# Patient Record
Sex: MALE | Race: WHITE | NOT HISPANIC OR LATINO | Employment: OTHER | URBAN - METROPOLITAN AREA
[De-identification: names, ages, dates, MRNs, and addresses within clinical notes are randomized per-mention and may not be internally consistent; named-entity substitution may affect disease eponyms.]

---

## 2017-04-06 ENCOUNTER — HOSPITAL ENCOUNTER (OUTPATIENT)
Dept: NON INVASIVE DIAGNOSTICS | Facility: CLINIC | Age: 72
Discharge: HOME | End: 2017-04-06
Payer: COMMERCIAL

## 2017-04-06 DIAGNOSIS — I72.4 ANEURYSM OF ARTERY OF LOWER EXTREMITY (HCC): ICD-10-CM

## 2017-04-06 DIAGNOSIS — I71.4 ABDOMINAL AORTIC ANEURYSM WITHOUT RUPTURE (HCC): ICD-10-CM

## 2017-04-06 DIAGNOSIS — I65.29 OCCLUSION AND STENOSIS OF UNSPECIFIED CAROTID ARTERY: ICD-10-CM

## 2017-04-06 PROCEDURE — 93925 LOWER EXTREMITY STUDY: CPT

## 2017-04-06 PROCEDURE — 93880 EXTRACRANIAL BILAT STUDY: CPT

## 2017-04-06 PROCEDURE — 93978 VASCULAR STUDY: CPT

## 2017-04-06 PROCEDURE — 93923 UPR/LXTR ART STDY 3+ LVLS: CPT

## 2017-04-17 ENCOUNTER — HOSPITAL ENCOUNTER (OUTPATIENT)
Facility: HOSPITAL | Age: 72
Setting detail: OBSERVATION
Discharge: HOME HEALTH | End: 2017-04-18
Attending: EMERGENCY MEDICINE | Admitting: SURGERY
Payer: COMMERCIAL

## 2017-04-17 ENCOUNTER — APPOINTMENT (EMERGENCY)
Dept: RADIOLOGY | Facility: HOSPITAL | Age: 72
End: 2017-04-17
Payer: COMMERCIAL

## 2017-04-17 DIAGNOSIS — S32.000A LUMBAR COMPRESSION FRACTURE, CLOSED, INITIAL ENCOUNTER (HCC): Primary | ICD-10-CM

## 2017-04-17 PROCEDURE — 70450 CT HEAD/BRAIN W/O DYE: CPT

## 2017-04-17 PROCEDURE — 90471 IMMUNIZATION ADMIN: CPT

## 2017-04-17 PROCEDURE — 90715 TDAP VACCINE 7 YRS/> IM: CPT | Performed by: EMERGENCY MEDICINE

## 2017-04-17 PROCEDURE — 99285 EMERGENCY DEPT VISIT HI MDM: CPT

## 2017-04-17 PROCEDURE — 72131 CT LUMBAR SPINE W/O DYE: CPT

## 2017-04-17 PROCEDURE — 72125 CT NECK SPINE W/O DYE: CPT

## 2017-04-17 PROCEDURE — 96374 THER/PROPH/DIAG INJ IV PUSH: CPT

## 2017-04-17 RX ORDER — ONDANSETRON 2 MG/ML
4 INJECTION INTRAMUSCULAR; INTRAVENOUS EVERY 6 HOURS PRN
Status: DISCONTINUED | OUTPATIENT
Start: 2017-04-17 | End: 2017-04-18 | Stop reason: HOSPADM

## 2017-04-17 RX ORDER — CHLORAL HYDRATE 500 MG
1000 CAPSULE ORAL DAILY
Status: DISCONTINUED | OUTPATIENT
Start: 2017-04-18 | End: 2017-04-18 | Stop reason: HOSPADM

## 2017-04-17 RX ORDER — ACETAMINOPHEN 325 MG/1
650 TABLET ORAL EVERY 6 HOURS SCHEDULED
Status: DISCONTINUED | OUTPATIENT
Start: 2017-04-17 | End: 2017-04-18 | Stop reason: HOSPADM

## 2017-04-17 RX ORDER — SODIUM CHLORIDE 9 MG/ML
100 INJECTION, SOLUTION INTRAVENOUS CONTINUOUS
Status: DISCONTINUED | OUTPATIENT
Start: 2017-04-18 | End: 2017-04-18 | Stop reason: HOSPADM

## 2017-04-17 RX ORDER — OXYCODONE HYDROCHLORIDE 5 MG/1
5 TABLET ORAL EVERY 4 HOURS PRN
Status: DISCONTINUED | OUTPATIENT
Start: 2017-04-17 | End: 2017-04-18 | Stop reason: HOSPADM

## 2017-04-17 RX ORDER — HEPARIN SODIUM 5000 [USP'U]/ML
5000 INJECTION, SOLUTION INTRAVENOUS; SUBCUTANEOUS EVERY 8 HOURS SCHEDULED
Status: DISCONTINUED | OUTPATIENT
Start: 2017-04-17 | End: 2017-04-18 | Stop reason: HOSPADM

## 2017-04-17 RX ORDER — ONDANSETRON 2 MG/ML
4 INJECTION INTRAMUSCULAR; INTRAVENOUS EVERY 4 HOURS PRN
Status: DISCONTINUED | OUTPATIENT
Start: 2017-04-17 | End: 2017-04-17

## 2017-04-17 RX ORDER — CARVEDILOL 12.5 MG/1
12.5 TABLET ORAL 2 TIMES DAILY WITH MEALS
Status: DISCONTINUED | OUTPATIENT
Start: 2017-04-18 | End: 2017-04-18 | Stop reason: HOSPADM

## 2017-04-17 RX ORDER — OXYCODONE HYDROCHLORIDE 10 MG/1
10 TABLET ORAL EVERY 4 HOURS PRN
Status: DISCONTINUED | OUTPATIENT
Start: 2017-04-17 | End: 2017-04-18 | Stop reason: HOSPADM

## 2017-04-17 RX ORDER — LISINOPRIL 20 MG/1
20 TABLET ORAL DAILY
Status: DISCONTINUED | OUTPATIENT
Start: 2017-04-18 | End: 2017-04-18 | Stop reason: HOSPADM

## 2017-04-17 RX ORDER — GINSENG 100 MG
1 CAPSULE ORAL 2 TIMES DAILY
Status: DISCONTINUED | OUTPATIENT
Start: 2017-04-17 | End: 2017-04-18 | Stop reason: HOSPADM

## 2017-04-17 RX ORDER — PRAVASTATIN SODIUM 40 MG
40 TABLET ORAL
Status: DISCONTINUED | OUTPATIENT
Start: 2017-04-18 | End: 2017-04-18 | Stop reason: HOSPADM

## 2017-04-17 RX ADMIN — BACITRACIN ZINC 1 LARGE APPLICATION: 500 OINTMENT TOPICAL at 20:27

## 2017-04-17 RX ADMIN — HYDROMORPHONE HYDROCHLORIDE 0.5 MG: 1 INJECTION, SOLUTION INTRAMUSCULAR; INTRAVENOUS; SUBCUTANEOUS at 16:55

## 2017-04-17 RX ADMIN — OXYCODONE HYDROCHLORIDE 10 MG: 10 TABLET ORAL at 19:15

## 2017-04-17 RX ADMIN — ACETAMINOPHEN 650 MG: 325 TABLET, FILM COATED ORAL at 19:15

## 2017-04-17 RX ADMIN — TETANUS TOXOID, REDUCED DIPHTHERIA TOXOID AND ACELLULAR PERTUSSIS VACCINE, ADSORBED 0.5 ML: 5; 2.5; 8; 8; 2.5 SUSPENSION INTRAMUSCULAR at 16:59

## 2017-04-17 RX ADMIN — HEPARIN SODIUM 5000 UNITS: 5000 INJECTION, SOLUTION INTRAVENOUS; SUBCUTANEOUS at 21:25

## 2017-04-18 ENCOUNTER — APPOINTMENT (OUTPATIENT)
Dept: OCCUPATIONAL THERAPY | Facility: HOSPITAL | Age: 72
End: 2017-04-18
Payer: COMMERCIAL

## 2017-04-18 ENCOUNTER — APPOINTMENT (OUTPATIENT)
Dept: RADIOLOGY | Facility: HOSPITAL | Age: 72
End: 2017-04-18
Payer: COMMERCIAL

## 2017-04-18 VITALS
HEART RATE: 64 BPM | HEIGHT: 66 IN | DIASTOLIC BLOOD PRESSURE: 84 MMHG | TEMPERATURE: 98.6 F | SYSTOLIC BLOOD PRESSURE: 149 MMHG | OXYGEN SATURATION: 97 % | WEIGHT: 204 LBS | RESPIRATION RATE: 17 BRPM | BODY MASS INDEX: 32.78 KG/M2

## 2017-04-18 PROBLEM — S32.010A CLOSED COMPRESSION FRACTURE OF FIRST LUMBAR VERTEBRA (HCC): Status: ACTIVE | Noted: 2017-04-18

## 2017-04-18 LAB
ANION GAP SERPL CALCULATED.3IONS-SCNC: 4 MMOL/L (ref 4–13)
APTT PPP: 30 SECONDS (ref 24–36)
BASOPHILS # BLD AUTO: 0.04 THOUSANDS/ΜL (ref 0–0.1)
BASOPHILS NFR BLD AUTO: 1 % (ref 0–1)
BUN SERPL-MCNC: 12 MG/DL (ref 5–25)
CALCIUM SERPL-MCNC: 8.1 MG/DL (ref 8.3–10.1)
CHLORIDE SERPL-SCNC: 106 MMOL/L (ref 100–108)
CO2 SERPL-SCNC: 29 MMOL/L (ref 21–32)
CREAT SERPL-MCNC: 0.83 MG/DL (ref 0.6–1.3)
EOSINOPHIL # BLD AUTO: 0.26 THOUSAND/ΜL (ref 0–0.61)
EOSINOPHIL NFR BLD AUTO: 5 % (ref 0–6)
ERYTHROCYTE [DISTWIDTH] IN BLOOD BY AUTOMATED COUNT: 12.4 % (ref 11.6–15.1)
GFR SERPL CREATININE-BSD FRML MDRD: >60 ML/MIN/1.73SQ M
GLUCOSE SERPL-MCNC: 98 MG/DL (ref 65–140)
HCT VFR BLD AUTO: 33.8 % (ref 36.5–49.3)
HGB BLD-MCNC: 11.5 G/DL (ref 12–17)
INR PPP: 1.12 (ref 0.86–1.16)
LYMPHOCYTES # BLD AUTO: 1.72 THOUSANDS/ΜL (ref 0.6–4.47)
LYMPHOCYTES NFR BLD AUTO: 31 % (ref 14–44)
MCH RBC QN AUTO: 32.5 PG (ref 26.8–34.3)
MCHC RBC AUTO-ENTMCNC: 34 G/DL (ref 31.4–37.4)
MCV RBC AUTO: 96 FL (ref 82–98)
MONOCYTES # BLD AUTO: 0.68 THOUSAND/ΜL (ref 0.17–1.22)
MONOCYTES NFR BLD AUTO: 12 % (ref 4–12)
NEUTROPHILS # BLD AUTO: 2.82 THOUSANDS/ΜL (ref 1.85–7.62)
NEUTS SEG NFR BLD AUTO: 51 % (ref 43–75)
NRBC BLD AUTO-RTO: 0 /100 WBCS
PLATELET # BLD AUTO: 144 THOUSANDS/UL (ref 149–390)
PMV BLD AUTO: 8.8 FL (ref 8.9–12.7)
POTASSIUM SERPL-SCNC: 4.1 MMOL/L (ref 3.5–5.3)
PROTHROMBIN TIME: 14.5 SECONDS (ref 12–14.3)
RBC # BLD AUTO: 3.54 MILLION/UL (ref 3.88–5.62)
SODIUM SERPL-SCNC: 139 MMOL/L (ref 136–145)
WBC # BLD AUTO: 5.53 THOUSAND/UL (ref 4.31–10.16)

## 2017-04-18 PROCEDURE — G8987 SELF CARE CURRENT STATUS: HCPCS

## 2017-04-18 PROCEDURE — G8979 MOBILITY GOAL STATUS: HCPCS

## 2017-04-18 PROCEDURE — 85610 PROTHROMBIN TIME: CPT | Performed by: EMERGENCY MEDICINE

## 2017-04-18 PROCEDURE — 85025 COMPLETE CBC W/AUTO DIFF WBC: CPT | Performed by: EMERGENCY MEDICINE

## 2017-04-18 PROCEDURE — 80048 BASIC METABOLIC PNL TOTAL CA: CPT | Performed by: EMERGENCY MEDICINE

## 2017-04-18 PROCEDURE — 72080 X-RAY EXAM THORACOLMB 2/> VW: CPT

## 2017-04-18 PROCEDURE — 97530 THERAPEUTIC ACTIVITIES: CPT

## 2017-04-18 PROCEDURE — 97163 PT EVAL HIGH COMPLEX 45 MIN: CPT

## 2017-04-18 PROCEDURE — 85730 THROMBOPLASTIN TIME PARTIAL: CPT | Performed by: EMERGENCY MEDICINE

## 2017-04-18 PROCEDURE — G8978 MOBILITY CURRENT STATUS: HCPCS

## 2017-04-18 PROCEDURE — 72100 X-RAY EXAM L-S SPINE 2/3 VWS: CPT

## 2017-04-18 PROCEDURE — 97116 GAIT TRAINING THERAPY: CPT

## 2017-04-18 PROCEDURE — G8988 SELF CARE GOAL STATUS: HCPCS

## 2017-04-18 PROCEDURE — 97166 OT EVAL MOD COMPLEX 45 MIN: CPT

## 2017-04-18 RX ADMIN — ACETAMINOPHEN 650 MG: 325 TABLET, FILM COATED ORAL at 12:31

## 2017-04-18 RX ADMIN — SODIUM CHLORIDE 100 ML/HR: 0.9 INJECTION, SOLUTION INTRAVENOUS at 10:15

## 2017-04-18 RX ADMIN — OXYCODONE HYDROCHLORIDE 5 MG: 5 TABLET ORAL at 00:32

## 2017-04-18 RX ADMIN — BACITRACIN ZINC 1 LARGE APPLICATION: 500 OINTMENT TOPICAL at 08:46

## 2017-04-18 RX ADMIN — BACITRACIN ZINC 1 LARGE APPLICATION: 500 OINTMENT TOPICAL at 18:02

## 2017-04-18 RX ADMIN — CARVEDILOL 12.5 MG: 12.5 TABLET, FILM COATED ORAL at 08:46

## 2017-04-18 RX ADMIN — ACETAMINOPHEN 650 MG: 325 TABLET, FILM COATED ORAL at 05:36

## 2017-04-18 RX ADMIN — PRAVASTATIN SODIUM 40 MG: 40 TABLET ORAL at 18:01

## 2017-04-18 RX ADMIN — Medication 1000 MG: at 08:46

## 2017-04-18 RX ADMIN — Medication 1 TABLET: at 08:46

## 2017-04-18 RX ADMIN — ACETAMINOPHEN 650 MG: 325 TABLET, FILM COATED ORAL at 18:02

## 2017-04-18 RX ADMIN — ACETAMINOPHEN 650 MG: 325 TABLET, FILM COATED ORAL at 00:26

## 2017-04-18 RX ADMIN — HEPARIN SODIUM 5000 UNITS: 5000 INJECTION, SOLUTION INTRAVENOUS; SUBCUTANEOUS at 05:36

## 2017-04-18 RX ADMIN — SODIUM CHLORIDE 100 ML/HR: 0.9 INJECTION, SOLUTION INTRAVENOUS at 00:30

## 2017-04-18 RX ADMIN — CARVEDILOL 12.5 MG: 12.5 TABLET, FILM COATED ORAL at 18:02

## 2017-04-18 RX ADMIN — LISINOPRIL 20 MG: 20 TABLET ORAL at 08:46

## 2017-04-20 ENCOUNTER — ALLSCRIPTS OFFICE VISIT (OUTPATIENT)
Dept: OTHER | Facility: OTHER | Age: 72
End: 2017-04-20

## 2017-04-25 ENCOUNTER — HOSPITAL ENCOUNTER (OUTPATIENT)
Dept: RADIOLOGY | Facility: CLINIC | Age: 72
Discharge: HOME | End: 2017-04-25
Payer: COMMERCIAL

## 2017-04-25 ENCOUNTER — TRANSCRIBE ORDERS (OUTPATIENT)
Dept: RADIOLOGY | Facility: CLINIC | Age: 72
End: 2017-04-25

## 2017-04-25 DIAGNOSIS — S34.101A: ICD-10-CM

## 2017-04-25 DIAGNOSIS — S34.101A: Primary | ICD-10-CM

## 2017-04-25 PROCEDURE — 72080 X-RAY EXAM THORACOLMB 2/> VW: CPT

## 2017-04-26 ENCOUNTER — ALLSCRIPTS OFFICE VISIT (OUTPATIENT)
Dept: OTHER | Facility: OTHER | Age: 72
End: 2017-04-26

## 2017-05-09 ENCOUNTER — GENERIC CONVERSION - ENCOUNTER (OUTPATIENT)
Dept: OTHER | Facility: OTHER | Age: 72
End: 2017-05-09

## 2017-05-12 DIAGNOSIS — S32.010D WEDGE COMPRESSION FRACTURE OF FIRST LUMBAR VERTEBRA WITH ROUTINE HEALING: ICD-10-CM

## 2017-05-12 DIAGNOSIS — I10 ESSENTIAL (PRIMARY) HYPERTENSION: ICD-10-CM

## 2017-05-12 DIAGNOSIS — I71.4 ABDOMINAL AORTIC ANEURYSM WITHOUT RUPTURE (HCC): ICD-10-CM

## 2017-05-30 ENCOUNTER — HOSPITAL ENCOUNTER (OUTPATIENT)
Dept: RADIOLOGY | Facility: CLINIC | Age: 72
Discharge: HOME | End: 2017-05-30
Payer: COMMERCIAL

## 2017-05-30 DIAGNOSIS — S32.010D WEDGE COMPRESSION FRACTURE OF FIRST LUMBAR VERTEBRA WITH ROUTINE HEALING: ICD-10-CM

## 2017-05-30 PROCEDURE — 72100 X-RAY EXAM L-S SPINE 2/3 VWS: CPT

## 2017-05-31 ENCOUNTER — TRANSCRIBE ORDERS (OUTPATIENT)
Dept: RADIOLOGY | Facility: HOSPITAL | Age: 72
End: 2017-05-31

## 2017-05-31 ENCOUNTER — HOSPITAL ENCOUNTER (OUTPATIENT)
Dept: RADIOLOGY | Facility: HOSPITAL | Age: 72
Discharge: HOME | End: 2017-05-31
Payer: COMMERCIAL

## 2017-05-31 ENCOUNTER — ALLSCRIPTS OFFICE VISIT (OUTPATIENT)
Dept: OTHER | Facility: OTHER | Age: 72
End: 2017-05-31

## 2017-05-31 DIAGNOSIS — S32.010D WEDGE COMPRESSION FRACTURE OF FIRST LUMBAR VERTEBRA WITH ROUTINE HEALING: ICD-10-CM

## 2017-05-31 PROCEDURE — 72020 X-RAY EXAM OF SPINE 1 VIEW: CPT

## 2017-06-06 ENCOUNTER — ALLSCRIPTS OFFICE VISIT (OUTPATIENT)
Dept: OTHER | Facility: OTHER | Age: 72
End: 2017-06-06

## 2017-06-07 ENCOUNTER — APPOINTMENT (OUTPATIENT)
Dept: LAB | Facility: CLINIC | Age: 72
End: 2017-06-07
Payer: COMMERCIAL

## 2017-06-07 DIAGNOSIS — I10 ESSENTIAL (PRIMARY) HYPERTENSION: ICD-10-CM

## 2017-06-07 DIAGNOSIS — R35.1 NOCTURIA: ICD-10-CM

## 2017-06-07 DIAGNOSIS — R31.9 HEMATURIA: ICD-10-CM

## 2017-06-07 LAB
ALBUMIN SERPL BCP-MCNC: 3.8 G/DL (ref 3.5–5)
ALP SERPL-CCNC: 49 U/L (ref 46–116)
ALT SERPL W P-5'-P-CCNC: 28 U/L (ref 12–78)
ANION GAP SERPL CALCULATED.3IONS-SCNC: 6 MMOL/L (ref 4–13)
AST SERPL W P-5'-P-CCNC: 18 U/L (ref 5–45)
BASOPHILS # BLD AUTO: 0.05 THOUSANDS/ΜL (ref 0–0.1)
BASOPHILS NFR BLD AUTO: 1 % (ref 0–1)
BILIRUB SERPL-MCNC: 0.49 MG/DL (ref 0.2–1)
BUN SERPL-MCNC: 15 MG/DL (ref 5–25)
CALCIUM SERPL-MCNC: 9.1 MG/DL (ref 8.3–10.1)
CHLORIDE SERPL-SCNC: 103 MMOL/L (ref 100–108)
CO2 SERPL-SCNC: 28 MMOL/L (ref 21–32)
CREAT SERPL-MCNC: 0.83 MG/DL (ref 0.6–1.3)
EOSINOPHIL # BLD AUTO: 0.29 THOUSAND/ΜL (ref 0–0.61)
EOSINOPHIL NFR BLD AUTO: 6 % (ref 0–6)
ERYTHROCYTE [DISTWIDTH] IN BLOOD BY AUTOMATED COUNT: 12.3 % (ref 11.6–15.1)
GFR SERPL CREATININE-BSD FRML MDRD: >60 ML/MIN/1.73SQ M
GLUCOSE P FAST SERPL-MCNC: 93 MG/DL (ref 65–99)
HCT VFR BLD AUTO: 37.3 % (ref 36.5–49.3)
HGB BLD-MCNC: 12.7 G/DL (ref 12–17)
LYMPHOCYTES # BLD AUTO: 2 THOUSANDS/ΜL (ref 0.6–4.47)
LYMPHOCYTES NFR BLD AUTO: 38 % (ref 14–44)
MCH RBC QN AUTO: 32.2 PG (ref 26.8–34.3)
MCHC RBC AUTO-ENTMCNC: 34 G/DL (ref 31.4–37.4)
MCV RBC AUTO: 95 FL (ref 82–98)
MONOCYTES # BLD AUTO: 0.64 THOUSAND/ΜL (ref 0.17–1.22)
MONOCYTES NFR BLD AUTO: 12 % (ref 4–12)
NEUTROPHILS # BLD AUTO: 2.28 THOUSANDS/ΜL (ref 1.85–7.62)
NEUTS SEG NFR BLD AUTO: 43 % (ref 43–75)
NRBC BLD AUTO-RTO: 0 /100 WBCS
PLATELET # BLD AUTO: 168 THOUSANDS/UL (ref 149–390)
PMV BLD AUTO: 8.8 FL (ref 8.9–12.7)
POTASSIUM SERPL-SCNC: 4.1 MMOL/L (ref 3.5–5.3)
PROT SERPL-MCNC: 7.4 G/DL (ref 6.4–8.2)
RBC # BLD AUTO: 3.94 MILLION/UL (ref 3.88–5.62)
SODIUM SERPL-SCNC: 137 MMOL/L (ref 136–145)
TSH SERPL DL<=0.05 MIU/L-ACNC: 0.98 UIU/ML (ref 0.36–3.74)
WBC # BLD AUTO: 5.26 THOUSAND/UL (ref 4.31–10.16)

## 2017-06-07 PROCEDURE — 85025 COMPLETE CBC W/AUTO DIFF WBC: CPT

## 2017-06-07 PROCEDURE — 84443 ASSAY THYROID STIM HORMONE: CPT

## 2017-06-07 PROCEDURE — 36415 COLL VENOUS BLD VENIPUNCTURE: CPT

## 2017-06-07 PROCEDURE — 80053 COMPREHEN METABOLIC PANEL: CPT

## 2017-06-12 ENCOUNTER — GENERIC CONVERSION - ENCOUNTER (OUTPATIENT)
Dept: OTHER | Facility: OTHER | Age: 72
End: 2017-06-12

## 2017-06-16 DIAGNOSIS — R42 DIZZINESS AND GIDDINESS: ICD-10-CM

## 2017-06-29 ENCOUNTER — APPOINTMENT (OUTPATIENT)
Dept: PHYSICAL THERAPY | Facility: REHABILITATION | Age: 72
End: 2017-06-29
Payer: COMMERCIAL

## 2017-06-29 ENCOUNTER — GENERIC CONVERSION - ENCOUNTER (OUTPATIENT)
Dept: OTHER | Facility: OTHER | Age: 72
End: 2017-06-29

## 2017-06-29 PROCEDURE — 97162 PT EVAL MOD COMPLEX 30 MIN: CPT

## 2017-06-29 PROCEDURE — G8991 OTHER PT/OT GOAL STATUS: HCPCS

## 2017-06-29 PROCEDURE — 97112 NEUROMUSCULAR REEDUCATION: CPT

## 2017-06-29 PROCEDURE — G8990 OTHER PT/OT CURRENT STATUS: HCPCS

## 2017-08-02 ENCOUNTER — GENERIC CONVERSION - ENCOUNTER (OUTPATIENT)
Dept: OTHER | Facility: OTHER | Age: 72
End: 2017-08-02

## 2017-09-07 ENCOUNTER — TRANSCRIBE ORDERS (OUTPATIENT)
Dept: ADMINISTRATIVE | Facility: HOSPITAL | Age: 72
End: 2017-09-07

## 2017-09-07 ENCOUNTER — ALLSCRIPTS OFFICE VISIT (OUTPATIENT)
Dept: OTHER | Facility: OTHER | Age: 72
End: 2017-09-07

## 2017-09-07 DIAGNOSIS — R41.3 MEMORY LOSS: Primary | ICD-10-CM

## 2017-09-07 DIAGNOSIS — R41.3 OTHER AMNESIA: ICD-10-CM

## 2017-09-14 ENCOUNTER — HOSPITAL ENCOUNTER (OUTPATIENT)
Dept: RADIOLOGY | Age: 72
Discharge: HOME | End: 2017-09-14
Payer: COMMERCIAL

## 2017-09-14 ENCOUNTER — APPOINTMENT (OUTPATIENT)
Dept: LAB | Age: 72
End: 2017-09-14
Payer: COMMERCIAL

## 2017-09-14 DIAGNOSIS — R41.3 MEMORY LOSS: ICD-10-CM

## 2017-09-14 DIAGNOSIS — R41.3 OTHER AMNESIA: ICD-10-CM

## 2017-09-14 LAB
25(OH)D3 SERPL-MCNC: 32.3 NG/ML (ref 30–100)
CRP SERPL QL: <3 MG/L
VIT B12 SERPL-MCNC: 587 PG/ML (ref 100–900)

## 2017-09-14 PROCEDURE — 82607 VITAMIN B-12: CPT

## 2017-09-14 PROCEDURE — 36415 COLL VENOUS BLD VENIPUNCTURE: CPT

## 2017-09-14 PROCEDURE — 70450 CT HEAD/BRAIN W/O DYE: CPT

## 2017-09-14 PROCEDURE — 86140 C-REACTIVE PROTEIN: CPT

## 2017-09-14 PROCEDURE — 86592 SYPHILIS TEST NON-TREP QUAL: CPT

## 2017-09-14 PROCEDURE — 82306 VITAMIN D 25 HYDROXY: CPT

## 2017-09-15 ENCOUNTER — GENERIC CONVERSION - ENCOUNTER (OUTPATIENT)
Dept: OTHER | Facility: OTHER | Age: 72
End: 2017-09-15

## 2017-09-15 LAB — RPR SER QL: NORMAL

## 2017-10-09 ENCOUNTER — HOSPITAL ENCOUNTER (OUTPATIENT)
Dept: NON INVASIVE DIAGNOSTICS | Facility: CLINIC | Age: 72
Discharge: HOME | End: 2017-10-09
Payer: COMMERCIAL

## 2017-10-09 DIAGNOSIS — I65.23 OCCLUSION AND STENOSIS OF BILATERAL CAROTID ARTERIES: ICD-10-CM

## 2017-10-09 DIAGNOSIS — I71.4 ABDOMINAL AORTIC ANEURYSM WITHOUT RUPTURE (HCC): ICD-10-CM

## 2017-10-09 PROCEDURE — 93880 EXTRACRANIAL BILAT STUDY: CPT

## 2017-10-09 PROCEDURE — 93923 UPR/LXTR ART STDY 3+ LVLS: CPT

## 2017-10-09 PROCEDURE — 93978 VASCULAR STUDY: CPT

## 2017-10-28 DIAGNOSIS — I65.23 OCCLUSION AND STENOSIS OF BILATERAL CAROTID ARTERIES: ICD-10-CM

## 2017-10-28 DIAGNOSIS — I71.4 ABDOMINAL AORTIC ANEURYSM WITHOUT RUPTURE (HCC): ICD-10-CM

## 2018-01-09 NOTE — RESULT NOTES
Discussion/Summary   all bw was normal     Verified Results  (1) CBC/PLT/DIFF [id number] 16:70ON Nichole Brownlee     Test Name Result Flag Reference   WBC COUNT 5 26 Thousand/uL  4 31-10 16   RBC COUNT 3 94 Million/uL  3 88-5 62   HEMOGLOBIN 12 7 g/dL  12 0-17 0   HEMATOCRIT 37 3 %  36 5-49 3   MCV 95 fL  82-98   MCH 32 2 pg  26 8-34 3   MCHC 34 0 g/dL  31 4-37 4   RDW 12 3 %  11 6-15 1   MPV 8 8 fL L 8 9-12 7   PLATELET COUNT 445 Thousands/uL  149-390   nRBC AUTOMATED 0 /100 WBCs     NEUTROPHILS RELATIVE PERCENT 43 %  43-75   LYMPHOCYTES RELATIVE PERCENT 38 %  14-44   MONOCYTES RELATIVE PERCENT 12 %  4-12   EOSINOPHILS RELATIVE PERCENT 6 %  0-6   BASOPHILS RELATIVE PERCENT 1 %  0-1   NEUTROPHILS ABSOLUTE COUNT 2 28 Thousands/? ??L  1 85-7 62   LYMPHOCYTES ABSOLUTE COUNT 2 00 Thousands/? ??L  0 60-4 47   MONOCYTES ABSOLUTE COUNT 0 64 Thousand/? ??L  0 17-1 22   EOSINOPHILS ABSOLUTE COUNT 0 29 Thousand/? ??L  0 00-0 61   BASOPHILS ABSOLUTE COUNT 0 05 Thousands/? ??L  0 00-0 10     (1) TSH [id number] 75:22OF Nichole Brownlee     Test Name Result Flag Reference   TSH 0 979 uIU/mL  0 358-3 740   Patients undergoing fluorescein dye angiography may retain small amounts of fluorescein in the body for 48-72 hours post procedure  Samples containing fluorescein can produce falsely depressed TSH values  If the patient had this procedure,a specimen should be resubmitted post fluorescein clearance

## 2018-01-10 NOTE — PROGRESS NOTES
Assessment    1  Compression fracture of L1 lumbar vertebra with routine healing (V54 17) (S32 010D)   2  History of compression fracture of vertebral column (V15 51) (Z87 81)    Plan    · XR SPINE LUMBAR COMPLETE W BENDING MINIMUM 6 VIEWS; Status:Active; Requested for:34Bvx5797;    Perform:[facility]; CDK:[id number];SIVABVN; Today; For:Compression fracture of L1 lumbar vertebra with routine healing; Ordered By:George Luque;    · Follow-up PRN Evaluation and Treatment  Follow-up  Status: Complete  Done:  53TYO1505   Ordered; For: Compression fracture of L1 lumbar vertebra with routine healing, History of compression fracture of vertebral column; Ordered By: Ignacio Obregon Performed:  Due: 41KMS7950    Discussion/Summary    Very pleasant 29-year-old male, returns to review lumbar spine studies 5/30/17,, these studies are carefully reviewed by Dr Selin Gamino compared with prior studies of 4/25/17 and 4/18/17 the compression fracture at L1 is identified, no significant changes are appreciated, appropriate alignment of the lumbar spine is noted  Clinically the patient is pain-free regardless of position and with palpation  With no gait or balance disturbance  Flexion-extension study is a lumbar spine was also completed today 5/31/17, was reviewed by Dr Selin Gamino and reveal stability in flexion and extension  The patient is advised to proceed to wean from his current neoprene LSO brace, over the next several days to a week, gradually increasing his activity level without the brace in place  He is cautioned to avoid impact type activities in the future such as trampoline and jumping as people with a history of a prior compression fracture are at greater risk of recurrent he expressed understanding of these instructions  Relative to his activity levels is advised to progressively increase his lifting from 10 pounds about 5 pounds weekly until he reaches his typical maximum for his age      Further follow-up with neurosurgery will be on an as basis  And no additional imaging is required at this time    These findings, impressions and recommendations are reviewed in great detail with the patient, he expressed understanding and agreement, his questions were answered completely and to his satisfaction  His wife was also notified by telephone with the results of his flexion-extension studies today, and instructions to proceed with weaning from his brace as we have discussed she expressed understanding and will pass this on to her  area  The patient, patient's family was counseled regarding diagnostic results, instructions for management, patient and family education, importance of compliance with treatment  The patient has the current Goals: She'll return to all usual activities without restriction  Patient is able to Self-Care  Chief Complaint    1  Back Pain  Hospital follow-up, history of fall, L1 compression fracture, approximately 7 weeks ago  History of Present Illness  Pleasant 66-year-old male, accompanied by his wife, returns for follow-up as noted above    He has had updated lumbar spine studies as requested 5/30/17  He reports he has discontinued his TLSO brace and has a neoprene lumbar brace with stays that has been wearing the last 2-3 weeks  He denies back pain regardless of activity or position, denies bowel or bladder disturbance, and denies gait or balance disturbance       As noted in the past he has a history of a fall from a ladder 4/17/17, he reports he was approximately 2 steps up, slipped with his slippers on and fell  He was seen at [place] [id number] emergency department 4/7/17 had a very brief inpatient stay 4/7/17 through 4/18/17, was discharged to home  He reports no interval change in his medical or surgical history  Drew Egan presents with complaints of no back pain     Associated symptoms include no spasm, no stiffness, no fever, no night sweats, no abdominal pain, no general malaise, no weight loss, no arm numbness, no leg numbness, no arm weakness, no leg weakness, no urinary incontinence, no fecal incontinence, no urinary retention and no rash localized to the area of pain  Review of Systems    Constitutional: No fever or chills, feels well, no tiredness, no recent weight gain or weight loss  Eyes: No complaints of eye pain, no red eyes, no discharge from eyes, no itchy eyes  ENT: no complaints of earache, no hearing loss, no nosebleeds, no nasal discharge, no sore throat, no hoarseness  Cardiovascular: No complaints of slow heart rate, no fast heart rate, no chest pain, no palpitations, no leg claudication, no lower extremity  Respiratory: No complaints of shortness of breath, no wheezing, no cough, no SOB on exertion, no orthopnea or PND  Gastrointestinal: No complaints of abdominal pain, no constipation, no nausea or vomiting, no diarrhea or bloody stools  Genitourinary: No complaints of dysuria, no incontinence, no hesitancy, no nocturia, no genital lesion, no testicular pain  Musculoskeletal: No complaints of arthralgia, no myalgias, no joint swelling or stiffness, no limb pain or swelling  Integumentary: No complaints of skin rash or skin lesions, no itching, no skin wound, no dry skin  Neurological: No compliants of headache, no confusion, no convulsions, no numbness or tingling, no dizziness or fainting, no limb weakness, no difficulty walking  Psychiatric: Is not suicidal, no sleep disturbances, no anxiety or depression, no change in personality, no emotional problems  Endocrine: No complaints of proptosis, no hot flashes, no muscle weakness, no erectile dysfunction, no deepening of the voice, no feelings of weakness  Hematologic/Lymphatic: a tendency for easy bruising, but no tendency for easy bleeding  ROS reviewed  Active Problems    1  Abdominal aortic aneurysm without rupture (441 4) (I71 4)   2   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)   3  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)   4  Arthritis (716 90) (M19 90)   5  Carotid artery stenosis, asymptomatic (433 10) (I65 29)   6  Compression fracture of L1 lumbar vertebra with routine healing (V54 17) (S32 010D)   7  Coronary artery disease (414 00) (I25 10)   8  Dark urine (791 9) (R82 99)   9  Elevated liver function tests (790 6) (R94 5)   10  Encounter for screening colonoscopy (V76 51) (Z12 11)   11  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) (F52 21)   12  Hematuria (599 70) (R31 9)   13  Hyperglycemia (790 29) (R73 9)   14  Hyperlipidemia (272 4) (E78 5)   15  Hypertension (401 9) (I10)   16  Incisional hernia, without obstruction or gangrene (553 21) (K43 2)   17  Localized, primary osteoarthritis of lower leg, unspecified laterality   18  Need for prophylactic vaccination and inoculation against influenza (V04 81) (Z23)   19  Nocturia (788 43) (R35 1)   20  Pain in joint, lower leg (719 46) (M25 569)   21  Postoperative examination (V67 00) (Z09)   22  Preoperative cardiovascular examination (V72 81) (Z01 810)   23  Primary osteoarthritis of left knee (715 16) (M17 12)   24  Special screening for malignant neoplasm of prostate (V76 44) (Z12 5)   25  Synovial cyst of popliteal space (727 51) (M71 20)   26  Transient cerebral ischemic attack (435 9) (G45 9)   27  Vertigo (780 4) (R42)    Past Medical History    1  History of Appendicitis (541)   2  History of Colonoscopy (Fiberoptic)   3  History of abdominal aortic aneurysm (V12 59) (Z86 79)   4  History of cholelithiasis (V12 79) (Z87 19)   5  History of dizziness (V13 89) (Z87 898)   6  History of fall (V15 88) (Z91 81)   7  History of insomnia (V13 89) (Z87 898)   8  History of transient cerebral ischemia (V12 54) (Z86 73)    The active problems and past medical history were reviewed and updated today  Surgical History    1  History of Appendectomy   2  History of Cath Stent Placement   3   History of Cholecystectomy Laparoscopic   4  History of PTCA   5  History of Surgery For Abdominal Aortic Aneurysm    The surgical history was reviewed and updated today  Family History  Mother    1  Family history of Benign Essential Hypertension   2  Family history of Heart Disease (V17 49)   3  Family history of Reported Family History Of Heart Disease   4  Family history of Silent Stroke  Father    5  Family history of Heart Disease (V17 49)   6  Family history of Reported Family History Of Heart Disease  Brother    7  Family history of Aortic Aneurysm   8  Family history of Diabetes Mellitus (V18 0)   9  Family history of Heart Disease (V17 49)   10  Family history of Heart Disease (V17 49)   11  Family history of Heart Disease (V17 49)  Family History    12  Family history of Reported Family History Of Heart Disease    The family history was reviewed and updated today  Social History    · Alcohol Use (History)   · Being A Social Drinker   · Denied: History of Drug Use   · Former smoker (H26 25) (N49 035)   · Retired  The social history was reviewed and updated today  Current Meds   1  Carvedilol 12 5 MG Oral Tablet; Take 1 tablet by mouth  twice a day with meals    Requested for: 01Aug2016; Last Rx:28Jiw0527 Ordered   2  Clopidogrel Bisulfate 75 MG Oral Tablet; TAKE 1 TABLET DAILY; Therapy: 94NTP1760 to (Evaluate:14Jan2018)  Requested for: 20Jan2017; Last   Rx:19Jan2017 Ordered   3  Lisinopril 20 MG Oral Tablet; Take 1 tablet daily; Therapy: 77SFG3328 to (Evaluate:29Azs6133)  Requested for: 73Tnl6155; Last   Rx:56Vyx3507 Ordered   4  Multi-Vitamin TABS; Therapy: (Recorded:11Jun2013) to Recorded   5  Simvastatin 40 MG Oral Tablet; TAKE 1 TABLET DAILY; Therapy: 48MMS9871 to 450 39 173)  Requested for: 83JVG5948; Last   Rx:19Jan2017 Ordered   6   Tylenol Arthritis Pain 650 MG TBCR; TAKE 2 TABLETS IN THE AM AND TWO TABLETS AT   NOON FOR THE PAIN;   Therapy: (Recorded:14Jan2014) to Recorded    The medication list was reviewed and updated today  Allergies    1  No Known Drug Allergies    Vitals  Vital Signs    Recorded: 14KYB6263 01:50PM   Temperature 98 2 F, Tympanic   Heart Rate 82   Respiration 16   Systolic 076, LUE, Sitting   Diastolic 76, LUE, Sitting   Height 5 ft 5 in   Weight 201 lb    BMI Calculated 33 45   BSA Calculated 1 98     Physical Exam     Constitutional Patient appears healthy and well developed  No signs of acute distress present  Respiratory Respiratory effort: Normal   Auscultation of lungs: Clear to auscultation bilaterally  Cardiovascular Auscultation of heart: Rate is regular  Rhythm is regular  No heart murmur appreciated  Musculo: Spine Contour is normal  No tenderness of the spine billaterally  Lumbar/Sacral Spine examination demonstrates no visible abnormailities, normal lordosis, no scoliosis, no spine tenderness on palpation, no masses, full ROM and no pain with motion in any direction  Motor Exam: all motor groups within normal limits of strength & tone bilaterally  Sensory Exam: all sensory within normal limits bilaterally  Deep Tendon Reflexes: all reflexes within normal limits bilaterally  Neurologic - Mental Status: Alert and Oriented x3  Mood and affect: Affect is normal   Grossly nonfocal    Motor System General Motor Strength: No pronator drift and no parietal drift  Reflexes: Biceps reflexes are 2+ bilaterally  Triceps reflexes are 2+ bilaterally  Achilles reflexes are 2+ bilaterally  Babinski's reflex is 2+ down going bilaterally  Ankle clonus is absent bilaterally  Sensory: Sensation grossly intact to light touch  Sensation grossly intact to light touch  Gait and Station: [place] with a normal gait         Results/Data  * XR SPINE LUMBAR 2 OR 3 VIEWS INJURY [id number] 09:06AM Nikolas Davenport Order Number: [id number]   Performing Comments: Upright study, brace in place     Test Name Result Flag Reference   XR SPINE LUMBAR 2 OR 3 VIEWS (Report)     LUMBAR SPINE     INDICATION: Follow-up L1 fracture  COMPARISON: April 25, 2017  VIEWS: AP and lateral     IMAGES: 3     FINDINGS:     A thoracolumbar scoliosis is present  Vertebral alignment is otherwise unremarkable  A wedge compression fracture of L1 is again noted, involving primarily the superior endplate, with slight further loss of height since the last study  The other lumbar vertebral bodies are normal in height  There is no evidence of new fracture  Degenerative disc disease is present at multiple levels, most advanced at L4-L5  Visualized soft tissues appear unremarkable  IMPRESSION:     Compression fracture of L1 with further loss of height since April 18, 2017  Workstation performed: [id number]     Signed by:   Jl Matthews MD   5/31/17     Health Management  Encounter for screening colonoscopy   COLONOSCOPY; every 10 weeks; Next Due: 28TUC1116; Overdue  Health Maintenance   Medicare Annual Wellness Visit; every 1 year; Next Due: 08PQJ0255;  Overdue    Signatures   Electronically signed by : Jorgito Yadav [facility]; May 31 2017  4:11PM EST                       (Author)    Electronically signed by : Dory Leventhal, M D ; May 31 2017  4:24PM EST                       (Author)    Electronically signed by : Dory Leventhal, M D ; May 31 2017  4:24PM EST                       (Author)

## 2018-01-11 NOTE — RESULT NOTES
Verified Results  * XR CHEST PA & LATERAL 44MXC7259 09:35AM [facility]     Test Name Result Flag Reference   XR CHEST PA & LATERAL (Report)     CHEST     INDICATION: Preoperative exam      COMPARISON: 12/20/2015     VIEWS: Frontal and lateral projections; 2 images     FINDINGS:         The cardiomediastinal silhouette is unremarkable  The lungs are clear  No pleural effusions  Osseous structures are age appropriate  IMPRESSION:     No active pulmonary disease  Workstation performed: [id number]     Signed by:    Sascha Kyle MD   8/22/16

## 2018-01-12 VITALS
WEIGHT: 201 LBS | DIASTOLIC BLOOD PRESSURE: 76 MMHG | BODY MASS INDEX: 33.49 KG/M2 | HEART RATE: 82 BPM | HEIGHT: 65 IN | TEMPERATURE: 98.2 F | SYSTOLIC BLOOD PRESSURE: 152 MMHG | RESPIRATION RATE: 16 BRPM

## 2018-01-12 NOTE — RESULT NOTES
Verified Results  (1) TISSUE EXAM [id number] 08:51AM Annie Gresham     Test Name Result Flag Reference   LAB AP CASE REPORT (Report)     Surgical Pathology Report             Case: [id number]                   Authorizing Provider: Edilma Garcia MD    Collected:      09/08/2016 0851        Ordering Location:   [address] [place] [address]   Received:      09/08/2016 [address] Operating Room                            Pathologist:      Del Montana MD                                 Specimen:  Gallbladder   LAB AP FINAL DIAGNOSIS      A  Gallbladder (cholecystectomy):  - Chronic cholecystitis and cholelithiasis  - Cholesterolosis  - Negative for dysplasia   Electronically signed by Del Montana MD on 9/12/2016 at 1:39 PM   LAB AP SURGICAL ADDITIONAL INFORMATION (Report)     These tests were developed and their performance characteristics   determined by [facility] or "[employer]  They may not be cleared or approved by the [place]  Food and   Drug Administration  The FDA has determined that such clearance or   approval is not necessary  These tests are used for clinical purposes  They should not be regarded as investigational or for research  This   laboratory has been approved by [facility], designated as a high-complexity   laboratory and is qualified to perform these tests  LAB AP GROSS DESCRIPTION (Report)     A  The specimen is received in formalin, labeled with the patient's name   and medical record number, and is designated gallbladder  The specimen   consists of a gallbladder measuring 5 7 x 2 8 x 1 5 cm  The serosa is   tan-pink to purple, smooth, glistening, and exhibits a transmural defect   measuring 0 6 centimeters in greatest dimension  The proximal cystic duct   margin is inked blue  The specimen is opened, and the lumen contains   liquid bile, and multiple brown-black stony fragments measuring in   aggregate 1 7 x 1 4 x 0 3 cm   The wall measures 0 2 cm in thickness  The   mucosa is green and velvety  Representative sections  One cassette  Note: The estimated total formalin fixation time based upon information   provided by the submitting clinician and the standard processing schedule   is 35 75 hours    MAS

## 2018-01-12 NOTE — RESULT NOTES
Verified Results  (1) TYPE & SCREEN [id number] 07:21AM Ning Mu     Test Name Result Flag Reference   ABO GROUPING O     RH FACTOR Positive     ANTIBODY SCREEN Negative       (1) PT WITH INR [id number] 09:43AM Ning Mu     Test Name Result Flag Reference   INR 1 11  0 86-1 16   PT 14 1 seconds  12 0-14 3     ECG 12-LEAD 42Fsm5913 09:39AM Ning Mu     Test Name Result Flag Reference   ECG 12-LEAD      Sinus bradycardia   Otherwise normal ECG   When compared with ECG of 02-DEC-2015 14:43,   No significant change was found   Confirmed by MOHSEN Bojorquez , Alex Espinoza ([id number]) on 8/19/2016 5:42:18 PM

## 2018-01-13 VITALS
TEMPERATURE: 97 F | RESPIRATION RATE: 18 BRPM | WEIGHT: 205.5 LBS | SYSTOLIC BLOOD PRESSURE: 120 MMHG | HEIGHT: 65 IN | HEART RATE: 78 BPM | BODY MASS INDEX: 34.24 KG/M2 | DIASTOLIC BLOOD PRESSURE: 80 MMHG

## 2018-01-13 NOTE — PROGRESS NOTES
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)   3  Carotid artery stenosis, asymptomatic (433 10) (I65 29)   4  Abdominal aortic aneurysm without rupture (441 4) (I71 4)   5  History of Surgery For Abdominal Aortic Aneurysm    Plan   Abdominal aortic aneurysm without rupture    · Follow Up After Tests Complete Evaluation and Treatment  Follow-up  Status: Complete   Done: [id number]   Ordered; For: Abdominal aortic aneurysm without rupture; Ordered By: Hafsa Cardenas Performed:  Due: 40VRG7664; Last Updated By: Filipe Rivers; 3/14/2016 11:10:33 AM  Aneurysm of iliac artery, Aneurysm of popliteal artery    · VAS LOWER LIMB ARTERIAL DUPLEX, COMPLETE BILATERAL/GRAFTS; SIDE:Bilateral;  Status:Active; Requested for:14Mar2017;    Perform:[facility]; Order Comments:Popliteal and ilaic ectasia; Due:14Mar2018; Last Updated Amanda Santos; 3/14/2016 11:10:33 AM;Ordered; For:Aneurysm of iliac artery, Aneurysm of popliteal artery; Ordered By:Orquidea Ospina;  Carotid artery stenosis, asymptomatic    · VAS CAROTID COMPLETE STUDY; SIDE:Bilateral; Status:Active; Requested  for:14Mar2017;    Perform:[facility]; Due:14Mar2018; Last Updated Amanda Santos; 3/14/2016 11:10:33 AM;Ordered; For:Carotid artery stenosis, asymptomatic; Ordered By:Orquidea Ospina;    (1) BUN; Status:Resulted - Requires Verification;   Done: 82MBO5209 12:00AM  [id number]; For:Abdominal aortic aneurysm without rupture, PMH: Surgery For Abdominal Aortic Aneurysm; Ordered By:Orquidea Ospina;   (1) CREATININE; Status:Resulted - Requires Verification;   Done: 21POP1452 12:00AM  IEU:[id number];LCNMKKI; For:Abdominal aortic aneurysm without rupture, PMH: Surgery For Abdominal Aortic Aneurysm; Ordered By:Orquidea Ospina;   CTA CHEST ABDOMEN PELVIS W WO CONTRAST; Status:Need Information - Financial Authorization;  Requested for:14Mar2016; Perform:[facility]; VBR:[id number]; Last Updated By:Katie Kuo; 3/18/2016 9:42:39 AM;Ordered; For:Abdominal aortic aneurysm without rupture, PMH: Surgery For Abdominal Aortic Aneurysm; Ordered By:Orquidea Ospina; Annotations              3/28/16 @ 1PM SLT     Discussion/Summary  Discussion Summary:   1  Abdominal aortic aneurysm  Mr Yousif Cardoso has history of abdominal aortic aneurysm status post tube graft repair in 2011  Recent duplex imaging with significant enlargement of aneurysmal changes proximal to the graft (4 4cm, prior 2 8cm)  Will obtain CTA chest/abdomen/pelvis for further evaluation  He is asymptomatic at this time  He will return to the office for review with Dr Shawn Galeano after imaging  2  Arterial ectasia  Discussed continued routine surveillance monitoring of popliteal and left common iliac ectasias with arterial duplex annually  He is a nonsmoker  3  Asymptomatic bilateral carotid stenosis  Discussed the importance of routine surveillance monitoring with annual CV duplex  He should continue on his antiplatelet agent (Plavix per cardiology) and statin therapy  We discussed the benefits of a low-fat diet  Chief Complaint  Chief Complaint Free Text Note Form: " I am here for my test results "       History of Present Illness  HPI: Mr Yousif Cardoso is here today to review his recent arterial testing  He had SHABBIR and CV duplex done 3/3/16 and AOIL on 2/23/16  He has history significant for tube graft repair of AAA in 2011  Today, he denies any abdominal pain or back pain  He complains of right "kneecap" pain after walking greater than 5 blocks, but denies any symptoms consistent with claudication  He says he attributes his knee pain to bilateral Baker's cysts  He is asymptomatic from a carotid standpoint  He denies any TIA/CVA symptoms; no amaurosis fugax, lateralizing weakness, or speech disturbances   He lives at home with his wife and is independent of all activities of daily living  He says he remains physically active and is a nonsmoker  He is on Plavix for his CAD  Review of Systems  Complete Male - Vasc:   Constitutional: no loss in appetite and feeling tired, but no fever, no recent weight gain, no chills and no recent weight loss  Eyes: No sudden vision loss, no blurred vision, no double vision  ENT: no loss of hearing, no nosebleeds, no hoarseness  Cardiovascular: no chest pain, regular heart rate  Respiratory: no shortness of breath, no wheezing, no shortness of breath during exertion, no orthopnea and no complaints of PND, but cough  Gastrointestinal: No nausea, No vomiting, no diarrhea, no blood in stool  Genitourinary: no dysuria, no hematuria, No urinary incontinence, no erectile dysfunction  Musculoskeletal: no limb pain, no limb swelling  Integumentary: no rash, no lesions, no wounds, no ulcer  Neurological: no dementia, no headache, no numbness, no limb weakness, no dizziness, no difficulty walking  Psychiatric: no depression, no mood disorders, no anxiety  Hematologic/Lymphatic: no bleeding disorder, no easy bruising  ROS Reviewed:   ROS reviewed  Active Problems    1  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)   2  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)   3  Carotid artery stenosis, asymptomatic (433 10) (I65 29)   4  Coronary artery disease (414 00) (I25 10)   5  Encounter for screening colonoscopy (V76 51) (Z12 11)   6  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) (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 21)   7  Hyperglycemia (790 29) (R73 9)   8  Hyperlipidemia (272 4) (E78 5)   9  Hypertension (401 9) (I10)   10  Localized, primary osteoarthritis of lower leg, unspecified laterality   11  Need for prophylactic vaccination and inoculation against influenza (V04 81) (Z23)   12  Nocturia (788 43) (R35 1)   13  Pain in joint, lower leg (719 46) (M25 569)   14  Primary osteoarthritis of left knee (715 16) (M17 12)   15   Synovial cyst of popliteal space (727 51) (M71 20)   16  Transient cerebral ischemic attack (435 9) (G45 9)   17  Vertigo (780 4) (R42)    Past Medical History    1  History of Appendicitis (541)   2  History of Colonoscopy (Fiberoptic)   3  History of abdominal aortic aneurysm (V12 59) (Z86 79)   4  History of dizziness (V13 89) (Z87 898)   5  History of insomnia (V13 89) (Z87 898)   6  History of transient cerebral ischemia (V12 54) (H01 07)  Active Problems And Past Medical History Reviewed: The active problems and past medical history were reviewed and updated today  Surgical History    1  History of Cath Stent Placement   2  History of PTCA   3  History of Surgery For Abdominal Aortic Aneurysm  Surgical History Reviewed: The surgical history was reviewed and updated today  Family History    1  Family history of Benign Essential Hypertension   2  Family history of Heart Disease (V17 49)   3  Family history of Reported Family History Of Heart Disease   4  Family history of Silent Stroke    5  Family history of Heart Disease (V17 49)   6  Family history of Reported Family History Of Heart Disease    7  Family history of Aortic Aneurysm   8  Family history of Diabetes Mellitus (V18 0)   9  Family history of Heart Disease (V17 49)   10  Family history of Heart Disease (V17 49)   11  Family history of Heart Disease (V17 49)    12  Family history of Reported Family History Of Heart Disease  Family History Reviewed: The family history was reviewed and updated today  Social History    · Alcohol Use (History)   · Being A Social Drinker   · Denied: History of Drug Use   · Former smoker (D46 28) (X84 660)  Social History Reviewed: The social history was reviewed and updated today  Current Meds   1  Carvedilol 12 5 MG Oral Tablet; Take 1 tablet by mouth  twice a day with meals    Requested for: 84QTC6514; Last Rx:25Jan2016 Ordered   2  Clopidogrel Bisulfate 75 MG Oral Tablet; TAKE 1 TABLET DAILY;    Therapy: 90CRX6687 to (Evaluate:09Jan2017)  Requested for: 93FVV5373; Last   Rx:15Jan2016 Ordered   3  Fish Oil 1000 MG Oral Capsule; Therapy: (Recorded:11Jun2013) to Recorded   4  Lisinopril 20 MG Oral Tablet; Take 1 tablet daily; Therapy: 45JSJ1413 to (Evaluate:09Jan2017)  Requested for: 96BAK8456; Last   Rx:15Jan2016 Ordered   5  Multi-Vitamin TABS; Therapy: (Recorded:11Jun2013) to Recorded   6  Nitrostat 0 4 MG Sublingual Tablet Sublingual; PLACE 1 TABLET UNDER THE TONGUE   EVERY 5 MINUTES UP TO 3 DOSES AS NEEDED FOR CHEST PAIN;   Therapy: 25GWI8265 to (Alexander Marrero)  Requested for: 43CQC0186; Last   Rx:14Nov2013 Ordered   7  Simvastatin 40 MG Oral Tablet; TAKE 1 TABLET DAILY; Therapy: 53OXN5122 to (Evaluate:09Jan2017)  Requested for: 09LNM9248; Last   Rx:15Jan2016 Ordered   8  Tylenol Arthritis Pain 650 MG Oral Tablet Extended Release; TAKE 2 TABLETS IN THE AM   AND TWO TABLETS AT NOON FOR THE PAIN;   Therapy: (Recorded:14Jan2014) to Recorded  Medication List Reviewed: The medication list was reviewed and updated today  Allergies    1  No Known Drug Allergies    Vitals  Vital Signs [Data Includes: Current Encounter]    Recorded: 09AEO6088 10:22AM   Heart Rate 74, R Radial   Pulse Quality Normal, R Radial   Respiration 16   Respiration Quality Normal   Systolic 895, RUE, Sitting   Diastolic 66, RUE, Sitting   Height 5 ft 5 5 in   Weight 202 lb    BMI Calculated 33 1   BSA Calculated 2     Physical Exam    Vascular Complete Exam   Carotid: no bruit heard on the right and no bruit on the left  Radial: right 2+ and left 2+  Femoral: right 2+ and left 2+  Popliteal: right 2+ and left 2+  Posterior tibialis: right 2+ and left 2+  Dorsalis pedis: right 2+ and left 0  Distal Pulse Exam:       Abdominal Aorta:  no palpable aneurysm  Normal Capillary Refill  Extremities: No upper or lower extremity edema  LE Varicose Veins: No Varicose Veins are Present  The heart rate was normal  The rhythm was regular  Heart sounds: normal S1 and normal S2    Murmurs: no murmurs were heard   Pulmonary   Respiratory effort: No increased work of breathing or signs of respiratory distress  Abdomen   Abdomen: Non-tender, no masses  Incisional site hernia  Psychiatric   Orientation to person, place and time: Normal    Eyes   Conjunctiva and lids: No swelling, erythema, or discharge  Ears, Nose, Mouth, and Throat   Hearing: Normal    Neck   Neck: No jugular venous distention, normal ROM and extension  Neurologic Sensory exam normal   Motor skills intact   Musculoskeletal   Gait and station: Normal    Digits and nails: Normal without clubbing or cyanosis  Muscle strength/tone: Normal    Skin   Skin and subcutaneous tissue: Normal without rashes or lesions  Venous Disease: No lipodermatosclerosis, stasis dermatitis, or hyperpigmentation noted on exam       Results/Data  Results   VAS CAROTID COMPLETE STUDY 14GQT1478 01:52PM Brain Lute Order Number: [id number]     Test Name Result Flag Reference   VAS CAROTID COMPLETE STUDY (Report)     [facility]PORT   CLINICAL:   Indications: Carotid disease w/o CVA [I65 29]  Follow-up  Routine surveillance of carotid artery disease  Patient is asymptomatic at this time  Operative History   AAA repair   ANGIOPLASTY   Coronary stenting   Risk Factors   The patient has history of HTN, hyperlipidemia, PAD and CAD  Clinical   Right Pressure: 136/80 mm Hg, Left Pressure: 138/80 mm Hg  FINDINGS:      Right    Impression PSV EDV (cm/s) Direction of Flow Ratio    Dist  ICA         54     22           0 66    Mid  ICA         80     30           0 97    Prox   ICA  1 - 49%   74     27           0 91    Dist CCA         70     27                 Mid CCA          82     25           0 97    Prox CCA         84     23                 Ext Carotid        79     12           0 96    Prox Vert         41     11 Antegrade            Subclavian        194      0 Left     Impression PSV EDV (cm/s) Direction of Flow Ratio    Dist  ICA         58     23           0 71    Mid  ICA         68     24           0 83    Prox  ICA  1 - 49%   62     23           0 76    Dist CCA         71     22                 Mid CCA          82     22           0 80    Prox CCA         103     24                 Ext Carotid        79     14           0 96    Prox Vert         42     13 Antegrade            Subclavian        112      0                          CONCLUSION:      Impression   RIGHT:   There is <50% stenosis noted in the internal carotid artery  Plaque is   heterogenous and irregular  Vertebral artery flow is antegrade  There is no significant subclavian artery   disease  LEFT:   There is <50% stenosis noted in the internal carotid artery  Plaque is   heterogenous and irregular  Vertebral artery flow is antegrade  There is no significant subclavian artery   disease  Compared to previous study on 03/17/2014, there is no significant change  Internal carotid artery stenosis determination by consensus criteria from:   Cherri Bowen et al  Carotid Artery Stenosis: Gray-Scale and Doppler US Diagnosis   - Society of Radiologists in [address] [place] [address], Radiology 2003;   [phone]  SIGNATURE:   Electronically Signed by: Allen Suazo on 2016-03-03 05:28:48 PM     VAS LOWER LIMB ARTERIAL DUPLEX, COMPLETE BILATERAL/GRAFTS 19PHH1044 01:49PM Katiuska Guadarrama Order Number: [id number]     Test Name Result Flag Reference   VAS LOWER LIMB ARTERIAL DUPLEX, COMPLETE BILATERAL/GRAFTS (Report)     [facility]PORT   CLINICAL:   Indications: Aneurysm of Artery of Lower Extremity [I72 4]  Evaluate for popliteal aneurysm, prominent pulse  H/O AAA   Risk Factors: The patient has history of Hypertension, Peripheral Arterial   Disease, Carotid Artery Disease and Hyperlipidemia     Operative History   AAA repair   ANGIOPLASTY   Coronary stenting Brachial Blood Pressure: Right Pressure: 136/80 mm Hg, Left Pressure: 138/80   mm Hg  FINDINGS:      Right         Impression PSV EDV AP (cm)    Common Femoral Artery        94  0         Prox Profunda            72  0         Prox SFA              97  0         Mid SFA               87  0         Dist SFA              48  0         Proximal Pop      Ectatic   50  0   1 0    Distal Pop             52  0         Dist Post Tibial          68  0         Dist  Ant  Tibial          78  0         Dist Peroneal            60  0            Left          Impression PSV EDV AP (cm)    Common Femoral Artery        97  0         Prox Profunda           103  0         Prox SFA              108  0         Mid SFA               78  0         Dist SFA              57  0         Proximal Pop      Ectatic   53  0   0 9    Distal Pop             56  0         Dist  Ant  Tibial          69  0         Dist Peroneal            71  0                  CONCLUSION:   Impression:   RIGHT LOWER LIMB:   Mild diffuse disease noted throughout the femoral and popliteal arteries, with   no significant stenosis  The popliteal artery is ectatic measuring 1 0 cm  There is a well defined hypoechoic non-vascularized structure noted in the   popliteal fossa consistent with a Bakers cyst       LEFT LOWER LIMB:   Mild diffuse disease noted throughout the femoral and popliteal arteries, with   no significant stenosis  The popliteal artery is ectatic measuring 0 9 cm  There is a well defined hypoechoic non-vascularized structure noted in the   popliteal fossa consistent with a Bakers cyst          TECH NOTE: GALILEO's done 2/23/2016 : RT: 1 22 LT: 1 27      In comparison to the study of 10/17/2014, the popliteal artery appears ectatic   bilaterally        SIGNATURE:   Electronically Signed by: Allen Suazo on 2016-03-03 05:28:28 PM     VAS ABDOMINAL AORTA/ILIACS; COMPLETE STUDY 98UYO5511 08:36AM Katiuska Guadarrama Order Number: [id number] Test Name Result Flag Reference   VAS ABDOMINAL AORTA/ILIACS; COMPLETE STUDY (Report)     [facility] REPORT   CLINICAL:   Indications: Aneurysm, Iliac Artery [I72 3]  Follow-up exam aortic tube graft  Operative History:   AAA repair   ANGIOPLASTY   Coronary stenting   Clinical   Right Pressure: 136/ mm Hg, Left Pressure: 140/ mm Hg  FINDINGS:      Unilateral    Impression PSV (cm/s) EDV (cm/s) AP (cm)    Sup-Luisa Ao              24      0         Jux Renal Aorta Aneurysm                4 4    Px Inf-Hank Ao            39      8         Ds Inf-Hank Ao            47      0   3 0    Celiac               148     26         Prox  SMA              113     14            Right    PSV (cm/s) EDV (cm/s) AP (cm)  RAR    Prox DIANNE      69      0   1 5       Dist DIANNE      93      0            Prox  EIA      76      0            Dist EIA      110      0            Prox Renal     86     29      3 52       Left    Impression PSV (cm/s) EDV (cm/s) AP (cm)  RAR    Prox DIANNE  Ectatic      116     13   1 5       Dist DIANNE            110      0            Prox  EIA            76      0   1 3       Dist EIA            92      0            Prox Renal           100     24      4 09             CONCLUSION:   Impression   THIS IS A FOLLOW UP STUDY   Wide patency is demonstrated through the abdominal aortic tube graft  The aorta proximal to the graft is aneurysmal, measuring 4 4 cm  ( Prior: 2 8   cm  )   The left common iliac artery is ectatic measuring 1 5 cm  The common iliac arteries are patent, bilaterally  The renal arteries are patent  Ankle/Brachial Indices were: Rt - 1 22, and Lt - 1 27  Great toe pressures were within normal limits for adequate healing potential,   bilaterally  Compared to previous study on 10/17/2014, there is a significant increase in   the caliber of the aorta proximal to the tube graft  Recommend repeat testing in 6 months as per protocol unless otherwise   indicated  SIGNATURE:   Electronically Signed by: Randi Garcia on 2016-02-23 06:13:46 PM     Future Appointments    Date/Time Provider Specialty Site   04/01/2016 01:15 PM Doctor, Robina Ray MD [facility]     Signatures   Electronically signed by : Danita Brenner; Mar 14 2016 11:36AM EST                       (Author)    Electronically signed by : Robina Roberts MD; Mar 18 2016  5:09PM EST                       (Author)

## 2018-01-13 NOTE — RESULT NOTES
Discussion/Summary   please call wife  bw was all normal and CT scan of head only showed old stroke but nothing recent  I recommend starting Aricept 5mg daily and make appt  with [facility] for f/u of memory  They probably can't get appt  until early 2018 but it would be ok for them to go on their upcoming trip  Verified Results  (1) VITAMIN B12 06IPS7478 43:17NI [facility] Order Number: [id number]     Test Name Result Flag Reference   VITAMIN B12 587 pg/mL  100-900     (1) C-REACTIVE PROTEIN 64UWT0934 16:91KM [facility] Order Number: [id number]     Test Name Result Flag Reference   C-REACT PROTEIN <3 0 mg/L  <3 0     (1) VITAMIN D 25-HYDROXY 33MKA5136 17:79ZF [facility] Order Number: [id number]     Test Name Result Flag Reference   VIT D 25-HYDROX 32 3 ng/mL  30 0-100 0   This assay is a certified procedure of the CDC Vitamin D Standardization Certification Program (VDSCP)     Deficiency <20ng/ml   Insufficiency 20-30ng/ml   Sufficient  ng/ml     *Patients undergoing fluorescein dye angiography may retain small amounts of fluorescein in the body for 48-72 hours post procedure  Samples containing fluorescein can produce falsely elevated Vitamin D values  If the patient had this procedure, a specimen should be resubmitted post fluorescein clearance  * CT HEAD WO CONTRAST [id number] 82:46BF Jaunita Mantle     Test Name Result Flag Reference   CT HEAD WO CONTRAST (Report)     CT BRAIN - WITHOUT CONTRAST     INDICATION: Amnesia  COMPARISON: 4/17/2017  TECHNIQUE: CT examination of the brain was performed  In addition to axial images, coronal reformatted images were created and submitted for interpretation  Radiation dose length product (DLP) for this visit: 983 mGy-cm    This examination, like all CT scans performed in the [facility], was performed utilizing techniques to minimize radiation dose exposure, including the use of iterative    reconstruction and automated exposure control  IMAGE QUALITY: Diagnostic  FINDINGS:      PARENCHYMA: Small old lacunar infarctions identified within the right caudate head and adjacent lentiform nucleus  No mass, mass effect or midline shift  No hemorrhage  No signs of acute territorial infarction  Normal basilar cisterns, brainstem and   cerebellum  VENTRICLES AND EXTRA-AXIAL SPACES: Normal for patient's age  VISUALIZED ORBITS AND PARANASAL SINUSES: Unremarkable  CALVARIUM AND EXTRACRANIAL SOFT TISSUES:  Normal        IMPRESSION:     Stable examination  Old caudate head and basal ganglia lacunar infarcts on the right  Workstation performed: [id number]     Signed by:   Reva Huang DO   9/14/17       Plan  Memory deficits    · Donepezil HCl - 5 MG Oral Tablet;  One po q day

## 2018-01-14 VITALS
TEMPERATURE: 97.7 F | WEIGHT: 200.25 LBS | DIASTOLIC BLOOD PRESSURE: 80 MMHG | SYSTOLIC BLOOD PRESSURE: 142 MMHG | BODY MASS INDEX: 33.36 KG/M2 | HEART RATE: 80 BPM | HEIGHT: 65 IN

## 2018-01-15 NOTE — RESULT NOTES
Verified Results  XR SPINE THORACOLUMBAR 2 VIEW 25Apr2017 09:16AM Wolf      Test Name Result Flag Reference   XR SPINE THORACOLUMBAR 2 VW (Report)     THORACOLUMBAR SPINE     INDICATION: L1 fracture x1 week  COMPARISON: April 18, 2017     VIEWS: AP and lateral     IMAGES: 2     FINDINGS: Thoracolumbar dextroscoliosis  L1 compression deformity appears stable in height  No retropulsed fragment is appreciated in these projections  Anterior marginal osteophyte formation present at multiple levels of the lumbar spine and lower thoracic spine  Lower lumbar intervertebral disc space height poorly discerned in this lateral projection due to scoliosis  The pedicles are intact  Atherosclerotic changes noted in the ectatic abdominal aorta  Numerous surgical clips are seen in the right upper quadrant and prevertebral soft tissues  IMPRESSION:     Unchanged L1 compression deformity  Workstation performed: [id number]     Signed by:   Estella Fernandes MD   4/26/17

## 2018-01-15 NOTE — PROGRESS NOTES
Assessment    1  Compression fracture of L1 lumbar vertebra with routine healing (V54 17) (S32 010D)    Plan    · * XR SPINE LUMBAR 2 OR 3 VIEWS INJURY; Status:Hold For - Exact Date; Requested  for:Approx [id number];    Perform:[facility] Radiology; Order Comments:Upright study, brace in place; Ordered; For:Compression fracture of L1 lumbar vertebra with routine healing; Ordered By:George Luque;   · Follow-up visit in 1 month Evaluation and Treatment  Follow-up, after lumbar spine study,  Ene Ribeiro  Status: Complete  Done: 26Apr2017   Ordered; For: Compression fracture of L1 lumbar vertebra with routine healing; Ordered By: Shay Martell Performed:  Due: 04RFE0554; Last Updated By: Mago Chapa; 4/26/2017 2:20:36 PM    Discussion/Summary    Very pleasant 51-year-old male, accompanied by his wife, presents to review thoracolumbar spine study 4/25/17, study was reviewed carefully in detail by Dr Cielo Roe, and compared with prior study 4/18/17, as well as CT lumbar spine 4/17/17  Appropriate alignment of the lumbar spine is noted, no significant progression of his compression fractures identified  Clinically the patient is doing very well reports significant reduction in pain, he is not wearing the appropriate brace at this time and was advised to return to the TLSO brace that was initially provided  He expresses understanding of this and will make the change she reports  He's been advised of activity restrictions, specifically no lifting greater than 10 pounds, no bending without brace in place, ambulation as tolerated  He understands to wear the brace at all times other than bed  In addition he understands that driving at this time is not advised  He also understands should there be any significant increase in his pain, difficulty with ambulation, gait or balance disturbance, or motor or sensory deficits in the lower extremities is to call return for reassessment sooner      Further follow-up as otherwise planned in approximately 4 weeks with lumbar spine study prior to visit  These findings, impressions and recommendations are reviewed in great detail with the patient, he expressed understanding and agreement, his questions were answered completely and to his satisfaction  Follow up has been scheduled, imaging has been requested      The patient, patient's family was counseled regarding diagnostic results, instructions for management, patient and family education, importance of compliance with treatment  The patient has the current Goals: Improvement/resolution of low back pain area  The patent has the current Barriers: None  Patient is able to Self-Care  Chief Complaint    1  Back Pain  Hospital follow-up, one week, history of fall, L1 compression fracture      History of Present Illness  Very pleasant 77-year-old male, accompanied by his wife, presents as noted above  He has had current lumbar spine study 4/25/17 as requested  He presents today with a LSO brace, loosely applied, not the TLSO brace he was prescribed  He reports the original brace was too tight and bothered his chest     He reports minimal pain, 2 on a 1-10 scale at most, and only with strenuous activity  He does use cane single-point, to assist with gait stability  He has a history of a fall 4/17/17, reports he fell from a ladder 2 steps landing on his back, was seen at [facility] emergency department, had a brief inpatient admission and was discharged on 4/18/17 to home  He does have ongoing home physical therapy for gait and balance strengthening and stability  Milton Carranza presents with complaints of back pain (patient expresses some pain in the lower back when he lays on his side  Patient is wearing a back brace)   Associated symptoms include no arm numbness, no leg numbness, no arm weakness, no leg weakness, no urinary incontinence, no fecal incontinence and no urinary retention  Review of Systems    Constitutional: No fever or chills, feels well, no tiredness, no recent weight gain or weight loss  Eyes: No complaints of eye pain, no red eyes, no discharge from eyes, no itchy eyes  ENT: hearing loss  Cardiovascular: No complaints of slow heart rate, no fast heart rate, no chest pain, no palpitations, no leg claudication, no lower extremity  Respiratory: No complaints of shortness of breath, no wheezing, no cough, no SOB on exertion, no orthopnea or PND  Gastrointestinal: No complaints of abdominal pain, no constipation, no nausea or vomiting, no diarrhea or bloody stools  Genitourinary: No complaints of dysuria, no incontinence, no hesitancy, no nocturia, no genital lesion, no testicular pain  Musculoskeletal: No complaints of arthralgia, no myalgias, no joint swelling or stiffness, no limb pain or swelling  Integumentary: No complaints of skin rash or skin lesions, no itching, no skin wound, no dry skin  Neurological: No compliants of headache, no confusion, no convulsions, no numbness or tingling, no dizziness or fainting, no limb weakness, no difficulty walking  Psychiatric: Is not suicidal, no sleep disturbances, no anxiety or depression, no change in personality, no emotional problems  Endocrine: No complaints of proptosis, no hot flashes, no muscle weakness, no erectile dysfunction, no deepening of the voice, no feelings of weakness  Hematologic/Lymphatic: No complaints of swollen glands, no swollen glands in the neck, does not bleed easily, no easy bruising  ROS reviewed  Active Problems    1  Abdominal aortic aneurysm without rupture (441 4) (I71 4)   2  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)   3  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)   4  Arthritis (716 90) (M19 90)   5  Carotid artery stenosis, asymptomatic (433 10) (I65 29)   6  Coronary artery disease (414 00) (I25 10)   7  Dark urine (791 9) (R82 99)   8   Elevated liver function tests (790  6) (R94 5)   9  Encounter for screening colonoscopy (V76 51) (Z12 11)   10  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) (F52 21)   11  Hematuria (599 70) (R31 9)   12  Hyperglycemia (790 29) (R73 9)   13  Hyperlipidemia (272 4) (E78 5)   14  Hypertension (401 9) (I10)   15  Incisional hernia, without obstruction or gangrene (553 21) (K43 2)   16  Localized, primary osteoarthritis of lower leg, unspecified laterality   17  Need for prophylactic vaccination and inoculation against influenza (V04 81) (Z23)   18  Nocturia (788 43) (R35 1)   19  Pain in joint, lower leg (719 46) (M25 569)   20  Postoperative examination (V67 00) (Z09)   21  Preoperative cardiovascular examination (V72 81) (Z01 810)   22  Primary osteoarthritis of left knee (715 16) (M17 12)   23  Special screening for malignant neoplasm of prostate (V76 44) (Z12 5)   24  Synovial cyst of popliteal space (727 51) (M71 20)   25  Transient cerebral ischemic attack (435 9) (G45 9)   26  Vertigo (780 4) (R42)    Past Medical History    1  History of Appendicitis (541)   2  History of Colonoscopy (Fiberoptic)   3  History of abdominal aortic aneurysm (V12 59) (Z86 79)   4  History of cholelithiasis (V12 79) (Z87 19)   5  History of dizziness (V13 89) (Z87 898)   6  History of fall (V15 88) (Z91 81)   7  History of insomnia (V13 89) (Z87 898)   8  History of transient cerebral ischemia (V12 54) (Z86 73)    The active problems and past medical history were reviewed and updated today  Surgical History    1  History of Appendectomy   2  History of Cath Stent Placement   3  History of Cholecystectomy Laparoscopic   4  History of PTCA   5  History of Surgery For Abdominal Aortic Aneurysm    The surgical history was reviewed and updated today  Family History  Mother    1  Family history of Benign Essential Hypertension   2  Family history of Heart Disease (V17 49)   3  Family history of Reported Family History Of Heart Disease   4   Family history of Silent Stroke  Father    5  Family history of Heart Disease (V17 49)   6  Family history of Reported Family History Of Heart Disease  Brother    7  Family history of Aortic Aneurysm   8  Family history of Diabetes Mellitus (V18 0)   9  Family history of Heart Disease (V17 49)   10  Family history of Heart Disease (V17 49)   11  Family history of Heart Disease (V17 49)  Family History    12  Family history of Reported Family History Of Heart Disease    The family history was reviewed and updated today  Social History    · Alcohol Use (History)   · Being A Social Drinker   · Denied: History of Drug Use   · Former smoker (I24 08) (C00 581)   · Retired  The social history was reviewed and updated today  Current Meds   1  Carvedilol 12 5 MG Oral Tablet; Take 1 tablet by mouth  twice a day with meals    Requested for: 01Aug2016; Last Rx:27Jul2016 Ordered   2  Clopidogrel Bisulfate 75 MG Oral Tablet; TAKE 1 TABLET DAILY; Therapy: 29ZTV9436 to (Evaluate:14Jan2018)  Requested for: 20Jan2017; Last   Rx:19Jan2017 Ordered   3  Fish Oil 1000 MG Oral Capsule; Therapy: (Recorded:11Jun2013) to Recorded   4  Lisinopril 20 MG Oral Tablet; Take 1 tablet daily; Therapy: 19XPZ3733 to (Evaluate:22Jul2017)  Requested for: 01Aug2016; Last   Rx:27Jul2016 Ordered   5  Multi-Vitamin TABS; Therapy: (Recorded:11Jun2013) to Recorded   6  Simvastatin 40 MG Oral Tablet; TAKE 1 TABLET DAILY; Therapy: 53JZS0810 to 450 5770)  Requested for: 20Jan2017; Last   Rx:19Jan2017 Ordered   7  Tylenol Arthritis Pain 650 MG TBCR; TAKE 2 TABLETS IN THE AM AND TWO TABLETS AT   NOON FOR THE PAIN;   Therapy: (Recorded:14Jan2014) to Recorded    The medication list was reviewed and updated today  Allergies    1   No Known Drug Allergies    Vitals  Vital Signs    Recorded: 26Apr2017 01:54PM   Temperature 97 8 F   Heart Rate 65   Respiration 14   Systolic 950   Diastolic 69   Height 5 ft 5 in   Weight 203 lb    BMI Calculated 33 78   BSA Calculated 1 99     Physical Exam     Constitutional Patient appears healthy and well developed  No signs of acute distress present  Respiratory Respiratory effort: Normal   Auscultation of lungs: Clear to auscultation bilaterally  Cardiovascular Auscultation of heart: Rate is regular  Rhythm is regular  No heart murmur appreciated  Musculo: Spine   Spine:    Lumbar/Sacral Spine examination demonstrates Lumbosacral Spine: Appearance: Normal  Tenderness: None  ROM: Deferred  Motor: Deferred  Motor Exam: all motor groups within normal limits of strength & tone bilaterally  Sensory Exam: all sensory within normal limits bilaterally  Deep Tendon Reflexes: all reflexes within normal limits bilaterally  Neurologic - Mental Status: Alert and Oriented x3  Mood and affect: Affect is normal   Grossly nonfocal    Motor System General Motor Strength: No pronator drift and no parietal drift  Motor System - Upper Extremities: Normal to inspection and palpation  Strength: Deltoids 5/5 bilaterally  Biceps 5/5 bilaterally  Triceps 5/5 bilaterally  Extensor carpi radials is 5/5 bilaterally  Extensor digitorum 5/5 bilaterally  Intrinsic 5/5 bilaterally   5/5 bilaterally  Motor System - Lower Extremities: Normal to inspection and palpation  Strength: iliopsoas 5/5 bilaterally  Quadriceps 5/5 bilaterally  Hamstrings 5/5 bilaterally  Gastrocnemius 5/5 bilaterally  Gait and Station:   Gait and station: Abnormal   Gait evaluation demonstrated a wide-based and ataxic gait and shuffling  Results/Data  XR SPINE THORACOLUMBAR 2 VIEW 25Apr2017 09:16AM [facility]     Test Name Result Flag Reference   XR SPINE THORACOLUMBAR 2 VW (Report)     THORACOLUMBAR SPINE     INDICATION: L1 fracture x1 week  COMPARISON: April 18, 2017     VIEWS: AP and lateral     IMAGES: 2     FINDINGS: Thoracolumbar dextroscoliosis  L1 compression deformity appears stable in height   No retropulsed fragment is appreciated in these projections  Anterior marginal osteophyte formation present at multiple levels of the lumbar spine and lower thoracic spine  Lower lumbar intervertebral disc space height poorly discerned in this lateral projection due to scoliosis  The pedicles are intact  Atherosclerotic changes noted in the ectatic abdominal aorta  Numerous surgical clips are seen in the right upper quadrant and prevertebral soft tissues  IMPRESSION:     Unchanged L1 compression deformity  Workstation performed: [id number]     Signed by:   Jhonny Ge MD   4/26/17     Health Management  Encounter for screening colonoscopy   COLONOSCOPY; every 10 weeks; Next Due: [id number]; Overdue  [employer]   Medicare Annual Wellness Visit; every 1 year; Next Due: 00IBG0195; Overdue    Future Appointments    Date/Time Provider Specialty Site   05/31/2017 02:00 PM Jorje Espinal, [address] [place] [address] [facility]   05/31/2017 02:15 PM MOHSEN Trammell  Neurosurgery [facility]     Signatures   Electronically signed by : Sanket Lorenzo, [facility];  Apr 26 2017  2:32PM EST                       (Author)    Electronically signed by : MOHSEN Orantes ; Apr 26 2017  2:51PM EST                       (Author)    Electronically signed by : MOHSEN Orantes ; Apr 26 2017  2:51PM EST                       (Author)

## 2018-01-15 NOTE — RESULT NOTES
Message  Patient did not require to be seen by the trauma office for a follow-up for a compression fracture  Patient supposed to see neurosurgery team and discuss clearance with the team  Patient was given neurosurgery number and told to show up for appointment in the next week  The patient was given apology for inconvenience and was not officially charged for visit as no medical work-up was performed  Signatures   Electronically signed by : SUKHDEV Holliday;  Apr 25 2017  2:55PM EST                       (Author)

## 2018-01-22 VITALS
HEIGHT: 65 IN | RESPIRATION RATE: 12 BRPM | TEMPERATURE: 98.1 F | WEIGHT: 205.38 LBS | HEART RATE: 80 BPM | BODY MASS INDEX: 34.22 KG/M2 | SYSTOLIC BLOOD PRESSURE: 158 MMHG | DIASTOLIC BLOOD PRESSURE: 80 MMHG

## 2018-01-22 VITALS
DIASTOLIC BLOOD PRESSURE: 69 MMHG | WEIGHT: 203 LBS | BODY MASS INDEX: 33.82 KG/M2 | HEART RATE: 65 BPM | SYSTOLIC BLOOD PRESSURE: 135 MMHG | RESPIRATION RATE: 14 BRPM | HEIGHT: 65 IN | TEMPERATURE: 97.8 F

## 2018-01-29 DIAGNOSIS — E78.5 HYPERLIPIDEMIA, UNSPECIFIED HYPERLIPIDEMIA TYPE: Primary | ICD-10-CM

## 2018-02-01 ENCOUNTER — TELEPHONE (OUTPATIENT)
Dept: CARDIOLOGY CLINIC | Facility: CLINIC | Age: 73
End: 2018-02-01

## 2018-02-01 RX ORDER — SIMVASTATIN 40 MG
40 TABLET ORAL
Qty: 30 TABLET | Refills: 5 | Status: SHIPPED | OUTPATIENT
Start: 2018-02-01 | End: 2018-03-01 | Stop reason: SDUPTHER

## 2018-02-02 DIAGNOSIS — I50.9 CONGESTIVE HEART FAILURE, UNSPECIFIED CONGESTIVE HEART FAILURE CHRONICITY, UNSPECIFIED CONGESTIVE HEART FAILURE TYPE: Primary | ICD-10-CM

## 2018-02-02 RX ORDER — CLOPIDOGREL BISULFATE 75 MG/1
75 TABLET ORAL DAILY
Qty: 30 TABLET | Refills: 0 | Status: SHIPPED | OUTPATIENT
Start: 2018-02-02 | End: 2018-03-01 | Stop reason: SDUPTHER

## 2018-02-05 ENCOUNTER — OFFICE VISIT (OUTPATIENT)
Dept: CARDIOLOGY CLINIC | Facility: CLINIC | Age: 73
End: 2018-02-05
Payer: COMMERCIAL

## 2018-02-05 VITALS
BODY MASS INDEX: 33.28 KG/M2 | DIASTOLIC BLOOD PRESSURE: 78 MMHG | HEART RATE: 55 BPM | HEIGHT: 66 IN | SYSTOLIC BLOOD PRESSURE: 130 MMHG | WEIGHT: 207.1 LBS

## 2018-02-05 DIAGNOSIS — I72.4 ANEURYSM OF POPLITEAL ARTERY (HCC): ICD-10-CM

## 2018-02-05 DIAGNOSIS — I72.3 ANEURYSM OF ILIAC ARTERY (HCC): ICD-10-CM

## 2018-02-05 DIAGNOSIS — E78.49 OTHER HYPERLIPIDEMIA: ICD-10-CM

## 2018-02-05 DIAGNOSIS — I25.10 CORONARY ARTERY DISEASE INVOLVING NATIVE CORONARY ARTERY OF NATIVE HEART WITHOUT ANGINA PECTORIS: ICD-10-CM

## 2018-02-05 DIAGNOSIS — I65.23 ASYMPTOMATIC BILATERAL CAROTID ARTERY STENOSIS: ICD-10-CM

## 2018-02-05 DIAGNOSIS — G45.8 OTHER SPECIFIED TRANSIENT CEREBRAL ISCHEMIAS: ICD-10-CM

## 2018-02-05 DIAGNOSIS — I10 ESSENTIAL HYPERTENSION: ICD-10-CM

## 2018-02-05 DIAGNOSIS — I71.4 ABDOMINAL AORTIC ANEURYSM WITHOUT RUPTURE (HCC): Primary | ICD-10-CM

## 2018-02-05 PROCEDURE — 99214 OFFICE O/P EST MOD 30 MIN: CPT | Performed by: INTERNAL MEDICINE

## 2018-02-05 PROCEDURE — 93000 ELECTROCARDIOGRAM COMPLETE: CPT | Performed by: INTERNAL MEDICINE

## 2018-02-05 NOTE — PROGRESS NOTES
Follow-up - Cardiology   Poornima Frazier Sr  67 y o  male MRN: [id number]        Problems     benign essential hypertension   hyperlipidemia   CAD with history of left circumflex stent   history of TIA, recurrent after stopping clopidogrel  History of AAA repair   mild bilateral carotid artery stenosis   iliac/popliteal ectasias      [place]     Jules Lorenzana is doing quite well, he has no cardiac symptoms, he is on his appropriate medical therapy and his blood pressure is well controlled  He is overdue for cholesterol assessment  I will have him undergo a lipid panel and a basic metabolic panel  His EKG in the office today was normal   All of his vascular testing is up-to-date and recently performed, no change in his AAA repair/ iliacs/popliteals  He also has mild bilateral carotid artery stenosis that is unchanged  HPI: Poornima Frazier Sr  is a 67y o  year old male with hypertension  His blood pressures continue to be very nicely controlled in the setting of carvedilol and lisinopril  He is in need of updated BMP  He denies any lightheadedness, medication intolerance  He has hyperlipidemia, he is overdue for a lipid assessment but continues to tolerate simvastatin  He has CAD with a history of stenting  This is remote  He denies chest pain  He remains relatively active although admits that he slightly overweight  He fell a year ago, had a compression fracture of the lumbar spine  Also underwent gallbladder removal and had his Plavix interrupted for a short amount of time  He has a history of TIA, recurrence after prior Plavix withdrawal   He did not have any neurological events during his gallbladder surgery  He continues to tolerate his medications well  Review of Systems   All other systems reviewed and are negative          Past Medical History:   Diagnosis Date    AAA (abdominal aortic aneurysm) (HCC)     BPH (benign prostatic hyperplasia)     CAD (coronary artery disease)     Cholelithiasis     Heart attack     Hypertension     Stroke [facility])      History   Alcohol Use    Yes     Comment: SOCIAL USE     History   Drug Use No     History   Smoking Status    Former Smoker   Smokeless Tobacco    Never Used       Allergies:  No Known Allergies    Medications:     Current Outpatient Prescriptions:     acetaminophen (TYLENOL) 500 mg tablet, Take 500 mg by mouth every 6 (six) hours as needed for mild pain , Disp: , Rfl:     carvedilol (COREG) 12 5 mg tablet, Take 12 5 mg by mouth 2 (two) times a day with meals  , Disp: , Rfl:     clopidogrel (PLAVIX) 75 mg tablet, Take 1 tablet (75 mg total) by mouth daily, Disp: 30 tablet, Rfl: 0    lisinopril (ZESTRIL) 20 mg tablet, Take 20 mg by mouth daily  , Disp: , Rfl:     Multiple Vitamin (MULTIVITAMIN) tablet, Take 1 tablet by mouth daily  , Disp: , Rfl:     Omega-3 Fatty Acids (FISH OIL) 1,000 mg, Take 1,000 mg by mouth daily  , Disp: , Rfl:     simvastatin (ZOCOR) 40 mg tablet, Take 1 tablet (40 mg total) by mouth daily at bedtime, Disp: 30 tablet, Rfl: 5      Physical Exam   Constitutional: He is oriented to person, place, and time  He appears well-developed and well-nourished  No distress  HENT:   Head: Normocephalic and atraumatic  Mouth/Throat: Oropharynx is clear and moist    Eyes: Conjunctivae and EOM are normal  Pupils are equal, round, and reactive to light  No scleral icterus  Neck: Normal range of motion  Neck supple  No thyromegaly present  Cardiovascular: Normal rate, regular rhythm, normal heart sounds and intact distal pulses  Exam reveals no gallop and no friction rub  No murmur heard  Pulmonary/Chest: Effort normal and breath sounds normal  No respiratory distress  He has no wheezes  He has no rales  Musculoskeletal: He exhibits no edema or deformity  Neurological: He is alert and oriented to person, place, and time  No cranial nerve deficit  Skin: Skin is warm and dry  No rash noted  He is not diaphoretic  No erythema  Psychiatric: He has a normal mood and affect  Laboratory Studies:  CMP:    NT-proBNP: No results found for: NTBNP   Coags:    Lipid Profile:   Lab Results   Component Value Date    CHOL 138 08/20/2015     Lab Results   Component Value Date    HDL 42 08/20/2015     Lab Results   Component Value Date    LDLCALC 67 08/20/2015     Lab Results   Component Value Date    TRIG 147 08/20/2015       Cardiac testing:   EKG reviewed personally:   Normal sinus rhythm, normal EKG  No results found for this or any previous visit  No results found for this or any previous visit  No results found for this or any previous visit  No results found for this or any previous visit  Junie Mcmillan MD    Portions of the record may have been created with voice recognition software   Occasional wrong word or "sound a like" substitutions may have occurred due to the inherent limitations of voice recognition software   Read the chart carefully and recognize, using context, where substitutions have occurred

## 2018-03-01 DIAGNOSIS — I50.9 CONGESTIVE HEART FAILURE, UNSPECIFIED CONGESTIVE HEART FAILURE CHRONICITY, UNSPECIFIED CONGESTIVE HEART FAILURE TYPE: Primary | ICD-10-CM

## 2018-03-01 DIAGNOSIS — E78.5 HYPERLIPIDEMIA, UNSPECIFIED HYPERLIPIDEMIA TYPE: ICD-10-CM

## 2018-03-01 DIAGNOSIS — I10 ESSENTIAL HYPERTENSION: ICD-10-CM

## 2018-03-01 RX ORDER — SIMVASTATIN 40 MG
40 TABLET ORAL
Qty: 90 TABLET | Refills: 3 | Status: SHIPPED | OUTPATIENT
Start: 2018-03-01 | End: 2019-01-14 | Stop reason: SDUPTHER

## 2018-03-01 RX ORDER — CARVEDILOL 12.5 MG/1
12.5 TABLET ORAL 2 TIMES DAILY WITH MEALS
Qty: 45 TABLET | Refills: 3 | Status: SHIPPED | OUTPATIENT
Start: 2018-03-01 | End: 2018-03-06 | Stop reason: SDUPTHER

## 2018-03-01 RX ORDER — CLOPIDOGREL BISULFATE 75 MG/1
75 TABLET ORAL DAILY
Qty: 90 TABLET | Refills: 3 | Status: SHIPPED | OUTPATIENT
Start: 2018-03-01 | End: 2019-01-14 | Stop reason: SDUPTHER

## 2018-03-01 RX ORDER — LISINOPRIL 20 MG/1
20 TABLET ORAL DAILY
Qty: 90 TABLET | Refills: 3 | Status: SHIPPED | OUTPATIENT
Start: 2018-03-01 | End: 2019-01-15 | Stop reason: SDUPTHER

## 2018-03-06 DIAGNOSIS — I10 ESSENTIAL HYPERTENSION: ICD-10-CM

## 2018-03-07 RX ORDER — CARVEDILOL 12.5 MG/1
12.5 TABLET ORAL 2 TIMES DAILY WITH MEALS
Qty: 180 TABLET | Refills: 0 | Status: SHIPPED | OUTPATIENT
Start: 2018-03-07 | End: 2018-07-23 | Stop reason: SDUPTHER

## 2018-04-12 ENCOUNTER — TRANSCRIBE ORDERS (OUTPATIENT)
Dept: LAB | Facility: CLINIC | Age: 73
End: 2018-04-12

## 2018-04-12 ENCOUNTER — HOSPITAL ENCOUNTER (OUTPATIENT)
Dept: NON INVASIVE DIAGNOSTICS | Facility: CLINIC | Age: 73
Discharge: HOME | End: 2018-04-12
Payer: COMMERCIAL

## 2018-04-12 DIAGNOSIS — I65.23 OCCLUSION AND STENOSIS OF BILATERAL CAROTID ARTERIES: ICD-10-CM

## 2018-04-12 PROCEDURE — 93925 LOWER EXTREMITY STUDY: CPT

## 2018-04-12 PROCEDURE — 93923 UPR/LXTR ART STDY 3+ LVLS: CPT

## 2018-04-13 PROCEDURE — 93922 UPR/L XTREMITY ART 2 LEVELS: CPT | Performed by: SURGERY

## 2018-04-13 PROCEDURE — 93925 LOWER EXTREMITY STUDY: CPT | Performed by: SURGERY

## 2018-05-28 DIAGNOSIS — I65.23 OCCLUSION AND STENOSIS OF BILATERAL CAROTID ARTERIES: ICD-10-CM

## 2018-06-28 ENCOUNTER — TELEPHONE (OUTPATIENT)
Dept: ADMINISTRATIVE | Facility: HOSPITAL | Age: 73
End: 2018-06-28

## 2018-06-28 DIAGNOSIS — I65.23 BILATERAL CAROTID ARTERY STENOSIS: Primary | ICD-10-CM

## 2018-07-23 DIAGNOSIS — I10 ESSENTIAL HYPERTENSION: ICD-10-CM

## 2018-07-23 RX ORDER — CARVEDILOL 12.5 MG/1
12.5 TABLET ORAL 2 TIMES DAILY WITH MEALS
Qty: 180 TABLET | Refills: 3 | Status: SHIPPED | OUTPATIENT
Start: 2018-07-23 | End: 2019-01-15

## 2018-08-06 ENCOUNTER — OFFICE VISIT (OUTPATIENT)
Dept: FAMILY MEDICINE CLINIC | Facility: CLINIC | Age: 73
End: 2018-08-06
Payer: COMMERCIAL

## 2018-08-06 VITALS
DIASTOLIC BLOOD PRESSURE: 70 MMHG | WEIGHT: 199.8 LBS | HEIGHT: 65 IN | RESPIRATION RATE: 16 BRPM | SYSTOLIC BLOOD PRESSURE: 110 MMHG | BODY MASS INDEX: 33.29 KG/M2 | HEART RATE: 72 BPM | TEMPERATURE: 97.9 F

## 2018-08-06 DIAGNOSIS — Z23 NEED FOR PNEUMOCOCCAL VACCINATION: ICD-10-CM

## 2018-08-06 DIAGNOSIS — Z01.818 PREOPERATIVE EVALUATION OF A MEDICAL CONDITION TO RULE OUT SURGICAL CONTRAINDICATIONS (TAR REQUIRED): Primary | ICD-10-CM

## 2018-08-06 DIAGNOSIS — I10 ESSENTIAL HYPERTENSION: ICD-10-CM

## 2018-08-06 DIAGNOSIS — R31.9 HEMATURIA, UNSPECIFIED TYPE: ICD-10-CM

## 2018-08-06 DIAGNOSIS — E78.5 HYPERLIPIDEMIA, UNSPECIFIED HYPERLIPIDEMIA TYPE: ICD-10-CM

## 2018-08-06 LAB
SL AMB  POCT GLUCOSE, UA: ABNORMAL
SL AMB LEUKOCYTE ESTERASE,UA: ABNORMAL
SL AMB POCT BILIRUBIN,UA: ABNORMAL
SL AMB POCT BLOOD,UA: ABNORMAL
SL AMB POCT CLARITY,UA: CLEAR
SL AMB POCT COLOR,UA: YELLOW
SL AMB POCT KETONES,UA: ABNORMAL
SL AMB POCT NITRITE,UA: ABNORMAL
SL AMB POCT PH,UA: 5
SL AMB POCT SPECIFIC GRAVITY,UA: 1.01
SL AMB POCT URINE PROTEIN: ABNORMAL
SL AMB POCT UROBILINOGEN: 0.2

## 2018-08-06 PROCEDURE — 3078F DIAST BP <80 MM HG: CPT | Performed by: FAMILY MEDICINE

## 2018-08-06 PROCEDURE — G0439 PPPS, SUBSEQ VISIT: HCPCS | Performed by: FAMILY MEDICINE

## 2018-08-06 PROCEDURE — 90460 IM ADMIN 1ST/ONLY COMPONENT: CPT | Performed by: FAMILY MEDICINE

## 2018-08-06 PROCEDURE — 3008F BODY MASS INDEX DOCD: CPT | Performed by: FAMILY MEDICINE

## 2018-08-06 PROCEDURE — 81002 URINALYSIS NONAUTO W/O SCOPE: CPT | Performed by: FAMILY MEDICINE

## 2018-08-06 PROCEDURE — 99214 OFFICE O/P EST MOD 30 MIN: CPT | Performed by: FAMILY MEDICINE

## 2018-08-06 PROCEDURE — 90732 PPSV23 VACC 2 YRS+ SUBQ/IM: CPT

## 2018-08-06 PROCEDURE — 3725F SCREEN DEPRESSION PERFORMED: CPT | Performed by: FAMILY MEDICINE

## 2018-08-06 PROCEDURE — 3074F SYST BP LT 130 MM HG: CPT | Performed by: FAMILY MEDICINE

## 2018-08-06 PROCEDURE — 1101F PT FALLS ASSESS-DOCD LE1/YR: CPT | Performed by: FAMILY MEDICINE

## 2018-08-06 RX ORDER — DIAZEPAM 2 MG/1
1 TABLET ORAL 3 TIMES DAILY PRN
COMMUNITY
Start: 2017-06-06 | End: 2018-09-06

## 2018-08-06 RX ORDER — SENNOSIDES 8.6 MG
650 CAPSULE ORAL AS NEEDED
COMMUNITY
End: 2018-09-06

## 2018-08-06 RX ORDER — DONEPEZIL HYDROCHLORIDE 5 MG/1
TABLET, FILM COATED ORAL DAILY
COMMUNITY
Start: 2017-09-15 | End: 2018-09-06

## 2018-08-06 RX ORDER — PREDNISOLONE ACETATE 10 MG/ML
SUSPENSION/ DROPS OPHTHALMIC
Refills: 1 | COMMUNITY
Start: 2018-07-27 | End: 2018-09-06

## 2018-08-06 RX ORDER — POLYMYXIN B SULFATE AND TRIMETHOPRIM 1; 10000 MG/ML; [USP'U]/ML
SOLUTION OPHTHALMIC
Refills: 1 | COMMUNITY
Start: 2018-07-26 | End: 2018-12-04 | Stop reason: ALTCHOICE

## 2018-08-06 RX ORDER — MECLIZINE HYDROCHLORIDE 25 MG/1
1 TABLET ORAL 3 TIMES DAILY PRN
COMMUNITY
Start: 2017-06-19 | End: 2018-08-06

## 2018-08-06 NOTE — ASSESSMENT & PLAN NOTE
Hypertension    Patient blood pressure is stable at this time and he will continue with current regimen of medications

## 2018-08-06 NOTE — ASSESSMENT & PLAN NOTE
Hematuria  Patient with asymptomatic microscopic hematuria  He will repeat urinalysis next week for further evaluation    We will make further recommendations pending results of test

## 2018-08-06 NOTE — PROGRESS NOTES
FAMILY PRACTICE OFFICE VISIT       NAME: Justina Chau Sr   AGE: 67 y o  SEX: male       : 1945        MRN: [id number]    DATE: 2018  TIME: 11:11 AM    Assessment and Plan     Problem List Items Addressed This Visit     Hyperlipidemia     Hyperlipidemia  Patient will obtain lipid panel blood work and continue with current dose of statin therapy         Relevant Orders    CBC    Lipid panel    Hypertension     Hypertension  Patient blood pressure is stable at this time and he will continue with current regimen of medications         Relevant Orders    Comprehensive metabolic panel    TSH, 3rd generation    Need for pneumococcal vaccination - Primary     Health maintenance  Patient was given Pneumovax 23 vaccine today  He will return to the office in 1 year for Prevnar 13         Relevant Orders    PNEUMOCOCCAL POLYSACCHARIDE VACCINE 23-VALENT =>1YO SQ IM    Hematuria     Hematuria  Patient with asymptomatic microscopic hematuria  He will repeat urinalysis next week for further evaluation  We will make further recommendations pending results of test          Relevant Orders    CBC    TSH, 3rd generation    Preoperative evaluation of a medical condition to rule out surgical contraindications (TAR required)     Preoperative consultation for cataract surgery  Patient appears to be in stable health  His EKG is within normal limits  He will obtain blood work for further evaluation  If blood work is within normal limits he will be cleared for upcoming surgery as described                 There are no Patient Instructions on file for this visit          Chief Complaint     Chief Complaint   Patient presents with    Medicare Wellness Visit    Pre-op Exam     Pt is here for preop clearance for cataract surgery on right eye on 2018 by Dr Vesna De La Torre at [facility] AT THE [facility]        History of Present Illness     Patient is scheduled for right cataract surgery to be performed 2018 by Dr Wiley  He denies any recent illness  I reviewed his EKG from February 2018        Review of Systems   Review of Systems   Constitutional: Negative  HENT: Negative  Eyes:        Decreased visual acuity right eye due to cataract formation   Respiratory: Negative  Cardiovascular: Negative  Gastrointestinal: Negative  Genitourinary: Negative  Musculoskeletal: Negative  Neurological: Negative          Active Problem List     Patient Active Problem List   Diagnosis    Closed compression fracture of first lumbar vertebra (HCC)    Abdominal aortic aneurysm without rupture (HCC)    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[address] )    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[address] )    Asymptomatic bilateral carotid artery stenosis    Coronary artery disease    Hyperlipidemia    Hypertension    Transient cerebral ischemic attack    Need for pneumococcal vaccination    Hematuria    Preoperative evaluation of a medical condition to rule out surgical contraindications (TAR required)       Past Medical History:  Past Medical History:   Diagnosis Date    AAA (abdominal aortic aneurysm) ([address] )     last assessed: June 11, 2013    Appendicitis     BPH (benign prostatic hyperplasia)     CAD (coronary artery disease)     Cholelithiasis     last assessed: July 26, 2016    Heart attack ([address] )     Hypertension     Insomnia     Stroke ([address] )     Transient cerebral ischemia        Past Surgical History:  Past Surgical History:   Procedure Laterality Date    ABDOMINAL AORTIC ANEURYSM REPAIR  06/07/2011    Dr Colin Headley 6/7/11    APPENDECTOMY      CHOLECYSTECTOMY LAPAROSCOPIC      COLONOSCOPY  11/2006    CORONARY ANGIOPLASTY      3 STENTS INSERTED    CORONARY ANGIOPLASTY      CORONARY ANGIOPLASTY      with stenting - 3/2012    [place] LAP,CHOLECYSTECTOMY/GRAPH N/A 9/8/2016    Procedure: CHOLECYSTECTOMY LAPAROSCOPIC with intra-op cholangiogram ;  Surgeon: José Miguel Leyva MD;  [facility];  Service: General       Family History:  Family History   Problem Relation Age of Onset    Heart disease Mother     Hypertension Mother         Benign Essential     Stroke Mother         Stroke     Heart disease Father     Diabetes Brother         mellitus     Heart disease Brother     Aortic aneurysm Brother     Heart disease Family        Social History:  Social History     Social History    Marital status: /Civil Union     Spouse name: N/A    Number of children: N/A    Years of education: N/A     Occupational History    Retired       Social History Main Topics    Smoking status: Former Smoker    Smokeless tobacco: Never Used      Comment: Quit 1980's about 25 years x1ppd    Alcohol use Yes      Comment: SOCIAL USE - occasionally     Drug use: No    Sexual activity: Not on file     Other Topics Concern    Not on file     Social History Narrative    No narrative on file       Objective     Vitals:    08/06/18 1106   BP: 110/70   Pulse:    Resp:    Temp:      Wt Readings from Last 3 Encounters:   08/06/18 90 6 kg (199 lb 12 8 oz)   02/05/18 93 9 kg (207 lb 1 6 oz)   09/07/17 93 2 kg (205 lb 8 oz)       Physical Exam   Constitutional: No distress  HENT:   Mouth/Throat: Oropharynx is clear and moist  No oropharyngeal exudate  Tympanic membranes within normal limits bilaterally   Neck:   No carotid bruits   Cardiovascular:   Regular rate and rhythm with no murmurs   Pulmonary/Chest:   Lungs are clear to auscultation without wheezes,rales, or rhonchi   Abdominal:   Abdomen is soft, nontender with positive bowel sounds  There is no rebound or guarding  No masses palpated   Musculoskeletal: He exhibits no edema  Lymphadenopathy:     He has no cervical adenopathy  Skin: No rash noted       [facility] from February 2018 at [facility] showed sinus bradycardia at 50 beats per minute, normal axis, negative acute ischemic changes  Pertinent Laboratory/Diagnostic Studies:  Lab Results   Component Value Date GLUCOSE 98 04/18/2017    BUN 15 06/07/2017    CREATININE 0 83 06/07/2017    CALCIUM 9 1 06/07/2017     06/07/2017    K 4 1 06/07/2017    CO2 28 06/07/2017     06/07/2017     Lab Results   Component Value Date    ALT 28 06/07/2017    AST 18 06/07/2017    ALKPHOS 49 06/07/2017    BILITOT 0 49 06/07/2017       Lab Results   Component Value Date    WBC 5 26 06/07/2017    HGB 12 7 06/07/2017    HCT 37 3 06/07/2017    MCV 95 06/07/2017     06/07/2017       No results found for: TSH    Lab Results   Component Value Date    CHOL 138 08/20/2015     Lab Results   Component Value Date    TRIG 147 08/20/2015     Lab Results   Component Value Date    HDL 42 08/20/2015     Lab Results   Component Value Date    LDLCALC 67 08/20/2015     No results found for: HGBA1C    Results for orders placed or performed in visit on 09/14/17   Vitamin B12   Result Value Ref Range    Vitamin B-12 587 100 - 900 pg/mL   C-reactive protein   Result Value Ref Range    CRP <3 0 <3 0 mg/L   Vitamin D 25 hydroxy   Result Value Ref Range    Vit D, 25-Hydroxy 32 3 30 0 - 100 0 ng/mL   RPR   Result Value Ref Range    RPR Non-Reactive Non-Reactive       Orders Placed This Encounter   Procedures    PNEUMOCOCCAL POLYSACCHARIDE VACCINE 23-VALENT =>3YO SQ IM    CBC    Comprehensive metabolic panel    TSH, 3rd generation    Lipid panel       ALLERGIES:  No Known Allergies    Current Medications     Current Outpatient Prescriptions   Medication Sig Dispense Refill    acetaminophen (TYLENOL ARTHRITIS PAIN) 650 mg CR tablet Take 650 mg by mouth as needed        acetaminophen (TYLENOL) 500 mg tablet Take 500 mg by mouth every 6 (six) hours as needed for mild pain        carvedilol (COREG) 12 5 mg tablet Take 1 tablet (12 5 mg total) by mouth 2 (two) times a day with meals 180 tablet 3    clopidogrel (PLAVIX) 75 mg tablet Take 1 tablet (75 mg total) by mouth daily 90 tablet 3    diazepam (VALIUM) 2 mg tablet Take 1 tablet by mouth 3 (three) times a day as needed      donepezil (ARICEPT) 5 mg tablet Take by mouth daily      lisinopril (ZESTRIL) 20 mg tablet Take 1 tablet (20 mg total) by mouth daily 90 tablet 3    Multiple Vitamin (MULTIVITAMIN) tablet Take 1 tablet by mouth daily   polymyxin b-trimethoprim (POLYTRIM) ophthalmic solution INSTILL 1 DROP INTO SURGERY EYE 4 TIMES A DAY, START 3 DAYS PRIOR TO SURGERY, SEPARATE BY 5 MINS  1    prednisoLONE acetate (PRED FORTE) 1 % ophthalmic suspension INSTILL 1 DROP IN SURGICAL EYE 4X DAILY  SEPARATE DROPS BY 5 MIN  START 3 DAYS PRIOR TO SURGERY  1    simvastatin (ZOCOR) 40 mg tablet Take 1 tablet (40 mg total) by mouth daily at bedtime 90 tablet 3     No current facility-administered medications for this visit            Health Maintenance     Health Maintenance   Topic Date Due    Depression Screening PHQ-9  1945    SLP PLAN OF CARE  1945    CRC Screening: Colonoscopy  1945    Fall Risk  09/29/2010    PNEUMOCOCCAL POLYSACCHARIDE VACCINE AGE 65 AND OVER  09/29/2010    GLAUCOMA SCREENING 65 + YR  09/29/2012    INFLUENZA VACCINE  09/01/2018    DTaP,Tdap,and Td Vaccines (2 - Td) 04/17/2027    Hepatitis C Screening  Completed     Immunization History   Administered Date(s) Administered    Influenza 10/15/2016    Influenza TIV (IM) 09/28/2011, 10/06/2014, 11/05/2017    Tdap 66/53/8745       Anibal Holloway MD

## 2018-08-06 NOTE — ASSESSMENT & PLAN NOTE
Health maintenance  Patient was given Pneumovax 23 vaccine today    He will return to the office in 1 year for Prevnar 13

## 2018-08-06 NOTE — ASSESSMENT & PLAN NOTE
Preoperative consultation for cataract surgery  Patient appears to be in stable health  His EKG is within normal limits  He will obtain blood work for further evaluation    If blood work is within normal limits he will be cleared for upcoming surgery as described

## 2018-08-06 NOTE — PROGRESS NOTES
Assessment and Plan:    Problem List Items Addressed This Visit     Hyperlipidemia     Hyperlipidemia  Patient will obtain lipid panel blood work and continue with current dose of statin therapy         Relevant Orders    CBC    Lipid panel    Hypertension     Hypertension  Patient blood pressure is stable at this time and he will continue with current regimen of medications         Relevant Orders    Comprehensive metabolic panel    TSH, 3rd generation    Need for pneumococcal vaccination     Health maintenance  Patient was given Pneumovax 23 vaccine today  He will return to the office in 1 year for Prevnar 13         Relevant Orders    PNEUMOCOCCAL POLYSACCHARIDE VACCINE 23-VALENT =>3YO SQ IM (Completed)    Hematuria     Hematuria  Patient with asymptomatic microscopic hematuria  He will repeat urinalysis next week for further evaluation  We will make further recommendations pending results of test          Relevant Orders    CBC    TSH, 3rd generation    Preoperative evaluation of a medical condition to rule out surgical contraindications (TAR required) - Primary     Preoperative consultation for cataract surgery  Patient appears to be in stable health  His EKG is within normal limits  He will obtain blood work for further evaluation  If blood work is within normal limits he will be cleared for upcoming surgery as described         Relevant Orders    POCT urine dip auto non-scope (Completed)        Health Maintenance Due   Topic Date Due    SLP PLAN OF CARE  1945    CRC Screening: Colonoscopy  1945         HPI:  Lasha Parr Sr  is a 67 y o  male here for his Subsequent Wellness Visit      Patient Active Problem List   Diagnosis    Closed compression fracture of first lumbar vertebra (HCC)    Abdominal aortic aneurysm without rupture (HCC)    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HCC)    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HCC)    Asymptomatic bilateral carotid artery stenosis    Coronary artery disease    Hyperlipidemia    Hypertension    Transient cerebral ischemic attack    Need for pneumococcal vaccination    Hematuria    Preoperative evaluation of a medical condition to rule out surgical contraindications (TAR required)     Past Medical History:   Diagnosis Date    AAA (abdominal aortic aneurysm) ([address] )     last assessed: June 11, 2013    Appendicitis     BPH (benign prostatic hyperplasia)     CAD (coronary artery disease)     Cholelithiasis     last assessed: July 26, 2016    Heart attack [facility])     Hypertension     Insomnia     Stroke [facility])     Transient cerebral ischemia      Past Surgical History:   Procedure Laterality Date    ABDOMINAL AORTIC ANEURYSM REPAIR  06/07/2011    Dr Marc Gonzales 6/7/11    APPENDECTOMY      CHOLECYSTECTOMY LAPAROSCOPIC      COLONOSCOPY  11/2006    CORONARY ANGIOPLASTY      3 STENTS INSERTED    CORONARY ANGIOPLASTY      CORONARY ANGIOPLASTY      with stenting - 3/2012    [place] LAP,CHOLECYSTECTOMY/GRAPH N/A 9/8/2016    Procedure: CHOLECYSTECTOMY LAPAROSCOPIC with intra-op cholangiogram ;  Surgeon: Ramona Harris MD;  [facility];  Service: General     Family History   Problem Relation Age of Onset    Heart disease Mother     Hypertension Mother         Benign Essential     Stroke Mother         Stroke     Heart disease Father     Diabetes Brother         mellitus     Heart disease Brother     Aortic aneurysm Brother     Heart disease Family      History   Smoking Status    Former Smoker   Smokeless Tobacco    Never Used     Comment: Quit 1980's about 25 years x1ppd     History   Alcohol Use    Yes     Comment: SOCIAL USE - occasionally       History   Drug Use No       Current Outpatient Prescriptions   Medication Sig Dispense Refill    acetaminophen (TYLENOL ARTHRITIS PAIN) 650 mg CR tablet Take 650 mg by mouth as needed        acetaminophen (TYLENOL) 500 mg tablet Take 500 mg by mouth every 6 (six) hours as needed for mild pain       carvedilol (COREG) 12 5 mg tablet Take 1 tablet (12 5 mg total) by mouth 2 (two) times a day with meals 180 tablet 3    clopidogrel (PLAVIX) 75 mg tablet Take 1 tablet (75 mg total) by mouth daily 90 tablet 3    diazepam (VALIUM) 2 mg tablet Take 1 tablet by mouth 3 (three) times a day as needed      donepezil (ARICEPT) 5 mg tablet Take by mouth daily      lisinopril (ZESTRIL) 20 mg tablet Take 1 tablet (20 mg total) by mouth daily 90 tablet 3    Multiple Vitamin (MULTIVITAMIN) tablet Take 1 tablet by mouth daily   polymyxin b-trimethoprim (POLYTRIM) ophthalmic solution INSTILL 1 DROP INTO SURGERY EYE 4 TIMES A DAY, START 3 DAYS PRIOR TO SURGERY, SEPARATE BY 5 MINS  1    prednisoLONE acetate (PRED FORTE) 1 % ophthalmic suspension INSTILL 1 DROP IN SURGICAL EYE 4X DAILY  SEPARATE DROPS BY 5 MIN  START 3 DAYS PRIOR TO SURGERY  1    simvastatin (ZOCOR) 40 mg tablet Take 1 tablet (40 mg total) by mouth daily at bedtime 90 tablet 3     No current facility-administered medications for this visit        No Known Allergies  Immunization History   Administered Date(s) Administered    Influenza 10/15/2016    Influenza TIV (IM) 09/28/2011, 10/06/2014, 11/05/2017    Pneumococcal Polysaccharide PPV23 08/06/2018    Tdap 04/17/2017       Patient Care Team:  Zak Macias MD as PCP - MD Celina To PA-C Edgar Dears Doctor, MD Timbo Lizarraga Dorinda Pickerel, PA-C Santana Harrow, MD Mickey Fell, MD      Medicare Screening Tests and Risk Assessments:  AWV Clinical     ISAR:   Previous hospitalizations?:  No       Once in a Lifetime Medicare Screening:   EKG performed?:  No    AAA screening performed? (if performed, please add date to Health Maintenance):  No       Medicare Screening Tests and Risk Assessment:   AAA Risk Assessment     Tobacco use (males only):  No   Age over 72 (males only):  Yes Family history of AAA:  No   Osteoporosis Risk Assessment     Female:  No :  Yes :  No   Age over 48:  Yes Low body weight (<127lbs):  No   Tobacco use:  No Alcohol use:  Yes   Low calcium diet:  No    FHX of fractures:  No    HIV Risk Assessment    None indicated:  Yes        Drug and Alcohol Use:   Tobacco use    Cigarettes:  former smoker    Smokeless:  never used smokeless tobacco    Tobacco use duration    Tobacco Cessation Readiness    Alcohol use    Alcohol use:  occasional use    Concern about alcohol use:  No    Alcohol Treatment Readiness   Illicit Drug Use    Drug use:  never    Drug type:  no sedative use       Diet & Exercise:   Diet   What is your diet?:  Low Saturated Fat, No Added Salt, Frequent junk food   How many servings a day of the following:   Fruits and Vegetables:  1-2 Meat:  1-2   Dairy:  1    Coffee:  2 Tea:  1   Exercise    Do you currently exercise?:  currently not exercising       Cognitive Impairment Screening:   Depression screening preformed:  Yes     PHQ-9 Depression scale score:  1   Depression screening results:  no significant symptoms   Cognitive Impairment Screening    Do you have difficulty learning or retaining new information?:  No Do you have difficulty handling new tasks?:  No   Do you have difficulty with reasoning?:  No Do you have difficulty with spatial ability and orientation?:  No   Do you have difficulty with language?:  No Do you have difficulty with behavior?:  No       Functional Ability/Level of Safety:   Hearing    Hearing difficulties:  Yes    Left:  slightly decreased Right:  normal   Hearing aid:  Yes    Hearing Impairment Assessment    Hearing status:  Hearing loss in left ear   Current Activities    Status:  unlimited ADL's, unlimited IADL's, unlimited social activities, unlimited driving   Help needed with the folllowing:    Using the phone:  No Transportation:  No   Shopping:  No Preparing Meals:  No   Doing Housework:  No Doing Laundry:  No   Managing Medications:  No Managing Money:  No   ADL    Feeding: Independant   Oral hygiene and Facial grooming:  Independant   Bathing:  Independant   Upper Body Dressing:  Independant   Lower Body Dressing:  Independant   Toileting:  Independant   Bed Mobility:  Independant   Fall Risk   Have you fallen in the last 12 months?:  No Are you unsteady on your feet?:  No    Are you taking any medications that may cause fatigue or dizziness?:  Yes    Do you rush to the bathroom potentially risking a fall?:  No   Injury History   Polypharmacy:  No Antidepressant Use:  No   Sedative Use:  No Antihypertensive Use:  Yes   Previous Fall:  No Alcohol Use:  Yes   Deconditioning:  No Visual Impairment:  No   Cogitive Impairment:  No Mmobility Impairment:  No   Postural Hypotension:  No Urinary Incontinence:  No       Home Safety:   Are there hazards in your environment?:  No   Home Safety Risk Factors   Unfamilar with surroundings:  No Uneven floors:  No   Stairs or handrail saftey risk:  No Loose rugs:  No   Household clutter:  No Poor household lighting:  No   No grab bars in bathroom:  No Further evaluation needed:  No       Advanced Directives:   Advanced Directives    Living Will:  Yes Durable POA for healthcare: Yes   Advanced directive:  Yes    Patient's End of Life Decisions        Urinary Incontinence:   Do you have urinary incontinence?:  No        Glaucoma:            Provider Screening     Preventative Screening/Counseling:   Cardiovascular Screening/Counseling:   (Labs Q5 years, EKG optional one-time)         Diabetes Screening/Counseling:   (2 tests/year if Pre-Diabetes or 1 test/year if no Diabetes)         Colorectal Cancer Screening/Counseling:   (FOBT Q1 yr; Flex Sig Q4 yrs or Q10 yrs after Screening Colonoscopy; Screening Colonoscpy Q2 yrs High Risk or Q10 yrs Low Risk; Barium Enema Q2 yrs High Risk or Q4 yrs Low Risk)         Prostate Cancer Screening/Counseling:   (Annual)          Breast Cancer Screening/Counseling:   (Baseline Age 28 - 43;  Annual Age 36+) Cervical Cancer Screening/Counseling:   (Annual for High Risk or Childbearing Age with Abnormal Pap in Last 3 yrs; Every 2 all others)         Osteoporosis Screening/Counseling:   (Every 2 Yrs if at risk or more if medically necessary)         AAA Screening/Counseling:   (Once per Lifetime with risk factors)    Family History of AAA:  No Age over 72 (males only):  Yes Tobacco use (males only):  No         Glaucoma Screening/Counseling:   (Annual)         HIV Screening/Counseling:   (Voluntary; Once annually for high risk OR 3 times for Pregnancy at diagnosis of IUP; 3rd trimester; and at Labor         Hepatitis C Screening:             Immunizations:        Other Preventative Couseling (Non-Medicare Wellness Visit Required):       Referrals (Non-Medicare Wellness Visit Required):       Medical Equipment/Suppliers:

## 2018-08-06 NOTE — ASSESSMENT & PLAN NOTE
Hyperlipidemia    Patient will obtain lipid panel blood work and continue with current dose of statin therapy

## 2018-08-13 ENCOUNTER — APPOINTMENT (OUTPATIENT)
Dept: LAB | Facility: CLINIC | Age: 73
End: 2018-08-13
Payer: COMMERCIAL

## 2018-08-13 ENCOUNTER — CLINICAL SUPPORT (OUTPATIENT)
Dept: FAMILY MEDICINE CLINIC | Facility: CLINIC | Age: 73
End: 2018-08-13
Payer: COMMERCIAL

## 2018-08-13 DIAGNOSIS — I10 ESSENTIAL HYPERTENSION: ICD-10-CM

## 2018-08-13 DIAGNOSIS — R31.9 HEMATURIA, UNSPECIFIED TYPE: Primary | ICD-10-CM

## 2018-08-13 DIAGNOSIS — R31.9 HEMATURIA, UNSPECIFIED TYPE: ICD-10-CM

## 2018-08-13 DIAGNOSIS — E78.5 HYPERLIPIDEMIA, UNSPECIFIED HYPERLIPIDEMIA TYPE: ICD-10-CM

## 2018-08-13 LAB
ALBUMIN SERPL BCP-MCNC: 3.8 G/DL (ref 3.5–5)
ALP SERPL-CCNC: 52 U/L (ref 46–116)
ALT SERPL W P-5'-P-CCNC: 28 U/L (ref 12–78)
ANION GAP SERPL CALCULATED.3IONS-SCNC: 7 MMOL/L (ref 4–13)
AST SERPL W P-5'-P-CCNC: 21 U/L (ref 5–45)
BILIRUB SERPL-MCNC: 0.46 MG/DL (ref 0.2–1)
BUN SERPL-MCNC: 13 MG/DL (ref 5–25)
CALCIUM SERPL-MCNC: 8.7 MG/DL (ref 8.3–10.1)
CHLORIDE SERPL-SCNC: 105 MMOL/L (ref 100–108)
CHOLEST SERPL-MCNC: 122 MG/DL (ref 50–200)
CO2 SERPL-SCNC: 26 MMOL/L (ref 21–32)
CREAT SERPL-MCNC: 0.86 MG/DL (ref 0.6–1.3)
ERYTHROCYTE [DISTWIDTH] IN BLOOD BY AUTOMATED COUNT: 11.9 % (ref 11.6–15.1)
GFR SERPL CREATININE-BSD FRML MDRD: 87 ML/MIN/1.73SQ M
GLUCOSE P FAST SERPL-MCNC: 93 MG/DL (ref 65–99)
HCT VFR BLD AUTO: 38.2 % (ref 36.5–49.3)
HDLC SERPL-MCNC: 37 MG/DL (ref 40–60)
HGB BLD-MCNC: 13 G/DL (ref 12–17)
LDLC SERPL CALC-MCNC: 57 MG/DL (ref 0–100)
MCH RBC QN AUTO: 32.9 PG (ref 26.8–34.3)
MCHC RBC AUTO-ENTMCNC: 34 G/DL (ref 31.4–37.4)
MCV RBC AUTO: 97 FL (ref 82–98)
NONHDLC SERPL-MCNC: 85 MG/DL
PLATELET # BLD AUTO: 200 THOUSANDS/UL (ref 149–390)
PMV BLD AUTO: 8.9 FL (ref 8.9–12.7)
POTASSIUM SERPL-SCNC: 3.8 MMOL/L (ref 3.5–5.3)
PROT SERPL-MCNC: 7.7 G/DL (ref 6.4–8.2)
RBC # BLD AUTO: 3.95 MILLION/UL (ref 3.88–5.62)
SL AMB  POCT GLUCOSE, UA: NEGATIVE
SL AMB LEUKOCYTE ESTERASE,UA: NEGATIVE
SL AMB POCT BILIRUBIN,UA: NEGATIVE
SL AMB POCT BLOOD,UA: ABNORMAL
SL AMB POCT CLARITY,UA: CLEAR
SL AMB POCT COLOR,UA: YELLOW
SL AMB POCT KETONES,UA: NEGATIVE
SL AMB POCT NITRITE,UA: NEGATIVE
SL AMB POCT PH,UA: 5
SL AMB POCT SPECIFIC GRAVITY,UA: 1.02
SL AMB POCT URINE PROTEIN: NEGATIVE
SL AMB POCT UROBILINOGEN: 0.2
SODIUM SERPL-SCNC: 138 MMOL/L (ref 136–145)
TRIGL SERPL-MCNC: 140 MG/DL
TSH SERPL DL<=0.05 MIU/L-ACNC: 0.88 UIU/ML (ref 0.36–3.74)
WBC # BLD AUTO: 5.54 THOUSAND/UL (ref 4.31–10.16)

## 2018-08-13 PROCEDURE — 85027 COMPLETE CBC AUTOMATED: CPT

## 2018-08-13 PROCEDURE — 84443 ASSAY THYROID STIM HORMONE: CPT

## 2018-08-13 PROCEDURE — 80061 LIPID PANEL: CPT

## 2018-08-13 PROCEDURE — 81002 URINALYSIS NONAUTO W/O SCOPE: CPT

## 2018-08-13 PROCEDURE — 80053 COMPREHEN METABOLIC PANEL: CPT

## 2018-08-13 PROCEDURE — 36415 COLL VENOUS BLD VENIPUNCTURE: CPT

## 2018-08-14 ENCOUNTER — TELEPHONE (OUTPATIENT)
Dept: FAMILY MEDICINE CLINIC | Facility: CLINIC | Age: 73
End: 2018-08-14

## 2018-08-14 DIAGNOSIS — R31.9 HEMATURIA, UNSPECIFIED TYPE: Primary | ICD-10-CM

## 2018-08-14 NOTE — TELEPHONE ENCOUNTER
----- Message from Radu Denson MD sent at 3/43/1013 10:47 AM EDT -----  Patient's urinalysis still showing signs of microscopic blood  I recommend patient obtaining an ultrasound of his kidney a bladder for further evaluation    I will print out prescription to proceed with this test

## 2018-08-24 DIAGNOSIS — I71.4 ABDOMINAL AORTIC ANEURYSM WITHOUT RUPTURE (HCC): Primary | ICD-10-CM

## 2018-08-27 ENCOUNTER — HOSPITAL ENCOUNTER (OUTPATIENT)
Dept: ULTRASOUND IMAGING | Facility: HOSPITAL | Age: 73
Discharge: HOME | End: 2018-08-27
Payer: COMMERCIAL

## 2018-08-27 DIAGNOSIS — R31.9 HEMATURIA, UNSPECIFIED TYPE: ICD-10-CM

## 2018-08-27 PROCEDURE — 76770 US EXAM ABDO BACK WALL COMP: CPT

## 2018-08-28 ENCOUNTER — TELEPHONE (OUTPATIENT)
Dept: UROLOGY | Facility: CLINIC | Age: 73
End: 2018-08-28

## 2018-08-28 ENCOUNTER — TELEPHONE (OUTPATIENT)
Dept: FAMILY MEDICINE CLINIC | Facility: CLINIC | Age: 73
End: 2018-08-28

## 2018-08-28 NOTE — TELEPHONE ENCOUNTER
----- Message from Tenzin Collado MD sent at 0/94/4436 11:05 AM EDT -----  Ultrasound shows mildly dilated ureter to use from his kidneys to his bladder  Reason is unknown at this time however since ultrasound is not normal I would recommend evaluation with urologist for further evaluation    Please refer to [facility] and give phone number

## 2018-08-28 NOTE — TELEPHONE ENCOUNTER
Patient left message to schedule new patient evaluation  He had ultrasound done on 8/27/18 in Epic, mildly dilated ureters, please review and advise of time frame for scheduling and if should be with MD or PA  He was referred by PCP Dr Reyna Beckwith MD     Patient states he will be at [facility] next Tuesday for BW  Please route to [facility] clinical      Thank you

## 2018-08-29 NOTE — TELEPHONE ENCOUNTER
Patient called re appointment  He accepted appointment of 10/02/18, 8:45AM at [facility] with Dr Janett Garcia  Intake paperwork mailed

## 2018-09-04 ENCOUNTER — TRANSCRIBE ORDERS (OUTPATIENT)
Dept: LAB | Facility: CLINIC | Age: 73
End: 2018-09-04

## 2018-09-04 ENCOUNTER — APPOINTMENT (OUTPATIENT)
Dept: LAB | Facility: CLINIC | Age: 73
End: 2018-09-04
Payer: COMMERCIAL

## 2018-09-04 DIAGNOSIS — I10 ESSENTIAL HYPERTENSION, MALIGNANT: Primary | ICD-10-CM

## 2018-09-04 DIAGNOSIS — I10 ESSENTIAL HYPERTENSION, MALIGNANT: ICD-10-CM

## 2018-09-04 LAB
ANION GAP SERPL CALCULATED.3IONS-SCNC: 7 MMOL/L (ref 4–13)
BUN SERPL-MCNC: 12 MG/DL (ref 5–25)
CALCIUM SERPL-MCNC: 9 MG/DL (ref 8.3–10.1)
CHLORIDE SERPL-SCNC: 105 MMOL/L (ref 100–108)
CO2 SERPL-SCNC: 28 MMOL/L (ref 21–32)
CREAT SERPL-MCNC: 0.92 MG/DL (ref 0.6–1.3)
GFR SERPL CREATININE-BSD FRML MDRD: 83 ML/MIN/1.73SQ M
GLUCOSE P FAST SERPL-MCNC: 113 MG/DL (ref 65–99)
POTASSIUM SERPL-SCNC: 4.4 MMOL/L (ref 3.5–5.3)
SODIUM SERPL-SCNC: 140 MMOL/L (ref 136–145)

## 2018-09-04 PROCEDURE — 36415 COLL VENOUS BLD VENIPUNCTURE: CPT

## 2018-09-04 PROCEDURE — 80048 BASIC METABOLIC PNL TOTAL CA: CPT

## 2018-09-06 ENCOUNTER — OFFICE VISIT (OUTPATIENT)
Dept: CARDIOLOGY CLINIC | Facility: CLINIC | Age: 73
End: 2018-09-06
Payer: COMMERCIAL

## 2018-09-06 VITALS
HEART RATE: 73 BPM | OXYGEN SATURATION: 95 % | WEIGHT: 201 LBS | HEIGHT: 65 IN | BODY MASS INDEX: 33.49 KG/M2 | DIASTOLIC BLOOD PRESSURE: 68 MMHG | SYSTOLIC BLOOD PRESSURE: 120 MMHG

## 2018-09-06 DIAGNOSIS — I65.23 ASYMPTOMATIC BILATERAL CAROTID ARTERY STENOSIS: ICD-10-CM

## 2018-09-06 DIAGNOSIS — I10 ESSENTIAL HYPERTENSION: ICD-10-CM

## 2018-09-06 DIAGNOSIS — I25.10 CORONARY ARTERY DISEASE INVOLVING NATIVE CORONARY ARTERY OF NATIVE HEART WITHOUT ANGINA PECTORIS: Primary | ICD-10-CM

## 2018-09-06 DIAGNOSIS — I71.4 ABDOMINAL AORTIC ANEURYSM WITHOUT RUPTURE (HCC): ICD-10-CM

## 2018-09-06 DIAGNOSIS — E78.49 OTHER HYPERLIPIDEMIA: ICD-10-CM

## 2018-09-06 DIAGNOSIS — I72.3 ANEURYSM OF ILIAC ARTERY (HCC): ICD-10-CM

## 2018-09-06 DIAGNOSIS — I72.4 ANEURYSM OF POPLITEAL ARTERY (HCC): ICD-10-CM

## 2018-09-06 PROCEDURE — 99214 OFFICE O/P EST MOD 30 MIN: CPT | Performed by: INTERNAL MEDICINE

## 2018-09-06 NOTE — PROGRESS NOTES
Follow-up - Cardiology   Ling Youssef Sr  67 y o  male MRN: [id number]        Problems     benign essential hypertension   hyperlipidemia   CAD with history of left circumflex stent   history of TIA, recurrent after stopping clopidogrel  History of AAA repair   mild bilateral carotid artery stenosis   iliac/popliteal ectasias      Plan      Hypertension is stable   lipids are controlled   CAD is stable without exertional symptoms   vascular imaging up-to-date   no other changes or testing plan      HPI: Ling Youssef Sr  is a 67y o  year old male with Hypertension, hyperlipidemia, CAD with remote history of stenting, AAA status post repair, with iliac and popliteal aneurysms stable as of 10/17 by imaging  He has mild bilateral carotid artery stenosis, unchanged by imaging as of 4/18  His hypertension is under good control, lipids recently assessed 8/18 and excellent, and he has no exertional cardiac symptoms, recently walked extensively around [employer]  [place] am use [place] park for 4 days over the [place] weekend, and had no exertional symptoms  He did have right-sided lower chest discomfort on Monday, took some Mylanta, passed gas and had resolution of his pain  This is been a recurrent issue for him in the past   He is also dealing with hematuria at this time, recently had a renal ultrasound and has follow-up with urology planned  Review of Systems   All other systems reviewed and are negative          Past Medical History:   Diagnosis Date    AAA [facility]abdominal [facility]) ([facility] [address] )     last assessed: June 11, 2013    Appendicitis     BPH (benign prostatic hyperplasia)     CAD (coronary artery disease)     Cholelithiasis     last assessed: July 26, 2016    [facility])     Hypertension     Insomnia     Stroke ([facility] [address] )     Transient cerebral ischemia      History   Alcohol Use    Yes     Comment: SOCIAL USE - occasionally      History   Drug Use No     History   Smoking Status    Former Smoker   Smokeless Tobacco    Never Used     Comment: Quit 1980's about 25 years x1ppd       Allergies:  No Known Allergies    Medications:     Current Outpatient Prescriptions:     carvedilol (COREG) 12 5 mg tablet, Take 1 tablet (12 5 mg total) by mouth 2 (two) times a day with meals, Disp: 180 tablet, Rfl: 3    clopidogrel (PLAVIX) 75 mg tablet, Take 1 tablet (75 mg total) by mouth daily, Disp: 90 tablet, Rfl: 3    lisinopril (ZESTRIL) 20 mg tablet, Take 1 tablet (20 mg total) by mouth daily, Disp: 90 tablet, Rfl: 3    Multiple Vitamin (MULTIVITAMIN) tablet, Take 1 tablet by mouth daily  , Disp: , Rfl:     polymyxin b-trimethoprim (POLYTRIM) ophthalmic solution, INSTILL 1 DROP INTO SURGERY EYE 4 TIMES A DAY, START 3 DAYS PRIOR TO SURGERY, SEPARATE BY 5 MINS, Disp: , Rfl: 1    simvastatin (ZOCOR) 40 mg tablet, Take 1 tablet (40 mg total) by mouth daily at bedtime, Disp: 90 tablet, Rfl: 3      Physical Exam   Constitutional: He is oriented to person, place, and time  No distress  HENT:   Head: Normocephalic and atraumatic  Eyes: Conjunctivae are normal  No scleral icterus  Neck: Normal range of motion  No JVD present  Cardiovascular: Normal rate, regular rhythm, normal heart sounds and intact distal pulses  No murmur heard  Pulmonary/Chest: Effort normal and breath sounds normal  He has no wheezes  He has no rales  Musculoskeletal: He exhibits no edema  Neurological: He is alert and oriented to person, place, and time  Skin: Skin is warm and dry  He is not diaphoretic           Laboratory Studies:  CMP:    Results from last 7 days  Lab Units 09/04/18  1037   SODIUM mmol/L 140   POTASSIUM mmol/L 4 4   CHLORIDE mmol/L 105   CO2 mmol/L 28   BUN mg/dL 12   CREATININE mg/dL 0 92   CALCIUM mg/dL 9 0   EGFR ml/min/1 73sq m 83     NT-proBNP: No results found for: NTBNP   Coags:    Lipid Profile:   Lab Results   Component Value Date    CHOL 138 08/20/2015     Lab Results   Component Value Date HDL 37 (L) 08/13/2018     Lab Results   Component Value Date    LDLCALC 57 08/13/2018     Lab Results   Component Value Date    TRIG 140 08/13/2018       Cardiac testing:       Agatha Hernandez MD    Portions of the record may have been created with voice recognition software   Occasional wrong word or "sound a like" substitutions may have occurred due to the inherent limitations of voice recognition software   Read the chart carefully and recognize, using context, where substitutions have occurred

## 2018-09-28 NOTE — PROGRESS NOTES
10/2/2018    Tabitha Chau    [id number]  [id number]    Discussion and Plan    An extensive discussion regards to a micro hematuria workup  Postvoid residual today is 67 cc  Recommended repeating PSA at this time along with in office cystoscopy  Risks including bleeding and infection discussed  All questions answered at this time  1  Nocturia  - POCT Measure PVR  - PSA Total, Diagnostic; Future    2  Hematuria, unspecified type  - Cystoscopy; Future    Assessment      Patient Active Problem List   Diagnosis    Closed compression fracture of first lumbar vertebra (HCC)    Abdominal aortic aneurysm without rupture (HCC)    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HCC)    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[facility] [address] [facility]    Asymptomatic bilateral carotid artery stenosis    Coronary artery disease    Hyperlipidemia    Hypertension    Transient cerebral ischemic attack    Need for pneumococcal vaccination    Hematuria    Preoperative evaluation of a medical condition to rule out surgical contraindications (TAR required)       History of Present Illness    Braden Hutton is a 68 y o  male seen today in regards to a history of persistent micro hematuria  This is noted on 2 urinalyses  A renal bladder ultrasound was obtained in the interim which is noteworthy only for mildly dilated ureters  No abnormal renal findings or hydronephrosis  He states he has fair to good urinary flow with nocturia times 1-2 and otherwise complete bladder emptying sensation  No history of UTI, gross hematuria, or nephrolithiasis  He is a former smoker having quit 32 years ago  PSA was 0 3 in 2016      Urinary Symptom Assessment        Past Medical History  Past Medical History:   Diagnosis Date    [facility] )     last assessed: June 11, 2013    Appendicitis     BPH (benign prostatic hyperplasia)     CAD (coronary artery disease)     Cholelithiasis     last assessed: July 26, 2016    [facility])     Hypertension  Insomnia     Stroke ([facility] [address] )     Transient cerebral ischemia        Past Social History  Past Surgical History:   Procedure Laterality Date    ABDOMINAL AORTIC ANEURYSM REPAIR  06/07/2011    Dr Jorge Chavez 6/7/11    APPENDECTOMY      CHOLECYSTECTOMY LAPAROSCOPIC      COLONOSCOPY  11/2006    CORONARY ANGIOPLASTY      3 STENTS INSERTED    CORONARY ANGIOPLASTY      CORONARY ANGIOPLASTY      with stenting - 3/2012    AR LAP,CHOLECYSTECTOMY/GRAPH N/A 9/8/2016    Procedure: CHOLECYSTECTOMY LAPAROSCOPIC with intra-op cholangiogram ;  Surgeon: Sidra Hopkins MD;  Location: [facility];  Service: General       Past Family History  Family History   Problem Relation Age of Onset    Heart disease Mother     Hypertension Mother         Benign Essential     Stroke Mother         Stroke     Heart disease Father     Diabetes Brother         mellitus     Heart disease Brother     Aortic aneurysm Brother     Heart disease Family        Past Social history  Social History     Social History    Marital status: /Civil Union     Spouse name: N/A    Number of children: N/A    Years of education: N/A     Occupational History    Retired       Social History Main Topics    Smoking status: Former Smoker    Smokeless tobacco: Never Used      Comment: Quit 1980's about 25 years x1ppd    Alcohol use Yes      Comment: SOCIAL USE - occasionally     Drug use: No    Sexual activity: Not on file     Other Topics Concern    Not on file     Social History Narrative    No narrative on file       Current Medications  Current Outpatient Prescriptions   Medication Sig Dispense Refill    carvedilol (COREG) 12 5 mg tablet Take 1 tablet (12 5 mg total) by mouth 2 (two) times a day with meals 180 tablet 3    clopidogrel (PLAVIX) 75 mg tablet Take 1 tablet (75 mg total) by mouth daily 90 tablet 3    lisinopril (ZESTRIL) 20 mg tablet Take 1 tablet (20 mg total) by mouth daily 90 tablet 3    Multiple Vitamin (MULTIVITAMIN) tablet Take 1 tablet by mouth daily   polymyxin b-trimethoprim (POLYTRIM) ophthalmic solution INSTILL 1 DROP INTO SURGERY EYE 4 TIMES A DAY, START 3 DAYS PRIOR TO SURGERY, SEPARATE BY 5 MINS  1    simvastatin (ZOCOR) 40 mg tablet Take 1 tablet (40 mg total) by mouth daily at bedtime 90 tablet 3     No current facility-administered medications for this visit  Allergies  No Known Allergies    Past Medical History, Social History, Family History, medications and allergies were reviewed  Review of Systems  Review of Systems   Constitutional: Negative  HENT: Negative  Eyes: Negative  Respiratory: Negative  Cardiovascular: Negative  Gastrointestinal: Negative  Endocrine: Negative  Genitourinary: Negative for decreased urine volume, difficulty urinating, frequency, hematuria and urgency  Musculoskeletal: Negative  Skin: Negative  Neurological: Negative  Hematological: Negative  Psychiatric/Behavioral: Negative  Vitals  Vitals:    10/02/18 0834   BP: 138/70   Pulse: 84   Weight: 92 5 kg (204 lb)   Height: 5' 5" (1 651 m)         Physical Exam    Physical Exam   Constitutional: He is oriented to person, place, and time  He appears well-developed and well-nourished  HENT:   Head: Normocephalic and atraumatic  Eyes: Pupils are equal, round, and reactive to light  Neck: Normal range of motion  Cardiovascular: Normal rate, regular rhythm and normal heart sounds  Pulmonary/Chest: Effort normal and breath sounds normal  No accessory muscle usage  No respiratory distress  Abdominal: Soft  Normal appearance and bowel sounds are normal  There is no tenderness  Genitourinary: Rectum normal, prostate normal and penis normal  No penile tenderness  Genitourinary Comments: Prostate 35 g no nodularity   Musculoskeletal: Normal range of motion  Neurological: He is alert and oriented to person, place, and time  Skin: Skin is warm, dry and intact     Psychiatric: He has a normal mood and affect  His speech is normal  Cognition and memory are normal    Nursing note and vitals reviewed  Results    Below listed labs, pathology results, and radiology images were personally reviewed:    Lab Results   Component Value Date/Time    PSA 0 3 06/28/2016 09:02 AM    PSA 0 3 09/17/2014 01:42 PM     Lab Results   Component Value Date    GLUCOSE 121 12/03/2015    CALCIUM 9 0 09/04/2018     09/04/2018    K 4 4 09/04/2018    CO2 28 09/04/2018     09/04/2018    BUN 12 09/04/2018    CREATININE 0 92 09/04/2018     Lab Results   Component Value Date    WBC 5 54 08/13/2018    HGB 13 0 08/13/2018    HCT 38 2 08/13/2018    MCV 97 08/13/2018     08/13/2018       Recent Results (from the past 1 hour(s))   POCT Measure PVR    Collection Time: 10/02/18  8:38 AM   Result Value Ref Range    POST-VOID RESIDUAL VOLUME, ML POC 67 mL   ]    RENAL ULTRASOUND     INDICATION:   R31 9: Hematuria, unspecified      COMPARISON: None     TECHNIQUE:   Ultrasound of the retroperitoneum was performed with a curvilinear transducer utilizing volumetric sweeps and still imaging techniques       FINDINGS:     KIDNEYS:  Symmetric and normal size  Right kidney:  10 5 x 5 4 cm  Left kidney:  11 0 x 5 8 cm      Right kidney  Normal echogenicity and contour  No suspicious masses detected  There is mild fullness of the collecting system  No shadowing calculi  No perinephric fluid collections      Left kidney  Normal echogenicity and contour  No suspicious masses detected  There is mild fullness of the collecting system, right greater than left  No shadowing calculi  No perinephric fluid collections      URETERS:  Nonvisualized      BLADDER:   Not overdistended  No focal thickening or mass lesions  Bilateral ureteral jets detected         IMPRESSION:     1  There is mild fullness of the renal collecting systems bilaterally, right greater than left  Etiology unclear    This is occasionally related to bladder outlet symptomatology however bladder was not over distended at the time of the exam  2  Bilateral ureteral jets are present  3  The kidneys are otherwise unremarkable in appearance        Workstation performed: [id number]      Imaging     US kidney and bladder (Order #[id number]) on 8/27/2018 - Imaging Information       Post Void Residual Measurement:  After voiding to completion the patient was brought to the exam room and positioned supine    Residual urine volume was measured with an ultrasound at:    67 ml

## 2018-10-02 ENCOUNTER — TELEPHONE (OUTPATIENT)
Dept: UROLOGY | Facility: CLINIC | Age: 73
End: 2018-10-02

## 2018-10-02 ENCOUNTER — APPOINTMENT (OUTPATIENT)
Dept: LAB | Facility: AMBULARY SURGERY CENTER | Age: 73
End: 2018-10-02
Attending: UROLOGY
Payer: COMMERCIAL

## 2018-10-02 ENCOUNTER — OFFICE VISIT (OUTPATIENT)
Dept: UROLOGY | Facility: CLINIC | Age: 73
End: 2018-10-02
Payer: COMMERCIAL

## 2018-10-02 VITALS
WEIGHT: 204 LBS | SYSTOLIC BLOOD PRESSURE: 138 MMHG | BODY MASS INDEX: 33.99 KG/M2 | HEIGHT: 65 IN | HEART RATE: 84 BPM | DIASTOLIC BLOOD PRESSURE: 70 MMHG

## 2018-10-02 DIAGNOSIS — R31.9 HEMATURIA, UNSPECIFIED TYPE: ICD-10-CM

## 2018-10-02 DIAGNOSIS — R35.1 NOCTURIA: Primary | ICD-10-CM

## 2018-10-02 DIAGNOSIS — R35.1 NOCTURIA: ICD-10-CM

## 2018-10-02 LAB
POST-VOID RESIDUAL VOLUME, ML POC: 67 ML
PSA SERPL-MCNC: 0.3 NG/ML (ref 0–4)

## 2018-10-02 PROCEDURE — 99204 OFFICE O/P NEW MOD 45 MIN: CPT | Performed by: UROLOGY

## 2018-10-02 PROCEDURE — 51798 US URINE CAPACITY MEASURE: CPT | Performed by: UROLOGY

## 2018-10-02 PROCEDURE — 84153 ASSAY OF PSA TOTAL: CPT

## 2018-10-09 ENCOUNTER — HOSPITAL ENCOUNTER (OUTPATIENT)
Dept: NON INVASIVE DIAGNOSTICS | Facility: CLINIC | Age: 73
Discharge: HOME | End: 2018-10-09
Payer: COMMERCIAL

## 2018-10-09 DIAGNOSIS — I71.4 ABDOMINAL AORTIC ANEURYSM WITHOUT RUPTURE (HCC): ICD-10-CM

## 2018-10-09 PROCEDURE — 93978 VASCULAR STUDY: CPT | Performed by: SURGERY

## 2018-10-09 PROCEDURE — 93978 VASCULAR STUDY: CPT

## 2018-11-08 ENCOUNTER — TELEPHONE (OUTPATIENT)
Dept: ADMINISTRATIVE | Facility: HOSPITAL | Age: 73
End: 2018-11-08

## 2018-11-08 DIAGNOSIS — I65.23 BILATERAL CAROTID ARTERY STENOSIS: Primary | ICD-10-CM

## 2018-11-13 PROCEDURE — 52000 CYSTOURETHROSCOPY: CPT | Performed by: UROLOGY

## 2018-11-13 NOTE — PROGRESS NOTES
Cystoscopy  Date/Time: 11/13/2018 6:04 AM  Performed by: Melly Bauer by: James Odonnell     Procedure details: cystoscopy    Patient tolerance: Patient tolerated the procedure well with no immediate complications          Patient seen for persistent micro hematuria  This is noted on 2 urinalyses  A renal bladder ultrasound was obtained in the interim which is noteworthy only for mildly dilated ureters  No abnormal renal findings or hydronephrosis  He states he has fair to good urinary flow with nocturia times 1-2 and otherwise complete bladder emptying sensation  No history of UTI, gross hematuria, or nephrolithiasis  He is a former smoker having quit 32 years ago  PSA was 0 3 in 2016  Component      Latest Ref Rng & Units 6/28/2016 10/2/2018   PSA, Total      0 0 - 4 0 ng/mL 0 3 0 3       PROCEDURE- CYSTOSCOPY    With the patient properly identified and informed consent obtained he was placed supine in the procedure suite  He was sterilely prepped and draped in the usual fashion  10 cc of viscous lidocaine was administered per urethra  Flexible cystourethroscopy was performed with the 16 [employer] Maggy scope  This revealed a normal pendulous urethra  Prostate was +2 enlarged  Kissing lobes noted  Inspection of the bladder revealed no evidence of bladder tumor  Patient tolerated the procedure well  PLAN    Cystoscopy demonstrated no lower urinary tract abnormalities  He had a repeat negative micro hematuria workup  PSA remains stable  He therefore will return in 1 year with PSA and repeat urine studies prior to visit    All questions answered at this time

## 2018-11-14 ENCOUNTER — PROCEDURE VISIT (OUTPATIENT)
Dept: UROLOGY | Facility: AMBULATORY SURGERY CENTER | Age: 73
End: 2018-11-14
Payer: COMMERCIAL

## 2018-11-14 VITALS
HEIGHT: 66 IN | DIASTOLIC BLOOD PRESSURE: 74 MMHG | SYSTOLIC BLOOD PRESSURE: 138 MMHG | WEIGHT: 204 LBS | BODY MASS INDEX: 32.78 KG/M2 | HEART RATE: 56 BPM

## 2018-11-14 DIAGNOSIS — R31.29 MICROHEMATURIA: Primary | ICD-10-CM

## 2018-11-14 LAB
SL AMB  POCT GLUCOSE, UA: NORMAL
SL AMB LEUKOCYTE ESTERASE,UA: NORMAL
SL AMB POCT BILIRUBIN,UA: NORMAL
SL AMB POCT BLOOD,UA: NORMAL
SL AMB POCT CLARITY,UA: CLEAR
SL AMB POCT COLOR,UA: YELLOW
SL AMB POCT KETONES,UA: NORMAL
SL AMB POCT NITRITE,UA: NORMAL
SL AMB POCT PH,UA: 6.5
SL AMB POCT SPECIFIC GRAVITY,UA: 1
SL AMB POCT URINE PROTEIN: NORMAL
SL AMB POCT UROBILINOGEN: 0.2

## 2018-11-14 PROCEDURE — 81002 URINALYSIS NONAUTO W/O SCOPE: CPT | Performed by: UROLOGY

## 2018-11-19 ENCOUNTER — HOSPITAL ENCOUNTER (OUTPATIENT)
Dept: NON INVASIVE DIAGNOSTICS | Facility: CLINIC | Age: 73
Discharge: HOME | End: 2018-11-19
Payer: COMMERCIAL

## 2018-11-19 DIAGNOSIS — I65.23 BILATERAL CAROTID ARTERY STENOSIS: ICD-10-CM

## 2018-11-19 PROCEDURE — 93880 EXTRACRANIAL BILAT STUDY: CPT

## 2018-11-20 PROCEDURE — 93880 EXTRACRANIAL BILAT STUDY: CPT | Performed by: SURGERY

## 2018-12-04 ENCOUNTER — OFFICE VISIT (OUTPATIENT)
Dept: CARDIOLOGY CLINIC | Facility: CLINIC | Age: 73
End: 2018-12-04
Payer: COMMERCIAL

## 2018-12-04 ENCOUNTER — APPOINTMENT (OUTPATIENT)
Dept: LAB | Facility: CLINIC | Age: 73
End: 2018-12-04
Payer: COMMERCIAL

## 2018-12-04 VITALS
WEIGHT: 203.1 LBS | DIASTOLIC BLOOD PRESSURE: 80 MMHG | HEIGHT: 65 IN | SYSTOLIC BLOOD PRESSURE: 152 MMHG | BODY MASS INDEX: 33.84 KG/M2 | OXYGEN SATURATION: 98 % | HEART RATE: 64 BPM

## 2018-12-04 DIAGNOSIS — I25.119 CORONARY ARTERY DISEASE WITH ANGINA PECTORIS, UNSPECIFIED VESSEL OR LESION TYPE, UNSPECIFIED WHETHER NATIVE OR TRANSPLANTED HEART (HCC): ICD-10-CM

## 2018-12-04 DIAGNOSIS — I10 ESSENTIAL HYPERTENSION: ICD-10-CM

## 2018-12-04 DIAGNOSIS — G45.8 OTHER SPECIFIED TRANSIENT CEREBRAL ISCHEMIAS: ICD-10-CM

## 2018-12-04 DIAGNOSIS — I25.110 CORONARY ARTERY DISEASE INVOLVING NATIVE CORONARY ARTERY OF NATIVE HEART WITH UNSTABLE ANGINA PECTORIS (HCC): ICD-10-CM

## 2018-12-04 DIAGNOSIS — I25.119 CORONARY ARTERY DISEASE WITH ANGINA PECTORIS, UNSPECIFIED VESSEL OR LESION TYPE, UNSPECIFIED WHETHER NATIVE OR TRANSPLANTED HEART (HCC): Primary | ICD-10-CM

## 2018-12-04 DIAGNOSIS — I65.23 ASYMPTOMATIC BILATERAL CAROTID ARTERY STENOSIS: ICD-10-CM

## 2018-12-04 DIAGNOSIS — E78.49 OTHER HYPERLIPIDEMIA: ICD-10-CM

## 2018-12-04 LAB
ANION GAP SERPL CALCULATED.3IONS-SCNC: 8 MMOL/L (ref 4–13)
BUN SERPL-MCNC: 16 MG/DL (ref 5–25)
CALCIUM SERPL-MCNC: 9.3 MG/DL (ref 8.3–10.1)
CHLORIDE SERPL-SCNC: 102 MMOL/L (ref 100–108)
CO2 SERPL-SCNC: 27 MMOL/L (ref 21–32)
CREAT SERPL-MCNC: 0.98 MG/DL (ref 0.6–1.3)
ERYTHROCYTE [DISTWIDTH] IN BLOOD BY AUTOMATED COUNT: 11.6 % (ref 11.6–15.1)
GFR SERPL CREATININE-BSD FRML MDRD: 76 ML/MIN/1.73SQ M
GLUCOSE SERPL-MCNC: 86 MG/DL (ref 65–140)
HCT VFR BLD AUTO: 37.8 % (ref 36.5–49.3)
HGB BLD-MCNC: 13 G/DL (ref 12–17)
INR PPP: 1.03 (ref 0.86–1.17)
MCH RBC QN AUTO: 32.4 PG (ref 26.8–34.3)
MCHC RBC AUTO-ENTMCNC: 34.4 G/DL (ref 31.4–37.4)
MCV RBC AUTO: 94 FL (ref 82–98)
PLATELET # BLD AUTO: 185 THOUSANDS/UL (ref 149–390)
PMV BLD AUTO: 8.6 FL (ref 8.9–12.7)
POTASSIUM SERPL-SCNC: 4.2 MMOL/L (ref 3.5–5.3)
PROTHROMBIN TIME: 13.2 SECONDS (ref 11.8–14.2)
RBC # BLD AUTO: 4.01 MILLION/UL (ref 3.88–5.62)
SODIUM SERPL-SCNC: 137 MMOL/L (ref 136–145)
WBC # BLD AUTO: 6.54 THOUSAND/UL (ref 4.31–10.16)

## 2018-12-04 PROCEDURE — 80048 BASIC METABOLIC PNL TOTAL CA: CPT

## 2018-12-04 PROCEDURE — 93000 ELECTROCARDIOGRAM COMPLETE: CPT | Performed by: INTERNAL MEDICINE

## 2018-12-04 PROCEDURE — 85610 PROTHROMBIN TIME: CPT | Performed by: INTERNAL MEDICINE

## 2018-12-04 PROCEDURE — 85027 COMPLETE CBC AUTOMATED: CPT

## 2018-12-04 PROCEDURE — 36415 COLL VENOUS BLD VENIPUNCTURE: CPT | Performed by: INTERNAL MEDICINE

## 2018-12-04 PROCEDURE — 4040F PNEUMOC VAC/ADMIN/RCVD: CPT | Performed by: INTERNAL MEDICINE

## 2018-12-04 PROCEDURE — 99214 OFFICE O/P EST MOD 30 MIN: CPT | Performed by: INTERNAL MEDICINE

## 2018-12-04 RX ORDER — ISOSORBIDE MONONITRATE 30 MG/1
30 TABLET, EXTENDED RELEASE ORAL DAILY
Qty: 30 TABLET | Refills: 3 | Status: SHIPPED | OUTPATIENT
Start: 2018-12-04 | End: 2018-12-10

## 2018-12-04 NOTE — PATIENT INSTRUCTIONS
Start Imdur 30 mg daily  Cardiac catheterization  Pre cardiac catheterization labs  Risks of the procedure described

## 2018-12-04 NOTE — PROGRESS NOTES
[facility]  Follow up note  Prince Dong  68 y o  male MRN: [id number]        Problems    1  Coronary artery disease with angina pectoris, unspecified vessel or lesion type, unspecified whether native or transplanted heart [facility])  POCT ECG    Basic metabolic panel    CBC    Protime-INR    Cardiac catheterization    Protime-INR   2  Other hyperlipidemia     3  Asymptomatic bilateral carotid artery stenosis     4  Essential hypertension     5  Other specified transient cerebral ischemias     6  Coronary artery disease involving native coronary artery of native heart with unstable angina pectoris [facility])         Impression:    Fede Sanford returns to see me at the [facility] office, with 2 weeks of worsening chest pain with exertion, which is a new symptom for him and likely represents new onset mild unstable angina  He has a remote history of multi vascular coronary artery disease, with an obstructive obtuse marginal status post stenting in 2006  Considering his current level of symptoms, with recurrence at even a half a block of exertion, I feel proceeding directly with cardiac catheterization is the correct approach  Plan:    Start Imdur 30 mg daily  Cardiac catheterization  Pre cardiac catheterization labs  Risks of the procedure described      HPI:   Prince Dong  is a 68y o  year old male with hypertension, hyperlipidemia, CAD defined by multivessel disease noted in 2006, but only severe obstruction of the obtuse marginal status post stenting at that time  He has a history of AAA, AAA repair, iliac aneurysms, celiac stenosis, bilateral carotid artery stenosis  From a vascular standpoint however he has been stable, recent follow-up surveillance ultrasounds unchanged      He presents today for new symptoms of chest pain, chest pressure which have been occurring over the last 2 weeks, increasing in intensity, increasing with less exertion, and now experiencing symptoms with a half a block of ambulation  Discomfort is described as moderate, relieved with rest, and while he does also have shoulder pain, this separately occurs while sleeping, it does not occur with exertion  He denies diaphoresis, shortness of breath, lightheadedness, palpitations, syncope, lower extremity edema  Review of Systems   Constitutional: Negative  HENT: Negative  Eyes: Negative  Respiratory: Positive for chest tightness  Negative for shortness of breath  Cardiovascular: Positive for chest pain  Gastrointestinal: Negative  Genitourinary: Negative  Musculoskeletal: Negative  Skin: Negative  Neurological: Negative  Hematological: Negative  Psychiatric/Behavioral: Negative  Past Medical History:   Diagnosis Date    AAA (abdominal aortic aneurysm) ([place] [id number] )     last assessed: June 11, 2013    Appendicitis     BPH (benign prostatic hyperplasia)     CAD (coronary artery disease)     Cholelithiasis     last assessed: July 26, 2016    Heart attack ([place] [address] )     Hypertension     Insomnia     Stroke ([place] [address] )     Transient cerebral ischemia      History   Alcohol Use    Yes     Comment: SOCIAL USE - occasionally      History   Drug Use No     History   Smoking Status    Former Smoker   Smokeless Tobacco    Never Used     Comment: Quit 1980's about 25 years x1ppd       Allergies:  No Known Allergies    Medications:     Current Outpatient Prescriptions:     carvedilol (COREG) 12 5 mg tablet, Take 1 tablet (12 5 mg total) by mouth 2 (two) times a day with meals, Disp: 180 tablet, Rfl: 3    clopidogrel (PLAVIX) 75 mg tablet, Take 1 tablet (75 mg total) by mouth daily, Disp: 90 tablet, Rfl: 3    lisinopril (ZESTRIL) 20 mg tablet, Take 1 tablet (20 mg total) by mouth daily, Disp: 90 tablet, Rfl: 3    Multiple Vitamin (MULTIVITAMIN) tablet, Take 1 tablet by mouth daily  , Disp: , Rfl:     simvastatin (ZOCOR) 40 mg tablet, Take 1 tablet (40 mg total) by mouth daily at bedtime, Disp: 90 tablet, Rfl: 3      Vitals:    12/04/18 1431   BP: 152/80   Pulse: 64   SpO2: 98%     Weight (last 2 days)     Date/Time   Weight    12/04/18 1431  92 1 (203 1)            Physical Exam   Constitutional: He is oriented to person, place, and time  No distress  HENT:   Head: Normocephalic and atraumatic  Eyes: Conjunctivae are normal  No scleral icterus  Neck: Normal range of motion  No JVD present  Cardiovascular: Normal rate, regular rhythm, normal heart sounds and intact distal pulses  No murmur heard  Pulmonary/Chest: Effort normal and breath sounds normal  He has no wheezes  He has no rales  Musculoskeletal: He exhibits no edema  Neurological: He is alert and oriented to person, place, and time  Skin: Skin is warm and dry  He is not diaphoretic           Laboratory Studies:  Chem:   Lab Results   Component Value Date     12/03/2015     12/02/2015     08/20/2015    K 4 4 09/04/2018    K 3 8 08/13/2018    K 4 1 06/07/2017    K 4 3 12/03/2015    K 4 0 12/02/2015    K 4 8 08/20/2015     09/04/2018     08/13/2018     06/07/2017     12/03/2015     12/02/2015     08/20/2015    CO2 28 09/04/2018    CO2 26 08/13/2018    CO2 28 06/07/2017    CO2 28 1 12/03/2015    CO2 32 6 (H) 12/02/2015    CO2 29 3 08/20/2015    GLUCOSE 121 12/03/2015    GLUCOSE 105 12/02/2015    GLUCOSE 105 08/20/2015    CREATININE 0 92 09/04/2018    CREATININE 0 86 08/13/2018    CREATININE 0 83 06/07/2017    CREATININE 0 86 12/03/2015    CREATININE 0 92 12/02/2015    CREATININE 0 89 08/20/2015    BUN 12 09/04/2018    BUN 13 08/13/2018    BUN 15 06/07/2017    BUN 15 12/03/2015    BUN 17 12/02/2015    BUN 15 08/20/2015     NT-proBNP:   Coags:  Lipid Profile:   Lab Results   Component Value Date    CHOL 138 08/20/2015    CHOL 135 09/30/2014    LDLCALC 57 08/13/2018    LDLCALC 67 08/20/2015    LDLCALC 65 09/30/2014    HDL 37 (L) 08/13/2018    HDL 42 08/20/2015    HDL 46 09/30/2014 TRIG 140 08/13/2018    TRIG 147 08/20/2015    TRIG 120 09/30/2014       Cardiac testing:   EKG reviewed personally:  Normal sinus rhythm, normal EKG    No results found for this or any previous visit  No results found for this or any previous visit  No results found for this or any previous visit  No results found for this or any previous visit  Colin Lopez MD    Portions of the record may have been created with voice recognition software   Occasional wrong word or "sound a like" substitutions may have occurred due to the inherent limitations of voice recognition software   Read the chart carefully and recognize, using context, where substitutions have occurred

## 2018-12-07 PROBLEM — I20.9 ANGINA, CLASS III (HCC): Chronic | Status: ACTIVE | Noted: 2018-12-07

## 2018-12-07 RX ORDER — ASPIRIN 81 MG/1
324 TABLET, CHEWABLE ORAL ONCE
Status: CANCELLED | OUTPATIENT
Start: 2018-12-07 | End: 2018-12-07

## 2018-12-07 RX ORDER — SODIUM CHLORIDE 9 MG/ML
125 INJECTION, SOLUTION INTRAVENOUS CONTINUOUS
Status: CANCELLED | OUTPATIENT
Start: 2018-12-07

## 2018-12-10 ENCOUNTER — HOSPITAL ENCOUNTER (OUTPATIENT)
Dept: NON INVASIVE DIAGNOSTICS | Facility: HOSPITAL | Age: 73
Discharge: HOME | End: 2018-12-10
Attending: INTERNAL MEDICINE | Admitting: INTERNAL MEDICINE
Payer: COMMERCIAL

## 2018-12-10 VITALS
SYSTOLIC BLOOD PRESSURE: 126 MMHG | HEIGHT: 66 IN | BODY MASS INDEX: 32.3 KG/M2 | TEMPERATURE: 96.9 F | WEIGHT: 201 LBS | HEART RATE: 64 BPM | RESPIRATION RATE: 18 BRPM | OXYGEN SATURATION: 96 % | DIASTOLIC BLOOD PRESSURE: 72 MMHG

## 2018-12-10 DIAGNOSIS — I25.119 CORONARY ARTERY DISEASE WITH ANGINA PECTORIS, UNSPECIFIED VESSEL OR LESION TYPE, UNSPECIFIED WHETHER NATIVE OR TRANSPLANTED HEART (HCC): ICD-10-CM

## 2018-12-10 DIAGNOSIS — I25.110 CORONARY ARTERY DISEASE INVOLVING NATIVE CORONARY ARTERY OF NATIVE HEART WITH UNSTABLE ANGINA PECTORIS (HCC): Primary | ICD-10-CM

## 2018-12-10 LAB
ANION GAP SERPL CALCULATED.3IONS-SCNC: 9 MMOL/L (ref 4–13)
ATRIAL RATE: 63 BPM
ATRIAL RATE: 64 BPM
BUN SERPL-MCNC: 13 MG/DL (ref 5–25)
CALCIUM SERPL-MCNC: 9.2 MG/DL (ref 8.3–10.1)
CHLORIDE SERPL-SCNC: 105 MMOL/L (ref 100–108)
CHOLEST SERPL-MCNC: 125 MG/DL (ref 50–200)
CO2 SERPL-SCNC: 25 MMOL/L (ref 21–32)
CREAT SERPL-MCNC: 0.86 MG/DL (ref 0.6–1.3)
ERYTHROCYTE [DISTWIDTH] IN BLOOD BY AUTOMATED COUNT: 11.9 % (ref 11.6–15.1)
GFR SERPL CREATININE-BSD FRML MDRD: 86 ML/MIN/1.73SQ M
GLUCOSE P FAST SERPL-MCNC: 117 MG/DL (ref 65–99)
GLUCOSE SERPL-MCNC: 117 MG/DL (ref 65–140)
HCT VFR BLD AUTO: 36.9 % (ref 36.5–49.3)
HDLC SERPL-MCNC: 41 MG/DL (ref 40–60)
HGB BLD-MCNC: 12.8 G/DL (ref 12–17)
INR PPP: 1.05 (ref 0.86–1.17)
LDLC SERPL CALC-MCNC: 66 MG/DL (ref 0–100)
MAGNESIUM SERPL-MCNC: 1.9 MG/DL (ref 1.6–2.6)
MCH RBC QN AUTO: 33 PG (ref 26.8–34.3)
MCHC RBC AUTO-ENTMCNC: 34.7 G/DL (ref 31.4–37.4)
MCV RBC AUTO: 95 FL (ref 82–98)
NONHDLC SERPL-MCNC: 84 MG/DL
P AXIS: 46 DEGREES
P AXIS: 78 DEGREES
PLATELET # BLD AUTO: 157 THOUSANDS/UL (ref 149–390)
PMV BLD AUTO: 8.6 FL (ref 8.9–12.7)
POTASSIUM SERPL-SCNC: 4.2 MMOL/L (ref 3.5–5.3)
PR INTERVAL: 200 MS
PR INTERVAL: 208 MS
PROTHROMBIN TIME: 13.4 SECONDS (ref 11.8–14.2)
QRS AXIS: -2 DEGREES
QRS AXIS: -2 DEGREES
QRSD INTERVAL: 102 MS
QRSD INTERVAL: 98 MS
QT INTERVAL: 404 MS
QT INTERVAL: 408 MS
QTC INTERVAL: 407 MS
QTC INTERVAL: 411 MS
RBC # BLD AUTO: 3.88 MILLION/UL (ref 3.88–5.62)
SODIUM SERPL-SCNC: 139 MMOL/L (ref 136–145)
T WAVE AXIS: 33 DEGREES
T WAVE AXIS: 59 DEGREES
TRIGL SERPL-MCNC: 90 MG/DL
VENTRICULAR RATE: 61 BPM
VENTRICULAR RATE: 61 BPM
WBC # BLD AUTO: 5.91 THOUSAND/UL (ref 4.31–10.16)

## 2018-12-10 PROCEDURE — C1894 INTRO/SHEATH, NON-LASER: HCPCS | Performed by: INTERNAL MEDICINE

## 2018-12-10 PROCEDURE — 85610 PROTHROMBIN TIME: CPT | Performed by: INTERNAL MEDICINE

## 2018-12-10 PROCEDURE — 93458 L HRT ARTERY/VENTRICLE ANGIO: CPT | Performed by: INTERNAL MEDICINE

## 2018-12-10 PROCEDURE — 99152 MOD SED SAME PHYS/QHP 5/>YRS: CPT | Performed by: INTERNAL MEDICINE

## 2018-12-10 PROCEDURE — 80048 BASIC METABOLIC PNL TOTAL CA: CPT | Performed by: INTERNAL MEDICINE

## 2018-12-10 PROCEDURE — C1887 CATHETER, GUIDING: HCPCS | Performed by: INTERNAL MEDICINE

## 2018-12-10 PROCEDURE — C1769 GUIDE WIRE: HCPCS | Performed by: INTERNAL MEDICINE

## 2018-12-10 PROCEDURE — 83735 ASSAY OF MAGNESIUM: CPT | Performed by: INTERNAL MEDICINE

## 2018-12-10 PROCEDURE — 80061 LIPID PANEL: CPT | Performed by: INTERNAL MEDICINE

## 2018-12-10 PROCEDURE — 93010 ELECTROCARDIOGRAM REPORT: CPT | Performed by: INTERNAL MEDICINE

## 2018-12-10 PROCEDURE — 93005 ELECTROCARDIOGRAM TRACING: CPT

## 2018-12-10 PROCEDURE — 85027 COMPLETE CBC AUTOMATED: CPT | Performed by: INTERNAL MEDICINE

## 2018-12-10 RX ORDER — FENTANYL CITRATE 50 UG/ML
INJECTION, SOLUTION INTRAMUSCULAR; INTRAVENOUS CODE/TRAUMA/SEDATION MEDICATION
Status: COMPLETED | OUTPATIENT
Start: 2018-12-10 | End: 2018-12-10

## 2018-12-10 RX ORDER — SODIUM CHLORIDE 9 MG/ML
125 INJECTION, SOLUTION INTRAVENOUS CONTINUOUS
Status: DISPENSED | OUTPATIENT
Start: 2018-12-10 | End: 2018-12-10

## 2018-12-10 RX ORDER — LIDOCAINE HYDROCHLORIDE 10 MG/ML
INJECTION, SOLUTION INFILTRATION; PERINEURAL CODE/TRAUMA/SEDATION MEDICATION
Status: COMPLETED | OUTPATIENT
Start: 2018-12-10 | End: 2018-12-10

## 2018-12-10 RX ORDER — HEPARIN SODIUM 1000 [USP'U]/ML
INJECTION, SOLUTION INTRAVENOUS; SUBCUTANEOUS CODE/TRAUMA/SEDATION MEDICATION
Status: COMPLETED | OUTPATIENT
Start: 2018-12-10 | End: 2018-12-10

## 2018-12-10 RX ORDER — ASPIRIN 81 MG/1
324 TABLET, CHEWABLE ORAL ONCE
Status: COMPLETED | OUTPATIENT
Start: 2018-12-10 | End: 2018-12-10

## 2018-12-10 RX ORDER — VERAPAMIL HYDROCHLORIDE 2.5 MG/ML
INJECTION, SOLUTION INTRAVENOUS CODE/TRAUMA/SEDATION MEDICATION
Status: COMPLETED | OUTPATIENT
Start: 2018-12-10 | End: 2018-12-10

## 2018-12-10 RX ORDER — SODIUM CHLORIDE 9 MG/ML
125 INJECTION, SOLUTION INTRAVENOUS CONTINUOUS
Status: DISCONTINUED | OUTPATIENT
Start: 2018-12-10 | End: 2018-12-11 | Stop reason: HOSPADM

## 2018-12-10 RX ORDER — NITROGLYCERIN 20 MG/100ML
INJECTION INTRAVENOUS CODE/TRAUMA/SEDATION MEDICATION
Status: COMPLETED | OUTPATIENT
Start: 2018-12-10 | End: 2018-12-10

## 2018-12-10 RX ORDER — MIDAZOLAM HYDROCHLORIDE 1 MG/ML
INJECTION INTRAMUSCULAR; INTRAVENOUS CODE/TRAUMA/SEDATION MEDICATION
Status: COMPLETED | OUTPATIENT
Start: 2018-12-10 | End: 2018-12-10

## 2018-12-10 RX ADMIN — NITROGLYCERIN 200 MCG: 20 INJECTION INTRAVENOUS at 10:21

## 2018-12-10 RX ADMIN — FENTANYL CITRATE 50 MCG: 50 INJECTION INTRAMUSCULAR; INTRAVENOUS at 10:09

## 2018-12-10 RX ADMIN — HEPARIN SODIUM 4000 UNITS: 1000 INJECTION INTRAVENOUS; SUBCUTANEOUS at 10:21

## 2018-12-10 RX ADMIN — VERAPAMIL HYDROCHLORIDE 2.5 MG: 2.5 INJECTION, SOLUTION INTRAVENOUS at 10:21

## 2018-12-10 RX ADMIN — MIDAZOLAM HYDROCHLORIDE 2 MG: 1 INJECTION, SOLUTION INTRAMUSCULAR; INTRAVENOUS at 10:09

## 2018-12-10 RX ADMIN — MIDAZOLAM HYDROCHLORIDE 2 MG: 1 INJECTION, SOLUTION INTRAMUSCULAR; INTRAVENOUS at 10:20

## 2018-12-10 RX ADMIN — FENTANYL CITRATE 50 MCG: 50 INJECTION INTRAMUSCULAR; INTRAVENOUS at 10:19

## 2018-12-10 RX ADMIN — IOHEXOL 75 ML: 350 INJECTION, SOLUTION INTRAVENOUS at 10:27

## 2018-12-10 RX ADMIN — ASPIRIN 81 MG 324 MG: 81 TABLET ORAL at 08:33

## 2018-12-10 RX ADMIN — LIDOCAINE HYDROCHLORIDE ANHYDROUS 1 ML: 10 INJECTION, SOLUTION INFILTRATION at 10:19

## 2018-12-10 RX ADMIN — SODIUM CHLORIDE 125 ML/HR: 0.9 INJECTION, SOLUTION INTRAVENOUS at 08:20

## 2018-12-10 NOTE — BRIEF OP NOTE (RAD/CATH)
No significant angiographic change compared to 2012, patent lcx stents  Suggest med rx cad standpoint

## 2018-12-10 NOTE — INTERVAL H&P NOTE
Update: (This section must be completed if the H&P was completed greater than 24 hrs to procedure or admission)    H&P reviewed  After examining the patient, I find no changed to the H&P since it had been written  Patient re-evaluated   Accept as history and physical     Joanthan Zhao, DO/December 10, 2018/10:31 AM

## 2018-12-10 NOTE — DISCHARGE INSTRUCTIONS
After Radial Heart Catheterization   WHAT YOU NEED TO KNOW:   What will happen after a radial heart catheterization? · You will be attached to a heart monitor until you are fully awake  A heart monitor is an EKG that stays on continuously to record your heart's electrical activity  Healthcare providers will monitor your vital signs and pulses in your arm  They will frequently check your pressure bandage for bleeding or swelling  · You may have a band wrapped tightly around your wrist  The band puts pressure on your wound and helps prevent bleeding  A healthcare provider can put air into the band or remove air from the band  A healthcare provider will gradually remove air from the band and decrease pressure on your wrist  The band may be removed in 2 hours or when your wound stops bleeding  · You will need to keep your wrist straight for 2 to 4 hours  Do not  push or pull with your arm  Arm movements can cause serious bleeding  After you are monitored for several hours, you may go home or may need to stay in the hospital overnight  What do I need to know before I go home? · Care for your wound as directed  Remove the pressure bandage in 24 hours or as directed  Mild bruising is normal and expected  A small bandage can be placed on your wound after you remove the pressure bandage  Do not put powders, lotions, or creams on your wound  They may cause your wound to get infected  Monitor your wound every day for signs of infection, such as redness, swelling, or pus  · Shower the day after your procedure or as directed  Remove your pressure bandage before you shower  Do not take baths or go in hot tubs or pools  Carefully wash the wound with soap and water  Pat the area dry  A small bandage can be placed on your wound after you shower  · Apply firm, steady pressure to your wound if it bleeds  Apply pressure with a clean gauze or towel for 5 to 10 minutes   Call 911 if bleeding becomes heavy or does not stop  · Drink liquids as directed  Liquids will help flush the contrast liquid from your body  Ask how much liquid to drink each day and which liquids are best for you  · Do not lift anything heavier than 5 pounds until directed by your healthcare provider  Heavy lifting can put stress on your wound and cause bleeding  Do not push or pull with the arm that was used for the procedure  Do not do vigorous activity for at least 48 hours  Vigorous activity may cause bleeding from your wound  Rest and do quiet activities  Take short walks around the house to prevent a blood clot  Ask your healthcare provider when you can return to your normal activities  · Do not drive or return to work until your healthcare provider says it is okay  Your healthcare provider may tell you to wait 48 hours before you drive to decrease your risk for bleeding  You may not be able to return to work for at least 2 days after your procedure if your job involves heavy lifting  What medicines may I need? You may need any of the following:  · Blood thinners    help prevent blood clots  Examples of blood thinners include heparin and warfarin  Clots can cause strokes, heart attacks, and death  The following are general safety guidelines to follow while you are taking a blood thinner:    ¨ Watch for bleeding and bruising while you take blood thinners  Watch for bleeding from your gums or nose  Watch for blood in your urine and bowel movements  Use a soft washcloth on your skin, and a soft toothbrush to brush your teeth  This can keep your skin and gums from bleeding  If you shave, use an electric shaver  Do not play contact sports  ¨ Tell your dentist and other healthcare providers that you take anticoagulants  Wear a bracelet or necklace that says you take this medicine  ¨ Do not start or stop any medicines unless your healthcare provider tells you to  Many medicines cannot be used with blood thinners       ¨ Tell your healthcare provider right away if you forget to take the medicine, or if you take too much  ¨ Warfarin  is a blood thinner that you may need to take  The following are things you should be aware of if you take warfarin  § Foods and medicines can affect the amount of warfarin in your blood  Do not make major changes to your diet while you take warfarin  Warfarin works best when you eat about the same amount of vitamin K every day  Vitamin K is found in green leafy vegetables and certain other foods  Ask for more information about what to eat when you are taking warfarin  § You will need to see your healthcare provider for follow-up visits when you are on warfarin  You will need regular blood tests  These tests are used to decide how much medicine you need  · Acetaminophen  helps decrease pain and fever  This medicine is available without a doctor's order  Ask how much medicine is safe to take, and how often to take it  Acetaminophen can cause liver damage if not taken correctly  · Take your medicine as directed  Contact your healthcare provider if you think your medicine is not helping or if you have side effects  Tell him or her if you are allergic to any medicine  Keep a list of the medicines, vitamins, and herbs you take  Include the amounts, and when and why you take them  Bring the list or the pill bottles to follow-up visits  Carry your medicine list with you in case of an emergency    Call 911 for any of the following:   · You have any of the following signs of a heart attack:      ¨ Squeezing, pressure, or pain in your chest that lasts longer than 5 minutes or returns    ¨ Discomfort or pain in your back, neck, jaw, stomach, or arm     ¨ Trouble breathing    ¨ Nausea or vomiting    ¨ Lightheadedness or a sudden cold sweat, especially with chest pain or trouble breathing    · You have any of the following signs of a stroke:      ¨ Numbness or drooping on one side of your face     ¨ Weakness in an arm or leg    ¨ Confusion or difficulty speaking    ¨ Dizziness, a severe headache, or vision loss    · You feel lightheaded, short of breath, and have chest pain  · You cough up blood  · You have trouble breathing  · You cannot stop the bleeding from your wound even after you hold firm pressure for 10 minutes  When should I seek immediate care? · Blood soaks through your bandage  · Your stitches come apart  · Your hand or arm feels numb, cool, or looks pale  · Your wound gets swollen quickly  When should I contact my healthcare provider? · You have a fever or chills  · Your wound is red, swollen, or draining pus  · Your wound looks more bruised or you have new bruising on the side of your wrist      · You have nausea or are vomiting  · Your skin is itchy, swollen, or you have a rash  · You have questions or concerns about your condition or care  CARE AGREEMENT:   You have the right to help plan your care  Learn about your health condition and how it may be treated  Discuss treatment options with your caregivers to decide what care you want to receive  You always have the right to refuse treatment  The above information is an  only  It is not intended as medical advice for individual conditions or treatments  Talk to your doctor, nurse or pharmacist before following any medical regimen to see if it is safe and effective for you  © 2017 [employer] Information is for End User's use only and may not be sold, redistributed or otherwise used for commercial purposes  All illustrations and images included in CareNotes® are the copyrighted property of A D [employer] M [employer]  or Jamison Mixon

## 2018-12-10 NOTE — H&P (VIEW-ONLY)
[facility]  Follow up note  Kiki Bran  68 y o  male MRN: [id number]        Problems    1  Coronary artery disease with angina pectoris, unspecified vessel or lesion type, unspecified whether native or transplanted heart [facility])  POCT ECG    Basic metabolic panel    CBC    Protime-INR    Cardiac catheterization    Protime-INR   2  Other hyperlipidemia     3  Asymptomatic bilateral carotid artery stenosis     4  Essential hypertension     5  Other specified transient cerebral ischemias     6  Coronary artery disease involving native coronary artery of native heart with unstable angina pectoris [facility])         Impression:    Lissy Smith returns to see me at the [place] office, with 2 weeks of worsening chest pain with exertion, which is a new symptom for him and likely represents new onset mild unstable angina  He has a remote history of multi vascular coronary artery disease, with an obstructive obtuse marginal status post stenting in 2006  Considering his current level of symptoms, with recurrence at even a half a block of exertion, I feel proceeding directly with cardiac catheterization is the correct approach  Plan:    Start Imdur 30 mg daily  Cardiac catheterization  Pre cardiac catheterization labs  Risks of the procedure described      HPI:   Kiki Bran  is a 68y o  year old male with hypertension, hyperlipidemia, CAD defined by multivessel disease noted in 2006, but only severe obstruction of the obtuse marginal status post stenting at that time  He has a history of AAA, AAA repair, iliac aneurysms, celiac stenosis, bilateral carotid artery stenosis  From a vascular standpoint however he has been stable, recent follow-up surveillance ultrasounds unchanged      He presents today for new symptoms of chest pain, chest pressure which have been occurring over the last 2 weeks, increasing in intensity, increasing with less exertion, and now experiencing symptoms with a half a block of ambulation  Discomfort is described as moderate, relieved with rest, and while he does also have shoulder pain, this separately occurs while sleeping, it does not occur with exertion  He denies diaphoresis, shortness of breath, lightheadedness, palpitations, syncope, lower extremity edema  Review of Systems   Constitutional: Negative  HENT: Negative  Eyes: Negative  Respiratory: Positive for chest tightness  Negative for shortness of breath  Cardiovascular: Positive for chest pain  Gastrointestinal: Negative  Genitourinary: Negative  Musculoskeletal: Negative  Skin: Negative  Neurological: Negative  Hematological: Negative  Psychiatric/Behavioral: Negative  Past Medical History:   Diagnosis Date    AAA (abdominal aortic aneurysm) ([place] [address] )     last assessed: June 11, 2013    Appendicitis     BPH (benign prostatic hyperplasia)     CAD (coronary artery disease)     Cholelithiasis     last assessed: July 26, 2016    Heart attack ([place] [address] )     Hypertension     Insomnia     Stroke ([place] [address] )     Transient cerebral ischemia      History   Alcohol Use    Yes     Comment: SOCIAL USE - occasionally      History   Drug Use No     History   Smoking Status    Former Smoker   Smokeless Tobacco    Never Used     Comment: Quit 1980's about 25 years x1ppd       Allergies:  No Known Allergies    Medications:     Current Outpatient Prescriptions:     carvedilol (COREG) 12 5 mg tablet, Take 1 tablet (12 5 mg total) by mouth 2 (two) times a day with meals, Disp: 180 tablet, Rfl: 3    clopidogrel (PLAVIX) 75 mg tablet, Take 1 tablet (75 mg total) by mouth daily, Disp: 90 tablet, Rfl: 3    lisinopril (ZESTRIL) 20 mg tablet, Take 1 tablet (20 mg total) by mouth daily, Disp: 90 tablet, Rfl: 3    Multiple Vitamin (MULTIVITAMIN) tablet, Take 1 tablet by mouth daily  , Disp: , Rfl:     simvastatin (ZOCOR) 40 mg tablet, Take 1 tablet (40 mg total) by mouth daily at bedtime, Disp: 90 tablet, Rfl: 3      Vitals:    12/04/18 1431   BP: 152/80   Pulse: 64   SpO2: 98%     Weight (last 2 days)     Date/Time   Weight    12/04/18 1431  92 1 (203 1)            Physical Exam   Constitutional: He is oriented to person, place, and time  No distress  HENT:   Head: Normocephalic and atraumatic  Eyes: Conjunctivae are normal  No scleral icterus  Neck: Normal range of motion  No JVD present  Cardiovascular: Normal rate, regular rhythm, normal heart sounds and intact distal pulses  No murmur heard  Pulmonary/Chest: Effort normal and breath sounds normal  He has no wheezes  He has no rales  Musculoskeletal: He exhibits no edema  Neurological: He is alert and oriented to person, place, and time  Skin: Skin is warm and dry  He is not diaphoretic           Laboratory Studies:  Chem:   Lab Results   Component Value Date     12/03/2015     12/02/2015     08/20/2015    K 4 4 09/04/2018    K 3 8 08/13/2018    K 4 1 06/07/2017    K 4 3 12/03/2015    K 4 0 12/02/2015    K 4 8 08/20/2015     09/04/2018     08/13/2018     06/07/2017     12/03/2015     12/02/2015     08/20/2015    CO2 28 09/04/2018    CO2 26 08/13/2018    CO2 28 06/07/2017    CO2 28 1 12/03/2015    CO2 32 6 (H) 12/02/2015    CO2 29 3 08/20/2015    GLUCOSE 121 12/03/2015    GLUCOSE 105 12/02/2015    GLUCOSE 105 08/20/2015    CREATININE 0 92 09/04/2018    CREATININE 0 86 08/13/2018    CREATININE 0 83 06/07/2017    CREATININE 0 86 12/03/2015    CREATININE 0 92 12/02/2015    CREATININE 0 89 08/20/2015    BUN 12 09/04/2018    BUN 13 08/13/2018    BUN 15 06/07/2017    BUN 15 12/03/2015    BUN 17 12/02/2015    BUN 15 08/20/2015     NT-proBNP:   Coags:  Lipid Profile:   Lab Results   Component Value Date    CHOL 138 08/20/2015    CHOL 135 09/30/2014    LDLCALC 57 08/13/2018    LDLCALC 67 08/20/2015    LDLCALC 65 09/30/2014    HDL 37 (L) 08/13/2018    HDL 42 08/20/2015    HDL 46 09/30/2014 TRIG 140 08/13/2018    TRIG 147 08/20/2015    TRIG 120 09/30/2014       Cardiac testing:   EKG reviewed personally:  Normal sinus rhythm, normal EKG    No results found for this or any previous visit  No results found for this or any previous visit  No results found for this or any previous visit  No results found for this or any previous visit  Larry Diallo MD    Portions of the record may have been created with voice recognition software   Occasional wrong word or "sound a like" substitutions may have occurred due to the inherent limitations of voice recognition software   Read the chart carefully and recognize, using context, where substitutions have occurred

## 2018-12-11 ENCOUNTER — TELEPHONE (OUTPATIENT)
Dept: CARDIOLOGY CLINIC | Facility: CLINIC | Age: 73
End: 2018-12-11

## 2018-12-11 DIAGNOSIS — I20.8 ANGINA EFFORT: Primary | ICD-10-CM

## 2018-12-11 RX ORDER — ISOSORBIDE MONONITRATE 30 MG/1
30 TABLET, EXTENDED RELEASE ORAL DAILY
Qty: 30 TABLET | Refills: 0 | Status: SHIPPED | OUTPATIENT
Start: 2018-12-11 | End: 2018-12-19 | Stop reason: SDUPTHER

## 2018-12-11 NOTE — TELEPHONE ENCOUNTER
Pt had cardiac cath yesterday  Wife called, concerned that he is still c/o chest pain  He did not receive the imdur from mail order yet so has not yet started taking  Has f/u with you on 12/18      #[phone]

## 2018-12-12 NOTE — TELEPHONE ENCOUNTER
I spoke with Jules Lorenzana today, he should get the isosorbide mononitrate today, indicated he should take it for 4-5 days at 30 mg daily, if it does not work for his chest discomfort, to double up to 60 mg daily, and he has an appointment to see me 12/19/2018 at which time we will discuss further medical therapy or stress testing

## 2018-12-19 ENCOUNTER — OFFICE VISIT (OUTPATIENT)
Dept: CARDIOLOGY CLINIC | Facility: CLINIC | Age: 73
End: 2018-12-19
Payer: COMMERCIAL

## 2018-12-19 VITALS
HEART RATE: 69 BPM | WEIGHT: 200.4 LBS | DIASTOLIC BLOOD PRESSURE: 64 MMHG | BODY MASS INDEX: 32.21 KG/M2 | OXYGEN SATURATION: 94 % | HEIGHT: 66 IN | SYSTOLIC BLOOD PRESSURE: 110 MMHG

## 2018-12-19 DIAGNOSIS — I25.110 CORONARY ARTERY DISEASE INVOLVING NATIVE CORONARY ARTERY OF NATIVE HEART WITH UNSTABLE ANGINA PECTORIS (HCC): Chronic | ICD-10-CM

## 2018-12-19 DIAGNOSIS — I10 ESSENTIAL HYPERTENSION: ICD-10-CM

## 2018-12-19 DIAGNOSIS — E78.49 OTHER HYPERLIPIDEMIA: ICD-10-CM

## 2018-12-19 DIAGNOSIS — I20.8 ANGINA EFFORT: ICD-10-CM

## 2018-12-19 DIAGNOSIS — I20.9 ANGINA, CLASS III (HCC): Primary | Chronic | ICD-10-CM

## 2018-12-19 PROCEDURE — 99215 OFFICE O/P EST HI 40 MIN: CPT | Performed by: INTERNAL MEDICINE

## 2018-12-19 RX ORDER — ISOSORBIDE MONONITRATE 30 MG/1
60 TABLET, EXTENDED RELEASE ORAL DAILY
Qty: 30 TABLET | Refills: 11
Start: 2018-12-19 | End: 2019-01-25 | Stop reason: SDUPTHER

## 2018-12-19 RX ORDER — ISOSORBIDE MONONITRATE 30 MG/1
30 TABLET, EXTENDED RELEASE ORAL DAILY
Qty: 90 TABLET | Refills: 3 | OUTPATIENT
Start: 2018-12-19

## 2018-12-19 NOTE — PROGRESS NOTES
[facility]  Follow up note  Celia Merida  68 y o  male MRN: [id number]        Problems    1  Angina, class III ([facility] )     2  Coronary artery disease involving native coronary artery of native heart with unstable angina pectoris ([facility] [address] )     3  Other hyperlipidemia     4  Essential hypertension         Impression:    Jennifer Gutierrez returns to see me for post cardiac catheterization evaluation of recent escalating anginal symptoms described as class 3 with less and less activity, an increased frequency  Catheterization revealed multiple nonobstructive lesions, some small vessel lesions that may explain his symptoms  He was started on Imdur 30 mg, good tolerance, improvement in pain but not resolution  He has tried to make some dietary changes  His blood pressure is well controlled, carvedilol cannot be up titrated for further anti anginal relief due to his systolic blood pressure of 110, and future discussion will be possibly to change to metoprolol succinate  His heart rate is currently 69  His lipids have been well controlled but he still takes moderate intensity simvastatin  Plan:    Increase isosorbide mononitrate to 60 mg daily  Treadmill stress test to identify area of ischemia in approximately 2-3 weeks  1 month follow-up  Most of the visit was spent educating Jennifer Gutierrez and his wife on the nature of coronary artery disease, the plan for lowering the heart rate and vascular dilatation with beta-blockers and nitrates, discussed more dietary modification, including moving to a more substantial vegetarian diet  Overall 40 min office visit with more than 20 min counseling on the above  HPI:   Carline Greer Sr  is a 68y o  year old male with hypertension, hyperlipidemia, CAD defined by multivessel disease noted in 2006, but only severe obstruction of the obtuse marginal status post stenting at that time    He has a history of AAA, AAA repair, iliac aneurysms, celiac stenosis, bilateral carotid artery stenosis  From a vascular standpoint however he has been stable, recent follow-up surveillance ultrasounds unchanged  He presented a couple weeks ago with worsening anginal symptoms, was referred directly for cardiac catheterization with discovery of multiple nonobstructive lesions, no particular identifiable arterial stenosis to explain his symptoms, and therefore possibly some smaller vessel disease, although some smaller OM branches did appear to have some ostial stenosis  He was started on Imdur 30 mg, which has improved but certainly not resolved his symptoms  He presents today with approximately 3 added 10 chest discomfort with exertion  He is tolerating the Imdur well  His heart rate is 69, he takes carvedilol 12 5 mg b i d , and his blood pressure is 426 systolic  He has made some dietary modifications including getting rid of cheese, trying to moved to slightly more vegetarian diet  Review of Systems   Constitutional: Negative  HENT: Negative  Eyes: Negative  Respiratory: Positive for chest tightness  Negative for shortness of breath  Cardiovascular: Positive for chest pain  Gastrointestinal: Negative  Genitourinary: Negative  Musculoskeletal: Negative  Skin: Negative  Neurological: Negative  Hematological: Negative  Psychiatric/Behavioral: Negative            Past Medical History:   Diagnosis Date    AAA [facility]ab[facility] aortic aneurysm) ([facility] )     last assessed: June 11, 2013    Appendicitis     BPH (benign prostatic hyperplasia)     CAD (coronary artery disease)     Cholelithiasis     last assessed: July 26, 2016    Heart attack [facility])     Hyperlipidemia     Hypertension     Insomnia     Stroke ([facility] )     Transient cerebral ischemia      History   Alcohol Use    4 2 oz/week    3 Shots of liquor, 4 Cans of beer per week     Comment: SOCIAL USE - occasionally      History   Drug Use No     History   Smoking Status    Former Smoker Smokeless Tobacco    Never Used     Comment: Quit 1980's about 25 years x1ppd       Allergies:  No Known Allergies    Medications:     Current Outpatient Prescriptions:     carvedilol (COREG) 12 5 mg tablet, Take 1 tablet (12 5 mg total) by mouth 2 (two) times a day with meals, Disp: 180 tablet, Rfl: 3    clopidogrel (PLAVIX) 75 mg tablet, Take 1 tablet (75 mg total) by mouth daily, Disp: 90 tablet, Rfl: 3    isosorbide mononitrate (IMDUR) 30 mg 24 hr tablet, Take 1 tablet (30 mg total) by mouth daily, Disp: 30 tablet, Rfl: 0    lisinopril (ZESTRIL) 20 mg tablet, Take 1 tablet (20 mg total) by mouth daily, Disp: 90 tablet, Rfl: 3    Multiple Vitamin (MULTIVITAMIN) tablet, Take 1 tablet by mouth daily  , Disp: , Rfl:     simvastatin (ZOCOR) 40 mg tablet, Take 1 tablet (40 mg total) by mouth daily at bedtime, Disp: 90 tablet, Rfl: 3      Vitals:    12/19/18 1034   BP: 110/64   Pulse: 69   SpO2: 94%     Weight (last 2 days)     Date/Time   Weight    12/19/18 1034  90 9 (200 4)            Physical Exam   Constitutional: He is oriented to person, place, and time  No distress  HENT:   Head: Normocephalic and atraumatic  Eyes: Conjunctivae are normal  No scleral icterus  Neck: Normal range of motion  No JVD present  Cardiovascular: Normal rate, regular rhythm, normal heart sounds and intact distal pulses  No murmur heard  Pulmonary/Chest: Effort normal and breath sounds normal  He has no wheezes  He has no rales  Musculoskeletal: He exhibits no edema  Neurological: He is alert and oriented to person, place, and time  Skin: Skin is warm and dry  He is not diaphoretic           Laboratory Studies:  Chem:   Lab Results   Component Value Date     12/03/2015     12/02/2015     08/20/2015    K 4 2 12/10/2018    K 4 2 12/04/2018    K 4 4 09/04/2018    K 4 3 12/03/2015    K 4 0 12/02/2015    K 4 8 08/20/2015     12/10/2018     12/04/2018     09/04/2018     12/03/2015     12/02/2015     08/20/2015    CO2 25 12/10/2018    CO2 27 12/04/2018    CO2 28 09/04/2018    CO2 28 1 12/03/2015    CO2 32 6 (H) 12/02/2015    CO2 29 3 08/20/2015    GLUCOSE 121 12/03/2015    GLUCOSE 105 12/02/2015    GLUCOSE 105 08/20/2015    CREATININE 0 86 12/10/2018    CREATININE 0 98 12/04/2018    CREATININE 0 92 09/04/2018    CREATININE 0 86 12/03/2015    CREATININE 0 92 12/02/2015    CREATININE 0 89 08/20/2015    BUN 13 12/10/2018    BUN 16 12/04/2018    BUN 12 09/04/2018    BUN 15 12/03/2015    BUN 17 12/02/2015    BUN 15 08/20/2015    MG 1 9 12/10/2018     NT-proBNP:   Coags:  Lipid Profile:   Lab Results   Component Value Date    CHOL 138 08/20/2015    CHOL 135 09/30/2014    LDLCALC 66 12/10/2018    LDLCALC 57 08/13/2018    LDLCALC 67 08/20/2015    LDLCALC 65 09/30/2014    HDL 41 12/10/2018    HDL 37 (L) 08/13/2018    HDL 42 08/20/2015    HDL 46 09/30/2014    TRIG 90 12/10/2018    TRIG 140 08/13/2018    TRIG 147 08/20/2015    TRIG 120 09/30/2014       Cardiac testing:   EKG reviewed personally:  Normal sinus rhythm, normal EKG    Cardiac catheterization was personally reviewed in the office today with the patient  Multiple nonobstructive lesions, likely some small vessel disease causing discomfort  Laureano Barrera MD    Portions of the record may have been created with voice recognition software   Occasional wrong word or "sound a like" substitutions may have occurred due to the inherent limitations of voice recognition software   Read the chart carefully and recognize, using context, where substitutions have occurred

## 2018-12-19 NOTE — PATIENT INSTRUCTIONS
U of multiple nonobstructive lesions, therefore probably some smaller vessels are causing her chest discomfort  I would likely increase the isosorbide mononitrate to 60 mg (2 tablets daily), and given a couple weeks, and then in the new year please have a treadmill stress test done which will help us try to identify the area of your heart muscle under stress  I would like to see you in about a month to continue following her symptoms, follow up on your stress test and decide on any further medication changes

## 2018-12-27 ENCOUNTER — TELEPHONE (OUTPATIENT)
Dept: FAMILY MEDICINE CLINIC | Facility: CLINIC | Age: 73
End: 2018-12-27

## 2019-01-07 ENCOUNTER — HOSPITAL ENCOUNTER (OUTPATIENT)
Dept: NON INVASIVE DIAGNOSTICS | Facility: CLINIC | Age: 74
Discharge: HOME | End: 2019-01-07
Payer: COMMERCIAL

## 2019-01-07 DIAGNOSIS — I20.9 ANGINA, CLASS III (HCC): Primary | Chronic | ICD-10-CM

## 2019-01-07 DIAGNOSIS — I25.110 CORONARY ARTERY DISEASE INVOLVING NATIVE CORONARY ARTERY OF NATIVE HEART WITH UNSTABLE ANGINA PECTORIS (HCC): Chronic | ICD-10-CM

## 2019-01-07 PROCEDURE — 93018 CV STRESS TEST I&R ONLY: CPT | Performed by: INTERNAL MEDICINE

## 2019-01-07 PROCEDURE — 78452 HT MUSCLE IMAGE SPECT MULT: CPT

## 2019-01-07 PROCEDURE — 78452 HT MUSCLE IMAGE SPECT MULT: CPT | Performed by: INTERNAL MEDICINE

## 2019-01-07 PROCEDURE — 93016 CV STRESS TEST SUPVJ ONLY: CPT | Performed by: INTERNAL MEDICINE

## 2019-01-07 PROCEDURE — A9502 TC99M TETROFOSMIN: HCPCS

## 2019-01-07 PROCEDURE — 93017 CV STRESS TEST TRACING ONLY: CPT

## 2019-01-07 RX ORDER — RANOLAZINE 500 MG/1
500 TABLET, EXTENDED RELEASE ORAL 2 TIMES DAILY
Qty: 60 TABLET | Refills: 0 | Status: SHIPPED | OUTPATIENT
Start: 2019-01-07 | End: 2019-01-15

## 2019-01-07 RX ADMIN — REGADENOSON 0.4 MG: 0.08 INJECTION, SOLUTION INTRAVENOUS at 13:20

## 2019-01-07 NOTE — PROGRESS NOTES
Small to moderate ischemia on stress  No severe stenosis correlating on prior cath  Continue medical management and will start Ranexa

## 2019-01-08 ENCOUNTER — TELEPHONE (OUTPATIENT)
Dept: CARDIOLOGY CLINIC | Facility: CLINIC | Age: 74
End: 2019-01-08

## 2019-01-08 DIAGNOSIS — E78.5 HYPERLIPIDEMIA, UNSPECIFIED HYPERLIPIDEMIA TYPE: ICD-10-CM

## 2019-01-08 LAB
CHEST PAIN STATEMENT: NORMAL
MAX DIASTOLIC BP: 80 MMHG
MAX HEART RATE: 96 BPM
MAX PREDICTED HEART RATE: 147 BPM
MAX. SYSTOLIC BP: 154 MMHG
PROTOCOL NAME: NORMAL
REASON FOR TERMINATION: NORMAL
TARGET HR FORMULA: NORMAL
TEST INDICATION: NORMAL
TIME IN EXERCISE PHASE: NORMAL

## 2019-01-08 NOTE — TELEPHONE ENCOUNTER
Patient plane did not need a PA for Ranexa his plan will not do a price cut- Needs to fail alternative drugs first-    Please advice

## 2019-01-08 NOTE — TELEPHONE ENCOUNTER
That is fine, I will see him in follow-up next week and make a further decision    Nothing further for now

## 2019-01-14 DIAGNOSIS — E78.5 HYPERLIPIDEMIA, UNSPECIFIED HYPERLIPIDEMIA TYPE: ICD-10-CM

## 2019-01-14 DIAGNOSIS — I50.9 CONGESTIVE HEART FAILURE (HCC): ICD-10-CM

## 2019-01-14 RX ORDER — SIMVASTATIN 40 MG
40 TABLET ORAL
Qty: 90 TABLET | Refills: 2 | Status: SHIPPED | OUTPATIENT
Start: 2019-01-14 | End: 2019-02-28 | Stop reason: SDUPTHER

## 2019-01-14 RX ORDER — CLOPIDOGREL BISULFATE 75 MG/1
TABLET ORAL
Qty: 90 TABLET | Refills: 3 | Status: SHIPPED | OUTPATIENT
Start: 2019-01-14 | End: 2020-03-14 | Stop reason: SDUPTHER

## 2019-01-14 RX ORDER — SIMVASTATIN 40 MG
TABLET ORAL
Qty: 90 TABLET | Refills: 3 | Status: SHIPPED | OUTPATIENT
Start: 2019-01-14 | End: 2019-01-15

## 2019-01-15 ENCOUNTER — OFFICE VISIT (OUTPATIENT)
Dept: CARDIOLOGY CLINIC | Facility: CLINIC | Age: 74
End: 2019-01-15
Payer: COMMERCIAL

## 2019-01-15 VITALS
HEIGHT: 66 IN | BODY MASS INDEX: 32.03 KG/M2 | DIASTOLIC BLOOD PRESSURE: 68 MMHG | SYSTOLIC BLOOD PRESSURE: 100 MMHG | WEIGHT: 199.3 LBS | HEART RATE: 69 BPM | OXYGEN SATURATION: 98 %

## 2019-01-15 DIAGNOSIS — I10 ESSENTIAL HYPERTENSION: ICD-10-CM

## 2019-01-15 DIAGNOSIS — I20.9 ANGINA, CLASS III (HCC): Chronic | ICD-10-CM

## 2019-01-15 DIAGNOSIS — I25.110 CORONARY ARTERY DISEASE INVOLVING NATIVE CORONARY ARTERY OF NATIVE HEART WITH UNSTABLE ANGINA PECTORIS (HCC): Chronic | ICD-10-CM

## 2019-01-15 DIAGNOSIS — E78.49 OTHER HYPERLIPIDEMIA: Primary | ICD-10-CM

## 2019-01-15 PROCEDURE — 99215 OFFICE O/P EST HI 40 MIN: CPT | Performed by: INTERNAL MEDICINE

## 2019-01-15 RX ORDER — METOPROLOL TARTRATE 50 MG/1
50 TABLET, FILM COATED ORAL EVERY 12 HOURS SCHEDULED
Qty: 180 TABLET | Refills: 3 | Status: SHIPPED | OUTPATIENT
Start: 2019-01-15 | End: 2019-03-11 | Stop reason: SDUPTHER

## 2019-01-15 RX ORDER — LISINOPRIL 20 MG/1
10 TABLET ORAL DAILY
Qty: 45 TABLET | Refills: 3
Start: 2019-01-15 | End: 2019-08-05

## 2019-01-15 RX ORDER — NITROGLYCERIN 0.4 MG/1
0.4 TABLET SUBLINGUAL
Qty: 30 TABLET | Refills: 1 | Status: SHIPPED | OUTPATIENT
Start: 2019-01-15 | End: 2019-03-11 | Stop reason: SDUPTHER

## 2019-01-15 NOTE — PROGRESS NOTES
[facility] Cardiology  Follow up note  Ansley Linares  68 y o  male MRN: [id number]        Problems    1  Other hyperlipidemia     2  Coronary artery disease involving native coronary artery of native heart with unstable angina pectoris ([address] )     3  Angina, class III ([address] )     4  Essential hypertension         Impression:    Alessandra Armando returns to see me for chronic stable angina  CAD  Recent stress test shows diffuse moderate nonobstructive disease, but he has had exertional angina, stable class 2 symptoms, only able to do 5 min on the treadmill recently, and nuclear testing revealed a moderate-sized area of distal anterior and apical ischemia, although his cardiac catheterization only determined him to have a 50% mid LAD lesion  Because of blood pressure we will need to make changes below to afford more blood pressure room for anti anginal therapy titration  Blood pressure is too low for further titration of anti anginal therapy, please see changes below  Lipids are well controlled on simvastatin      Plan:    Please cut year lisinopril in half, effectively this will then be 10 mg daily  Recurring this down because her blood pressure is too low to raise any other medications for year chest pain  Stop taking carvedilol  Start taking metoprolol tartrate 50 mg twice a day, sent in to your male in pharmacy  Do not do anything to exertional like cutting down trees  Add prn Nitro  40 minutes total visit with more than 50% educating on med changes, symptoms of angina and plan of action if symptoms don't reolve with rest     HPI:   Jake Davila Sr  is a 68y o  year old male with hypertension, hyperlipidemia, CAD defined by multivessel disease noted in 2006, but only severe obstruction of the obtuse marginal status post stenting at that time  He has a history of AAA, AAA repair, iliac aneurysms, celiac stenosis, bilateral carotid artery stenosis    From a vascular standpoint however he has been stable, recent follow-up surveillance ultrasounds unchanged  Ongoing anginal symptoms despite cardiac catheterization revealing moderate nonobstructive disease led to us proceeding with a treadmill stress test which does show a moderate-sized area of ischemia in the distal anterior and apical wall  He was only able to tolerate 5 min on the treadmill before development of chest discomfort, and he has maintained class 2 stable angina on current regimen of carvedilol, isosorbide mononitrate 60 mg  His blood pressure is only 447 systolic, no room for titration of beta-blocker therapy, and at this time he has continued to have symptoms that have been stable, easily relieved with rest   His wife is concerned that he is trying to cut down trees with his family members  Review of Systems   Constitutional: Negative  HENT: Negative  Eyes: Negative  Respiratory: Positive for chest tightness  Negative for shortness of breath  Cardiovascular: Positive for chest pain  Gastrointestinal: Negative  Genitourinary: Negative  Musculoskeletal: Negative  Skin: Negative  Neurological: Negative  Hematological: Negative  Psychiatric/Behavioral: Negative            Past Medical History:   Diagnosis Date    AAA (abdominal aortic aneurysm) ([facility] )     last assessed: June 11, 2013    Appendicitis     BPH (benign prostatic hyperplasia)     CAD (coronary artery disease)     Cholelithiasis     last assessed: July 26, 2016    Heart [facility])     Hyperlipidemia     Hypertension     Insomnia     Stroke ([facility] )     Transient cerebral ischemia      History   Alcohol Use    4 2 oz/week    3 Shots of liquor, 4 Cans of beer per week     Comment: SOCIAL USE - occasionally      History   Drug Use No     History   Smoking Status    Former Smoker   Smokeless Tobacco    Never Used     Comment: Quit 1980's about 25 years [id number]       Allergies:  No Known Allergies    Medications:     Current Outpatient Prescriptions:     carvedilol (COREG) 12 5 mg tablet, Take 1 tablet (12 5 mg total) by mouth 2 (two) times a day with meals, Disp: 180 tablet, Rfl: 3    clopidogrel (PLAVIX) 75 mg tablet, TAKE ONE TABLET BY MOUTH EVERY DAY, Disp: 90 tablet, Rfl: 3    isosorbide mononitrate (IMDUR) 30 mg 24 hr tablet, Take 2 tablets (60 mg total) by mouth daily, Disp: 30 tablet, Rfl: 11    lisinopril (ZESTRIL) 20 mg tablet, Take 1 tablet (20 mg total) by mouth daily, Disp: 90 tablet, Rfl: 3    Multiple Vitamin (MULTIVITAMIN) tablet, Take 1 tablet by mouth daily  , Disp: , Rfl:     simvastatin (ZOCOR) 40 mg tablet, Take 1 tablet (40 mg total) by mouth daily at bedtime, Disp: 90 tablet, Rfl: 2    ranolazine (RANEXA) 500 mg 12 hr tablet, Take 1 tablet (500 mg total) by mouth 2 (two) times a day (Patient not taking: Reported on 1/15/2019 ), Disp: 60 tablet, Rfl: 0    simvastatin (ZOCOR) 40 mg tablet, TAKE ONE TABLET BY MOUTH AT BEDTIME (Patient not taking: Reported on 1/15/2019), Disp: 90 tablet, Rfl: 3      Vitals:    01/15/19 0814   BP: 100/68   Pulse: 69   SpO2: 98%     Weight (last 2 days)     Date/Time   Weight    01/15/19 0814  90 4 (199 3)            Physical Exam   Constitutional: He is oriented to person, place, and time  No distress  HENT:   Head: Normocephalic and atraumatic  Eyes: Conjunctivae are normal  No scleral icterus  Neck: Normal range of motion  No JVD present  Cardiovascular: Normal rate, regular rhythm, normal heart sounds and intact distal pulses  No murmur heard  Pulmonary/Chest: Effort normal and breath sounds normal  He has no wheezes  He has no rales  Musculoskeletal: He exhibits no edema  Neurological: He is alert and oriented to person, place, and time  Skin: Skin is warm and dry  He is not diaphoretic           Laboratory Studies:  Chem:   Lab Results   Component Value Date     12/03/2015     12/02/2015     08/20/2015    K 4 2 12/10/2018    K 4 2 12/04/2018    K 4 4 09/04/2018    K 4 3 12/03/2015    K 4 0 12/02/2015    K 4 8 08/20/2015     12/10/2018     12/04/2018     09/04/2018     12/03/2015     12/02/2015     08/20/2015    CO2 25 12/10/2018    CO2 27 12/04/2018    CO2 28 09/04/2018    CO2 28 1 12/03/2015    CO2 32 6 (H) 12/02/2015    CO2 29 3 08/20/2015    GLUCOSE 121 12/03/2015    GLUCOSE 105 12/02/2015    GLUCOSE 105 08/20/2015    CREATININE 0 86 12/10/2018    CREATININE 0 98 12/04/2018    CREATININE 0 92 09/04/2018    CREATININE 0 86 12/03/2015    CREATININE 0 92 12/02/2015    CREATININE 0 89 08/20/2015    BUN 13 12/10/2018    BUN 16 12/04/2018    BUN 12 09/04/2018    BUN 15 12/03/2015    BUN 17 12/02/2015    BUN 15 08/20/2015    MG 1 9 12/10/2018     NT-proBNP:   Coags:  Lipid Profile:   Lab Results   Component Value Date    CHOL 138 08/20/2015    CHOL 135 09/30/2014    LDLCALC 66 12/10/2018    LDLCALC 57 08/13/2018    LDLCALC 67 08/20/2015    LDLCALC 65 09/30/2014    HDL 41 12/10/2018    HDL 37 (L) 08/13/2018    HDL 42 08/20/2015    HDL 46 09/30/2014    TRIG 90 12/10/2018    TRIG 140 08/13/2018    TRIG 147 08/20/2015    TRIG 120 09/30/2014       Cardiac testing:   EKG reviewed personally:      Cardiac catheterization was personally reviewed in the office today with the patient  Multiple nonobstructive lesions, likely some small vessel disease causing discomfort  Stress Myoview personally reviewed - only tolerated 5 min, heart rate did not reach 85%, switched to a Lexiscan Myoview stress test, LVEF normal but moderate area of distal anterior and apical ischemia    Kelly Melendez MD    Portions of the record may have been created with voice recognition software   Occasional wrong word or "sound a like" substitutions may have occurred due to the inherent limitations of voice recognition software   Read the chart carefully and recognize, using context, where substitutions have occurred

## 2019-01-15 NOTE — PATIENT INSTRUCTIONS
Please cut year lisinopril in half, effectively this will then be 10 mg daily  Recurring this down because her blood pressure is too low to raise any other medications for year chest pain  Stop taking carvedilol  Start taking metoprolol tartrate 50 mg twice a day, sent in to your male in pharmacy  Do not do anything to exertional like cutting down trees  Keep taking all your other medications including simvastatin  You do not need to take Ranexa at this time due to current cost, but may be going generic in the future

## 2019-01-25 ENCOUNTER — TELEPHONE (OUTPATIENT)
Dept: CARDIOLOGY CLINIC | Facility: CLINIC | Age: 74
End: 2019-01-25

## 2019-01-25 DIAGNOSIS — I20.8 ANGINA EFFORT: ICD-10-CM

## 2019-01-25 DIAGNOSIS — I20.9 ANGINA, CLASS III (HCC): Primary | Chronic | ICD-10-CM

## 2019-01-25 RX ORDER — ISOSORBIDE MONONITRATE 60 MG/1
60 TABLET, EXTENDED RELEASE ORAL DAILY
Qty: 90 TABLET | Refills: 3
Start: 2019-01-25 | End: 2019-02-05 | Stop reason: SDUPTHER

## 2019-01-25 RX ORDER — ISOSORBIDE MONONITRATE 60 MG/1
60 TABLET, EXTENDED RELEASE ORAL DAILY
Qty: 90 TABLET | Refills: 1 | Status: CANCELLED | OUTPATIENT
Start: 2019-01-25

## 2019-01-25 NOTE — TELEPHONE ENCOUNTER
Patient is on isosorbide 60 mg daily per notes of last visit with dr Meseret Soares, filling it for the amount prescribed last visit, for 90 with 1 refill

## 2019-01-25 NOTE — TELEPHONE ENCOUNTER
Pt stopped in and needs a 90 day refill sent to [facility] mail order  isosorbide mononitrate (IMDUR) 30 mg

## 2019-01-30 ENCOUNTER — TELEPHONE (OUTPATIENT)
Dept: CARDIOLOGY CLINIC | Facility: CLINIC | Age: 74
End: 2019-01-30

## 2019-01-30 NOTE — TELEPHONE ENCOUNTER
Per pt feels like the medication he's on is not working  metoprolol tartrate (LOPRESSOR) 50   lisinopril (ZESTRIL) 20 mg tablet   isosorbide mononitrate (IMDUR) 60 mg 24 hr tablet   Is not helping  He would like to know what he should do and if you could possibly give him a call    Please  Advise   Thanks

## 2019-02-01 DIAGNOSIS — I20.9 ANGINA, CLASS III (HCC): Primary | Chronic | ICD-10-CM

## 2019-02-01 RX ORDER — RANOLAZINE 500 MG/1
500 TABLET, EXTENDED RELEASE ORAL 2 TIMES DAILY
Qty: 60 TABLET | Refills: 3 | Status: SHIPPED | OUTPATIENT
Start: 2019-02-01 | End: 2019-02-06 | Stop reason: SDUPTHER

## 2019-02-02 ENCOUNTER — HOSPITAL ENCOUNTER (EMERGENCY)
Facility: HOSPITAL | Age: 74
Discharge: HOME | End: 2019-02-02
Attending: EMERGENCY MEDICINE | Admitting: EMERGENCY MEDICINE
Payer: COMMERCIAL

## 2019-02-02 ENCOUNTER — APPOINTMENT (EMERGENCY)
Dept: RADIOLOGY | Facility: HOSPITAL | Age: 74
End: 2019-02-02
Payer: COMMERCIAL

## 2019-02-02 VITALS
TEMPERATURE: 97.3 F | WEIGHT: 202.16 LBS | SYSTOLIC BLOOD PRESSURE: 144 MMHG | BODY MASS INDEX: 32.63 KG/M2 | OXYGEN SATURATION: 99 % | HEART RATE: 59 BPM | RESPIRATION RATE: 20 BRPM | DIASTOLIC BLOOD PRESSURE: 73 MMHG

## 2019-02-02 DIAGNOSIS — S80.01XA CONTUSION OF RIGHT KNEE, INITIAL ENCOUNTER: Primary | ICD-10-CM

## 2019-02-02 DIAGNOSIS — M25.561 PAIN AND SWELLING OF RIGHT KNEE: ICD-10-CM

## 2019-02-02 DIAGNOSIS — M25.461 PAIN AND SWELLING OF RIGHT KNEE: ICD-10-CM

## 2019-02-02 PROCEDURE — 73564 X-RAY EXAM KNEE 4 OR MORE: CPT

## 2019-02-02 PROCEDURE — 99283 EMERGENCY DEPT VISIT LOW MDM: CPT

## 2019-02-02 NOTE — DISCHARGE INSTRUCTIONS
Contusion in Adults   WHAT YOU NEED TO KNOW:   A contusion is a bruise that appears on your skin after an injury  A bruise happens when small blood vessels tear but skin does not  When blood vessels tear, blood leaks into nearby tissue, such as soft tissue or muscle  DISCHARGE INSTRUCTIONS:   Return to the emergency department if:   · You have new trouble moving the injured area  · You have tingling or numbness in or near the injured area  · Your hand or foot below the bruise gets cold or turns pale  Contact your healthcare provider if:   · You find a new lump in the injured area  · Your symptoms do not improve with treatment after 4 to 5 days  · You have questions or concerns about your condition or care  Medicines: You may need any of the following:  · NSAIDs  help decrease swelling and pain or fever  This medicine is available with or without a doctor's order  NSAIDs can cause stomach bleeding or kidney problems in certain people  If you take blood thinner medicine, always ask your healthcare provider if NSAIDs are safe for you  Always read the medicine label and follow directions  · Prescription pain medicine  may be given  Do not wait until the pain is severe before you take your medicine  · Take your medicine as directed  Contact your healthcare provider if you think your medicine is not helping or if you have side effects  Tell him of her if you are allergic to any medicine  Keep a list of the medicines, vitamins, and herbs you take  Include the amounts, and when and why you take them  Bring the list or the pill bottles to follow-up visits  Carry your medicine list with you in case of an emergency  Follow up with your healthcare provider as directed: You may need to return within a week to check your injury again  Write down your questions so you remember to ask them during your visits    Help a contusion heal:   · Rest the injured area  or use it less than usual  If you bruised your leg or foot, you may need crutches or a cane to help you walk  This will help you keep weight off your injured body part  · Apply ice  to decrease swelling and pain  Ice may also help prevent tissue damage  Use an ice pack, or put crushed ice in a plastic bag  Cover it with a towel and place it on your bruise for 15 to 20 minutes every hour or as directed  · Use compression  to support the area and decrease swelling  Wrap an elastic bandage around the area over the bruised muscle  Make sure the bandage is not too tight  You should be able to fit 1 finger between the bandage and your skin  · Elevate (raise) your injured body part  above the level of your heart to help decrease pain and swelling  Use pillows, blankets, or rolled towels to elevate the area as often as you can  · Do not drink alcohol  as directed  Alcohol may slow healing  · Do not stretch injured muscles  right after your injury  Ask your healthcare provider when and how you may safely stretch after your injury  Gentle stretches can help increase your flexibility  · Do not massage the area or put heating pads  on the bruise right after your injury  Heat and massage may slow healing  Your healthcare provider may tell you to apply heat after several days  At that time, heat will start to help the injury heal   Prevent another contusion:   · Stretch and warm up before you play sports or exercise  · Wear protective gear when you play sports  Examples are shin guards and padding  · If you begin a new physical activity, start slowly to give your body a chance to adjust   © 2017 [facility] Information is for End User's use only and may not be sold, redistributed or otherwise used for commercial purposes  All illustrations and images included in CareNotes® are the copyrighted property of [employer]  or [employer]  The above information is an  only   It is not intended as medical advice for individual conditions or treatments  Talk to your doctor, nurse or pharmacist before following any medical regimen to see if it is safe and effective for you  Swollen Knee Joint   WHAT YOU NEED TO KNOW:   A swollen knee joint may be caused by arthritis or by an injury or trauma, such as a knee sprain  It may also happen if you exercise too much  It may be painful to bend or straighten your knee, or walk  DISCHARGE INSTRUCTIONS:   Return to the emergency department if:   · Your knee locks or gives way  This may cause you to fall  · Your feet or toes start to look pale or feel cold  · You cannot bear weight on your leg, or you have severe pain even after treatment  Contact your healthcare provider if:   · You have a fever  · You have redness or warmth over your knee  · The swelling does not decrease with treatment  · It gets harder or more painful to straighten your leg at the knee  · Your knee weakens, or you continue to limp  · You have questions or concerns about your condition or care  Medicines:  · NSAIDs , such as ibuprofen, help decrease swelling, pain, and fever  This medicine is available with or without a doctor's order  NSAIDs can cause stomach bleeding or kidney problems in certain people  If you take blood thinner medicine, always ask your healthcare provider if NSAIDs are safe for you  Always read the medicine label and follow directions  · Take your medicine as directed  Contact your healthcare provider if you think your medicine is not helping or if you have side effects  Tell him of her if you are allergic to any medicine  Keep a list of the medicines, vitamins, and herbs you take  Include the amounts, and when and why you take them  Bring the list or the pill bottles to follow-up visits  Carry your medicine list with you in case of an emergency  Self-care:   · Rest  your knee  Avoid activities that make the swelling or pain worse   You may need to avoid putting weight on your knee while you have pain  Crutches, a cane, or a walker can be used to avoid putting weight on your knee while it heals  · Apply ice  on your knee for 15 to 20 minutes every hour or as directed  Use an ice pack, or put crushed ice in a plastic bag  Cover it with a towel  Ice helps prevent tissue damage and decreases swelling and pain  · Compress your knee with a brace or bandage to help reduce swelling  Use a brace or bandage only as directed  · Elevate  your knee above the level of your heart as often as you can  This will help decrease swelling and pain  Prop your joint on pillows or blankets to keep it elevated comfortably  · Apply heat  on your knee for 20 to 30 minutes every 2 hours for as many days as directed  Heat helps decrease pain  Physical therapy:  A physical therapist teaches you exercises to help improve movement and strength, and to decrease pain  Follow up with your healthcare provider as directed:  Write down your questions so you remember to ask them during your visits  © 2017 [facility] Information is for End User's use only and may not be sold, redistributed or otherwise used for commercial purposes  All illustrations and images included in CareNotes® are the copyrighted property of [employer]  or [employer]  The above information is an  only  It is not intended as medical advice for individual conditions or treatments  Talk to your doctor, nurse or pharmacist before following any medical regimen to see if it is safe and effective for you

## 2019-02-02 NOTE — ED PROVIDER NOTES
History  Chief Complaint   Patient presents with    Fall     slipped on ice thursday, injured right knee + swelling     67 y/o male presents today complaining of right knee pain after a fall 3 days ago  States he slipped on ice and fell directly on his right knee  States he feels like the Baker's cyst he has got bigger and is requesting a cortisone shot  He has not contacted his orthopedist for this problem  Denies other injury  Is not on blood thinners  History provided by:  Patient  Fall   Mechanism of injury: fall    Injury location:  Leg  Leg injury location:  R knee  Incident location:  Home  Time since incident:  3 days  Arrived directly from scene: no    Fall:     Fall occurred:  Tripped    Impact surface:  [employer] of impact:  Knees    Entrapped after fall: no    Suspicion of alcohol use: no    Suspicion of drug use: no    Tetanus status:  Up to date  Associated symptoms: no abdominal pain, no difficulty breathing, no headaches, no loss of consciousness and no neck pain    Risk factors: CAD    Risk factors: no anticoagulation therapy and no kidney disease        Prior to Admission Medications   Prescriptions Last Dose Informant Patient Reported? Taking? Multiple Vitamin (MULTIVITAMIN) tablet  Self Yes No   Sig: Take 1 tablet by mouth daily     clopidogrel (PLAVIX) 75 mg tablet   No No   Sig: TAKE ONE TABLET BY MOUTH EVERY DAY   isosorbide mononitrate (IMDUR) 60 mg 24 hr tablet   No No   Sig: Take 1 tablet (60 mg total) by mouth daily   lisinopril (ZESTRIL) 20 mg tablet   No No   Sig: Take 0 5 tablets (10 mg total) by mouth daily   metoprolol tartrate (LOPRESSOR) 50 mg tablet   No No   Sig: Take 1 tablet (50 mg total) by mouth every 12 (twelve) hours   nitroglycerin (NITROSTAT) 0 4 mg SL tablet   No No   Sig: Place 1 tablet (0 4 mg total) under the tongue every 5 (five) minutes as needed for chest pain   ranolazine (RANEXA) 500 mg 12 hr tablet   No No   Sig: Take 1 tablet (500 mg total) by mouth 2 (two) times a day   simvastatin (ZOCOR) 40 mg tablet   No No   Sig: Take 1 tablet (40 mg total) by mouth daily at bedtime      Facility-Administered Medications: None       Past Medical History:   Diagnosis Date    AAA (abdominal aortic aneurysm) ([facility] )     last assessed: June 11, 2013    Appendicitis     BPH (benign prostatic hyperplasia)     CAD (coronary artery disease)     Cholelithiasis     last assessed: July 26, 2016    Heart [facility])     Hyperlipidemia     Hypertension     Insomnia     Stroke ([facility] )     Transient cerebral ischemia        Past Surgical History:   Procedure Laterality Date    ABDOMINAL AORTIC ANEURYSM REPAIR  06/07/2011    Dr Noemy Moreno 6/7/11    APPENDECTOMY      CATARACT EXTRACTION Right 11/2018    CHOLECYSTECTOMY      CHOLECYSTECTOMY LAPAROSCOPIC      COLONOSCOPY  11/2006    CORONARY ANGIOPLASTY      3 STENTS INSERTED    CORONARY ANGIOPLASTY      CORONARY ANGIOPLASTY      with stenting - 3/2012    [place] LAP,CHOLECYSTECTOMY/GRAPH N/A 9/8/2016    Procedure: CHOLECYSTECTOMY LAPAROSCOPIC with intra-op cholangiogram ;  Surgeon: Todd Castro MD;  [facility];  Service: General       Family History   Problem Relation Age of Onset    Heart disease Mother     Hypertension Mother         Benign Essential     Stroke Mother         Stroke     Heart disease Father     Diabetes Brother         mellitus     Heart disease Brother     Aortic aneurysm Brother     Heart disease Family      I have reviewed and agree with the history as documented  Social History   Substance Use Topics    Smoking status: Former Smoker    Smokeless tobacco: Never Used      Comment: Quit 1980's about 25 years x1ppd    Alcohol use 4 2 oz/week     3 Shots of liquor, 4 Cans of beer per week      Comment: SOCIAL USE - occasionally         Review of Systems   Constitutional: Negative for chills, fatigue and fever     HENT: Negative for postnasal drip, sore throat and trouble swallowing  Respiratory: Negative for chest tightness and shortness of breath  Gastrointestinal: Negative for abdominal pain  Genitourinary: Negative for dysuria  Musculoskeletal: Negative for neck pain  Skin: Negative for rash  Allergic/Immunologic: Negative for immunocompromised state  Neurological: Negative for dizziness, loss of consciousness, light-headedness and headaches  Physical Exam  Physical Exam   Constitutional: He appears well-developed and well-nourished  HENT:   Head: Normocephalic and atraumatic  Eyes: Pupils are equal, round, and reactive to light  Pulmonary/Chest: Effort normal    Musculoskeletal:        Right knee: He exhibits decreased range of motion (with flexion, due to pain) and swelling (mild)  He exhibits no ecchymosis, no deformity, no laceration and no erythema  Tenderness found  Medial joint line and lateral joint line tenderness noted  Skin: Skin is warm and dry  Capillary refill takes less than 2 seconds  Psychiatric: He has a normal mood and affect  Nursing note and vitals reviewed        Vital Signs  ED Triage Vitals   Temperature Pulse Respirations Blood Pressure SpO2   02/02/19 1025 02/02/19 1022 02/02/19 1022 02/02/19 1022 02/02/19 1022   (!) 97 3 °F (36 3 °C) 59 20 144/73 99 %      Temp Source Heart Rate Source Patient Position - Orthostatic VS BP Location FiO2 (%)   02/02/19 1025 -- -- -- --   Axillary          Pain Score       02/02/19 1022       7           Vitals:    02/02/19 1022   BP: 144/73   Pulse: 59       Visual Acuity      ED Medications  Medications - No data to display    Diagnostic Studies  Results Reviewed     None                 XR knee 4+ views Right injury   ED Interpretation by Vanda Finney DO (02/02 1119)   No acute process                 Procedures  Procedures       Phone Contacts  ED Phone Contact    ED Course                               MDM  Number of Diagnoses or Management Options  Contusion of right knee, initial encounter: new and requires workup  Pain and swelling of right knee: new and requires workup  Diagnosis management comments: 10:45 AM  Patient is requesting a cortisone shot in his knee  I explained that we don't do them in the emergency department and he will need to follow up with his orthopedist for that  He then asked if I would just 'suck the fluid out' of the baker's cyst   Again, I explained that we don't do that either, due to the fact that the fluid will just reaccumulate  Will check xray to rule out bony pathology and if negative, will recommend ace wrap for compression and f/u with ortho as outpatient  11:21 AM  Xray negative by my read  Patient able to ambulate  Recommend f/u with ortho as outpatient  Amount and/or Complexity of Data Reviewed  Tests in the radiology section of CPT®: ordered and reviewed  Independent visualization of images, tracings, or specimens: yes    Risk of Complications, Morbidity, and/or Mortality  Presenting problems: low  Diagnostic procedures: low  Management options: low    Patient Progress  Patient progress: stable      Disposition  Final diagnoses:   Contusion of right knee, initial encounter   Pain and swelling of right knee     Time reflects when diagnosis was documented in both MDM as applicable and the Disposition within this note     Time User Action Codes Description Comment    2/2/2019 10:46 AM Priya Jiménez Add [S80 01XA] Contusion of right knee, initial encounter     2/2/2019 10:46 AM Priya Jiménez Add [Z78 [id number],  M25 461] Pain and swelling of right knee       ED Disposition     ED Disposition Condition Date/Time Comment    Discharge  Sat Feb 2, 2019 11:15 AM Christian Clemente Sr  discharge to home/self care      Condition at discharge: Stable        Follow-up Information     Follow up With Specialties Details Why Contact Info Additional Information    Celia Diaz MD Orthopedic Surgery Schedule an appointment as soon as possible for a visit  [address] [place] [address]  [address]  [address] [facility] Emergency Department Emergency Medicine  If symptoms worsen [address] [id number] [phone] [facility], [address], [place], [place], [address]          Patient's Medications   Discharge Prescriptions    No medications on file     No discharge procedures on file      ED Provider  Electronically Signed by           Jazmín Louie DO  02/02/19 1125

## 2019-02-05 ENCOUNTER — TELEPHONE (OUTPATIENT)
Dept: CARDIOLOGY CLINIC | Facility: CLINIC | Age: 74
End: 2019-02-05

## 2019-02-05 DIAGNOSIS — I20.8 ANGINA EFFORT: ICD-10-CM

## 2019-02-05 RX ORDER — ISOSORBIDE MONONITRATE 60 MG/1
60 TABLET, EXTENDED RELEASE ORAL DAILY
Qty: 30 TABLET | Refills: 6
Start: 2019-02-05 | End: 2019-02-06 | Stop reason: SDUPTHER

## 2019-02-05 NOTE — TELEPHONE ENCOUNTER
S/W pt he will try Isosorbide 60mg again     Pt requested to send 30 days supply to [facility]    Script sent, please sign

## 2019-02-06 DIAGNOSIS — I20.8 ANGINA EFFORT: ICD-10-CM

## 2019-02-06 DIAGNOSIS — I20.9 ANGINA, CLASS III (HCC): Chronic | ICD-10-CM

## 2019-02-08 ENCOUNTER — OFFICE VISIT (OUTPATIENT)
Dept: OBGYN CLINIC | Facility: CLINIC | Age: 74
End: 2019-02-08
Payer: COMMERCIAL

## 2019-02-08 VITALS
HEART RATE: 63 BPM | SYSTOLIC BLOOD PRESSURE: 162 MMHG | DIASTOLIC BLOOD PRESSURE: 79 MMHG | BODY MASS INDEX: 32.14 KG/M2 | WEIGHT: 200 LBS | HEIGHT: 66 IN

## 2019-02-08 DIAGNOSIS — M17.11 PRIMARY OSTEOARTHRITIS OF RIGHT KNEE: Primary | ICD-10-CM

## 2019-02-08 DIAGNOSIS — I20.8 ANGINA EFFORT: ICD-10-CM

## 2019-02-08 PROCEDURE — 99203 OFFICE O/P NEW LOW 30 MIN: CPT | Performed by: ORTHOPAEDIC SURGERY

## 2019-02-08 RX ORDER — ISOSORBIDE MONONITRATE 60 MG/1
60 TABLET, EXTENDED RELEASE ORAL DAILY
Qty: 90 TABLET | Refills: 3 | Status: SHIPPED | OUTPATIENT
Start: 2019-02-08 | End: 2019-09-23 | Stop reason: SDUPTHER

## 2019-02-08 RX ORDER — RANOLAZINE 500 MG/1
500 TABLET, EXTENDED RELEASE ORAL 2 TIMES DAILY
Qty: 180 TABLET | Refills: 2 | Status: SHIPPED | OUTPATIENT
Start: 2019-02-08 | End: 2019-03-11 | Stop reason: ALTCHOICE

## 2019-02-08 RX ORDER — ISOSORBIDE MONONITRATE 60 MG/1
60 TABLET, EXTENDED RELEASE ORAL DAILY
Qty: 30 TABLET | Refills: 6 | Status: SHIPPED | OUTPATIENT
Start: 2019-02-08 | End: 2019-02-08 | Stop reason: SDUPTHER

## 2019-02-08 NOTE — PROGRESS NOTES
Patient Name:  Nina Gaines  MRN:  [id number]    Assessment & Plan     Severe medial compartment osteoarthritis of Right knee  Flare up of osteoarthritis after mechanical fall on 1/30/19     1  Explained to the patient that since his symptoms have decreased significantly and is having no pain, there is no need for a cortisone injection today  He was instructed to make a follow-up visit if his symptoms increase for possible cortisone injection  2  He may resume activities as tolerated  3  Take Naproxen prn for pain relief  4  He will follow up on an as needed basis      Chief Complaint     Right knee pain      History of the Present Illness     Adela Ward Sr  is a 68 y o  male presents today for an initial visit of his right knee  Patient states that on 01/30/2019 suffered a mechanical fall at home when he landed directly onto the anterior aspect of his right knee  Patient states that initially he had increased pain/swelling in his knee  He was seen in the ED on 02/02/2019 with x-rays, prescribed naproxen and told him to follow up with Orthopedics if pain persists  Today, patient notes that he has minimal pain/swelling in his right knee  He is able ambulate as tolerated without pain  He is taking naproxen p r n  for pain relief with appreciable benefit  Denies numbness and tingling, fevers or chills      Physical Exam     /79   Pulse 63   Ht 5' 6" (1 676 m)   Wt 90 7 kg (200 lb)   BMI 32 28 kg/m²     Right knee:  Trace effusion  No tenderness to palpation  ROM: full without pain  5/5 quadriceps, hamstring strength  Stable to varus and valgus stress    Eyes:  Anicteric sclerae  Neck:  Supple  Lungs:  Unlabored breathing  Cardiovascular:  Capillary refill is less than 2 seconds  Skin:  Intact without erythema  Neurologic:  Sensation intact to light touch  Psychiatric:  Mood and affect are appropriate        Data Review     I have personally reviewed pertinent films in PACS, and my interpretation follows:    X-ray right knee performed on 02/02/2019:   No acute osseous abnormalities  Moderate to severe medial compartment osteoarthritis  Past Medical History:   Diagnosis Date    AAA (abdominal aortic aneurysm) ([facility] )     last assessed: June 11, 2013    Appendicitis     BPH (benign prostatic hyperplasia)     CAD (coronary artery disease)     Cholelithiasis     last assessed: July 26, 2016    Heart attack [facility])     Hyperlipidemia     Hypertension     Insomnia     Stroke ([facility] )     Transient cerebral ischemia        Past Surgical History:   Procedure Laterality Date    ABDOMINAL AORTIC ANEURYSM REPAIR  06/07/2011    Dr Monica Montgomery 6/7/11    APPENDECTOMY      CATARACT EXTRACTION Right 11/2018    CHOLECYSTECTOMY      CHOLECYSTECTOMY LAPAROSCOPIC      COLONOSCOPY  11/2006    CORONARY ANGIOPLASTY      3 STENTS INSERTED    CORONARY ANGIOPLASTY      CORONARY ANGIOPLASTY      with stenting - 3/2012    [place] LAP,CHOLECYSTECTOMY/GRAPH N/A 9/8/2016    Procedure: CHOLECYSTECTOMY LAPAROSCOPIC with intra-op cholangiogram ;  Surgeon: Bharti Lawrence MD;  [facility];  Service: General       No Known Allergies    Current Outpatient Prescriptions on File Prior to Visit   Medication Sig Dispense Refill    clopidogrel (PLAVIX) 75 mg tablet TAKE ONE TABLET BY MOUTH EVERY DAY 90 tablet 3    isosorbide mononitrate (IMDUR) 60 mg 24 hr tablet Take 1 tablet (60 mg total) by mouth daily 30 tablet 6    lisinopril (ZESTRIL) 20 mg tablet Take 0 5 tablets (10 mg total) by mouth daily 45 tablet 3    metoprolol tartrate (LOPRESSOR) 50 mg tablet Take 1 tablet (50 mg total) by mouth every 12 (twelve) hours 180 tablet 3    Multiple Vitamin (MULTIVITAMIN) tablet Take 1 tablet by mouth daily        nitroglycerin (NITROSTAT) 0 4 mg SL tablet Place 1 tablet (0 4 mg total) under the tongue every 5 (five) minutes as needed for chest pain 30 tablet 1    ranolazine (RANEXA) 500 mg 12 hr tablet Take 1 tablet (500 mg total) by mouth 2 (two) times a day 180 tablet 2    simvastatin (ZOCOR) 40 mg tablet Take 1 tablet (40 mg total) by mouth daily at bedtime 90 tablet 2     No current facility-administered medications on file prior to visit  Social History   Substance Use Topics    Smoking status: Former Smoker    Smokeless tobacco: Never Used      Comment: Quit 1980's about 25 years x1ppd    Alcohol use 4 2 oz/week     3 Shots of liquor, 4 Cans of beer per week      Comment: SOCIAL USE - occasionally        Family History   Problem Relation Age of Onset    Heart disease Mother     Hypertension Mother         Benign Essential     Stroke Mother         Stroke     Heart disease Father     Diabetes Brother         mellitus     Heart disease Brother     Aortic aneurysm Brother     Heart disease Family        Review of Systems     As stated in the HPI  All other systems were reviewed and are negative        Scribe Attestation    I,:   Lucia Queen am acting as a scribe while in the presence of the attending physician :        I,:   Shashank Matthews MD personally performed the services described in this documentation    as scribed in my presence :

## 2019-02-28 DIAGNOSIS — E78.5 HYPERLIPIDEMIA, UNSPECIFIED HYPERLIPIDEMIA TYPE: ICD-10-CM

## 2019-02-28 RX ORDER — SIMVASTATIN 40 MG
40 TABLET ORAL
Qty: 90 TABLET | Refills: 2 | Status: SHIPPED | OUTPATIENT
Start: 2019-02-28 | End: 2020-02-24 | Stop reason: SDUPTHER

## 2019-03-11 ENCOUNTER — OFFICE VISIT (OUTPATIENT)
Dept: CARDIOLOGY CLINIC | Facility: CLINIC | Age: 74
End: 2019-03-11
Payer: COMMERCIAL

## 2019-03-11 VITALS
HEIGHT: 66 IN | HEART RATE: 72 BPM | OXYGEN SATURATION: 96 % | SYSTOLIC BLOOD PRESSURE: 134 MMHG | BODY MASS INDEX: 32.56 KG/M2 | WEIGHT: 202.6 LBS | DIASTOLIC BLOOD PRESSURE: 76 MMHG

## 2019-03-11 DIAGNOSIS — I10 ESSENTIAL HYPERTENSION: ICD-10-CM

## 2019-03-11 DIAGNOSIS — I20.9 ANGINA, CLASS III (HCC): Chronic | ICD-10-CM

## 2019-03-11 DIAGNOSIS — E78.49 OTHER HYPERLIPIDEMIA: ICD-10-CM

## 2019-03-11 DIAGNOSIS — I25.110 CORONARY ARTERY DISEASE INVOLVING NATIVE CORONARY ARTERY OF NATIVE HEART WITH UNSTABLE ANGINA PECTORIS (HCC): Primary | Chronic | ICD-10-CM

## 2019-03-11 PROCEDURE — 99214 OFFICE O/P EST MOD 30 MIN: CPT | Performed by: INTERNAL MEDICINE

## 2019-03-11 RX ORDER — NITROGLYCERIN 0.4 MG/1
0.4 TABLET SUBLINGUAL
Qty: 90 TABLET | Refills: 1 | Status: SHIPPED | OUTPATIENT
Start: 2019-03-11 | End: 2019-08-05 | Stop reason: SDUPTHER

## 2019-03-11 RX ORDER — METOPROLOL TARTRATE 50 MG/1
TABLET, FILM COATED ORAL
Qty: 270 TABLET | Refills: 3 | Status: SHIPPED | OUTPATIENT
Start: 2019-03-11 | End: 2020-03-02 | Stop reason: SDUPTHER

## 2019-03-11 NOTE — PROGRESS NOTES
[facility]  Follow up note  Jay Jacobo  68 y o  male MRN: [id number]        Problems    1  Coronary artery disease involving native coronary artery of native heart with unstable angina pectoris ([address] )     2  Angina, class III (HCC)  nitroglycerin (NITROSTAT) 0 4 mg SL tablet    metoprolol tartrate (LOPRESSOR) 50 mg tablet   3  Other hyperlipidemia     4  Essential hypertension         Impression:    Nu Esteves returns to see me for chronic stable angina  · CAD  · Catheterization suggested nonobstructive moderate diffuse disease, but he does have an anterior apical ischemic defect on stress testing  · Currently still having occasional symptoms of angina, but improved, and he is able to tolerate up to 15 minutes on the treadmill  · Imdur 60 mg, metoprolol tartrate 50 mg b i d  Is his current regimen    · Hypertension  · Blood pressure afford room for further anti anginal titration, 134/76 today    · Hyperlipidemia  · Very well controlled on simvastatin      Plan:    · 3 month follow-up  · Increase metoprolol tartrate to 100 mg a m , 50 mg p m  · Continue on half dose lisinopril, cutting the 20 mg tablets in half  · Overall anginal symptoms have improved, exercise has improved  HPI:   Megan Ingram Sr  is a 68y o  year old male with hypertension, hyperlipidemia, CAD defined by multivessel disease noted in 2006, but only severe obstruction of the obtuse marginal status post stenting at that time  He has a history of AAA, AAA repair, iliac aneurysms, celiac stenosis, bilateral carotid artery stenosis  His hypertension remains well controlled, but afford room for titration of anti anginal therapy  His lipids have been under really good control  Continues on simvastatin  He could not afford Ranexa  Isosorbide mononitrate was increased to 60 mg  Below was discontinued, metoprolol was started, he is on 50 mg b i d    He has noticed an improvement in anginal symptoms, now able to exercise 15 minutes on his treadmill, previously only 5 minutes  Anxiety, emotional stress and rushing around the house can still produces anginal symptoms  It usually resolves with rest   He indicates he does not have any nitroglycerin sublingual at home  He denies syncope, lightheadedness, palpitations, shortness of breath  Review of Systems   Constitutional: Negative  HENT: Negative  Eyes: Negative  Respiratory: Positive for chest tightness  Negative for shortness of breath  Cardiovascular: Positive for chest pain  Gastrointestinal: Negative  Genitourinary: Negative  Musculoskeletal: Negative  Skin: Negative  Neurological: Negative  Hematological: Negative  Psychiatric/Behavioral: Negative            Past Medical History:   Diagnosis Date    AAA (abdominal aortic aneurysm) ([address] [place] [address] )     last assessed: June 11, 2013    Appendicitis     BPH (benign prostatic hyperplasia)     CAD (coronary artery disease)     Cholelithiasis     last assessed: July 26, 2016    Heart attack ([address] [place] [address] )     Hyperlipidemia     Hypertension     Insomnia     Stroke ([place] [address] )     Transient cerebral ischemia      Social History     Substance and Sexual Activity   Alcohol Use Yes    Alcohol/week: 4 2 oz    Types: 3 Shots of liquor, 4 Cans of beer per week    Comment: SOCIAL USE - occasionally      Social History     Substance and Sexual Activity   Drug Use No     Social History     Tobacco Use   Smoking Status Former Smoker   Smokeless Tobacco Never Used   Tobacco Comment    Quit 1980's about 25 years x1ppd       Allergies:  No Known Allergies    Medications:     Current Outpatient Medications:     clopidogrel (PLAVIX) 75 mg tablet, TAKE ONE TABLET BY MOUTH EVERY DAY, Disp: 90 tablet, Rfl: 3    isosorbide mononitrate (IMDUR) 60 mg 24 hr tablet, Take 1 tablet (60 mg total) by mouth daily, Disp: 90 tablet, Rfl: 3    lisinopril (ZESTRIL) 20 mg tablet, Take 0 5 tablets (10 mg total) by mouth daily, Disp: 45 tablet, Rfl: 3    metoprolol tartrate (LOPRESSOR) 50 mg tablet, Please take 2 tablets in the a m , 1 tablet in the p m , Disp: 270 tablet, Rfl: 3    Multiple Vitamin (MULTIVITAMIN) tablet, Take 1 tablet by mouth daily  , Disp: , Rfl:     simvastatin (ZOCOR) 40 mg tablet, Take 1 tablet (40 mg total) by mouth daily at bedtime, Disp: 90 tablet, Rfl: 2    nitroglycerin (NITROSTAT) 0 4 mg SL tablet, Place 1 tablet (0 4 mg total) under the tongue every 5 (five) minutes as needed for chest pain, Disp: 90 tablet, Rfl: 1      Vitals:    03/11/19 1046   BP: 134/76   Pulse: 72   SpO2: 96%     Weight (last 2 days)     Date/Time   Weight    03/11/19 1046   91 9 (202 6)            Physical Exam   Constitutional: He is oriented to person, place, and time  No distress  HENT:   Head: Normocephalic and atraumatic  Eyes: Conjunctivae are normal  No scleral icterus  Neck: Normal range of motion  No JVD present  Cardiovascular: Normal rate, regular rhythm, normal heart sounds and intact distal pulses  No murmur heard  Pulmonary/Chest: Effort normal and breath sounds normal  He has no wheezes  He has no rales  Musculoskeletal: He exhibits no edema  Neurological: He is alert and oriented to person, place, and time  Skin: Skin is warm and dry  He is not diaphoretic           Laboratory Studies:  Chem:   Lab Results   Component Value Date     12/03/2015     12/02/2015     08/20/2015    K 4 2 12/10/2018    K 4 2 12/04/2018    K 4 4 09/04/2018    K 4 3 12/03/2015    K 4 0 12/02/2015    K 4 8 08/20/2015     12/10/2018     12/04/2018     09/04/2018     12/03/2015     12/02/2015     08/20/2015    CO2 25 12/10/2018    CO2 27 12/04/2018    CO2 28 09/04/2018    CO2 28 1 12/03/2015    CO2 32 6 (H) 12/02/2015    CO2 29 3 08/20/2015    GLUCOSE 121 12/03/2015    GLUCOSE 105 12/02/2015    GLUCOSE 105 08/20/2015    CREATININE 0 86 12/10/2018    CREATININE 0 98 12/04/2018 CREATININE 0 92 09/04/2018    CREATININE 0 86 12/03/2015    CREATININE 0 92 12/02/2015    CREATININE 0 89 08/20/2015    BUN 13 12/10/2018    BUN 16 12/04/2018    BUN 12 09/04/2018    BUN 15 12/03/2015    BUN 17 12/02/2015    BUN 15 08/20/2015    MG 1 9 12/10/2018     NT-proBNP:   Coags:  Lipid Profile:   Lab Results   Component Value Date    CHOL 138 08/20/2015    CHOL 135 09/30/2014    LDLCALC 66 12/10/2018    LDLCALC 57 08/13/2018    LDLCALC 67 08/20/2015    LDLCALC 65 09/30/2014    HDL 41 12/10/2018    HDL 37 (L) 08/13/2018    HDL 42 08/20/2015    HDL 46 09/30/2014    TRIG 90 12/10/2018    TRIG 140 08/13/2018    TRIG 147 08/20/2015    TRIG 120 09/30/2014       Cardiac testing:   EKG reviewed personally:      Cardiac catheterization was personally reviewed in the office today with the patient  Multiple nonobstructive lesions, likely some small vessel disease causing discomfort  Stress Myoview personally reviewed - only tolerated 5 min, heart rate did not reach 85%, switched to a Lexiscan Myoview stress test, LVEF normal but moderate area of distal anterior and apical ischemia    Twyla Robb MD    Portions of the record may have been created with voice recognition software   Occasional wrong word or "sound a like" substitutions may have occurred due to the inherent limitations of voice recognition software   Read the chart carefully and recognize, using context, where substitutions have occurred

## 2019-04-03 DIAGNOSIS — I72.4 ANEURYSM OF ARTERY OF LOWER EXTREMITY (HCC): Primary | ICD-10-CM

## 2019-04-18 ENCOUNTER — HOSPITAL ENCOUNTER (OUTPATIENT)
Dept: NON INVASIVE DIAGNOSTICS | Facility: CLINIC | Age: 74
Discharge: HOME | End: 2019-04-18
Payer: COMMERCIAL

## 2019-04-18 DIAGNOSIS — I72.4 ANEURYSM OF ARTERY OF LOWER EXTREMITY (HCC): ICD-10-CM

## 2019-04-18 PROCEDURE — 93925 LOWER EXTREMITY STUDY: CPT

## 2019-04-18 PROCEDURE — 93925 LOWER EXTREMITY STUDY: CPT | Performed by: SURGERY

## 2019-04-18 PROCEDURE — 93923 UPR/LXTR ART STDY 3+ LVLS: CPT

## 2019-04-18 PROCEDURE — 93922 UPR/L XTREMITY ART 2 LEVELS: CPT | Performed by: SURGERY

## 2019-07-23 ENCOUNTER — OFFICE VISIT (OUTPATIENT)
Dept: CARDIOLOGY CLINIC | Facility: CLINIC | Age: 74
End: 2019-07-23
Payer: COMMERCIAL

## 2019-07-23 VITALS
OXYGEN SATURATION: 95 % | BODY MASS INDEX: 32.3 KG/M2 | WEIGHT: 201 LBS | HEART RATE: 72 BPM | DIASTOLIC BLOOD PRESSURE: 68 MMHG | HEIGHT: 66 IN | SYSTOLIC BLOOD PRESSURE: 128 MMHG

## 2019-07-23 DIAGNOSIS — I65.23 ASYMPTOMATIC BILATERAL CAROTID ARTERY STENOSIS: ICD-10-CM

## 2019-07-23 DIAGNOSIS — I71.4 ABDOMINAL AORTIC ANEURYSM WITHOUT RUPTURE (HCC): ICD-10-CM

## 2019-07-23 DIAGNOSIS — E78.49 OTHER HYPERLIPIDEMIA: ICD-10-CM

## 2019-07-23 DIAGNOSIS — I25.110 CORONARY ARTERY DISEASE INVOLVING NATIVE CORONARY ARTERY OF NATIVE HEART WITH UNSTABLE ANGINA PECTORIS (HCC): Chronic | ICD-10-CM

## 2019-07-23 DIAGNOSIS — I20.9 ANGINA, CLASS III (HCC): Primary | Chronic | ICD-10-CM

## 2019-07-23 DIAGNOSIS — I72.3 ANEURYSM OF ILIAC ARTERY (HCC): ICD-10-CM

## 2019-07-23 DIAGNOSIS — I72.4 ANEURYSM OF POPLITEAL ARTERY (HCC): ICD-10-CM

## 2019-07-23 DIAGNOSIS — I10 ESSENTIAL HYPERTENSION: ICD-10-CM

## 2019-07-23 PROCEDURE — 3074F SYST BP LT 130 MM HG: CPT | Performed by: INTERNAL MEDICINE

## 2019-07-23 PROCEDURE — 99214 OFFICE O/P EST MOD 30 MIN: CPT | Performed by: INTERNAL MEDICINE

## 2019-07-23 NOTE — PROGRESS NOTES
[facility] Cardiology  Follow up note  Sreekanth Patel  68 y o  male MRN: [id number]        Problems    1  Angina, class III ([facility] [address] )     2  Coronary artery disease involving native coronary artery of native heart with unstable angina pectoris ([facility] )     3  Other hyperlipidemia     4  Abdominal aortic aneurysm without rupture ([facility] )     5  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HCC)     6  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HCC)     7  Asymptomatic bilateral carotid artery stenosis     8  Essential hypertension         Impression:    Emerson Rivers returns to see me for chronic stable angina  · CAD  · Catheterization 12/18 suggested nonobstructive moderate diffuse disease, despite anterior ischemia by stress myoview 1/19 as a f/u  · Metoprolol tartrate and isosorbide mononitrate remain his anti anginal treatment  · Having good exercise tolerance, recently visited the [email or web address], walked a mi and a half without any significant symptoms, has been cutting wood without any significant symptoms, has occasional symptoms when walking up a few steps, sitting on the toilet etcetera which are very atypical, low intensity and I am not convinced anginal in nature  · Hypertension  · Well controlled    · Hyperlipidemia  · Very well controlled      Plan:    · Routine labs necessary  · Six-month follow-up  · No medication changes  · No additional testing for now    HPI:   Ria Love Sr  is a 68y o  year old male with hypertension, hyperlipidemia, CAD defined by multivessel disease noted in 2006, but only severe obstruction of the obtuse marginal status post stenting at that time  He has a history of AAA, AAA repair, iliac aneurysms, celiac stenosis, bilateral carotid artery stenosis       He has no abdominal complaints, he has occasional chest pain, 2/10, central to lower substernal, sometimes occurs when sitting on the toilet, sometimes occurs when quickly running up a set of steps, but [place], mi and a half up hills without any significant exertional symptoms  Was recently cutting wood, tells me he cut about 100 pieces of wood without any exertional symptoms  He remains on metoprolol, Imdur from anti anginal standpoint  His blood pressure is well controlled, and lisinopril previously decreased to afford tolerance of higher doses of Imdur and metoprolol  His wife is a bit concerned about forgetfulness  He does have a CT scan from 2017 at indicates old caudate head and basal ganglia lacunar infarcts  He denies palpitations  His lipids have been well controlled, last assessed 12/18  Review of Systems   All other systems reviewed and are negative          Past Medical History:   Diagnosis Date    AAA (abdominal aortic aneurysm) ([facility] )     last assessed: June 11, 2013    Appendicitis     BPH (benign prostatic hyperplasia)     CAD (coronary artery disease)     Cholelithiasis     last assessed: July 26, 2016    Heart attack ([place] [address] )     Hyperlipidemia     Hypertension     Insomnia     Stroke ([place] [address] )     Transient cerebral ischemia      Social History     Substance and Sexual Activity   Alcohol Use Yes    Alcohol/week: 7 0 standard drinks    Types: 3 Shots of liquor, 4 Cans of beer per week    Comment: SOCIAL USE - occasionally      Social History     Substance and Sexual Activity   Drug Use No     Social History     Tobacco Use   Smoking Status Former Smoker   Smokeless Tobacco Never Used   Tobacco Comment    Quit 1980's about 25 years x1ppd       Allergies:  No Known Allergies    Medications:     Current Outpatient Medications:     clopidogrel (PLAVIX) 75 mg tablet, TAKE ONE TABLET BY MOUTH EVERY DAY, Disp: 90 tablet, Rfl: 3    isosorbide mononitrate (IMDUR) 60 mg 24 hr tablet, Take 1 tablet (60 mg total) by mouth daily, Disp: 90 tablet, Rfl: 3    lisinopril (ZESTRIL) 20 mg tablet, Take 0 5 tablets (10 mg total) by mouth daily, Disp: 45 tablet, Rfl: 3    metoprolol tartrate (LOPRESSOR) 50 mg tablet, Please take 2 tablets in the a m , 1 tablet in the p m , Disp: 270 tablet, Rfl: 3    Multiple Vitamin (MULTIVITAMIN) tablet, Take 1 tablet by mouth daily  , Disp: , Rfl:     nitroglycerin (NITROSTAT) 0 4 mg SL tablet, Place 1 tablet (0 4 mg total) under the tongue every 5 (five) minutes as needed for chest pain, Disp: 90 tablet, Rfl: 1    simvastatin (ZOCOR) 40 mg tablet, Take 1 tablet (40 mg total) by mouth daily at bedtime, Disp: 90 tablet, Rfl: 2      Vitals:    07/23/19 0940   BP: 128/68   Pulse: 72   SpO2: 95%     Weight (last 2 days)     Date/Time   Weight    07/23/19 0940   91 2 (201)            Physical Exam   Constitutional: He is oriented to person, place, and time  No distress  HENT:   Head: Normocephalic and atraumatic  Eyes: Conjunctivae are normal  No scleral icterus  Neck: Normal range of motion  No JVD present  Cardiovascular: Normal rate, regular rhythm, normal heart sounds and intact distal pulses  No murmur heard  Pulmonary/Chest: Effort normal and breath sounds normal  No respiratory distress  He has no decreased breath sounds  He has no wheezes  He has no rhonchi  He has no rales  Musculoskeletal: He exhibits no edema  Right lower leg: Normal  He exhibits no edema  Left lower leg: Normal  He exhibits no edema  Neurological: He is alert and oriented to person, place, and time  Skin: Skin is warm and dry  He is not diaphoretic           Laboratory Studies:  Chem:   Lab Results   Component Value Date     12/03/2015     12/02/2015     08/20/2015    K 4 2 12/10/2018    K 4 2 12/04/2018    K 4 4 09/04/2018    K 4 3 12/03/2015    K 4 0 12/02/2015    K 4 8 08/20/2015     12/10/2018     12/04/2018     09/04/2018     12/03/2015     12/02/2015     08/20/2015    CO2 25 12/10/2018    CO2 27 12/04/2018    CO2 28 09/04/2018    CO2 28 1 12/03/2015    CO2 32 6 (H) 12/02/2015    CO2 29 3 08/20/2015    GLUCOSE 121 12/03/2015    GLUCOSE 105 12/02/2015    GLUCOSE 105 08/20/2015    CREATININE 0 86 12/10/2018    CREATININE 0 98 12/04/2018    CREATININE 0 92 09/04/2018    CREATININE 0 86 12/03/2015    CREATININE 0 92 12/02/2015    CREATININE 0 89 08/20/2015    BUN 13 12/10/2018    BUN 16 12/04/2018    BUN 12 09/04/2018    BUN 15 12/03/2015    BUN 17 12/02/2015    BUN 15 08/20/2015    MG 1 9 12/10/2018     NT-proBNP:   Coags:  Lipid Profile:   Lab Results   Component Value Date    CHOL 138 08/20/2015    CHOL 135 09/30/2014    LDLCALC 66 12/10/2018    LDLCALC 57 08/13/2018    LDLCALC 67 08/20/2015    LDLCALC 65 09/30/2014    HDL 41 12/10/2018    HDL 37 (L) 08/13/2018    HDL 42 08/20/2015    HDL 46 09/30/2014    TRIG 90 12/10/2018    TRIG 140 08/13/2018    TRIG 147 08/20/2015    TRIG 120 09/30/2014       Cardiac testing:   EKG reviewed personally:      Cardiac catheterization was personally reviewed in the office today with the patient  Multiple nonobstructive lesions, likely some small vessel disease causing discomfort  Stress Myoview personally reviewed - only tolerated 5 min, heart rate did not reach 85%, switched to a Lexiscan Myoview stress test, LVEF normal but moderate area of distal anterior and apical ischemia    Roscoe Olmstead MD    Portions of the record may have been created with voice recognition software   Occasional wrong word or "sound a like" substitutions may have occurred due to the inherent limitations of voice recognition software   Read the chart carefully and recognize, using context, where substitutions have occurred

## 2019-07-29 ENCOUNTER — TELEPHONE (OUTPATIENT)
Dept: CARDIOLOGY CLINIC | Facility: CLINIC | Age: 74
End: 2019-07-29

## 2019-07-29 DIAGNOSIS — I20.9 ANGINA, CLASS III (HCC): Chronic | ICD-10-CM

## 2019-07-29 NOTE — TELEPHONE ENCOUNTER
Patient called and would like the following medication updated from 20mg to 10 mg as prescribed-patient does not want to cut the pills any longer  He claims they turn to dust     lisinopril (ZESTRIL)   and  nitroglycerin (NITROSTAT) 0 4 mg SL tablet (no changes requested to this medication)    Please send both to [facility]    Thank you!

## 2019-07-30 RX ORDER — LISINOPRIL 10 MG/1
10 TABLET ORAL DAILY
Qty: 90 TABLET | Refills: 3 | Status: CANCELLED | OUTPATIENT
Start: 2019-07-30

## 2019-08-05 DIAGNOSIS — I20.9 ANGINA, CLASS III (HCC): Chronic | ICD-10-CM

## 2019-08-05 RX ORDER — NITROGLYCERIN 0.4 MG/1
0.4 TABLET SUBLINGUAL
Qty: 90 TABLET | Refills: 1 | Status: SHIPPED | OUTPATIENT
Start: 2019-08-05 | End: 2022-03-17

## 2019-08-05 RX ORDER — LISINOPRIL 20 MG/1
10 TABLET ORAL DAILY
Qty: 45 TABLET | Refills: 3 | Status: SHIPPED | OUTPATIENT
Start: 2019-08-05 | End: 2019-08-07 | Stop reason: SDUPTHER

## 2019-08-07 DIAGNOSIS — I20.9 ANGINA, CLASS III (HCC): Chronic | ICD-10-CM

## 2019-08-07 RX ORDER — LISINOPRIL 10 MG/1
10 TABLET ORAL DAILY
Qty: 90 TABLET | Refills: 3 | Status: SHIPPED | OUTPATIENT
Start: 2019-08-07 | End: 2020-03-02

## 2019-08-15 ENCOUNTER — TELEPHONE (OUTPATIENT)
Dept: FAMILY MEDICINE CLINIC | Facility: CLINIC | Age: 74
End: 2019-08-15

## 2019-08-15 RX ORDER — NITROGLYCERIN 0.4 MG/1
0.4 TABLET SUBLINGUAL
Qty: 90 TABLET | Refills: 1 | Status: SHIPPED | OUTPATIENT
Start: 2019-08-15

## 2019-08-15 NOTE — TELEPHONE ENCOUNTER
Spoke with pts wife, MD instructions given as pt has been having increased angina pain  She will have [facility] fill out paper

## 2019-08-15 NOTE — TELEPHONE ENCOUNTER
Why is pt requesting a handicapped license plate? Ortho stated on note from 2/19 that his knee felt much better with minimal discomfort  If it is due to heart condition then his cardiologist would have to fill out form

## 2019-08-26 ENCOUNTER — TELEPHONE (OUTPATIENT)
Dept: CARDIOLOGY CLINIC | Facility: CLINIC | Age: 74
End: 2019-08-26

## 2019-08-26 NOTE — TELEPHONE ENCOUNTER
Patient of Dr Miguel Call is having angina more frequently and wants to know if Dr Miguel Call has any suggestions  Also, wants his opinion on Cardiac Syndrome X

## 2019-08-27 NOTE — TELEPHONE ENCOUNTER
He can try increasing isosorbide mononitrate to 3 tablets a day  Give it 1 week and let me know how he is feeling

## 2019-08-27 NOTE — TELEPHONE ENCOUNTER
Patient's wife called to inquire on status of her request from yesterday  Advised of the note from Dr Della Young below and she will call in 1wk to advise if the medication increase helps with the pain

## 2019-09-23 DIAGNOSIS — I72.3 ANEURYSM OF ILIAC ARTERY (HCC): Primary | ICD-10-CM

## 2019-09-23 DIAGNOSIS — I20.8 ANGINA AT REST (HCC): ICD-10-CM

## 2019-09-23 DIAGNOSIS — I71.4 ABDOMINAL AORTIC ANEURYSM WITHOUT RUPTURE (HCC): ICD-10-CM

## 2019-09-23 RX ORDER — ISOSORBIDE MONONITRATE 60 MG/1
60 TABLET, EXTENDED RELEASE ORAL DAILY
Qty: 90 TABLET | Refills: 3 | Status: SHIPPED | OUTPATIENT
Start: 2019-09-23 | End: 2019-09-30 | Stop reason: SDUPTHER

## 2019-09-23 NOTE — TELEPHONE ENCOUNTER
Pt did not report back in one week as directed, and is almost out of Imdur 60mg  He has not felt any relief when increased to 3 tabs daily  Stated angina   described as a sharp pain lasting for a few seconds   occurs every hour   Please advise

## 2019-09-30 DIAGNOSIS — I20.8 ANGINA AT REST (HCC): ICD-10-CM

## 2019-09-30 RX ORDER — ISOSORBIDE MONONITRATE 60 MG/1
60 TABLET, EXTENDED RELEASE ORAL DAILY
Qty: 90 TABLET | Refills: 3 | Status: SHIPPED | OUTPATIENT
Start: 2019-09-30 | End: 2019-10-15 | Stop reason: SDUPTHER

## 2019-09-30 NOTE — TELEPHONE ENCOUNTER
Pt called needs Isosorbide mono ER 60mg 1 tab daily 90 day supply  Please send prescription to [facility]  He does not want it sent to [facility]  Please call pt when prescription is sent  Thank you

## 2019-10-14 ENCOUNTER — TELEPHONE (OUTPATIENT)
Dept: CARDIOLOGY CLINIC | Facility: CLINIC | Age: 74
End: 2019-10-14

## 2019-10-14 DIAGNOSIS — I20.8 ANGINA AT REST (HCC): ICD-10-CM

## 2019-10-14 RX ORDER — ISOSORBIDE MONONITRATE 60 MG/1
60 TABLET, EXTENDED RELEASE ORAL DAILY
Qty: 90 TABLET | Refills: 3 | Status: CANCELLED | OUTPATIENT
Start: 2019-10-14

## 2019-10-14 NOTE — TELEPHONE ENCOUNTER
Patient called and is in need of refill for isosorbide mononitrate (IMDUR) 60 mg 24 hr tablet     Pt is stating that he does not feel that increasing to the 3 pills per day is helping  He is now out of the medication with the increase from the originally prescribed one pill per day      He is asking if you feel he should continue this medication and IF yes, he will need a refill sent with updated instructions for the insurance to cover the script     Please advise  Thank you

## 2019-10-15 ENCOUNTER — HOSPITAL ENCOUNTER (OUTPATIENT)
Dept: NON INVASIVE DIAGNOSTICS | Facility: CLINIC | Age: 74
Discharge: HOME | End: 2019-10-15

## 2019-10-15 DIAGNOSIS — I20.8 ANGINA AT REST (HCC): ICD-10-CM

## 2019-10-15 DIAGNOSIS — I72.3 ANEURYSM OF ILIAC ARTERY (HCC): ICD-10-CM

## 2019-10-15 DIAGNOSIS — I71.4 ABDOMINAL AORTIC ANEURYSM WITHOUT RUPTURE (HCC): ICD-10-CM

## 2019-10-15 RX ORDER — ISOSORBIDE MONONITRATE 60 MG/1
60 TABLET, EXTENDED RELEASE ORAL 3 TIMES DAILY
Qty: 90 TABLET | Refills: 0 | Status: CANCELLED | OUTPATIENT
Start: 2019-10-15

## 2019-10-15 RX ORDER — ISOSORBIDE MONONITRATE 60 MG/1
120 TABLET, EXTENDED RELEASE ORAL DAILY
Qty: 180 TABLET | Refills: 3 | Status: SHIPPED | OUTPATIENT
Start: 2019-10-15 | End: 2019-10-15 | Stop reason: SDUPTHER

## 2019-10-15 RX ORDER — ISOSORBIDE MONONITRATE 60 MG/1
120 TABLET, EXTENDED RELEASE ORAL DAILY
Qty: 60 TABLET | Refills: 0 | Status: SHIPPED | OUTPATIENT
Start: 2019-10-15 | End: 2020-09-24

## 2019-10-15 RX ORDER — ISOSORBIDE MONONITRATE 60 MG/1
120 TABLET, EXTENDED RELEASE ORAL DAILY
Qty: 60 TABLET | Refills: 0 | OUTPATIENT
Start: 2019-10-15

## 2019-10-15 RX ORDER — ISOSORBIDE MONONITRATE 60 MG/1
120 TABLET, EXTENDED RELEASE ORAL DAILY
Qty: 180 TABLET | Refills: 3 | Status: SHIPPED | OUTPATIENT
Start: 2019-10-15 | End: 2020-09-24 | Stop reason: SDUPTHER

## 2019-10-15 NOTE — TELEPHONE ENCOUNTER
S/W Obdulio, advised Dr Ciera Munoz recommended decreasing back down to once daily  Pt has been paying out of pocket for a short supply because he was taking TID and insurance will not refill--too soon  Advised pt we will send a one time script over to pharmacy for three times daily with No refills  Pt also concerned he is going on a cruise and if angina would become worse he would like the option of going back up to TID  Pt will call the office when he is almost out of meds and update his status

## 2019-10-15 NOTE — TELEPHONE ENCOUNTER
So it looks like he has a 60mg tablet, max dose is 2 per day, I called and left him a message with those instructions and put through for 2 tabs of 60mg per day

## 2019-10-15 NOTE — TELEPHONE ENCOUNTER
He is a compliant patient, happy to give him whatever he needs  I'll put through a script for 3 a day but he can take 2 a day for now   Please call him and let him know

## 2019-10-17 ENCOUNTER — HOSPITAL ENCOUNTER (OUTPATIENT)
Dept: NON INVASIVE DIAGNOSTICS | Facility: CLINIC | Age: 74
Discharge: HOME | End: 2019-10-17
Payer: COMMERCIAL

## 2019-10-17 PROCEDURE — 93978 VASCULAR STUDY: CPT

## 2019-10-17 PROCEDURE — 93978 VASCULAR STUDY: CPT | Performed by: SURGERY

## 2019-11-11 ENCOUNTER — APPOINTMENT (OUTPATIENT)
Dept: LAB | Facility: CLINIC | Age: 74
End: 2019-11-11
Payer: COMMERCIAL

## 2019-11-11 DIAGNOSIS — I25.110 CORONARY ARTERY DISEASE INVOLVING NATIVE CORONARY ARTERY OF NATIVE HEART WITH UNSTABLE ANGINA PECTORIS (HCC): Chronic | ICD-10-CM

## 2019-11-11 DIAGNOSIS — R31.29 MICROHEMATURIA: ICD-10-CM

## 2019-11-11 LAB
ALBUMIN SERPL BCP-MCNC: 3.6 G/DL (ref 3.5–5)
ALP SERPL-CCNC: 52 U/L (ref 46–116)
ALT SERPL W P-5'-P-CCNC: 29 U/L (ref 12–78)
ANION GAP SERPL CALCULATED.3IONS-SCNC: 9 MMOL/L (ref 4–13)
AST SERPL W P-5'-P-CCNC: 23 U/L (ref 5–45)
BACTERIA UR QL AUTO: ABNORMAL /HPF
BILIRUB SERPL-MCNC: 0.6 MG/DL (ref 0.2–1)
BILIRUB UR QL STRIP: NEGATIVE
BUN SERPL-MCNC: 14 MG/DL (ref 5–25)
CALCIUM SERPL-MCNC: 9 MG/DL (ref 8.3–10.1)
CHLORIDE SERPL-SCNC: 101 MMOL/L (ref 100–108)
CHOLEST SERPL-MCNC: 114 MG/DL (ref 50–200)
CLARITY UR: CLEAR
CO2 SERPL-SCNC: 26 MMOL/L (ref 21–32)
COLOR UR: YELLOW
CREAT SERPL-MCNC: 0.89 MG/DL (ref 0.6–1.3)
GFR SERPL CREATININE-BSD FRML MDRD: 84 ML/MIN/1.73SQ M
GLUCOSE P FAST SERPL-MCNC: 97 MG/DL (ref 65–99)
GLUCOSE UR STRIP-MCNC: NEGATIVE MG/DL
HDLC SERPL-MCNC: 35 MG/DL
HGB UR QL STRIP.AUTO: ABNORMAL
KETONES UR STRIP-MCNC: NEGATIVE MG/DL
LDLC SERPL CALC-MCNC: 57 MG/DL (ref 0–100)
LEUKOCYTE ESTERASE UR QL STRIP: NEGATIVE
MUCOUS THREADS UR QL AUTO: ABNORMAL
NITRITE UR QL STRIP: NEGATIVE
NON-SQ EPI CELLS URNS QL MICRO: ABNORMAL /HPF
NONHDLC SERPL-MCNC: 79 MG/DL
PH UR STRIP.AUTO: 5.5 [PH]
POTASSIUM SERPL-SCNC: 3.9 MMOL/L (ref 3.5–5.3)
PROT SERPL-MCNC: 7.6 G/DL (ref 6.4–8.2)
PROT UR STRIP-MCNC: NEGATIVE MG/DL
PSA SERPL-MCNC: 0.3 NG/ML (ref 0–4)
RBC #/AREA URNS AUTO: ABNORMAL /HPF
SODIUM SERPL-SCNC: 136 MMOL/L (ref 136–145)
SP GR UR STRIP.AUTO: 1.01 (ref 1–1.03)
TRIGL SERPL-MCNC: 112 MG/DL
UROBILINOGEN UR QL STRIP.AUTO: 0.2 E.U./DL
WBC #/AREA URNS AUTO: ABNORMAL /HPF

## 2019-11-11 PROCEDURE — 81001 URINALYSIS AUTO W/SCOPE: CPT

## 2019-11-11 PROCEDURE — 80061 LIPID PANEL: CPT

## 2019-11-11 PROCEDURE — 84153 ASSAY OF PSA TOTAL: CPT

## 2019-11-11 PROCEDURE — 80053 COMPREHEN METABOLIC PANEL: CPT

## 2019-11-25 ENCOUNTER — OFFICE VISIT (OUTPATIENT)
Dept: UROLOGY | Facility: AMBULATORY SURGERY CENTER | Age: 74
End: 2019-11-25
Payer: COMMERCIAL

## 2019-11-25 VITALS
DIASTOLIC BLOOD PRESSURE: 84 MMHG | WEIGHT: 200 LBS | BODY MASS INDEX: 32.14 KG/M2 | HEIGHT: 66 IN | HEART RATE: 56 BPM | SYSTOLIC BLOOD PRESSURE: 114 MMHG

## 2019-11-25 DIAGNOSIS — Z12.5 PROSTATE CANCER SCREENING: Primary | ICD-10-CM

## 2019-11-25 PROCEDURE — 99213 OFFICE O/P EST LOW 20 MIN: CPT | Performed by: NURSE PRACTITIONER

## 2019-11-25 NOTE — PROGRESS NOTES
11/25/2019      Chief Complaint   Patient presents with    Nocturia     Assessment and Plan    76 y o  male managed by Dr Jackie Phillips    1  Nocturia  · Stop fluid intake approximately 2 hours before bedtime  · Maintain adequate hydration-upwards to 60 oz of water per day  · Avoid bladder irritants-coffee, soda, teas, spicy food    2  Hematuria  · Status post cystoscopy 11/14/2018 with negative findings  · Ultrasound kidney and bladder performed 10/2018 yields negative findings    3  Routine prostate cancer screening  · PSA performed 11/11/2019 is 0 3  · MENDOZA reveals a prostate of approximately 40 g with no nodules noted  · Discussed A guidelines regarding routine prostate cancer screening-will discontinue routine screening  Patient in agreement to doing every other year    History of Present Illness  Bernadette Shannon Sr  is a 76 y o  male here for follow up evaluation of  routine prostate cancer screening and nocturia  Patient is status post cystoscopy 11/14/2018 with negative findings and ultrasound of kidney and bladder performed 10/2018 which was also negative  Patient with complaints of nocturia getting up between 2 and 3 times a night to urinate  We discussed interventions that he can initiate including behavior and dietary modifications to assist in the reduction of urinary symptoms  Patient verbalized understanding and will attempt to reinforce these interventions  He currently denies changes to his general health since his previous office visit  Patient currently has no lower urinary tract symptoms to complain of at this visit  He denies dysuria, hematuria, urinary frequency and urgency  Reports sensation of complete bladder emptying with urination  Patient reports a significant family history of prostate cancer in his brother  Review of Systems   Constitutional: Negative for chills and fever  Respiratory: Negative for cough and shortness of breath      Cardiovascular: Negative for chest pain    Gastrointestinal: Negative for abdominal distention, abdominal pain, blood in stool, nausea and vomiting  Genitourinary: Negative for difficulty urinating, dysuria, enuresis, flank pain, frequency, hematuria and urgency  Nocturia     Musculoskeletal: Negative for back pain  Skin: Negative for rash  Neurological: Negative for dizziness  Urinary Incontinence Screening      Most Recent Value   Urinary Incontinence   Urinary Incontinence? No   Incomplete emptying? Yes   Urinary frequency? Yes   Urinary urgency? No   Urinary hesitancy? Yes   Dysuria (painful difficult urination)? No   Nocturia (waking up to use the bathroom)? Yes   Straining (having to push to go)? No   Weak stream?  Yes   Intermittent stream?  Yes   Post void dribbling?   Yes        Past Medical History  Past Medical History:   Diagnosis Date    AAA (abdominal aortic aneurysm) ([facility] )     last assessed: June 11, 2013    Appendicitis     BPH (benign prostatic hyperplasia)     CAD (coronary artery disease)     Cholelithiasis     last assessed: July 26, 2016    [facility] attack [facility])     Hyperlipidemia     Hypertension     Insomnia     Stroke ([facility] )     Transient cerebral ischemia        Past Social History  Past Surgical History:   Procedure Laterality Date    ABDOMINAL AORTIC ANEURYSM REPAIR  06/07/2011    Dr Ansley Montgomery 6/7/11    APPENDECTOMY      CATARACT EXTRACTION Right 11/2018    CHOLECYSTECTOMY      CHOLECYSTECTOMY LAPAROSCOPIC      COLONOSCOPY  11/2006    CORONARY ANGIOPLASTY      3 STENTS INSERTED    CORONARY ANGIOPLASTY      CORONARY ANGIOPLASTY      with stenting - 3/2012    AL LAP,CHOLECYSTECTOMY/GRAPH N/A 9/8/2016    Procedure: CHOLECYSTECTOMY LAPAROSCOPIC with intra-op cholangiogram ;  Surgeon: Lord Ingrid MD;  Location: [facility];  Service: General     Social History     Tobacco Use   Smoking Status Former Smoker   Smokeless Tobacco Never Used   Tobacco Comment    Quit 1980's about 25 years x1ppd       Past Family History  Family History   Problem Relation Age of Onset    Heart disease Mother     Hypertension Mother         Benign Essential     Stroke Mother         Stroke     Heart disease Father     Diabetes Brother         mellitus     Heart disease Brother     Aortic aneurysm Brother     Heart disease Family        Past Social history  Social History     Socioeconomic History    Marital status: /Civil Union     Spouse name: Not on file    Number of children: Not on file    Years of education: Not on file    Highest education level: Not on file   Occupational History    Occupation: Retired    Social Needs    Financial resource strain: Not on file    Food insecurity:     Worry: Not on file     Inability: Not on file   [employer] needs:     Medical: Not on file     Non-medical: Not on file   Tobacco Use    Smoking status: Former Smoker    Smokeless tobacco: Never Used    Tobacco comment: Quit 1980's about 25 years x1ppd   Substance and Sexual Activity    Alcohol use:  Yes     Alcohol/week: 7 0 standard drinks     Types: 3 Shots of liquor, 4 Cans of beer per week     Comment: SOCIAL USE - occasionally     Drug use: No    Sexual activity: Not on file   Lifestyle    Physical activity:     Days per week: Not on file     Minutes per session: Not on file    Stress: Not on file   Relationships    Social connections:     Talks on phone: Not on file     Gets together: Not on file     Attends [employer] service: Not on file     Active member of club or organization: Not on file     Attends meetings of clubs or organizations: Not on file     Relationship status: Not on file    Intimate partner violence:     Fear of current or ex partner: Not on file     Emotionally abused: Not on file     Physically abused: Not on file     Forced sexual activity: Not on file   Other Topics Concern    Not on file   Social History Narrative    Not on file       Current Medications  Current Outpatient Medications   Medication Sig Dispense Refill    clopidogrel (PLAVIX) 75 mg tablet TAKE ONE TABLET BY MOUTH EVERY DAY 90 tablet 3    isosorbide mononitrate (IMDUR) 60 mg 24 hr tablet Take 2 tablets (120 mg total) by mouth daily 180 tablet 3    isosorbide mononitrate (IMDUR) 60 mg 24 hr tablet Take 2 tablets (120 mg total) by mouth daily 60 tablet 0    lisinopril (ZESTRIL) 10 mg tablet Take 1 tablet (10 mg total) by mouth daily 90 tablet 3    metoprolol tartrate (LOPRESSOR) 50 mg tablet Please take 2 tablets in the a m , 1 tablet in the p m  270 tablet 3    Multiple Vitamin (MULTIVITAMIN) tablet Take 1 tablet by mouth daily   nitroglycerin (NITROSTAT) 0 4 mg SL tablet Place 1 tablet (0 4 mg total) under the tongue every 5 (five) minutes as needed for chest pain 90 tablet 1    nitroglycerin (NITROSTAT) 0 4 mg SL tablet Place 1 tablet (0 4 mg total) under the tongue every 5 (five) minutes as needed for chest pain 90 tablet 1    simvastatin (ZOCOR) 40 mg tablet Take 1 tablet (40 mg total) by mouth daily at bedtime 90 tablet 2     No current facility-administered medications for this visit  Allergies  No Known Allergies      The following portions of the patient's history were reviewed and updated as appropriate: allergies, current medications, past medical history, past social history, past surgical history and problem list       Vitals  Vitals:    11/25/19 1045   BP: 114/84   BP Location: Right arm   Patient Position: Sitting   Cuff Size: Adult   Pulse: 56   Weight: 90 7 kg (200 lb)   Height: 5' 6" (1 676 m)     Physical Exam  Physical Exam   Constitutional: He is oriented to person, place, and time  He appears well-developed and well-nourished  No distress  HENT:   Head: Atraumatic  Cardiovascular: Normal rate  Pulmonary/Chest: Effort normal and breath sounds normal  No respiratory distress  Abdominal: Soft     Genitourinary: Rectum normal, testes normal and penis normal  Prostate is enlarged  Prostate is not tender  Genitourinary Comments: Prostate noted to be approximate 40 grams smooth, no nodules noted    Musculoskeletal: Normal range of motion  Neurological: He is alert and oriented to person, place, and time  Skin: Skin is warm and dry  Vitals reviewed  Results  No results found for this or any previous visit (from the past 1 hour(s)) ]  Lab Results   Component Value Date    PSA 0 3 11/11/2019    PSA 0 3 10/02/2018    PSA 0 3 06/28/2016     Lab Results   Component Value Date    GLUCOSE 121 12/03/2015    CALCIUM 9 0 11/11/2019     12/03/2015    K 3 9 11/11/2019    CO2 26 11/11/2019     11/11/2019    BUN 14 11/11/2019    CREATININE 0 89 11/11/2019     Lab Results   Component Value Date    WBC 5 91 12/10/2018    HGB 12 8 12/10/2018    HCT 36 9 12/10/2018    MCV 95 12/10/2018     12/10/2018     Orders  Orders Placed This Encounter   Procedures    PSA, Total Screen     This is a patient instruction: This test is non-fasting  Please drink two glasses of water morning of bloodwork          Standing Status:   Future     Standing Expiration Date:   1/1/2022       SANTHOSH Torres

## 2019-11-25 NOTE — PATIENT INSTRUCTIONS
PSA to be performed in 2 years  Call the office for concerning symptoms  Stop fluid intake upwards to 2 hours before bedtime  Limit bladder irritants- coffee, soda, tea and spicy foods

## 2020-02-11 ENCOUNTER — TELEPHONE (OUTPATIENT)
Dept: CARDIOLOGY CLINIC | Facility: CLINIC | Age: 75
End: 2020-02-11

## 2020-02-11 NOTE — TELEPHONE ENCOUNTER
Patient notified that Disability form is complete and ready for pick at the [facility]

## 2020-02-24 DIAGNOSIS — E78.5 HYPERLIPIDEMIA, UNSPECIFIED HYPERLIPIDEMIA TYPE: ICD-10-CM

## 2020-02-24 RX ORDER — SIMVASTATIN 40 MG
40 TABLET ORAL
Qty: 90 TABLET | Refills: 3 | Status: CANCELLED | OUTPATIENT
Start: 2020-02-24

## 2020-02-28 RX ORDER — SIMVASTATIN 40 MG
40 TABLET ORAL
Qty: 90 TABLET | Refills: 3 | Status: SHIPPED | OUTPATIENT
Start: 2020-02-28 | End: 2021-02-22 | Stop reason: SDUPTHER

## 2020-03-02 ENCOUNTER — OFFICE VISIT (OUTPATIENT)
Dept: CARDIOLOGY CLINIC | Facility: CLINIC | Age: 75
End: 2020-03-02
Payer: COMMERCIAL

## 2020-03-02 VITALS
HEART RATE: 65 BPM | WEIGHT: 203.5 LBS | DIASTOLIC BLOOD PRESSURE: 76 MMHG | BODY MASS INDEX: 32.71 KG/M2 | SYSTOLIC BLOOD PRESSURE: 120 MMHG | HEIGHT: 66 IN

## 2020-03-02 DIAGNOSIS — E78.49 OTHER HYPERLIPIDEMIA: ICD-10-CM

## 2020-03-02 DIAGNOSIS — I20.9 ANGINA, CLASS III (HCC): Chronic | ICD-10-CM

## 2020-03-02 DIAGNOSIS — I25.110 CORONARY ARTERY DISEASE INVOLVING NATIVE CORONARY ARTERY OF NATIVE HEART WITH UNSTABLE ANGINA PECTORIS (HCC): Primary | Chronic | ICD-10-CM

## 2020-03-02 DIAGNOSIS — I10 ESSENTIAL HYPERTENSION: ICD-10-CM

## 2020-03-02 DIAGNOSIS — I65.23 ASYMPTOMATIC BILATERAL CAROTID ARTERY STENOSIS: ICD-10-CM

## 2020-03-02 DIAGNOSIS — I72.3 ANEURYSM OF ILIAC ARTERY (HCC): ICD-10-CM

## 2020-03-02 DIAGNOSIS — I71.4 ABDOMINAL AORTIC ANEURYSM WITHOUT RUPTURE (HCC): ICD-10-CM

## 2020-03-02 DIAGNOSIS — I72.4 ANEURYSM OF POPLITEAL ARTERY (HCC): ICD-10-CM

## 2020-03-02 PROCEDURE — 93000 ELECTROCARDIOGRAM COMPLETE: CPT | Performed by: INTERNAL MEDICINE

## 2020-03-02 PROCEDURE — 4040F PNEUMOC VAC/ADMIN/RCVD: CPT | Performed by: INTERNAL MEDICINE

## 2020-03-02 PROCEDURE — 3008F BODY MASS INDEX DOCD: CPT | Performed by: INTERNAL MEDICINE

## 2020-03-02 PROCEDURE — 3078F DIAST BP <80 MM HG: CPT | Performed by: INTERNAL MEDICINE

## 2020-03-02 PROCEDURE — 1036F TOBACCO NON-USER: CPT | Performed by: INTERNAL MEDICINE

## 2020-03-02 PROCEDURE — 99214 OFFICE O/P EST MOD 30 MIN: CPT | Performed by: INTERNAL MEDICINE

## 2020-03-02 PROCEDURE — 3074F SYST BP LT 130 MM HG: CPT | Performed by: INTERNAL MEDICINE

## 2020-03-02 PROCEDURE — 1160F RVW MEDS BY RX/DR IN RCRD: CPT | Performed by: INTERNAL MEDICINE

## 2020-03-02 RX ORDER — METOPROLOL TARTRATE 100 MG/1
100 TABLET ORAL EVERY 12 HOURS SCHEDULED
Qty: 180 TABLET | Refills: 3 | Status: SHIPPED | OUTPATIENT
Start: 2020-03-02 | End: 2020-03-09 | Stop reason: SDUPTHER

## 2020-03-02 NOTE — PATIENT INSTRUCTIONS
Please take a look at her isosorbide mononitrate prescription, I need to know whether your on 60 mg or 120 mg       I am going to look into the cost of Ranexa, this would be the next medication to use for chest pain relief  If this is not an option, we will repeat  Your stress test to see if there is a new area we need to consider repeat catheterization for     please stop taking lisinopril, your blood pressure does not have much room for us to increase her metoprolol at this time, and I would like to get more room to increase the metoprolol in the future  For now please increase the metoprolol to 100 mg in the morning, and 100 mg in the evening  For now this will be a 50 mg tablet that you take double of in the morning, and double of in the evening, the new medication tablet will be for 100 mg

## 2020-03-02 NOTE — PROGRESS NOTES
[facility]  Follow up note  Sudha Valdes  76 y o  male MRN: [id number]        Problems    1  Coronary artery disease involving native coronary artery of native heart with unstable angina pectoris ([facility] [address] )     2  Angina, class III ([facility] [address] )     3  Other hyperlipidemia     4  Abdominal aortic aneurysm without rupture ([facility] [address] )     5  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HCC)     6  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HCC)     7  Asymptomatic bilateral carotid artery stenosis     8  Essential hypertension         Impression:    Frank An returns to see me for chronic stable angina, and requested a 2nd opinion    · CAD  · Catheterization 12/18 suggested nonobstructive moderate diffuse disease, 50% of essentially all vessels  · Stress Myoview was done 1/19 due to ongoing symptoms despite cardiac catheterization, with discovery of anterior wall ischemia  · Metoprolol is at 50 mg p m , 100 mg a m     Imdur at 60 mg b i d  · Having limited exercise tolerance, recurrent cardiac symptoms, but often his chest pain is described as stabbing  · Hypertension  · Very well controlled    · Hyperlipidemia  · Very well controlled      Plan:    · Increase metoprolol to 100 mg b i d  · Will reassess possibility of reasonable co-pay for Ranexa which he could not previously afford  · No additional cardiac testing at this time, however I have recommended he proceed with interventional cardiology 2nd opinion    HPI:   Sudha Valdes  is a 76y o  year old male with hypertension, hyperlipidemia, CAD defined by multivessel disease noted in 2006, but only severe obstruction of the obtuse marginal status post stenting at that time  He has a history of AAA, AAA repair, iliac aneurysms, celiac stenosis, bilateral carotid artery stenosis, and has been dealing with chronic stage III angina for the last 1 year      He continues to have exertional angina, angina described as sharp and stabbing, without radiation, less improvement with nitroglycerin p r n  At this point, but is currently on 60 mg Imdur b i d , and metoprolol 100 mg a m , 50 mg p m , with previous difficulty affording Ranexa  He denies shortness of breath, syncope, lightheadedness, palpitations  His blood pressure is under excellent control  His lipids remain excellent as well  Review of Systems   Constitutional: Negative for appetite change, diaphoresis, fatigue and fever  Respiratory: Negative for chest tightness, shortness of breath and wheezing  Cardiovascular: Positive for chest pain  Negative for palpitations and leg swelling  Gastrointestinal: Negative for abdominal pain and blood in stool  Musculoskeletal: Negative for arthralgias and joint swelling  Skin: Negative for rash  Neurological: Negative for dizziness, syncope and light-headedness           Past Medical History:   Diagnosis Date    AAA (abdominal aortic aneurysm) ([place] [address] )     last assessed: June 11, 2013    Appendicitis     BPH (benign prostatic hyperplasia)     CAD (coronary artery disease)     Cholelithiasis     last assessed: July 26, 2016    Heart attack ([place] [address] )     Hyperlipidemia     Hypertension     Insomnia     Stroke ([place] [address] )     Transient cerebral ischemia      Social History     Substance and Sexual Activity   Alcohol Use Yes    Alcohol/week: 7 0 standard drinks    Types: 3 Shots of liquor, 4 Cans of beer per week    Comment: SOCIAL USE - occasionally      Social History     Substance and Sexual Activity   Drug Use No     Social History     Tobacco Use   Smoking Status Former Smoker   Smokeless Tobacco Never Used   Tobacco Comment    Quit 1980's about 25 years x1ppd       Allergies:  No Known Allergies    Medications:     Current Outpatient Medications:     clopidogrel (PLAVIX) 75 mg tablet, TAKE ONE TABLET BY MOUTH EVERY DAY, Disp: 90 tablet, Rfl: 3    isosorbide mononitrate (IMDUR) 60 mg 24 hr tablet, Take 2 tablets (120 mg total) by mouth daily, Disp: 180 tablet, Rfl: 3   lisinopril (ZESTRIL) 10 mg tablet, Take 1 tablet (10 mg total) by mouth daily, Disp: 90 tablet, Rfl: 3    metoprolol tartrate (LOPRESSOR) 50 mg tablet, Please take 2 tablets in the a m , 1 tablet in the p m , Disp: 270 tablet, Rfl: 3    nitroglycerin (NITROSTAT) 0 4 mg SL tablet, Place 1 tablet (0 4 mg total) under the tongue every 5 (five) minutes as needed for chest pain, Disp: 90 tablet, Rfl: 1    simvastatin (ZOCOR) 40 mg tablet, Take 1 tablet (40 mg total) by mouth daily at bedtime, Disp: 90 tablet, Rfl: 3    isosorbide mononitrate (IMDUR) 60 mg 24 hr tablet, Take 2 tablets (120 mg total) by mouth daily (Patient not taking: Reported on 3/2/2020), Disp: 60 tablet, Rfl: 0    Multiple Vitamin (MULTIVITAMIN) tablet, Take 1 tablet by mouth daily  , Disp: , Rfl:     nitroglycerin (NITROSTAT) 0 4 mg SL tablet, Place 1 tablet (0 4 mg total) under the tongue every 5 (five) minutes as needed for chest pain (Patient not taking: Reported on 3/2/2020), Disp: 90 tablet, Rfl: 1      Vitals:    03/02/20 1401   BP: 120/76   Pulse: 65     Weight (last 2 days)     Date/Time   Weight    03/02/20 1401   92 3 (203 5)            Physical Exam   Constitutional: He is oriented to person, place, and time  No distress  HENT:   Head: Normocephalic and atraumatic  Eyes: Conjunctivae are normal  No scleral icterus  Neck: Normal range of motion  No JVD present  Cardiovascular: Normal rate, regular rhythm, normal heart sounds and intact distal pulses  No murmur heard  Pulmonary/Chest: Effort normal and breath sounds normal  No respiratory distress  He has no decreased breath sounds  He has no wheezes  He has no rhonchi  He has no rales  Musculoskeletal: He exhibits no edema  Right lower leg: Normal  He exhibits no edema  Left lower leg: Normal  He exhibits no edema  Neurological: He is alert and oriented to person, place, and time  Skin: Skin is warm and dry  He is not diaphoretic           Laboratory Studies:  Chem:   Lab Results   Component Value Date     12/03/2015     12/02/2015     08/20/2015    K 3 9 11/11/2019    K 4 2 12/10/2018    K 4 2 12/04/2018    K 4 3 12/03/2015    K 4 0 12/02/2015    K 4 8 08/20/2015     11/11/2019     12/10/2018     12/04/2018     12/03/2015     12/02/2015     08/20/2015    CO2 26 11/11/2019    CO2 25 12/10/2018    CO2 27 12/04/2018    CO2 28 1 12/03/2015    CO2 32 6 (H) 12/02/2015    CO2 29 3 08/20/2015    GLUCOSE 121 12/03/2015    GLUCOSE 105 12/02/2015    GLUCOSE 105 08/20/2015    CREATININE 0 89 11/11/2019    CREATININE 0 86 12/10/2018    CREATININE 0 98 12/04/2018    CREATININE 0 86 12/03/2015    CREATININE 0 92 12/02/2015    CREATININE 0 89 08/20/2015    BUN 14 11/11/2019    BUN 13 12/10/2018    BUN 16 12/04/2018    BUN 15 12/03/2015    BUN 17 12/02/2015    BUN 15 08/20/2015    MG 1 9 12/10/2018     NT-proBNP:   Coags:  Lipid Profile:   Lab Results   Component Value Date    CHOL 138 08/20/2015    CHOL 135 09/30/2014    LDLCALC 57 11/11/2019    LDLCALC 66 12/10/2018    LDLCALC 57 08/13/2018    LDLCALC 67 08/20/2015    LDLCALC 65 09/30/2014    HDL 35 (L) 11/11/2019    HDL 41 12/10/2018    HDL 37 (L) 08/13/2018    HDL 42 08/20/2015    HDL 46 09/30/2014    TRIG 112 11/11/2019    TRIG 90 12/10/2018    TRIG 140 08/13/2018    TRIG 147 08/20/2015    TRIG 120 09/30/2014       Cardiac testing:   EKG reviewed personally:      Cardiac catheterization  12/18/2050% nonobstructive disease of all major vessels    Stress Myoview  1/19-anterior wall ischemia    Pina Monzon MD    Portions of the record may have been created with voice recognition software   Occasional wrong word or "sound a like" substitutions may have occurred due to the inherent limitations of voice recognition software   Read the chart carefully and recognize, using context, where substitutions have occurred

## 2020-03-09 DIAGNOSIS — I20.9 ANGINA, CLASS III (HCC): Chronic | ICD-10-CM

## 2020-03-11 RX ORDER — METOPROLOL TARTRATE 100 MG/1
100 TABLET ORAL EVERY 12 HOURS SCHEDULED
Qty: 180 TABLET | Refills: 3 | Status: SHIPPED | OUTPATIENT
Start: 2020-03-11 | End: 2021-03-26 | Stop reason: SDUPTHER

## 2020-03-14 DIAGNOSIS — I50.9 CONGESTIVE HEART FAILURE (HCC): ICD-10-CM

## 2020-03-16 DIAGNOSIS — I50.9 CONGESTIVE HEART FAILURE (HCC): ICD-10-CM

## 2020-03-16 RX ORDER — CLOPIDOGREL BISULFATE 75 MG/1
75 TABLET ORAL DAILY
Qty: 90 TABLET | Refills: 0 | Status: SHIPPED | OUTPATIENT
Start: 2020-03-16 | End: 2020-03-16

## 2020-03-17 RX ORDER — CLOPIDOGREL BISULFATE 75 MG/1
75 TABLET ORAL DAILY
Qty: 90 TABLET | Refills: 2 | Status: SHIPPED | OUTPATIENT
Start: 2020-03-17 | End: 2020-06-05 | Stop reason: SDUPTHER

## 2020-04-09 ENCOUNTER — TELEPHONE (OUTPATIENT)
Dept: CARDIOLOGY CLINIC | Facility: CLINIC | Age: 75
End: 2020-04-09

## 2020-04-15 ENCOUNTER — TELEMEDICINE (OUTPATIENT)
Dept: CARDIOLOGY CLINIC | Facility: CLINIC | Age: 75
End: 2020-04-15
Payer: COMMERCIAL

## 2020-04-15 VITALS
DIASTOLIC BLOOD PRESSURE: 90 MMHG | HEART RATE: 68 BPM | SYSTOLIC BLOOD PRESSURE: 150 MMHG | WEIGHT: 200.4 LBS | BODY MASS INDEX: 32.35 KG/M2

## 2020-04-15 DIAGNOSIS — E78.49 OTHER HYPERLIPIDEMIA: Primary | ICD-10-CM

## 2020-04-15 DIAGNOSIS — I25.110 CORONARY ARTERY DISEASE INVOLVING NATIVE CORONARY ARTERY OF NATIVE HEART WITH UNSTABLE ANGINA PECTORIS (HCC): Chronic | ICD-10-CM

## 2020-04-15 DIAGNOSIS — I20.9 ANGINA, CLASS III (HCC): Chronic | ICD-10-CM

## 2020-04-15 PROCEDURE — 99215 OFFICE O/P EST HI 40 MIN: CPT | Performed by: INTERNAL MEDICINE

## 2020-04-15 RX ORDER — AMLODIPINE BESYLATE 5 MG/1
5 TABLET ORAL DAILY
Qty: 90 TABLET | Refills: 3 | Status: SHIPPED | OUTPATIENT
Start: 2020-04-15 | End: 2020-09-24 | Stop reason: SDUPTHER

## 2020-06-04 DIAGNOSIS — I72.4 ANEURYSM OF ARTERY OF LOWER EXTREMITY (HCC): Primary | ICD-10-CM

## 2020-06-05 DIAGNOSIS — I50.9 CONGESTIVE HEART FAILURE (HCC): ICD-10-CM

## 2020-06-05 RX ORDER — CLOPIDOGREL BISULFATE 75 MG/1
75 TABLET ORAL DAILY
Qty: 90 TABLET | Refills: 3 | Status: SHIPPED | OUTPATIENT
Start: 2020-06-05 | End: 2021-06-17 | Stop reason: SDUPTHER

## 2020-06-24 ENCOUNTER — HOSPITAL ENCOUNTER (OUTPATIENT)
Dept: NON INVASIVE DIAGNOSTICS | Facility: CLINIC | Age: 75
Discharge: HOME | End: 2020-06-24
Payer: COMMERCIAL

## 2020-06-24 DIAGNOSIS — I72.4 ANEURYSM OF ARTERY OF LOWER EXTREMITY (HCC): ICD-10-CM

## 2020-06-24 PROCEDURE — 93923 UPR/LXTR ART STDY 3+ LVLS: CPT

## 2020-06-24 PROCEDURE — 93925 LOWER EXTREMITY STUDY: CPT

## 2020-06-26 PROCEDURE — 93922 UPR/L XTREMITY ART 2 LEVELS: CPT | Performed by: SURGERY

## 2020-06-26 PROCEDURE — 93925 LOWER EXTREMITY STUDY: CPT | Performed by: SURGERY

## 2020-09-24 ENCOUNTER — OFFICE VISIT (OUTPATIENT)
Dept: CARDIOLOGY CLINIC | Facility: CLINIC | Age: 75
End: 2020-09-24
Payer: COMMERCIAL

## 2020-09-24 VITALS — BODY MASS INDEX: 32.21 KG/M2 | WEIGHT: 200.4 LBS | HEART RATE: 55 BPM | OXYGEN SATURATION: 95 % | HEIGHT: 66 IN

## 2020-09-24 DIAGNOSIS — I65.23 ASYMPTOMATIC BILATERAL CAROTID ARTERY STENOSIS: ICD-10-CM

## 2020-09-24 DIAGNOSIS — E78.49 OTHER HYPERLIPIDEMIA: ICD-10-CM

## 2020-09-24 DIAGNOSIS — I20.8 ANGINA AT REST (HCC): ICD-10-CM

## 2020-09-24 DIAGNOSIS — I71.4 ABDOMINAL AORTIC ANEURYSM WITHOUT RUPTURE (HCC): ICD-10-CM

## 2020-09-24 DIAGNOSIS — I20.9 ANGINA, CLASS III (HCC): Chronic | ICD-10-CM

## 2020-09-24 DIAGNOSIS — I72.4 ANEURYSM OF POPLITEAL ARTERY (HCC): ICD-10-CM

## 2020-09-24 DIAGNOSIS — I72.3 ANEURYSM OF ILIAC ARTERY (HCC): ICD-10-CM

## 2020-09-24 DIAGNOSIS — I25.118 CORONARY ARTERY DISEASE OF NATIVE ARTERY OF NATIVE HEART WITH STABLE ANGINA PECTORIS (HCC): ICD-10-CM

## 2020-09-24 DIAGNOSIS — I10 ESSENTIAL HYPERTENSION: ICD-10-CM

## 2020-09-24 DIAGNOSIS — R07.9 CHEST PAIN, UNSPECIFIED TYPE: Primary | ICD-10-CM

## 2020-09-24 PROCEDURE — 93000 ELECTROCARDIOGRAM COMPLETE: CPT | Performed by: INTERNAL MEDICINE

## 2020-09-24 PROCEDURE — 99214 OFFICE O/P EST MOD 30 MIN: CPT | Performed by: INTERNAL MEDICINE

## 2020-09-24 RX ORDER — AMLODIPINE BESYLATE 10 MG/1
10 TABLET ORAL DAILY
Qty: 90 TABLET | Refills: 3 | Status: SHIPPED | OUTPATIENT
Start: 2020-09-24 | End: 2020-09-29 | Stop reason: SDUPTHER

## 2020-09-24 RX ORDER — ISOSORBIDE MONONITRATE 60 MG/1
120 TABLET, EXTENDED RELEASE ORAL DAILY
Qty: 180 TABLET | Refills: 3 | Status: SHIPPED | OUTPATIENT
Start: 2020-09-24 | End: 2021-10-04 | Stop reason: SDUPTHER

## 2020-09-24 NOTE — PROGRESS NOTES
[facility]  Follow up note  Fidelina Grimaldo  76 y o  male MRN: [id number]        Problems    1  Chest pain, unspecified type  POCT ECG   2  Coronary artery disease of native artery of native heart with stable angina pectoris ([facility] [address] )     3  Other hyperlipidemia     4  Abdominal aortic aneurysm without rupture ([facility] [address] )     5  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HCC)     6  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HCC)     7  Asymptomatic bilateral carotid artery stenosis     8  Essential hypertension         Impression:    Yamel Soto returns to see me for chronic stable angina, and requested a 2nd opinion    · CAD  · Stress Myoview 1/19 suggests ischemic response, but catheterization 1 month prior only showed moderate nonobstructive disease, nothing truly amenable to PCI, had him obtain a 2nd opinion from Interventional Cardiology, Dr Marcos Lawrence, who agreed with my assessment  · He is on Imdur 120 mg a m   · He is on metoprolol 100 mg b i d   · He is taking 5 mg of amlodipine at suppertime    · Hypertension  · Blood pressure is been high    · Hyperlipidemia  · Lipids remain very well controlled      Plan:    · I have asked him to increase amlodipine to 10 mg and take it 1st thing in the morning  · Continue the remainder of the medications  · Will price out co-pay for Ranexa, this is been very high in the past for him, have not utilized it yet  · Three-month follow-up recommended    HPI:   Kinga [facility]   is a 76y o  year old male with hypertension, hyperlipidemia, CAD defined by multivessel disease noted in 2006, but only severe obstruction of the obtuse marginal status post stenting at that time  He has a history of AAA, AAA repair, iliac aneurysms, celiac stenosis, bilateral carotid artery stenosis, and has been dealing with chronic stage III angina for the last 1 year  Post AAA repair and iliac aneurysm surveillance continues with vascular, no major changes recently    Blood pressures been running high  Angina continues to be an issue, but very inconsistent, telling me he can split wood without symptoms, but then made the bed this morning and had symptoms while making the bed  Had a 2nd opinion with interventional cardiology who did not see an opportunity for intervention, made some adjustments to medical therapy  EKG today is normal  Lipids have been remarkably well controlled      Review of Systems   Constitutional: Negative for appetite change, diaphoresis, fatigue and fever  Respiratory: Negative for chest tightness, shortness of breath and wheezing  Cardiovascular: Positive for chest pain  Negative for palpitations and leg swelling  Gastrointestinal: Negative for abdominal pain and blood in stool  Musculoskeletal: Negative for arthralgias and joint swelling  Skin: Negative for rash  Neurological: Negative for dizziness, syncope and light-headedness           Past Medical History:   Diagnosis Date    AAA (abdominal aortic aneurysm) ([place] [address] )     last assessed: June 11, 2013    Appendicitis     BPH (benign prostatic hyperplasia)     CAD (coronary artery disease)     Cholelithiasis     last assessed: July 26, 2016    Heart attack ([place] [address] )     Hyperlipidemia     Hypertension     Insomnia     Stroke ([place] [address] )     Transient cerebral ischemia      Social History     Substance and Sexual Activity   Alcohol Use Yes    Alcohol/week: 7 0 standard drinks    Types: 3 Shots of liquor, 4 Cans of beer per week    Comment: SOCIAL USE - occasionally      Social History     Substance and Sexual Activity   Drug Use No     Social History     Tobacco Use   Smoking Status Former Smoker   Smokeless Tobacco Never Used   Tobacco Comment    Quit 1980's about 25 years x1ppd       Allergies:  No Known Allergies    Medications:     Current Outpatient Medications:     amLODIPine (NORVASC) 5 mg tablet, Take 1 tablet (5 mg total) by mouth daily, Disp: 90 tablet, Rfl: 3    clopidogrel (PLAVIX) 75 mg tablet, Take 1 tablet (75 mg total) by mouth daily, Disp: 90 tablet, Rfl: 3    isosorbide mononitrate (IMDUR) 60 mg 24 hr tablet, Take 2 tablets (120 mg total) by mouth daily, Disp: 180 tablet, Rfl: 3    metoprolol tartrate (LOPRESSOR) 100 mg tablet, Take 1 tablet (100 mg total) by mouth every 12 (twelve) hours, Disp: 180 tablet, Rfl: 3    simvastatin (ZOCOR) 40 mg tablet, Take 1 tablet (40 mg total) by mouth daily at bedtime, Disp: 90 tablet, Rfl: 3    isosorbide mononitrate (IMDUR) 60 mg 24 hr tablet, Take 2 tablets (120 mg total) by mouth daily (Patient not taking: Reported on 3/2/2020), Disp: 60 tablet, Rfl: 0    Multiple Vitamin (MULTIVITAMIN) tablet, Take 1 tablet by mouth daily  , Disp: , Rfl:     nitroglycerin (NITROSTAT) 0 4 mg SL tablet, Place 1 tablet (0 4 mg total) under the tongue every 5 (five) minutes as needed for chest pain (Patient not taking: Reported on 9/24/2020), Disp: 90 tablet, Rfl: 1    nitroglycerin (NITROSTAT) 0 4 mg SL tablet, Place 1 tablet (0 4 mg total) under the tongue every 5 (five) minutes as needed for chest pain (Patient not taking: Reported on 3/2/2020), Disp: 90 tablet, Rfl: 1      Vitals:    09/24/20 0902   Pulse: 55   SpO2: 95%     Weight (last 2 days)     Date/Time   Weight    09/24/20 0902   90 9 (200 4)            Physical Exam  Constitutional:       General: He is not in acute distress  Appearance: He is not diaphoretic  HENT:      Head: Normocephalic and atraumatic  Eyes:      General: No scleral icterus  Conjunctiva/sclera: Conjunctivae normal    Neck:      Musculoskeletal: Normal range of motion  Vascular: No JVD  Cardiovascular:      Rate and Rhythm: Normal rate and regular rhythm  Heart sounds: Normal heart sounds  No murmur  Pulmonary:      Effort: Pulmonary effort is normal  No respiratory distress  Breath sounds: Normal breath sounds  No decreased breath sounds, wheezing, rhonchi or rales  Musculoskeletal:      Right lower leg: Normal  No edema        Left lower leg: Normal  No edema  Skin:     General: Skin is warm and dry  Neurological:      Mental Status: He is alert and oriented to person, place, and time             Laboratory Studies:  Chem:   Lab Results   Component Value Date     12/03/2015     12/02/2015     08/20/2015    K 3 9 11/11/2019    K 4 2 12/10/2018    K 4 2 12/04/2018    K 4 3 12/03/2015    K 4 0 12/02/2015    K 4 8 08/20/2015     11/11/2019     12/10/2018     12/04/2018     12/03/2015     12/02/2015     08/20/2015    CO2 26 11/11/2019    CO2 25 12/10/2018    CO2 27 12/04/2018    CO2 28 1 12/03/2015    CO2 32 6 (H) 12/02/2015    CO2 29 3 08/20/2015    GLUCOSE 121 12/03/2015    GLUCOSE 105 12/02/2015    GLUCOSE 105 08/20/2015    CREATININE 0 89 11/11/2019    CREATININE 0 86 12/10/2018    CREATININE 0 98 12/04/2018    CREATININE 0 86 12/03/2015    CREATININE 0 92 12/02/2015    CREATININE 0 89 08/20/2015    BUN 14 11/11/2019    BUN 13 12/10/2018    BUN 16 12/04/2018    BUN 15 12/03/2015    BUN 17 12/02/2015    BUN 15 08/20/2015    MG 1 9 12/10/2018     NT-proBNP:   Coags:  Lipid Profile:   Lab Results   Component Value Date    CHOL 138 08/20/2015    CHOL 135 09/30/2014    LDLCALC 57 11/11/2019    LDLCALC 66 12/10/2018    LDLCALC 57 08/13/2018    LDLCALC 67 08/20/2015    LDLCALC 65 09/30/2014    HDL 35 (L) 11/11/2019    HDL 41 12/10/2018    HDL 37 (L) 08/13/2018    HDL 42 08/20/2015    HDL 46 09/30/2014    TRIG 112 11/11/2019    TRIG 90 12/10/2018    TRIG 140 08/13/2018    TRIG 147 08/20/2015    TRIG 120 09/30/2014       Cardiac testing:   EKG reviewed personally:    9/20-sinus bradycardia, normal EKG    Cardiac catheterization  12/18 - 50% nonobstructive disease of all major vessels    Stress Myoview  1/19-anterior wall ischemia    Jelly Campbell MD    Portions of the record may have been created with voice recognition software   Occasional wrong word or "sound a like" substitutions may have occurred due to the inherent limitations of voice recognition software   Read the chart carefully and recognize, using context, where substitutions have occurred

## 2020-09-24 NOTE — PATIENT INSTRUCTIONS
Keep taking all of you pills as you currently are taking except the changes noted as below  Increase amlodipine to 10 mg, and take it 1st thing in the morning

## 2020-09-29 ENCOUNTER — TELEPHONE (OUTPATIENT)
Dept: CARDIOLOGY CLINIC | Facility: CLINIC | Age: 75
End: 2020-09-29

## 2020-09-29 DIAGNOSIS — I72.3 ANEURYSM OF ILIAC ARTERY (HCC): ICD-10-CM

## 2020-09-29 DIAGNOSIS — I20.9 ANGINA, CLASS III (HCC): Chronic | ICD-10-CM

## 2020-09-29 DIAGNOSIS — I71.4 ABDOMINAL AORTIC ANEURYSM WITHOUT RUPTURE (HCC): Primary | ICD-10-CM

## 2020-10-05 RX ORDER — AMLODIPINE BESYLATE 10 MG/1
10 TABLET ORAL DAILY
Qty: 90 TABLET | Refills: 3 | Status: SHIPPED | OUTPATIENT
Start: 2020-10-05 | End: 2021-09-13 | Stop reason: SDUPTHER

## 2020-10-06 DIAGNOSIS — I25.118 CORONARY ARTERY DISEASE OF NATIVE ARTERY OF NATIVE HEART WITH STABLE ANGINA PECTORIS (HCC): Primary | ICD-10-CM

## 2020-10-06 RX ORDER — RANOLAZINE 500 MG/1
500 TABLET, EXTENDED RELEASE ORAL 2 TIMES DAILY
Qty: 180 TABLET | Refills: 3 | Status: SHIPPED | OUTPATIENT
Start: 2020-10-06 | End: 2021-01-11 | Stop reason: SDUPTHER

## 2020-10-28 ENCOUNTER — HOSPITAL ENCOUNTER (OUTPATIENT)
Dept: NON INVASIVE DIAGNOSTICS | Facility: CLINIC | Age: 75
Discharge: HOME | End: 2020-10-28
Payer: COMMERCIAL

## 2020-10-28 DIAGNOSIS — I72.3 ANEURYSM OF ILIAC ARTERY (HCC): ICD-10-CM

## 2020-10-28 DIAGNOSIS — I71.4 ABDOMINAL AORTIC ANEURYSM WITHOUT RUPTURE (HCC): ICD-10-CM

## 2020-10-28 PROCEDURE — 93978 VASCULAR STUDY: CPT

## 2020-10-28 PROCEDURE — 93978 VASCULAR STUDY: CPT | Performed by: SURGERY

## 2020-11-25 ENCOUNTER — VBI (OUTPATIENT)
Dept: ADMINISTRATIVE | Facility: OTHER | Age: 75
End: 2020-11-25

## 2021-01-11 DIAGNOSIS — I25.118 CORONARY ARTERY DISEASE OF NATIVE ARTERY OF NATIVE HEART WITH STABLE ANGINA PECTORIS (HCC): ICD-10-CM

## 2021-01-11 NOTE — TELEPHONE ENCOUNTER
Dr Ciera Munoz patient lost track of medication only has enough for tomorrow   Can Dr Ciera Munoz send a script to [facility]

## 2021-01-12 RX ORDER — RANOLAZINE 500 MG/1
500 TABLET, EXTENDED RELEASE ORAL 2 TIMES DAILY
Qty: 180 TABLET | Refills: 3 | Status: SHIPPED | OUTPATIENT
Start: 2021-01-12 | End: 2021-10-08 | Stop reason: SDUPTHER

## 2021-01-24 DIAGNOSIS — Z23 ENCOUNTER FOR IMMUNIZATION: ICD-10-CM

## 2021-02-05 DIAGNOSIS — I65.23 CAROTID STENOSIS, BILATERAL: Primary | ICD-10-CM

## 2021-02-05 DIAGNOSIS — I73.9 PERIPHERAL VASCULAR DISEASE, UNSPECIFIED (HCC): Primary | ICD-10-CM

## 2021-02-22 DIAGNOSIS — E78.5 HYPERLIPIDEMIA, UNSPECIFIED HYPERLIPIDEMIA TYPE: ICD-10-CM

## 2021-02-22 RX ORDER — SIMVASTATIN 40 MG
40 TABLET ORAL
Qty: 90 TABLET | Refills: 3 | Status: SHIPPED | OUTPATIENT
Start: 2021-02-22 | End: 2022-02-21

## 2021-02-22 NOTE — TELEPHONE ENCOUNTER
Farrah Green called and is requesting a med refill for simvastatin, if approved please send to [facility] order for a 90 day supply, Dr Antonia Shah is the last ordering provider

## 2021-02-24 ENCOUNTER — HOSPITAL ENCOUNTER (OUTPATIENT)
Dept: NON INVASIVE DIAGNOSTICS | Facility: CLINIC | Age: 76
Discharge: HOME | End: 2021-02-24
Payer: COMMERCIAL

## 2021-02-24 DIAGNOSIS — I65.23 CAROTID STENOSIS, BILATERAL: ICD-10-CM

## 2021-02-24 PROCEDURE — 93880 EXTRACRANIAL BILAT STUDY: CPT

## 2021-02-25 PROCEDURE — 93880 EXTRACRANIAL BILAT STUDY: CPT | Performed by: SURGERY

## 2021-03-26 ENCOUNTER — TELEPHONE (OUTPATIENT)
Dept: VASCULAR SURGERY | Facility: CLINIC | Age: 76
End: 2021-03-26

## 2021-03-26 DIAGNOSIS — I20.9 ANGINA, CLASS III (HCC): Chronic | ICD-10-CM

## 2021-03-26 RX ORDER — METOPROLOL TARTRATE 100 MG/1
100 TABLET ORAL EVERY 12 HOURS SCHEDULED
Qty: 180 TABLET | Refills: 3 | Status: SHIPPED | OUTPATIENT
Start: 2021-03-26 | End: 2022-03-06 | Stop reason: SDUPTHER

## 2021-06-15 DIAGNOSIS — I50.9 CONGESTIVE HEART FAILURE (HCC): ICD-10-CM

## 2021-06-15 RX ORDER — CLOPIDOGREL BISULFATE 75 MG/1
75 TABLET ORAL DAILY
Qty: 90 TABLET | Refills: 3 | Status: CANCELLED | OUTPATIENT
Start: 2021-06-15

## 2021-06-17 DIAGNOSIS — I50.9 CONGESTIVE HEART FAILURE (HCC): ICD-10-CM

## 2021-06-17 RX ORDER — CLOPIDOGREL BISULFATE 75 MG/1
75 TABLET ORAL DAILY
Qty: 90 TABLET | Refills: 3 | Status: SHIPPED | OUTPATIENT
Start: 2021-06-17 | End: 2022-06-02

## 2021-06-28 ENCOUNTER — HOSPITAL ENCOUNTER (OUTPATIENT)
Dept: NON INVASIVE DIAGNOSTICS | Facility: CLINIC | Age: 76
Discharge: HOME | End: 2021-06-28
Payer: COMMERCIAL

## 2021-06-28 DIAGNOSIS — I73.9 PERIPHERAL VASCULAR DISEASE, UNSPECIFIED (HCC): ICD-10-CM

## 2021-06-28 PROCEDURE — 93925 LOWER EXTREMITY STUDY: CPT

## 2021-06-28 PROCEDURE — 93923 UPR/LXTR ART STDY 3+ LVLS: CPT

## 2021-06-29 PROCEDURE — 93925 LOWER EXTREMITY STUDY: CPT | Performed by: SURGERY

## 2021-06-29 PROCEDURE — 93922 UPR/L XTREMITY ART 2 LEVELS: CPT | Performed by: SURGERY

## 2021-09-13 DIAGNOSIS — I20.9 ANGINA, CLASS III (HCC): Chronic | ICD-10-CM

## 2021-09-14 RX ORDER — AMLODIPINE BESYLATE 10 MG/1
10 TABLET ORAL DAILY
Qty: 90 TABLET | Refills: 3 | Status: SHIPPED | OUTPATIENT
Start: 2021-09-14 | End: 2021-12-09 | Stop reason: SDUPTHER

## 2021-10-04 DIAGNOSIS — I20.8 ANGINA AT REST (HCC): ICD-10-CM

## 2021-10-04 RX ORDER — ISOSORBIDE MONONITRATE 60 MG/1
120 TABLET, EXTENDED RELEASE ORAL DAILY
Qty: 180 TABLET | Refills: 3 | Status: SHIPPED | OUTPATIENT
Start: 2021-10-04 | End: 2022-07-08 | Stop reason: SDUPTHER

## 2021-10-08 DIAGNOSIS — I25.118 CORONARY ARTERY DISEASE OF NATIVE ARTERY OF NATIVE HEART WITH STABLE ANGINA PECTORIS (HCC): ICD-10-CM

## 2021-10-08 RX ORDER — RANOLAZINE 500 MG/1
500 TABLET, EXTENDED RELEASE ORAL 2 TIMES DAILY
Qty: 180 TABLET | Refills: 3 | Status: SHIPPED | OUTPATIENT
Start: 2021-10-08 | End: 2022-07-08 | Stop reason: SDUPTHER

## 2021-12-07 ENCOUNTER — TELEPHONE (OUTPATIENT)
Dept: CARDIOLOGY CLINIC | Facility: CLINIC | Age: 76
End: 2021-12-07

## 2021-12-07 DIAGNOSIS — I20.9 ANGINA, CLASS III (HCC): Chronic | ICD-10-CM

## 2021-12-07 RX ORDER — AMLODIPINE BESYLATE 10 MG/1
10 TABLET ORAL DAILY
Qty: 90 TABLET | Refills: 3 | Status: CANCELLED | OUTPATIENT
Start: 2021-12-07

## 2021-12-09 DIAGNOSIS — I20.9 ANGINA, CLASS III (HCC): Chronic | ICD-10-CM

## 2021-12-14 RX ORDER — AMLODIPINE BESYLATE 10 MG/1
10 TABLET ORAL DAILY
Qty: 90 TABLET | Refills: 3 | Status: SHIPPED | OUTPATIENT
Start: 2021-12-14 | End: 2021-12-15 | Stop reason: SDUPTHER

## 2021-12-15 DIAGNOSIS — I20.9 ANGINA, CLASS III (HCC): Chronic | ICD-10-CM

## 2021-12-22 RX ORDER — AMLODIPINE BESYLATE 10 MG/1
10 TABLET ORAL DAILY
Qty: 90 TABLET | Refills: 3 | Status: SHIPPED | OUTPATIENT
Start: 2021-12-22

## 2022-02-21 ENCOUNTER — TELEPHONE (OUTPATIENT)
Dept: UROLOGY | Facility: CLINIC | Age: 77
End: 2022-02-21

## 2022-02-21 DIAGNOSIS — E78.5 HYPERLIPIDEMIA, UNSPECIFIED HYPERLIPIDEMIA TYPE: ICD-10-CM

## 2022-02-21 NOTE — TELEPHONE ENCOUNTER
Patient LVM 2/21/22 stating he hasn't been seen in the office with Radha Polanco  since 2019 and is having trouble urinating would like to get an appointment ASAP please give him a call

## 2022-02-21 NOTE — TELEPHONE ENCOUNTER
Patient previously of DR Ganga [facility] in the [place] office, known to practice for nocturia, gross hematuria s/p negative workup 11/2018, and prostate cancer screening  He was last seen 11/2019, yearly follow up was cancelled by patient

## 2022-02-22 RX ORDER — SIMVASTATIN 40 MG
40 TABLET ORAL
Qty: 90 TABLET | Refills: 3 | Status: SHIPPED | OUTPATIENT
Start: 2022-02-22

## 2022-02-22 NOTE — TELEPHONE ENCOUNTER
Spoke to patient and appointment has been scheduled for 2/25 at the [facility] office per patient's request  Location confirmed

## 2022-02-22 NOTE — TELEPHONE ENCOUNTER
Patient should be scheduled with provider to re-establish care in the next few weeks  In the interim he should go for urine testing

## 2022-02-28 ENCOUNTER — TELEPHONE (OUTPATIENT)
Dept: CARDIOLOGY CLINIC | Facility: CLINIC | Age: 77
End: 2022-02-28

## 2022-02-28 ENCOUNTER — OFFICE VISIT (OUTPATIENT)
Dept: UROLOGY | Facility: CLINIC | Age: 77
End: 2022-02-28
Payer: COMMERCIAL

## 2022-02-28 VITALS
SYSTOLIC BLOOD PRESSURE: 132 MMHG | OXYGEN SATURATION: 98 % | RESPIRATION RATE: 16 BRPM | HEART RATE: 61 BPM | BODY MASS INDEX: 31.32 KG/M2 | HEIGHT: 65 IN | WEIGHT: 188 LBS | DIASTOLIC BLOOD PRESSURE: 76 MMHG

## 2022-02-28 DIAGNOSIS — R39.9 LOWER URINARY TRACT SYMPTOMS (LUTS): ICD-10-CM

## 2022-02-28 DIAGNOSIS — R35.1 NOCTURIA: Primary | ICD-10-CM

## 2022-02-28 LAB
BACTERIA UR QL AUTO: ABNORMAL /HPF
BILIRUB UR QL STRIP: NEGATIVE
CLARITY UR: CLEAR
COLOR UR: YELLOW
GLUCOSE UR STRIP-MCNC: NEGATIVE MG/DL
HGB UR QL STRIP.AUTO: NEGATIVE
KETONES UR STRIP-MCNC: NEGATIVE MG/DL
LEUKOCYTE ESTERASE UR QL STRIP: ABNORMAL
MUCOUS THREADS UR QL AUTO: ABNORMAL
NITRITE UR QL STRIP: NEGATIVE
NON-SQ EPI CELLS URNS QL MICRO: ABNORMAL /HPF
PH UR STRIP.AUTO: 6 [PH]
POST-VOID RESIDUAL VOLUME, ML POC: 12 ML
PROT UR STRIP-MCNC: NEGATIVE MG/DL
RBC #/AREA URNS AUTO: ABNORMAL /HPF
SL AMB  POCT GLUCOSE, UA: NORMAL
SL AMB LEUKOCYTE ESTERASE,UA: NORMAL
SL AMB POCT BILIRUBIN,UA: NORMAL
SL AMB POCT BLOOD,UA: NORMAL
SL AMB POCT CLARITY,UA: CLEAR
SL AMB POCT COLOR,UA: NORMAL
SL AMB POCT KETONES,UA: NORMAL
SL AMB POCT NITRITE,UA: NORMAL
SL AMB POCT PH,UA: 5
SL AMB POCT SPECIFIC GRAVITY,UA: 1.02
SL AMB POCT URINE PROTEIN: NORMAL
SL AMB POCT UROBILINOGEN: 0.2
SP GR UR STRIP.AUTO: 1.02 (ref 1–1.03)
UROBILINOGEN UR QL STRIP.AUTO: 0.2 E.U./DL
WBC #/AREA URNS AUTO: ABNORMAL /HPF

## 2022-02-28 PROCEDURE — 81001 URINALYSIS AUTO W/SCOPE: CPT | Performed by: PHYSICIAN ASSISTANT

## 2022-02-28 PROCEDURE — 99213 OFFICE O/P EST LOW 20 MIN: CPT | Performed by: PHYSICIAN ASSISTANT

## 2022-02-28 PROCEDURE — 81002 URINALYSIS NONAUTO W/O SCOPE: CPT | Performed by: PHYSICIAN ASSISTANT

## 2022-02-28 PROCEDURE — 51798 US URINE CAPACITY MEASURE: CPT | Performed by: PHYSICIAN ASSISTANT

## 2022-02-28 RX ORDER — TAMSULOSIN HYDROCHLORIDE 0.4 MG/1
0.4 CAPSULE ORAL
Qty: 30 CAPSULE | Refills: 0 | Status: SHIPPED | OUTPATIENT
Start: 2022-02-28 | End: 2022-04-01 | Stop reason: SDUPTHER

## 2022-02-28 NOTE — PROGRESS NOTES
1  Nocturia  POCT urine dip    POCT Measure PVR       Assessment and plan:       1  LUTS  - relatively stable and well controlled urinary symptoms on tamsulosin monotherapy  - discussed conservative measures  - will f/u with urology PRN  - encouraged to contact us in the future with any additional urinary concerns      Angi Mcmahon PA-C      Chief Complaint     LUTS    History of Present Illness     Unknown Lars Schwartz  is a 68 y o  male presenting today for LUTS  Taking tamsulosin monotherapy  Overall comfortable with LUTS  Does admit to some starting and stopping at the end of his stream  Denies any incontinence, hematuria, dysuria, or infections  Hx of hematuria s/p cystoscopy and US 2018 with negative findings  Routine prostate cancer screening discontinued  Urine dip in the office today is leukocyte, nitrite, and blood negative      PVR 12mL    AUA SYMPTOM SCORE      Most Recent Value   AUA SYMPTOM SCORE    How often have you had a sensation of not emptying your bladder completely after you finished urinating? 1 (P)     How often have you had to urinate again less than two hours after you finished urinating? 0 (P)     How often have you found you stopped and started again several times when you urinate? 3 (P)     How often have you found it difficult to postpone urination? 0 (P)     How often have you had a weak urinary stream? 1 (P)     How often have you had to push or strain to begin urination? 0 (P)     How many times did you most typically get up to urinate from the time you went to bed at night until the time you got up in the morning? 1 (P)     Quality of Life: If you were to spend the rest of your life with your urinary condition just the way it is now, how would you feel about that? 2 (P)     AUA SYMPTOM SCORE 6 (P)           Laboratory     Lab Results   Component Value Date    CREATININE 0 89 11/11/2019       Lab Results   Component Value Date    PSA 0 3 11/11/2019 PSA 0 3 10/02/2018    PSA 0 3 06/28/2016       Review of Systems     Review of Systems   Constitutional: Negative for activity change, appetite change, chills, diaphoresis, fatigue, fever and unexpected weight change  Respiratory: Negative for chest tightness and shortness of breath  Cardiovascular: Negative for chest pain, palpitations and leg swelling  Gastrointestinal: Negative for abdominal distention, abdominal pain, constipation, diarrhea, nausea and vomiting  Genitourinary: Negative for decreased urine volume, difficulty urinating, dysuria, enuresis, flank pain, frequency, genital sores, hematuria and urgency  Musculoskeletal: Negative for back pain, gait problem and myalgias  Skin: Negative for color change, pallor, rash and wound  Psychiatric/Behavioral: Negative for behavioral problems  The patient is not nervous/anxious  AUA SYMPTOM SCORE      Most Recent Value   AUA SYMPTOM SCORE    How often have you had a sensation of not emptying your bladder completely after you finished urinating? 1 (P)     How often have you had to urinate again less than two hours after you finished urinating? 0 (P)     How often have you found you stopped and started again several times when you urinate? 3 (P)     How often have you found it difficult to postpone urination? 0 (P)     How often have you had a weak urinary stream? 1 (P)     How often have you had to push or strain to begin urination? 0 (P)     How many times did you most typically get up to urinate from the time you went to bed at night until the time you got up in the morning? 1 (P)     Quality of Life: If you were to spend the rest of your life with your urinary condition just the way it is now, how would you feel about that? 2 (P)     AUA SYMPTOM SCORE 6 (P)           Allergies     No Known Allergies    Physical Exam     Physical Exam  Constitutional:       General: He is not in acute distress  Appearance: Normal appearance   He is normal weight  He is not ill-appearing, toxic-appearing or diaphoretic  HENT:      Head: Normocephalic and atraumatic  Eyes:      General:         Right eye: No discharge  Left eye: No discharge  Conjunctiva/sclera: Conjunctivae normal    Pulmonary:      Effort: Pulmonary effort is normal  No respiratory distress  Musculoskeletal:         General: No swelling or tenderness  Normal range of motion  Skin:     General: Skin is warm and dry  Coloration: Skin is not jaundiced or pale  Neurological:      General: No focal deficit present  Mental Status: He is alert and oriented to person, place, and time  Psychiatric:         Mood and Affect: Mood normal          Behavior: Behavior normal          Thought Content: Thought content normal            Vital Signs     There were no vitals filed for this visit  Current Medications       Current Outpatient Medications:     amLODIPine (NORVASC) 10 mg tablet, Take 1 tablet (10 mg total) by mouth daily, Disp: 90 tablet, Rfl: 3    clopidogrel (PLAVIX) 75 mg tablet, Take 1 tablet (75 mg total) by mouth daily, Disp: 90 tablet, Rfl: 3    isosorbide mononitrate (IMDUR) 60 mg 24 hr tablet, Take 2 tablets (120 mg total) by mouth daily, Disp: 180 tablet, Rfl: 3    metoprolol tartrate (LOPRESSOR) 100 mg tablet, Take 1 tablet (100 mg total) by mouth every 12 (twelve) hours, Disp: 180 tablet, Rfl: 3    Multiple Vitamin (MULTIVITAMIN) tablet, Take 1 tablet by mouth daily  , Disp: , Rfl:     nitroglycerin (NITROSTAT) 0 4 mg SL tablet, Place 1 tablet (0 4 mg total) under the tongue every 5 (five) minutes as needed for chest pain (Patient not taking: Reported on 9/24/2020), Disp: 90 tablet, Rfl: 1    nitroglycerin (NITROSTAT) 0 4 mg SL tablet, Place 1 tablet (0 4 mg total) under the tongue every 5 (five) minutes as needed for chest pain (Patient not taking: Reported on 3/2/2020), Disp: 90 tablet, Rfl: 1    ranolazine (RANEXA) 500 mg 12 hr tablet, Take 1 tablet (500 mg total) by mouth 2 (two) times a day, Disp: 180 tablet, Rfl: 3    simvastatin (ZOCOR) 40 mg tablet, Take 1 tablet (40 mg total) by mouth daily at bedtime, Disp: 90 tablet, Rfl: 3      Active Problems     Patient Active Problem List   Diagnosis    Closed compression fracture of first lumbar vertebra (HCC)    [facility]    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[facility]    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[facility]    Asymptomatic bilateral carotid artery stenosis    Coronary artery disease of native artery of native heart with stable angina pectoris ([facility]    Hyperlipidemia    Hypertension    Transient cerebral ischemic attack    Need for pneumococcal vaccination    Hematuria    Preoperative evaluation of a medical condition to rule out surgical contraindications (TAR required)    Microhematuria    Angina, class III ([facility] )         Past Medical History     Past Medical History:   Diagnosis Date    [facility]     last assessed: June 11, 2013    Appendicitis     BPH (benign prostatic hyperplasia)     CAD (coronary artery disease)     Cholelithiasis     last assessed: July 26, 2016    Heart attack [facility])     Hyperlipidemia     Hypertension     Insomnia     [facility]     Transient cerebral ischemia          Surgical History     Past Surgical History:   Procedure Laterality Date    ABDOMINAL AORTIC ANEURYSM REPAIR  06/07/2011    Dr Rosalie Smith 6/7/11    APPENDECTOMY      CATARACT EXTRACTION Right 11/2018    CHOLECYSTECTOMY      CHOLECYSTECTOMY LAPAROSCOPIC      COLONOSCOPY  11/2006    CORONARY ANGIOPLASTY      3 STENTS INSERTED    CORONARY ANGIOPLASTY      CORONARY ANGIOPLASTY      with stenting - 3/2012    ME LAP,CHOLECYSTECTOMY/GRAPH N/A 9/8/2016    Procedure: CHOLECYSTECTOMY LAPAROSCOPIC with intra-op cholangiogram ;  Surgeon: Nasreen Staples MD;  [facility];  Service: General         Family History     Family History Problem Relation Age of Onset    Heart disease Mother     Hypertension Mother         Benign Essential     Stroke Mother         Stroke     Heart disease Father     Diabetes Brother         mellitus     Heart disease Brother     Aortic aneurysm Brother     Heart disease Family          Social History     Social History       Radiology

## 2022-03-06 DIAGNOSIS — I20.9 ANGINA, CLASS III (HCC): Chronic | ICD-10-CM

## 2022-03-07 RX ORDER — METOPROLOL TARTRATE 100 MG/1
100 TABLET ORAL EVERY 12 HOURS SCHEDULED
Qty: 180 TABLET | Refills: 11 | Status: SHIPPED | OUTPATIENT
Start: 2022-03-07 | End: 2022-04-01 | Stop reason: SDUPTHER

## 2022-03-22 ENCOUNTER — OFFICE VISIT (OUTPATIENT)
Dept: FAMILY MEDICINE CLINIC | Facility: CLINIC | Age: 77
End: 2022-03-22
Payer: COMMERCIAL

## 2022-03-22 VITALS
SYSTOLIC BLOOD PRESSURE: 120 MMHG | WEIGHT: 194.13 LBS | TEMPERATURE: 97.5 F | HEIGHT: 65 IN | RESPIRATION RATE: 18 BRPM | HEART RATE: 60 BPM | BODY MASS INDEX: 32.35 KG/M2 | OXYGEN SATURATION: 95 % | DIASTOLIC BLOOD PRESSURE: 70 MMHG

## 2022-03-22 DIAGNOSIS — I25.118 CORONARY ARTERY DISEASE OF NATIVE ARTERY OF NATIVE HEART WITH STABLE ANGINA PECTORIS (HCC): ICD-10-CM

## 2022-03-22 DIAGNOSIS — E78.49 OTHER HYPERLIPIDEMIA: ICD-10-CM

## 2022-03-22 DIAGNOSIS — I10 PRIMARY HYPERTENSION: Primary | ICD-10-CM

## 2022-03-22 DIAGNOSIS — I73.9 PERIPHERAL VASCULAR DISEASE, UNSPECIFIED (HCC): ICD-10-CM

## 2022-03-22 DIAGNOSIS — I71.4 ABDOMINAL AORTIC ANEURYSM WITHOUT RUPTURE (HCC): ICD-10-CM

## 2022-03-22 DIAGNOSIS — N52.9 ERECTILE DYSFUNCTION, UNSPECIFIED ERECTILE DYSFUNCTION TYPE: ICD-10-CM

## 2022-03-22 PROBLEM — Z01.818 PREOPERATIVE EVALUATION OF A MEDICAL CONDITION TO RULE OUT SURGICAL CONTRAINDICATIONS (TAR REQUIRED): Status: RESOLVED | Noted: 2018-08-06 | Resolved: 2022-03-22

## 2022-03-22 PROCEDURE — 1036F TOBACCO NON-USER: CPT | Performed by: FAMILY MEDICINE

## 2022-03-22 PROCEDURE — 1125F AMNT PAIN NOTED PAIN PRSNT: CPT | Performed by: FAMILY MEDICINE

## 2022-03-22 PROCEDURE — 3725F SCREEN DEPRESSION PERFORMED: CPT | Performed by: FAMILY MEDICINE

## 2022-03-22 PROCEDURE — 1170F FXNL STATUS ASSESSED: CPT | Performed by: FAMILY MEDICINE

## 2022-03-22 PROCEDURE — 3288F FALL RISK ASSESSMENT DOCD: CPT | Performed by: FAMILY MEDICINE

## 2022-03-22 PROCEDURE — 1160F RVW MEDS BY RX/DR IN RCRD: CPT | Performed by: FAMILY MEDICINE

## 2022-03-22 PROCEDURE — G0439 PPPS, SUBSEQ VISIT: HCPCS | Performed by: FAMILY MEDICINE

## 2022-03-22 PROCEDURE — 99214 OFFICE O/P EST MOD 30 MIN: CPT | Performed by: FAMILY MEDICINE

## 2022-03-22 NOTE — ASSESSMENT & PLAN NOTE
Erectile dysfunction  I had a long discussion with the patient and his wife  He will discuss with his cardiologist whether patient could safely take Cialis or Levitra given the fact that he is on nitrate medication such as Imdur

## 2022-03-22 NOTE — PROGRESS NOTES
FAMILY PRACTICE OFFICE VISIT       NAME: Edmar Chau Sr   AGE: 68 y o  SEX: male       : 1945        MRN: [id number]    DATE: 3/22/2022  TIME: 3:07 PM    Assessment and Plan     Problem List Items Addressed This Visit        Cardiovascular and Mediastinum    [facility]     Abdominal aneurysm  Most recent abdominal ultrasound showed aneurysm at 3 9 cm  Patient will follow up with cardiologist for continue monitoring of condition         Coronary artery disease of native artery of native heart with stable angina pectoris ([facility] )     Coronary artery disease  Patient denies any symptoms of chest pain or shortness of breath  At 1 point patient had been having angina type symptoms that resolved with changes in medication  Hypertension - Primary     Hypertension  Patient blood pressure is stable at this time he will continue current regimen of medications  Relevant Orders    CBC    Comprehensive metabolic panel    Lipid panel    TSH, 3rd generation       Other    Hyperlipidemia     Hyperlipidemia  Patient was given prescription to check lipid panel blood work and continue with current dose of statin therapy         Relevant Orders    CBC    Comprehensive metabolic panel    Lipid panel    TSH, 3rd generation    Erectile dysfunction     Erectile dysfunction  I had a long discussion with the patient and his wife  He will discuss with his cardiologist whether patient could safely take Cialis or Levitra given the fact that he is on nitrate medication such as Imdur  Other Visit Diagnoses     Peripheral vascular disease, unspecified [facility])                  Chief Complaint     Chief Complaint   Patient presents with    Medicare Wellness Visit       History of Present Illness     Patient the office to review chronic medical condition  He is accompanied by his wife  He denies any recent illness    He has lost 12 lb in the last 3 months by changing his eating habits and exercising on a regular basis  Patient is able to walk on a treadmill for 15 minutes in also lift weights for 10 minutes several days a week  He denies any chest pain or shortness of breath  Patient does have complaint of erectile dysfunction however patient does take in door on a regular basis  He states in the past he failed a trial of factor that was prescribed by his cardiologist     Patient also sees his urologist for history of decreased urine flow for which she takes Flomax  He denies any dysuria  Review of Systems   Review of Systems   Constitutional: Negative  HENT: Negative  Eyes: Negative  Respiratory: Negative  Cardiovascular: Negative  Gastrointestinal: Negative  Genitourinary:        As per HPI   Musculoskeletal: Negative  Skin: Negative  Neurological: Negative  Psychiatric/Behavioral: Negative          Active Problem List     Patient Active Problem List   Diagnosis    Closed compression fracture of first lumbar vertebra (HCC)    Abdominal aortic aneurysm without rupture (HCC)    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HCC)    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    Asymptomatic bilateral carotid artery stenosis    Coronary artery disease of native artery of native heart with stable angina pectoris ([facility] )    Hyperlipidemia    Hypertension    Transient cerebral ischemic attack    Need for pneumococcal vaccination    Hematuria    Microhematuria    Angina, class III ([facility]    Erectile dysfunction       Past Medical History:  Past Medical History:   Diagnosis Date    AAA (abdominal aortic aneurysm) ([facility] )     last assessed: June 11, 2013    Appendicitis     BPH (benign prostatic hyperplasia)     CAD (coronary artery disease)     Cholelithiasis     last assessed: July 26, 2016    [facility])     Hyperlipidemia     Hypertension     Insomnia     Stroke ([facility]     Transient cerebral ischemia        Past Surgical History:  Past Surgical History:   Procedure Laterality Date    ABDOMINAL AORTIC ANEURYSM REPAIR  06/07/2011    Dr Duane Barry 6/7/11    APPENDECTOMY      CATARACT EXTRACTION Right 11/2018    CHOLECYSTECTOMY      CHOLECYSTECTOMY LAPAROSCOPIC      COLONOSCOPY  11/2006    CORONARY ANGIOPLASTY      3 STENTS INSERTED    CORONARY ANGIOPLASTY      CORONARY ANGIOPLASTY      with stenting - 3/2012    IN LAP,CHOLECYSTECTOMY/GRAPH N/A 9/8/2016    Procedure: CHOLECYSTECTOMY LAPAROSCOPIC with intra-op cholangiogram ;  Surgeon: Filemon Whitley MD;  [facility];  Service: General       Family History:  Family History   Problem Relation Age of Onset    Heart disease Mother     Hypertension Mother         Benign Essential     Stroke Mother         Stroke     Heart disease Father     Diabetes Brother         mellitus     Heart disease Brother     Aortic aneurysm Brother     Heart disease Family        Social History:  Social History     Socioeconomic History    Marital status: /Civil Union     Spouse name: Not on file    Number of children: Not on file    Years of education: Not on file    Highest education level: Not on file   Occupational History    Occupation: Retired    Tobacco Use    Smoking status: Former Smoker    Smokeless tobacco: Never Used    Tobacco comment: Quit 1980's about 25 years [id number]   Vaping Use    Vaping Use: Never used   Substance and Sexual Activity    Alcohol use:  Yes     Alcohol/week: 7 0 standard drinks     Types: 4 Cans of beer, 3 Shots of liquor per week     Comment: SOCIAL USE - occasionally     Drug use: No    Sexual activity: Not Currently   Other Topics Concern    Not on file   Social History Narrative    Not on file     Social Determinants of Health     Financial Resource Strain: Not on file   Food Insecurity: Not on file   Transportation Needs: Not on file   Physical Activity: Not on file   Stress: Not on file   Social Connections: Not on file   Intimate Partner Violence: Not on file   Housing Stability: Not on file       Objective     Vitals:    03/22/22 1421   BP: 120/70   Pulse: 60   Resp: 18   Temp: 97 5 °F (36 4 °C)   SpO2: 95%     Wt Readings from Last 3 Encounters:   03/22/22 88 1 kg (194 lb 2 oz)   02/28/22 85 3 kg (188 lb)   09/24/20 90 9 kg (200 lb 6 4 oz)       Physical Exam  Constitutional:       General: He is not in acute distress  Appearance: Normal appearance  He is not ill-appearing  HENT:      Head: Normocephalic and atraumatic  Eyes:      General:         Right eye: No discharge  Left eye: No discharge  Extraocular Movements: Extraocular movements intact  Conjunctiva/sclera: Conjunctivae normal       Pupils: Pupils are equal, round, and reactive to light  Neck:      Vascular: No carotid bruit  Cardiovascular:      Rate and Rhythm: Normal rate and regular rhythm  Heart sounds: Normal heart sounds  No murmur heard  Pulmonary:      Effort: Pulmonary effort is normal       Breath sounds: Normal breath sounds  No wheezing, rhonchi or rales  Abdominal:      General: Abdomen is flat  Bowel sounds are normal  There is no distension  Palpations: Abdomen is soft  Tenderness: There is no abdominal tenderness  There is no guarding or rebound  Musculoskeletal:      Right lower leg: No edema  Left lower leg: No edema  Lymphadenopathy:      Cervical: No cervical adenopathy  Skin:     Findings: No rash  Neurological:      General: No focal deficit present  Mental Status: He is alert and oriented to person, place, and time  Cranial Nerves: No cranial nerve deficit  Psychiatric:         Mood and Affect: Mood normal          Behavior: Behavior normal          Thought Content:  Thought content normal          Judgment: Judgment normal          Pertinent Laboratory/Diagnostic Studies:  Lab Results   Component Value Date    GLUCOSE 121 12/03/2015    BUN 14 11/11/2019    CREATININE 0 89 11/11/2019 CALCIUM 9 0 11/11/2019     12/03/2015    K 3 9 11/11/2019    CO2 26 11/11/2019     11/11/2019     Lab Results   Component Value Date    ALT 29 11/11/2019    AST 23 11/11/2019    ALKPHOS 52 11/11/2019    BILITOT 0 65 12/02/2015       Lab Results   Component Value Date    WBC 5 91 12/10/2018    HGB 12 8 12/10/2018    HCT 36 9 12/10/2018    MCV 95 12/10/2018     12/10/2018       No results found for: TSH    Lab Results   Component Value Date    CHOL 138 08/20/2015     Lab Results   Component Value Date    TRIG 112 11/11/2019     Lab Results   Component Value Date    HDL 35 (L) 11/11/2019     Lab Results   Component Value Date    LDLCALC 57 11/11/2019     No results found for: HGBA1C    Results for orders placed or performed in visit on 02/28/22   Urinalysis with microscopic   Result Value Ref Range    Clarity, UA Clear     Color, UA Yellow     Specific Gravity, UA 1 025 1 003 - 1 030    pH, UA 6 0 4 5, 5 0, 5 5, 6 0, 6 5, 7 0, 7 5, 8 0    Glucose, UA Negative Negative mg/dl    Ketones, UA Negative Negative mg/dl    Blood, UA Negative     Protein, UA Negative Negative mg/dl    Nitrite, UA Negative Negative    Bilirubin, UA Negative Negative    Urobilinogen, UA 0 2 0 2, 1 0 E U /dl E U /dl    Leukocytes, UA Trace (A) Negative    WBC, UA None Seen None Seen, 2-4, 5-60 /hpf    RBC, UA None Seen None Seen, 2-4 /hpf    Bacteria, UA None Seen None Seen, Occasional /hpf    Epithelial Cells None Seen None Seen, Occasional /hpf    MUCUS THREADS Moderate (A) None Seen   POCT urine dip   Result Value Ref Range    LEUKOCYTE ESTERASE,UA -     NITRITE,UA -     SL AMB POCT UROBILINOGEN 0 2     POCT URINE PROTEIN -      PH,UA 5 0     BLOOD,UA -     SPECIFIC GRAVITY,UA 1 025     KETONES,UA -     BILIRUBIN,UA -     GLUCOSE, UA -      COLOR,UA [place]     CLARITY,UA clear    POCT Measure PVR   Result Value Ref Range    POST-VOID RESIDUAL VOLUME, ML POC 12 mL       Orders Placed This Encounter   Procedures    CBC    Comprehensive metabolic panel    Lipid panel    TSH, 3rd generation       ALLERGIES:  No Known Allergies    Current Medications     Current Outpatient Medications   Medication Sig Dispense Refill    amLODIPine (NORVASC) 10 mg tablet Take 1 tablet (10 mg total) by mouth daily 90 tablet 3    clopidogrel (PLAVIX) 75 mg tablet Take 1 tablet (75 mg total) by mouth daily 90 tablet 3    isosorbide mononitrate (IMDUR) 60 mg 24 hr tablet Take 2 tablets (120 mg total) by mouth daily 180 tablet 3    metoprolol tartrate (LOPRESSOR) 100 mg tablet Take 1 tablet (100 mg total) by mouth every 12 (twelve) hours 180 tablet 11    ranolazine (RANEXA) 500 mg 12 hr tablet Take 1 tablet (500 mg total) by mouth 2 (two) times a day 180 tablet 3    simvastatin (ZOCOR) 40 mg tablet Take 1 tablet (40 mg total) by mouth daily at bedtime 90 tablet 3    tamsulosin (FLOMAX) 0 4 mg Take 1 capsule (0 4 mg total) by mouth daily with dinner 30 capsule 0    nitroglycerin (NITROSTAT) 0 4 mg SL tablet Place 1 tablet (0 4 mg total) under the tongue every 5 (five) minutes as needed for chest pain (Patient not taking: Reported on 9/24/2020) 90 tablet 1     No current facility-administered medications for this visit           Health Maintenance     Health Maintenance   Topic Date Due    Medicare Annual Wellness Visit (AWV)  Never done    SLP PLAN OF CARE  Never done    BMI: Followup Plan  Never done    COVID-19 Vaccine (3 - Booster for Moderna series) 04/06/2022    Fall Risk  03/22/2023    Depression Screening  03/22/2023    BMI: Adult  03/22/2023    DTaP,Tdap,and Td Vaccines (2 - Td or Tdap) 04/17/2027    Hepatitis C Screening  Completed    Pneumococcal Vaccine: 65+ Years  Completed    Influenza Vaccine  Completed    HIB Vaccine  Aged Out    Hepatitis B Vaccine  Aged Out    IPV Vaccine  Aged Out    Hepatitis A Vaccine  Aged Out    Meningococcal ACWY Vaccine  Aged Out    HPV Vaccine  Aged [employer] History   Administered Date(s) Administered    COVID-19 MODERNA VACC 0 5 ML IM 02/08/2021, 11/06/2021    INFLUENZA 10/15/2016, 11/03/2018, 11/03/2018, 10/24/2020, 10/16/2021    Influenza Split High Dose Preservative Free IM 11/24/2019    Influenza, high dose seasonal 0 7 mL 08/29/2020    Influenza, seasonal, injectable 09/28/2011, 10/06/2014, 11/05/2017    Pneumococcal Polysaccharide PPV23 08/06/2018    Tdap 01/55/1608       Tabitha Toledo MD    I spent 25 minutes with this patient which greater than 50% was spent counseling reviewing chart

## 2022-03-22 NOTE — PATIENT INSTRUCTIONS

## 2022-03-22 NOTE — PROGRESS NOTES
Assessment and Plan:     Problem List Items Addressed This Visit     None           Preventive health issues were discussed with patient, and age appropriate screening tests were ordered as noted in patient's After Visit Summary  Personalized health advice and appropriate referrals for health education or preventive services given if needed, as noted in patient's After Visit Summary       History of Present Illness:     Patient presents for Medicare Annual Wellness visit    Patient Care Team:  Salina Robertson MD as PCP - General (Family Medicine)  MD Linda Gonzalez Elgie Burrs Doctor, MD Shiloh Keene MD Thomasenia Poncho, MD     Problem List:     Patient Active Problem List   Diagnosis    Closed compression fracture of first lumbar vertebra [facility])    [facility]dominal aortic aneurysm without rupture ([facility] )    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[place] [address] )    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[place] [address] )    Asymptomatic bilateral carotid artery stenosis    Coronary artery disease of native artery of native heart with stable angina pectoris ([facility] )    Hyperlipidemia    Hypertension    Transient cerebral ischemic attack    Need for pneumococcal vaccination    Hematuria    Preoperative evaluation of a medical condition to rule out surgical contraindications (TAR required)    Microhematuria    Angina, class III [facility])      Past Medical and Surgical History:     Past Medical History:   Diagnosis Date    AAA (abdominal aortic aneurysm) ([facility] )     last assessed: June 11, 2013    Appendicitis     BPH (benign prostatic hyperplasia)     CAD (coronary artery disease)     Cholelithiasis     last assessed: July 26, 2016    Heart attack [facility])     Hyperlipidemia     Hypertension     Insomnia     Stroke ([facility] )     Transient cerebral ischemia      Past Surgical History:   Procedure Laterality Date    ABDOMINAL AORTIC ANEURYSM REPAIR  06/07/2011    Dr Dedra Good 6/7/11    APPENDECTOMY  CATARACT EXTRACTION Right 11/2018    CHOLECYSTECTOMY      CHOLECYSTECTOMY LAPAROSCOPIC      COLONOSCOPY  11/2006    CORONARY ANGIOPLASTY      3 STENTS INSERTED    CORONARY ANGIOPLASTY      CORONARY ANGIOPLASTY      with stenting - 3/2012    [place] LAP,CHOLECYSTECTOMY/GRAPH N/A 9/8/2016    Procedure: CHOLECYSTECTOMY LAPAROSCOPIC with intra-op cholangiogram ;  Surgeon: Filemon Whitley MD;  Location: [facility];  Service: General      Family History:     Family History   Problem Relation Age of Onset    Heart disease Mother     Hypertension Mother         Benign Essential     Stroke Mother         Stroke     Heart disease Father     Diabetes Brother         mellitus     Heart disease Brother     Aortic aneurysm Brother     Heart disease Family       Social History:     Social History     Socioeconomic History    Marital status: /Civil Union     Spouse name: None    Number of children: None    Years of education: None    Highest education level: None   Occupational History    Occupation: Retired    Tobacco Use    Smoking status: Former Smoker    Smokeless tobacco: Never Used    Tobacco comment: Quit 1980's about 25 years x1ppd   Vaping Use    Vaping Use: Never used   Substance and Sexual Activity    Alcohol use:  Yes     Alcohol/week: 7 0 standard drinks     Types: 4 Cans of beer, 3 Shots of liquor per week     Comment: SOCIAL USE - occasionally     Drug use: No    Sexual activity: Not Currently   Other Topics Concern    None   Social History Narrative    None     Social Determinants of Health     Financial Resource Strain: Not on file   Food Insecurity: Not on file   Transportation Needs: Not on file   Physical Activity: Not on file   Stress: Not on file   Social Connections: Not on file   Intimate Partner Violence: Not on file   Housing Stability: Not on file      Medications and Allergies:     Current Outpatient Medications   Medication Sig Dispense Refill    amLODIPine ([address]) 10 mg tablet Take 1 tablet (10 mg total) by mouth daily 90 tablet 3    clopidogrel (PLAVIX) 75 mg tablet Take 1 tablet (75 mg total) by mouth daily 90 tablet 3    isosorbide mononitrate (IMDUR) 60 mg 24 hr tablet Take 2 tablets (120 mg total) by mouth daily 180 tablet 3    metoprolol tartrate (LOPRESSOR) 100 mg tablet Take 1 tablet (100 mg total) by mouth every 12 (twelve) hours 180 tablet 11    nitroglycerin (NITROSTAT) 0 4 mg SL tablet Place 1 tablet (0 4 mg total) under the tongue every 5 (five) minutes as needed for chest pain (Patient not taking: Reported on 9/24/2020) 90 tablet 1    ranolazine (RANEXA) 500 mg 12 hr tablet Take 1 tablet (500 mg total) by mouth 2 (two) times a day 180 tablet 3    simvastatin (ZOCOR) 40 mg tablet Take 1 tablet (40 mg total) by mouth daily at bedtime 90 tablet 3    tamsulosin (FLOMAX) 0 4 mg Take 1 capsule (0 4 mg total) by mouth daily with dinner 30 capsule 0     No current facility-administered medications for this visit  No Known Allergies   Immunizations:     Immunization History   Administered Date(s) Administered    COVID-19 MODERNA VACC 0 5 ML IM 02/08/2021, 11/06/2021    INFLUENZA 10/15/2016, 11/03/2018, 11/03/2018, 10/24/2020, 10/16/2021    Influenza Split High Dose Preservative Free IM 11/24/2019    Influenza, high dose seasonal 0 7 mL 08/29/2020    Influenza, seasonal, injectable 09/28/2011, 10/06/2014, 11/05/2017    Pneumococcal Polysaccharide PPV23 08/06/2018    Tdap 04/17/2017      Health Maintenance:         Topic Date Due    Hepatitis C Screening  Completed     There are no preventive care reminders to display for this patient  Medicare Health Risk Assessment:     There were no vitals taken for this visit  Health Risk Assessment:   Patient rates overall health as very good  Patient feels that their physical health rating is slightly better  Patient is very satisfied with their life  Eyesight was rated as same   Hearing was rated as slightly worse  Patient feels that their emotional and mental health rating is same  Patients states they are sometimes angry  Patient states they are sometimes unusually tired/fatigued  Pain experienced in the last 7 days has been some  Patient's pain rating has been 2/10  Patient states that he has experienced weight loss or gain in last 6 months  Depression Screening:   PHQ-2 Score: 0      Fall Risk Screening: In the past year, patient has experienced: no history of falling in past year      Home Safety:  Patient does not have trouble with stairs inside or outside of their home  Patient has working smoke alarms and has working carbon monoxide detector  Home safety hazards include: none  Nutrition:   Current diet is Low Carb  Medications:   Patient is not currently taking any over-the-counter supplements  Patient is able to manage medications  Activities of Daily Living (ADLs)/Instrumental Activities of Daily Living (IADLs):   Walk and transfer into and out of bed and chair?: Yes  Dress and groom yourself?: Yes    Bathe or shower yourself?: Yes    Feed yourself? Yes  Do your laundry/housekeeping?: Yes  Manage your money, pay your bills and track your expenses?: Yes  Make your own meals?: Yes    Do your own shopping?: Yes    Previous Hospitalizations:   Any hospitalizations or ED visits within the last 12 months?: No      Advance Care Planning:   Living will: Yes    Durable POA for healthcare:  Yes    Advanced directive: Yes      PREVENTIVE SCREENINGS      Cardiovascular Screening:    General: Screening Not Indicated, History Lipid Disorder and Risks and Benefits Discussed    Due for: Lipid Panel      Diabetes Screening:     General: Risks and Benefits Discussed    Due for: Blood Glucose      Colorectal Cancer Screening:     General: Screening Not Indicated      Prostate Cancer Screening:    General: Screening Not Indicated      Abdominal Aortic Aneurysm (AAA) Screening:    Risk factors include: tobacco use        General: Screening Not Indicated and History AAA      Lung Cancer Screening:     General: Screening Not Indicated      Hepatitis C Screening:    General: Screening Current    Screening, Brief Intervention, and Referral to Treatment (SBIRT)    Screening  Typical number of drinks in a day: 3  Typical number of drinks in a week: 3  Interpretation: Low risk drinking behavior  AUDIT-C Screenin) How often did you have a drink containing alcohol in the past year? monthly or less  2) How many drinks did you have on a typical day when you were drinking in the past year?  1 to 2  3) How often did you have 6 or more drinks on one occasion in the past year? never    AUDIT-C Score: 1  Interpretation: Score 0-3 (male): Negative screen for alcohol misuse    Single Item Drug Screening:  How often have you used an illegal drug (including marijuana) or a prescription medication for non-medical reasons in the past year? never    Single Item Drug Screen Score: 0  Interpretation: Negative screen for possible drug use disorder      Angelique Trivedi MD

## 2022-03-22 NOTE — ASSESSMENT & PLAN NOTE
Abdominal aneurysm  Most recent abdominal ultrasound showed aneurysm at 3 9 cm    Patient will follow up with cardiologist for continue monitoring of condition

## 2022-03-22 NOTE — ASSESSMENT & PLAN NOTE
Coronary artery disease  Patient denies any symptoms of chest pain or shortness of breath  At 1 point patient had been having angina type symptoms that resolved with changes in medication

## 2022-03-22 NOTE — ASSESSMENT & PLAN NOTE
Dr Oumar Espino- [phone] Hyperlipidemia    Patient was given prescription to check lipid panel blood work and continue with current dose of statin therapy

## 2022-03-26 ENCOUNTER — APPOINTMENT (OUTPATIENT)
Dept: LAB | Facility: CLINIC | Age: 77
End: 2022-03-26
Payer: COMMERCIAL

## 2022-03-26 DIAGNOSIS — E78.49 OTHER HYPERLIPIDEMIA: ICD-10-CM

## 2022-03-26 DIAGNOSIS — I10 PRIMARY HYPERTENSION: ICD-10-CM

## 2022-03-26 LAB
ALBUMIN SERPL BCP-MCNC: 3.9 G/DL (ref 3.5–5)
ALP SERPL-CCNC: 58 U/L (ref 46–116)
ALT SERPL W P-5'-P-CCNC: 24 U/L (ref 12–78)
ANION GAP SERPL CALCULATED.3IONS-SCNC: 1 MMOL/L (ref 4–13)
AST SERPL W P-5'-P-CCNC: 17 U/L (ref 5–45)
BILIRUB SERPL-MCNC: 0.75 MG/DL (ref 0.2–1)
BUN SERPL-MCNC: 13 MG/DL (ref 5–25)
CALCIUM SERPL-MCNC: 9.6 MG/DL (ref 8.3–10.1)
CHLORIDE SERPL-SCNC: 106 MMOL/L (ref 100–108)
CHOLEST SERPL-MCNC: 118 MG/DL
CO2 SERPL-SCNC: 31 MMOL/L (ref 21–32)
CREAT SERPL-MCNC: 1.03 MG/DL (ref 0.6–1.3)
ERYTHROCYTE [DISTWIDTH] IN BLOOD BY AUTOMATED COUNT: 12.2 % (ref 11.6–15.1)
GFR SERPL CREATININE-BSD FRML MDRD: 70 ML/MIN/1.73SQ M
GLUCOSE P FAST SERPL-MCNC: 99 MG/DL (ref 65–99)
HCT VFR BLD AUTO: 39.6 % (ref 36.5–49.3)
HDLC SERPL-MCNC: 43 MG/DL
HGB BLD-MCNC: 13.8 G/DL (ref 12–17)
LDLC SERPL CALC-MCNC: 54 MG/DL (ref 0–100)
MCH RBC QN AUTO: 32.4 PG (ref 26.8–34.3)
MCHC RBC AUTO-ENTMCNC: 34.8 G/DL (ref 31.4–37.4)
MCV RBC AUTO: 93 FL (ref 82–98)
NONHDLC SERPL-MCNC: 75 MG/DL
PLATELET # BLD AUTO: 192 THOUSANDS/UL (ref 149–390)
PMV BLD AUTO: 8.8 FL (ref 8.9–12.7)
POTASSIUM SERPL-SCNC: 4.7 MMOL/L (ref 3.5–5.3)
PROT SERPL-MCNC: 7.4 G/DL (ref 6.4–8.2)
RBC # BLD AUTO: 4.26 MILLION/UL (ref 3.88–5.62)
SODIUM SERPL-SCNC: 138 MMOL/L (ref 136–145)
TRIGL SERPL-MCNC: 103 MG/DL
TSH SERPL DL<=0.05 MIU/L-ACNC: 1.85 UIU/ML (ref 0.36–3.74)
WBC # BLD AUTO: 5.56 THOUSAND/UL (ref 4.31–10.16)

## 2022-03-26 PROCEDURE — 80061 LIPID PANEL: CPT

## 2022-03-26 PROCEDURE — 84443 ASSAY THYROID STIM HORMONE: CPT

## 2022-03-26 PROCEDURE — 80053 COMPREHEN METABOLIC PANEL: CPT

## 2022-03-26 PROCEDURE — 85027 COMPLETE CBC AUTOMATED: CPT

## 2022-03-26 PROCEDURE — 36415 COLL VENOUS BLD VENIPUNCTURE: CPT

## 2022-04-01 ENCOUNTER — OFFICE VISIT (OUTPATIENT)
Dept: CARDIOLOGY CLINIC | Facility: CLINIC | Age: 77
End: 2022-04-01
Payer: COMMERCIAL

## 2022-04-01 VITALS
HEART RATE: 50 BPM | DIASTOLIC BLOOD PRESSURE: 66 MMHG | WEIGHT: 192.3 LBS | BODY MASS INDEX: 32.04 KG/M2 | SYSTOLIC BLOOD PRESSURE: 102 MMHG | HEIGHT: 65 IN

## 2022-04-01 DIAGNOSIS — R39.9 LOWER URINARY TRACT SYMPTOMS (LUTS): ICD-10-CM

## 2022-04-01 DIAGNOSIS — N52.01 ERECTILE DYSFUNCTION DUE TO ARTERIAL INSUFFICIENCY: ICD-10-CM

## 2022-04-01 DIAGNOSIS — I20.9 ANGINA, CLASS III (HCC): Primary | Chronic | ICD-10-CM

## 2022-04-01 DIAGNOSIS — I71.4 ABDOMINAL AORTIC ANEURYSM WITHOUT RUPTURE (HCC): ICD-10-CM

## 2022-04-01 DIAGNOSIS — I72.3 ANEURYSM OF ILIAC ARTERY (HCC): ICD-10-CM

## 2022-04-01 DIAGNOSIS — E78.49 OTHER HYPERLIPIDEMIA: ICD-10-CM

## 2022-04-01 DIAGNOSIS — I65.23 ASYMPTOMATIC BILATERAL CAROTID ARTERY STENOSIS: ICD-10-CM

## 2022-04-01 DIAGNOSIS — I72.4 ANEURYSM OF POPLITEAL ARTERY (HCC): ICD-10-CM

## 2022-04-01 DIAGNOSIS — I10 PRIMARY HYPERTENSION: ICD-10-CM

## 2022-04-01 PROCEDURE — 99214 OFFICE O/P EST MOD 30 MIN: CPT | Performed by: INTERNAL MEDICINE

## 2022-04-01 PROCEDURE — 93000 ELECTROCARDIOGRAM COMPLETE: CPT | Performed by: INTERNAL MEDICINE

## 2022-04-01 PROCEDURE — 3078F DIAST BP <80 MM HG: CPT | Performed by: INTERNAL MEDICINE

## 2022-04-01 PROCEDURE — 3074F SYST BP LT 130 MM HG: CPT | Performed by: INTERNAL MEDICINE

## 2022-04-01 PROCEDURE — 1160F RVW MEDS BY RX/DR IN RCRD: CPT | Performed by: INTERNAL MEDICINE

## 2022-04-01 PROCEDURE — 1036F TOBACCO NON-USER: CPT | Performed by: INTERNAL MEDICINE

## 2022-04-01 RX ORDER — TAMSULOSIN HYDROCHLORIDE 0.4 MG/1
0.4 CAPSULE ORAL
Qty: 90 CAPSULE | Refills: 3 | Status: SHIPPED | OUTPATIENT
Start: 2022-04-01

## 2022-04-01 RX ORDER — METOPROLOL TARTRATE 50 MG/1
50 TABLET, FILM COATED ORAL EVERY 12 HOURS SCHEDULED
Qty: 180 TABLET | Refills: 3 | Status: SHIPPED | OUTPATIENT
Start: 2022-04-01

## 2022-04-01 RX ORDER — SILDENAFIL 100 MG/1
100 TABLET, FILM COATED ORAL DAILY PRN
Qty: 10 TABLET | Refills: 3 | Status: SHIPPED | OUTPATIENT
Start: 2022-04-01

## 2022-04-01 NOTE — PROGRESS NOTES
[facility] Cardiology  Follow up note  Marcelino Mendez  68 y o  male MRN: [id number]        Problems    1  Lower urinary tract symptoms (LUTS)  POCT ECG    tamsulosin (FLOMAX) 0 4 mg       Impression:    Toney Jung returns to see me for chronic stable angina, and requested a 2nd opinion    · CAD  · Stress Myoview 1/19 suggests ischemic response, but catheterization 1 month prior only showed moderate nonobstructive disease, nothing truly amenable to PCI, had him obtain a 2nd opinion from Interventional Cardiology, Dr Gwynne Cheadle, who agreed with my assessment  ·  Imdur, metoprolol, Ranexa, amlodipine all provide excellent anginal relief, he is exercising without any symptoms    · Hypertension  ·  blood pressure is low today    · Hyperlipidemia  · Well controlled        Plan:    · It has been years since he has been able to have an erection, he definitely asked if it was ever possible again   · 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, due to his vascular disease   · The number of antianginals he takes as well, especially the beta-blocker probably effects this is well   · His blood pressure is low normal, he is being started on Flomax by Urology, he did not get the prescription, so I sent in today   · We discussed other mechanical means for obtaining an erection, we also discussed down titration of some of his antianginals considering he has absolutely no angina for the last couple years and is exercising frequently on a treadmill  · We will start by reducing the metoprolol dose to  50 mg twice a day    HPI:   Ella Cervantes Sr  is a 68y o  year old male with hypertension, hyperlipidemia, CAD defined by multivessel disease noted in 2006, but only severe obstruction of the obtuse marginal status post stenting at that time  He has a history of AAA, AAA repair, iliac aneurysms, celiac stenosis, bilateral carotid artery stenosis, and has been dealing with chronic stage III angina for the last 1 year       he is frustrated by erectile dysfunction   Has not had an erection many years   He asks why, likely a combination of his vascular disease and multiple anti anginal medical treatment strategies  His EKG is unremarkable today   He has no new anginal symptoms   His blood pressure is low normal   He saw Urology, did not have a rectal exam, but his symptoms prompted a prescription for Flomax, a 30 day trial was given but he never received, asked for me to send in a refill today   lipids are excellent on simvastatin      Review of Systems   Constitutional: Negative for appetite change, diaphoresis, fatigue and fever  Respiratory: Negative for chest tightness, shortness of breath and wheezing  Cardiovascular: Negative for chest pain, palpitations and leg swelling  Gastrointestinal: Negative for abdominal pain and blood in stool  Musculoskeletal: Negative for arthralgias and joint swelling  Skin: Negative for rash  Neurological: Negative for dizziness, syncope and light-headedness           Past Medical History:   Diagnosis Date    AAA (abdominal aortic aneurysm) ([place] [address] )     last assessed: June 11, 2013    Appendicitis     BPH (benign prostatic hyperplasia)     CAD (coronary artery disease)     Cholelithiasis     last assessed: July 26, 2016    Heart attack ([place] [address] )     Hyperlipidemia     Hypertension     Insomnia     Stroke ([place] [address] )     Transient cerebral ischemia      Social History     Substance and Sexual Activity   Alcohol Use Yes    Alcohol/week: 7 0 standard drinks    Types: 4 Cans of beer, 3 Shots of liquor per week    Comment: SOCIAL USE - occasionally      Social History     Substance and Sexual Activity   Drug Use No     Social History     Tobacco Use   Smoking Status Former Smoker   Smokeless Tobacco Never Used   Tobacco Comment    Quit 1980's about 25 years x1ppd       Allergies:  No Known Allergies    Medications:     Current Outpatient Medications:     amLODIPine (NORVASC) 10 mg tablet, Take 1 tablet (10 mg total) by mouth daily, Disp: 90 tablet, Rfl: 3    clopidogrel (PLAVIX) 75 mg tablet, Take 1 tablet (75 mg total) by mouth daily, Disp: 90 tablet, Rfl: 3    isosorbide mononitrate (IMDUR) 60 mg 24 hr tablet, Take 2 tablets (120 mg total) by mouth daily, Disp: 180 tablet, Rfl: 3    metoprolol tartrate (LOPRESSOR) 100 mg tablet, Take 1 tablet (100 mg total) by mouth every 12 (twelve) hours, Disp: 180 tablet, Rfl: 11    ranolazine (RANEXA) 500 mg 12 hr tablet, Take 1 tablet (500 mg total) by mouth 2 (two) times a day, Disp: 180 tablet, Rfl: 3    simvastatin (ZOCOR) 40 mg tablet, Take 1 tablet (40 mg total) by mouth daily at bedtime, Disp: 90 tablet, Rfl: 3    nitroglycerin (NITROSTAT) 0 4 mg SL tablet, Place 1 tablet (0 4 mg total) under the tongue every 5 (five) minutes as needed for chest pain (Patient not taking: Reported on 9/24/2020), Disp: 90 tablet, Rfl: 1    tamsulosin (FLOMAX) 0 4 mg, Take 1 capsule (0 4 mg total) by mouth daily with dinner, Disp: 90 capsule, Rfl: 3      Vitals:    04/01/22 1111   BP: 102/66   Pulse: (!) 50     Weight (last 2 days)     Date/Time Weight    04/01/22 1111 87 2 (192 3)        Physical Exam  Constitutional:       General: He is not in acute distress  Appearance: He is not diaphoretic  HENT:      Head: Normocephalic and atraumatic  Eyes:      General: No scleral icterus  Conjunctiva/sclera: Conjunctivae normal    Neck:      Vascular: No JVD  Cardiovascular:      Rate and Rhythm: Normal rate and regular rhythm  Heart sounds: Normal heart sounds  No murmur heard  Pulmonary:      Effort: Pulmonary effort is normal  No respiratory distress  Breath sounds: Normal breath sounds  No decreased breath sounds, wheezing, rhonchi or rales  Musculoskeletal:      Cervical back: Normal range of motion  Right lower leg: Normal  No edema  Left lower leg: Normal  No edema  Skin:     General: Skin is warm and dry     Neurological: Mental Status: He is alert and oriented to person, place, and time             Laboratory Studies:  Chem:   Lab Results   Component Value Date     12/03/2015     12/02/2015     08/20/2015    K 4 7 03/26/2022    K 3 9 11/11/2019    K 4 2 12/10/2018    K 4 3 12/03/2015    K 4 0 12/02/2015    K 4 8 08/20/2015     03/26/2022     11/11/2019     12/10/2018     12/03/2015     12/02/2015     08/20/2015    CO2 31 03/26/2022    CO2 26 11/11/2019    CO2 25 12/10/2018    CO2 28 1 12/03/2015    CO2 32 6 (H) 12/02/2015    CO2 29 3 08/20/2015    GLUCOSE 121 12/03/2015    GLUCOSE 105 12/02/2015    GLUCOSE 105 08/20/2015    CREATININE 1 03 03/26/2022    CREATININE 0 89 11/11/2019    CREATININE 0 86 12/10/2018    CREATININE 0 86 12/03/2015    CREATININE 0 92 12/02/2015    CREATININE 0 89 08/20/2015    BUN 13 03/26/2022    BUN 14 11/11/2019    BUN 13 12/10/2018    BUN 15 12/03/2015    BUN 17 12/02/2015    BUN 15 08/20/2015    MG 1 9 12/10/2018     NT-proBNP:   Coags:  Lipid Profile:   Lab Results   Component Value Date    CHOL 138 08/20/2015    CHOL 135 09/30/2014    LDLCALC 54 03/26/2022    LDLCALC 57 11/11/2019    LDLCALC 66 12/10/2018    LDLCALC 67 08/20/2015    LDLCALC 65 09/30/2014    HDL 43 03/26/2022    HDL 35 (L) 11/11/2019    HDL 41 12/10/2018    HDL 42 08/20/2015    HDL 46 09/30/2014    TRIG 103 03/26/2022    TRIG 112 11/11/2019    TRIG 90 12/10/2018    TRIG 147 08/20/2015    TRIG 120 09/30/2014       Cardiac testing:   EKG reviewed personally:    Results for orders placed or performed in visit on 04/01/22   POCT ECG    Impression     Sinus bradycardia, first-degree AV block         Cardiac catheterization  12/18 - 50% nonobstructive disease of all major vessels    Stress Myoview  1/19-anterior wall ischemia    Diana Pandey MD    Portions of the record may have been created with voice recognition software   Occasional wrong word or "sound a like" substitutions may have occurred due to the inherent limitations of voice recognition software   Read the chart carefully and recognize, using context, where substitutions have occurred

## 2022-05-04 ENCOUNTER — TRANSCRIBE ORDERS (OUTPATIENT)
Dept: VASCULAR SURGERY | Facility: CLINIC | Age: 77
End: 2022-05-04

## 2022-05-04 ENCOUNTER — TELEPHONE (OUTPATIENT)
Dept: ADMINISTRATIVE | Facility: HOSPITAL | Age: 77
End: 2022-05-04

## 2022-05-04 DIAGNOSIS — I72.4 ANEURYSM OF POPLITEAL ARTERY (HCC): Primary | ICD-10-CM

## 2022-05-04 NOTE — TELEPHONE ENCOUNTER
Patient called in today to report he is having increased swelling of his right lower extremity  He stated he thought he was due for a follow up but hadn't received a call from us  I advised he had an appointment after his previous doppler on 6/28/21 and he no showed  I spoke with one of our nurses Jayda Perrin) and she advised if he is symptomatic we can move his doppler up now and have an office visit to determine what is causing his symptoms  Patient reports the swelling has gradually increased over the last month  I scheduled his doppler for 5/5/22 and OV with Dakota Dixon for 5/10/22

## 2022-05-05 ENCOUNTER — HOSPITAL ENCOUNTER (OUTPATIENT)
Dept: NON INVASIVE DIAGNOSTICS | Facility: CLINIC | Age: 77
Discharge: HOME | End: 2022-05-05
Payer: COMMERCIAL

## 2022-05-05 DIAGNOSIS — I72.4 ANEURYSM OF POPLITEAL ARTERY (HCC): ICD-10-CM

## 2022-05-05 PROCEDURE — 93923 UPR/LXTR ART STDY 3+ LVLS: CPT

## 2022-05-05 PROCEDURE — 93925 LOWER EXTREMITY STUDY: CPT | Performed by: SURGERY

## 2022-05-05 PROCEDURE — 93922 UPR/L XTREMITY ART 2 LEVELS: CPT | Performed by: SURGERY

## 2022-05-05 PROCEDURE — 93925 LOWER EXTREMITY STUDY: CPT

## 2022-05-10 ENCOUNTER — OFFICE VISIT (OUTPATIENT)
Dept: VASCULAR SURGERY | Facility: CLINIC | Age: 77
End: 2022-05-10
Payer: COMMERCIAL

## 2022-05-10 VITALS
DIASTOLIC BLOOD PRESSURE: 84 MMHG | SYSTOLIC BLOOD PRESSURE: 140 MMHG | HEIGHT: 65 IN | BODY MASS INDEX: 31.65 KG/M2 | HEART RATE: 54 BPM | WEIGHT: 190 LBS | RESPIRATION RATE: 18 BRPM

## 2022-05-10 DIAGNOSIS — I65.23 ASYMPTOMATIC BILATERAL CAROTID ARTERY STENOSIS: ICD-10-CM

## 2022-05-10 DIAGNOSIS — I71.4 ABDOMINAL AORTIC ANEURYSM WITHOUT RUPTURE (HCC): Primary | ICD-10-CM

## 2022-05-10 DIAGNOSIS — M79.89 LEG SWELLING: ICD-10-CM

## 2022-05-10 DIAGNOSIS — I72.3 ANEURYSM OF ILIAC ARTERY (HCC): ICD-10-CM

## 2022-05-10 DIAGNOSIS — I72.4 POPLITEAL ARTERY ANEURYSM, BILATERAL (HCC): ICD-10-CM

## 2022-05-10 DIAGNOSIS — I72.4 ANEURYSM OF POPLITEAL ARTERY (HCC): ICD-10-CM

## 2022-05-10 PROCEDURE — 3079F DIAST BP 80-89 MM HG: CPT

## 2022-05-10 PROCEDURE — 3077F SYST BP >= 140 MM HG: CPT

## 2022-05-10 PROCEDURE — 1160F RVW MEDS BY RX/DR IN RCRD: CPT

## 2022-05-10 PROCEDURE — 99204 OFFICE O/P NEW MOD 45 MIN: CPT

## 2022-05-10 PROCEDURE — 1036F TOBACCO NON-USER: CPT

## 2022-05-10 NOTE — PATIENT INSTRUCTIONS
AAA  -You have a 3 9 cm aneurysm above the aortic graft  You are overdue for imaging, so we will get an updated ultrasound to monitor the progression  -Continue with good control of your blood pressure  -Recommend low-sodium, low-fat diet    Popliteal Ectasia   -You have a widening of the artery behind the knee, this is common in patients with aneurysms  -We will monitor this with yearly arterial ultrasound     Carotid artery disease  -You have mild disease in both carotid arteries   -Please continue taking the Plavix and cholesterol medication   -We will get an updated carotid artery duplex in 1 year and follow up afterwards to discuss results  Leg swelling  -I gave you a prescription for compression stockings today   Please wear these every day (on in am, off in pm)

## 2022-05-10 NOTE — PROGRESS NOTES
Assessment/Plan:    Abdominal aortic aneurysm without rupture (HCC)  Hx of AAA with AAA repair on 6/7/2011 now with infrarenal AAA measuring 3 9 cm  Diagnostics:   AOIL 10/2020 reviewed: Wide patency is demonstrated through the abdominal aortic tube graft with mid-graft bend noted  The aorta proximal to the graft is aneurysmal measuring approximately 3 9 cm (Prior 4 0 cm)  The right common iliac artery is ectatic measuring 1 6 cm (Prior 1 6 cm)  The left common iliac artery is ectatic measuring 1 6 cm (Prior 1 6 cm)  Patent celiac and superior mesenteric artery  Patent bilateral renal arteries arteries  Ankle/Brachial Indices were: Rt - 1 10 (Prior 1 16) and Lt -1 09 (Prior 1 20)    Recommendations:   -Asymptomatic  -Will obtain updated AOIL to assess aneurysm size   -Continue adequate BP control, mgmt per cardiology/ PCP  -Educated on s/sx of AAA rupture and to call 911 if symptoms occur  -Instructed patient to call office with questions, concerns, or new symptoms    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[address] )  Will obtain updated AOIL to assess iliac arteries  -Asymptomatic  -Denies rest pain, claudications    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HCC)  Hx of stable popliteal artery ectasia  SHABBIR 5/5/22 was reviewed:   R popliteal artery 1 1 (prior 1 1)  L popliteal artery 1 0 (prior 1 0)    -Will continue to monitor with yearly LEAD to assess for progression   -Continue plavix  -Continue statin   -Educated on s/sx of worsening condition    Asymptomatic bilateral carotid artery stenosis  Hx of mild bilateral carotid artery stenosis <50%  -Asymptomatic  Denies TIA/CVA sx  -Continue plavix  -Continue statin   -Recommend low-fat, low-sodium diet and regular exercise  -Will continue to monitor with yearly CV duplex    -Educated on s/sx of TIA or stroke and to call 911 or go to the ED if symptoms occur       Leg swelling  67 yo male with past medical history of AAA s/p repair in 2011 now with AAA above graft, iliac artery aneurysm, popliteal ectasia, asymptomatic bilateral carotid stenosis, HTN, HLD, TIA, presents today for results review of noninvasive imaging done on 5/5/22 and evaluation of BLE swelling x several months  Patient states BLE swelling has been worsening over the last month R>L  No hx of surgeries or trauma to the leg    -Denies rest pain, claudication, pruritis, wounds to BLE  -Does not wear compression but does occassionally elevate his legs  -He notes increased swelling of the legs with increased salt intake    -No hx of DVT or clotting disorder  -BLE warm,perfused without cyanosis  -2+ edema R>L  No erythema, warmth, or pain in BLE  No concern for acute DVT at this time  Diagnostics:   LEAD 5/5/22 was reviewed:   --RLE: Mild diffuse disease noted throughout the femoral and popliteal arteries, with no significant stenosis  The popliteal artery is ectatic measuring 1 1 cm  (Prior 1 1cm )  GALILEO:  1 22 (Prior 1 26)/ / GTP 96  PVR/ PPG tracings are normal    Note: There is a well defined hypoechoic non-vascularized structure noted in the popliteal fossa consistent with a Bakers cyst, measures 4 23 x 1 01 cm      --LLE: Mild diffuse disease noted throughout the femoral and popliteal arteries, with >75% stenosis in the distal common femoral artery and in the proximal superficial femoral artery  The popliteal artery is ectatic measuring 1 0 cm  (Prior 1 0 cm )  GALILEO:  1 05 (Prior 1 23)/ / GTP 87  ankle tracings are normal, metatarsal PVR//PPG tracings are mildly dampened  Recommendations:   -No acute vascular intervention indicated at this time  -Swelling likely 2/2 venous insufficiency  Recommended conservative therapy with daily use of compression stockings, elevation of legs, regular exercise/ weight management, low-sodium diet and diligent skin care     -Continue plavix and statin therapy   -Prescription given for compression stockings 20-30 mmHg today    -Educated on s/sx of worsening condition and when to call the office for re-evaluation  -f/u 1 year or sooner if needed  -Instructed to call the office in the interim with questions, concerns, or new symptoms  Diagnoses and all orders for this visit:    Abdominal aortic aneurysm without rupture ([address] )  -     VAS abdominal aorta/iliacs; complete study; Future    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HCC)  -     VAS abdominal aorta/iliacs; complete study; Future    Popliteal artery aneurysm, bilateral (HCC)  -     VAS lower limb arterial duplex, complete bilateral; Future    Asymptomatic bilateral carotid artery stenosis  -     VAS carotid complete study; Future    Leg swelling  -     Compression Stocking    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HCC)          Subjective:      Patient ID: Boyd Mccann Sr  is a 68 y o  male  Patient presents today with his wife for results review of noninvasive imaging done on 05/05/2022  Patient complains of swelling in the bilateral lower extremities R>L over the past few months  He states the swelling has been slightly worsening over the past month  He denies leg pain at rest and with walking, numbness, or tingling  There is no evidence of bulging varicosities on exam   Pt does not wear compression stockings or elevate his legs throughout the day  He denies redness or increased temperature of the leg  He denies history of DVT or clotting disorder  I recommended daily usage of compression stockings and frequent elevation of the legs to promote blood return to the heart  Prescription for compression stockings was given today  I also recommended regular exercise and weight management, low-sodium diet, diligent skin care routine to prevent breaks in the skin  Pt is on Plavix and Simvastatin  Patient has a history AAA with repair in 2011  He now has a AAA above the aortic graft measuring 3 9 cm  Patient is overdue for serial imaging studies  Will obtain updated AOIL to assess aneurysm size    Patient has history of popliteal ectasia, which has been relatively stable  SHABBIR 5/5/22 was reviewed with patient there is evidence of diffuse disease in the right lower extremity without significant stenosis right lower extremity GALILEO 1 22/ 167/ 96  Left lower extremity with evidence of greater than 75% stenosis of distal common femoral artery and proximal SFA  GALILEO 1 05/ 137/ 87  Will obtain updated lower extremity arterial duplex in 1 year  Patient has history of bilateral carotid artery stenosis less than 50%  Patient is currently asymptomatic  He denies TIA/CVA symptoms  Will obtain updated carotid artery duplex in 1 year  Patient was educated on signs and symptoms of TIA/stroke and to call 911 if symptoms occur  He will return in 1 year sooner if needed  HPI    The following portions of the patient's history were reviewed and updated as appropriate: allergies, current medications, past family history, past medical history, past social history, past surgical history and problem list     Review of Systems   Constitutional: Negative  HENT: Negative  Eyes: Negative  Respiratory: Negative  Cardiovascular: Positive for leg swelling (RLE)  Gastrointestinal: Negative  Endocrine: Negative  Genitourinary: Negative  Musculoskeletal: Negative  Skin: Negative  Allergic/Immunologic: Negative  Neurological: Negative  Hematological: Negative  Psychiatric/Behavioral: Negative  I have personally reviewed and made appropriate changes to the ROS that was input by the medical assistant         Objective:        Vitals:    05/10/22 1515   BP: 140/84   BP Location: Left arm   Patient Position: Sitting   Pulse: (!) 54   Resp: 18   Weight: 86 2 kg (190 lb)   Height: 5' 5" (1 651 m)       Patient Active Problem List   Diagnosis    Closed compression fracture of first lumbar vertebra (HCC)    Abdominal aortic aneurysm without rupture (HCC)    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HCC)    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[facility] [address]    Asymptomatic bilateral carotid artery stenosis    Coronary artery disease involving native coronary artery of native heart without angina pectoris    Hyperlipidemia    Hypertension    Transient cerebral ischemic attack    Need for pneumococcal vaccination    Hematuria    Microhematuria    Erectile dysfunction    Leg swelling       Past Surgical History:   Procedure Laterality Date    ABDOMINAL AORTIC ANEURYSM REPAIR  06/07/2011    Dr Reji Bar 6/7/11    APPENDECTOMY      CATARACT EXTRACTION Right 11/2018    CHOLECYSTECTOMY      CHOLECYSTECTOMY LAPAROSCOPIC      COLONOSCOPY  11/2006    CORONARY ANGIOPLASTY      3 STENTS INSERTED    CORONARY ANGIOPLASTY      CORONARY ANGIOPLASTY      with stenting - 3/2012    [place] LAP,CHOLECYSTECTOMY/GRAPH N/A 9/8/2016    Procedure: CHOLECYSTECTOMY LAPAROSCOPIC with intra-op cholangiogram ;  Surgeon: Migue Pino MD;  Location: [facility];  Service: General       Family History   Problem Relation Age of Onset    Heart disease Mother     Hypertension Mother         Benign Essential     Stroke Mother         Stroke     Heart disease Father     Diabetes Brother         mellitus     Heart disease Brother     Aortic aneurysm Brother     Heart disease Family        Social History     Socioeconomic History    Marital status: /Civil Union     Spouse name: Not on file    Number of children: Not on file    Years of education: Not on file    Highest education level: Not on file   Occupational History    Occupation: Retired    Tobacco Use    Smoking status: Former Smoker    Smokeless tobacco: Never Used    Tobacco comment: Quit 1980's about 25 years [id number]   Vaping Use    Vaping Use: Never used   Substance and Sexual Activity    Alcohol use:  Yes     Alcohol/week: 7 0 standard drinks     Types: 4 Cans of beer, 3 Shots of liquor per week     Comment: SOCIAL USE - occasionally     Drug use: No    Sexual activity: Not Currently   Other Topics Concern    Not on file   Social History Narrative    Not on file     Social Determinants of Health     Financial Resource Strain: Not on file   Food Insecurity: Not on file   Transportation Needs: Not on file   Physical Activity: Not on file   Stress: Not on file   Social Connections: Not on file   Intimate Partner Violence: Not on file   Housing Stability: Not on file       No Known Allergies      Current Outpatient Medications:     amLODIPine (NORVASC) 10 mg tablet, Take 1 tablet (10 mg total) by mouth daily, Disp: 90 tablet, Rfl: 3    isosorbide mononitrate (IMDUR) 60 mg 24 hr tablet, Take 2 tablets (120 mg total) by mouth daily, Disp: 180 tablet, Rfl: 3    metoprolol tartrate (LOPRESSOR) 50 mg tablet, Take 1 tablet (50 mg total) by mouth every 12 (twelve) hours, Disp: 180 tablet, Rfl: 3    ranolazine (RANEXA) 500 mg 12 hr tablet, Take 1 tablet (500 mg total) by mouth 2 (two) times a day, Disp: 180 tablet, Rfl: 3    sildenafil (VIAGRA) 100 mg tablet, Take 1 tablet (100 mg total) by mouth daily as needed for erectile dysfunction, Disp: 10 tablet, Rfl: 3    simvastatin (ZOCOR) 40 mg tablet, Take 1 tablet (40 mg total) by mouth daily at bedtime, Disp: 90 tablet, Rfl: 3    tamsulosin (FLOMAX) 0 4 mg, Take 1 capsule (0 4 mg total) by mouth daily with dinner, Disp: 90 capsule, Rfl: 3    clopidogrel (PLAVIX) 75 mg tablet, Take 1 tablet (75 mg total) by mouth daily, Disp: 90 tablet, Rfl: 3    nitroglycerin (NITROSTAT) 0 4 mg SL tablet, Place 1 tablet (0 4 mg total) under the tongue every 5 (five) minutes as needed for chest pain (Patient not taking: Reported on 9/24/2020), Disp: 90 tablet, Rfl: 1    /84 (BP Location: Left arm, Patient Position: Sitting)   Pulse (!) 54   Resp 18   Ht 5' 5" (1 651 m)   Wt 86 2 kg (190 lb)   BMI 31 62 kg/m²          Physical Exam  Vitals and nursing note reviewed  Constitutional:       Appearance: Normal appearance  He is obese     HENT:      Head: Normocephalic and atraumatic  Eyes:      Extraocular Movements: Extraocular movements intact  Pupils: Pupils are equal, round, and reactive to light  Neck:      Vascular: No carotid bruit  Cardiovascular:      Rate and Rhythm: Normal rate and regular rhythm  Pulses:           Radial pulses are 2+ on the right side and 2+ on the left side  Femoral pulses are 2+ on the right side and 2+ on the left side  Popliteal pulses are 1+ on the right side and 1+ on the left side  Dorsalis pedis pulses are detected w/ Doppler on the right side and detected w/ Doppler on the left side  Posterior tibial pulses are detected w/ Doppler on the right side and detected w/ Doppler on the left side  Heart sounds: Normal heart sounds  Comments: No carotid bruit   Pulmonary:      Effort: Pulmonary effort is normal  No respiratory distress  Breath sounds: Normal breath sounds  Abdominal:      General: Bowel sounds are normal  There is no distension  Palpations: Abdomen is soft  Comments: No abdominal bruit or pulsatile masses  Musculoskeletal:         General: No tenderness  Normal range of motion  Cervical back: Normal range of motion and neck supple  Right lower le+ Edema present  Left lower le+ Edema present  Comments: BLE 2+ edema R>L  Skin:     General: Skin is warm  Capillary Refill: Capillary refill takes less than 2 seconds  Findings: No erythema  Comments: BLE are warm without erythema, tenderness, or pain  Neurological:      General: No focal deficit present  Mental Status: He is alert and oriented to person, place, and time     Psychiatric:         Mood and Affect: Mood normal          Behavior: Behavior normal

## 2022-05-10 NOTE — ASSESSMENT & PLAN NOTE
69 yo male with past medical history of AAA s/p repair in 2011 now with AAA above graft, iliac artery aneurysm, popliteal ectasia, asymptomatic bilateral carotid stenosis, HTN, HLD, TIA, presents today for results review of noninvasive imaging done on 5/5/22 and evaluation of BLE swelling x several months  Patient states BLE swelling has been worsening over the last month R>L  No hx of surgeries or trauma to the leg    -Denies rest pain, claudication, pruritis, wounds to BLE  -Does not wear compression but does occassionally elevate his legs  -He notes increased swelling of the legs with increased salt intake    -No hx of DVT or clotting disorder  -BLE warm,perfused without cyanosis  -2+ edema R>L  No erythema, warmth, or pain in BLE  No concern for acute DVT at this time  Diagnostics:   LEAD 5/5/22 was reviewed:   --RLE: Mild diffuse disease noted throughout the femoral and popliteal arteries, with no significant stenosis  The popliteal artery is ectatic measuring 1 1 cm  (Prior 1 1cm )  GALILEO:  1 22 (Prior 1 26)/ / GTP 96  PVR/ PPG tracings are normal    Note: There is a well defined hypoechoic non-vascularized structure noted in the popliteal fossa consistent with a Bakers cyst, measures 4 23 x 1 01 cm      --LLE: Mild diffuse disease noted throughout the femoral and popliteal arteries, with >75% stenosis in the distal common femoral artery and in the proximal superficial femoral artery  The popliteal artery is ectatic measuring 1 0 cm  (Prior 1 0 cm )  GALILEO:  1 05 (Prior 1 23)/ / GTP 87  ankle tracings are normal, metatarsal PVR//PPG tracings are mildly dampened  Recommendations:   -No acute vascular intervention indicated at this time  -Swelling likely 2/2 venous insufficiency  Recommended conservative therapy with daily use of compression stockings, elevation of legs, regular exercise/ weight management, low-sodium diet and diligent skin care     -Continue ASA 81 mg and statin therapy -Prescription given for compression stockings 20-30 mmHg today    -Educated on s/sx of worsening condition and when to call the office for re-evaluation  -f/u 1 year or sooner if needed  -Instructed to call the office in the interim with questions, concerns, or new symptoms

## 2022-05-10 NOTE — ASSESSMENT & PLAN NOTE
Will obtain updated AOIL to assess iliac arteries     -Asymptomatic  -Denies rest pain, claudications

## 2022-05-10 NOTE — ASSESSMENT & PLAN NOTE
Hx of mild bilateral carotid artery stenosis <50%  -Asymptomatic  Denies TIA/CVA sx  -Continue plavix  -Continue statin   -Recommend low-fat, low-sodium diet and regular exercise  -Will continue to monitor with yearly CV duplex    -Educated on s/sx of TIA or stroke and to call 911 or go to the ED if symptoms occur

## 2022-05-10 NOTE — ASSESSMENT & PLAN NOTE
Hx of AAA with AAA repair on 6/7/2011 now with infrarenal AAA measuring 3 9 cm  Diagnostics:   AOIL 10/2020 reviewed: Wide patency is demonstrated through the abdominal aortic tube graft with mid-graft bend noted  The aorta proximal to the graft is aneurysmal measuring approximately 3 9 cm (Prior 4 0 cm)  The right common iliac artery is ectatic measuring 1 6 cm (Prior 1 6 cm)  The left common iliac artery is ectatic measuring 1 6 cm (Prior 1 6 cm)  Patent celiac and superior mesenteric artery  Patent bilateral renal arteries arteries    Ankle/Brachial Indices were: Rt - 1 10 (Prior 1 16) and Lt -1 09 (Prior 1 20)    Recommendations:   -Asymptomatic  -Will obtain updated AOIL to assess aneurysm size   -Continue adequate BP control, mgmt per cardiology/ PCP  -Educated on s/sx of AAA rupture and to call 911 if symptoms occur  -Instructed patient to call office with questions, concerns, or new symptoms

## 2022-05-10 NOTE — ASSESSMENT & PLAN NOTE
Hx of stable popliteal artery ectasia     SHABBIR 5/5/22 was reviewed:   R popliteal artery 1 1 (prior 1 1)  L popliteal artery 1 0 (prior 1 0)    -Will continue to monitor with yearly LEAD to assess for progression   -Continue plavix  -Continue statin   -Educated on s/sx of worsening condition

## 2022-06-02 DIAGNOSIS — I50.9 CONGESTIVE HEART FAILURE (HCC): ICD-10-CM

## 2022-06-02 RX ORDER — CLOPIDOGREL BISULFATE 75 MG/1
TABLET ORAL
Qty: 90 TABLET | Refills: 3 | Status: SHIPPED | OUTPATIENT
Start: 2022-06-02

## 2022-06-08 ENCOUNTER — HOSPITAL ENCOUNTER (OUTPATIENT)
Dept: NON INVASIVE DIAGNOSTICS | Facility: CLINIC | Age: 77
Discharge: HOME | End: 2022-06-08
Payer: COMMERCIAL

## 2022-06-08 DIAGNOSIS — I71.4 ABDOMINAL AORTIC ANEURYSM WITHOUT RUPTURE (HCC): ICD-10-CM

## 2022-06-08 DIAGNOSIS — I72.3 ANEURYSM OF ILIAC ARTERY (HCC): ICD-10-CM

## 2022-06-08 PROCEDURE — 93923 UPR/LXTR ART STDY 3+ LVLS: CPT

## 2022-06-08 PROCEDURE — 93978 VASCULAR STUDY: CPT | Performed by: SURGERY

## 2022-06-08 PROCEDURE — 93978 VASCULAR STUDY: CPT

## 2022-06-09 ENCOUNTER — TRANSCRIBE ORDERS (OUTPATIENT)
Dept: VASCULAR SURGERY | Facility: CLINIC | Age: 77
End: 2022-06-09

## 2022-06-09 DIAGNOSIS — I71.4 ABDOMINAL AORTIC ANEURYSM WITHOUT RUPTURE (HCC): Primary | ICD-10-CM

## 2022-07-08 DIAGNOSIS — I20.8 ANGINA AT REST (HCC): ICD-10-CM

## 2022-07-08 DIAGNOSIS — I25.118 CORONARY ARTERY DISEASE OF NATIVE ARTERY OF NATIVE HEART WITH STABLE ANGINA PECTORIS (HCC): ICD-10-CM

## 2022-07-08 RX ORDER — ISOSORBIDE MONONITRATE 60 MG/1
120 TABLET, EXTENDED RELEASE ORAL DAILY
Qty: 180 TABLET | Refills: 3 | Status: SHIPPED | OUTPATIENT
Start: 2022-07-08 | End: 2022-09-29 | Stop reason: SDUPTHER

## 2022-07-08 RX ORDER — RANOLAZINE 500 MG/1
500 TABLET, EXTENDED RELEASE ORAL 2 TIMES DAILY
Qty: 180 TABLET | Refills: 3 | Status: SHIPPED | OUTPATIENT
Start: 2022-07-08 | End: 2022-09-13 | Stop reason: SDUPTHER

## 2022-09-13 DIAGNOSIS — I25.118 CORONARY ARTERY DISEASE OF NATIVE ARTERY OF NATIVE HEART WITH STABLE ANGINA PECTORIS (HCC): ICD-10-CM

## 2022-09-13 DIAGNOSIS — E78.5 HYPERLIPIDEMIA, UNSPECIFIED HYPERLIPIDEMIA TYPE: ICD-10-CM

## 2022-09-13 RX ORDER — RANOLAZINE 500 MG/1
500 TABLET, EXTENDED RELEASE ORAL 2 TIMES DAILY
Qty: 180 TABLET | Refills: 3 | Status: SHIPPED | OUTPATIENT
Start: 2022-09-13

## 2022-09-13 RX ORDER — SIMVASTATIN 40 MG
40 TABLET ORAL
Qty: 90 TABLET | Refills: 3 | Status: SHIPPED | OUTPATIENT
Start: 2022-09-13

## 2022-09-29 DIAGNOSIS — I20.8 ANGINA AT REST (HCC): ICD-10-CM

## 2022-09-29 RX ORDER — ISOSORBIDE MONONITRATE 60 MG/1
120 TABLET, EXTENDED RELEASE ORAL DAILY
Qty: 180 TABLET | Refills: 3 | Status: SHIPPED | OUTPATIENT
Start: 2022-09-29

## 2022-10-13 DIAGNOSIS — I20.9 ANGINA, CLASS III (HCC): Chronic | ICD-10-CM

## 2022-10-13 RX ORDER — METOPROLOL TARTRATE 50 MG/1
50 TABLET, FILM COATED ORAL EVERY 12 HOURS SCHEDULED
Qty: 14 TABLET | Refills: 0 | Status: SHIPPED | OUTPATIENT
Start: 2022-10-13 | End: 2022-10-25 | Stop reason: SDUPTHER

## 2022-10-25 DIAGNOSIS — I20.9 ANGINA, CLASS III (HCC): Chronic | ICD-10-CM

## 2022-10-25 RX ORDER — METOPROLOL TARTRATE 50 MG/1
50 TABLET, FILM COATED ORAL EVERY 12 HOURS SCHEDULED
Qty: 60 TABLET | Refills: 3 | Status: SHIPPED | OUTPATIENT
Start: 2022-10-25 | End: 2022-11-04 | Stop reason: SDUPTHER

## 2022-10-25 RX ORDER — METOPROLOL TARTRATE 50 MG/1
50 TABLET, FILM COATED ORAL EVERY 12 HOURS SCHEDULED
Qty: 60 TABLET | Refills: 3 | Status: SHIPPED | OUTPATIENT
Start: 2022-10-25 | End: 2022-10-25 | Stop reason: SDUPTHER

## 2022-11-04 DIAGNOSIS — I20.9 ANGINA, CLASS III (HCC): Chronic | ICD-10-CM

## 2022-11-04 RX ORDER — METOPROLOL TARTRATE 50 MG/1
50 TABLET, FILM COATED ORAL EVERY 12 HOURS SCHEDULED
Qty: 180 TABLET | Refills: 3 | Status: SHIPPED | OUTPATIENT
Start: 2022-11-04

## 2022-11-15 ENCOUNTER — OFFICE VISIT (OUTPATIENT)
Dept: CARDIOLOGY CLINIC | Facility: CLINIC | Age: 77
End: 2022-11-15

## 2022-11-15 VITALS
HEART RATE: 62 BPM | DIASTOLIC BLOOD PRESSURE: 68 MMHG | SYSTOLIC BLOOD PRESSURE: 122 MMHG | OXYGEN SATURATION: 97 % | WEIGHT: 186 LBS | HEIGHT: 65 IN | BODY MASS INDEX: 30.99 KG/M2

## 2022-11-15 DIAGNOSIS — I71.40 ABDOMINAL AORTIC ANEURYSM (AAA) WITHOUT RUPTURE, UNSPECIFIED PART: ICD-10-CM

## 2022-11-15 DIAGNOSIS — E78.49 OTHER HYPERLIPIDEMIA: ICD-10-CM

## 2022-11-15 DIAGNOSIS — I10 PRIMARY HYPERTENSION: ICD-10-CM

## 2022-11-15 DIAGNOSIS — I25.10 CORONARY ARTERY DISEASE INVOLVING NATIVE CORONARY ARTERY OF NATIVE HEART WITHOUT ANGINA PECTORIS: Primary | ICD-10-CM

## 2022-11-15 NOTE — PROGRESS NOTES
[facility]  Follow up note  [facility]  68 y o  male MRN: [id number]        Problems    1  Coronary artery disease involving native coronary artery of native heart without angina pectoris     2  Other hyperlipidemia     3  Abdominal aortic aneurysm (AAA) without rupture, unspecified part     4  Primary hypertension         Impression:    Vicki Loo returns to see me for a follow-up visit    CAD  Stress Myoview 1/19 suggests ischemic response, but catheterization 1 month prior only showed moderate nonobstructive disease, nothing truly amenable to PCI, had him obtain a 2nd opinion from Interventional Cardiology, Dr Delfina Francisco, who agreed with would have we have dinners will need anymore snacks of 1 irregular fruits flap Yina order it is too early for dinner but you any EM my assessment  He is exercising 15-20 minutes on the treadmill, and in doing some upper extremity strengthening exercises, all without anginal symptoms, on a regimen of metoprolol, Imdur, amlodipine, ranolazine  Hypertension  Blood pressure is well controlled    Vascular disease is followed closely by the vascular department   AAA repair with mild proximal dilation of 3 9 centimeters  Mild carotid artery stenosis  Mild iliac artery aneurysms    Hyperlipidemia  Lipids are well controlled      Plan:    No additional cardiac testing needed at this time   Lowering metoprolol previously did not improve erectile function, he does not respond to phosphodiesterase 5 inhibitor therapy, and he is not interested any urological procedures   I will have him follow-up in approximately 6 months  Her    HPI:   Christian Clemente Sr  is a 68y o  year old male with hypertension, hyperlipidemia, CAD defined by multivessel disease noted in 2006, but only severe obstruction of the obtuse marginal status post stenting at that time  He has a history of AAA, AAA repair, iliac aneurysms, celiac stenosis, bilateral carotid artery stenosis      His angina is largely absent at this time, he exercises 15-20 minutes on a treadmill per day, he does additional upper arm exercises for mild weight training  He has no exertional angina with any of this level of activity  He is on a regimen of amlodipine, Imdur, metoprolol, Ranexa  He has not had a change in symptoms in any other way shape or form, including any rest symptoms  His blood pressure is controlled  His lipids are controlled  His vascular imaging and recent follow-up with vascular earlier this year proved stability of his AAA repair with proximal dilation at 39 millimeters, mild carotid artery stenosis bilaterally, and is small iliac artery aneurysms  He denies claudication  Erectile dysfunction is his biggest complaint, cutting back on metoprolol did not improve symptoms, Viagra has not been helpful, he is not interested in speaking with urology  Review of Systems   Constitutional: Negative for appetite change, diaphoresis, fatigue and fever  Respiratory: Negative for chest tightness, shortness of breath and wheezing  Cardiovascular: Negative for chest pain, palpitations and leg swelling  Gastrointestinal: Negative for abdominal pain and blood in stool  Musculoskeletal: Negative for arthralgias and joint swelling  Skin: Negative for rash  Neurological: Negative for dizziness, syncope and light-headedness           Past Medical History:   Diagnosis Date   • AAA (abdominal aortic aneurysm)     last assessed: June 11, 2013   • Appendicitis    • BPH (benign prostatic hyperplasia)    • CAD (coronary artery disease)    • Cholelithiasis     last assessed: July 26, 2016   • Heart attack [facility])    • Hyperlipidemia    • Hypertension    • Insomnia    • Stroke [facility])    • Transient cerebral ischemia      Social History     Substance and Sexual Activity   Alcohol Use Yes   • Alcohol/week: 7 0 standard drinks   • Types: 4 Cans of beer, 3 Shots of liquor per week    Comment: SOCIAL USE - occasionally      Social History     Substance and Sexual Activity   Drug Use No     Social History     Tobacco Use   Smoking Status Former Smoker   Smokeless Tobacco Never Used   Tobacco Comment    Quit 1980's about 25 years x1ppd       Allergies:  No Known Allergies    Medications:     Current Outpatient Medications:   •  amLODIPine (NORVASC) 10 mg tablet, Take 1 tablet (10 mg total) by mouth daily, Disp: 90 tablet, Rfl: 3  •  clopidogrel (PLAVIX) 75 mg tablet, TAKE ONE TABLET BY MOUTH EVERY DAY, Disp: 90 tablet, Rfl: 3  •  isosorbide mononitrate (IMDUR) 60 mg 24 hr tablet, Take 2 tablets (120 mg total) by mouth daily, Disp: 180 tablet, Rfl: 3  •  metoprolol tartrate (LOPRESSOR) 50 mg tablet, Take 1 tablet (50 mg total) by mouth every 12 (twelve) hours, Disp: 180 tablet, Rfl: 3  •  ranolazine (RANEXA) 500 mg 12 hr tablet, Take 1 tablet (500 mg total) by mouth 2 (two) times a day, Disp: 180 tablet, Rfl: 3  •  simvastatin (ZOCOR) 40 mg tablet, Take 1 tablet (40 mg total) by mouth daily at bedtime, Disp: 90 tablet, Rfl: 3  •  tamsulosin (FLOMAX) 0 4 mg, Take 1 capsule (0 4 mg total) by mouth daily with dinner, Disp: 90 capsule, Rfl: 3  •  nitroglycerin (NITROSTAT) 0 4 mg SL tablet, Place 1 tablet (0 4 mg total) under the tongue every 5 (five) minutes as needed for chest pain (Patient not taking: Reported on 9/24/2020), Disp: 90 tablet, Rfl: 1      Vitals:    11/15/22 1331   BP: 122/68   Pulse: 62   SpO2: 97%     Weight (last 2 days)     Date/Time Weight    11/15/22 1331 84 4 (186)        Physical Exam  Constitutional:       General: He is not in acute distress  Appearance: He is not diaphoretic  HENT:      Head: Normocephalic and atraumatic  Eyes:      General: No scleral icterus  Conjunctiva/sclera: Conjunctivae normal    Neck:      Vascular: No JVD  Cardiovascular:      Rate and Rhythm: Normal rate and regular rhythm  Heart sounds: Normal heart sounds  No murmur heard    Pulmonary:      Effort: Pulmonary effort is normal  No respiratory distress  Breath sounds: Normal breath sounds  No decreased breath sounds, wheezing, rhonchi or rales  Musculoskeletal:      Cervical back: Normal range of motion  Right lower leg: Normal  No edema  Left lower leg: Normal  No edema  Skin:     General: Skin is warm and dry  Neurological:      Mental Status: He is alert and oriented to person, place, and time  Laboratory Studies:  Chem:   Lab Results   Component Value Date     12/03/2015     12/02/2015     08/20/2015    K 4 7 03/26/2022    K 3 9 11/11/2019    K 4 2 12/10/2018    K 4 3 12/03/2015    K 4 0 12/02/2015    K 4 8 08/20/2015     03/26/2022     11/11/2019     12/10/2018     12/03/2015     12/02/2015     08/20/2015    CO2 31 03/26/2022    CO2 26 11/11/2019    CO2 25 12/10/2018    CO2 28 1 12/03/2015    CO2 32 6 (H) 12/02/2015    CO2 29 3 08/20/2015    GLUCOSE 121 12/03/2015    GLUCOSE 105 12/02/2015    GLUCOSE 105 08/20/2015    CREATININE 1 03 03/26/2022    CREATININE 0 89 11/11/2019    CREATININE 0 86 12/10/2018    CREATININE 0 86 12/03/2015    CREATININE 0 92 12/02/2015    CREATININE 0 89 08/20/2015    BUN 13 03/26/2022    BUN 14 11/11/2019    BUN 13 12/10/2018    BUN 15 12/03/2015    BUN 17 12/02/2015    BUN 15 08/20/2015    MG 1 9 12/10/2018     NT-proBNP:   Coags:  Lipid Profile:   Lab Results   Component Value Date    CHOL 138 08/20/2015    CHOL 135 09/30/2014    LDLCALC 54 03/26/2022    LDLCALC 57 11/11/2019    LDLCALC 66 12/10/2018    LDLCALC 67 08/20/2015    LDLCALC 65 09/30/2014    HDL 43 03/26/2022    HDL 35 (L) 11/11/2019    HDL 41 12/10/2018    HDL 42 08/20/2015    HDL 46 09/30/2014    TRIG 103 03/26/2022    TRIG 112 11/11/2019    TRIG 90 12/10/2018    TRIG 147 08/20/2015    TRIG 120 09/30/2014       Cardiac testing:   EKG reviewed personally:    No results found for this visit on 11/15/22        Cardiac catheterization  12/18 - 50% nonobstructive disease of all major vessels    Stress Myoview  1/19-anterior wall ischemia    Leonora Barbour MD    Portions of the record may have been created with voice recognition software   Occasional wrong word or "sound a like" substitutions may have occurred due to the inherent limitations of voice recognition software   Read the chart carefully and recognize, using context, where substitutions have occurred

## 2022-12-09 ENCOUNTER — HOSPITAL ENCOUNTER (OUTPATIENT)
Dept: NON INVASIVE DIAGNOSTICS | Facility: CLINIC | Age: 77
Discharge: HOME | End: 2022-12-09

## 2022-12-09 DIAGNOSIS — I71.40 ABDOMINAL AORTIC ANEURYSM WITHOUT RUPTURE: ICD-10-CM

## 2022-12-14 ENCOUNTER — TRANSCRIBE ORDERS (OUTPATIENT)
Dept: VASCULAR SURGERY | Facility: CLINIC | Age: 77
End: 2022-12-14

## 2022-12-14 DIAGNOSIS — I71.43 INFRARENAL ABDOMINAL AORTIC ANEURYSM (AAA) WITHOUT RUPTURE: Primary | ICD-10-CM

## 2023-03-20 DIAGNOSIS — I20.9 ANGINA, CLASS III (HCC): Chronic | ICD-10-CM

## 2023-03-20 DIAGNOSIS — R39.9 LOWER URINARY TRACT SYMPTOMS (LUTS): ICD-10-CM

## 2023-03-21 RX ORDER — TAMSULOSIN HYDROCHLORIDE 0.4 MG/1
0.4 CAPSULE ORAL
Qty: 90 CAPSULE | Refills: 3 | OUTPATIENT
Start: 2023-03-21

## 2023-03-22 RX ORDER — AMLODIPINE BESYLATE 10 MG/1
10 TABLET ORAL DAILY
Qty: 90 TABLET | Refills: 3 | Status: SHIPPED | OUTPATIENT
Start: 2023-03-22

## 2023-03-28 ENCOUNTER — TELEPHONE (OUTPATIENT)
Dept: VASCULAR SURGERY | Facility: CLINIC | Age: 78
End: 2023-03-28

## 2023-03-28 NOTE — TELEPHONE ENCOUNTER
Attempted to contact patient to schedule appointment(s) listed below  Requested patient call [phone] option 3 to schedule appointment(s)  Patient's appointment(s) are due now 5/10/2023  Dopplers  [x] Abdominal Aorta Iliac (AOIL) Due 6/2023  [x] Carotid (CV) Due 5/10/2023[] Celiac and/or Mesenteric  [] Endovascular Aortic Repair (EVAR)   [] Hemodialysis Access (HD)   [] Lower Limb Arterial (SHABBIR) [] Lower Limb Venous (LEV)  [] Lower Limb Venous Duplex with Reflux (LEVDR)  [] Renal Artery  [] Upper Limb Arterial (UEA)    [] Upper Limb Venous (UEV)              [] GALILEO and Waveform analysis     Advanced Imaging   [] CTA head/neck    [] CTA abdomen    [] CTA abdomen & pelvis    [] CT abdomen with/ without contrast  [] CT abdomen with contrast  [] CT abdomen without contrast    [] CT abdomen & pelvis with/ without contrast  [] CT abdomen & pelvis with contrast  [] CT abdomen & pelvis without contrast    Office Visit   [] New patient, patient last seen over 3 years ago  [x] Risk factor modification (RFM)   [] Follow up   [] Lost to follow up (LTFU)  L/S ARUN 5/10/2022  Due 5/10/2023 RFM 1 YEAR F/U     5/10/2022

## 2023-04-01 DIAGNOSIS — I20.8 ANGINA AT REST (HCC): ICD-10-CM

## 2023-04-01 DIAGNOSIS — I20.9 ANGINA, CLASS III (HCC): Chronic | ICD-10-CM

## 2023-04-01 DIAGNOSIS — I25.118 CORONARY ARTERY DISEASE OF NATIVE ARTERY OF NATIVE HEART WITH STABLE ANGINA PECTORIS (HCC): ICD-10-CM

## 2023-04-01 DIAGNOSIS — R39.9 LOWER URINARY TRACT SYMPTOMS (LUTS): ICD-10-CM

## 2023-04-03 RX ORDER — RANOLAZINE 500 MG/1
500 TABLET, EXTENDED RELEASE ORAL 2 TIMES DAILY
Qty: 180 TABLET | Refills: 3 | Status: SHIPPED | OUTPATIENT
Start: 2023-04-03

## 2023-04-03 RX ORDER — ISOSORBIDE MONONITRATE 60 MG/1
120 TABLET, EXTENDED RELEASE ORAL DAILY
Qty: 180 TABLET | Refills: 0 | Status: SHIPPED | OUTPATIENT
Start: 2023-04-03

## 2023-04-03 RX ORDER — METOPROLOL TARTRATE 50 MG/1
50 TABLET, FILM COATED ORAL EVERY 12 HOURS SCHEDULED
Qty: 180 TABLET | Refills: 0 | Status: SHIPPED | OUTPATIENT
Start: 2023-04-03

## 2023-04-03 RX ORDER — TAMSULOSIN HYDROCHLORIDE 0.4 MG/1
0.4 CAPSULE ORAL
Qty: 90 CAPSULE | Refills: 0 | Status: SHIPPED | OUTPATIENT
Start: 2023-04-03

## 2023-05-04 ENCOUNTER — TELEPHONE (OUTPATIENT)
Dept: VASCULAR SURGERY | Facility: CLINIC | Age: 78
End: 2023-05-04

## 2023-05-18 ENCOUNTER — HOSPITAL ENCOUNTER (OUTPATIENT)
Dept: NON INVASIVE DIAGNOSTICS | Facility: CLINIC | Age: 78
Discharge: HOME | End: 2023-05-18

## 2023-05-18 DIAGNOSIS — I65.23 ASYMPTOMATIC BILATERAL CAROTID ARTERY STENOSIS: ICD-10-CM
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
## 2023-05-23 ENCOUNTER — OFFICE VISIT (OUTPATIENT)
Dept: CARDIOLOGY CLINIC | Facility: CLINIC | Age: 78
End: 2023-05-23

## 2023-05-23 VITALS
SYSTOLIC BLOOD PRESSURE: 132 MMHG | BODY MASS INDEX: 32.46 KG/M2 | OXYGEN SATURATION: 97 % | DIASTOLIC BLOOD PRESSURE: 76 MMHG | HEIGHT: 65 IN | HEART RATE: 56 BPM | WEIGHT: 194.8 LBS

## 2023-05-23 DIAGNOSIS — S29.011A MUSCLE STRAIN OF CHEST WALL, INITIAL ENCOUNTER: ICD-10-CM

## 2023-05-23 DIAGNOSIS — E78.49 OTHER HYPERLIPIDEMIA: ICD-10-CM

## 2023-05-23 DIAGNOSIS — I20.9 ANGINA, CLASS III (HCC): Chronic | ICD-10-CM

## 2023-05-23 DIAGNOSIS — I65.23 ASYMPTOMATIC BILATERAL CAROTID ARTERY STENOSIS: ICD-10-CM

## 2023-05-23 DIAGNOSIS — I71.40 ABDOMINAL AORTIC ANEURYSM (AAA) WITHOUT RUPTURE, UNSPECIFIED PART (HCC): ICD-10-CM

## 2023-05-23 DIAGNOSIS — I10 PRIMARY HYPERTENSION: ICD-10-CM

## 2023-05-23 DIAGNOSIS — I25.10 CORONARY ARTERY DISEASE INVOLVING NATIVE CORONARY ARTERY OF NATIVE HEART WITHOUT ANGINA PECTORIS: Primary | ICD-10-CM

## 2023-05-23 DIAGNOSIS — G45.8 OTHER SPECIFIED TRANSIENT CEREBRAL ISCHEMIAS: ICD-10-CM

## 2023-05-23 RX ORDER — METHOCARBAMOL 500 MG/1
500 TABLET, FILM COATED ORAL
Qty: 15 TABLET | Refills: 0 | Status: SHIPPED | OUTPATIENT
Start: 2023-05-23

## 2023-05-23 RX ORDER — NITROGLYCERIN 0.4 MG/1
0.4 TABLET SUBLINGUAL
Qty: 24 TABLET | Refills: 1 | Status: SHIPPED | OUTPATIENT
Start: 2023-05-23

## 2023-05-23 NOTE — PATIENT INSTRUCTIONS
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
## 2023-06-01 ENCOUNTER — TELEPHONE (OUTPATIENT)
Dept: CARDIOLOGY CLINIC | Facility: CLINIC | Age: 78
End: 2023-06-01

## 2023-06-01 DIAGNOSIS — I50.9 CONGESTIVE HEART FAILURE (HCC): ICD-10-CM

## 2023-06-01 NOTE — TELEPHONE ENCOUNTER
[facility] mail order requested 90 day -plavix-     They received a 30 day supply

## 2023-06-02 RX ORDER — CLOPIDOGREL BISULFATE 75 MG/1
75 TABLET ORAL DAILY
Qty: 90 TABLET | Refills: 3 | Status: SHIPPED | OUTPATIENT
Start: 2023-06-02

## 2023-06-21 ENCOUNTER — OFFICE VISIT (OUTPATIENT)
Dept: FAMILY MEDICINE CLINIC | Facility: CLINIC | Age: 78
End: 2023-06-21
Payer: COMMERCIAL

## 2023-06-21 VITALS
RESPIRATION RATE: 18 BRPM | HEIGHT: 65 IN | WEIGHT: 195.5 LBS | BODY MASS INDEX: 32.57 KG/M2 | TEMPERATURE: 97.8 F | OXYGEN SATURATION: 97 % | HEART RATE: 57 BPM | SYSTOLIC BLOOD PRESSURE: 129 MMHG | DIASTOLIC BLOOD PRESSURE: 68 MMHG

## 2023-06-21 DIAGNOSIS — Z00.00 MEDICARE ANNUAL WELLNESS VISIT, SUBSEQUENT: Primary | ICD-10-CM

## 2023-06-21 DIAGNOSIS — E78.49 OTHER HYPERLIPIDEMIA: ICD-10-CM

## 2023-06-21 DIAGNOSIS — R20.2 PARESTHESIA: ICD-10-CM

## 2023-06-21 DIAGNOSIS — I10 PRIMARY HYPERTENSION: ICD-10-CM

## 2023-06-21 DIAGNOSIS — I25.10 CORONARY ARTERY DISEASE INVOLVING NATIVE CORONARY ARTERY OF NATIVE HEART WITHOUT ANGINA PECTORIS: ICD-10-CM

## 2023-06-21 PROCEDURE — G0439 PPPS, SUBSEQ VISIT: HCPCS | Performed by: FAMILY MEDICINE

## 2023-06-21 PROCEDURE — 99213 OFFICE O/P EST LOW 20 MIN: CPT | Performed by: FAMILY MEDICINE

## 2023-06-21 NOTE — PROGRESS NOTES
Assessment and Plan:     Problem List Items Addressed This Visit        Cardiovascular and Mediastinum    Coronary artery disease involving native coronary artery of native heart without angina pectoris     Coronary artery disease  Patient appears to be in stable health and has no symptoms at this time  He will follow-up with cardiologist to monitor chronic condition  He was encouraged to restart her more regular form of aerobic exercise         Relevant Orders    CBC    Comprehensive metabolic panel    Lipid panel    TSH, 3rd generation    CBC    Comprehensive metabolic panel    Lipid panel    TSH, 3rd generation    Hypertension     Hypertension  The patient's blood pressure is stable at this time and he will continue current regimen of medications         Relevant Orders    CBC    Comprehensive metabolic panel    Lipid panel    TSH, 3rd generation    CBC    Comprehensive metabolic panel    Lipid panel    TSH, 3rd generation       Other    Hyperlipidemia     Hyperlipidemia  Patient will check lipid panel blood work and continue with current dose of statin therapy         Relevant Orders    CBC    Comprehensive metabolic panel    Lipid panel    TSH, 3rd generation    CBC    Comprehensive metabolic panel    Lipid panel    TSH, 3rd generation    Paresthesia     Paresthesia  Patient was given prescription to obtain EMG testing of her right arm to rule out carpal tunnel syndrome  We will make further recommendations pending results of test          Relevant Orders    EMG 1 Limb   Other Visit Diagnoses     Medicare annual wellness visit, subsequent    -  Primary           Preventive health issues were discussed with patient, and age appropriate screening tests were ordered as noted in patient's After Visit Summary  Personalized health advice and appropriate referrals for health education or preventive services given if needed, as noted in patient's After Visit Summary       History of Present Illness:     Patient presents for a Medicare Wellness Visit    Office to discuss chronic conditions  He recently saw his cardiologist who felt his left-sided chest discomfort was due to muscular etiology  Symptoms resolved with the use of muscle relaxer  He will follow-up with his usual cardiologist in July 2023  He denies any chest pain or shortness of breath at this time    Patient relates 1 month history of numbness and tingling of fingertips of his first 3 fingers on his right hand  Symptoms are fairly constant  Patient admits to some weight loss due to lack of exercise that he had been performing previously on a regular basis but has not been active recently with his treadmill or walking     Patient Care Team:  Alaina Murdock MD as PCP - General (Family Medicine)  MD Nneka Eli Sherald Lacrosse Doctor, MD Toi Mack PA-C Willadean Fredericks, MD Ivey Plough, MD     Review of Systems:     Review of Systems   Constitutional: Negative  HENT: Negative  Eyes: Negative  Respiratory: Negative  Cardiovascular: Negative  Gastrointestinal: Negative  Genitourinary: Negative  Musculoskeletal: Negative  Skin: Negative  Neurological: Positive for numbness  Psychiatric/Behavioral: Negative           Problem List:     Patient Active Problem List   Diagnosis   • Closed compression fracture of first lumbar vertebra (HCC)   • Abdominal aortic aneurysm without rupture (HCC)   • 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HCC)   • 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HCC)   • Asymptomatic bilateral carotid artery stenosis   • Coronary artery disease involving native coronary artery of native heart without angina pectoris   • Hyperlipidemia   • Hypertension   • Transient cerebral ischemic attack   • Need for pneumococcal vaccination   • Hematuria   • Microhematuria   • Erectile dysfunction   • Leg swelling   • Paresthesia      Past Medical and Surgical History:     Past Medical History:   Diagnosis Date   • AAA (abdominal aortic aneurysm) ([facility] [address] )     last assessed: June 11, 2013   • Appendicitis    • BPH (benign prostatic hyperplasia)    • CAD (coronary artery disease)    • Cholelithiasis     last assessed: July 26, 2016   • Heart attack [facility])    • Hyperlipidemia    • Hypertension    • Insomnia    • Stroke [facility])    • Transient cerebral ischemia      Past Surgical History:   Procedure Laterality Date   • ABDOMINAL AORTIC ANEURYSM REPAIR  06/07/2011    Dr Gomez Ayoub 6/7/11   • APPENDECTOMY     • CATARACT EXTRACTION Right 11/2018   • CHOLECYSTECTOMY     • CHOLECYSTECTOMY LAPAROSCOPIC     • COLONOSCOPY  11/2006   • CORONARY ANGIOPLASTY      3 STENTS INSERTED   • CORONARY ANGIOPLASTY     • CORONARY ANGIOPLASTY      with stenting - 3/2012   • IL LAPS SURG CHOLECYSTECTOMY W/CHOLANGIOGRAPHY N/A 9/8/2016    Procedure: CHOLECYSTECTOMY LAPAROSCOPIC with intra-op cholangiogram ;  Surgeon: Romayne Adjutant, MD;  Location: [facility];  Service: General      Family History:     Family History   Problem Relation Age of Onset   • Heart disease Mother    • Hypertension Mother         Benign Essential    • Stroke Mother         Stroke    • Heart disease Father    • Diabetes Brother         mellitus    • Heart disease Brother    • Aortic aneurysm Brother    • Heart disease Family       Social History:     Social History     Socioeconomic History   • Marital status: /Civil Union     Spouse name: None   • Number of children: None   • Years of education: None   • Highest education level: None   Occupational History   • Occupation: Retired    Tobacco Use   • Smoking status: Former   • Smokeless tobacco: Never   • Tobacco comments:     Quit 1980's about 25 years [id number]   Vaping Use   • Vaping Use: Never used   Substance and Sexual Activity   • Alcohol use:  Yes     Alcohol/week: 7 0 standard drinks of alcohol     Types: 4 Cans of beer, 3 Shots of liquor per week     Comment: SOCIAL USE - occasionally    • Drug use: No   • Sexual activity: Not Currently   Other Topics Concern   • None   Social History Narrative   • None     Social Determinants of Health     Financial Resource Strain: Low Risk  (6/21/2023)    Overall Financial Resource Strain (CARDIA)    • Difficulty of Paying Living Expenses: Not very hard   Food Insecurity: Not on file   Transportation Needs: No Transportation Needs (6/21/2023)    PRAPARE - Transportation    • Lack of Transportation (Medical): No    • Lack of Transportation (Non-Medical): No   Physical Activity: Not on file   Stress: Not on file   Social Connections: Not on file   Intimate Partner Violence: Not on file   Housing Stability: Not on file      Medications and Allergies:     Current Outpatient Medications   Medication Sig Dispense Refill   • amLODIPine (NORVASC) 10 mg tablet Take 1 tablet (10 mg total) by mouth daily 90 tablet 3   • clopidogrel (PLAVIX) 75 mg tablet Take 1 tablet (75 mg total) by mouth daily 90 tablet 3   • isosorbide mononitrate (IMDUR) 60 mg 24 hr tablet Take 2 tablets (120 mg total) by mouth daily 180 tablet 0   • methocarbamol (ROBAXIN) 500 mg tablet Take 1 tablet (500 mg total) by mouth daily at bedtime as needed for muscle spasms 15 tablet 0   • metoprolol tartrate (LOPRESSOR) 50 mg tablet Take 1 tablet (50 mg total) by mouth every 12 (twelve) hours 180 tablet 0   • nitroglycerin (NITROSTAT) 0 4 mg SL tablet Place 1 tablet (0 4 mg total) under the tongue every 5 (five) minutes as needed for chest pain 24 tablet 1   • ranolazine (RANEXA) 500 mg 12 hr tablet Take 1 tablet (500 mg total) by mouth 2 (two) times a day 180 tablet 3   • simvastatin (ZOCOR) 40 mg tablet Take 1 tablet (40 mg total) by mouth daily at bedtime 90 tablet 3   • tamsulosin (FLOMAX) 0 4 mg Take 1 capsule (0 4 mg total) by mouth daily with dinner 90 capsule 0     No current facility-administered medications for this visit       No Known Allergies   Immunizations:     Immunization History   Administered Date(s) Administered   • COVID-19 MODERNA VACC 0 5 ML IM 02/08/2021, 11/06/2021   • INFLUENZA 10/15/2016, 11/03/2018, 11/03/2018, 10/24/2020, 10/16/2021, 10/15/2022   • Influenza Split High Dose Preservative Free IM 11/24/2019   • Influenza, high dose seasonal 0 7 mL 08/29/2020   • Influenza, seasonal, injectable 09/28/2011, 10/06/2014, 11/05/2017   • Pneumococcal Polysaccharide PPV23 08/06/2018   • Tdap 04/17/2017      Health Maintenance:         Topic Date Due   • Hepatitis C Screening  Completed   • Colorectal Cancer Screening  Discontinued         Topic Date Due   • Pneumococcal Vaccine: 65+ Years (2 - PCV) 08/06/2019   • COVID-19 Vaccine (3 - Moderna series) 01/01/2022      Medicare Screening Tests and Risk Assessments:         Health Risk Assessment:   Patient rates overall health as very good  Patient feels that their physical health rating is slightly better  Patient is very satisfied with their life  Eyesight was rated as same  Hearing was rated as slightly worse  Patient feels that their emotional and mental health rating is same  Patients states they are sometimes angry  Patient states they are sometimes unusually tired/fatigued  Pain experienced in the last 7 days has been none  Patient states that he has experienced weight loss or gain in last 6 months  Fall Risk Screening: In the past year, patient has experienced: no history of falling in past year      Home Safety:  Patient does not have trouble with stairs inside or outside of their home  Patient has working smoke alarms and has working carbon monoxide detector  Home safety hazards include: none  Nutrition:   Current diet is Low Carb  Medications:   Patient is not currently taking any over-the-counter supplements  Patient is able to manage medications       Activities of Daily Living (ADLs)/Instrumental Activities of Daily Living (IADLs):   Walk and transfer into and out of bed and chair?: Yes  Dress and groom yourself?: Yes    Bathe or shower yourself?: Yes    Feed "yourself? Yes  Do your laundry/housekeeping?: Yes  Manage your money, pay your bills and track your expenses?: Yes  Make your own meals?: Yes    Do your own shopping?: Yes    Previous Hospitalizations:   Any hospitalizations or ED visits within the last 12 months?: No      Advance Care Planning:   Living will: Yes    Durable POA for healthcare: Yes    Advanced directive: Yes      PREVENTIVE SCREENINGS      Cardiovascular Screening:    General: Screening Not Indicated and History Lipid Disorder    Due for: Lipid Panel      Diabetes Screening:     General: Risks and Benefits Discussed    Due for: Blood Glucose      Colorectal Cancer Screening:     General: Screening Not Indicated      Prostate Cancer Screening:    General: Screening Not Indicated      Abdominal Aortic Aneurysm (AAA) Screening:    Risk factors include: tobacco use        General: Screening Not Indicated and History AAA      Lung Cancer Screening:     General: Screening Not Indicated      Hepatitis C Screening:    General: Screening Current    Screening, Brief Intervention, and Referral to Treatment (SBIRT)    Screening  Typical number of drinks in a day: 3  Typical number of drinks in a week: 3  Interpretation: Low risk drinking behavior  Single Item Drug Screening:  How often have you used an illegal drug (including marijuana) or a prescription medication for non-medical reasons in the past year? never    Single Item Drug Screen Score: 0  Interpretation: Negative screen for possible drug use disorder    No results found  Physical Exam:     /68 (BP Location: Right arm, Patient Position: Sitting, Cuff Size: Adult)   Pulse 57   Temp 97 8 °F (36 6 °C)   Resp 18   Ht 5' 5\" (1 651 m)   Wt 88 7 kg (195 lb 8 oz)   SpO2 97%   BMI 32 53 kg/m²     Physical Exam  Vitals and nursing note reviewed  Constitutional:       General: He is not in acute distress  Appearance: Normal appearance  He is well-developed  He is not ill-appearing   " HENT:      Head: Normocephalic and atraumatic  Eyes:      General:         Right eye: No discharge  Left eye: No discharge  Extraocular Movements: Extraocular movements intact  Conjunctiva/sclera: Conjunctivae normal       Pupils: Pupils are equal, round, and reactive to light  Neck:      Vascular: No carotid bruit  Cardiovascular:      Rate and Rhythm: Normal rate and regular rhythm  Heart sounds: Normal heart sounds  No murmur heard  Pulmonary:      Effort: Pulmonary effort is normal  No respiratory distress  Breath sounds: Normal breath sounds  No wheezing or rhonchi  Abdominal:      General: Abdomen is flat  Bowel sounds are normal       Palpations: Abdomen is soft  Tenderness: There is no abdominal tenderness  There is no guarding or rebound  Musculoskeletal:      Right lower leg: No edema  Left lower leg: No edema  Lymphadenopathy:      Cervical: No cervical adenopathy  Skin:     General: Skin is warm and dry  Capillary Refill: Capillary refill takes less than 2 seconds  Neurological:      Mental Status: He is alert  Mental status is at baseline  Psychiatric:         Mood and Affect: Mood normal          Behavior: Behavior normal          Thought Content:  Thought content normal          Judgment: Judgment normal           Zane Green MD

## 2023-06-21 NOTE — ASSESSMENT & PLAN NOTE
Coronary artery disease  Patient appears to be in stable health and has no symptoms at this time  He will follow-up with cardiologist to monitor chronic condition    He was encouraged to restart her more regular form of aerobic exercise

## 2023-06-21 NOTE — PATIENT INSTRUCTIONS
Medicare Preventive Visit Patient Instructions  Thank you for completing your Welcome to Medicare Visit or Medicare Annual Wellness Visit today  Your next wellness visit will be due in one year (6/21/2024)  The screening/preventive services that you may require over the next 5-10 years are detailed below  Some tests may not apply to you based off risk factors and/or age  Screening tests ordered at today's visit but not completed yet may show as past due  Also, please note that scanned in results may not display below  Preventive Screenings:  Service Recommendations Previous Testing/Comments   Colorectal Cancer Screening  · Colonoscopy    · Fecal Occult Blood Test (FOBT)/Fecal Immunochemical Test (FIT)  · Fecal DNA/Cologuard Test  · Flexible Sigmoidoscopy Age: 39-70 years old   Colonoscopy: every 10 years (May be performed more frequently if at higher risk)  OR  FOBT/FIT: every 1 year  OR  Cologuard: every 3 years  OR  Sigmoidoscopy: every 5 years  Screening may be recommended earlier than age 39 if at higher risk for colorectal cancer  Also, an individualized decision between you and your healthcare provider will decide whether screening between the ages of 74-80 would be appropriate   Colonoscopy: 11/20/2006  FOBT/FIT: Not on file  Cologuard: Not on file  Sigmoidoscopy: Not on file    Screening Not Indicated     Prostate Cancer Screening Individualized decision between patient and health care provider in men between ages of 53-78   Medicare will cover every 12 months beginning on the day after your 50th birthday PSA: 0 3 ng/mL     Screening Not Indicated     Hepatitis C Screening Once for adults born between 1945 and 1965  More frequently in patients at high risk for Hepatitis C Hep C Antibody: 07/05/2016    Screening Current   Diabetes Screening 1-2 times per year if you're at risk for diabetes or have pre-diabetes Fasting glucose: 99 mg/dL (3/26/2022)  A1C: No results in last 5 years (No results in last 5 years)  Risks and Benefits Discussed  Due for Blood Glucose   Cholesterol Screening Once every 5 years if you don't have a lipid disorder  May order more often based on risk factors  Lipid panel: 03/26/2022  Screening Not Indicated  History Lipid Disorder  Due for Lipid Panel      Other Preventive Screenings Covered by Medicare:  1  Abdominal Aortic Aneurysm (AAA) Screening: covered once if your at risk  You're considered to be at risk if you have a family history of AAA or a male between the age of 73-68 who smoking at least 100 cigarettes in your lifetime  2  Lung Cancer Screening: covers low dose CT scan once per year if you meet all of the following conditions: (1) Age 50-69; (2) No signs or symptoms of lung cancer; (3) Current smoker or have quit smoking within the last 15 years; (4) You have a tobacco smoking history of at least 20 pack years (packs per day x number of years you smoked); (5) You get a written order from a healthcare provider  3  Glaucoma Screening: covered annually if you're considered high risk: (1) You have diabetes OR (2) Family history of glaucoma OR (3)  aged 48 and older OR (3)  American aged 72 and older  3  Osteoporosis Screening: covered every 2 years if you meet one of the following conditions: (1) Have a vertebral abnormality; (2) On glucocorticoid therapy for more than 3 months; (3) Have primary hyperparathyroidism; (4) On osteoporosis medications and need to assess response to drug therapy  5  HIV Screening: covered annually if you're between the age of 12-76  Also covered annually if you are younger than 13 and older than 72 with risk factors for HIV infection  For pregnant patients, it is covered up to 3 times per pregnancy      Immunizations:  Immunization Recommendations   Influenza Vaccine Annual influenza vaccination during flu season is recommended for all persons aged >= 6 months who do not have contraindications   Pneumococcal Vaccine   * Pneumococcal conjugate vaccine = PCV13 (Prevnar 13), PCV15 (Vaxneuvance), PCV20 (Prevnar 20)  * Pneumococcal polysaccharide vaccine = PPSV23 (Pneumovax) Adults 2364 years old: 1-3 doses may be recommended based on certain risk factors  Adults 72 years old: 1-2 doses may be recommended based off what pneumonia vaccine you previously received   Hepatitis B Vaccine 3 dose series if at intermediate or high risk (ex: diabetes, end stage renal disease, liver disease)   Tetanus (Td) Vaccine - COST NOT COVERED BY MEDICARE PART B Following completion of primary series, a booster dose should be given every 10 years to maintain immunity against tetanus  Td may also be given as tetanus wound prophylaxis  Tdap Vaccine - COST NOT COVERED BY MEDICARE PART B Recommended at least once for all adults  For pregnant patients, recommended with each pregnancy  Shingles Vaccine (Shingrix) - COST NOT COVERED BY MEDICARE PART B  2 shot series recommended in those aged 48 and above     Health Maintenance Due:      Topic Date Due   • Hepatitis C Screening  Completed   • Colorectal Cancer Screening  Discontinued     Immunizations Due:      Topic Date Due   • Pneumococcal Vaccine: 65+ Years (2 - PCV) 08/06/2019   • COVID-19 Vaccine (3 - Moderna series) 01/01/2022     Advance Directives   What are advance directives? Advance directives are legal documents that state your wishes and plans for medical care  These plans are made ahead of time in case you lose your ability to make decisions for yourself  Advance directives can apply to any medical decision, such as the treatments you want, and if you want to donate organs  What are the types of advance directives? There are many types of advance directives, and each state has rules about how to use them  You may choose a combination of any of the following:  · Living will: This is a written record of the treatment you want   You can also choose which treatments you do not want, which to limit, and which to stop at a certain time  This includes surgery, medicine, IV fluid, and tube feedings  · Durable power of  for healthcare [facility]): This is a written record that states who you want to make healthcare choices for you when you are unable to make them for yourself  This person, called a proxy, is usually a family member or a friend  You may choose more than 1 proxy  · Do not resuscitate (DNR) order:  A DNR order is used in case your heart stops beating or you stop breathing  It is a request not to have certain forms of treatment, such as CPR  A DNR order may be included in other types of advance directives  · Medical directive: This covers the care that you want if you are in a coma, near death, or unable to make decisions for yourself  You can list the treatments you want for each condition  Treatment may include pain medicine, surgery, blood transfusions, dialysis, IV or tube feedings, and a ventilator (breathing machine)  · Values history: This document has questions about your views, beliefs, and how you feel and think about life  This information can help others choose the care that you would choose  Why are advance directives important? An advance directive helps you control your care  Although spoken wishes may be used, it is better to have your wishes written down  Spoken wishes can be misunderstood, or not followed  Treatments may be given even if you do not want them  An advance directive may make it easier for your family to make difficult choices about your care  Weight Management   Why it is important to manage your weight:  Being overweight increases your risk of health conditions such as heart disease, high blood pressure, type 2 diabetes, and certain types of cancer  It can also increase your risk for osteoarthritis, sleep apnea, and other respiratory problems  Aim for a slow, steady weight loss   Even a small amount of weight loss can lower your risk of health problems  How to lose weight safely:  A safe and healthy way to lose weight is to eat fewer calories and get regular exercise  You can lose up about 1 pound a week by decreasing the number of calories you eat by 500 calories each day  Healthy meal plan for weight management:  A healthy meal plan includes a variety of foods, contains fewer calories, and helps you stay healthy  A healthy meal plan includes the following:  · Eat whole-grain foods more often  A healthy meal plan should contain fiber  Fiber is the part of grains, fruits, and vegetables that is not broken down by your body  Whole-grain foods are healthy and provide extra fiber in your diet  Some examples of whole-grain foods are whole-wheat breads and pastas, oatmeal, brown rice, and bulgur  · Eat a variety of vegetables every day  Include dark, leafy greens such as spinach, kale, malvin greens, and mustard greens  Eat yellow and orange vegetables such as carrots, sweet potatoes, and winter squash  · Eat a variety of fruits every day  Choose fresh or canned fruit (canned in its own juice or light syrup) instead of juice  Fruit juice has very little or no fiber  · Eat low-fat dairy foods  Drink fat-free (skim) milk or 1% milk  Eat fat-free yogurt and low-fat cottage cheese  Try low-fat cheeses such as mozzarella and other reduced-fat cheeses  · Choose meat and other protein foods that are low in fat  Choose beans or other legumes such as split peas or lentils  Choose fish, skinless poultry (chicken or turkey), or lean cuts of red meat (beef or pork)  Before you cook meat or poultry, cut off any visible fat  · Use less fat and oil  Try baking foods instead of frying them  Add less fat, such as margarine, sour cream, regular salad dressing and mayonnaise to foods  Eat fewer high-fat foods  Some examples of high-fat foods include french fries, doughnuts, ice cream, and cakes  · Eat fewer sweets    Limit foods and drinks that are high in sugar  This includes candy, cookies, regular soda, and sweetened drinks  Exercise:  Exercise at least 30 minutes per day on most days of the week  Some examples of exercise include walking, biking, dancing, and swimming  You can also fit in more physical activity by taking the stairs instead of the elevator or parking farther away from stores  Ask your healthcare provider about the best exercise plan for you  © Copyright [employer] 2018 Information is for End User's use only and may not be sold, redistributed or otherwise used for commercial purposes   All illustrations and images included in CareNotes® are the copyrighted property of A D A M , Inc  or [address] [place] [address]

## 2023-06-21 NOTE — ASSESSMENT & PLAN NOTE
Paresthesia  Patient was given prescription to obtain EMG testing of her right arm to rule out carpal tunnel syndrome    We will make further recommendations pending results of test

## 2023-06-22 ENCOUNTER — APPOINTMENT (OUTPATIENT)
Dept: LAB | Facility: CLINIC | Age: 78
End: 2023-06-22
Payer: COMMERCIAL

## 2023-06-22 DIAGNOSIS — E78.49 OTHER HYPERLIPIDEMIA: ICD-10-CM

## 2023-06-22 DIAGNOSIS — I10 PRIMARY HYPERTENSION: ICD-10-CM

## 2023-06-22 DIAGNOSIS — I25.10 CORONARY ARTERY DISEASE INVOLVING NATIVE CORONARY ARTERY OF NATIVE HEART WITHOUT ANGINA PECTORIS: ICD-10-CM

## 2023-06-22 LAB
ALBUMIN SERPL BCP-MCNC: 4.4 G/DL (ref 3.5–5)
ALP SERPL-CCNC: 47 U/L (ref 34–104)
ALT SERPL W P-5'-P-CCNC: 12 U/L (ref 7–52)
ANION GAP SERPL CALCULATED.3IONS-SCNC: 5 MMOL/L
AST SERPL W P-5'-P-CCNC: 17 U/L (ref 13–39)
BILIRUB SERPL-MCNC: 0.92 MG/DL (ref 0.2–1)
BUN SERPL-MCNC: 15 MG/DL (ref 5–25)
CALCIUM SERPL-MCNC: 9.6 MG/DL (ref 8.4–10.2)
CHLORIDE SERPL-SCNC: 103 MMOL/L (ref 96–108)
CHOLEST SERPL-MCNC: 129 MG/DL
CO2 SERPL-SCNC: 29 MMOL/L (ref 21–32)
CREAT SERPL-MCNC: 1.08 MG/DL (ref 0.6–1.3)
ERYTHROCYTE [DISTWIDTH] IN BLOOD BY AUTOMATED COUNT: 12.1 % (ref 11.6–15.1)
GFR SERPL CREATININE-BSD FRML MDRD: 65 ML/MIN/1.73SQ M
GLUCOSE P FAST SERPL-MCNC: 104 MG/DL (ref 65–99)
HCT VFR BLD AUTO: 40.1 % (ref 36.5–49.3)
HDLC SERPL-MCNC: 43 MG/DL
HGB BLD-MCNC: 13.4 G/DL (ref 12–17)
LDLC SERPL CALC-MCNC: 65 MG/DL (ref 0–100)
MCH RBC QN AUTO: 32.1 PG (ref 26.8–34.3)
MCHC RBC AUTO-ENTMCNC: 33.4 G/DL (ref 31.4–37.4)
MCV RBC AUTO: 96 FL (ref 82–98)
NONHDLC SERPL-MCNC: 86 MG/DL
PLATELET # BLD AUTO: 199 THOUSANDS/UL (ref 149–390)
PMV BLD AUTO: 8.5 FL (ref 8.9–12.7)
POTASSIUM SERPL-SCNC: 4.1 MMOL/L (ref 3.5–5.3)
PROT SERPL-MCNC: 7.6 G/DL (ref 6.4–8.4)
RBC # BLD AUTO: 4.18 MILLION/UL (ref 3.88–5.62)
SODIUM SERPL-SCNC: 137 MMOL/L (ref 135–147)
TRIGL SERPL-MCNC: 104 MG/DL
TSH SERPL DL<=0.05 MIU/L-ACNC: 1.48 UIU/ML (ref 0.45–4.5)
WBC # BLD AUTO: 5.04 THOUSAND/UL (ref 4.31–10.16)

## 2023-06-22 PROCEDURE — 80061 LIPID PANEL: CPT

## 2023-06-22 PROCEDURE — 36415 COLL VENOUS BLD VENIPUNCTURE: CPT

## 2023-06-22 PROCEDURE — 85027 COMPLETE CBC AUTOMATED: CPT

## 2023-06-22 PROCEDURE — 84443 ASSAY THYROID STIM HORMONE: CPT

## 2023-06-22 PROCEDURE — 80053 COMPREHEN METABOLIC PANEL: CPT

## 2023-06-28 ENCOUNTER — RA CDI HCC (OUTPATIENT)
Dept: OTHER | Facility: HOSPITAL | Age: 78
End: 2023-06-28

## 2023-06-28 NOTE — PROGRESS NOTES
[address]  coding opportunities       Chart reviewed, no opportunity found: CHART REVIEWED, NO OPPORTUNITY FOUND        Patients Insurance     Medicare Insurance: [employer]

## 2023-07-05 DIAGNOSIS — R39.9 LOWER URINARY TRACT SYMPTOMS (LUTS): ICD-10-CM

## 2023-07-05 DIAGNOSIS — I20.8 ANGINA AT REST (HCC): ICD-10-CM

## 2023-07-05 RX ORDER — TAMSULOSIN HYDROCHLORIDE 0.4 MG/1
0.4 CAPSULE ORAL
Qty: 90 CAPSULE | Refills: 3 | Status: SHIPPED | OUTPATIENT
Start: 2023-07-05 | End: 2023-07-07 | Stop reason: SDUPTHER

## 2023-07-05 RX ORDER — ISOSORBIDE MONONITRATE 60 MG/1
120 TABLET, EXTENDED RELEASE ORAL DAILY
Qty: 180 TABLET | Refills: 3 | Status: SHIPPED | OUTPATIENT
Start: 2023-07-05 | End: 2023-07-07 | Stop reason: SDUPTHER

## 2023-07-07 DIAGNOSIS — R39.9 LOWER URINARY TRACT SYMPTOMS (LUTS): ICD-10-CM

## 2023-07-07 DIAGNOSIS — I20.8 ANGINA AT REST (HCC): ICD-10-CM

## 2023-07-07 DIAGNOSIS — I25.118 CORONARY ARTERY DISEASE OF NATIVE ARTERY OF NATIVE HEART WITH STABLE ANGINA PECTORIS (HCC): ICD-10-CM

## 2023-07-07 RX ORDER — TAMSULOSIN HYDROCHLORIDE 0.4 MG/1
0.4 CAPSULE ORAL
Qty: 90 CAPSULE | Refills: 3 | Status: SHIPPED | OUTPATIENT
Start: 2023-07-07

## 2023-07-07 RX ORDER — ISOSORBIDE MONONITRATE 60 MG/1
120 TABLET, EXTENDED RELEASE ORAL DAILY
Qty: 180 TABLET | Refills: 3 | Status: SHIPPED | OUTPATIENT
Start: 2023-07-07

## 2023-07-07 RX ORDER — RANOLAZINE 500 MG/1
500 TABLET, EXTENDED RELEASE ORAL 2 TIMES DAILY
Qty: 180 TABLET | Refills: 3 | Status: SHIPPED | OUTPATIENT
Start: 2023-07-07

## 2023-07-16 ENCOUNTER — NURSE TRIAGE (OUTPATIENT)
Dept: OTHER | Facility: OTHER | Age: 78
End: 2023-07-16

## 2023-07-16 DIAGNOSIS — I20.8 ANGINA AT REST (HCC): ICD-10-CM

## 2023-07-16 DIAGNOSIS — R39.9 LOWER URINARY TRACT SYMPTOMS (LUTS): ICD-10-CM

## 2023-07-16 DIAGNOSIS — I25.10 CORONARY ARTERY DISEASE INVOLVING NATIVE CORONARY ARTERY OF NATIVE HEART WITHOUT ANGINA PECTORIS: Primary | ICD-10-CM

## 2023-07-16 RX ORDER — RANOLAZINE 500 MG/1
500 TABLET, EXTENDED RELEASE ORAL 2 TIMES DAILY
Qty: 14 TABLET | Refills: 0 | Status: SHIPPED | OUTPATIENT
Start: 2023-07-16 | End: 2023-07-25

## 2023-07-16 RX ORDER — TAMSULOSIN HYDROCHLORIDE 0.4 MG/1
0.4 CAPSULE ORAL
Qty: 7 CAPSULE | Refills: 0 | Status: SHIPPED | OUTPATIENT
Start: 2023-07-16 | End: 2023-07-25

## 2023-07-16 RX ORDER — ISOSORBIDE MONONITRATE 120 MG/1
120 TABLET, EXTENDED RELEASE ORAL DAILY
Qty: 7 TABLET | Refills: 0 | Status: SHIPPED | OUTPATIENT
Start: 2023-07-16 | End: 2023-07-18

## 2023-07-16 NOTE — TELEPHONE ENCOUNTER
Reason for Disposition  • [1] Prescription prescribed recently is not at pharmacy AND [2] triager has access to patient's EMR AND [3] prescription is recorded in the EMR    Answer Assessment - Initial Assessment Questions  1. NAME of MEDICATION: "What medicine are you calling about?"      Flomax, ranolazine, and imdur    2. QUESTION: "What is your question?" (e.g., medication refill, side effect)  Short supply sent to local pharmacy. Mail order has not come yet    3. PRESCRIBING HCP: "Who prescribed it?" Reason: if prescribed by specialist, call should be referred to that group. Dr. Rachel Castellanos    4.  SYMPTOMS: "Do you have any symptoms?"  No    Protocols used: MEDICATION QUESTION CALL-ADULT-

## 2023-07-16 NOTE — TELEPHONE ENCOUNTER
Regarding: MEDICATION RAN OUT MAIL ORDER DID NOT ARRIVE  ----- Message from Destiney Hymen sent at 7/16/2023  9:04 AM EDT -----  Patient called that mail order pharmacy did not send his medication, he tried to call but the recording does not tell him if they received the new script from 7/7. He is completely out of his medication.      tamsulosin (FLOMAX) 0.4 mg      Sig: Take 1 capsule (0.4 mg total) by mouth daily with dinner    ranolazine (RANEXA) 500 mg 12 hr tablet     Sig: Take 1 tablet (500 mg total) by mouth 2 (two) times a day    isosorbide mononitrate (IMDUR) 60 mg 24 hr tablet      Sig: Take 2 tablets (120 mg total) by mouth daily    [place] [address]   Phone:  [phone]  Fax:  [phone]

## 2023-07-17 ENCOUNTER — NURSE TRIAGE (OUTPATIENT)
Dept: OTHER | Facility: OTHER | Age: 78
End: 2023-07-17

## 2023-07-17 ENCOUNTER — TELEPHONE (OUTPATIENT)
Dept: CARDIOLOGY CLINIC | Facility: CLINIC | Age: 78
End: 2023-07-17

## 2023-07-17 DIAGNOSIS — I20.8 ANGINA AT REST (HCC): ICD-10-CM

## 2023-07-17 DIAGNOSIS — R39.9 LOWER URINARY TRACT SYMPTOMS (LUTS): ICD-10-CM

## 2023-07-17 DIAGNOSIS — I25.118 CORONARY ARTERY DISEASE OF NATIVE ARTERY OF NATIVE HEART WITH STABLE ANGINA PECTORIS (HCC): ICD-10-CM

## 2023-07-17 RX ORDER — RANOLAZINE 500 MG/1
500 TABLET, EXTENDED RELEASE ORAL 2 TIMES DAILY
Qty: 180 TABLET | Refills: 3 | Status: CANCELLED | OUTPATIENT
Start: 2023-07-17

## 2023-07-17 RX ORDER — TAMSULOSIN HYDROCHLORIDE 0.4 MG/1
0.4 CAPSULE ORAL
Qty: 90 CAPSULE | Refills: 3 | Status: CANCELLED | OUTPATIENT
Start: 2023-07-17

## 2023-07-17 RX ORDER — ISOSORBIDE MONONITRATE 60 MG/1
120 TABLET, EXTENDED RELEASE ORAL DAILY
Qty: 14 TABLET | Refills: 0 | Status: SHIPPED | OUTPATIENT
Start: 2023-07-17 | End: 2023-07-25 | Stop reason: SDUPTHER

## 2023-07-17 NOTE — TELEPHONE ENCOUNTER
Returned call to patient who is currently in the hospital with his brother but has  medication questions. He could not remember the names of his medications. Advised patient to return call to office when he can view his medications & return call to office.

## 2023-07-17 NOTE — TELEPHONE ENCOUNTER
Regarding: medication concern  ----- Message from Graceann Mortimer sent at 7/17/2023  5:52 PM EDT -----  "I need to cancel this callback request from the nurse. The prescription sent is correct"    ----- Message from Sheridan Martinez sent at 7/17/2023  5:45 PM EDT -----  " I was prescribed the incorrect dose on my isosorbide mononitrate (IMDUR) medication.  It 120mg one tablet twice a day and they only prescribed my 60 mg twice a day."

## 2023-07-17 NOTE — TELEPHONE ENCOUNTER
Reason for Disposition  • Caller has cancelled the call before the first contact    Protocols used: NO CONTACT OR DUPLICATE CONTACT CALL-ADULT-

## 2023-07-18 RX ORDER — ISOSORBIDE MONONITRATE 120 MG/1
TABLET, EXTENDED RELEASE ORAL
Qty: 7 TABLET | Refills: 0 | Status: SHIPPED | OUTPATIENT
Start: 2023-07-18 | End: 2023-07-25

## 2023-07-21 ENCOUNTER — HOSPITAL ENCOUNTER (OUTPATIENT)
Dept: NON INVASIVE DIAGNOSTICS | Facility: CLINIC | Age: 78
Discharge: HOME | End: 2023-07-21
Payer: COMMERCIAL

## 2023-07-21 DIAGNOSIS — I71.43 INFRARENAL ABDOMINAL AORTIC ANEURYSM (AAA) WITHOUT RUPTURE (HCC): ICD-10-CM

## 2023-07-21 PROCEDURE — 93923 UPR/LXTR ART STDY 3+ LVLS: CPT | Performed by: SURGERY

## 2023-07-21 PROCEDURE — 93978 VASCULAR STUDY: CPT | Performed by: SURGERY

## 2023-07-21 PROCEDURE — 93978 VASCULAR STUDY: CPT

## 2023-07-23 ENCOUNTER — TELEPHONE (OUTPATIENT)
Dept: OTHER | Facility: OTHER | Age: 78
End: 2023-07-23

## 2023-07-23 ENCOUNTER — NURSE TRIAGE (OUTPATIENT)
Dept: OTHER | Facility: OTHER | Age: 78
End: 2023-07-23

## 2023-07-23 DIAGNOSIS — R39.9 LOWER URINARY TRACT SYMPTOMS (LUTS): ICD-10-CM

## 2023-07-23 DIAGNOSIS — I25.10 CORONARY ARTERY DISEASE INVOLVING NATIVE CORONARY ARTERY OF NATIVE HEART WITHOUT ANGINA PECTORIS: Primary | ICD-10-CM

## 2023-07-23 RX ORDER — TAMSULOSIN HYDROCHLORIDE 0.4 MG/1
0.4 CAPSULE ORAL
Qty: 7 CAPSULE | Refills: 0 | Status: SHIPPED | OUTPATIENT
Start: 2023-07-23 | End: 2023-07-25 | Stop reason: SDUPTHER

## 2023-07-23 RX ORDER — RANOLAZINE 500 MG/1
500 TABLET, EXTENDED RELEASE ORAL 2 TIMES DAILY
Qty: 14 TABLET | Refills: 0 | Status: SHIPPED | OUTPATIENT
Start: 2023-07-23 | End: 2023-07-25 | Stop reason: SDUPTHER

## 2023-07-23 NOTE — TELEPHONE ENCOUNTER
Pt. Is out of his medications again and called for emergency fill. He wants to know why the 3 medications he needs don't seem to be getting to his mail order pharmacy. Can someone look into this issue and get the medication ordered please.      He needs:      isosorbide mononitrate (IMDUR) 60 mg 24 hr tablet Take 2 tablets (120 mg total) by mouth daily     ranolazine (RANEXA) 500 mg 12 hr tablet Take 1 tablet (500 mg total) by mouth 2 (two) times a day for 7 days     tamsulosin (FLOMAX) 0.4 mg Take 1 capsule (0.4 mg total) by mouth daily with dinner

## 2023-07-23 NOTE — TELEPHONE ENCOUNTER
Regarding: emergency refills  ----- Message from Owen Bhagat sent at 7/23/2023  9:46 AM EDT -----  "I need refills on these medications because I haven't received them from [facility] mail order and I am out again. I need a 14 day supply of the following 3 medications:    1. isosorbide mononitrate (IMDUR) 60 mg 24 hr tablet twice a day   2. tamsulosin (FLOMAX) 0.4 mg [[id number]]    Sig: Take 1 capsule (0.4 mg total) by mouth daily with dinner  3. ranolazine (RANEXA) 500 mg 12 hr tablet    It needs to be sent to [address] [place] [address] in [facility] again.  Phone:  [phone]  Fax:  [phone]

## 2023-07-23 NOTE — TELEPHONE ENCOUNTER
Reason for Disposition  • [1] Caller requesting a prescription renewal (no refills left), no triage required, AND [2] triager able to renew prescription per department policy    Answer Assessment - Initial Assessment Questions  1. DRUG NAME: "What medicine do you need to have refilled?"      Medication Refill Request     Name Ranexa    Dose/Frequency 500 mg- 1 tab 2 times a day  Quantity 180  Verified pharmacy   [x ]  Verified ordering Provider   [ x]  Does patient have enough for the next 3 days? Yes [ ] No [x ]    Medication Refill Request     Name Tamsulosin   Dose/Frequency 0.4 mg daily with dinner  Quantity 90   Verified pharmacy   [x ]  Verified ordering Provider   [ x]  Does patient have enough for the next 3 days? Yes [ ] No [ x]    2. PRESCRIBING HCP: "Who prescribed it?" Reason: If prescribed by specialist, call should be referred to that group. Dr. Marilyn Erazo    Protocols used: MEDICATION REFILL AND RENEWAL CALL-ADULT-AH    Patient states he has a few more Imdur for a few more days. Patient made aware to please follow up with cardiology for remainder of refills- please follow up with patient. Temporary refills for Ranexa and Tamsulosin were sent to pharmacy.

## 2023-07-24 NOTE — PROGRESS NOTES
Assessment/Plan:    Abdominal aortic aneurysm without rupture [facility])  69 yo male with HTH, HLD, TIA, CAD s/p PCI '12, AAA s/p repair in '11 (Marcos Becky), iliac artery aneurysm, popliteal ectasia, and asymptomatic bilateral carotid stenosis presents to review imaging and [facility]. - patient presents without complaints. Denies abdominal, back, or leg pain  - Palpable distal PT pulses bilaterally    AOIL 7/21/2023-without significant change or progression of disease  Wide patency is demonstrated throughout the abdominal aortic tube graft. The aorta proximal to the graft is aneurysmal, 4.3 cm (Prior 4.2 cm)  The common iliac arteries are ectatic bilaterally, Rt - 1.6 cm, Lt 1.6 cm. 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 GALILEO Rt 1.12 and Lt 0.99 with GT pressures adequate for healing        Plan:  - AOIL in 1 year with return OV  - Continue Plavix  - Continue statin           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HCC)  -Asymptomatic  -Denies rest pain claudication    Plan as above    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HCC)  Known Hx of stable popliteal artery ectasia. Imaging previously ordered, not scheduled. - Obtain LEAD  -Continue plavix  -Continue statin       Asymptomatic bilateral carotid artery stenosis  Known Hx of mild bilateral carotid artery stenosis <50%. -Asymptomatic. Denies TIA/CVA sx    Carotid duplex 5/18/23 without significant change or progression of disease  < 50% ICA stenosis    Plan  - Carotid duplex in 1 year with return OV  -Continue plavix  -Continue statin          Diagnoses and all orders for this visit:    Asymptomatic bilateral carotid artery stenosis  -     VAS carotid complete study;  Future    Abdominal aortic aneurysm (AAA) without rupture, unspecified part ([address])  -     VAS abdominal aorta/iliacs; with GALILEO's; Future    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HCC)  -     VAS abdominal aorta/iliacs; with GALILEO's; Future          Subjective:      Patient ID: Fern Hewitt is a 68 y.o. male. Patient presents today to review carotid duplex (5/18) and AOIL (7/21). H/o AAA repair in 2011. Denies any s/s of CVA. Denies any new or worsening abdominal pain, back pain or leg pain. HPI   See assessment and plan    77-year-old male presents to review imaging and [facility] visit. Presents without complaints. Denies abdominal, back, or leg pain. Maintained on OMT with Plavix and statin  AOIL and carotid imaging reviewed without significant change or progression of disease. LEAD not obtained- due now, evaluate lower extremity/popliteal ectasia. Continue yearly surveillance and OMT. Advised to call or return to office with new or worsening symptoms, questions or concerns. The following portions of the patient's history were reviewed and updated as appropriate: allergies, current medications, past family history, past medical history, past social history, past surgical history and problem list.    Review of Systems   Constitutional: Negative. HENT: Negative. Eyes: Negative. Respiratory: Negative. Cardiovascular: Negative. Gastrointestinal: Negative. Endocrine: Negative. Genitourinary: Negative. Musculoskeletal: Negative. Skin: Negative. Allergic/Immunologic: Negative. Neurological: Negative. Hematological: Negative. Psychiatric/Behavioral: Negative. I have reviewed and made appropriate changes to the review of systems input by the medical assistant. Objective:      /70 (BP Location: Right arm, Patient Position: Sitting)   Pulse (!) 50   Ht 5' 5" (1.651 m)   Wt 86.6 kg (191 lb)   BMI 31.78 kg/m²          Physical Exam  Vitals reviewed. Constitutional:       General: He is not in acute distress. Appearance: Normal appearance. HENT:      Head: Normocephalic. Neck:      Vascular: No carotid bruit. Cardiovascular:      Rate and Rhythm: Normal rate. Pulses: Normal pulses. Carotid pulses are 2+ on the right side and 2+ on the left side. Radial pulses are 2+ on the right side and 2+ on the left side. Posterior tibial pulses are 2+ on the right side and 2+ on the left side. Heart sounds: Normal heart sounds. No murmur heard. Pulmonary:      Effort: Pulmonary effort is normal. No respiratory distress. Breath sounds: Normal breath sounds. No wheezing. Abdominal:      General: Bowel sounds are normal.      Palpations: Abdomen is soft. There is no mass. Tenderness: There is no abdominal tenderness. Musculoskeletal:         General: Normal range of motion. Cervical back: Normal range of motion and neck supple. Right lower leg: No edema. Left lower leg: No edema. Skin:     General: Skin is warm. Capillary Refill: Capillary refill takes less than 2 seconds. Neurological:      General: No focal deficit present. Mental Status: He is alert and oriented to person, place, and time. Sensory: No sensory deficit. Motor: No weakness. Psychiatric:         Behavior: Behavior normal.         I have spent a total time of 25 minutes on 07/25/23 in caring for this patient including Diagnostic results, Prognosis, Patient and family education, Risk factor reductions, Impressions, Counseling / Coordination of care, Documenting in the medical record, Reviewing / ordering tests, medicine, procedures   and Obtaining or reviewing history  .       Vitals:    07/25/23 1253   BP: 120/70   BP Location: Right arm   Patient Position: Sitting   Pulse: (!) 50   Weight: 86.6 kg (191 lb)   Height: 5' 5" (1.651 m)       Patient Active Problem List   Diagnosis   • Closed compression fracture of first lumbar vertebra [facility])   • Abdominal aortic aneurysm without rupture (HCC)   • 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HCC)   • 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HCC)   • Asymptomatic bilateral carotid artery stenosis   • Coronary artery disease of native artery of native heart with stable angina pectoris ([address])   • Hyperlipidemia   • Hypertension   • Transient cerebral ischemic attack   • Need for pneumococcal vaccination   • Hematuria   • Microhematuria   • Erectile dysfunction   • Leg swelling   • Paresthesia       Past Surgical History:   Procedure Laterality Date   • ABDOMINAL AORTIC ANEURYSM REPAIR  06/07/2011    Dr. Calista Jaimes 6/7/11   • APPENDECTOMY     • CATARACT EXTRACTION Right 11/2018   • CHOLECYSTECTOMY     • CHOLECYSTECTOMY LAPAROSCOPIC     • COLONOSCOPY  11/2006   • CORONARY ANGIOPLASTY      3 STENTS INSERTED   • CORONARY ANGIOPLASTY     • CORONARY ANGIOPLASTY      with stenting - 3/2012   • KS LAPS SURG CHOLECYSTECTOMY W/CHOLANGIOGRAPHY N/A 9/8/2016    Procedure: CHOLECYSTECTOMY LAPAROSCOPIC with intra-op cholangiogram ;  Surgeon: Almita Pal MD;  Location: [facility];  Service: General       Family History   Problem Relation Age of Onset   • Heart disease Mother    • Hypertension Mother         Benign Essential    • Stroke Mother         Stroke    • Heart disease Father    • Diabetes Brother         mellitus    • Heart disease Brother    • Aortic aneurysm Brother    • Heart disease Family        Social History     Socioeconomic History   • Marital status: /Civil Union     Spouse name: Not on file   • Number of children: Not on file   • Years of education: Not on file   • Highest education level: Not on file   Occupational History   • Occupation: Retired    Tobacco Use   • Smoking status: Former   • Smokeless tobacco: Never   • Tobacco comments:     Quit 1980's about 25 years x1ppd   Vaping Use   • Vaping Use: Never used   Substance and Sexual Activity   • Alcohol use:  Yes     Alcohol/week: 7.0 standard drinks of alcohol     Types: 4 Cans of beer, 3 Shots of liquor per week     Comment: SOCIAL USE - occasionally    • Drug use: No   • Sexual activity: Not Currently   Other Topics Concern   • Not on file   Social History Narrative   • Not on file     Social Determinants of Health     Financial Resource Strain: Low Risk  (6/21/2023)    Overall Financial Resource Strain (CARDIA)    • Difficulty of Paying Living Expenses: Not very hard   Food Insecurity: Not on file   Transportation Needs: No Transportation Needs (6/21/2023)    PRAPARE - Transportation    • Lack of Transportation (Medical): No    • Lack of Transportation (Non-Medical):  No   Physical Activity: Not on file   Stress: Not on file   Social Connections: Not on file   Intimate Partner Violence: Not on file   Housing Stability: Not on file       No Known Allergies      Current Outpatient Medications:   •  amLODIPine (NORVASC) 10 mg tablet, Take 1 tablet (10 mg total) by mouth daily, Disp: 90 tablet, Rfl: 3  •  clopidogrel (PLAVIX) 75 mg tablet, Take 1 tablet (75 mg total) by mouth daily, Disp: 90 tablet, Rfl: 3  •  isosorbide mononitrate (IMDUR) 60 mg 24 hr tablet, Take 1 tablet (60 mg total) by mouth 2 (two) times a day, Disp: 180 tablet, Rfl: 3  •  metoprolol tartrate (LOPRESSOR) 50 mg tablet, Take 1 tablet (50 mg total) by mouth every 12 (twelve) hours, Disp: 180 tablet, Rfl: 3  •  nitroglycerin (NITROSTAT) 0.4 mg SL tablet, Place 1 tablet (0.4 mg total) under the tongue every 5 (five) minutes as needed for chest pain, Disp: 24 tablet, Rfl: 1  •  ranolazine (RANEXA) 500 mg 12 hr tablet, Take 1 tablet (500 mg total) by mouth 2 (two) times a day, Disp: 180 tablet, Rfl: 3  •  simvastatin (ZOCOR) 40 mg tablet, Take 1 tablet (40 mg total) by mouth daily at bedtime, Disp: 90 tablet, Rfl: 3  •  tamsulosin (FLOMAX) 0.4 mg, Take 1 capsule (0.4 mg total) by mouth daily with dinner, Disp: 7 capsule, Rfl: 0

## 2023-07-25 ENCOUNTER — TELEPHONE (OUTPATIENT)
Dept: CARDIOLOGY CLINIC | Facility: CLINIC | Age: 78
End: 2023-07-25

## 2023-07-25 ENCOUNTER — OFFICE VISIT (OUTPATIENT)
Dept: CARDIOLOGY CLINIC | Facility: CLINIC | Age: 78
End: 2023-07-25
Payer: COMMERCIAL

## 2023-07-25 ENCOUNTER — OFFICE VISIT (OUTPATIENT)
Dept: VASCULAR SURGERY | Facility: CLINIC | Age: 78
End: 2023-07-25
Payer: COMMERCIAL

## 2023-07-25 VITALS
HEIGHT: 65 IN | DIASTOLIC BLOOD PRESSURE: 70 MMHG | SYSTOLIC BLOOD PRESSURE: 120 MMHG | WEIGHT: 191 LBS | HEART RATE: 50 BPM | BODY MASS INDEX: 31.82 KG/M2

## 2023-07-25 VITALS
BODY MASS INDEX: 31.82 KG/M2 | WEIGHT: 191 LBS | HEART RATE: 52 BPM | OXYGEN SATURATION: 95 % | SYSTOLIC BLOOD PRESSURE: 136 MMHG | HEIGHT: 65 IN | DIASTOLIC BLOOD PRESSURE: 70 MMHG

## 2023-07-25 DIAGNOSIS — I20.8 ANGINA AT REST (HCC): ICD-10-CM

## 2023-07-25 DIAGNOSIS — I20.8 ANGINA AT REST (HCC): Primary | ICD-10-CM

## 2023-07-25 DIAGNOSIS — I20.9 ANGINA, CLASS III (HCC): Chronic | ICD-10-CM

## 2023-07-25 DIAGNOSIS — I71.40 ABDOMINAL AORTIC ANEURYSM (AAA) WITHOUT RUPTURE, UNSPECIFIED PART (HCC): ICD-10-CM

## 2023-07-25 DIAGNOSIS — R39.9 LOWER URINARY TRACT SYMPTOMS (LUTS): ICD-10-CM

## 2023-07-25 DIAGNOSIS — I25.10 CORONARY ARTERY DISEASE INVOLVING NATIVE CORONARY ARTERY OF NATIVE HEART WITHOUT ANGINA PECTORIS: ICD-10-CM

## 2023-07-25 DIAGNOSIS — I72.3 ANEURYSM OF ILIAC ARTERY (HCC): ICD-10-CM

## 2023-07-25 DIAGNOSIS — I65.23 ASYMPTOMATIC BILATERAL CAROTID ARTERY STENOSIS: Primary | ICD-10-CM

## 2023-07-25 DIAGNOSIS — E78.49 OTHER HYPERLIPIDEMIA: ICD-10-CM

## 2023-07-25 DIAGNOSIS — I25.118 CORONARY ARTERY DISEASE OF NATIVE ARTERY OF NATIVE HEART WITH STABLE ANGINA PECTORIS (HCC): ICD-10-CM

## 2023-07-25 PROCEDURE — 93000 ELECTROCARDIOGRAM COMPLETE: CPT | Performed by: INTERNAL MEDICINE

## 2023-07-25 PROCEDURE — 99214 OFFICE O/P EST MOD 30 MIN: CPT | Performed by: INTERNAL MEDICINE

## 2023-07-25 PROCEDURE — 99213 OFFICE O/P EST LOW 20 MIN: CPT | Performed by: NURSE PRACTITIONER

## 2023-07-25 RX ORDER — ISOSORBIDE MONONITRATE 60 MG/1
60 TABLET, EXTENDED RELEASE ORAL 2 TIMES DAILY
Qty: 180 TABLET | Refills: 3 | Status: SHIPPED | OUTPATIENT
Start: 2023-07-25

## 2023-07-25 RX ORDER — TAMSULOSIN HYDROCHLORIDE 0.4 MG/1
0.4 CAPSULE ORAL
Qty: 7 CAPSULE | Refills: 0 | Status: SHIPPED | OUTPATIENT
Start: 2023-07-25

## 2023-07-25 RX ORDER — RANOLAZINE 500 MG/1
500 TABLET, EXTENDED RELEASE ORAL 2 TIMES DAILY
Qty: 60 TABLET | Refills: 0 | Status: SHIPPED | OUTPATIENT
Start: 2023-07-25 | End: 2023-07-25 | Stop reason: SDUPTHER

## 2023-07-25 RX ORDER — ISOSORBIDE MONONITRATE 60 MG/1
60 TABLET, EXTENDED RELEASE ORAL 2 TIMES DAILY
Qty: 60 TABLET | Refills: 0 | Status: SHIPPED | OUTPATIENT
Start: 2023-07-25 | End: 2023-07-25 | Stop reason: SDUPTHER

## 2023-07-25 RX ORDER — ISOSORBIDE MONONITRATE 60 MG/1
120 TABLET, EXTENDED RELEASE ORAL DAILY
Qty: 14 TABLET | Refills: 0 | Status: SHIPPED | OUTPATIENT
Start: 2023-07-25 | End: 2023-07-25 | Stop reason: SDUPTHER

## 2023-07-25 RX ORDER — METOPROLOL TARTRATE 50 MG/1
50 TABLET, FILM COATED ORAL EVERY 12 HOURS SCHEDULED
Qty: 180 TABLET | Refills: 3 | Status: SHIPPED | OUTPATIENT
Start: 2023-07-25

## 2023-07-25 RX ORDER — ISOSORBIDE MONONITRATE 60 MG/1
60 TABLET, EXTENDED RELEASE ORAL 2 TIMES DAILY
Qty: 180 TABLET | Refills: 3 | Status: SHIPPED | OUTPATIENT
Start: 2023-07-25 | End: 2023-07-25 | Stop reason: SDUPTHER

## 2023-07-25 RX ORDER — RANOLAZINE 500 MG/1
500 TABLET, EXTENDED RELEASE ORAL 2 TIMES DAILY
Qty: 180 TABLET | Refills: 3 | Status: SHIPPED | OUTPATIENT
Start: 2023-07-25

## 2023-07-25 NOTE — ASSESSMENT & PLAN NOTE
Known Hx of stable popliteal artery ectasia. Imaging previously ordered, not scheduled.      - Obtain LEAD  -Continue plavix  -Continue statin

## 2023-07-25 NOTE — PROGRESS NOTES
[facility]  Follow up note  Tonie Lingle. 68 y.o. male MRN: [id number]        Problems    1. Angina at rest [facility])  isosorbide mononitrate (IMDUR) 60 mg 24 hr tablet      2. Coronary artery disease of native artery of native heart with stable angina pectoris (HCC)  POCT ECG    NM myocardial perfusion spect (stress and/or rest)      3. Angina, class III (HCC)  metoprolol tartrate (LOPRESSOR) 50 mg tablet      4. Coronary artery disease involving native coronary artery of native heart without angina pectoris  ranolazine (RANEXA) 500 mg 12 hr tablet      5. Other hyperlipidemia        6.  Abdominal aortic aneurysm (AAA) without rupture, unspecified part ([address])            Impression:    Madison Gurrola returns to see me for a follow-up visit    CAD  Stress Myoview 1/19 suggests ischemia, but catheterization 1 month prior only showed moderate nonobstructive disease, nothing truly amenable to PCI, had him obtain a 2nd opinion from Interventional Cardiology, Dr. Adelina Owens, who agreed with medical management  He actually sizes 15 minutes to 2-1/2 mph on a flat treadmill  He is experiencing increased exertional fatigue  EKG without rest ischemia today in the office  Heart rate is 52, continues to be on metoprolol 50 mg twice a day, consider possible beta-blocker induced chronotropic incompetence  Possible increasing ischemia, last stress test as noted above was 4 years ago  Continues on his antianginal regimen of amlodipine, Imdur, Ranexa    Hypertension  Blood is excellent    Vascular disease is followed closely by the vascular department   AAA repair with slowly enlarging proximal segment of the aorta, now 4.3 cm, sees vascular later today  He has mild carotid stenosis  Mild iliac artery aneurysms    Hyperlipidemia  Lipid are well controlled      Plan:    Myoview stress test to assess for worsening ischemia/chronotropic incompetence  Continue beta-blocker therapy, do not withhold prior to the stress test  6-month follow-up follow-up    HPI:   Greta Rodriguez Sr. is a 68y.o. year old male with hypertension, hyperlipidemia, CAD defined by multivessel disease noted in 2006, but only severe obstruction of the obtuse marginal status post stenting at that time. He has a history of AAA, AAA repair, iliac aneurysms, celiac stenosis, bilateral carotid artery stenosis. No significant typical angina, exercising 15 minutes at 2-1/2 mph on a flat treadmill. But he tires very easily he tells me, fatigue has become a big issue with exertion. Resting EKG today normal.  He is mildly bradycardic. Blood pressures are excellent. Lipids are well controlled. AAA repair post follow-up ultrasound shows slight slow enlargement of the aorta proximal to the repair, 4.3 cm and he sees vascular today. He denies claudication. Carotid stenosis remains mild. Review of Systems   Constitutional: Positive for fatigue. Negative for appetite change, diaphoresis and fever. Respiratory: Negative for chest tightness, shortness of breath and wheezing. Cardiovascular: Negative for chest pain, palpitations and leg swelling. Gastrointestinal: Negative for abdominal pain and blood in stool. Musculoskeletal: Negative for arthralgias and joint swelling. Skin: Negative for rash. Neurological: Negative for dizziness, syncope and light-headedness.          Past Medical History:   Diagnosis Date   • AAA (abdominal aortic aneurysm) ([address])     last assessed: June 11, 2013   • Appendicitis    • BPH (benign prostatic hyperplasia)    • CAD (coronary artery disease)    • Cholelithiasis     last assessed: July 26, 2016   • Heart attack [facility])    • Hyperlipidemia    • Hypertension    • Insomnia    • Stroke [facility])    • Transient cerebral ischemia      Social History     Substance and Sexual Activity   Alcohol Use Yes   • Alcohol/week: 7.0 standard drinks of alcohol   • Types: 4 Cans of beer, 3 Shots of liquor per week    Comment: SOCIAL USE - occasionally      Social History     Substance and Sexual Activity   Drug Use No     Social History     Tobacco Use   Smoking Status Former   Smokeless Tobacco Never   Tobacco Comments    Quit 1980's about 25 years x1ppd       Allergies:  No Known Allergies    Medications:     Current Outpatient Medications:   •  amLODIPine (NORVASC) 10 mg tablet, Take 1 tablet (10 mg total) by mouth daily, Disp: 90 tablet, Rfl: 3  •  clopidogrel (PLAVIX) 75 mg tablet, Take 1 tablet (75 mg total) by mouth daily, Disp: 90 tablet, Rfl: 3  •  isosorbide mononitrate (IMDUR) 60 mg 24 hr tablet, Take 1 tablet (60 mg total) by mouth 2 (two) times a day, Disp: 180 tablet, Rfl: 3  •  metoprolol tartrate (LOPRESSOR) 50 mg tablet, Take 1 tablet (50 mg total) by mouth every 12 (twelve) hours, Disp: 180 tablet, Rfl: 3  •  nitroglycerin (NITROSTAT) 0.4 mg SL tablet, Place 1 tablet (0.4 mg total) under the tongue every 5 (five) minutes as needed for chest pain, Disp: 24 tablet, Rfl: 1  •  ranolazine (RANEXA) 500 mg 12 hr tablet, Take 1 tablet (500 mg total) by mouth 2 (two) times a day, Disp: 180 tablet, Rfl: 3  •  simvastatin (ZOCOR) 40 mg tablet, Take 1 tablet (40 mg total) by mouth daily at bedtime, Disp: 90 tablet, Rfl: 3  •  tamsulosin (FLOMAX) 0.4 mg, Take 1 capsule (0.4 mg total) by mouth daily with dinner, Disp: 7 capsule, Rfl: 0      Vitals:    07/25/23 1114   BP: 136/70   Pulse: (!) 52   SpO2: 95%     Weight (last 2 days)     Date/Time Weight    07/25/23 1114 86.6 (191)        Physical Exam  Constitutional:       General: He is not in acute distress. Appearance: He is not diaphoretic. HENT:      Head: Normocephalic and atraumatic. Eyes:      General: No scleral icterus. Conjunctiva/sclera: Conjunctivae normal.   Neck:      Vascular: No carotid bruit or JVD. Cardiovascular:      Rate and Rhythm: Normal rate and regular rhythm. Heart sounds: Normal heart sounds. No murmur heard.   Pulmonary:      Effort: Pulmonary effort is normal. No respiratory distress. Breath sounds: Normal breath sounds. No decreased breath sounds, wheezing, rhonchi or rales. Musculoskeletal:      Cervical back: Normal range of motion. Right lower leg: Normal. No edema. Left lower leg: Normal. No edema. Skin:     General: Skin is warm and dry. Neurological:      Mental Status: He is alert and oriented to person, place, and time.            Laboratory Studies:  Chem:   Lab Results   Component Value Date     12/03/2015     12/02/2015     08/20/2015    K 4.1 06/22/2023    K 4.7 03/26/2022    K 3.9 11/11/2019    K 4.3 12/03/2015    K 4.0 12/02/2015    K 4.8 08/20/2015     06/22/2023     03/26/2022     11/11/2019     12/03/2015     12/02/2015     08/20/2015    CO2 29 06/22/2023    CO2 31 03/26/2022    CO2 26 11/11/2019    CO2 28.1 12/03/2015    CO2 32.6 (H) 12/02/2015    CO2 29.3 08/20/2015    GLUCOSE 121 12/03/2015    GLUCOSE 105 12/02/2015    GLUCOSE 105 08/20/2015    CREATININE 1.08 06/22/2023    CREATININE 1.03 03/26/2022    CREATININE 0.89 11/11/2019    CREATININE 0.86 12/03/2015    CREATININE 0.92 12/02/2015    CREATININE 0.89 08/20/2015    BUN 15 06/22/2023    BUN 13 03/26/2022    BUN 14 11/11/2019    BUN 15 12/03/2015    BUN 17 12/02/2015    BUN 15 08/20/2015    MG 1.9 12/10/2018     NT-proBNP:   Coags:  Lipid Profile:   Lab Results   Component Value Date    CHOL 138 08/20/2015    CHOL 135 09/30/2014    LDLCALC 65 06/22/2023    LDLCALC 54 03/26/2022    LDLCALC 57 11/11/2019    LDLCALC 67 08/20/2015    LDLCALC 65 09/30/2014    HDL 43 06/22/2023    HDL 43 03/26/2022    HDL 35 (L) 11/11/2019    HDL 42 08/20/2015    HDL 46 09/30/2014    TRIG 104 06/22/2023    TRIG 103 03/26/2022    TRIG 112 11/11/2019    TRIG 147 08/20/2015    TRIG 120 09/30/2014       Cardiac testing:   EKG reviewed personally:    Results for orders placed or performed in visit on 07/25/23   POCT ECG    Impression Sinus bradycardia, otherwise normal         Cardiac catheterization  12/18 - 50% nonobstructive disease of all major vessels    Stress Myoview  1/19-anterior wall ischemia    Francis Castaneda MD    Portions of the record may have been created with voice recognition software.  Occasional wrong word or "sound a like" substitutions may have occurred due to the inherent limitations of voice recognition software.  Read the chart carefully and recognize, using context, where substitutions have occurred.

## 2023-07-25 NOTE — ASSESSMENT & PLAN NOTE
Known Hx of mild bilateral carotid artery stenosis <50%. -Asymptomatic.  Denies TIA/CVA sx    Carotid duplex 5/18/23 without significant change or progression of disease  < 50% ICA stenosis    Plan  - Carotid duplex in 1 year with return OV  -Continue plavix  -Continue statin

## 2023-07-25 NOTE — TELEPHONE ENCOUNTER
Pt is being seen today in the office. We will make sure his Rx's get sent to the correct place. All meds were sent to [facility] mid July. We will; send emergency refill to local pharm.

## 2023-07-25 NOTE — PATIENT INSTRUCTIONS
Lower extremity arterial duplex due now.   Order previously placed    AOIL and carotid duplex in 1 year with return office visit  Continue Plavix and statin  Call return to office sooner with new or worsening symptoms, questions or concerns

## 2023-07-25 NOTE — ASSESSMENT & PLAN NOTE
69 yo male with HTH, HLD, TIA, CAD s/p PCI '12, AAA s/p repair in '11 (Aristeo Valenzuela), iliac artery aneurysm, popliteal ectasia, and asymptomatic bilateral carotid stenosis presents to review imaging and [facility]. - patient presents without complaints. Denies abdominal, back, or leg pain  - Palpable distal PT pulses bilaterally    AOIL 7/21/2023-without significant change or progression of disease  Wide patency is demonstrated throughout the abdominal aortic tube graft. The aorta proximal to the graft is aneurysmal, 4.3 cm (Prior 4.2 cm)  The common iliac arteries are ectatic bilaterally, Rt - 1.6 cm, Lt 1.6 cm. 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   GALILEO Rt 1.12 and Lt 0.99 with GT pressures adequate for healing        Plan:  - AOIL in 1 year with return OV  - Continue Plavix  - Continue statin

## 2023-07-31 ENCOUNTER — TELEPHONE (OUTPATIENT)
Dept: FAMILY MEDICINE CLINIC | Facility: CLINIC | Age: 78
End: 2023-07-31

## 2023-07-31 DIAGNOSIS — R20.2 PARESTHESIA: Primary | ICD-10-CM

## 2023-07-31 NOTE — TELEPHONE ENCOUNTER
----- Message from Ailyn Blanton MD sent at 9/12/5289  6:02 AM EDT -----  Recent EMG showed moderate to severe carpal tunnel syndrome of right hand. Please refer patient to [facility] orthopedist Dr. Velia Estrada for consultation to discuss further treatment options. I will place referral in epic.   Please provide phone number

## 2023-08-03 ENCOUNTER — TELEPHONE (OUTPATIENT)
Age: 78
End: 2023-08-03

## 2023-08-03 NOTE — TELEPHONE ENCOUNTER
Patient is being referred to a orthopedics. Please schedule accordingly.     [facility]   [phone]

## 2023-08-14 ENCOUNTER — HOSPITAL ENCOUNTER (OUTPATIENT)
Dept: NON INVASIVE DIAGNOSTICS | Facility: CLINIC | Age: 78
Discharge: HOME | End: 2023-08-14
Payer: COMMERCIAL

## 2023-08-14 VITALS — WEIGHT: 191 LBS | HEIGHT: 65 IN | BODY MASS INDEX: 31.82 KG/M2

## 2023-08-14 DIAGNOSIS — I25.118 CORONARY ARTERY DISEASE OF NATIVE ARTERY OF NATIVE HEART WITH STABLE ANGINA PECTORIS (HCC): ICD-10-CM

## 2023-08-14 PROCEDURE — 78452 HT MUSCLE IMAGE SPECT MULT: CPT | Performed by: INTERNAL MEDICINE

## 2023-08-14 PROCEDURE — 78452 HT MUSCLE IMAGE SPECT MULT: CPT

## 2023-08-14 PROCEDURE — 93017 CV STRESS TEST TRACING ONLY: CPT

## 2023-08-14 PROCEDURE — A9502 TC99M TETROFOSMIN: HCPCS

## 2023-08-14 PROCEDURE — G1004 CDSM NDSC: HCPCS

## 2023-08-14 PROCEDURE — 93018 CV STRESS TEST I&R ONLY: CPT | Performed by: INTERNAL MEDICINE

## 2023-08-14 PROCEDURE — 93016 CV STRESS TEST SUPVJ ONLY: CPT | Performed by: INTERNAL MEDICINE

## 2023-08-14 RX ORDER — REGADENOSON 0.08 MG/ML
0.4 INJECTION, SOLUTION INTRAVENOUS ONCE
Status: COMPLETED | OUTPATIENT
Start: 2023-08-14 | End: 2023-08-14

## 2023-08-14 RX ADMIN — REGADENOSON 0.4 MG: 0.08 INJECTION, SOLUTION INTRAVENOUS at 14:07

## 2023-08-15 LAB
CHEST PAIN STATEMENT: NORMAL
MAX DIASTOLIC BP: 62 MMHG
MAX HEART RATE: 114 BPM
MAX PREDICTED HEART RATE: 143 BPM
MAX. SYSTOLIC BP: 154 MMHG
PROTOCOL NAME: NORMAL
REASON FOR TERMINATION: NORMAL
TARGET HR FORMULA: NORMAL
TEST INDICATION: NORMAL
TIME IN EXERCISE PHASE: NORMAL

## 2023-08-16 LAB
MAX HR PERCENT: 79 %
MAX HR: 114 BPM
NUC STRESS EJECTION FRACTION: 60 %
RATE PRESSURE PRODUCT: NORMAL
SL CV REST NUCLEAR ISOTOPE DOSE: 7.9 MCI
SL CV STRESS NUCLEAR ISOTOPE DOSE: 29.7 MCI
SL CV STRESS RECOVERY BP: NORMAL MMHG
SL CV STRESS RECOVERY HR: 74 BPM
SL CV STRESS RECOVERY O2 SAT: 99 %
SL CV STRESS STAGE REACHED: 2
STRESS ANGINA INDEX: 0
STRESS BASELINE BP: NORMAL MMHG
STRESS BASELINE HR: 53 BPM
STRESS O2 SAT REST: 98 %
STRESS PEAK HR: 114 BPM
STRESS POST ESTIMATED WORKLOAD: 8.1 METS
STRESS POST EXERCISE DUR MIN: 6 MIN
STRESS POST EXERCISE DUR SEC: 39 SEC
STRESS POST O2 SAT PEAK: 98 %
STRESS POST PEAK BP: 132 MMHG
STRESS/REST PERFUSION RATIO: 1.1

## 2023-08-31 ENCOUNTER — TELEPHONE (OUTPATIENT)
Dept: CARDIOLOGY CLINIC | Facility: CLINIC | Age: 78
End: 2023-08-31

## 2023-08-31 NOTE — TELEPHONE ENCOUNTER
We have been treating his chronotropic incompetence with reduced dosing beta-blocker, so at this point have him discontinue it for about 2 weeks, if he notices significant improvement, please let us know, if he notices no improvement in his fatigue especially with exertion, then just go back to the original dose of metoprolol 25 twice a day

## 2023-08-31 NOTE — TELEPHONE ENCOUNTER
P/C advised on 8/14/2023 Dr Armando Yuen changed his metoprolol to 12.5 mg bid. He continues to be fatigued has not noticed any difference. Please advised if you want him to continue at the metoprolol 12.5 mg bid or go back to original dosage.     Denies any sob,chest pains    Please advise

## 2023-09-08 ENCOUNTER — HOSPITAL ENCOUNTER (OUTPATIENT)
Dept: RADIOLOGY | Facility: HOSPITAL | Age: 78
Discharge: HOME | End: 2023-09-08
Payer: COMMERCIAL

## 2023-09-08 ENCOUNTER — OFFICE VISIT (OUTPATIENT)
Dept: FAMILY MEDICINE CLINIC | Facility: CLINIC | Age: 78
End: 2023-09-08
Payer: COMMERCIAL

## 2023-09-08 VITALS
OXYGEN SATURATION: 95 % | SYSTOLIC BLOOD PRESSURE: 140 MMHG | HEART RATE: 75 BPM | HEIGHT: 65 IN | BODY MASS INDEX: 31.99 KG/M2 | TEMPERATURE: 98 F | RESPIRATION RATE: 16 BRPM | DIASTOLIC BLOOD PRESSURE: 80 MMHG | WEIGHT: 192 LBS

## 2023-09-08 DIAGNOSIS — R07.81 RIB PAIN: Primary | ICD-10-CM

## 2023-09-08 DIAGNOSIS — R07.81 RIB PAIN: ICD-10-CM

## 2023-09-08 PROCEDURE — 71101 X-RAY EXAM UNILAT RIBS/CHEST: CPT

## 2023-09-08 PROCEDURE — 99213 OFFICE O/P EST LOW 20 MIN: CPT | Performed by: FAMILY MEDICINE
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
## 2023-09-12 ENCOUNTER — HOSPITAL ENCOUNTER (OUTPATIENT)
Dept: NON INVASIVE DIAGNOSTICS | Facility: CLINIC | Age: 78
Discharge: HOME | End: 2023-09-12
Payer: COMMERCIAL

## 2023-09-12 DIAGNOSIS — I72.4 POPLITEAL ARTERY ANEURYSM, BILATERAL (HCC): ICD-10-CM

## 2023-09-12 PROCEDURE — 93923 UPR/LXTR ART STDY 3+ LVLS: CPT

## 2023-09-12 PROCEDURE — 93925 LOWER EXTREMITY STUDY: CPT

## 2023-09-13 PROCEDURE — 93923 UPR/LXTR ART STDY 3+ LVLS: CPT | Performed by: SURGERY

## 2023-09-13 PROCEDURE — 93925 LOWER EXTREMITY STUDY: CPT | Performed by: SURGERY

## 2023-09-14 ENCOUNTER — TRANSCRIBE ORDERS (OUTPATIENT)
Dept: ADMINISTRATIVE | Facility: HOSPITAL | Age: 78
End: 2023-09-14

## 2023-09-14 DIAGNOSIS — I72.4 ANEURYSM OF POPLITEAL ARTERY (HCC): Primary | ICD-10-CM

## 2023-09-26 ENCOUNTER — TELEPHONE (OUTPATIENT)
Dept: CARDIOLOGY CLINIC | Facility: CLINIC | Age: 78
End: 2023-09-26

## 2023-09-26 NOTE — TELEPHONE ENCOUNTER
P/C back to give you an update. He has noticed no change. Continues to have the fatigue with exertion. Advised to go back to original dosage of metoprolol 25 mg bid.    He verbally understood     Please advise if would like something different

## 2023-09-27 ENCOUNTER — TELEPHONE (OUTPATIENT)
Dept: FAMILY MEDICINE CLINIC | Facility: CLINIC | Age: 78
End: 2023-09-27

## 2023-09-27 NOTE — TELEPHONE ENCOUNTER
[facility] calling, EMG is scheduled 10/2 and order is in Epic but they are stating you need to electronically sign the order.  Thank you

## 2023-10-02 ENCOUNTER — HOSPITAL ENCOUNTER (OUTPATIENT)
Dept: NEUROLOGY | Facility: CLINIC | Age: 78
Discharge: HOME | End: 2023-10-02

## 2023-10-02 DIAGNOSIS — R20.2 PARESTHESIA: Primary | ICD-10-CM

## 2023-10-02 DIAGNOSIS — R20.2 PARESTHESIA OF SKIN: ICD-10-CM

## 2023-10-02 DIAGNOSIS — R20.2 PARESTHESIA: ICD-10-CM

## 2023-10-02 NOTE — PROGRESS NOTES
Patient was scheduled for a right upper extremity EMG today, patient arrived, however mention he had the test done ready at an outside facility. Results were reviewed, scanned in media, patient had the test on July 28 at [facility], study consistent with moderate to severe carpal tunnel on the right, no evidence to support a radiculopathy in the right upper extremity. Results were reviewed with the patient, he requested information for orthopedic surgeons which was provided to him.

## 2023-10-24 DIAGNOSIS — R39.9 LOWER URINARY TRACT SYMPTOMS (LUTS): ICD-10-CM

## 2023-10-24 RX ORDER — TAMSULOSIN HYDROCHLORIDE 0.4 MG/1
0.4 CAPSULE ORAL
Qty: 90 CAPSULE | Refills: 3 | Status: SHIPPED | OUTPATIENT
Start: 2023-10-24

## 2023-11-27 ENCOUNTER — TELEPHONE (OUTPATIENT)
Dept: CARDIOLOGY CLINIC | Facility: CLINIC | Age: 78
End: 2023-11-27

## 2023-11-27 DIAGNOSIS — E78.5 HYPERLIPIDEMIA, UNSPECIFIED HYPERLIPIDEMIA TYPE: ICD-10-CM

## 2023-11-27 RX ORDER — SIMVASTATIN 40 MG
40 TABLET ORAL
Qty: 90 TABLET | Refills: 3 | Status: SHIPPED | OUTPATIENT
Start: 2023-11-27

## 2023-11-27 RX ORDER — SIMVASTATIN 40 MG
40 TABLET ORAL
Qty: 90 TABLET | Refills: 3 | Status: SHIPPED | OUTPATIENT
Start: 2023-11-27 | End: 2023-11-27 | Stop reason: SDUPTHER

## 2023-12-04 ENCOUNTER — TELEPHONE (OUTPATIENT)
Dept: CARDIOLOGY CLINIC | Facility: CLINIC | Age: 78
End: 2023-12-04

## 2023-12-04 DIAGNOSIS — I10 PRIMARY HYPERTENSION: Primary | ICD-10-CM

## 2023-12-04 NOTE — TELEPHONE ENCOUNTER
Rx was sent on 11/27/2023. Verified with Lora Luevano from [facility] and she stated  that it has been shipped out. Also relayed message to the patient and he verbalized understanding.

## 2023-12-08 DIAGNOSIS — I10 PRIMARY HYPERTENSION: ICD-10-CM

## 2023-12-26 ENCOUNTER — TELEPHONE (OUTPATIENT)
Dept: CARDIOLOGY CLINIC | Facility: CLINIC | Age: 78
End: 2023-12-26

## 2023-12-26 NOTE — TELEPHONE ENCOUNTER
Patient is requesting script for metoprolol 50 mg twice daily rather than 25 mg twice daily. Patient was on the higher dose at his last office visit in July however Epic shows several adjustments were made to the dose in August and September due to patient's fatigue on exertion. Patient reports no recollection of these changes and states he has been taking 50 mg twice daily (as two 25 mg tablets twice a day) for as long as he can remember. He denies any current symptoms of fatigue. Please advise.

## 2023-12-27 DIAGNOSIS — I10 PRIMARY HYPERTENSION: ICD-10-CM

## 2023-12-27 RX ORDER — METOPROLOL TARTRATE 50 MG/1
50 TABLET, FILM COATED ORAL 2 TIMES DAILY
Qty: 180 TABLET | Refills: 3 | Status: SHIPPED | OUTPATIENT
Start: 2023-12-27

## 2023-12-27 NOTE — TELEPHONE ENCOUNTER
Thanks for doing some of that taking, not sure how he ended up back on the 50 mg twice a day, but yes I will send a prescription in for that.

## 2024-03-07 DIAGNOSIS — I20.9 ANGINA, CLASS III (HCC): Chronic | ICD-10-CM

## 2024-03-07 RX ORDER — AMLODIPINE BESYLATE 10 MG/1
10 TABLET ORAL DAILY
Qty: 90 TABLET | Refills: 3 | Status: SHIPPED | OUTPATIENT
Start: 2024-03-07

## 2024-03-08 ENCOUNTER — OFFICE VISIT (OUTPATIENT)
Dept: FAMILY MEDICINE CLINIC | Facility: CLINIC | Age: 79
End: 2024-03-08
Payer: COMMERCIAL

## 2024-03-08 VITALS
RESPIRATION RATE: 16 BRPM | WEIGHT: 193 LBS | SYSTOLIC BLOOD PRESSURE: 120 MMHG | OXYGEN SATURATION: 94 % | HEIGHT: 65 IN | BODY MASS INDEX: 32.15 KG/M2 | DIASTOLIC BLOOD PRESSURE: 70 MMHG | HEART RATE: 61 BPM | TEMPERATURE: 98 F

## 2024-03-08 DIAGNOSIS — I71.40 ABDOMINAL AORTIC ANEURYSM (AAA) WITHOUT RUPTURE, UNSPECIFIED PART (HCC): ICD-10-CM

## 2024-03-08 DIAGNOSIS — R42 VERTIGO: Primary | ICD-10-CM

## 2024-03-08 DIAGNOSIS — I25.118 CORONARY ARTERY DISEASE OF NATIVE ARTERY OF NATIVE HEART WITH STABLE ANGINA PECTORIS (HCC): ICD-10-CM

## 2024-03-08 PROBLEM — I50.9 HEART FAILURE, UNSPECIFIED HF CHRONICITY, UNSPECIFIED HEART FAILURE TYPE (HCC): Status: ACTIVE | Noted: 2024-03-08

## 2024-03-08 PROCEDURE — G2211 COMPLEX E/M VISIT ADD ON: HCPCS | Performed by: NURSE PRACTITIONER

## 2024-03-08 PROCEDURE — 99213 OFFICE O/P EST LOW 20 MIN: CPT | Performed by: NURSE PRACTITIONER

## 2024-03-08 NOTE — PROGRESS NOTES
FAMILY PRACTICE OFFICE VISIT       NAME: Shane Chau Sr.  AGE: 78 y.o. SEX: male       : 1945        MRN: [id number]    Assessment and Plan   1. Vertigo  Symptoms consistent with BPPV.   Will have him go to vestibular therapy.   He will call me if symptoms are not improving with PT.  -     Ambulatory Referral to Physical Therapy; Future    2. Abdominal aortic aneurysm (AAA) without rupture, unspecified part (HCC)  Assessment & Plan:  Prior AAA repair. Continues with Plavix, and statin.   Follows with vascular.         3. Coronary artery disease of native artery of native heart with stable angina pectoris (HCC)  Assessment & Plan:  Asymptomatic.   Continue current medications and cardiology follow up.              Chief Complaint     Chief Complaint   Patient presents with    Dizziness     Patient is here for dizziness when bending ongoing       History of Present Illness     Shane Chau Sr. Is a 78 year old male presenting today for dizziness.     Head spinning. Bending over and coming back up, turning head.   Rolling over in bed has dizzines.   Started in early winter, was very mild.   Now getting more intense and frequent.   Never had this before.     No nausea.   Lasts seconds then resolves.     No headache, no vision changes. No lightheadedness.  No recent colds.     Accompanied by his wife today.        Review of Systems   Review of Systems   Constitutional: Negative.    HENT:  Positive for postnasal drip and sore throat. Negative for congestion, ear pain and hearing loss (hearing unchanged from baseline).    Respiratory: Negative.  Negative for cough and shortness of breath.    Cardiovascular:  Negative for chest pain, palpitations and leg swelling.   Gastrointestinal: Negative.    Neurological:  Positive for dizziness. Negative for light-headedness and headaches.       I have reviewed the patient's medical history in detail; there are no changes to the history as noted in the  "electronic medical record.    Objective     Vitals:    03/08/24 1414 03/08/24 1443   BP: 120/70 120/70   Pulse: 61    Resp: 16    Temp: 98 °F (36.7 °C)    TempSrc: Temporal    SpO2: 94%    Weight: 87.5 kg (193 lb)    Height: 5' 5\" (1.651 m)      Wt Readings from Last 3 Encounters:   03/08/24 87.5 kg (193 lb)   09/08/23 87.1 kg (192 lb)   08/14/23 86.6 kg (191 lb)     Physical Exam  Vitals and nursing note reviewed.   Constitutional:       General: He is not in acute distress.     Appearance: Normal appearance. He is not ill-appearing.   HENT:      Head: Atraumatic.      Right Ear: Tympanic membrane normal.      Left Ear: Tympanic membrane normal.      Nose: Nose normal.      Mouth/Throat:      Mouth: Mucous membranes are moist.      Pharynx: Oropharynx is clear.   Eyes:      Extraocular Movements: Extraocular movements intact.      Right eye: Nystagmus (with left lateral gaze) present.      Left eye: Nystagmus (with left lateral gaze) present.      Conjunctiva/sclera: Conjunctivae normal.      Pupils: Pupils are equal, round, and reactive to light.   Cardiovascular:      Rate and Rhythm: Normal rate and regular rhythm.      Heart sounds: No murmur heard.  Pulmonary:      Effort: Pulmonary effort is normal.      Breath sounds: Normal breath sounds.   Musculoskeletal:      Cervical back: Normal range of motion and neck supple.      Right lower leg: No edema.      Left lower leg: No edema.   Lymphadenopathy:      Cervical: No cervical adenopathy.   Neurological:      Mental Status: He is alert.   Psychiatric:         Mood and Affect: Mood normal.         Depression Screening and Follow-up Plan: Patient was screened for depression during today's encounter. They screened negative with a PHQ-2 score of 0.        ALLERGIES:  No Known Allergies    Current Medications     Current Outpatient Medications   Medication Sig Dispense Refill    amLODIPine (NORVASC) 10 mg tablet Take 1 tablet (10 mg total) by mouth daily 90 tablet 3 "    clopidogrel (PLAVIX) 75 mg tablet Take 1 tablet (75 mg total) by mouth daily 90 tablet 3    isosorbide mononitrate (IMDUR) 60 mg 24 hr tablet Take 1 tablet (60 mg total) by mouth 2 (two) times a day 180 tablet 3    metoprolol tartrate (LOPRESSOR) 50 mg tablet Take 1 tablet (50 mg total) by mouth 2 (two) times a day 180 tablet 3    nitroglycerin (NITROSTAT) 0.4 mg SL tablet Place 1 tablet (0.4 mg total) under the tongue every 5 (five) minutes as needed for chest pain 24 tablet 1    ranolazine (RANEXA) 500 mg 12 hr tablet Take 1 tablet (500 mg total) by mouth 2 (two) times a day 180 tablet 3    simvastatin (ZOCOR) 40 mg tablet Take 1 tablet (40 mg total) by mouth daily at bedtime 90 tablet 3    tamsulosin (FLOMAX) 0.4 mg Take 1 capsule by mouth daily with dinner 90 capsule 3     No current facility-administered medications for this visit.         Health Maintenance     Health Maintenance   Topic Date Due    SLP PLAN OF CARE  Never done    Zoster Vaccine (1 of 2) Never done    Pneumococcal Vaccine: 65+ Years (2 of 2 - PCV) 08/06/2019    COVID-19 Vaccine (4 - 2023-24 season) 09/01/2023    Fall Risk  06/21/2024    Medicare Annual Wellness Visit (AWV)  06/21/2024    Depression Screening  03/08/2025    Hepatitis C Screening  Completed    Influenza Vaccine  Completed    HIB Vaccine  Aged Out    IPV Vaccine  Aged Out    Hepatitis A Vaccine  Aged Out    Meningococcal ACWY Vaccine  Aged Out    HPV Vaccine  Aged Out    Colorectal Cancer Screening  Discontinued     Immunization History   Administered Date(s) Administered    COVID-19 MODERNA VACC 0.5 ML IM 02/08/2021, 03/08/2021, 11/06/2021    INFLUENZA 10/15/2016, 11/03/2018, 11/03/2018, 10/24/2020, 10/16/2021, 10/15/2022, 10/07/2023    Influenza Split High Dose Preservative Free IM 11/24/2019    Influenza, high dose seasonal 0.7 mL 08/29/2020    Influenza, seasonal, injectable 09/28/2011, 10/06/2014, 11/05/2017    Pneumococcal Polysaccharide PPV23 08/06/2018    Tdap  04/17/2017       SANTHOSH Sierra

## 2024-03-09 NOTE — ASSESSMENT & PLAN NOTE
Assessment and Plan





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


Current Visit: No   Status: Acute   


Plan to address problem: 


Management as per primary care and hematology.








(2) SIRS (systemic inflammatory response syndrome)


Current Visit: Yes   Status: Acute   


Plan to address problem: 


Patient is on Ceftriaxone and Vancomycin.








Subjective


Date of service: 08/15/19


Interval history: 


Patient is seen today for: 





Seen and examined at bedside; 24hour events reviewed; nursing and respiratory 

care staff consulted; no adverse overnight events reported to me;





Objective


                               Vital Signs - 12hr











  08/15/19 08/15/19 08/15/19





  00:29 05:14 07:35


 


Temperature 98.0 F 98.0 F 


 


Pulse Rate 75 69 


 


Respiratory 16 18 





Rate   


 


Blood Pressure 112/68 119/75 


 


O2 Sat by Pulse 94 98 100





Oximetry   














  08/15/19 08/15/19





  08:57 09:00


 


Temperature  


 


Pulse Rate  


 


Respiratory 20 20





Rate  


 


Blood Pressure  


 


O2 Sat by Pulse  





Oximetry  











Constitutional: no acute distress, alert


Eyes: non-icteric


ENT: oropharynx moist


Neck: supple


Effort: normal


Ascultation: Bilateral: clear, rales


Cardiovascular: regular rate and rhythm


Gastrointestinal: normoactive bowel sounds, soft


Integumentary: normal


Extremities: no cyanosis, no edema


Neurologic: normal mental status, non-focal exam, pupils equal and round, CN II-

XII normal


Psychiatric: mood appropriate


CBC and BMP: 


                                 08/13/19 22:25





                                 08/12/19 15:54


ABG, PT/INR, D-dimer: 


ABG











ABG pH  7.378 pH Units (7.350-7.450)   08/13/19  15:10    


 


ABG pCO2  37.9 mm Hg  08/13/19  15:10    


 


ABG pO2  120.3 mm Hg (80.0-90.0)  H  08/13/19  15:10    


 


ABG O2 Saturation  98.3 % (95.0-99.0)   08/13/19  15:10    





PT/INR, D-dimer











PT  15.3 Sec. (12.2-14.9)  H  08/12/19  16:00    


 


INR  1.24  (0.87-1.13)  H  08/12/19  16:00    








Abnormal lab findings: 


                                  Abnormal Labs











  08/12/19 08/12/19 08/12/19





  15:54 15:54 16:00


 


WBC  14.1 H  


 


RBC  1.75 L  


 


Hgb  6.2 L  


 


Hct  17.0 L*  


 


MCV  97 H  


 


MCH  35 H  


 


MCHC  37 H  


 


RDW  29.1 H  


 


Monocytes % (Manual)  9.0 H  


 


Seg Neutrophils # Man  9.9 H  


 


Monocytes # (Manual)  1.3 H  


 


Percent Retic  16.61 H  


 


PT    15.3 H


 


INR    1.24 H


 


ABG pO2   


 


ABG Base Excess   


 


ABG Hemoglobin   


 


Oxyhemoglobin   


 


Sodium   130 L 


 


Chloride   96.8 L 


 


Creatinine   0.5 L 


 


Lactic Acid   


 


Magnesium   1.60 L 


 


TIBC   


 


Ferritin   


 


Total Bilirubin   11.00 H 


 


AST   109 H 


 


Alkaline Phosphatase   184 H 


 


Lactate Dehydrogenase   1989 H 


 


Crossmatch   














  08/12/19 08/13/19 08/13/19





  16:00 06:14 06:14


 


WBC   


 


RBC   


 


Hgb    5.9 L*


 


Hct    15.5 L*


 


MCV   


 


MCH   


 


MCHC   


 


RDW   


 


Monocytes % (Manual)   


 


Seg Neutrophils # Man   


 


Monocytes # (Manual)   


 


Percent Retic   


 


PT   


 


INR   


 


ABG pO2   


 


ABG Base Excess   


 


ABG Hemoglobin   


 


Oxyhemoglobin   


 


Sodium   


 


Chloride   


 


Creatinine   


 


Lactic Acid   0.50 L 


 


Magnesium   


 


TIBC   


 


Ferritin   


 


Total Bilirubin   


 


AST   


 


Alkaline Phosphatase   


 


Lactate Dehydrogenase   


 


Crossmatch  See Detail  














  08/13/19 08/13/19 08/13/19





  06:14 06:14 15:10


 


WBC   


 


RBC   


 


Hgb   


 


Hct   


 


MCV   


 


MCH   


 


MCHC   


 


RDW   


 


Monocytes % (Manual)   


 


Seg Neutrophils # Man   


 


Monocytes # (Manual)   


 


Percent Retic   


 


PT   


 


INR   


 


ABG pO2    120.3 H


 


ABG Base Excess    -3.1 L


 


ABG Hemoglobin    6.9 L


 


Oxyhemoglobin    92.3 L


 


Sodium   


 


Chloride   


 


Creatinine   


 


Lactic Acid   


 


Magnesium   


 


TIBC  209 L  


 


Ferritin   600.7 H 


 


Total Bilirubin   


 


AST   


 


Alkaline Phosphatase   


 


Lactate Dehydrogenase   


 


Crossmatch   














  08/13/19





  22:25


 


WBC 


 


RBC 


 


Hgb  8.0 L


 


Hct  22.1 L D


 


MCV 


 


MCH 


 


MCHC 


 


RDW 


 


Monocytes % (Manual) 


 


Seg Neutrophils # Man 


 


Monocytes # (Manual) 


 


Percent Retic 


 


PT 


 


INR 


 


ABG pO2 


 


ABG Base Excess 


 


ABG Hemoglobin 


 


Oxyhemoglobin 


 


Sodium 


 


Chloride 


 


Creatinine 


 


Lactic Acid 


 


Magnesium 


 


TIBC 


 


Ferritin 


 


Total Bilirubin 


 


AST 


 


Alkaline Phosphatase 


 


Lactate Dehydrogenase 


 


Crossmatch Asymptomatic.   Continue current medications and cardiology follow up.

## 2024-05-13 ENCOUNTER — NURSE TRIAGE (OUTPATIENT)
Age: 79
End: 2024-05-13

## 2024-05-13 NOTE — TELEPHONE ENCOUNTER
"Answer Assessment - Initial Assessment Questions  1. LOCATION: \"Where does it hurt?\"        Chest pain with exertion and becomes out of breath   2. RADIATION: \"Does the pain go anywhere else?\" (e.g., into neck, jaw, arms, back)      no  3. ONSET: \"When did the chest pain begin?\" (Minutes, hours or days)       Couple weeks ago   4. PATTERN \"Does the pain come and go, or has it been constant since it started?\"  \"Does it get worse with exertion?\"       With exertion   5. DURATION: \"How long does it last\" (e.g., seconds, minutes, hours)      Minutes   6. SEVERITY: \"How bad is the pain?\"  (e.g., Scale 1-10; mild, moderate, or severe)     - MILD (1-3): doesn't interfere with normal activities      - MODERATE (4-7): interferes with normal activities or awakens from sleep     - SEVERE (8-10): excruciating pain, unable to do any normal activities        Moderate   7. CARDIAC RISK FACTORS: \"Do you have any history of heart problems or risk factors for heart disease?\" (e.g., angina, prior heart attack; diabetes, high blood pressure, high cholesterol, smoker, or strong family history of heart disease)      CAD, prior heart attack, hypertension   8. PULMONARY RISK FACTORS: \"Do you have any history of lung disease?\"  (e.g., blood clots in lung, asthma, emphysema, birth control pills)      no  9. CAUSE: \"What do you think is causing the chest pain?\"      Unsure   10. OTHER SYMPTOMS: \"Do you have any other symptoms?\" (e.g., dizziness, nausea, vomiting, sweating, fever, difficulty breathing, cough)        Dizziness when bending down    Protocols used: Chest Pain-ADULT-[place]    "
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
## 2024-05-13 NOTE — TELEPHONE ENCOUNTER
Regarding: CHEST PAIN SOB  ----- Message from Silvina Otoole sent at 5/13/2024  4:11 PM EDT -----  Pt called stating he has chest pains and is out of breath.
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
## 2024-05-13 NOTE — TELEPHONE ENCOUNTER
"Pt called stating that over the past couple weeks he has been experiencing chest discomfort with over exercise or exertion like taking a walk. He said that he also feels he gets out of breath quicker. Pt denied chest pain while on the phone. Denied chest pain at rest. Advised if chest pain would be present and/or become worse he should go to ED. Pt understood.    Scheduled pt for an appointment with Dr. Juares to further discuss and over due for follow up however he is leaving to go [facility] that day, so scheduled with Eveline TREVIZO    1. LOCATION: \"Where does it hurt?\"        Chest pain with exertion and becomes out of breath   2. RADIATION: \"Does the pain go anywhere else?\" (e.g., into neck, jaw, arms, back)      no  3. ONSET: \"When did the chest pain begin?\" (Minutes, hours or days)       Couple weeks ago   4. PATTERN \"Does the pain come and go, or has it been constant since it started?\"  \"Does it get worse with exertion?\"       With exertion   5. DURATION: \"How long does it last\" (e.g., seconds, minutes, hours)      Minutes   6. SEVERITY: \"How bad is the pain?\"  (e.g., Scale 1-10; mild, moderate, or severe)     - MILD (1-3): doesn't interfere with normal activities      - MODERATE (4-7): interferes with normal activities or awakens from sleep     - SEVERE (8-10): excruciating pain, unable to do any normal activities        Moderate   7. CARDIAC RISK FACTORS: \"Do you have any history of heart problems or risk factors for heart disease?\" (e.g., angina, prior heart attack; diabetes, high blood pressure, high cholesterol, smoker, or strong family history of heart disease)      CAD, prior heart attack, hypertension   8. PULMONARY RISK FACTORS: \"Do you have any history of lung disease?\"  (e.g., blood clots in lung, asthma, emphysema, birth control pills)      no  9. CAUSE: \"What do you think is causing the chest pain?\"      Unsure   10. OTHER SYMPTOMS: \"Do you have any other symptoms?\" (e.g., dizziness, nausea, " vomiting, sweating, fever, difficulty breathing, cough)        Dizziness when bending down

## 2024-05-14 ENCOUNTER — OFFICE VISIT (OUTPATIENT)
Dept: CARDIOLOGY CLINIC | Facility: CLINIC | Age: 79
End: 2024-05-14
Payer: COMMERCIAL

## 2024-05-14 ENCOUNTER — PREP FOR PROCEDURE (OUTPATIENT)
Dept: CARDIOLOGY CLINIC | Facility: CLINIC | Age: 79
End: 2024-05-14

## 2024-05-14 VITALS
BODY MASS INDEX: 31.82 KG/M2 | HEART RATE: 52 BPM | OXYGEN SATURATION: 96 % | HEIGHT: 65 IN | DIASTOLIC BLOOD PRESSURE: 78 MMHG | SYSTOLIC BLOOD PRESSURE: 134 MMHG | WEIGHT: 191 LBS

## 2024-05-14 DIAGNOSIS — I20.89 ANGINA OF EFFORT: Primary | ICD-10-CM

## 2024-05-14 DIAGNOSIS — I71.40 ABDOMINAL AORTIC ANEURYSM (AAA) WITHOUT RUPTURE, UNSPECIFIED PART (HCC): ICD-10-CM

## 2024-05-14 DIAGNOSIS — R06.09 DOE (DYSPNEA ON EXERTION): Primary | ICD-10-CM

## 2024-05-14 DIAGNOSIS — I20.9 ANGINA, CLASS III (HCC): Chronic | ICD-10-CM

## 2024-05-14 DIAGNOSIS — E78.00 PURE HYPERCHOLESTEROLEMIA: ICD-10-CM

## 2024-05-14 DIAGNOSIS — I25.118 CORONARY ARTERY DISEASE INVOLVING NATIVE HEART WITH OTHER FORM OF ANGINA PECTORIS, UNSPECIFIED VESSEL OR LESION TYPE (HCC): Primary | ICD-10-CM

## 2024-05-14 DIAGNOSIS — I72.3 ANEURYSM OF ILIAC ARTERY (HCC): ICD-10-CM

## 2024-05-14 DIAGNOSIS — I10 PRIMARY HYPERTENSION: ICD-10-CM

## 2024-05-14 DIAGNOSIS — I25.118 CORONARY ARTERY DISEASE OF NATIVE ARTERY OF NATIVE HEART WITH STABLE ANGINA PECTORIS (HCC): ICD-10-CM

## 2024-05-14 PROCEDURE — 93000 ELECTROCARDIOGRAM COMPLETE: CPT | Performed by: NURSE PRACTITIONER

## 2024-05-14 PROCEDURE — 99215 OFFICE O/P EST HI 40 MIN: CPT | Performed by: NURSE PRACTITIONER

## 2024-05-14 RX ORDER — ASPIRIN 325 MG
325 TABLET ORAL DAILY
Start: 2024-05-14

## 2024-05-14 RX ORDER — NITROGLYCERIN 0.4 MG/1
0.4 TABLET SUBLINGUAL
Qty: 24 TABLET | Refills: 1 | Status: SHIPPED | OUTPATIENT
Start: 2024-05-14
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
## 2024-05-15 ENCOUNTER — APPOINTMENT (OUTPATIENT)
Dept: LAB | Facility: CLINIC | Age: 79
End: 2024-05-15
Payer: COMMERCIAL

## 2024-05-15 ENCOUNTER — TELEPHONE (OUTPATIENT)
Dept: CARDIOLOGY CLINIC | Facility: CLINIC | Age: 79
End: 2024-05-15

## 2024-05-15 DIAGNOSIS — E78.49 OTHER HYPERLIPIDEMIA: ICD-10-CM

## 2024-05-15 DIAGNOSIS — I25.118 CORONARY ARTERY DISEASE OF NATIVE ARTERY OF NATIVE HEART WITH STABLE ANGINA PECTORIS (HCC): ICD-10-CM

## 2024-05-15 DIAGNOSIS — I20.9 ANGINA, CLASS III (HCC): Chronic | ICD-10-CM

## 2024-05-15 DIAGNOSIS — I25.10 CORONARY ARTERY DISEASE INVOLVING NATIVE CORONARY ARTERY OF NATIVE HEART WITHOUT ANGINA PECTORIS: ICD-10-CM

## 2024-05-15 DIAGNOSIS — I10 PRIMARY HYPERTENSION: ICD-10-CM

## 2024-05-15 LAB
ANION GAP SERPL CALCULATED.3IONS-SCNC: 4 MMOL/L (ref 4–13)
BUN SERPL-MCNC: 13 MG/DL (ref 5–25)
CALCIUM SERPL-MCNC: 9 MG/DL (ref 8.4–10.2)
CHLORIDE SERPL-SCNC: 105 MMOL/L (ref 96–108)
CO2 SERPL-SCNC: 30 MMOL/L (ref 21–32)
CREAT SERPL-MCNC: 0.93 MG/DL (ref 0.6–1.3)
ERYTHROCYTE [DISTWIDTH] IN BLOOD BY AUTOMATED COUNT: 12.1 % (ref 11.6–15.1)
GFR SERPL CREATININE-BSD FRML MDRD: 78 ML/MIN/1.73SQ M
GLUCOSE P FAST SERPL-MCNC: 98 MG/DL (ref 65–99)
HCT VFR BLD AUTO: 37.6 % (ref 36.5–49.3)
HGB BLD-MCNC: 12.9 G/DL (ref 12–17)
MCH RBC QN AUTO: 32.8 PG (ref 26.8–34.3)
MCHC RBC AUTO-ENTMCNC: 34.3 G/DL (ref 31.4–37.4)
MCV RBC AUTO: 96 FL (ref 82–98)
PLATELET # BLD AUTO: 169 THOUSANDS/UL (ref 149–390)
PMV BLD AUTO: 8.7 FL (ref 8.9–12.7)
POTASSIUM SERPL-SCNC: 4 MMOL/L (ref 3.5–5.3)
RBC # BLD AUTO: 3.93 MILLION/UL (ref 3.88–5.62)
SODIUM SERPL-SCNC: 139 MMOL/L (ref 135–147)
WBC # BLD AUTO: 5.39 THOUSAND/UL (ref 4.31–10.16)

## 2024-05-15 PROCEDURE — 36415 COLL VENOUS BLD VENIPUNCTURE: CPT

## 2024-05-15 PROCEDURE — 85027 COMPLETE CBC AUTOMATED: CPT

## 2024-05-15 PROCEDURE — 80048 BASIC METABOLIC PNL TOTAL CA: CPT

## 2024-05-15 RX ORDER — NITROGLYCERIN 0.4 MG/1
0.4 TABLET SUBLINGUAL
Qty: 24 TABLET | Refills: 1 | OUTPATIENT
Start: 2024-05-15

## 2024-05-15 NOTE — TELEPHONE ENCOUNTER
Request from [facility], medication ordered yesterday 05.14.2024 and sent to [facility], please review with patient preferred pharmacy.

## 2024-05-15 NOTE — TELEPHONE ENCOUNTER
Patient confirmed pharmacy with me at office visit yesterday. His picked up his nitro at [facility] yesterday per chart.

## 2024-05-15 NOTE — TELEPHONE ENCOUNTER
----- Message from SANTHOSH Dominguez sent at 5/14/2024  4:26 PM EDT -----  Please asked the patient to start taking aspirin 325 mg daily in addition to his other medications.

## 2024-05-15 NOTE — TELEPHONE ENCOUNTER
Patient scheduled for LEFT  Heart Cath on 06/03/2024 at [facility] with Dr. MOSELEY.      Instructions sent to patient through Take the Interview.      Patient aware of all general instructions.    Medication holds:   N/A      Labs to be done on: 05/15/2024  CMP / CBC (fasting 8 hours)

## 2024-05-21 DIAGNOSIS — I50.9 CONGESTIVE HEART FAILURE (HCC): ICD-10-CM

## 2024-05-21 NOTE — TELEPHONE ENCOUNTER
Reason for call:   [x] Refill   [] Prior Auth  [] Other:     Office:   [] PCP/Provider -   [x] Specialty/Provider - Cardiology     Medication: clopidogrel (PLAVIX) 75 mg tablet  Take 1 tablet (75 mg total) by mouth daily       Pharmacy: [facility]    Does the patient have enough for 3 days?   [x] Yes   [] No - Send as HP to POD

## 2024-05-22 RX ORDER — CLOPIDOGREL BISULFATE 75 MG/1
75 TABLET ORAL DAILY
Qty: 90 TABLET | Refills: 1 | Status: SHIPPED | OUTPATIENT
Start: 2024-05-22

## 2024-06-03 ENCOUNTER — HOSPITAL ENCOUNTER (OUTPATIENT)
Facility: HOSPITAL | Age: 79
Setting detail: OUTPATIENT SURGERY
Discharge: HOME | End: 2024-06-03
Attending: INTERNAL MEDICINE | Admitting: INTERNAL MEDICINE
Payer: COMMERCIAL

## 2024-06-03 VITALS
BODY MASS INDEX: 31.98 KG/M2 | TEMPERATURE: 97 F | HEART RATE: 62 BPM | SYSTOLIC BLOOD PRESSURE: 132 MMHG | RESPIRATION RATE: 17 BRPM | OXYGEN SATURATION: 93 % | HEIGHT: 66 IN | DIASTOLIC BLOOD PRESSURE: 75 MMHG | WEIGHT: 199 LBS

## 2024-06-03 DIAGNOSIS — I20.89 ANGINA OF EFFORT: ICD-10-CM

## 2024-06-03 DIAGNOSIS — I25.118 CORONARY ARTERY DISEASE OF NATIVE ARTERY OF NATIVE HEART WITH STABLE ANGINA PECTORIS (HCC): Primary | ICD-10-CM

## 2024-06-03 LAB
ATRIAL RATE: 60 BPM
P AXIS: 41 DEGREES
PR INTERVAL: 190 MS
QRS AXIS: 5 DEGREES
QRSD INTERVAL: 94 MS
QT INTERVAL: 444 MS
QTC INTERVAL: 444 MS
T WAVE AXIS: -3 DEGREES
VENTRICULAR RATE: 60 BPM

## 2024-06-03 PROCEDURE — 99152 MOD SED SAME PHYS/QHP 5/>YRS: CPT | Performed by: INTERNAL MEDICINE

## 2024-06-03 PROCEDURE — C1894 INTRO/SHEATH, NON-LASER: HCPCS | Performed by: INTERNAL MEDICINE

## 2024-06-03 PROCEDURE — C1769 GUIDE WIRE: HCPCS | Performed by: INTERNAL MEDICINE

## 2024-06-03 PROCEDURE — 93458 L HRT ARTERY/VENTRICLE ANGIO: CPT | Performed by: INTERNAL MEDICINE

## 2024-06-03 PROCEDURE — 93005 ELECTROCARDIOGRAM TRACING: CPT

## 2024-06-03 PROCEDURE — 93010 ELECTROCARDIOGRAM REPORT: CPT | Performed by: INTERNAL MEDICINE

## 2024-06-03 RX ORDER — SODIUM CHLORIDE 9 MG/ML
125 INJECTION, SOLUTION INTRAVENOUS CONTINUOUS
Status: DISCONTINUED | OUTPATIENT
Start: 2024-06-03 | End: 2024-06-03

## 2024-06-03 RX ORDER — HEPARIN SODIUM 1000 [USP'U]/ML
INJECTION, SOLUTION INTRAVENOUS; SUBCUTANEOUS CODE/TRAUMA/SEDATION MEDICATION
Status: DISCONTINUED | OUTPATIENT
Start: 2024-06-03 | End: 2024-06-03 | Stop reason: HOSPADM

## 2024-06-03 RX ORDER — ACETAMINOPHEN 325 MG/1
975 TABLET ORAL ONCE AS NEEDED
Status: DISCONTINUED | OUTPATIENT
Start: 2024-06-03 | End: 2024-06-03 | Stop reason: HOSPADM

## 2024-06-03 RX ORDER — SODIUM CHLORIDE 9 MG/ML
125 INJECTION, SOLUTION INTRAVENOUS CONTINUOUS
Status: DISPENSED | OUTPATIENT
Start: 2024-06-03 | End: 2024-06-03

## 2024-06-03 RX ORDER — ASPIRIN 81 MG/1
324 TABLET, CHEWABLE ORAL ONCE
Status: COMPLETED | OUTPATIENT
Start: 2024-06-03 | End: 2024-06-03

## 2024-06-03 RX ORDER — VERAPAMIL HYDROCHLORIDE 2.5 MG/ML
INJECTION, SOLUTION INTRAVENOUS CODE/TRAUMA/SEDATION MEDICATION
Status: DISCONTINUED | OUTPATIENT
Start: 2024-06-03 | End: 2024-06-03 | Stop reason: HOSPADM

## 2024-06-03 RX ORDER — NITROGLYCERIN 0.4 MG/1
0.4 TABLET SUBLINGUAL ONCE AS NEEDED
Status: DISCONTINUED | OUTPATIENT
Start: 2024-06-03 | End: 2024-06-03 | Stop reason: HOSPADM

## 2024-06-03 RX ORDER — NITROGLYCERIN 20 MG/100ML
INJECTION INTRAVENOUS CODE/TRAUMA/SEDATION MEDICATION
Status: DISCONTINUED | OUTPATIENT
Start: 2024-06-03 | End: 2024-06-03 | Stop reason: HOSPADM

## 2024-06-03 RX ORDER — ROSUVASTATIN CALCIUM 40 MG/1
40 TABLET, COATED ORAL DAILY
Qty: 90 TABLET | Refills: 3 | Status: SHIPPED | OUTPATIENT
Start: 2024-06-03

## 2024-06-03 RX ORDER — FENTANYL CITRATE 50 UG/ML
INJECTION, SOLUTION INTRAMUSCULAR; INTRAVENOUS CODE/TRAUMA/SEDATION MEDICATION
Status: DISCONTINUED | OUTPATIENT
Start: 2024-06-03 | End: 2024-06-03 | Stop reason: HOSPADM

## 2024-06-03 RX ORDER — ONDANSETRON 2 MG/ML
4 INJECTION INTRAMUSCULAR; INTRAVENOUS ONCE AS NEEDED
Status: DISCONTINUED | OUTPATIENT
Start: 2024-06-03 | End: 2024-06-03 | Stop reason: HOSPADM

## 2024-06-03 RX ORDER — MIDAZOLAM HYDROCHLORIDE 2 MG/2ML
INJECTION, SOLUTION INTRAMUSCULAR; INTRAVENOUS CODE/TRAUMA/SEDATION MEDICATION
Status: DISCONTINUED | OUTPATIENT
Start: 2024-06-03 | End: 2024-06-03 | Stop reason: HOSPADM

## 2024-06-03 RX ORDER — LIDOCAINE HYDROCHLORIDE 10 MG/ML
INJECTION, SOLUTION EPIDURAL; INFILTRATION; INTRACAUDAL; PERINEURAL CODE/TRAUMA/SEDATION MEDICATION
Status: DISCONTINUED | OUTPATIENT
Start: 2024-06-03 | End: 2024-06-03 | Stop reason: HOSPADM

## 2024-06-03 RX ADMIN — SODIUM CHLORIDE 125 ML/HR: 0.9 INJECTION, SOLUTION INTRAVENOUS at 08:07

## 2024-06-03 RX ADMIN — ASPIRIN 81 MG CHEWABLE TABLET 324 MG: 81 TABLET CHEWABLE at 08:00

## 2024-06-03 NOTE — INTERVAL H&P NOTE
"H&P reviewed. After examining the patient, I find no changed to the H&P since it had been written.    /72   Pulse 60   Temp 97.6 °F (36.4 °C) (Temporal)   Resp 18   Ht 5' 5.5\" (1.664 m)   Wt 90.3 kg (199 lb)   SpO2 94%   BMI 32.61 kg/m²      Patient re-evaluated. Accept as history and physical.    Amber Camacho, DO/Pennie 3, 2024/8:19 AM  "

## 2024-06-03 NOTE — DISCHARGE INSTR - AVS FIRST PAGE
1. Please see the post cardiac catheterization dishcarge instructions.   No heavy lifting, greater than 10 lbs. or strenuous  activity for 48 hrs.    2.Remove band aid tomorrow.  Shower and wash area- wrist gently with soap and water- beginning tomorrow. Rinse and pat dry.  Apply new water seal band aid.  Repeat this process for 5 days. No powders, creams lotions or antibiotic ointments  for 5 days.  No tub baths, hot tubs or swimming for 5 days.     3. Please call our office ([phone]) if you have any fever, redness, swelling, discharge from your wrist access site.    4.No driving for 1 day

## 2024-06-07 ENCOUNTER — CONSULT (OUTPATIENT)
Dept: CARDIAC SURGERY | Facility: CLINIC | Age: 79
End: 2024-06-07
Payer: COMMERCIAL

## 2024-06-07 VITALS
BODY MASS INDEX: 30.53 KG/M2 | HEIGHT: 66 IN | HEART RATE: 61 BPM | OXYGEN SATURATION: 97 % | SYSTOLIC BLOOD PRESSURE: 118 MMHG | WEIGHT: 190 LBS | DIASTOLIC BLOOD PRESSURE: 71 MMHG

## 2024-06-07 DIAGNOSIS — Z01.89 ENCOUNTER FOR IMAGING OF BILATERAL GREATER SAPHENOUS VEINS: ICD-10-CM

## 2024-06-07 DIAGNOSIS — Z13.1 ENCOUNTER FOR SCREENING FOR DIABETES MELLITUS: ICD-10-CM

## 2024-06-07 DIAGNOSIS — Z01.812 ENCOUNTER FOR PRE-OPERATIVE LABORATORY TESTING: ICD-10-CM

## 2024-06-07 DIAGNOSIS — I25.118 CORONARY ARTERY DISEASE OF NATIVE ARTERY OF NATIVE HEART WITH STABLE ANGINA PECTORIS (HCC): Primary | ICD-10-CM

## 2024-06-07 DIAGNOSIS — Z01.810 PRE-OPERATIVE CARDIOVASCULAR EXAMINATION: ICD-10-CM

## 2024-06-07 PROCEDURE — 99205 OFFICE O/P NEW HI 60 MIN: CPT | Performed by: THORACIC SURGERY (CARDIOTHORACIC VASCULAR SURGERY)

## 2024-06-07 RX ORDER — CEFAZOLIN SODIUM 2 G/50ML
2000 SOLUTION INTRAVENOUS ONCE
OUTPATIENT
Start: 2024-06-07 | End: 2024-06-07

## 2024-06-07 RX ORDER — CHLORHEXIDINE GLUCONATE ORAL RINSE 1.2 MG/ML
15 SOLUTION DENTAL ONCE
OUTPATIENT
Start: 2024-06-07 | End: 2024-06-07

## 2024-06-07 NOTE — PROGRESS NOTES
Consultation - Cardiothoracic Surgery   Shane Chau Sr. 78 y.o. male MRN: [id number]    Physician Requesting Consult: Dr. Blanco     Reason for Consult / Principal Problem: Coronary artery disease    History of Present Illness: Shane Chau Sr. is a 78 y.o. year old male with PMHx CAD (prior angioplasty in 1980's, stent in 1996, and JENN x2 to L Cx in 2013), BPH, HLD, HTN, insomnia, several TIA's in the 1990's, abdominal aortic aneurysm s/p repair in 2011 with aneurysm measuring 4.3 cm proximal to graft who has been following with Dr. Juares for several years. He had a pharmacologic nuclear stress test in August 2023 that demonstrated a reversible perfusion defect and mild ischemia of mid to distal anterior and apical walls. He saw cardiology again in May of this year and reported that over the last several months, his dyspnea and chest pain have progressed to the point where he has needed to take nitro at different points. He underwent cardiac catheterization on Monday 6/3 that demonstrated severe multivessel CAD, and it was at this point that he was referred for CABG evaluation.     Patient is accompanied today by his wife, Mrayjo, who underwent CABG with Dr. Amos in December 2022. Upon interview, he confirms above details. He admits to chest pain with exertion, such as going up steps, doing weed wacking, or walking quickly. He has associated dyspnea with these activities. He also reports fatigue and change in activity tolerance, needing to take more frequent breaks with activities. Up until a few weeks ago, he was going on the treadmill every day to walk. He has not been able to do this due to his symptoms. He does have a strong family history of CAD and SCD. His older brother had CABG last year at age 82. 3 of his other brothers had SCD, at ages 42, 68 and 76. He is a former smoker, 1/2 ppd, quit in 1986. He drinks alcohol socially. He is a retired  for [employer].  He is independent with his ADL's, and does not use an ambulatory assist device.     Past Medical History:  Past Medical History:   Diagnosis Date    AAA (abdominal aortic aneurysm) (HCC)     last assessed: June 11, 2013    Appendicitis     BPH (benign prostatic hyperplasia)     CAD (coronary artery disease)     Cholelithiasis     last assessed: July 26, 2016    Coronary artery disease on record    Heart attack (HCC)     Hyperlipidemia     Hypertension     Insomnia     Myocardial infarction (HCC) on record    Stroke (HCC)     Transient cerebral ischemia          Past Surgical History:   Past Surgical History:   Procedure Laterality Date    ABDOMINAL AORTIC ANEURYSM REPAIR  06/07/2011    Dr. Yarbrough 6/7/11    APPENDECTOMY      CARDIAC CATHETERIZATION Left 6/3/2024    Procedure: Cardiac Left Heart Cath;  Surgeon: Pk Blanco DO;  [facility];  Service: Cardiology    CARDIAC CATHETERIZATION  6/3/2024    Procedure: Cardiac catheterization;  Surgeon: Pk Blanco DO;  [facility];  Service: Cardiology    CARDIAC CATHETERIZATION N/A 6/3/2024    Procedure: Cardiac Coronary Angiogram;  Surgeon: Pk Blanco DO;  [facility];  Service: Cardiology    CATARACT EXTRACTION Right 11/2018    CHOLECYSTECTOMY      CHOLECYSTECTOMY LAPAROSCOPIC      COLONOSCOPY  11/2006    CORONARY ANGIOPLASTY      3 STENTS INSERTED    CORONARY ANGIOPLASTY      CORONARY ANGIOPLASTY      with stenting - 3/2012    AR LAPS SURG CHOLECYSTECTOMY W/CHOLANGIOGRAPHY N/A 9/8/2016    Procedure: CHOLECYSTECTOMY LAPAROSCOPIC with intra-op cholangiogram ;  Surgeon: Prasanth Verdin MD;  Location: [facility];  Service: General         Family History:  Family History   Problem Relation Age of Onset    Heart disease Mother     Hypertension Mother         Benign Essential     Stroke Mother         Stroke     Heart disease Father     Diabetes Brother         mellitus     Heart disease  Brother     Aortic aneurysm Brother     Heart disease Family          Social History:      Social History     Substance and Sexual Activity   Alcohol Use Not Currently    Comment: 4 oz glass of wine every other day w/ his dinner     Social History     Substance and Sexual Activity   Drug Use No     Social History     Tobacco Use   Smoking Status Former    Current packs/day: 0.00    Average packs/day: 0.5 packs/day for 30.0 years (15.0 ttl pk-yrs)    Types: Cigarettes    Start date: 3/10/1956    Quit date: 3/10/1986    Years since quittin.2   Smokeless Tobacco Never   Tobacco Comments    Quit  about 25 years x1ppd       Home Medications:   Prior to Admission medications    Medication Sig Start Date End Date Taking? Authorizing Provider   amLODIPine (NORVASC) 10 mg tablet Take 1 tablet (10 mg total) by mouth daily 3/7/24  Yes Jose Juares MD   aspirin 325 mg tablet Take 1 tablet (325 mg total) by mouth daily 24  Yes SANTHOSH Dominguez   clopidogrel (PLAVIX) 75 mg tablet Take 1 tablet (75 mg total) by mouth daily 24  Yes Jose Juares MD   isosorbide mononitrate (IMDUR) 60 mg 24 hr tablet Take 1 tablet (60 mg total) by mouth 2 (two) times a day 23  Yes Jose Juares MD   metoprolol tartrate (LOPRESSOR) 50 mg tablet Take 1 tablet (50 mg total) by mouth 2 (two) times a day 23  Yes Jose Juares MD   nitroglycerin (NITROSTAT) 0.4 mg SL tablet Place 1 tablet (0.4 mg total) under the tongue every 5 (five) minutes as needed for chest pain 24  Yes SANTHOSH Dominguez   ranolazine (RANEXA) 500 mg 12 hr tablet Take 1 tablet (500 mg total) by mouth 2 (two) times a day 23  Yes Jose Juares MD   rosuvastatin (CRESTOR) 40 MG tablet Take 1 tablet (40 mg total) by mouth daily 6/3/24  Yes SANTHOSH Duckwotrh   tamsulosin (FLOMAX) 0.4 mg Take 1 capsule by mouth daily with dinner 10/24/23  Yes Jose Juares MD       Allergies:  No Known Allergies    Review of Systems:     Review of Systems  "  Constitutional:  Positive for activity change and fatigue. Negative for chills and fever.   HENT:  Negative for ear pain and sore throat.    Eyes:  Negative for pain and visual disturbance.   Respiratory:  Positive for shortness of breath. Negative for cough.    Cardiovascular:  Positive for chest pain. Negative for palpitations and leg swelling.   Gastrointestinal:  Negative for abdominal pain and vomiting.   Genitourinary:  Negative for dysuria and hematuria.   Musculoskeletal:  Negative for arthralgias and back pain.   Skin:  Negative for color change and rash.   Neurological:  Negative for seizures and syncope.   All other systems reviewed and are negative.      Vital Signs:     Vitals:    06/07/24 0913 06/07/24 0921   BP: 116/65 118/71   BP Location: Left arm Right arm   Patient Position: Sitting Sitting   Cuff Size: Standard Standard   Pulse: 61    SpO2: 97%    Weight: 86.2 kg (190 lb)    Height: 5' 5.5\" (1.664 m)        Physical Exam:     Physical Exam  Vitals reviewed.   Constitutional:       Appearance: Normal appearance.   HENT:      Head: Normocephalic and atraumatic.   Eyes:      Pupils: Pupils are equal, round, and reactive to light.   Neck:      Vascular: No carotid bruit or JVD.   Cardiovascular:      Rate and Rhythm: Normal rate and regular rhythm.      Pulses:           Carotid pulses are 2+ on the right side and 2+ on the left side.       Radial pulses are 2+ on the right side and 2+ on the left side.        Dorsalis pedis pulses are 2+ on the right side and 2+ on the left side.      Heart sounds: Normal heart sounds. No murmur heard.     No friction rub. No gallop.   Pulmonary:      Effort: Pulmonary effort is normal.      Breath sounds: Normal breath sounds. No wheezing, rhonchi or rales.   Abdominal:      Palpations: Abdomen is soft.      Tenderness: There is no abdominal tenderness.   Musculoskeletal:      Cervical back: Normal range of motion.      Right lower leg: Edema present.      Left " "lower leg: No edema.   Skin:     General: Skin is warm and dry.      Capillary Refill: Capillary refill takes less than 2 seconds.   Neurological:      General: No focal deficit present.      Mental Status: He is alert.      Sensory: Sensation is intact.   Psychiatric:         Attention and Perception: Attention normal.         Mood and Affect: Mood normal.         Behavior: Behavior normal. Behavior is cooperative.         Lab Results:               Invalid input(s): \"LABGLOM\"      No results found for: \"HGBA1C\"  Lab Results   Component Value Date    TROPONINI <0.02 12/03/2015       Imaging Studies:     Cardiac Catheterization: 6/3/24  Left Main   Ost LM to Mid LM lesion is 30% stenosed.   Left Anterior Descending   Prox LAD lesion is 100% stenosed. The lesion is calcified. The lesion is chronically occluded.   Second Diagonal Branch   Collaterals   2nd Diag filled by collaterals from Ramus.   The collateral flow was moderate.   Left Circumflex   Prox Cx to Mid Cx lesion is 10% stenosed. The lesion was previously treated using a stent of unknown type.   First Obtuse Marginal Branch   1st Mrg lesion is 70% stenosed.   Right Coronary Artery   Prox RCA lesion is 90% stenosed. KALE flow is 3. The lesion is type C. The lesion is calcified.   Intervention    No interventions have been documented.    Echocardiogram: scheduled for 6/13/24     I have personally reviewed pertinent reports.   and reviewed prior notes from PCP and cardiology.     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  They understand and wish to proceed with further workup and ultimately surgical intervention.  We have ordered routine preoperative laboratory and vascular studies, including CT chest, vein mapping. He should maintain his appointment for his previously ordered echocardiogram that is on Thursday 6/13.  Pending the results of these tests, they will be scheduled for surgery on 6/20/24 with Dr. Ravin Amos M.D.    Shane Chau Sr. was comfortable with our recommendations, and their questions were answered to their satisfaction.  Thank you for allowing us to participate in the care of this patient.       Routine referral to gastroenterology for colonoscopy screening was not indicated, as the patient is over 75 years old    SIGNATURE: Ernestina Jj PA-C  DATE: 06/07/24  TIME: 9:58 AM

## 2024-06-07 NOTE — H&P
Preop History and Physical - Cardiothoracic Surgery   Shane Chau Sr. 78 y.o. male MRN: [id number]    Physician Requesting Consult: Dr. Blanco     Reason for Consult / Principal Problem: Coronary artery disease    History of Present Illness: Shane Chau Sr. is a 78 y.o. year old male with PMHx CAD (prior angioplasty in 1980's, stent in 1996, and JENN x2 to L Cx in 2013), BPH, HLD, HTN, insomnia, several TIA's in the 1990's, abdominal aortic aneurysm s/p repair in 2011 with aneurysm measuring 4.3 cm proximal to graft who has been following with Dr. Juares for several years. He had a pharmacologic nuclear stress test in August 2023 that demonstrated a reversible perfusion defect and mild ischemia of mid to distal anterior and apical walls. He saw cardiology again in May of this year and reported that over the last several months, his dyspnea and chest pain have progressed to the point where he has needed to take nitro at different points. He underwent cardiac catheterization on Monday 6/3 that demonstrated severe multivessel CAD, and it was at this point that he was referred for CABG evaluation.     Patient is accompanied today by his wife, Maryjo, who underwent CABG with Dr. Amos in December 2022. Upon interview, he confirms above details. He admits to chest pain with exertion, such as going up steps, doing weed wacking, or walking quickly. He has associated dyspnea with these activities. He also reports fatigue and change in activity tolerance, needing to take more frequent breaks with activities. Up until a few weeks ago, he was going on the treadmill every day to walk. He has not been able to do this due to his symptoms. He does have a strong family history of CAD and SCD. His older brother had CABG last year at age 82. 3 of his other brothers had SCD, at ages 42, 68 and 76. He is a former smoker, 1/2 ppd, quit in 1986. He drinks alcohol socially. He is a retired  for  [employer]. He is independent with his ADL's, and does not use an ambulatory assist device.     Past Medical History:  Past Medical History:   Diagnosis Date    AAA (abdominal aortic aneurysm) (HCC)     last assessed: June 11, 2013    Appendicitis     BPH (benign prostatic hyperplasia)     CAD (coronary artery disease)     Cholelithiasis     last assessed: July 26, 2016    Coronary artery disease on record    Heart attack (HCC)     Hyperlipidemia     Hypertension     Insomnia     Myocardial infarction (HCC) on record    Stroke (HCC)     Transient cerebral ischemia          Past Surgical History:   Past Surgical History:   Procedure Laterality Date    ABDOMINAL AORTIC ANEURYSM REPAIR  06/07/2011    Dr. Yarbrough 6/7/11    APPENDECTOMY      CARDIAC CATHETERIZATION Left 6/3/2024    Procedure: Cardiac Left Heart Cath;  Surgeon: Pk Blanco DO;  [facility];  Service: Cardiology    CARDIAC CATHETERIZATION  6/3/2024    Procedure: Cardiac catheterization;  Surgeon: Pk Blanco DO;  [facility];  Service: Cardiology    CARDIAC CATHETERIZATION N/A 6/3/2024    Procedure: Cardiac Coronary Angiogram;  Surgeon: Pk Blanco DO;  [facility];  Service: Cardiology    CATARACT EXTRACTION Right 11/2018    CHOLECYSTECTOMY      CHOLECYSTECTOMY LAPAROSCOPIC      COLONOSCOPY  11/2006    CORONARY ANGIOPLASTY      3 STENTS INSERTED    CORONARY ANGIOPLASTY      CORONARY ANGIOPLASTY      with stenting - 3/2012    IL LAPS SURG CHOLECYSTECTOMY W/CHOLANGIOGRAPHY N/A 9/8/2016    Procedure: CHOLECYSTECTOMY LAPAROSCOPIC with intra-op cholangiogram ;  Surgeon: Prasanth Verdin MD;  Location: [facility];  Service: General         Family History:  Family History   Problem Relation Age of Onset    Heart disease Mother     Hypertension Mother         Benign Essential     Stroke Mother         Stroke     Heart disease Father     Diabetes Brother         mellitus      Heart disease Brother     Aortic aneurysm Brother     Heart disease Family          Social History:      Social History     Substance and Sexual Activity   Alcohol Use Not Currently    Comment: 4 oz glass of wine every other day w/ his dinner     Social History     Substance and Sexual Activity   Drug Use No     Social History     Tobacco Use   Smoking Status Former    Current packs/day: 0.00    Average packs/day: 0.5 packs/day for 30.0 years (15.0 ttl pk-yrs)    Types: Cigarettes    Start date: 3/10/1956    Quit date: 3/10/1986    Years since quittin.2   Smokeless Tobacco Never   Tobacco Comments    Quit  about 25 years x1ppd       Home Medications:   Prior to Admission medications    Medication Sig Start Date End Date Taking? Authorizing Provider   amLODIPine (NORVASC) 10 mg tablet Take 1 tablet (10 mg total) by mouth daily 3/7/24  Yes Jose Juares MD   aspirin 325 mg tablet Take 1 tablet (325 mg total) by mouth daily 24  Yes SANTHOSH Dominguez   clopidogrel (PLAVIX) 75 mg tablet Take 1 tablet (75 mg total) by mouth daily 24  Yes Jose Juares MD   isosorbide mononitrate (IMDUR) 60 mg 24 hr tablet Take 1 tablet (60 mg total) by mouth 2 (two) times a day 23  Yes Jose Juares MD   metoprolol tartrate (LOPRESSOR) 50 mg tablet Take 1 tablet (50 mg total) by mouth 2 (two) times a day 23  Yes Jose Juares MD   nitroglycerin (NITROSTAT) 0.4 mg SL tablet Place 1 tablet (0.4 mg total) under the tongue every 5 (five) minutes as needed for chest pain 24  Yes SANTHOSH Dominguez   ranolazine (RANEXA) 500 mg 12 hr tablet Take 1 tablet (500 mg total) by mouth 2 (two) times a day 23  Yes Jose Juares MD   rosuvastatin (CRESTOR) 40 MG tablet Take 1 tablet (40 mg total) by mouth daily 6/3/24  Yes SANTHOSH Duckworth   tamsulosin (FLOMAX) 0.4 mg Take 1 capsule by mouth daily with dinner 10/24/23  Yes Jose Juares MD       Allergies:  No Known Allergies    Review of Systems:     Review of  "Systems   Constitutional:  Positive for activity change and fatigue. Negative for chills and fever.   HENT:  Negative for ear pain and sore throat.    Eyes:  Negative for pain and visual disturbance.   Respiratory:  Positive for shortness of breath. Negative for cough.    Cardiovascular:  Positive for chest pain. Negative for palpitations and leg swelling.   Gastrointestinal:  Negative for abdominal pain and vomiting.   Genitourinary:  Negative for dysuria and hematuria.   Musculoskeletal:  Negative for arthralgias and back pain.   Skin:  Negative for color change and rash.   Neurological:  Negative for seizures and syncope.   All other systems reviewed and are negative.      Vital Signs:     Vitals:    06/07/24 0913 06/07/24 0921   BP: 116/65 118/71   BP Location: Left arm Right arm   Patient Position: Sitting Sitting   Cuff Size: Standard Standard   Pulse: 61    SpO2: 97%    Weight: 86.2 kg (190 lb)    Height: 5' 5.5\" (1.664 m)        Physical Exam:     Physical Exam  Vitals reviewed.   Constitutional:       Appearance: Normal appearance.   HENT:      Head: Normocephalic and atraumatic.   Eyes:      Pupils: Pupils are equal, round, and reactive to light.   Neck:      Vascular: No carotid bruit or JVD.   Cardiovascular:      Rate and Rhythm: Normal rate and regular rhythm.      Pulses:           Carotid pulses are 2+ on the right side and 2+ on the left side.       Radial pulses are 2+ on the right side and 2+ on the left side.        Dorsalis pedis pulses are 2+ on the right side and 2+ on the left side.      Heart sounds: Normal heart sounds. No murmur heard.     No friction rub. No gallop.   Pulmonary:      Effort: Pulmonary effort is normal.      Breath sounds: Normal breath sounds. No wheezing, rhonchi or rales.   Abdominal:      Palpations: Abdomen is soft.      Tenderness: There is no abdominal tenderness.   Musculoskeletal:      Cervical back: Normal range of motion.      Right lower leg: Edema present.     " " Left lower leg: No edema.   Skin:     General: Skin is warm and dry.      Capillary Refill: Capillary refill takes less than 2 seconds.   Neurological:      General: No focal deficit present.      Mental Status: He is alert.      Sensory: Sensation is intact.   Psychiatric:         Attention and Perception: Attention normal.         Mood and Affect: Mood normal.         Behavior: Behavior normal. Behavior is cooperative.         Lab Results:               Invalid input(s): \"LABGLOM\"      No results found for: \"HGBA1C\"  Lab Results   Component Value Date    TROPONINI <0.02 12/03/2015       Imaging Studies:     Cardiac Catheterization: 6/3/24  Left Main   Ost LM to Mid LM lesion is 30% stenosed.   Left Anterior Descending   Prox LAD lesion is 100% stenosed. The lesion is calcified. The lesion is chronically occluded.   Second Diagonal Branch   Collaterals   2nd Diag filled by collaterals from Ramus.   The collateral flow was moderate.   Left Circumflex   Prox Cx to Mid Cx lesion is 10% stenosed. The lesion was previously treated using a stent of unknown type.   First Obtuse Marginal Branch   1st Mrg lesion is 70% stenosed.   Right Coronary Artery   Prox RCA lesion is 90% stenosed. KALE flow is 3. The lesion is type C. The lesion is calcified.   Intervention    No interventions have been documented.    Echocardiogram: scheduled for 6/13/24     I have personally reviewed pertinent reports.   and reviewed prior notes from PCP and cardiology.     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  They understand and wish to proceed with further workup and ultimately surgical intervention.  We have ordered routine preoperative laboratory and vascular studies, including CT chest, vein mapping. He should maintain his appointment for his previously ordered echocardiogram that is on Thursday 6/13.  Pending the results of these tests, they will be scheduled for surgery on 6/20/24 with Dr. Ravin Amos M.D.    Shane Chau Sr. was comfortable with our recommendations, and their questions were answered to their satisfaction.  Thank you for allowing us to participate in the care of this patient.       Routine referral to gastroenterology for colonoscopy screening was not indicated, as the patient is over 75 years old    SIGNATURE: Ernestina Jj PA-C  DATE: 06/07/24  TIME: 9:58 AM

## 2024-06-07 NOTE — H&P (VIEW-ONLY)
Consultation - Cardiothoracic Surgery   Shane Chau Sr. 78 y.o. male MRN: [id number]    Physician Requesting Consult: Dr. Blanco     Reason for Consult / Principal Problem: Coronary artery disease    History of Present Illness: Shane Chau Sr. is a 78 y.o. year old male with PMHx CAD (prior angioplasty in 1980's, stent in 1996, and JENN x2 to L Cx in 2013), BPH, HLD, HTN, insomnia, several TIA's in the 1990's, abdominal aortic aneurysm s/p repair in 2011 with aneurysm measuring 4.3 cm proximal to graft who has been following with Dr. Juares for several years. He had a pharmacologic nuclear stress test in August 2023 that demonstrated a reversible perfusion defect and mild ischemia of mid to distal anterior and apical walls. He saw cardiology again in May of this year and reported that over the last several months, his dyspnea and chest pain have progressed to the point where he has needed to take nitro at different points. He underwent cardiac catheterization on Monday 6/3 that demonstrated severe multivessel CAD, and it was at this point that he was referred for CABG evaluation.     Patient is accompanied today by his wife, Maryjo, who underwent CABG with Dr. Amos in December 2022. Upon interview, he confirms above details. He admits to chest pain with exertion, such as going up steps, doing weed wacking, or walking quickly. He has associated dyspnea with these activities. He also reports fatigue and change in activity tolerance, needing to take more frequent breaks with activities. Up until a few weeks ago, he was going on the treadmill every day to walk. He has not been able to do this due to his symptoms. He does have a strong family history of CAD and SCD. His older brother had CABG last year at age 82. 3 of his other brothers had SCD, at ages 42, 68 and 76. He is a former smoker, 1/2 ppd, quit in 1986. He drinks alcohol socially. He is a retired  for [employer].  He is independent with his ADL's, and does not use an ambulatory assist device.     Past Medical History:  Past Medical History:   Diagnosis Date    AAA (abdominal aortic aneurysm) (HCC)     last assessed: June 11, 2013    Appendicitis     BPH (benign prostatic hyperplasia)     CAD (coronary artery disease)     Cholelithiasis     last assessed: July 26, 2016    Coronary artery disease on record    Heart attack (HCC)     Hyperlipidemia     Hypertension     Insomnia     Myocardial infarction (HCC) on record    Stroke (HCC)     Transient cerebral ischemia          Past Surgical History:   Past Surgical History:   Procedure Laterality Date    ABDOMINAL AORTIC ANEURYSM REPAIR  06/07/2011    Dr. Yarbrough 6/7/11    APPENDECTOMY      CARDIAC CATHETERIZATION Left 6/3/2024    Procedure: Cardiac Left Heart Cath;  Surgeon: Pk Blanco DO;  Location: [facility];  Service: Cardiology    CARDIAC CATHETERIZATION  6/3/2024    Procedure: Cardiac catheterization;  Surgeon: Pk Blanco DO;  Location: [facility];  Service: Cardiology    CARDIAC CATHETERIZATION N/A 6/3/2024    Procedure: Cardiac Coronary Angiogram;  Surgeon: Pk Blanco DO;  Location: [facility];  Service: Cardiology    CATARACT EXTRACTION Right 11/2018    CHOLECYSTECTOMY      CHOLECYSTECTOMY LAPAROSCOPIC      COLONOSCOPY  11/2006    CORONARY ANGIOPLASTY      3 STENTS INSERTED    CORONARY ANGIOPLASTY      CORONARY ANGIOPLASTY      with stenting - 3/2012    WI LAPS SURG CHOLECYSTECTOMY W/CHOLANGIOGRAPHY N/A 9/8/2016    Procedure: CHOLECYSTECTOMY LAPAROSCOPIC with intra-op cholangiogram ;  Surgeon: Prasanth Verdin MD;  Location: [facility];  Service: General         Family History:  Family History   Problem Relation Age of Onset    Heart disease Mother     Hypertension Mother         Benign Essential     Stroke Mother         Stroke     Heart disease Father     Diabetes Brother         mellitus     Heart disease  Brother     Aortic aneurysm Brother     Heart disease Family          Social History:      Social History     Substance and Sexual Activity   Alcohol Use Not Currently    Comment: 4 oz glass of wine every other day w/ his dinner     Social History     Substance and Sexual Activity   Drug Use No     Social History     Tobacco Use   Smoking Status Former    Current packs/day: 0.00    Average packs/day: 0.5 packs/day for 30.0 years (15.0 ttl pk-yrs)    Types: Cigarettes    Start date: 3/10/1956    Quit date: 3/10/1986    Years since quittin.2   Smokeless Tobacco Never   Tobacco Comments    Quit  about 25 years x1ppd       Home Medications:   Prior to Admission medications    Medication Sig Start Date End Date Taking? Authorizing Provider   amLODIPine (NORVASC) 10 mg tablet Take 1 tablet (10 mg total) by mouth daily 3/7/24  Yes Jose Juares MD   aspirin 325 mg tablet Take 1 tablet (325 mg total) by mouth daily 24  Yes SANTHOSH Dominguez   clopidogrel (PLAVIX) 75 mg tablet Take 1 tablet (75 mg total) by mouth daily 24  Yes Jose Juares MD   isosorbide mononitrate (IMDUR) 60 mg 24 hr tablet Take 1 tablet (60 mg total) by mouth 2 (two) times a day 23  Yes Jose Juares MD   metoprolol tartrate (LOPRESSOR) 50 mg tablet Take 1 tablet (50 mg total) by mouth 2 (two) times a day 23  Yes Jose Juares MD   nitroglycerin (NITROSTAT) 0.4 mg SL tablet Place 1 tablet (0.4 mg total) under the tongue every 5 (five) minutes as needed for chest pain 24  Yes SANTHOSH Dominguez   ranolazine (RANEXA) 500 mg 12 hr tablet Take 1 tablet (500 mg total) by mouth 2 (two) times a day 23  Yes Jose Juares MD   rosuvastatin (CRESTOR) 40 MG tablet Take 1 tablet (40 mg total) by mouth daily 6/3/24  Yes SANTHOSH Duckworth   tamsulosin (FLOMAX) 0.4 mg Take 1 capsule by mouth daily with dinner 10/24/23  Yes Jose Juares MD       Allergies:  No Known Allergies    Review of Systems:     Review of Systems  "  Constitutional:  Positive for activity change and fatigue. Negative for chills and fever.   HENT:  Negative for ear pain and sore throat.    Eyes:  Negative for pain and visual disturbance.   Respiratory:  Positive for shortness of breath. Negative for cough.    Cardiovascular:  Positive for chest pain. Negative for palpitations and leg swelling.   Gastrointestinal:  Negative for abdominal pain and vomiting.   Genitourinary:  Negative for dysuria and hematuria.   Musculoskeletal:  Negative for arthralgias and back pain.   Skin:  Negative for color change and rash.   Neurological:  Negative for seizures and syncope.   All other systems reviewed and are negative.      Vital Signs:     Vitals:    06/07/24 0913 06/07/24 0921   BP: 116/65 118/71   BP Location: Left arm Right arm   Patient Position: Sitting Sitting   Cuff Size: Standard Standard   Pulse: 61    SpO2: 97%    Weight: 86.2 kg (190 lb)    Height: 5' 5.5\" (1.664 m)        Physical Exam:     Physical Exam  Vitals reviewed.   Constitutional:       Appearance: Normal appearance.   HENT:      Head: Normocephalic and atraumatic.   Eyes:      Pupils: Pupils are equal, round, and reactive to light.   Neck:      Vascular: No carotid bruit or JVD.   Cardiovascular:      Rate and Rhythm: Normal rate and regular rhythm.      Pulses:           Carotid pulses are 2+ on the right side and 2+ on the left side.       Radial pulses are 2+ on the right side and 2+ on the left side.        Dorsalis pedis pulses are 2+ on the right side and 2+ on the left side.      Heart sounds: Normal heart sounds. No murmur heard.     No friction rub. No gallop.   Pulmonary:      Effort: Pulmonary effort is normal.      Breath sounds: Normal breath sounds. No wheezing, rhonchi or rales.   Abdominal:      Palpations: Abdomen is soft.      Tenderness: There is no abdominal tenderness.   Musculoskeletal:      Cervical back: Normal range of motion.      Right lower leg: Edema present.      Left " "lower leg: No edema.   Skin:     General: Skin is warm and dry.      Capillary Refill: Capillary refill takes less than 2 seconds.   Neurological:      General: No focal deficit present.      Mental Status: He is alert.      Sensory: Sensation is intact.   Psychiatric:         Attention and Perception: Attention normal.         Mood and Affect: Mood normal.         Behavior: Behavior normal. Behavior is cooperative.         Lab Results:               Invalid input(s): \"LABGLOM\"      No results found for: \"HGBA1C\"  Lab Results   Component Value Date    TROPONINI <0.02 12/03/2015       Imaging Studies:     Cardiac Catheterization: 6/3/24  Left Main   Ost LM to Mid LM lesion is 30% stenosed.   Left Anterior Descending   Prox LAD lesion is 100% stenosed. The lesion is calcified. The lesion is chronically occluded.   Second Diagonal Branch   Collaterals   2nd Diag filled by collaterals from [place].   The collateral flow was moderate.   Left Circumflex   Prox Cx to Mid Cx lesion is 10% stenosed. The lesion was previously treated using a stent of unknown type.   First Obtuse Marginal Branch   1st Mrg lesion is 70% stenosed.   Right Coronary Artery   Prox RCA lesion is 90% stenosed. KALE flow is 3. The lesion is type C. The lesion is calcified.   Intervention    No interventions have been documented.    Echocardiogram: scheduled for 6/13/24     I have personally reviewed pertinent reports.   and reviewed prior notes from PCP and cardiology.     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  They understand and wish to proceed with further workup and ultimately surgical intervention.  We have ordered routine preoperative laboratory and vascular studies, including CT chest, vein mapping. He should maintain his appointment for his previously ordered echocardiogram that is on Thursday 6/13.  Pending the results of these tests, they will be scheduled for surgery on 6/20/24 with Dr. Ravin Amos M.D.    Shane Chau Sr. was comfortable with our recommendations, and their questions were answered to their satisfaction.  Thank you for allowing us to participate in the care of this patient.       Routine referral to gastroenterology for colonoscopy screening was not indicated, as the patient is over 75 years old    SIGNATURE: Ernestina Jj PA-C  DATE: 06/07/24  TIME: 9:58 AM

## 2024-06-08 ENCOUNTER — LAB REQUISITION (OUTPATIENT)
Dept: LAB | Facility: HOSPITAL | Age: 79
End: 2024-06-08
Payer: COMMERCIAL

## 2024-06-08 ENCOUNTER — APPOINTMENT (OUTPATIENT)
Dept: LAB | Facility: CLINIC | Age: 79
End: 2024-06-08
Payer: COMMERCIAL

## 2024-06-08 DIAGNOSIS — Z13.1 ENCOUNTER FOR SCREENING FOR DIABETES MELLITUS: ICD-10-CM

## 2024-06-08 DIAGNOSIS — I25.118 ATHEROSCLEROTIC HEART DISEASE OF NATIVE CORONARY ARTERY WITH OTHER FORMS OF ANGINA PECTORIS (HCC): ICD-10-CM

## 2024-06-08 DIAGNOSIS — I25.118 CORONARY ARTERY DISEASE OF NATIVE ARTERY OF NATIVE HEART WITH STABLE ANGINA PECTORIS (HCC): ICD-10-CM

## 2024-06-08 DIAGNOSIS — Z01.810 PRE-OPERATIVE CARDIOVASCULAR EXAMINATION: ICD-10-CM

## 2024-06-08 DIAGNOSIS — Z01.812 ENCOUNTER FOR PRE-OPERATIVE LABORATORY TESTING: ICD-10-CM

## 2024-06-08 LAB
ABO GROUP BLD: NORMAL
ALBUMIN SERPL BCP-MCNC: 4.3 G/DL (ref 3.5–5)
ALP SERPL-CCNC: 45 U/L (ref 34–104)
ALT SERPL W P-5'-P-CCNC: 15 U/L (ref 7–52)
ANION GAP SERPL CALCULATED.3IONS-SCNC: 5 MMOL/L (ref 4–13)
AST SERPL W P-5'-P-CCNC: 18 U/L (ref 13–39)
BILIRUB SERPL-MCNC: 0.85 MG/DL (ref 0.2–1)
BLD GP AB SCN SERPL QL: NEGATIVE
BUN SERPL-MCNC: 15 MG/DL (ref 5–25)
CALCIUM SERPL-MCNC: 9.5 MG/DL (ref 8.4–10.2)
CHLORIDE SERPL-SCNC: 103 MMOL/L (ref 96–108)
CHOLEST SERPL-MCNC: 121 MG/DL
CO2 SERPL-SCNC: 29 MMOL/L (ref 21–32)
CREAT SERPL-MCNC: 1.02 MG/DL (ref 0.6–1.3)
ERYTHROCYTE [DISTWIDTH] IN BLOOD BY AUTOMATED COUNT: 12.2 % (ref 11.6–15.1)
EST. AVERAGE GLUCOSE BLD GHB EST-MCNC: 108 MG/DL
GFR SERPL CREATININE-BSD FRML MDRD: 70 ML/MIN/1.73SQ M
GLUCOSE P FAST SERPL-MCNC: 101 MG/DL (ref 65–99)
HBA1C MFR BLD: 5.4 %
HCT VFR BLD AUTO: 39.6 % (ref 36.5–49.3)
HDLC SERPL-MCNC: 36 MG/DL
HGB BLD-MCNC: 13.8 G/DL (ref 12–17)
INR PPP: 1.03 (ref 0.84–1.19)
LDLC SERPL CALC-MCNC: 62 MG/DL (ref 0–100)
MCH RBC QN AUTO: 33.1 PG (ref 26.8–34.3)
MCHC RBC AUTO-ENTMCNC: 34.8 G/DL (ref 31.4–37.4)
MCV RBC AUTO: 95 FL (ref 82–98)
PLATELET # BLD AUTO: 192 THOUSANDS/UL (ref 149–390)
PMV BLD AUTO: 8.4 FL (ref 8.9–12.7)
POTASSIUM SERPL-SCNC: 4.2 MMOL/L (ref 3.5–5.3)
PROT SERPL-MCNC: 7.6 G/DL (ref 6.4–8.4)
PROTHROMBIN TIME: 14.1 SECONDS (ref 11.6–14.5)
RBC # BLD AUTO: 4.17 MILLION/UL (ref 3.88–5.62)
RH BLD: POSITIVE
SODIUM SERPL-SCNC: 137 MMOL/L (ref 135–147)
SPECIMEN EXPIRATION DATE: NORMAL
TRIGL SERPL-MCNC: 116 MG/DL
TSH SERPL DL<=0.05 MIU/L-ACNC: 3 UIU/ML (ref 0.45–4.5)
WBC # BLD AUTO: 5.94 THOUSAND/UL (ref 4.31–10.16)

## 2024-06-08 PROCEDURE — 84443 ASSAY THYROID STIM HORMONE: CPT

## 2024-06-08 PROCEDURE — 80061 LIPID PANEL: CPT

## 2024-06-08 PROCEDURE — 83036 HEMOGLOBIN GLYCOSYLATED A1C: CPT

## 2024-06-08 PROCEDURE — 86850 RBC ANTIBODY SCREEN: CPT | Performed by: THORACIC SURGERY (CARDIOTHORACIC VASCULAR SURGERY)

## 2024-06-08 PROCEDURE — 87081 CULTURE SCREEN ONLY: CPT

## 2024-06-08 PROCEDURE — 85610 PROTHROMBIN TIME: CPT

## 2024-06-08 PROCEDURE — 86901 BLOOD TYPING SEROLOGIC RH(D): CPT | Performed by: THORACIC SURGERY (CARDIOTHORACIC VASCULAR SURGERY)

## 2024-06-08 PROCEDURE — 80053 COMPREHEN METABOLIC PANEL: CPT

## 2024-06-08 PROCEDURE — 85027 COMPLETE CBC AUTOMATED: CPT

## 2024-06-08 PROCEDURE — 86900 BLOOD TYPING SEROLOGIC ABO: CPT | Performed by: THORACIC SURGERY (CARDIOTHORACIC VASCULAR SURGERY)

## 2024-06-09 LAB — MRSA NOSE QL CULT: NORMAL

## 2024-06-11 ENCOUNTER — HOSPITAL ENCOUNTER (OUTPATIENT)
Dept: NON INVASIVE DIAGNOSTICS | Facility: HOSPITAL | Age: 79
Discharge: HOME | End: 2024-06-11
Payer: COMMERCIAL

## 2024-06-11 DIAGNOSIS — Z01.812 ENCOUNTER FOR PRE-OPERATIVE LABORATORY TESTING: ICD-10-CM

## 2024-06-11 DIAGNOSIS — I25.118 CORONARY ARTERY DISEASE OF NATIVE ARTERY OF NATIVE HEART WITH STABLE ANGINA PECTORIS (HCC): ICD-10-CM

## 2024-06-11 DIAGNOSIS — Z01.810 PRE-OPERATIVE CARDIOVASCULAR EXAMINATION: ICD-10-CM

## 2024-06-11 DIAGNOSIS — Z01.89 ENCOUNTER FOR IMAGING OF BILATERAL GREATER SAPHENOUS VEINS: ICD-10-CM

## 2024-06-11 PROCEDURE — 93971 EXTREMITY STUDY: CPT | Performed by: SURGERY

## 2024-06-11 PROCEDURE — 93971 EXTREMITY STUDY: CPT

## 2024-06-13 ENCOUNTER — HOSPITAL ENCOUNTER (OUTPATIENT)
Dept: NON INVASIVE DIAGNOSTICS | Facility: CLINIC | Age: 79
Discharge: HOME | End: 2024-06-13
Payer: COMMERCIAL

## 2024-06-13 VITALS
WEIGHT: 190.04 LBS | BODY MASS INDEX: 30.54 KG/M2 | HEART RATE: 58 BPM | SYSTOLIC BLOOD PRESSURE: 118 MMHG | HEIGHT: 66 IN | DIASTOLIC BLOOD PRESSURE: 71 MMHG

## 2024-06-13 DIAGNOSIS — I25.118 CORONARY ARTERY DISEASE OF NATIVE ARTERY OF NATIVE HEART WITH STABLE ANGINA PECTORIS (HCC): ICD-10-CM

## 2024-06-13 DIAGNOSIS — R06.09 DOE (DYSPNEA ON EXERTION): ICD-10-CM

## 2024-06-13 LAB
AORTIC ROOT: 3.5 CM
APICAL FOUR CHAMBER EJECTION FRACTION: 69 %
ASCENDING AORTA: 3.2 CM
BSA FOR ECHO PROCEDURE: 1.95 M2
E WAVE DECELERATION TIME: 244 MS
E/A RATIO: 0.76
FRACTIONAL SHORTENING: 33 (ref 28–44)
INTERVENTRICULAR SEPTUM IN DIASTOLE (PARASTERNAL SHORT AXIS VIEW): 1 CM
INTERVENTRICULAR SEPTUM: 1 CM (ref 0.6–1.1)
LAAS-AP2: 19.7 CM2
LAAS-AP4: 20.7 CM2
LEFT ATRIUM AREA SYSTOLE SINGLE PLANE A4C: 19.7 CM2
LEFT ATRIUM SIZE: 4 CM
LEFT ATRIUM VOLUME (MOD BIPLANE): 64 ML
LEFT ATRIUM VOLUME INDEX (MOD BIPLANE): 32.8 ML/M2
LEFT INTERNAL DIMENSION IN SYSTOLE: 3.4 CM (ref 2.1–4)
LEFT VENTRICLE DIASTOLIC VOLUME (MOD BIPLANE): 73 ML
LEFT VENTRICLE DIASTOLIC VOLUME INDEX (MOD BIPLANE): 37.4 ML/M2
LEFT VENTRICLE SYSTOLIC VOLUME (MOD BIPLANE): 23 ML
LEFT VENTRICLE SYSTOLIC VOLUME INDEX (MOD BIPLANE): 11.8 ML/M2
LEFT VENTRICULAR INTERNAL DIMENSION IN DIASTOLE: 5.1 CM (ref 3.5–6)
LEFT VENTRICULAR POSTERIOR WALL IN END DIASTOLE: 1 CM
LEFT VENTRICULAR STROKE VOLUME: 77 ML
LV EF: 68 %
LVSV (TEICH): 77 ML
MV E'TISSUE VEL-SEP: 6 CM/S
MV PEAK A VEL: 0.82 M/S
MV PEAK E VEL: 62 CM/S
MV STENOSIS PRESSURE HALF TIME: 71 MS
MV VALVE AREA P 1/2 METHOD: 3.1
RA PRESSURE ESTIMATED: 3 MMHG
RIGHT ATRIUM AREA SYSTOLE A4C: 15.2 CM2
RIGHT VENTRICLE ID DIMENSION: 3.3 CM
RV PSP: 26 MMHG
SL CV LEFT ATRIUM LENGTH A2C: 5.1 CM
SL CV LV EF: 60
SL CV PED ECHO LEFT VENTRICLE DIASTOLIC VOLUME (MOD BIPLANE) 2D: 124 ML
SL CV PED ECHO LEFT VENTRICLE SYSTOLIC VOLUME (MOD BIPLANE) 2D: 47 ML
TR MAX PG: 23 MMHG
TR PEAK VELOCITY: 2.4 M/S
TRICUSPID ANNULAR PLANE SYSTOLIC EXCURSION: 2.8 CM
TRICUSPID VALVE PEAK REGURGITATION VELOCITY: 2.4 M/S

## 2024-06-13 PROCEDURE — 93306 TTE W/DOPPLER COMPLETE: CPT | Performed by: INTERNAL MEDICINE

## 2024-06-13 PROCEDURE — 93306 TTE W/DOPPLER COMPLETE: CPT

## 2024-06-14 ENCOUNTER — HOSPITAL ENCOUNTER (OUTPATIENT)
Dept: CT IMAGING | Facility: HOSPITAL | Age: 79
Discharge: HOME | End: 2024-06-14
Payer: COMMERCIAL

## 2024-06-14 DIAGNOSIS — I25.118 CORONARY ARTERY DISEASE OF NATIVE ARTERY OF NATIVE HEART WITH STABLE ANGINA PECTORIS (HCC): ICD-10-CM

## 2024-06-14 DIAGNOSIS — Z01.812 ENCOUNTER FOR PRE-OPERATIVE LABORATORY TESTING: ICD-10-CM

## 2024-06-14 DIAGNOSIS — Z01.810 PRE-OPERATIVE CARDIOVASCULAR EXAMINATION: ICD-10-CM

## 2024-06-14 PROCEDURE — 71250 CT THORAX DX C-: CPT

## 2024-06-19 ENCOUNTER — TELEPHONE (OUTPATIENT)
Dept: CARDIAC SURGERY | Facility: CLINIC | Age: 79
End: 2024-06-19

## 2024-06-19 ENCOUNTER — ANESTHESIA EVENT (OUTPATIENT)
Dept: PERIOP | Facility: HOSPITAL | Age: 79
End: 2024-06-19
Payer: COMMERCIAL

## 2024-06-19 RX ORDER — PHENYLEPHRINE HCL IN 0.9% NACL 1 MG/10 ML
200-2000 SYRINGE (ML) INTRAVENOUS ONCE
Status: CANCELLED | OUTPATIENT
Start: 2024-06-20

## 2024-06-19 NOTE — TELEPHONE ENCOUNTER
Called patient with results of preoperative testing.   CT chest - no acute findings  Vein mapping - adequate usable vein for bypass.   Labs - no significant findings    Called patient and spoke with his wife who verbalized understanding.

## 2024-06-20 ENCOUNTER — APPOINTMENT (INPATIENT)
Dept: RADIOLOGY | Facility: HOSPITAL | Age: 79
DRG: 235 | End: 2024-06-20
Payer: COMMERCIAL

## 2024-06-20 ENCOUNTER — ANESTHESIA (OUTPATIENT)
Dept: PERIOP | Facility: HOSPITAL | Age: 79
End: 2024-06-20
Payer: COMMERCIAL

## 2024-06-20 ENCOUNTER — HOSPITAL ENCOUNTER (INPATIENT)
Facility: HOSPITAL | Age: 79
LOS: 19 days | DRG: 235 | End: 2024-07-09
Attending: THORACIC SURGERY (CARDIOTHORACIC VASCULAR SURGERY) | Admitting: THORACIC SURGERY (CARDIOTHORACIC VASCULAR SURGERY)
Payer: COMMERCIAL

## 2024-06-20 ENCOUNTER — APPOINTMENT (OUTPATIENT)
Dept: NON INVASIVE DIAGNOSTICS | Facility: HOSPITAL | Age: 79
DRG: 235 | End: 2024-06-20
Payer: COMMERCIAL

## 2024-06-20 DIAGNOSIS — I25.10 CAD IN NATIVE ARTERY: ICD-10-CM

## 2024-06-20 DIAGNOSIS — I25.118 CORONARY ARTERY DISEASE OF NATIVE ARTERY OF NATIVE HEART WITH STABLE ANGINA PECTORIS (HCC): ICD-10-CM

## 2024-06-20 DIAGNOSIS — Z78.9 NATIONAL INSTITUTES OF HEALTH (NIH) STROKE SCALE LEVEL OF CONSCIOUSNESS SCORE 0, ALERT; KEENLY RESPONSIVE: ICD-10-CM

## 2024-06-20 DIAGNOSIS — Z95.1 S/P CABG X 3: Primary | ICD-10-CM

## 2024-06-20 DIAGNOSIS — I71.40 ABDOMINAL AORTIC ANEURYSM (AAA) WITHOUT RUPTURE, UNSPECIFIED PART (HCC): ICD-10-CM

## 2024-06-20 DIAGNOSIS — R29.90 STROKE-LIKE SYMPTOMS: ICD-10-CM

## 2024-06-20 LAB
ANION GAP SERPL CALCULATED.3IONS-SCNC: 6 MMOL/L (ref 4–13)
APTT PPP: 29 SECONDS (ref 23–37)
ATRIAL RATE: 67 BPM
BASE EXCESS BLDA CALC-SCNC: -1 MMOL/L (ref -2–3)
BASE EXCESS BLDA CALC-SCNC: -1 MMOL/L (ref -2–3)
BASE EXCESS BLDA CALC-SCNC: -2 MMOL/L (ref -2–3)
BASE EXCESS BLDA CALC-SCNC: -4 MMOL/L (ref -2–3)
BASE EXCESS BLDA CALC-SCNC: -5 MMOL/L (ref -2–3)
BASE EXCESS BLDA CALC-SCNC: 0 MMOL/L (ref -2–3)
BUN SERPL-MCNC: 16 MG/DL (ref 5–25)
CA-I BLD-SCNC: 1.04 MMOL/L (ref 1.12–1.32)
CA-I BLD-SCNC: 1.07 MMOL/L (ref 1.12–1.32)
CA-I BLD-SCNC: 1.08 MMOL/L (ref 1.12–1.32)
CA-I BLD-SCNC: 1.08 MMOL/L (ref 1.12–1.32)
CA-I BLD-SCNC: 1.14 MMOL/L (ref 1.12–1.32)
CA-I BLD-SCNC: 1.24 MMOL/L (ref 1.12–1.32)
CA-I BLD-SCNC: 1.24 MMOL/L (ref 1.12–1.32)
CA-I BLD-SCNC: 1.27 MMOL/L (ref 1.12–1.32)
CALCIUM SERPL-MCNC: 8.9 MG/DL (ref 8.4–10.2)
CHLORIDE SERPL-SCNC: 109 MMOL/L (ref 96–108)
CO2 SERPL-SCNC: 22 MMOL/L (ref 21–32)
CREAT SERPL-MCNC: 0.96 MG/DL (ref 0.6–1.3)
FIBRINOGEN PPP-MCNC: 243 MG/DL (ref 207–520)
FIO2 GAS DIL.REBREATH: 80 L
GFR SERPL CREATININE-BSD FRML MDRD: 75 ML/MIN/1.73SQ M
GLUCOSE SERPL-MCNC: 102 MG/DL (ref 65–140)
GLUCOSE SERPL-MCNC: 106 MG/DL (ref 65–140)
GLUCOSE SERPL-MCNC: 120 MG/DL (ref 65–140)
GLUCOSE SERPL-MCNC: 138 MG/DL (ref 65–140)
GLUCOSE SERPL-MCNC: 140 MG/DL (ref 65–140)
GLUCOSE SERPL-MCNC: 141 MG/DL (ref 65–140)
GLUCOSE SERPL-MCNC: 149 MG/DL (ref 65–140)
GLUCOSE SERPL-MCNC: 150 MG/DL (ref 65–140)
GLUCOSE SERPL-MCNC: 160 MG/DL (ref 65–140)
GLUCOSE SERPL-MCNC: 165 MG/DL (ref 65–140)
GLUCOSE SERPL-MCNC: 167 MG/DL (ref 65–140)
GLUCOSE SERPL-MCNC: 169 MG/DL (ref 65–140)
GLUCOSE SERPL-MCNC: 171 MG/DL (ref 65–140)
GLUCOSE SERPL-MCNC: 182 MG/DL (ref 65–140)
GLUCOSE SERPL-MCNC: 188 MG/DL (ref 65–140)
GLUCOSE SERPL-MCNC: 203 MG/DL (ref 65–140)
HCO3 BLDA-SCNC: 20.2 MMOL/L (ref 22–28)
HCO3 BLDA-SCNC: 21 MMOL/L (ref 22–28)
HCO3 BLDA-SCNC: 24.1 MMOL/L (ref 22–28)
HCO3 BLDA-SCNC: 24.8 MMOL/L (ref 22–28)
HCO3 BLDA-SCNC: 25 MMOL/L (ref 22–28)
HCO3 BLDA-SCNC: 25.1 MMOL/L (ref 22–28)
HCO3 BLDA-SCNC: 25.5 MMOL/L (ref 24–30)
HCO3 BLDA-SCNC: 27.1 MMOL/L (ref 22–28)
HCT VFR BLD AUTO: 35 % (ref 36.5–49.3)
HCT VFR BLD AUTO: 37.6 % (ref 36.5–49.3)
HCT VFR BLD CALC: 26 % (ref 36.5–49.3)
HCT VFR BLD CALC: 27 % (ref 36.5–49.3)
HCT VFR BLD CALC: 27 % (ref 36.5–49.3)
HCT VFR BLD CALC: 30 % (ref 36.5–49.3)
HCT VFR BLD CALC: 31 % (ref 36.5–49.3)
HCT VFR BLD CALC: 33 % (ref 36.5–49.3)
HGB BLD-MCNC: 12.2 G/DL (ref 12–17)
HGB BLD-MCNC: 13.1 G/DL (ref 12–17)
HGB BLDA-MCNC: 10.2 G/DL (ref 12–17)
HGB BLDA-MCNC: 10.5 G/DL (ref 12–17)
HGB BLDA-MCNC: 11.2 G/DL (ref 12–17)
HGB BLDA-MCNC: 8.8 G/DL (ref 12–17)
HGB BLDA-MCNC: 9.2 G/DL (ref 12–17)
HGB BLDA-MCNC: 9.2 G/DL (ref 12–17)
INR PPP: 1.37 (ref 0.84–1.19)
KCT BLD-ACNC: 116 SEC (ref 89–137)
KCT BLD-ACNC: 123 SEC (ref 89–137)
KCT BLD-ACNC: 495 SEC (ref 89–137)
KCT BLD-ACNC: 646 SEC (ref 89–137)
KCT BLD-ACNC: 665 SEC (ref 89–137)
KCT BLD-ACNC: 818 SEC (ref 89–137)
P AXIS: 37 DEGREES
PCO2 BLD: 21 MMOL/L (ref 21–32)
PCO2 BLD: 22 MMOL/L (ref 21–32)
PCO2 BLD: 25 MMOL/L (ref 21–32)
PCO2 BLD: 26 MMOL/L (ref 21–32)
PCO2 BLD: 27 MMOL/L (ref 21–32)
PCO2 BLD: 29 MMOL/L (ref 21–32)
PCO2 BLD: 36.3 MM HG (ref 36–44)
PCO2 BLD: 36.8 MM HG (ref 36–44)
PCO2 BLD: 38.9 MM HG (ref 36–44)
PCO2 BLD: 40.6 MM HG (ref 36–44)
PCO2 BLD: 46.1 MM HG (ref 36–44)
PCO2 BLD: 47.5 MM HG (ref 42–50)
PCO2 BLD: 48.6 MM HG (ref 36–44)
PCO2 BLD: 54.2 MM HG (ref 36–44)
PH BLD: 7.31 [PH] (ref 7.35–7.45)
PH BLD: 7.32 [PH] (ref 7.35–7.45)
PH BLD: 7.34 [PH] (ref 7.35–7.45)
PH BLD: 7.34 [PH] (ref 7.3–7.4)
PH BLD: 7.35 [PH] (ref 7.35–7.45)
PH BLD: 7.37 [PH] (ref 7.35–7.45)
PH BLD: 7.4 [PH] (ref 7.35–7.45)
PH BLD: 7.4 [PH] (ref 7.35–7.45)
PLATELET # BLD AUTO: 111 THOUSANDS/UL (ref 149–390)
PLATELET # BLD AUTO: 148 THOUSANDS/UL (ref 149–390)
PMV BLD AUTO: 8.7 FL (ref 8.9–12.7)
PMV BLD AUTO: 8.7 FL (ref 8.9–12.7)
PO2 BLD: 139 MM HG (ref 75–129)
PO2 BLD: 255 MM HG (ref 75–129)
PO2 BLD: 336 MM HG (ref 75–129)
PO2 BLD: 376 MM HG (ref 75–129)
PO2 BLD: 377 MM HG (ref 75–129)
PO2 BLD: 49 MM HG (ref 35–45)
PO2 BLD: 49 MM HG (ref 75–129)
PO2 BLD: >400 MM HG (ref 75–129)
POTASSIUM BLD-SCNC: 3.4 MMOL/L (ref 3.5–5.3)
POTASSIUM BLD-SCNC: 3.5 MMOL/L (ref 3.5–5.3)
POTASSIUM BLD-SCNC: 4.2 MMOL/L (ref 3.5–5.3)
POTASSIUM BLD-SCNC: 4.3 MMOL/L (ref 3.5–5.3)
POTASSIUM BLD-SCNC: 4.7 MMOL/L (ref 3.5–5.3)
POTASSIUM BLD-SCNC: 4.9 MMOL/L (ref 3.5–5.3)
POTASSIUM BLD-SCNC: 5 MMOL/L (ref 3.5–5.3)
POTASSIUM BLD-SCNC: 5.4 MMOL/L (ref 3.5–5.3)
POTASSIUM SERPL-SCNC: 3.7 MMOL/L (ref 3.5–5.3)
POTASSIUM SERPL-SCNC: 3.8 MMOL/L (ref 3.5–5.3)
POTASSIUM SERPL-SCNC: 4.2 MMOL/L (ref 3.5–5.3)
PR INTERVAL: 182 MS
PROTHROMBIN TIME: 16.7 SECONDS (ref 11.6–14.5)
QRS AXIS: 12 DEGREES
QRSD INTERVAL: 92 MS
QT INTERVAL: 450 MS
QTC INTERVAL: 475 MS
SAO2 % BLD FROM PO2: 100 % (ref 60–85)
SAO2 % BLD FROM PO2: 80 % (ref 60–85)
SAO2 % BLD FROM PO2: 82 % (ref 60–85)
SAO2 % BLD FROM PO2: 99 % (ref 60–85)
SODIUM BLD-SCNC: 134 MMOL/L (ref 136–145)
SODIUM BLD-SCNC: 135 MMOL/L (ref 136–145)
SODIUM BLD-SCNC: 135 MMOL/L (ref 136–145)
SODIUM BLD-SCNC: 136 MMOL/L (ref 136–145)
SODIUM BLD-SCNC: 136 MMOL/L (ref 136–145)
SODIUM BLD-SCNC: 139 MMOL/L (ref 136–145)
SODIUM BLD-SCNC: 140 MMOL/L (ref 136–145)
SODIUM BLD-SCNC: 140 MMOL/L (ref 136–145)
SODIUM SERPL-SCNC: 137 MMOL/L (ref 135–147)
SPECIMEN SOURCE: ABNORMAL
SPECIMEN SOURCE: NORMAL
SPECIMEN SOURCE: NORMAL
T WAVE AXIS: 22 DEGREES
VENTRICULAR RATE: 67 BPM

## 2024-06-20 PROCEDURE — 85347 COAGULATION TIME ACTIVATED: CPT

## 2024-06-20 PROCEDURE — 85018 HEMOGLOBIN: CPT | Performed by: PHYSICIAN ASSISTANT

## 2024-06-20 PROCEDURE — 33533 CABG ARTERIAL SINGLE: CPT | Performed by: THORACIC SURGERY (CARDIOTHORACIC VASCULAR SURGERY)

## 2024-06-20 PROCEDURE — 93355 ECHO TRANSESOPHAGEAL (TEE): CPT

## 2024-06-20 PROCEDURE — 02HV33Z INSERTION OF INFUSION DEVICE INTO SUPERIOR VENA CAVA, PERCUTANEOUS APPROACH: ICD-10-PCS | Performed by: INTERNAL MEDICINE

## 2024-06-20 PROCEDURE — 71045 X-RAY EXAM CHEST 1 VIEW: CPT

## 2024-06-20 PROCEDURE — 84132 ASSAY OF SERUM POTASSIUM: CPT | Performed by: PHYSICIAN ASSISTANT

## 2024-06-20 PROCEDURE — 94002 VENT MGMT INPAT INIT DAY: CPT

## 2024-06-20 PROCEDURE — A7041 WATER SEAL DRAIN CONTAINER: HCPCS | Performed by: THORACIC SURGERY (CARDIOTHORACIC VASCULAR SURGERY)

## 2024-06-20 PROCEDURE — 85014 HEMATOCRIT: CPT | Performed by: PHYSICIAN ASSISTANT

## 2024-06-20 PROCEDURE — 84132 ASSAY OF SERUM POTASSIUM: CPT

## 2024-06-20 PROCEDURE — 85049 AUTOMATED PLATELET COUNT: CPT | Performed by: THORACIC SURGERY (CARDIOTHORACIC VASCULAR SURGERY)

## 2024-06-20 PROCEDURE — 94760 N-INVAS EAR/PLS OXIMETRY 1: CPT

## 2024-06-20 PROCEDURE — 82330 ASSAY OF CALCIUM: CPT

## 2024-06-20 PROCEDURE — 06BQ4ZZ EXCISION OF LEFT SAPHENOUS VEIN, PERCUTANEOUS ENDOSCOPIC APPROACH: ICD-10-PCS | Performed by: THORACIC SURGERY (CARDIOTHORACIC VASCULAR SURGERY)

## 2024-06-20 PROCEDURE — 85730 THROMBOPLASTIN TIME PARTIAL: CPT | Performed by: PHYSICIAN ASSISTANT

## 2024-06-20 PROCEDURE — 33533 CABG ARTERIAL SINGLE: CPT | Performed by: PHYSICIAN ASSISTANT

## 2024-06-20 PROCEDURE — 93010 ELECTROCARDIOGRAM REPORT: CPT | Performed by: INTERNAL MEDICINE

## 2024-06-20 PROCEDURE — 86923 COMPATIBILITY TEST ELECTRIC: CPT

## 2024-06-20 PROCEDURE — 99291 CRITICAL CARE FIRST HOUR: CPT | Performed by: ANESTHESIOLOGY

## 2024-06-20 PROCEDURE — 80048 BASIC METABOLIC PNL TOTAL CA: CPT | Performed by: PHYSICIAN ASSISTANT

## 2024-06-20 PROCEDURE — 33508 ENDOSCOPIC VEIN HARVEST: CPT | Performed by: THORACIC SURGERY (CARDIOTHORACIC VASCULAR SURGERY)

## 2024-06-20 PROCEDURE — 85610 PROTHROMBIN TIME: CPT | Performed by: PHYSICIAN ASSISTANT

## 2024-06-20 PROCEDURE — 85014 HEMATOCRIT: CPT

## 2024-06-20 PROCEDURE — 82803 BLOOD GASES ANY COMBINATION: CPT

## 2024-06-20 PROCEDURE — 5A1221Z PERFORMANCE OF CARDIAC OUTPUT, CONTINUOUS: ICD-10-PCS | Performed by: THORACIC SURGERY (CARDIOTHORACIC VASCULAR SURGERY)

## 2024-06-20 PROCEDURE — 5A1223Z PERFORMANCE OF CARDIAC PACING, CONTINUOUS: ICD-10-PCS | Performed by: THORACIC SURGERY (CARDIOTHORACIC VASCULAR SURGERY)

## 2024-06-20 PROCEDURE — 33518 CABG ARTERY-VEIN TWO: CPT | Performed by: THORACIC SURGERY (CARDIOTHORACIC VASCULAR SURGERY)

## 2024-06-20 PROCEDURE — 02100Z9 BYPASS CORONARY ARTERY, ONE ARTERY FROM LEFT INTERNAL MAMMARY, OPEN APPROACH: ICD-10-PCS | Performed by: THORACIC SURGERY (CARDIOTHORACIC VASCULAR SURGERY)

## 2024-06-20 PROCEDURE — 33508 ENDOSCOPIC VEIN HARVEST: CPT | Performed by: PHYSICIAN ASSISTANT

## 2024-06-20 PROCEDURE — 30233N0 TRANSFUSION OF AUTOLOGOUS RED BLOOD CELLS INTO PERIPHERAL VEIN, PERCUTANEOUS APPROACH: ICD-10-PCS | Performed by: INTERNAL MEDICINE

## 2024-06-20 PROCEDURE — 33518 CABG ARTERY-VEIN TWO: CPT | Performed by: PHYSICIAN ASSISTANT

## 2024-06-20 PROCEDURE — 93005 ELECTROCARDIOGRAM TRACING: CPT

## 2024-06-20 PROCEDURE — 021109W BYPASS CORONARY ARTERY, TWO ARTERIES FROM AORTA WITH AUTOLOGOUS VENOUS TISSUE, OPEN APPROACH: ICD-10-PCS | Performed by: THORACIC SURGERY (CARDIOTHORACIC VASCULAR SURGERY)

## 2024-06-20 PROCEDURE — 85384 FIBRINOGEN ACTIVITY: CPT | Performed by: PHYSICIAN ASSISTANT

## 2024-06-20 PROCEDURE — 85049 AUTOMATED PLATELET COUNT: CPT | Performed by: PHYSICIAN ASSISTANT

## 2024-06-20 PROCEDURE — 84295 ASSAY OF SERUM SODIUM: CPT

## 2024-06-20 PROCEDURE — 82947 ASSAY GLUCOSE BLOOD QUANT: CPT

## 2024-06-20 PROCEDURE — 82948 REAGENT STRIP/BLOOD GLUCOSE: CPT

## 2024-06-20 DEVICE — MARKER CORONARY BYPASS VOSS GRAFT: Type: IMPLANTABLE DEVICE | Site: HEART | Status: FUNCTIONAL

## 2024-06-20 RX ORDER — SODIUM CHLORIDE 450 MG/100ML
20 INJECTION, SOLUTION INTRAVENOUS CONTINUOUS
Status: DISCONTINUED | OUTPATIENT
Start: 2024-06-20 | End: 2024-06-21

## 2024-06-20 RX ORDER — ROCURONIUM BROMIDE 10 MG/ML
INJECTION, SOLUTION INTRAVENOUS AS NEEDED
Status: DISCONTINUED | OUTPATIENT
Start: 2024-06-20 | End: 2024-06-20

## 2024-06-20 RX ORDER — CALCIUM CHLORIDE 100 MG/ML
INJECTION INTRAVENOUS; INTRAVENTRICULAR AS NEEDED
Status: DISCONTINUED | OUTPATIENT
Start: 2024-06-20 | End: 2024-06-20

## 2024-06-20 RX ORDER — PANTOPRAZOLE SODIUM 40 MG/1
40 TABLET, DELAYED RELEASE ORAL
Status: DISCONTINUED | OUTPATIENT
Start: 2024-06-21 | End: 2024-06-25

## 2024-06-20 RX ORDER — FENTANYL CITRATE 50 UG/ML
50 INJECTION, SOLUTION INTRAMUSCULAR; INTRAVENOUS
Status: DISCONTINUED | OUTPATIENT
Start: 2024-06-20 | End: 2024-06-20

## 2024-06-20 RX ORDER — FONDAPARINUX SODIUM 2.5 MG/.5ML
2.5 INJECTION SUBCUTANEOUS DAILY
Status: DISCONTINUED | OUTPATIENT
Start: 2024-06-21 | End: 2024-06-23

## 2024-06-20 RX ORDER — POTASSIUM CHLORIDE 14.9 MG/ML
20 INJECTION INTRAVENOUS ONCE AS NEEDED
Status: DISCONTINUED | OUTPATIENT
Start: 2024-06-20 | End: 2024-06-21

## 2024-06-20 RX ORDER — AMIODARONE HYDROCHLORIDE 200 MG/1
200 TABLET ORAL EVERY 8 HOURS SCHEDULED
Status: DISCONTINUED | OUTPATIENT
Start: 2024-06-20 | End: 2024-06-26

## 2024-06-20 RX ORDER — HYDROMORPHONE HCL/PF 1 MG/ML
SYRINGE (ML) INJECTION AS NEEDED
Status: DISCONTINUED | OUTPATIENT
Start: 2024-06-20 | End: 2024-06-20

## 2024-06-20 RX ORDER — MAGNESIUM SULFATE HEPTAHYDRATE 40 MG/ML
2 INJECTION, SOLUTION INTRAVENOUS ONCE
Status: COMPLETED | OUTPATIENT
Start: 2024-06-20 | End: 2024-06-20

## 2024-06-20 RX ORDER — FENTANYL CITRATE 50 UG/ML
50 INJECTION, SOLUTION INTRAMUSCULAR; INTRAVENOUS ONCE
Status: DISCONTINUED | OUTPATIENT
Start: 2024-06-20 | End: 2024-06-20

## 2024-06-20 RX ORDER — PROPOFOL 10 MG/ML
INJECTION, EMULSION INTRAVENOUS CONTINUOUS PRN
Status: DISCONTINUED | OUTPATIENT
Start: 2024-06-20 | End: 2024-06-20

## 2024-06-20 RX ORDER — NITROGLYCERIN 5 MG/ML
INJECTION, SOLUTION INTRAVENOUS AS NEEDED
Status: DISCONTINUED | OUTPATIENT
Start: 2024-06-20 | End: 2024-06-20

## 2024-06-20 RX ORDER — POTASSIUM CHLORIDE 29.8 MG/ML
40 INJECTION INTRAVENOUS ONCE
Status: COMPLETED | OUTPATIENT
Start: 2024-06-20 | End: 2024-06-21

## 2024-06-20 RX ORDER — SODIUM CHLORIDE 9 MG/ML
INJECTION, SOLUTION INTRAVENOUS CONTINUOUS PRN
Status: DISCONTINUED | OUTPATIENT
Start: 2024-06-20 | End: 2024-06-20

## 2024-06-20 RX ORDER — MAGNESIUM HYDROXIDE 1200 MG/15ML
LIQUID ORAL AS NEEDED
Status: DISCONTINUED | OUTPATIENT
Start: 2024-06-20 | End: 2024-06-20 | Stop reason: HOSPADM

## 2024-06-20 RX ORDER — SODIUM CHLORIDE, SODIUM GLUCONATE, SODIUM ACETATE, POTASSIUM CHLORIDE, MAGNESIUM CHLORIDE, SODIUM PHOSPHATE, DIBASIC, AND POTASSIUM PHOSPHATE .53; .5; .37; .037; .03; .012; .00082 G/100ML; G/100ML; G/100ML; G/100ML; G/100ML; G/100ML; G/100ML
INJECTION, SOLUTION INTRAVENOUS AS NEEDED
Status: DISCONTINUED | OUTPATIENT
Start: 2024-06-20 | End: 2024-06-20

## 2024-06-20 RX ORDER — KETAMINE HYDROCHLORIDE 100 MG/ML
INJECTION, SOLUTION INTRAMUSCULAR; INTRAVENOUS AS NEEDED
Status: DISCONTINUED | OUTPATIENT
Start: 2024-06-20 | End: 2024-06-20

## 2024-06-20 RX ORDER — HEPARIN SODIUM 1000 [USP'U]/ML
10000 INJECTION, SOLUTION INTRAVENOUS; SUBCUTANEOUS ONCE
Status: COMPLETED | OUTPATIENT
Start: 2024-06-20 | End: 2024-06-20

## 2024-06-20 RX ORDER — SODIUM CHLORIDE, SODIUM LACTATE, POTASSIUM CHLORIDE, CALCIUM CHLORIDE 600; 310; 30; 20 MG/100ML; MG/100ML; MG/100ML; MG/100ML
INJECTION, SOLUTION INTRAVENOUS CONTINUOUS PRN
Status: DISCONTINUED | OUTPATIENT
Start: 2024-06-20 | End: 2024-06-20

## 2024-06-20 RX ORDER — ONDANSETRON 2 MG/ML
4 INJECTION INTRAMUSCULAR; INTRAVENOUS EVERY 6 HOURS PRN
Status: DISCONTINUED | OUTPATIENT
Start: 2024-06-20 | End: 2024-07-05

## 2024-06-20 RX ORDER — CEFAZOLIN SODIUM 2 G/50ML
2000 SOLUTION INTRAVENOUS ONCE
Status: COMPLETED | OUTPATIENT
Start: 2024-06-20 | End: 2024-06-20

## 2024-06-20 RX ORDER — LIDOCAINE HYDROCHLORIDE 20 MG/ML
INJECTION, SOLUTION EPIDURAL; INFILTRATION; INTRACAUDAL; PERINEURAL AS NEEDED
Status: DISCONTINUED | OUTPATIENT
Start: 2024-06-20 | End: 2024-06-20

## 2024-06-20 RX ORDER — BISACODYL 10 MG
10 SUPPOSITORY, RECTAL RECTAL DAILY PRN
Status: DISCONTINUED | OUTPATIENT
Start: 2024-06-20 | End: 2024-07-02

## 2024-06-20 RX ORDER — CALCIUM GLUCONATE 20 MG/ML
2 INJECTION, SOLUTION INTRAVENOUS ONCE AS NEEDED
Status: DISCONTINUED | OUTPATIENT
Start: 2024-06-20 | End: 2024-06-21

## 2024-06-20 RX ORDER — ATORVASTATIN CALCIUM 80 MG/1
80 TABLET, FILM COATED ORAL
Status: DISCONTINUED | OUTPATIENT
Start: 2024-06-20 | End: 2024-07-09 | Stop reason: HOSPADM

## 2024-06-20 RX ORDER — MANNITOL 250 MG/ML
INJECTION, SOLUTION INTRAVENOUS AS NEEDED
Status: DISCONTINUED | OUTPATIENT
Start: 2024-06-20 | End: 2024-06-20

## 2024-06-20 RX ORDER — FENTANYL CITRATE 50 UG/ML
INJECTION, SOLUTION INTRAMUSCULAR; INTRAVENOUS AS NEEDED
Status: DISCONTINUED | OUTPATIENT
Start: 2024-06-20 | End: 2024-06-20

## 2024-06-20 RX ORDER — CHLORHEXIDINE GLUCONATE ORAL RINSE 1.2 MG/ML
15 SOLUTION DENTAL ONCE
Status: COMPLETED | OUTPATIENT
Start: 2024-06-20 | End: 2024-06-20

## 2024-06-20 RX ORDER — ACETAMINOPHEN 325 MG/1
975 TABLET ORAL
Status: DISCONTINUED | OUTPATIENT
Start: 2024-06-20 | End: 2024-07-09 | Stop reason: HOSPADM

## 2024-06-20 RX ORDER — CHLORHEXIDINE GLUCONATE ORAL RINSE 1.2 MG/ML
15 SOLUTION DENTAL 2 TIMES DAILY
Status: DISCONTINUED | OUTPATIENT
Start: 2024-06-20 | End: 2024-06-20

## 2024-06-20 RX ORDER — ASPIRIN 325 MG
325 TABLET ORAL DAILY
Status: DISCONTINUED | OUTPATIENT
Start: 2024-06-20 | End: 2024-06-20

## 2024-06-20 RX ORDER — VANCOMYCIN HYDROCHLORIDE 1 G/20ML
INJECTION, POWDER, LYOPHILIZED, FOR SOLUTION INTRAVENOUS AS NEEDED
Status: DISCONTINUED | OUTPATIENT
Start: 2024-06-20 | End: 2024-06-20 | Stop reason: HOSPADM

## 2024-06-20 RX ORDER — PROTAMINE SULFATE 10 MG/ML
INJECTION, SOLUTION INTRAVENOUS AS NEEDED
Status: DISCONTINUED | OUTPATIENT
Start: 2024-06-20 | End: 2024-06-20

## 2024-06-20 RX ORDER — CEFAZOLIN SODIUM 2 G/50ML
2000 SOLUTION INTRAVENOUS EVERY 8 HOURS
Status: COMPLETED | OUTPATIENT
Start: 2024-06-20 | End: 2024-06-21

## 2024-06-20 RX ORDER — PROPOFOL 10 MG/ML
INJECTION, EMULSION INTRAVENOUS AS NEEDED
Status: DISCONTINUED | OUTPATIENT
Start: 2024-06-20 | End: 2024-06-20

## 2024-06-20 RX ORDER — HEPARIN SODIUM 1000 [USP'U]/ML
400 INJECTION, SOLUTION INTRAVENOUS; SUBCUTANEOUS ONCE
Status: DISCONTINUED | OUTPATIENT
Start: 2024-06-20 | End: 2024-06-20 | Stop reason: HOSPADM

## 2024-06-20 RX ORDER — LIDOCAINE HCL/PF 100 MG/5ML
SYRINGE (ML) INJECTION AS NEEDED
Status: DISCONTINUED | OUTPATIENT
Start: 2024-06-20 | End: 2024-06-20

## 2024-06-20 RX ORDER — CHLORHEXIDINE GLUCONATE ORAL RINSE 1.2 MG/ML
15 SOLUTION DENTAL 2 TIMES DAILY
Status: DISCONTINUED | OUTPATIENT
Start: 2024-06-20 | End: 2024-07-09 | Stop reason: HOSPADM

## 2024-06-20 RX ORDER — HYDROMORPHONE HCL/PF 1 MG/ML
0.5 SYRINGE (ML) INJECTION EVERY 2 HOUR PRN
Status: DISCONTINUED | OUTPATIENT
Start: 2024-06-20 | End: 2024-06-21

## 2024-06-20 RX ORDER — MIDAZOLAM HYDROCHLORIDE 2 MG/2ML
INJECTION, SOLUTION INTRAMUSCULAR; INTRAVENOUS AS NEEDED
Status: DISCONTINUED | OUTPATIENT
Start: 2024-06-20 | End: 2024-06-20

## 2024-06-20 RX ORDER — OXYCODONE HYDROCHLORIDE 5 MG/1
5 TABLET ORAL EVERY 4 HOURS PRN
Status: DISCONTINUED | OUTPATIENT
Start: 2024-06-20 | End: 2024-06-29

## 2024-06-20 RX ORDER — HEPARIN SODIUM 1000 [USP'U]/ML
INJECTION, SOLUTION INTRAVENOUS; SUBCUTANEOUS AS NEEDED
Status: DISCONTINUED | OUTPATIENT
Start: 2024-06-20 | End: 2024-06-20

## 2024-06-20 RX ORDER — POLYETHYLENE GLYCOL 3350 17 G/17G
17 POWDER, FOR SOLUTION ORAL DAILY
Status: DISCONTINUED | OUTPATIENT
Start: 2024-06-21 | End: 2024-07-06

## 2024-06-20 RX ADMIN — AMINOCAPROIC ACID 5 G: 250 INJECTION, SOLUTION INTRAVENOUS at 09:48

## 2024-06-20 RX ADMIN — LIDOCAINE HYDROCHLORIDE 100 MG: 20 INJECTION INTRAVENOUS at 11:45

## 2024-06-20 RX ADMIN — NITROGLYCERIN 25 MCG: 5 INJECTION, SOLUTION INTRAVENOUS at 11:50

## 2024-06-20 RX ADMIN — SODIUM CHLORIDE, SODIUM LACTATE, POTASSIUM CHLORIDE, AND CALCIUM CHLORIDE: .6; .31; .03; .02 INJECTION, SOLUTION INTRAVENOUS at 07:30

## 2024-06-20 RX ADMIN — HYDROMORPHONE HYDROCHLORIDE 0.5 MG: 1 INJECTION, SOLUTION INTRAMUSCULAR; INTRAVENOUS; SUBCUTANEOUS at 09:01

## 2024-06-20 RX ADMIN — POTASSIUM CHLORIDE 40 MEQ: 29.8 INJECTION, SOLUTION INTRAVENOUS at 17:36

## 2024-06-20 RX ADMIN — MANNITOL 25 G: 12.5 INJECTION, SOLUTION INTRAVENOUS at 07:37

## 2024-06-20 RX ADMIN — INSULIN HUMAN 3 UNITS: 100 INJECTION, SOLUTION PARENTERAL at 11:41

## 2024-06-20 RX ADMIN — OXYCODONE HYDROCHLORIDE 5 MG: 5 TABLET ORAL at 15:15

## 2024-06-20 RX ADMIN — CALCIUM CHLORIDE 0.25 G: 100 INJECTION INTRAVENOUS; INTRAVENTRICULAR at 12:19

## 2024-06-20 RX ADMIN — NITROGLYCERIN 25 MCG: 5 INJECTION, SOLUTION INTRAVENOUS at 11:55

## 2024-06-20 RX ADMIN — MUPIROCIN 1 APPLICATION: 20 OINTMENT TOPICAL at 20:34

## 2024-06-20 RX ADMIN — HYDROMORPHONE HYDROCHLORIDE 0.5 MG: 1 INJECTION, SOLUTION INTRAMUSCULAR; INTRAVENOUS; SUBCUTANEOUS at 09:29

## 2024-06-20 RX ADMIN — AMINOCAPROIC ACID 1 G/HR: 250 INJECTION, SOLUTION INTRAVENOUS at 09:59

## 2024-06-20 RX ADMIN — CHLORHEXIDINE GLUCONATE 0.12% ORAL RINSE 15 ML: 1.2 LIQUID ORAL at 18:00

## 2024-06-20 RX ADMIN — HYDROMORPHONE HYDROCHLORIDE 0.5 MG: 1 INJECTION, SOLUTION INTRAMUSCULAR; INTRAVENOUS; SUBCUTANEOUS at 16:09

## 2024-06-20 RX ADMIN — SODIUM CHLORIDE: 0.9 INJECTION, SOLUTION INTRAVENOUS at 07:30

## 2024-06-20 RX ADMIN — NICARDIPINE HYDROCHLORIDE 10 MG/HR: 2.5 INJECTION, SOLUTION INTRAVENOUS at 19:23

## 2024-06-20 RX ADMIN — SODIUM CHLORIDE, SODIUM GLUCONATE, SODIUM ACETATE, POTASSIUM CHLORIDE, MAGNESIUM CHLORIDE, SODIUM PHOSPHATE, DIBASIC, AND POTASSIUM PHOSPHATE 500 ML: .53; .5; .37; .037; .03; .012; .00082 INJECTION, SOLUTION INTRAVENOUS at 07:37

## 2024-06-20 RX ADMIN — CEFAZOLIN SODIUM 2000 MG: 2 SOLUTION INTRAVENOUS at 19:19

## 2024-06-20 RX ADMIN — MUPIROCIN 1 APPLICATION: 20 OINTMENT TOPICAL at 06:07

## 2024-06-20 RX ADMIN — AMIODARONE HYDROCHLORIDE 200 MG: 200 TABLET ORAL at 15:16

## 2024-06-20 RX ADMIN — CEFAZOLIN SODIUM 2000 MG: 2 SOLUTION INTRAVENOUS at 11:38

## 2024-06-20 RX ADMIN — NITROGLYCERIN 25 MCG: 5 INJECTION, SOLUTION INTRAVENOUS at 11:53

## 2024-06-20 RX ADMIN — OXYCODONE HYDROCHLORIDE 5 MG: 5 TABLET ORAL at 23:47

## 2024-06-20 RX ADMIN — PROTAMINE SULFATE 250 MG: 10 INJECTION, SOLUTION INTRAVENOUS at 11:50

## 2024-06-20 RX ADMIN — HEPARIN SODIUM 10000 UNITS: 1000 INJECTION INTRAVENOUS; SUBCUTANEOUS at 11:16

## 2024-06-20 RX ADMIN — LIDOCAINE HYDROCHLORIDE 100 MG: 20 INJECTION, SOLUTION EPIDURAL; INFILTRATION; INTRACAUDAL; PERINEURAL at 07:45

## 2024-06-20 RX ADMIN — NICARDIPINE HYDROCHLORIDE 12.5 MG/HR: 2.5 INJECTION, SOLUTION INTRAVENOUS at 23:54

## 2024-06-20 RX ADMIN — FENTANYL CITRATE 250 MCG: 50 INJECTION, SOLUTION INTRAMUSCULAR; INTRAVENOUS at 07:45

## 2024-06-20 RX ADMIN — FENTANYL CITRATE 50 MCG: 50 INJECTION, SOLUTION INTRAMUSCULAR; INTRAVENOUS at 07:30

## 2024-06-20 RX ADMIN — CALCIUM CHLORIDE 1 G: 100 INJECTION INTRAVENOUS; INTRAVENTRICULAR at 11:48

## 2024-06-20 RX ADMIN — CHLORHEXIDINE GLUCONATE 0.12% ORAL RINSE 15 ML: 1.2 LIQUID ORAL at 06:07

## 2024-06-20 RX ADMIN — ACETAMINOPHEN 975 MG: 325 TABLET, FILM COATED ORAL at 19:19

## 2024-06-20 RX ADMIN — SODIUM BICARBONATE 50 MEQ: 84 INJECTION, SOLUTION INTRAVENOUS at 07:38

## 2024-06-20 RX ADMIN — POTASSIUM CHLORIDE 750 ML: 2 INJECTION, SOLUTION, CONCENTRATE INTRAVENOUS at 10:36

## 2024-06-20 RX ADMIN — NICARDIPINE HYDROCHLORIDE 2.5 MG/HR: 2.5 INJECTION, SOLUTION INTRAVENOUS at 16:30

## 2024-06-20 RX ADMIN — NICARDIPINE HYDROCHLORIDE 12.5 MG/HR: 2.5 INJECTION, SOLUTION INTRAVENOUS at 21:48

## 2024-06-20 RX ADMIN — NICARDIPINE HYDROCHLORIDE 200 MCG: 2.5 INJECTION, SOLUTION INTRAVENOUS at 10:05

## 2024-06-20 RX ADMIN — PROPOFOL 30 MCG/KG/MIN: 10 INJECTION, EMULSION INTRAVENOUS at 12:38

## 2024-06-20 RX ADMIN — ROCURONIUM BROMIDE 20 MG: 10 INJECTION, SOLUTION INTRAVENOUS at 08:40

## 2024-06-20 RX ADMIN — MIDAZOLAM 2 MG: 1 INJECTION INTRAMUSCULAR; INTRAVENOUS at 07:30

## 2024-06-20 RX ADMIN — PROPOFOL 50 MG: 10 INJECTION, EMULSION INTRAVENOUS at 07:45

## 2024-06-20 RX ADMIN — ROCURONIUM BROMIDE 80 MG: 10 INJECTION, SOLUTION INTRAVENOUS at 07:45

## 2024-06-20 RX ADMIN — SUGAMMADEX 200 MG: 100 INJECTION, SOLUTION INTRAVENOUS at 13:05

## 2024-06-20 RX ADMIN — OXYCODONE HYDROCHLORIDE 5 MG: 5 TABLET ORAL at 19:19

## 2024-06-20 RX ADMIN — KETAMINE HYDROCHLORIDE 20 MG: 100 INJECTION, SOLUTION, CONCENTRATE INTRAMUSCULAR; INTRAVENOUS at 07:45

## 2024-06-20 RX ADMIN — FENTANYL CITRATE 100 MCG: 50 INJECTION, SOLUTION INTRAMUSCULAR; INTRAVENOUS at 08:51

## 2024-06-20 RX ADMIN — HYDROMORPHONE HYDROCHLORIDE 0.5 MG: 1 INJECTION, SOLUTION INTRAMUSCULAR; INTRAVENOUS; SUBCUTANEOUS at 21:20

## 2024-06-20 RX ADMIN — ROCURONIUM BROMIDE 20 MG: 10 INJECTION, SOLUTION INTRAVENOUS at 10:04

## 2024-06-20 RX ADMIN — HEPARIN SODIUM 10000 UNITS: 1000 INJECTION INTRAVENOUS; SUBCUTANEOUS at 07:37

## 2024-06-20 RX ADMIN — AMIODARONE HYDROCHLORIDE 200 MG: 200 TABLET ORAL at 20:34

## 2024-06-20 RX ADMIN — ROCURONIUM BROMIDE 10 MG: 10 INJECTION, SOLUTION INTRAVENOUS at 12:06

## 2024-06-20 RX ADMIN — NICARDIPINE HYDROCHLORIDE 200 MCG: 2.5 INJECTION, SOLUTION INTRAVENOUS at 10:10

## 2024-06-20 RX ADMIN — ASPIRIN 81 MG: 81 TABLET, COATED ORAL at 15:17

## 2024-06-20 RX ADMIN — HYDROMORPHONE HYDROCHLORIDE 0.5 MG: 1 INJECTION, SOLUTION INTRAMUSCULAR; INTRAVENOUS; SUBCUTANEOUS at 18:00

## 2024-06-20 RX ADMIN — ACETAMINOPHEN 975 MG: 325 TABLET, FILM COATED ORAL at 15:16

## 2024-06-20 RX ADMIN — MAGNESIUM SULFATE HEPTAHYDRATE 2 G: 40 INJECTION, SOLUTION INTRAVENOUS at 13:09

## 2024-06-20 RX ADMIN — HEPARIN SODIUM 34500 UNITS: 1000 INJECTION INTRAVENOUS; SUBCUTANEOUS at 09:46

## 2024-06-20 RX ADMIN — CEFAZOLIN SODIUM 2000 MG: 2 SOLUTION INTRAVENOUS at 08:23

## 2024-06-20 RX ADMIN — AMIODARONE HYDROCHLORIDE 150 MG: 50 INJECTION, SOLUTION INTRAVENOUS at 11:16

## 2024-06-20 RX ADMIN — Medication 12.5 MG: at 06:06

## 2024-06-20 RX ADMIN — HEPARIN SODIUM 1 EACH: 5000 INJECTION INTRAVENOUS; SUBCUTANEOUS at 07:37

## 2024-06-20 RX ADMIN — KETAMINE HYDROCHLORIDE 30 MG: 100 INJECTION, SOLUTION, CONCENTRATE INTRAMUSCULAR; INTRAVENOUS at 08:47

## 2024-06-20 RX ADMIN — SODIUM CHLORIDE 1.5 UNITS/HR: 9 INJECTION, SOLUTION INTRAVENOUS at 13:57

## 2024-06-20 RX ADMIN — MAGNESIUM SULFATE HEPTAHYDRATE 2 G: 500 INJECTION, SOLUTION INTRAMUSCULAR; INTRAVENOUS at 11:29

## 2024-06-20 RX ADMIN — FENTANYL CITRATE 100 MCG: 50 INJECTION, SOLUTION INTRAMUSCULAR; INTRAVENOUS at 08:45

## 2024-06-20 RX ADMIN — ATORVASTATIN CALCIUM 80 MG: 80 TABLET, FILM COATED ORAL at 15:16

## 2024-06-20 RX ADMIN — SODIUM CHLORIDE 20 ML/HR: 0.45 INJECTION, SOLUTION INTRAVENOUS at 13:30

## 2024-06-20 RX ADMIN — CALCIUM CHLORIDE 0.25 G: 100 INJECTION INTRAVENOUS; INTRAVENTRICULAR at 12:12

## 2024-06-20 NOTE — ANESTHESIA PREPROCEDURE EVALUATION
Procedure:  CORONARY ARTERY BYPASS GRAFT (CABG) X 3- VESSELS  W/ DEDRICK (Chest)  HARVEST VEIN ENDOSCOPIC (EVH) (Leg)    Relevant Problems   CARDIO   (+) Abdominal aortic aneurysm without rupture (HCC)   (+) Coronary artery disease of native artery of native heart with stable angina pectoris (HCC)   (+) Hyperlipidemia   (+) Hypertension   (+) Rib pain      NEURO/PSYCH   (+) Paresthesia        Physical Exam    Airway    Mallampati score: I  TM Distance: >3 FB  Neck ROM: full     Dental    upper dentures and lower dentures    Cardiovascular  Cardiovascular exam normal    Pulmonary  Pulmonary exam normal     Other Findings        Anesthesia Plan  ASA Score- 4     Anesthesia Type- general with ASA Monitors.         Additional Monitors: central venous line, arterial line and pulmonary artery catheter.    Airway Plan: ETT.           Plan Factors-    Chart reviewed. EKG reviewed.  Existing labs reviewed.     Patient is not a current smoker.              Induction- intravenous.    Postoperative Plan- Plan for postoperative opioid use.         Informed Consent- Anesthetic plan and risks discussed with patient and spouse.

## 2024-06-20 NOTE — INTERVAL H&P NOTE
Vitals:    06/20/24 0548   BP: 128/75   Pulse: 57   Resp: 17   Temp: 97.6 °F (36.4 °C)   SpO2: 97%         H&P reviewed. After examining the patient I find no changes in the patients condition since the H&P was completed.    Plan for CABG.    Preoperative Beta Blocker: indicated.    Anticipated Length of Stay: Patient will be admitted on an inpatient basis with an anticipated length of stay of grater than 2 midnights.  Justification for Hospital StaY: Post surgical recovery following open heart surgery.      KALPANA Amos MD  06/20/24  7:25 AM

## 2024-06-20 NOTE — RESPIRATORY THERAPY NOTE
06/20/24 1612   Respiratory Assessment   Resp Comments Extubated pt to 4 L NC as ordered by MD. Pt passed SBT and positive cuff leak. Pt tolerated well, vent pulled.

## 2024-06-20 NOTE — OP NOTE
OPERATIVE REPORT  PATIENT NAME: Shane Chau Sr.    :  1945  MRN: [id number]  Pt Location:  OR ROOM 10    SURGERY DATE: 2024    SURGEON: KALPANA Amos MD    ASSISTANT: Mercedes Baker PA-C    ADDITIONAL ASSISTANT: None    PREOPERATIVE DIAGNOSIS:  Multivessel coronary artery disease    POSTOPERATIVE DIAGNOSIS:  None    NYHA Class: 3    CCS Class: 3    PROCEDURE: Coronary artery bypass grafting x 3 with left internal mammary artery to left anterior descending, saphenous vein graft to right posterior descending artery, saphenous vein graft to obtuse marginal 1.     ANESTHESIA: General endotracheal anesthesia with transesophageal echocardiogram guidance, Dr. Neena Crowder     CARDIOPULMONARY BYPASS TIME: 76 minutes.     CROSSCLAMP TIME: 70 minutes.    PACKS/TUBES/DRAINS: Chest tubes x 3.     MATERIALS: Pacing wires: A x1. V x1.     TRANSFUSION: None.     SPECIMENS: None.     ESTIMATED BLOOD LOSS: 200 mL    OPERATIVE TECHNIQUE:    The patient was taken to the operating room and placed supine on the operating table. Following the satisfactory induction of general anesthesia and placement of monitoring lines, the patient was prepped and draped in the usual sterile fashion. A time-out procedure was performed.    The patient underwent median sternotomy, LIMA harvest, endoscopic left greater saphenous vein harvest, systemic heparinization and conduit preparation. The patient underwent pericardiotomy and epiaortic ultrasound was used to evaluate the ascending aorta, which was found to be free of significant atheromatous disease. The patient underwent aortic and right atrial cannulation and was initiated on bypass. The ascending aorta was crossclamped. Antegrade [place] Nido cardioplegia was delivered with an excellent arrest.    The saphenous vein was anastomosed to the right posterior descending artery in end-to-side fashion using running 7-0 Prolene suture. The saphenous vein was anastomosed to the  obtuse  marginal 1 in end-to-side fashion using running 7-0 Prolene suture. The left internal mammary artery was anastomosed to the left anterior descending in end-to-side fashion using running 7-0 Prolene suture. The quality of all three grafts was excellent. A total of 2 proximal anastomoses were completed on the ascending aorta in end-to-side fashion using running 5-0 Prolene suture. The heart was de-aired and the crossclamp was removed. Atrial and ventricular pacing wires were placed. Following a period of reperfusion, the patient was weaned from cardiopulmonary bypass and decannulated. Protamine was administered with normalization of the ACT. Hemostasis was confirmed in all fields. Thoracostomy tubes were placed. The sternum was closed with stainless steel wires. The fascia, subdermis and skin were closed with multiple layers of running absorbable suture.    As the attending surgeon, I was present and scrubbed for all critical portions of this procedure. Sponge, needle and instrument counts were reported as correct by the nursing staff. There is no cardiac residency program at this facility and for complex procedures such as this, it is vital to have a physician assistant experienced in cardiac surgery.  The assistance of Mercedes Baker PA-C was necessary for endoscopic saphenous vein harvesting, retraction of the heart and coronary conduit with proper tissue handling techniques, and following of the suture with correct tension during construction of the proximal and distal coronary anastomoses.    Final transesophageal echocardiogram demonstrated normal biventricular function.        SIGNATURE: KALPANA Amos MD  DATE: June 20, 2024  TIME: 12:40 PM

## 2024-06-20 NOTE — ANESTHESIA PROCEDURE NOTES
Introducer/[place]    Performed by: Neena Crowder MD  Authorized by: Neena Crowder MD    Date/Time: 6/20/2024 8:11 AM  Patient identity confirmed: arm band  Location details: right internal jugular  Catheter size: 9 Fr  Patient position: Trendelenburg  Assessment: blood return through all ports and free fluid flow  Preparation: skin prepped with 2% chlorhexidine  Skin prep agent dried: skin prep agent completely dried prior to procedure  Sterile barriers: all five maximum sterile barriers used - cap, mask, sterile gown, sterile gloves, and large sterile sheet  Hand hygiene: hand hygiene performed prior to central venous catheter insertion  Ultrasound guidance: yes  ultrasound permanent image saved  Pre-procedure: landmarks identified  Number of attempts: 1  Successful placement: yes  Post-procedure: line sutured and dressing applied

## 2024-06-20 NOTE — ANESTHESIA PROCEDURE NOTES
Procedure Performed: DEDRICK Anesthesia  Start Time:  6/20/2024 8:17 AM            Procedure    Diagnostic Indications for DEDRICK:  hemodynamic monitoring. Type of DEDRICK: complete DEDRICK with interpretation.    Location performed: OR. Intubated.  Heart visualized. Insertion of DEDRICK Probe:  Atraumatic. Probe Type:  Multiplane and epiaortic. Modalities:  2D only, color flow mapping and continuous wave Doppler.      Echocardiographic and Doppler Measurements    PREPROCEDURE    LEFT VENTRICLE:  Systolic Function: normal. Ejection Fraction: 55%. Cavity size: normal.       RIGHT VENTRICLE:  Systolic Function: normal.  Cavity size mildly dilated. Hypertrophy present.             AORTIC VALVE:  Leaflets: normal and trileaflet. Leaflet motions normal and normal. Stenosis: none.     Regurgitation: none.      MITRAL VALVE:  Leaflets: normal. Leaflet Motions: normal. Regurgitation: mild.   Stenosis: none.       TRICUSPID VALVE:  Leaflets: normal. Leaflet Motions: normal. Stenosis: none. Regurgitation: mild.      PULMONIC VALVE:  Leaflets: normal. Regurgitation: trace.         ASCENDING AORTA:  Size:  normal.  Dissection not present.      AORTIC ARCH:  Size:  normal.   Grade 2: severe intimal thickening without protruding atheroma.    DESCENDING AORTA:  Size: normal.   Grade 4: atheroma protruding => 0.5 cm into lumen.        RIGHT ATRIUM:  Size:  normal.     LEFT ATRIUM:  Size: normal.     LEFT ATRIAL APPENDAGE:  Size: normal. No spontaneous echo contrast         ATRIAL SEPTUM:  Intra-atrial septal morphology: patent foramen ovale.          VENTRICULAR SEPTUM:  Intra-ventricular septum morphology: normal.         EPIAORTIC:  Plaque Thickness: > 5 mm.    OTHER FINDINGS:  Pericardium:  normal. Pleural Effusion:  none.        POSTPROCEDURE    LEFT VENTRICLE: Unchanged .         RIGHT VENTRICLE: Unchanged .           AORTIC VALVE: Unchanged .           MITRAL VALVE: Unchanged .         TRICUSPID VALVE: Unchanged .        PULMONIC VALVE:  Unchanged             ATRIA: Unchanged .          AORTA: Unchanged .        REMOVAL:  Probe Removal: atraumatic.

## 2024-06-20 NOTE — ANESTHESIA PROCEDURE NOTES
Arterial Line Insertion    Performed by: Neena Crowder MD  Authorized by: Neena Crowder MD  Consent: Written consent obtained.  Required items: required blood products, implants, devices, and special equipment available  Patient identity confirmed: verbally with patient and arm band  Preparation: Patient was prepped and draped in the usual sterile fashion.  Indications: multiple ABGs and hemodynamic monitoring  Orientation:  Left  Location: radial artery  Procedure Details:  Needle gauge: 20  Seldinger technique: Seldinger technique used  Number of attempts: 2    Post-procedure:  Post-procedure: dressing applied  Waveform: good waveform

## 2024-06-20 NOTE — CONSULTS
[facility]  Consult: Critical Care   Date of Service: 6/20/2024  Hospital Day: 0  Name: Shane Orozco I  MRN: [id number]  Unit/Bed#: [facility] 417-01    Consults  Assessment & Plan     Impressions:  MVCAD s/p CABG x 3 (LIMA > LAD, SVG > PDA, SVG > OM1)  Hypertension   Hyperlipidemia  TIA's on Plavix   S/p open AAA repair (2011)   PFO  Insomnia    Neuro:   Discontinue continuous sedation.   ATC tylenol, PRN oxycodone for pain  Trend neuro exam  Delirium precautions    CV:   Goals:   Cardiac Surgery Hemodynamic Monitoring goals: MAP goal >65 CI >2.2 SBP < 130 Continue pulmonary artery catheter for hemodynamic monitoring  Continue central line due to vasoactive medications Continue arterial line for blood pressure monitoring and/or frequent blood gas draws  Volume resuscitation as needed.   Epicardial pacing wire plan : Epicardial pacing wires in place. Will pace as needed for bradycardia or cardiac output   Monitor rhythm on telemetry.        Lung:   Check STAT post-op ABG and CXR  Wean vent with spontaneous breathing trial with goal to extubate today    GI:   GI prophylaxis with PPI  Bowel regimen  Zofran PRN for nausea.     FEN:   NPO Replenish K >4.0, mag >2.0 and calcium >7.0.     :   Check STAT post-op BMP  Cortés in place.   Monitor UOP with goal >0.5cc/kg/hour.  Lasix versus volume resuscitate as needed depending on hemodynamics and volume status.    ID:   Prophylactic post-op abx. Maintain normothermia. Trend temps.     Heme:   Check STAT post-op H/H and platelets.  Monitor incision site, invasive lines, and chest tube outputs for bleeding. Send coag panel if needed.    Endo:   Insulin gtt for blood sugar control.     Disposition: ICU Care        Subjective     HPI: Shane Chau Sr. is a 77 y/o male with hypertension, hyperlipidemia, multiple prior TIA's, BPH, open AAA repair (2011), insomnia, and known CAD with prior angioplasty (1980's), PCI (1996), JENN  x2 to LCx (2013) who reported to his outpatient cardiologist that he was experiencing several months of worsening chest pain and dyspnea. He typically was able to walk daily on the treadmill, but had progressed to requiring frequent breaks and SL nitro. On 6/3/24, he underwent LHC, which revealed MVCAD. He presents today, 6/20/24, s/po CABG x 3 (LIMA > LAD, SVG > PDA, SVG > OM1).     Intra-op DEDRICK LVEF 55%, RV mildly dilated with normal function, mild MR, mild TR, grade IV descending aortic atheroma, PFO    Post-op medications: None    ROS: ROS unable to be obtained due to patient being intubated and sedated.     History obtained from chart review due to patient being intubated and sedated.        Objective          Vitals: Invasive Monitoring      /75   Pulse 57   Resp 17   O2 Sat 97 %   O2 Device Nasal cannula   Temp 97.6 °F (36.4 °C)    Arterial Line  Lety BP    Arterial Line BP  Min: 93/48  Max: 130/57   MAP    Arterial Line MAP (mmHg)  Min: 62 mmHg  Max: 80 mmHg     PA Catheter   Most Recent  Min/Max in 24hrs    PAP   PAP  Min: 24/11  Max: 28/13   CVP   CVP (mean)  Min: 9 mmHg  Max: 12 mmHg   CI    CI (L/min/m2)  Min: 2.1 L/min/m2  Max: 2.2 L/min/m2   SVR   SVR (dyne*sec)/cm5  Min: 1285 (dyne*sec)/cm5  Max: 1287 (dyne*sec)/cm5          Physical Exam   Physical Exam  Vitals and nursing note reviewed.   Eyes:      Conjunctiva/sclera: Conjunctivae normal.      Pupils: Pupils are equal, round, and reactive to light.   Skin:     General: Skin is warm and dry.      Capillary Refill: Capillary refill takes less than 2 seconds.   HENT:      Head: Normocephalic and atraumatic.      Mouth/Throat:      Lips: Pink.   Neck:      Vascular: Central line present.     Cardiovascular:      Rate and Rhythm: Normal rate and regular rhythm.      Pulses:           Radial pulses are 2+ on the right side and 2+ on the left side.        Dorsalis pedis pulses are 1+ on the right side and 1+ on the left side.      Heart sounds:  Normal heart sounds.   Musculoskeletal:      Right lower leg: No edema.      Left lower leg: No edema.   Abdominal: General: There is no distension.      Palpations: Abdomen is soft.      Tenderness: There is no abdominal tenderness.       Constitutional:       General: He is not in acute distress.     Appearance: He is well-developed.      Interventions: He is sedated, intubated and restrained.   Pulmonary:      Effort: He is intubated.      Comments: Chest tubes with sanguinous output     Chest:       Psychiatric:         Behavior: Behavior is cooperative.   Neurological:      General: No focal deficit present.      Mental Status: He is lethargic.      GCS: GCS eye subscore is 3. GCS verbal subscore is 1. GCS motor subscore is 6.   Genitourinary/Anorectal:  Cortés present.        Diagnostic Studies      06/20/24 CXR: No obvious pneumothorax. Lines and tubes in appropriate positioning.    This was personally reviewed by myself in PACS.  06/20/24 EKG: NSR.   This was personally reviewed by myself.       Medications:  Scheduled PRN   acetaminophen, 975 mg, Q6H While awake  amiodarone, 200 mg, Q8H KRISTI  aspirin, 81 mg, Daily  atorvastatin, 80 mg, Daily With Dinner  cefazolin, 2,000 mg, Q8H  chlorhexidine, 15 mL, BID  fentaNYL, 50 mcg, Once  [START ON 6/21/2024] fondaparinux, 2.5 mg, Daily  magnesium sulfate, 2 g, Once  mupirocin, 1 Application, Q12H KRISTI  [START ON 6/21/2024] pantoprazole, 40 mg, Early Morning  [START ON 6/21/2024] polyethylene glycol, 17 g, Daily      acetaminophen, 650 mg, Q4H PRN  bisacodyl, 10 mg, Daily PRN  calcium gluconate, 2 g, Once PRN  fentaNYL, 50 mcg, Q1H PRN  HYDROmorphone, 0.5 mg, Q2H PRN  lactated ringers, 500 mL, Once PRN  ondansetron, 4 mg, Q6H PRN  oxyCODONE, 2.5 mg, Q4H PRN   Or  oxyCODONE, 5 mg, Q4H PRN  potassium chloride, 20 mEq, Once PRN       Continuous    insulin regular (HumuLIN R,NovoLIN R) 1 Units/mL in sodium chloride 0.9 % 100 mL infusion, 0.3-21 Units/hr  niCARdipine,  2.5-15 mg/hr  phenylephine,  mcg/min  sodium chloride, 20 mL/hr         Labs:  CBC    Recent Labs     06/20/24  1138 06/20/24  1202 06/20/24  1310   HGB 9.2* 8.8* 10.5*   HCT 27* 26* 31*   *  --   --      BMP    Recent Labs     06/20/24  1202 06/20/24  1310   CO2 22 21       Coags    No recent results     Additional Electrolytes  Recent Labs     06/20/24  1202 06/20/24  1310   CAIONIZED 1.27 1.24          Blood Gas    No recent results  No recent results LFTs  No recent results    Infectious    No recent results     No recent results Glucose  No recent results       Invasive lines and devices:  Invasive Devices       Central Venous Catheter Line  Duration             CVC Central Lines 06/20/24 <1 day    Introducer 06/20/24 <1 day              Peripheral Intravenous Line  Duration             Peripheral IV 06/20/24 Left Hand <1 day              Arterial Line  Duration             Arterial Line 06/20/24 Left Radial <1 day              Line  Duration             Pacer Wires <1 day    Pacer Wires <1 day              Drain  Duration             Chest Tube 1 Left Pleural 32 Fr. <1 day    Chest Tube 2 Mediastinal;Posterior 32 Fr. <1 day    Chest Tube 3 Mediastinal;Anterior 32 Fr. <1 day    Urethral Catheter Latex;Straight-tip 16 Fr. <1 day              Airway  Duration             ETT  Hi-Lo 8 mm <1 day                     Historical Information   Past Medical History:  No date: AAA (abdominal aortic aneurysm) (HCC)      Comment:  s/p open repair  No date: BPH (benign prostatic hyperplasia)  No date: CAD (coronary artery disease)  No date: Cholelithiasis  No date: Former tobacco use  No date: History of MI (myocardial infarction)  No date: History of TIA (transient ischemic attack)  No date: Hyperlipidemia  No date: Hypertension  No date: Insomnia  No date: Obesity Past Surgical History:  06/07/2011: ABDOMINAL AORTIC ANEURYSM REPAIR      Comment:  Dr. Yarbrough 6/7/11  No date: APPENDECTOMY  06/03/2024: CARDIAC  CATHETERIZATION; Left      Comment:  Procedure: Cardiac Left Heart Cath;  Surgeon:                Pk Blanco DO;  Location: [facility];               Service: Cardiology  2024: CARDIAC CATHETERIZATION      Comment:  Procedure: Cardiac catheterization;  Surgeon:                Pk Blanco DO;  Location: [facility];               Service: Cardiology  2024: CARDIAC CATHETERIZATION; N/A      Comment:  Procedure: Cardiac Coronary Angiogram;  Surgeon:                Pk Blanco DO;  Location: [facility];               Service: Cardiology  2018: CATARACT EXTRACTION; Right  No date: CHOLECYSTECTOMY LAPAROSCOPIC  2006: COLONOSCOPY  No date: CORONARY ANGIOPLASTY      Comment:  3 STENTS INSERTED  2016: NV LAPS SURG CHOLECYSTECTOMY W/CHOLANGIOGRAPHY; N/A      Comment:  Procedure: CHOLECYSTECTOMY LAPAROSCOPIC with intra-op                cholangiogram ;  Surgeon: Prasanth Verdin MD;  Location:               [facility];  Service: General   Current Outpatient Medications   Medication Instructions    amLODIPine (NORVASC) 10 mg, Oral, Daily    aspirin 325 mg, Oral, Daily    clopidogrel (PLAVIX) 75 mg, Oral, Daily    isosorbide mononitrate (IMDUR) 60 mg, Oral, 2 times daily    metoprolol tartrate (LOPRESSOR) 50 mg, Oral, 2 times daily    mupirocin (BACTROBAN) 2 % ointment Topical, 2 times daily    nitroglycerin (NITROSTAT) 0.4 mg, Sublingual, Every 5 minutes PRN    ranolazine (RANEXA) 500 mg, Oral, 2 times daily    rosuvastatin (CRESTOR) 40 mg, Oral, Daily    tamsulosin (FLOMAX) 0.4 mg, Oral, Daily with dinner    No Known Allergies   Social History     Tobacco Use    Smoking status: Former     Current packs/day: 0.00     Average packs/day: 0.5 packs/day for 30.0 years (15.0 ttl pk-yrs)     Types: Cigarettes     Start date: 3/10/1956     Quit date: 3/10/1986     Years since quittin.3    Smokeless tobacco: Never    Tobacco comments:     Quit   about 25 years x1ppd   Vaping Use    Vaping status: Never Used   Substance Use Topics    Alcohol use: Not Currently     Comment: 4 oz glass of wine every other day w/ his dinner    Drug use: No    Family History   Problem Relation Age of Onset    Heart disease Mother     Hypertension Mother         Benign Essential     Stroke Mother         Stroke     Heart disease Father     Coronary artery disease Brother     Diabetes Brother         mellitus     Heart disease Brother     Aortic aneurysm Brother     Coronary artery disease Brother     Sudden death Brother     Coronary artery disease Brother     Sudden death Brother     Sudden death Brother     Coronary artery disease Brother     Heart disease Family             SIGNATURE: SANTHOSH Davis

## 2024-06-20 NOTE — RESPIRATORY THERAPY NOTE
06/20/24 1304   Respiratory Protocol   Protocol Initiated? Yes   Protocol Selection Respiratory;Airway Clearance   Language Barrier? Yes   Medical & Social History Reviewed? Yes   Diagnostic Studies Reviewed? Yes   Physical Assessment Performed? Yes   Airway Clearance Plan Incentive Spirometer   Respiratory Plan Vent/NIV/HFNC   Respiratory Assessment   Assessment Type Assess only   General Appearance Sedated   Respiratory Pattern Normal   Chest Assessment Chest expansion symmetrical   Bilateral Breath Sounds Clear   R Breath Sounds Clear   L Breath Sounds Clear   Resp Comments Assessment preformed on pt who was brought from OR to unit orally intubateed with size 8.0 TT cuffed secured at 24 cm at lips with clear tape. Bilateral breath sounds clear and equal. Pt placed on settings as documented  of CM/18/420/50%/6+. All alarms on and functioning, BVM present at bedside. Will continue to monitor pt. ISTAT used for ABG and results given to MD at bedside.   ETT  Hi-Lo 8 mm   Placement Date/Time: 06/20/24 0769   Mask Ventilation: Ventilated by mask with oral airway (2)  Preoxygenated: Yes  Technique: Video laryngoscopy  Type: Hi-Lo  Tube Size: 8 mm  Laryngoscope: Ward  Blade Size: 3  Location: Oral  Grade View: Full vie...   Secured at (cm) 24   Measured from Lips   Secured Location Right   Secured by Other (Comment)  (CLEAR TAPE)   Site Condition Dry   Cuff Pressure (cm H2O)   (mlt)   Cuff Pressure (color) Green

## 2024-06-20 NOTE — ANESTHESIA PROCEDURE NOTES
Central Line Insertion    Performed by: Neena Crowder MD  Authorized by: Neena Crowder MD    Date/Time: 6/20/2024 8:05 AM  Catheter Type:  triple lumen  Consent: Written consent obtained.  Risks and benefits: risks, benefits and alternatives were discussed  Consent given by: patient  Indications: vascular access  Catheter size: 7 Fr  Patient position: Trendelenburg  Assessment: blood return through all ports and free fluid flow  Skin prep agent dried: skin prep agent completely dried prior to procedure  Sterile barriers: all five maximum sterile barriers used - cap, mask, sterile gown, sterile gloves, and large sterile sheet  Hand hygiene: hand hygiene performed prior to central venous catheter insertion  sterile gel and probe cover used in ultrasound-guided central venous catheter insertionultrasound permanent image saved  Vessel of Catheter Tip End: RA  Number of attempts: 1  Successful placement: yes  Post-procedure: chlorhexidine patch applied, dressing applied and line sutured

## 2024-06-20 NOTE — ANESTHESIA POSTPROCEDURE EVALUATION
Post-Op Assessment Note    CV Status:  Stable  Pain Score: 0    Pain management: adequate       Mental Status:  Unresponsive   Hydration Status:  Stable   PONV Controlled:  None   Airway Patency:  Patent  Airway: intubated     Post Op Vitals Reviewed: Yes    No anethesia notable event occurred.    Staff: Anesthesiologist               BP   116/52   Temp   96.0   Pulse  67   Resp   18   SpO2   98     Patient transported to ICU in stable condition. Handoff given to receiving nurse, AP, and intensivist.

## 2024-06-21 ENCOUNTER — APPOINTMENT (INPATIENT)
Dept: RADIOLOGY | Facility: HOSPITAL | Age: 79
DRG: 235 | End: 2024-06-21
Payer: COMMERCIAL

## 2024-06-21 LAB
ANION GAP SERPL CALCULATED.3IONS-SCNC: 10 MMOL/L (ref 4–13)
ANION GAP SERPL CALCULATED.3IONS-SCNC: 11 MMOL/L (ref 4–13)
BASE EXCESS BLDA CALC-SCNC: -8.2 MMOL/L
BUN SERPL-MCNC: 20 MG/DL (ref 5–25)
BUN SERPL-MCNC: 24 MG/DL (ref 5–25)
CALCIUM SERPL-MCNC: 7.7 MG/DL (ref 8.4–10.2)
CALCIUM SERPL-MCNC: 8 MG/DL (ref 8.4–10.2)
CHLORIDE SERPL-SCNC: 105 MMOL/L (ref 96–108)
CHLORIDE SERPL-SCNC: 110 MMOL/L (ref 96–108)
CO2 SERPL-SCNC: 18 MMOL/L (ref 21–32)
CO2 SERPL-SCNC: 20 MMOL/L (ref 21–32)
CREAT SERPL-MCNC: 1.29 MG/DL (ref 0.6–1.3)
CREAT SERPL-MCNC: 1.29 MG/DL (ref 0.6–1.3)
ERYTHROCYTE [DISTWIDTH] IN BLOOD BY AUTOMATED COUNT: 12 % (ref 11.6–15.1)
GFR SERPL CREATININE-BSD FRML MDRD: 52 ML/MIN/1.73SQ M
GFR SERPL CREATININE-BSD FRML MDRD: 52 ML/MIN/1.73SQ M
GLUCOSE SERPL-MCNC: 169 MG/DL (ref 65–140)
GLUCOSE SERPL-MCNC: 169 MG/DL (ref 65–140)
GLUCOSE SERPL-MCNC: 173 MG/DL (ref 65–140)
GLUCOSE SERPL-MCNC: 179 MG/DL (ref 65–140)
GLUCOSE SERPL-MCNC: 180 MG/DL (ref 65–140)
GLUCOSE SERPL-MCNC: 188 MG/DL (ref 65–140)
GLUCOSE SERPL-MCNC: 200 MG/DL (ref 65–140)
GLUCOSE SERPL-MCNC: 218 MG/DL (ref 65–140)
HCO3 BLDA-SCNC: 17.1 MMOL/L (ref 22–28)
HCT VFR BLD AUTO: 35.3 % (ref 36.5–49.3)
HGB BLD-MCNC: 12.5 G/DL (ref 12–17)
MAGNESIUM SERPL-MCNC: 2.3 MG/DL (ref 1.9–2.7)
MCH RBC QN AUTO: 33.7 PG (ref 26.8–34.3)
MCHC RBC AUTO-ENTMCNC: 35.4 G/DL (ref 31.4–37.4)
MCV RBC AUTO: 95 FL (ref 82–98)
O2 CT BLDA-SCNC: 16.8 ML/DL (ref 16–23)
OTHER FIO2: ABNORMAL %
OXYHGB MFR BLDA: 91.6 % (ref 94–97)
PCO2 BLDA: 34.8 MM HG (ref 36–44)
PH BLDA: 7.31 [PH] (ref 7.35–7.45)
PLATELET # BLD AUTO: 145 THOUSANDS/UL (ref 149–390)
PMV BLD AUTO: 8.9 FL (ref 8.9–12.7)
PO2 BLDA: 67.9 MM HG (ref 75–129)
POTASSIUM SERPL-SCNC: 4.3 MMOL/L (ref 3.5–5.3)
POTASSIUM SERPL-SCNC: 4.6 MMOL/L (ref 3.5–5.3)
RBC # BLD AUTO: 3.71 MILLION/UL (ref 3.88–5.62)
SODIUM SERPL-SCNC: 136 MMOL/L (ref 135–147)
SODIUM SERPL-SCNC: 138 MMOL/L (ref 135–147)
SPECIMEN SOURCE: ABNORMAL
WBC # BLD AUTO: 17.41 THOUSAND/UL (ref 4.31–10.16)

## 2024-06-21 PROCEDURE — 71045 X-RAY EXAM CHEST 1 VIEW: CPT

## 2024-06-21 PROCEDURE — 80048 BASIC METABOLIC PNL TOTAL CA: CPT | Performed by: NURSE PRACTITIONER

## 2024-06-21 PROCEDURE — 97163 PT EVAL HIGH COMPLEX 45 MIN: CPT

## 2024-06-21 PROCEDURE — 85027 COMPLETE CBC AUTOMATED: CPT | Performed by: PHYSICIAN ASSISTANT

## 2024-06-21 PROCEDURE — 80048 BASIC METABOLIC PNL TOTAL CA: CPT | Performed by: PHYSICIAN ASSISTANT

## 2024-06-21 PROCEDURE — 97535 SELF CARE MNGMENT TRAINING: CPT

## 2024-06-21 PROCEDURE — 97167 OT EVAL HIGH COMPLEX 60 MIN: CPT

## 2024-06-21 PROCEDURE — 93005 ELECTROCARDIOGRAM TRACING: CPT

## 2024-06-21 PROCEDURE — NC001 PR NO CHARGE: Performed by: ANESTHESIOLOGY

## 2024-06-21 PROCEDURE — 82805 BLOOD GASES W/O2 SATURATION: CPT | Performed by: PHYSICIAN ASSISTANT

## 2024-06-21 PROCEDURE — 94664 DEMO&/EVAL PT USE INHALER: CPT

## 2024-06-21 PROCEDURE — 83735 ASSAY OF MAGNESIUM: CPT | Performed by: PHYSICIAN ASSISTANT

## 2024-06-21 PROCEDURE — 82948 REAGENT STRIP/BLOOD GLUCOSE: CPT

## 2024-06-21 PROCEDURE — 99024 POSTOP FOLLOW-UP VISIT: CPT | Performed by: THORACIC SURGERY (CARDIOTHORACIC VASCULAR SURGERY)

## 2024-06-21 RX ORDER — BUMETANIDE 0.25 MG/ML
2 INJECTION INTRAMUSCULAR; INTRAVENOUS ONCE
Status: COMPLETED | OUTPATIENT
Start: 2024-06-21 | End: 2024-06-21

## 2024-06-21 RX ORDER — BUMETANIDE 0.25 MG/ML
2 INJECTION INTRAMUSCULAR; INTRAVENOUS 2 TIMES DAILY
Status: DISCONTINUED | OUTPATIENT
Start: 2024-06-21 | End: 2024-06-22

## 2024-06-21 RX ORDER — AMLODIPINE BESYLATE 10 MG/1
10 TABLET ORAL DAILY
Status: DISCONTINUED | OUTPATIENT
Start: 2024-06-22 | End: 2024-06-25

## 2024-06-21 RX ORDER — AMLODIPINE BESYLATE 5 MG/1
5 TABLET ORAL ONCE
Status: COMPLETED | OUTPATIENT
Start: 2024-06-21 | End: 2024-06-21

## 2024-06-21 RX ORDER — DOCUSATE SODIUM 100 MG/1
100 CAPSULE, LIQUID FILLED ORAL 2 TIMES DAILY
Status: DISCONTINUED | OUTPATIENT
Start: 2024-06-21 | End: 2024-07-01

## 2024-06-21 RX ORDER — DOCUSATE SODIUM 100 MG/1
100 CAPSULE, LIQUID FILLED ORAL 2 TIMES DAILY
Status: DISCONTINUED | OUTPATIENT
Start: 2024-06-21 | End: 2024-06-21

## 2024-06-21 RX ORDER — CLOPIDOGREL BISULFATE 75 MG/1
75 TABLET ORAL DAILY
Status: DISCONTINUED | OUTPATIENT
Start: 2024-06-21 | End: 2024-07-07

## 2024-06-21 RX ORDER — AMLODIPINE BESYLATE 10 MG/1
10 TABLET ORAL DAILY
Status: DISCONTINUED | OUTPATIENT
Start: 2024-06-21 | End: 2024-06-21

## 2024-06-21 RX ORDER — TEMAZEPAM 15 MG/1
15 CAPSULE ORAL
Status: DISCONTINUED | OUTPATIENT
Start: 2024-06-21 | End: 2024-07-09 | Stop reason: HOSPADM

## 2024-06-21 RX ORDER — FUROSEMIDE 10 MG/ML
40 INJECTION INTRAMUSCULAR; INTRAVENOUS ONCE
Status: COMPLETED | OUTPATIENT
Start: 2024-06-21 | End: 2024-06-21

## 2024-06-21 RX ORDER — INSULIN LISPRO 100 [IU]/ML
1-5 INJECTION, SOLUTION INTRAVENOUS; SUBCUTANEOUS
Status: DISCONTINUED | OUTPATIENT
Start: 2024-06-21 | End: 2024-07-09 | Stop reason: HOSPADM

## 2024-06-21 RX ORDER — INSULIN LISPRO 100 [IU]/ML
1-6 INJECTION, SOLUTION INTRAVENOUS; SUBCUTANEOUS
Status: DISCONTINUED | OUTPATIENT
Start: 2024-06-21 | End: 2024-07-09 | Stop reason: HOSPADM

## 2024-06-21 RX ORDER — AMLODIPINE BESYLATE 5 MG/1
5 TABLET ORAL DAILY
Status: DISCONTINUED | OUTPATIENT
Start: 2024-06-21 | End: 2024-06-21

## 2024-06-21 RX ORDER — LIDOCAINE 50 MG/G
2 PATCH TOPICAL DAILY
Status: DISCONTINUED | OUTPATIENT
Start: 2024-06-21 | End: 2024-07-09 | Stop reason: HOSPADM

## 2024-06-21 RX ORDER — CLOPIDOGREL BISULFATE 75 MG/1
75 TABLET ORAL DAILY
Status: DISCONTINUED | OUTPATIENT
Start: 2024-06-21 | End: 2024-06-21

## 2024-06-21 RX ORDER — BUMETANIDE 0.25 MG/ML
4 INJECTION INTRAMUSCULAR; INTRAVENOUS ONCE
Status: DISCONTINUED | OUTPATIENT
Start: 2024-06-21 | End: 2024-06-21

## 2024-06-21 RX ORDER — METHOCARBAMOL 500 MG/1
500 TABLET, FILM COATED ORAL EVERY 6 HOURS SCHEDULED
Status: DISCONTINUED | OUTPATIENT
Start: 2024-06-21 | End: 2024-07-02

## 2024-06-21 RX ORDER — POTASSIUM CHLORIDE 20 MEQ/1
20 TABLET, EXTENDED RELEASE ORAL 2 TIMES DAILY
Status: DISCONTINUED | OUTPATIENT
Start: 2024-06-21 | End: 2024-06-25

## 2024-06-21 RX ADMIN — AMIODARONE HYDROCHLORIDE 200 MG: 200 TABLET ORAL at 14:27

## 2024-06-21 RX ADMIN — PANTOPRAZOLE SODIUM 40 MG: 40 TABLET, DELAYED RELEASE ORAL at 05:50

## 2024-06-21 RX ADMIN — NICARDIPINE HYDROCHLORIDE 10 MG/HR: 2.5 INJECTION, SOLUTION INTRAVENOUS at 20:25

## 2024-06-21 RX ADMIN — ONDANSETRON 4 MG: 2 INJECTION INTRAMUSCULAR; INTRAVENOUS at 19:06

## 2024-06-21 RX ADMIN — INSULIN LISPRO 1 UNITS: 100 INJECTION, SOLUTION INTRAVENOUS; SUBCUTANEOUS at 16:35

## 2024-06-21 RX ADMIN — FONDAPARINUX SODIUM 2.5 MG: 2.5 INJECTION, SOLUTION SUBCUTANEOUS at 08:34

## 2024-06-21 RX ADMIN — CHLORHEXIDINE GLUCONATE 0.12% ORAL RINSE 15 ML: 1.2 LIQUID ORAL at 17:59

## 2024-06-21 RX ADMIN — NICARDIPINE HYDROCHLORIDE 10 MG/HR: 2.5 INJECTION, SOLUTION INTRAVENOUS at 01:51

## 2024-06-21 RX ADMIN — POLYETHYLENE GLYCOL 3350 17 G: 17 POWDER, FOR SOLUTION ORAL at 08:34

## 2024-06-21 RX ADMIN — LIDOCAINE 5% 2 PATCH: 700 PATCH TOPICAL at 21:27

## 2024-06-21 RX ADMIN — CHLORHEXIDINE GLUCONATE 0.12% ORAL RINSE 15 ML: 1.2 LIQUID ORAL at 08:34

## 2024-06-21 RX ADMIN — OXYCODONE HYDROCHLORIDE 5 MG: 5 TABLET ORAL at 03:54

## 2024-06-21 RX ADMIN — OXYCODONE HYDROCHLORIDE 5 MG: 5 TABLET ORAL at 20:25

## 2024-06-21 RX ADMIN — CLOPIDOGREL BISULFATE 75 MG: 75 TABLET ORAL at 08:34

## 2024-06-21 RX ADMIN — HYDROMORPHONE HYDROCHLORIDE 0.5 MG: 1 INJECTION, SOLUTION INTRAMUSCULAR; INTRAVENOUS; SUBCUTANEOUS at 04:47

## 2024-06-21 RX ADMIN — AMIODARONE HYDROCHLORIDE 200 MG: 200 TABLET ORAL at 05:50

## 2024-06-21 RX ADMIN — ACETAMINOPHEN 975 MG: 325 TABLET, FILM COATED ORAL at 14:27

## 2024-06-21 RX ADMIN — INSULIN LISPRO 1 UNITS: 100 INJECTION, SOLUTION INTRAVENOUS; SUBCUTANEOUS at 11:04

## 2024-06-21 RX ADMIN — ONDANSETRON 4 MG: 2 INJECTION INTRAMUSCULAR; INTRAVENOUS at 09:49

## 2024-06-21 RX ADMIN — ACETAMINOPHEN 975 MG: 325 TABLET, FILM COATED ORAL at 07:29

## 2024-06-21 RX ADMIN — Medication 12.5 MG: at 21:26

## 2024-06-21 RX ADMIN — HYDROMORPHONE HYDROCHLORIDE 0.5 MG: 1 INJECTION, SOLUTION INTRAMUSCULAR; INTRAVENOUS; SUBCUTANEOUS at 02:04

## 2024-06-21 RX ADMIN — MUPIROCIN 1 APPLICATION: 20 OINTMENT TOPICAL at 08:34

## 2024-06-21 RX ADMIN — ACETAMINOPHEN 975 MG: 325 TABLET, FILM COATED ORAL at 20:25

## 2024-06-21 RX ADMIN — Medication 12.5 MG: at 08:34

## 2024-06-21 RX ADMIN — AMIODARONE HYDROCHLORIDE 200 MG: 200 TABLET ORAL at 21:27

## 2024-06-21 RX ADMIN — INSULIN LISPRO 2 UNITS: 100 INJECTION, SOLUTION INTRAVENOUS; SUBCUTANEOUS at 21:29

## 2024-06-21 RX ADMIN — CEFAZOLIN SODIUM 2000 MG: 2 SOLUTION INTRAVENOUS at 11:07

## 2024-06-21 RX ADMIN — DOCUSATE SODIUM 100 MG: 100 CAPSULE, LIQUID FILLED ORAL at 17:59

## 2024-06-21 RX ADMIN — OXYCODONE HYDROCHLORIDE 5 MG: 5 TABLET ORAL at 12:33

## 2024-06-21 RX ADMIN — OXYCODONE HYDROCHLORIDE 5 MG: 5 TABLET ORAL at 16:34

## 2024-06-21 RX ADMIN — FUROSEMIDE 40 MG: 10 INJECTION, SOLUTION INTRAMUSCULAR; INTRAVENOUS at 02:06

## 2024-06-21 RX ADMIN — HYDROMORPHONE HYDROCHLORIDE 0.5 MG: 1 INJECTION, SOLUTION INTRAMUSCULAR; INTRAVENOUS; SUBCUTANEOUS at 00:22

## 2024-06-21 RX ADMIN — AMLODIPINE BESYLATE 5 MG: 5 TABLET ORAL at 14:55

## 2024-06-21 RX ADMIN — NICARDIPINE HYDROCHLORIDE 10 MG/HR: 2.5 INJECTION, SOLUTION INTRAVENOUS at 23:54

## 2024-06-21 RX ADMIN — DOCUSATE SODIUM 100 MG: 100 CAPSULE, LIQUID FILLED ORAL at 08:34

## 2024-06-21 RX ADMIN — NICARDIPINE HYDROCHLORIDE 7.5 MG/HR: 2.5 INJECTION, SOLUTION INTRAVENOUS at 04:46

## 2024-06-21 RX ADMIN — AMLODIPINE BESYLATE 5 MG: 5 TABLET ORAL at 11:39

## 2024-06-21 RX ADMIN — NICARDIPINE HYDROCHLORIDE 7.5 MG/HR: 2.5 INJECTION, SOLUTION INTRAVENOUS at 18:00

## 2024-06-21 RX ADMIN — POTASSIUM CHLORIDE 20 MEQ: 1500 TABLET, EXTENDED RELEASE ORAL at 17:59

## 2024-06-21 RX ADMIN — CEFAZOLIN SODIUM 2000 MG: 2 SOLUTION INTRAVENOUS at 02:57

## 2024-06-21 RX ADMIN — BUMETANIDE 2 MG: 0.25 INJECTION INTRAMUSCULAR; INTRAVENOUS at 05:50

## 2024-06-21 RX ADMIN — MUPIROCIN 1 APPLICATION: 20 OINTMENT TOPICAL at 21:27

## 2024-06-21 RX ADMIN — ATORVASTATIN CALCIUM 80 MG: 80 TABLET, FILM COATED ORAL at 16:34

## 2024-06-21 RX ADMIN — METHOCARBAMOL 500 MG: 500 TABLET ORAL at 21:27

## 2024-06-21 RX ADMIN — BUMETANIDE 2 MG: 0.25 INJECTION INTRAMUSCULAR; INTRAVENOUS at 16:35

## 2024-06-21 RX ADMIN — NICARDIPINE HYDROCHLORIDE 5 MG/HR: 2.5 INJECTION, SOLUTION INTRAVENOUS at 10:05

## 2024-06-21 RX ADMIN — NICARDIPINE HYDROCHLORIDE 7.5 MG/HR: 2.5 INJECTION, SOLUTION INTRAVENOUS at 14:11

## 2024-06-21 RX ADMIN — ASPIRIN 81 MG: 81 TABLET, COATED ORAL at 08:34

## 2024-06-21 NOTE — UTILIZATION REVIEW
Initial Clinical Review    Elective Inpatient surgical procedure  Age/Sex: 78 y.o. male  Surgery Date: 6/20/2024   Procedure:  Coronary artery bypass grafting x 3 with left internal mammary artery to left anterior descending, saphenous vein graft to right posterior descending artery, saphenous vein graft to obtuse marginal 1.   Anesthesia: General endotracheal anesthesia with transesophageal echocardiogram guidance     POD#1 Progress Note: Pt s/p CABG x 3 (LIMA > LAD, SVG > PDA, SVG > OM1) . Given total of 500mL LR for low CI, high SVR. Started on cardene for hypertension, which he remains on. Increasing oxygen requirements overnight, currently on 15L MFNC. Given 40mg IV lasix with minimal ouput. Given 2mg IV bumex in AM with ongoing response.    Cont cardene gtt, consider starting amlodipine 5mg (50% home dose) later today if cardene requirements remain unchanged. Continue metoprolol tartrate 12.5 mg BID. DC Martha/[place].  Cont Cortés. Continue chest tubes, pacing wires. Bumex for Diuresis. PO ASA/Statin. Close monitoring of UOP and electrolytes. Cont tele. Wean supplemental O2 as needed to maintain SpO2 > 90%. Cont PPI ppx. Bowel regimen. PT/OT. Transition to sliding scale regimen       Admission Orders: Date/Time/Statement:   Admission Orders (From admission, onward)       Ordered        06/20/24 0825  Inpatient Admission  Once                          Orders Placed This Encounter   Procedures    Inpatient Admission     Standing Status:   Standing     Number of Occurrences:   1     Order Specific Question:   Level of Care     Answer:   Critical Care [15]     Order Specific Question:   Estimated length of stay     Answer:   More than 2 Midnights     Order Specific Question:   Certification     Answer:   I certify that inpatient services are medically necessary for this patient for a duration of greater than two midnights. See H&P and MD Progress Notes for additional information about the patient's course of  treatment.       Vital Signs (last 3 days)       Date/Time Temp Pulse Resp BP MAP (mmHg) Arterial Line BP MAP SpO2 FiO2 (%) Calculated FIO2 (%) - Nasal Cannula O2 Flow Rate (L/min) Nasal Cannula O2 Flow Rate (L/min) O2 Device CO CI Patient Position - Orthostatic VS CVP (mean) [place] Coma Scale Score Pain    06/21/24 1427 -- -- -- -- -- -- -- -- -- -- -- -- -- -- -- -- -- -- Med Not Given for Pain - for MAR use only    06/21/24 1233 -- -- -- -- -- -- -- -- -- -- -- -- -- -- -- -- -- -- 6    06/21/24 1139 -- -- -- 144/67 -- -- -- -- -- -- -- -- -- -- -- -- -- -- --    06/21/24 1008 -- -- -- -- -- -- -- -- -- -- -- -- -- -- -- -- -- -- 1    06/21/24 0930 -- -- -- -- -- -- -- -- -- -- -- -- -- -- -- -- -- -- 1    06/21/24 0900 -- 67 23 142/67 96 138/53 75 mmHg 95 % -- -- -- -- -- -- -- -- -- -- --    06/21/24 0800 97.9 °F (36.6 °C) 68 26 136/62 89 128/52 73 mmHg 93 % -- -- 15 L/min -- Mid flow nasal cannula -- -- Sitting -- 15 No Pain    06/21/24 0600 -- 71 23 115/58 79 115/50 66 mmHg 94 % -- -- -- -- -- -- -- -- -- -- --    06/21/24 0447 -- -- -- -- -- -- -- -- -- -- -- -- -- -- -- -- -- -- 8    06/21/24 0400 99.5 °F (37.5 °C) 73 26 121/56 80 123/56 73 mmHg 90 % -- -- 12 L/min -- Mid flow nasal cannula -- -- Lying 17 mmHg 15 --    06/21/24 0354 -- 72 25 -- -- 122/55 72 mmHg 89 % -- -- -- -- -- 4.5 L/min 2.4 L/min/m2 -- 17 mmHg -- 8 06/21/24 0300 -- 74 24 118/57 82 118/55 71 mmHg 90 % -- -- -- -- -- 3.7 L/min 2 L/min/m2 -- 15 mmHg -- --    06/21/24 0204 -- -- -- -- -- -- -- -- -- -- -- -- -- -- -- -- -- -- 8 06/21/24 0200 99.5 °F (37.5 °C) 75 26 117/58 82 119/54 70 mmHg 92 % -- -- 12 L/min -- Mid flow nasal cannula 4.5 L/min 2.4 L/min/m2 -- 15 mmHg -- --    06/21/24 0022 -- -- -- -- -- -- -- -- -- -- -- -- -- -- -- -- -- -- 8 06/21/24 0000 99.1 °F (37.3 °C) 78 24 129/58 84 130/55 76 mmHg 90 % -- 36 -- 4 L/min Nasal cannula 6.2 L/min 3.2 L/min/m2 Lying 13 mmHg 15 --    06/20/24 2347 -- -- -- -- -- -- -- -- --  -- -- -- -- -- -- -- -- -- 7 06/20/24 2300 99.3 °F (37.4 °C) 82 22 122/58 82 123/55 76 mmHg 91 % -- -- -- -- -- 6.2 L/min 3.2 L/min/m2 -- 11 mmHg -- --    06/20/24 2200 99.1 °F (37.3 °C) 86 18 128/57 82 124/55 75 mmHg 90 % -- -- -- -- -- 7.4 L/min 3.9 L/min/m2 -- 10 mmHg -- --    06/20/24 2120 -- -- -- -- -- -- -- -- -- -- -- -- -- -- -- -- -- -- 9 06/20/24 2100 99.3 °F (37.4 °C) 91 23 133/60 86 133/58 79 mmHg 91 % -- -- -- -- -- 6.7 L/min 3.5 L/min/m2 -- 11 mmHg -- --    06/20/24 2000 99.1 °F (37.3 °C) 92 23 131/59 85 129/57 78 mmHg 92 % -- 36 -- 4 L/min Nasal cannula 6.2 L/min 3.3 L/min/m2 Lying 10 mmHg 15 --    06/20/24 1919 -- -- -- -- -- -- -- -- -- -- -- -- -- -- -- -- -- -- 9 06/20/24 1900 99.1 °F (37.3 °C) 92 16 129/59 85 126/56 77 mmHg 91 % -- -- -- -- -- 6.1 L/min 3.2 L/min/m2 -- 9 mmHg -- --    06/20/24 1800 99.1 °F (37.3 °C) 94 24 120/59 83 118/55 74 mmHg 91 % -- -- -- -- -- 6.8 L/min 3.6 L/min/m2 -- 11 mmHg -- 9 06/20/24 1700 99 °F (37.2 °C) 91 20 134/63 90 130/55 78 mmHg 95 % -- -- -- -- -- 7.1 L/min 3.7 L/min/m2 -- 8 mmHg -- --    06/20/24 1615 -- -- -- 135/65 94 -- -- -- -- -- -- -- -- -- -- Lying -- -- --    06/20/24 1610 -- -- -- -- -- -- -- -- -- 36 -- 4 L/min Nasal cannula -- -- -- -- 15 7    06/20/24 1600 98.4 °F (36.9 °C) 77 22 -- -- 135/61 83 mmHg 97 % -- -- -- -- Ventilator 5.8 L/min 3 L/min/m2 -- 9 mmHg -- --    06/20/24 1500 97.3 °F (36.3 °C) 68 23 -- -- 116/54 73 mmHg 99 % -- -- -- -- -- 4.6 L/min 2.4 L/min/m2 -- 10 mmHg -- --    06/20/24 1430 -- 65 20 -- -- 116/55 75 mmHg 100 % -- -- -- -- -- -- -- -- 10 mmHg -- --    06/20/24 1415 -- 63 18 -- -- 122/58 79 mmHg 100 % -- -- -- -- -- -- -- -- 10 mmHg -- --    06/20/24 1400 96.3 °F (35.7 °C) 63 18 -- -- 112/55 75 mmHg 99 % 50 -- -- -- -- 4.1 L/min 2.1 L/min/m2 -- 10 mmHg -- --    06/20/24 1345 -- 61 19 -- -- 93/48 62 mmHg 98 % -- -- -- -- -- -- -- -- 9 mmHg 11 --    06/20/24 1330 -- 67 19 -- -- 110/50 68 mmHg 99 % 60 -- -- -- --  -- -- -- 12 mmHg -- --    06/20/24 1315 -- 67 18 -- -- 116/52 72 mmHg 98 % 60 -- -- -- -- -- -- -- 12 mmHg -- --    06/20/24 1300 95.9 °F (35.5 °C) 67 16 -- -- 130/57 80 mmHg 99 % 60 -- -- -- Ventilator 4.2 L/min 2.2 L/min/m2 -- 10 mmHg 3 --    06/20/24 0613 -- -- -- -- -- -- -- -- -- 28 -- 2 L/min Nasal cannula -- -- -- -- -- --    06/20/24 0548 97.6 °F (36.4 °C) 57 17 128/75 -- -- -- 97 % -- -- -- -- None (Room air) -- -- -- -- -- No Pain          Weight (last 2 days)       Date/Time Weight    06/21/24 0553 88.4 (194.89)    06/20/24 0548 84 (185.19)            Pertinent Labs/Diagnostic Test Results:   Radiology:  XR chest portable   Final Interpretation by Joesph Marshall MD (06/21 1417)      No acute cardiopulmonary disease.            Workstation performed: [id number]         XR chest portable ICU   Final Interpretation by Nataliia Evans MD (06/20 7497)      Expected appearance after CABG.      Left base opacity, likely atelectasis. Small effusion not excluded.            Workstation performed: [id number]           Cardiology:  DEDRICK intraop interventional w/realtime guidance of cardiac procedures   Final Result by SYSTEMGENERATED, DOCUMENTATION (06/21 8263)   This order contains the linked images for the DEDRICK that was performed by    the Anesthesiologist.  Please see the  CARDIAC DEDRICK ANESTHESIA procedure    for results.      ECG 12 lead    by Interface, Ris Results In (06/21 9603)      ECG 12 lead    by Interface, Ris Results In (06/21 3921)      ECG 12 lead   Final Result by Johann Meredith MD (06/20 2658)   Normal sinus rhythm   Normal ECG   When compared with ECG of 03-JUN-2024 08:09,   No significant change was found   Confirmed by Johann Meredith ([id number]) on 6/20/2024 3:23:04 PM        GI:  No orders to display           Results from last 7 days   Lab Units 06/21/24  0349 06/20/24  1942 06/20/24  1310 06/20/24  1306 06/20/24  1202 06/20/24  1138   WBC Thousand/uL 17.41*  --   --   --   --   --   "  HEMOGLOBIN g/dL 12.5 13.1  --  12.2  --   --    I STAT HEMOGLOBIN g/dl  --   --  10.5*  --  8.8* 9.2*   HEMATOCRIT % 35.3* 37.6  --  35.0*  --   --    HEMATOCRIT, ISTAT %  --   --  31*  --  26* 27*   PLATELETS Thousands/uL 145*  --   --  111*  --  148*         Results from last 7 days   Lab Units 06/21/24  1429 06/21/24  0409 06/21/24  0335 06/20/24  1942 06/20/24  1647 06/20/24  1310 06/20/24  1306 06/20/24  1202 06/20/24  1138 06/20/24  1106 06/20/24  1048   SODIUM mmol/L 136  --  138  --   --   --  137  --   --   --   --    POTASSIUM mmol/L 4.3  --  4.6 4.2 3.7  --  3.8  --   --   --   --    CHLORIDE mmol/L 105  --  110*  --   --   --  109*  --   --   --   --    CO2 mmol/L 20*  --  18*  --   --   --  22  --   --   --   --    CO2, I-STAT mmol/L  --   --   --   --   --  21  --  22 25 26 27   ANION GAP mmol/L 11  --  10  --   --   --  6  --   --   --   --    BUN mg/dL 24  --  20  --   --   --  16  --   --   --   --    CREATININE mg/dL 1.29  --  1.29  --   --   --  0.96  --   --   --   --    EGFR ml/min/1.73sq m 52  --  52  --   --   --  75  --   --   --   --    CALCIUM mg/dL 8.0*  --  7.7*  --   --   --  8.9  --   --   --   --    CALCIUM, IONIZED, ISTAT mmol/L  --   --   --   --   --  1.24  --  1.27 1.08* 1.07* 1.08*   MAGNESIUM mg/dL  --  2.3  --   --   --   --   --   --   --   --   --          Results from last 7 days   Lab Units 06/21/24  1104 06/21/24  0549 06/21/24  0333 06/21/24  0150 06/20/24  2349 06/20/24  2141 06/20/24  1941 06/20/24  1807 06/20/24  1603 06/20/24  1357 06/20/24  1307   POC GLUCOSE mg/dl 180* 169* 173* 179* 188* 167* 169* 203* 106 120 165*     Results from last 7 days   Lab Units 06/21/24  1429 06/21/24  0335 06/20/24  1306   GLUCOSE RANDOM mg/dL 200* 169* 150*             No results found for: \"BETA-HYDROXYBUTYRATE\"   Results from last 7 days   Lab Units 06/21/24  0335   PH ART  7.310*   PCO2 ART mm Hg 34.8*   PO2 ART mm Hg 67.9*   HCO3 ART mmol/L 17.1*   BASE EXC ART mmol/L -8.2   O2 " CONTENT ART mL/dL 16.8   O2 HGB, ARTERIAL % 91.6*   ABG SOURCE  Line, Arterial         Results from last 7 days   Lab Units 06/20/24  1310 06/20/24  1202 06/20/24  1138 06/20/24  1106 06/20/24  1048   PH, SAÚL I-STAT   --   --   --   --  7.338   PCO2, SAÚL ISTAT mm HG  --   --   --   --  47.5   PO2, SAÚL ISTAT mm HG  --   --   --   --  49.0*   HCO3, SAÚL ISTAT mmol/L  --   --   --   --  25.5   I STAT BASE EXC mmol/L -5* -4* -1   < > 0   I STAT O2 SAT % 99* 100* 100*   < > 82   ISTAT PH ART  7.347* 7.370 7.400   < >  --    I STAT ART PCO2 mm HG 36.8 36.3 38.9   < >  --    I STAT ART PO2 mm .0* 255.0* 336.0*   < >  --    I STAT ART HCO3 mmol/L 20.2* 21.0* 24.1   < >  --     < > = values in this interval not displayed.                 Results from last 7 days   Lab Units 06/20/24  1306   PROTIME seconds 16.7*   INR  1.37*   PTT seconds 29                                     Results from last 7 days   Lab Units 06/20/24  1357   UNIT PRODUCT CODE  [id number]  [id number]  [id number]  [id number]   UNIT NUMBER  [id number]  [id number]  [id number]  [id number]   UNITABO  O  O  O  O   UNITRH  POS  POS  POS  POS   CROSSMATCH  Compatible  Compatible  Compatible  Compatible   UNIT DISPENSE STATUS  Crossmatched  Crossmatched  Crossmatched  Crossmatched   UNIT PRODUCT VOL ml 350  300  300  350               Diet: Cardiovascular; Fld restriction 1800 ml  Mobility: Ambulate  DVT Prophylaxis: SCD, fondaparinux SC    Medications/Pain Control:   Scheduled Medications:  acetaminophen, 975 mg, Oral, Q6H While awake  amiodarone, 200 mg, Oral, Q8H [facility] hospital  [START ON 6/22/2024] amLODIPine, 10 mg, Oral, Daily  aspirin, 81 mg, Oral, Daily  atorvastatin, 80 mg, Oral, Daily With Dinner  bumetanide, 2 mg, Intravenous, BID  chlorhexidine, 15 mL, Mouth/Throat, BID  clopidogrel, 75 mg, Oral, Daily  docusate sodium, 100 mg, Oral, BID  fondaparinux, 2.5 mg, Subcutaneous, Daily  insulin lispro, 1-5 Units, Subcutaneous,  HS  insulin lispro, 1-6 Units, Subcutaneous, TID AC  metoprolol tartrate, 12.5 mg, Oral, Q12H KRISTI  mupirocin, 1 Application, Nasal, Q12H KRISTI  pantoprazole, 40 mg, Oral, Early Morning  polyethylene glycol, 17 g, Oral, Daily  potassium chloride, 20 mEq, Oral, BID      Continuous IV Infusions:  niCARdipine, 2.5-15 mg/hr, Intravenous, Titrated  phenylephrine (PILAR-SYNEPHRINE) 50 mg (STANDARD CONCENTRATION) in sodium chloride 0.9% 250 mL  Rate: 7.5-54 mL/hr Dose:  mcg/min  Freq: Titrated Route: IV  Last Dose: Stopped (06/20/24 1418)  Start: 06/20/24 1300 End: 06/21/24 0714  insulin regular (HumuLIN R,NovoLIN R) 1 Units/mL in sodium chloride 0.9 % 100 mL infusion  Rate: 0.3-21 mL/hr Dose: 0.3-21 Units/hr  Freq: Titrated Route: IV  Last Dose: Stopped (06/21/24 0722)  Start: 06/20/24 1300 End: 06/21/24 0714    PRN Meds:  bisacodyl, 10 mg, Rectal, Daily PRN 06/21 x 1  ondansetron, 4 mg, Intravenous, Q6H PRN  oxyCODONE, 2.5 mg, Oral, Q4H PRN   Or  oxyCODONE, 5 mg, Oral, Q4H PRN 06/20 x 3, 06/21 x 2  temazepam, 15 mg, Oral, HS PRN        Network Utilization Review Department  ATTENTION: Please call with any questions or concerns to [phone] and carefully listen to the prompts so that you are directed to the right person. All voicemails are confidential.   For Discharge needs, contact Care Management DC Support Team at [phone] opt. [phone]  Send all requests for admission clinical reviews, approved or denied determinations and any other requests to dedicated fax number below belonging to the campus where the patient is receiving treatment. List of dedicated fax numbers for the Facilities:  FACILITY NAME UR FAX NUMBER   ADMISSION DENIALS (Administrative/Medical Necessity) [phone]   DISCHARGE SUPPORT TEAM (NETWORK) [phone]   PARENT CHILD HEALTH (Maternity/NICU/Pediatrics) [phone]   [facility] [phone]   [facility] [phone]   [facility] [phone]   [facility] [phone]   [facility] [phone]   General acute hospital [phone]   [facility] [phone]   [facility] [phone]   [facility] [phone]   [facility] [phone]   [facility] [phone]   [facility] [phone]

## 2024-06-21 NOTE — CASE MANAGEMENT
Case Management Assessment & Discharge Planning Note    Patient name Shane Chau .  Location [facility]01 MRN [id number]  : 1945 Date 2024       Current Admission Date: 2024  Current Admission Diagnosis:Coronary artery disease of native artery of native heart with stable angina pectoris (HCC)   Patient Active Problem List    Diagnosis Date Noted Date Diagnosed    S/P CABG x 3 2024     Rib pain 2023     Paresthesia 2023     Leg swelling 05/10/2022     Erectile dysfunction 2022     Microhematuria 2018     Need for pneumococcal vaccination 2018     Hematuria 2018     Asymptomatic bilateral carotid artery stenosis 2017     Closed compression fracture of first lumbar vertebra ([facility]) 2017     Abdominal aortic aneurysm without rupture ([facility]) 2016     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[facility]) 2013     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     Transient cerebral ischemic attack 2013     Coronary artery disease of native artery of native heart with stable angina pectoris ([facility]) 2012     Hyperlipidemia 2012     Hypertension 2012       LOS (days): 1  Geometric Mean LOS (GMLOS) (days): 5.8  Days to GMLOS:4.6     OBJECTIVE:    Risk of Unplanned Readmission Score: 12.89     Current admission status: Inpatient    Preferred Pharmacy:   [facility] - [place] - [address]  [address] [facility] [place] [address]  Phone: [phone] Fax: [phone]    [facility]  [place] - [facility]  [place] [address]  Phone: [phone] Fax: [phone]    [id number] [place] - [address]  [address] [place] [address]  Phone: [phone] Fax: [phone]    [facility] - [place] - [address]  [address]  [place] [address]  Phone: [phone] Fax:  [phone]    Primary Care Provider: Sherwin Mantilla MD    Primary Insurance: [facility] REP  Secondary Insurance:     ASSESSMENT:  Active Health Care Proxies    There are no active Health Care Proxies on file.       Readmission Root Cause  30 Day Readmission: No    Patient Information  Admitted from:: Home  Mental Status: Alert  During Assessment patient was accompanied by: Spouse  Assessment information provided by:: Spouse  Primary Caregiver: Spouse  Caregiver's Name:: Wife Maryjo  Caregiver's Relationship to Patient:: Significant Other  Caregiver's Telephone Number:: [phone]  Support Systems: Spouse/significant other  County [place]: [place]  [place]  Home entry access options. Select all that apply.: Stairs  Number of steps to enter home.: One Flight (13 steps up to front door OR 5 steps in from garage.)  Do the steps have railings?: Yes  Type of Current Residence: 2 story home  Upon entering residence, is there a bedroom on the main floor (no further steps)?: No  A bedroom is located on the following floor levels of residence (select all that apply):: 2nd Floor  Upon entering residence, is there a bathroom on the main floor (no further steps)?: Yes  Number of steps to 2nd floor from main floor: One Flight  Living Arrangements: Lives w/ Spouse/significant other    Activities of Daily Living Prior to Admission  Functional Status: Independent  Completes ADLs independently?: Yes  Ambulates independently?: Yes  Does patient use assisted devices?: Yes  Assisted Devices (DME) used: Walker  Does patient currently own DME?: Yes  What DME does the patient currently own?: Walker  Does patient have a history of Outpatient Therapy (PT/OT)?: No  Does the patient have a history of Short-Term Rehab?: No  Does patient have a history of HHC?: No  Does patient currently have HHC?: No    Patient Information Continued  Income Source: Pension/halfway  Does patient have prescription  coverage?: Yes  Does patient receive dialysis treatments?: No    Means of Transportation  Means of Transport to Appts:: Drives Self      Social Determinants of Health (SDOH)      Flowsheet Row Most Recent Value   Housing Stability    In the last 12 months, was there a time when you were not able to pay the mortgage or rent on time? N   In the past 12 months, how many times have you moved where you were living? 0   At any time in the past 12 months, were you homeless or living in a shelter (including now)? N   Transportation Needs    In the past 12 months, has lack of transportation kept you from medical appointments or from getting medications? no   In the past 12 months, has lack of transportation kept you from meetings, work, or from getting things needed for daily living? No   Food Insecurity    Within the past 12 months, you worried that your food would run out before you got the money to buy more. Never true   Within the past 12 months, the food you bought just didn't last and you didn't have money to get more. Never true   Utilities    In the past 12 months has the electric, gas, oil, or water company threatened to shut off services in your home? No          DISCHARGE DETAILS:    Discharge planning discussed with:: Patient and Patient's Wife Maryjo  Freedom of Choice: Yes  Comments - Freedom of Choice: CM met bedside with patient and patient's Wife Maryjo to discuss level II rehab recommendations. Patient's wife stated that patient would be open to OP PT or home rehab services but not STR placement.  CM contacted family/caregiver?: Yes  Were Treatment Team discharge recommendations reviewed with patient/caregiver?: Yes      Contacts  Patient Contacts: Maryjo, wife  Relationship to Patient:: Family  Contact Method: In Person  Reason/Outcome: Continuity of Care, Discharge Planning      CM met with patient and patient's Wife Maryjo bedside to introduce role, complete assessment, and discuss discharge planning. KYARA  discussed rehab recommendations and patient's wife confirmed what patient would be agreeable to regarding rehab. Maryjo stated that patient is independent with all IADL's/ADL's, drives self, and ambulates independently with a walker. As CM was leaving patient's room, family/friends were arriving to visit.     Patient/Wife had no other questions or concerns at this time. CM will continue to follow patient for discharge needs.

## 2024-06-21 NOTE — PLAN OF CARE
Problem: Prexisting or High Potential for Compromised Skin Integrity  Goal: Skin integrity is maintained or improved  Description: INTERVENTIONS:  - Identify patients at risk for skin breakdown  - Assess and monitor skin integrity  - Assess and monitor nutrition and hydration status  - Monitor labs   - Assess for incontinence   - Turn and reposition patient  - Assist with mobility/ambulation  - Relieve pressure over bony prominences  - Avoid friction and shearing  - Provide appropriate hygiene as needed including keeping skin clean and dry  - Evaluate need for skin moisturizer/barrier cream  - Collaborate with interdisciplinary team   - Patient/family teaching  - Consider wound care consult   Outcome: Progressing     Problem: PAIN - ADULT  Goal: Verbalizes/displays adequate comfort level or baseline comfort level  Description: Interventions:  - Encourage patient to monitor pain and request assistance  - Assess pain using appropriate pain scale  - Administer analgesics based on type and severity of pain and evaluate response  - Implement non-pharmacological measures as appropriate and evaluate response  - Consider cultural and social influences on pain and pain management  - Notify physician/advanced practitioner if interventions unsuccessful or patient reports new pain  Outcome: Progressing     Problem: INFECTION - ADULT  Goal: Absence or prevention of progression during hospitalization  Description: INTERVENTIONS:  - Assess and monitor for signs and symptoms of infection  - Monitor lab/diagnostic results  - Monitor all insertion sites, i.e. indwelling lines, tubes, and drains  - Monitor endotracheal if appropriate and nasal secretions for changes in amount and color  - Mission appropriate cooling/warming therapies per order  - Administer medications as ordered  - Instruct and encourage patient and family to use good hand hygiene technique  - Identify and instruct in appropriate isolation precautions for  identified infection/condition  Outcome: Progressing  Goal: Absence of fever/infection during neutropenic period  Description: INTERVENTIONS:  - Monitor WBC    Outcome: Progressing     Problem: SAFETY ADULT  Goal: Patient will remain free of falls  Description: INTERVENTIONS:  - Educate patient/family on patient safety including physical limitations  - Instruct patient to call for assistance with activity   - Consult OT/PT to assist with strengthening/mobility   - Keep Call bell within reach  - Keep bed low and locked with side rails adjusted as appropriate  - Keep care items and personal belongings within reach  - Initiate and maintain comfort rounds  - Make Fall Risk Sign visible to staff  - Offer Toileting every 2 Hours, in advance of need  - Initiate/Maintain alarm  - Obtain necessary fall risk management equipment:   - Apply yellow socks and bracelet for high fall risk patients  - Consider moving patient to room near nurses station  Outcome: Progressing  Goal: Maintain or return to baseline ADL function  Description: INTERVENTIONS:  -  Assess patient's ability to carry out ADLs; assess patient's baseline for ADL function and identify physical deficits which impact ability to perform ADLs (bathing, care of mouth/teeth, toileting, grooming, dressing, etc.)  - Assess/evaluate cause of self-care deficits   - Assess range of motion  - Assess patient's mobility; develop plan if impaired  - Assess patient's need for assistive devices and provide as appropriate  - Encourage maximum independence but intervene and supervise when necessary  - Involve family in performance of ADLs  - Assess for home care needs following discharge   - Consider OT consult to assist with ADL evaluation and planning for discharge  - Provide patient education as appropriate  Outcome: Progressing  Goal: Maintains/Returns to pre admission functional level  Description: INTERVENTIONS:  - Perform AM-PAC 6 Click Basic Mobility/ Daily Activity  assessment daily.  - Set and communicate daily mobility goal to care team and patient/family/caregiver.   - Collaborate with rehabilitation services on mobility goals if consulted  - Perform Range of Motion 4 times a day.  - Reposition patient every 2 hours.  - Dangle patient 4 times a day  - Stand patient 4 times a day  - Ambulate patient 4 times a day  - Out of bed to chair 3 times a day   - Out of bed for meals 3 times a day  - Out of bed for toileting  - Record patient progress and toleration of activity level   Outcome: Progressing     Problem: DISCHARGE PLANNING  Goal: Discharge to home or other facility with appropriate resources  Description: INTERVENTIONS:  - Identify barriers to discharge w/patient and caregiver  - Arrange for needed discharge resources and transportation as appropriate  - Identify discharge learning needs (meds, wound care, etc.)  - Arrange for interpretive services to assist at discharge as needed  - Refer to Case Management Department for coordinating discharge planning if the patient needs post-hospital services based on physician/advanced practitioner order or complex needs related to functional status, cognitive ability, or social support system  Outcome: Progressing     Problem: Knowledge Deficit  Goal: Patient/family/caregiver demonstrates understanding of disease process, treatment plan, medications, and discharge instructions  Description: Complete learning assessment and assess knowledge base.  Interventions:  - Provide teaching at level of understanding  - Provide teaching via preferred learning methods  Outcome: Progressing

## 2024-06-21 NOTE — PLAN OF CARE
Problem: OCCUPATIONAL THERAPY ADULT  Goal: Performs self-care activities at highest level of function for planned discharge setting.  See evaluation for individualized goals.  Description: Treatment Interventions: ADL retraining, Functional transfer training, Endurance training, Patient/family training, Equipment evaluation/education, Continued evaluation, Cardiac education, Energy conservation, Activityengagement          See flowsheet documentation for full assessment, interventions and recommendations.   Outcome: Progressing  Note: Limitation: Decreased ADL status, Decreased Safe judgement during ADL, Decreased endurance, Decreased self-care trans, Decreased high-level ADLs  Prognosis: Fair  Assessment: Pt is a 78 y.o. male who was admitted to [facility] on 6/20/2024 with Coronary artery disease of native artery of native heart with stable angina pectoris (HCC), s/p CABG x 3. Pt seen for an OT evaluation per active OT orders, CT and A-line in place.  Pt  has a past medical history of AAA (abdominal aortic aneurysm) (HCC), BPH (benign prostatic hyperplasia), CAD (coronary artery disease), Cholelithiasis, Former tobacco use, History of MI (myocardial infarction), History of TIA (transient ischemic attack), Hyperlipidemia, Hypertension, Insomnia, and Obesity. Pt lives in a [facility] with [facility], uses a tub shower with GB and standard toilet with GB. Pta, pt was independent w/ ADL/IADL and functional mobility, was (+) driving and was not using any DME at baseline. Currently, pt is Min-Mod Ax1 for UB ADL, Max Ax1 for LB ADL, and completed transfers/FM w Mod Ax1. Pt currently presents with impairments in the following categories -steps to enter environment, limited home support, difficulty performing ADLS, and limited insight into deficits activity tolerance, endurance, insight, and safety . These impairments, as well as pt's fatigue, SOB, DUNNE, pain, cardiac/sternal precautions, and risk for falls  limit pt's  ability to safely engage in all baseline areas of occupation, includinggrooming, bathing, dressing, toileting, and functional mobility/transfers.  The patient's raw score on the AM-PAC Daily Activity Inpatient Short Form is 15. A raw score of less than 19 suggests the patient may benefit from discharge to post-acute rehabilitation services. Please refer to the recommendation of the Occupational Therapist for safe discharge planning. Pt would benefit from continued acute OT services throughout hospital course and following D/C. Plan for OT interventions 2-3x per week. From OT standpoint, recommend level II services pending progress upon D/C. Pt was left seated in bedside chair with all needs within reach.     Rehab Resource Intensity Level, OT: I (Maximum Resource Intensity) (pending progress)

## 2024-06-21 NOTE — PLAN OF CARE
Problem: PHYSICAL THERAPY ADULT  Goal: Performs mobility at highest level of function for planned discharge setting.  See evaluation for individualized goals.  Description: Treatment/Interventions: Functional transfer training, LE strengthening/ROM, Elevations, Therapeutic exercise, Endurance training, Cognitive reorientation, Patient/family training, Equipment eval/education, Bed mobility, Gait training, Spoke to nursing, Spoke to case management, OT  Equipment Recommended: Walker (pt reports owning)       See flowsheet documentation for full assessment, interventions and recommendations.  Note: Prognosis: Good  Problem List: Decreased strength, Decreased range of motion, Decreased endurance, Impaired balance, Decreased mobility, Decreased safety awareness, Pain, Decreased skin integrity  Assessment: Pt is a 78 y.o. male seen for PT evaluation s/p admit to [facility] on 6/20/2024. Pt was admitted with a primary dx of: Coronary artery disease of native artery of native heart with stable angina pectoris and is s/p CABG.  PT now consulted for assessment of mobility and d/c needs. Pt with Up as tolerated orders. Pts current clinical presentation is Unstable/ Unpredictable (high complexity) due to Ongoing medical management for primary dx, Increased reliance on more restrictive AD compared to baseline, Decreased activity tolerance compared to baseline, Fall risk, Increased assistance needed from caregiver at current time, Ongoing telemetry monitoring, Increased O2 via NC from pts baseline, Continuous pulse oximetry monitoring , s/p surgical intervention. Prior to admission, pt was living in 1SH and was IND in mobility w/o AD. Upon evaluation, pt currently is requiring MOD A for transfers; MOD A for ambulation 80 ft w/ RW; Pt presents at PT eval functioning below baseline and currently w/ overall mobility deficits 2* to: BLE weakness, decreased ROM, impaired balance, decreased endurance, gait deviations,  pain, decreased activity tolerance compared to baseline, decreased functional mobility tolerance compared to baseline, decreased safety awareness, fall risk, SOB upon exertion. Pt currently at a fall risk 2* to impairments listed above.  Pt will continue to benefit from skilled acute PT interventions to address stated impairments; to maximize functional mobility; for ongoing pt/ family training; and DME needs. At conclusion of PT session pt returned back in chair, chair alarm engaged, and all needs in reach with phone and call bell within reach. Pt denies any further questions at this time. The patient's AM-PAC Basic Mobility Inpatient Short Form Raw Score is 13. A Raw score of less than or equal to 16 suggests the patient may benefit from discharge to post-acute rehabilitation services. Please also refer to the recommendation of the Physical Therapist for safe discharge planning. Recommend acute rehab upon hospital D/C.  Barriers to Discharge: (S) Inaccessible home environment (13 JUJU)     Rehab Resource Intensity Level, PT: I (Maximum Resource Intensity) (pending progress)    See flowsheet documentation for full assessment.

## 2024-06-21 NOTE — OCCUPATIONAL THERAPY NOTE
Occupational Therapy Evaluation and Treatment     Patient Name: Shane Chau Sr.  Today's Date: 6/21/2024  Problem List  Principal Problem:    Coronary artery disease of native artery of native heart with stable angina pectoris (HCC)  Active Problems:    S/P CABG x 3    Past Medical History  Past Medical History:   Diagnosis Date    AAA (abdominal aortic aneurysm) (HCC)     s/p open repair    BPH (benign prostatic hyperplasia)     CAD (coronary artery disease)     Cholelithiasis     Former tobacco use     History of MI (myocardial infarction)     History of TIA (transient ischemic attack)     Hyperlipidemia     Hypertension     Insomnia     Obesity      Past Surgical History  Past Surgical History:   Procedure Laterality Date    ABDOMINAL AORTIC ANEURYSM REPAIR  06/07/2011    Dr. Yarbrough 6/7/11    APPENDECTOMY      CARDIAC CATHETERIZATION Left 06/03/2024    Procedure: Cardiac Left Heart Cath;  Surgeon: Pk Blanco DO;  [facility]: [facility];  Service: Cardiology    CARDIAC CATHETERIZATION  06/03/2024    Procedure: Cardiac catheterization;  Surgeon: Pk Blanco DO;  [facility];  Service: Cardiology    CARDIAC CATHETERIZATION N/A 06/03/2024    Procedure: Cardiac Coronary Angiogram;  Surgeon: Pk Blanco DO;  [facility]: [facility];  Service: Cardiology    CATARACT EXTRACTION Right 11/2018    CHOLECYSTECTOMY LAPAROSCOPIC      COLONOSCOPY  11/2006    CORONARY ANGIOPLASTY      3 STENTS INSERTED    HARVEST VEIN Left 6/20/2024    Procedure: HARVEST VEIN ENDOSCOPIC (EVH);  Surgeon: KALPANA Amos MD;  Location: [facility];  Service: Cardiac Surgery    OK CORONARY ARTERY BYP W/VEIN & ARTERY GRAFT 3 VEIN N/A 6/20/2024    Procedure: CORONARY ARTERY BYPASS GRAFT (CABG) X3 VESSELS, LIMA - LAD, LEFT LEG EVH/SVG - PDA AND OM1, W/ DEDRICK;  Surgeon: KALPANA Amos MD;  Location: [facility];  Service: Cardiac Surgery    OK LAPS SURG  "CHOLECYSTECTOMY W/CHOLANGIOGRAPHY N/A 09/08/2016    Procedure: CHOLECYSTECTOMY LAPAROSCOPIC with intra-op cholangiogram ;  Surgeon: Prasanth Verdin MD;  Location: [facility];  Service: General             06/21/24 1008   OT Last Visit   OT Visit Date 06/21/24   Note Type   Note type Evaluation   Pain Assessment   Pain Assessment Tool 0-10   Pain Score 1   Pain Location/Orientation Orientation: Mid;Location: Chest;Location: Incision   Patient's Stated Pain Goal No pain   Hospital Pain Intervention(s) Repositioned;Ambulation/increased activity   Restrictions/Precautions   Weight Bearing Precautions Per Order No   Other Precautions Cardiac/sternal;Multiple lines;Telemetry;O2;Fall Risk;Pain;Hard of hearing  (CT, A-line)   Home Living   Type of Home House   Home Layout One level;Stairs to enter with rails;Performs ADLs on one level  (13 JUJU)   Bathroom Shower/Tub Tub/shower unit   Bathroom Toilet Standard   Bathroom Equipment Grab bars in shower;Grab bars around toilet   Bathroom Accessibility Accessible   Home Equipment Walker;Cane;Wheelchair-manual  (not using pta)   Additional Comments Pt lives in a 1 SH with 13 [address], uses a tub shower with GB and standard toilet with GB   Prior Function   Level of [place] Independent with ADLs;Independent with functional mobility;Independent with IADLS   Lives With Spouse   Receives Help From Family   IADLs Independent with driving;Independent with meal prep;Independent with medication management   Falls in the last 6 months 0   Vocational Retired   Lifestyle   Autonomy Pta pt I with ADL, IADL and functional mobility, (+)    Reciprocal Relationships supportive spouse   Service to Others retired   Intrinsic Gratification enjoys time with family   Subjective   Subjective \"I feel nauseous\"   ADL   Where Assessed Chair   Eating Assistance 5  Supervision/Setup   Grooming Assistance 4  Minimal Assistance   UB Bathing Assistance 3  Moderate Assistance   LB Bathing Assistance 2  " Maximal Assistance   UB Dressing Assistance 4  Minimal Assistance   LB Dressing Assistance 2  Maximal Assistance   Toileting Assistance  3  Moderate Assistance   Functional Assistance 3  Moderate Assistance   Bed Mobility   Additional Comments Pt greeted OOB in chair, left in chair with all needs within reach   Transfers   Sit to Stand 3  Moderate assistance   Additional items Assist x 1;Increased time required;Verbal cues   Stand to Sit 3  Moderate assistance   Additional items Assist x 1;Increased time required;Verbal cues   Additional Comments with RW. Following initial STS pt becomes nauseous requiring seated rest followed by RN providing nausea medication. Pt with no further symptoms following 2nd STS with BP stable throughout.    Functional Mobility   Functional Mobility 3  Moderate assistance   Additional Comments Pt performs short distances through room and hallway with MOD A x 1 with RW requiring vc for safety throughout, extra assist for chair follow and lines.   Additional items Rolling walker   Balance   Static Sitting Fair   Dynamic Sitting Fair -   Static Standing Poor +   Dynamic Standing Poor   Ambulatory Poor   Activity Tolerance   Activity Tolerance Patient limited by fatigue;Patient limited by pain;Treatment limited secondary to medical complications (Comment)  (nausea, RN aware and provided nausea medication)   Medical Staff Made Aware Co-eval with DPT due to high medical complexity, assist from [facility]   Nurse Made Aware RN cleared for therapy   RUE Assessment   RUE Assessment WFL   LUE Assessment   LUE Assessment WFL   Hand Function   Gross Motor Coordination Functional   Fine Motor Coordination Functional   Sensation   Light Touch No apparent deficits   Cognition   Overall Cognitive Status WFL   Arousal/Participation Alert;Cooperative   Attention Attends with cues to redirect   Orientation Level Oriented X4   Memory Decreased recall of precautions   Following Commands Follows one  step commands with increased time or repetition   Comments Pt cooperative to therapy although [place], with some decreased insight to deficits and safety awareness requiring vc throughout transfers and mobility.   Assessment   Limitation Decreased ADL status;Decreased Safe judgement during ADL;Decreased endurance;Decreased self-care trans;Decreased high-level ADLs   Prognosis Fair   Assessment Pt is a 78 y.o. male who was admitted to [facility] on 6/20/2024 with Coronary artery disease of native artery of native heart with stable angina pectoris (HCC), s/p CABG x 3. Pt seen for an OT evaluation per active OT orders, CT and A-line in place.  Pt  has a past medical history of AAA (abdominal aortic aneurysm) (HCC), BPH (benign prostatic hyperplasia), CAD (coronary artery disease), Cholelithiasis, Former tobacco use, History of MI (myocardial infarction), History of TIA (transient ischemic attack), Hyperlipidemia, Hypertension, Insomnia, and Obesity. Pt lives in a [facility] with [facility], uses a tub shower with GB and standard toilet with GB. Pta, pt was independent w/ ADL/IADL and functional mobility, was (+) driving and was not using any DME at baseline. Currently, pt is Min-Mod Ax1 for UB ADL, Max Ax1 for LB ADL, and completed transfers/FM w Mod Ax1. Pt currently presents with impairments in the following categories -steps to enter environment, limited home support, difficulty performing ADLS, and limited insight into deficits activity tolerance, endurance, insight, and safety . These impairments, as well as pt's fatigue, SOB, DUNNE, pain, cardiac/sternal precautions, and risk for falls  limit pt's ability to safely engage in all baseline areas of occupation, includinggrooming, bathing, dressing, toileting, and functional mobility/transfers.  The patient's raw score on the AM-PAC Daily Activity Inpatient Short Form is 15. A raw score of less than 19 suggests the patient may benefit from discharge to post-acute  rehabilitation services. Please refer to the recommendation of the Occupational Therapist for safe discharge planning. Pt would benefit from continued acute OT services throughout hospital course and following D/C. Plan for OT interventions 2-3x per week. From OT standpoint, recommend level II services pending progress upon D/C. Pt was left seated in bedside chair with all needs within reach.   Goals   Patient Goals to get better   Plan   Treatment Interventions ADL retraining;Functional transfer training;Endurance training;Patient/family training;Equipment evaluation/education;Continued evaluation;Cardiac education;Energy conservation;Activityengagement   Goal Expiration Date 07/05/24   OT Frequency 2-3x/wk   Discharge Recommendation   Rehab Resource Intensity Level, OT I (Maximum Resource Intensity)  (pending progress)   AM-PAC Daily Activity Inpatient   Lower Body Dressing 2   Bathing 2   Toileting 2   Upper Body Dressing 2   Grooming 3   Eating 4   Daily Activity Raw Score 15   Daily Activity Standardized Score (Calc for Raw Score >=11) 34.69   AM-PAC Applied Cognition Inpatient   Following a Speech/Presentation 3   Understanding Ordinary Conversation 4   Taking Medications 3   Remembering Where Things Are Placed or Put Away 3   Remembering List of 4-5 Errands 3   Taking Care of Complicated Tasks 3   Applied Cognition Raw Score 19   Applied Cognition Standardized Score 39.77   Modified Ceiba Scale   Modified Ceiba Scale 4   Additional Treatment Session   Start Time 1049   End Time 1059   Treatment Assessment Pt and spouse provided s/p cardiac sx education packet, reviewed w/ OT, discussed cardiac/sternal precautions, lifestyle modifications, post hospitalization IADL management, environmental temperature control, emotional regulation and management, lifting/driving restrictions, energy conservation techniques, mobility schedule, incisional management, VNA, and cardiac rehabilitation initiation upon clearance  per physician at follow up. Pt reviewed education packet and acknowledged reception of such.   Additional Treatment Day 1   End of Consult   Education Provided Yes   Patient Position at End of Consult Bedside chair;All needs within reach   Nurse Communication Nurse aware of consult       OT Goals    - Pt will be Min A  with LB ADL by end of hospital course.    - Pt will be Supervision with UB ADL by end of hospital course.    - Pt will be Supervision with all functional transfers required for patient safety by end of hospital course.    - Pt will be Supervision with functional mobility to/from bathroom for increased independence with toileting tasks.    - Pt activity tolerance will increase to 30 minutes in order to safely engage in ADL and transfers.     - Pt standing tolerance will increase to 5 minutes  in order to promote sink side ADLs and IADL activities.    - Pt will consistently follow one-step directions with min to no vc or prompting.     - Pt will attend to functional tasks for 10 minutes with min to no vc for attention/redirection.    - Pt will attend to and complete ongoing cognitive assessment in order to inform safe discharge planning.    - Pt will recall all cardiac precautions during OT sessions to promote safety following hospital stay.    - Pt will verbalize and demonstrate understanding to cardiac packet following education in order to promote safety following hospital stay.          Abril Pineda, YARON, OTR/L

## 2024-06-21 NOTE — PROGRESS NOTES
[facility]  Progress Note: Critical Care   Date of Service: 6/21/2024  Hospital Day: 1  Name: Shane Orozco I  MRN: [id number]  Unit/Bed#: [facility] 417-01      Assessment & Plan     Impressions:  MVCAD s/p CABG x 3 (LIMA > LAD, SVG > PDA, SVG > OM1) (6/20/24)   Hypertension   Hyperlipidemia  Acute respiratory insufficiency   BARBARA  TIA's on Plavix   S/p open AAA repair (2011)   PFO  Insomnia    Neuro:   Analgesia:     Tylenol 975mg q6h  Oxycodone 2.5 - 5mg q4h PRN  Can initiate Lidoderm patches as needed   Delirium precautions  Regulate sleep/wake cycle  Restoril 15mg qHS PRN  CAM-ICU daily  Routine neuro checks       CV:   Cardiac Surgery Hemodynamic Monitoring Goals:  MAP > 65   SBP < 130  Continue central line due to vasoactive medications  Continue arterial line for blood pressure monitoring and/or frequent ABG's  Vasopressor/Vasodilator plan:    Nicardipine 5 mg/hr  Consider starting amlodipine 5mg (50% home dose) later today if cardene requirements remain unchanged   Beta Blocker Plan:    Continue metoprolol tartrate 12.5 mg BID  Epicardial Pacing Wire Plan:    Epicardial pacing wire plan : Epicardial pacing wires in place. Will pace as needed for bradycardia or cardiac output   Post-op cardiac surgery/GDMT medications:    ASA 81 mg daily  Atorvastatin 80 mg qHS  Amiodarone 200 mg PO q8 hours   Start Plavix 75mg daily   Continue close telemetry monitoring     Lung:   Wean supplemental oxygen as needed to maintain SpO2 > 90%    Chest tube output remains persistently high; Continue chest tubes to suction today  Continue incentive spirometry and encourage coughing and deep breathing   CXR as needed       GI:   Continue PPI for stress ulcer prophylaxis  Bowel regimen:  Colace BID  Miralax daily   Dulcolax daily PRN  Monitor abdominal exam and bowel function    FEN:   Diuresis Plan: Bumex 2 mg IV BID  Nutrition plan: Cardiac diet, 1800mL fluid restriction   Replenish  electrolytes with goals: K >4.0, Mag >2.0, and Phos >3.0    :   Strict I/O monitoring   Maintain Cortés catheter given low UOP and rising creatine   Resume flomax tonight   Continue to trend regnal function tests      ID:   Monitor WBC and fever curve off antibiotics     Heme:   CBC daily to monitor hemoglobin and platelets   VTE prophylaxis: SCD's as able based on vein harvest sites, Arixtra 2.5mg daily     Endo:   Transition to sliding scale regimen   Adjust insulin regimen as needed to maintain -180    MSK/Skin:  Early mobility and skin protection:   PT/OT consult as medically appropriate   Frequent turning and pressure off-loading  Local wound care as needed    Disposition:  Stepdown Level 1    ICU Core Measures     A: Assess, Prevent, and Manage Pain Has pain been assessed? Yes  Need for changes to pain regimen? No   B: Both SAT/SAT  N/A   C: Choice of Sedation RASS Goal: 0 Alert and Calm or N/A patient not on sedation  Need for changes to sedation or analgesia regimen? No   D: Delirium CAM-ICU: Negative   E: Early Mobility  Plan for early mobility? Yes   F: Family Engagement Plan for family engagement today? Yes       Antibiotic Review: Post op requirements     Review of Invasive Devices:    Cortés Plan: Continue for accurate I/O monitoring for 48 hours  Central access plan: Medications requiring central line Hemodynamic monitoring  Lety Plan: Keep arterial line for hemodynamic monitoring    Prophylaxis:  VTE VTE covered by:  fondaparinux, Subcutaneous       Stress Ulcer  covered bypantoprazole (PROTONIX) EC tablet 40 mg [[id number]]          Significant 24hr Events      24 Hour Events/HPI: POD # 1 s/p CABG x 3 (LIMA > LAD, SVG > PDA, SVG > OM1) . Given total of 500mL LR for low CI, high SVR. Started on cardene for hypertension, which he remains on. Increasing oxygen requirements overnight. Given 40mg IV lasix with minimal ouput. Given 2mg IV bumex in AM with ongoing response.     Critical Care  Infusions : Cardene 5 mg/hour   Subjective   Review of Systems: Review of Systems   Respiratory:  Negative for shortness of breath.    Cardiovascular:  Negative for chest pain.   Gastrointestinal:  Negative for nausea.   Neurological:  Negative for dizziness.        Objective     Medications:  Scheduled Continuous   acetaminophen, 975 mg, Q6H While awake  amiodarone, 200 mg, Q8H KRISTI  aspirin, 81 mg, Daily  atorvastatin, 80 mg, Daily With Dinner  cefazolin, 2,000 mg, Q8H  chlorhexidine, 15 mL, BID  fondaparinux, 2.5 mg, Daily  mupirocin, 1 Application, Q12H KRISTI  pantoprazole, 40 mg, Early Morning  polyethylene glycol, 17 g, Daily      insulin regular (HumuLIN R,NovoLIN R) 1 Units/mL in sodium chloride 0.9 % 100 mL infusion, 0.3-21 Units/hr, Last Rate: 3 Units/hr (06/21/24 0151)  niCARdipine, 2.5-15 mg/hr, Last Rate: 5 mg/hr (06/21/24 0504)  phenylephine,  mcg/min, Last Rate: Stopped (06/20/24 1418)  sodium chloride, 20 mL/hr, Last Rate: 20 mL/hr (06/20/24 1330)         Vitals: Invasive Monitoring       Most Recent Min/Max in 24hrs   Temp 99.5 °F (37.5 °C) Temp  Min: 95.9 °F (35.5 °C)  Max: 99.5 °F (37.5 °C)   Pulse 71 Pulse  Min: 61  Max: 94   Resp (!) 23 Resp  Min: 16  Max: 26   /58 BP  Min: 115/58  Max: 135/65   O2 Sat 94 % SpO2  Min: 89 %  Max: 100 %   O2 Device: Mid flow nasal cannula  Nasal Cannula O2 Flow Rate (L/min): 4 L/min Arterial Line  [place] /50  Arterial Line BP  Min: 93/48  Max: 135/61   MAP 66 mmHg  Arterial Line MAP (mmHg)  Min: 62 mmHg  Max: 83 mmHg     PA Catheter   Most Recent  Min/Max in 24hrs    PAP 39/15 PAP  Min: 24/11  Max: 40/15   CVP 17 mmHg CVP (mean)  Min: 8 mmHg  Max: 17 mmHg   CI 2.4 L/min/m2  CI (L/min/m2)  Min: 2 L/min/m2  Max: 3.9 L/min/m2    (dyne*sec)/cm5 SVR (dyne*sec)/cm5  Min: 698 (dyne*sec)/cm5  Max: 1287 (dyne*sec)/cm5        I/O Chest tube output (if present)     Intake/Output Summary (Last 24 hours) at 6/21/2024 0644  Last data filed at 6/21/2024  0600  Gross per 24 hour   Intake 5249.82 ml   Output 2670 ml   Net 2579.82 ml     UOP: 30-50 ml/hour    BM: 0 in the last 24 hours  Weight change: 4.4 kg (9 lb 11.2 oz)     Mediastinal tubes:  110 mL/8hr  320 mL/24hr   Pleural tubes: 80 mL/8hr  365 mL/24hr        Physical Exam   Physical Exam  Vitals and nursing note reviewed.   Eyes:      Pupils: Pupils are equal, round, and reactive to light.   Skin:     General: Skin is warm and dry.      Capillary Refill: Capillary refill takes less than 2 seconds.   HENT:      Head: Normocephalic and atraumatic.      Mouth/Throat:      Lips: Pink.      Mouth: Mucous membranes are dry.   Neck:      Vascular: Central line present.     Cardiovascular:      Rate and Rhythm: Normal rate and regular rhythm.      Pulses:           Radial pulses are 2+ on the right side and 2+ on the left side.        Dorsalis pedis pulses are 2+ on the right side and 2+ on the left side.      Heart sounds: Normal heart sounds.   Musculoskeletal:      Right lower leg: No edema.      Left lower leg: No edema.   Abdominal: General: Bowel sounds are decreased. There is no distension.      Palpations: Abdomen is soft.      Tenderness: There is no abdominal tenderness.   Constitutional:       General: He is awake. He is not in acute distress.     Appearance: Normal appearance. He is well-developed and well-nourished.      Interventions: Nasal cannula in place.   Pulmonary:      Effort: Pulmonary effort is normal.      Breath sounds: Normal breath sounds.      Comments: 1500mL IS  Chest:          Comments: Chest tubes with serosang output  Psychiatric:         Behavior: Behavior is cooperative.   Neurological:      General: No focal deficit present.      Mental Status: He is alert and oriented to person, place, and time.      GCS: GCS eye subscore is 4. GCS verbal subscore is 5. GCS motor subscore is 6.   Genitourinary/Anorectal:  Cortés present.        Diagnostic Studies      06/21/24 CXR: No obvious  pneumothorax. Mild vascular congestion. Lines and tubes in appropriate placement.   This was personally reviewed by myself in PACS.  06/21/24 EKG: Accelerated junctional.   This was personally reviewed by myself.         Labs:  CBC    Recent Labs     06/20/24  1306 06/20/24  1310 06/20/24  1942 06/21/24  0349   WBC  --   --   --  17.41*   HGB 12.2   < > 13.1 12.5   HCT 35.0*   < > 37.6 35.3*   *  --   --  145*    < > = values in this interval not displayed.     BMP    Recent Labs     06/20/24  1306 06/20/24  1310 06/20/24  1647 06/20/24 1942 06/21/24  0335   SODIUM 137  --   --   --  138   K 3.8  --    < > 4.2 4.6   *  --   --   --  110*   CO2 22 21  --   --  18*   AGAP 6  --   --   --  10   BUN 16  --   --   --  20   CREATININE 0.96  --   --   --  1.29   CALCIUM 8.9  --   --   --  7.7*    < > = values in this interval not displayed.        Baseline Creat: 0.93 - 1.08   Coags    Recent Labs     06/20/24  1306   INR 1.37*   PTT 29              Additional Electrolytes  Recent Labs     06/20/24  1202 06/20/24  1310 06/21/24  0409   MG  --   --  2.3   CAIONIZED 1.27 1.24  --           Blood Gas    Recent Labs     06/21/24  0335   PHART 7.310*   HJD1DSQ 34.8*   PO2ART 67.9*   JOW2WCB 17.1*   BEART -8.2   SOURCE Line, Arterial     Recent Labs     06/21/24  0335   SOURCE Line, Arterial    LFTs  No recent results    Infectious    No recent results     No recent results Glucose  Recent Labs     06/20/24  1306 06/21/24  0335   GLUC 150* 169*        Collaborative bedside rounds performed with cardiac surgery attending, critical care attending and bedside RN.     SANTHOSH Davis

## 2024-06-21 NOTE — PROGRESS NOTES
Progress Note - Cardiothoracic Surgery   Shane Chau Sr. 78 y.o. male MRN: [id number]  Unit/Bed#: [facility] 417-01 Encounter: [id number]      POD # 1 s/p CABG    Pt seen/examined.  Interval history and data reviewed with critical care team.  Pt doing well.  No specific complaints.    No vasoactive gtts.      Medications:   Scheduled Meds:  Current Facility-Administered Medications   Medication Dose Route Frequency Provider Last Rate    acetaminophen  975 mg Oral Q6H While awake Mercedes Baker PA-C      amiodarone  200 mg Oral Q8H [facility] Mercedes Baker PA-C      aspirin  81 mg Oral Daily SANTHOSH Davis      atorvastatin  80 mg Oral Daily With Dinner Mercedes Baker PA-C      bisacodyl  10 mg Rectal Daily PRN Mercedes Baker PA-C      bumetanide  2 mg Intravenous BID SANTHOSH Davis      cefazolin  2,000 mg Intravenous Q8H Mercedes Baker PA-C Stopped (06/21/24 0400)    chlorhexidine  15 mL Mouth/Throat BID SANTHOSH Davis      clopidogrel  75 mg Oral Daily Mercedes Baker PA-C      docusate sodium  100 mg Oral BID SANTHOSH Davis      fondaparinux  2.5 mg Subcutaneous Daily Mercedes Baker PA-C      insulin lispro  1-5 Units Subcutaneous HS SANTHOSH Davis      insulin lispro  1-6 Units Subcutaneous TID AC SANTHOSH Davis      metoprolol tartrate  12.5 mg Oral Q12H [facility] SANTHOSH Davis      mupirocin  1 Application Nasal Q12H [facility] Mercedes Baker PA-C      niCARdipine  2.5-15 mg/hr Intravenous Titrated Mercedes Baker PA-C 5 mg/hr (06/21/24 0504)    ondansetron  4 mg Intravenous Q6H PRN Mercedes Baker PA-C      oxyCODONE  2.5 mg Oral Q4H PRN Mercedes Baker PA-C      Or    oxyCODONE  5 mg Oral Q4H PRN Mercedes Baker PA-C      pantoprazole  40 mg Oral Early Morning Mercedes Baker PA-C      polyethylene glycol  17 g Oral Daily Mercedes Baker PA-C      potassium chloride  20 mEq Oral BID SANTHOSH Davis      temazepam  15 mg Oral  "HS PRN SANTHOSH Davis       Continuous Infusions:niCARdipine, 2.5-15 mg/hr, Last Rate: 5 mg/hr (06/21/24 0504)      PRN Meds:.  bisacodyl    ondansetron    oxyCODONE **OR** oxyCODONE    temazepam    Vitals: Blood pressure 115/58, pulse 71, temperature 99.5 °F (37.5 °C), temperature source Core, resp. rate (!) 23, height 5' 5\" (1.651 m), weight 88.4 kg (194 lb 14.2 oz), SpO2 94%.,Body mass index is 32.43 kg/m².  I/O last 24 hours:  In: 5249.8 [P.O.:160; I.V.:3569.8; IV Piggyback:770]  Out: 2670 [Urine:1235; Blood:750; Chest Tube:685]  Invasive Devices       Central Venous Catheter Line  Duration             CVC Central Lines 06/20/24 1 day              Peripheral Intravenous Line  Duration             Peripheral IV 06/20/24 Left Hand 1 day              Arterial Line  Duration             Arterial Line 06/20/24 Left Radial 1 day              Line  Duration             Pacer Wires <1 day    Pacer Wires <1 day              Drain  Duration             Urethral Catheter Latex;Straight-tip 16 Fr. 1 day    Chest Tube 1 Left Pleural 32 Fr. <1 day    Chest Tube 2 Mediastinal;Posterior 32 Fr. <1 day    Chest Tube 3 Mediastinal;Anterior 32 Fr. <1 day                      Lab, Imaging and other studies:   Results from last 7 days   Lab Units 06/21/24  0349 06/20/24  1942 06/20/24  1310 06/20/24  1306 06/20/24  1202 06/20/24  1138   WBC Thousand/uL 17.41*  --   --   --   --   --    HEMOGLOBIN g/dL 12.5 13.1  --  12.2  --   --    I STAT HEMOGLOBIN g/dl  --   --  10.5*  --    < > 9.2*   HEMATOCRIT % 35.3* 37.6  --  35.0*  --   --    HEMATOCRIT, ISTAT %  --   --  31*  --    < > 27*   PLATELETS Thousands/uL 145*  --   --  111*  --  148*    < > = values in this interval not displayed.     Results from last 7 days   Lab Units 06/21/24  0335 06/20/24  1942 06/20/24  1647 06/20/24  1310 06/20/24  1306   POTASSIUM mmol/L 4.6 4.2 3.7  --  3.8   CHLORIDE mmol/L 110*  --   --   --  109*   CO2 mmol/L 18*  --   --   --  22   CO2, " I-STAT mmol/L  --   --   --  21  --    BUN mg/dL 20  --   --   --  16   CREATININE mg/dL 1.29  --   --   --  0.96   GLUCOSE, ISTAT mg/dl  --   --   --  140  --    CALCIUM mg/dL 7.7*  --   --   --  8.9     Results from last 7 days   Lab Units 06/20/24  1306   INR  1.37*   PTT seconds 29     Recent Labs     06/21/24  0335   PHART 7.310*   NLU2YSK 17.1*   PO2ART 67.9*   DVF6EIC 34.8*   BEART -8.2           Plan:    DC Alan/Cordis/Lety Cortés.  Continue chest tubes, pacing wires.  Ambulate.  Incentive spirometry.  Bumex for Diuresis.  PO ASA/Statin/B blocker.  Close monitoring of UOP and electrolytes.      SIGNATURE: KALPANA Amos MD  DATE: June 21, 2024  TIME: 8:51 AM

## 2024-06-21 NOTE — UTILIZATION REVIEW
NOTIFICATION OF INPATIENT ADMISSION   AUTHORIZATION REQUEST   SERVICING FACILITY:   [facility]  Address: [address] [place] [address]  Tax ID: [id number]  NPI: [id number] ATTENDING PROVIDER:  Attending Name and NPI#: KALPANA Amos Md [[id number]]  Address: [address] [place] [address]  Phone: [phone]   ADMISSION INFORMATION:  Place of Service: Inpatient Acute [facility]  Place of Service Code: 21  Inpatient Admission Date/Time: 6/20/24  8:25 AM  Discharge Date/Time: No discharge date for patient encounter.  Admitting Diagnosis Code/Description:  Coronary artery disease of native artery of native heart with stable angina pectoris (HCC) [I25.118]     UTILIZATION REVIEW CONTACT:  Sang Smith Utilization   Network Utilization Review Department  Phone: [phone]  Fax: [phone]  Email: [email or web address]  Contact for approvals/pending authorizations, clinical reviews, and discharge.     PHYSICIAN ADVISORY SERVICES:  Medical Necessity Denial & [employer]-lv-[facility]le[employer] Review  Phone: [phone]  Fax: [phone]  Email: [email or web address]     DISCHARGE SUPPORT TEAM:  For Patients Discharge Needs & Updates  Phone: [phone] opt. 2 Fax: [phone]  Email: [email or web address]

## 2024-06-21 NOTE — PHYSICAL THERAPY NOTE
Physical Therapy Evaluation    Patient Name: Shane Chau Sr.    Today's Date: 6/21/2024     Problem List  Principal Problem:    Coronary artery disease of native artery of native heart with stable angina pectoris (HCC)  Active Problems:    S/P CABG x 3       Past Medical History  Past Medical History:   Diagnosis Date    AAA (abdominal aortic aneurysm) (HCC)     s/p open repair    BPH (benign prostatic hyperplasia)     CAD (coronary artery disease)     Cholelithiasis     Former tobacco use     History of MI (myocardial infarction)     History of TIA (transient ischemic attack)     Hyperlipidemia     Hypertension     Insomnia     Obesity         Past Surgical History  Past Surgical History:   Procedure Laterality Date    ABDOMINAL AORTIC ANEURYSM REPAIR  06/07/2011    Dr. Yarbrough 6/7/11    APPENDECTOMY      CARDIAC CATHETERIZATION Left 06/03/2024    Procedure: Cardiac Left Heart Cath;  Surgeon: Pk Blanco DO;  [facility]: [facility];  Service: Cardiology    CARDIAC CATHETERIZATION  06/03/2024    Procedure: Cardiac catheterization;  Surgeon: Pk Blanco DO;  [facility];  Service: Cardiology    CARDIAC CATHETERIZATION N/A 06/03/2024    Procedure: Cardiac Coronary Angiogram;  Surgeon: Pk Blanco DO;  [facility]: [facility];  Service: Cardiology    CATARACT EXTRACTION Right 11/2018    CHOLECYSTECTOMY LAPAROSCOPIC      COLONOSCOPY  11/2006    CORONARY ANGIOPLASTY      3 STENTS INSERTED    HARVEST VEIN Left 6/20/2024    Procedure: HARVEST VEIN ENDOSCOPIC (EVH);  Surgeon: KALPANA Amos MD;  Location: [facility];  Service: Cardiac Surgery    OH CORONARY ARTERY BYP W/VEIN & ARTERY GRAFT 3 VEIN N/A 6/20/2024    Procedure: CORONARY ARTERY BYPASS GRAFT (CABG) X3 VESSELS, LIMA - LAD, LEFT LEG EVH/SVG - PDA AND OM1, W/ DEDRICK;  Surgeon: KALPANA Amos MD;  Location: [facility];  Service:  Cardiac Surgery    AZ LAPS SURG CHOLECYSTECTOMY W/CHOLANGIOGRAPHY N/A 09/08/2016    Procedure: CHOLECYSTECTOMY LAPAROSCOPIC with intra-op cholangiogram ;  Surgeon: Prasanth Verdin MD;  Location: [facility];  Service: General        06/21/24 0930   PT Last Visit   PT Visit Date 06/21/24   Note Type   Note type Evaluation   Pain Assessment   Pain Assessment Tool 0-10   Pain Score 1   Pain Location/Orientation Location: Chest;Location: Incision   Hospital Pain Intervention(s) Repositioned;Ambulation/increased activity;Splinting   Restrictions/Precautions   Weight Bearing Precautions Per Order No   Other Precautions Cardiac/sternal;Chair Alarm;Bed Alarm;Multiple lines;Telemetry;O2;Fall Risk;Pain  (15L Midflow NC; LUE A-line; Cortés; CT x3)   Home Living   Type of Home House   Home Layout One level;Stairs to enter with rails;Performs ADLs on one level;Able to live on main level with bedroom/bathroom  (13 JUJU)   Bathroom Shower/Tub Tub/shower unit   Bathroom Toilet Standard   Bathroom Equipment Grab bars in shower;Grab bars around toilet   Bathroom Accessibility Accessible   Home Equipment Cane;Walker;Wheelchair-manual  (was not using AD PTA)   Additional Comments (S)  Pt reports living in 1SH w/ 13 JUJU   Prior Function   Level of [place] Independent with ADLs;Independent with functional mobility;Independent with IADLS   Lives With Spouse   Receives Help From Family   IADLs Independent with driving;Independent with meal prep;Independent with medication management   Falls in the last 6 months 0   Vocational Retired   General   Family/Caregiver Present No   Cognition   Overall Cognitive Status WFL   Arousal/Participation Alert   Orientation Level Oriented X4   Memory Decreased recall of precautions   Following Commands Follows one step commands without difficulty   Comments Pt pleasant and cooperative. VC for safety and task completion   RLE Assessment   RLE Assessment WFL   LLE Assessment   LLE Assessment WFL   Bed  Mobility   Supine to Sit Unable to assess   Sit to Supine Unable to assess   Additional Comments Pt received sitting OOB in chair. Pt returned to chair post session   Transfers   Sit to Stand 3  Moderate assistance   Additional items Assist x 1;Increased time required;Verbal cues   Stand to Sit 3  Moderate assistance   Additional items Assist x 1;Increased time required;Verbal cues   Additional Comments Transfers w/ RW. Pt reporting initial dizziness + nausea, improved with time   Ambulation/Elevation   Gait pattern L Knee Elke;Improper Weight shift;Forward Flexion;Decreased foot clearance;Decreased L stance;Shuffling;Short stride;Excessively slow;Decreased heel strike   Gait Assistance 3  Moderate assist   Additional items Assist x 1;Verbal cues  (+ chair follow + SBA for line management)   Assistive Device Rolling walker   Distance 80'   Balance   Static Sitting Fair   Dynamic Sitting Fair -   Static Standing Poor +   Dynamic Standing Poor   Ambulatory Poor   Endurance Deficit   Endurance Deficit Yes   Endurance Deficit Description fatigue, weakness, DUNNE   Activity Tolerance   Activity Tolerance Patient limited by pain;Patient limited by fatigue   Medical Staff Made Aware LISA bella 2/2 medical complexity   Nurse Made Aware RN cleared   Assessment   Prognosis Good   Problem List Decreased strength;Decreased range of motion;Decreased endurance;Impaired balance;Decreased mobility;Decreased safety awareness;Pain;Decreased skin integrity   Assessment Pt is a 78 y.o. male seen for PT evaluation s/p admit to [facility] on 6/20/2024. Pt was admitted with a primary dx of: Coronary artery disease of native artery of native heart with stable angina pectoris and is s/p CABG.  PT now consulted for assessment of mobility and d/c needs. Pt with Up as tolerated orders. Pts current clinical presentation is Unstable/ Unpredictable (high complexity) due to Ongoing medical management for primary dx, Increased  reliance on more restrictive AD compared to baseline, Decreased activity tolerance compared to baseline, Fall risk, Increased assistance needed from caregiver at current time, Ongoing telemetry monitoring, Increased O2 via NC from pts baseline, Continuous pulse oximetry monitoring , s/p surgical intervention. Prior to admission, pt was living in 1SH and was IND in mobility w/o AD. Upon evaluation, pt currently is requiring MOD A for transfers; MOD A for ambulation 80 ft w/ RW; Pt presents at PT eval functioning below baseline and currently w/ overall mobility deficits 2* to: BLE weakness, decreased ROM, impaired balance, decreased endurance, gait deviations, pain, decreased activity tolerance compared to baseline, decreased functional mobility tolerance compared to baseline, decreased safety awareness, fall risk, SOB upon exertion. Pt currently at a fall risk 2* to impairments listed above.  Pt will continue to benefit from skilled acute PT interventions to address stated impairments; to maximize functional mobility; for ongoing pt/ family training; and DME needs. At conclusion of PT session pt returned back in chair, chair alarm engaged, and all needs in reach with phone and call bell within reach. Pt denies any further questions at this time. The patient's AM-PAC Basic Mobility Inpatient Short Form Raw Score is 13. A Raw score of less than or equal to 16 suggests the patient may benefit from discharge to post-acute rehabilitation services. Please also refer to the recommendation of the Physical Therapist for safe discharge planning. Recommend acute rehab upon hospital D/C.   Barriers to Discharge (S)  Inaccessible home environment  (13 JUJU)   Goals   Patient Goals to go home   STG Expiration Date 07/05/24   Short Term Goal #1 STG 1. Pt will be able to perform bed mobility tasks with mod I in order to improve overall functional mobility and assist in safe d/c. STG 2. Pt with sit EOB for at least 25 minutes at mod I  level in order to strengthen abdominal musculature and assist in future transfers/ ambulation. STG 3. Pt will be able to perform functional transfer with mod I in order to improve overall functional mobility and assist in safe d/c. STG 4. Pt will be able to ambulate at least 100 feet with least restrictive device with SUP A in order to improve overall functional mobility and assist in safe d/c. STG 5. Pt will improve sitting/standing static/dynamic balance 1/2 grade in order to improve functional mobility and assist in safe d/c. STG 6. Pt will improve LE strength by 1/2 grade in order to improve functional mobility and assist in safe d/c. STG 7. Pt will be able to negotiate at least 13 stairs with least restrictive device with SUP A in order to improve overall functional mobility and assist in safe d/c.   PT Treatment Day 0   Plan   Treatment/Interventions Functional transfer training;LE strengthening/ROM;Elevations;Therapeutic exercise;Endurance training;Cognitive reorientation;Patient/family training;Equipment eval/education;Bed mobility;Gait training;Spoke to nursing;Spoke to case management;OT   PT Frequency 4-6x/wk   Discharge Recommendation   Rehab Resource Intensity Level, PT I (Maximum Resource Intensity)  (pending progress)   Equipment Recommended Walker  (pt reports owning)   AM-PAC Basic Mobility Inpatient   Turning in Flat Bed Without Bedrails 3   Lying on Back to Sitting on Edge of Flat Bed Without Bedrails 2   Moving Bed to Chair 2   Standing Up From Chair Using Arms 2   Walk in Room 2   Climb 3-5 Stairs With Railing 2   Basic Mobility Inpatient Raw Score 13   Basic Mobility Standardized Score 33.99   [facility] Highest Level Of Mobility   -[facility] Goal 4: Move to chair/commode   -[facility] Achieved 7: Walk 25 feet or more   Modified Kimmie Scale   Modified Kimmie Scale 4   End of Consult   Patient Position at End of Consult Bed/Chair alarm activated;All needs within reach;Bedside chair     Jasmine Kang  PT, DPT

## 2024-06-22 LAB
ANION GAP SERPL CALCULATED.3IONS-SCNC: 7 MMOL/L (ref 4–13)
ANION GAP SERPL CALCULATED.3IONS-SCNC: 7 MMOL/L (ref 4–13)
ATRIAL RATE: 70 BPM
ATRIAL RATE: 82 BPM
BUN SERPL-MCNC: 30 MG/DL (ref 5–25)
BUN SERPL-MCNC: 33 MG/DL (ref 5–25)
CALCIUM SERPL-MCNC: 7.9 MG/DL (ref 8.4–10.2)
CALCIUM SERPL-MCNC: 8 MG/DL (ref 8.4–10.2)
CHLORIDE SERPL-SCNC: 101 MMOL/L (ref 96–108)
CHLORIDE SERPL-SCNC: 103 MMOL/L (ref 96–108)
CO2 SERPL-SCNC: 22 MMOL/L (ref 21–32)
CO2 SERPL-SCNC: 24 MMOL/L (ref 21–32)
CREAT SERPL-MCNC: 1.25 MG/DL (ref 0.6–1.3)
CREAT SERPL-MCNC: 1.29 MG/DL (ref 0.6–1.3)
ERYTHROCYTE [DISTWIDTH] IN BLOOD BY AUTOMATED COUNT: 12.1 % (ref 11.6–15.1)
GFR SERPL CREATININE-BSD FRML MDRD: 52 ML/MIN/1.73SQ M
GFR SERPL CREATININE-BSD FRML MDRD: 54 ML/MIN/1.73SQ M
GLUCOSE SERPL-MCNC: 129 MG/DL (ref 65–140)
GLUCOSE SERPL-MCNC: 148 MG/DL (ref 65–140)
GLUCOSE SERPL-MCNC: 152 MG/DL (ref 65–140)
GLUCOSE SERPL-MCNC: 155 MG/DL (ref 65–140)
GLUCOSE SERPL-MCNC: 164 MG/DL (ref 65–140)
GLUCOSE SERPL-MCNC: 177 MG/DL (ref 65–140)
GLUCOSE SERPL-MCNC: 215 MG/DL (ref 65–140)
HCT VFR BLD AUTO: 32.1 % (ref 36.5–49.3)
HGB BLD-MCNC: 11.2 G/DL (ref 12–17)
MAGNESIUM SERPL-MCNC: 2.1 MG/DL (ref 1.9–2.7)
MCH RBC QN AUTO: 33.4 PG (ref 26.8–34.3)
MCHC RBC AUTO-ENTMCNC: 34.9 G/DL (ref 31.4–37.4)
MCV RBC AUTO: 96 FL (ref 82–98)
PLATELET # BLD AUTO: 158 THOUSANDS/UL (ref 149–390)
PMV BLD AUTO: 9.1 FL (ref 8.9–12.7)
POTASSIUM SERPL-SCNC: 4.3 MMOL/L (ref 3.5–5.3)
POTASSIUM SERPL-SCNC: 4.4 MMOL/L (ref 3.5–5.3)
QRS AXIS: -12 DEGREES
QRS AXIS: -25 DEGREES
QRSD INTERVAL: 78 MS
QRSD INTERVAL: 80 MS
QT INTERVAL: 384 MS
QT INTERVAL: 390 MS
QTC INTERVAL: 427 MS
QTC INTERVAL: 432 MS
RBC # BLD AUTO: 3.35 MILLION/UL (ref 3.88–5.62)
SODIUM SERPL-SCNC: 132 MMOL/L (ref 135–147)
SODIUM SERPL-SCNC: 132 MMOL/L (ref 135–147)
T WAVE AXIS: 3 DEGREES
T WAVE AXIS: 43 DEGREES
VENTRICULAR RATE: 72 BPM
VENTRICULAR RATE: 76 BPM
WBC # BLD AUTO: 24.7 THOUSAND/UL (ref 4.31–10.16)

## 2024-06-22 PROCEDURE — NC001 PR NO CHARGE: Performed by: PHYSICIAN ASSISTANT

## 2024-06-22 PROCEDURE — 80048 BASIC METABOLIC PNL TOTAL CA: CPT | Performed by: PHYSICIAN ASSISTANT

## 2024-06-22 PROCEDURE — 80048 BASIC METABOLIC PNL TOTAL CA: CPT | Performed by: NURSE PRACTITIONER

## 2024-06-22 PROCEDURE — 94760 N-INVAS EAR/PLS OXIMETRY 1: CPT

## 2024-06-22 PROCEDURE — 99024 POSTOP FOLLOW-UP VISIT: CPT | Performed by: THORACIC SURGERY (CARDIOTHORACIC VASCULAR SURGERY)

## 2024-06-22 PROCEDURE — 82948 REAGENT STRIP/BLOOD GLUCOSE: CPT

## 2024-06-22 PROCEDURE — 83735 ASSAY OF MAGNESIUM: CPT | Performed by: NURSE PRACTITIONER

## 2024-06-22 PROCEDURE — 99233 SBSQ HOSP IP/OBS HIGH 50: CPT | Performed by: ANESTHESIOLOGY

## 2024-06-22 PROCEDURE — 85027 COMPLETE CBC AUTOMATED: CPT | Performed by: NURSE PRACTITIONER

## 2024-06-22 PROCEDURE — 93010 ELECTROCARDIOGRAM REPORT: CPT | Performed by: INTERNAL MEDICINE

## 2024-06-22 PROCEDURE — 94664 DEMO&/EVAL PT USE INHALER: CPT

## 2024-06-22 RX ORDER — BUMETANIDE 0.25 MG/ML
2 INJECTION INTRAMUSCULAR; INTRAVENOUS 3 TIMES DAILY
Status: DISCONTINUED | OUTPATIENT
Start: 2024-06-22 | End: 2024-06-23

## 2024-06-22 RX ORDER — HYDRALAZINE HYDROCHLORIDE 20 MG/ML
5 INJECTION INTRAMUSCULAR; INTRAVENOUS ONCE
Status: COMPLETED | OUTPATIENT
Start: 2024-06-22 | End: 2024-06-22

## 2024-06-22 RX ORDER — METOLAZONE 10 MG/1
10 TABLET ORAL ONCE
Status: COMPLETED | OUTPATIENT
Start: 2024-06-22 | End: 2024-06-22

## 2024-06-22 RX ORDER — METOPROLOL TARTRATE 50 MG/1
50 TABLET, FILM COATED ORAL EVERY 12 HOURS SCHEDULED
Status: DISCONTINUED | OUTPATIENT
Start: 2024-06-22 | End: 2024-06-25

## 2024-06-22 RX ORDER — HYDRALAZINE HYDROCHLORIDE 20 MG/ML
5 INJECTION INTRAMUSCULAR; INTRAVENOUS EVERY 6 HOURS PRN
Status: DISCONTINUED | OUTPATIENT
Start: 2024-06-22 | End: 2024-07-09 | Stop reason: HOSPADM

## 2024-06-22 RX ADMIN — CHLORHEXIDINE GLUCONATE 0.12% ORAL RINSE 15 ML: 1.2 LIQUID ORAL at 17:31

## 2024-06-22 RX ADMIN — AMLODIPINE BESYLATE 10 MG: 10 TABLET ORAL at 08:19

## 2024-06-22 RX ADMIN — OXYCODONE HYDROCHLORIDE 5 MG: 5 TABLET ORAL at 04:33

## 2024-06-22 RX ADMIN — METHOCARBAMOL 500 MG: 500 TABLET ORAL at 17:31

## 2024-06-22 RX ADMIN — PANTOPRAZOLE SODIUM 40 MG: 40 TABLET, DELAYED RELEASE ORAL at 05:50

## 2024-06-22 RX ADMIN — INSULIN LISPRO 1 UNITS: 100 INJECTION, SOLUTION INTRAVENOUS; SUBCUTANEOUS at 16:02

## 2024-06-22 RX ADMIN — NICARDIPINE HYDROCHLORIDE 10 MG/HR: 2.5 INJECTION, SOLUTION INTRAVENOUS at 02:36

## 2024-06-22 RX ADMIN — NICARDIPINE HYDROCHLORIDE 10 MG/HR: 2.5 INJECTION, SOLUTION INTRAVENOUS at 05:03

## 2024-06-22 RX ADMIN — INSULIN LISPRO 2 UNITS: 100 INJECTION, SOLUTION INTRAVENOUS; SUBCUTANEOUS at 10:58

## 2024-06-22 RX ADMIN — CHLORHEXIDINE GLUCONATE 0.12% ORAL RINSE 15 ML: 1.2 LIQUID ORAL at 08:18

## 2024-06-22 RX ADMIN — METHOCARBAMOL 500 MG: 500 TABLET ORAL at 11:28

## 2024-06-22 RX ADMIN — BUMETANIDE 2 MG: 0.25 INJECTION INTRAMUSCULAR; INTRAVENOUS at 15:57

## 2024-06-22 RX ADMIN — NICARDIPINE HYDROCHLORIDE 7.5 MG/HR: 2.5 INJECTION, SOLUTION INTRAVENOUS at 06:14

## 2024-06-22 RX ADMIN — Medication 12.5 MG: at 10:10

## 2024-06-22 RX ADMIN — DOCUSATE SODIUM 100 MG: 100 CAPSULE, LIQUID FILLED ORAL at 17:31

## 2024-06-22 RX ADMIN — HYDRALAZINE HYDROCHLORIDE 5 MG: 20 INJECTION INTRAMUSCULAR; INTRAVENOUS at 08:54

## 2024-06-22 RX ADMIN — OXYCODONE HYDROCHLORIDE 5 MG: 5 TABLET ORAL at 14:06

## 2024-06-22 RX ADMIN — DOCUSATE SODIUM 100 MG: 100 CAPSULE, LIQUID FILLED ORAL at 08:19

## 2024-06-22 RX ADMIN — HYDRALAZINE HYDROCHLORIDE 10 MG: 20 INJECTION INTRAMUSCULAR; INTRAVENOUS at 13:20

## 2024-06-22 RX ADMIN — INSULIN LISPRO 1 UNITS: 100 INJECTION, SOLUTION INTRAVENOUS; SUBCUTANEOUS at 06:05

## 2024-06-22 RX ADMIN — POLYETHYLENE GLYCOL 3350 17 G: 17 POWDER, FOR SOLUTION ORAL at 08:20

## 2024-06-22 RX ADMIN — METHOCARBAMOL 500 MG: 500 TABLET ORAL at 05:50

## 2024-06-22 RX ADMIN — Medication 12.5 MG: at 08:19

## 2024-06-22 RX ADMIN — AMIODARONE HYDROCHLORIDE 200 MG: 200 TABLET ORAL at 13:53

## 2024-06-22 RX ADMIN — ACETAMINOPHEN 975 MG: 325 TABLET, FILM COATED ORAL at 21:00

## 2024-06-22 RX ADMIN — AMIODARONE HYDROCHLORIDE 200 MG: 200 TABLET ORAL at 22:02

## 2024-06-22 RX ADMIN — MUPIROCIN 1 APPLICATION: 20 OINTMENT TOPICAL at 21:19

## 2024-06-22 RX ADMIN — METOPROLOL TARTRATE 50 MG: 50 TABLET, FILM COATED ORAL at 22:00

## 2024-06-22 RX ADMIN — ACETAMINOPHEN 975 MG: 325 TABLET, FILM COATED ORAL at 13:53

## 2024-06-22 RX ADMIN — ACETAMINOPHEN 975 MG: 325 TABLET, FILM COATED ORAL at 08:21

## 2024-06-22 RX ADMIN — ONDANSETRON 4 MG: 2 INJECTION INTRAMUSCULAR; INTRAVENOUS at 06:14

## 2024-06-22 RX ADMIN — POTASSIUM CHLORIDE 20 MEQ: 1500 TABLET, EXTENDED RELEASE ORAL at 17:31

## 2024-06-22 RX ADMIN — BUMETANIDE 2 MG: 0.25 INJECTION INTRAMUSCULAR; INTRAVENOUS at 08:18

## 2024-06-22 RX ADMIN — ASPIRIN 81 MG: 81 TABLET, COATED ORAL at 08:21

## 2024-06-22 RX ADMIN — CLOPIDOGREL BISULFATE 75 MG: 75 TABLET ORAL at 08:32

## 2024-06-22 RX ADMIN — AMIODARONE HYDROCHLORIDE 200 MG: 200 TABLET ORAL at 05:50

## 2024-06-22 RX ADMIN — FONDAPARINUX SODIUM 2.5 MG: 2.5 INJECTION, SOLUTION SUBCUTANEOUS at 08:21

## 2024-06-22 RX ADMIN — POTASSIUM CHLORIDE 20 MEQ: 1500 TABLET, EXTENDED RELEASE ORAL at 08:18

## 2024-06-22 RX ADMIN — ATORVASTATIN CALCIUM 80 MG: 80 TABLET, FILM COATED ORAL at 15:56

## 2024-06-22 RX ADMIN — MUPIROCIN 1 APPLICATION: 20 OINTMENT TOPICAL at 08:21

## 2024-06-22 RX ADMIN — METOLAZONE 10 MG: 10 TABLET ORAL at 11:28

## 2024-06-22 RX ADMIN — BUMETANIDE 2 MG: 0.25 INJECTION INTRAMUSCULAR; INTRAVENOUS at 21:17

## 2024-06-22 NOTE — PROGRESS NOTES
06/22/24    Procedure: Epicardial Pacing Wire removal    Shane KALPANA Chau Sr. was returned to bed and informed of mandatory one hour post-procedure bed rest.  The assigned nurse was notified.  Epicardial pacing wires removed in routine fashion, without incident.  The patient tolerated the procedure well.  Vital signs ordered  q 15 minutes for one hour, as per protocol.    Procedure: Chest tube removal    Chest tubes removed in routine fashion without incident.  Shanedevante Chau Sr. tolerated the procedure well.  Nurse notified.    SIGNATURE: Moraima Alvarez PA-C  DATE: June 22, 2024  TIME: 2:28 PM

## 2024-06-22 NOTE — RESPIRATORY THERAPY NOTE
Resp Care   06/22/24 0842   Respiratory Assessment   Assessment Type Assess only   General Appearance Alert   Respiratory Pattern Symmetrical;Spontaneous   Chest Assessment Chest expansion symmetrical   Bilateral Breath Sounds Diminished;Clear   Resp Comments pt found on 10L MFNC, spo2 is 91%, bs are diminished, will cont to monitor per resp protocol.   Oxygen Therapy/Pulse Ox   O2 Device Mid flow nasal cannula   Nasal Cannula O2 Flow Rate (L/min) 10 L/min   Calculated FIO2 (%) - Nasal Cannula 60   SpO2 Activity At Rest   $ Pulse Oximetry Spot Check Charge Completed

## 2024-06-22 NOTE — PROGRESS NOTES
Progress Note - Cardiothoracic Surgery   Shane Chau . 78 y.o. male MRN: [id number]  Unit/Bed#: [facility] 417-01 Encounter: [id number]      POD # 2 s/p CABG    Pt seen/examined.  Interval history and data reviewed with critical care team.  Pt doing well.  No specific complaints.    cardene      Medications:   Scheduled Meds:  Current Facility-Administered Medications   Medication Dose Route Frequency Provider Last Rate    acetaminophen  975 mg Oral Q6H While awake Mercedes Baker PA-C      amiodarone  200 mg Oral Q8H [facility] Mercedes Baker PA-C      amLODIPine  10 mg Oral Daily SANTHOSH Davis      aspirin  81 mg Oral Daily SANTHOSH Davis      atorvastatin  80 mg Oral Daily With Dinner Mercedes Baker PA-C      bisacodyl  10 mg Rectal Daily PRN Mercedes Baker PA-C      bumetanide  2 mg Intravenous TID Moraima Alvarez PA-C      chlorhexidine  15 mL Mouth/Throat BID SANTHOSH Davis      clopidogrel  75 mg Oral Daily Mercedes Baker PA-C      docusate sodium  100 mg Oral BID SANTHOSH Davis      fondaparinux  2.5 mg Subcutaneous Daily Mercedes Baker PA-C      insulin lispro  1-5 Units Subcutaneous HS SANTHOSH Davis      insulin lispro  1-6 Units Subcutaneous TID AC SANTHOSH Davis      lidocaine  2 patch Topical Daily Dianna Kinney PA-C      methocarbamol  500 mg Oral Q6H [facility] Dianna Kinney PA-C      metoprolol tartrate  25 mg Oral Q12H [facility] Moraima Alvarez PA-C      mupirocin  1 Application Nasal Q12H [facility] Mercedes Baker PA-C      niCARdipine  2.5-15 mg/hr Intravenous Titrated Dianna Kinney PA-C 2.5 mg/hr (06/22/24 1011)    ondansetron  4 mg Intravenous Q6H PRN Mercedes Baker PA-C      oxyCODONE  2.5 mg Oral Q4H PRN Mercedes Baker PA-C      Or    oxyCODONE  5 mg Oral Q4H PRN Mercedes Baker PA-C      pantoprazole  40 mg Oral Early Morning Mercedes Baker PA-C      polyethylene glycol  17 g Oral Daily Mercedes Baker PA-C       "potassium chloride  20 mEq Oral BID SANTHOSH Davis      temazepam  15 mg Oral HS PRN SANTHOSH Davis       Continuous Infusions:niCARdipine, 2.5-15 mg/hr, Last Rate: 2.5 mg/hr (06/22/24 1011)      PRN Meds:.  bisacodyl    ondansetron    oxyCODONE **OR** oxyCODONE    temazepam    Vitals: Blood pressure (!) 124/49, pulse 78, temperature 97.5 °F (36.4 °C), temperature source Oral, resp. rate (!) 25, height 5' 5\" (1.651 m), weight 91.3 kg (201 lb 4.5 oz), SpO2 95%.,Body mass index is 33.49 kg/m².  I/O last 24 hours:  In: 3033.3 [P.O.:960; I.V.:2023.3; IV Piggyback:50]  Out: 2980 [Urine:2600; Chest Tube:380]  Invasive Devices       Central Venous Catheter Line  Duration             CVC Central Lines 06/20/24 2 days              Peripheral Intravenous Line  Duration             Peripheral IV 06/20/24 Left Hand 2 days              Arterial Line  Duration             Arterial Line 06/20/24 Left Radial 2 days              Line  Duration             Pacer Wires 1 day    Pacer Wires 1 day              Drain  Duration             Chest Tube 1 Left Pleural 32 Fr. 2 days    Chest Tube 2 Mediastinal;Posterior 32 Fr. 2 days    Urethral Catheter Latex;Straight-tip 16 Fr. 2 days    Chest Tube 3 Mediastinal;Anterior 32 Fr. 1 day                      Lab, Imaging and other studies:   Results from last 7 days   Lab Units 06/22/24  0423 06/21/24  0349 06/20/24  1942 06/20/24  1310 06/20/24  1306   WBC Thousand/uL 24.70* 17.41*  --   --   --    HEMOGLOBIN g/dL 11.2* 12.5 13.1  --  12.2   I STAT HEMOGLOBIN   --   --   --    < >  --    HEMATOCRIT % 32.1* 35.3* 37.6  --  35.0*   HEMATOCRIT, ISTAT   --   --   --    < >  --    PLATELETS Thousands/uL 158 145*  --   --  111*    < > = values in this interval not displayed.     Results from last 7 days   Lab Units 06/22/24 0423 06/21/24  1429 06/21/24  0335 06/20/24  1647 06/20/24  1310   POTASSIUM mmol/L 4.4 4.3 4.6   < >  --    CHLORIDE mmol/L 103 105 110*  --   --    CO2 " mmol/L 22 20* 18*  --   --    CO2, I-STAT mmol/L  --   --   --   --  21   BUN mg/dL 30* 24 20  --   --    CREATININE mg/dL 1.25 1.29 1.29  --   --    GLUCOSE, ISTAT mg/dl  --   --   --   --  140   CALCIUM mg/dL 8.0* 8.0* 7.7*  --   --     < > = values in this interval not displayed.     Results from last 7 days   Lab Units 06/20/24  1306   INR  1.37*   PTT seconds 29     Recent Labs     06/21/24  0335   PHART 7.310*   [id number] 17.1*   PO2ART 67.9*   [id number] 34.8*   BEART -8.2           Plan:    Remove chest tubes  Remove wires if tolerates increased beta blocker dose  Ambulate.  Incentive spirometry.  Wean supplemental oxygen as tolerated  Bumex for Diuresis.  PO ASA/Statin/B blocker.  Close monitoring of UOP and electrolytes.      SIGNATURE: Favian Yoon DO  DATE: June 22, 2024  TIME: 10:13 AM

## 2024-06-22 NOTE — PROGRESS NOTES
[facility]  Progress Note: Critical Care   Date of Service: 6/22/2024  Hospital Day: 2  Name: Shane Chau SrDenise I  MRN: [id number]  Unit/Bed#: [facility] 417-01      Assessment & Plan     Impressions:  MVCAD s/p CABG x 3 (LIMA > LAD, SVG > PDA, SVG > OM1) (6/20/24)   Hypertension   Hyperlipidemia  Acute respiratory insufficiency   BARBARA  TIA's on Plavix   S/p open AAA repair (2011)   PFO  Insomnia    Neuro:     Cardiac Surgery Pain Control: ATC tylenol and PRN oxycodone 2.5/5mg  Delirium precautions  Regulate sleep/wake cycle  CAM-ICU daily  Trend neuro exam      CV:   Cardiac Surgery Hemodynamic Monitoring: MAP goal >65 SBP < 130 Continue central line due to vasoactive medications Continue arterial line for blood pressure monitoring and/or frequent blood gas draws  Follow rhythm on telemetry  Critical Care Infusions : Cardene 7.5 mg/hour  Beta Blocker Plan: Consider increasing lopressor to 25 mg BID  Continue norvasc 10mg daily  Epicardial pacing wire plan : No longer needed, discontinue  Post op cardiac surgery medications:   ASA 81 mg QD  Statin: Lipitor 80 mg qHS  Amiodarone 200 mg PO q8 hours       Lung:   Expected post operative oxygen requirement of 8 liters/min via MFNC  Wean supplemental oxygen as tolerated for saturation > 90%  Chest tube output remains persistently high; Continue chest tubes to suction today    GI:   Continue PPI for stress ulcer prophylaxis  Continue bowel regimen  Trend abdominal exam and bowel function    FEN:   Diuresis Plan: Bumex 2 mg IV BID  Nutrition plan: as tolerated  Replenish electrolytes with goals: K >4.0, Mag >2.0, and Phos >3.0    :   [place] Plan: Continue for accurate I/O monitoring for 48 hours  Trend UOP and BUN/creat  Strict I and O     ID:   Trend temps and WBC count  Maintain normothermia    Heme:   Trend hgb and plts    Endo:       MSK/Skin:  Mobility goal:   PT/OT consult as medically appropriate   Frequent turning and pressure  off-loading  Local wound care as needed    Disposition:  Stepdown Level 1    ICU Core Measures     A: Assess, Prevent, and Manage Pain Has pain been assessed? Yes  Need for changes to pain regimen? No   B: Both SAT/SAT  N/A   C: Choice of Sedation RASS Goal: 0 Alert and Calm  Need for changes to sedation or analgesia regimen? NA   D: Delirium CAM-ICU: Negative   E: Early Mobility  Plan for early mobility? Yes   F: Family Engagement Plan for family engagement today? Yes       Review of Invasive Devices:    [place] Plan: Continue for accurate I/O monitoring for 48 hours  Central access plan: Medications requiring central line  Lety Plan: Keep arterial line for hemodynamic monitoring    Prophylaxis:  VTE VTE covered by:  fondaparinux, Subcutaneous, 2.5 mg at 06/21/24 0834       Stress Ulcer  covered bypantoprazole (PROTONIX) EC tablet 40 mg [[id number]]          Significant 24hr Events      24 Hour Events/HPI: POD # 2 s/p CABG x 3. Yesterday Norvasc increased to 10mg and started on metoprolol due to hypertension on cardene.  Minimally responsive to lasix transitioned to bumex 2mg BID with improvement in UOP.  Remains on MFNC 8LPM  Critical Care Infusions : Cardene 7.5 mg/hour   Subjective        Objective     Medications:  Scheduled Continuous   acetaminophen, 975 mg, Q6H While awake  amiodarone, 200 mg, Q8H KRISTI  amLODIPine, 10 mg, Daily  aspirin, 81 mg, Daily  atorvastatin, 80 mg, Daily With Dinner  bumetanide, 2 mg, BID  chlorhexidine, 15 mL, BID  clopidogrel, 75 mg, Daily  docusate sodium, 100 mg, BID  fondaparinux, 2.5 mg, Daily  insulin lispro, 1-5 Units, HS  insulin lispro, 1-6 Units, TID AC  lidocaine, 2 patch, Daily  methocarbamol, 500 mg, Q6H KRISTI  metoprolol tartrate, 12.5 mg, Q12H KRISTI  mupirocin, 1 Application, Q12H KRISTI  pantoprazole, 40 mg, Early Morning  polyethylene glycol, 17 g, Daily  potassium chloride, 20 mEq, BID      niCARdipine, 2.5-15 mg/hr, Last Rate: 7.5 mg/hr (06/22/24 0614)         Vitals:  Invasive Monitoring       Most Recent Min/Max in 24hrs   Temp 97.9 °F (36.6 °C) Temp  Min: 97.9 °F (36.6 °C)  Max: 97.9 °F (36.6 °C)   Pulse 75 Pulse  Min: 66  Max: 79   Resp (!) 27 Resp  Min: 22  Max: 32   /58 BP  Min: 119/55  Max: 144/67   O2 Sat 92 % SpO2  Min: 91 %  Max: 97 %   O2 Device: Mid flow nasal cannula  Nasal Cannula O2 Flow Rate (L/min): 8 L/min Arterial Line  Vista /48  Arterial Line BP  Min: 128/46  Max: 144/53   MAP 71 mmHg  Arterial Line MAP (mmHg)  Min: 69 mmHg  Max: 78 mmHg      I/O Chest tube output (if present)     Intake/Output Summary (Last 24 hours) at 6/22/2024 0708  Last data filed at 6/22/2024 0600  Gross per 24 hour   Intake 2655.18 ml   Output 2555 ml   Net 100.18 ml     UOP: 1cc/kg/hour    BM: 0 in the last 24 hours  Weight change: 2.9 kg (6 lb 6.3 oz)     Mediastinal tubes:  60 mL/8hr  160 mL/24hr   Pleural tubes: 40 mL/8hr  160 mL/24hr        Physical Exam   Physical Exam  Skin:     General: Skin is warm and dry.   HENT:      Head: Normocephalic and atraumatic.      Mouth/Throat:      Mouth: Mucous membranes are moist.   Cardiovascular:      Rate and Rhythm: Normal rate.   Musculoskeletal:      Right lower leg: Edema present.      Left lower leg: Edema present.   Abdominal: General: There is no distension.      Palpations: Abdomen is soft.      Tenderness: There is no abdominal tenderness. There is no guarding.   Constitutional:       General: He is not in acute distress.     Appearance: He is well-developed and well-nourished. He is not ill-appearing.   Pulmonary:      Effort: Pulmonary effort is normal. No respiratory distress.      Breath sounds: No wheezing, rhonchi or rales.      Comments: Diminished b/l breath sounds   Neurological:      Mental Status: He is alert. Mental status is at baseline.   Genitourinary/Anorectal:  Cortés present.        Diagnostic Studies               Labs:  CBC    Recent Labs     06/21/24  0349 06/22/24  0423   WBC 17.41* 24.70*   HGB 12.5  11.2*   HCT 35.3* 32.1*   * 158     BMP    Recent Labs     06/21/24  1429 06/22/24  0423   SODIUM 136 132*   K 4.3 4.4    103   CO2 20* 22   AGAP 11 7   BUN 24 30*   CREATININE 1.29 1.25   CALCIUM 8.0* 8.0*        Baseline Creat: 0.9-1.1   Coags    Recent Labs     06/20/24  1306   INR 1.37*   PTT 29              Additional Electrolytes  Recent Labs     06/20/24  1202 06/20/24  1310 06/21/24  0409 06/22/24  0423   MG  --   --  2.3 2.1   CAIONIZED 1.27 1.24  --   --           Blood Gas    Recent Labs     06/21/24  0335   PHART 7.310*   [id number] 34.8*   PO2ART 67.9*   BUG5ARN 17.1*   BEART -8.2   SOURCE Line, Arterial     Recent Labs     06/21/24  0335   SOURCE Line, Arterial    LFTs  No recent results    Infectious    No recent results     No recent results Glucose  Recent Labs     06/20/24  1306 06/21/24  0335 06/21/24  1429 06/22/24  0423   GLUC 150* 169* 200* 177*        Collaborative bedside rounds performed with cardiac surgery attending, critical care attending and bedside RN.     Moraima Alvarez PA-C

## 2024-06-23 LAB
ABO GROUP BLD BPU: NORMAL
ANION GAP SERPL CALCULATED.3IONS-SCNC: 8 MMOL/L (ref 4–13)
ATRIAL RATE: 141 BPM
BPU ID: NORMAL
BUN SERPL-MCNC: 39 MG/DL (ref 5–25)
CALCIUM SERPL-MCNC: 8 MG/DL (ref 8.4–10.2)
CHLORIDE SERPL-SCNC: 95 MMOL/L (ref 96–108)
CO2 SERPL-SCNC: 31 MMOL/L (ref 21–32)
CREAT SERPL-MCNC: 1.6 MG/DL (ref 0.6–1.3)
CROSSMATCH: NORMAL
ERYTHROCYTE [DISTWIDTH] IN BLOOD BY AUTOMATED COUNT: 12.4 % (ref 11.6–15.1)
GFR SERPL CREATININE-BSD FRML MDRD: 40 ML/MIN/1.73SQ M
GLUCOSE SERPL-MCNC: 108 MG/DL (ref 65–140)
GLUCOSE SERPL-MCNC: 117 MG/DL (ref 65–140)
GLUCOSE SERPL-MCNC: 127 MG/DL (ref 65–140)
GLUCOSE SERPL-MCNC: 216 MG/DL (ref 65–140)
HCT VFR BLD AUTO: 30.1 % (ref 36.5–49.3)
HGB BLD-MCNC: 10.4 G/DL (ref 12–17)
MAGNESIUM SERPL-MCNC: 2 MG/DL (ref 1.9–2.7)
MCH RBC QN AUTO: 33.1 PG (ref 26.8–34.3)
MCHC RBC AUTO-ENTMCNC: 34.6 G/DL (ref 31.4–37.4)
MCV RBC AUTO: 96 FL (ref 82–98)
P AXIS: 48 DEGREES
PLATELET # BLD AUTO: 130 THOUSANDS/UL (ref 149–390)
PMV BLD AUTO: 9 FL (ref 8.9–12.7)
POTASSIUM SERPL-SCNC: 3.6 MMOL/L (ref 3.5–5.3)
PR INTERVAL: 170 MS
QRS AXIS: -23 DEGREES
QRSD INTERVAL: 84 MS
QT INTERVAL: 424 MS
QTC INTERVAL: 498 MS
RBC # BLD AUTO: 3.14 MILLION/UL (ref 3.88–5.62)
SODIUM SERPL-SCNC: 134 MMOL/L (ref 135–147)
T WAVE AXIS: 98 DEGREES
UNIT DISPENSE STATUS: NORMAL
UNIT PRODUCT CODE: NORMAL
UNIT PRODUCT VOLUME: 300 ML
UNIT PRODUCT VOLUME: 300 ML
UNIT PRODUCT VOLUME: 350 ML
UNIT PRODUCT VOLUME: 350 ML
UNIT RH: NORMAL
VENTRICULAR RATE: 83 BPM
WBC # BLD AUTO: 15.76 THOUSAND/UL (ref 4.31–10.16)

## 2024-06-23 PROCEDURE — 99233 SBSQ HOSP IP/OBS HIGH 50: CPT | Performed by: ANESTHESIOLOGY

## 2024-06-23 PROCEDURE — 82948 REAGENT STRIP/BLOOD GLUCOSE: CPT

## 2024-06-23 PROCEDURE — 94664 DEMO&/EVAL PT USE INHALER: CPT

## 2024-06-23 PROCEDURE — 94760 N-INVAS EAR/PLS OXIMETRY 1: CPT

## 2024-06-23 PROCEDURE — 80048 BASIC METABOLIC PNL TOTAL CA: CPT | Performed by: PHYSICIAN ASSISTANT

## 2024-06-23 PROCEDURE — 93005 ELECTROCARDIOGRAM TRACING: CPT

## 2024-06-23 PROCEDURE — 93010 ELECTROCARDIOGRAM REPORT: CPT | Performed by: INTERNAL MEDICINE

## 2024-06-23 PROCEDURE — 94762 N-INVAS EAR/PLS OXIMTRY CONT: CPT

## 2024-06-23 PROCEDURE — 85027 COMPLETE CBC AUTOMATED: CPT | Performed by: PHYSICIAN ASSISTANT

## 2024-06-23 PROCEDURE — 83735 ASSAY OF MAGNESIUM: CPT | Performed by: PHYSICIAN ASSISTANT

## 2024-06-23 RX ORDER — BUMETANIDE 0.25 MG/ML
2 INJECTION INTRAMUSCULAR; INTRAVENOUS 2 TIMES DAILY
Status: DISCONTINUED | OUTPATIENT
Start: 2024-06-23 | End: 2024-06-24

## 2024-06-23 RX ORDER — HEPARIN SODIUM 5000 [USP'U]/ML
5000 INJECTION, SOLUTION INTRAVENOUS; SUBCUTANEOUS EVERY 8 HOURS SCHEDULED
Status: DISCONTINUED | OUTPATIENT
Start: 2024-06-23 | End: 2024-07-09 | Stop reason: HOSPADM

## 2024-06-23 RX ORDER — POTASSIUM CHLORIDE 20 MEQ/1
20 TABLET, EXTENDED RELEASE ORAL ONCE
Status: COMPLETED | OUTPATIENT
Start: 2024-06-23 | End: 2024-06-23

## 2024-06-23 RX ORDER — LANOLIN ALCOHOL/MO/W.PET/CERES
3 CREAM (GRAM) TOPICAL
Status: DISCONTINUED | OUTPATIENT
Start: 2024-06-23 | End: 2024-06-23

## 2024-06-23 RX ORDER — LANOLIN ALCOHOL/MO/W.PET/CERES
6 CREAM (GRAM) TOPICAL
Status: DISCONTINUED | OUTPATIENT
Start: 2024-06-23 | End: 2024-07-09 | Stop reason: HOSPADM

## 2024-06-23 RX ADMIN — METHOCARBAMOL 500 MG: 500 TABLET ORAL at 17:35

## 2024-06-23 RX ADMIN — CHLORHEXIDINE GLUCONATE 0.12% ORAL RINSE 15 ML: 1.2 LIQUID ORAL at 17:35

## 2024-06-23 RX ADMIN — BUMETANIDE 2 MG: 0.25 INJECTION INTRAMUSCULAR; INTRAVENOUS at 17:35

## 2024-06-23 RX ADMIN — ATORVASTATIN CALCIUM 80 MG: 80 TABLET, FILM COATED ORAL at 16:31

## 2024-06-23 RX ADMIN — INSULIN LISPRO 2 UNITS: 100 INJECTION, SOLUTION INTRAVENOUS; SUBCUTANEOUS at 21:39

## 2024-06-23 RX ADMIN — HEPARIN SODIUM 5000 UNITS: 5000 INJECTION INTRAVENOUS; SUBCUTANEOUS at 21:38

## 2024-06-23 RX ADMIN — ACETAMINOPHEN 975 MG: 325 TABLET, FILM COATED ORAL at 14:05

## 2024-06-23 RX ADMIN — POLYETHYLENE GLYCOL 3350 17 G: 17 POWDER, FOR SOLUTION ORAL at 08:57

## 2024-06-23 RX ADMIN — POTASSIUM CHLORIDE 20 MEQ: 1500 TABLET, EXTENDED RELEASE ORAL at 08:57

## 2024-06-23 RX ADMIN — AMLODIPINE BESYLATE 10 MG: 10 TABLET ORAL at 08:57

## 2024-06-23 RX ADMIN — METOPROLOL TARTRATE 50 MG: 50 TABLET, FILM COATED ORAL at 21:39

## 2024-06-23 RX ADMIN — CLOPIDOGREL BISULFATE 75 MG: 75 TABLET ORAL at 08:57

## 2024-06-23 RX ADMIN — METHOCARBAMOL 500 MG: 500 TABLET ORAL at 12:31

## 2024-06-23 RX ADMIN — DOCUSATE SODIUM 100 MG: 100 CAPSULE, LIQUID FILLED ORAL at 08:57

## 2024-06-23 RX ADMIN — AMIODARONE HYDROCHLORIDE 200 MG: 200 TABLET ORAL at 06:18

## 2024-06-23 RX ADMIN — POTASSIUM CHLORIDE 20 MEQ: 1500 TABLET, EXTENDED RELEASE ORAL at 17:35

## 2024-06-23 RX ADMIN — AMIODARONE HYDROCHLORIDE 200 MG: 200 TABLET ORAL at 21:38

## 2024-06-23 RX ADMIN — BUMETANIDE 2 MG: 0.25 INJECTION INTRAMUSCULAR; INTRAVENOUS at 08:57

## 2024-06-23 RX ADMIN — CHLORHEXIDINE GLUCONATE 0.12% ORAL RINSE 15 ML: 1.2 LIQUID ORAL at 08:57

## 2024-06-23 RX ADMIN — POTASSIUM CHLORIDE 20 MEQ: 1500 TABLET, EXTENDED RELEASE ORAL at 07:42

## 2024-06-23 RX ADMIN — PANTOPRAZOLE SODIUM 40 MG: 40 TABLET, DELAYED RELEASE ORAL at 06:17

## 2024-06-23 RX ADMIN — METOPROLOL TARTRATE 50 MG: 50 TABLET, FILM COATED ORAL at 08:57

## 2024-06-23 RX ADMIN — ACETAMINOPHEN 975 MG: 325 TABLET, FILM COATED ORAL at 07:42

## 2024-06-23 RX ADMIN — HEPARIN SODIUM 5000 UNITS: 5000 INJECTION INTRAVENOUS; SUBCUTANEOUS at 07:43

## 2024-06-23 RX ADMIN — Medication 6 MG: at 22:06

## 2024-06-23 RX ADMIN — HEPARIN SODIUM 5000 UNITS: 5000 INJECTION INTRAVENOUS; SUBCUTANEOUS at 14:07

## 2024-06-23 RX ADMIN — METHOCARBAMOL 500 MG: 500 TABLET ORAL at 06:18

## 2024-06-23 RX ADMIN — AMIODARONE HYDROCHLORIDE 200 MG: 200 TABLET ORAL at 14:05

## 2024-06-23 RX ADMIN — DOCUSATE SODIUM 100 MG: 100 CAPSULE, LIQUID FILLED ORAL at 17:35

## 2024-06-23 RX ADMIN — ASPIRIN 81 MG: 81 TABLET, COATED ORAL at 08:57

## 2024-06-23 NOTE — PLAN OF CARE
Problem: Prexisting or High Potential for Compromised Skin Integrity  Goal: Skin integrity is maintained or improved  Description: INTERVENTIONS:  - Identify patients at risk for skin breakdown  - Assess and monitor skin integrity  - Assess and monitor nutrition and hydration status  - Monitor labs   - Assess for incontinence   - Turn and reposition patient  - Assist with mobility/ambulation  - Relieve pressure over bony prominences  - Avoid friction and shearing  - Provide appropriate hygiene as needed including keeping skin clean and dry  - Evaluate need for skin moisturizer/barrier cream  - Collaborate with interdisciplinary team   - Patient/family teaching  - Consider wound care consult   Outcome: Progressing     Problem: PAIN - ADULT  Goal: Verbalizes/displays adequate comfort level or baseline comfort level  Description: Interventions:  - Encourage patient to monitor pain and request assistance  - Assess pain using appropriate pain scale  - Administer analgesics based on type and severity of pain and evaluate response  - Implement non-pharmacological measures as appropriate and evaluate response  - Consider cultural and social influences on pain and pain management  - Notify physician/advanced practitioner if interventions unsuccessful or patient reports new pain  Outcome: Progressing     Problem: INFECTION - ADULT  Goal: Absence or prevention of progression during hospitalization  Description: INTERVENTIONS:  - Assess and monitor for signs and symptoms of infection  - Monitor lab/diagnostic results  - Monitor all insertion sites, i.e. indwelling lines, tubes, and drains  - Monitor endotracheal if appropriate and nasal secretions for changes in amount and color  - [place] appropriate cooling/warming therapies per order  - Administer medications as ordered  - Instruct and encourage patient and family to use good hand hygiene technique  - Identify and instruct in appropriate isolation precautions for  identified infection/condition  Outcome: Progressing  Goal: Absence of fever/infection during neutropenic period  Description: INTERVENTIONS:  - Monitor WBC    Outcome: Progressing     Goal: Maintain or return to baseline ADL function  Description: INTERVENTIONS:  -  Assess patient's ability to carry out ADLs; assess patient's baseline for ADL function and identify physical deficits which impact ability to perform ADLs (bathing, care of mouth/teeth, toileting, grooming, dressing, etc.)  - Assess/evaluate cause of self-care deficits   - Assess range of motion  - Assess patient's mobility; develop plan if impaired  - Assess patient's need for assistive devices and provide as appropriate  - Encourage maximum independence but intervene and supervise when necessary  - Involve family in performance of ADLs  - Assess for home care needs following discharge   - Consider OT consult to assist with ADL evaluation and planning for discharge  - Provide patient education as appropriate  Outcome: Progressing     Problem: DISCHARGE PLANNING  Goal: Discharge to home or other facility with appropriate resources  Description: INTERVENTIONS:  - Identify barriers to discharge w/patient and caregiver  - Arrange for needed discharge resources and transportation as appropriate  - Identify discharge learning needs (meds, wound care, etc.)  - Arrange for interpretive services to assist at discharge as needed  - Refer to Case Management Department for coordinating discharge planning if the patient needs post-hospital services based on physician/advanced practitioner order or complex needs related to functional status, cognitive ability, or social support system  Outcome: Progressing     Problem: Knowledge Deficit  Goal: Patient/family/caregiver demonstrates understanding of disease process, treatment plan, medications, and discharge instructions  Description: Complete learning assessment and assess knowledge base.  Interventions:  - Provide  teaching at level of understanding  - Provide teaching via preferred learning methods  Outcome: Progressing

## 2024-06-23 NOTE — RESPIRATORY THERAPY NOTE
Resp Care   06/23/24 0736   Respiratory Assessment   Assessment Type Assess only   General Appearance Awake;Alert   Respiratory Pattern Normal   Chest Assessment Chest expansion symmetrical   Bilateral Breath Sounds Diminished;Clear   Resp Comments pt found on 6L MFNC, spo2 is 95%, pt titrated to 5L nc, spo2 is 93%, bs are diminished, will cont to monitor   Oxygen Therapy/Pulse Ox   O2 Device Nasal cannula   Nasal Cannula O2 Flow Rate (L/min) 5 L/min   Calculated FIO2 (%) - Nasal Cannula 40   SpO2 Activity At Rest   $ Pulse Oximetry Spot Check Charge Completed   $ Pulse Oximetry Continuous Daily Charge Completed

## 2024-06-23 NOTE — PROGRESS NOTES
[facility]  Progress Note: Critical Care   Date of Service: 6/23/2024  Hospital Day: 3  Name: Shane Orozco I  MRN: [id number]  Unit/Bed#: [facility] 417-01      Assessment & Plan     Impressions:  MVCAD s/p CABG x 3 (LIMA > LAD, SVG > PDA, SVG > OM1) (6/20/24)   BARBARA  Hypertension   Hyperlipidemia  Acute respiratory insufficiency   BARBARA  TIA's on Plavix   S/p open AAA repair (2011)   PFO  Insomnia    Neuro:     Cardiac Surgery Pain Control: ATC tylenol and PRN oxycodone 2.5/5mg  Delirium precautions  Regulate sleep/wake cycle  CAM-ICU daily  Trend neuro exam      CV:   Cardiac Surgery Hemodynamic Monitoring: MAP goal >65 MAP goal >130  Follow rhythm on telemetry    Beta Blocker Plan: Continue current lopressor dosage  Epicardial pacing wire plan : Epicardial wires not in place  Post op cardiac surgery medications:   ASA 81 mg QD  Statin: Lipitor 80 mg qHS  Amiodarone 200 mg PO q8 hours  Continue norvasc 10mg daily    Lung:   Expected post operative oxygen requirement of 7 liters/min via nasal cannula  Chest tubes have been discontinued    GI:   Continue PPI for stress ulcer prophylaxis  Continue bowel regimen  Trend abdominal exam and bowel function    FEN:   Diuresis Plan: Bumex 2 mg IV decrease to BID  Nutrition plan: as tolerated  Replenish electrolytes with goals: K >4.0, Mag >2.0, and Phos >3.0    :   Cortés Plan: Cortés to be removed. Order has been placed  Trend UOP and BUN/creat  Strict I and O     ID:   Trend temps and WBC count  Maintain normothermia    Heme:   Trend hgb and plts    Endo:       MSK/Skin:  Mobility goal:   PT/OT consult as medically appropriate   Frequent turning and pressure off-loading  Local wound care as needed    Disposition:  Med Surg with Telemetry    ICU Core Measures     A: Assess, Prevent, and Manage Pain Has pain been assessed? Yes  Need for changes to pain regimen? NA   B: Both SAT/SAT  N/A   C: Choice of Sedation RASS Goal: 0 Alert and  Calm  Need for changes to sedation or analgesia regimen? NA   D: Delirium CAM-ICU: Negative   E: Early Mobility  Plan for early mobility? Yes   F: Family Engagement Plan for family engagement today? Yes       Review of Invasive Devices:    Cortés Plan: Cortés to be removed. Order has been placed  Central access plan: Hemodynamic monitoring      Prophylaxis:  VTE VTE covered by:  fondaparinux, Subcutaneous, 2.5 mg at 06/22/24 0821       Stress Ulcer  covered bypantoprazole (PROTONIX) EC tablet 40 mg [[id number]]          Significant 24hr Events      24 Hour Events/HPI: POD # 3 s/p CABG x 3. Yesterday CT/wires removed, uptitrated metoprolol to 50mg BID, weaned off of cardene.  Given a 1x dose of metolazone with improvement in UOP.        Subjective        Objective     Medications:  Scheduled Continuous   acetaminophen, 975 mg, Q6H While awake  amiodarone, 200 mg, Q8H KRISTI  amLODIPine, 10 mg, Daily  aspirin, 81 mg, Daily  atorvastatin, 80 mg, Daily With Dinner  bumetanide, 2 mg, TID  chlorhexidine, 15 mL, BID  clopidogrel, 75 mg, Daily  docusate sodium, 100 mg, BID  fondaparinux, 2.5 mg, Daily  insulin lispro, 1-5 Units, HS  insulin lispro, 1-6 Units, TID AC  lidocaine, 2 patch, Daily  methocarbamol, 500 mg, Q6H KRISTI  metoprolol tartrate, 50 mg, Q12H KRISTI  pantoprazole, 40 mg, Early Morning  polyethylene glycol, 17 g, Daily  potassium chloride, 20 mEq, BID            Vitals: Invasive Monitoring       Most Recent Min/Max in 24hrs   Temp 97.9 °F (36.6 °C) Temp  Min: 97.4 °F (36.3 °C)  Max: 98.2 °F (36.8 °C)   Pulse 74 Pulse  Min: 72  Max: 97   Resp 22 Resp  Min: 17  Max: 34   /55 BP  Min: 103/56  Max: 138/64   O2 Sat 93 % SpO2  Min: 90 %  Max: 97 %   O2 Device: Mid flow nasal cannula  Nasal Cannula O2 Flow Rate (L/min): 7 L/min    I/O Chest tube output (if present)     Intake/Output Summary (Last 24 hours) at 6/23/2024 0635  Last data filed at 6/23/2024 0600  Gross per 24 hour   Intake 1932.92 ml   Output 5220 ml   Net  -3287.08 ml     UOP: 2.5cc/kg/hour    BM: 0 in the last 24 hours  Weight change: -4.3 kg (-9 lb 7.7 oz)          Physical Exam   Physical Exam  Skin:     General: Skin is warm and dry.   HENT:      Head: Normocephalic and atraumatic.      Mouth/Throat:      Mouth: Mucous membranes are moist.   Cardiovascular:      Rate and Rhythm: Normal rate and regular rhythm.      Heart sounds: No murmur heard.     No friction rub. No gallop.   Musculoskeletal:      Right lower leg: Trace Edema present.      Left lower leg: Trace Edema present.   Abdominal: General: There is no distension.      Palpations: Abdomen is soft.      Tenderness: There is no abdominal tenderness. There is no guarding.   Constitutional:       General: He is not in acute distress.     Appearance: He is well-developed and well-nourished. He is not ill-appearing.   Pulmonary:      Effort: Pulmonary effort is normal. No respiratory distress.      Breath sounds: No wheezing, rhonchi or rales.   Neurological:      Mental Status: He is alert. Mental status is at baseline.          Diagnostic Studies      No new         Labs:  CBC    Recent Labs     06/22/24  0423 06/23/24  0418   WBC 24.70* 15.76*   HGB 11.2* 10.4*   HCT 32.1* 30.1*    130*     BMP    Recent Labs     06/22/24  1358 06/23/24  0418   SODIUM 132* 134*   K 4.3 3.6    95*   CO2 24 31   AGAP 7 8   BUN 33* 39*   CREATININE 1.29 1.60*   CALCIUM 7.9* 8.0*        Baseline Creat: 0.9-1.1   Coags    No recent results           Additional Electrolytes  Recent Labs     06/22/24  0423 06/23/24  0418   MG 2.1 2.0          Blood Gas    No recent results  No recent results LFTs  No recent results    Infectious    No recent results     No recent results Glucose  Recent Labs     06/21/24  1429 06/22/24  0423 06/22/24  1358 06/23/24  0418   GLUC 200* 177* 155* 108        Collaborative bedside rounds performed with cardiac surgery attending, critical care attending and bedside RN.     Moraima Odom  KATIA Alvarez

## 2024-06-24 PROBLEM — N17.9 AKI (ACUTE KIDNEY INJURY) (HCC): Status: ACTIVE | Noted: 2024-06-24

## 2024-06-24 LAB
ANION GAP SERPL CALCULATED.3IONS-SCNC: 11 MMOL/L (ref 4–13)
BUN SERPL-MCNC: 48 MG/DL (ref 5–25)
CALCIUM SERPL-MCNC: 8.2 MG/DL (ref 8.4–10.2)
CHLORIDE SERPL-SCNC: 90 MMOL/L (ref 96–108)
CO2 SERPL-SCNC: 35 MMOL/L (ref 21–32)
CREAT SERPL-MCNC: 1.86 MG/DL (ref 0.6–1.3)
ERYTHROCYTE [DISTWIDTH] IN BLOOD BY AUTOMATED COUNT: 12.3 % (ref 11.6–15.1)
GFR SERPL CREATININE-BSD FRML MDRD: 33 ML/MIN/1.73SQ M
GLUCOSE SERPL-MCNC: 103 MG/DL (ref 65–140)
GLUCOSE SERPL-MCNC: 131 MG/DL (ref 65–140)
GLUCOSE SERPL-MCNC: 134 MG/DL (ref 65–140)
GLUCOSE SERPL-MCNC: 139 MG/DL (ref 65–140)
GLUCOSE SERPL-MCNC: 86 MG/DL (ref 65–140)
HCT VFR BLD AUTO: 29.1 % (ref 36.5–49.3)
HGB BLD-MCNC: 10.2 G/DL (ref 12–17)
MCH RBC QN AUTO: 33.6 PG (ref 26.8–34.3)
MCHC RBC AUTO-ENTMCNC: 35.1 G/DL (ref 31.4–37.4)
MCV RBC AUTO: 96 FL (ref 82–98)
PLATELET # BLD AUTO: 135 THOUSANDS/UL (ref 149–390)
PMV BLD AUTO: 9.1 FL (ref 8.9–12.7)
POTASSIUM SERPL-SCNC: 3.4 MMOL/L (ref 3.5–5.3)
RBC # BLD AUTO: 3.04 MILLION/UL (ref 3.88–5.62)
SODIUM SERPL-SCNC: 136 MMOL/L (ref 135–147)
WBC # BLD AUTO: 16.01 THOUSAND/UL (ref 4.31–10.16)

## 2024-06-24 PROCEDURE — 99024 POSTOP FOLLOW-UP VISIT: CPT | Performed by: PHYSICIAN ASSISTANT

## 2024-06-24 PROCEDURE — 80048 BASIC METABOLIC PNL TOTAL CA: CPT | Performed by: PHYSICIAN ASSISTANT

## 2024-06-24 PROCEDURE — 97535 SELF CARE MNGMENT TRAINING: CPT

## 2024-06-24 PROCEDURE — 97530 THERAPEUTIC ACTIVITIES: CPT

## 2024-06-24 PROCEDURE — 82948 REAGENT STRIP/BLOOD GLUCOSE: CPT

## 2024-06-24 PROCEDURE — 85027 COMPLETE CBC AUTOMATED: CPT | Performed by: PHYSICIAN ASSISTANT

## 2024-06-24 PROCEDURE — 97116 GAIT TRAINING THERAPY: CPT

## 2024-06-24 PROCEDURE — 99222 1ST HOSP IP/OBS MODERATE 55: CPT | Performed by: INTERNAL MEDICINE

## 2024-06-24 RX ORDER — SENNOSIDES 8.6 MG
1 TABLET ORAL
Status: DISCONTINUED | OUTPATIENT
Start: 2024-06-24 | End: 2024-07-01

## 2024-06-24 RX ORDER — BUMETANIDE 2 MG/1
2 TABLET ORAL
Status: DISCONTINUED | OUTPATIENT
Start: 2024-06-24 | End: 2024-06-25

## 2024-06-24 RX ORDER — POTASSIUM CHLORIDE 20 MEQ/1
40 TABLET, EXTENDED RELEASE ORAL ONCE
Status: COMPLETED | OUTPATIENT
Start: 2024-06-24 | End: 2024-06-24

## 2024-06-24 RX ADMIN — METOPROLOL TARTRATE 50 MG: 50 TABLET, FILM COATED ORAL at 09:19

## 2024-06-24 RX ADMIN — DOCUSATE SODIUM 100 MG: 100 CAPSULE, LIQUID FILLED ORAL at 08:37

## 2024-06-24 RX ADMIN — METHOCARBAMOL 500 MG: 500 TABLET ORAL at 05:58

## 2024-06-24 RX ADMIN — DOCUSATE SODIUM 100 MG: 100 CAPSULE, LIQUID FILLED ORAL at 17:48

## 2024-06-24 RX ADMIN — AMIODARONE HYDROCHLORIDE 200 MG: 200 TABLET ORAL at 13:03

## 2024-06-24 RX ADMIN — ACETAMINOPHEN 975 MG: 325 TABLET, FILM COATED ORAL at 13:03

## 2024-06-24 RX ADMIN — ACETAMINOPHEN 975 MG: 325 TABLET, FILM COATED ORAL at 08:38

## 2024-06-24 RX ADMIN — METOPROLOL TARTRATE 50 MG: 50 TABLET, FILM COATED ORAL at 21:32

## 2024-06-24 RX ADMIN — CHLORHEXIDINE GLUCONATE 0.12% ORAL RINSE 15 ML: 1.2 LIQUID ORAL at 17:48

## 2024-06-24 RX ADMIN — BUMETANIDE 2 MG: 2 TABLET ORAL at 16:23

## 2024-06-24 RX ADMIN — POTASSIUM CHLORIDE 20 MEQ: 1500 TABLET, EXTENDED RELEASE ORAL at 08:36

## 2024-06-24 RX ADMIN — Medication 6 MG: at 21:32

## 2024-06-24 RX ADMIN — AMLODIPINE BESYLATE 10 MG: 10 TABLET ORAL at 09:19

## 2024-06-24 RX ADMIN — ACETAMINOPHEN 975 MG: 325 TABLET, FILM COATED ORAL at 21:32

## 2024-06-24 RX ADMIN — CLOPIDOGREL BISULFATE 75 MG: 75 TABLET ORAL at 08:37

## 2024-06-24 RX ADMIN — CHLORHEXIDINE GLUCONATE 0.12% ORAL RINSE 15 ML: 1.2 LIQUID ORAL at 08:37

## 2024-06-24 RX ADMIN — POTASSIUM CHLORIDE 40 MEQ: 1500 TABLET, EXTENDED RELEASE ORAL at 08:37

## 2024-06-24 RX ADMIN — POLYETHYLENE GLYCOL 3350 17 G: 17 POWDER, FOR SOLUTION ORAL at 08:37

## 2024-06-24 RX ADMIN — AMIODARONE HYDROCHLORIDE 200 MG: 200 TABLET ORAL at 21:32

## 2024-06-24 RX ADMIN — ATORVASTATIN CALCIUM 80 MG: 80 TABLET, FILM COATED ORAL at 16:23

## 2024-06-24 RX ADMIN — ASPIRIN 81 MG: 81 TABLET, COATED ORAL at 08:38

## 2024-06-24 RX ADMIN — HEPARIN SODIUM 5000 UNITS: 5000 INJECTION INTRAVENOUS; SUBCUTANEOUS at 21:32

## 2024-06-24 RX ADMIN — METHOCARBAMOL 500 MG: 500 TABLET ORAL at 17:48

## 2024-06-24 RX ADMIN — METHOCARBAMOL 500 MG: 500 TABLET ORAL at 13:03

## 2024-06-24 RX ADMIN — POTASSIUM CHLORIDE 20 MEQ: 1500 TABLET, EXTENDED RELEASE ORAL at 17:48

## 2024-06-24 RX ADMIN — AMIODARONE HYDROCHLORIDE 200 MG: 200 TABLET ORAL at 05:58

## 2024-06-24 RX ADMIN — PANTOPRAZOLE SODIUM 40 MG: 40 TABLET, DELAYED RELEASE ORAL at 05:58

## 2024-06-24 RX ADMIN — HEPARIN SODIUM 5000 UNITS: 5000 INJECTION INTRAVENOUS; SUBCUTANEOUS at 13:04

## 2024-06-24 RX ADMIN — SENNOSIDES 8.6 MG: 8.6 TABLET, FILM COATED ORAL at 21:32

## 2024-06-24 RX ADMIN — BUMETANIDE 2 MG: 2 TABLET ORAL at 09:19

## 2024-06-24 RX ADMIN — HEPARIN SODIUM 5000 UNITS: 5000 INJECTION INTRAVENOUS; SUBCUTANEOUS at 05:58

## 2024-06-24 NOTE — PLAN OF CARE
Problem: OCCUPATIONAL THERAPY ADULT  Goal: Performs self-care activities at highest level of function for planned discharge setting.  See evaluation for individualized goals.  Description: Treatment Interventions: ADL retraining, Functional transfer training, Endurance training, Patient/family training, Equipment evaluation/education, Continued evaluation, Cardiac education, Energy conservation, Activityengagement          See flowsheet documentation for full assessment, interventions and recommendations.   Outcome: Progressing  Note: Limitation: Decreased ADL status, Decreased Safe judgement during ADL, Decreased endurance, Decreased self-care trans, Decreased high-level ADLs  Prognosis: Fair  Assessment: Pt seen for OT treatment session day 2 on this date focused on ADL retraining, functional transfers and mobility, energy conservation, functional endurance, recall of safety precautions. Pt was greeted in chair and was cooperative throughout session. Following session, pt was left in chair with chair alarm on and all needs within reach. Pt continues to be limited by functional status related to ADLs and transfers requiring MIN A for standing grooming, MIN A x 1 with RW for all functional transfers and mobility.   The patient's raw score on the -PAC Daily Activity Inpatient Short Form is 16. A raw score of less than 19 suggests the patient may benefit from discharge to post-acute rehabilitation services. Please refer to the recommendation of the Occupational Therapist for safe discharge planning. Pt would benefit from continued acute OT intervention with plan to continue OT treatment sessions 2-3x per week. Recommend d/c to level I services.     Rehab Resource Intensity Level, OT: I (Maximum Resource Intensity)

## 2024-06-24 NOTE — OCCUPATIONAL THERAPY NOTE
Occupational Therapy Progress Note     Patient Name: Shane Chau Sr.  Today's Date: 6/24/2024  Problem List  Principal Problem:    Coronary artery disease of native artery of native heart with stable angina pectoris (HCC)  Active Problems:    Abdominal aortic aneurysm without rupture (HCC)    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HCC)    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HCC)    Asymptomatic bilateral carotid artery stenosis    Hyperlipidemia    Hypertension    S/P CABG x 3    BARBARA (acute kidney injury) (HCC)            06/24/24 0919   OT Last Visit   OT Visit Date 06/24/24   Note Type   Note Type Treatment   Pain Assessment   Pain Assessment Tool 0-10   Pain Score No Pain   Restrictions/Precautions   Weight Bearing Precautions Per Order No   Other Precautions Cardiac/sternal;Cognitive;Chair Alarm;Multiple lines;Telemetry;O2;Fall Risk;Hard of hearing   Lifestyle   Autonomy Pta pt I with ADL, IADL and functional mobility, (+)    Reciprocal Relationships supportive spouse   Service to Others retired   Intrinsic Gratification enjoys time with family   ADL   Where Assessed Other (Comment)  (Standing at raised tray table)   Eating Assistance 5  Supervision/Setup   Eating Deficit Setup;Supervision/safety   Grooming Assistance 4  Minimal Assistance   Grooming Deficit Setup;Steadying;Supervision/safety;Teeth care   Grooming Comments Pt stands for 1 minute at raised tray table to brush teeth, MIN A x 1 to steady in stance. Pt then washes face and brushes hair when seated in chair after setup.   Functional Standing Tolerance   Time 1 minute   Activity Standing grooming   Comments Pt stands at raised tray table for 1 minute to brush teeth, steadies using table surface, requiring MIN A x 1 to steady in stance   Bed Mobility   Additional Comments Pt greeted in chair, left in chair with alarm on and all needs within reach   Transfers   Sit to Stand 4  Minimal assistance   Additional items Assist x 1;Increased time  required;Verbal cues   Stand to Sit 4  Minimal assistance   Additional items Assist x 1;Increased time required;Verbal cues   Additional Comments with RW   Functional Mobility   Functional Mobility 4  Minimal assistance   Additional Comments Pt performs short distances through room and hallway with MIN A x 1 with RW and chair follow, requiring 2 seated rest breaks   Additional items Rolling walker   Cognition   Overall Cognitive Status Impaired   Arousal/Participation Cooperative   Attention Attends with cues to redirect   Orientation Level Oriented to person;Oriented to place;Oriented to situation   Memory Decreased recall of recent events;Decreased recall of precautions   Following Commands Follows one step commands with increased time or repetition   Comments Pt cooperative to therapy although limited by [place], fatigue, decreased initiation. Pt with decreased insight and safety awareness requiring vc for safety throughout ADL and transfers, will continue to assess.   Activity Tolerance   Activity Tolerance Patient limited by fatigue   Medical Staff Made Aware RN cleared for therapy, assist from restorative Mame   Assessment   Assessment Pt seen for OT treatment session day 2 on this date focused on ADL retraining, functional transfers and mobility, energy conservation, functional endurance, recall of safety precautions. Pt was greeted in chair and was cooperative throughout session. Following session, pt was left in chair with chair alarm on and all needs within reach. Pt continues to be limited by functional status related to ADLs and transfers requiring MIN A for standing grooming, MIN A x 1 with RW for all functional transfers and mobility.   The patient's raw score on the AM-PAC Daily Activity Inpatient Short Form is 16. A raw score of less than 19 suggests the patient may benefit from discharge to post-acute rehabilitation services. Please refer to the recommendation of the Occupational Therapist for safe  discharge planning. Pt would benefit from continued acute OT intervention with plan to continue OT treatment sessions 2-3x per week. Recommend d/c to level I services.   Plan   Treatment Interventions ADL retraining;Functional transfer training;Endurance training;Continued evaluation;Energy conservation;Activityengagement   Goal Expiration Date 07/05/24   OT Treatment Day 2   OT Frequency 2-3x/wk   Discharge Recommendation   Rehab Resource Intensity Level, OT I (Maximum Resource Intensity)   AM-PAC Daily Activity Inpatient   Lower Body Dressing 2   Bathing 2   Toileting 2   Upper Body Dressing 3   Grooming 3   Eating 4   Daily Activity Raw Score 16   Daily Activity Standardized Score (Calc for Raw Score >=11) 35.96   AM-PAC Applied Cognition Inpatient   Following a Speech/Presentation 3   Understanding Ordinary Conversation 4   Taking Medications 3   Remembering Where Things Are Placed or Put Away 3   Remembering List of 4-5 Errands 2   Taking Care of Complicated Tasks 2   Applied Cognition Raw Score 17   Applied Cognition Standardized Score 36.52   Modified [place] Scale   Modified [place] Scale 4   End of Consult   Education Provided Yes   Patient Position at End of Consult Bedside chair;Bed/Chair alarm activated;All needs within reach   Nurse Communication Nurse aware of consult       YARON Rae, OTR/L

## 2024-06-24 NOTE — PROGRESS NOTES
Progress Note - Cardiothoracic Surgery   Shane Chau . 78 y.o. male MRN: [id number]  Unit/Bed#: [facility] 405-01 Encounter: [id number]    Coronary artery disease. S/P coronary artery bypass grafting; POD # 4      24 Hour Events: Feeling tired and weak. Pain ok. Remains on 3L NC, using IS. + flatus and good BS, no BM yet. Working with PT/OT    Medications:   Scheduled Meds:  Current Facility-Administered Medications   Medication Dose Route Frequency Provider Last Rate    acetaminophen  975 mg Oral Q6H While awake Moraima Alvarez PA-C      amiodarone  200 mg Oral Q8H [facility] Moraima Alvarez PA-C      amLODIPine  10 mg Oral Daily Moraima Alvarez PA-C      aspirin  81 mg Oral Daily Moraima Alvarez PA-C      atorvastatin  80 mg Oral Daily With Dinner Moraima Alvarez PA-C      bisacodyl  10 mg Rectal Daily PRN Moraima Alvarez PA-C      bumetanide  2 mg Intravenous BID Moraima Alvarez PA-C      chlorhexidine  15 mL Mouth/Throat BID Moraima Alvarez PA-C      clopidogrel  75 mg Oral Daily Moraima Alvarez PA-C      docusate sodium  100 mg Oral BID Moraima Alvarez PA-C      heparin (porcine)  5,000 Units Subcutaneous Q8H [facility] Moraima Alvarez PA-C      hydrALAZINE  5 mg Intravenous Q6H PRN Moraima Alvarez PA-C      insulin lispro  1-5 Units Subcutaneous HS Moraima Alvarez PA-C      insulin lispro  1-6 Units Subcutaneous TID AC Moraima Alvarez PA-C      lidocaine  2 patch Topical Daily Moraima Alvarez PA-C      melatonin  6 mg Oral HS Rahel Baig PA-C      methocarbamol  500 mg Oral Q6H [facility] Moraima Alvarez PA-C      metoprolol tartrate  50 mg Oral Q12H [facility] Moraima Alvarez PA-C      ondansetron  4 mg Intravenous Q6H PRN Moraima Alvarez PA-C      oxyCODONE  2.5 mg Oral Q4H PRN Moraima Alvarez PA-C      Or    oxyCODONE  5 mg Oral Q4H PRN Moraima Alvarez PA-C      pantoprazole  40 mg Oral Early Morning Moraima Alvarez PA-C      polyethylene  glycol  17 g Oral Daily Moraima Alvarez PA-C      potassium chloride  20 mEq Oral BID Moraima Alvarez PA-C      temazepam  15 mg Oral HS PRN Moraima Alvarez PA-C       Continuous Infusions:   PRN Meds:.  bisacodyl    hydrALAZINE    ondansetron    oxyCODONE **OR** oxyCODONE    temazepam    Vitals:   Vitals:    06/23/24 2239 06/24/24 0249 06/24/24 0600 06/24/24 0722   BP: 129/62 100/55  105/56   BP Location:       Pulse: 92 75  81   Resp: 16 16     Temp: 98 °F (36.7 °C) 100.4 °F (38 °C)  97.9 °F (36.6 °C)   TempSrc:       SpO2: 93% (!) 88%  90%   Weight:   85.3 kg (188 lb)    Height:           Telemetry: NSR; Heart Rate: 70-80s    Respiratory:   SpO2: SpO2: 90 %; 3 LPM    Intake/Output:     Intake/Output Summary (Last 24 hours) at 6/24/2024 0722  Last data filed at 6/24/2024 0708  Gross per 24 hour   Intake 1120 ml   Output 3075 ml   Net -1955 ml        Chest tube Output:  removed    Weights:   Weight (last 2 days)       Date/Time Weight    06/24/24 0600 85.3 (188)    06/23/24 0600 87 (191.8)    06/22/24 0550 91.3 (201.28)              Results:   Results from last 7 days   Lab Units 06/24/24  0452 06/23/24  0418 06/22/24  0423   WBC Thousand/uL 16.01* 15.76* 24.70*   HEMOGLOBIN g/dL 10.2* 10.4* 11.2*   HEMATOCRIT % 29.1* 30.1* 32.1*   PLATELETS Thousands/uL 135* 130* 158     Results from last 7 days   Lab Units 06/24/24  0452 06/23/24  0418 06/22/24  1358 06/20/24  1647 06/20/24  1310   SODIUM mmol/L 136 134* 132*   < >  --    POTASSIUM mmol/L 3.4* 3.6 4.3   < >  --    CHLORIDE mmol/L 90* 95* 101   < >  --    CO2 mmol/L 35* 31 24   < >  --    CO2, I-STAT mmol/L  --   --   --   --  21   BUN mg/dL 48* 39* 33*   < >  --    CREATININE mg/dL 1.86* 1.60* 1.29   < >  --    GLUCOSE, ISTAT mg/dl  --   --   --   --  140   CALCIUM mg/dL 8.2* 8.0* 7.9*   < >  --     < > = values in this interval not displayed.     Results from last 7 days   Lab Units 06/20/24  1306   INR  1.37*   PTT seconds 29       Point of care  glucose: 86 - 215    Studies:  CXR 6/21:  Narrative & Impression   XR CHEST PORTABLE     INDICATION: Post Open Heart Surgey.     COMPARISON: 6/20/2024     FINDINGS: Sternotomy. Mediastinal/thoracostomy tubes. Right  sided central venous catheter tip terminates near cavoatrial junction. ET tube and [place]-Luis catheter removed.     Left lung base atelectasis. Lungs are otherwise clear. No pneumothorax or large pleural effusion.     Unchanged cardiomediastinal silhouette.     No acute osseous abnormality within the limitations of portable radiography.     Normal upper abdomen.     IMPRESSION:     No acute cardiopulmonary disease.       I have personally reviewed pertinent reports.   and I have personally reviewed pertinent films in PACS    Invasive Lines/Tubes:  Invasive Devices       Central Venous Catheter Line  Duration             CVC Central Lines 06/20/24 3 days              Peripheral Intravenous Line  Duration             Peripheral IV 06/20/24 Left Hand 4 days                    Physical Exam:    General: No acute distress, Alert, and Normal appearance  HEENT/NECK:  Normocephalic. Atraumatic.  no jugular venous distention.    Cardiac: Regular rate and rhythm and No murmurs/rubs/gallops  Pulmonary:  Breath sounds clear bilaterally and No rales/rhonchi/wheezes  Abdomen:  Non-tender, Non-distended, and Normal bowel sounds  Incisions: Sternum is stable.  Incision is clean, dry, and intact.  and Saphenectomy incison is clean, dry, and intact.   Extremities: Extremities warm/dry and 1+ edema B/L  Neuro: Alert and oriented X 3, Sensation is grossly intact, and No focal deficits  Skin: Warm/Dry, without rashes or lesions.    Assessment:  Principal Problem:    Coronary artery disease of native artery of native heart with stable angina pectoris (HCC)  Active Problems:    S/P CABG x 3       Coronary artery disease. S/P coronary artery bypass grafting; POD # 4    Plan:    Cardiac:     Elective surgical admission  Normal  ventricular systolic function, EF 60%    NSR; HR well-controlled  BP well-controlled    Continue Lopressor, 50mg PO BID  Cont Norvasc 10mg QD    Hold ACE inhibitor/ARB secondary to renal dysfunction    Continue prophylactic Amiodarone, 200 mg PO TID    Continue ASA and Statin therapy    Epicardial pacing wires have been removed    Central IV access no longer required; Remove central venous catheter today    Continue Subcutaneous Heparin for DVT prophylaxis    Pulmonary:     Acute post-op pulmonary insufficiency; Requiring 3 liters via nasal cannula, secondary to idiopathic pulmonary fibrosis, atelectasis, and poor inspiratory effort secondary to pain. Continue incentive spirometry/coughing/deep breathing exercises.  Wean supplemental oxygen as tolerated for saturation > 90%    Chest tubes have been discontinued    Renal:     Postoperative BARBARA, with creatinine 1.86, from 1.6 fro m1.29;  Follow up daily BMP    Intake/Output net: -1.8 L/24 hours, 2.9L/24hr    Diuretic Regimen:  Transition to  PO Bumex,  2  mg BID  Continue Potassium Chloride 20 mEq PO BID, replete K    Neuro:    Neurologically intact; No active issues   Melatonin for sleep    Incisional pain well controlled   Continue tylenol, 975 mg PO q 8, standing dose   Continue oxycodone, 2.5 to 5 mg PO q 4 hours prn pain   Continue topical Lidocaine patch to affected area   Continue methocarbamol, 500 mg PO q 6 hours prn pain/muscle spasm    GI:    Cardiac diet, with 1800 mL fluid restriction    Tolerating diet without complaint    Continue stool softeners and prn suppository, increase bowel regimen    Continue GI prophylaxis    Endo:     Pre-Op Hgb A1C: 5.4    Serum glucose well controlled on insulin sliding scale coverage    7    Hematology:     Post-operative acute blood loss anemia; Hemoglobin 10.2; trend prn  Thrombocytopenia  Leukocytosis; Afebrile with no signs of infection; Trend CBC prn    8.   Disposition:        Following daily PT/OT recommendations  regarding home vs. rehab when medically cleared for discharge - will likely need rehab pending progress      VTE Pharmacologic Prophylaxis: Heparin  VTE Mechanical Prophylaxis: sequential compression device    Collaborative rounds completed with supervising physician  Plan of care discussed with bedside nurse    SIGNATURE: Rahel Baig PA-C  DATE: June 24, 2024  TIME: 7:22 AM

## 2024-06-24 NOTE — PROGRESS NOTES
Pastoral Care Progress Note    2024  Patient: Shane Chau Sr. : 1945  Admission Date & Time: 2024  MRN: [id number] CSN: [id number]        Father met with pt for prayer and to bless. Anointing denied. Chaplains remain available.                24 1000   Clinical Encounter Type   Visited With Patient  (Father Joel)   Bahai Encounters   Bahai Needs Prayer

## 2024-06-24 NOTE — PLAN OF CARE
Problem: PHYSICAL THERAPY ADULT  Goal: Performs mobility at highest level of function for planned discharge setting.  See evaluation for individualized goals.  Description: Treatment/Interventions: Functional transfer training, LE strengthening/ROM, Elevations, Therapeutic exercise, Endurance training, Cognitive reorientation, Patient/family training, Equipment eval/education, Bed mobility, Gait training, Spoke to nursing, Spoke to case management, OT  Equipment Recommended: Walker (pt reports owning)       See flowsheet documentation for full assessment, interventions and recommendations.  Outcome: Progressing  Note: Prognosis: Good  Problem List: Decreased strength, Decreased endurance, Impaired balance, Decreased mobility, Decreased coordination, Pain  Assessment: Pt seen for session for setup, transfers, gait w/ rest periods, repositioning.  Pt cooperative w/ session, but limited by weakness, decreased endurance, pain.  continues to need A of 1 for safety w/ mobility.  Needs cues for pacing, RW use.  due to continued decrease in mobility tolerance, endurance, continue to lean towards Level I (maximal) post acute care therapy needs at d/c.  Pt/wife have been declining, and may d/c home w/ Level III per their request.  will follow  Barriers to Discharge: (S) Inaccessible home environment (13 JUJU)     Rehab Resource Intensity Level, PT: I (Maximum Resource Intensity)    See flowsheet documentation for full assessment.

## 2024-06-24 NOTE — CASE MANAGEMENT
Case Management Discharge Planning Note    Patient name Shane Chau .  Location [facility] 405/[facility] 405-01 MRN [id number]  : 1945 Date 2024       Current Admission Date: 2024  Current Admission Diagnosis:Coronary artery disease of native artery of native heart with stable angina pectoris (HCC)   Patient Active Problem List    Diagnosis Date Noted Date Diagnosed    BARBARA (acute kidney injury) ([facility]) 2024     S/P CABG x 3 2024     Rib pain 2023     Paresthesia 2023     Leg swelling 05/10/2022     Erectile dysfunction 2022     Microhematuria 2018     Need for pneumococcal vaccination 2018     Hematuria 2018     Asymptomatic bilateral carotid artery stenosis 2017     Closed compression fracture of first lumbar vertebra ([facility]) 2017     Abdominal aortic aneurysm without rupture ([facility]) 2016     Localized primary osteoarthritis of lower leg, unspecified laterality 2013     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[facility]) 2013     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  [address]  [place] [address]  Phone: [phone] Fax: [phone]    [id number] - [place] - [address]  [place] [address]  Phone: [phone] Fax: [phone]    [facility] #[id number] - [place] - [address]  [address] [place] [address]  Phone: [phone] Fax: [phone]    [facility] ([facility]  - [place] - [address]  [address]  [place] [address]  Phone: [phone] Fax: [phone]    Primary Care Provider: Sherwin Mantilla MD    Primary Insurance: [facility] REP  Secondary Insurance:     DISCHARGE DETAILS:              Requested Home Health Care         Home Health Agency Name:: [facility]         Other Referral/Resources/Interventions Provided:  Interventions: HHA  Referral Comments: Provider choice list for [facility] given to pt and his wife Maryjo, they chose [facility] and reserved on AIDIN.         Treatment Team Recommendation: Short Term Rehab  Discharge Destination Plan:: Home with Home Health Care ([facility])             IMM Given (Date):: 06/24/24  IMM Given to:: Patient     Additional Comments: Reviewed IMM with pt and wife Maryjo at bedside, answered all questions regarding DC. Copy of IMM given at bedside and original to medical records.

## 2024-06-24 NOTE — PHYSICAL THERAPY NOTE
Physical Therapy Treatment Note       06/24/24 1050   PT Last Visit   PT Visit Date 06/24/24   Note Type   Note Type Treatment   Pain Assessment   Pain Assessment Tool 0-10   Pain Score 3   Pain Location/Orientation Location: Chest   Patient's Stated Pain Goal No pain   Hospital Pain Intervention(s) Ambulation/increased activity   Restrictions/Precautions   Weight Bearing Precautions Per Order No   Other Precautions Cardiac/sternal;Multiple lines;Telemetry;Fall Risk;Pain;O2   General   Chart Reviewed Yes   Family/Caregiver Present No   Cognition   Overall Cognitive Status WFL   Arousal/Participation Responsive   Attention Attends with cues to redirect   Orientation Level Oriented X4   Memory Unable to assess   Following Commands Follows one step commands without difficulty   Subjective   Subjective states he feels hot, weak, tired.  cooperative w/ encouragement   Transfers   Sit to Stand 4  Minimal assistance   Additional items Assist x 1;Increased time required;Verbal cues   Stand to Sit 4  Minimal assistance   Additional items Assist x 1;Increased time required;Verbal cues   Ambulation/Elevation   Gait pattern   (slow, forward flexion, ataxia)   Gait Assistance 4  Minimal assist   Additional items Assist x 1  (w/ 2nd for chair follow)   Assistive Device Rolling walker   Distance 30'+40'+ 70' w/ extended seated rest periods.  time spent for setup, reposiitoning   Balance   Static Sitting Good   Dynamic Sitting Fair -   Static Standing Poor +   Dynamic Standing Poor +   Ambulatory Poor +   Endurance Deficit   Endurance Deficit Yes   Endurance Deficit Description fatigue, weakness, pain, fatigue   Activity Tolerance   Activity Tolerance Patient tolerated treatment well;Patient limited by fatigue;Patient limited by pain;Treatment limited secondary to medical complications (Comment)   Nurse Made Aware yes   Assessment   Prognosis Good   Problem List Decreased strength;Decreased endurance;Impaired balance;Decreased  mobility;Decreased coordination;Pain   Assessment Pt seen for session for setup, transfers, gait w/ rest periods, repositioning.  Pt cooperative w/ session, but limited by weakness, decreased endurance, pain.  continues to need A of 1 for safety w/ mobility.  Needs cues for pacing, RW use.  due to continued decrease in mobility tolerance, endurance, continue to lean towards Level I (maximal) post acute care therapy needs at d/c.  Pt/wife have been declining, and may d/c home w/ Level III per their request.  will follow   Goals   Patient Goals to feel better   [facility] Expiration Date 07/05/24   PT Treatment Day 1   Plan   Treatment/Interventions Functional transfer training;LE strengthening/ROM;Therapeutic exercise;Endurance training;Patient/family training;Equipment eval/education;Bed mobility;Gait training;Elevations   Progress Progressing toward goals   PT Frequency 4-6x/wk   Discharge Recommendation   Rehab Resource Intensity Level, PT I (Maximum Resource Intensity)   Equipment Recommended Walker  (pt owns RW)   AM-PAC Basic Mobility Inpatient   Turning in Flat Bed Without Bedrails 3   Lying on Back to Sitting on Edge of Flat Bed Without Bedrails 2   Moving Bed to Chair 3   Standing Up From Chair Using Arms 3   Walk in Room 3   Climb 3-5 Stairs With Railing 2   Basic Mobility Inpatient Raw Score 16   Basic Mobility Standardized Score 38.32   [facility] Highest Level Of Mobility   -HLM Goal 5: Stand one or more mins   -HLM Achieved 7: Walk 25 feet or more     Dandre Castillo PT, DPT CSRS

## 2024-06-24 NOTE — PLAN OF CARE
Problem: Prexisting or High Potential for Compromised Skin Integrity  Goal: Skin integrity is maintained or improved  Description: INTERVENTIONS:  - Identify patients at risk for skin breakdown  - Assess and monitor skin integrity  - Assess and monitor nutrition and hydration status  - Monitor labs   - Assess for incontinence   - Turn and reposition patient  - Assist with mobility/ambulation  - Relieve pressure over bony prominences  - Avoid friction and shearing  - Provide appropriate hygiene as needed including keeping skin clean and dry  - Evaluate need for skin moisturizer/barrier cream  - Collaborate with interdisciplinary team   - Patient/family teaching  - Consider wound care consult   Outcome: Progressing     Problem: PAIN - ADULT  Goal: Verbalizes/displays adequate comfort level or baseline comfort level  Description: Interventions:  - Encourage patient to monitor pain and request assistance  - Assess pain using appropriate pain scale  - Administer analgesics based on type and severity of pain and evaluate response  - Implement non-pharmacological measures as appropriate and evaluate response  - Consider cultural and social influences on pain and pain management  - Notify physician/advanced practitioner if interventions unsuccessful or patient reports new pain  Outcome: Progressing     Problem: INFECTION - ADULT  Goal: Absence or prevention of progression during hospitalization  Description: INTERVENTIONS:  - Assess and monitor for signs and symptoms of infection  - Monitor lab/diagnostic results  - Monitor all insertion sites, i.e. indwelling lines, tubes, and drains  - Monitor endotracheal if appropriate and nasal secretions for changes in amount and color  - [place] appropriate cooling/warming therapies per order  - Administer medications as ordered  - Instruct and encourage patient and family to use good hand hygiene technique  - Identify and instruct in appropriate isolation precautions for  identified infection/condition  Outcome: Progressing  Goal: Absence of fever/infection during neutropenic period  Description: INTERVENTIONS:  - Monitor WBC    Outcome: Progressing     Problem: SAFETY ADULT  Goal: Patient will remain free of falls  Description: INTERVENTIONS:  - Educate patient/family on patient safety including physical limitations  - Instruct patient to call for assistance with activity   - Consult OT/PT to assist with strengthening/mobility   - Keep Call bell within reach  - Keep bed low and locked with side rails adjusted as appropriate  - Keep care items and personal belongings within reach  - Initiate and maintain comfort rounds  - Make Fall Risk Sign visible to staff  - Offer Toileting every 2 Hours, in advance of need  - Initiate/Maintain chair alarm  - Apply yellow socks and bracelet for high fall risk patients  - Consider moving patient to room near nurses station  Outcome: Progressing  Goal: Maintain or return to baseline ADL function  Description: INTERVENTIONS:  -  Assess patient's ability to carry out ADLs; assess patient's baseline for ADL function and identify physical deficits which impact ability to perform ADLs (bathing, care of mouth/teeth, toileting, grooming, dressing, etc.)  - Assess/evaluate cause of self-care deficits   - Assess range of motion  - Assess patient's mobility; develop plan if impaired  - Assess patient's need for assistive devices and provide as appropriate  - Encourage maximum independence but intervene and supervise when necessary  - Involve family in performance of ADLs  - Assess for home care needs following discharge   - Consider OT consult to assist with ADL evaluation and planning for discharge  - Provide patient education as appropriate  Outcome: Progressing  Goal: Maintains/Returns to pre admission functional level  Description: INTERVENTIONS:  - Perform AM-PAC 6 Click Basic Mobility/ Daily Activity assessment daily.  - Set and communicate daily  mobility goal to care team and patient/family/caregiver.   - Collaborate with rehabilitation services on mobility goals if consulted  - Ambulate patient 3 times a day  - Out of bed for toileting  - Record patient progress and toleration of activity level   Outcome: Progressing     Problem: DISCHARGE PLANNING  Goal: Discharge to home or other facility with appropriate resources  Description: INTERVENTIONS:  - Identify barriers to discharge w/patient and caregiver  - Arrange for needed discharge resources and transportation as appropriate  - Identify discharge learning needs (meds, wound care, etc.)  - Arrange for interpretive services to assist at discharge as needed  - Refer to Case Management Department for coordinating discharge planning if the patient needs post-hospital services based on physician/advanced practitioner order or complex needs related to functional status, cognitive ability, or social support system  Outcome: Progressing     Problem: Knowledge Deficit  Goal: Patient/family/caregiver demonstrates understanding of disease process, treatment plan, medications, and discharge instructions  Description: Complete learning assessment and assess knowledge base.  Interventions:  - Provide teaching at level of understanding  - Provide teaching via preferred learning methods  Outcome: Progressing

## 2024-06-24 NOTE — CASE MANAGEMENT
Case Management Discharge Planning Note    Patient name Shane Chau .  Location [facility] 405/[facility] 405-01 MRN [id number]  : 1945 Date 2024       Current Admission Date: 2024  Current Admission Diagnosis:Coronary artery disease of native artery of native heart with stable angina pectoris (HCC)   Patient Active Problem List    Diagnosis Date Noted Date Diagnosed    BARBARA (acute kidney injury) ([facility]) 2024     S/P CABG x 3 2024     Rib pain 2023     Paresthesia 2023     Leg swelling 05/10/2022     Erectile dysfunction 2022     Microhematuria 2018     Need for pneumococcal vaccination 2018     Hematuria 2018     Asymptomatic bilateral carotid artery stenosis 2017     Closed compression fracture of first lumbar vertebra ([facility]) 2017     Abdominal aortic aneurysm without rupture ([facility]) 2016     Localized primary osteoarthritis of lower leg, unspecified laterality 2013     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) 2012     Hyperlipidemia 2012     Hypertension 2012       LOS (days): 4  Geometric Mean LOS (GMLOS) (days): 5.8  Days to GMLOS:1.6     OBJECTIVE:  Risk of Unplanned Readmission Score: 15.97         Current admission status: Inpatient   Preferred Pharmacy:   [facility] - [place] - [address]  [address]  [place] [address]  Phone: [phone] Fax: [phone]    [id number] - [place], [place] - [address]  [place] [address]  Phone: [phone] Fax: [phone]    [facility] #[id number] - [place] - [address]  [address] [place] [address]  Phone: [phone] Fax: [phone]    [facility] ([facility]  - [place] - [address]  [address]  [place] [address]  Phone: [phone] Fax: [phone]    Primary Care Provider: Sherwin Mantilla MD    Primary Insurance: [facility] REP  Secondary Insurance:     DISCHARGE DETAILS:    Discharge planning discussed with:: Patient and wife Maryjo at bedside  Freedom of Choice: Yes  Comments - Freedom of Choice: Both pt and wife Maryjo refuse STR. Agreeable to blanket referrals for [facility]NA for Home PT/OT.  CM contacted family/caregiver?: Yes (Evelyn Sibley at bedside)  Were Treatment Team discharge recommendations reviewed with patient/caregiver?: Yes  Did patient/caregiver verbalize understanding of patient care needs?: Yes  Were patient/caregiver advised of the risks associated with not following Treatment Team discharge recommendations?: Yes    Contacts  Patient Contacts: Evelyn Sibley  Relationship to Patient:: Family  Contact Method: In Person  Reason/Outcome: Discharge Planning    Requested Home Health Care         Is the patient interested in HHC at discharge?: Yes  Home Health Discipline requested:: Nursing, Physical Therapy, Occupational Therapy  Home Health Follow-Up Provider:: Referring Provider  Home Health Services Needed:: Post-Op Care and Assessment, Evaluate Functional Status and Safety, Strengthening/Theraputic Exercises to Improve Function  Homebound Criteria Met:: Uses an Assist Device (i.e. cane, walker, etc)         Other Referral/Resources/Interventions Provided:  Interventions: HHA  Referral Comments: Met with pt and evelyn Sibley at bedside to discuss STR rec level 1. pt and wife declined STR and requesting HHPT. Explained the limitations of HHPT vs STR. Both verbalized understanding, evelyn Sibley states she had a CABG 1.5 years ago and recovered at home and she is aware of the assistance the patient will need and feels confident she can provide it. She has also seen pt ambulate while here post surgery. Both pt and wife are agreeable with [facility] referral  for VNA.         Treatment Team Recommendation: Short Term Rehab  Discharge Destination Plan:: Home with Home Health Care

## 2024-06-24 NOTE — CONSULTS
Consultation - Cardiology Team One  Shane Chau Sr. 78 y.o. male MRN: [id number]  Unit/Bed#: [facility] 405-01 Encounter: [id number]    Inpatient consult to Cardiology  Consult performed by: Brittny Barclay PA-C  Consult ordered by: SANTHOSH Davis          Physician Requesting Consult: KALPANA Amos MD  Reason for Consult / Principal Problem: CAD s/p CABG    Assessment:    1.  CAD: s/p CABG x 3 with LIMA-LAD, SVG-RPDA and SVG-OM1.  POD #4  Final intraoperative DEDRICK: EF 55%  Rhythm management: Amiodarone 200 mg TID and Lopressor 50 mg BID  Volume management: IV Bumex transition to oral Bumex 2 mg BID today with K+ supplement.  Net output -2.0 L  Aspirin, beta-blocker, statin    2.  Preserved biventricular systolic function: Final intraoperative DEDRICK EF 55%.  Management as above.    3.  Postoperative respiratory insufficiency: Currently requiring 3 L NC.    4.  Postoperative anemia: Baseline hemoglobin 12-13.  Hemoglobin 10.2 today.    5.  Essential hypertension: Average /59 on amlodipine 10 mg daily    6.  Hyperlipidemia: , , HDL 36, LDL 62 on rosuvastatin 40 mg daily switch to atorvastatin 80 mg this admission due to nonformulary.    7.  PAD: Maintained on aspirin, Plavix and statin.      Plan/Recommendations:  Continue oral diuretics for negative fluid balance  Monitor I&O's, renal function, electrolytes and weight  Maintain potassium >4 and magnesium >2  Wean oxygen as able  Continue current medication regimen  Encourage ambulation and incentive spirometry  Continue to monitor on telemetry  ______________________________________________________________________    CC: Exertional dyspnea and chest pain      History of Present Illness   HPI: Shane Chau Sr. is a 78 y.o. year old male who has CAD s/p JENN to OM in 2006, essential hypertension, hyperlipidemia, AAA s/p repair, PAD who follows with cardiologist Dr. Juares.  Patient was seen for an acute visit on 5/14/2024 with  worsening dyspnea on exertion and exertional chest pain over the past month.  He underwent a LHC on 6/3/2024 that revealed three-vessel CAD with  collateralized LAD, calcified 90% irregular RCA and small OM1 with moderate disease within the prior AV groove stented area.  An echocardiogram was done that revealed preserved BiV function with no significant valvular abnormality.  Patient was referred to CT surgery for CABG evaluation.  After routine preoperative testing patient presented 6/20/2024 and underwent successful CABG x 3 with LIMA-LAD, SVG-RPDA and SVG-OM1.  Final intraoperative DEDRICK revealed EF of 55%.  Cardiology has been consulted for postoperative co-management.    Home medication regimen includes amlodipine 10 mg daily, aspirin 325 mg daily, Plavix 75 mg daily, Imdur 60 mg daily, Lopressor 50 mg BID, Ranexa 500 mg BID, rosuvastatin 40 mg daily.    Patient sitting in a chair during consultation and states that he feels very tired.  He has some shortness of breath.  He denies any significant chest pain palpitation, lightheadedness or dizziness.      Echocardiogram 6/13/2024: EF 60% with no WMA, G1DD, normal RV function, no significant valvular abnormality.    Cardiac catheterization 6/3/2024: Three-vessel CAD with  collateralized LAD, calcified 90% irregular RCA, smaller OM1 with moderate disease within the prior AV groove stented areas.    Left Main   Ost LM to Mid LM lesion is 30% stenosed.      Left Anterior Descending   Prox LAD lesion is 100% stenosed. The lesion is calcified. The lesion is chronically occluded.      Second Diagonal Branch   Collaterals   2nd Diag filled by collaterals from Ramus.   The collateral flow was moderate.      Left Circumflex   Prox Cx to Mid Cx lesion is 10% stenosed. The lesion was previously treated using a stent of unknown type.      First Obtuse Marginal Branch   1st Mrg lesion is 70% stenosed.      Right Coronary Artery   Prox RCA lesion is 90% stenosed. KALE flow  is 3. The lesion is type C. The lesion is calcified.      Intervention     No interventions have been documented.       EKG reviewed personally: 6/23/2024-sinus rhythm at a rate of 83 bpm with PACs.  When compared to EKG from 6/21/2024 PACs were not noted otherwise no significant change.    Telemetry reviewed personally: Normal sinus rhythm with heart rates in the 60-80s        Review of Systems   Constitutional: Positive for malaise/fatigue. Negative for chills.   Cardiovascular:  Negative for chest pain, dyspnea on exertion, leg swelling, near-syncope, orthopnea, palpitations, paroxysmal nocturnal dyspnea and syncope.   Respiratory:  Positive for shortness of breath. Negative for cough and wheezing.    Endocrine: Negative.    Hematologic/Lymphatic: Negative.    Skin: Negative.    Musculoskeletal: Negative.    Gastrointestinal: Negative.  Negative for diarrhea, nausea and vomiting.   Neurological:  Negative for dizziness, light-headedness and weakness.   Psychiatric/Behavioral: Negative.  Negative for altered mental status.    All other systems reviewed and are negative.    Historical Information   Past Medical History:   Diagnosis Date    AAA (abdominal aortic aneurysm) (HCC)     s/p open repair    BPH (benign prostatic hyperplasia)     CAD (coronary artery disease)     Cholelithiasis     Former tobacco use     History of MI (myocardial infarction)     History of TIA (transient ischemic attack)     Hyperlipidemia     Hypertension     Insomnia     Obesity      Past Surgical History:   Procedure Laterality Date    ABDOMINAL AORTIC ANEURYSM REPAIR  06/07/2011    Dr. Yarbrough 6/7/11    APPENDECTOMY      CARDIAC CATHETERIZATION Left 06/03/2024    Procedure: Cardiac Left Heart Cath;  Surgeon: Pk Blanco DO;  [facility]: [facility];  Service: Cardiology    CARDIAC CATHETERIZATION  06/03/2024    Procedure: Cardiac catheterization;  Surgeon: Pk Blanco DO;  [facility];   Service: Cardiology    CARDIAC CATHETERIZATION N/A 2024    Procedure: Cardiac Coronary Angiogram;  Surgeon: Pk Blanco DO;  [facility]: [facility];  Service: Cardiology    CATARACT EXTRACTION Right 2018    CHOLECYSTECTOMY LAPAROSCOPIC      COLONOSCOPY  2006    CORONARY ANGIOPLASTY      3 STENTS INSERTED    HARVEST VEIN Left 2024    Procedure: HARVEST VEIN ENDOSCOPIC (EVH);  Surgeon: KALPANA Amos MD;  Location: [facility];  Service: Cardiac Surgery    CA CORONARY ARTERY BYP W/VEIN & ARTERY GRAFT 3 VEIN N/A 2024    Procedure: CORONARY ARTERY BYPASS GRAFT (CABG) X3 VESSELS, LIMA - LAD, LEFT LEG EVH/SVG - PDA AND OM1, W/ DEDRICK;  Surgeon: KALPANA Amos MD;  Location: [facility];  Service: Cardiac Surgery    CA LAPS SURG CHOLECYSTECTOMY W/CHOLANGIOGRAPHY N/A 2016    Procedure: CHOLECYSTECTOMY LAPAROSCOPIC with intra-op cholangiogram ;  Surgeon: Prasanth Verdin MD;  Location: [facility];  Service: General     Social History     Substance and Sexual Activity   Alcohol Use Not Currently    Comment: 4 oz glass of wine every other day w/ his dinner     Social History     Substance and Sexual Activity   Drug Use No     Social History     Tobacco Use   Smoking Status Former    Current packs/day: 0.00    Average packs/day: 0.5 packs/day for 30.0 years (15.0 ttl pk-yrs)    Types: Cigarettes    Start date: 3/10/1956    Quit date: 3/10/1986    Years since quittin.3   Smokeless Tobacco Never   Tobacco Comments    Quit  about 25 years x1ppd     Family History:   Family History   Problem Relation Age of Onset    Heart disease Mother     Hypertension Mother         Benign Essential     Stroke Mother         Stroke     Heart disease Father     Coronary artery disease Brother     Diabetes Brother         mellitus     Heart disease Brother     Aortic aneurysm Brother     Coronary artery disease Brother     Sudden death Brother     Coronary artery disease Brother      Sudden death Brother     Sudden death Brother     Coronary artery disease Brother     Heart disease Family        Meds/Allergies   all current active meds have been reviewed, current meds:   Current Facility-Administered Medications   Medication Dose Route Frequency    acetaminophen (TYLENOL) tablet 975 mg  975 mg Oral Q6H While awake    amiodarone tablet 200 mg  200 mg Oral Q8H KRISTI    amLODIPine (NORVASC) tablet 10 mg  10 mg Oral Daily    aspirin (ECOTRIN LOW STRENGTH) EC tablet 81 mg  81 mg Oral Daily    atorvastatin (LIPITOR) tablet 80 mg  80 mg Oral Daily With Dinner    bisacodyl (DULCOLAX) rectal suppository 10 mg  10 mg Rectal Daily PRN    bumetanide (BUMEX) tablet 2 mg  2 mg Oral BID (diuretic)    chlorhexidine (PERIDEX) 0.12 % oral rinse 15 mL  15 mL Mouth/Throat BID    clopidogrel (PLAVIX) tablet 75 mg  75 mg Oral Daily    docusate sodium (COLACE) capsule 100 mg  100 mg Oral BID    heparin (porcine) subcutaneous injection 5,000 Units  5,000 Units Subcutaneous Q8H [facility]    hydrALAZINE (APRESOLINE) injection 5 mg  5 mg Intravenous Q6H PRN    insulin lispro (HumALOG/ADMELOG) 100 units/mL subcutaneous injection 1-5 Units  1-5 Units Subcutaneous HS    insulin lispro (HumALOG/ADMELOG) 100 units/mL subcutaneous injection 1-6 Units  1-6 Units Subcutaneous TID AC    lidocaine (LIDODERM) 5 % patch 2 patch  2 patch Topical Daily    magnesium hydroxide (MILK OF MAGNESIA) oral suspension 30 mL  30 mL Oral Daily PRN    melatonin tablet 6 mg  6 mg Oral HS    methocarbamol (ROBAXIN) tablet 500 mg  500 mg Oral Q6H [facility]    metoprolol tartrate (LOPRESSOR) tablet 50 mg  50 mg Oral Q12H KRISTI    ondansetron (ZOFRAN) injection 4 mg  4 mg Intravenous Q6H PRN    oxyCODONE (ROXICODONE) split tablet 2.5 mg  2.5 mg Oral Q4H PRN    Or    oxyCODONE (ROXICODONE) IR tablet 5 mg  5 mg Oral Q4H PRN    pantoprazole (PROTONIX) EC tablet 40 mg  40 mg Oral Early Morning    polyethylene glycol (MIRALAX) packet 17 g  17 g Oral Daily    potassium  "chloride (Klor-Con M20) CR tablet 20 mEq  20 mEq Oral BID    senna (SENOKOT) tablet 8.6 mg  1 tablet Oral HS    temazepam (RESTORIL) capsule 15 mg  15 mg Oral HS PRN   , and PTA meds:   Prior to Admission Medications   Prescriptions Last Dose Informant Patient Reported? Taking?   amLODIPine (NORVASC) 10 mg tablet 2024 Self No Yes   Sig: Take 1 tablet (10 mg total) by mouth daily   aspirin 325 mg tablet 2024 Self No Yes   Sig: Take 1 tablet (325 mg total) by mouth daily   clopidogrel (PLAVIX) 75 mg tablet 6/15/2024 Self No No   Sig: Take 1 tablet (75 mg total) by mouth daily   isosorbide mononitrate (IMDUR) 60 mg 24 hr tablet 2024 Self No Yes   Sig: Take 1 tablet (60 mg total) by mouth 2 (two) times a day   metoprolol tartrate (LOPRESSOR) 50 mg tablet 2024 Self No Yes   Sig: Take 1 tablet (50 mg total) by mouth 2 (two) times a day   mupirocin (BACTROBAN) 2 % ointment 2024  No Yes   Sig: Apply topically 2 (two) times a day   nitroglycerin (NITROSTAT) 0.4 mg SL tablet  Self No No   Sig: Place 1 tablet (0.4 mg total) under the tongue every 5 (five) minutes as needed for chest pain   ranolazine (RANEXA) 500 mg 12 hr tablet 2024 Self No Yes   Sig: Take 1 tablet (500 mg total) by mouth 2 (two) times a day   rosuvastatin (CRESTOR) 40 MG tablet 2024 Self No Yes   Sig: Take 1 tablet (40 mg total) by mouth daily   tamsulosin (FLOMAX) 0.4 mg 2024 Self No Yes   Sig: Take 1 capsule by mouth daily with dinner      Facility-Administered Medications: None          No Known Allergies    Objective   Vitals: Blood pressure 103/77, pulse 82, temperature 97.9 °F (36.6 °C), resp. rate 16, height 5' 5\" (1.651 m), weight 85.3 kg (188 lb), SpO2 90%.,     Body mass index is 31.28 kg/m².,     Systolic (24hrs), Av , Min:97 , Max:129     Diastolic (24hrs), Av, Min:54, Max:77    Wt Readings from Last 3 Encounters:   24 85.3 kg (188 lb)   24 86.2 kg (190 lb 0.6 oz)   24 86.2 kg " (190 lb)      Lab Results   Component Value Date    CREATININE 1.86 (H) 06/24/2024    CREATININE 1.60 (H) 06/23/2024    CREATININE 1.29 06/22/2024         Intake/Output Summary (Last 24 hours) at 6/24/2024 0914  Last data filed at 6/24/2024 0851  Gross per 24 hour   Intake 1540 ml   Output 2950 ml   Net -1410 ml     Weight (last 2 days)       Date/Time Weight    06/24/24 0600 85.3 (188)    06/23/24 0600 87 (191.8)    06/22/24 0550 91.3 (201.28)          Invasive Devices       Central Venous Catheter Line  Duration             CVC Central Lines 06/20/24 4 days              Peripheral Intravenous Line  Duration             Peripheral IV 06/20/24 Left Hand 4 days                      Physical Exam  Vitals and nursing note reviewed.   Constitutional:       General: He is not in acute distress.     Appearance: He is well-developed.   HENT:      Head: Normocephalic and atraumatic.   Neck:      Vascular: No JVD.      Comments: Invasive lines noted in right neck  Cardiovascular:      Rate and Rhythm: Normal rate and regular rhythm.      Heart sounds: Normal heart sounds. No murmur heard.     No friction rub. No gallop.   Pulmonary:      Effort: Pulmonary effort is normal. No respiratory distress.      Breath sounds: No wheezing or rales.      Comments: Diminished breath sounds at the bases bilaterally  Chest:      Chest wall: No tenderness.   Abdominal:      General: Bowel sounds are normal. There is no distension.      Palpations: Abdomen is soft.      Tenderness: There is no abdominal tenderness.   Musculoskeletal:         General: No tenderness. Normal range of motion.      Cervical back: Normal range of motion and neck supple.      Right lower leg: No edema.      Left lower leg: No edema.   Skin:     General: Skin is warm and dry.      Coloration: Skin is not pale.      Findings: No erythema.      Comments: Sternal incision clean and dry   Neurological:      Mental Status: He is alert and oriented to person, place, and  time.   Psychiatric:         Mood and Affect: Mood normal.         Behavior: Behavior normal.         Thought Content: Thought content normal.         Judgment: Judgment normal.           LABORATORY RESULTS:      CBC with diff:   Results from last 7 days   Lab Units 06/24/24  0452 06/23/24 0418 06/22/24  0423 06/21/24  0349 06/20/24  1942 06/20/24  1310 06/20/24  1306 06/20/24  1202 06/20/24  1138   WBC Thousand/uL 16.01* 15.76* 24.70* 17.41*  --   --   --   --   --    HEMOGLOBIN g/dL 10.2* 10.4* 11.2* 12.5 13.1  --  12.2  --   --    I STAT HEMOGLOBIN g/dl  --   --   --   --   --  10.5*  --    < > 9.2*   HEMATOCRIT % 29.1* 30.1* 32.1* 35.3* 37.6  --  35.0*  --   --    HEMATOCRIT, ISTAT %  --   --   --   --   --  31*  --    < > 27*   MCV fL 96 96 96 95  --   --   --   --   --    PLATELETS Thousands/uL 135* 130* 158 145*  --   --  111*  --  148*   RBC Million/uL 3.04* 3.14* 3.35* 3.71*  --   --   --   --   --    MCH pg 33.6 33.1 33.4 33.7  --   --   --   --   --    MCHC g/dL 35.1 34.6 34.9 35.4  --   --   --   --   --    RDW % 12.3 12.4 12.1 12.0  --   --   --   --   --    MPV fL 9.1 9.0 9.1 8.9  --   --  8.7*  --  8.7*    < > = values in this interval not displayed.       CMP:  Results from last 7 days   Lab Units 06/24/24 0452 06/23/24 0418 06/22/24  1358 06/22/24  0423 06/21/24  1429 06/21/24  0335 06/20/24 1942 06/20/24  1647 06/20/24  1310 06/20/24  1306 06/20/24  1202 06/20/24  1138 06/20/24  1106 06/20/24  1048 06/20/24  1041 06/20/24  0951   POTASSIUM mmol/L 3.4* 3.6 4.3 4.4 4.3 4.6 4.2   < >  --  3.8  --   --   --   --   --   --    CHLORIDE mmol/L 90* 95* 101 103 105 110*  --   --   --  109*  --   --   --   --   --   --    CO2 mmol/L 35* 31 24 22 20* 18*  --   --   --  22  --   --   --   --   --   --    CO2, I-STAT mmol/L  --   --   --   --   --   --   --   --  21  --  22 25 26 27 26 26   BUN mg/dL 48* 39* 33* 30* 24 20  --   --   --  16  --   --   --   --   --   --    CREATININE mg/dL 1.86* 1.60* 1.29  "1.25 1.29 1.29  --   --   --  0.96  --   --   --   --   --   --    GLUCOSE, ISTAT mg/dl  --   --   --   --   --   --   --   --  140  --  171* 182* 160* 149* 141* 138   CALCIUM mg/dL 8.2* 8.0* 7.9* 8.0* 8.0* 7.7*  --   --   --  8.9  --   --   --   --   --   --    EGFR ml/min/1.73sq m 33 40 52 54 52 52  --   --   --  75  --   --   --   --   --   --     < > = values in this interval not displayed.       BMP:  Results from last 7 days   Lab Units 06/24/24  0452 06/23/24 0418 06/22/24  1358 06/22/24  0423 06/21/24  1429 06/21/24  0335 06/20/24  1942 06/20/24  1647 06/20/24  1310 06/20/24  1306   POTASSIUM mmol/L 3.4* 3.6 4.3 4.4 4.3 4.6 4.2   < >  --  3.8   CHLORIDE mmol/L 90* 95* 101 103 105 110*  --   --   --  109*   CO2 mmol/L 35* 31 24 22 20* 18*  --   --   --  22   CO2, I-STAT mmol/L  --   --   --   --   --   --   --   --  21  --    BUN mg/dL 48* 39* 33* 30* 24 20  --   --   --  16   CREATININE mg/dL 1.86* 1.60* 1.29 1.25 1.29 1.29  --   --   --  0.96   GLUCOSE, ISTAT mg/dl  --   --   --   --   --   --   --   --  140  --    CALCIUM mg/dL 8.2* 8.0* 7.9* 8.0* 8.0* 7.7*  --   --   --  8.9    < > = values in this interval not displayed.          No results found for: \"NTBNP\"         Results from last 7 days   Lab Units 06/23/24 0418 06/22/24  0423 06/21/24  0409   MAGNESIUM mg/dL 2.0 2.1 2.3                     Results from last 7 days   Lab Units 06/20/24  1306   INR  1.37*     Lipid Profile:   Lab Results   Component Value Date    CHOL 138 08/20/2015    CHOL 135 09/30/2014     Lab Results   Component Value Date    HDL 36 (L) 06/08/2024    HDL 43 06/22/2023    HDL 43 03/26/2022     Lab Results   Component Value Date    LDLCALC 62 06/08/2024    LDLCALC 65 06/22/2023    LDLCALC 54 03/26/2022     Lab Results   Component Value Date    TRIG 116 06/08/2024    TRIG 104 06/22/2023    TRIG 103 03/26/2022       Imaging: I have personally reviewed pertinent reports.    XR chest portable    Result Date: 6/21/2024  Narrative: XR " CHEST PORTABLE INDICATION: Post Open Heart Surgey. COMPARISON: 6/20/2024 FINDINGS: Sternotomy. Mediastinal/thoracostomy tubes. Right  sided central venous catheter tip terminates near cavoatrial junction. ET tube and Pangburn-Luis catheter removed. Left lung base atelectasis. Lungs are otherwise clear. No pneumothorax or large pleural effusion. Unchanged cardiomediastinal silhouette. No acute osseous abnormality within the limitations of portable radiography. Normal upper abdomen.     Impression: No acute cardiopulmonary disease. Workstation performed: [id number]     DEDRICK intraop interventional w/realtime guidance of cardiac procedures    Result Date: 6/21/2024  Narrative: This order contains the linked images for the DEDRICK that was performed by the Anesthesiologist.  Please see the  CARDIAC DEDRICK ANESTHESIA procedure for results.    XR chest portable ICU    Result Date: 6/20/2024  Narrative: XR CHEST PORTABLE ICU INDICATION: S/P open heart. COMPARISON: CXR 6/14/2024, chest CT 9/8/2023. FINDINGS: ET tube 3 cm above the arminda. NG tube in stomach. Right jugular pulmonary artery catheter in main pulmonary artery. Chest tube over the left base. Moderate left base opacity. No pneumothorax. Normal heart size, CABG with sternal wires well aligned. Two mediastinal drains. Temporary epicardial pacemaker wires. Bones are unremarkable for age. Normal upper abdomen.     Impression: Expected appearance after CABG. Left base opacity, likely atelectasis. Small effusion not excluded. Workstation performed: [id number]     DEDRICK Anesthesia    Result Date: 6/20/2024  Narrative: Neena Crowder MD     6/20/2024 12:33 PM Procedure Performed: DEDRICK Anesthesia Start Time:  6/20/2024 8:17 AM Procedure Diagnostic Indications for DEDRICK:  hemodynamic monitoring. Type of DEDRICK: complete DEDRICK with interpretation.    Location performed: OR. Intubated.  Heart visualized. Insertion of DEDRICK Probe:  Atraumatic. Probe Type:  Multiplane and epiaortic. Modalities:  2D only,  color flow mapping and continuous wave Doppler. Echocardiographic and Doppler Measurements PREPROCEDURE LEFT VENTRICLE: Systolic Function: normal. Ejection Fraction: 55%. Cavity size: normal.   RIGHT VENTRICLE: Systolic Function: normal.  Cavity size mildly dilated. Hypertrophy present. AORTIC VALVE: Leaflets: normal and trileaflet. Leaflet motions normal and normal. Stenosis: none.     Regurgitation: none.  MITRAL VALVE: Leaflets: normal. Leaflet Motions: normal. Regurgitation: mild.   Stenosis: none.   TRICUSPID VALVE: Leaflets: normal. Leaflet Motions: normal. Stenosis: none. Regurgitation: mild. PULMONIC VALVE: Leaflets: normal. Regurgitation: trace. ASCENDING AORTA: Size:  normal.  Dissection not present.  AORTIC ARCH: Size:  normal.   Grade 2: severe intimal thickening without protruding atheroma. DESCENDING AORTA: Size: normal.   Grade 4: atheroma protruding => 0.5 cm into lumen. RIGHT ATRIUM: Size:  normal. LEFT ATRIUM: Size: normal. LEFT ATRIAL APPENDAGE: Size: normal. No spontaneous echo contrast ATRIAL SEPTUM: Intra-atrial septal morphology: patent foramen ovale.  VENTRICULAR SEPTUM: Intra-ventricular septum morphology: normal. EPIAORTIC: Plaque Thickness: > 5 mm. OTHER FINDINGS: Pericardium:  normal. Pleural Effusion:  none. POSTPROCEDURE LEFT VENTRICLE: Unchanged .    RIGHT VENTRICLE: Unchanged .  AORTIC VALVE: Unchanged .      MITRAL VALVE: Unchanged .    TRICUSPID VALVE: Unchanged .   PULMONIC VALVE: Unchanged   ATRIA: Unchanged .   AORTA: Unchanged . REMOVAL: Probe Removal: atraumatic.      US bedside procedure    Result Date: 6/20/2024  Narrative: [id number]    CT chest wo contrast    Result Date: 6/19/2024  Narrative: CT CHEST WITHOUT IV CONTRAST INDICATION: I25.118: Atherosclerotic heart disease of native coronary artery with other forms of angina pectoris Z01.812: Encounter for preprocedural laboratory examination Z01.810: Encounter for preprocedural  cardiovascular examination. COMPARISON: CTA chest/abdomen/pelvis 3/28/2016. TECHNIQUE: CT examination of the chest was performed without intravenous contrast. Multiplanar 2D reformatted images were created from the source data. This examination, like all CT scans performed in the [facility], was performed utilizing techniques to minimize radiation dose exposure, including the use of iterative reconstruction and automated exposure control. Radiation dose length product (DLP) for this visit: 428 mGy-cm FINDINGS: LUNGS: Mild right apical pleural-parenchymal scarring. Mild right basilar dependent changes. No consolidation, edema or suspicious nodules. There is no tracheal or endobronchial lesion. PLEURA: Single flat posterior left pleural plaque, unlikely to be related to asbestosis exposure. HEART/GREAT VESSELS: Heavy atherosclerotic coronary artery calcification. Mild atherosclerotic aortic calcifications. No thoracic aortic aneurysm. MEDIASTINUM AND OVI: Unremarkable. CHEST WALL AND LOWER NECK: Unremarkable. VISUALIZED STRUCTURES IN THE UPPER ABDOMEN: Cholecystectomy. OSSEOUS STRUCTURES: No acute fracture or destructive osseous lesion.     Impression: No consolidation, edema or suspicious nodules. Workstation performed: [id number]     Echo complete w/ contrast if indicated    Result Date: 6/13/2024  Narrative:   Left Ventricle: Left ventricular cavity size is normal. Wall thickness is normal. The left ventricular ejection fraction is 60%. Systolic function is normal. Wall motion is normal. Diastolic function is mildly abnormal, consistent with grade I (abnormal) relaxation.   Right Ventricle: Right ventricular cavity size is normal. Systolic function is normal.   Aortic Valve: There is aortic valve sclerosis.     VAS VEIN MAPPING- LOWER EXTREMITY    Result Date: 6/11/2024  Narrative:  [facility] REPORT CLINICAL: Indications: Patient presents for surgical clearance and general health  evaluation secondary to future open heart surgery.  Patient is asymptomatic at this time. Operative History: 2024-06-03 Cardiac catheterization 2012-03-01 Coronary Angioplasty & Stent x 3 2011-06-07 AAA Open Repair (Tube Graft) (Dr. Yarbrough) Risk Factors The patient has history of KIRBY, HTN, Hyperlipidemia, PAD, CAD, AAA, TIA and previous smoking (quit >10yrs ago). He is currently on a blood thinner.  FINDINGS:  Right           Impression  AP (mm)  GSV Inguinal                    4.2  GSV Prox Thigh  Branch          3.2  GSV Mid Thigh                   2.9  GSV Dist Thigh                  3.2  GSV Knee        Branch          2.2  GSV Prox Calf                   2.4  GSV Mid Calf    Branch          2.9  GSV Dist Calf                   2.3  GSV Ankle                       2.8   Left            Impression  AP (mm)  GSV Inguinal                    3.7  GSV Prox Thigh                  4.9  GSV Mid Thigh   Branch          3.4  GSV Dist Thigh  Branch          3.5  GSV Knee                        2.6  GSV Prox Calf                   2.7  GSV Mid Calf                    2.4  GSV Dist Calf                   3.0  GSV Ankle                       2.7     CONCLUSION:  Impression:  RIGHT LOWER LIMB: The great saphenous vein is patent  from the groin to the ankle. The intraluminal diameter measurements range from 2.2 mm to 4.2 mm throughout. Multiple branches noted.  LEFT LOWER LIMB: The great saphenous vein is patent  from the groin to the ankle. The intraluminal diameter measurements range from 2.4 mm to 4.9 mm throughout. Multiple branches noted.  SIGNATURE: Electronically Signed by: MEERA WANG MD on 2024-06-11 12:05:05 PM    Cardiac catheterization    Result Date: 6/3/2024  Narrative:   3V CAD with  collateralized LAD, calcified 90% irregular RCA, smaller OM1 with moderate disease within prior AV groove stented area         Counseling / Coordination of Care  Total floor / unit time spent today 45 minutes.  Greater than  50% of total time was spent with the patient and / or family counseling and / or coordination of care.  A description of the counseling / coordination of care: Review of history, current assessment, development of a plan.      Code Status: Level 1 - Full Code    ** Please Note: Dragon 360 Dictation voice to text software may have been used in the creation of this document. **

## 2024-06-25 ENCOUNTER — APPOINTMENT (INPATIENT)
Dept: RADIOLOGY | Facility: HOSPITAL | Age: 79
DRG: 235 | End: 2024-06-25
Payer: COMMERCIAL

## 2024-06-25 LAB
ANION GAP SERPL CALCULATED.3IONS-SCNC: 15 MMOL/L (ref 4–13)
BUN SERPL-MCNC: 62 MG/DL (ref 5–25)
CALCIUM SERPL-MCNC: 8.8 MG/DL (ref 8.4–10.2)
CHLORIDE SERPL-SCNC: 87 MMOL/L (ref 96–108)
CO2 SERPL-SCNC: 33 MMOL/L (ref 21–32)
CREAT SERPL-MCNC: 2.22 MG/DL (ref 0.6–1.3)
ERYTHROCYTE [DISTWIDTH] IN BLOOD BY AUTOMATED COUNT: 12.4 % (ref 11.6–15.1)
GFR SERPL CREATININE-BSD FRML MDRD: 27 ML/MIN/1.73SQ M
GLUCOSE SERPL-MCNC: 107 MG/DL (ref 65–140)
GLUCOSE SERPL-MCNC: 113 MG/DL (ref 65–140)
GLUCOSE SERPL-MCNC: 73 MG/DL (ref 65–140)
GLUCOSE SERPL-MCNC: 79 MG/DL (ref 65–140)
GLUCOSE SERPL-MCNC: 94 MG/DL (ref 65–140)
HCT VFR BLD AUTO: 32.2 % (ref 36.5–49.3)
HGB BLD-MCNC: 11.2 G/DL (ref 12–17)
LACTATE SERPL-SCNC: 1.6 MMOL/L (ref 0.5–2)
LACTATE SERPL-SCNC: 2.2 MMOL/L (ref 0.5–2)
LACTATE SERPL-SCNC: 3.8 MMOL/L (ref 0.5–2)
MCH RBC QN AUTO: 32.8 PG (ref 26.8–34.3)
MCHC RBC AUTO-ENTMCNC: 34.8 G/DL (ref 31.4–37.4)
MCV RBC AUTO: 94 FL (ref 82–98)
PLATELET # BLD AUTO: 199 THOUSANDS/UL (ref 149–390)
PMV BLD AUTO: 9.2 FL (ref 8.9–12.7)
POTASSIUM SERPL-SCNC: 3.2 MMOL/L (ref 3.5–5.3)
POTASSIUM SERPL-SCNC: 3.4 MMOL/L (ref 3.5–5.3)
POTASSIUM SERPL-SCNC: 3.9 MMOL/L (ref 3.5–5.3)
RBC # BLD AUTO: 3.41 MILLION/UL (ref 3.88–5.62)
SODIUM SERPL-SCNC: 135 MMOL/L (ref 135–147)
WBC # BLD AUTO: 10.82 THOUSAND/UL (ref 4.31–10.16)

## 2024-06-25 PROCEDURE — 99223 1ST HOSP IP/OBS HIGH 75: CPT | Performed by: SURGERY

## 2024-06-25 PROCEDURE — 74176 CT ABD & PELVIS W/O CONTRAST: CPT

## 2024-06-25 PROCEDURE — 80048 BASIC METABOLIC PNL TOTAL CA: CPT | Performed by: PHYSICIAN ASSISTANT

## 2024-06-25 PROCEDURE — 97116 GAIT TRAINING THERAPY: CPT

## 2024-06-25 PROCEDURE — 83605 ASSAY OF LACTIC ACID: CPT

## 2024-06-25 PROCEDURE — 71046 X-RAY EXAM CHEST 2 VIEWS: CPT

## 2024-06-25 PROCEDURE — 83605 ASSAY OF LACTIC ACID: CPT | Performed by: PHYSICIAN ASSISTANT

## 2024-06-25 PROCEDURE — 97530 THERAPEUTIC ACTIVITIES: CPT

## 2024-06-25 PROCEDURE — 99232 SBSQ HOSP IP/OBS MODERATE 35: CPT | Performed by: INTERNAL MEDICINE

## 2024-06-25 PROCEDURE — 82948 REAGENT STRIP/BLOOD GLUCOSE: CPT

## 2024-06-25 PROCEDURE — 85027 COMPLETE CBC AUTOMATED: CPT | Performed by: PHYSICIAN ASSISTANT

## 2024-06-25 PROCEDURE — 84132 ASSAY OF SERUM POTASSIUM: CPT | Performed by: PHYSICIAN ASSISTANT

## 2024-06-25 PROCEDURE — 99024 POSTOP FOLLOW-UP VISIT: CPT | Performed by: THORACIC SURGERY (CARDIOTHORACIC VASCULAR SURGERY)

## 2024-06-25 RX ORDER — BISACODYL 10 MG
10 SUPPOSITORY, RECTAL RECTAL ONCE
Status: COMPLETED | OUTPATIENT
Start: 2024-06-25 | End: 2024-06-25

## 2024-06-25 RX ORDER — SENNOSIDES 8.6 MG
1 TABLET ORAL ONCE
Status: DISCONTINUED | OUTPATIENT
Start: 2024-06-25 | End: 2024-07-01

## 2024-06-25 RX ORDER — POTASSIUM CHLORIDE 20 MEQ/1
40 TABLET, EXTENDED RELEASE ORAL 2 TIMES DAILY
Status: DISCONTINUED | OUTPATIENT
Start: 2024-06-25 | End: 2024-06-25

## 2024-06-25 RX ORDER — ALBUMIN, HUMAN INJ 5% 5 %
25 SOLUTION INTRAVENOUS ONCE
Status: COMPLETED | OUTPATIENT
Start: 2024-06-25 | End: 2024-06-25

## 2024-06-25 RX ORDER — METOCLOPRAMIDE HYDROCHLORIDE 5 MG/ML
10 INJECTION INTRAMUSCULAR; INTRAVENOUS ONCE
Status: COMPLETED | OUTPATIENT
Start: 2024-06-25 | End: 2024-06-25

## 2024-06-25 RX ORDER — METOPROLOL TARTRATE 1 MG/ML
7.5 INJECTION, SOLUTION INTRAVENOUS EVERY 6 HOURS SCHEDULED
Status: DISCONTINUED | OUTPATIENT
Start: 2024-06-26 | End: 2024-06-26

## 2024-06-25 RX ORDER — BUMETANIDE 1 MG/1
1 TABLET ORAL
Status: DISCONTINUED | OUTPATIENT
Start: 2024-06-25 | End: 2024-06-25

## 2024-06-25 RX ORDER — POTASSIUM CHLORIDE 29.8 MG/ML
40 INJECTION INTRAVENOUS ONCE
Status: COMPLETED | OUTPATIENT
Start: 2024-06-25 | End: 2024-06-25

## 2024-06-25 RX ORDER — METOPROLOL TARTRATE 1 MG/ML
5 INJECTION, SOLUTION INTRAVENOUS EVERY 6 HOURS SCHEDULED
Status: DISCONTINUED | OUTPATIENT
Start: 2024-06-25 | End: 2024-06-25

## 2024-06-25 RX ORDER — BUMETANIDE 1 MG/1
1 TABLET ORAL DAILY
Status: DISCONTINUED | OUTPATIENT
Start: 2024-06-26 | End: 2024-06-25

## 2024-06-25 RX ORDER — POTASSIUM CHLORIDE 14.9 MG/ML
20 INJECTION INTRAVENOUS ONCE
Status: COMPLETED | OUTPATIENT
Start: 2024-06-25 | End: 2024-06-25

## 2024-06-25 RX ORDER — SODIUM CHLORIDE, SODIUM GLUCONATE, SODIUM ACETATE, POTASSIUM CHLORIDE, MAGNESIUM CHLORIDE, SODIUM PHOSPHATE, DIBASIC, AND POTASSIUM PHOSPHATE .53; .5; .37; .037; .03; .012; .00082 G/100ML; G/100ML; G/100ML; G/100ML; G/100ML; G/100ML; G/100ML
50 INJECTION, SOLUTION INTRAVENOUS CONTINUOUS
Status: DISCONTINUED | OUTPATIENT
Start: 2024-06-25 | End: 2024-06-27

## 2024-06-25 RX ORDER — PANTOPRAZOLE SODIUM 40 MG/10ML
40 INJECTION, POWDER, LYOPHILIZED, FOR SOLUTION INTRAVENOUS
Status: DISCONTINUED | OUTPATIENT
Start: 2024-06-26 | End: 2024-07-01

## 2024-06-25 RX ADMIN — BISACODYL 10 MG: 10 SUPPOSITORY RECTAL at 17:08

## 2024-06-25 RX ADMIN — METOROPROLOL TARTRATE 5 MG: 5 INJECTION, SOLUTION INTRAVENOUS at 17:08

## 2024-06-25 RX ADMIN — HEPARIN SODIUM 5000 UNITS: 5000 INJECTION INTRAVENOUS; SUBCUTANEOUS at 14:33

## 2024-06-25 RX ADMIN — POTASSIUM CHLORIDE 40 MEQ: 29.8 INJECTION, SOLUTION INTRAVENOUS at 14:24

## 2024-06-25 RX ADMIN — PANTOPRAZOLE SODIUM 40 MG: 40 TABLET, DELAYED RELEASE ORAL at 06:04

## 2024-06-25 RX ADMIN — METOROPROLOL TARTRATE 7.5 MG: 5 INJECTION, SOLUTION INTRAVENOUS at 23:36

## 2024-06-25 RX ADMIN — METOCLOPRAMIDE 10 MG: 5 INJECTION, SOLUTION INTRAMUSCULAR; INTRAVENOUS at 03:25

## 2024-06-25 RX ADMIN — ALBUMIN (HUMAN) 25 G: 12.5 INJECTION, SOLUTION INTRAVENOUS at 11:49

## 2024-06-25 RX ADMIN — CHLORHEXIDINE GLUCONATE 0.12% ORAL RINSE 15 ML: 1.2 LIQUID ORAL at 17:08

## 2024-06-25 RX ADMIN — SODIUM CHLORIDE, SODIUM GLUCONATE, SODIUM ACETATE, POTASSIUM CHLORIDE, MAGNESIUM CHLORIDE, SODIUM PHOSPHATE, DIBASIC, AND POTASSIUM PHOSPHATE 50 ML/HR: .53; .5; .37; .037; .03; .012; .00082 INJECTION, SOLUTION INTRAVENOUS at 14:33

## 2024-06-25 RX ADMIN — AMIODARONE HYDROCHLORIDE 200 MG: 200 TABLET ORAL at 06:04

## 2024-06-25 RX ADMIN — HEPARIN SODIUM 5000 UNITS: 5000 INJECTION INTRAVENOUS; SUBCUTANEOUS at 06:04

## 2024-06-25 RX ADMIN — HEPARIN SODIUM 5000 UNITS: 5000 INJECTION INTRAVENOUS; SUBCUTANEOUS at 22:06

## 2024-06-25 RX ADMIN — METHOCARBAMOL 500 MG: 500 TABLET ORAL at 00:09

## 2024-06-25 RX ADMIN — ONDANSETRON 4 MG: 2 INJECTION INTRAMUSCULAR; INTRAVENOUS at 00:53

## 2024-06-25 RX ADMIN — POTASSIUM CHLORIDE 40 MEQ: 29.8 INJECTION, SOLUTION INTRAVENOUS at 07:21

## 2024-06-25 RX ADMIN — POTASSIUM CHLORIDE 20 MEQ: 14.9 INJECTION, SOLUTION INTRAVENOUS at 19:46

## 2024-06-25 RX ADMIN — METOROPROLOL TARTRATE 5 MG: 5 INJECTION, SOLUTION INTRAVENOUS at 12:46

## 2024-06-25 NOTE — PHYSICAL THERAPY NOTE
Physical Therapy Treatment Note       06/25/24 1050   PT Last Visit   PT Visit Date 06/25/24   Note Type   Note Type Treatment   Pain Assessment   Pain Assessment Tool 0-10   Pain Score 7   Pain Location/Orientation Location: Chest;Location: Abdomen   Patient's Stated Pain Goal No pain   Hospital Pain Intervention(s) Ambulation/increased activity   Restrictions/Precautions   Weight Bearing Precautions Per Order No   Other Precautions Cardiac/sternal;Multiple lines;Telemetry;Fall Risk;Pain;O2   General   Chart Reviewed Yes   Family/Caregiver Present No   Cognition   Overall Cognitive Status WFL   Arousal/Participation Responsive   Attention Attends with cues to redirect   Orientation Level Oriented X4   Memory Unable to assess   Following Commands Follows one step commands without difficulty   Subjective   Subjective cooperative w session.  notes abdominal pain   Transfers   Sit to Stand 4  Minimal assistance   Additional items Assist x 1;Increased time required;Verbal cues   Stand to Sit 4  Minimal assistance   Additional items Assist x 1;Increased time required;Verbal cues   Additional Comments increased time spent for setup, repositioning   Ambulation/Elevation   Gait pattern   (slow, antalgic, short step length, forward flexion)   Gait Assistance 4  Minimal assist   Additional items Assist x 1  (2nd for chair follow)   Assistive Device Rolling walker   Distance 40'x2 and 75'x1 w/ extended seated rests   Balance   Static Sitting Good   Dynamic Sitting Fair -   Static Standing Poor +   Dynamic Standing Poor +   Ambulatory Poor +   Endurance Deficit   Endurance Deficit Yes   Endurance Deficit Description fatigue, weakness, pain   Activity Tolerance   Activity Tolerance Patient limited by fatigue;Patient limited by pain;Treatment limited secondary to medical complications (Comment)   Nurse Made Aware yes   Assessment   Prognosis Good   Problem List Decreased strength;Decreased endurance;Decreased mobility;Impaired  balance;Decreased coordination   Assessment Pt seen for session for setup, transfers, gait w/ rest times, repositioning.  Pt cooperative, but limited by nausea, abd pain.  had abdominal CT earlier, but OK to ambulate per surgery team.  Needed a comparable level of assist today, but needed increased rest time, w/ SOB and pain limitations.  Cues for pacing, RW use.  continue to recommend Level I (maximal) post acute care therapy needs at d/c due to deconditioning, but pt and family have been refusing rehab   Goals   Patient Goals to feel better   [facility] Expiration Date 07/05/24   PT Treatment Day 2   Plan   Treatment/Interventions Functional transfer training;LE strengthening/ROM;Therapeutic exercise;Elevations;Endurance training;Patient/family training;Equipment eval/education;Bed mobility;Gait training   Progress Progressing toward goals   PT Frequency 4-6x/wk   Discharge Recommendation   Rehab Resource Intensity Level, PT I (Maximum Resource Intensity)   Equipment Recommended Walker  (owns RW)   AM-PAC Basic Mobility Inpatient   Turning in Flat Bed Without Bedrails 3   Lying on Back to Sitting on Edge of Flat Bed Without Bedrails 2   Moving Bed to Chair 3   Standing Up From Chair Using Arms 3   Walk in Room 3   Climb 3-5 Stairs With Railing 2   Basic Mobility Inpatient Raw Score 16   Basic Mobility Standardized Score 38.32   [facility] Highest Level Of Mobility   JH-HLM Goal 5: Stand one or more mins   JH-HLM Achieved 7: Walk 25 feet or more     Dandre Castillo PT, DPT CSRS

## 2024-06-25 NOTE — PROGRESS NOTES
General Cardiology   Progress Note -  Team One   Shane Chau Sr. 78 y.o. male MRN: [id number]    Unit/Bed#: [facility] 405-01 Encounter: [id number]    Assessment:    1.  CAD: s/p CABG x 3 with LIMA-LAD, SVG-RPDA and SVG-OM1.  POD #5  Final intraoperative DEDRICK: EF 55%  Rhythm management: Amiodarone 200 mg TID and Lopressor 50 mg BID  Volume management: Oral Bumex decreased to 1 mg BID today with K+ supplement.  Net output -2.2 L  Aspirin, beta-blocker, statin     2.  Preserved biventricular systolic function: Final intraoperative DEDRICK EF 55%.  Management as above.     3.  Postoperative respiratory insufficiency: Currently requiring 4 L NC.     4.  Postoperative renal insufficiency: Baseline creatinine 0.9-1.2.  Creatinine slowly worsened to 2.22 today from 1.86 yesterday.     5.  Essential hypertension: Average /59 on amlodipine 10 mg daily     6.  Hyperlipidemia: , , HDL 36, LDL 62 on rosuvastatin 40 mg daily switch to atorvastatin 80 mg this admission due to nonformulary.     7.  PAD: Maintained on aspirin and statin.    8.  History of TIAs: Maintained on aspirin and Plavix.    9.  Abdominal pain: CT A/P ordered by primary team.        Plan/Recommendations:  Continue oral diuretics for negative fluid balance  Monitor I&O's, renal function, electrolytes and weight  Maintain potassium >4 and magnesium >2  Wean oxygen as able  Continue current medication regimen  Encourage ambulation and incentive spirometry  Continue to monitor on telemetry  Abdominal pain workup per primary team  ______________________________________________________________________    Subjective    Patient seen and examined.  No acute events overnight.    Review of Systems   Constitutional: Negative. Negative for chills.   Cardiovascular:  Negative for chest pain, dyspnea on exertion, leg swelling, near-syncope, orthopnea, palpitations, paroxysmal nocturnal dyspnea and syncope.   Respiratory: Negative.  Negative for cough,  "shortness of breath and wheezing.    Endocrine: Negative.    Hematologic/Lymphatic: Negative.    Skin: Negative.    Musculoskeletal: Negative.    Gastrointestinal:  Positive for abdominal pain. Negative for diarrhea, nausea and vomiting.   Neurological:  Negative for dizziness, light-headedness and weakness.   Psychiatric/Behavioral: Negative.  Negative for altered mental status.    All other systems reviewed and are negative.      Objective:   Vitals: Blood pressure 122/66, pulse 71, temperature (!) 97.3 °F (36.3 °C), resp. rate 16, height 5' 5\" (1.651 m), weight 84.4 kg (186 lb 1.1 oz), SpO2 94%.,     Wt Readings from Last 3 Encounters:   24 84.4 kg (186 lb 1.1 oz)   24 86.2 kg (190 lb 0.6 oz)   24 86.2 kg (190 lb)        Lab Results   Component Value Date    CREATININE 2.22 (H) 2024    CREATININE 1.86 (H) 2024    CREATININE 1.60 (H) 2024         Body mass index is 30.96 kg/m².,     Systolic (24hrs), Av , Min:103 , Max:132     Diastolic (24hrs), Av, Min:61, Max:77          Intake/Output Summary (Last 24 hours) at 2024 0845  Last data filed at 2024 0740  Gross per 24 hour   Intake 1128 ml   Output 1175 ml   Net -47 ml     Weight (last 2 days)       Date/Time Weight    24 0600 84.4 (186.07)    24 0600 85.3 (188)    24 0600 87 (191.8)              Telemetry Review: No significant arrhythmias seen on telemetry review.       Physical Exam  Vitals and nursing note reviewed.   Constitutional:       General: He is not in acute distress.     Appearance: He is well-developed.      Comments: On 6 L NC in NAD   HENT:      Head: Normocephalic and atraumatic.   Neck:      Vascular: No JVD.      Comments: Invasive lines noted in right neck  Cardiovascular:      Rate and Rhythm: Normal rate and regular rhythm.      Heart sounds: Normal heart sounds. No murmur heard.     No friction rub. No gallop.   Pulmonary:      Effort: Pulmonary effort is normal. No " respiratory distress.      Breath sounds: Normal breath sounds. No wheezing or rales.   Chest:      Chest wall: No tenderness.   Abdominal:      General: Bowel sounds are normal. There is no distension.      Palpations: Abdomen is soft.      Tenderness: There is abdominal tenderness.   Musculoskeletal:         General: No tenderness. Normal range of motion.      Cervical back: Normal range of motion and neck supple.      Right lower leg: No edema.      Left lower leg: No edema.   Skin:     General: Skin is warm and dry.      Coloration: Skin is not pale.      Findings: No erythema.      Comments: Sternal incision clean and dry   Neurological:      Mental Status: He is alert and oriented to person, place, and time.   Psychiatric:         Mood and Affect: Mood normal.         Behavior: Behavior normal.         Thought Content: Thought content normal.         Judgment: Judgment normal.         LABORATORY RESULTS      CBC with diff:   Results from last 7 days   Lab Units 06/25/24  0459 06/24/24  0452 06/23/24  0418 06/22/24  0423 06/21/24  0349 06/20/24  1942 06/20/24  1310 06/20/24  1306 06/20/24  1202 06/20/24  1138   WBC Thousand/uL 10.82* 16.01* 15.76* 24.70* 17.41*  --   --   --   --   --    HEMOGLOBIN g/dL 11.2* 10.2* 10.4* 11.2* 12.5 13.1  --  12.2  --   --    I STAT HEMOGLOBIN g/dl  --   --   --   --   --   --  10.5*  --    < > 9.2*   HEMATOCRIT % 32.2* 29.1* 30.1* 32.1* 35.3* 37.6  --  35.0*  --   --    HEMATOCRIT, ISTAT %  --   --   --   --   --   --  31*  --    < > 27*   MCV fL 94 96 96 96 95  --   --   --   --   --    PLATELETS Thousands/uL 199 135* 130* 158 145*  --   --  111*  --  148*   RBC Million/uL 3.41* 3.04* 3.14* 3.35* 3.71*  --   --   --   --   --    MCH pg 32.8 33.6 33.1 33.4 33.7  --   --   --   --   --    MCHC g/dL 34.8 35.1 34.6 34.9 35.4  --   --   --   --   --    RDW % 12.4 12.3 12.4 12.1 12.0  --   --   --   --   --    MPV fL 9.2 9.1 9.0 9.1 8.9  --   --  8.7*  --  8.7*    < > = values in  "this interval not displayed.       CMP:  Results from last 7 days   Lab Units 06/25/24  0459 06/24/24  0452 06/23/24  0418 06/22/24  1358 06/22/24  0423 06/21/24  1429 06/21/24  0335 06/20/24  1647 06/20/24  1310 06/20/24  1306 06/20/24  1202 06/20/24  1138 06/20/24  1106 06/20/24  1048 06/20/24  1041 06/20/24  0951   POTASSIUM mmol/L 3.2* 3.4* 3.6 4.3 4.4 4.3 4.6   < >  --    < >  --   --   --   --   --   --    CHLORIDE mmol/L 87* 90* 95* 101 103 105 110*  --   --    < >  --   --   --   --   --   --    CO2 mmol/L 33* 35* 31 24 22 20* 18*  --   --    < >  --   --   --   --   --   --    CO2, I-STAT mmol/L  --   --   --   --   --   --   --   --  21  --  22 25 26 27 26 26   BUN mg/dL 62* 48* 39* 33* 30* 24 20  --   --    < >  --   --   --   --   --   --    CREATININE mg/dL 2.22* 1.86* 1.60* 1.29 1.25 1.29 1.29  --   --    < >  --   --   --   --   --   --    GLUCOSE, ISTAT mg/dl  --   --   --   --   --   --   --   --  140  --  171* 182* 160* 149* 141* 138   CALCIUM mg/dL 8.8 8.2* 8.0* 7.9* 8.0* 8.0* 7.7*  --   --    < >  --   --   --   --   --   --    EGFR ml/min/1.73sq m 27 33 40 52 54 52 52  --   --    < >  --   --   --   --   --   --     < > = values in this interval not displayed.       BMP:  Results from last 7 days   Lab Units 06/25/24 0459 06/24/24 0452 06/23/24  0418 06/22/24  1358 06/22/24  0423 06/21/24  1429 06/21/24  0335 06/20/24  1647 06/20/24  1310   POTASSIUM mmol/L 3.2* 3.4* 3.6 4.3 4.4 4.3 4.6   < >  --    CHLORIDE mmol/L 87* 90* 95* 101 103 105 110*  --   --    CO2 mmol/L 33* 35* 31 24 22 20* 18*  --   --    CO2, I-STAT mmol/L  --   --   --   --   --   --   --   --  21   BUN mg/dL 62* 48* 39* 33* 30* 24 20  --   --    CREATININE mg/dL 2.22* 1.86* 1.60* 1.29 1.25 1.29 1.29  --   --    GLUCOSE, ISTAT mg/dl  --   --   --   --   --   --   --   --  140   CALCIUM mg/dL 8.8 8.2* 8.0* 7.9* 8.0* 8.0* 7.7*  --   --     < > = values in this interval not displayed.       No results found for: \"NTBNP\"        "   Results from last 7 days   Lab Units 06/23/24  0418 06/22/24  0423 06/21/24  0409   MAGNESIUM mg/dL 2.0 2.1 2.3                     Results from last 7 days   Lab Units 06/20/24  1306   INR  1.37*       Lipid Profile:   Lab Results   Component Value Date    CHOL 138 08/20/2015    CHOL 135 09/30/2014     Lab Results   Component Value Date    HDL 36 (L) 06/08/2024    HDL 43 06/22/2023    HDL 43 03/26/2022     Lab Results   Component Value Date    LDLCALC 62 06/08/2024    LDLCALC 65 06/22/2023    LDLCALC 54 03/26/2022     Lab Results   Component Value Date    TRIG 116 06/08/2024    TRIG 104 06/22/2023    TRIG 103 03/26/2022         Meds/Allergies   all current active meds have been reviewed, current meds:   Current Facility-Administered Medications   Medication Dose Route Frequency    acetaminophen (TYLENOL) tablet 975 mg  975 mg Oral Q6H While awake    amiodarone tablet 200 mg  200 mg Oral Q8H KRISTI    amLODIPine (NORVASC) tablet 10 mg  10 mg Oral Daily    aspirin (ECOTRIN LOW STRENGTH) EC tablet 81 mg  81 mg Oral Daily    atorvastatin (LIPITOR) tablet 80 mg  80 mg Oral Daily With Dinner    bisacodyl (DULCOLAX) rectal suppository 10 mg  10 mg Rectal Daily PRN    bumetanide (BUMEX) tablet 1 mg  1 mg Oral BID (diuretic)    chlorhexidine (PERIDEX) 0.12 % oral rinse 15 mL  15 mL Mouth/Throat BID    clopidogrel (PLAVIX) tablet 75 mg  75 mg Oral Daily    docusate sodium (COLACE) capsule 100 mg  100 mg Oral BID    heparin (porcine) subcutaneous injection 5,000 Units  5,000 Units Subcutaneous Q8H KRISTI    hydrALAZINE (APRESOLINE) injection 5 mg  5 mg Intravenous Q6H PRN    insulin lispro (HumALOG/ADMELOG) 100 units/mL subcutaneous injection 1-5 Units  1-5 Units Subcutaneous HS    insulin lispro (HumALOG/ADMELOG) 100 units/mL subcutaneous injection 1-6 Units  1-6 Units Subcutaneous TID AC    lidocaine (LIDODERM) 5 % patch 2 patch  2 patch Topical Daily    magnesium hydroxide (MILK OF MAGNESIA) oral suspension 30 mL  30 mL Oral  Daily PRN    melatonin tablet 6 mg  6 mg Oral HS    methocarbamol (ROBAXIN) tablet 500 mg  500 mg Oral Q6H KRISTI    metoprolol tartrate (LOPRESSOR) tablet 50 mg  50 mg Oral Q12H KRISTI    ondansetron (ZOFRAN) injection 4 mg  4 mg Intravenous Q6H PRN    oxyCODONE (ROXICODONE) split tablet 2.5 mg  2.5 mg Oral Q4H PRN    Or    oxyCODONE (ROXICODONE) IR tablet 5 mg  5 mg Oral Q4H PRN    pantoprazole (PROTONIX) EC tablet 40 mg  40 mg Oral Early Morning    polyethylene glycol (MIRALAX) packet 17 g  17 g Oral Daily    potassium chloride (Klor-Con M20) CR tablet 40 mEq  40 mEq Oral BID    potassium chloride 40 mEq IVPB (premix)  40 mEq Intravenous Once    senna (SENOKOT) tablet 8.6 mg  1 tablet Oral HS    temazepam (RESTORIL) capsule 15 mg  15 mg Oral HS PRN    trimethobenzamide (TIGAN) IM injection 200 mg  200 mg Intramuscular Q6H PRN   , and PTA meds:   Prior to Admission Medications   Prescriptions Last Dose Informant Patient Reported? Taking?   amLODIPine (NORVASC) 10 mg tablet 6/19/2024 Self No Yes   Sig: Take 1 tablet (10 mg total) by mouth daily   aspirin 325 mg tablet 6/19/2024 Self No Yes   Sig: Take 1 tablet (325 mg total) by mouth daily   clopidogrel (PLAVIX) 75 mg tablet 6/15/2024 Self No No   Sig: Take 1 tablet (75 mg total) by mouth daily   isosorbide mononitrate (IMDUR) 60 mg 24 hr tablet 6/19/2024 Self No Yes   Sig: Take 1 tablet (60 mg total) by mouth 2 (two) times a day   metoprolol tartrate (LOPRESSOR) 50 mg tablet 6/19/2024 Self No Yes   Sig: Take 1 tablet (50 mg total) by mouth 2 (two) times a day   mupirocin (BACTROBAN) 2 % ointment 6/19/2024  No Yes   Sig: Apply topically 2 (two) times a day   nitroglycerin (NITROSTAT) 0.4 mg SL tablet  Self No No   Sig: Place 1 tablet (0.4 mg total) under the tongue every 5 (five) minutes as needed for chest pain   ranolazine (RANEXA) 500 mg 12 hr tablet 6/19/2024 Self No Yes   Sig: Take 1 tablet (500 mg total) by mouth 2 (two) times a day   rosuvastatin (CRESTOR) 40 MG  tablet 6/19/2024 Self No Yes   Sig: Take 1 tablet (40 mg total) by mouth daily   tamsulosin (FLOMAX) 0.4 mg 6/19/2024 Self No Yes   Sig: Take 1 capsule by mouth daily with dinner      Facility-Administered Medications: None       Medications Prior to Admission:     amLODIPine (NORVASC) 10 mg tablet    aspirin 325 mg tablet    isosorbide mononitrate (IMDUR) 60 mg 24 hr tablet    metoprolol tartrate (LOPRESSOR) 50 mg tablet    mupirocin (BACTROBAN) 2 % ointment    ranolazine (RANEXA) 500 mg 12 hr tablet    rosuvastatin (CRESTOR) 40 MG tablet    tamsulosin (FLOMAX) 0.4 mg    clopidogrel (PLAVIX) 75 mg tablet    nitroglycerin (NITROSTAT) 0.4 mg SL tablet         Counseling / Coordination of Care  Total floor / unit time spent today 20 minutes.  Greater than 50% of total time was spent with the patient and / or family counseling and / or coordination of care.      ** Please Note: Dragon 360 Dictation voice to text software may have been used in the creation of this document. **

## 2024-06-25 NOTE — PLAN OF CARE
Problem: Prexisting or High Potential for Compromised Skin Integrity  Goal: Skin integrity is maintained or improved  Description: INTERVENTIONS:  - Identify patients at risk for skin breakdown  - Assess and monitor skin integrity  - Assess and monitor nutrition and hydration status  - Monitor labs   - Assess for incontinence   - Turn and reposition patient  - Assist with mobility/ambulation  - Relieve pressure over bony prominences  - Avoid friction and shearing  - Provide appropriate hygiene as needed including keeping skin clean and dry  - Evaluate need for skin moisturizer/barrier cream  - Collaborate with interdisciplinary team   - Patient/family teaching  - Consider wound care consult   Outcome: Progressing     Problem: INFECTION - ADULT  Goal: Absence or prevention of progression during hospitalization  Description: INTERVENTIONS:  - Assess and monitor for signs and symptoms of infection  - Monitor lab/diagnostic results  - Monitor all insertion sites, i.e. indwelling lines, tubes, and drains  - Monitor endotracheal if appropriate and nasal secretions for changes in amount and color  - [place] appropriate cooling/warming therapies per order  - Administer medications as ordered  - Instruct and encourage patient and family to use good hand hygiene technique  - Identify and instruct in appropriate isolation precautions for identified infection/condition  Outcome: Progressing     Problem: Knowledge Deficit  Goal: Patient/family/caregiver demonstrates understanding of disease process, treatment plan, medications, and discharge instructions  Description: Complete learning assessment and assess knowledge base.  Interventions:  - Provide teaching at level of understanding  - Provide teaching via preferred learning methods  Outcome: Progressing     Problem: DISCHARGE PLANNING  Goal: Discharge to home or other facility with appropriate resources  Description: INTERVENTIONS:  - Identify barriers to discharge  w/patient and caregiver  - Arrange for needed discharge resources and transportation as appropriate  - Identify discharge learning needs (meds, wound care, etc.)  - Arrange for interpretive services to assist at discharge as needed  - Refer to Case Management Department for coordinating discharge planning if the patient needs post-hospital services based on physician/advanced practitioner order or complex needs related to functional status, cognitive ability, or social support system  Outcome: Progressing

## 2024-06-25 NOTE — PLAN OF CARE
Problem: Prexisting or High Potential for Compromised Skin Integrity  Goal: Skin integrity is maintained or improved  Description: INTERVENTIONS:  - Identify patients at risk for skin breakdown  - Assess and monitor skin integrity  - Assess and monitor nutrition and hydration status  - Monitor labs   - Assess for incontinence   - Turn and reposition patient  - Assist with mobility/ambulation  - Relieve pressure over bony prominences  - Avoid friction and shearing  - Provide appropriate hygiene as needed including keeping skin clean and dry  - Evaluate need for skin moisturizer/barrier cream  - Collaborate with interdisciplinary team   - Patient/family teaching  - Consider wound care consult   Outcome: Progressing     Problem: PAIN - ADULT  Goal: Verbalizes/displays adequate comfort level or baseline comfort level  Description: Interventions:  - Encourage patient to monitor pain and request assistance  - Assess pain using appropriate pain scale  - Administer analgesics based on type and severity of pain and evaluate response  - Implement non-pharmacological measures as appropriate and evaluate response  - Consider cultural and social influences on pain and pain management  - Notify physician/advanced practitioner if interventions unsuccessful or patient reports new pain  Outcome: Progressing     Problem: INFECTION - ADULT  Goal: Absence or prevention of progression during hospitalization  Description: INTERVENTIONS:  - Assess and monitor for signs and symptoms of infection  - Monitor lab/diagnostic results  - Monitor all insertion sites, i.e. indwelling lines, tubes, and drains  - Monitor endotracheal if appropriate and nasal secretions for changes in amount and color  - [place] appropriate cooling/warming therapies per order  - Administer medications as ordered  - Instruct and encourage patient and family to use good hand hygiene technique  - Identify and instruct in appropriate isolation precautions for  identified infection/condition  Outcome: Progressing  Goal: Absence of fever/infection during neutropenic period  Description: INTERVENTIONS:  - Monitor WBC    Outcome: Progressing     Problem: SAFETY ADULT  Goal: Patient will remain free of falls  Description: INTERVENTIONS:  - Educate patient/family on patient safety including physical limitations  - Instruct patient to call for assistance with activity   - Consult OT/PT to assist with strengthening/mobility   - Keep Call bell within reach  - Keep bed low and locked with side rails adjusted as appropriate  - Keep care items and personal belongings within reach  - Initiate and maintain comfort rounds  - Make Fall Risk Sign visible to staff  - Offer Toileting every 2 Hours, in advance of need  - Initiate/Maintain bed/chair alarm  - Apply yellow socks and bracelet for high fall risk patients  - Consider moving patient to room near nurses station  Outcome: Progressing  Goal: Maintain or return to baseline ADL function  Description: INTERVENTIONS:  -  Assess patient's ability to carry out ADLs; assess patient's baseline for ADL function and identify physical deficits which impact ability to perform ADLs (bathing, care of mouth/teeth, toileting, grooming, dressing, etc.)  - Assess/evaluate cause of self-care deficits   - Assess range of motion  - Assess patient's mobility; develop plan if impaired  - Assess patient's need for assistive devices and provide as appropriate  - Encourage maximum independence but intervene and supervise when necessary  - Involve family in performance of ADLs  - Assess for home care needs following discharge   - Consider OT consult to assist with ADL evaluation and planning for discharge  - Provide patient education as appropriate  Outcome: Progressing  Goal: Maintains/Returns to pre admission functional level  Description: INTERVENTIONS:  - Perform AM-PAC 6 Click Basic Mobility/ Daily Activity assessment daily.  - Set and communicate daily  mobility goal to care team and patient/family/caregiver.   - Collaborate with rehabilitation services on mobility goals if consulted  - Ambulate patient 3 times a day  - Out of bed for toileting  - Record patient progress and toleration of activity level   Outcome: Progressing     Problem: DISCHARGE PLANNING  Goal: Discharge to home or other facility with appropriate resources  Description: INTERVENTIONS:  - Identify barriers to discharge w/patient and caregiver  - Arrange for needed discharge resources and transportation as appropriate  - Identify discharge learning needs (meds, wound care, etc.)  - Arrange for interpretive services to assist at discharge as needed  - Refer to Case Management Department for coordinating discharge planning if the patient needs post-hospital services based on physician/advanced practitioner order or complex needs related to functional status, cognitive ability, or social support system  Outcome: Progressing     Problem: Knowledge Deficit  Goal: Patient/family/caregiver demonstrates understanding of disease process, treatment plan, medications, and discharge instructions  Description: Complete learning assessment and assess knowledge base.  Interventions:  - Provide teaching at level of understanding  - Provide teaching via preferred learning methods  Outcome: Progressing

## 2024-06-25 NOTE — PLAN OF CARE
Problem: PHYSICAL THERAPY ADULT  Goal: Performs mobility at highest level of function for planned discharge setting.  See evaluation for individualized goals.  Description: Treatment/Interventions: Functional transfer training, LE strengthening/ROM, Elevations, Therapeutic exercise, Endurance training, Cognitive reorientation, Patient/family training, Equipment eval/education, Bed mobility, Gait training, Spoke to nursing, Spoke to case management, OT  Equipment Recommended: Walker (pt reports owning)       See flowsheet documentation for full assessment, interventions and recommendations.  Outcome: Progressing  Note: Prognosis: Good  Problem List: Decreased strength, Decreased endurance, Decreased mobility, Impaired balance, Decreased coordination  Assessment: Pt seen for session for setup, transfers, gait w/ rest times, repositioning.  Pt cooperative, but limited by nausea, abd pain.  had abdominal CT earlier, but OK to ambulate per surgery team.  Needed a comparable level of assist today, but needed increased rest time, w/ SOB and pain limitations.  Cues for pacing, RW use.  continue to recommend Level I (maximal) post acute care therapy needs at d/c due to deconditioning, but pt and family have been refusing rehab  Barriers to Discharge: (S) Inaccessible home environment (13 JUJU)     Rehab Resource Intensity Level, PT: I (Maximum Resource Intensity)    See flowsheet documentation for full assessment.

## 2024-06-25 NOTE — PROGRESS NOTES
Progress Note - Cardiothoracic Surgery   Shane Chau Sr. 78 y.o. male MRN: [id number]  Unit/Bed#: [facility] 405-01 Encounter: [id number]    Coronary artery disease. S/P coronary artery bypass grafting; POD # 5      24 Hour Events: Nausea developing at midnight with 7-8 episodes of non-bloody emesis (hx of open AAA repair), given Zofran with minimal relief, refused Tigan.     Pain controlled, Denies SOB, remains on 3L NC, using IS (1250, been using 5 x a day). + flatus, no BM yet. Working with PT/OT.    Medications:   Scheduled Meds:  Current Facility-Administered Medications   Medication Dose Route Frequency Provider Last Rate    acetaminophen  975 mg Oral Q6H While awake Moraima Alvarez PA-C      amiodarone  200 mg Oral Q8H [facility] Moraima Alvarez PA-C      amLODIPine  10 mg Oral Daily Moraima Alvarez PA-C      aspirin  81 mg Oral Daily Moraima Alvarez PA-C      atorvastatin  80 mg Oral Daily With Dinner Moraima Alvarez PA-C      bisacodyl  10 mg Rectal Daily PRN Moraima Alvarez PA-C      bumetanide  2 mg Oral BID (diuretic) Rahel Baig PA-C      chlorhexidine  15 mL Mouth/Throat BID Moraima Alvarez PA-C      clopidogrel  75 mg Oral Daily Moraima Alvarez PA-C      docusate sodium  100 mg Oral BID Moraima Alvarez PA-C      heparin (porcine)  5,000 Units Subcutaneous Q8H [facility] Moraima Alvarez PA-C      hydrALAZINE  5 mg Intravenous Q6H PRN Moraima Alvarez PA-C      insulin lispro  1-5 Units Subcutaneous HS Moraima Alvarez PA-C      insulin lispro  1-6 Units Subcutaneous TID AC Moraima Alvarez PA-C      lidocaine  2 patch Topical Daily Moraima Alvarez PA-C      magnesium hydroxide  30 mL Oral Daily PRN Rahel Baig PA-C      melatonin  6 mg Oral HS Rahel Baig PA-C      methocarbamol  500 mg Oral Q6H [facility] Moraima Alvarez PA-C      metoprolol tartrate  50 mg Oral Q12H KRISTI Moraima Alvarez PA-C      ondansetron  4 mg Intravenous Q6H PRN  Moraima Alvarez PA-C      oxyCODONE  2.5 mg Oral Q4H PRN Moraima Alvarez PA-C      Or    oxyCODONE  5 mg Oral Q4H PRN Moraima Alvarez PA-C      pantoprazole  40 mg Oral Early Morning Moraiam Alvarez PA-C      polyethylene glycol  17 g Oral Daily Moraima Alvarez PA-C      potassium chloride  20 mEq Oral BID Moraima Alvarez PA-C      senna  1 tablet Oral HS Rahel Baig PA-C      temazepam  15 mg Oral HS PRN Moraima Alvarez PA-C      trimethobenzamide  200 mg Intramuscular Q6H PRN Rudolph Gunter PA-C       Continuous Infusions:   PRN Meds:.  bisacodyl    hydrALAZINE    magnesium hydroxide    ondansetron    oxyCODONE **OR** oxyCODONE    temazepam    trimethobenzamide    Vitals:   Vitals:    06/24/24 1539 06/1945 06/24/24 2300 06/25/24 0237   BP: 105/73 111/71 115/66 122/67   Pulse: 67 70 59 63   Resp:  16 16 16   Temp: (!) 97.3 °F (36.3 °C) 97.9 °F (36.6 °C) 98.1 °F (36.7 °C) 98 °F (36.7 °C)   TempSrc:       SpO2: 92% 90% 90% (!) 89%   Weight:       Height:       Afebrile (Tmax 100.4 on 6/24 @ 0300), HR 60-70s), 122/67 (SBP 100s-122)    Telemetry: NSR; Heart Rate: 71 (1 episode of 4 run Vtach @ 21:36)    Respiratory:   SpO2: SpO2: (!) 89 %; 3 LPM (quit tobacco use in 80s)    Intake/Output:     Intake/Output Summary (Last 24 hours) at 6/25/2024 0614  Last data filed at 6/25/2024 0601  Gross per 24 hour   Intake 1310 ml   Output 1300 ml   Net 10 ml    Since admit -2.4 L     Chest tube Output:  removed    Weights:   Weight (last 2 days)       Date/Time Weight    06/24/24 0600 85.3 (188)    06/23/24 0600 87 (191.8)          Admit weight: 6/20 84 kg (up 1 kg/3 lbs)    Results:   Results from last 7 days   Lab Units 06/25/24 0459 06/24/24 0452 06/23/24  0418   WBC Thousand/uL 10.82* 16.01* 15.76*   HEMOGLOBIN g/dL 11.2* 10.2* 10.4*   HEMATOCRIT % 32.2* 29.1* 30.1*   PLATELETS Thousands/uL 199 135* 130*     Results from last 7 days   Lab Units 06/25/24 0459 06/24/24  0452  06/23/24  0418 06/20/24  1647 06/20/24  1310   SODIUM mmol/L 135 136 134*   < >  --    POTASSIUM mmol/L 3.2* 3.4* 3.6   < >  --    CHLORIDE mmol/L 87* 90* 95*   < >  --    CO2 mmol/L 33* 35* 31   < >  --    CO2, I-STAT mmol/L  --   --   --   --  21   BUN mg/dL 62* 48* 39*   < >  --    CREATININE mg/dL 2.22* 1.86* 1.60*   < >  --    GLUCOSE, ISTAT mg/dl  --   --   --   --  140   CALCIUM mg/dL 8.8 8.2* 8.0*   < >  --     < > = values in this interval not displayed.     No hx of CKD    Results from last 7 days   Lab Units 06/20/24  1306   INR  1.37*   PTT seconds 29       Point of care glucose: 94 - 139 (no hx of DM)    Studies:  CXR 6/21:  Narrative & Impression   XR CHEST PORTABLE     INDICATION: Post Open Heart Surgey.     COMPARISON: 6/20/2024     FINDINGS: Sternotomy. Mediastinal/thoracostomy tubes. Right  sided central venous catheter tip terminates near cavoatrial junction. ET tube and Momence-Luis catheter removed.     Left lung base atelectasis. Lungs are otherwise clear. No pneumothorax or large pleural effusion.     Unchanged cardiomediastinal silhouette.     No acute osseous abnormality within the limitations of portable radiography.     Normal upper abdomen.     IMPRESSION:     No acute cardiopulmonary disease.       I have personally reviewed pertinent reports.   and I have personally reviewed pertinent films in PACS    Invasive Lines/Tubes:  Invasive Devices       Central Venous Catheter Line  Duration             CVC Central Lines 06/20/24 4 days                    Physical Exam:    General: No acute distress and Normal appearance  HEENT/NECK:  Normocephalic. Atraumatic.  No jugular venous distention.    Cardiac: Regular rate and rhythm and No murmurs/rubs/gallops  Pulmonary:  No rales/rhonchi/wheezes and BS diminished in L lower lobe  Abdomen:  Non-tender, Hypo-active bowel sounds, and Distended  Incisions: Sternum is stable.  Incision is clean, dry, and intact.  and Saphenectomy incison is clean, dry,  and intact.   Extremities: Extremities warm/dry and Trace edema B/L  Neuro: Alert and oriented X 3 and No focal deficits  Skin: Warm/Dry, without rashes or lesions.       Assessment:  Principal Problem:    Coronary artery disease of native artery of native heart with stable angina pectoris (HCC)  Active Problems:    Abdominal aortic aneurysm without rupture (HCC)    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HCC)    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HCC)    Asymptomatic bilateral carotid artery stenosis    Hyperlipidemia    Hypertension    S/P CABG x 3    BARBARA (acute kidney injury) (HCC)       Coronary artery disease. S/P coronary artery bypass grafting; POD # 5    Plan:    Cardiac:     Elective surgical admission  Normal ventricular systolic function, EF 60%    NSR; HR well-controlled  BP well-controlled    Continue Lopressor, 50mg PO BID  Cont Norvasc 10mg QD    Hold ACE inhibitor/ARB secondary to renal dysfunction    Continue prophylactic Amiodarone, 200 mg PO TID    Continue ASA (81 mg) and Plavix for h/o TIAs (on PTA) and Statin therapy (asymptomatic b/l carotid artery stenosis)    Epicardial pacing wires have been removed    Central IV access no longer required; Remove central venous catheter today    Continue Subcutaneous Heparin for DVT prophylaxis    Pulmonary:     Acute post-op pulmonary insufficiency; Requiring 3 liters via nasal cannula, secondary to idiopathic pulmonary fibrosis, atelectasis, and poor inspiratory effort secondary to pain. Continue incentive spirometry/coughing/deep breathing exercises.  Wean supplemental oxygen as tolerated for saturation > 90%    Chest tubes have been discontinued    Diminished BS in LLL- CXR PA/lateral    Renal:     Postoperative BARBARA, with creatinine 2.22 from 1.86, from 1.6 from 1.29;  Follow up daily BMP    Intake/Output net: even L/24 hours, (UOP- 1.3L/24- 0.6ml/kg/hr), since admit - 2.4L    Diuretic Regimen:  Decrease to PO Bumex,  1  mg BID  Increase to Potassium Chloride 40 mEq PO  BID, replete K      Neuro:    Neurologically intact; No active issues   Melatonin for sleep    Incisional pain well controlled   Continue tylenol, 975 mg PO q 8, standing dose   Continue oxycodone, 2.5 to 5 mg PO q 4 hours prn pain   Continue topical Lidocaine patch to affected area   Continue methocarbamol, 500 mg PO q 6 hours prn pain/muscle spasm    GI:    Cardiac diet, with 1800 mL fluid restriction    Complains of nausea/emesis- order CT a/p w/o, consult general surgery    Continue stool softeners and prn suppository, increase bowel regimen    Continue GI prophylaxis    Endo:     Pre-Op Hgb A1C: 5.4    Serum glucose well controlled on insulin sliding scale coverage    7    Hematology:     Post-operative blood count acceptable; Trend prn  Leukocytosis; Afebrile with no signs of infection; Trend CBC prn    8.   Disposition:        Following daily PT/OT recommendations regarding home vs. rehab when medically cleared for discharge - currently recs for rehab     VTE Pharmacologic Prophylaxis: Heparin  VTE Mechanical Prophylaxis: sequential compression device    Collaborative rounds completed with supervising physician  Plan of care discussed with bedside nurse    SIGNATURE: Sandy Mckeon PA-C  DATE: June 25, 2024  TIME: 6:14 AM

## 2024-06-25 NOTE — CONSULTS
Acute Care Surgery  Consultation    Assessment:  78 year old male with a past medical history of AAA s/p open repair, BPH, HTN, HLD, CAD with history of MI now s/p 6/20/24 CABG x3 vessels with worsening abdominal distension, emesis and no bowel movement post operatively. General surgery consulted d/t concern for ileus.    6/25 CTAP: Gastric distention and associated proximal small bowel dilatation and stool like contents within the dilated loops of small bowel without discrete transition point identified.     WBC 10.82 (16.01)  Hgb 11.2 (10.2)  Cr 2.22 (1.86)  Lactic acid 3.8, repeat pending    Plan:  Recommend NGT placement for decompression given significant gastric distension and dilated SB loops seen on CT scan.   Recommend IV fluid bolus as patient appears to be volume down with new BARBARA and lactic acidosis.  Trend lactic acid after receiving fluid bolus to ensure improvement in acidosis  Recommend switching patient to IV meds with NGT in place to ensure tubing can remain to suction  Await return of bowel function  Discussed plans with primary team who expressed understanding  General surgery will continue to follow    History of Present Illness   HPI:  Shane ACUNA Kandi Schwartz. is a 78 y.o. male with a past medical history of AAA s/p open repair, BPH, HTN, HLD, CAD with history of MI now s/p 6/20/24 CABG x3 vessels with worsening abdominal distension, emesis and no bowel movement post operatively. General surgery consulted d/t concern for ileus. Patient reports he has being doing okay post operatively but today developed worsening abdominal pain with nausea and episodes of emesis. He also has abdominal distension, bloating and frequent belching. He reports continued nausea at the time of my assessment and is passing flatus, he reports a small bowel movement this morning but no relieve in bloating after the fact. He denies fever, chills.    Review of Systems   Constitutional:  Positive for activity change and  appetite change. Negative for chills and fever.   HENT:  Negative for ear pain and sore throat.    Eyes:  Negative for pain and visual disturbance.   Respiratory:  Negative for cough, chest tightness and shortness of breath.    Cardiovascular:  Positive for chest pain. Negative for palpitations.   Gastrointestinal:  Negative for abdominal pain and vomiting.   Genitourinary:  Negative for dysuria and hematuria.   Musculoskeletal:  Negative for arthralgias and back pain.   Skin:  Negative for color change and rash.   Neurological:  Negative for seizures and syncope.   All other systems reviewed and are negative.      Historical Information   Past Medical History:   Diagnosis Date    AAA (abdominal aortic aneurysm) (HCC)     s/p open repair    BPH (benign prostatic hyperplasia)     CAD (coronary artery disease)     Cholelithiasis     Former tobacco use     History of MI (myocardial infarction)     History of TIA (transient ischemic attack)     Hyperlipidemia     Hypertension     Insomnia     Obesity      Past Surgical History:   Procedure Laterality Date    ABDOMINAL AORTIC ANEURYSM REPAIR  06/07/2011    Dr. Yarbrough 6/7/11    APPENDECTOMY      CARDIAC CATHETERIZATION Left 06/03/2024    Procedure: Cardiac Left Heart Cath;  Surgeon: Pk Blanco DO;  Location: [facility];  Service: Cardiology    CARDIAC CATHETERIZATION  06/03/2024    Procedure: Cardiac catheterization;  Surgeon: Pk Blanco DO;  Location: [facility];  Service: Cardiology    CARDIAC CATHETERIZATION N/A 06/03/2024    Procedure: Cardiac Coronary Angiogram;  Surgeon: Pk Blanco DO;  Location: [facility];  Service: Cardiology    CATARACT EXTRACTION Right 11/2018    CHOLECYSTECTOMY LAPAROSCOPIC      COLONOSCOPY  11/2006    CORONARY ANGIOPLASTY      3 STENTS INSERTED    HARVEST VEIN Left 6/20/2024    Procedure: HARVEST VEIN ENDOSCOPIC (EV[facility]);  Surgeon: KALPANA Amos MD;  Location: [facility];   Service: Cardiac Surgery    MT CORONARY ARTERY BYP W/VEIN & ARTERY GRAFT 3 VEIN N/A 2024    Procedure: CORONARY ARTERY BYPASS GRAFT (CABG) X3 VESSELS, LIMA - LAD, LEFT LEG EVH/SVG - PDA AND OM1, W/ DEDRICK;  Surgeon: KALPANA Amos MD;  Location: [facility];  Service: Cardiac Surgery    MT LAPS SURG CHOLECYSTECTOMY W/CHOLANGIOGRAPHY N/A 2016    Procedure: CHOLECYSTECTOMY LAPAROSCOPIC with intra-op cholangiogram ;  Surgeon: Prasanth Verdin MD;  Location: [facility];  Service: General     Social History   Social History     Substance and Sexual Activity   Alcohol Use Not Currently    Comment: 4 oz glass of wine every other day w/ his dinner     Social History     Substance and Sexual Activity   Drug Use No     Social History     Tobacco Use   Smoking Status Former    Current packs/day: 0.00    Average packs/day: 0.5 packs/day for 30.0 years (15.0 ttl pk-yrs)    Types: Cigarettes    Start date: 3/10/1956    Quit date: 3/10/1986    Years since quittin.3   Smokeless Tobacco Never   Tobacco Comments    Quit  about 25 years x1ppd     Family History   Problem Relation Age of Onset    Heart disease Mother     Hypertension Mother         Benign Essential     Stroke Mother         Stroke     Heart disease Father     Coronary artery disease Brother     Diabetes Brother         mellitus     Heart disease Brother     Aortic aneurysm Brother     Coronary artery disease Brother     Sudden death Brother     Coronary artery disease Brother     Sudden death Brother     Sudden death Brother     Coronary artery disease Brother     Heart disease Family        Meds/Allergies   current meds:   Current Facility-Administered Medications   Medication Dose Route Frequency    acetaminophen (TYLENOL) tablet 975 mg  975 mg Oral Q6H While awake    amiodarone tablet 200 mg  200 mg Oral Q8H [facility]    aspirin (ECOTRIN LOW STRENGTH) EC tablet 81 mg  81 mg Oral Daily    atorvastatin (LIPITOR) tablet 80 mg  80 mg Oral Daily With  Dinner    bisacodyl (DULCOLAX) rectal suppository 10 mg  10 mg Rectal Daily PRN    bisacodyl (DULCOLAX) rectal suppository 10 mg  10 mg Rectal Once    chlorhexidine (PERIDEX) 0.12 % oral rinse 15 mL  15 mL Mouth/Throat BID    clopidogrel (PLAVIX) tablet 75 mg  75 mg Oral Daily    docusate sodium (COLACE) capsule 100 mg  100 mg Oral BID    heparin (porcine) subcutaneous injection 5,000 Units  5,000 Units Subcutaneous Q8H KRISTI    hydrALAZINE (APRESOLINE) injection 5 mg  5 mg Intravenous Q6H PRN    insulin lispro (HumALOG/ADMELOG) 100 units/mL subcutaneous injection 1-5 Units  1-5 Units Subcutaneous HS    insulin lispro (HumALOG/ADMELOG) 100 units/mL subcutaneous injection 1-6 Units  1-6 Units Subcutaneous TID AC    lidocaine (LIDODERM) 5 % patch 2 patch  2 patch Topical Daily    magnesium hydroxide (MILK OF MAGNESIA) oral suspension 30 mL  30 mL Oral Daily PRN    melatonin tablet 6 mg  6 mg Oral HS    methocarbamol (ROBAXIN) tablet 500 mg  500 mg Oral Q6H KRISTI    metoprolol (LOPRESSOR) injection 5 mg  5 mg Intravenous Q6H KRISTI    multi-electrolyte (PLASMALYTE-A/ISOLYTE-S PH 7.4) IV solution  50 mL/hr Intravenous Continuous    ondansetron (ZOFRAN) injection 4 mg  4 mg Intravenous Q6H PRN    oxyCODONE (ROXICODONE) split tablet 2.5 mg  2.5 mg Oral Q4H PRN    Or    oxyCODONE (ROXICODONE) IR tablet 5 mg  5 mg Oral Q4H PRN    pantoprazole (PROTONIX) EC tablet 40 mg  40 mg Oral Early Morning    polyethylene glycol (MIRALAX) packet 17 g  17 g Oral Daily    potassium chloride (Klor-Con M20) CR tablet 40 mEq  40 mEq Oral BID    potassium chloride 40 mEq IVPB (premix)  40 mEq Intravenous Once    senna (SENOKOT) tablet 8.6 mg  1 tablet Oral HS    senna (SENOKOT) tablet 8.6 mg  1 tablet Oral Once    temazepam (RESTORIL) capsule 15 mg  15 mg Oral HS PRN    trimethobenzamide (TIGAN) IM injection 200 mg  200 mg Intramuscular Q6H PRN    and PTA meds:   Prior to Admission Medications   Prescriptions Last Dose Informant Patient Reported?  "Taking?   amLODIPine (NORVASC) 10 mg tablet 6/19/2024 Self No Yes   Sig: Take 1 tablet (10 mg total) by mouth daily   aspirin 325 mg tablet 6/19/2024 Self No Yes   Sig: Take 1 tablet (325 mg total) by mouth daily   clopidogrel (PLAVIX) 75 mg tablet 6/15/2024 Self No No   Sig: Take 1 tablet (75 mg total) by mouth daily   isosorbide mononitrate (IMDUR) 60 mg 24 hr tablet 6/19/2024 Self No Yes   Sig: Take 1 tablet (60 mg total) by mouth 2 (two) times a day   metoprolol tartrate (LOPRESSOR) 50 mg tablet 6/19/2024 Self No Yes   Sig: Take 1 tablet (50 mg total) by mouth 2 (two) times a day   mupirocin (BACTROBAN) 2 % ointment 6/19/2024  No Yes   Sig: Apply topically 2 (two) times a day   nitroglycerin (NITROSTAT) 0.4 mg SL tablet  Self No No   Sig: Place 1 tablet (0.4 mg total) under the tongue every 5 (five) minutes as needed for chest pain   ranolazine (RANEXA) 500 mg 12 hr tablet 6/19/2024 Self No Yes   Sig: Take 1 tablet (500 mg total) by mouth 2 (two) times a day   rosuvastatin (CRESTOR) 40 MG tablet 6/19/2024 Self No Yes   Sig: Take 1 tablet (40 mg total) by mouth daily   tamsulosin (FLOMAX) 0.4 mg 6/19/2024 Self No Yes   Sig: Take 1 capsule by mouth daily with dinner      Facility-Administered Medications: None     No Known Allergies    Objective   First Vitals:   Blood Pressure: 128/75 (06/20/24 0548)  Pulse: 57 (06/20/24 0548)  Temperature: 97.6 °F (36.4 °C) (06/20/24 0548)  Temp Source: Temporal (06/20/24 0548)  Respirations: 17 (06/20/24 0548)  Height: 5' 5\" (165.1 cm) (06/20/24 0548)  Weight - Scale: 84 kg (185 lb 3 oz) (06/20/24 0548)  SpO2: 97 % (06/20/24 0548)    Current Vitals:   Blood Pressure: 147/72 (06/25/24 1036)  Pulse: 81 (06/25/24 1036)  Temperature: 97.6 °F (36.4 °C) (06/25/24 1036)  Temp Source: Oral (06/25/24 1036)  Respirations: 18 (06/25/24 1036)  Height: 5' 5\" (165.1 cm) (06/20/24 0548)  Weight - Scale: 84.4 kg (186 lb 1.1 oz) (06/25/24 0600)  SpO2: 94 % (06/25/24 1036)      Intake/Output " Summary (Last 24 hours) at 6/25/2024 1051  Last data filed at 6/25/2024 1023  Gross per 24 hour   Intake 988 ml   Output 1625 ml   Net -637 ml       Invasive Devices       Central Venous Catheter Line  Duration             CVC Central Lines 06/20/24 5 days              Drain  Duration             NG/OG/Enteral Tube Nasogastric Left nare <1 day                    Physical Exam  Vitals and nursing note reviewed.   Constitutional:       General: He is not in acute distress.     Appearance: Normal appearance. He is not ill-appearing.   HENT:      Head: Normocephalic and atraumatic.      Nose: Nose normal.      Mouth/Throat:      Mouth: Mucous membranes are moist.      Pharynx: Oropharynx is clear.   Eyes:      Extraocular Movements: Extraocular movements intact.   Cardiovascular:      Rate and Rhythm: Normal rate and regular rhythm.   Pulmonary:      Effort: Pulmonary effort is normal. No respiratory distress.      Breath sounds: Normal breath sounds.   Abdominal:      General: There is distension.      Palpations: Abdomen is soft.      Tenderness: There is no abdominal tenderness. There is no guarding or rebound.   Musculoskeletal:      Cervical back: Neck supple.   Skin:     General: Skin is warm and dry.      Coloration: Skin is not jaundiced.   Neurological:      Mental Status: He is alert and oriented to person, place, and time. Mental status is at baseline.   Psychiatric:         Mood and Affect: Mood normal.         Behavior: Behavior normal.         Thought Content: Thought content normal.         Lab Results: CBC:   Lab Results   Component Value Date    WBC 10.82 (H) 06/25/2024    HGB 11.2 (L) 06/25/2024    HCT 32.2 (L) 06/25/2024    MCV 94 06/25/2024     06/25/2024    RBC 3.41 (L) 06/25/2024    MCH 32.8 06/25/2024    MCHC 34.8 06/25/2024    RDW 12.4 06/25/2024    MPV 9.2 06/25/2024   , CMP:   Lab Results   Component Value Date    SODIUM 135 06/25/2024    K 3.4 (L) 06/25/2024    CL 87 (L) 06/25/2024     CO2 33 (H) 06/25/2024    BUN 62 (H) 06/25/2024    CREATININE 2.22 (H) 06/25/2024    CALCIUM 8.8 06/25/2024    EGFR 27 06/25/2024     Imaging:   CT abdomen pelvis wo contrast   Final Result by Sp Barroso MD (06/25 1057)      Gastric distention and associated proximal small bowel dilatation and stool like contents within the dilated loops of small bowel without discrete transition point identified. Findings suggest chronic ileus though chronic partial small bowel obstruction    not entirely excluded.      Juxtarenal abdominal aortic aneurysm measuring 4.5 x 4.2 cm. Suprarenal abdominal aorta is dilated measuring 3.7 cm.      Workstation performed: [id number]         XR chest portable   Final Result by Joesph Marshall MD (06/21 1417)      No acute cardiopulmonary disease.            Workstation performed: [id number]         XR chest portable ICU   Final Result by Nataliia Evans MD (06/20 1543)      Expected appearance after CABG.      Left base opacity, likely atelectasis. Small effusion not excluded.            Workstation performed: [id number]         XR chest pa & lateral    (Results Pending)       EKG, Pathology, and Other Studies: I have personally reviewed pertinent reports.      Counseling / Coordination of Care  Total floor / unit time spent today 30 minutes.  Greater than 50% of total time was spent with the patient and / or family counseling and / or coordination of care.    Soraya Forrest MD  6/25/2024 10:51 AM

## 2024-06-26 LAB
ANION GAP SERPL CALCULATED.3IONS-SCNC: 12 MMOL/L (ref 4–13)
ANION GAP SERPL CALCULATED.3IONS-SCNC: 14 MMOL/L (ref 4–13)
BUN SERPL-MCNC: 72 MG/DL (ref 5–25)
BUN SERPL-MCNC: 82 MG/DL (ref 5–25)
CALCIUM SERPL-MCNC: 8.3 MG/DL (ref 8.4–10.2)
CALCIUM SERPL-MCNC: 8.4 MG/DL (ref 8.4–10.2)
CHLORIDE SERPL-SCNC: 93 MMOL/L (ref 96–108)
CHLORIDE SERPL-SCNC: 95 MMOL/L (ref 96–108)
CO2 SERPL-SCNC: 33 MMOL/L (ref 21–32)
CO2 SERPL-SCNC: 34 MMOL/L (ref 21–32)
CREAT SERPL-MCNC: 1.95 MG/DL (ref 0.6–1.3)
CREAT SERPL-MCNC: 2.01 MG/DL (ref 0.6–1.3)
ERYTHROCYTE [DISTWIDTH] IN BLOOD BY AUTOMATED COUNT: 12.6 % (ref 11.6–15.1)
GFR SERPL CREATININE-BSD FRML MDRD: 30 ML/MIN/1.73SQ M
GFR SERPL CREATININE-BSD FRML MDRD: 32 ML/MIN/1.73SQ M
GLUCOSE SERPL-MCNC: 105 MG/DL (ref 65–140)
GLUCOSE SERPL-MCNC: 113 MG/DL (ref 65–140)
GLUCOSE SERPL-MCNC: 123 MG/DL (ref 65–140)
GLUCOSE SERPL-MCNC: 124 MG/DL (ref 65–140)
GLUCOSE SERPL-MCNC: 125 MG/DL (ref 65–140)
GLUCOSE SERPL-MCNC: 94 MG/DL (ref 65–140)
HCT VFR BLD AUTO: 34 % (ref 36.5–49.3)
HGB BLD-MCNC: 11.6 G/DL (ref 12–17)
LACTATE SERPL-SCNC: 1.5 MMOL/L (ref 0.5–2)
MCH RBC QN AUTO: 32.6 PG (ref 26.8–34.3)
MCHC RBC AUTO-ENTMCNC: 34.1 G/DL (ref 31.4–37.4)
MCV RBC AUTO: 96 FL (ref 82–98)
PLATELET # BLD AUTO: 202 THOUSANDS/UL (ref 149–390)
PMV BLD AUTO: 9 FL (ref 8.9–12.7)
POTASSIUM SERPL-SCNC: 3.3 MMOL/L (ref 3.5–5.3)
POTASSIUM SERPL-SCNC: 3.9 MMOL/L (ref 3.5–5.3)
RBC # BLD AUTO: 3.56 MILLION/UL (ref 3.88–5.62)
SODIUM SERPL-SCNC: 139 MMOL/L (ref 135–147)
SODIUM SERPL-SCNC: 142 MMOL/L (ref 135–147)
WBC # BLD AUTO: 10.03 THOUSAND/UL (ref 4.31–10.16)

## 2024-06-26 PROCEDURE — 97116 GAIT TRAINING THERAPY: CPT

## 2024-06-26 PROCEDURE — 97535 SELF CARE MNGMENT TRAINING: CPT

## 2024-06-26 PROCEDURE — 97530 THERAPEUTIC ACTIVITIES: CPT

## 2024-06-26 PROCEDURE — 80048 BASIC METABOLIC PNL TOTAL CA: CPT | Performed by: PHYSICIAN ASSISTANT

## 2024-06-26 PROCEDURE — 85027 COMPLETE CBC AUTOMATED: CPT | Performed by: PHYSICIAN ASSISTANT

## 2024-06-26 PROCEDURE — 82948 REAGENT STRIP/BLOOD GLUCOSE: CPT

## 2024-06-26 PROCEDURE — C9113 INJ PANTOPRAZOLE SODIUM, VIA: HCPCS | Performed by: PHYSICIAN ASSISTANT

## 2024-06-26 PROCEDURE — 99233 SBSQ HOSP IP/OBS HIGH 50: CPT | Performed by: SURGERY

## 2024-06-26 PROCEDURE — 99024 POSTOP FOLLOW-UP VISIT: CPT | Performed by: THORACIC SURGERY (CARDIOTHORACIC VASCULAR SURGERY)

## 2024-06-26 PROCEDURE — 99232 SBSQ HOSP IP/OBS MODERATE 35: CPT | Performed by: INTERNAL MEDICINE

## 2024-06-26 PROCEDURE — 83605 ASSAY OF LACTIC ACID: CPT | Performed by: PHYSICIAN ASSISTANT

## 2024-06-26 RX ORDER — METOPROLOL TARTRATE 1 MG/ML
10 INJECTION, SOLUTION INTRAVENOUS EVERY 6 HOURS SCHEDULED
Status: DISCONTINUED | OUTPATIENT
Start: 2024-06-26 | End: 2024-06-28

## 2024-06-26 RX ORDER — POTASSIUM CHLORIDE 14.9 MG/ML
20 INJECTION INTRAVENOUS ONCE
Status: COMPLETED | OUTPATIENT
Start: 2024-06-26 | End: 2024-06-26

## 2024-06-26 RX ORDER — POTASSIUM CHLORIDE 29.8 MG/ML
40 INJECTION INTRAVENOUS ONCE
Status: COMPLETED | OUTPATIENT
Start: 2024-06-26 | End: 2024-06-26

## 2024-06-26 RX ADMIN — METHOCARBAMOL 500 MG: 500 TABLET ORAL at 17:29

## 2024-06-26 RX ADMIN — HEPARIN SODIUM 5000 UNITS: 5000 INJECTION INTRAVENOUS; SUBCUTANEOUS at 16:04

## 2024-06-26 RX ADMIN — POTASSIUM CHLORIDE 20 MEQ: 14.9 INJECTION, SOLUTION INTRAVENOUS at 11:58

## 2024-06-26 RX ADMIN — SODIUM CHLORIDE, SODIUM GLUCONATE, SODIUM ACETATE, POTASSIUM CHLORIDE, MAGNESIUM CHLORIDE, SODIUM PHOSPHATE, DIBASIC, AND POTASSIUM PHOSPHATE 50 ML/HR: .53; .5; .37; .037; .03; .012; .00082 INJECTION, SOLUTION INTRAVENOUS at 09:56

## 2024-06-26 RX ADMIN — HEPARIN SODIUM 5000 UNITS: 5000 INJECTION INTRAVENOUS; SUBCUTANEOUS at 22:00

## 2024-06-26 RX ADMIN — CLOPIDOGREL BISULFATE 75 MG: 75 TABLET ORAL at 09:56

## 2024-06-26 RX ADMIN — METOROPROLOL TARTRATE 10 MG: 5 INJECTION, SOLUTION INTRAVENOUS at 17:38

## 2024-06-26 RX ADMIN — ATORVASTATIN CALCIUM 80 MG: 80 TABLET, FILM COATED ORAL at 17:29

## 2024-06-26 RX ADMIN — AMIODARONE HYDROCHLORIDE 0.5 MG/MIN: 50 INJECTION, SOLUTION INTRAVENOUS at 10:07

## 2024-06-26 RX ADMIN — POTASSIUM CHLORIDE 20 MEQ: 14.9 INJECTION, SOLUTION INTRAVENOUS at 17:45

## 2024-06-26 RX ADMIN — ASPIRIN 81 MG: 81 TABLET, COATED ORAL at 09:56

## 2024-06-26 RX ADMIN — POTASSIUM CHLORIDE 40 MEQ: 29.8 INJECTION, SOLUTION INTRAVENOUS at 08:17

## 2024-06-26 RX ADMIN — METOROPROLOL TARTRATE 7.5 MG: 5 INJECTION, SOLUTION INTRAVENOUS at 11:46

## 2024-06-26 RX ADMIN — HEPARIN SODIUM 5000 UNITS: 5000 INJECTION INTRAVENOUS; SUBCUTANEOUS at 05:35

## 2024-06-26 RX ADMIN — METOROPROLOL TARTRATE 7.5 MG: 5 INJECTION, SOLUTION INTRAVENOUS at 05:35

## 2024-06-26 RX ADMIN — PANTOPRAZOLE SODIUM 40 MG: 40 INJECTION, POWDER, FOR SOLUTION INTRAVENOUS at 09:08

## 2024-06-26 NOTE — PROGRESS NOTES
Progress Note - Cardiothoracic Surgery   Shane Chau Sr. 78 y.o. male MRN: [id number]  Unit/Bed#: [facility] 405-01 Encounter: [id number]    Coronary artery disease. S/P coronary artery bypass grafting; POD # 6      24 Hour Events: CT a/p yesterday revealing ileus, NPO/NGT placed (2.5L OP), given 500 cc of albumin with LA of 3.8, down trended now WNL at 1.5 today, diuretics d/c'd, placed on isolyte 50 mL/hr. Held all oral meds and started IV lopressor, now 7.5 mg IV q6h. General surgery following.    CXR without effusion, left diaphragm elevated likely from gastric dilatation.     Pain controlled, Denies SOB, remains on 3L NC (turned off this AM), using IS (1000, used once yesterday). +diarrhea in evening. Working with PT/OT.    Medications:   Scheduled Meds:  Current Facility-Administered Medications   Medication Dose Route Frequency Provider Last Rate    acetaminophen  975 mg Oral Q6H While awake Moraima Alvarez PA-C      amiodarone  200 mg Oral Q8H [facility] Moraima Alvarez PA-C      aspirin  81 mg Oral Daily Moraima Alvarez PA-C      atorvastatin  80 mg Oral Daily With Dinner Moraima Alvarez PA-C      bisacodyl  10 mg Rectal Daily PRN Moraima Alvarez PA-C      chlorhexidine  15 mL Mouth/Throat BID Moraima Avlarez PA-C      clopidogrel  75 mg Oral Daily Moraima Alvarez PA-C      docusate sodium  100 mg Oral BID Moraima Alvarez PA-C      heparin (porcine)  5,000 Units Subcutaneous Q8H [facility] Moraima Alvarez PA-C      hydrALAZINE  5 mg Intravenous Q6H PRN Moraima Alvarez PA-C      insulin lispro  1-5 Units Subcutaneous HS Moraima Alvarez PA-C      insulin lispro  1-6 Units Subcutaneous TID AC Moraima Alvarez PA-C      lidocaine  2 patch Topical Daily Moraima Alvarez PA-C      magnesium hydroxide  30 mL Oral Daily PRN Rahel Baig PA-C      melatonin  6 mg Oral HS Rahel Baig PA-C      methocarbamol  500 mg Oral Q6H KRISTI Moraima Alvarez PA-C       metoprolol  7.5 mg Intravenous Q6H [facility] Sp Hagen PA-C      multi-electrolyte  50 mL/hr Intravenous Continuous Sp Hagen PA-C 50 mL/hr (06/25/24 1433)    ondansetron  4 mg Intravenous Q6H PRN Moraima Alvarez PA-C      oxyCODONE  2.5 mg Oral Q4H PRN Moraima Alvarez PA-C      Or    oxyCODONE  5 mg Oral Q4H PRN Moraima Alvarez PA-C      pantoprazole  40 mg Intravenous Q24H [facility] Sp Hagen PA-C      polyethylene glycol  17 g Oral Daily Moraima Alvarez PA-C      senna  1 tablet Oral HS Rahel Baig PA-C      senna  1 tablet Oral Once Sandy Mckeon PA-C      temazepam  15 mg Oral HS PRN Moraima Alvarez PA-C      trimethobenzamide  200 mg Intramuscular Q6H PRN Rudolph Gunter PA-C       Continuous Infusions:multi-electrolyte, 50 mL/hr, Last Rate: 50 mL/hr (06/25/24 1433)      PRN Meds:.  bisacodyl    hydrALAZINE    magnesium hydroxide    ondansetron    oxyCODONE **OR** oxyCODONE    temazepam    trimethobenzamide    Vitals:   Vitals:    06/26/24 0239 06/26/24 0529 06/26/24 0600 06/26/24 0701   BP: 134/70 132/72  107/67   BP Location:       Pulse: 84 83  86   Resp: 15   18   Temp: 98.9 °F (37.2 °C)   97.6 °F (36.4 °C)   TempSrc:       SpO2: 93% 91%  92%   Weight:   81.5 kg (179 lb 10.8 oz)    Height:       Afebrile, HR 80s, 122/67 (SBP 100s-140)    Telemetry: NSR; Heart Rate: 77 (1 episode of 4 run Vtach @ 0538)    Respiratory:   SpO2: SpO2: 92 %; 3 LPM (quit tobacco use in 80s)    Intake/Output:     Intake/Output Summary (Last 24 hours) at 6/26/2024 0703  Last data filed at 6/26/2024 0602  Gross per 24 hour   Intake 1192.17 ml   Output 4023 ml   Net -2830.83 ml   UOP 1.5L  NGT 2.5 L- bilious   Net neg. 2.8  Since admit -5.2L     Chest tube Output:  removed    Weights:   Weight (last 2 days)       Date/Time Weight    06/26/24 0600 81.5 (179.68)    06/25/24 0600 84.4 (186.07)    06/24/24 0600 85.3 (188)          Admit weight: 6/20 84 kg (down 3 kg)    Results:   Results from last  7 days   Lab Units 06/26/24  0455 06/25/24  0459 06/24/24  0452   WBC Thousand/uL 10.03 10.82* 16.01*   HEMOGLOBIN g/dL 11.6* 11.2* 10.2*   HEMATOCRIT % 34.0* 32.2* 29.1*   PLATELETS Thousands/uL 202 199 135*     Results from last 7 days   Lab Units 06/26/24  0455 06/25/24  1827 06/25/24  1332 06/25/24  0459 06/24/24  0452 06/20/24  1647 06/20/24  1310   SODIUM mmol/L 139  --   --  135 136   < >  --    POTASSIUM mmol/L 3.3* 3.9 3.4* 3.2* 3.4*   < >  --    CHLORIDE mmol/L 93*  --   --  87* 90*   < >  --    CO2 mmol/L 34*  --   --  33* 35*   < >  --    CO2, I-STAT mmol/L  --   --   --   --   --   --  21   BUN mg/dL 72*  --   --  62* 48*   < >  --    CREATININE mg/dL 1.95*  --   --  2.22* 1.86*   < >  --    GLUCOSE, ISTAT mg/dl  --   --   --   --   --   --  140   CALCIUM mg/dL 8.4  --   --  8.8 8.2*   < >  --     < > = values in this interval not displayed.     No hx of CKD    Results from last 7 days   Lab Units 06/20/24  1306   INR  1.37*   PTT seconds 29       Point of care glucose: 79 - 113 (no hx of DM)    Studies:  CXR: 6/25  NG tube in the stomach. There is moderate gaseous distention of the stomach.     CT a/p 6/25:   Gastric distention and associated proximal small bowel dilatation and stool like contents within the dilated loops of small bowel without discrete transition point identified. Findings suggest chronic ileus though chronic partial small bowel obstruction   not entirely excluded.     Juxtarenal abdominal aortic aneurysm measuring 4.5 x 4.2 cm. Suprarenal abdominal aorta is dilated measuring 3.7 cm.      I have personally reviewed pertinent reports.   and I have personally reviewed pertinent films in PACS    Invasive Lines/Tubes:  Invasive Devices       Central Venous Catheter Line  Duration             CVC Central Lines 06/20/24 5 days              Drain  Duration             NG/OG/Enteral Tube Nasogastric Left nare <1 day                    Physical Exam:    General: No acute distress and Normal  appearance  HEENT/NECK:  Normocephalic. Atraumatic.  No jugular venous distention.    Cardiac: Regular rate and rhythm and No murmurs/rubs/gallops  Pulmonary:  Breath sounds clear bilaterally and No rales/rhonchi/wheezes  Abdomen:   Appropriate tenderness with distention , hypoactive BS. No guarding or rigidity   Incisions: Sternum is stable.  Incision dressed with Acticoat.  No erythema or drainage and Saphenectomy incison is clean, dry, and intact.   Extremities: Extremities warm/dry and Trace edema B/L  Neuro: Alert and oriented X 3 and No focal deficits  Skin: Warm/Dry, without rashes or lesions.       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 S/P coronary artery bypass grafting; POD # 6    Plan:    Cardiac:     Elective surgical admission  Normal ventricular systolic function, EF 60%    NSR; HR well-controlled  BP well-controlled    Hold Lopressor, 50mg PO BID Norvasc 10mg QD- continue IV lopressor 7.5 mg Q6H    Hold ACE inhibitor/ARB secondary to renal dysfunction    Transition prophylactic Amiodarone, 200 mg PO TID to gtt    Restart ASA (81 mg) and Plavix for h/o TIAs (on PTA) and Statin therapy (asymptomatic b/l carotid artery stenosis)- clamp tube for meds today (~30 min)    Epicardial pacing wires have been removed    Central IV access no longer required; Continue central venous catheter today    Continue Subcutaneous Heparin for DVT prophylaxis    Pulmonary:     Acute post-op pulmonary insufficiency; Requiring 3 liters via nasal cannula, secondary to idiopathic pulmonary fibrosis, atelectasis, and poor inspiratory effort  secondary to pain. Continue incentive spirometry/coughing/deep breathing exercises.  Wean supplemental oxygen as tolerated for saturation > 90%    Chest tubes have been discontinued    CXR 6/25- elevated L diaphragm, no effusion    Renal:     Postoperative BARBARA, w/ Cr 1.95 from 2.22 from 1.86, from 1.6 from 1.29;  Follow up daily BMP    Intake/Output net: -2.8 L/24 hours, (UOP- 1.5L/24, NGT 2.5L), since admit - 5.2L    Diuretic Regimen:   No further diuretics   K 3.3- replete w/ IV K, recheck this afternoon        Neuro:    Neurologically intact; No active issues   Melatonin for sleep    Incisional pain well controlled   Continue tylenol, 975 mg PO q 8, standing dose   Continue oxycodone, 2.5 to 5 mg PO q 4 hours prn pain   Continue topical Lidocaine patch to affected area   Continue methocarbamol, 500 mg PO q 6 hours prn pain/muscle spasm    GI:    Postop ileus- NPO/NGT w/ IVF @ 50 mL/hr- general surgery following    Continue stool softeners and prn suppository, increase bowel regimen    Continue GI prophylaxis    Endo:     Pre-Op Hgb A1C: 5.4    Serum glucose well controlled on insulin sliding scale coverage    7    Hematology:     Post-operative blood count acceptable; Trend prn  Leukocytosis; Afebrile with no signs of infection; Trend CBC prn    8.   Disposition:        Following daily PT/OT recommendations regarding home vs. rehab when medically cleared for discharge - currently recs for rehab     VTE Pharmacologic Prophylaxis: Heparin  VTE Mechanical Prophylaxis: sequential compression device    Collaborative rounds completed with supervising physician  Plan of care discussed with bedside nurse    SIGNATURE: Sandy Mckeon PA-C  DATE: June 26, 2024  TIME: 7:03 AM

## 2024-06-26 NOTE — OCCUPATIONAL THERAPY NOTE
Occupational Therapy Progress Note     Patient Name: Shane Chau Sr.  Today's Date: 6/26/2024  Problem List  Principal Problem:    Coronary artery disease of native artery of native heart with stable angina pectoris (HCC)  Active Problems:    Abdominal aortic aneurysm without rupture (HCC)    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HCC)    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HCC)    Asymptomatic bilateral carotid artery stenosis    Hyperlipidemia    Hypertension    S/P CABG x 3    BARBARA (acute kidney injury) ([facility])            06/26/24 1258   OT Last Visit   OT Visit Date 06/26/24   Note Type   Note Type Treatment   Pain Assessment   Pain Assessment Tool 0-10   Pain Score No Pain   Hospital Pain Intervention(s) Repositioned;Ambulation/increased activity;Emotional support   Restrictions/Precautions   Weight Bearing Precautions Per Order No   Other Precautions Cardiac/sternal;Cognitive;Chair Alarm;Bed Alarm;Multiple lines;Telemetry;O2;Fall Risk;Pain  (NGT)   Lifestyle   Autonomy Pta pt I with ADL, IADL and functional mobility, (+)    Reciprocal Relationships supportive spouse   Service to Others retired   Intrinsic Gratification enjoys time with family   ADL   Where Assessed Chair   Grooming Assistance 5  Supervision/Setup   Grooming Deficit Setup   LB Dressing Assistance 2  Maximal Assistance   Toileting Assistance  2  Maximal Assistance   Toileting Deficit Clothing management up;Clothing management down;Perineal hygiene;Grab bar use   Functional Standing Tolerance   Time 1 minute   Activity Posterior hygiene in standing   Comments w/ RW   Bed Mobility   Supine to Sit Unable to assess   Sit to Supine Unable to assess   Additional Comments Upon arrival, pt found sitting upright in recliner; wife present in room; @ end of session, pt left sitting upright in recliner with all functional needs in reach with chair alarm activated + wife and friends in room   Transfers   Sit to Stand 4  Minimal assistance   Additional items  "Assist x 1;Increased time required;Verbal cues   Stand to Sit 4  Minimal assistance   Additional items Assist x 1;Increased time required;Verbal cues   Toilet transfer 3  Moderate assistance   Additional items Assist x 1;Increased time required;Verbal cues;Standard toilet   Additional Comments w/ RW   Functional Mobility   Functional Mobility 4  Minimal assistance   Additional Comments Pt completed short household functional mobility distances with Min A x1 w/ RW <> bathroom   Additional items Rolling walker   Toilet Transfers   Toilet Transfer From Rolling walker   Toilet Transfer Type To and from   Toilet Transfer to Standard toilet   Toilet Transfer Technique Ambulating   Toilet Transfers Moderate assistance   Toilet Transfers Comments w/ RW + grab bar use for safety and support   Subjective   Subjective \"I have to use the bathroom.\"   Cognition   Arousal/Participation Alert;Responsive;Arousable;Cooperative   Attention Within functional limits   Memory Decreased recall of precautions   Following Commands Follows one step commands without difficulty   Comments Pt pleasant and coopeative; verbal cues for proper technique during functional activity to adhere to sternal precautions   Activity Tolerance   Activity Tolerance Patient limited by fatigue;Patient limited by pain   Medical Staff Made Aware RN cleared   Assessment   Assessment Pt is a 77 yo male who actively participated in skilled OT session on 6/26/2024. Treatment focused to improve functional transfers with fall prevention strategies, static/dynamic balance, postural/trunk control, proper body mechanics, functional use of b/l UE's, higher level cognitive functions, safety awareness, and overall increased activity tolerance in ADL/IADL/leisure tasks. Upon arrival, pt found sitting upright in recliner and was agreeable to OT session. Pt performed STS w/ Min A x1 w/ RW and completed household functional mobility distances <> bathroom with Min A x1 w/ RW. Pt " completing toilet transfer w/ Mod A x1 w/ RW and grab bar and required Max A in standing for posterior hygiene w/ RW for steadying/support. At the end of the session, pt was left sitting upright in recliner with all functional needs in reach. Pt demonstrates gradual functional improvements towards OT goals however continues to be functioning below occupational baseline and is still limited by the following limitations/impairments which were addressed through skilled instruction: sternal precautions, generalized weakness, balance, endurance/activity tolerance, postural/trunk control, strength, pain, and safety awareness. At this time, recommend discharge to post-acute rehab when medically stable. The patient's raw score on the AM-PAC Daily Activity Inpatient Short Form is 17. A raw score of less than 19 suggests the patient may benefit from discharge to post-acute rehabilitation services. Please refer to the recommendation of the Occupational Therapist for safe discharge planning.  Established OT goals will be continued 2-3x/wk to address acute care needs and underlying performance skills to maximize occupational performance and safety to return to [facility].   Plan   Treatment Interventions ADL retraining;Functional transfer training;UE strengthening/ROM;Endurance training;Patient/family training;Equipment evaluation/education;Compensatory technique education;Continued evaluation;Energy conservation;Activityengagement   Goal Expiration Date 07/05/24   OT Treatment Day 3   OT Frequency 2-3x/wk   Discharge Recommendation   Rehab Resource Intensity Level, OT I (Maximum Resource Intensity)   AM-PAC Daily Activity Inpatient   Lower Body Dressing 2   Bathing 2   Toileting 2   Upper Body Dressing 3   Grooming 4   Eating 4   Daily Activity Raw Score 17   Daily Activity Standardized Score (Calc for Raw Score >=11) 37.26   AM-PAC Applied Cognition Inpatient   Following a Speech/Presentation 3   Understanding Ordinary Conversation  4   Taking Medications 3   Remembering Where Things Are Placed or Put Away 3   Remembering List of 4-5 Errands 3   Taking Care of Complicated Tasks 2   Applied Cognition Raw Score 18   Applied Cognition Standardized Score 38.07   End of Consult   Education Provided Yes;Family or social support of family present for education by provider   Patient Position at End of Consult Bedside chair;Bed/Chair alarm activated;All needs within reach   Nurse Communication Nurse aware of consult       Nidia Lopez MS, OTR/L

## 2024-06-26 NOTE — CASE MANAGEMENT
Case Management Discharge Planning Note    Patient name Shane Chau .  Location [facility] 405/[facility] 405-01 MRN [id number]  : 1945 Date 2024       Current Admission Date: 2024  Current Admission Diagnosis:Coronary artery disease of native artery of native heart with stable angina pectoris (HCC)   Patient Active Problem List    Diagnosis Date Noted Date Diagnosed    BARBARA (acute kidney injury) ([facility]) 2024     S/P CABG x 3 2024     Rib pain 2023     Paresthesia 2023     Leg swelling 05/10/2022     Erectile dysfunction 2022     Microhematuria 2018     Need for pneumococcal vaccination 2018     Hematuria 2018     Asymptomatic bilateral carotid artery stenosis 2017     Closed compression fracture of first lumbar vertebra ([facility]) 2017     Abdominal aortic aneurysm without rupture ([facility]) 2016     Localized primary osteoarthritis of lower leg, unspecified laterality 2013     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[facility]) 2013     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[facility]) 2013     Transient cerebral ischemic attack 2013     Coronary artery disease of native artery of native heart with stable angina pectoris ([facility]) 2012     Hyperlipidemia 2012     Hypertension 2012       LOS (days): 6  Geometric Mean LOS (GMLOS) (days): 8.2  Days to GMLOS:1.9     OBJECTIVE:  Risk of Unplanned Readmission Score: 15.07         Current admission status: Inpatient   Preferred Pharmacy:   [facility] - [place] - [address]  [address]  [place] [address]  Phone: [phone] Fax: [phone]    [id number] - [place], [place] - [address]  [place] [address]  Phone: [phone] Fax: [phone]    [id number] - [place] - [address]  [address] [place] [place] [address]  Phone: [phone] Fax: [phone]    [facility]  - [place] - [address]  [address]  [place] [address]  Phone: [phone] Fax: [phone]    Primary Care Provider: Sherwin Mantilla MD    Primary Insurance: [facility] REP  Secondary Insurance:     DISCHARGE DETAILS:    Discharge planning discussed with:: patient, wife Maryjo and son Shane Mena.  Freedom of Choice: Yes  Comments - Freedom of Choice: pt/family considering STR, would like [facility] or [facility].  CM contacted family/caregiver?: Yes (wife Maryjo and maggie Lynn at bedside)  Were Treatment Team discharge recommendations reviewed with patient/caregiver?: Yes (STR)  Did patient/caregiver verbalize understanding of patient care needs?: N/A- going to facility  Were patient/caregiver advised of the risks associated with not following Treatment Team discharge recommendations?: Yes                   Other Referral/Resources/Interventions Provided:  Interventions: Short Term Rehab  Referral Comments: Met with pt ,wife Maryjo and maggie Lynn regarding STR as made aware by provider that family wanted to discuss. Pt/family now agreeable with STR and would like referrals to [facility]. Referrals plced on AIDIN. PASSR completed.         Treatment Team Recommendation: Short Term Rehab  Discharge Destination Plan:: Short Term Rehab

## 2024-06-26 NOTE — PROGRESS NOTES
"Progress Note - General Surgery   Shane Chau Sr. 78 y.o. male MRN: [id number]  Unit/Bed#: [facility] 405-01 Encounter: [id number]    Assessment:  78 year old male with a past medical history of AAA s/p open repair, BPH, HTN, HLD, CAD with history of MI now s/p 6/20/24 CABG x3 vessels with worsening abdominal distension, emesis and no bowel movement post operatively. General surgery consulted d/t concern for ileus.    Vitals normal on 2.5L NC  UOP 1523cc + 2 unmeasured occurrences  NGT 1920cc + 2 unmeasured occurrences, gastric    WBC 10.03 (from 10.82)  Hb 11.6 (from 11.2)  Cr 1.95 (from 2.22)  LA 1.5 (from 1.6 from 2.2 from 3.4 from 3.8)    Plan:  Maintain NPO/NGT given ongoing abdominal distension and high NG tube output, decompression for another 24-48 hours and remove NG if ongoing bowel function  Maintenance IVF while NPO  Trend Cr  Rest of care per primary    Subjective/Objective     Subjective: No acute events overnight, remains NPO with NGT in place, passing flatus and had 3 BM's yesterday, abdominal pain improved    Objective:     Blood pressure 137/71, pulse 86, temperature 98.3 °F (36.8 °C), resp. rate 18, height 5' 5\" (1.651 m), weight 81.5 kg (179 lb 10.8 oz), SpO2 92%.,Body mass index is 29.9 kg/m².      Intake/Output Summary (Last 24 hours) at 6/26/2024 1051  Last data filed at 6/26/2024 0949  Gross per 24 hour   Intake 974.17 ml   Output 3773 ml   Net -2798.83 ml       Invasive Devices       Central Venous Catheter Line  Duration             CVC Central Lines 06/20/24 6 days              Drain  Duration             NG/OG/Enteral Tube Nasogastric Left nare 1 day                    Physical Exam:   Gen:    NAD  CV:      warm, well-perfused  Lungs: No respiratory distress  Abd:     soft, distended, minimally tender in bilateral lower quadrants  Ext:      no CCE  Neuro: A&Ox3     "

## 2024-06-26 NOTE — PHYSICAL THERAPY NOTE
PHYSICAL THERAPY NOTE          Patient Name: Shane Chau Sr.  Today's Date: 6/26/2024 06/26/24 1414   PT Last Visit   PT Visit Date 06/26/24   Note Type   Note Type Treatment   Pain Assessment   Pain Assessment Tool 0-10   Pain Score No Pain   Restrictions/Precautions   Weight Bearing Precautions Per Order No   Other Precautions Cardiac/sternal;Cognitive;Chair Alarm;Multiple lines;Telemetry;O2;Fall Risk;Pain  (NGT)   General   Chart Reviewed Yes   Additional Pertinent History Cleared for PT treatment session by mode.   Response to Previous Treatment Patient with no complaints from previous session.   Family/Caregiver Present Yes  (Wife and Son present at bedside)   Cognition   Overall Cognitive Status WFL   Arousal/Participation Alert;Responsive   Attention Within functional limits   Orientation Level Oriented to person;Oriented to place;Oriented to situation   Memory Decreased recall of precautions   Following Commands Follows one step commands without difficulty   Subjective   Subjective Pt alert; seated in chair; agreeable to mobilize with PT.   Transfers   Sit to Stand 4  Minimal assistance   Additional items Assist x 1;Increased time required;Verbal cues   Stand to Sit 4  Minimal assistance   Additional items Assist x 1;Increased time required;Verbal cues   Ambulation/Elevation   Gait pattern Wide NELLIE;Decreased foot clearance;Inconsistent keenan;Foward flexed;Short stride;Excessively slow   Gait Assistance 4  Minimal assist   Additional items Assist x 1;Verbal cues   Assistive Device Rolling walker   Distance 50ft + 30ft + 50ft + 30ft with seated rest breaks in between   Stair Management Assistance Not tested   Balance   Static Sitting Fair   Dynamic Sitting Fair -   Static Standing Fair -   Dynamic Standing Poor +   Ambulatory Poor +   Activity Tolerance   Activity Tolerance Patient limited by fatigue   Nurse Made  Aware Spoke w/ JORGE ALBERTO Feliciano.   Exercises   Knee AROM Long Arc Quad Sitting;15 reps;AROM;Bilateral   Ankle Pumps Sitting;15 reps;AROM;Bilateral   Marching Sitting;10 reps;AROM;Bilateral   Assessment   Prognosis Good   Problem List Decreased strength;Decreased endurance;Impaired balance;Decreased mobility;Decreased coordination   Assessment Pt continuing to have deficits with endurance and fatigue that limit his overall mobility. Pt requires frequent rest breaks throughout ambulation. Pt still requiring Min(A)x1. for all aspects of mobility. Pt had noted improvement in his sit to stand transfers and required less assistance each time he performed them. Will continue to work with PT to address the above mentioned deficits.   Barriers to Discharge Inaccessible home environment   Goals   Patient Goals To feel better   [facility] Expiration Date 07/05/24   PT Treatment Day 3   Plan   Treatment/Interventions Functional transfer training   Progress Slow progress, decreased activity tolerance   PT Frequency 4-6x/wk   Discharge Recommendation   Rehab Resource Intensity Level, PT I (Maximum Resource Intensity)   AM-PAC Basic Mobility Inpatient   Turning in Flat Bed Without Bedrails 2   Lying on Back to Sitting on Edge of Flat Bed Without Bedrails 2   Moving Bed to Chair 3   Standing Up From Chair Using Arms 3   Walk in Room 3   Climb 3-5 Stairs With Railing 1   Basic Mobility Inpatient Raw Score 14   Basic Mobility Standardized Score 35.55   [facility] Highest Level Of Mobility   -[facility] Goal 4: Move to chair/commode   -[facility] Achieved 7: Walk 25 feet or more   Education   Education Provided Mobility training;Home exercise program;Assistive device   Patient Demonstrates acceptance/verbal understanding   End of Consult   Patient Position at End of Consult Bedside chair;Bed/Chair alarm activated;All needs within reach     Staci Asencio

## 2024-06-26 NOTE — PLAN OF CARE
Problem: PHYSICAL THERAPY ADULT  Goal: Performs mobility at highest level of function for planned discharge setting.  See evaluation for individualized goals.  Description: Treatment/Interventions: Functional transfer training, LE strengthening/ROM, Elevations, Therapeutic exercise, Endurance training, Cognitive reorientation, Patient/family training, Equipment eval/education, Bed mobility, Gait training, Spoke to nursing, Spoke to case management, OT  Equipment Recommended: Walker (pt reports owning)       See flowsheet documentation for full assessment, interventions and recommendations.  6/26/2024 1718 by Staci Asencio  Outcome: Progressing  Note: Prognosis: Good  Problem List: Decreased strength, Decreased endurance, Impaired balance, Decreased mobility, Decreased coordination  Assessment: Pt continuing to have deficits with endurance and fatigue that limit his overall mobility. Pt requires frequent rest breaks throughout ambulation. Pt still requiring Min(A)x1. for all aspects of mobility. Pt had noted improvement in his sit to stand transfers and required less assistance each time he performed them. Will continue to work with PT to address the above mentioned deficits.  Barriers to Discharge: Inaccessible home environment     Rehab Resource Intensity Level, PT: I (Maximum Resource Intensity)    See flowsheet documentation for full assessment.

## 2024-06-26 NOTE — PROGRESS NOTES
Progress Note - Cardiology Team 1  Shane Chau Sr. 78 y.o. male MRN: [id number]  Unit/Bed#: [facility] 405-01 Encounter: [id number]        Principal Problem:    Coronary artery disease of native artery of native heart with stable angina pectoris (HCC)  Active Problems:    Abdominal aortic aneurysm without rupture (HCC)    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HCC)    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HCC)    Asymptomatic bilateral carotid artery stenosis    Hyperlipidemia    Hypertension    S/P CABG x 3    BARBARA (acute kidney injury) ([facility])      Assessment:     1.  CAD: s/p CABG x 3 with LIMA-LAD, SVG-RPDA and SVG-OM1.  POD #6  Final intraoperative DEDRICK: EF 55%  Rhythm management: Amiodarone 200 mg TID-transitioned to IV while n.p.o. Lopressor 7.5mg IV every 6.  Patient is maintaining sinus rhythm  Volume management: diuretics on hold while n.p.o. with ileus.  24-hour net -2 L  Aspirin, beta-blocker, statin- held   3-4 beat WCT     2.  Preserved biventricular systolic function: Final intraoperative DEDRICK EF 55%.  Management as above.     3.  Postoperative respiratory insufficiency: remains on 2.5 L NC.     4.  Postoperative renal insufficiency: Baseline creatinine 0.9-1.2.  Creatinine peaked  2.22 down to 1.9 this am     5.  Essential hypertension: normotensive on IV lopressor     6.  Hyperlipidemia: , , HDL 36, LDL 62 on rosuvastatin 40 mg daily switch to atorvastatin 80 mg this admission due to nonformulary. Statin not given d/t NPO status     7.  PAD: Maintained on aspirin and statin.     8.  History of TIAs: Maintained on aspirin and Plavix.     9.  Post op ileus- general surgery following- NPO/NGT        Plan/Recommendations:  Monitor I&O's, renal function, electrolytes and weight  Maintain potassium >4 and magnesium >2  Wean oxygen as able  Continue current medication regimen  Encourage ambulation and incentive spirometry  Continue to monitor on telemetry  Ileus per surgery    Subjective/Objective   Chief  "Complaint/subjective  Pt with NGT. No cp, sob        Vitals: /72   Pulse 92   Temp 98.3 °F (36.8 °C)   Resp 18   Ht 5' 5\" (1.651 m)   Wt 81.5 kg (179 lb 10.8 oz)   SpO2 92%   BMI 29.90 kg/m²     Vitals:    06/25/24 0600 06/26/24 0600   Weight: 84.4 kg (186 lb 1.1 oz) 81.5 kg (179 lb 10.8 oz)     Orthostatic Blood Pressures      Flowsheet Row Most Recent Value   Blood Pressure 136/72 filed at 06/26/2024 1146   Patient Position - Orthostatic VS Lying filed at 06/25/2024 1530              Intake/Output Summary (Last 24 hours) at 6/26/2024 1146  Last data filed at 6/26/2024 1130  Gross per 24 hour   Intake 974.17 ml   Output 3043 ml   Net -2068.83 ml       Invasive Devices       Central Venous Catheter Line  Duration             CVC Central Lines 06/20/24 6 days              Drain  Duration             NG/OG/Enteral Tube Nasogastric Left nare 1 day                    Current Facility-Administered Medications   Medication Dose Route Frequency    acetaminophen (TYLENOL) tablet 975 mg  975 mg Oral Q6H While awake    amiodarone (CORDARONE) 900 mg in dextrose 5 % 500 mL infusion  0.5 mg/min Intravenous Continuous    aspirin (ECOTRIN LOW STRENGTH) EC tablet 81 mg  81 mg Oral Daily    atorvastatin (LIPITOR) tablet 80 mg  80 mg Oral Daily With Dinner    bisacodyl (DULCOLAX) rectal suppository 10 mg  10 mg Rectal Daily PRN    chlorhexidine (PERIDEX) 0.12 % oral rinse 15 mL  15 mL Mouth/Throat BID    clopidogrel (PLAVIX) tablet 75 mg  75 mg Oral Daily    docusate sodium (COLACE) capsule 100 mg  100 mg Oral BID    heparin (porcine) subcutaneous injection 5,000 Units  5,000 Units Subcutaneous Q8H KRISTI    hydrALAZINE (APRESOLINE) injection 5 mg  5 mg Intravenous Q6H PRN    insulin lispro (HumALOG/ADMELOG) 100 units/mL subcutaneous injection 1-5 Units  1-5 Units Subcutaneous HS    insulin lispro (HumALOG/ADMELOG) 100 units/mL subcutaneous injection 1-6 Units  1-6 Units Subcutaneous TID AC    lidocaine (LIDODERM) 5 % patch " "2 patch  2 patch Topical Daily    magnesium hydroxide (MILK OF MAGNESIA) oral suspension 30 mL  30 mL Oral Daily PRN    melatonin tablet 6 mg  6 mg Oral HS    methocarbamol (ROBAXIN) tablet 500 mg  500 mg Oral Q6H Count includes the [facility]    metoprolol (LOPRESSOR) injection 7.5 mg  7.5 mg Intravenous Q6H Count includes the [facility]    multi-electrolyte (PLASMALYTE-A/ISOLYTE-S PH 7.4) IV solution  50 mL/hr Intravenous Continuous    ondansetron (ZOFRAN) injection 4 mg  4 mg Intravenous Q6H PRN    oxyCODONE (ROXICODONE) split tablet 2.5 mg  2.5 mg Oral Q4H PRN    Or    oxyCODONE (ROXICODONE) IR tablet 5 mg  5 mg Oral Q4H PRN    pantoprazole (PROTONIX) injection 40 mg  40 mg Intravenous Q24H Count includes the [facility]    polyethylene glycol (MIRALAX) packet 17 g  17 g Oral Daily    potassium chloride 20 mEq IVPB (premix)  20 mEq Intravenous Once    potassium chloride 40 mEq IVPB (premix)  40 mEq Intravenous Once    senna (SENOKOT) tablet 8.6 mg  1 tablet Oral HS    senna (SENOKOT) tablet 8.6 mg  1 tablet Oral Once    temazepam (RESTORIL) capsule 15 mg  15 mg Oral HS PRN    trimethobenzamide (TIGAN) IM injection 200 mg  200 mg Intramuscular Q6H PRN         Physical Exam: /72   Pulse 92   Temp 98.3 °F (36.8 °C)   Resp 18   Ht 5' 5\" (1.651 m)   Wt 81.5 kg (179 lb 10.8 oz)   SpO2 92%   BMI 29.90 kg/m²     General Appearance:    Alert, cooperative, no distress, appears stated age   Head:    Normocephalic, no scleral icterus   Eyes:    PERRL   Nose:   Nares normal, septum midline, no drainage    Throat:   Lips, mucosa, and tongue normal   Neck:   Supple, symmetrical, trachea midline,            Lungs:     Clear to auscultation bilaterally, respirations unlabored        Heart:    Regular rate and rhythm, S1 and S2 normal, no murmur, rub   or gallop   Abdomen:     Distended    Extremities:   Extremities normal, atraumatic, mild edema       Skin:   Skin warm   Neurologic:   Alert and oriented to person place and time, no focal deficits                 Lab Results:   Recent Results " (from the past 72 hour(s))   Fingerstick Glucose (POCT)    Collection Time: 06/23/24 12:41 PM   Result Value Ref Range    POC Glucose 127 65 - 140 mg/dl   Fingerstick Glucose (POCT)    Collection Time: 06/23/24  4:33 PM   Result Value Ref Range    POC Glucose 117 65 - 140 mg/dl   Fingerstick Glucose (POCT)    Collection Time: 06/23/24  8:52 PM   Result Value Ref Range    POC Glucose 216 (H) 65 - 140 mg/dl   Basic metabolic panel    Collection Time: 06/24/24  4:52 AM   Result Value Ref Range    Sodium 136 135 - 147 mmol/L    Potassium 3.4 (L) 3.5 - 5.3 mmol/L    Chloride 90 (L) 96 - 108 mmol/L    CO2 35 (H) 21 - 32 mmol/L    ANION GAP 11 4 - 13 mmol/L    BUN 48 (H) 5 - 25 mg/dL    Creatinine 1.86 (H) 0.60 - 1.30 mg/dL    Glucose 103 65 - 140 mg/dL    Calcium 8.2 (L) 8.4 - 10.2 mg/dL    eGFR 33 ml/min/1.73sq m   CBC (With Platelets)    Collection Time: 06/24/24  4:52 AM   Result Value Ref Range    WBC 16.01 (H) 4.31 - 10.16 Thousand/uL    RBC 3.04 (L) 3.88 - 5.62 Million/uL    Hemoglobin 10.2 (L) 12.0 - 17.0 g/dL    Hematocrit 29.1 (L) 36.5 - 49.3 %    MCV 96 82 - 98 fL    MCH 33.6 26.8 - 34.3 pg    MCHC 35.1 31.4 - 37.4 g/dL    RDW 12.3 11.6 - 15.1 %    Platelets 135 (L) 149 - 390 Thousands/uL    MPV 9.1 8.9 - 12.7 fL   Fingerstick Glucose (POCT)    Collection Time: 06/24/24  5:49 AM   Result Value Ref Range    POC Glucose 86 65 - 140 mg/dl   Fingerstick Glucose (POCT)    Collection Time: 06/24/24 10:38 AM   Result Value Ref Range    POC Glucose 131 65 - 140 mg/dl   Fingerstick Glucose (POCT)    Collection Time: 06/24/24  3:35 PM   Result Value Ref Range    POC Glucose 139 65 - 140 mg/dl   Fingerstick Glucose (POCT)    Collection Time: 06/24/24  8:48 PM   Result Value Ref Range    POC Glucose 134 65 - 140 mg/dl   Basic metabolic panel    Collection Time: 06/25/24  4:59 AM   Result Value Ref Range    Sodium 135 135 - 147 mmol/L    Potassium 3.2 (L) 3.5 - 5.3 mmol/L    Chloride 87 (L) 96 - 108 mmol/L    CO2 33 (H) 21 -  32 mmol/L    ANION GAP 15 (H) 4 - 13 mmol/L    BUN 62 (H) 5 - 25 mg/dL    Creatinine 2.22 (H) 0.60 - 1.30 mg/dL    Glucose 107 65 - 140 mg/dL    Calcium 8.8 8.4 - 10.2 mg/dL    eGFR 27 ml/min/1.73sq m   CBC    Collection Time: 06/25/24  4:59 AM   Result Value Ref Range    WBC 10.82 (H) 4.31 - 10.16 Thousand/uL    RBC 3.41 (L) 3.88 - 5.62 Million/uL    Hemoglobin 11.2 (L) 12.0 - 17.0 g/dL    Hematocrit 32.2 (L) 36.5 - 49.3 %    MCV 94 82 - 98 fL    MCH 32.8 26.8 - 34.3 pg    MCHC 34.8 31.4 - 37.4 g/dL    RDW 12.4 11.6 - 15.1 %    Platelets 199 149 - 390 Thousands/uL    MPV 9.2 8.9 - 12.7 fL   Fingerstick Glucose (POCT)    Collection Time: 06/25/24  5:57 AM   Result Value Ref Range    POC Glucose 94 65 - 140 mg/dl   Lactic acid, plasma (w/reflex if result > 2.0)    Collection Time: 06/25/24 10:17 AM   Result Value Ref Range    LACTIC ACID 3.8 (H) 0.5 - 2.0 mmol/L   Fingerstick Glucose (POCT)    Collection Time: 06/25/24 10:34 AM   Result Value Ref Range    POC Glucose 73 65 - 140 mg/dl   Lactic acid 2 Hours    Collection Time: 06/25/24  1:32 PM   Result Value Ref Range    LACTIC ACID 2.2 (H) 0.5 - 2.0 mmol/L   Potassium    Collection Time: 06/25/24  1:32 PM   Result Value Ref Range    Potassium 3.4 (L) 3.5 - 5.3 mmol/L   Fingerstick Glucose (POCT)    Collection Time: 06/25/24  3:27 PM   Result Value Ref Range    POC Glucose 79 65 - 140 mg/dl   Lactic acid, plasma (w/reflex if result > 2.0)    Collection Time: 06/25/24  6:27 PM   Result Value Ref Range    LACTIC ACID 1.6 0.5 - 2.0 mmol/L   Potassium    Collection Time: 06/25/24  6:27 PM   Result Value Ref Range    Potassium 3.9 3.5 - 5.3 mmol/L   Fingerstick Glucose (POCT)    Collection Time: 06/25/24  8:31 PM   Result Value Ref Range    POC Glucose 113 65 - 140 mg/dl   CBC    Collection Time: 06/26/24  4:55 AM   Result Value Ref Range    WBC 10.03 4.31 - 10.16 Thousand/uL    RBC 3.56 (L) 3.88 - 5.62 Million/uL    Hemoglobin 11.6 (L) 12.0 - 17.0 g/dL    Hematocrit 34.0  (L) 36.5 - 49.3 %    MCV 96 82 - 98 fL    MCH 32.6 26.8 - 34.3 pg    MCHC 34.1 31.4 - 37.4 g/dL    RDW 12.6 11.6 - 15.1 %    Platelets 202 149 - 390 Thousands/uL    MPV 9.0 8.9 - 12.7 fL   Lactic acid, plasma (w/reflex if result > 2.0)    Collection Time: 06/26/24  4:55 AM   Result Value Ref Range    LACTIC ACID 1.5 0.5 - 2.0 mmol/L   Basic metabolic panel    Collection Time: 06/26/24  4:55 AM   Result Value Ref Range    Sodium 139 135 - 147 mmol/L    Potassium 3.3 (L) 3.5 - 5.3 mmol/L    Chloride 93 (L) 96 - 108 mmol/L    CO2 34 (H) 21 - 32 mmol/L    ANION GAP 12 4 - 13 mmol/L    BUN 72 (H) 5 - 25 mg/dL    Creatinine 1.95 (H) 0.60 - 1.30 mg/dL    Glucose 113 65 - 140 mg/dL    Calcium 8.4 8.4 - 10.2 mg/dL    eGFR 32 ml/min/1.73sq m   Fingerstick Glucose (POCT)    Collection Time: 06/26/24  5:54 AM   Result Value Ref Range    POC Glucose 105 65 - 140 mg/dl   Fingerstick Glucose (POCT)    Collection Time: 06/26/24 10:36 AM   Result Value Ref Range    POC Glucose 94 65 - 140 mg/dl     Imaging: I have personally reviewed pertinent reports.    Tele- nsr     Counseling / Coordination of Care  Total time spent today 20 minutes. Greater than 50% of total time was spent with the patient and / or family counseling and / or coordination of care.

## 2024-06-26 NOTE — PLAN OF CARE
Problem: OCCUPATIONAL THERAPY ADULT  Goal: Performs self-care activities at highest level of function for planned discharge setting.  See evaluation for individualized goals.  Description: Treatment Interventions: ADL retraining, Functional transfer training, Endurance training, Patient/family training, Equipment evaluation/education, Continued evaluation, Cardiac education, Energy conservation, Activityengagement          See flowsheet documentation for full assessment, interventions and recommendations.   Note: Limitation: Decreased ADL status, Decreased Safe judgement during ADL, Decreased endurance, Decreased self-care trans, Decreased high-level ADLs  Prognosis: Fair  Assessment: Pt is a 77 yo male who actively participated in skilled OT session on 6/26/2024. Treatment focused to improve functional transfers with fall prevention strategies, static/dynamic balance, postural/trunk control, proper body mechanics, functional use of b/l UE's, higher level cognitive functions, safety awareness, and overall increased activity tolerance in ADL/IADL/leisure tasks. Upon arrival, pt found sitting upright in recliner and was agreeable to OT session. Pt performed STS w/ Min A x1 w/ RW and completed household functional mobility distances <> bathroom with Min A x1 w/ RW. Pt completing toilet transfer w/ Mod A x1 w/ RW and grab bar and required Max A in standing for posterior hygiene w/ RW for steadying/support. At the end of the session, pt was left sitting upright in recliner with all functional needs in reach. Pt demonstrates gradual functional improvements towards OT goals however continues to be functioning below occupational baseline and is still limited by the following limitations/impairments which were addressed through skilled instruction: sternal precautions, generalized weakness, balance, endurance/activity tolerance, postural/trunk control, strength, pain, and safety awareness. At this time, recommend discharge  to post-acute rehab when medically stable. The patient's raw score on the AM-PAC Daily Activity Inpatient Short Form is 17. A raw score of less than 19 suggests the patient may benefit from discharge to post-acute rehabilitation services. Please refer to the recommendation of the Occupational Therapist for safe discharge planning.  Established OT goals will be continued 2-3x/wk to address acute care needs and underlying performance skills to maximize occupational performance and safety to return to PLOF.     Rehab Resource Intensity Level, OT: I (Maximum Resource Intensity)

## 2024-06-27 ENCOUNTER — APPOINTMENT (INPATIENT)
Dept: RADIOLOGY | Facility: HOSPITAL | Age: 79
DRG: 235 | End: 2024-06-27
Payer: COMMERCIAL

## 2024-06-27 LAB
ANION GAP SERPL CALCULATED.3IONS-SCNC: 12 MMOL/L (ref 4–13)
BUN SERPL-MCNC: 85 MG/DL (ref 5–25)
CALCIUM SERPL-MCNC: 7.9 MG/DL (ref 8.4–10.2)
CHLORIDE SERPL-SCNC: 98 MMOL/L (ref 96–108)
CO2 SERPL-SCNC: 33 MMOL/L (ref 21–32)
CREAT SERPL-MCNC: 1.76 MG/DL (ref 0.6–1.3)
GFR SERPL CREATININE-BSD FRML MDRD: 36 ML/MIN/1.73SQ M
GLUCOSE SERPL-MCNC: 105 MG/DL (ref 65–140)
GLUCOSE SERPL-MCNC: 117 MG/DL (ref 65–140)
GLUCOSE SERPL-MCNC: 123 MG/DL (ref 65–140)
GLUCOSE SERPL-MCNC: 125 MG/DL (ref 65–140)
GLUCOSE SERPL-MCNC: 75 MG/DL (ref 65–140)
LACTATE SERPL-SCNC: 1.3 MMOL/L (ref 0.5–2)
MAGNESIUM SERPL-MCNC: 3.2 MG/DL (ref 1.9–2.7)
POTASSIUM SERPL-SCNC: 3.2 MMOL/L (ref 3.5–5.3)
POTASSIUM SERPL-SCNC: 3.8 MMOL/L (ref 3.5–5.3)
SODIUM SERPL-SCNC: 143 MMOL/L (ref 135–147)

## 2024-06-27 PROCEDURE — 74019 RADEX ABDOMEN 2 VIEWS: CPT

## 2024-06-27 PROCEDURE — 99024 POSTOP FOLLOW-UP VISIT: CPT | Performed by: PHYSICIAN ASSISTANT

## 2024-06-27 PROCEDURE — C9113 INJ PANTOPRAZOLE SODIUM, VIA: HCPCS | Performed by: PHYSICIAN ASSISTANT

## 2024-06-27 PROCEDURE — 83735 ASSAY OF MAGNESIUM: CPT | Performed by: PHYSICIAN ASSISTANT

## 2024-06-27 PROCEDURE — 80048 BASIC METABOLIC PNL TOTAL CA: CPT | Performed by: PHYSICIAN ASSISTANT

## 2024-06-27 PROCEDURE — 84132 ASSAY OF SERUM POTASSIUM: CPT | Performed by: PHYSICIAN ASSISTANT

## 2024-06-27 PROCEDURE — 82948 REAGENT STRIP/BLOOD GLUCOSE: CPT

## 2024-06-27 PROCEDURE — 97530 THERAPEUTIC ACTIVITIES: CPT

## 2024-06-27 PROCEDURE — 99233 SBSQ HOSP IP/OBS HIGH 50: CPT | Performed by: SURGERY

## 2024-06-27 PROCEDURE — 83605 ASSAY OF LACTIC ACID: CPT | Performed by: PHYSICIAN ASSISTANT

## 2024-06-27 PROCEDURE — 97116 GAIT TRAINING THERAPY: CPT

## 2024-06-27 RX ORDER — BISACODYL 10 MG
10 SUPPOSITORY, RECTAL RECTAL ONCE
Status: COMPLETED | OUTPATIENT
Start: 2024-06-27 | End: 2024-06-27

## 2024-06-27 RX ORDER — POTASSIUM CHLORIDE 29.8 MG/ML
40 INJECTION INTRAVENOUS ONCE
Status: COMPLETED | OUTPATIENT
Start: 2024-06-27 | End: 2024-06-27

## 2024-06-27 RX ORDER — AMOXICILLIN 250 MG
1 CAPSULE ORAL ONCE
Status: COMPLETED | OUTPATIENT
Start: 2024-06-27 | End: 2024-06-27

## 2024-06-27 RX ORDER — POTASSIUM CHLORIDE 29.8 MG/ML
40 INJECTION INTRAVENOUS ONCE
Status: COMPLETED | OUTPATIENT
Start: 2024-06-27 | End: 2024-06-28

## 2024-06-27 RX ADMIN — BISACODYL 10 MG: 10 SUPPOSITORY RECTAL at 09:48

## 2024-06-27 RX ADMIN — METOROPROLOL TARTRATE 10 MG: 5 INJECTION, SOLUTION INTRAVENOUS at 13:31

## 2024-06-27 RX ADMIN — METOROPROLOL TARTRATE 10 MG: 5 INJECTION, SOLUTION INTRAVENOUS at 06:57

## 2024-06-27 RX ADMIN — HEPARIN SODIUM 5000 UNITS: 5000 INJECTION INTRAVENOUS; SUBCUTANEOUS at 06:57

## 2024-06-27 RX ADMIN — POTASSIUM CHLORIDE 40 MEQ: 29.8 INJECTION, SOLUTION INTRAVENOUS at 20:43

## 2024-06-27 RX ADMIN — PANTOPRAZOLE SODIUM 40 MG: 40 INJECTION, POWDER, FOR SOLUTION INTRAVENOUS at 09:37

## 2024-06-27 RX ADMIN — HEPARIN SODIUM 5000 UNITS: 5000 INJECTION INTRAVENOUS; SUBCUTANEOUS at 21:07

## 2024-06-27 RX ADMIN — POTASSIUM CHLORIDE 40 MEQ: 29.8 INJECTION, SOLUTION INTRAVENOUS at 09:35

## 2024-06-27 RX ADMIN — ASPIRIN 81 MG: 81 TABLET, COATED ORAL at 09:27

## 2024-06-27 RX ADMIN — CLOPIDOGREL BISULFATE 75 MG: 75 TABLET ORAL at 09:27

## 2024-06-27 RX ADMIN — METOROPROLOL TARTRATE 10 MG: 5 INJECTION, SOLUTION INTRAVENOUS at 17:37

## 2024-06-27 RX ADMIN — Medication 6 MG: at 21:07

## 2024-06-27 RX ADMIN — AMIODARONE HYDROCHLORIDE 0.5 MG/MIN: 50 INJECTION, SOLUTION INTRAVENOUS at 15:01

## 2024-06-27 RX ADMIN — DIATRIZOATE MEGLUMINE AND DIATRIZOATE SODIUM 120 ML: 660; 100 LIQUID ORAL; RECTAL at 15:15

## 2024-06-27 RX ADMIN — POTASSIUM CHLORIDE 40 MEQ: 29.8 INJECTION, SOLUTION INTRAVENOUS at 13:18

## 2024-06-27 RX ADMIN — CHLORHEXIDINE GLUCONATE 0.12% ORAL RINSE 15 ML: 1.2 LIQUID ORAL at 20:58

## 2024-06-27 RX ADMIN — SENNOSIDES AND DOCUSATE SODIUM 1 TABLET: 50; 8.6 TABLET ORAL at 09:29

## 2024-06-27 RX ADMIN — ATORVASTATIN CALCIUM 80 MG: 80 TABLET, FILM COATED ORAL at 20:58

## 2024-06-27 RX ADMIN — CHLORHEXIDINE GLUCONATE 0.12% ORAL RINSE 15 ML: 1.2 LIQUID ORAL at 09:33

## 2024-06-27 RX ADMIN — SODIUM CHLORIDE, SODIUM GLUCONATE, SODIUM ACETATE, POTASSIUM CHLORIDE, MAGNESIUM CHLORIDE, SODIUM PHOSPHATE, DIBASIC, AND POTASSIUM PHOSPHATE 50 ML/HR: .53; .5; .37; .037; .03; .012; .00082 INJECTION, SOLUTION INTRAVENOUS at 07:21

## 2024-06-27 RX ADMIN — METOROPROLOL TARTRATE 10 MG: 5 INJECTION, SOLUTION INTRAVENOUS at 01:18

## 2024-06-27 NOTE — PROGRESS NOTES
Progress Note - General Surgery  Shane Chau . 78 y.o. male MRN: [id number]  Unit/Bed#: [facility] 405-01 Encounter: [id number]      Assessment:  78 y.o. male ith a past medical history of AAA s/p open repair, BPH, HTN, HLD, CAD with history of MI now s/p 6/20/24 CABG x3 vessels with worsening abdominal distension, emesis and no bowel movement post operatively. General surgery consulted d/t concern for ileus.     Afebrile, HDS on 3L NC.  Amio 0.5   No CBC ordered this morning  Lactate 1.3  UOP: 2050cc  NGT: 1075cc  No bowel movements documented      Plan:  - NPO  - NGT to LCWS  - Gastrografin challenge today  - Continue to monitor abdominal exam  - Remainder of recommendations pending gastrografin challenge          Subjective: No acute events overnight. Afebrile, hemodynamically stable. Passing flatus, no bowel movements. Tender in left nikko abdomen this morning L>R. No rebound or guarding, abdomen remains soft, lactate reassuring. NGT output remains elevated.      Objective:   Temp:  [97.6 °F (36.4 °C)-98.6 °F (37 °C)] 97.6 °F (36.4 °C)  HR:  [62-92] 62  Resp:  [16-22] 22  BP: (121-137)/(63-72) 121/66    Physical Exam:  General: No acute distress, alert and oriented  CV: Well perfused, regular rate   Lungs: Normal work of breathing, no increased respiratory effort  Abdomen: Soft, tender throughout, L>R, no rebound or guarding, distended, tympanic  Extremities: No  clubbing or cyanosis  Skin: Warm, dry      I/O         06/25 0701  06/26 0700 06/26 0701  06/27 0700 06/27 0701  06/28 0700    P.O. 118 0 0    I.V. (mL/kg) 774.2 (9.5) 463.2 (5.8)     NG/GT 0 0     IV Piggyback 300 101.7     Total Intake(mL/kg) 1192.2 (14.6) 564.9 (7.1) 0 (0)    Urine (mL/kg/hr) 1523 (0.8) 2050 (1.1)     Emesis/NG output 2500 1075     Stool 0      Total Output 4023 3125     Net -2830.8 -2560.2 0           Unmeasured Urine Occurrence 2 x  1 x    Unmeasured Stool Occurrence 2 x  1 x    Unmeasured Emesis Occurrence 2 x              Lab,  "Imaging and other studies: I have personally reviewed pertinent reports.  , CBC with diff: No results found for: \"WBC\", \"HGB\", \"HCT\", \"MCV\", \"PLT\", \"ADJUSTEDWBC\", \"RBC\", \"MCH\", \"MCHC\", \"RDW\", \"MPV\", \"NRBC\", BMP/CMP:   Lab Results   Component Value Date    SODIUM 143 06/27/2024    K 3.2 (L) 06/27/2024    CL 98 06/27/2024    CO2 33 (H) 06/27/2024    BUN 85 (H) 06/27/2024    CREATININE 1.76 (H) 06/27/2024    CALCIUM 7.9 (L) 06/27/2024    EGFR 36 06/27/2024           Pa Castellanos MD  General Surgery   06/27/24          "

## 2024-06-27 NOTE — PROGRESS NOTES
Progress Note - Cardiothoracic Surgery   Shane Chau Sr. 78 y.o. male MRN: [id number]  Unit/Bed#: [facility] 405-01 Encounter: [id number]    Coronary artery disease. S/P coronary artery bypass grafting; POD # 7      24 Hour Events: NGT with 1L OP, +flatus, denies BM yesterday, distention improving but TTP remains- worse in LUQ. Denies nausea. Mag 3.2, K 3.2. Lactic acid 1.3.    Pain controlled, Denies SOB, remains on 3L NC (turned off again this AM), using IS (1100, using more frequently). Ambulating with walker.     Medications:   Scheduled Meds:  Current Facility-Administered Medications   Medication Dose Route Frequency Provider Last Rate    acetaminophen  975 mg Oral Q6H While awake Moraima Alvarez PA-C      amiodarone (CORDARONE) 900 mg in dextrose 5 % 500 mL infusion  0.5 mg/min Intravenous Continuous Sandy Mckeon PA-C 0.5 mg/min (06/26/24 1007)    aspirin  81 mg Oral Daily Moraima Alvarez PA-C      atorvastatin  80 mg Oral Daily With Dinner Moraima Alvarez PA-C      bisacodyl  10 mg Rectal Daily PRN Moraima Alvarez PA-C      chlorhexidine  15 mL Mouth/Throat BID Moraima Alvarez PA-C      clopidogrel  75 mg Oral Daily Moraima Alvarez PA-C      docusate sodium  100 mg Oral BID Moraima Alvarez PA-C      heparin (porcine)  5,000 Units Subcutaneous Q8H KRISTI Moraima Alvarez PA-C      hydrALAZINE  5 mg Intravenous Q6H PRN Moraima Alvarez PA-C      insulin lispro  1-5 Units Subcutaneous HS Moraima Alvarez PA-C      insulin lispro  1-6 Units Subcutaneous TID AC Moraima Alvarez PA-C      lidocaine  2 patch Topical Daily Moraima Alvarez PA-C      magnesium hydroxide  30 mL Oral Daily PRN Rahel Baig PA-C      melatonin  6 mg Oral HS Rahel Baig PA-C      methocarbamol  500 mg Oral Q6H KRISTI Moraima Alvarez PA-C      metoprolol  10 mg Intravenous Q6H KRISTI Sandy Mckeon PA-C      multi-electrolyte  50 mL/hr Intravenous Continuous Sp Hagen PA-C 50  mL/hr (06/26/24 0956)    ondansetron  4 mg Intravenous Q6H PRN Moraima Alvarez PA-C      oxyCODONE  2.5 mg Oral Q4H PRN Moraima Alvarez PA-C      Or    oxyCODONE  5 mg Oral Q4H PRN Moraima Alvarez PA-C      pantoprazole  40 mg Intravenous Q24H [facility] Sp Hagen PA-C      polyethylene glycol  17 g Oral Daily Moraima Alvarez PA-C      senna  1 tablet Oral HS Rahel Baig PA-C      senna  1 tablet Oral Once Sandy Mckeon PA-C      temazepam  15 mg Oral HS PRN Moraima Alvarez PA-C      trimethobenzamide  200 mg Intramuscular Q6H PRN Rudolph Gunter PA-C       Continuous Infusions:amiodarone (CORDARONE) 900 mg in dextrose 5 % 500 mL infusion, 0.5 mg/min, Last Rate: 0.5 mg/min (06/26/24 1007)  multi-electrolyte, 50 mL/hr, Last Rate: 50 mL/hr (06/26/24 0956)      PRN Meds:.  bisacodyl    hydrALAZINE    magnesium hydroxide    ondansetron    oxyCODONE **OR** oxyCODONE    temazepam    trimethobenzamide    Vitals:   Vitals:    06/27/24 0157 06/27/24 0600 06/27/24 0659 06/27/24 0711   BP: 130/63  121/66 121/66   Pulse:   78 62   Resp:       Temp: 98.6 °F (37 °C)      TempSrc:       SpO2:   91% 90%   Weight:  79.6 kg (175 lb 6.4 oz)     Height:       Afebrile, HR 80s, systolics 120-130    Telemetry: NSR; Heart Rate: 82     Respiratory:   SpO2: SpO2: 90 %; 3 LPM (quit tobacco use in 80s)    Intake/Output:     Intake/Output Summary (Last 24 hours) at 6/27/2024 0712  Last data filed at 6/27/2024 0636  Gross per 24 hour   Intake 564.85 ml   Output 3025 ml   Net -2460.15 ml   UOP 2 L  NGT 1 L- bilious   Net neg. 2.6  Since admit -7.8 L     Chest tube Output:  removed    Weights:   Weight (last 2 days)       Date/Time Weight    06/27/24 0600 79.6 (175.4)    06/26/24 0600 81.5 (179.68)    06/25/24 0600 84.4 (186.07)          Admit weight: 6/20 84 kg (down 5 kg)    Results:   Results from last 7 days   Lab Units 06/26/24  0455 06/25/24  0459 06/24/24  0452   WBC Thousand/uL 10.03 10.82* 16.01*    HEMOGLOBIN g/dL 11.6* 11.2* 10.2*   HEMATOCRIT % 34.0* 32.2* 29.1*   PLATELETS Thousands/uL 202 199 135*     Results from last 7 days   Lab Units 06/27/24  0508 06/26/24  1530 06/26/24  0455 06/20/24  1647 06/20/24  1310   SODIUM mmol/L 143 142 139   < >  --    POTASSIUM mmol/L 3.2* 3.9 3.3*   < >  --    CHLORIDE mmol/L 98 95* 93*   < >  --    CO2 mmol/L 33* 33* 34*   < >  --    CO2, I-STAT mmol/L  --   --   --   --  21   BUN mg/dL 85* 82* 72*   < >  --    CREATININE mg/dL 1.76* 2.01* 1.95*   < >  --    GLUCOSE, ISTAT mg/dl  --   --   --   --  140   CALCIUM mg/dL 7.9* 8.3* 8.4   < >  --     < > = values in this interval not displayed.   Mag- 3.2    No hx of CKD    Results from last 7 days   Lab Units 06/20/24  1306   INR  1.37*   PTT seconds 29       Point of care glucose: 94 - 123 (no hx of DM)    Studies:  CXR: 6/25  NG tube in the stomach. There is moderate gaseous distention of the stomach.     CT a/p 6/25:   Gastric distention and associated proximal small bowel dilatation and stool like contents within the dilated loops of small bowel without discrete transition point identified. Findings suggest chronic ileus though chronic partial small bowel obstruction   not entirely excluded.     Juxtarenal abdominal aortic aneurysm measuring 4.5 x 4.2 cm. Suprarenal abdominal aorta is dilated measuring 3.7 cm.      I have personally reviewed pertinent reports.   and I have personally reviewed pertinent films in PACS    Invasive Lines/Tubes:  Invasive Devices       Central Venous Catheter Line  Duration             CVC Central Lines 06/20/24 6 days              Drain  Duration             NG/OG/Enteral Tube Nasogastric Left nare 1 day                  Physical Exam:    General: No acute distress and Normal appearance  HEENT/NECK:  Normocephalic. Atraumatic.  No jugular venous distention.    Cardiac: Regular rate and rhythm and No murmurs/rubs/gallops  Pulmonary:  Breath sounds clear bilaterally and No  rales/rhonchi/wheezes  Abdomen:  Hypo-active bowel sounds and Distended- improving,  No guarding or rigidity, mild diffuse tenderness worse in LUQ  Incisions: Sternum is stable.  Incision is clean, dry, and intact.  and Saphenectomy incison is clean, dry, and intact.   Extremities: Extremities warm/dry and 1+ edema B/L  Neuro: Alert and oriented X 3 and No focal deficits  Skin: Warm/Dry, without rashes or lesions.          Assessment:  Principal Problem:    Coronary artery disease of native artery of native heart with stable angina pectoris (HCC)  Active Problems:    Abdominal aortic aneurysm without rupture (HCC)    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HCC)    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HCC)    Asymptomatic bilateral carotid artery stenosis    Hyperlipidemia    Hypertension    S/P CABG x 3    BARBARA (acute kidney injury) ([facility])       Coronary artery disease. S/P coronary artery bypass grafting; POD # 7    Plan:    Cardiac:     Elective surgical admission  Normal ventricular systolic function, EF 60%    NSR; HR well-controlled  BP well-controlled    Hold Lopressor, 50mg PO BID Norvasc 10mg QD- continue IV lopressor 10 mg Q6H    Hold ACE inhibitor/ARB secondary to renal dysfunction    Continue prophylactic Amiodarone gtt    Continue ASA (81 mg) and Plavix for h/o TIAs (on PTA) and Statin therapy (asymptomatic b/l carotid artery stenosis)- clamp tube for meds (~30 min)    Epicardial pacing wires have been removed    Central IV access no longer required; Continue central venous catheter today    Continue Subcutaneous Heparin for DVT prophylaxis    Pulmonary:     Acute post-op pulmonary insufficiency; Requiring 3 liters via nasal cannula, secondary to idiopathic pulmonary fibrosis, atelectasis, and poor inspiratory effort secondary to pain. Continue incentive spirometry/coughing/deep breathing exercises.  Wean supplemental oxygen as tolerated for saturation > 90%    Chest tubes have been discontinued    CXR 6/25- elevated L  diaphragm, no effusion    Renal:     Postoperative BARBARA, w/ Cr 1.76 from 1.95 from 2.22 (peak) Follow up daily BMP    Intake/Output net: -2.5 L/24 hours, (UOP- 2L/24, NGT 1L, since admit - 7.8 L)    Diuretic Regimen:   No further diuretics   K 3.2- replete w/ IV K, recheck this afternoon        Neuro:    Neurologically intact; No active issues   Melatonin for sleep    Incisional pain well controlled   Continue tylenol, 975 mg PO q 8, standing dose   Continue oxycodone, 2.5 to 5 mg PO q 4 hours prn pain   Continue topical Lidocaine patch to affected area   Continue methocarbamol, 500 mg PO q 6 hours prn pain/muscle spasm    GI:    Postop ileus- NPO/NGT w/ IVF @ 50 mL/hr- general surgery following- discuss w/ team need for reimaging    Continue stool softeners and prn suppository, increase bowel regimen    Continue GI prophylaxis    Endo:     Pre-Op Hgb A1C: 5.4    Serum glucose well controlled on insulin sliding scale coverage    7    Hematology:     Post-operative blood count acceptable; Trend prn  Leukocytosis; Afebrile with no signs of infection; Trend CBC prn    8.   Disposition:        Following daily PT/OT recommendations regarding home vs. rehab when medically cleared for discharge - currently recs for rehab, d/c when presumed ileus resolves    VTE Pharmacologic Prophylaxis: Heparin  VTE Mechanical Prophylaxis: sequential compression device    Collaborative rounds completed with supervising physician  Plan of care discussed with bedside nurse    SIGNATURE: Sandy Mckeon PA-C  DATE: June 27, 2024  TIME: 7:12 AM

## 2024-06-27 NOTE — PHYSICAL THERAPY NOTE
Physical Therapy Treatment Note       06/27/24 0900   PT Last Visit   PT Visit Date 06/27/24   Note Type   Note Type Treatment   Pain Assessment   Pain Assessment Tool 0-10   Pain Score 4   Pain Location/Orientation Location: Chest;Location: Generalized   Patient's Stated Pain Goal No pain   Hospital Pain Intervention(s) Ambulation/increased activity;Repositioned   Restrictions/Precautions   Weight Bearing Precautions Per Order No   Other Precautions Cardiac/sternal;Multiple lines;Telemetry;Fall Risk;O2;Pain  (pt on 2 L O2)   General   Chart Reviewed Yes   Family/Caregiver Present No   Cognition   Overall Cognitive Status WFL   Arousal/Participation Responsive   Attention Within functional limits   Orientation Level Oriented X4   Memory Unable to assess   Following Commands Follows one step commands without difficulty   Subjective   Subjective states he feels OK, but has pain limitations as well as fatigue   Transfers   Sit to Stand 4  Minimal assistance   Additional items Assist x 1;Increased time required;Verbal cues   Stand to Sit 4  Minimal assistance   Additional items Assist x 1;Increased time required;Verbal cues   Toilet transfer 3  Moderate assistance   Additional items Assist x 1;Increased time required;Verbal cues   Additional Comments standing trials to clean pt post using toilet, as well as after last gait trial   Ambulation/Elevation   Gait pattern   (slow, antalgic, forward flexion)   Gait Assistance 4  Minimal assist  (occasional mod A x 1)   Additional items Assist x 1  (w/ 2nd for chair follow)   Assistive Device Rolling walker   Distance 10'x2 to and from BR w/ seated time to use toilet as well as after return from BR, followed by 30'x2 and 40'x1 w/ extended seated rests time.   Balance   Static Sitting Fair   Dynamic Sitting Poor +   Static Standing Poor +   Dynamic Standing Poor   Ambulatory Poor   Endurance Deficit   Endurance Deficit Yes   Endurance Deficit Description fatigue, weakness,  pain   Activity Tolerance   Activity Tolerance Patient limited by fatigue;Patient limited by pain;Treatment limited secondary to medical complications (Comment)   Nurse Made Aware yes   Assessment   Prognosis Good   Problem List Decreased strength;Decreased endurance;Impaired balance;Decreased mobility;Decreased coordination;Pain   Assessment Pt seen for session for setup, transfers, janell in BR, standing trials, gait w/ rest time, repositioning.  Pt cooperative w/ session w/ encouragement.  cotninued abdominal pain.  seems generally weaker today, and needed a bit more physical assist for mobility tasks.  gait distances decreased before needing to sit w/ mininmally decreased endurance.  Has been slower to progress, and continue to recommend Level I (maximal) post acute care therapy needs.   Goals   Patient Goals to rest   STG Expiration Date 07/05/24   PT Treatment Day 4   Plan   Treatment/Interventions Functional transfer training;LE strengthening/ROM;Therapeutic exercise;Endurance training;Patient/family training;Gait training;Bed mobility;Equipment eval/education;Elevations   Progress Slow progress, medical status limitations   PT Frequency 4-6x/wk   Discharge Recommendation   Rehab Resource Intensity Level, PT I (Maximum Resource Intensity)   AM-PAC Basic Mobility Inpatient   Turning in Flat Bed Without Bedrails 2   Lying on Back to Sitting on Edge of Flat Bed Without Bedrails 2   Moving Bed to Chair 2   Standing Up From Chair Using Arms 3   Walk in Room 2   Climb 3-5 Stairs With Railing 1   Basic Mobility Inpatient Raw Score 12   Basic Mobility Standardized Score 32.23   [facility] Highest Level Of Mobility   -HLM Goal 4: Move to chair/commode   -HLM Achieved 7: Walk 25 feet or more     Dandre Castillo PT, DPT CSRS

## 2024-06-27 NOTE — PROGRESS NOTES
Received Gastrografin from radiology department with instructions for administration. Per instructions, 120ml of Gastrografin administered at 1515 through NG tube and NG tube clamped after. Radiology notified and KUB ordered for 4 hours after administration.

## 2024-06-27 NOTE — PLAN OF CARE
Problem: PHYSICAL THERAPY ADULT  Goal: Performs mobility at highest level of function for planned discharge setting.  See evaluation for individualized goals.  Description: Treatment/Interventions: Functional transfer training, LE strengthening/ROM, Elevations, Therapeutic exercise, Endurance training, Cognitive reorientation, Patient/family training, Equipment eval/education, Bed mobility, Gait training, Spoke to nursing, Spoke to case management, OT  Equipment Recommended: Walker (pt reports owning)       See flowsheet documentation for full assessment, interventions and recommendations.  Outcome: Not Progressing  Note: Prognosis: Good  Problem List: Decreased strength, Decreased endurance, Impaired balance, Decreased mobility, Decreased coordination, Pain  Assessment: Pt seen for session for setup, transfers, janell in BR, standing trials, gait w/ rest time, repositioning.  Pt cooperative w/ session w/ encouragement.  cotninued abdominal pain.  seems generally weaker today, and needed a bit more physical assist for mobility tasks.  gait distances decreased before needing to sit w/ mininmally decreased endurance.  Has been slower to progress, and continue to recommend Level I (maximal) post acute care therapy needs.  Barriers to Discharge: Inaccessible home environment     Rehab Resource Intensity Level, PT: I (Maximum Resource Intensity)    See flowsheet documentation for full assessment.

## 2024-06-28 LAB
ANION GAP SERPL CALCULATED.3IONS-SCNC: 12 MMOL/L (ref 4–13)
BUN SERPL-MCNC: 83 MG/DL (ref 5–25)
CALCIUM SERPL-MCNC: 7.9 MG/DL (ref 8.4–10.2)
CHLORIDE SERPL-SCNC: 103 MMOL/L (ref 96–108)
CO2 SERPL-SCNC: 32 MMOL/L (ref 21–32)
CREAT SERPL-MCNC: 1.53 MG/DL (ref 0.6–1.3)
GFR SERPL CREATININE-BSD FRML MDRD: 42 ML/MIN/1.73SQ M
GLUCOSE SERPL-MCNC: 115 MG/DL (ref 65–140)
GLUCOSE SERPL-MCNC: 141 MG/DL (ref 65–140)
GLUCOSE SERPL-MCNC: 149 MG/DL (ref 65–140)
POTASSIUM SERPL-SCNC: 3.3 MMOL/L (ref 3.5–5.3)
POTASSIUM SERPL-SCNC: 3.3 MMOL/L (ref 3.5–5.3)
SODIUM SERPL-SCNC: 147 MMOL/L (ref 135–147)

## 2024-06-28 PROCEDURE — 99024 POSTOP FOLLOW-UP VISIT: CPT | Performed by: THORACIC SURGERY (CARDIOTHORACIC VASCULAR SURGERY)

## 2024-06-28 PROCEDURE — 99232 SBSQ HOSP IP/OBS MODERATE 35: CPT | Performed by: SURGERY

## 2024-06-28 PROCEDURE — 97530 THERAPEUTIC ACTIVITIES: CPT

## 2024-06-28 PROCEDURE — 82948 REAGENT STRIP/BLOOD GLUCOSE: CPT

## 2024-06-28 PROCEDURE — 97535 SELF CARE MNGMENT TRAINING: CPT

## 2024-06-28 PROCEDURE — 80048 BASIC METABOLIC PNL TOTAL CA: CPT | Performed by: PHYSICIAN ASSISTANT

## 2024-06-28 PROCEDURE — 84132 ASSAY OF SERUM POTASSIUM: CPT | Performed by: PHYSICIAN ASSISTANT

## 2024-06-28 PROCEDURE — C9113 INJ PANTOPRAZOLE SODIUM, VIA: HCPCS | Performed by: PHYSICIAN ASSISTANT

## 2024-06-28 RX ORDER — POTASSIUM CHLORIDE 20 MEQ/1
40 TABLET, EXTENDED RELEASE ORAL ONCE
Status: COMPLETED | OUTPATIENT
Start: 2024-06-28 | End: 2024-06-28

## 2024-06-28 RX ORDER — POLYETHYLENE GLYCOL 3350 17 G/17G
17 POWDER, FOR SOLUTION ORAL DAILY PRN
Status: DISCONTINUED | OUTPATIENT
Start: 2024-06-28 | End: 2024-07-03

## 2024-06-28 RX ORDER — AMIODARONE HYDROCHLORIDE 200 MG/1
200 TABLET ORAL
Status: DISCONTINUED | OUTPATIENT
Start: 2024-06-28 | End: 2024-07-02

## 2024-06-28 RX ADMIN — METOROPROLOL TARTRATE 10 MG: 5 INJECTION, SOLUTION INTRAVENOUS at 05:45

## 2024-06-28 RX ADMIN — METHOCARBAMOL 500 MG: 500 TABLET ORAL at 12:23

## 2024-06-28 RX ADMIN — LIDOCAINE 5% 2 PATCH: 700 PATCH TOPICAL at 22:00

## 2024-06-28 RX ADMIN — CHLORHEXIDINE GLUCONATE 0.12% ORAL RINSE 15 ML: 1.2 LIQUID ORAL at 08:23

## 2024-06-28 RX ADMIN — HEPARIN SODIUM 5000 UNITS: 5000 INJECTION INTRAVENOUS; SUBCUTANEOUS at 05:45

## 2024-06-28 RX ADMIN — POTASSIUM CHLORIDE 40 MEQ: 1500 TABLET, EXTENDED RELEASE ORAL at 16:01

## 2024-06-28 RX ADMIN — AMIODARONE HYDROCHLORIDE 200 MG: 200 TABLET ORAL at 08:23

## 2024-06-28 RX ADMIN — METOPROLOL TARTRATE 25 MG: 25 TABLET, FILM COATED ORAL at 12:23

## 2024-06-28 RX ADMIN — Medication 6 MG: at 21:58

## 2024-06-28 RX ADMIN — ACETAMINOPHEN 975 MG: 325 TABLET, FILM COATED ORAL at 13:39

## 2024-06-28 RX ADMIN — ASPIRIN 81 MG: 81 TABLET, COATED ORAL at 08:23

## 2024-06-28 RX ADMIN — POTASSIUM CHLORIDE 40 MEQ: 1500 TABLET, EXTENDED RELEASE ORAL at 08:23

## 2024-06-28 RX ADMIN — HEPARIN SODIUM 5000 UNITS: 5000 INJECTION INTRAVENOUS; SUBCUTANEOUS at 13:39

## 2024-06-28 RX ADMIN — AMIODARONE HYDROCHLORIDE 200 MG: 200 TABLET ORAL at 12:23

## 2024-06-28 RX ADMIN — CHLORHEXIDINE GLUCONATE 0.12% ORAL RINSE 15 ML: 1.2 LIQUID ORAL at 17:46

## 2024-06-28 RX ADMIN — POTASSIUM CHLORIDE 40 MEQ: 1500 TABLET, EXTENDED RELEASE ORAL at 12:23

## 2024-06-28 RX ADMIN — ATORVASTATIN CALCIUM 80 MG: 80 TABLET, FILM COATED ORAL at 16:01

## 2024-06-28 RX ADMIN — PANTOPRAZOLE SODIUM 40 MG: 40 INJECTION, POWDER, FOR SOLUTION INTRAVENOUS at 08:23

## 2024-06-28 RX ADMIN — METOPROLOL TARTRATE 25 MG: 25 TABLET, FILM COATED ORAL at 21:58

## 2024-06-28 RX ADMIN — ACETAMINOPHEN 975 MG: 325 TABLET, FILM COATED ORAL at 08:23

## 2024-06-28 RX ADMIN — METOROPROLOL TARTRATE 10 MG: 5 INJECTION, SOLUTION INTRAVENOUS at 00:25

## 2024-06-28 RX ADMIN — ACETAMINOPHEN 975 MG: 325 TABLET, FILM COATED ORAL at 21:58

## 2024-06-28 RX ADMIN — METHOCARBAMOL 500 MG: 500 TABLET ORAL at 17:46

## 2024-06-28 RX ADMIN — CLOPIDOGREL BISULFATE 75 MG: 75 TABLET ORAL at 08:23

## 2024-06-28 RX ADMIN — DOCUSATE SODIUM 100 MG: 100 CAPSULE, LIQUID FILLED ORAL at 17:46

## 2024-06-28 RX ADMIN — AMIODARONE HYDROCHLORIDE 200 MG: 200 TABLET ORAL at 16:01

## 2024-06-28 RX ADMIN — HEPARIN SODIUM 5000 UNITS: 5000 INJECTION INTRAVENOUS; SUBCUTANEOUS at 22:02

## 2024-06-28 RX ADMIN — ONDANSETRON 4 MG: 2 INJECTION INTRAMUSCULAR; INTRAVENOUS at 21:55

## 2024-06-28 NOTE — OCCUPATIONAL THERAPY NOTE
Occupational Therapy Progress Note     Patient Name: Shane Chau Sr.  Today's Date: 6/28/2024  Problem List  Principal Problem:    Coronary artery disease of native artery of native heart with stable angina pectoris (HCC)  Active Problems:    Abdominal aortic aneurysm without rupture (HCC)    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HCC)    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HCC)    Asymptomatic bilateral carotid artery stenosis    Hyperlipidemia    Hypertension    S/P CABG x 3    BARBARA (acute kidney injury) ([facility])            06/28/24 1212   OT Last Visit   OT Visit Date 06/28/24   Note Type   Note Type Treatment   Pain Assessment   Pain Assessment Tool 0-10   Pain Score No Pain   Restrictions/Precautions   Weight Bearing Precautions Per Order No   Other Precautions Cardiac/sternal;Cognitive;Bed Alarm;Telemetry;Fall Risk;Hard of hearing   Lifestyle   Autonomy Pta pt I with ADL, IADL and functional mobility, (+)    Reciprocal Relationships supportive spouse   Service to Others retired   Intrinsic Gratification enjoys time with family   ADL   Where Assessed Edge of bed   Eating Assistance 5  Supervision/Setup   Eating Deficit Setup;Supervision/safety   UB Dressing Assistance 3  Moderate Assistance   UB Dressing Deficit Setup;Supervision/safety;Verbal cueing;Thread RUE;Thread LUE;Increased time to complete;Pull over head   UB Dressing Comments Pt requiring MOD A to change gown when seated at EOB, vc for safety   LB Dressing Assistance 2  Maximal Assistance   LB Dressing Deficit Don/doff R sock;Don/doff L sock;Thread RLE into underwear;Thread LLE into underwear;Pull up over hips   LB Dressing Comments Pt requiring assist to thread BLE through adult diaper and pull over hips, assist to don B socks.   Toileting Assistance  2  Maximal Assistance   Toileting Deficit Supervison/safety;Increased time to complete;Clothing management up;Clothing management down;Perineal hygiene   Toileting Comments Pt incontinent of bowel x 2,  incontinent of urine during session requiring assist for post. hygiene and cleanup, wore adult diaper during functional mobility due to incontinence.   Bed Mobility   Rolling R 3  Moderate assistance   Additional items Assist x 1;Increased time required;Verbal cues;LE management   Rolling L 3  Moderate assistance   Additional items Assist x 1;Increased time required;Verbal cues;LE management   Supine to Sit 3  Moderate assistance   Additional items Assist x 1;Increased time required;Verbal cues;LE management   Sit to Supine 3  Moderate assistance   Additional items Assist x 1;Increased time required;Verbal cues;LE management   Additional Comments Pt greeted and left in bed with alarm on and all needs within reach. Bed linens and pads changed during session due to incontinence.   Transfers   Sit to Stand 3  Moderate assistance   Additional items Assist x 1;Increased time required;Verbal cues   Stand to Sit 4  Minimal assistance   Additional items Assist x 1;Increased time required;Verbal cues   Additional Comments with RW, STS x 5   Functional Mobility   Functional Mobility 4  Minimal assistance   Additional Comments Pt performs short distances with MIN A x 1 with RW and chair follow requiring 3 seated rest breaks.   Additional items Rolling walker   Cognition   Overall Cognitive Status Impaired   Arousal/Participation Cooperative;Lethargic   Attention Attends with cues to redirect   Orientation Level Oriented to person;Oriented to place;Oriented to situation   Memory Decreased short term memory;Decreased recall of recent events;Decreased recall of precautions   Following Commands Follows one step commands with increased time or repetition   Comments Pt cooperative to therapy although confused throughout session, very flat at times and requiring vc for communication throughout ADL and transfers, will continue to assess.   Activity Tolerance   Activity Tolerance Patient limited by fatigue   Medical Staff Made Aware JORGE ALBERTO  cleared for therapy, assist from restorative Mame   Assessment   Assessment Pt seen for OT treatment session day 4 on this date focused on ADL retraining, functional transfers and mobility, functional endurance. Pt was greeted in bed and was cooperative throughout session. Following session, pt was left in bed with alarm on and all needs within reach. Pt continues to be limited by functional status related to ADLs and transfers requiring MOD A for UB dressing, MAX A for LB dressing and toileting, also limited by cognition on this date .   The patient's raw score on the AM-PAC Daily Activity Inpatient Short Form is 15. A raw score of less than 19 suggests the patient may benefit from discharge to post-acute rehabilitation services. Please refer to the recommendation of the Occupational Therapist for safe discharge planning. Pt would benefit from continued acute OT intervention with plan to continue OT treatment sessions 2-3x per week. Recommend d/c to level II services.   Plan   Treatment Interventions ADL retraining;Functional transfer training;Endurance training;Patient/family training;Continued evaluation;Energy conservation   Goal Expiration Date 07/05/24   OT Treatment Day 4   OT Frequency 2-3x/wk   Discharge Recommendation   Rehab Resource Intensity Level, OT II (Moderate Resource Intensity)   AM-PAC Daily Activity Inpatient   Lower Body Dressing 2   Bathing 2   Toileting 2   Upper Body Dressing 2   Grooming 3   Eating 4   Daily Activity Raw Score 15   Daily Activity Standardized Score (Calc for Raw Score >=11) 34.69   AM-PAC Applied Cognition Inpatient   Following a Speech/Presentation 3   Understanding Ordinary Conversation 3   Taking Medications 3   Remembering Where Things Are Placed or Put Away 2   Remembering List of 4-5 Errands 2   Taking Care of Complicated Tasks 2   Applied Cognition Raw Score 15   Applied Cognition Standardized Score 33.54   Modified Geary Scale   Modified Geary Scale 4   End of  Consult   Education Provided Yes   Patient Position at End of Consult Supine;Bed/Chair alarm activated;All needs within reach   Nurse Communication Nurse aware of consult       YARON Rae, OTR/L

## 2024-06-28 NOTE — CASE MANAGEMENT
Case Management Discharge Planning Note    Patient name Shane Chau .  Location [facility] 405/[facility] 405-01 MRN [id number]  : 1945 Date 2024       Current Admission Date: 2024  Current Admission Diagnosis:Coronary artery disease of native artery of native heart with stable angina pectoris (HCC)   Patient Active Problem List    Diagnosis Date Noted Date Diagnosed    BARBARA (acute kidney injury) ([facility]) 2024     S/P CABG x 3 2024     Rib pain 2023     Paresthesia 2023     Leg swelling 05/10/2022     Erectile dysfunction 2022     Microhematuria 2018     Need for pneumococcal vaccination 2018     Hematuria 2018     Asymptomatic bilateral carotid artery stenosis 2017     Closed compression fracture of first lumbar vertebra ([facility]) 2017     Abdominal aortic aneurysm without rupture ([facility]) 2016     Localized primary osteoarthritis of lower leg, unspecified laterality 2013     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[facility]) 2013     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[facility]) 2013     Transient cerebral ischemic attack 2013     Coronary artery disease of native artery of native heart with stable angina pectoris ([facility]) 2012     Hyperlipidemia 2012     Hypertension 2012       LOS (days): 8  Geometric Mean LOS (GMLOS) (days): 8.2  Days to GMLOS:0     OBJECTIVE:  Risk of Unplanned Readmission Score: 17.83         Current admission status: Inpatient   Preferred Pharmacy:   [facility] - [place] - [address]  [address]  [place] [address]  Phone: [phone] Fax: [phone]    [id number] - [place] - [address]  [place] [address]  Phone: [phone] Fax: [phone]    [id number] - [place] - [address]  [address] [place] [address]  Phone: [phone] Fax: [phone]    [facility] -  [place] - [address]  [address]  [place] [address]  Phone: [phone] Fax: [phone]    Primary Care Provider: Sherwin Mantilla MD    Primary Insurance: [facility] REP  Secondary Insurance:     DISCHARGE DETAILS:                                                                                                 Additional Comments: Pt. is accepted at [facility] when medically ready. Phone number given to wife to call their admissions office to answer questions. Requested NPI etc for MV to submit for auth when ready.

## 2024-06-28 NOTE — PLAN OF CARE
Problem: SAFETY ADULT  Goal: Patient will remain free of falls  Description: INTERVENTIONS:  - Educate patient/family on patient safety including physical limitations  - Instruct patient to call for assistance with activity   - Consult OT/PT to assist with strengthening/mobility   - Keep Call bell within reach  - Keep bed low and locked with side rails adjusted as appropriate  - Keep care items and personal belongings within reach  - Initiate and maintain comfort rounds  - Make Fall Risk Sign visible to staff  - Offer Toileting every  Hours, in advance of need  - Initiate/Maintain alarm  - Obtain necessary fall risk management equipment:   - Apply yellow socks and bracelet for high fall risk patients  - Consider moving patient to room near nurses station  Outcome: Progressing  Goal: Maintain or return to baseline ADL function  Description: INTERVENTIONS:  -  Assess patient's ability to carry out ADLs; assess patient's baseline for ADL function and identify physical deficits which impact ability to perform ADLs (bathing, care of mouth/teeth, toileting, grooming, dressing, etc.)  - Assess/evaluate cause of self-care deficits   - Assess range of motion  - Assess patient's mobility; develop plan if impaired  - Assess patient's need for assistive devices and provide as appropriate  - Encourage maximum independence but intervene and supervise when necessary  - Involve family in performance of ADLs  - Assess for home care needs following discharge   - Consider OT consult to assist with ADL evaluation and planning for discharge  - Provide patient education as appropriate  Outcome: Progressing  Goal: Maintains/Returns to pre admission functional level  Description: INTERVENTIONS:  - Perform AM-PAC 6 Click Basic Mobility/ Daily Activity assessment daily.  - Set and communicate daily mobility goal to care team and patient/family/caregiver.   - Collaborate with rehabilitation services on mobility goals if consulted  - Perform  Chief complaint:   Chief Complaint   Patient presents with   • Back Problem     NP,severe lower back pain radiating down left leg, muscle spasms in bilateral calf. for 3 years pain worst for 3-4 months.        Low back pain  Left lateral thigh and calf pain bilaterally  Onset: Chronic with progressive worsening    Vitals:  There were no vitals taken for this visit.    HISTORY OF PRESENT ILLNESS     Lavonne returns after a long absence reporting worsening low back and bilateral lower extremity pain. Her lower extremity pain is worst in the left posterolateral thigh. She also gets cramping pain in the posterior lower legs bilaterally. She is most bothered by her back pain. She cannot stand or walk more than 1-1.5 hours without severe pain. She cannot lie down long without severe back pain. Because of this, she cannot sleep through the night. All these symptoms have been worsening over the past 3-4 months. No focal weakness. She denies any numbness.  Ongoing maintenance chiropractic therapy has given her moderate temporary relief. She is also active with aquatic therapy with mild relief.     Lavonne is known to have bilateral trochanteric bursitis for which she has been treated by Dr Sharp. Lavonne is being treated for psoriatic arthritis with Humira, though she feels this medication is not helping her.     Treatment:  Current medical management: Acetaminophen, Humira  Past medical management: N/A  Physical therapy: None.  Chiropractic care: Ongoing, Dr. Del Cid  Spinal injections: None  Other: None.         Other significant problems:  Patient Active Problem List    Diagnosis Date Noted   • Dependent edema 06/11/2014     Priority: High   • Coronary atherosclerosis 06/10/2013     Priority: High     May 2013 Coronary Calcium Score 16 (All LAD)     • Dyslipidemia, goal LDL below 130 06/05/2012     Priority: High     2/08/13 Lipid panel - Chol. 199, Trig. 231, HDL 52 and .8  6/05/14 Lipid panel - Chol. 175, Trig.  129, HDL 70 and LDL 79  6/13/16 Lipid Panel: Chol. 165, LDL 71, , .   6/5/17 Lipid Panel: Chol. 128, LDL 57, HDL 48, .        • Mild recurrent major depression (CMS/Formerly [facility]) 10/17/2017     Priority: Low   • Bunion of great toe of right foot 09/20/2016     Priority: Low   • Encounter for long-term (current) use of medications 08/03/2016     Priority: Low   • Psoriasis 08/03/2016     Priority: Low   • Psoriatic arthritis (CMS/Former[facility]) 08/03/2016     Priority: Low   • Metatarsalgia of right foot 07/28/2016     Priority: Low   • Right foot pain 07/28/2016     Priority: Low   • Hammer toe of right foot 07/28/2016     Priority: Low   • Capsulitis 07/28/2016     Priority: Low   • Acquired spondylolisthesis 08/24/2015     Priority: Low   • Spinal stenosis, lumbar region, with neurogenic claudication 08/24/2015     Priority: Low   • Carpal tunnel syndrome 07/12/2013     Priority: Low   • Disturbance of skin sensation 07/12/2013     Priority: Low   • Pain in limb 07/12/2013     Priority: Low   • Unspecified vitamin D deficiency 04/23/2012     Priority: Low   • Complete rupture of rotator cuff 03/26/2012     Priority: Low   • Rotator cuff (capsule) sprain 05/17/2011     Priority: Low   • Acute pain of left shoulder 05/17/2011     Priority: Low   • Unspecified hypothyroidism      Priority: Low   • Abnormal involuntary movements(781.0)      Priority: Low   • Generalized osteoarthrosis, unspecified site      Priority: Low   • Insomnia, unspecified      Priority: Low   • Disorder of bone and cartilage, unspecified      Priority: Low     osteopenia     • Essential hypertension      Priority: Low   • Premature menopause      Priority: Low     age 32 last menses     • Female stress incontinence      Priority: Low   • Paroxysmal supraventricular tachycardia (CMS/[facility])      Priority: Low     post ablation 2004         PAST MEDICAL, FAMILY AND SOCIAL HISTORY     Medications:  Current Outpatient Prescriptions   Medication   •  Range of Motion  times a day.  - Reposition patient every  hours.  - Dangle patient  times a day  - Stand patient  times a day  - Ambulate patient  times a day  - Out of bed to chair  times a day   - Out of bed for meals  times a day  - Out of bed for toileting  - Record patient progress and toleration of activity level   Outcome: Progressing      CRESTOR 20 MG tablet   • citalopram (CELEXA) 40 MG tablet   • levothyroxine (SYNTHROID, LEVOTHROID) 75 MCG tablet   • Omega-3 Fatty Acids (FISH OIL) 1200 MG capsule   • aspirin 81 MG tablet   • Calcium Carbonate Antacid (ANTACID CALCIUM PO)   • amlodipine-valsartan (EXFORGE)  MG per tablet   • metoPROLOL (LOPRESSOR) 50 MG tablet   • estrogens, conjugated (PREMARIN) cream   • Glucosamine HCl ([facility] GLUCOSAMINE PO)   • neomycin 3.5 mg-polymyxin 10,000 units-hydroCORTisone 10 mg per mL (CORTISPORIN) otic solution   • psyllium (METAMUCIL) 58.6 % packet   • Coenzyme Q10 (CO Q-10) 100 MG CAPS   • DAILY MULTIVITAMIN PO TABS   • CALCIUM 500 + D 500-400 MG-UNIT PO TABS   • IBUPROFEN 200 MG PO TABS     No current facility-administered medications for this visit.       Allergies:  ALLERGIES:   Allergen Reactions   • Methotrexate DIARRHEA       Past Medical  History/Surgeries:  Past Medical History:   Diagnosis Date   • Anxiety    • Arthritis    • Chronic depressive personality disorder    • Cyst of finger     right middle   • Depression    • Disorder of bone and cartilage, unspecified     osteopenia   • DJD (degenerative joint disease)    • Eczema    • Esophageal reflux    • Female stress incontinence     wears pantyliner   • Generalized osteoarthrosis, unspecified site    • GERD (gastroesophageal reflux disease)    • Hyperlipidemia    • Insomnia, unspecified    • Lumbar spondylosis    • Other and unspecified hyperlipidemia    • Paroxysmal supraventricular tachycardia (CMS/HCC)     post ablation 2004   • Premature menopause     age 32 last menses   • Psoriasis    • Rotator cuff tear     surgery on left shoulder 6-   • Unspecified essential hypertension    • Unspecified hypothyroidism        Past Surgical History:   Procedure Laterality Date   • Bladder surgery  10/2013    bladder stimulator implant for overactive bladder   • Colonoscopy  2006   • Colonoscopy  7/7/2016   • Ep ablation  2004   •  Esophagogastroduodenoscopy  7/7/2016    Gastritis   • Eye surgery Right 2012    right cataract extraction with IOL:   • Eye surgery Right 02-12    Vitrectomy    • Foot surgery Left 2002    FOOT 4TH TOE STRAIGHTENED   • Fusion finger joint Right 2009 and 2012    INDEX FINGER   • Ptca  2004    ablation   • Shoulder arthroscopy w/ rotator cuff repair Left 2012    left   • Skin biopsy  2011    nose       Family History:  Family History   Problem Relation Age of Onset   • Heart Father         CHF   • Asthma Father    • Alcohol Abuse Father         alcoholic   • Heart disease Father    • Heart Mother         CAD   • Arthritis Mother    • Heart disease Mother    • High blood pressure Mother    • Cancer Son         Solis's sarcoma   • * Brother         DIVERTICULITIS       Social History:  Social History   Substance Use Topics   • Smoking status: Former Smoker     Packs/day: 1.00     Years: 6.00     Types: Cigarettes     Quit date: 6/10/1968   • Smokeless tobacco: Never Used      Comment: quit smoking in 1968.   • Alcohol use 1.5 oz/week     3 Standard drinks or equivalent per week      Comment: 1-2 drinks/day       PHYSICAL EXAM     Physical Exam   Constitutional: She is oriented to person, place, and time. She appears well-developed and well-nourished.   HENT:   Head: Normocephalic and atraumatic.   Neck: Normal range of motion.   Pulmonary/Chest: Effort normal.   Musculoskeletal:        Lumbar back: She exhibits decreased range of motion, tenderness and bony tenderness. She exhibits no deformity.   Neurological: She is alert and oriented to person, place, and time.   Reflex Scores:       Patellar reflexes are 2+ on the right side and 2+ on the left side.       Achilles reflexes are 2+ on the right side and 2+ on the left side.  5/5 in all muscle groups tested, bilateral lower extremities  No clonus  Straight leg raise negative   Skin: Skin is warm and dry.   Psychiatric: She has a normal mood and affect. Her behavior is  normal. Thought content normal.     Imaging:  Lumbar radiographs today were compared to prior from 7/27/15 and reviewed with the patient. There are no significant changes compared to the 2015 radiographs. There is anterolisthesis at L4-5 secondary to severe facet joint arthropathy.  There is an L5 transitional segment with sacralization.  No other significant disc space collapse or facet arthropathy noted.     ASSESSMENT/PLAN     Assessment:  Lumbar spondylosis  Lumbar stenosis with radiculopathy  L4-5 anterolisthesis  L5 transitional segment with sacralization    Discussion:  I discussed the clinical and radiographic findings. I discussed the natural history of the above disorders. I discussed treatment options.  She has failed to improve with therapeutic exercises and ongoing chiropractic treatment.  She is interested in further evaluation with CT myelogram.  She may be a candidate for lumbar epidural injections. Ultimately, she may require L4-5 interbody fusion with instrumentation.  Hopefully this can be avoided. Lavonne would like to return following this CT myelogram to see it in person. This is reasonable.     Plan:  Consider physical therapy  Continue chiropractor  Consider acupuncture  Consider PEPPER  Lumbar CT myelogram  Return CT myelogram for review      Greater than 50% of this time was spent counseling the patient and coordinating care.

## 2024-06-28 NOTE — PLAN OF CARE
Problem: OCCUPATIONAL THERAPY ADULT  Goal: Performs self-care activities at highest level of function for planned discharge setting.  See evaluation for individualized goals.  Description: Treatment Interventions: ADL retraining, Functional transfer training, Endurance training, Patient/family training, Equipment evaluation/education, Continued evaluation, Cardiac education, Energy conservation, Activityengagement          See flowsheet documentation for full assessment, interventions and recommendations.   Outcome: Progressing  Note: Limitation: Decreased ADL status, Decreased Safe judgement during ADL, Decreased endurance, Decreased self-care trans, Decreased high-level ADLs  Prognosis: Fair  Assessment: Pt seen for OT treatment session day 4 on this date focused on ADL retraining, functional transfers and mobility, functional endurance. Pt was greeted in bed and was cooperative throughout session. Following session, pt was left in bed with alarm on and all needs within reach. Pt continues to be limited by functional status related to ADLs and transfers requiring MOD A for UB dressing, MAX A for LB dressing and toileting, also limited by cognition on this date .   The patient's raw score on the -PAC Daily Activity Inpatient Short Form is 15. A raw score of less than 19 suggests the patient may benefit from discharge to post-acute rehabilitation services. Please refer to the recommendation of the Occupational Therapist for safe discharge planning. Pt would benefit from continued acute OT intervention with plan to continue OT treatment sessions 2-3x per week. Recommend d/c to level II services.     Rehab Resource Intensity Level, OT: II (Moderate Resource Intensity)

## 2024-06-28 NOTE — PROGRESS NOTES
Progress Note - General Surgery   Shane Chau . 78 y.o. male MRN: [id number]  Unit/Bed#: [facility] [place] 405-01 Encounter: [id number]    Assessment:  78 y.o. male s/p 6/20 CABG x3, postoperative ileus now resolving.     Hx of AAA s/p open repair, BPH, HTN, HLD, CAD with MI.    Plan:  - Cardiac diet   - Monitor bowel function  - Pain and nausea control PRN  - Encourage IS, ambulation   - DVT ppx   - Remainder of care per primary   - Please reach out with additional questions or concerns    Subjective:  No acute events overnight. Denies abdominal pain. Patient denies nausea, vomiting, fever, chills. Endorses passing flatus, having bowel movements, voiding.    Objective:    Vitals:   Afebrile, Normal VS on 25. L nc  Temp:  [97.3 °F (36.3 °C)-97.7 °F (36.5 °C)] 97.7 °F (36.5 °C)  HR:  [62-78] 71  Resp:  [16-26] 16  BP: (117-129)/(60-70) 117/60  Body mass index is 30.52 kg/m².    Physical Exam:   Gen: NAD, Resting in bed  Neuro: No focal deficits  Head: Normal Cephalic, Atraumatic  Eye: EOMI, No scleral icterus  CV: Regular rate  Pulm: Normal work of breathing, No respiratory distress   Abd: Soft, Mild upper quadrant distention, Non-Tender   Skin: Warm, Dry, Intact    I/O:  U.O: 1 L +1x  BM x6    Intake/Output Summary (Last 24 hours) at 6/28/2024 0620  Last data filed at 6/27/2024 2200  Gross per 24 hour   Intake 2636.9 ml   Output 2025 ml   Net 611.9 ml       Lab Results:  06/28/24 labs pending  Recent Labs     06/26/24  0455 06/26/24  1530 06/27/24  0508 06/27/24  1742   WBC 10.03  --   --   --    HGB 11.6*  --   --   --      --   --   --    SODIUM 139 142 143  --    K 3.3* 3.9 3.2* 3.8   CL 93* 95* 98  --    CO2 34* 33* 33*  --    BUN 72* 82* 85*  --    CREATININE 1.95* 2.01* 1.76*  --    GLUC 113 125 117  --    CALCIUM 8.4 8.3* 7.9*  --    MG  --   --  3.2*  --        ---    Marilee Birmingham MD  General Surgery PGY-I

## 2024-06-28 NOTE — PROGRESS NOTES
Progress Note - Cardiothoracic Surgery   Shane Chau Sr. 78 y.o. male MRN: [id number]  Unit/Bed#: [facility] 405-01 Encounter: [id number]    Coronary artery disease. S/P coronary artery bypass grafting; POD # 8      24 Hour Events: Underwent gastrograffin challenge yesterday, contrast found in colon, NGT d/c'd, started on clears, doing well, eager to eat more, denies nausea, +distention but soft (same as yesterday), TTP now worse in LLQ, pain overall improved. +BM this AM- normal.    Pain controlled, Denies SOB, remains on 3L NC (turned off again this AM), using IS (1200, using more frequently). Ambulating with walker.     Medications:   Scheduled Meds:  Current Facility-Administered Medications   Medication Dose Route Frequency Provider Last Rate    acetaminophen  975 mg Oral Q6H While awake Moraima Alvarez PA-C      amiodarone (CORDARONE) 900 mg in dextrose 5 % 500 mL infusion  0.5 mg/min Intravenous Continuous Sandy Mckeon PA-C Stopped (06/28/24 0732)    aspirin  81 mg Oral Daily Moraima Alvarez PA-C      atorvastatin  80 mg Oral Daily With Dinner Moraima Alvarez PA-C      bisacodyl  10 mg Rectal Daily PRN Moraima Alvarez PA-C      chlorhexidine  15 mL Mouth/Throat BID Moraima Alvarez PA-C      clopidogrel  75 mg Oral Daily Moraima Alvarez PA-C      docusate sodium  100 mg Oral BID Moraima Alvarez PA-C      heparin (porcine)  5,000 Units Subcutaneous Q8H [facility] Moraima Alvarez PA-C      hydrALAZINE  5 mg Intravenous Q6H PRN Moraima Alvarez PA-C      insulin lispro  1-5 Units Subcutaneous HS Moraima Alvarez PA-C      insulin lispro  1-6 Units Subcutaneous TID AC Moraima Alvarez PA-C      lidocaine  2 patch Topical Daily Moraima Alvarez PA-C      magnesium hydroxide  30 mL Oral Daily PRN Rahel Baig PA-C      melatonin  6 mg Oral HS Rahel Baig PA-C      methocarbamol  500 mg Oral Q6H [facility] Moraima Alvarez PA-C      metoprolol  10 mg Intravenous Q6H  KRISTI Sandy Mckeon PA-C      ondansetron  4 mg Intravenous Q6H PRN Moraima Alvarez PA-C      oxyCODONE  2.5 mg Oral Q4H PRN Moraima Alvarez PA-C      Or    oxyCODONE  5 mg Oral Q4H PRN Moraima Alvarez PA-C      pantoprazole  40 mg Intravenous Q24H KRISTI Hagen PA-C      polyethylene glycol  17 g Oral Daily Moraima Alvarez PA-C      senna  1 tablet Oral HS Rahel Ilene Baig PA-C      senna  1 tablet Oral Once Sandy Mckeon PA-C      temazepam  15 mg Oral HS PRN Moraima Alvarez PA-C      trimethobenzamide  200 mg Intramuscular Q6H PRN Rudolph Gunter PA-C       Continuous Infusions:amiodarone (CORDARONE) 900 mg in dextrose 5 % 500 mL infusion, 0.5 mg/min, Last Rate: Stopped (06/28/24 0732)      PRN Meds:.  bisacodyl    hydrALAZINE    magnesium hydroxide    ondansetron    oxyCODONE **OR** oxyCODONE    temazepam    trimethobenzamide    Vitals:   Vitals:    06/28/24 0301 06/28/24 0550 06/28/24 0711 06/28/24 0712   BP: 117/60  117/61 117/61   Pulse: 71   69   Resp: 16  18    Temp: 97.7 °F (36.5 °C)  (!) 97.4 °F (36.3 °C) (!) 97.4 °F (36.3 °C)   TempSrc:       SpO2: 96%   98%   Weight:  83.2 kg (183 lb 6.8 oz)     Height:       Afebrile, HR 70s, systolics 110-120    Telemetry: NSR; Heart Rate: 69     Respiratory:   SpO2: SpO2: 98 %; 3 LPM (quit tobacco use in 80s)    Intake/Output:     Intake/Output Summary (Last 24 hours) at 6/28/2024 0734  Last data filed at 6/27/2024 2200  Gross per 24 hour   Intake 2636.9 ml   Output 1275 ml   Net 1361.9 ml   UOP 1 L   mL- bilious   Net +1.4  Since admit -6.4 L     Chest tube Output:  removed    Weights:   Weight (last 2 days)       Date/Time Weight    06/28/24 0550 83.2 (183.42)    06/27/24 0600 79.6 (175.4)    06/26/24 0600 81.5 (179.68)          Admit weight: 6/20 84 kg (down 1 kg)    Results:   Results from last 7 days   Lab Units 06/26/24  0455 06/25/24  0459 06/24/24  0452   WBC Thousand/uL 10.03 10.82* 16.01*   HEMOGLOBIN g/dL 11.6* 11.2*  10.2*   HEMATOCRIT % 34.0* 32.2* 29.1*   PLATELETS Thousands/uL 202 199 135*     Results from last 7 days   Lab Units 06/28/24  0546 06/27/24  1742 06/27/24  0508 06/26/24  1530   SODIUM mmol/L 147  --  143 142   POTASSIUM mmol/L 3.3* 3.8 3.2* 3.9   CHLORIDE mmol/L 103  --  98 95*   CO2 mmol/L 32  --  33* 33*   BUN mg/dL 83*  --  85* 82*   CREATININE mg/dL 1.53*  --  1.76* 2.01*   CALCIUM mg/dL 7.9*  --  7.9* 8.3*       No hx of CKD            Point of care glucose: 94 - 123 (no hx of DM)    Studies:  CXR: 6/25  NG tube in the stomach. There is moderate gaseous distention of the stomach.     CT a/p 6/25:   Gastric distention and associated proximal small bowel dilatation and stool like contents within the dilated loops of small bowel without discrete transition point identified. Findings suggest chronic ileus though chronic partial small bowel obstruction   not entirely excluded.     Juxtarenal abdominal aortic aneurysm measuring 4.5 x 4.2 cm. Suprarenal abdominal aorta is dilated measuring 3.7 cm.    KUB: 6/27  Contrast noted in the colon thus excluding a complete small bowel obstruction. The small bowel dilatation appears improved compared to most recent CT scan.     I have personally reviewed pertinent reports.   and I have personally reviewed pertinent films in PACS    Invasive Lines/Tubes:  Invasive Devices       Central Venous Catheter Line  Duration             CVC Central Lines 06/20/24 7 days                  Physical Exam:    General: No acute distress and Normal appearance  HEENT/NECK:  Normocephalic. Atraumatic.  No jugular venous distention.    Cardiac: Regular rate and rhythm and No murmurs/rubs/gallops  Pulmonary:  Breath sounds clear bilaterally and No rales/rhonchi/wheezes  Abdomen:  Hypo-active bowel sounds, Distended, and TTP improving, soft, no guarding/rigidity, pain worse LLQ  Incisions: Sternum is stable.  Incision is clean, dry, and intact.  and Saphenectomy incison is clean, dry, and intact.    Extremities: Extremities warm/dry and Trace edema B/L  Neuro: Alert and oriented X 3 and No focal deficits  Skin: Warm/Dry, without rashes or lesions.          Assessment:  Principal Problem:    Coronary artery disease of native artery of native heart with stable angina pectoris (HCC)  Active Problems:    Abdominal aortic aneurysm without rupture (HCC)    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HCC)    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HCC)    Asymptomatic bilateral carotid artery stenosis    Hyperlipidemia    Hypertension    S/P CABG x 3    BARBARA (acute kidney injury) (HCC)       Coronary artery disease. S/P coronary artery bypass grafting; POD # 8    Plan:    Cardiac:     Elective surgical admission  Normal ventricular systolic function, EF 60%    NSR; HR well-controlled, BP well-controlled    Restart Lopressor, 25mg PO BID- d/c IV lopressor 10 mg Q6H    Hold ACE inhibitor/ARB secondary to renal dysfunction    Transition back to prophylactic PO Amiodarone     Continue ASA (81 mg) and Plavix for h/o TIAs (on PTA) and Statin therapy (asymptomatic b/l carotid artery stenosis)    Epicardial pacing wires have been removed    D/C Central IV access no longer required    Continue Subcutaneous Heparin for DVT prophylaxis    Pulmonary:     Acute post-op pulmonary insufficiency; Requiring 3 liters via nasal cannula, secondary to idiopathic pulmonary fibrosis, atelectasis, and poor inspiratory effort secondary to pain. Continue incentive spirometry/coughing/deep breathing exercises.  Wean supplemental oxygen as tolerated for saturation > 90%    Chest tubes have been discontinued    CXR 6/25- elevated L diaphragm, no effusion    Renal:     Postoperative BARBARA improving, w/ Cr 1.53 from 1.76 from 1.95 from 2.22 (peak) Follow up daily BMP    Intake/Output net: +1.36 L/24 hours, (UOP- 1L/24hr (0.5), since admit - 6.4 L), down 1 kg    Diuretic Regimen:   No further diuretics- monitor UOP today, possible demadex 10 mg   K 3.3- replete        Neuro:    Neurologically intact; No active issues   Melatonin for sleep    Incisional pain well controlled   Continue tylenol, 975 mg PO q 8, standing dose   Continue oxycodone, 2.5 to 5 mg PO q 4 hours prn pain   Continue topical Lidocaine patch to affected area   Continue methocarbamol, 500 mg PO q 6 hours prn pain/muscle spasm    GI:    Postop ileus resolving- start cardiac diet with fluid restriction- general surgery following and in agreement    Continue stool softeners and prn suppository, increase bowel regimen    Continue GI prophylaxis    Endo:     Pre-Op Hgb A1C: 5.4    Serum glucose well controlled on insulin sliding scale coverage    7    Hematology:     Post-operative blood count acceptable; Trend prn  Leukocytosis; Afebrile with no signs of infection; Trend CBC prn    8.   Disposition:        Following daily PT/OT recommendations regarding home vs. rehab when medically cleared for discharge - currently recs for rehab, d/c possible 6/29    VTE Pharmacologic Prophylaxis: Heparin  VTE Mechanical Prophylaxis: sequential compression device    Collaborative rounds completed with supervising physician  Plan of care discussed with bedside nurse    SIGNATURE: Sandy Mckeon PA-C  DATE: June 28, 2024  TIME: 7:34 AM

## 2024-06-29 ENCOUNTER — APPOINTMENT (INPATIENT)
Dept: RADIOLOGY | Facility: HOSPITAL | Age: 79
DRG: 235 | End: 2024-06-29
Payer: COMMERCIAL

## 2024-06-29 LAB
ANION GAP SERPL CALCULATED.3IONS-SCNC: 10 MMOL/L (ref 4–13)
BUN SERPL-MCNC: 65 MG/DL (ref 5–25)
CALCIUM SERPL-MCNC: 7.5 MG/DL (ref 8.4–10.2)
CHLORIDE SERPL-SCNC: 103 MMOL/L (ref 96–108)
CO2 SERPL-SCNC: 28 MMOL/L (ref 21–32)
CREAT SERPL-MCNC: 1.42 MG/DL (ref 0.6–1.3)
GFR SERPL CREATININE-BSD FRML MDRD: 46 ML/MIN/1.73SQ M
GLUCOSE SERPL-MCNC: 113 MG/DL (ref 65–140)
GLUCOSE SERPL-MCNC: 114 MG/DL (ref 65–140)
GLUCOSE SERPL-MCNC: 114 MG/DL (ref 65–140)
GLUCOSE SERPL-MCNC: 168 MG/DL (ref 65–140)
GLUCOSE SERPL-MCNC: 94 MG/DL (ref 65–140)
MAGNESIUM SERPL-MCNC: 2.9 MG/DL (ref 1.9–2.7)
POTASSIUM SERPL-SCNC: 4 MMOL/L (ref 3.5–5.3)
SODIUM SERPL-SCNC: 141 MMOL/L (ref 135–147)

## 2024-06-29 PROCEDURE — 99024 POSTOP FOLLOW-UP VISIT: CPT | Performed by: PHYSICIAN ASSISTANT

## 2024-06-29 PROCEDURE — 82948 REAGENT STRIP/BLOOD GLUCOSE: CPT

## 2024-06-29 PROCEDURE — 99232 SBSQ HOSP IP/OBS MODERATE 35: CPT | Performed by: SURGERY

## 2024-06-29 PROCEDURE — 80048 BASIC METABOLIC PNL TOTAL CA: CPT | Performed by: PHYSICIAN ASSISTANT

## 2024-06-29 PROCEDURE — C9113 INJ PANTOPRAZOLE SODIUM, VIA: HCPCS | Performed by: PHYSICIAN ASSISTANT

## 2024-06-29 PROCEDURE — 83735 ASSAY OF MAGNESIUM: CPT | Performed by: PHYSICIAN ASSISTANT

## 2024-06-29 PROCEDURE — 74018 RADEX ABDOMEN 1 VIEW: CPT

## 2024-06-29 RX ORDER — POTASSIUM CHLORIDE 14.9 MG/ML
20 INJECTION INTRAVENOUS
Status: COMPLETED | OUTPATIENT
Start: 2024-06-29 | End: 2024-06-29

## 2024-06-29 RX ORDER — SODIUM CHLORIDE, SODIUM GLUCONATE, SODIUM ACETATE, POTASSIUM CHLORIDE, MAGNESIUM CHLORIDE, SODIUM PHOSPHATE, DIBASIC, AND POTASSIUM PHOSPHATE .53; .5; .37; .037; .03; .012; .00082 G/100ML; G/100ML; G/100ML; G/100ML; G/100ML; G/100ML; G/100ML
50 INJECTION, SOLUTION INTRAVENOUS CONTINUOUS
Status: DISCONTINUED | OUTPATIENT
Start: 2024-06-29 | End: 2024-06-29

## 2024-06-29 RX ORDER — SODIUM CHLORIDE, SODIUM GLUCONATE, SODIUM ACETATE, POTASSIUM CHLORIDE, MAGNESIUM CHLORIDE, SODIUM PHOSPHATE, DIBASIC, AND POTASSIUM PHOSPHATE .53; .5; .37; .037; .03; .012; .00082 G/100ML; G/100ML; G/100ML; G/100ML; G/100ML; G/100ML; G/100ML
50 INJECTION, SOLUTION INTRAVENOUS CONTINUOUS
Status: DISCONTINUED | OUTPATIENT
Start: 2024-06-29 | End: 2024-07-01

## 2024-06-29 RX ORDER — METOPROLOL TARTRATE 50 MG/1
50 TABLET, FILM COATED ORAL EVERY 12 HOURS SCHEDULED
Status: DISCONTINUED | OUTPATIENT
Start: 2024-06-29 | End: 2024-07-09 | Stop reason: HOSPADM

## 2024-06-29 RX ADMIN — PANTOPRAZOLE SODIUM 40 MG: 40 INJECTION, POWDER, FOR SOLUTION INTRAVENOUS at 09:11

## 2024-06-29 RX ADMIN — CLOPIDOGREL BISULFATE 75 MG: 75 TABLET ORAL at 09:11

## 2024-06-29 RX ADMIN — ATORVASTATIN CALCIUM 80 MG: 80 TABLET, FILM COATED ORAL at 17:46

## 2024-06-29 RX ADMIN — POTASSIUM CHLORIDE 20 MEQ: 14.9 INJECTION, SOLUTION INTRAVENOUS at 08:39

## 2024-06-29 RX ADMIN — POTASSIUM CHLORIDE 20 MEQ: 14.9 INJECTION, SOLUTION INTRAVENOUS at 13:34

## 2024-06-29 RX ADMIN — AMIODARONE HYDROCHLORIDE 200 MG: 200 TABLET ORAL at 17:46

## 2024-06-29 RX ADMIN — HEPARIN SODIUM 5000 UNITS: 5000 INJECTION INTRAVENOUS; SUBCUTANEOUS at 21:47

## 2024-06-29 RX ADMIN — SODIUM CHLORIDE, SODIUM GLUCONATE, SODIUM ACETATE, POTASSIUM CHLORIDE, MAGNESIUM CHLORIDE, SODIUM PHOSPHATE, DIBASIC, AND POTASSIUM PHOSPHATE 50 ML/HR: .53; .5; .37; .037; .03; .012; .00082 INJECTION, SOLUTION INTRAVENOUS at 14:15

## 2024-06-29 RX ADMIN — METOPROLOL TARTRATE 50 MG: 50 TABLET, FILM COATED ORAL at 21:46

## 2024-06-29 RX ADMIN — HEPARIN SODIUM 5000 UNITS: 5000 INJECTION INTRAVENOUS; SUBCUTANEOUS at 13:32

## 2024-06-29 RX ADMIN — ASPIRIN 81 MG: 81 TABLET, COATED ORAL at 09:11

## 2024-06-29 RX ADMIN — ACETAMINOPHEN 975 MG: 325 TABLET, FILM COATED ORAL at 13:32

## 2024-06-29 RX ADMIN — CHLORHEXIDINE GLUCONATE 0.12% ORAL RINSE 15 ML: 1.2 LIQUID ORAL at 09:11

## 2024-06-29 RX ADMIN — AMIODARONE HYDROCHLORIDE 200 MG: 200 TABLET ORAL at 12:02

## 2024-06-29 RX ADMIN — CHLORHEXIDINE GLUCONATE 0.12% ORAL RINSE 15 ML: 1.2 LIQUID ORAL at 17:46

## 2024-06-29 RX ADMIN — AMIODARONE HYDROCHLORIDE 200 MG: 200 TABLET ORAL at 09:11

## 2024-06-29 RX ADMIN — METHOCARBAMOL 500 MG: 500 TABLET ORAL at 12:02

## 2024-06-29 RX ADMIN — POTASSIUM CHLORIDE 20 MEQ: 14.9 INJECTION, SOLUTION INTRAVENOUS at 11:19

## 2024-06-29 RX ADMIN — ACETAMINOPHEN 975 MG: 325 TABLET, FILM COATED ORAL at 09:11

## 2024-06-29 RX ADMIN — POTASSIUM CHLORIDE 20 MEQ: 14.9 INJECTION, SOLUTION INTRAVENOUS at 15:42

## 2024-06-29 RX ADMIN — LIDOCAINE 5% 2 PATCH: 700 PATCH TOPICAL at 21:45

## 2024-06-29 RX ADMIN — Medication 6 MG: at 21:47

## 2024-06-29 RX ADMIN — METHOCARBAMOL 500 MG: 500 TABLET ORAL at 05:12

## 2024-06-29 RX ADMIN — METOPROLOL TARTRATE 50 MG: 50 TABLET, FILM COATED ORAL at 09:11

## 2024-06-29 RX ADMIN — HEPARIN SODIUM 5000 UNITS: 5000 INJECTION INTRAVENOUS; SUBCUTANEOUS at 05:12

## 2024-06-29 RX ADMIN — METHOCARBAMOL 500 MG: 500 TABLET ORAL at 17:46

## 2024-06-29 NOTE — PLAN OF CARE
Problem: SAFETY ADULT  Goal: Patient will remain free of falls  Description: INTERVENTIONS:  - Educate patient/family on patient safety including physical limitations  - Instruct patient to call for assistance with activity   - Consult OT/PT to assist with strengthening/mobility   - Keep Call bell within reach  - Keep bed low and locked with side rails adjusted as appropriate  - Keep care items and personal belongings within reach  - Initiate and maintain comfort rounds  - Make Fall Risk Sign visible to staff  - Offer Toileting every  Hours, in advance of need  - Initiate/Maintain alarm  - Obtain necessary fall risk management equipment:   - Apply yellow socks and bracelet for high fall risk patients  - Consider moving patient to room near nurses station  Outcome: Progressing  Goal: Maintain or return to baseline ADL function  Description: INTERVENTIONS:  -  Assess patient's ability to carry out ADLs; assess patient's baseline for ADL function and identify physical deficits which impact ability to perform ADLs (bathing, care of mouth/teeth, toileting, grooming, dressing, etc.)  - Assess/evaluate cause of self-care deficits   - Assess range of motion  - Assess patient's mobility; develop plan if impaired  - Assess patient's need for assistive devices and provide as appropriate  - Encourage maximum independence but intervene and supervise when necessary  - Involve family in performance of ADLs  - Assess for home care needs following discharge   - Consider OT consult to assist with ADL evaluation and planning for discharge  - Provide patient education as appropriate  Outcome: Progressing  Goal: Maintains/Returns to pre admission functional level  Description: INTERVENTIONS:  - Perform AM-PAC 6 Click Basic Mobility/ Daily Activity assessment daily.  - Set and communicate daily mobility goal to care team and patient/family/caregiver.   - Collaborate with rehabilitation services on mobility goals if consulted  - Perform  Range of Motion  times a day.  - Reposition patient every  hours.  - Dangle patient times a day  - Stand patient  times a day  - Ambulate patient  times a day  - Out of bed to chair  times a day   - Out of bed for meals  times a day  - Out of bed for toileting  - Record patient progress and toleration of activity level   Outcome: Progressing

## 2024-06-29 NOTE — PROGRESS NOTES
Progress Note - General Surgery   Shane Chau . 78 y.o. male MRN: [id number]  Unit/Bed#: [facility] 405-01 Encounter: [id number]    Assessment:  78 y.o. male s/p 6/20 CABG x3, postoperative ileus. Having bowel function.     Hx of AAA s/p open repair, BPH, HTN, HLD, CAD with MI.    Plan:  - Follow up pending KUB  - If nausea resolved and continuing to have bowel function, trial clear liquid diet  - Advance diet as tolerated  - No immediate indication for NGT replacement  - Continue abdominal exams  - Pain and nausea control PRN  - Encourage IS, ambulation   - DVT ppx   - Remainder of care per primary     Subjective:  Episode of emesis overnight, patient unable to provide additional details regarding provocation. Denies abdominal pain or nausea at present. Reports eating yesterday. States he is hungry and thirsty this morning. Passing flatus, having frequent bowel movements, nine in past 24 hours.     Objective:    Vitals:   Afebrile, Normal VS on room air.  Temp:  [97.4 °F (36.3 °C)-98.6 °F (37 °C)] 97.7 °F (36.5 °C)  HR:  [62-80] 80  Resp:  [16-22] 18  BP: (127-141)/(69-72) 127/70  Body mass index is 30.8 kg/m².    Physical Exam:   Gen: NAD, Resting in bed  Head: Normal Cephalic, Atraumatic  CV: Regular rate  Pulm: Normal work of breathing, No respiratory distress on RA  Abd: Soft, Distention in upper abdomen, Mildly tender inferior to prior chest tube sites   Ext: No edema bilateral lower extremities, Non-tender  Skin: Warm, Dry, Intact    I/O:  U.O: 976 cc +5x unmeasured  Emesis: x1  Stool x9    Intake/Output Summary (Last 24 hours) at 6/29/2024 0939  Last data filed at 6/29/2024 0910  Gross per 24 hour   Intake 300 ml   Output 976 ml   Net -676 ml       Lab Results:  Recent Labs     06/27/24  0508 06/27/24  1742 06/28/24  0546 06/28/24  1349 06/29/24  0512   SODIUM 143  --  147  --  141   K 3.2*   < > 3.3* 3.3* 4.0   CL 98  --  103  --  103   CO2 33*  --  32  --  28   BUN 85*  --  83*  --  65*   CREATININE  1.76*  --  1.53*  --  1.42*   GLUC 117  --  115  --  113   CALCIUM 7.9*  --  7.9*  --  7.5*   MG 3.2*  --   --   --  2.9*    < > = values in this interval not displayed.       ---    Marilee Birmingham MD  General Surgery PGY-I

## 2024-06-29 NOTE — PROGRESS NOTES
Progress Note - Cardiothoracic Surgery   Shane Chau Sr. 78 y.o. male MRN: [id number]  Unit/Bed#: [facility] 405-01 Encounter: [id number]    Coronary artery disease. S/P coronary artery bypass grafting; POD # 9      24 Hour Events: Advanced to regular cardiac diet yesterday after passing gastrograffin challenge. Developed emesis at MN. Episodes of diarrhea as well. Denies abdominal pain or nausea currently. Abdomen remains distended. Diffuse tenderness to palpation.     Pain controlled, Denies SOB, using IS (1200, using more frequently). Ambulating with walker.     Medications:   Scheduled Meds:  Current Facility-Administered Medications   Medication Dose Route Frequency Provider Last Rate    acetaminophen  975 mg Oral Q6H While awake Moraima Alvarez PA-C      amiodarone  200 mg Oral TID With Meals Sandy Mckeon PA-C      aspirin  81 mg Oral Daily Moraima Alvarez PA-C      atorvastatin  80 mg Oral Daily With Dinner Moraima Alvarez PA-C      bisacodyl  10 mg Rectal Daily PRN Moraima Alvarez PA-C      chlorhexidine  15 mL Mouth/Throat BID Moraima Alvarez PA-C      clopidogrel  75 mg Oral Daily Moraima Alvarez PA-C      docusate sodium  100 mg Oral BID Moraima Alvarez PA-C      heparin (porcine)  5,000 Units Subcutaneous Q8H KRISTI Moraima Alvarez PA-C      hydrALAZINE  5 mg Intravenous Q6H PRN Moraima Alvarez PA-C      insulin lispro  1-5 Units Subcutaneous HS Moraima Alvarez PA-C      insulin lispro  1-6 Units Subcutaneous TID AC Moraima Alvarez PA-C      lidocaine  2 patch Topical Daily Moraima Alvarez PA-C      magnesium hydroxide  30 mL Oral Daily PRN Rahel Baig PA-C      melatonin  6 mg Oral HS Rahel Baig PA-C      methocarbamol  500 mg Oral Q6H KRISTI Moriama Alvarez PA-C      metoprolol tartrate  25 mg Oral Q12H KRISTI Sandy Mckeon PA-C      ondansetron  4 mg Intravenous Q6H PRN Moraima Alvarez PA-C      oxyCODONE  2.5 mg Oral Q4H PRN Moraima Odom  KATIA Alvarez      Or    oxyCODONE  5 mg Oral Q4H PRN Moraima Alvarez PA-C      pantoprazole  40 mg Intravenous Q24H [facility] Hagen PA-C      polyethylene glycol  17 g Oral Daily Moraima Alvarez PA-C      polyethylene glycol  17 g Oral Daily PRN Loreta Santamaria PA-C      senna  1 tablet Oral HS Rahel Baig PA-C      senna  1 tablet Oral Once Sandy Mckeon PA-C      temazepam  15 mg Oral HS PRN Moraima Alvarez PA-C      trimethobenzamide  200 mg Intramuscular Q6H PRN Rudolph Gunter PA-C       Continuous Infusions:     PRN Meds:.  bisacodyl    hydrALAZINE    magnesium hydroxide    ondansetron    oxyCODONE **OR** oxyCODONE    polyethylene glycol    temazepam    trimethobenzamide    Vitals:   Vitals:    06/28/24 2200 06/28/24 2302 06/29/24 0250 06/29/24 0600   BP: 134/69 132/69 130/69    Pulse: 70 76 76    Resp:  16 16    Temp:  98.5 °F (36.9 °C) 98.2 °F (36.8 °C)    TempSrc:       SpO2: 94% 94% 93%    Weight:    84 kg (185 lb 1.6 oz)   Height:       Afebrile, HR 70s, systolics 130s     Telemetry: NSR; Heart Rate: 71 w/ PVCs    Respiratory:   SpO2: SpO2: 93 %; RA (quit tobacco use in 80s)    Intake/Output:     Intake/Output Summary (Last 24 hours) at 6/29/2024 0657  Last data filed at 6/29/2024 0200  Gross per 24 hour   Intake 578 ml   Output 976 ml   Net -398 ml   UOP 1 L  Diarrhea x 7-9  Net -400  Since admit -6.8 L     Chest tube Output:  removed    Weights:   Weight (last 2 days)       Date/Time Weight    06/29/24 0600 84 (185.1)    06/28/24 0550 83.2 (183.42)    06/27/24 0600 79.6 (175.4)          Admit weight: 6/20 84 kg (at admit weight)    Results:   Results from last 7 days   Lab Units 06/26/24  0455 06/25/24  0459 06/24/24  0452   WBC Thousand/uL 10.03 10.82* 16.01*   HEMOGLOBIN g/dL 11.6* 11.2* 10.2*   HEMATOCRIT % 34.0* 32.2* 29.1*   PLATELETS Thousands/uL 202 199 135*     Results from last 7 days   Lab Units 06/29/24  0512 06/28/24  1349 06/28/24  0546 06/27/24  1742  06/27/24  0508   SODIUM mmol/L 141  --  147  --  143   POTASSIUM mmol/L 4.0 3.3* 3.3*   < > 3.2*   CHLORIDE mmol/L 103  --  103  --  98   CO2 mmol/L 28  --  32  --  33*   BUN mg/dL 65*  --  83*  --  85*   CREATININE mg/dL 1.42*  --  1.53*  --  1.76*   CALCIUM mg/dL 7.5*  --  7.9*  --  7.9*    < > = values in this interval not displayed.   Mag- 6/27 3.2    No hx of CKD        Point of care glucose: 114 - 149 (no hx of DM)    Studies:  CXR: 6/25  NG tube in the stomach. There is moderate gaseous distention of the stomach.     CT a/p 6/25:   Gastric distention and associated proximal small bowel dilatation and stool like contents within the dilated loops of small bowel without discrete transition point identified. Findings suggest chronic ileus though chronic partial small bowel obstruction   not entirely excluded.     Juxtarenal abdominal aortic aneurysm measuring 4.5 x 4.2 cm. Suprarenal abdominal aorta is dilated measuring 3.7 cm.    KUB: 6/27  Contrast noted in the colon thus excluding a complete small bowel obstruction. The small bowel dilatation appears improved compared to most recent CT scan.     I have personally reviewed pertinent reports.   and I have personally reviewed pertinent films in PACS    Invasive Lines/Tubes:  Invasive Devices       Peripheral Intravenous Line  Duration             Peripheral IV 06/28/24 Right Antecubital <1 day              Drain  Duration             External Urinary Catheter Small <1 day                  Physical Exam:    General: No acute distress and Normal appearance  HEENT/NECK:  Normocephalic. Atraumatic.  No jugular venous distention.    Cardiac: Regular rate and rhythm and No murmurs/rubs/gallops  Pulmonary:  Breath sounds clear bilaterally and No rales/rhonchi/wheezes  Abdomen:  Hypo-active bowel sounds, Distended, and diffuse TTP , soft, no guarding or rigidity  Incisions: Sternum is stable.  Incision is clean, dry, and intact.  and Saphenectomy incison is clean, dry,  and intact.   Extremities: Extremities warm/dry and No edema B/L  Neuro: Alert and oriented X 3 and No focal deficits  Skin: Warm/Dry, without rashes or lesions.       Assessment:  Principal Problem:    Coronary artery disease of native artery of native heart with stable angina pectoris (HCC)  Active Problems:    Abdominal aortic aneurysm without rupture (HCC)    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HCC)    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HCC)    Asymptomatic bilateral carotid artery stenosis    Hyperlipidemia    Hypertension    S/P CABG x 3    BARBARA (acute kidney injury) (HCC)       Coronary artery disease. S/P coronary artery bypass grafting; POD # 9    Plan:    Cardiac:     Elective surgical admission  Normal ventricular systolic function, EF 60%    NSR; HR well-controlled, BP well-controlled    Increase Lopressor, 50mg PO BID w/ PVCs    Hold ACE inhibitor/ARB secondary to renal dysfunction    Continue prophylactic PO Amiodarone     Continue ASA (81 mg) and Plavix for h/o TIAs (on PTA) and Statin therapy (asymptomatic b/l carotid artery stenosis)    Epicardial pacing wires have been removed    D/Cd Central IV access no longer required    Continue Subcutaneous Heparin for DVT prophylaxis    Pulmonary:     Acute post-op pulmonary insufficiency; Requiring 3 liters via nasal cannula, secondary to idiopathic pulmonary fibrosis, atelectasis, and poor inspiratory effort secondary to pain. Continue incentive spirometry/coughing/deep breathing exercises.  Wean supplemental oxygen as tolerated for saturation > 90%    Chest tubes have been discontinued    CXR 6/25- elevated L diaphragm, no effusion    Renal:     Postoperative BARBARA improving, w/ Cr 1.42 1.53 from 1.76 from 1.95 from 2.22 (peak) Follow up daily BMP    Intake/Output net: ->400 mL/24 hours, (UOP- ~1L/24hr (0.5), since admit - 6.8 L), at admit weight    Diuretic Regimen:   No further diuretics   K 4- continue to monitor w/ frequent hypokalemia      Neuro:    Neurologically  intact; No active issues   Melatonin for sleep    Incisional pain well controlled   Continue tylenol, 975 mg PO q 8, standing dose   Discontinue oxycodone, 2.5 to 5 mg PO q 4 hours prn pain   Continue topical Lidocaine patch to affected area   Continue methocarbamol, 500 mg PO q 6 hours for pain/muscle spasm    GI:    Postop ileus vs. Chronic partial SBO- NPO, order KUB, discuss with general surgery need for NGT vs further imaging, currently not nauseous    -f/u Mag    Continue stool softeners and prn suppository, increase bowel regimen    Continue GI prophylaxis    Endo:     Pre-Op Hgb A1C: 5.4    Serum glucose well controlled on insulin sliding scale coverage    7    Hematology:     Post-operative blood count acceptable; Trend prn  Leukocytosis; Afebrile with no signs of infection; Trend CBC prn      8.   Disposition:        Following daily PT/OT recommendations regarding home vs. rehab when medically cleared for discharge - currently recs for rehab, when GI issue resolves    VTE Pharmacologic Prophylaxis: Heparin  VTE Mechanical Prophylaxis: sequential compression device    Collaborative rounds completed with supervising physician  Plan of care discussed with bedside nurse    SIGNATURE: Sandy Mckeon PA-C  DATE: June 29, 2024  TIME: 6:57 AM

## 2024-06-29 NOTE — PLAN OF CARE
Problem: SAFETY ADULT  Goal: Patient will remain free of falls  Description: INTERVENTIONS:  - Educate patient/family on patient safety including physical limitations  - Instruct patient to call for assistance with activity   - Consult OT/PT to assist with strengthening/mobility   - Keep Call bell within reach  - Keep bed low and locked with side rails adjusted as appropriate  - Keep care items and personal belongings within reach  - Initiate and maintain comfort rounds  - Make Fall Risk Sign visible to staff  - Offer Toileting every  Hours, in advance of need  - Initiate/Maintain alarm  - Obtain necessary fall risk management equipment:   - Apply yellow socks and bracelet for high fall risk patients  - Consider moving patient to room near nurses station  6/29/2024 1004 by Soledad Corona  Outcome: Progressing  6/29/2024 1003 by Soledad Corona  Outcome: Progressing  Goal: Maintain or return to baseline ADL function  Description: INTERVENTIONS:  -  Assess patient's ability to carry out ADLs; assess patient's baseline for ADL function and identify physical deficits which impact ability to perform ADLs (bathing, care of mouth/teeth, toileting, grooming, dressing, etc.)  - Assess/evaluate cause of self-care deficits   - Assess range of motion  - Assess patient's mobility; develop plan if impaired  - Assess patient's need for assistive devices and provide as appropriate  - Encourage maximum independence but intervene and supervise when necessary  - Involve family in performance of ADLs  - Assess for home care needs following discharge   - Consider OT consult to assist with ADL evaluation and planning for discharge  - Provide patient education as appropriate  6/29/2024 1004 by Soledad Corona  Outcome: Progressing  6/29/2024 1003 by Soledad Corona  Outcome: Progressing  Goal: Maintains/Returns to pre admission functional level  Description: INTERVENTIONS:  - Perform AM-PAC 6 Click Basic Mobility/ Daily Activity assessment  daily.  - Set and communicate daily mobility goal to care team and patient/family/caregiver.   - Collaborate with rehabilitation services on mobility goals if consulted  - Perform Range of Motion  times a day.  - Reposition patient every  hours.  - Dangle patient  times a day  - Stand patient  times a day  - Ambulate patient  times a day  - Out of bed to chair  times a day   - Out of bed for meals  times a day  - Out of bed for toileting  - Record patient progress and toleration of activity level   6/29/2024 1004 by Soledad Corona  Outcome: Progressing  6/29/2024 1003 by Soledad Corona  Outcome: Progressing

## 2024-06-30 LAB
ANION GAP SERPL CALCULATED.3IONS-SCNC: 8 MMOL/L (ref 4–13)
BASOPHILS # BLD AUTO: 0.03 THOUSANDS/ÂΜL (ref 0–0.1)
BASOPHILS NFR BLD AUTO: 0 % (ref 0–1)
BUN SERPL-MCNC: 50 MG/DL (ref 5–25)
CALCIUM SERPL-MCNC: 7.1 MG/DL (ref 8.4–10.2)
CHLORIDE SERPL-SCNC: 106 MMOL/L (ref 96–108)
CO2 SERPL-SCNC: 24 MMOL/L (ref 21–32)
CREAT SERPL-MCNC: 1.16 MG/DL (ref 0.6–1.3)
EOSINOPHIL # BLD AUTO: 0.48 THOUSAND/ÂΜL (ref 0–0.61)
EOSINOPHIL NFR BLD AUTO: 3 % (ref 0–6)
ERYTHROCYTE [DISTWIDTH] IN BLOOD BY AUTOMATED COUNT: 13.2 % (ref 11.6–15.1)
GFR SERPL CREATININE-BSD FRML MDRD: 59 ML/MIN/1.73SQ M
GLUCOSE SERPL-MCNC: 126 MG/DL (ref 65–140)
GLUCOSE SERPL-MCNC: 143 MG/DL (ref 65–140)
GLUCOSE SERPL-MCNC: 162 MG/DL (ref 65–140)
GLUCOSE SERPL-MCNC: 93 MG/DL (ref 65–140)
GLUCOSE SERPL-MCNC: 95 MG/DL (ref 65–140)
HCT VFR BLD AUTO: 31.2 % (ref 36.5–49.3)
HGB BLD-MCNC: 10.3 G/DL (ref 12–17)
IMM GRANULOCYTES # BLD AUTO: 0.26 THOUSAND/UL (ref 0–0.2)
IMM GRANULOCYTES NFR BLD AUTO: 2 % (ref 0–2)
LYMPHOCYTES # BLD AUTO: 1.34 THOUSANDS/ÂΜL (ref 0.6–4.47)
LYMPHOCYTES NFR BLD AUTO: 8 % (ref 14–44)
MAGNESIUM SERPL-MCNC: 2.7 MG/DL (ref 1.9–2.7)
MCH RBC QN AUTO: 32.4 PG (ref 26.8–34.3)
MCHC RBC AUTO-ENTMCNC: 33 G/DL (ref 31.4–37.4)
MCV RBC AUTO: 98 FL (ref 82–98)
MONOCYTES # BLD AUTO: 1.11 THOUSAND/ÂΜL (ref 0.17–1.22)
MONOCYTES NFR BLD AUTO: 6 % (ref 4–12)
NEUTROPHILS # BLD AUTO: 14.12 THOUSANDS/ÂΜL (ref 1.85–7.62)
NEUTS SEG NFR BLD AUTO: 81 % (ref 43–75)
NRBC BLD AUTO-RTO: 0 /100 WBCS
PLATELET # BLD AUTO: 197 THOUSANDS/UL (ref 149–390)
PMV BLD AUTO: 9.3 FL (ref 8.9–12.7)
POTASSIUM SERPL-SCNC: 3.9 MMOL/L (ref 3.5–5.3)
RBC # BLD AUTO: 3.18 MILLION/UL (ref 3.88–5.62)
SODIUM SERPL-SCNC: 138 MMOL/L (ref 135–147)
WBC # BLD AUTO: 17.34 THOUSAND/UL (ref 4.31–10.16)

## 2024-06-30 PROCEDURE — 97116 GAIT TRAINING THERAPY: CPT

## 2024-06-30 PROCEDURE — 97535 SELF CARE MNGMENT TRAINING: CPT

## 2024-06-30 PROCEDURE — 99024 POSTOP FOLLOW-UP VISIT: CPT | Performed by: PHYSICIAN ASSISTANT

## 2024-06-30 PROCEDURE — 97530 THERAPEUTIC ACTIVITIES: CPT

## 2024-06-30 PROCEDURE — 85025 COMPLETE CBC W/AUTO DIFF WBC: CPT | Performed by: PHYSICIAN ASSISTANT

## 2024-06-30 PROCEDURE — C9113 INJ PANTOPRAZOLE SODIUM, VIA: HCPCS | Performed by: PHYSICIAN ASSISTANT

## 2024-06-30 PROCEDURE — 99232 SBSQ HOSP IP/OBS MODERATE 35: CPT | Performed by: SURGERY

## 2024-06-30 PROCEDURE — 83735 ASSAY OF MAGNESIUM: CPT | Performed by: PHYSICIAN ASSISTANT

## 2024-06-30 PROCEDURE — 82948 REAGENT STRIP/BLOOD GLUCOSE: CPT

## 2024-06-30 PROCEDURE — 80048 BASIC METABOLIC PNL TOTAL CA: CPT | Performed by: PHYSICIAN ASSISTANT

## 2024-06-30 RX ORDER — POTASSIUM CHLORIDE 20 MEQ/1
40 TABLET, EXTENDED RELEASE ORAL ONCE
Status: COMPLETED | OUTPATIENT
Start: 2024-06-30 | End: 2024-06-30

## 2024-06-30 RX ADMIN — HEPARIN SODIUM 5000 UNITS: 5000 INJECTION INTRAVENOUS; SUBCUTANEOUS at 06:14

## 2024-06-30 RX ADMIN — CHLORHEXIDINE GLUCONATE 0.12% ORAL RINSE 15 ML: 1.2 LIQUID ORAL at 09:51

## 2024-06-30 RX ADMIN — PANTOPRAZOLE SODIUM 40 MG: 40 INJECTION, POWDER, FOR SOLUTION INTRAVENOUS at 09:50

## 2024-06-30 RX ADMIN — METOPROLOL TARTRATE 50 MG: 50 TABLET, FILM COATED ORAL at 09:50

## 2024-06-30 RX ADMIN — ACETAMINOPHEN 975 MG: 325 TABLET, FILM COATED ORAL at 13:46

## 2024-06-30 RX ADMIN — HEPARIN SODIUM 5000 UNITS: 5000 INJECTION INTRAVENOUS; SUBCUTANEOUS at 13:46

## 2024-06-30 RX ADMIN — METHOCARBAMOL 500 MG: 500 TABLET ORAL at 06:14

## 2024-06-30 RX ADMIN — AMIODARONE HYDROCHLORIDE 200 MG: 200 TABLET ORAL at 09:50

## 2024-06-30 RX ADMIN — ATORVASTATIN CALCIUM 80 MG: 80 TABLET, FILM COATED ORAL at 17:15

## 2024-06-30 RX ADMIN — METHOCARBAMOL 500 MG: 500 TABLET ORAL at 17:15

## 2024-06-30 RX ADMIN — POTASSIUM CHLORIDE 40 MEQ: 1500 TABLET, EXTENDED RELEASE ORAL at 09:50

## 2024-06-30 RX ADMIN — ASPIRIN 81 MG: 81 TABLET, COATED ORAL at 09:50

## 2024-06-30 RX ADMIN — INSULIN LISPRO 1 UNITS: 100 INJECTION, SOLUTION INTRAVENOUS; SUBCUTANEOUS at 17:15

## 2024-06-30 RX ADMIN — METOPROLOL TARTRATE 50 MG: 50 TABLET, FILM COATED ORAL at 22:03

## 2024-06-30 RX ADMIN — CLOPIDOGREL BISULFATE 75 MG: 75 TABLET ORAL at 09:50

## 2024-06-30 RX ADMIN — AMIODARONE HYDROCHLORIDE 200 MG: 200 TABLET ORAL at 17:15

## 2024-06-30 RX ADMIN — HEPARIN SODIUM 5000 UNITS: 5000 INJECTION INTRAVENOUS; SUBCUTANEOUS at 22:03

## 2024-06-30 RX ADMIN — ACETAMINOPHEN 975 MG: 325 TABLET, FILM COATED ORAL at 09:50

## 2024-06-30 RX ADMIN — AMIODARONE HYDROCHLORIDE 200 MG: 200 TABLET ORAL at 13:46

## 2024-06-30 RX ADMIN — CHLORHEXIDINE GLUCONATE 0.12% ORAL RINSE 15 ML: 1.2 LIQUID ORAL at 17:15

## 2024-06-30 NOTE — PROGRESS NOTES
Progress Note - Cardiothoracic Surgery   Shane Chau Sr. 78 y.o. male MRN: [id number]  Unit/Bed#: [facility] 405-01 Encounter: [id number]    Coronary artery disease. S/P coronary artery bypass grafting; POD # 10      24 Hour Events: KUB yesterday with signs of ileus, continues to be distended and tympanic, denies nausea, continues with diarrhea. NPO/NGT. K 3.9, Mag 2.7.    No further PVCs with increase in BB.     Pain controlled, Denies SOB, using IS (1100), using more frequently), weaned to RA. Ambulating with walker.     Medications:   Scheduled Meds:  Current Facility-Administered Medications   Medication Dose Route Frequency Provider Last Rate    acetaminophen  975 mg Oral Q6H While awake Moraima Alvarez PA-C      amiodarone  200 mg Oral TID With Meals Sandy Mckeon PA-C      aspirin  81 mg Oral Daily Moraima Alvarez PA-C      atorvastatin  80 mg Oral Daily With Dinner Moraima Alvarez PA-C      bisacodyl  10 mg Rectal Daily PRN Moraima Alvarez PA-C      chlorhexidine  15 mL Mouth/Throat BID Moraima Alvarez PA-C      clopidogrel  75 mg Oral Daily Moraima Alvarez PA-C      docusate sodium  100 mg Oral BID Moraima Alvarez PA-C      heparin (porcine)  5,000 Units Subcutaneous Q8H KRISTI Moraima Alvarez PA-C      hydrALAZINE  5 mg Intravenous Q6H PRN Moraima Alvarez PA-C      insulin lispro  1-5 Units Subcutaneous HS Moraima Alvarez PA-C      insulin lispro  1-6 Units Subcutaneous TID AC Moraima Alvarez PA-C      lidocaine  2 patch Topical Daily Moraima Alvarez PA-C      magnesium hydroxide  30 mL Oral Daily PRN Rahel Baig PA-C      melatonin  6 mg Oral HS Rahel Baig PA-C      methocarbamol  500 mg Oral Q6H KRISTI Moraima Alvarez PA-C      metoprolol tartrate  50 mg Oral Q12H KRISTI Sandy Mckeon PA-C      multi-electrolyte  50 mL/hr Intravenous Continuous Marilee Birmingham MD 50 mL/hr (06/29/24 1415)    ondansetron  4 mg Intravenous Q6H PRN Moraima Odom  KATIA Alvarez      pantoprazole  40 mg Intravenous Q24H [facility] hightower      polyethylene glycol  17 g Oral Daily Moraima Alvarez PA-C      polyethylene glycol  17 g Oral Daily PRN Loreta Santamaria PA-C      senna  1 tablet Oral HS Rahel Baig PA-C      senna  1 tablet Oral Once Sandy Mckeon PA-C      temazepam  15 mg Oral HS PRN Moraima Alvarez PA-C      trimethobenzamide  200 mg Intramuscular Q6H PRN Rudolph Gunter PA-C       Continuous Infusions:multi-electrolyte, 50 mL/hr, Last Rate: 50 mL/hr (06/29/24 1415)        PRN Meds:.  bisacodyl    hydrALAZINE    magnesium hydroxide    ondansetron    polyethylene glycol    temazepam    trimethobenzamide    Vitals:   Vitals:    06/29/24 2147 06/29/24 2208 06/30/24 0248 06/30/24 0600   BP: 108/62 106/53 116/63    Pulse:  69 70    Resp:  (!) 24     Temp:  97.8 °F (36.6 °C) 97.8 °F (36.6 °C)    TempSrc:       SpO2:  94% 93%    Weight:    81.1 kg (178 lb 12.7 oz)   Height:       Afebrile, HR 70s-80, systolics 100-130    Telemetry: NSR; Heart Rate: 68    Respiratory:   SpO2: SpO2: 93 %; RA (quit tobacco use in 80s)    Intake/Output:     Intake/Output Summary (Last 24 hours) at 6/30/2024 0642  Last data filed at 6/30/2024 0622  Gross per 24 hour   Intake 805.83 ml   Output 2050 ml   Net -1244.17 ml   UOP 2 L  Diarrhea x 2  Net -1.2  Since admit -8 L     Chest tube Output:  removed    Weights:   Weight (last 2 days)       Date/Time Weight    06/30/24 0600 81.1 (178.79)    06/29/24 0600 84 (185.1)    06/28/24 0550 83.2 (183.42)          Admit weight: 6/20 84 kg (down 3 kg)    Results:   Results from last 7 days   Lab Units 06/30/24  0500 06/26/24  0455 06/25/24  0459   WBC Thousand/uL 17.34* 10.03 10.82*   HEMOGLOBIN g/dL 10.3* 11.6* 11.2*   HEMATOCRIT % 31.2* 34.0* 32.2*   PLATELETS Thousands/uL 197 202 199     Results from last 7 days   Lab Units 06/30/24  0500 06/29/24  0512 06/28/24  1349 06/28/24  0546   SODIUM mmol/L 138 141  --  147    POTASSIUM mmol/L 3.9 4.0 3.3* 3.3*   CHLORIDE mmol/L 106 103  --  103   CO2 mmol/L 24 28  --  32   BUN mg/dL 50* 65*  --  83*   CREATININE mg/dL 1.16 1.42*  --  1.53*   CALCIUM mg/dL 7.1* 7.5*  --  7.9*   Mag- 2.7    No hx of CKD        Point of care glucose: 95 - 168 (no hx of DM)    Studies:  CXR: 6/25  NG tube in the stomach. There is moderate gaseous distention of the stomach.     CT a/p 6/25:   Gastric distention and associated proximal small bowel dilatation and stool like contents within the dilated loops of small bowel without discrete transition point identified. Findings suggest chronic ileus though chronic partial small bowel obstruction   not entirely excluded.     Juxtarenal abdominal aortic aneurysm measuring 4.5 x 4.2 cm. Suprarenal abdominal aorta is dilated measuring 3.7 cm.    KUB: 6/27  Contrast noted in the colon thus excluding a complete small bowel obstruction. The small bowel dilatation appears improved compared to most recent CT scan.     KUB 6/30 read pending- signs of ileus      I have personally reviewed pertinent reports.   and I have personally reviewed pertinent films in PACS    Invasive Lines/Tubes:  Invasive Devices       Peripheral Intravenous Line  Duration             Peripheral IV 06/28/24 Right Antecubital 1 day              Drain  Duration             External Urinary Catheter Small 1 day                  Physical Exam:    General: No acute distress and Normal appearance  HEENT/NECK:  Normocephalic. Atraumatic.  No jugular venous distention.    Cardiac: Regular rate and rhythm and No murmurs/rubs/gallops  Pulmonary:  Breath sounds clear bilaterally and No rales/rhonchi/wheezes  Abdomen:  Hypo-active bowel sounds, Distended, and appropriate tenderness to palpation, soft, no guarding or rigidity  Incisions: Sternum is stable.  Incision is clean, dry, and intact.  and Saphenectomy incison is clean, dry, and intact.   Extremities: Extremities warm/dry and No edema B/L  Neuro:  Alert and oriented X 3 and No focal deficits  Skin: Warm/Dry, without rashes or lesions.         Assessment:  Principal Problem:    Coronary artery disease of native artery of native heart with stable angina pectoris (HCC)  Active Problems:    Abdominal aortic aneurysm without rupture (HCC)    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HCC)    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HCC)    Asymptomatic bilateral carotid artery stenosis    Hyperlipidemia    Hypertension    S/P CABG x 3    BARBARA (acute kidney injury) (HCC)       Coronary artery disease. S/P coronary artery bypass grafting; POD # 10    Plan:    Cardiac:     Elective surgical admission  Normal ventricular systolic function, EF 60%    NSR; HR well-controlled, BP well-controlled    Continue Lopressor, 50mg PO BID     Hold ACE inhibitor/ARB secondary to renal dysfunction    Continue prophylactic PO Amiodarone     Continue ASA (81 mg) and Plavix for h/o TIAs (on PTA) and Statin therapy (asymptomatic b/l carotid artery stenosis)    Epicardial pacing wires have been removed    D/Cd Central IV access no longer required    Continue Subcutaneous Heparin for DVT prophylaxis    Pulmonary:     Good Room air oxygen saturation; Continue incentive spirometry/Coughing/Deep breathing exercises    Chest tubes have been discontinued    CXR 6/25- elevated L diaphragm, no effusion    Renal:     Postoperative BARBARA resolved, w/ Cr 1.16 from 1.42 1.53 from 1.76 from 1.95 from 2.22 (peak) Follow up daily BMP    Intake/Output net: -1.2 L/24 hours, (UOP- 2L/24hr, since admit -8 L), down from admit weight    Diuretic Regimen:   No further diuretics   K 3.9- continue to monitor w/ prior hypokalemia, replete > 4      Neuro:    Neurologically intact; No active issues   Melatonin for sleep    Incisional pain well controlled   Continue tylenol, 975 mg PO q 8, standing dose   Discontinue oxycodone, 2.5 to 5 mg PO q 4 hours prn pain   Continue topical Lidocaine patch to affected area   Continue methocarbamol, 500 mg  PO q 6 hours for pain/muscle spasm    GI:    Postop ileus vs. Chronic partial SBO- start clears w/ IVF @ 50, general surgery following, no additional imaging recomended    -monitor Mag    Continue stool softeners and prn suppository, increase bowel regimen    Continue GI prophylaxis    Endo:     Pre-Op Hgb A1C: 5.4    Serum glucose well controlled on insulin sliding scale coverage    7    Hematology:     Post-operative blood count acceptable; Trend prn  Leukocytosis; Afebrile with no signs of infection; Trend CBC prn      8.   Disposition:        Following daily PT/OT recommendations regarding home vs. rehab when medically cleared for discharge - recs for rehab, when GI issue resolves - accepted to [facility], needs auth    VTE Pharmacologic Prophylaxis: Heparin  VTE Mechanical Prophylaxis: sequential compression device    Collaborative rounds completed with supervising physician  Plan of care discussed with bedside nurse    SIGNATURE: Sandy Mckeon PA-C  DATE: June 30, 2024  TIME: 6:42 AM

## 2024-06-30 NOTE — PROGRESS NOTES
"Progress Note  Shane Chau Sr. 78 y.o. male MRN: [id number]  Unit/Bed#: [facility] 405-01 Encounter: [id number]    Assessment  78M s/p 6/20 CABG x3, postop ileus. Having bowel function     Plan  - adv diet to CLD, IVF  - IS, OOB/ambulate  - DVT ppx    Subjective/Objective   NAOE. Pain controlled. No n/v. Had x2 small BM, passing flatus. Voiding. OOB.    VSS on RA.  /63   Pulse 70   Temp 97.8 °F (36.6 °C)   Resp (!) 24   Ht 5' 5\" (1.651 m)   Wt 81.1 kg (178 lb 12.7 oz)   SpO2 93%   BMI 29.75 kg/m²     Physical Exam:  General: NAD  CV: nl rate  Lungs: nl wob. No resp distress.  ABD: Soft, mild-moderate distention, minimal upper tenderness  Extrem: No CCE    UOP: 2050cc    Recent Labs     06/29/24  0512 06/30/24  0500   WBC  --  17.34*   HGB  --  10.3*   PLT  --  197   SODIUM 141 138   K 4.0 3.9    106   CO2 28 24   BUN 65* 50*   CREATININE 1.42* 1.16   GLUC 113 93   CALCIUM 7.5* 7.1*   MG 2.9* 2.7   EGFR 46 59       "

## 2024-06-30 NOTE — PLAN OF CARE
Problem: OCCUPATIONAL THERAPY ADULT  Goal: Performs self-care activities at highest level of function for planned discharge setting.  See evaluation for individualized goals.  Description: Treatment Interventions: ADL retraining, Functional transfer training, Endurance training, Patient/family training, Equipment evaluation/education, Continued evaluation, Cardiac education, Energy conservation, Activityengagement          See flowsheet documentation for full assessment, interventions and recommendations.   Outcome: Progressing  Note: Limitation: Decreased ADL status, Decreased Safe judgement during ADL, Decreased endurance, Decreased self-care trans, Decreased high-level ADLs  Prognosis: Fair  Assessment: Pt seen for Occupational Therapy session with focus on activity tolerance functional transfers/mob, standing tolerance and standing tolerance and balance for pt engagement in UB/LB self-care tasks.  Pt cleared by JORGE ALBERTO/ Maura for pt participated in OT session. Pt presented sitting out of bed to bedside chair  and agreeable to participate in therapy following pt identifiers confirmed. Pt did not report a therapy goal this session however pt was pleasant and cooperative with all therapy requests. Pt required assist  LB dressing, bathing and toileting 2* decreased activity tolerance and decreased general strength. Pt required frequent rest breaks throughout session. Pt  tolerate session Pt remains appropriate for  I ([employer]) Pt set up to bedside chair post session, chair alarm activated and all needs within pt reach     Rehab Resource Intensity Level, OT: II (Moderate Resource Intensity)

## 2024-06-30 NOTE — OCCUPATIONAL THERAPY NOTE
"  Occupational Therapy Progress Note     Patient Name: Shane Chau Sr.  Today's Date: 6/30/2024  Problem List  Principal Problem:    Coronary artery disease of native artery of native heart with stable angina pectoris (HCC)  Active Problems:    Abdominal aortic aneurysm without rupture (HCC)    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HCC)    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HCC)    Asymptomatic bilateral carotid artery stenosis    Hyperlipidemia    Hypertension    S/P CABG x 3    BARBARA (acute kidney injury) (HCC)       Occupational Therapy Treatment Note     06/30/24 0849   OT Last Visit   OT Visit Date 06/30/24   Note Type   Note Type Treatment for insurance authorization   Pain Assessment   Pain Assessment Tool 0-10   Pain Score No Pain   Restrictions/Precautions   Weight Bearing Precautions Per Order No   Other Precautions Cardiac/sternal;Chair Alarm   Lifestyle   Autonomy Pta pt I with ADL, IADL and functional mobility, (+)    Reciprocal Relationships supportive spouse   Service to Others retired   Intrinsic Gratification enjoys time with family   ADL   Where Assessed Commode   Grooming Assistance 5  Supervision/Setup   UB Bathing Assistance 3  Moderate Assistance   LB Bathing Assistance 2  Maximal Assistance   UB Dressing Assistance 4  Minimal Assistance   LB Dressing Assistance 2  Maximal Assistance   Toileting Assistance  2  Maximal Assistance   Toileting Deficit Perineal hygiene   Toilet Transfers   Toilet Transfer From Rolling walker   Toilet Transfer Type To and from   Toilet Transfer to Standard toilet   Toilet Transfer Technique Ambulating   Toilet Transfers Moderate assistance   Subjective   Subjective \"This air feels so good. Can we stay here for 5 minutes?\"   Cognition   Overall Cognitive Status Impaired   Arousal/Participation Cooperative;Lethargic   Attention Attends with cues to redirect   Orientation Level Oriented X4   Memory Decreased short term memory;Decreased recall of recent events;Decreased " recall of precautions   Following Commands Follows one step commands with increased time or repetition   Activity Tolerance   Activity Tolerance Patient limited by fatigue   Assessment   Assessment Pt seen for Occupational Therapy session with focus on activity tolerance functional transfers/mob, standing tolerance and standing tolerance and balance for pt engagement in UB/LB self-care tasks.  Pt cleared by JORGE ALBERTO/ Maura for pt participated in OT session. Pt presented sitting out of bed to bedside chair  and agreeable to participate in therapy following pt identifiers confirmed. Pt did not report a therapy goal this session however pt was pleasant and cooperative with all therapy requests. Pt required assist  LB dressing, bathing and toileting 2* decreased activity tolerance and decreased general strength. Pt required frequent rest breaks throughout session. Pt  tolerate session Pt remains appropriate for  I ([employer]) Pt set up to bedside chair post session, chair alarm activated and all needs within pt reach   Plan   Treatment Interventions ADL retraining   Goal Expiration Date 07/05/24   OT Treatment Day 5   OT Frequency 2-3x/wk   Discharge Recommendation   Rehab Resource Intensity Level, OT II (Moderate Resource Intensity)   AM-PAC Daily Activity Inpatient   Lower Body Dressing 2   Bathing 2   Toileting 2   Upper Body Dressing 3   Grooming 3   Eating 4   Daily Activity Raw Score 16   Daily Activity Standardized Score (Calc for Raw Score >=11) 35.96   AM-PAC Applied Cognition Inpatient   Following a Speech/Presentation 3   Understanding Ordinary Conversation 3   Taking Medications 3   Remembering Where Things Are Placed or Put Away 2   Remembering List of 4-5 Errands 2   Taking Care of Complicated Tasks 2   Applied Cognition Raw Score 15   Applied Cognition Standardized Score 33.54   Barthel Index   Grooming Score 5   Dressing Score 5   Toilet Use Score 5   Transfers (Bed/Chair) Score 10   Mobility (Level  Surface) Score 10         Aisha BINGHAM/L

## 2024-06-30 NOTE — PROGRESS NOTES
"Progress Note  Shane Chau Sr. 78 y.o. male MRN: [id number]  Unit/Bed#: [facility] 405-01 Encounter: [id number]    Assessment  78M s/p 6/20 CABG x3, postop ileus. Having bowel function     Plan  - adv diet to lo-res, d/c IVF  - IS, OOB/ambulate  - DVT ppx    Subjective/Objective   NAOE. Pain controlled. No n/v. Had BM, passing flatus. Voiding. Walking.    VSS on RA.  /59   Pulse 64   Temp 98 °F (36.7 °C) (Oral)   Resp 18   Ht 5' 5\" (1.651 m)   Wt 83.6 kg (184 lb 3.2 oz)   SpO2 95%   BMI 30.65 kg/m²     Physical Exam:  General: NAD  CV: nl rate  Lungs: nl wob. No resp distress.  ABD: Soft, moderate distention, NT  Extrem: No CCE    UOP: 1330cc  Stool: x3    Recent Labs     06/30/24  0500 07/01/24  0500   WBC 17.34* 14.31*   HGB 10.3* 9.7*    213   SODIUM 138 133*   K 3.9 3.8    103   CO2 24 24   BUN 50* 32*   CREATININE 1.16 1.10   GLUC 93 93   CALCIUM 7.1* 6.8*   MG 2.7 2.3   EGFR 59 63       "

## 2024-06-30 NOTE — PLAN OF CARE
Problem: PHYSICAL THERAPY ADULT  Goal: Performs mobility at highest level of function for planned discharge setting.  See evaluation for individualized goals.  Description: Treatment/Interventions: Functional transfer training, LE strengthening/ROM, Elevations, Therapeutic exercise, Endurance training, Cognitive reorientation, Patient/family training, Equipment eval/education, Bed mobility, Gait training, Spoke to nursing, Spoke to case management, OT  Equipment Recommended: Walker (pt reports owning)       See flowsheet documentation for full assessment, interventions and recommendations.  Outcome: Progressing  Note: Prognosis: Good  Problem List: Decreased strength, Decreased endurance, Impaired balance, Decreased mobility, Decreased coordination, Pain  Assessment: Pt seen for PT treatment session this date. Upon initially standing pt became incontinent of bowel. Performed several STS transfers throughout today's session. Patient had poor pattie over throughout session requiring continued verbal cuing for proper hand placement. Patient required seated rest breaks during ambulation. Patient eager to reach distance he had set for himself however needed to rest several times. Increased time required for all transfers and mobility. Min A x1 with RW during ambulation due to poor reactive balance and poor awareness of surroundings. At end of session pt in bedside recliner all needs within reach and chair alarm activated. Patient would benefit from continued PT intervention. Recommend level I resource intensity.  Barriers to Discharge: Inaccessible home environment     Rehab Resource Intensity Level, PT: I (Maximum Resource Intensity)    See flowsheet documentation for full assessment.

## 2024-06-30 NOTE — PHYSICAL THERAPY NOTE
PHYSICAL THERAPY NOTE          Patient Name: Shane Chau Sr.  Today's Date: 6/30/2024 06/30/24 0850   PT Last Visit   PT Visit Date 06/30/24   Note Type   Note Type Treatment for insurance authorization   Pain Assessment   Pain Assessment Tool 0-10   Pain Score No Pain   Restrictions/Precautions   Weight Bearing Precautions Per Order No   Other Precautions Cardiac/sternal;Cognitive;Chair Alarm;Multiple lines;Hard of hearing   General   Chart Reviewed Yes   Response to Previous Treatment Patient with no complaints from previous session.   Family/Caregiver Present No   Cognition   Overall Cognitive Status Impaired   Orientation Level Oriented X4   Memory Decreased short term memory;Decreased recall of recent events;Decreased recall of precautions   Following Commands Follows one step commands with increased time or repetition   Bed Mobility   Additional Comments Pt found in bedside recliner, returned at end of session all needs within reach   Transfers   Sit to Stand 4  Minimal assistance   Additional items Assist x 1;Increased time required;Verbal cues   Stand to Sit 4  Minimal assistance   Additional items Assist x 1;Increased time required;Verbal cues   Toilet transfer 3  Moderate assistance   Additional items Assist x 1;Increased time required;Verbal cues   Additional Comments w/ RW, STS x 6   Ambulation/Elevation   Gait pattern Short stride;Antalgic;Step through pattern   Gait Assistance 4  Minimal assist   Additional items Assist x 1   Assistive Device Rolling walker   Distance 10'x 2 to and from bathroom due to bowel incontience, then 20', 40', 30', and 20'. Seated rest breaks in between + chair follow   Stair Management Assistance Not tested   Balance   Static Sitting Fair   Dynamic Sitting Poor +   Static Standing Poor +   Dynamic Standing Poor   Ambulatory Poor   Endurance Deficit   Endurance Deficit Yes    Endurance Deficit Description fatigue, weakness   Activity Tolerance   Activity Tolerance Patient limited by fatigue   Medical Staff Made Aware OT   Nurse Made Aware RN cleared   Assessment   Prognosis Good   Problem List Decreased strength;Decreased endurance;Impaired balance;Decreased mobility;Decreased coordination;Pain   Assessment Pt seen for PT treatment session this date. Upon initially standing pt became incontinent of bowel. Performed several STS transfers throughout today's session. Patient had poor pattie over throughout session requiring continued verbal cuing for proper hand placement. Patient required seated rest breaks during ambulation. Patient eager to reach distance he had set for himself however needed to rest several times. Increased time required for all transfers and mobility. Min A x1 with RW during ambulation due to poor reactive balance and poor awareness of surroundings. At end of session pt in bedside recliner all needs within reach and chair alarm activated. Patient would benefit from continued PT intervention. Recommend level I resource intensity.   Barriers to Discharge Inaccessible home environment   Goals   Patient Goals to walk   STG Expiration Date 07/05/24   PT Treatment Day 5   Plan   Treatment/Interventions Functional transfer training;LE strengthening/ROM;Therapeutic exercise;Endurance training;Patient/family training;Gait training;Bed mobility;Equipment eval/education;Elevations   Progress Slow progress, decreased activity tolerance   PT Frequency 4-6x/wk   Discharge Recommendation   Rehab Resource Intensity Level, PT I (Maximum Resource Intensity)   Equipment Recommended Walker  (pt owns)   AM-PAC Basic Mobility Inpatient   Turning in Flat Bed Without Bedrails 3   Lying on Back to Sitting on Edge of Flat Bed Without Bedrails 3   Moving Bed to Chair 3   Standing Up From Chair Using Arms 3   Walk in Room 3   Climb 3-5 Stairs With Railing 1   Basic Mobility Inpatient Raw Score 16    Basic Mobility Standardized Score 38.32   [facility] Highest Level Of Mobility   -HL Goal 5: Stand one or more mins   -HLM Achieved 7: Walk 25 feet or more   Education   Education Provided Home exercise program;Assistive device   Patient Demonstrates acceptance/verbal understanding         Carley Negron, PT

## 2024-07-01 LAB
ANION GAP SERPL CALCULATED.3IONS-SCNC: 6 MMOL/L (ref 4–13)
BASOPHILS # BLD AUTO: 0.02 THOUSANDS/ÂΜL (ref 0–0.1)
BASOPHILS NFR BLD AUTO: 0 % (ref 0–1)
BUN SERPL-MCNC: 32 MG/DL (ref 5–25)
CALCIUM SERPL-MCNC: 6.8 MG/DL (ref 8.4–10.2)
CHLORIDE SERPL-SCNC: 103 MMOL/L (ref 96–108)
CO2 SERPL-SCNC: 24 MMOL/L (ref 21–32)
CREAT SERPL-MCNC: 1.1 MG/DL (ref 0.6–1.3)
EOSINOPHIL # BLD AUTO: 0.43 THOUSAND/ÂΜL (ref 0–0.61)
EOSINOPHIL NFR BLD AUTO: 3 % (ref 0–6)
ERYTHROCYTE [DISTWIDTH] IN BLOOD BY AUTOMATED COUNT: 13.1 % (ref 11.6–15.1)
GFR SERPL CREATININE-BSD FRML MDRD: 63 ML/MIN/1.73SQ M
GLUCOSE SERPL-MCNC: 111 MG/DL (ref 65–140)
GLUCOSE SERPL-MCNC: 133 MG/DL (ref 65–140)
GLUCOSE SERPL-MCNC: 170 MG/DL (ref 65–140)
GLUCOSE SERPL-MCNC: 289 MG/DL (ref 65–140)
GLUCOSE SERPL-MCNC: 93 MG/DL (ref 65–140)
HCT VFR BLD AUTO: 29.5 % (ref 36.5–49.3)
HGB BLD-MCNC: 9.7 G/DL (ref 12–17)
IMM GRANULOCYTES # BLD AUTO: 0.2 THOUSAND/UL (ref 0–0.2)
IMM GRANULOCYTES NFR BLD AUTO: 1 % (ref 0–2)
LYMPHOCYTES # BLD AUTO: 1.36 THOUSANDS/ÂΜL (ref 0.6–4.47)
LYMPHOCYTES NFR BLD AUTO: 10 % (ref 14–44)
MAGNESIUM SERPL-MCNC: 2.3 MG/DL (ref 1.9–2.7)
MCH RBC QN AUTO: 32.7 PG (ref 26.8–34.3)
MCHC RBC AUTO-ENTMCNC: 32.9 G/DL (ref 31.4–37.4)
MCV RBC AUTO: 99 FL (ref 82–98)
MONOCYTES # BLD AUTO: 0.95 THOUSAND/ÂΜL (ref 0.17–1.22)
MONOCYTES NFR BLD AUTO: 7 % (ref 4–12)
NEUTROPHILS # BLD AUTO: 11.35 THOUSANDS/ÂΜL (ref 1.85–7.62)
NEUTS SEG NFR BLD AUTO: 79 % (ref 43–75)
NRBC BLD AUTO-RTO: 0 /100 WBCS
PLATELET # BLD AUTO: 213 THOUSANDS/UL (ref 149–390)
PMV BLD AUTO: 9.6 FL (ref 8.9–12.7)
POTASSIUM SERPL-SCNC: 3.8 MMOL/L (ref 3.5–5.3)
RBC # BLD AUTO: 2.97 MILLION/UL (ref 3.88–5.62)
SODIUM SERPL-SCNC: 133 MMOL/L (ref 135–147)
WBC # BLD AUTO: 14.31 THOUSAND/UL (ref 4.31–10.16)

## 2024-07-01 PROCEDURE — 99024 POSTOP FOLLOW-UP VISIT: CPT | Performed by: PHYSICIAN ASSISTANT

## 2024-07-01 PROCEDURE — 97116 GAIT TRAINING THERAPY: CPT

## 2024-07-01 PROCEDURE — 80048 BASIC METABOLIC PNL TOTAL CA: CPT | Performed by: PHYSICIAN ASSISTANT

## 2024-07-01 PROCEDURE — 83735 ASSAY OF MAGNESIUM: CPT | Performed by: PHYSICIAN ASSISTANT

## 2024-07-01 PROCEDURE — 97110 THERAPEUTIC EXERCISES: CPT

## 2024-07-01 PROCEDURE — 82948 REAGENT STRIP/BLOOD GLUCOSE: CPT

## 2024-07-01 PROCEDURE — 99231 SBSQ HOSP IP/OBS SF/LOW 25: CPT | Performed by: SURGERY

## 2024-07-01 PROCEDURE — 97530 THERAPEUTIC ACTIVITIES: CPT

## 2024-07-01 PROCEDURE — 85025 COMPLETE CBC W/AUTO DIFF WBC: CPT | Performed by: PHYSICIAN ASSISTANT

## 2024-07-01 RX ORDER — AMOXICILLIN 250 MG
1 CAPSULE ORAL 2 TIMES DAILY
Status: DISCONTINUED | OUTPATIENT
Start: 2024-07-01 | End: 2024-07-03

## 2024-07-01 RX ORDER — PANTOPRAZOLE SODIUM 40 MG/10ML
40 INJECTION, POWDER, LYOPHILIZED, FOR SOLUTION INTRAVENOUS
Status: DISCONTINUED | OUTPATIENT
Start: 2024-07-01 | End: 2024-07-01

## 2024-07-01 RX ORDER — PANTOPRAZOLE SODIUM 40 MG/1
40 TABLET, DELAYED RELEASE ORAL
Status: DISCONTINUED | OUTPATIENT
Start: 2024-07-02 | End: 2024-07-09 | Stop reason: HOSPADM

## 2024-07-01 RX ORDER — POTASSIUM CHLORIDE 20 MEQ/1
20 TABLET, EXTENDED RELEASE ORAL ONCE
Status: COMPLETED | OUTPATIENT
Start: 2024-07-01 | End: 2024-07-01

## 2024-07-01 RX ADMIN — METOPROLOL TARTRATE 50 MG: 50 TABLET, FILM COATED ORAL at 22:26

## 2024-07-01 RX ADMIN — ASPIRIN 81 MG: 81 TABLET, COATED ORAL at 08:47

## 2024-07-01 RX ADMIN — HEPARIN SODIUM 5000 UNITS: 5000 INJECTION INTRAVENOUS; SUBCUTANEOUS at 15:29

## 2024-07-01 RX ADMIN — SENNOSIDES AND DOCUSATE SODIUM 1 TABLET: 50; 8.6 TABLET ORAL at 08:47

## 2024-07-01 RX ADMIN — CHLORHEXIDINE GLUCONATE 0.12% ORAL RINSE 15 ML: 1.2 LIQUID ORAL at 08:48

## 2024-07-01 RX ADMIN — METHOCARBAMOL 500 MG: 500 TABLET ORAL at 18:15

## 2024-07-01 RX ADMIN — CLOPIDOGREL BISULFATE 75 MG: 75 TABLET ORAL at 08:47

## 2024-07-01 RX ADMIN — METHOCARBAMOL 500 MG: 500 TABLET ORAL at 06:19

## 2024-07-01 RX ADMIN — ACETAMINOPHEN 975 MG: 325 TABLET, FILM COATED ORAL at 08:47

## 2024-07-01 RX ADMIN — SODIUM CHLORIDE, SODIUM GLUCONATE, SODIUM ACETATE, POTASSIUM CHLORIDE, MAGNESIUM CHLORIDE, SODIUM PHOSPHATE, DIBASIC, AND POTASSIUM PHOSPHATE 50 ML/HR: .53; .5; .37; .037; .03; .012; .00082 INJECTION, SOLUTION INTRAVENOUS at 06:19

## 2024-07-01 RX ADMIN — HEPARIN SODIUM 5000 UNITS: 5000 INJECTION INTRAVENOUS; SUBCUTANEOUS at 06:19

## 2024-07-01 RX ADMIN — POLYETHYLENE GLYCOL 3350 17 G: 17 POWDER, FOR SOLUTION ORAL at 08:48

## 2024-07-01 RX ADMIN — AMIODARONE HYDROCHLORIDE 200 MG: 200 TABLET ORAL at 18:15

## 2024-07-01 RX ADMIN — HEPARIN SODIUM 5000 UNITS: 5000 INJECTION INTRAVENOUS; SUBCUTANEOUS at 22:26

## 2024-07-01 RX ADMIN — METOPROLOL TARTRATE 50 MG: 50 TABLET, FILM COATED ORAL at 08:47

## 2024-07-01 RX ADMIN — AMIODARONE HYDROCHLORIDE 200 MG: 200 TABLET ORAL at 08:47

## 2024-07-01 RX ADMIN — ATORVASTATIN CALCIUM 80 MG: 80 TABLET, FILM COATED ORAL at 18:18

## 2024-07-01 RX ADMIN — AMIODARONE HYDROCHLORIDE 200 MG: 200 TABLET ORAL at 12:00

## 2024-07-01 RX ADMIN — INSULIN LISPRO 4 UNITS: 100 INJECTION, SOLUTION INTRAVENOUS; SUBCUTANEOUS at 17:00

## 2024-07-01 RX ADMIN — METHOCARBAMOL 500 MG: 500 TABLET ORAL at 12:00

## 2024-07-01 RX ADMIN — INSULIN LISPRO 1 UNITS: 100 INJECTION, SOLUTION INTRAVENOUS; SUBCUTANEOUS at 06:19

## 2024-07-01 RX ADMIN — ACETAMINOPHEN 975 MG: 325 TABLET, FILM COATED ORAL at 15:29

## 2024-07-01 RX ADMIN — POTASSIUM CHLORIDE 20 MEQ: 1500 TABLET, EXTENDED RELEASE ORAL at 08:47

## 2024-07-01 RX ADMIN — Medication 6 MG: at 22:26

## 2024-07-01 RX ADMIN — SENNOSIDES AND DOCUSATE SODIUM 1 TABLET: 50; 8.6 TABLET ORAL at 18:15

## 2024-07-01 NOTE — PLAN OF CARE
Problem: PHYSICAL THERAPY ADULT  Goal: Performs mobility at highest level of function for planned discharge setting.  See evaluation for individualized goals.  Description: Treatment/Interventions: Functional transfer training, LE strengthening/ROM, Elevations, Therapeutic exercise, Endurance training, Cognitive reorientation, Patient/family training, Equipment eval/education, Bed mobility, Gait training, Spoke to nursing, Spoke to case management, OT  Equipment Recommended: Walker (pt reports owning)       See flowsheet documentation for full assessment, interventions and recommendations.  Outcome: Progressing  Note: Prognosis: Good  Problem List: Decreased strength, Decreased endurance, Impaired balance, Decreased mobility, Decreased safety awareness  Assessment: Pt continuing to make progressions in overall mobility status. Pt still continues to be limited by endurance and required frequent rest breaks throughout ambulation, However, pt was able to ambulate further today. Pt continuing to make improvements in balance as well with decreased support needed for transfers and ambulation. Overall, Pt will continue to benefit from PT services until medically cleared for D/C; Will continue to follow.  Barriers to Discharge: Inaccessible home environment     Rehab Resource Intensity Level, PT: I (Maximum Resource Intensity)    See flowsheet documentation for full assessment.

## 2024-07-01 NOTE — RESTORATIVE TECHNICIAN NOTE
Restorative Technician Note      Patient Name: Shane ACUNA Kandi Schwartz.     Restorative Tech Visit Date: 07/01/24  Note Type: Mobility  Patient Position Upon Consult: Bedside chair  Activity Performed: Stood; Other (Comment) (to be cleaned)  Assistive Device: Roller walker  Patient Position at End of Consult: Bed/Chair alarm activated; All needs within reach; Bedside chair

## 2024-07-01 NOTE — PHYSICAL THERAPY NOTE
Physical Therapy Treatment     Patient's Name: Shane Chau Sr.    Admitting Diagnosis  Coronary artery disease of native artery of native heart with stable angina pectoris (formerly [facility]) [I25.118]    Problem List  Patient Active Problem List   Diagnosis    Closed compression fracture of first lumbar vertebra (HCC)    Abdominal aortic aneurysm without rupture (HCC)    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HCC)    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HCC)    Asymptomatic bilateral carotid artery stenosis    Coronary artery disease of native artery of native heart with stable angina pectoris (HCC)    Hyperlipidemia    Hypertension    Transient cerebral ischemic attack    Need for pneumococcal vaccination    Hematuria    Microhematuria    Erectile dysfunction    Leg swelling    Paresthesia    Rib pain    S/P CABG x 3    Localized primary osteoarthritis of lower leg, unspecified laterality    BARBARA (acute kidney injury) (formerly [facility])       Past Medical History  Past Medical History:   Diagnosis Date    AAA (abdominal aortic aneurysm) (formerly [facility])     s/p open repair    BPH (benign prostatic hyperplasia)     CAD (coronary artery disease)     Cholelithiasis     Former tobacco use     History of MI (myocardial infarction)     History of TIA (transient ischemic attack)     Hyperlipidemia     Hypertension     Insomnia     Obesity        Past Surgical History  Past Surgical History:   Procedure Laterality Date    ABDOMINAL AORTIC ANEURYSM REPAIR  06/07/2011    Dr. Yarbrough 6/7/11    APPENDECTOMY      CARDIAC CATHETERIZATION Left 06/03/2024    Procedure: Cardiac Left Heart Cath;  Surgeon: Pk Blanco DO;  [facility];  Service: Cardiology    CARDIAC CATHETERIZATION  06/03/2024    Procedure: Cardiac catheterization;  Surgeon: Pk Blanco DO;  [facility];  Service: Cardiology    CARDIAC CATHETERIZATION N/A 06/03/2024    Procedure: Cardiac Coronary Angiogram;  Surgeon: Pk Blanco DO;  Location:  [facility];  Service: Cardiology    CATARACT EXTRACTION Right 11/2018    CHOLECYSTECTOMY LAPAROSCOPIC      COLONOSCOPY  11/2006    CORONARY ANGIOPLASTY      3 STENTS INSERTED    HARVEST VEIN Left 6/20/2024    Procedure: HARVEST VEIN ENDOSCOPIC ([facility]);  Surgeon: KALPANA Amos MD;  Location: [facility];  Service: Cardiac Surgery    MI CORONARY ARTERY BYP W/VEIN & ARTERY GRAFT 3 VEIN N/A 6/20/2024    Procedure: CORONARY ARTERY BYPASS GRAFT (CABG) X3 VESSELS, LIMA - LAD, LEFT LEG EVH/SVG - PDA AND OM1, W/ DEDRICK;  Surgeon: KALPANA Amos MD;  Location: [facility];  Service: Cardiac Surgery    MI LAPS SURG CHOLECYSTECTOMY W/CHOLANGIOGRAPHY N/A 09/08/2016    Procedure: CHOLECYSTECTOMY LAPAROSCOPIC with intra-op cholangiogram ;  Surgeon: Prasanth Verdin MD;  Location: [facility];  Service: General          07/01/24 1720   PT Last Visit   PT Visit Date 07/01/24   Note Type   Note Type Treatment   Pain Assessment   Pain Assessment Tool 0-10   Pain Score No Pain   Restrictions/Precautions   Weight Bearing Precautions Per Order No   Other Precautions Cardiac/sternal;Chair Alarm;Multiple lines;Hard of hearing  (Simultaneous filing. User may not have seen previous data.)   General   Chart Reviewed Yes   Additional Pertinent History Cleared for PT treatment session by nsg.   Response to Previous Treatment Patient with no complaints from previous session.   Family/Caregiver Present No   Cognition   Overall Cognitive Status WFL   Arousal/Participation Alert   Attention Attends with cues to redirect   Orientation Level Oriented to person;Oriented to place;Oriented to situation   Memory Decreased recall of precautions   Following Commands Follows one step commands without difficulty   Subjective   Subjective Pt alert; seated in chair; agreeable to mobilize with PT.   Transfers   Sit to Stand 5  Supervision   Additional items Increased time required;Verbal cues   Stand to Sit 5  Supervision   Additional items  Increased time required;Verbal cues   Ambulation/Elevation   Gait pattern Wide NELLIE;Decreased foot clearance;Inconsistent keenan;Short stride;Excessively slow   Gait Assistance 4  Minimal assist   Additional items Assist x 1;Verbal cues   Assistive Device Rolling walker   Distance 30ft + 40ft + 30ft + 70ft w/ seated rest breaks in between.   Stair Management Assistance Not tested   Balance   Static Sitting Fair +   Dynamic Sitting Fair   Static Standing Fair -   Dynamic Standing Poor +   Ambulatory Poor +   Activity Tolerance   Activity Tolerance Patient limited by fatigue   Nurse Made Aware Spoke w/ JORGE ALBERTO Meyer  (Simultaneous filing. User may not have seen previous data.)   Exercises   Knee AROM Long Arc Quad Sitting;20 reps;AROM;Bilateral   Ankle Pumps Sitting;20 reps;AROM;Bilateral   Marching    Balance Training Sitting;20 reps;AROM;Bilateral    Standing balance/tolerance; x1 minute during pericare post-residual stool noted    Assessment   Prognosis Good   Problem List Decreased strength;Decreased endurance;Impaired balance;Decreased mobility;Decreased safety awareness   Assessment Pt continuing to make progressions in overall mobility status. Pt still continues to be limited by endurance and required frequent rest breaks throughout ambulation, However, pt was able to ambulate further today. Pt continuing to make improvements in balance as well with decreased support needed for transfers and ambulation. Overall, Pt will continue to benefit from PT services until medically cleared for D/C; Will continue to follow.   Barriers to Discharge Inaccessible home environment   Goals   Patient Goals To get better.   STG Expiration Date 07/05/24   PT Treatment Day 6   Plan   Treatment/Interventions Functional transfer training;LE strengthening/ROM;Therapeutic exercise;Endurance training;Equipment eval/education;Bed mobility;Gait training;Spoke to case management;Spoke to nursing   Progress Progressing toward goals   PT  Frequency 4-6x/wk   Discharge Recommendation   Rehab Resource Intensity Level, PT I (Maximum Resource Intensity)   AM-PAC Basic Mobility Inpatient   Turning in Flat Bed Without Bedrails 3   Lying on Back to Sitting on Edge of Flat Bed Without Bedrails 3   Moving Bed to Chair 3   Standing Up From Chair Using Arms 3   Walk in Room 3   Climb 3-5 Stairs With Railing 1  (Simultaneous filing. User may not have seen previous data.)   Basic Mobility Inpatient Raw Score 16   Basic Mobility Standardized Score 38.32   [facility] Highest Level Of Mobility   -HLM Goal 5: Stand one or more mins   -HLM Achieved 7: Walk 25 feet or more   Education   Education Provided Mobility training;Home exercise program;Assistive device   Patient Demonstrates acceptance/verbal understanding   End of Consult   Patient Position at End of Consult Bedside chair;Bed/Chair alarm activated;All needs within reach           Staci Asencio

## 2024-07-01 NOTE — RESTORATIVE TECHNICIAN NOTE
Restorative Technician Note      Patient Name: Shane ACUNA Kandi Schwartz.     Restorative Tech Visit Date: 07/01/24  Note Type: Mobility  Patient Position Upon Consult: Bedside chair  Activity Performed: Ambulated  Assistive Device: Roller walker  Patient Position at End of Consult: Bed/Chair alarm activated; All needs within reach; Bedside chair

## 2024-07-01 NOTE — PROGRESS NOTES
Progress Note - Cardiothoracic Surgery   Shane Chau Sr. 78 y.o. male MRN: [id number]  Unit/Bed#: [facility] 405-01 Encounter: [id number]    Coronary artery disease. S/P coronary artery bypass grafting; POD # 11    24 Hour Events: had clear liquid for breakfast, did well, no nausea thereafter  No pain this morning  Ambulated with PT  BM x 3 since yesterday    Medications:   Scheduled Meds:  Current Facility-Administered Medications   Medication Dose Route Frequency Provider Last Rate    acetaminophen  975 mg Oral Q6H While awake Moraima Alvarez PA-C      amiodarone  200 mg Oral TID With Meals Sandy Mckeon PA-C      aspirin  81 mg Oral Daily Moraima Alvarez PA-C      atorvastatin  80 mg Oral Daily With Dinner Moraima Alvarez PA-C      bisacodyl  10 mg Rectal Daily PRN Moraima Alvarez PA-C      chlorhexidine  15 mL Mouth/Throat BID Moraima Alvarez PA-C      clopidogrel  75 mg Oral Daily Moraima Alvarez PA-C      docusate sodium  100 mg Oral BID Moraima Alvarez PA-C      heparin (porcine)  5,000 Units Subcutaneous Q8H KRISTI Moraima Alvarez PA-C      hydrALAZINE  5 mg Intravenous Q6H PRN Moraima Alvarez PA-C      insulin lispro  1-5 Units Subcutaneous HS Moraima Alvarez PA-C      insulin lispro  1-6 Units Subcutaneous TID AC Moraima Alvarez PA-C      lidocaine  2 patch Topical Daily Moraima Alvarez PA-C      magnesium hydroxide  30 mL Oral Daily PRN Rahel Baig PA-C      melatonin  6 mg Oral HS Rahel Baig PA-C      methocarbamol  500 mg Oral Q6H KRISTI Moraima Alvarez PA-C      metoprolol tartrate  50 mg Oral Q12H KRISTI Sandy Mckeon PA-C      multi-electrolyte  50 mL/hr Intravenous Continuous Marilee Birmingham MD 50 mL/hr (07/01/24 0619)    ondansetron  4 mg Intravenous Q6H PRN Moraima Alvarez PA-C      [START ON 7/2/2024] pantoprazole  40 mg Oral Early Morning KALPANA Amos MD      pantoprazole  40 mg Intravenous Q24H KRISTI Amos,  MD      polyethylene glycol  17 g Oral Daily Moraima Alvarez PA-C      polyethylene glycol  17 g Oral Daily PRN Loreta Santamaria PA-C      senna  1 tablet Oral HS Rahel Baig PA-C      senna  1 tablet Oral Once Sandy Mckeon PA-C      temazepam  15 mg Oral HS PRN Moraima Alvarez PA-C      trimethobenzamide  200 mg Intramuscular Q6H PRN Rudolph Gunter PA-C       Continuous Infusions:multi-electrolyte, 50 mL/hr, Last Rate: 50 mL/hr (07/01/24 0619)      PRN Meds:.  bisacodyl    hydrALAZINE    magnesium hydroxide    ondansetron    polyethylene glycol    temazepam    trimethobenzamide    Vitals:   Vitals:    07/01/24 0234 07/01/24 0540 07/01/24 0654 07/01/24 0700   BP: 115/59  111/66    BP Location:       Pulse: 64  72    Resp:   18 19   Temp:    97.7 °F (36.5 °C)   TempSrc:    Oral   SpO2: 95%  96%    Weight:  83.6 kg (184 lb 3.2 oz)     Height:           Telemetry: NSR; Heart Rate: 71    Respiratory:   SpO2: SpO2: 96 %; Room Air    Intake/Output:     Intake/Output Summary (Last 24 hours) at 7/1/2024 0742  Last data filed at 7/1/2024 0619  Gross per 24 hour   Intake 1792 ml   Output 2130 ml   Net -338 ml        Weights:   Weight (last 2 days)       Date/Time Weight    07/01/24 0540 83.6 (184.2)    06/30/24 0600 81.1 (178.79)    06/29/24 0600 84 (185.1)          Results:   Results from last 7 days   Lab Units 07/01/24  0500 06/30/24  0500 06/26/24  0455   WBC Thousand/uL 14.31* 17.34* 10.03   HEMOGLOBIN g/dL 9.7* 10.3* 11.6*   HEMATOCRIT % 29.5* 31.2* 34.0*   PLATELETS Thousands/uL 213 197 202     Results from last 7 days   Lab Units 07/01/24  0500 06/30/24  0500 06/29/24  0512   SODIUM mmol/L 133* 138 141   POTASSIUM mmol/L 3.8 3.9 4.0   CHLORIDE mmol/L 103 106 103   CO2 mmol/L 24 24 28   BUN mg/dL 32* 50* 65*   CREATININE mg/dL 1.10 1.16 1.42*   CALCIUM mg/dL 6.8* 7.1* 7.5*       Point of care glucose:     Invasive Lines/Tubes:  Invasive Devices       Peripheral Intravenous Line  Duration              Peripheral IV 06/28/24 Right Antecubital 2 days              Drain  Duration             External Urinary Catheter Small 2 days                    Physical Exam:    General: No acute distress, Alert, and Normal appearance  HEENT/NECK:  Normocephalic. Atraumatic.  No jugular venous distention.    Cardiac: Regular rate and rhythm  Pulmonary:  Breath sounds clear bilaterally  Abdomen:   soft, nontender, distended with good bowel sounds  Incisions: Sternum is stable.  Incision is clean, dry, and intact.  and Saphenectomy incison is clean, dry, and intact.   Extremities: Extremities warm/dry and Trace edema B/L  Neuro: Alert and oriented X 3  Skin: Warm/Dry, without rashes or lesions.    Assessment:  Principal Problem:    S/P CABG x 3  Active Problems:    Abdominal aortic aneurysm without rupture ([facility])    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[facility])    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[facility])    Asymptomatic bilateral carotid artery stenosis    Coronary artery disease of native artery of native heart with stable angina pectoris ([facility])    Hyperlipidemia    Hypertension    BARBARA (acute kidney injury) ([facility])       Coronary artery disease. S/P coronary artery bypass grafting; POD # 11    Plan:    Cardiac:   LVEF 60%  NSR; HR well-controlled  BP well-controlled  On metoprolol 50 mg bid   Continue prophylactic Amiodarone, 200 mg PO TID  Plavix for hx of TIA (on preoperatively)  Continue ASA and Statin therapy    Epicardial pacing wires have been removed    Patient no longer has central IV access     Continue Subcutaneous Heparin for DVT prophylaxis    Pulmonary:     Good Room air oxygen saturation; Continue incentive spirometry/Coughing/Deep breathing exercises    Chest tubes have been discontinued    Renal:     Post op Creatinine stable; Follow up labs prn     Intake/Output net: -338 mL/24 hours  No diuretics with recent ileus, ivf recently d/c by general surgery      Neuro:    Neurologically intact; No active issues       GI: postop  ileus resolved, having BM   Diet advanced by general surgery  Ivf d/c'd     Continue stool softeners and prn suppository    Continue GI prophylaxis    Endo: on SSI     7    Hematology:     Post-operative acute blood loss anemia; Hemoglobin 9.7; trend prn  WBC downtrending    8.   Disposition:        Anticipated discharge date: once tolerating diet        VTE Pharmacologic Prophylaxis: Heparin  VTE Mechanical Prophylaxis: sequential compression device    Collaborative rounds completed with supervising physician  Plan of care discussed with bedside nurse    SIGNATURE: Miranda Mijares PA-C  DATE: July 1, 2024  TIME: 7:42 AM

## 2024-07-02 LAB
GLUCOSE SERPL-MCNC: 124 MG/DL (ref 65–140)
GLUCOSE SERPL-MCNC: 131 MG/DL (ref 65–140)
GLUCOSE SERPL-MCNC: 141 MG/DL (ref 65–140)
GLUCOSE SERPL-MCNC: 90 MG/DL (ref 65–140)
MAGNESIUM SERPL-MCNC: 2.2 MG/DL (ref 1.9–2.7)
SARS-COV-2 RNA RESP QL NAA+PROBE: NEGATIVE

## 2024-07-02 PROCEDURE — 97530 THERAPEUTIC ACTIVITIES: CPT

## 2024-07-02 PROCEDURE — 83735 ASSAY OF MAGNESIUM: CPT | Performed by: PHYSICIAN ASSISTANT

## 2024-07-02 PROCEDURE — 97535 SELF CARE MNGMENT TRAINING: CPT

## 2024-07-02 PROCEDURE — 99231 SBSQ HOSP IP/OBS SF/LOW 25: CPT | Performed by: SURGERY

## 2024-07-02 PROCEDURE — 87635 SARS-COV-2 COVID-19 AMP PRB: CPT | Performed by: PHYSICIAN ASSISTANT

## 2024-07-02 PROCEDURE — 97116 GAIT TRAINING THERAPY: CPT

## 2024-07-02 PROCEDURE — 82948 REAGENT STRIP/BLOOD GLUCOSE: CPT

## 2024-07-02 PROCEDURE — 97110 THERAPEUTIC EXERCISES: CPT

## 2024-07-02 PROCEDURE — 99024 POSTOP FOLLOW-UP VISIT: CPT | Performed by: PHYSICIAN ASSISTANT

## 2024-07-02 RX ORDER — METHOCARBAMOL 500 MG/1
500 TABLET, FILM COATED ORAL EVERY 6 HOURS PRN
Status: DISCONTINUED | OUTPATIENT
Start: 2024-07-02 | End: 2024-07-09 | Stop reason: HOSPADM

## 2024-07-02 RX ORDER — BISACODYL 10 MG
10 SUPPOSITORY, RECTAL RECTAL DAILY PRN
Status: DISCONTINUED | OUTPATIENT
Start: 2024-07-02 | End: 2024-07-09 | Stop reason: HOSPADM

## 2024-07-02 RX ADMIN — SENNOSIDES AND DOCUSATE SODIUM 1 TABLET: 50; 8.6 TABLET ORAL at 17:12

## 2024-07-02 RX ADMIN — CHLORHEXIDINE GLUCONATE 0.12% ORAL RINSE 15 ML: 1.2 LIQUID ORAL at 17:14

## 2024-07-02 RX ADMIN — HEPARIN SODIUM 5000 UNITS: 5000 INJECTION INTRAVENOUS; SUBCUTANEOUS at 21:26

## 2024-07-02 RX ADMIN — PANTOPRAZOLE SODIUM 40 MG: 40 TABLET, DELAYED RELEASE ORAL at 06:47

## 2024-07-02 RX ADMIN — POLYETHYLENE GLYCOL 3350 17 G: 17 POWDER, FOR SOLUTION ORAL at 09:39

## 2024-07-02 RX ADMIN — CLOPIDOGREL BISULFATE 75 MG: 75 TABLET ORAL at 09:39

## 2024-07-02 RX ADMIN — CHLORHEXIDINE GLUCONATE 0.12% ORAL RINSE 15 ML: 1.2 LIQUID ORAL at 09:39

## 2024-07-02 RX ADMIN — METOPROLOL TARTRATE 50 MG: 50 TABLET, FILM COATED ORAL at 09:39

## 2024-07-02 RX ADMIN — METHOCARBAMOL 500 MG: 500 TABLET ORAL at 02:56

## 2024-07-02 RX ADMIN — HEPARIN SODIUM 5000 UNITS: 5000 INJECTION INTRAVENOUS; SUBCUTANEOUS at 06:47

## 2024-07-02 RX ADMIN — ACETAMINOPHEN 975 MG: 325 TABLET, FILM COATED ORAL at 14:03

## 2024-07-02 RX ADMIN — ASPIRIN 81 MG: 81 TABLET, COATED ORAL at 09:39

## 2024-07-02 RX ADMIN — SENNOSIDES AND DOCUSATE SODIUM 1 TABLET: 50; 8.6 TABLET ORAL at 09:39

## 2024-07-02 RX ADMIN — Medication 6 MG: at 21:26

## 2024-07-02 RX ADMIN — ACETAMINOPHEN 975 MG: 325 TABLET, FILM COATED ORAL at 07:57

## 2024-07-02 RX ADMIN — ATORVASTATIN CALCIUM 80 MG: 80 TABLET, FILM COATED ORAL at 17:12

## 2024-07-02 RX ADMIN — HEPARIN SODIUM 5000 UNITS: 5000 INJECTION INTRAVENOUS; SUBCUTANEOUS at 14:03

## 2024-07-02 RX ADMIN — ACETAMINOPHEN 975 MG: 325 TABLET, FILM COATED ORAL at 20:05

## 2024-07-02 RX ADMIN — METOPROLOL TARTRATE 50 MG: 50 TABLET, FILM COATED ORAL at 20:06

## 2024-07-02 NOTE — CASE MANAGEMENT
Case Management Discharge Planning Note    Patient name Shane Chau .  Location [facility] 405/[facility] 405-01 MRN [id number]  : 1945 Date 2024       Current Admission Date: 2024  Current Admission Diagnosis:S/P CABG x 3   Patient Active Problem List    Diagnosis Date Noted Date Diagnosed    BARBARA (acute kidney injury) (HCC) 2024     S/P CABG x 3 2024     Rib pain 2023     Paresthesia 2023     Leg swelling 05/10/2022     Erectile dysfunction 2022     Microhematuria 2018     Need for pneumococcal vaccination 2018     Hematuria 2018     Asymptomatic bilateral carotid artery stenosis 2017     Closed compression fracture of first lumbar vertebra ([facility]) 2017     Abdominal aortic aneurysm without rupture ([facility]) 2016     Localized primary osteoarthritis of lower leg, unspecified laterality 2013     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[facility]) 2013     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[facility]) 2013     Transient cerebral ischemic attack 2013     Coronary artery disease of native artery of native heart with stable angina pectoris ([facility]) 2012     Hyperlipidemia 2012     Hypertension 2012       LOS (days): 12  Geometric Mean LOS (GMLOS) (days): 8.2  Days to GMLOS:-4     OBJECTIVE:  Risk of Unplanned Readmission Score: 15.8         Current admission status: Inpatient   Preferred Pharmacy:   [facility] - [place] - [address]  [address] [facility] [address]  Phone: [phone] Fax: [phone]    [facility]  [place], [place] - [address]  [place] [address]  Phone: [phone] Fax: [phone]    [facility]/pharmacy #[id number] - [place], [place] - [address]  [address] [address]  Phone: [phone] Fax: [phone]    [facility] - [place] - [address]  [address]  [facility]  [address]  Phone: [phone] Fax: [phone]    Primary Care Provider: Sherwin Mantilla MD    Primary Insurance: [facility] REP  Secondary Insurance:     DISCHARGE DETAILS:                                                                                                               Facility Insurance Auth Number: [id number]

## 2024-07-02 NOTE — CASE MANAGEMENT
[employer] has received APPROVED authorization.  Insurance:   miriam   Called in by Rep:ege HOLLAND#  [phone]  Authorization received for: SNF  Facility: [facility]    Authorization #:[id number]    Start of Care:7/2  Next Review Date:7/5   Submit next review to: alyse or fax [phone]     Care Manager notified:catie hunt     Please reach out to CM for updates on any clinical information.

## 2024-07-02 NOTE — CASE MANAGEMENT
Case Management Discharge Planning Note    Patient name Shane Chau .  Location [facility] 405/[facility] 405-01 MRN [id number]  : 1945 Date 2024       Current Admission Date: 2024  Current Admission Diagnosis:S/P CABG x 3   Patient Active Problem List    Diagnosis Date Noted Date Diagnosed    BARBARA (acute kidney injury) (HCC) 2024     S/P CABG x 3 2024     Rib pain 2023     Paresthesia 2023     Leg swelling 05/10/2022     Erectile dysfunction 2022     Microhematuria 2018     Need for pneumococcal vaccination 2018     Hematuria 2018     Asymptomatic bilateral carotid artery stenosis 2017     Closed compression fracture of first lumbar vertebra ([facility]) 2017     Abdominal aortic aneurysm without rupture ([facility]) 2016     Localized primary osteoarthritis of lower leg, unspecified laterality 2013     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[facility]) 2013     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[facility]) 2013     Transient cerebral ischemic attack 2013     Coronary artery disease of native artery of native heart with stable angina pectoris ([facility]) 2012     Hyperlipidemia 2012     Hypertension 2012       LOS (days): 12  Geometric Mean LOS (GMLOS) (days): 8.2  Days to GMLOS:-4.1     OBJECTIVE:  Risk of Unplanned Readmission Score: 15.8         Current admission status: Inpatient   Preferred Pharmacy:   [facility] - [place] - [address]  [address] [facility] [place] [address]  Phone: [phone] Fax: [phone]    [facility] - [place] - [address]  [place] [address]  Phone: [phone] Fax: [phone]    [facility]/pharmacy #[id number] - [place], [place] - [address]  [address] [address]  Phone: [phone] Fax: [phone]    [facility] - [place] - [address]  [address]  [facility]  [address]  Phone: [phone] Fax: [phone]    Primary Care Provider: Sherwin Mantilla MD    Primary Insurance: [email or web address]  Secondary Insurance:     DISCHARGE DETAILS:                                                                                                 Additional Comments: Covid neg. Auth received for [facility], facility requested for 7/3/24 am. Requested BLS for transport at 1000. No auth needed.  Awaiting time.    Accepting Facility Name, City & State : [facility] rehab     Facility/Agency Fax Number: [phone]

## 2024-07-02 NOTE — PHYSICAL THERAPY NOTE
PHYSICAL THERAPY NOTE          Patient Name: Shane Chau Sr.  Today's Date: 7/2/2024 07/02/24 1142   PT Last Visit   PT Visit Date 07/02/24   Note Type   Note Type Treatment   Pain Assessment   Pain Assessment Tool 0-10   Pain Score No Pain   Restrictions/Precautions   Other Precautions Cardiac/sternal;Telemetry;Hard of hearing;Chair Alarm   General   Chart Reviewed Yes   Additional Pertinent History cleared for Tx session by nsg   Response to Previous Treatment Patient with no complaints from previous session.   Cognition   Overall Cognitive Status WFL   Arousal/Participation Alert;Cooperative   Attention Within functional limits   Orientation Level Oriented to person;Oriented to place;Oriented to situation   Memory Decreased recall of precautions   Following Commands Follows one step commands with increased time or repetition   Subjective   Subjective Alert; in the chair; agreeable to mobilize   Transfers   Sit to Stand 5  Supervision   Additional items Verbal cues   Stand to Sit 4  Minimal assistance   Additional items Assist x 1;Verbal cues   Ambulation/Elevation   Gait pattern Short stride;Excessively slow   Gait Assistance 4  Minimal assist   Additional items Assist x 1;Verbal cues;Tactile cues   Assistive Device Rolling walker   Distance 60 ft + 70 ft + 60 ft w/ extended seated rest periods in between   Stair Management Assistance Not tested   Balance   Static Sitting Fair +   Dynamic Sitting Fair   Static Standing Fair -   Dynamic Standing Poor +   Ambulatory Poor +   Activity Tolerance   Activity Tolerance Patient limited by fatigue   Nurse Made Aware spoke to JORGE ALBERTO Meyer   Exercises   Hip Abduction Sitting;15 reps;AAROM;Bilateral   Knee AROM Long Arc Quad Sitting;15 reps;AROM;Bilateral   Ankle Pumps Sitting;15 reps;AROM;Bilateral   Marching Sitting;10 reps;AROM;Bilateral   Assessment   Prognosis Good   Problem  List Decreased strength;Decreased endurance;Impaired balance;Decreased mobility   Assessment Pt cont to gradually improve in functional mobility skills ambulating further distances overall w/ close guarding/min (A) provided; pt still remains to be generally weak and deconditioned w/ decreased functional endurance likely affecting his mobility skills --> will cont to follow to address; D/C recommendations are listed below; will follow   Barriers to Discharge Inaccessible home environment   Goals   Patient Goals to cont getting better   STG Expiration Date 07/05/24   PT Treatment Day 7   Plan   Treatment/Interventions Functional transfer training;LE strengthening/ROM;Elevations;Endurance training;Therapeutic exercise;Bed mobility;Gait training;Spoke to nursing;Spoke to case management   Progress Progressing toward goals   PT Frequency 4-6x/wk   Discharge Recommendation   Rehab Resource Intensity Level, PT I (Maximum Resource Intensity)   Equipment Recommended Walker   AM-PAC Basic Mobility Inpatient   Turning in Flat Bed Without Bedrails 3   Lying on Back to Sitting on Edge of Flat Bed Without Bedrails 3   Moving Bed to Chair 3   Standing Up From Chair Using Arms 3   Walk in Room 3   Climb 3-5 Stairs With Railing 2   Basic Mobility Inpatient Raw Score 17   Basic Mobility Standardized Score 39.67   [facility] Highest Level Of Mobility   -HL Goal 5: Stand one or more mins   -HLM Achieved 7: Walk 25 feet or more   Education   Education Provided Mobility training;Home exercise program;Assistive device   Patient Demonstrates verbal understanding;Reinforcement needed   End of Consult   Patient Position at End of Consult Bedside chair;Bed/Chair alarm activated;All needs within reach     Bradly Howell

## 2024-07-02 NOTE — PROGRESS NOTES
"Progress Note  Shane Chau Sr. 78 y.o. male MRN: [id number]  Unit/Bed#: [facility] 405-01 Encounter: [id number]    Assessment  78M s/p 6/20 CABG x3, postop ileus.  Having bowel function         Plan  Monitor strict I/O  Diet as tolerated  Continue OOB ambulate  Rest per primary        Subjective/Objective   Patient seen and examined bedside.  No overnight events.  Large bowel movement while getting on scale for daily weight today. Multiple additional BM yesterday.  Tolerated regular diet yesterday.  Denies feeling bloated.  No nausea or emesis.        Vitals:    07/02/24 0714   BP: 112/59   Pulse: 66   Resp: 15   Temp: 97.7 °F (36.5 °C)   SpO2: 97%     /59 (BP Location: Right arm)   Pulse 66   Temp 97.7 °F (36.5 °C) (Oral)   Resp 15   Ht 5' 5\" (1.651 m)   Wt 83.2 kg (183 lb 6.8 oz)   SpO2 97%   BMI 30.52 kg/m²     Physical Exam:  General - no acute distress, responsive and cooperative  CV - warm, regular rate  Pulm - normal work of breathing, no respiratory distress  Abd - soft, protuberant, nontender  Neuro - m/s grossly intact, cn grossly intact  Ext - moving all extremities      Recent Labs     06/30/24  0500 07/01/24  0500 07/02/24  0633   WBC 17.34* 14.31*  --    HGB 10.3* 9.7*  --     213  --    SODIUM 138 133*  --    K 3.9 3.8  --     103  --    CO2 24 24  --    BUN 50* 32*  --    CREATININE 1.16 1.10  --    GLUC 93 93  --    CALCIUM 7.1* 6.8*  --    MG 2.7 2.3 2.2   EGFR 59 63  --        "

## 2024-07-02 NOTE — PLAN OF CARE
Problem: PHYSICAL THERAPY ADULT  Goal: Performs mobility at highest level of function for planned discharge setting.  See evaluation for individualized goals.  Description: Treatment/Interventions: Functional transfer training, LE strengthening/ROM, Elevations, Therapeutic exercise, Endurance training, Cognitive reorientation, Patient/family training, Equipment eval/education, Bed mobility, Gait training, Spoke to nursing, Spoke to case management, OT  Equipment Recommended: Walker (pt reports owning)       See flowsheet documentation for full assessment, interventions and recommendations.  Outcome: Progressing  Note: Prognosis: Good  Problem List: Decreased strength, Decreased endurance, Impaired balance, Decreased mobility  Assessment: Pt cont to gradually improve in functional mobility skills ambulating further distances overall w/ close guarding/min (A) provided; pt still remains to be generally weak and deconditioned w/ decreased functional endurance likely affecting his mobility skills --> will cont to follow to address; D/C recommendations are listed below; will follow  Barriers to Discharge: Inaccessible home environment     Rehab Resource Intensity Level, PT: I (Maximum Resource Intensity)    See flowsheet documentation for full assessment.

## 2024-07-02 NOTE — PROGRESS NOTES
Progress Note - Cardiothoracic Surgery   Shane Chau . 78 y.o. male MRN: [id number]  Unit/Bed#: [place] 405-01 Encounter: [id number]    Coronary artery disease. S/P coronary artery bypass grafting; POD # 12    24 Hour Events: tolerating diet, no nausea or vomiting.  Daily BM  No abdominal pain.      Medications:   Scheduled Meds:  Current Facility-Administered Medications   Medication Dose Route Frequency Provider Last Rate    acetaminophen  975 mg Oral Q6H While awake Moraima Alvarez PA-C      amiodarone  200 mg Oral TID With Meals Sandy Mckeon PA-C      aspirin  81 mg Oral Daily Moraima Alvarez PA-C      atorvastatin  80 mg Oral Daily With Dinner Moraima Alvarez PA-C      bisacodyl  10 mg Rectal Daily PRN Moraima Alvarez PA-C      chlorhexidine  15 mL Mouth/Throat BID Moraima Alvarez PA-C      clopidogrel  75 mg Oral Daily Moraima Alvarez PA-C      heparin (porcine)  5,000 Units Subcutaneous Q8H KRISTI Moraima Alvarez PA-C      hydrALAZINE  5 mg Intravenous Q6H PRN Moraima Alvarez PA-C      insulin lispro  1-5 Units Subcutaneous HS Moraima Alvarez PA-C      insulin lispro  1-6 Units Subcutaneous TID AC Moraima Alvarez PA-C      lidocaine  2 patch Topical Daily Moraima Alvarez PA-C      magnesium hydroxide  30 mL Oral Daily PRN Rahel Baig PA-C      melatonin  6 mg Oral HS Rahel Baig PA-C      methocarbamol  500 mg Oral Q6H KRISTI Moraima Alvarez PA-C      metoprolol tartrate  50 mg Oral Q12H KRISTI Sandy Mckeon PA-C      ondansetron  4 mg Intravenous Q6H PRN Moraima Alvarez PA-C      pantoprazole  40 mg Oral Early Morning KALPANA Amos MD      polyethylene glycol  17 g Oral Daily Moraima Alvarez PA-C      polyethylene glycol  17 g Oral Daily PRN Loreta Santamaria PA-C      senna-docusate sodium  1 tablet Oral BID Miranda Mijares PA-C      temazepam  15 mg Oral HS PRN Moraima Ilene Antonio, PA-C      trimethobenzamide  200 mg  Intramuscular Q6H PRN Rudolph Gunter PA-C       Continuous Infusions:   PRN Meds:.  bisacodyl    hydrALAZINE    magnesium hydroxide    ondansetron    polyethylene glycol    temazepam    trimethobenzamide    Vitals: 112/59 at 0714 and then 109/56 at 3 am  Vitals:    07/01/24 2228 07/02/24 0252 07/02/24 0600 07/02/24 0714   BP: 146/71 109/56  112/59   BP Location:       Pulse: 76 72  66   Resp:    15   Temp: 97.7 °F (36.5 °C)   97.7 °F (36.5 °C)   TempSrc:       SpO2: 96% 94%  97%   Weight:   83.2 kg (183 lb 6.8 oz)    Height:           Telemetry: NSR; Heart Rate: 73    Respiratory:   SpO2: SpO2: 97 %; Room Air    Intake/Output:     Intake/Output Summary (Last 24 hours) at 7/2/2024 0727  Last data filed at 7/2/2024 0600  Gross per 24 hour   Intake 2000.83 ml   Output 937 ml   Net 1063.83 ml        Weights:   Weight (last 2 days)       Date/Time Weight    07/02/24 0600 83.2 (183.42)    07/01/24 0540 83.6 (184.2)    06/30/24 0600 81.1 (178.79)          Results:   Results from last 7 days   Lab Units 07/01/24  0500 06/30/24  0500 06/26/24  0455   WBC Thousand/uL 14.31* 17.34* 10.03   HEMOGLOBIN g/dL 9.7* 10.3* 11.6*   HEMATOCRIT % 29.5* 31.2* 34.0*   PLATELETS Thousands/uL 213 197 202     Results from last 7 days   Lab Units 07/01/24  0500 06/30/24  0500 06/29/24  0512   SODIUM mmol/L 133* 138 141   POTASSIUM mmol/L 3.8 3.9 4.0   CHLORIDE mmol/L 103 106 103   CO2 mmol/L 24 24 28   BUN mg/dL 32* 50* 65*   CREATININE mg/dL 1.10 1.16 1.42*   CALCIUM mg/dL 6.8* 7.1* 7.5*     Point of care glucose:     Invasive Lines/Tubes:  Invasive Devices       Peripheral Intravenous Line  Duration             Peripheral IV 07/02/24 Right;Ventral (anterior) Forearm <1 day              Drain  Duration             External Urinary Catheter Small 3 days                    Physical Exam:    General: No acute distress, Alert, and Normal appearance  HEENT/NECK:  Normocephalic. Atraumatic.  No jugular venous distention.    Cardiac:  Regular rate and rhythm  Pulmonary:  Breath sounds clear bilaterally  Abdomen:  Non-tender, Normal bowel sounds, and Distended  Incisions: Sternum is stable.  Incision is clean, dry, and intact.  and Saphenectomy incison is clean, dry, and intact.   Extremities: Extremities warm/dry and Trace edema B/L  Neuro: Alert and oriented X 3  Skin: Warm/Dry, without rashes or lesions.    Assessment:  Principal Problem:    S/P CABG x 3  Active Problems:    Abdominal aortic aneurysm without rupture (HCC)    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HCC)    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HCC)    Asymptomatic bilateral carotid artery stenosis    Coronary artery disease of native artery of native heart with stable angina pectoris (HCC)    Hyperlipidemia    Hypertension    BARBARA (acute kidney injury) (HCC)       Coronary artery disease. S/P coronary artery bypass grafting; POD # 12    Plan:    Cardiac:     LVEF 60%  NSR; HR well-controlled  BP well-controlled  On metoprolol 50 mg bid   D/C prophylactic amiodarone  Plavix for hx of TIA (on preoperatively)  Continue ASA and Statin therapy     Epicardial pacing wires have been removed     Patient no longer has central IV access      Continue Subcutaneous Heparin for DVT prophylaxis    Pulmonary:     Good Room air oxygen saturation; Continue incentive spirometry/Coughing/Deep breathing exercises    Chest tubes have been discontinued    Renal:     Post op Creatinine stable; Follow up labs prn     Intake/Output net: +1063 mL/24 hours    Neuro:    Neurologically intact; No active issues       GI:  Postoperative ileus resolving   Diet advanced by general surgery, tolerating, no nausea no vomiting + daily BM     Continue stool softeners and prn suppository    Continue GI prophylaxis    Endo: no issues     7    Hematology:     Post-operative acute blood loss anemia; Hemoglobin 9.7; trend prn    8.   Disposition:        Anticipated discharge date: today/tomorrow to [facility]      VTE Pharmacologic  Prophylaxis: Heparin  VTE Mechanical Prophylaxis: sequential compression device    Collaborative rounds completed with supervising physician  Plan of care discussed with bedside nurse    SIGNATURE: Miranda Mijares PA-C  DATE: July 2, 2024  TIME: 7:27 AM

## 2024-07-02 NOTE — CASE MANAGEMENT
Case Management Discharge Planning Note    Patient name Shane Chau .  Location [facility] 405/[facility] 405-01 MRN [id number]  : 1945 Date 2024       Current Admission Date: 2024  Current Admission Diagnosis:S/P CABG x 3   Patient Active Problem List    Diagnosis Date Noted Date Diagnosed    BARBARA (acute kidney injury) (HCC) 2024     S/P CABG x 3 2024     Rib pain 2023     Paresthesia 2023     Leg swelling 05/10/2022     Erectile dysfunction 2022     Microhematuria 2018     Need for pneumococcal vaccination 2018     Hematuria 2018     Asymptomatic bilateral carotid artery stenosis 2017     Closed compression fracture of first lumbar vertebra ([facility]) 2017     Abdominal aortic aneurysm without rupture ([facility]) 2016     Localized primary osteoarthritis of lower leg, unspecified laterality 2013     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[facility]) 2013     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[facility]) 2013     Transient cerebral ischemic attack 2013     Coronary artery disease of native artery of native heart with stable angina pectoris ([facility]) 2012     Hyperlipidemia 2012     Hypertension 2012       LOS (days): 12  Geometric Mean LOS (GMLOS) (days): 8.2  Days to GMLOS:-4     OBJECTIVE:  Risk of Unplanned Readmission Score: 15.8         Current admission status: Inpatient   Preferred Pharmacy:   [facility] - [place] - [address]  [address] [facility] [address]  Phone: [phone] Fax: [phone]    [facility]  [place] - [address]  [place] [address]  Phone: [phone] Fax: [phone]    [facility]/pharmacy #[id number] - [place], [place] - [address]  [address] [address]  Phone: [phone] Fax: [phone]    [facility] - [place] - [address]  [address]  [facility]  [address]  Phone: [phone] Fax: [phone]    Primary Care Provider: Sherwin Mantilla MD    Primary Insurance: [facility] [employer]  Secondary Insurance:     DISCHARGE DETAILS:                                                                                        IMM Given (Date):: 07/02/24  IMM Given to:: Patient

## 2024-07-02 NOTE — PLAN OF CARE
Problem: OCCUPATIONAL THERAPY ADULT  Goal: Performs self-care activities at highest level of function for planned discharge setting.  See evaluation for individualized goals.  Description: Treatment Interventions: ADL retraining, Functional transfer training, Endurance training, Patient/family training, Equipment evaluation/education, Continued evaluation, Cardiac education, Energy conservation, Activityengagement          See flowsheet documentation for full assessment, interventions and recommendations.   Outcome: Progressing  Note: Limitation: Decreased ADL status, Decreased Safe judgement during ADL, Decreased endurance, Decreased self-care trans, Decreased high-level ADLs  Prognosis: Fair  Assessment: Pt seen for OT treatment session day 6 on this date focused on ADL retraining, functional transfers and mobility, energy conservation, functional endurance, recall of safety precautions. Pt was greeted in chair and was cooperative throughout session. Following session, pt was left in chair with chair alarm on and all needs within reach. Pt continues to be limited by functional status related to ADLs and transfers requiring MIN A for standing grooming, MOD A for toileting after incontinent of bowel, although demonstrates improvements in cognition. The patient's raw score on the -PAC Daily Activity Inpatient Short Form is 16. A raw score of less than 19 suggests the patient may benefit from discharge to post-acute rehabilitation services. Please refer to the recommendation of the Occupational Therapist for safe discharge planning. Pt would benefit from continued acute OT intervention with plan to continue OT treatment sessions 2-3 per week. Recommend d/c to  level II services.     Rehab Resource Intensity Level, OT: II (Moderate Resource Intensity)

## 2024-07-02 NOTE — OCCUPATIONAL THERAPY NOTE
Occupational Therapy Progress Note     Patient Name: Shane Chau Sr.  Today's Date: 7/2/2024  Problem List  Principal Problem:    S/P CABG x 3  Active Problems:    Abdominal aortic aneurysm without rupture (HCC)    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HCC)    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HCC)    Asymptomatic bilateral carotid artery stenosis    Coronary artery disease of native artery of native heart with stable angina pectoris (HCC)    Hyperlipidemia    Hypertension    ABRBARA (acute kidney injury) ([facility])          07/02/24 0950   OT Last Visit   OT Visit Date 07/02/24   Note Type   Note Type Treatment   Pain Assessment   Pain Assessment Tool 0-10   Pain Score No Pain   Restrictions/Precautions   Weight Bearing Precautions Per Order No   Other Precautions Cardiac/sternal;Cognitive;Chair Alarm;Bed Alarm;Telemetry;Fall Risk;Hard of hearing   Lifestyle   Autonomy Pta pt I with ADL, IADL and functional mobility, (+)    Reciprocal Relationships supportive spouse   Service to Others retired   Intrinsic Gratification enjoys time with family   ADL   Where Assessed Chair   Grooming Assistance 4  Minimal Assistance   Grooming Deficit Steadying;Supervision/safety   Grooming Comments Pt began grooming standing at raised tray table standing for 1 minute although became incontinent of bowel requiring seated rest, finishes brushing teeth when seated in chair.   Toileting Assistance  3  Moderate Assistance   Toileting Deficit Steadying;Supervison/safety;Perineal hygiene   Toileting Comments Pt incontinent of bowel requiring assist for steadying, post. hygiene and pad changes.   Functional Standing Tolerance   Time 1 minute   Activity Standing grooming   Comments Pt stands at raised tray table with CGA for 1 minute to perform standing grooming   Bed Mobility   Additional Comments Pt greeted OOB in chair, left in chair with alarm on and all needs within reach.   Transfers   Sit to Stand 5  Supervision   Stand to Sit 5  Supervision    Additional Comments with RW, STS x 6   Functional Mobility   Functional Mobility 4  Minimal assistance   Additional Comments Pt performs short household distance mobility with MIN A x 1 with RW and chair follow requiring 3 seated rest breaks. Scans hallway environment for 4 blue sticky notes, locates and reaches during functional mobility.   Additional items Rolling walker   Cognition   Overall Cognitive Status WFL   Arousal/Participation Cooperative   Attention Attends with cues to redirect   Orientation Level Oriented X4   Memory Decreased recall of precautions   Following Commands Follows one step commands without difficulty   Comments Pt cooperative to therapy with improved cognition at this time.   Activity Tolerance   Activity Tolerance Patient limited by fatigue   Medical Staff Made Aware RN alonzo, LISA Parham   Assessment   Assessment Pt seen for OT treatment session day 6 on this date focused on ADL retraining, functional transfers and mobility, energy conservation, functional endurance, recall of safety precautions. Pt was greeted in chair and was cooperative throughout session. Following session, pt was left in chair with chair alarm on and all needs within reach. Pt continues to be limited by functional status related to ADLs and transfers requiring MIN A for standing grooming, MOD A for toileting after incontinent of bowel, although demonstrates improvements in cognition. The patient's raw score on the AM-PAC Daily Activity Inpatient Short Form is 16. A raw score of less than 19 suggests the patient may benefit from discharge to post-acute rehabilitation services. Please refer to the recommendation of the Occupational Therapist for safe discharge planning. Pt would benefit from continued acute OT intervention with plan to continue OT treatment sessions 2-3 per week. Recommend d/c to  level II services.   Plan   Treatment Interventions ADL retraining;Functional transfer training;Endurance  training;Continued evaluation;Energy conservation   Goal Expiration Date 07/05/24   OT Treatment Day 6   OT Frequency 2-3x/wk   Discharge Recommendation   Rehab Resource Intensity Level, OT II (Moderate Resource Intensity)   AM-PAC Daily Activity Inpatient   Lower Body Dressing 2   Bathing 2   Toileting 2   Upper Body Dressing 3   Grooming 3   Eating 4   Daily Activity Raw Score 16   Daily Activity Standardized Score (Calc for Raw Score >=11) 35.96   AM-PAC Applied Cognition Inpatient   Following a Speech/Presentation 3   Understanding Ordinary Conversation 4   Taking Medications 4   Remembering Where Things Are Placed or Put Away 3   Remembering List of 4-5 Errands 3   Taking Care of Complicated Tasks 2   Applied Cognition Raw Score 19   Applied Cognition Standardized Score 39.77   Modified [place] Scale   Modified Kimmie Scale 4   End of Consult   Education Provided Yes   Patient Position at End of Consult Bedside chair;Bed/Chair alarm activated;All needs within reach   Nurse Communication Nurse aware of consult       YARON Rae, OTR/L

## 2024-07-02 NOTE — CASE MANAGEMENT
[facility] received request for authorization from Care Manager.  Authorization request submitted for: SNF  Facility Name:[facility]    NPI:[id number]   Facility MD: Dr. Vivar     NPI: [id number]  Authorization initiated by contacting insurance:  "[employer]  Via: resmio   Clinicals submitted via Portal attachment   Pending Reference #:[id number]     Care Manager notified: Mary Marquez     Updates to authorization status will be noted in chart. Please reach out to CM for updates on any clinical information.

## 2024-07-03 ENCOUNTER — APPOINTMENT (INPATIENT)
Dept: RADIOLOGY | Facility: HOSPITAL | Age: 79
DRG: 235 | End: 2024-07-03
Payer: COMMERCIAL

## 2024-07-03 ENCOUNTER — APPOINTMENT (INPATIENT)
Dept: NON INVASIVE DIAGNOSTICS | Facility: HOSPITAL | Age: 79
DRG: 235 | End: 2024-07-03
Payer: COMMERCIAL

## 2024-07-03 PROBLEM — R29.90 STROKE-LIKE SYMPTOMS: Status: ACTIVE | Noted: 2024-07-03

## 2024-07-03 LAB
ANION GAP SERPL CALCULATED.3IONS-SCNC: 7 MMOL/L (ref 4–13)
AORTIC ROOT: 3.6 CM
APICAL FOUR CHAMBER EJECTION FRACTION: 68 %
ASCENDING AORTA: 3.5 CM
ATRIAL RATE: 72 BPM
BSA FOR ECHO PROCEDURE: 1.9 M2
BUN SERPL-MCNC: 23 MG/DL (ref 5–25)
CALCIUM SERPL-MCNC: 7.3 MG/DL (ref 8.4–10.2)
CHLORIDE SERPL-SCNC: 100 MMOL/L (ref 96–108)
CO2 SERPL-SCNC: 25 MMOL/L (ref 21–32)
CREAT SERPL-MCNC: 1.12 MG/DL (ref 0.6–1.3)
E WAVE DECELERATION TIME: 327 MS
E/A RATIO: 0.8
ERYTHROCYTE [DISTWIDTH] IN BLOOD BY AUTOMATED COUNT: 13.1 % (ref 11.6–15.1)
FRACTIONAL SHORTENING: 33 (ref 28–44)
GFR SERPL CREATININE-BSD FRML MDRD: 62 ML/MIN/1.73SQ M
GLUCOSE SERPL-MCNC: 102 MG/DL (ref 65–140)
GLUCOSE SERPL-MCNC: 119 MG/DL (ref 65–140)
GLUCOSE SERPL-MCNC: 122 MG/DL (ref 65–140)
GLUCOSE SERPL-MCNC: 96 MG/DL (ref 65–140)
GLUCOSE SERPL-MCNC: 98 MG/DL (ref 65–140)
GLUCOSE SERPL-MCNC: 99 MG/DL (ref 65–140)
HCT VFR BLD AUTO: 30.2 % (ref 36.5–49.3)
HGB BLD-MCNC: 9.9 G/DL (ref 12–17)
INTERVENTRICULAR SEPTUM IN DIASTOLE (PARASTERNAL SHORT AXIS VIEW): 0.9 CM
INTERVENTRICULAR SEPTUM: 0.9 CM (ref 0.6–1.1)
LAAS-AP2: 15.9 CM2
LAAS-AP4: 13.6 CM2
LEFT ATRIUM SIZE: 3.4 CM
LEFT ATRIUM VOLUME (MOD BIPLANE): 35 ML
LEFT ATRIUM VOLUME INDEX (MOD BIPLANE): 18.4 ML/M2
LEFT INTERNAL DIMENSION IN SYSTOLE: 3.4 CM (ref 2.1–4)
LEFT VENTRICULAR INTERNAL DIMENSION IN DIASTOLE: 5.1 CM (ref 3.5–6)
LEFT VENTRICULAR POSTERIOR WALL IN END DIASTOLE: 1 CM
LEFT VENTRICULAR STROKE VOLUME: 75 ML
LVSV (TEICH): 75 ML
MAGNESIUM SERPL-MCNC: 2.1 MG/DL (ref 1.9–2.7)
MCH RBC QN AUTO: 31.9 PG (ref 26.8–34.3)
MCHC RBC AUTO-ENTMCNC: 32.8 G/DL (ref 31.4–37.4)
MCV RBC AUTO: 97 FL (ref 82–98)
MV E'TISSUE VEL-SEP: 6 CM/S
MV PEAK A VEL: 0.74 M/S
MV PEAK E VEL: 59 CM/S
MV STENOSIS PRESSURE HALF TIME: 95 MS
MV VALVE AREA P 1/2 METHOD: 2.32
P AXIS: 22 DEGREES
PA ADP BLD-ACNC: 180 PRU (ref 194–418)
PLATELET # BLD AUTO: 232 THOUSANDS/UL (ref 149–390)
PMV BLD AUTO: 9 FL (ref 8.9–12.7)
POTASSIUM SERPL-SCNC: 3.9 MMOL/L (ref 3.5–5.3)
PR INTERVAL: 188 MS
QRS AXIS: 12 DEGREES
QRSD INTERVAL: 102 MS
QT INTERVAL: 448 MS
QTC INTERVAL: 490 MS
RA PRESSURE ESTIMATED: 3 MMHG
RBC # BLD AUTO: 3.1 MILLION/UL (ref 3.88–5.62)
RIGHT ATRIUM AREA SYSTOLE A4C: 15.9 CM2
RIGHT VENTRICLE ID DIMENSION: 2.9 CM
SL CV LEFT ATRIUM LENGTH A2C: 5.5 CM
SL CV LV EF: 70
SL CV PED ECHO LEFT VENTRICLE DIASTOLIC VOLUME (MOD BIPLANE) 2D: 123 ML
SL CV PED ECHO LEFT VENTRICLE SYSTOLIC VOLUME (MOD BIPLANE) 2D: 48 ML
SODIUM SERPL-SCNC: 132 MMOL/L (ref 135–147)
T WAVE AXIS: 175 DEGREES
TRICUSPID ANNULAR PLANE SYSTOLIC EXCURSION: 0.8 CM
VENTRICULAR RATE: 72 BPM
WBC # BLD AUTO: 12.67 THOUSAND/UL (ref 4.31–10.16)

## 2024-07-03 PROCEDURE — 99024 POSTOP FOLLOW-UP VISIT: CPT | Performed by: THORACIC SURGERY (CARDIOTHORACIC VASCULAR SURGERY)

## 2024-07-03 PROCEDURE — 93306 TTE W/DOPPLER COMPLETE: CPT

## 2024-07-03 PROCEDURE — 93306 TTE W/DOPPLER COMPLETE: CPT | Performed by: INTERNAL MEDICINE

## 2024-07-03 PROCEDURE — 83735 ASSAY OF MAGNESIUM: CPT | Performed by: PHYSICIAN ASSISTANT

## 2024-07-03 PROCEDURE — 85027 COMPLETE CBC AUTOMATED: CPT | Performed by: PHYSICIAN ASSISTANT

## 2024-07-03 PROCEDURE — 80048 BASIC METABOLIC PNL TOTAL CA: CPT | Performed by: PHYSICIAN ASSISTANT

## 2024-07-03 PROCEDURE — NC001 PR NO CHARGE

## 2024-07-03 PROCEDURE — 85576 BLOOD PLATELET AGGREGATION: CPT

## 2024-07-03 PROCEDURE — 82948 REAGENT STRIP/BLOOD GLUCOSE: CPT

## 2024-07-03 PROCEDURE — 70498 CT ANGIOGRAPHY NECK: CPT

## 2024-07-03 PROCEDURE — 92610 EVALUATE SWALLOWING FUNCTION: CPT

## 2024-07-03 PROCEDURE — 97530 THERAPEUTIC ACTIVITIES: CPT

## 2024-07-03 PROCEDURE — 97535 SELF CARE MNGMENT TRAINING: CPT

## 2024-07-03 PROCEDURE — NC001 PR NO CHARGE: Performed by: PSYCHIATRY & NEUROLOGY

## 2024-07-03 PROCEDURE — 70496 CT ANGIOGRAPHY HEAD: CPT

## 2024-07-03 PROCEDURE — 99223 1ST HOSP IP/OBS HIGH 75: CPT

## 2024-07-03 PROCEDURE — 93005 ELECTROCARDIOGRAM TRACING: CPT

## 2024-07-03 PROCEDURE — 93010 ELECTROCARDIOGRAM REPORT: CPT | Performed by: INTERNAL MEDICINE

## 2024-07-03 RX ORDER — AMOXICILLIN 250 MG
1 CAPSULE ORAL 2 TIMES DAILY PRN
Status: DISCONTINUED | OUTPATIENT
Start: 2024-07-03 | End: 2024-07-09 | Stop reason: HOSPADM

## 2024-07-03 RX ADMIN — HEPARIN SODIUM 5000 UNITS: 5000 INJECTION INTRAVENOUS; SUBCUTANEOUS at 05:29

## 2024-07-03 RX ADMIN — CHLORHEXIDINE GLUCONATE 0.12% ORAL RINSE 15 ML: 1.2 LIQUID ORAL at 17:36

## 2024-07-03 RX ADMIN — ASPIRIN 81 MG: 81 TABLET, COATED ORAL at 09:06

## 2024-07-03 RX ADMIN — CHLORHEXIDINE GLUCONATE 0.12% ORAL RINSE 15 ML: 1.2 LIQUID ORAL at 10:00

## 2024-07-03 RX ADMIN — PANTOPRAZOLE SODIUM 40 MG: 40 TABLET, DELAYED RELEASE ORAL at 05:29

## 2024-07-03 RX ADMIN — HEPARIN SODIUM 5000 UNITS: 5000 INJECTION INTRAVENOUS; SUBCUTANEOUS at 14:46

## 2024-07-03 RX ADMIN — METOPROLOL TARTRATE 50 MG: 50 TABLET, FILM COATED ORAL at 20:50

## 2024-07-03 RX ADMIN — POLYETHYLENE GLYCOL 3350 17 G: 17 POWDER, FOR SOLUTION ORAL at 09:06

## 2024-07-03 RX ADMIN — Medication 6 MG: at 21:24

## 2024-07-03 RX ADMIN — METOPROLOL TARTRATE 50 MG: 50 TABLET, FILM COATED ORAL at 09:06

## 2024-07-03 RX ADMIN — ACETAMINOPHEN 975 MG: 325 TABLET, FILM COATED ORAL at 20:50

## 2024-07-03 RX ADMIN — CLOPIDOGREL BISULFATE 75 MG: 75 TABLET ORAL at 09:06

## 2024-07-03 RX ADMIN — ATORVASTATIN CALCIUM 80 MG: 80 TABLET, FILM COATED ORAL at 17:36

## 2024-07-03 RX ADMIN — ACETAMINOPHEN 975 MG: 325 TABLET, FILM COATED ORAL at 14:46

## 2024-07-03 RX ADMIN — IOHEXOL 85 ML: 350 INJECTION, SOLUTION INTRAVENOUS at 07:01

## 2024-07-03 RX ADMIN — ACETAMINOPHEN 975 MG: 325 TABLET, FILM COATED ORAL at 09:06

## 2024-07-03 RX ADMIN — HEPARIN SODIUM 5000 UNITS: 5000 INJECTION INTRAVENOUS; SUBCUTANEOUS at 21:24

## 2024-07-03 NOTE — PHYSICAL THERAPY NOTE
PHYSICAL THERAPY NOTE          Patient Name: Shane Chau Sr.  Today's Date: 7/3/2024         07/03/24 1103   PT Last Visit   PT Visit Date 07/03/24   Note Type   Note Type Treatment   Pain Assessment   Pain Assessment Tool 0-10   Pain Score No Pain   Restrictions/Precautions   Other Precautions Cardiac/sternal;Telemetry;Fall Risk;Bed Alarm;Hard of hearing   General   Chart Reviewed Yes   Additional Pertinent History Pt had a rapid response earlier in AM due to noted (L) side weakness and slurred speech; undergoing w/u w/ MRI pending; cleared for Tx session by nsg   Response to Previous Treatment Patient with no complaints from previous session.   Cognition   Overall Cognitive Status WFL   Arousal/Participation Alert;Cooperative   Attention Attends with cues to redirect   Orientation Level Oriented to person;Oriented to place;Oriented to situation   Memory Decreased recall of precautions   Following Commands Follows one step commands without difficulty   Subjective   Subjective Alert; in bed; min dysarthria noted; agreeable to mobilize; noted to be incontinent of stool and extensive pericare initiated w/ staff   Bed Mobility   Rolling R 4  Minimal assistance   Additional items Assist x 1;Increased time required;Verbal cues   Rolling L 4  Minimal assistance   Additional items Assist x 1;Increased time required;Verbal cues   Supine to Sit 4  Minimal assistance   Additional items Assist x 1;HOB elevated;Increased time required;Verbal cues   Sit to Supine 4  Minimal assistance   Additional items Assist x 1;Increased time required;Verbal cues;LE management   Transfers   Sit to Stand 4  Minimal assistance   Additional items Assist x 1;Verbal cues   Stand to Sit 4  Minimal assistance   Additional items Assist x 1;Verbal cues   Additional Comments Upon standing, pt reports he needs to have a BM and transition to the BR was initiated    Ambulation/Elevation   Gait pattern Excessively slow;Short stride;Inconsistent keenan   Gait Assistance 4  Minimal assist   Additional items Assist x 1;Verbal cues;Tactile cues   Assistive Device Rolling walker   Distance 2 x 8 ft w/ seated period in between   Balance   Static Sitting Fair   Dynamic Sitting Fair -   Static Standing Fair -   Dynamic Standing Poor +   Ambulatory Poor +   Activity Tolerance   Activity Tolerance Patient limited by fatigue   Medical Staff Made Aware Co-Tx performed w/ OTR due to complexity of medical status   Nurse Made Aware spoke to JORGE ALBERTO Meyer   Exercises   Balance training  Standing balance/tolerance training x 30 sec during pericare   Assessment   Prognosis Good   Problem List Decreased strength;Decreased endurance;Impaired balance;Decreased mobility;Impaired hearing   Assessment Pt presents s/p rapid response earlier in AM due to acute neurologic change and additional workup is pending; no overt focal weakness noted in the LE w/ overall observed mobility status appearing to be at the level noted during last session (min (A) at this time); pt does exhibit some slurred speech but able to respond appropriately and follow directions consistently; overall, will cont to follow pt per POC to achieve Tx goals; D/C recommendations are listed below   Barriers to Discharge Inaccessible home environment   Goals   Patient Goals to go to the    STG Expiration Date 07/05/24   PT Treatment Day 8   Plan   Treatment/Interventions Functional transfer training;LE strengthening/ROM;Elevations;Therapeutic exercise;Endurance training;Bed mobility;Gait training;Spoke to nursing;OT   Progress Progressing toward goals   PT Frequency 4-6x/wk   Discharge Recommendation   Rehab Resource Intensity Level, PT I (Maximum Resource Intensity)   Equipment Recommended Walker   Walker Package Recommended Wheeled walker   Change/add to Walker Package? No   AM-PAC Basic Mobility Inpatient   Turning in Flat Bed Without  Bedrails 3   Lying on Back to Sitting on Edge of Flat Bed Without Bedrails 3   Moving Bed to Chair 3   Standing Up From Chair Using Arms 3   Walk in Room 3   Climb 3-5 Stairs With Railing 2   Basic Mobility Inpatient Raw Score 17   Basic Mobility Standardized Score 39.67   [facility] Highest Level Of Mobility   -[facility] Goal 5: Stand one or more mins   -HL Achieved 6: Walk 10 steps or more   Education   Education Provided Mobility training;Assistive device   Patient Demonstrates verbal understanding   End of Consult   Patient Position at End of Consult Supine;Bed/Chair alarm activated;All needs within reach     Bradly Howell

## 2024-07-03 NOTE — QUICK NOTE
NEUROLOGY RESIDENCY   STROKE ALERT  OVERNIGHT QUICK NOTE    Name: Shane Chau Sr. Age: 78 y.o. Sex:male  MRN: [id number]   Unit/Bed#:[unfilled]/@BED@   Encounter: [id number] Length of Stay: 13    Patient seen and examined by the overnight resident. Case was discussed with the on-call Neurologist, Dr Bunn. Preliminary recommendations are as outlined below. Formal recommendations to follow in AM.      Recommendations for outpatient neurological follow up have yet to be determined.   Pending for discharge: Stroke Work Up    Assessment & Plan    ASSESSMENT & PLAN   #Stroke Like Symptoms   Shane Chau Sr. is a 78 y.o. male with pertinent PMHx HTN, HLD, multiple TIAs, abdominal aortic aneurysm s/p repair in 2011, who is currently admitted where he had a CABG x 3 performed on 6/20/2024. Stroke alert called this morning at 6:20 AM. Patient was last seen normal at 4:30 AM. Nursing staff noticed at 6 AM that patient was dysarthric, and had left-sided weakness was appreciated when patient stood to use the scale. On neurology arrival patient had BP of 136/75, FSBG 111. Left sided weakness resolved. NIH SS 2 for left facial droop and dysarthria. NC CTH without acute intracranial abnormalities, chronic lacunar infarcts in the right BG corona radiata, chronic bilateral cerebellar lacunar infarcts, and mild microangiopathic changes. CTA H/N showed moderate to severe atherosclerotic stenosis of the origin of the left vertebral artery, patent major vessels of the [place] without high-grade stenosis, no aneurysm, and no other hemodynamically significant stenosis in the cervical carotid or right vertebral artery.s a result of recent CABG patient was not determined to be a candidate for thrombolysis (TNK).      Impression: Impression: Clinical presentation concerning for acute vascular insult . Further work up as outlined below to identify presence of a vascular insult.    Plan: Discussed plan with neurology  attending, Dr. Bunn  Admit along the stroke pathway with telemetry monitoring  Strict NPO prior to dysphagia screen   Frequent neuro checks per protocol. If there is any acute changes in exam, please obtain stat CT head and notify neurology team  BP Goals: Permissive hypertension - SBP<220/110 for first 24 hrs.   Antiplatelet agents: Continue DAPT with ASA 81 mg and Plavix 75 mg   Statin: Continue stati  Labs: Hgb A1c, LDL   Brain Imaging: Obtain MRI brain without contrast  TTE pending  Euthermic/Euglycemic  DVT PPx: Heparin, SCDs  PT/OT/ST/PMR evaluations when able  Stroke education and counseling  Rest of other care per primary team appreciated    Disposition: PT Recommendations - Rehab Resource Intensity Level, PT: I (Maximum Resource Intensity)    Subjective   CHIEF COMPLAINT   No chief complaint on file.    HISTORY OF PRESENT ILLNESS   Reason for Consult: Stroke Alert  Time Stroke alert called: 7/3/2024 at 6:20 AM  Neurology Time of Arrival: Immediate  Patient Last Known Well: 7/3/2024 at 4:30 AM    HPI: Shane Kimalis Orozco is a 78 y.o. male with pertinent PMHx HTN, HLD, multiple TIAs, abdominal aortic aneurysm s/p repair in 2011, who is currently admitted where he had a CABG x 3 performed on 6/20/2024. Stroke alert called this morning at 6:20 AM. Patient was last seen normal at 4:30 AM. Nursing staff noticed at 6 AM that patient was dysarthric, and had left-sided weakness was appreciated when patient stood to use the scale. On neurology arrival patient had BP of 136/75, FSBG 111. Left sided weakness resolved. NIH SS 2 for left facial droop and dysarthria. NC CTH without acute intracranial abnormalities, chronic lacunar infarcts in the right BG corona radiata, chronic bilateral cerebellar lacunar infarcts, and mild microangiopathic changes. CTA H/N showed moderate to severe atherosclerotic stenosis of the origin of the left vertebral artery, patent major vessels of the [place] without  high-grade stenosis, no aneurysm, and no other hemodynamically significant stenosis in the cervical carotid or right vertebral artery.      TNK Decision: Not administered as patient not a candidate for thrombolytic medications.  Contraindication(s) to TNK administration: Recent CABG    NIH Stroke Scale:  Interval: Baseline (time of stroke alert)  Time NIHSS Completed: Upon neurology team arrival  1a.Level of Consciousness: 0 = Alert   1b. LOC Questions: 0 = Answers both correctly   1c. LOC Commands: 0 = Obeys both correctly   2. Best Gaze: 0 = Normal   3. Visual: 0 = No visual field loss   4. Facial Palsy: 1=Minor paralysis (flattened nasolabial fold, asymmetric on smiling)   5a. Motor Right Arm: 0 = No drift, limb holds 90 (or 45) degrees for full 10 seconds   5b. Motor Left Arm: 0 = No drift, limb holds 90 (or 45) degrees for full 10 seconds   6a. Motor Right Le = No drift, limb holds 30 degrees for full 5 seconds   6b. Motor Left Le = No drift, limb holds 30 degrees for full 5 seconds   7. Limb Ataxia:  0 = Absent   8. Sensory: 0 = Normal; no sensory loss   9. Best Language:  0 = No aphasia, normal   10. Dysarthria: 1 = Mild to moderate,slurs at least some words and at worst, can be understood with some difficulty   11. Extinction: 0=No abnormality   Total Score: 2     Modified Nicholson Score: 0 (No baseline symptoms/disability)    REVIEW OF SYSTEMS   ROS: See HPI for details  HISTORICAL INFORMATION   Review of previous medical records was completed.   Past Medical History:  has a past medical history of AAA (abdominal aortic aneurysm) (HCC), BPH (benign prostatic hyperplasia), CAD (coronary artery disease), Cholelithiasis, Former tobacco use, History of MI (myocardial infarction), History of TIA (transient ischemic attack), Hyperlipidemia, Hypertension, Insomnia, and Obesity.   Allergies: Patient has no known allergies.  Past Surgical History:  has a past surgical history that includes Appendectomy;  "Abdominal aortic aneurysm repair (06/07/2011); Coronary angioplasty; pr laps surg cholecystectomy w/cholangiography (N/A, 09/08/2016); Colonoscopy (11/2006); CHOLECYSTECTOMY LAPAROSCOPIC; Cataract extraction (Right, 11/2018); Cardiac catheterization (Left, 06/03/2024); Cardiac catheterization (06/03/2024); Cardiac catheterization (N/A, 06/03/2024); pr coronary artery byp w/vein & artery graft 3 vein (N/A, 6/20/2024); and HARVEST VEIN (Left, 6/20/2024).  Family History: family history includes Aortic aneurysm in his brother; Coronary artery disease in his brother, brother, brother, and brother; Diabetes in his brother; Heart disease in his brother, family, father, and mother; Hypertension in his mother; Stroke in his mother; Sudden death in his brother, brother, and brother.  Social History:  reports that he quit smoking about 38 years ago. His smoking use included cigarettes. He started smoking about 68 years ago. He has a 15 pack-year smoking history. He has never used smokeless tobacco. He reports that he does not currently use alcohol. He reports that he does not use drugs.     Objective   OBJECTIVE   Patient ID: Shane Chau Sr. is a 78 y.o. male.  Blood pressure 121/66, pulse 71, temperature 97.5 °F (36.4 °C), resp. rate 16, height 5' 5\" (1.651 m), weight 83 kg (182 lb 15.7 oz), SpO2 96%.   GENERAL EXAM:  Constitutional: Not in acute distress. Not ill-appearing, toxic-appearing or diaphoretic.   HENT: Normocephalic and atraumatic. Nose and Ears normal.   Eyes: No scleral icterus. No discharge.   Cardiovascular:  Distal extremities warm without palpable edema or tenderness, no observed significant swelling.   Pulmonary: Pulmonary effort is normal. Not in respiratory distress  Musculoskeletal: No swelling or deformity.  Psychiatric: Normal behavior and appropriate affect     NEUROLOGIC  EXAM:  Mental Status: Alertness: alert, Orientation: time, date, person, Speech/Language fluent, garbled, dysarthric, " Commands: Can follow multistep commands  Cranial Nerves:  I Not tested   II Visual Fields normal   II, Ill,  IV, VI Pupils equal, round, reactive to light and accommodation  EOM full and intact   V facial sensation was normal and symmetrical   VII facial asymmetrical (LFD)   VIII Normal hearing to speech   IX, X Normal Palatal Elevation, No Uvular Deviation   XI Shoulder shrug and head turn is normal   XII Midline tongue protrusion   Motor: No pronator drift, No atrophy or muscle wasting, Normal tone, No Involuntary Movements, and No tremors appreciated  Arm Right  Left Leg Right  Left   Deltoid 5/5 5/5 lliopsoas 5/5 5/5   Biceps 5/5 5/5 Quads 5/5 5/5   Triceps 5/5 5/5 Hamstrings 5/5 5/5   Wrist Extension 5/5 5/5 Ankle Dorsi Flexion 5/5 5/5   Wrist Flexion 5/5 5/5 Ankle Plantar Flexion 5/5 5/5   Reflexes: No clonus, no Yates's, no cross abductors, toes down     BICEP   TRICEPS   BRACHIO   PATELLAR   ACHILLES   RIGHT 2+ 2+ 2+ 2+ 2+   LEFT 2+ 2+ 2+ 2+ 2+   Sensory: Normal sensation to light touch, pinprick, vibration, temperature, and proprioception in all limbs, romberg negative  Coordination: Cerebellar arm drift present. Finger To Nose: Right Intact, Left Intact. Heel To Shin: Right Intact, Left Intact  Station/Gait: Deferred d/t medical severity    LABORATORY DATA      Labs: I have personally reviewed pertinent labs.    Results from last 7 days   Lab Units 07/03/24  0436 07/01/24  0500   WBC Thousand/uL 12.67* 14.31*   HEMOGLOBIN g/dL 9.9* 9.7*   HEMATOCRIT % 30.2* 29.5*   PLATELETS Thousands/uL 232 213   SEGS PCT %  --  79*   LYMPHO PCT %  --  10*   MONO PCT %  --  7   EOS PCT %  --  3     Results from last 7 days   Lab Units 07/03/24  0436   SODIUM mmol/L 132*   POTASSIUM mmol/L 3.9   CHLORIDE mmol/L 100   CO2 mmol/L 25   BUN mg/dL 23   CREATININE mg/dL 1.12   ANION GAP mmol/L 7   CALCIUM mg/dL 7.3*   GLUCOSE RANDOM mg/dL 98     Results from last 7 days   Lab Units 07/03/24  0634 07/03/24  0554 07/02/24  2101  07/02/24  1617 07/02/24  1046 07/02/24  0546 07/01/24  2107 07/01/24  1608 07/01/24  1040 07/01/24  0529 06/30/24  2110 06/30/24  1537   POC GLUCOSE mg/dl 102 96 124 131 141* 90 111 289* 133 170* 143* 162*         Results from last 7 days   Lab Units 06/27/24  0508   LACTIC ACID mmol/L 1.3         Microbiology: I have reviewed pertinent results.      Lab Results   Component Value Date    URINECX No Growth <1000 cfu/mL 06/29/2016       IMAGING STUDIES    Radiology Results: I have personally reviewed pertinent reports and reviewed films in PACS  XR chest pa & lateral    Result Date: 6/26/2024  Impression: NG tube in the stomach. There is moderate gaseous distention of the stomach. Workstation performed: [id number]     CT abdomen pelvis wo contrast    Result Date: 6/25/2024  Impression: Gastric distention and associated proximal small bowel dilatation and stool like contents within the dilated loops of small bowel without discrete transition point identified. Findings suggest chronic ileus though chronic partial small bowel obstruction not entirely excluded. Juxtarenal abdominal aortic aneurysm measuring 4.5 x 4.2 cm. Suprarenal abdominal aorta is dilated measuring 3.7 cm. Workstation performed: [id number]     XR chest portable    Result Date: 6/21/2024  Impression: No acute cardiopulmonary disease. Workstation performed: [id number]     XR chest portable ICU    Result Date: 6/20/2024  Impression: Expected appearance after CABG. Left base opacity, likely atelectasis. Small effusion not excluded. Workstation performed: [id number]         MEDICATIONS     Medications Prior to Admission:     amLODIPine (NORVASC) 10 mg tablet    aspirin 325 mg tablet    isosorbide mononitrate (IMDUR) 60 mg 24 hr tablet    metoprolol tartrate (LOPRESSOR) 50 mg tablet    mupirocin (BACTROBAN) 2 % ointment    ranolazine (RANEXA) 500 mg 12 hr tablet    rosuvastatin (CRESTOR) 40 MG tablet    tamsulosin (FLOMAX) 0.4 mg    clopidogrel (PLAVIX) 75  "mg tablet    nitroglycerin (NITROSTAT) 0.4 mg SL tablet     Scheduled PRN   acetaminophen, 975 mg, Q6H While awake  aspirin, 81 mg, Daily  atorvastatin, 80 mg, Daily With Dinner  chlorhexidine, 15 mL, BID  clopidogrel, 75 mg, Daily  heparin (porcine), 5,000 Units, Q8H KRISTI  insulin lispro, 1-5 Units, HS  insulin lispro, 1-6 Units, TID AC  lidocaine, 2 patch, Daily  melatonin, 6 mg, HS  metoprolol tartrate, 50 mg, Q12H KRISTI  pantoprazole, 40 mg, Early Morning  polyethylene glycol, 17 g, Daily  senna-docusate sodium, 1 tablet, BID      bisacodyl, 10 mg, Daily PRN  hydrALAZINE, 5 mg, Q6H PRN  magnesium hydroxide, 30 mL, Daily PRN  methocarbamol, 500 mg, Q6H PRN  ondansetron, 4 mg, Q6H PRN  polyethylene glycol, 17 g, Daily PRN  temazepam, 15 mg, HS PRN  trimethobenzamide, 200 mg, Q6H PRN       Continuous          VTE Mechanical Prophylaxis: Sequential Compression Device  VTE Pharmacologic Prophylaxis: VTE covered by:  heparin (porcine), Subcutaneous, 5,000 Units at 07/03/24 0529      ====================================================================================  Code Status: Level 1 - Full Code  Advance Directive and Living Will:      Power of :    POLST:    =======================================================================    Dano Bullock,    [facility]'s Neurology Residency, PGY-3    Portions of the record may have been created with voice recognition software.  Occasional wrong word or \"sound a like\" substitutions may have occurred due to the inherent limitations of voice recognition software.  Read the chart carefully and recognize, using context, where substitutions have occurred.    "

## 2024-07-03 NOTE — CONSULTS
NEUROLOGY RESIDENCY CONSULT NOTE     Name: hSane Chau Sr.   Age & Sex: 78 y.o. male   MRN: [id number]  Unit/Bed#: [facility] 405-01   Encounter: [id number]  Length of Stay: 13    Recommendations for outpatient neurological follow up have yet to be determined.      ASSESSMENT & PLAN     Stroke-like symptoms  Assessment & Plan  Shane Chau Sr. is a 78 y.o. male with pertinent PMHx HTN, HLD, multiple TIAs, abdominal aortic aneurysm s/p repair in 2011, who is currently admitted where he had a CABG x 3 performed on 6/20/2024. Stroke alert called this morning at 6:20 AM. Patient was last seen normal at 4:30 AM. Nursing staff noticed at 6 AM that patient was dysarthric, and had left-sided weakness was appreciated when patient stood to use the scale. On neurology arrival patient had BP of 136/75, FSBG 111. Left sided weakness resolved. NIH SS 2 for left facial droop and dysarthria. NC CTH without acute intracranial abnormalities, chronic lacunar infarcts in the right BG corona radiata, chronic bilateral cerebellar lacunar infarcts, and mild microangiopathic changes. CTA H/N showed moderate to severe atherosclerotic stenosis of the origin of the left vertebral artery, patent major vessels of the [place] without high-grade stenosis, no aneurysm, and no other hemodynamically significant stenosis in the cervical carotid or right vertebral artery.s a result of recent CABG patient was not determined to be a candidate for thrombolysis (TNK).        Impression: Impression: Clinical presentation concerning for acute vascular insult . Further work up as outlined below to identify presence of a vascular insult.     Plan: Discussed plan with neurology attending, Dr. Bunn  Admit along the stroke pathway with telemetry monitoring  Strict NPO prior to dysphagia screen   Frequent neuro checks per protocol. If there is any acute changes in exam, please obtain stat CT head and notify neurology team  BP Goals:  Permissive hypertension - SBP<220/110 for first 24 hrs.   Antiplatelet agents: Continue DAPT with ASA 81 mg and Plavix 75 mg   Statin: Continue stati  Labs: Hgb A1c, LDL   Brain Imaging: Obtain MRI brain without contrast  TTE pending  Euthermic/Euglycemic  DVT PPx: Heparin, SCDs  PT/OT/ST/PMR evaluations when able  Stroke education and counseling  Rest of other care per primary team appreciated    Disposition: PT Recommendations - Rehab Resource Intensity Level, PT: I (Maximum Resource Intensit        SUBJECTIVE   Reason for Consult: Stroke Alert  Time Stroke alert called: 7/3/2024 at 6:20 AM  Neurology Time of Arrival: Immediate  Patient Last Known Well: 7/3/2024 at 4:30 AM     HPI: Shane Chau Sr. is a 78 y.o. male with pertinent PMHx HTN, HLD, multiple TIAs, abdominal aortic aneurysm s/p repair in 2011, who is currently admitted where he had a CABG x 3 performed on 6/20/2024. Stroke alert called this morning at 6:20 AM. Patient was last seen normal at 4:30 AM. Nursing staff noticed at 6 AM that patient was dysarthric, and had left-sided weakness was appreciated when patient stood to use the scale. On neurology arrival patient had BP of 136/75, FSBG 111. Left sided weakness resolved. NIH SS 2 for left facial droop and dysarthria. NC CTH without acute intracranial abnormalities, chronic lacunar infarcts in the right BG corona radiata, chronic bilateral cerebellar lacunar infarcts, and mild microangiopathic changes. CTA H/N showed moderate to severe atherosclerotic stenosis of the origin of the left vertebral artery, patent major vessels of the [place] without high-grade stenosis, no aneurysm, and no other hemodynamically significant stenosis in the cervical carotid or right vertebral artery.        TNK Decision: Not administered as patient not a candidate for thrombolytic medications.  Contraindication(s) to TNK administration: Recent CABG     NIH Stroke Scale:  Interval: Baseline (time of stroke  alert)  Time NIHSS Completed: Upon neurology team arrival  1a.Level of Consciousness: 0 = Alert   1b. LOC Questions: 0 = Answers both correctly   1c. LOC Commands: 0 = Obeys both correctly   2. Best Gaze: 0 = Normal   3. Visual: 0 = No visual field loss   4. Facial Palsy: 1=Minor paralysis (flattened nasolabial fold, asymmetric on smiling)   5a. Motor Right Arm: 0 = No drift, limb holds 90 (or 45) degrees for full 10 seconds   5b. Motor Left Arm: 0 = No drift, limb holds 90 (or 45) degrees for full 10 seconds   6a. Motor Right Le = No drift, limb holds 30 degrees for full 5 seconds   6b. Motor Left Le = No drift, limb holds 30 degrees for full 5 seconds   7. Limb Ataxia:  0 = Absent   8. Sensory: 0 = Normal; no sensory loss   9. Best Language:  0 = No aphasia, normal   10. Dysarthria: 1 = Mild to moderate,slurs at least some words and at worst, can be understood with some difficulty   11. Extinction: 0=No abnormality   Total Score: 2      Modified Kimmie Score: 0 (No baseline symptoms/disability)    Inpatient consult to Neurology  Consult performed by: Eugenia Velez  Consult ordered by: Rudolph Gunter PA-C        Historical Information   Past Medical History:   Diagnosis Date    AAA (abdominal aortic aneurysm) (HCC)     s/p open repair    BPH (benign prostatic hyperplasia)     CAD (coronary artery disease)     Cholelithiasis     Former tobacco use     History of MI (myocardial infarction)     History of TIA (transient ischemic attack)     Hyperlipidemia     Hypertension     Insomnia     Obesity      Past Surgical History:   Procedure Laterality Date    ABDOMINAL AORTIC ANEURYSM REPAIR  2011    Dr. Yarbrough 11    APPENDECTOMY      CARDIAC CATHETERIZATION Left 2024    Procedure: Cardiac Left Heart Cath;  Surgeon: Pk Blanco DO;  Location: [facility];  Service: Cardiology    CARDIAC CATHETERIZATION  2024    Procedure: Cardiac catheterization;  Surgeon: Pk  DO Francis;  [facility]: [facility];  Service: Cardiology    CARDIAC CATHETERIZATION N/A 2024    Procedure: Cardiac Coronary Angiogram;  Surgeon: Pk Blanco DO;  [facility];  Service: Cardiology    CATARACT EXTRACTION Right 2018    CHOLECYSTECTOMY LAPAROSCOPIC      COLONOSCOPY  2006    CORONARY ANGIOPLASTY      3 STENTS INSERTED    HARVEST VEIN Left 2024    Procedure: HARVEST VEIN ENDOSCOPIC (EVH);  Surgeon: KALPANA Amos MD;  Location: [facility];  Service: Cardiac Surgery    MA CORONARY ARTERY BYP W/VEIN & ARTERY GRAFT 3 VEIN N/A 2024    Procedure: CORONARY ARTERY BYPASS GRAFT (CABG) X3 VESSELS, LIMA - LAD, LEFT LEG EVH/SVG - PDA AND OM1, W/ DEDRICK;  Surgeon: KALPANA Amos MD;  Location: [facility];  Service: Cardiac Surgery    MA LAPS SURG CHOLECYSTECTOMY W/CHOLANGIOGRAPHY N/A 2016    Procedure: CHOLECYSTECTOMY LAPAROSCOPIC with intra-op cholangiogram ;  Surgeon: Prasanth Verdin MD;  Location: [facility];  Service: General     Social History   Social History     Substance and Sexual Activity   Alcohol Use Not Currently    Comment: 4 oz glass of wine every other day w/ his dinner     Social History     Substance and Sexual Activity   Drug Use No     E-Cigarette/Vaping    E-Cigarette Use Never User      E-Cigarette/Vaping Substances    Nicotine No     THC No     CBD No     Flavoring No      Social History     Tobacco Use   Smoking Status Former    Current packs/day: 0.00    Average packs/day: 0.5 packs/day for 30.0 years (15.0 ttl pk-yrs)    Types: Cigarettes    Start date: 3/10/1956    Quit date: 3/10/1986    Years since quittin.3   Smokeless Tobacco Never   Tobacco Comments    Quit  about 25 years x1ppd     Family History: non-contributory  Meds/Allergies   all current active meds have been reviewed  No Known Allergies    Review of previous medical records was completed.         OBJECTIVE     Patient ID: Shane ACUNA  "Kandi Orozco is a 78 y.o. male.    Vitals:   Vitals:    24 1042 24 1100 24 1115 24 1456   BP: 125/65  117/62 133/70   BP Location:       Pulse: 69  69 70   Resp: 20   18   Temp: 97.8 °F (36.6 °C)   (!) 97.4 °F (36.3 °C)   TempSrc:       SpO2: 95% 98%  96%   Weight: 82.6 kg (182 lb)      Height: 5' 5\" (1.651 m)         Body mass index is 30.29 kg/m².     Intake/Output Summary (Last 24 hours) at 7/3/2024 1649  Last data filed at 7/3/2024 1325  Gross per 24 hour   Intake 238 ml   Output 1268 ml   Net -1030 ml       Temperature:   Temp (24hrs), Av.5 °F (36.4 °C), Min:97.4 °F (36.3 °C), Max:97.8 °F (36.6 °C)    Temperature: (!) 97.4 °F (36.3 °C)    Invasive Devices:   Invasive Devices       Peripheral Intravenous Line  Duration             Peripheral IV 24 Right;Ventral (anterior) Forearm 1 day    Peripheral IV 24 Right Antecubital <1 day              Drain  Duration             External Urinary Catheter Small 4 days                    Physical Exam  Neurological:      Mental Status: He is oriented to person, place, and time.      Cranial Nerves: Cranial nerves 2-12 are intact.      Motor: Motor strength is normal.     Coordination: Finger-Nose-Finger Test normal.      Deep Tendon Reflexes:      Reflex Scores:       Bicep reflexes are 2+ on the right side and 2+ on the left side.       Brachioradialis reflexes are 2+ on the right side and 2+ on the left side.       Patellar reflexes are 2+ on the right side and 2+ on the left side.         Neurologic Exam     Mental Status   Oriented to person, place, and time.     Cranial Nerves   Cranial nerves II through XII intact.     Motor Exam   Muscle bulk: normal  Overall muscle tone: normal    Strength   Strength 5/5 throughout.     Sensory Exam   Light touch normal.   Pinprick normal.     Gait, Coordination, and Reflexes     Coordination   Finger to nose coordination: normal    Reflexes   Right brachioradialis: 2+  Left " brachioradialis: 2+  Right biceps: 2+  Left biceps: 2+  Right patellar: 2+  Left patellar: 2+         LABORATORY DATA     Labs: I have personally reviewed pertinent reports.    Results from last 7 days   Lab Units 07/03/24  0436 07/01/24  0500 06/30/24  0500   WBC Thousand/uL 12.67* 14.31* 17.34*   HEMOGLOBIN g/dL 9.9* 9.7* 10.3*   HEMATOCRIT % 30.2* 29.5* 31.2*   PLATELETS Thousands/uL 232 213 197   SEGS PCT %  --  79* 81*   MONO PCT %  --  7 6   EOS PCT %  --  3 3      Results from last 7 days   Lab Units 07/03/24  0436 07/01/24  0500 06/30/24  0500   POTASSIUM mmol/L 3.9 3.8 3.9   CHLORIDE mmol/L 100 103 106   CO2 mmol/L 25 24 24   BUN mg/dL 23 32* 50*   CREATININE mg/dL 1.12 1.10 1.16   CALCIUM mg/dL 7.3* 6.8* 7.1*     Results from last 7 days   Lab Units 07/03/24  0436 07/02/24  0633 07/01/24  0500   MAGNESIUM mg/dL 2.1 2.2 2.3              Results from last 7 days   Lab Units 06/27/24  0508   LACTIC ACID mmol/L 1.3           IMAGING & DIAGNOSTIC TESTING     Radiology Results: I have personally reviewed pertinent reports.    CTA stroke alert (head/neck)   Final Result by Liliana Irwin MD (07/03 0721)      1.  Patent major vessels of the [place] without high-grade stenosis.  No aneurysm.   2.  Moderate to severe atherosclerotic stenosis at the origin of the left vertebral artery.   3.  No hemodynamically significant stenosis in the cervical carotid and the right vertebral arteries.               Findings were directly discussed with Stone Lucas at 7:11 a.m.                           Workstation performed: [id number]         CT stroke alert brain   Final Result by Liliana Irwin MD (07/03 0721)      1.  No acute intracranial CT abnormality.   2.  Chronic lacunar infarcts in the right basal ganglia and corona radiata. Chronic bilateral cerebellar lacunar infarcts.   3.  Mild microangiopathic change.                        Findings were directly discussed with Stone Lucas at 7:11 a.m.                         Workstation performed: [id number]         XR abdomen 1 view kub   Final Result by Satya Gage MD (06/30 1430)      1. Enteric contrast material seen further in the colon. Persistent small bowel dilatation centrally may be related to partial or intermittent small bowel obstruction.               Workstation performed: [id number]         XR abdomen complete inc upright and/or decubitus   Final Result by Sp Tan MD (06/27 1937)      Contrast noted in the colon thus excluding a complete small bowel obstruction. The small bowel dilatation appears improved compared to most recent CT scan.               Workstation performed: [id number]         XR chest pa & lateral   Final Result by Joce Matthews MD (06/26 0605)      NG tube in the stomach. There is moderate gaseous distention of the stomach.            Workstation performed: [id number]         CT abdomen pelvis wo contrast   Final Result by Sp Barroso MD (06/25 1057)      Gastric distention and associated proximal small bowel dilatation and stool like contents within the dilated loops of small bowel without discrete transition point identified. Findings suggest chronic ileus though chronic partial small bowel obstruction    not entirely excluded.      Juxtarenal abdominal aortic aneurysm measuring 4.5 x 4.2 cm. Suprarenal abdominal aorta is dilated measuring 3.7 cm.      Workstation performed: [id number]         XR chest portable   Final Result by Joesph Marshall MD (06/21 1417)      No acute cardiopulmonary disease.            Workstation performed: [id number]         XR chest portable ICU   Final Result by Nataliia Evans MD (06/20 1543)      Expected appearance after CABG.      Left base opacity, likely atelectasis. Small effusion not excluded.            Workstation performed: [id number]         MRI Inpatient Order    (Results Pending)       Other Diagnostic Testing: I have personally reviewed pertinent reports.      ACTIVE  MEDICATIONS     Current Facility-Administered Medications   Medication Dose Route Frequency    acetaminophen (TYLENOL) tablet 975 mg  975 mg Oral Q6H While awake    aspirin (ECOTRIN LOW STRENGTH) EC tablet 81 mg  81 mg Oral Daily    atorvastatin (LIPITOR) tablet 80 mg  80 mg Oral Daily With Dinner    bisacodyl (DULCOLAX) rectal suppository 10 mg  10 mg Rectal Daily PRN    chlorhexidine (PERIDEX) 0.12 % oral rinse 15 mL  15 mL Mouth/Throat BID    clopidogrel (PLAVIX) tablet 75 mg  75 mg Oral Daily    heparin (porcine) subcutaneous injection 5,000 Units  5,000 Units Subcutaneous Q8H KRISTI    hydrALAZINE (APRESOLINE) injection 5 mg  5 mg Intravenous Q6H PRN    insulin lispro (HumALOG/ADMELOG) 100 units/mL subcutaneous injection 1-5 Units  1-5 Units Subcutaneous HS    insulin lispro (HumALOG/ADMELOG) 100 units/mL subcutaneous injection 1-6 Units  1-6 Units Subcutaneous TID AC    lidocaine (LIDODERM) 5 % patch 2 patch  2 patch Topical Daily    magnesium hydroxide (MILK OF MAGNESIA) oral suspension 30 mL  30 mL Oral Daily PRN    melatonin tablet 6 mg  6 mg Oral HS    methocarbamol (ROBAXIN) tablet 500 mg  500 mg Oral Q6H PRN    metoprolol tartrate (LOPRESSOR) tablet 50 mg  50 mg Oral Q12H KRISTI    ondansetron (ZOFRAN) injection 4 mg  4 mg Intravenous Q6H PRN    pantoprazole (PROTONIX) EC tablet 40 mg  40 mg Oral Early Morning    polyethylene glycol (MIRALAX) packet 17 g  17 g Oral Daily    senna-docusate sodium (SENOKOT S) 8.6-50 mg per tablet 1 tablet  1 tablet Oral BID PRN    temazepam (RESTORIL) capsule 15 mg  15 mg Oral HS PRN    trimethobenzamide (TIGAN) IM injection 200 mg  200 mg Intramuscular Q6H PRN       Prior to Admission medications    Medication Sig Start Date End Date Taking? Authorizing Provider   amLODIPine (NORVASC) 10 mg tablet Take 1 tablet (10 mg total) by mouth daily 3/7/24  Yes Jose Juares MD   aspirin 325 mg tablet Take 1 tablet (325 mg total) by mouth daily 5/14/24  Yes SANTHOSH Dominguez    isosorbide mononitrate (IMDUR) 60 mg 24 hr tablet Take 1 tablet (60 mg total) by mouth 2 (two) times a day 7/25/23  Yes Jose Juares MD   metoprolol tartrate (LOPRESSOR) 50 mg tablet Take 1 tablet (50 mg total) by mouth 2 (two) times a day 12/27/23  Yes Jose Juares MD   mupirocin (BACTROBAN) 2 % ointment Apply topically 2 (two) times a day 6/7/24  Yes Ernestina Jj PA-C   ranolazine (RANEXA) 500 mg 12 hr tablet Take 1 tablet (500 mg total) by mouth 2 (two) times a day 7/25/23  Yes Jose Juares MD   rosuvastatin (CRESTOR) 40 MG tablet Take 1 tablet (40 mg total) by mouth daily 6/3/24  Yes SANTHOSH Duckworth   tamsulosin (FLOMAX) 0.4 mg Take 1 capsule by mouth daily with dinner 10/24/23  Yes Jose Juares MD   clopidogrel (PLAVIX) 75 mg tablet Take 1 tablet (75 mg total) by mouth daily 5/22/24   Jose Juares MD   nitroglycerin (NITROSTAT) 0.4 mg SL tablet Place 1 tablet (0.4 mg total) under the tongue every 5 (five) minutes as needed for chest pain 5/14/24   SANTHOSH Dominguez       CODE STATUS & ADVANCED DIRECTIVES     Code Status: Level 1 - Full Code  Advance Directive and Living Will:      Power of :    POLST:        VTE Pharmacologic Prophylaxis: per primary team  VTE Mechanical Prophylaxis: per primary team    ======    I have discussed the patient's history, physical exam findings, assessment, and plan in detail with attending, Dr. León    Thank you for allowing me to participate in the care of your patient, Shane Chau Sr..    Eugenia Agustin  [facility] Neurology Residency, PGY-2

## 2024-07-03 NOTE — ASSESSMENT & PLAN NOTE
Shane Chau . is a 78 y.o. male with pertinent PMHx HTN, HLD, multiple TIAs, abdominal aortic aneurysm s/p repair in 2011, who is currently admitted where he had a CABG x 3 performed on 6/20/2024. Stroke alert called this morning at 6:20 AM. Patient was last seen normal at 4:30 AM. Nursing staff noticed at 6 AM that patient was dysarthric, and had left-sided weakness was appreciated when patient stood to use the scale. On neurology arrival patient had BP of 136/75, FSBG 111. Left sided weakness resolved. NIH SS 2 for left facial droop and dysarthria. NC CTH without acute intracranial abnormalities, chronic lacunar infarcts in the right BG corona radiata, chronic bilateral cerebellar lacunar infarcts, and mild microangiopathic changes. CTA H/N showed moderate to severe atherosclerotic stenosis of the origin of the left vertebral artery, patent major vessels of the [place] without high-grade stenosis, no aneurysm, and no other hemodynamically significant stenosis in the cervical carotid or right vertebral artery.s a result of recent CABG patient was not determined to be a candidate for thrombolysis (TNK).        Impression: Impression: Clinical presentation concerning for acute vascular insult . Further work up as outlined below to identify presence of a vascular insult.     Plan:  Frequent neuro checks per protocol. If there is any acute changes in exam, please obtain stat CT head and notify neurology team  BP Goals: Normotension  Antiplatelet agents: Continue DAPT with ASA 81 mg and Plavix 75 mg   Statin: Continue statin  Labs: P2Y12 - 180  Brain Imaging: Obtain MRI brain without contrast  Will consider medication changes after reviewing MRI results  TTE pending  Euthermic/Euglycemic  DVT PPx: Heparin, SCDs  PT/OT/ST/PMR evaluations when able  Stroke education and counseling  Rest of other care per primary team appreciated    Disposition: PT Recommendations - Rehab Resource Intensity Level, PT:  I (Maximum Resource Intensit

## 2024-07-03 NOTE — PLAN OF CARE
Problem: PHYSICAL THERAPY ADULT  Goal: Performs mobility at highest level of function for planned discharge setting.  See evaluation for individualized goals.  Description: Treatment/Interventions: Functional transfer training, LE strengthening/ROM, Elevations, Therapeutic exercise, Endurance training, Cognitive reorientation, Patient/family training, Equipment eval/education, Bed mobility, Gait training, Spoke to nursing, Spoke to case management, OT  Equipment Recommended: Walker (pt reports owning)       See flowsheet documentation for full assessment, interventions and recommendations.  Outcome: Progressing  Note: Prognosis: Good  Problem List: Decreased strength, Decreased endurance, Impaired balance, Decreased mobility, Impaired hearing  Assessment: Pt presents s/p rapid response earlier in AM due to acute neurologic change and additional workup is pending; no overt focal weakness noted in the LE w/ overall observed mobility status appearing to be at the level noted during last session (min (A) at this time); pt does exhibit some slurred speech but able to respond appropriately and follow directions consistently; overall, will cont to follow pt per POC to achieve Tx goals; D/C recommendations are listed below  Barriers to Discharge: Inaccessible home environment     Rehab Resource Intensity Level, PT: I (Maximum Resource Intensity)    See flowsheet documentation for full assessment.

## 2024-07-03 NOTE — CONSULTS
PHYSICAL MEDICINE AND REHABILITATION CONSULT NOTE  Shane Chau Sr. 78 y.o. male MRN: [id number]  Unit/Bed#: [facility] 405-01 Encounter: [id number]    Requested by (Physician/Service): KALPANA Amos MD  Reason for Consultation:  Assessment of rehabilitation needs    Assessment:  Rehabilitation Diagnosis:   Stroke like symptoms including dysarthria and left sided weakness   CAD s/p CABG  Impaired mobility and self care  Impaired cognition     Recommendations:  Rehabilitation Plan:  Continue PT/OT (SLP) while on acute care.  MRI is pending however if found to have acute infarct he may be a candidate for acute inpatient rehabilitation. Currently arrangement have been made for inpatient rehabilitation at [facility].     Medical Co-morbidities Plan:  CAD s/p CABG x 3 6/20/2024  Hypertension   Acute kidney injury  Bilateral carotid stenosis   Hyperlipidemia   Abdominal aortic aneurysm   Chronic right basal ganglia infarcts  Bowel plan: LBM 7/2/2024, incontinent   Bladder plan: Condom catheter  DVT ppx: heparin SQ and SCD    Thank you for this consultation.  Do not hesitate to contact service with further questions.      SANTHOSH Shaikh  PM&R    I have spent a total time of 30 minutes on 07/03/24 in caring for this patient including Patient and family education, Counseling / Coordination of care, Documenting in the medical record, Reviewing / ordering tests, medicine, procedures  , Obtaining or reviewing history  , and Communicating with other healthcare professionals .      History of Present Illness:  Shane Chau Sr. is a 78 y.o. male with a PMH of CAD, JENN x 2, BPH, HLD, HTN, TIAs, abdominal aortic aneurysm s/p repair 2011 who presented to the [facility] on 6/20/2024 for CABG. He had a nuclear stress test in 8/2023 that demonstrated a reversible perfusion defect and mild ischemia of mid to distal anterior and apical walls. He followed up with cardiology 5/2024 and  "at that time reported dyspnea and chest pain with exertion to  point where he needed to take nitro. He underwent cardiac catheterization on 6/3 which showed severe multivessel CAD and was referred for CABG evaluation at that time. He was admitted on 6/20/2024 and underwent CABG x 3. Intraoperative DEDRICK showed EF of 55%. On 7/3/2024 rapid response was called for dysarthria and left sided weakness. CT head showed no acute intracranial abnormality. Chronic lacunar infarcts in the right basal ganglia and corona radiata. Chronic bilateral cerebellar lacunar infarcts. MRI is pending. PM&R are consulted for rehabilitation recommendations.     The patient was seen in his room with his spouse at bedside. Currently the patient denies any weakness, numbness, tingling, blurred vision, double vision, slurred speech or expressive aphasia. He denies any exertional chest pain with ambulation and transfers. Spouse at bedside reports that he was to transfer to [facility] today for rehabilitation.      Review of Systems: 10 point ROS negative except for what is noted in HPI    Function:  Prior level of function and living situation: The patient lives with his spouse in a two story home with 13 steps up to the front door or 5 steps in from the garage. He was independent.       Current level of function:  Physical Therapy:Minimal assist for bed mobility, minimal assist for transfers and  ambulation   Occupational Therapy: minimal assist for grooming, moderate assist for toileting   Speech Therapy: dysphagia level 3 with thins      Physical Exam:  /64   Pulse 68   Temp (!) 97.4 °F (36.3 °C)   Resp 18   Ht 5' 5\" (1.651 m)   Wt 83 kg (182 lb 15.7 oz)   SpO2 98%   BMI 30.45 kg/m²        Intake/Output Summary (Last 24 hours) at 7/3/2024 1000  Last data filed at 7/3/2024 0918  Gross per 24 hour   Intake 118 ml   Output 1268 ml   Net -1150 ml       Body mass index is 30.45 kg/m².      Physical Exam  Constitutional:       " General: He is not in acute distress.     Appearance: He is not toxic-appearing.   HENT:      Head: Normocephalic and atraumatic.      Right Ear: External ear normal.      Left Ear: External ear normal.      Nose: Nose normal.      Mouth/Throat:      Mouth: Mucous membranes are moist.      Pharynx: Oropharynx is clear.   Pulmonary:      Effort: Pulmonary effort is normal. No respiratory distress.   Abdominal:      General: There is no distension.   Musculoskeletal:         General: Normal range of motion.   Skin:     General: Skin is warm and dry.   Neurological:      Mental Status: He is alert.      Comments: Oriented to person/place, mild dysmetria LUE on FTN testing, mild dysarthria    Psychiatric:         Mood and Affect: Mood normal.              Social History:    Social History     Socioeconomic History    Marital status: /Civil Union     Spouse name: None    Number of children: None    Years of education: None    Highest education level: None   Occupational History    Occupation: Retired    Tobacco Use    Smoking status: Former     Current packs/day: 0.00     Average packs/day: 0.5 packs/day for 30.0 years (15.0 ttl pk-yrs)     Types: Cigarettes     Start date: 3/10/1956     Quit date: 3/10/1986     Years since quittin.3    Smokeless tobacco: Never    Tobacco comments:     Quit  about 25 years x1ppd   Vaping Use    Vaping status: Never Used   Substance and Sexual Activity    Alcohol use: Not Currently     Comment: 4 oz glass of wine every other day w/ his dinner    Drug use: No    Sexual activity: Not Currently     Partners: Female   Other Topics Concern    None   Social History Narrative    None     Social Determinants of Health     Financial Resource Strain: Low Risk  (2023)    Overall Financial Resource Strain (CARDIA)     Difficulty of Paying Living Expenses: Not very hard   Food Insecurity: No Food Insecurity (2024)    Hunger Vital Sign     Worried About Running Out of Food  in the Last Year: Never true     Ran Out of Food in the Last Year: Never true   Transportation Needs: No Transportation Needs (6/21/2024)    PRAPARE - Transportation     Lack of Transportation (Medical): No     Lack of Transportation (Non-Medical): No   Physical Activity: Not on file   Stress: Not on file   Social Connections: Unknown (6/18/2024)    Received from [email or web address]    Social Connections     How often do you feel lonely or isolated from those around you? (Adult - for ages 18 years and over): Not on file   Intimate Partner Violence: Not on file   Housing Stability: Low Risk  (6/21/2024)    Housing Stability Vital Sign     Unable to Pay for Housing in the Last Year: No     Number of Times Moved in the Last Year: 0     Homeless in the Last Year: No        Family History:    Family History   Problem Relation Age of Onset    Heart disease Mother     Hypertension Mother         Benign Essential     Stroke Mother         Stroke     Heart disease Father     Coronary artery disease Brother     Diabetes Brother         mellitus     Heart disease Brother     Aortic aneurysm Brother     Coronary artery disease Brother     Sudden death Brother     Coronary artery disease Brother     Sudden death Brother     Sudden death Brother     Coronary artery disease Brother     Heart disease Family          Medications:     Current Facility-Administered Medications:     acetaminophen (TYLENOL) tablet 975 mg, 975 mg, Oral, Q6H While awake, Moraima Alvarez PA-C, 975 mg at 07/03/24 0906    aspirin (ECOTRIN LOW STRENGTH) EC tablet 81 mg, 81 mg, Oral, Daily, Moraima Alvarez PA-C, 81 mg at 07/03/24 0906    atorvastatin (LIPITOR) tablet 80 mg, 80 mg, Oral, Daily With Dinner, Moraima Alvarez PA-C, 80 mg at 07/02/24 1712    bisacodyl (DULCOLAX) rectal suppository 10 mg, 10 mg, Rectal, Daily PRN, KALPANA Amos MD    chlorhexidine (PERIDEX) 0.12 % oral rinse 15 mL, 15 mL, Mouth/Throat, BID, Moraima Alvarez PA-C, 15 mL  at 07/02/24 1714    clopidogrel (PLAVIX) tablet 75 mg, 75 mg, Oral, Daily, Moraima Alvarez PA-C, 75 mg at 07/03/24 0906    heparin (porcine) subcutaneous injection 5,000 Units, 5,000 Units, Subcutaneous, Q8H KRISTI, Moraima Alvarez PA-C, 5,000 Units at 07/03/24 0529    hydrALAZINE (APRESOLINE) injection 5 mg, 5 mg, Intravenous, Q6H PRN, Moraima Alvarez PA-C    insulin lispro (HumALOG/ADMELOG) 100 units/mL subcutaneous injection 1-5 Units, 1-5 Units, Subcutaneous, HS, Moraima Alvarez PA-C, 2 Units at 06/23/24 2139    insulin lispro (HumALOG/ADMELOG) 100 units/mL subcutaneous injection 1-6 Units, 1-6 Units, Subcutaneous, TID AC, 4 Units at 07/01/24 1700 **AND** Fingerstick Glucose (POCT), , , TID AC, Moraima Alvarez PA-C    lidocaine (LIDODERM) 5 % patch 2 patch, 2 patch, Topical, Daily, Moraima Alvarez PA-C, 2 patch at 06/29/24 2145    magnesium hydroxide (MILK OF MAGNESIA) oral suspension 30 mL, 30 mL, Oral, Daily PRN, Rahel Baig PA-C    melatonin tablet 6 mg, 6 mg, Oral, HS, Rahel Baig PA-C, 6 mg at 07/02/24 2126    methocarbamol (ROBAXIN) tablet 500 mg, 500 mg, Oral, Q6H PRN, Miranda Mijares PA-C    metoprolol tartrate (LOPRESSOR) tablet 50 mg, 50 mg, Oral, Q12H KRISTI, Sandy Mckeon PA-C, 50 mg at 07/03/24 0906    ondansetron (ZOFRAN) injection 4 mg, 4 mg, Intravenous, Q6H PRN, Moraima Alvarez PA-C, 4 mg at 06/28/24 2155    pantoprazole (PROTONIX) EC tablet 40 mg, 40 mg, Oral, Early Morning, KALPANA Amos MD, 40 mg at 07/03/24 0529    polyethylene glycol (MIRALAX) packet 17 g, 17 g, Oral, Daily, Moraima Alvarez PA-C, 17 g at 07/03/24 0906    senna-docusate sodium (SENOKOT S) 8.6-50 mg per tablet 1 tablet, 1 tablet, Oral, BID PRN, Miranda Mijares PA-C    temazepam (RESTORIL) capsule 15 mg, 15 mg, Oral, HS PRN, Moraima Alvarez PA-C    trimethobenzamide (TIGAN) IM injection 200 mg, 200 mg, Intramuscular, Q6H PRN, Rudolph Gunter PA-C    Past  Medical History:     Past Medical History:   Diagnosis Date    AAA (abdominal aortic aneurysm) (HCC)     s/p open repair    BPH (benign prostatic hyperplasia)     CAD (coronary artery disease)     Cholelithiasis     Former tobacco use     History of MI (myocardial infarction)     History of TIA (transient ischemic attack)     Hyperlipidemia     Hypertension     Insomnia     Obesity         Past Surgical History:     Past Surgical History:   Procedure Laterality Date    ABDOMINAL AORTIC ANEURYSM REPAIR  06/07/2011    Dr. Yarbrough 6/7/11    APPENDECTOMY      CARDIAC CATHETERIZATION Left 06/03/2024    Procedure: Cardiac Left Heart Cath;  Surgeon: Pk Blanco DO;  Location: [facility];  Service: Cardiology    CARDIAC CATHETERIZATION  06/03/2024    Procedure: Cardiac catheterization;  Surgeon: Pk Blanco DO;  Location: [facility];  Service: Cardiology    CARDIAC CATHETERIZATION N/A 06/03/2024    Procedure: Cardiac Coronary Angiogram;  Surgeon: Pk Blanco DO;  Location: [facility];  Service: Cardiology    CATARACT EXTRACTION Right 11/2018    CHOLECYSTECTOMY LAPAROSCOPIC      COLONOSCOPY  11/2006    CORONARY ANGIOPLASTY      3 STENTS INSERTED    HARVEST VEIN Left 6/20/2024    Procedure: HARVEST VEIN ENDOSCOPIC (EVH);  Surgeon: KALPANA Amos MD;  Location: [facility] MAIN [facility];  Service: Cardiac Surgery    MN CORONARY ARTERY BYP W/VEIN & ARTERY GRAFT 3 VEIN N/A 6/20/2024    Procedure: CORONARY ARTERY BYPASS GRAFT (CABG) X3 VESSELS, LIMA - LAD, LEFT LEG EVH/SVG - PDA AND OM1, W/ DEDRICK;  Surgeon: KALPANA Amos MD;  Location: [facility];  Service: Cardiac Surgery    MN LAPS SURG CHOLECYSTECTOMY W/CHOLANGIOGRAPHY N/A 09/08/2016    Procedure: CHOLECYSTECTOMY LAPAROSCOPIC with intra-op cholangiogram ;  Surgeon: Prasanth Verdin MD;  Location: [facility];  Service: General         Allergies:     No Known Allergies        LABORATORY RESULTS:      Lab Results    Component Value Date    HGB 9.9 (L) 07/03/2024    HGB 13.0 12/02/2015    HCT 30.2 (L) 07/03/2024    HCT 37.6 12/02/2015    WBC 12.67 (H) 07/03/2024    WBC 6.55 12/02/2015     Lab Results   Component Value Date    BUN 23 07/03/2024    BUN 15 12/03/2015     12/03/2015    K 3.9 07/03/2024    K 4.3 12/03/2015     07/03/2024     12/03/2015    GLUCOSE 140 06/20/2024    GLUCOSE 121 12/03/2015    CREATININE 1.12 07/03/2024    CREATININE 0.86 12/03/2015     Lab Results   Component Value Date    PROTIME 16.7 (H) 06/20/2024    PROTIME 14.0 12/02/2015    INR 1.37 (H) 06/20/2024    INR 1.15 12/02/2015        DIAGNOSTIC STUDIES: Reviewed  XR chest pa & lateral    Result Date: 6/26/2024  Impression: NG tube in the stomach. There is moderate gaseous distention of the stomach. Workstation performed: [id number]     CT abdomen pelvis wo contrast    Result Date: 6/25/2024  Impression: Gastric distention and associated proximal small bowel dilatation and stool like contents within the dilated loops of small bowel without discrete transition point identified. Findings suggest chronic ileus though chronic partial small bowel obstruction not entirely excluded. Juxtarenal abdominal aortic aneurysm measuring 4.5 x 4.2 cm. Suprarenal abdominal aorta is dilated measuring 3.7 cm. Workstation performed: [id number]     XR chest portable    Result Date: 6/21/2024  Impression: No acute cardiopulmonary disease. Workstation performed: [id number]     XR chest portable ICU    Result Date: 6/20/2024  Impression: Expected appearance after CABG. Left base opacity, likely atelectasis. Small effusion not excluded. Workstation performed: [id number]

## 2024-07-03 NOTE — PROGRESS NOTES
"Progress Note - Cardiothoracic Surgery   Shane Chau Sr. 78 y.o. male MRN: [id number]  Unit/Bed#: [facility] 405-01 Encounter: [id number]    Coronary artery disease. S/P coronary artery bypass grafting; POD # 13    24 Hour Events: RRT called/stroke alert for change in neuro status with slurring and possible left sided weakness     No pain.  No abdominal complaints.  He keeps telling me \"I'm hungry.\"    Medications:   Scheduled Meds:  Current Facility-Administered Medications   Medication Dose Route Frequency Provider Last Rate    acetaminophen  975 mg Oral Q6H While awake Moraima Alvarez PA-C      aspirin  81 mg Oral Daily Moraima Alvarez PA-C      atorvastatin  80 mg Oral Daily With Dinner Moraima Alvarez PA-C      bisacodyl  10 mg Rectal Daily PRN KALPANA Amos MD      chlorhexidine  15 mL Mouth/Throat BID Moraima Alvarez PA-C      clopidogrel  75 mg Oral Daily Moraima Alvarez PA-C      heparin (porcine)  5,000 Units Subcutaneous Q8H KRISTI Moraima Alvarez PA-C      hydrALAZINE  5 mg Intravenous Q6H PRN Moraima Alvarez PA-C      insulin lispro  1-5 Units Subcutaneous HS Moraima Alvarez PA-C      insulin lispro  1-6 Units Subcutaneous TID AC Moraima Alvarez PA-C      lidocaine  2 patch Topical Daily Moraima Alvarez PA-C      magnesium hydroxide  30 mL Oral Daily PRN Rahel Baig PA-C      melatonin  6 mg Oral HS Rahel Baig PA-C      methocarbamol  500 mg Oral Q6H PRN Miranda Mijares PA-C      metoprolol tartrate  50 mg Oral Q12H KRISTI Sandy Mckeon PA-C      ondansetron  4 mg Intravenous Q6H PRN Moraima Alvarez PA-C      pantoprazole  40 mg Oral Early Morning KALPANA Amos MD      polyethylene glycol  17 g Oral Daily Moraima Alvarez PA-C      polyethylene glycol  17 g Oral Daily PRN Loreta Santamaria PA-C      senna-docusate sodium  1 tablet Oral BID Miranda Mijares PA-C      temazepam  15 mg Oral HS PRN Moraima Alvarez PA-C "      trimethobenzamide  200 mg Intramuscular Q6H PRN Rudolph Gunter PA-C       Continuous Infusions:   PRN Meds:.  bisacodyl    hydrALAZINE    magnesium hydroxide    methocarbamol    ondansetron    polyethylene glycol    temazepam    trimethobenzamide    Vitals:   Vitals:    07/03/24 0241 07/03/24 0600 07/03/24 0614 07/03/24 0715   BP: 125/66  134/76 121/66   BP Location:    Left arm   Pulse: 69  80 71   Resp: 16      Temp: 97.5 °F (36.4 °C)      TempSrc:       SpO2: 96%  96% 96%   Weight:  83 kg (182 lb 15.7 oz)     Height:           Telemetry: NSR; Heart Rate: 69    Respiratory:   SpO2: SpO2: 96 %; Room Air    Intake/Output:     Intake/Output Summary (Last 24 hours) at 7/3/2024 0733  Last data filed at 7/3/2024 0534  Gross per 24 hour   Intake --   Output 968 ml   Net -968 ml        Weights:   Weight (last 2 days)       Date/Time Weight    07/03/24 0600 83 (182.98)    07/02/24 0600 83.2 (183.42)    07/01/24 0540 83.6 (184.2)          Results:   Results from last 7 days   Lab Units 07/03/24  0436 07/01/24  0500 06/30/24  0500   WBC Thousand/uL 12.67* 14.31* 17.34*   HEMOGLOBIN g/dL 9.9* 9.7* 10.3*   HEMATOCRIT % 30.2* 29.5* 31.2*   PLATELETS Thousands/uL 232 213 197     Results from last 7 days   Lab Units 07/03/24  0436 07/01/24  0500 06/30/24  0500   SODIUM mmol/L 132* 133* 138   POTASSIUM mmol/L 3.9 3.8 3.9   CHLORIDE mmol/L 100 103 106   CO2 mmol/L 25 24 24   BUN mg/dL 23 32* 50*   CREATININE mg/dL 1.12 1.10 1.16   CALCIUM mg/dL 7.3* 6.8* 7.1*     Point of care glucose:     Studies:  CTA stroke alert:  IMPRESSION:     1.  Patent major vessels of the [place] of [place] without high-grade stenosis.  No aneurysm.  2.  Moderate to severe atherosclerotic stenosis at the origin of the left vertebral artery.  3.  No hemodynamically significant stenosis in the cervical carotid and the right vertebral arteries.    CT head:   IMPRESSION:     1.  No acute intracranial CT abnormality.  2.  Chronic lacunar infarcts  in the right basal ganglia and corona radiata. Chronic bilateral cerebellar lacunar infarcts.  3.  Mild microangiopathic change.    Invasive Lines/Tubes:  Invasive Devices       Peripheral Intravenous Line  Duration             Peripheral IV 07/02/24 Right;Ventral (anterior) Forearm 1 day    Peripheral IV 07/03/24 Right Antecubital <1 day              Drain  Duration             External Urinary Catheter Small 4 days                    Physical Exam:    General: No acute distress and Alert  HEENT/NECK:  Normocephalic. Atraumatic.  No jugular venous distention.    Cardiac: Regular rate and rhythm  Pulmonary:  Breath sounds clear bilaterally  Abdomen:  Non-tender, Non-distended, and Distended + BMs  Incisions: Sternum is stable.  Incision is clean, dry, and intact.  and Saphenectomy incison is clean, dry, and intact.   Extremities: Extremities warm/dry and Trace edema B/L  Neuro: Alert and Oriented to time, place, person, follows commands in upper and lower extremities, he is 5/5 strength equal in UE and LE with grossly intact sensation  Speech is slow and mumbled but can be understood   Skin: Warm/Dry, without rashes or lesions.    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 S/P coronary artery bypass grafting; POD # 13    Plan:    Cardiac:   LVEF 60%, NSR with stable BP MAP 70-80  Continue metoprolol 50 mg bid   On ASA, statin therapy    Pulmonary:     Good Room air oxygen saturation; Continue incentive spirometry/Coughing/Deep breathing exercises      Renal:     Post op Creatinine stable; Follow up labs prn   Diuretic Regimen: hold  for now    Neuro: stroke pathway, neurology consult incomplete but seen at bedside by resident.  CT head/CTA complete without an acute finding. NPO for now until seen by speech for evaluation.   He had hx of previous TIAs in the past has been on ASA and Plavix therapy postoperatively     GI: postoperative ileus resolved, no abdominal pain or complaints.  Start to decrease his bowel regimen due to ongoing BMs     Continue stool softeners and prn suppository    Continue GI prophylaxis    Endo: no issues on SSI coverage     7    Hematology:     Post-operative acute blood loss anemia; Hemoglobin 9.9; trend prn    8.   Disposition:    pending    Called his wife, Maryjo.  Explained the current situation.  He has history of TIAs in the past, she tells me those were also speech related TIAs. She is coming to visit him here and aware he will stay in the hospital pending progress.    VTE Pharmacologic Prophylaxis: Heparin  VTE Mechanical Prophylaxis: sequential compression device    Collaborative rounds completed with supervising physician  Plan of care discussed with bedside nurse    SIGNATURE: Miranda Mijares PA-C  DATE: July 3, 2024  TIME: 7:33 AM

## 2024-07-03 NOTE — PROGRESS NOTES
RR was called this morning around 0618 when we noticed a L side weakness during standing to be weighed on scale. Pt wasn't able to grab the bar and staff felt he was a bit more unstable standing. Sat him back down on the bed and noticed speech was slurring which prompt us to call RR.

## 2024-07-03 NOTE — RAPID RESPONSE
Rapid Response Note  Shane Chau Sr. 78 y.o. male MRN: [id number]  Unit/Bed#: [facility] 405-01 Encounter: [id number]    Rapid Response Notification(s):   Response called date/time:  7/3/2024 6:10 AM  Response team arrival date/time:  7/3/2024 6:11 AM  Level of care:  [facility]  Rapid response location:  [facility] unit  Primary reason for rapid response call:  Acute change in neuro status    Rapid Response Intervention(s):   Airway:  None  Breathing:  None  Circulation:  None  Fluids administered:  None  Medications administered:  None       Assessment:   Stroke like symptoms    Plan:   Initiate stroke alert and stroke pathway  CTA/head and neck  Neurology consult     Rapid Response Outcome:   Transfer:  Remain on floor  Primary service notified of transfer: Yes    Code Status: Level 1 (Full Code)      Family notified: Primary team to notify Family Member       Background/Situation:   Shane Chau Sr. is a 78 y.o. male who is admitted for CABG. RRT called for garbled speech and reported left sided weakness. JULIETTEW 0430.    Review of Systems   All other systems reviewed and are negative.      Objective:   Vitals:    07/02/24 1925 07/02/24 2250 07/03/24 0241 07/03/24 0614   BP: 121/66 123/66 125/66 134/76   BP Location: Right arm Right arm     Pulse: 71 72 69 80   Resp: 17 17 16    Temp: 97.8 °F (36.6 °C) (!) 97.4 °F (36.3 °C) 97.5 °F (36.4 °C)    TempSrc: Oral Oral     SpO2: 96% 96% 96% 96%   Weight:       Height:         Physical Exam  Vitals reviewed.   Constitutional:       General: He is not in acute distress.     Appearance: He is not ill-appearing, toxic-appearing or diaphoretic.   Cardiovascular:      Rate and Rhythm: Normal rate.   Pulmonary:      Effort: No tachypnea, accessory muscle usage or respiratory distress.   Abdominal:      Palpations: Abdomen is soft.   Skin:     General: Skin is warm.   Neurological:      Mental Status: He is alert.      Comments: Neurologic Physical Exam:    Mental  Status  Orientation to: time, date, person, place  GCS: Eye Spontaneous = 4, Verbal Oriented = 5, Motor Obeys commands = 6, n/a  Follows commands x2: Follows commands in upper extremities, Follows commands in lower extremities, and Can follow 2 step commands  Speech: slowed, poor articulation, and mumbled    Cranial Nerves  Pupils: 4 mm bilaterally  EOM: full and intact  Facial Symmetry: facial symmetry equal  Visual fields: 0 = No visual field loss    Motor  RUE strength: 5/5: Full ROM against gravity and full resistance  RLE strength: 5/5: Full ROM against gravity and full resistance    LUE strength: 5/5: Full ROM against gravity and full resistance  LLE strength: 5/5: Full ROM against gravity and full resistance    Sensation  RUE sensation: normal  RLE sensation: normal    LUE sensation: normal  LLE sensation: normal    Coordination  Finger-to-nose: bilaterally intact

## 2024-07-03 NOTE — SPEECH THERAPY NOTE
Speech-Language Pathology Bedside Swallow Evaluation      Patient Name: Shane Chau Sr.    Today's Date: 7/3/2024     Problem List  Principal Problem:    S/P CABG x 3  Active Problems:    Abdominal aortic aneurysm without rupture (HCC)    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HCC)    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HCC)    Asymptomatic bilateral carotid artery stenosis    Coronary artery disease of native artery of native heart with stable angina pectoris (HCC)    Hyperlipidemia    Hypertension    BARBARA (acute kidney injury) (HCC)    Stroke-like symptoms      Past Medical History  Past Medical History:   Diagnosis Date    AAA (abdominal aortic aneurysm) (HCC)     s/p open repair    BPH (benign prostatic hyperplasia)     CAD (coronary artery disease)     Cholelithiasis     Former tobacco use     History of MI (myocardial infarction)     History of TIA (transient ischemic attack)     Hyperlipidemia     Hypertension     Insomnia     Obesity        Past Surgical History  Past Surgical History:   Procedure Laterality Date    ABDOMINAL AORTIC ANEURYSM REPAIR  06/07/2011    Dr. Yarbrough 6/7/11    APPENDECTOMY      CARDIAC CATHETERIZATION Left 06/03/2024    Procedure: Cardiac Left Heart Cath;  Surgeon: Pk Blanco DO;  Location: [facility];  Service: Cardiology    CARDIAC CATHETERIZATION  06/03/2024    Procedure: Cardiac catheterization;  Surgeon: Pk Blanco DO;  Location: [facility];  Service: Cardiology    CARDIAC CATHETERIZATION N/A 06/03/2024    Procedure: Cardiac Coronary Angiogram;  Surgeon: Pk Blanco DO;  Location: [facility];  Service: Cardiology    CATARACT EXTRACTION Right 11/2018    CHOLECYSTECTOMY LAPAROSCOPIC      COLONOSCOPY  11/2006    CORONARY ANGIOPLASTY      3 STENTS INSERTED    HARVEST VEIN Left 6/20/2024    Procedure: HARVEST VEIN ENDOSCOPIC (EVH);  Surgeon: KALPANA Amos MD;  Location: [facility];  Service: Cardiac Surgery    SD CORONARY  ARTERY BYP W/VEIN & ARTERY GRAFT 3 VEIN N/A 6/20/2024    Procedure: CORONARY ARTERY BYPASS GRAFT (CABG) X3 VESSELS, LIMA - LAD, LEFT LEG EVH/SVG - PDA AND OM1, W/ DEDRICK;  Surgeon: KALPANA Amos MD;  Location: [facility];  Service: Cardiac Surgery    SC LAPS SURG CHOLECYSTECTOMY W/CHOLANGIOGRAPHY N/A 09/08/2016    Procedure: CHOLECYSTECTOMY LAPAROSCOPIC with intra-op cholangiogram ;  Surgeon: Prasanth Verdin MD;  Location: [facility];  Service: General       Summary   Pt presented with s/s suggestive of moderate oral dysphagia characterized by decreased rotary chew pattern, prolonged mastication time and significant left side buccal pocketing with regular eggs. Patient has intermittent left anterior leakage with yogurt and thin liquids. The patient did not have pocketing observed with regular toast. Cough x1 observed with thin liquids. Otherwise pharyngeal stage of swallowing appears adequate.     Patient rapid response this morning for garbled speech and left side weakness. Awaiting MRI. Speech is occasionally slurred, but 100% intelligible.     Risk/s for Aspiration: low/mod     Recommended Diet: soft/level 3 diet and thin liquids   Recommended Form of Meds: whole with liquid   Aspiration precautions and swallowing strategies: upright posture, small bites/sips, and check left cheek for pocketing  Other Recommendations: Continue frequent oral care    Current Medical Status  HPI: Shane Kimalis Orozco is a 79 y/o male with hypertension, hyperlipidemia, multiple prior TIA's, BPH, open AAA repair (2011), insomnia, and known CAD with prior angioplasty (1980's), PCI (1996), JENN x2 to LCx (2013) who reported to his outpatient cardiologist that he was experiencing several months of worsening chest pain and dyspnea. He typically was able to walk daily on the treadmill, but had progressed to requiring frequent breaks and SL nitro. On 6/3/24, he underwent LHC, which revealed MVCAD. He presents today, 6/20/24, s/po  CABG x 3 (LIMA > LAD, SVG > PDA, SVG > OM1).    Intra-op DEDRICK LVEF 55%, RV mildly dilated with normal function, mild MR, mild TR, grade IV descending aortic atheroma, PFO    Current Precautions:  Fall  Aspiration    Allergies:  No known food allergies  Past medical history:  Please see H&P for details    Special Studies:  CT head 7/3/24:   1.  No acute intracranial CT abnormality.  2.  Chronic lacunar infarcts in the right basal ganglia and corona radiata. Chronic bilateral cerebellar lacunar infarcts.  3.  Mild microangiopathic change.    Social/Education/Vocational Hx:  Pt lives with family    Swallow Information   Current Risks for Dysphagia & Aspiration:  r/o CVA  Current Symptoms/Concerns: pocketing food  Current Diet: NPO   Baseline Diet: regular diet and thin liquids    Baseline Assessment   Behavior/Cognition: alert  Speech/Language Status: able to participate in conversation and able to follow commands  Patient Positioning: upright in bed  Pain Status/Interventions/Response to Interventions: No report of or nonverbal indications of pain.     Swallow Mechanism Exam  Facial: left facial droop  Labial: decreased ROM left side  Lingual: right sided tongue deviation  Velum: unable to visualize  Mandible: adequate ROM  Dentition: full dentures  Vocal quality:clear/adequate   Respiratory Status: on RA       Consistencies Assessed and Performance   Consistencies Administered: thin liquids, puree, and hard solids  Materials administered included yogurt, scrambled eggs and toast with thin liquids    Oral Stage: moderate  Bite strength is adequate. Rotary chew pattern is decreased, with prolonged mastication. Significant left side buccal pocketing observed with eggs. Patient tolerated toast and medications with water well. Intermittent anterior leakage observed with yogurt and thin liquids. Patient aware and able to clear.     Pharyngeal Stage: WFL  Swallow Mechanics:  Swallowing initiation appeared prompt.  Laryngeal  rise was observed and judged to be within functional limits.  No coughing, throat clearing, change in vocal quality or respiratory status noted today.     Esophageal Concerns: none reported    Summary and Recommendations (see above)    Results Reviewed with: patient, RN, and MD     Treatment Recommended: dysphagia therapy      Frequency of treatment: 2-3x week, as able     Patient Stated Goal: none stated     Dysphagia LTG  -Patient will demonstrate safe and effective oral intake (without overt s/s significant oral/pharyngeal dysphagia including s/s penetration or aspiration) for the highest appropriate diet level.     Short Term Goals:  -Pt will tolerate Dysphagia 3/advanced (dental soft) diet and thin liquid with no significant s/s oral or pharyngeal dysphagia across 1-3 diagnostic session/s.    -Patient will tolerate trials of upgraded food and/or liquid texture with no significant s/s of oral or pharyngeal dysphagia including aspiration across 1-3 diagnostic sessions     Speech Therapy Prognosis   Prognosis: fair    Prognosis Considerations: medical status

## 2024-07-03 NOTE — PLAN OF CARE
Problem: OCCUPATIONAL THERAPY ADULT  Goal: Performs self-care activities at highest level of function for planned discharge setting.  See evaluation for individualized goals.  Description: Treatment Interventions: ADL retraining, Functional transfer training, Endurance training, Patient/family training, Equipment evaluation/education, Continued evaluation, Cardiac education, Energy conservation, Activityengagement          See flowsheet documentation for full assessment, interventions and recommendations.   Outcome: Progressing  Note: Limitation: Decreased ADL status, Decreased Safe judgement during ADL, Decreased endurance, Decreased self-care trans, Decreased high-level ADLs  Prognosis: Fair  Assessment: Pt seen for OT treatment session day 7 on this date focused on ADL retraining, functional transfers and mobility, energy conservation, functional endurance. Pt seen for OT treatment session following stroke alert called this AM for facial droop, dysarthria, and L-sided weakness. Pt was greeted in bed and was cooperative throughout session. Following session, pt was left in bed with alarm on and all needs within reach. Pt continues to be limited by functional status related to ADLs and transfers requiring MAX A for LB bathing and toileting.   The patient's raw score on the -PAC Daily Activity Inpatient Short Form is 16. A raw score of less than 19 suggests the patient may benefit from discharge to post-acute rehabilitation services. Please refer to the recommendation of the Occupational Therapist for safe discharge planning. Pt would benefit from continued acute OT intervention with plan to continue OT treatment sessions 2-3x per week. Recommend d/c to level II services.     Rehab Resource Intensity Level, OT: II (Moderate Resource Intensity)

## 2024-07-04 LAB
ANION GAP SERPL CALCULATED.3IONS-SCNC: 7 MMOL/L (ref 4–13)
BUN SERPL-MCNC: 21 MG/DL (ref 5–25)
CALCIUM SERPL-MCNC: 7.3 MG/DL (ref 8.4–10.2)
CHLORIDE SERPL-SCNC: 102 MMOL/L (ref 96–108)
CHOLEST SERPL-MCNC: 43 MG/DL
CO2 SERPL-SCNC: 22 MMOL/L (ref 21–32)
CREAT SERPL-MCNC: 1.02 MG/DL (ref 0.6–1.3)
EST. AVERAGE GLUCOSE BLD GHB EST-MCNC: 117 MG/DL
GFR SERPL CREATININE-BSD FRML MDRD: 70 ML/MIN/1.73SQ M
GLUCOSE SERPL-MCNC: 103 MG/DL (ref 65–140)
GLUCOSE SERPL-MCNC: 109 MG/DL (ref 65–140)
GLUCOSE SERPL-MCNC: 131 MG/DL (ref 65–140)
GLUCOSE SERPL-MCNC: 139 MG/DL (ref 65–140)
GLUCOSE SERPL-MCNC: 154 MG/DL (ref 65–140)
HBA1C MFR BLD: 5.7 %
HDLC SERPL-MCNC: 12 MG/DL
LDLC SERPL CALC-MCNC: 10 MG/DL (ref 0–100)
POTASSIUM SERPL-SCNC: 3.9 MMOL/L (ref 3.5–5.3)
SODIUM SERPL-SCNC: 131 MMOL/L (ref 135–147)
TRIGL SERPL-MCNC: 103 MG/DL

## 2024-07-04 PROCEDURE — 82948 REAGENT STRIP/BLOOD GLUCOSE: CPT

## 2024-07-04 PROCEDURE — 80048 BASIC METABOLIC PNL TOTAL CA: CPT | Performed by: PHYSICIAN ASSISTANT

## 2024-07-04 PROCEDURE — 80061 LIPID PANEL: CPT

## 2024-07-04 PROCEDURE — 99024 POSTOP FOLLOW-UP VISIT: CPT | Performed by: PHYSICIAN ASSISTANT

## 2024-07-04 PROCEDURE — 83036 HEMOGLOBIN GLYCOSYLATED A1C: CPT

## 2024-07-04 RX ORDER — TORSEMIDE 20 MG/1
20 TABLET ORAL DAILY
Status: DISCONTINUED | OUTPATIENT
Start: 2024-07-04 | End: 2024-07-07

## 2024-07-04 RX ORDER — POTASSIUM CHLORIDE 20 MEQ/1
20 TABLET, EXTENDED RELEASE ORAL DAILY
Status: DISCONTINUED | OUTPATIENT
Start: 2024-07-04 | End: 2024-07-07

## 2024-07-04 RX ADMIN — ACETAMINOPHEN 975 MG: 325 TABLET, FILM COATED ORAL at 09:05

## 2024-07-04 RX ADMIN — HEPARIN SODIUM 5000 UNITS: 5000 INJECTION INTRAVENOUS; SUBCUTANEOUS at 22:00

## 2024-07-04 RX ADMIN — HEPARIN SODIUM 5000 UNITS: 5000 INJECTION INTRAVENOUS; SUBCUTANEOUS at 05:25

## 2024-07-04 RX ADMIN — PANTOPRAZOLE SODIUM 40 MG: 40 TABLET, DELAYED RELEASE ORAL at 05:25

## 2024-07-04 RX ADMIN — METOPROLOL TARTRATE 50 MG: 50 TABLET, FILM COATED ORAL at 22:00

## 2024-07-04 RX ADMIN — CLOPIDOGREL BISULFATE 75 MG: 75 TABLET ORAL at 09:05

## 2024-07-04 RX ADMIN — Medication 6 MG: at 22:00

## 2024-07-04 RX ADMIN — ATORVASTATIN CALCIUM 80 MG: 80 TABLET, FILM COATED ORAL at 17:14

## 2024-07-04 RX ADMIN — POLYETHYLENE GLYCOL 3350 17 G: 17 POWDER, FOR SOLUTION ORAL at 09:05

## 2024-07-04 RX ADMIN — INSULIN LISPRO 1 UNITS: 100 INJECTION, SOLUTION INTRAVENOUS; SUBCUTANEOUS at 11:02

## 2024-07-04 RX ADMIN — ACETAMINOPHEN 975 MG: 325 TABLET, FILM COATED ORAL at 22:00

## 2024-07-04 RX ADMIN — HEPARIN SODIUM 5000 UNITS: 5000 INJECTION INTRAVENOUS; SUBCUTANEOUS at 13:53

## 2024-07-04 RX ADMIN — POTASSIUM CHLORIDE 20 MEQ: 1500 TABLET, EXTENDED RELEASE ORAL at 11:02

## 2024-07-04 RX ADMIN — ASPIRIN 81 MG: 81 TABLET, COATED ORAL at 09:05

## 2024-07-04 RX ADMIN — TORSEMIDE 20 MG: 20 TABLET ORAL at 11:02

## 2024-07-04 RX ADMIN — METOPROLOL TARTRATE 50 MG: 50 TABLET, FILM COATED ORAL at 09:05

## 2024-07-04 RX ADMIN — CHLORHEXIDINE GLUCONATE 0.12% ORAL RINSE 15 ML: 1.2 LIQUID ORAL at 22:00

## 2024-07-04 RX ADMIN — CHLORHEXIDINE GLUCONATE 0.12% ORAL RINSE 15 ML: 1.2 LIQUID ORAL at 09:05

## 2024-07-04 RX ADMIN — ACETAMINOPHEN 975 MG: 325 TABLET, FILM COATED ORAL at 13:52

## 2024-07-04 NOTE — PROGRESS NOTES
Progress Note - Cardiothoracic Surgery   Shane Chau Sr. 78 y.o. male MRN: [id number]  Unit/Bed#: [facility] 405-01 Encounter: [id number]    Coronary artery disease. S/P coronary artery bypass grafting; POD # 14    24 Hour Events: imaging without any acute findings on CTA and CT yesterday, neurology following, speech clear and resolved.   No abdominal pain.  No n/v and he is tolerating diet.  No pain.     Medications:   Scheduled Meds:  Current Facility-Administered Medications   Medication Dose Route Frequency Provider Last Rate    acetaminophen  975 mg Oral Q6H While awake Moraima Alvarez PA-C      aspirin  81 mg Oral Daily oMraima Alvarez PA-C      atorvastatin  80 mg Oral Daily With Dinner Moraima Alvarez PA-C      bisacodyl  10 mg Rectal Daily PRN KALPANA Amos MD      chlorhexidine  15 mL Mouth/Throat BID Moraima Alvarez PA-C      clopidogrel  75 mg Oral Daily Moraima Alvarez PA-C      heparin (porcine)  5,000 Units Subcutaneous Q8H KRISTI Moraima Alvarez PA-C      hydrALAZINE  5 mg Intravenous Q6H PRN Moraima Alvarez PA-C      insulin lispro  1-5 Units Subcutaneous HS Moraima Alvarez PA-C      insulin lispro  1-6 Units Subcutaneous TID AC Moraima Alvarez PA-C      lidocaine  2 patch Topical Daily Moraima Alvarez PA-C      magnesium hydroxide  30 mL Oral Daily PRN Rahel Baig PA-C      melatonin  6 mg Oral HS Rahel Baig PA-C      methocarbamol  500 mg Oral Q6H PRN Miranda Mijares PA-C      metoprolol tartrate  50 mg Oral Q12H KRISTI Sandy Mckeon PA-C      ondansetron  4 mg Intravenous Q6H PRN Moraima Alvarez PA-C      pantoprazole  40 mg Oral Early Morning KALPANA Amos MD      polyethylene glycol  17 g Oral Daily Moraima Alvarez PA-C      senna-docusate sodium  1 tablet Oral BID PRN Miranda Mijares PA-C      temazepam  15 mg Oral HS PRN Moraima Alvarez PA-C      trimethobenzamide  200 mg Intramuscular Q6H PRN Rudolph RENTERIA  KATIA Gunter       Continuous Infusions:   PRN Meds:.  bisacodyl    hydrALAZINE    magnesium hydroxide    methocarbamol    ondansetron    senna-docusate sodium    temazepam    trimethobenzamide    Vitals:   Vitals:    07/04/24 0545 07/04/24 0651 07/04/24 0659 07/04/24 0700   BP:  124/77 124/77    BP Location:       Pulse:  69 62    Resp:  14 18    Temp:    98.5 °F (36.9 °C)   TempSrc:       SpO2:  96% 97%    Weight: 83.4 kg (183 lb 13.8 oz)      Height:           Telemetry: NSR; Heart Rate: 76    Respiratory:   SpO2: SpO2: 97 %; Room Air    Intake/Output:     Intake/Output Summary (Last 24 hours) at 7/4/2024 0755  Last data filed at 7/4/2024 0748  Gross per 24 hour   Intake 508 ml   Output 1266 ml   Net -758 ml        Weights:   Weight (last 2 days)       Date/Time Weight    07/04/24 0545 83.4 (183.86)    07/03/24 10:42:13 82.6 (182)    07/03/24 0600 83 (182.98)    07/02/24 0600 83.2 (183.42)            Results:   Results from last 7 days   Lab Units 07/03/24  0436 07/01/24  0500 06/30/24  0500   WBC Thousand/uL 12.67* 14.31* 17.34*   HEMOGLOBIN g/dL 9.9* 9.7* 10.3*   HEMATOCRIT % 30.2* 29.5* 31.2*   PLATELETS Thousands/uL 232 213 197     Results from last 7 days   Lab Units 07/04/24  0438 07/03/24  0436 07/01/24  0500   SODIUM mmol/L 131* 132* 133*   POTASSIUM mmol/L 3.9 3.9 3.8   CHLORIDE mmol/L 102 100 103   CO2 mmol/L 22 25 24   BUN mg/dL 21 23 32*   CREATININE mg/dL 1.02 1.12 1.10   CALCIUM mg/dL 7.3* 7.3* 6.8*     Point of care glucose:     Invasive Lines/Tubes:  Invasive Devices       Peripheral Intravenous Line  Duration             Peripheral IV 07/02/24 Right;Ventral (anterior) Forearm 2 days    Peripheral IV 07/03/24 Right Antecubital 1 day              Drain  Duration             External Urinary Catheter Small 5 days                    Physical Exam:    General: No acute distress, Alert, and Normal appearance  HEENT/NECK:  Normocephalic. Atraumatic.  No jugular venous distention.    Cardiac:  Regular rate and rhythm  Pulmonary:  Breath sounds clear bilaterally  Abdomen:  Non-tender, Normal bowel sounds, and Distended  Incisions: Sternum is stable.  Incision is clean, dry, and intact.  and Saphenectomy incision dressed with Acticoat.  No erythema or drainage  Extremities: Extremities warm/dry and 1+ edema B/L  Neuro: Alert and oriented X 3speech is clear this morning, strength equal bilaterally, no facial droop   Skin: Warm/Dry, without rashes or lesions.    Assessment:  Principal Problem:    S/P CABG x 3  Active Problems:    Abdominal aortic aneurysm without rupture ([facility])    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[facility])    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[facility])    Asymptomatic bilateral carotid artery stenosis    Coronary artery disease of native artery of native heart with stable angina pectoris ([facility])    Hyperlipidemia    Hypertension    BARBARA (acute kidney injury) ([facility])    Stroke-like symptoms       Coronary artery disease. S/P coronary artery bypass grafting; POD # 14    Plan:    Cardiac: LVEF 60%, on ASA and statin therapy  Continue metoprolol 50 mg bid   On ASA and statin therapy  Has been on his preoperative plavix since 6/21, P2Y12 below normal, per neurology will continue this plan    Pulmonary:     Good Room air oxygen saturation; Continue incentive spirometry/Coughing/Deep breathing exercises    Chest tubes have been discontinued    Renal:     Post op Creatinine stable; Follow up labs prn     Intake/Output net: -758 mL/24 hours    Diuretic Regimen: low dose diuretics     Neuro: stroke pathway per neurology     GI: postoperative ileus resolved  Speech cleared for level 3 dysphagia diet with thin liquids   Continue stool softeners and prn suppository    Continue GI prophylaxis    Endo:     Pre-Op Hgb A1C: 5.7    Serum glucose well controlled on insulin sliding scale coverage    7    Hematology:     Post-operative acute blood loss anemia; Hemoglobin 9.9; trend prn    8.   Disposition:    discharge to [facility] when cleared      VTE Pharmacologic Prophylaxis: Heparin  VTE Mechanical Prophylaxis: sequential compression device    Collaborative rounds completed with supervising physician  Plan of care discussed with bedside nurse    SIGNATURE: Miranda Mijares PA-C  DATE: July 4, 2024  TIME: 7:55 AM

## 2024-07-04 NOTE — CONSULTS
Patient reported dislikes Dental Soft diet restriction as well as low residue diet. Encouraged continued po intake along with heart healthy diet at home. Patient declined additional written cardiac diet education. Continue to follow up when wife available if diet questions arise; Consider omitting Low Residue diet restriction - eventually add back Cardiac diet restriction upon d/c.

## 2024-07-05 LAB
ANION GAP SERPL CALCULATED.3IONS-SCNC: 7 MMOL/L (ref 4–13)
BUN SERPL-MCNC: 25 MG/DL (ref 5–25)
CALCIUM SERPL-MCNC: 7.7 MG/DL (ref 8.4–10.2)
CHLORIDE SERPL-SCNC: 101 MMOL/L (ref 96–108)
CO2 SERPL-SCNC: 22 MMOL/L (ref 21–32)
CREAT SERPL-MCNC: 1.18 MG/DL (ref 0.6–1.3)
ERYTHROCYTE [DISTWIDTH] IN BLOOD BY AUTOMATED COUNT: 13.5 % (ref 11.6–15.1)
GFR SERPL CREATININE-BSD FRML MDRD: 58 ML/MIN/1.73SQ M
GLUCOSE SERPL-MCNC: 120 MG/DL (ref 65–140)
GLUCOSE SERPL-MCNC: 123 MG/DL (ref 65–140)
GLUCOSE SERPL-MCNC: 124 MG/DL (ref 65–140)
GLUCOSE SERPL-MCNC: 128 MG/DL (ref 65–140)
GLUCOSE SERPL-MCNC: 133 MG/DL (ref 65–140)
HCT VFR BLD AUTO: 34.3 % (ref 36.5–49.3)
HGB BLD-MCNC: 11.3 G/DL (ref 12–17)
MCH RBC QN AUTO: 33 PG (ref 26.8–34.3)
MCHC RBC AUTO-ENTMCNC: 32.9 G/DL (ref 31.4–37.4)
MCV RBC AUTO: 100 FL (ref 82–98)
PLATELET # BLD AUTO: 416 THOUSANDS/UL (ref 149–390)
PMV BLD AUTO: 8.8 FL (ref 8.9–12.7)
POTASSIUM SERPL-SCNC: 4.7 MMOL/L (ref 3.5–5.3)
RBC # BLD AUTO: 3.42 MILLION/UL (ref 3.88–5.62)
SODIUM SERPL-SCNC: 130 MMOL/L (ref 135–147)
WBC # BLD AUTO: 12.88 THOUSAND/UL (ref 4.31–10.16)

## 2024-07-05 PROCEDURE — 92526 ORAL FUNCTION THERAPY: CPT

## 2024-07-05 PROCEDURE — 99233 SBSQ HOSP IP/OBS HIGH 50: CPT | Performed by: PSYCHIATRY & NEUROLOGY

## 2024-07-05 PROCEDURE — 82948 REAGENT STRIP/BLOOD GLUCOSE: CPT

## 2024-07-05 PROCEDURE — 85027 COMPLETE CBC AUTOMATED: CPT | Performed by: PHYSICIAN ASSISTANT

## 2024-07-05 PROCEDURE — 99024 POSTOP FOLLOW-UP VISIT: CPT | Performed by: PHYSICIAN ASSISTANT

## 2024-07-05 PROCEDURE — 80048 BASIC METABOLIC PNL TOTAL CA: CPT | Performed by: PHYSICIAN ASSISTANT

## 2024-07-05 RX ORDER — ONDANSETRON 2 MG/ML
4 INJECTION INTRAMUSCULAR; INTRAVENOUS EVERY 6 HOURS PRN
Status: DISCONTINUED | OUTPATIENT
Start: 2024-07-05 | End: 2024-07-09 | Stop reason: HOSPADM

## 2024-07-05 RX ADMIN — HEPARIN SODIUM 5000 UNITS: 5000 INJECTION INTRAVENOUS; SUBCUTANEOUS at 06:56

## 2024-07-05 RX ADMIN — TORSEMIDE 20 MG: 20 TABLET ORAL at 08:45

## 2024-07-05 RX ADMIN — ONDANSETRON 4 MG: 2 INJECTION INTRAMUSCULAR; INTRAVENOUS at 00:18

## 2024-07-05 RX ADMIN — ASPIRIN 81 MG: 81 TABLET, COATED ORAL at 08:45

## 2024-07-05 RX ADMIN — HEPARIN SODIUM 5000 UNITS: 5000 INJECTION INTRAVENOUS; SUBCUTANEOUS at 21:24

## 2024-07-05 RX ADMIN — ACETAMINOPHEN 975 MG: 325 TABLET, FILM COATED ORAL at 14:15

## 2024-07-05 RX ADMIN — ATORVASTATIN CALCIUM 80 MG: 80 TABLET, FILM COATED ORAL at 17:01

## 2024-07-05 RX ADMIN — METOPROLOL TARTRATE 50 MG: 50 TABLET, FILM COATED ORAL at 21:25

## 2024-07-05 RX ADMIN — CHLORHEXIDINE GLUCONATE 0.12% ORAL RINSE 15 ML: 1.2 LIQUID ORAL at 08:44

## 2024-07-05 RX ADMIN — ACETAMINOPHEN 975 MG: 325 TABLET, FILM COATED ORAL at 21:24

## 2024-07-05 RX ADMIN — CLOPIDOGREL BISULFATE 75 MG: 75 TABLET ORAL at 08:45

## 2024-07-05 RX ADMIN — Medication 6 MG: at 21:25

## 2024-07-05 RX ADMIN — POTASSIUM CHLORIDE 20 MEQ: 1500 TABLET, EXTENDED RELEASE ORAL at 08:45

## 2024-07-05 RX ADMIN — ONDANSETRON 4 MG: 2 INJECTION INTRAMUSCULAR; INTRAVENOUS at 12:35

## 2024-07-05 RX ADMIN — HEPARIN SODIUM 5000 UNITS: 5000 INJECTION INTRAVENOUS; SUBCUTANEOUS at 14:15

## 2024-07-05 RX ADMIN — METOPROLOL TARTRATE 50 MG: 50 TABLET, FILM COATED ORAL at 08:45

## 2024-07-05 RX ADMIN — PANTOPRAZOLE SODIUM 40 MG: 40 TABLET, DELAYED RELEASE ORAL at 06:59

## 2024-07-05 NOTE — PROGRESS NOTES
NEUROLOGY RESIDENCY PROGRESS NOTE     Name: Shane Chau Sr.   Age & Sex: 78 y.o. male   MRN: [id number]  Unit/Bed#: [facility] 405-01   Encounter: [id number]    Shane Chau Sr. will need follow up in in 6 weeks with neurovascular Attending/YANIQUE/Resident .  He will not require outpatient neurological testing.      ASSESSMENT & PLAN     Stroke-like symptoms  Assessment & Plan  Shane Chau Sr. is a 78 y.o. male with pertinent PMHx HTN, HLD, multiple TIAs, abdominal aortic aneurysm s/p repair in 2011, who is currently admitted where he had a CABG x 3 performed on 6/20/2024. Stroke alert called this morning at 6:20 AM. Patient was last seen normal at 4:30 AM. Nursing staff noticed at 6 AM that patient was dysarthric, and had left-sided weakness was appreciated when patient stood to use the scale. On neurology arrival patient had BP of 136/75, FSBG 111. Left sided weakness resolved. NIH SS 2 for left facial droop and dysarthria. NC CTH without acute intracranial abnormalities, chronic lacunar infarcts in the right BG corona radiata, chronic bilateral cerebellar lacunar infarcts, and mild microangiopathic changes. CTA H/N showed moderate to severe atherosclerotic stenosis of the origin of the left vertebral artery, patent major vessels of the [place] without high-grade stenosis, no aneurysm, and no other hemodynamically significant stenosis in the cervical carotid or right vertebral artery.s a result of recent CABG patient was not determined to be a candidate for thrombolysis (TNK).        Impression: Impression: Clinical presentation concerning for acute vascular insult . Further work up as outlined below to identify presence of a vascular insult.     Plan:  Frequent neuro checks per protocol. If there is any acute changes in exam, please obtain stat CT head and notify neurology team  BP Goals: Normotension  Antiplatelet agents: Continue DAPT with ASA 81 mg and Plavix 75 mg   Statin:  Continue statin  Labs: P2Y12 - 180  Brain Imaging: Obtain MRI brain without contrast  Will consider medication changes after reviewing MRI results  TTE pending  Euthermic/Euglycemic  DVT PPx: Heparin, SCDs  PT/OT/ST/PMR evaluations when able  Stroke education and counseling  Rest of other care per primary team appreciated    Disposition: PT Recommendations - Rehab Resource Intensity Level, PT: I (Maximum Resource Intensit          SUBJECTIVE     Patient was seen and examined. No acute events overnight. Patient reports being gassy, otherwise no acute complaints. Discussed importance of MRI. Patient understands. All questions and concerns addressed.      OBJECTIVE     Patient ID: Shane Chau Sr. is a 78 y.o. male.    Vitals:    24 0314 24 0600 24 0739 24 1130   BP: 132/73  130/73 130/79   BP Location: Left arm      Pulse: 77  77 68   Resp: 16   20   Temp: 97.9 °F (36.6 °C)  (!) 97.4 °F (36.3 °C) 97.6 °F (36.4 °C)   TempSrc:       SpO2: 93%  93% 95%   Weight:  82.4 kg (181 lb 10.5 oz)     Height:          Temperature:   Temp (24hrs), Av.6 °F (36.4 °C), Min:97.3 °F (36.3 °C), Max:97.9 °F (36.6 °C)    Temperature: 97.6 °F (36.4 °C)      Physical Exam  Vitals reviewed.   Neurological:      Mental Status: He is oriented to person, place, and time.      Cranial Nerves: Cranial nerves 2-12 are intact.      Deep Tendon Reflexes:      Reflex Scores:       Bicep reflexes are 2+ on the right side and 2+ on the left side.       Brachioradialis reflexes are 2+ on the right side and 2+ on the left side.       Patellar reflexes are 2+ on the right side and 2+ on the left side.         Neurologic Exam     Mental Status   Oriented to person, place, and time.     Cranial Nerves   Cranial nerves II through XII intact.     Motor Exam   Muscle bulk: normal  Overall muscle tone: normal    Strength   Strength 5/5 except as noted.   Right iliopsoas: 4/5  Left iliopsoas: 4/5  Right quadriceps: 4/5  Left  quadriceps: 4/5  Right anterior tibial: 4/5  Left anterior tibial: 4/5  Right posterior tibial: 4/5  Left posterior tibial: 4/5    Sensory Exam   Light touch normal.     Gait, Coordination, and Reflexes     Reflexes   Right brachioradialis: 2+  Left brachioradialis: 2+  Right biceps: 2+  Left biceps: 2+  Right patellar: 2+  Left patellar: 2+         LABORATORY DATA     Labs: I have personally reviewed pertinent reports.    Results from last 7 days   Lab Units 07/05/24  0847 07/03/24  0436 07/01/24  0500 06/30/24  0500   WBC Thousand/uL 12.88* 12.67* 14.31* 17.34*   HEMOGLOBIN g/dL 11.3* 9.9* 9.7* 10.3*   HEMATOCRIT % 34.3* 30.2* 29.5* 31.2*   PLATELETS Thousands/uL 416* 232 213 197   SEGS PCT %  --   --  79* 81*   MONO PCT %  --   --  7 6   EOS PCT %  --   --  3 3      Results from last 7 days   Lab Units 07/05/24  0847 07/04/24  0438 07/03/24  0436   SODIUM mmol/L 130* 131* 132*   POTASSIUM mmol/L 4.7 3.9 3.9   CHLORIDE mmol/L 101 102 100   CO2 mmol/L 22 22 25   BUN mg/dL 25 21 23   CREATININE mg/dL 1.18 1.02 1.12   CALCIUM mg/dL 7.7* 7.3* 7.3*     Results from last 7 days   Lab Units 07/03/24  0436 07/02/24  0633 07/01/24  0500   MAGNESIUM mg/dL 2.1 2.2 2.3                        IMAGING & DIAGNOSTIC TESTING     Radiology Results: I have personally reviewed pertinent reports.      CTA stroke alert (head/neck)   Final Result by Liliana Irwin MD (07/03 0721)      1.  Patent major vessels of the [place] without high-grade stenosis.  No aneurysm.   2.  Moderate to severe atherosclerotic stenosis at the origin of the left vertebral artery.   3.  No hemodynamically significant stenosis in the cervical carotid and the right vertebral arteries.               Findings were directly discussed with Stone Lucas at 7:11 a.m.                           Workstation performed: [id number]         CT stroke alert brain   Final Result by Liliana Irwin MD (07/03 0721)      1.  No acute intracranial CT abnormality.   2.  Chronic  lacunar infarcts in the right basal ganglia and corona radiata. Chronic bilateral cerebellar lacunar infarcts.   3.  Mild microangiopathic change.                        Findings were directly discussed with Stone Lucas at 7:11 a.m.                        Workstation performed: [id number]         XR abdomen 1 view kub   Final Result by Satya Gage MD (06/30 1430)      1. Enteric contrast material seen further in the colon. Persistent small bowel dilatation centrally may be related to partial or intermittent small bowel obstruction.               Workstation performed: [id number]         XR abdomen complete inc upright and/or decubitus   Final Result by Sp Tan MD (06/27 1937)      Contrast noted in the colon thus excluding a complete small bowel obstruction. The small bowel dilatation appears improved compared to most recent CT scan.               Workstation performed: [id number]         XR chest pa & lateral   Final Result by Joce Matthews MD (06/26 0605)      NG tube in the stomach. There is moderate gaseous distention of the stomach.            Workstation performed: [id number]         CT abdomen pelvis wo contrast   Final Result by Sp Barroso MD (06/25 1057)      Gastric distention and associated proximal small bowel dilatation and stool like contents within the dilated loops of small bowel without discrete transition point identified. Findings suggest chronic ileus though chronic partial small bowel obstruction    not entirely excluded.      Juxtarenal abdominal aortic aneurysm measuring 4.5 x 4.2 cm. Suprarenal abdominal aorta is dilated measuring 3.7 cm.      Workstation performed: [id number]         XR chest portable   Final Result by Joesph Marshall MD (06/21 1417)      No acute cardiopulmonary disease.            Workstation performed: [id number]         XR chest portable ICU   Final Result by Nataliia Evans MD (06/20 8183)      Expected appearance after CABG.      Left  base opacity, likely atelectasis. Small effusion not excluded.            Workstation performed: [id number]         MRI Inpatient Order    (Results Pending)       Other Diagnostic Testing: I have personally reviewed pertinent reports.      ACTIVE MEDICATIONS     Current Facility-Administered Medications   Medication Dose Route Frequency    acetaminophen (TYLENOL) tablet 975 mg  975 mg Oral Q6H While awake    aspirin (ECOTRIN LOW STRENGTH) EC tablet 81 mg  81 mg Oral Daily    atorvastatin (LIPITOR) tablet 80 mg  80 mg Oral Daily With Dinner    bisacodyl (DULCOLAX) rectal suppository 10 mg  10 mg Rectal Daily PRN    chlorhexidine (PERIDEX) 0.12 % oral rinse 15 mL  15 mL Mouth/Throat BID    clopidogrel (PLAVIX) tablet 75 mg  75 mg Oral Daily    heparin (porcine) subcutaneous injection 5,000 Units  5,000 Units Subcutaneous Q8H KRISTI    hydrALAZINE (APRESOLINE) injection 5 mg  5 mg Intravenous Q6H PRN    insulin lispro (HumALOG/ADMELOG) 100 units/mL subcutaneous injection 1-5 Units  1-5 Units Subcutaneous HS    insulin lispro (HumALOG/ADMELOG) 100 units/mL subcutaneous injection 1-6 Units  1-6 Units Subcutaneous TID AC    lidocaine (LIDODERM) 5 % patch 2 patch  2 patch Topical Daily    magnesium hydroxide (MILK OF MAGNESIA) oral suspension 30 mL  30 mL Oral Daily PRN    melatonin tablet 6 mg  6 mg Oral HS    methocarbamol (ROBAXIN) tablet 500 mg  500 mg Oral Q6H PRN    metoprolol tartrate (LOPRESSOR) tablet 50 mg  50 mg Oral Q12H KRISTI    ondansetron (ZOFRAN) injection 4 mg  4 mg Intravenous Q6H PRN    pantoprazole (PROTONIX) EC tablet 40 mg  40 mg Oral Early Morning    polyethylene glycol (MIRALAX) packet 17 g  17 g Oral Daily    potassium chloride (Klor-Con M20) CR tablet 20 mEq  20 mEq Oral Daily    senna-docusate sodium (SENOKOT S) 8.6-50 mg per tablet 1 tablet  1 tablet Oral BID PRN    temazepam (RESTORIL) capsule 15 mg  15 mg Oral HS PRN    torsemide (DEMADEX) tablet 20 mg  20 mg Oral Daily    trimethobenzamide (TIGAN)  IM injection 200 mg  200 mg Intramuscular Q6H PRN       Prior to Admission medications    Medication Sig Start Date End Date Taking? Authorizing Provider   amLODIPine (NORVASC) 10 mg tablet Take 1 tablet (10 mg total) by mouth daily 3/7/24  Yes Jose Juares MD   aspirin 325 mg tablet Take 1 tablet (325 mg total) by mouth daily 5/14/24  Yes SANTHOSH Dominguez   isosorbide mononitrate (IMDUR) 60 mg 24 hr tablet Take 1 tablet (60 mg total) by mouth 2 (two) times a day 7/25/23  Yes Jose Juares MD   metoprolol tartrate (LOPRESSOR) 50 mg tablet Take 1 tablet (50 mg total) by mouth 2 (two) times a day 12/27/23  Yes Jose Juares MD   mupirocin (BACTROBAN) 2 % ointment Apply topically 2 (two) times a day 6/7/24  Yes Ernestina Jj PA-C   ranolazine (RANEXA) 500 mg 12 hr tablet Take 1 tablet (500 mg total) by mouth 2 (two) times a day 7/25/23  Yes Jose Juares MD   rosuvastatin (CRESTOR) 40 MG tablet Take 1 tablet (40 mg total) by mouth daily 6/3/24  Yes SANTHOSH Duckworth   tamsulosin (FLOMAX) 0.4 mg Take 1 capsule by mouth daily with dinner 10/24/23  Yes Jose Juares MD   clopidogrel (PLAVIX) 75 mg tablet Take 1 tablet (75 mg total) by mouth daily 5/22/24   Jose Juares MD   nitroglycerin (NITROSTAT) 0.4 mg SL tablet Place 1 tablet (0.4 mg total) under the tongue every 5 (five) minutes as needed for chest pain 5/14/24   SANTHOSH Dominguez         VTE Pharmacologic Prophylaxis: VTE covered by:  heparin (porcine), Subcutaneous, 5,000 Units at 07/05/24 1415      VTE Mechanical Prophylaxis: sequential compression device    ======    I have discussed the patient's history, physical exam findings, assessment, and plan in detail with attending, Dr. León    Thank you for allowing me to participate in the care of your patient, Shane Chau Sr..    Eugenia Velez  [facility]'s Neurology Residency, PGY-2

## 2024-07-05 NOTE — SPEECH THERAPY NOTE
Speech Language/Pathology    Speech/Language Pathology Progress Note    Patient Name: Shane Chau Sr.  Today's Date: 7/5/2024     Problem List  Principal Problem:    S/P CABG x 3  Active Problems:    Abdominal aortic aneurysm without rupture (HCC)    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HCC)    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HCC)    Asymptomatic bilateral carotid artery stenosis    Coronary artery disease of native artery of native heart with stable angina pectoris (HCC)    Hyperlipidemia    Hypertension    BARBARA (acute kidney injury) (HCC)    Stroke-like symptoms       Past Medical History  Past Medical History:   Diagnosis Date    AAA (abdominal aortic aneurysm) (HCC)     s/p open repair    BPH (benign prostatic hyperplasia)     CAD (coronary artery disease)     Cholelithiasis     Former tobacco use     History of MI (myocardial infarction)     History of TIA (transient ischemic attack)     Hyperlipidemia     Hypertension     Insomnia     Obesity         Past Surgical History  Past Surgical History:   Procedure Laterality Date    ABDOMINAL AORTIC ANEURYSM REPAIR  06/07/2011    Dr. Yarbrough 6/7/11    APPENDECTOMY      CARDIAC CATHETERIZATION Left 06/03/2024    Procedure: Cardiac Left Heart Cath;  Surgeon: Pk Blanco DO;  Location: [facility];  Service: Cardiology    CARDIAC CATHETERIZATION  06/03/2024    Procedure: Cardiac catheterization;  Surgeon: Pk Blanco DO;  [facility]: [facility];  Service: Cardiology    CARDIAC CATHETERIZATION N/A 06/03/2024    Procedure: Cardiac Coronary Angiogram;  Surgeon: Pk Blanco DO;  [facility]: [facility];  Service: Cardiology    CATARACT EXTRACTION Right 11/2018    CHOLECYSTECTOMY LAPAROSCOPIC      COLONOSCOPY  11/2006    CORONARY ANGIOPLASTY      3 STENTS INSERTED    HARVEST VEIN Left 6/20/2024    Procedure: HARVEST VEIN ENDOSCOPIC (EVH);  Surgeon: KALPANA Amos MD;  Location: [facility];  Service: Cardiac Surgery    OK  "CORONARY ARTERY BYP W/VEIN & ARTERY GRAFT 3 VEIN N/A 6/20/2024    Procedure: CORONARY ARTERY BYPASS GRAFT (CABG) X3 VESSELS, LIMA - LAD, LEFT LEG EVH/SVG - PDA AND OM1, W/ DEDRICK;  Surgeon: KALPANA Amos MD;  Location: [facility];  Service: Cardiac Surgery    NM LAPS SURG CHOLECYSTECTOMY W/CHOLANGIOGRAPHY N/A 09/08/2016    Procedure: CHOLECYSTECTOMY LAPAROSCOPIC with intra-op cholangiogram ;  Surgeon: Prasanth Verdin MD;  Location: [facility];  Service: General         Subjective:  \"I'd rather drink. I can have a pint of ice cream\" Patient is awake and alert.     Objective:  The patient is seen for dysphagia therapy. He reports poor appetite, but denies dislike of level 3 diet. The patient is agreeable to am snack and is assessed with regular cookie and ice cream with thin liquids. Facial weakness appears improved since prior session, with improved ROM. The patient takes cookies x2, all at once. Mastication time is min prolonged. A minimal amount of oral residue is observed in between bites, but is eventually cleared. The patient uses liquid wash. He feeds himself ice cream with good rate and bite size. No overt s/s aspiration observed across any trial. Discussed level 3 vs regular diet with patient. At this time, the patient wishes to continue on a level 3 diet.     Assessment:  The patient tolerated trials well.     Plan/Recommendations:  Recommend to continue level 3 diet and thin liquids. Continue ST and trial upgrades as able and agreeable.      "

## 2024-07-05 NOTE — CASE MANAGEMENT
Case Management Discharge Planning Note    Patient name Shane Chau .  Location [facility] 405/[facility] 405-01 MRN [id number]  : 1945 Date 2024       Current Admission Date: 2024  Current Admission Diagnosis:S/P CABG x 3   Patient Active Problem List    Diagnosis Date Noted Date Diagnosed    Stroke-like symptoms 2024     BARBARA (acute kidney injury) (HCC) 2024     S/P CABG x 3 2024     Rib pain 2023     Paresthesia 2023     Leg swelling 05/10/2022     Erectile dysfunction 2022     Microhematuria 2018     Need for pneumococcal vaccination 2018     Hematuria 2018     Asymptomatic bilateral carotid artery stenosis 2017     Closed compression fracture of first lumbar vertebra ([facility]) 2017     Abdominal aortic aneurysm without rupture ([facility]) 2016     Localized primary osteoarthritis of lower leg, unspecified laterality 2013     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[facility]) 2013     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[facility]) 2013     Transient cerebral ischemic attack 2013     Coronary artery disease of native artery of native heart with stable angina pectoris ([facility]) 2012     Hyperlipidemia 2012     Hypertension 2012       LOS (days): 15  Geometric Mean LOS (GMLOS) (days): 8.2  Days to GMLOS:-7.1     OBJECTIVE:  Risk of Unplanned Readmission Score: 14.78         Current admission status: Inpatient   Preferred Pharmacy:   [facility] [place] - [address]  [address]  [place] [address]  Phone: [phone] Fax: [phone]    [facility] - [place] - [address]  [place] [address]  Phone: [phone] Fax: [phone]    [facility] #[id number] - [place], [place] - [address]  [address] [place] [address]  Phone: [phone] Fax: [phone]    [facility]) - [place] - [address]  [facility]  [address] [place] [address]  Phone: [phone] Fax: [phone]    Primary Care Provider: Sherwin Mantilla MD    Primary Insurance: [employer] [facility]  Secondary Insurance:     DISCHARGE DETAILS:                                                                                                 Additional Comments: Pt. was seen again by PM&R who feel he may be appropriate for ARC when MRI is done. Possible MRI tonight. Will need to submit for new auth for ARC or MV.

## 2024-07-05 NOTE — PROGRESS NOTES
Progress Note - Cardiothoracic Surgery   Shane Chau . 78 y.o. male MRN: [id number]  Unit/Bed#: [facility] 405-01 Encounter: [id number]    Coronary artery disease. S/P coronary artery bypass grafting; POD # 15    24 Hour Events: No events overnight.  He does not like the low fiber/residue diet. No complaints.     Medications:   Scheduled Meds:  Current Facility-Administered Medications   Medication Dose Route Frequency Provider Last Rate    acetaminophen  975 mg Oral Q6H While awake Moraima Alvarez PA-C      aspirin  81 mg Oral Daily Moraima Alvarez PA-C      atorvastatin  80 mg Oral Daily With Dinner Moraima Alvarez PA-C      bisacodyl  10 mg Rectal Daily PRN KALPANA Amos MD      chlorhexidine  15 mL Mouth/Throat BID Moraima Alvarez PA-C      clopidogrel  75 mg Oral Daily Moraima Alvarez PA-C      heparin (porcine)  5,000 Units Subcutaneous Q8H KRISTI Moraima Alvarez PA-C      hydrALAZINE  5 mg Intravenous Q6H PRN Moraima Alvarez PA-C      insulin lispro  1-5 Units Subcutaneous HS Moraima Alvarez PA-C      insulin lispro  1-6 Units Subcutaneous TID AC Moraima Alvarez PA-C      lidocaine  2 patch Topical Daily Moraima Alvarez PA-C      magnesium hydroxide  30 mL Oral Daily PRN Rahel Baig PA-C      melatonin  6 mg Oral HS Rahel Baig PA-C      methocarbamol  500 mg Oral Q6H PRN Miranda Mijares PA-C      metoprolol tartrate  50 mg Oral Q12H KRISTI Sandy Mckeon PA-C      ondansetron  4 mg Intravenous Q6H PRN Moraima Alvarez PA-C      pantoprazole  40 mg Oral Early Morning KALPANA Amos MD      polyethylene glycol  17 g Oral Daily Moraima Alvarez PA-C      potassium chloride  20 mEq Oral Daily Miranda Mijares PA-C      senna-docusate sodium  1 tablet Oral BID PRN Miranda Mijares PA-C      temazepam  15 mg Oral HS PRN Moraima Alvarez PA-C      torsemide  20 mg Oral Daily Miranda Mijares PA-C      trimethobenzamide  200  mg Intramuscular Q6H PRN Rudolph Gunter PA-C       Continuous Infusions:   PRN Meds:.  bisacodyl    hydrALAZINE    magnesium hydroxide    methocarbamol    ondansetron    senna-docusate sodium    temazepam    trimethobenzamide    Vitals:   Vitals:    07/04/24 2320 07/05/24 0314 07/05/24 0600 07/05/24 0739   BP: 144/78 132/73  130/73   BP Location:  Left arm     Pulse: 77 77  77   Resp: 14 16     Temp: (!) 97.3 °F (36.3 °C) 97.9 °F (36.6 °C)  (!) 97.4 °F (36.3 °C)   TempSrc:       SpO2: 95% 93%  93%   Weight:   82.4 kg (181 lb 10.5 oz)    Height:           Telemetry: NSR; Heart Rate: 73    Respiratory:   SpO2: SpO2: 93 %; Room Air    Intake/Output:     Intake/Output Summary (Last 24 hours) at 7/5/2024 0754  Last data filed at 7/5/2024 0739  Gross per 24 hour   Intake 716 ml   Output 587 ml   Net 129 ml        Weights:   Weight (last 2 days)       Date/Time Weight    07/05/24 0600 82.4 (181.66)    07/04/24 0545 83.4 (183.86)    07/03/24 10:42:13 82.6 (182)    07/03/24 0600 83 (182.98)            Results:   Results from last 7 days   Lab Units 07/03/24  0436 07/01/24  0500 06/30/24  0500   WBC Thousand/uL 12.67* 14.31* 17.34*   HEMOGLOBIN g/dL 9.9* 9.7* 10.3*   HEMATOCRIT % 30.2* 29.5* 31.2*   PLATELETS Thousands/uL 232 213 197     Results from last 7 days   Lab Units 07/04/24  0438 07/03/24  0436 07/01/24  0500   SODIUM mmol/L 131* 132* 133*   POTASSIUM mmol/L 3.9 3.9 3.8   CHLORIDE mmol/L 102 100 103   CO2 mmol/L 22 25 24   BUN mg/dL 21 23 32*   CREATININE mg/dL 1.02 1.12 1.10   CALCIUM mg/dL 7.3* 7.3* 6.8*     Point of care glucose: 128-154    Invasive Lines/Tubes:  Invasive Devices       Peripheral Intravenous Line  Duration             Peripheral IV 07/02/24 Right;Ventral (anterior) Forearm 3 days    Peripheral IV 07/03/24 Right Antecubital 2 days              Drain  Duration             External Urinary Catheter Small 6 days                    Physical Exam:    General: No acute distress, Alert, and Normal  appearance  HEENT/NECK:  Normocephalic. Atraumatic.  No jugular venous distention.    Cardiac: Regular rate and rhythm  Pulmonary:  Breath sounds clear bilaterally  Abdomen:  Non-tender, Non-distended, and Normal bowel sounds  Incisions: Sternum is stable.  Incision is clean, dry, and intact.  and Saphenectomy incison is clean, dry, and intact.   Extremities: Extremities warm/dry and 1+ edema B/L  Neuro: Alert and oriented X 3  Skin: Warm/Dry, without rashes or lesions.    Assessment:  Principal Problem:    S/P CABG x 3  Active Problems:    Abdominal aortic aneurysm without rupture (HCC)    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[facility])    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HCC)    Asymptomatic bilateral carotid artery stenosis    Coronary artery disease of native artery of native heart with stable angina pectoris ([facility])    Hyperlipidemia    Hypertension    BARBARA (acute kidney injury) ([facility])    Stroke-like symptoms       Coronary artery disease. S/P coronary artery bypass grafting; POD # 15    Plan:    Cardiac:   LVEF 60%, on ASA and statin therapy, metoprolol 50 mg bid  Remains on Plavix with asa since postop for hx of TIA    Pulmonary: no issues     Renal: labs pending, even I/O balance    Neuro: awaiting MRI, on stroke pathway    GI: does not enjoy the food with low fiber/low residue but was recommended by gen surgery for now resolved ileus    Continue stool softeners and prn suppository    Continue GI prophylaxis    Endo: no issues     7    Hematology:     Post-operative blood count acceptable; Trend prn    8.   Disposition:        Anticipated discharge date: pending MRI then d/c to ARC       VTE Pharmacologic Prophylaxis: Heparin  VTE Mechanical Prophylaxis: sequential compression device    Collaborative rounds completed with supervising physician  Plan of care discussed with bedside nurse    SIGNATURE: Miranda Mijares PA-C  DATE: July 5, 2024  TIME: 7:54 AM

## 2024-07-05 NOTE — UTILIZATION REVIEW
Continued Stay Review    Date: 07/05                          Current Patient Class: IP  Current Level of Care: MS    HPI:78 y.o. male initially admitted on 06/20     Assessment/Plan: No events overnight. Pt does not like the low fiber/residue diet. Tele: NSR. Cont plavix and ASA. Continue stool softeners and prn suppository. Continue GI prophylaxis. Pending MRI brain. PT/OT recommending Maximum Resource Intensity. CM ff for placement- Acute rehab vx SNF for STR.    Vital Signs (last 3 days)       Date/Time Temp Pulse Resp BP MAP (mmHg) SpO2 O2 Device Patient Position - Orthostatic VS Stef Coma Scale Score Pain    07/05/24 15:45:26 97.1 °F (36.2 °C) 84 18 102/75 84 100 % -- -- -- --    07/05/24 1415 -- -- -- -- -- -- -- -- -- 1    07/05/24 1200 -- -- -- -- -- -- -- -- -- No Pain    07/05/24 11:30:20 97.6 °F (36.4 °C) 68 20 130/79 96 95 % -- -- -- --    07/05/24 0900 -- -- -- -- -- -- -- -- 15 No Pain    07/05/24 07:39:41 97.4 °F (36.3 °C) 77 -- 130/73 92 93 % -- -- -- --    07/05/24 03:14:27 97.9 °F (36.6 °C) 77 16 132/73 93 93 % None (Room air) Lying -- --    07/05/24 0100 -- -- -- -- -- -- -- -- -- No Pain    07/04/24 23:20:38 97.3 °F (36.3 °C) 77 14 144/78 100 95 % -- -- -- --    07/04/24 2200 -- 84 -- 150/80 -- -- -- -- -- No Pain    07/04/24 2000 -- -- -- -- -- -- -- -- 15 --    07/04/24 19:21:37 97.7 °F (36.5 °C) 81 16 151/81 104 95 % -- -- -- --    07/04/24 14:52:11 98 °F (36.7 °C) 76 -- 125/72 90 93 % -- -- -- --    07/04/24 10:08:17 97.8 °F (36.6 °C) 73 16 123/71 88 95 % -- -- -- --    07/04/24 0900 -- -- -- -- -- 95 % None (Room air) -- 15 No Pain    07/04/24 0700 98.5 °F (36.9 °C) -- -- -- -- -- -- -- -- --    07/04/24 06:59:35 -- 62 18 124/77 93 97 % -- -- -- --    07/04/24 06:51:52 -- 69 14 124/77 93 96 % -- -- -- --    07/04/24 0000 -- -- -- -- -- -- -- -- 15 --    07/03/24 2215 -- 72 -- 117/64 82 95 % -- -- -- --    07/03/24 2200 -- 72 -- -- -- 96 % -- -- 15 --    07/03/24 2100 -- 77 -- 121/65 84  96 % -- -- -- --    07/03/24 2050 -- 82 -- 121/65 -- -- -- -- -- No Pain    07/03/24 2000 -- 75 -- -- -- 95 % None (Room air) -- 15 No Pain    07/03/24 18:32:58 98.7 °F (37.1 °C) 78 18 124/65 85 96 % -- -- -- --    07/03/24 1800 -- -- -- -- -- -- -- -- 15 --    07/03/24 1702 -- 72 -- 129/69 89 97 % -- -- -- --    07/03/24 1700 -- -- -- -- -- -- -- -- 15 --    07/03/24 1602 -- 68 -- 129/70 90 97 % -- -- -- --    07/03/24 1600 -- -- -- -- -- -- -- -- 15 --    07/03/24 1500 -- -- -- -- -- -- -- -- 15 No Pain    07/03/24 14:56:01 97.4 °F (36.3 °C) 70 18 133/70 91 96 % -- -- -- --    07/03/24 1400 -- -- -- -- -- -- -- -- 15 --    07/03/24 1300 -- -- -- -- -- -- -- -- 15 --    07/03/24 1200 -- -- -- -- -- -- -- -- 15 --    07/03/24 1115 -- 69 -- 117/62 80 -- -- -- -- --    07/03/24 1103 -- -- -- -- -- -- -- -- -- No Pain    07/03/24 1101 -- -- -- -- -- -- -- -- -- No Pain    07/03/24 1100 -- -- -- -- -- 98 % -- -- 15 --    07/03/24 10:42:13 97.8 °F (36.6 °C) 69 20 125/65 85 95 % -- -- -- --    07/03/24 1015 -- 72 -- 124/66 85 -- -- -- -- --    07/03/24 1000 -- -- -- -- -- 97 % None (Room air) -- 15 --    07/03/24 0915 -- 70 -- 124/67 86 -- -- -- -- --    07/03/24 0900 -- -- -- -- -- 97 % None (Room air) -- 15 --    07/03/24 0800 97.4 °F (36.3 °C) 68 -- 123/64 84 97 % None (Room air) -- 15 No Pain    07/03/24 07:58:44 97.4 °F (36.3 °C) 68 18 123/64 84 98 % -- -- -- --    07/03/24 0745 -- 71 -- 121/65 84 -- -- -- 15 --    07/03/24 0730 -- 73 -- 119/68 85 96 % None (Room air) -- 15 No Pain    07/03/24 0715 -- 71 -- 121/66 84 96 % -- Lying -- --    07/03/24 06:14:37 -- 80 -- 134/76 -- 96 % -- -- -- --    07/03/24 02:41:49 97.5 °F (36.4 °C) 69 16 125/66 86 96 % -- -- -- --    07/02/24 22:50:36 97.4 °F (36.3 °C) 72 17 123/66 85 96 % None (Room air) Lying -- --    07/02/24 2015 -- -- -- -- -- -- -- -- 15 No Pain    07/02/24 19:25:52 97.8 °F (36.6 °C) 71 17 121/66 84 96 % None (Room air) Lying -- --    07/02/24 15:47:03 97.5 °F  (36.4 °C) 72 18 119/65 83 97 % None (Room air) Sitting -- --    07/02/24 1142 -- -- -- -- -- -- -- -- -- No Pain    07/02/24 0950 -- -- -- -- -- -- -- -- -- No Pain    07/02/24 0900 -- -- -- -- -- 97 % None (Room air) -- 15 No Pain    07/02/24 07:14:02 97.7 °F (36.5 °C) 66 15 112/59 77 97 % None (Room air) Sitting -- --    07/02/24 02:52:43 -- 72 -- 109/56 74 94 % -- -- -- --          Weight (last 2 days)       Date/Time Weight    07/05/24 0600 82.4 (181.66)    07/04/24 0545 83.4 (183.86)    07/03/24 10:42:13 82.6 (182)    07/03/24 0600 83 (182.98)              Pertinent Labs/Diagnostic Results:   Radiology:  CTA stroke alert (head/neck)   Final Interpretation by Liliana Irwin MD (07/03 0721)      1.  Patent major vessels of the [place] of coe without high-grade stenosis.  No aneurysm.   2.  Moderate to severe atherosclerotic stenosis at the origin of the left vertebral artery.   3.  No hemodynamically significant stenosis in the cervical carotid and the right vertebral arteries.               Findings were directly discussed with Stone Lucas at 7:11 a.m.                           Workstation performed: [id number]         CT stroke alert brain   Final Interpretation by Liliana Irwin MD (07/03 0721)      1.  No acute intracranial CT abnormality.   2.  Chronic lacunar infarcts in the right basal ganglia and corona radiata. Chronic bilateral cerebellar lacunar infarcts.   3.  Mild microangiopathic change.                        Findings were directly discussed with Stone Lucas at 7:11 a.m.                        Workstation performed: [id number]         XR abdomen 1 view kub   Final Interpretation by Satya Gage MD (06/30 4810)      1. Enteric contrast material seen further in the colon. Persistent small bowel dilatation centrally may be related to partial or intermittent small bowel obstruction.               Workstation performed: [id number]         XR abdomen complete inc upright and/or decubitus   Final  Interpretation by Sp Tan MD (06/27 1937)      Contrast noted in the colon thus excluding a complete small bowel obstruction. The small bowel dilatation appears improved compared to most recent CT scan.               Workstation performed: [id number]         XR chest pa & lateral   Final Interpretation by Joce Matthews MD (06/26 0605)      NG tube in the stomach. There is moderate gaseous distention of the stomach.            Workstation performed: [id number]         CT abdomen pelvis wo contrast   Final Interpretation by Sp Barroso MD (06/25 1057)      Gastric distention and associated proximal small bowel dilatation and stool like contents within the dilated loops of small bowel without discrete transition point identified. Findings suggest chronic ileus though chronic partial small bowel obstruction    not entirely excluded.      Juxtarenal abdominal aortic aneurysm measuring 4.5 x 4.2 cm. Suprarenal abdominal aorta is dilated measuring 3.7 cm.      Workstation performed: [id number]         XR chest portable   Final Interpretation by Joesph Marshall MD (06/21 1417)      No acute cardiopulmonary disease.            Workstation performed: [id number]         XR chest portable ICU   Final Interpretation by Nataliia Evans MD (06/20 1543)      Expected appearance after CABG.      Left base opacity, likely atelectasis. Small effusion not excluded.            Workstation performed: [id number]         MRI Inpatient Order    (Results Pending)     Cardiology:  Echo complete w/ contrast if indicated   Final Result by Pk Bee DO (07/03 1124)        Left Ventricle: Left ventricular cavity size is normal. Wall thickness    is mildly increased. There is mild concentric hypertrophy. The left    ventricular ejection fraction is 70%. Systolic function is hyperdynamic.    Wall motion is normal. Diastolic function is mildly abnormal, consistent    with grade I (abnormal) relaxation.      IVS: There is abnormal septal motion consistent with post-operative    status.     Right Ventricle: Right ventricle is not well visualized. Systolic    function is at least mildly reduced.     Aortic Valve: There is aortic valve sclerosis.     Mitral Valve: There is mild regurgitation.         ECG 12 lead   Final Result by Bennie Izquierdo MD (07/03 0749)   Normal sinus rhythm   T wave abnormality, consider anterolateral ischemia   Prolonged QT   Abnormal ECG   When compared with ECG of 23-JUN-2024 06:25,   Premature atrial complexes are no longer Present   Nonspecific T wave abnormality now evident in Inferior leads   T wave inversion now evident in Anterior leads   Confirmed by Bennie Izquierdo ([id number]) on 7/3/2024 7:49:30 AM      ECG 12 lead   Final Result by Pk Bee DO (06/23 1228)   Sinus tachycardia Premature atrial complexes      Nonspecific T wave abnormality   Prolonged QT   Abnormal ECG   When compared with ECG of 21-JUN-2024 05:56,   Confirmed by Pk Bee ([id number]) on 6/23/2024 12:28:37 PM      DEDRICK intraop interventional w/realtime guidance of cardiac procedures   Final Result by CARTER OMER (06/21 0987)   This order contains the linked images for the DEDRICK that was performed by    the Anesthesiologist.  Please see the  CARDIAC DEDRICK ANESTHESIA procedure    for results.      ECG 12 lead   Final Result by Johann Meredith MD (06/22 3132)   Accelerated Junctional rhythm   Nonspecific T wave abnormality   Abnormal ECG   When compared with ECG of 21-JUN-2024 04:29, (unconfirmed)   Criteria for Inferior infarct are no longer Present   Confirmed by Johann Meredith ([id number]) on 6/22/2024 3:58:36 PM      ECG 12 lead   Final Result by Johann Meredith MD (06/22 0188)   Accelerated Junctional rhythm with retrograde conduction   Inferior infarct , age undetermined   Abnormal ECG   Confirmed by Johann Meredith ([id number]) on 6/22/2024 3:58:23 PM      ECG 12 lead   Final Result by Johann Meredith MD  (06/20 1523)   Normal sinus rhythm   Normal ECG   When compared with ECG of 03-JUN-2024 08:09,   No significant change was found   Confirmed by Johann Meredith ([id number]) on 6/20/2024 3:23:04 PM        GI:  No orders to display       Results from last 7 days   Lab Units 07/02/24  0933   SARS-COV-2  Negative     Results from last 7 days   Lab Units 07/05/24  0847 07/03/24  0436 07/01/24  0500 06/30/24  0500   WBC Thousand/uL 12.88* 12.67* 14.31* 17.34*   HEMOGLOBIN g/dL 11.3* 9.9* 9.7* 10.3*   HEMATOCRIT % 34.3* 30.2* 29.5* 31.2*   PLATELETS Thousands/uL 416* 232 213 197   TOTAL NEUT ABS Thousands/µL  --   --  11.35* 14.12*         Results from last 7 days   Lab Units 07/05/24  0847 07/04/24  0438 07/03/24  0436 07/02/24  0633 07/01/24  0500 06/30/24  0500 06/29/24  0512   SODIUM mmol/L 130* 131* 132*  --  133* 138 141   POTASSIUM mmol/L 4.7 3.9 3.9  --  3.8 3.9 4.0   CHLORIDE mmol/L 101 102 100  --  103 106 103   CO2 mmol/L 22 22 25  --  24 24 28   ANION GAP mmol/L 7 7 7  --  6 8 10   BUN mg/dL 25 21 23  --  32* 50* 65*   CREATININE mg/dL 1.18 1.02 1.12  --  1.10 1.16 1.42*   EGFR ml/min/1.73sq m 58 70 62  --  63 59 46   CALCIUM mg/dL 7.7* 7.3* 7.3*  --  6.8* 7.1* 7.5*   MAGNESIUM mg/dL  --   --  2.1 2.2 2.3 2.7 2.9*         Results from last 7 days   Lab Units 07/05/24  1546 07/05/24  1055 07/05/24  0540 07/04/24  2045 07/04/24  1536 07/04/24  1029 07/04/24  0552 07/03/24  2052 07/03/24  1545 07/03/24  1050 07/03/24  0634 07/03/24  0554   POC GLUCOSE mg/dl 120 133 128 131 139 154* 109 119 99 122 102 96     Results from last 7 days   Lab Units 07/05/24  0847 07/04/24  0438 07/03/24  0436 07/01/24  0500 06/30/24  0500 06/29/24  0512   GLUCOSE RANDOM mg/dL 124 103 98 93 93 113         Results from last 7 days   Lab Units 07/04/24  0438   HEMOGLOBIN A1C % 5.7*   EAG mg/dl 117     Medications:   Scheduled Medications:  acetaminophen, 975 mg, Oral, Q6H While awake  aspirin, 81 mg, Oral, Daily  atorvastatin, 80 mg, Oral,  Daily With Dinner  chlorhexidine, 15 mL, Mouth/Throat, BID  clopidogrel, 75 mg, Oral, Daily  heparin (porcine), 5,000 Units, Subcutaneous, Q8H KRISTI  insulin lispro, 1-5 Units, Subcutaneous, HS  insulin lispro, 1-6 Units, Subcutaneous, TID AC  lidocaine, 2 patch, Topical, Daily  melatonin, 6 mg, Oral, HS  metoprolol tartrate, 50 mg, Oral, Q12H KRISTI  pantoprazole, 40 mg, Oral, Early Morning  polyethylene glycol, 17 g, Oral, Daily  potassium chloride, 20 mEq, Oral, Daily  torsemide, 20 mg, Oral, Daily      Continuous IV Infusions: none     PRN Meds:  bisacodyl, 10 mg, Rectal, Daily PRN  hydrALAZINE, 5 mg, Intravenous, Q6H PRN  magnesium hydroxide, 30 mL, Oral, Daily PRN  methocarbamol, 500 mg, Oral, Q6H PRN  ondansetron, 4 mg, Intravenous, Q6H PRN 07/05 x 2  senna-docusate sodium, 1 tablet, Oral, BID PRN  temazepam, 15 mg, Oral, HS PRN  trimethobenzamide, 200 mg, Intramuscular, Q6H PRN        Discharge Plan: D    Network Utilization Review Department  ATTENTION: Please call with any questions or concerns to [phone] and carefully listen to the prompts so that you are directed to the right person. All voicemails are confidential.   For Discharge needs, contact Care Management DC Support Team at [phone] opt. [phone]  Send all requests for admission clinical reviews, approved or denied determinations and any other requests to dedicated fax number below belonging to the [place] where the patient is receiving treatment. List of dedicated fax numbers for the Facilities:  FACILITY NAME UR FAX NUMBER   ADMISSION DENIALS (Administrative/Medical Necessity) [phone]   DISCHARGE SUPPORT TEAM (NETWORK) [phone]   PARENT CHILD HEALTH (Maternity/NICU/Pediatrics) [phone]   [facility] [phone]   [facility] [phone]   [facility] [phone]   [facility] [phone]   [facility]  [phone]   [facility] [phone]   [facility] [phone]   [facility] [phone]   [facility] [phone]   [facility] [phone]   [facility] [phone]   [facility] [phone]

## 2024-07-06 ENCOUNTER — APPOINTMENT (OUTPATIENT)
Dept: RADIOLOGY | Facility: HOSPITAL | Age: 79
DRG: 235 | End: 2024-07-06
Payer: COMMERCIAL

## 2024-07-06 LAB
ANION GAP SERPL CALCULATED.3IONS-SCNC: 8 MMOL/L (ref 4–13)
BUN SERPL-MCNC: 28 MG/DL (ref 5–25)
CALCIUM SERPL-MCNC: 7.4 MG/DL (ref 8.4–10.2)
CHLORIDE SERPL-SCNC: 99 MMOL/L (ref 96–108)
CO2 SERPL-SCNC: 22 MMOL/L (ref 21–32)
CREAT SERPL-MCNC: 1.14 MG/DL (ref 0.6–1.3)
GFR SERPL CREATININE-BSD FRML MDRD: 61 ML/MIN/1.73SQ M
GLUCOSE SERPL-MCNC: 103 MG/DL (ref 65–140)
GLUCOSE SERPL-MCNC: 105 MG/DL (ref 65–140)
GLUCOSE SERPL-MCNC: 108 MG/DL (ref 65–140)
GLUCOSE SERPL-MCNC: 114 MG/DL (ref 65–140)
GLUCOSE SERPL-MCNC: 116 MG/DL (ref 65–140)
POTASSIUM SERPL-SCNC: 4.5 MMOL/L (ref 3.5–5.3)
SODIUM SERPL-SCNC: 129 MMOL/L (ref 135–147)

## 2024-07-06 PROCEDURE — 80048 BASIC METABOLIC PNL TOTAL CA: CPT | Performed by: PHYSICIAN ASSISTANT

## 2024-07-06 PROCEDURE — 99024 POSTOP FOLLOW-UP VISIT: CPT | Performed by: PHYSICIAN ASSISTANT

## 2024-07-06 PROCEDURE — 82948 REAGENT STRIP/BLOOD GLUCOSE: CPT

## 2024-07-06 PROCEDURE — 70551 MRI BRAIN STEM W/O DYE: CPT

## 2024-07-06 RX ADMIN — POTASSIUM CHLORIDE 20 MEQ: 1500 TABLET, EXTENDED RELEASE ORAL at 08:29

## 2024-07-06 RX ADMIN — ACETAMINOPHEN 975 MG: 325 TABLET, FILM COATED ORAL at 08:29

## 2024-07-06 RX ADMIN — HEPARIN SODIUM 5000 UNITS: 5000 INJECTION INTRAVENOUS; SUBCUTANEOUS at 21:26

## 2024-07-06 RX ADMIN — ACETAMINOPHEN 975 MG: 325 TABLET, FILM COATED ORAL at 14:09

## 2024-07-06 RX ADMIN — TORSEMIDE 20 MG: 20 TABLET ORAL at 08:29

## 2024-07-06 RX ADMIN — CHLORHEXIDINE GLUCONATE 0.12% ORAL RINSE 15 ML: 1.2 LIQUID ORAL at 08:29

## 2024-07-06 RX ADMIN — CLOPIDOGREL BISULFATE 75 MG: 75 TABLET ORAL at 08:29

## 2024-07-06 RX ADMIN — Medication 6 MG: at 21:26

## 2024-07-06 RX ADMIN — HEPARIN SODIUM 5000 UNITS: 5000 INJECTION INTRAVENOUS; SUBCUTANEOUS at 14:09

## 2024-07-06 RX ADMIN — ACETAMINOPHEN 975 MG: 325 TABLET, FILM COATED ORAL at 21:26

## 2024-07-06 RX ADMIN — PANTOPRAZOLE SODIUM 40 MG: 40 TABLET, DELAYED RELEASE ORAL at 06:37

## 2024-07-06 RX ADMIN — METOPROLOL TARTRATE 50 MG: 50 TABLET, FILM COATED ORAL at 08:29

## 2024-07-06 RX ADMIN — CHLORHEXIDINE GLUCONATE 0.12% ORAL RINSE 15 ML: 1.2 LIQUID ORAL at 16:40

## 2024-07-06 RX ADMIN — METOPROLOL TARTRATE 50 MG: 50 TABLET, FILM COATED ORAL at 21:26

## 2024-07-06 RX ADMIN — ASPIRIN 81 MG: 81 TABLET, COATED ORAL at 08:29

## 2024-07-06 RX ADMIN — HEPARIN SODIUM 5000 UNITS: 5000 INJECTION INTRAVENOUS; SUBCUTANEOUS at 06:37

## 2024-07-06 RX ADMIN — ATORVASTATIN CALCIUM 80 MG: 80 TABLET, FILM COATED ORAL at 16:40

## 2024-07-06 NOTE — PROGRESS NOTES
Progress Note - Cardiothoracic Surgery   Shane Chau . 78 y.o. male MRN: [id number]  Unit/Bed#: [facility] 405-01 Encounter: [id number]    Coronary artery disease. S/P coronary artery bypass grafting; POD # 16    24 Hour Events: No events overnight.  No complaints.  NO pain.  MOving his bowel every day, no abdominal pain, hungry often.     Medications:   Scheduled Meds:  Current Facility-Administered Medications   Medication Dose Route Frequency Provider Last Rate    acetaminophen  975 mg Oral Q6H While awake Moraima Alvarez PA-C      aspirin  81 mg Oral Daily Moraima Alvarez PA-C      atorvastatin  80 mg Oral Daily With Dinner Moraima Alvarez PA-C      bisacodyl  10 mg Rectal Daily PRN KALPANA Amos MD      chlorhexidine  15 mL Mouth/Throat BID Moraima Alvarez PA-C      clopidogrel  75 mg Oral Daily Moraima Alvarez PA-C      heparin (porcine)  5,000 Units Subcutaneous Q8H KRISTI Moraima Alvarez PA-C      hydrALAZINE  5 mg Intravenous Q6H PRN Moraima Alvarez PA-C      insulin lispro  1-5 Units Subcutaneous HS Moraima Alvarez PA-C      insulin lispro  1-6 Units Subcutaneous TID AC Moraima Alvarez PA-C      lidocaine  2 patch Topical Daily Moraima Alvarez PA-C      magnesium hydroxide  30 mL Oral Daily PRN Rahel Baig PA-C      melatonin  6 mg Oral HS Rahel Baig PA-C      methocarbamol  500 mg Oral Q6H PRN Miranda Mijares PA-C      metoprolol tartrate  50 mg Oral Q12H [facility] Sandy Mckeon PA-C      ondansetron  4 mg Intravenous Q6H PRN Stone Lucas MD      pantoprazole  40 mg Oral Early Morning KALPANA Amos MD      polyethylene glycol  17 g Oral Daily Moraima Alvarez PA-C      potassium chloride  20 mEq Oral Daily Miranda Mijares PA-C      senna-docusate sodium  1 tablet Oral BID PRN Miranda Mijares PA-C      temazepam  15 mg Oral HS PRN Moraima Alvarez PA-C      torsemide  20 mg Oral Daily Miranda Mijares PA-C       trimethobenzamide  200 mg Intramuscular Q6H PRN Stone Lucas MD       Continuous Infusions:   PRN Meds:.  bisacodyl    hydrALAZINE    magnesium hydroxide    methocarbamol    ondansetron    senna-docusate sodium    temazepam    trimethobenzamide    Vitals: 115/66 at 7 am  Vitals:    07/05/24 2304 07/06/24 0228 07/06/24 0631 07/06/24 0709   BP: 146/80 142/77  115/66   BP Location:       Pulse: 87 81  81   Resp: 15 16  16   Temp: 98.7 °F (37.1 °C) 98.2 °F (36.8 °C)  (!) 97.2 °F (36.2 °C)   TempSrc:       SpO2: 93% 93%  94%   Weight:   81.9 kg (180 lb 8.9 oz)    Height:           Telemetry: NSR; Heart Rate: 69    Respiratory:   SpO2: SpO2: 94 %; Room Air    Intake/Output:     Intake/Output Summary (Last 24 hours) at 7/6/2024 0759  Last data filed at 7/6/2024 0235  Gross per 24 hour   Intake 720 ml   Output 250 ml   Net 470 ml        Weights:   Weight (last 2 days)       Date/Time Weight    07/06/24 0631 81.9 (180.56)    07/05/24 0600 82.4 (181.66)    07/04/24 0545 83.4 (183.86)            Results:   Results from last 7 days   Lab Units 07/05/24  0847 07/03/24  0436 07/01/24  0500   WBC Thousand/uL 12.88* 12.67* 14.31*   HEMOGLOBIN g/dL 11.3* 9.9* 9.7*   HEMATOCRIT % 34.3* 30.2* 29.5*   PLATELETS Thousands/uL 416* 232 213     Results from last 7 days   Lab Units 07/05/24  0847 07/04/24  0438 07/03/24  0436   SODIUM mmol/L 130* 131* 132*   POTASSIUM mmol/L 4.7 3.9 3.9   CHLORIDE mmol/L 101 102 100   CO2 mmol/L 22 22 25   BUN mg/dL 25 21 23   CREATININE mg/dL 1.18 1.02 1.12   CALCIUM mg/dL 7.7* 7.3* 7.3*     Invasive Lines/Tubes:  Invasive Devices       Peripheral Intravenous Line  Duration             Peripheral IV 07/02/24 Right;Ventral (anterior) Forearm 4 days    Peripheral IV 07/03/24 Right Antecubital 3 days                    Physical Exam:    General: No acute distress, Alert, and Normal appearance  HEENT/NECK:  Normocephalic. Atraumatic.  No jugular venous distention.    Cardiac: Regular rate and  rhythm  Pulmonary:  Breath sounds clear bilaterally  Abdomen:  Non-tender, Non-distended, and Distended  Incisions: Sternum is stable.  Incision is clean, dry, and intact.  and Saphenectomy incison is clean, dry, and intact.   Extremities: Extremities warm/dry and 1+ edema B/L  Neuro: Alert and oriented X 3  Skin: Warm/Dry, without rashes or lesions.    Assessment:  Principal Problem:    S/P CABG x 3  Active Problems:    Abdominal aortic aneurysm without rupture (HCC)    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HCC)    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HCC)    Asymptomatic bilateral carotid artery stenosis    Coronary artery disease of native artery of native heart with stable angina pectoris (HCC)    Hyperlipidemia    Hypertension    BARBARA (acute kidney injury) (HCC)    Stroke-like symptoms       Coronary artery disease. S/P coronary artery bypass grafting; POD # 16    Plan:    Cardiac:     LVEF 60%, on ASA and statin therapy, metoprolol 50 mg bid  Remains on Plavix with asa since postop for hx of TIA    Pulmonary:  no issues     Renal:     Post op Creatinine stable; Follow up labs prn   Diuresing well on torsemide 20 mg daily    Neuro:awaiting MRI for further planning with medications, called MRI yesterday was told it would be done last night. Not yet completed.  Will call again today for timing.     GI: ileus resolved, speech recommending to continue level 3 diet with thin liquids     Continue stool softeners and prn suppository    Continue GI prophylaxis    Endo:  no issues     7    Hematology:     Post-operative blood count acceptable; Trend prn    8.   Disposition:        Anticipated discharge date: when MRI completed then [facility] will accept patient      VTE Pharmacologic Prophylaxis: Heparin  VTE Mechanical Prophylaxis: sequential compression device    Collaborative rounds completed with supervising physician  Plan of care discussed with bedside nurse    SIGNATURE: Miranda Mijares PA-C  DATE: July 6, 2024  TIME:  7:59 AM

## 2024-07-06 NOTE — QUICK NOTE
78 year old male with a Shane Chau . is a 78 y.o. male with pertinent PMHx HTN, HLD, multiple TIAs, abdominal aortic aneurysm s/p repair in 2011, who is currently admitted for a CABG x 3 performed on 6/20/2024. Stroke alert called on 7/3/24 at 0620. The patient was found to have dysarthria, left sided weakness, left tongue deviation, left facial droop, and left pronator drift. Symptoms have since improved.    Patient's MRI completed and reviewed on 7/6/2024. Punctate acute to subacute infarcts involving the right caudate head, anterior centrum semiovale, and lateral right frontal cortex. Chronic right corona radiata/basal ganglia and bilateral cerebellar lacunar infarcts. Mild chronic microangiopathic ischemic changes.  Patient has been on Aspirin 81mg and Plavix 75mg. P2Y12 level checked during this hospitalization was 180.     The distribution of the strokes found on MRI is suspicious for an embolic source. Consider further cardiac work up including a Zio patch / Loop recorder to assess for possible arrhythmias unless high suspicion for procedure related embolic shower as the etiology.    If arrhythmia is found on work up recommend switching from DAPT to AC+ASA.    If procedure related etiology suspected can still continue current DAPT. May consider switching Plavix to Brilinta for further lowering P2Y12 level.

## 2024-07-07 LAB
ANION GAP SERPL CALCULATED.3IONS-SCNC: 7 MMOL/L (ref 4–13)
BUN SERPL-MCNC: 24 MG/DL (ref 5–25)
CALCIUM SERPL-MCNC: 7.3 MG/DL (ref 8.4–10.2)
CHLORIDE SERPL-SCNC: 100 MMOL/L (ref 96–108)
CO2 SERPL-SCNC: 23 MMOL/L (ref 21–32)
CREAT SERPL-MCNC: 1.1 MG/DL (ref 0.6–1.3)
GFR SERPL CREATININE-BSD FRML MDRD: 63 ML/MIN/1.73SQ M
GLUCOSE SERPL-MCNC: 105 MG/DL (ref 65–140)
GLUCOSE SERPL-MCNC: 114 MG/DL (ref 65–140)
GLUCOSE SERPL-MCNC: 117 MG/DL (ref 65–140)
GLUCOSE SERPL-MCNC: 84 MG/DL (ref 65–140)
GLUCOSE SERPL-MCNC: 91 MG/DL (ref 65–140)
POTASSIUM SERPL-SCNC: 3.8 MMOL/L (ref 3.5–5.3)
SODIUM SERPL-SCNC: 130 MMOL/L (ref 135–147)

## 2024-07-07 PROCEDURE — 99024 POSTOP FOLLOW-UP VISIT: CPT | Performed by: PHYSICIAN ASSISTANT

## 2024-07-07 PROCEDURE — 82948 REAGENT STRIP/BLOOD GLUCOSE: CPT

## 2024-07-07 PROCEDURE — 97530 THERAPEUTIC ACTIVITIES: CPT

## 2024-07-07 PROCEDURE — 97535 SELF CARE MNGMENT TRAINING: CPT

## 2024-07-07 PROCEDURE — 80048 BASIC METABOLIC PNL TOTAL CA: CPT | Performed by: PHYSICIAN ASSISTANT

## 2024-07-07 RX ORDER — TORSEMIDE 20 MG/1
20 TABLET ORAL 2 TIMES DAILY
Status: DISCONTINUED | OUTPATIENT
Start: 2024-07-07 | End: 2024-07-08

## 2024-07-07 RX ORDER — POTASSIUM CHLORIDE 20 MEQ/1
20 TABLET, EXTENDED RELEASE ORAL 2 TIMES DAILY
Status: DISCONTINUED | OUTPATIENT
Start: 2024-07-07 | End: 2024-07-09 | Stop reason: HOSPADM

## 2024-07-07 RX ADMIN — POTASSIUM CHLORIDE 20 MEQ: 1500 TABLET, EXTENDED RELEASE ORAL at 18:05

## 2024-07-07 RX ADMIN — TORSEMIDE 20 MG: 20 TABLET ORAL at 18:05

## 2024-07-07 RX ADMIN — HEPARIN SODIUM 5000 UNITS: 5000 INJECTION INTRAVENOUS; SUBCUTANEOUS at 21:49

## 2024-07-07 RX ADMIN — Medication 6 MG: at 21:49

## 2024-07-07 RX ADMIN — CHLORHEXIDINE GLUCONATE 0.12% ORAL RINSE 15 ML: 1.2 LIQUID ORAL at 18:05

## 2024-07-07 RX ADMIN — LIDOCAINE 5% 2 PATCH: 700 PATCH TOPICAL at 21:49

## 2024-07-07 RX ADMIN — TICAGRELOR 90 MG: 90 TABLET ORAL at 21:49

## 2024-07-07 RX ADMIN — POTASSIUM CHLORIDE 20 MEQ: 1500 TABLET, EXTENDED RELEASE ORAL at 09:50

## 2024-07-07 RX ADMIN — METOPROLOL TARTRATE 50 MG: 50 TABLET, FILM COATED ORAL at 21:49

## 2024-07-07 RX ADMIN — ACETAMINOPHEN 975 MG: 325 TABLET, FILM COATED ORAL at 13:28

## 2024-07-07 RX ADMIN — HEPARIN SODIUM 5000 UNITS: 5000 INJECTION INTRAVENOUS; SUBCUTANEOUS at 13:28

## 2024-07-07 RX ADMIN — METOPROLOL TARTRATE 50 MG: 50 TABLET, FILM COATED ORAL at 09:50

## 2024-07-07 RX ADMIN — ATORVASTATIN CALCIUM 80 MG: 80 TABLET, FILM COATED ORAL at 18:05

## 2024-07-07 RX ADMIN — TORSEMIDE 20 MG: 20 TABLET ORAL at 09:50

## 2024-07-07 RX ADMIN — ACETAMINOPHEN 975 MG: 325 TABLET, FILM COATED ORAL at 21:49

## 2024-07-07 RX ADMIN — PANTOPRAZOLE SODIUM 40 MG: 40 TABLET, DELAYED RELEASE ORAL at 05:49

## 2024-07-07 RX ADMIN — HEPARIN SODIUM 5000 UNITS: 5000 INJECTION INTRAVENOUS; SUBCUTANEOUS at 05:49

## 2024-07-07 RX ADMIN — CHLORHEXIDINE GLUCONATE 0.12% ORAL RINSE 15 ML: 1.2 LIQUID ORAL at 09:50

## 2024-07-07 RX ADMIN — ACETAMINOPHEN 975 MG: 325 TABLET, FILM COATED ORAL at 09:50

## 2024-07-07 RX ADMIN — ASPIRIN 81 MG: 81 TABLET, COATED ORAL at 09:50

## 2024-07-07 RX ADMIN — CLOPIDOGREL BISULFATE 75 MG: 75 TABLET ORAL at 09:50

## 2024-07-07 NOTE — PLAN OF CARE
Problem: PHYSICAL THERAPY ADULT  Goal: Performs mobility at highest level of function for planned discharge setting.  See evaluation for individualized goals.  Description: Treatment/Interventions: Functional transfer training, LE strengthening/ROM, Elevations, Therapeutic exercise, Endurance training, Cognitive reorientation, Patient/family training, Equipment eval/education, Bed mobility, Gait training, Spoke to nursing, Spoke to case management, OT  Equipment Recommended: Walker (pt reports owning)       See flowsheet documentation for full assessment, interventions and recommendations.  Outcome: Progressing  Note: Prognosis: Good  Problem List: Decreased strength, Decreased endurance, Impaired balance, Decreased mobility  Assessment: Pt cont to demonstrate gradual improvement in mobility skills achieving min (A) w/ transfers and amb w/ rw; pt still exhibits decreased functional endurance and overall weakness and deconditioning requiring frequent rest periods b/in bouts of mobility; D/C recommendations are listed below; will follow per updated Tx goals below.  Barriers to Discharge: Inaccessible home environment     Rehab Resource Intensity Level, PT: I (Maximum Resource Intensity)    See flowsheet documentation for full assessment.

## 2024-07-07 NOTE — OCCUPATIONAL THERAPY NOTE
Occupational Therapy Progress Note     Patient Name: Shane Chau Sr.  Today's Date: 7/7/2024  Problem List  Principal Problem:    S/P CABG x 3  Active Problems:    Abdominal aortic aneurysm without rupture (HCC)    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HCC)    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HCC)    Asymptomatic bilateral carotid artery stenosis    Coronary artery disease of native artery of native heart with stable angina pectoris (HCC)    Hyperlipidemia    Hypertension    BARBARA (acute kidney injury) (HCC)    Stroke-like symptoms              07/07/24 0945   OT Last Visit   OT Visit Date 07/07/24   Note Type   Note Type Treatment   Pain Assessment   Pain Assessment Tool 0-10   Pain Score No Pain   Restrictions/Precautions   Weight Bearing Precautions Per Order No   Other Precautions Cardiac/sternal;Cognitive;Chair Alarm;Telemetry;Fall Risk   Lifestyle   Autonomy Pta pt I with ADL, IADL and functional mobility, (+)    Reciprocal Relationships supportive spouse   Service to Others retired   Intrinsic Gratification enjoys time with family   ADL   Where Assessed Chair  (+bathroom)   UB Bathing Comments Simulated, pt maintains MOD A level of assist.   LB Bathing Comments Simulated, pt maintains MAX A level of assist.   UB Dressing Comments Simulated, pt maintains MIN A level of assist.   LB Dressing Assistance 2  Maximal Assistance   LB Dressing Deficit Don/doff R sock;Don/doff L sock   Toileting Assistance  3  Moderate Assistance   Toileting Deficit Steadying;Supervison/safety;Increased time to complete;Perineal hygiene   Toileting Comments Pt requiring assist for post hygiene following BM, assist for steadying.   Bed Mobility   Additional Comments Pt greeted and left in chair with alarm on and all needs within reach.   Transfers   Sit to Stand 4  Minimal assistance   Additional items Assist x 1;Verbal cues   Stand to Sit 4  Minimal assistance   Additional items Assist x 1;Verbal cues   Toilet transfer 3  Moderate  assistance   Additional items Assist x 1;Increased time required;Verbal cues;Standard toilet   Additional Comments with RW   Functional Mobility   Functional Mobility 4  Minimal assistance   Additional Comments Pt performs short distances with MIN A x 1 with RW and chair follow, requiring 2 seated rest breaks.   Additional items Rolling walker   Cognition   Overall Cognitive Status WFL  (with some higher level deficits)   Arousal/Participation Cooperative   Attention Attends with cues to redirect   Orientation Level Oriented to person;Oriented to place;Oriented to situation   Memory Decreased short term memory;Decreased recall of recent events;Decreased recall of precautions   Following Commands Follows one step commands without difficulty   Comments Pt cooperative to therapy although continues to require vc for safety, reports the month being June requiring vc for orientation to date.   Activity Tolerance   Activity Tolerance Patient limited by fatigue   Medical Staff Made Aware RN cleared for therapy, co-tx with DPT during functional mobility due to medical complexity and chair follow.   Assessment   Assessment Pt seen for OT treatment session day 8 on this date focused on ADL retraining, functional transfers and mobility, energy conservation, functional endurance, recall of safety precautions. Pt was greeted in chair and was cooperative throughout session. Following session, pt was left in chair with chair alarm on and all needs within reach. Pt continues to be limited by functional status related to ADLs and transfers requiring MAX A for LB dressing, MOD A for toileting, maintains MIN A for UB dressing, MOD A for UB bathing and MAX A for LB bathing, demonstrates improvements in motivation to improve and endurance. The patient's raw score on the AM-PAC Daily Activity Inpatient Short Form is 16. A raw score of less than 19 suggests the patient may benefit from discharge to post-acute rehabilitation services.  Please refer to the recommendation of the Occupational Therapist for safe discharge planning. Pt would benefit from continued acute OT intervention with plan to continue OT treatment sessions 2-3x per week. Recommend d/c to level I services.   Plan   Treatment Interventions ADL retraining;Functional transfer training;Endurance training;Continued evaluation;Energy conservation;Activityengagement   Goal Expiration Date 07/17/24   OT Treatment Day 8   OT Frequency 2-3x/wk   Discharge Recommendation   Rehab Resource Intensity Level, OT I (Maximum Resource Intensity)   AM-PAC Daily Activity Inpatient   Lower Body Dressing 2   Bathing 2   Toileting 2   Upper Body Dressing 3   Grooming 3   Eating 4   Daily Activity Raw Score 16   Daily Activity Standardized Score (Calc for Raw Score >=11) 35.96   AM-PAC Applied Cognition Inpatient   Following a Speech/Presentation 3   Understanding Ordinary Conversation 4   Taking Medications 3   Remembering Where Things Are Placed or Put Away 3   Remembering List of 4-5 Errands 3   Taking Care of Complicated Tasks 3   Applied Cognition Raw Score 19   Applied Cognition Standardized Score 39.77   Modified Kimmie Scale   Modified Rice Scale 4   End of Consult   Education Provided Yes   Patient Position at End of Consult Bedside chair;Bed/Chair alarm activated;All needs within reach   Nurse Communication Nurse aware of consult       YARON Rae, OTR/L

## 2024-07-07 NOTE — PHYSICAL THERAPY NOTE
PHYSICAL THERAPY NOTE          Patient Name: Shane Chau Sr.  Today's Date: 7/7/2024 07/07/24 0946   PT Last Visit   PT Visit Date 07/07/24   Note Type   Note Type Treatment   Pain Assessment   Pain Assessment Tool 0-10   Pain Score No Pain   Restrictions/Precautions   Other Precautions Cardiac/sternal;Telemetry;Fall Risk;Chair Alarm   General   Chart Reviewed Yes   Additional Pertinent History cleared for Tx session by nsg   Response to Previous Treatment Patient with no complaints from previous session.   Cognition   Overall Cognitive Status WFL   Arousal/Participation Alert;Cooperative   Attention Attends with cues to redirect   Orientation Level Oriented to person;Oriented to place;Oriented to situation   Memory Decreased short term memory;Decreased recall of recent events;Decreased recall of precautions   Following Commands Follows one step commands without difficulty   Subjective   Subjective Alert; in the chair; somewhat flat affect; agreeable to mobilize (going to the BR first)   Transfers   Sit to Stand 4  Minimal assistance   Additional items Assist x 1;Verbal cues   Stand to Sit 4  Minimal assistance   Additional items Assist x 1;Verbal cues   Ambulation/Elevation   Gait pattern Excessively slow;Short stride   Gait Assistance 4  Minimal assist   Additional items Assist x 1;Verbal cues;Tactile cues   Assistive Device Rolling walker   Distance 2 x 8 ft + 40 ft + 40 ft + 80 ft w/ seated rest periods in between   Balance   Static Sitting Fair   Dynamic Sitting Fair -   Static Standing Fair -   Dynamic Standing Poor +   Ambulatory Poor +   Activity Tolerance   Activity Tolerance Patient limited by fatigue   Medical Staff Made Aware Co-Tx performed w/ OTR due to complexity of medical status   Nurse Made Aware spoke to JORGE ALBERTO De Jesus   Assessment   Prognosis Good   Problem List Decreased strength;Decreased  endurance;Impaired balance;Decreased mobility   Assessment Pt cont to demonstrate gradual improvement in mobility skills achieving min (A) w/ transfers and amb w/ rw; pt still exhibits decreased functional endurance and overall weakness and deconditioning requiring frequent rest periods b/in bouts of mobility; D/C recommendations are listed below; will follow per updated Tx goals below.   Barriers to Discharge Inaccessible home environment   Goals   Patient Goals to go to rehab   STG Expiration Date 07/17/24   Short Term Goal #1 7-10 days (updated Tx goals). Pt will amb 150 ft w/ rw <--> SPC, mod (I) in order to facilitate safe return to premorbid environment and to initiate return to community amb status. Pt will negotiate 13 steps w/ hand rail and SPC PRN, (S)x1 in order to assure safe navigation in and out of the premorbid living environment. Pt will achieve mod (I) level w/ bed mob in order to facilitate safety with OOB and back to bed transitions in own living environment. Pt will perform transfers w/ mod (I) to assure (I) and safety w/ functional mobility/transitions w/ all aspects of mobility/locomotion. Pt will participate in LE therex and balance activities to max progression w/ mobility skills.   PT Treatment Day 9   Plan   Treatment/Interventions Functional transfer training;LE strengthening/ROM;Elevations;Therapeutic exercise;Endurance training;Equipment eval/education;Bed mobility;Gait training;Spoke to nursing;OT   Progress Progressing toward goals   PT Frequency 4-6x/wk   Discharge Recommendation   Rehab Resource Intensity Level, PT I (Maximum Resource Intensity)   Equipment Recommended Walker   Walker Package Recommended Wheeled walker   Change/add to Walker Package? No   AM-PAC Basic Mobility Inpatient   Turning in Flat Bed Without Bedrails 3   Lying on Back to Sitting on Edge of Flat Bed Without Bedrails 3   Moving Bed to Chair 3   Standing Up From Chair Using Arms 3   Walk in Room 3   Climb 3-5  Stairs With Railing 2   Basic Mobility Inpatient Raw Score 17   Basic Mobility Standardized Score 39.67   [facility] Highest Level Of Mobility   -[facility] Goal 5: Stand one or more mins   -[facility] Achieved 7: Walk 25 feet or more   Education   Education Provided Mobility training;Assistive device   Patient Demonstrates verbal understanding;Reinforcement needed   End of Consult   Patient Position at End of Consult Bedside chair;Bed/Chair alarm activated;All needs within reach     Bradly Howell

## 2024-07-07 NOTE — PLAN OF CARE
Problem: OCCUPATIONAL THERAPY ADULT  Goal: Performs self-care activities at highest level of function for planned discharge setting.  See evaluation for individualized goals.  Description: Treatment Interventions: ADL retraining, Functional transfer training, Endurance training, Patient/family training, Equipment evaluation/education, Continued evaluation, Cardiac education, Energy conservation, Activityengagement          See flowsheet documentation for full assessment, interventions and recommendations.   Outcome: Progressing  Note: Limitation: Decreased ADL status, Decreased Safe judgement during ADL, Decreased endurance, Decreased self-care trans, Decreased high-level ADLs  Prognosis: Fair  Assessment: Pt seen for OT treatment session day 8 on this date focused on ADL retraining, functional transfers and mobility, energy conservation, functional endurance, recall of safety precautions. Pt was greeted in chair and was cooperative throughout session. Following session, pt was left in chair with chair alarm on and all needs within reach. Pt continues to be limited by functional status related to ADLs and transfers requiring MAX A for LB dressing, MOD A for toileting, maintains MIN A for UB dressing, MOD A for UB bathing and MAX A for LB bathing, demonstrates improvements in motivation to improve and endurance. The patient's raw score on the -PAC Daily Activity Inpatient Short Form is 16. A raw score of less than 19 suggests the patient may benefit from discharge to post-acute rehabilitation services. Please refer to the recommendation of the Occupational Therapist for safe discharge planning. Pt would benefit from continued acute OT intervention with plan to continue OT treatment sessions 2-3x per week. Recommend d/c to level I services.     Rehab Resource Intensity Level, OT: I (Maximum Resource Intensity)

## 2024-07-07 NOTE — PROGRESS NOTES
Progress Note - Cardiothoracic Surgery   Shane Chau . 78 y.o. male MRN: [id number]  Unit/Bed#: [facility] 405-01 Encounter: [id number]       Coronary artery disease. S/P coronary artery bypass grafting; POD # 17    24 Hour Events: No events overnight.  No complaints. Tolerating diet.     Medications:   Scheduled Meds:  Current Facility-Administered Medications   Medication Dose Route Frequency Provider Last Rate    acetaminophen  975 mg Oral Q6H While awake Moraima Alvarez PA-C      aspirin  81 mg Oral Daily Moraima Alvarez PA-C      atorvastatin  80 mg Oral Daily With Dinner Moraima Alvarez PA-C      bisacodyl  10 mg Rectal Daily PRN KALPANA Amos MD      chlorhexidine  15 mL Mouth/Throat BID Moraima Alvarez PA-C      clopidogrel  75 mg Oral Daily Moraima Alvarez PA-C      heparin (porcine)  5,000 Units Subcutaneous Q8H KRISTI Moraima Alvarez PA-C      hydrALAZINE  5 mg Intravenous Q6H PRN Moraima Alvarez PA-C      insulin lispro  1-5 Units Subcutaneous HS Moraima Alvarez PA-C      insulin lispro  1-6 Units Subcutaneous TID AC Moraima Alvarez PA-C      lidocaine  2 patch Topical Daily Moraima Alvarez PA-C      magnesium hydroxide  30 mL Oral Daily PRN Rahel Baig PA-C      melatonin  6 mg Oral HS Rahel Baig PA-C      methocarbamol  500 mg Oral Q6H PRN Miranda Mijares PA-C      metoprolol tartrate  50 mg Oral Q12H [facility] Sandy Mckeon PA-C      ondansetron  4 mg Intravenous Q6H PRN Stone Lucas MD      pantoprazole  40 mg Oral Early Morning KALPANA Amos MD      potassium chloride  20 mEq Oral Daily Miranda Mijares PA-C      senna-docusate sodium  1 tablet Oral BID PRN Miranda Mijares PA-C      temazepam  15 mg Oral HS PRN Moraima Alvarez PA-C      torsemide  20 mg Oral Daily Miranda Mijares PA-C      trimethobenzamide  200 mg Intramuscular Q6H PRN Stone Lucas MD       Continuous Infusions:   PRN Meds:.   bisacodyl    hydrALAZINE    magnesium hydroxide    methocarbamol    ondansetron    senna-docusate sodium    temazepam    trimethobenzamide    Vitals:   Vitals:    07/06/24 2227 07/07/24 0233 07/07/24 0600 07/07/24 0707   BP: 119/65 120/65  112/62   BP Location:       Pulse: 81 79  68   Resp: 14   17   Temp: 98.3 °F (36.8 °C) 98.1 °F (36.7 °C)  (!) 97.3 °F (36.3 °C)   TempSrc:       SpO2: 94% 94%  95%   Weight:   80.8 kg (178 lb 2.1 oz)    Height:           Telemetry: NSR; Heart Rate: 70    Respiratory:   SpO2: SpO2: 95 %; Room Air    Intake/Output:     Intake/Output Summary (Last 24 hours) at 7/7/2024 0842  Last data filed at 7/7/2024 0822  Gross per 24 hour   Intake 1154 ml   Output 650 ml   Net 504 ml        Weights:   Weight (last 2 days)       Date/Time Weight    07/07/24 0600 80.8 (178.13)    07/06/24 0631 81.9 (180.56)    07/06/24 0600 81.9 (180.56)    07/05/24 0600 82.4 (181.66)          Results:   Results from last 7 days   Lab Units 07/05/24  0847 07/03/24  0436 07/01/24  0500   WBC Thousand/uL 12.88* 12.67* 14.31*   HEMOGLOBIN g/dL 11.3* 9.9* 9.7*   HEMATOCRIT % 34.3* 30.2* 29.5*   PLATELETS Thousands/uL 416* 232 213     Results from last 7 days   Lab Units 07/07/24  0547 07/06/24  0822 07/05/24  0847   SODIUM mmol/L 130* 129* 130*   POTASSIUM mmol/L 3.8 4.5 4.7   CHLORIDE mmol/L 100 99 101   CO2 mmol/L 23 22 22   BUN mg/dL 24 28* 25   CREATININE mg/dL 1.10 1.14 1.18   CALCIUM mg/dL 7.3* 7.4* 7.7*     Studies:  MRI IMPRESSION:     Punctate acute to subacute infarcts involving the right caudate head, anterior centrum semiovale, and lateral right frontal cortex.     Chronic right corona radiata/basal ganglia and bilateral cerebellar lacunar infarcts.     Mild chronic microangiopathic ischemic changes.    Invasive Lines/Tubes:  Invasive Devices       Peripheral Intravenous Line  Duration             Peripheral IV 07/07/24 Right;Ventral (anterior) Forearm <1 day                    Physical Exam:    General: No  acute distress, Alert, and Normal appearance  HEENT/NECK:  Normocephalic. Atraumatic.  No jugular venous distention.    Cardiac: Regular rate and rhythm  Pulmonary:  Breath sounds clear bilaterally  Abdomen:  Non-tender, Normal bowel sounds, and Distended  Incisions: Sternum is stable.  Incision is clean, dry, and intact.  and Saphenectomy incison is clean, dry, and intact.   Extremities: Extremities warm/dry and 1+ edema B/L  Neuro: Alert and oriented X 3  Skin: Warm/Dry, without rashes or lesions.    Assessment:  Principal Problem:    S/P CABG x 3  Active Problems:    Abdominal aortic aneurysm without rupture ([facility])    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[facility])    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[facility])    Asymptomatic bilateral carotid artery stenosis    Coronary artery disease of native artery of native heart with stable angina pectoris ([facility])    Hyperlipidemia    Hypertension    BARBARA (acute kidney injury) (HCC)    Stroke-like symptoms          Coronary artery disease. S/P coronary artery bypass grafting; POD # 17    Plan:    Cardiac: LVEF 60% on ASA and statin therapy   Metoprolol 50 mg bid   Amiodarone (prophylactic) d/c after 2 weeks with no telemetry events    Pulmonary: no issues, on RA    Renal:     Post op Creatinine stable; Follow up labs prn     Intake/Output net: inaccurate due to missed voids with BMs  Diuretic Regimen:  Continue PO Torsemide, 20 mg QD  Continue Potassium Chloride 20 mEq PO QD    Neuro: Stroke alert 7/3 for stroke like symptoms, now all resolved (dysarthria, left sided weakness and left facial droop) has been on plavix since 6/21 for hx of multiple TIAs in the past (wife states similar as stroke alert findings with quick resolution) on will discuss neurology note with my attending for further AC or Brilinta change from Plavix, will change if cleared -- cleared with Dr Yoon to transition from plavix to brilinta, has not had an documented arrhythmias while postoperative    GI:    Tolerating diet, ileus  resolved, distention continues but stable, having bowel movement, no abodminal pain, n/v    Continue stool softeners and prn suppository    Continue GI prophylaxis    Endo: no issues     7    Hematology:     Post-operative blood count acceptable; Trend prn    8.   Disposition:        Anticipated discharge date: [facility] d/c tomorrow now that MRI completed and Neurology seen      VTE Pharmacologic Prophylaxis: Heparin  VTE Mechanical Prophylaxis: sequential compression device    Collaborative rounds completed with supervising physician  Plan of care discussed with bedside nurse    SIGNATURE: Miranda Mijares PA-C  DATE: July 7, 2024  TIME: 8:42 AM

## 2024-07-08 DIAGNOSIS — R29.90 STROKE-LIKE SYMPTOMS: Primary | ICD-10-CM

## 2024-07-08 LAB
ANION GAP SERPL CALCULATED.3IONS-SCNC: 9 MMOL/L (ref 4–13)
BUN SERPL-MCNC: 24 MG/DL (ref 5–25)
CALCIUM SERPL-MCNC: 7.6 MG/DL (ref 8.4–10.2)
CHLORIDE SERPL-SCNC: 99 MMOL/L (ref 96–108)
CO2 SERPL-SCNC: 23 MMOL/L (ref 21–32)
CREAT SERPL-MCNC: 1.08 MG/DL (ref 0.6–1.3)
GFR SERPL CREATININE-BSD FRML MDRD: 65 ML/MIN/1.73SQ M
GLUCOSE SERPL-MCNC: 107 MG/DL (ref 65–140)
GLUCOSE SERPL-MCNC: 113 MG/DL (ref 65–140)
GLUCOSE SERPL-MCNC: 118 MG/DL (ref 65–140)
GLUCOSE SERPL-MCNC: 85 MG/DL (ref 65–140)
GLUCOSE SERPL-MCNC: 90 MG/DL (ref 65–140)
POTASSIUM SERPL-SCNC: 3.6 MMOL/L (ref 3.5–5.3)
SODIUM SERPL-SCNC: 131 MMOL/L (ref 135–147)

## 2024-07-08 PROCEDURE — 82948 REAGENT STRIP/BLOOD GLUCOSE: CPT

## 2024-07-08 PROCEDURE — 99233 SBSQ HOSP IP/OBS HIGH 50: CPT | Performed by: INTERNAL MEDICINE

## 2024-07-08 PROCEDURE — 80048 BASIC METABOLIC PNL TOTAL CA: CPT | Performed by: FAMILY MEDICINE

## 2024-07-08 PROCEDURE — 99024 POSTOP FOLLOW-UP VISIT: CPT | Performed by: PHYSICIAN ASSISTANT

## 2024-07-08 RX ORDER — TORSEMIDE 20 MG/1
20 TABLET ORAL DAILY
Status: DISCONTINUED | OUTPATIENT
Start: 2024-07-08 | End: 2024-07-09 | Stop reason: HOSPADM

## 2024-07-08 RX ADMIN — TICAGRELOR 90 MG: 90 TABLET ORAL at 21:40

## 2024-07-08 RX ADMIN — Medication 6 MG: at 21:40

## 2024-07-08 RX ADMIN — ACETAMINOPHEN 975 MG: 325 TABLET, FILM COATED ORAL at 08:58

## 2024-07-08 RX ADMIN — HEPARIN SODIUM 5000 UNITS: 5000 INJECTION INTRAVENOUS; SUBCUTANEOUS at 13:00

## 2024-07-08 RX ADMIN — CHLORHEXIDINE GLUCONATE 0.12% ORAL RINSE 15 ML: 1.2 LIQUID ORAL at 08:58

## 2024-07-08 RX ADMIN — HEPARIN SODIUM 5000 UNITS: 5000 INJECTION INTRAVENOUS; SUBCUTANEOUS at 06:08

## 2024-07-08 RX ADMIN — TORSEMIDE 20 MG: 20 TABLET ORAL at 08:58

## 2024-07-08 RX ADMIN — POTASSIUM CHLORIDE 20 MEQ: 1500 TABLET, EXTENDED RELEASE ORAL at 17:05

## 2024-07-08 RX ADMIN — CHLORHEXIDINE GLUCONATE 0.12% ORAL RINSE 15 ML: 1.2 LIQUID ORAL at 17:05

## 2024-07-08 RX ADMIN — METOPROLOL TARTRATE 50 MG: 50 TABLET, FILM COATED ORAL at 21:40

## 2024-07-08 RX ADMIN — ATORVASTATIN CALCIUM 80 MG: 80 TABLET, FILM COATED ORAL at 17:05

## 2024-07-08 RX ADMIN — METOPROLOL TARTRATE 50 MG: 50 TABLET, FILM COATED ORAL at 08:57

## 2024-07-08 RX ADMIN — POTASSIUM CHLORIDE 20 MEQ: 1500 TABLET, EXTENDED RELEASE ORAL at 08:58

## 2024-07-08 RX ADMIN — ASPIRIN 81 MG: 81 TABLET, COATED ORAL at 08:58

## 2024-07-08 RX ADMIN — PANTOPRAZOLE SODIUM 40 MG: 40 TABLET, DELAYED RELEASE ORAL at 06:08

## 2024-07-08 RX ADMIN — ACETAMINOPHEN 975 MG: 325 TABLET, FILM COATED ORAL at 20:32

## 2024-07-08 RX ADMIN — HEPARIN SODIUM 5000 UNITS: 5000 INJECTION INTRAVENOUS; SUBCUTANEOUS at 21:40

## 2024-07-08 RX ADMIN — TICAGRELOR 90 MG: 90 TABLET ORAL at 08:58

## 2024-07-08 NOTE — PROGRESS NOTES
Progress Note - Cardiology   Shane Chau Sr. 78 y.o. male MRN: [id number]  Unit/Bed#: [facility] 405-01 Encounter: [id number]    Assessment:  Stroke-like symptoms in the setting of post--CABG  - S/p CABG on 6/20 with development of stroke-like symptoms of left facial droop with dysarthria on 7/3 morning; dysarthria and L facial droop much improved, wife agrees that he is almost back to his baseline.  - CT head 7/3 showed chronic lacunar infarcts in R basal ganglia and corona radiata and chronic b/l cerebellar lacunar infarcts w/o acute intracranial abnormalities. 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   - Echo on 7/3 showed EF 70% with no wall motion abnormalities; G1DD, mild concentric hypertrophy, normal RV ; aortic valve sclerosis, and mild MR.  - MRI 7/6: Punctate acute to subacute infarcts involving the right caudate head, anterior centrum semiovale, and lateral right frontal cortex. Chronic right corona radiata/basal ganglia and bilateral cerebellar lacunar infarcts. Mild chronic microangiopathic ischemic changes.  - Given ~2 weeks s/p CABG, it is less likely due to embolic source from periprocedural/procedurally induced; consider arrhythmia-induced   - No significant events on tele in the past 24 hours  - Recommend outpatient Zio patch to assess possible arrhythmias; consider loop recorder if negative Zio patch before starting DOAC.   - Agree with neurology, consider ASA 81 mg daily and Brilinta 90 mg BID      Plan:  - Zio patch to assess arrhythmia ; consider loop recorder if negative Zio patch  - Continue current regimen of ASA + Brilinta    Subjective/Objective   Chief Complaint: CAD s/p CABG X3 on 6/20 with development of stroke-like symptoms on 7/3/24    Subjective: Patient is examined and seen this afternoon. Patient is sitting and breathing comfortably on RA. His wife is present.  "Patient states that he is feeling much improved regarding his stroke-like symptoms. His wife also agrees that his slurred speech and left facial droop have resolved. Patient is able to articulate w/o problem. Denies dizziness, headaches, visual changes, SOB, chest pain/pressure, cough, nausea, vomiting, diarrhea, and weakness in both upper and lower extremities. No other concerns voiced at this time.     Objective:    Vitals: /69   Pulse 83   Temp 97.9 °F (36.6 °C)   Resp 16   Ht 5' 5\" (1.651 m)   Wt 78.2 kg (172 lb 6.4 oz)   SpO2 96%   BMI 28.69 kg/m²   Vitals:    07/07/24 0600 07/08/24 0600   Weight: 80.8 kg (178 lb 2.1 oz) 78.2 kg (172 lb 6.4 oz)     Orthostatic Blood Pressures      Flowsheet Row Most Recent Value   Blood Pressure 122/69 filed at 07/08/2024 1029   Patient Position - Orthostatic VS Lying filed at 07/07/2024 2300              Intake/Output Summary (Last 24 hours) at 7/8/2024 1409  Last data filed at 7/8/2024 1123  Gross per 24 hour   Intake 1048 ml   Output 1400 ml   Net -352 ml       Invasive Devices       Peripheral Intravenous Line  Duration             Peripheral IV 07/07/24 Right;Ventral (anterior) Forearm 1 day                    Review of Systems   Constitutional:  Negative for chills and fever.   HENT:  Negative for ear pain and sore throat.    Eyes:  Negative for pain and visual disturbance.   Respiratory:  Negative for cough, chest tightness and shortness of breath.    Cardiovascular:  Positive for leg swelling. Negative for chest pain and palpitations.   Gastrointestinal:  Negative for abdominal pain and vomiting.   Genitourinary:  Negative for dysuria and hematuria.   Musculoskeletal:  Negative for arthralgias and back pain.   Skin:  Negative for color change and rash.   Neurological:  Negative for dizziness, seizures, syncope, facial asymmetry, light-headedness and headaches.   All other systems reviewed and are negative.      Physical Exam  Vitals and nursing note " reviewed.   Constitutional:       General: He is not in acute distress.     Appearance: He is well-developed.   HENT:      Head: Normocephalic and atraumatic.   Eyes:      Conjunctiva/sclera: Conjunctivae normal.   Cardiovascular:      Rate and Rhythm: Normal rate and regular rhythm.      Pulses: Normal pulses.      Heart sounds: Normal heart sounds. No murmur heard.  Pulmonary:      Effort: Pulmonary effort is normal. No respiratory distress.      Breath sounds: Normal breath sounds. No wheezing or rales.   Abdominal:      Palpations: Abdomen is soft.      Tenderness: There is no abdominal tenderness.   Musculoskeletal:         General: No swelling.      Cervical back: Neck supple.      Right lower leg: Edema (1+) present.      Left lower leg: Edema (1+) present.   Skin:     General: Skin is warm.      Capillary Refill: Capillary refill takes less than 2 seconds.      Coloration: Skin is not jaundiced.      Findings: No bruising.   Neurological:      Mental Status: He is alert and oriented to person, place, and time.   Psychiatric:         Mood and Affect: Mood normal.          Lab Results: I have personally reviewed pertinent lab results.    Imaging: I have personally reviewed pertinent reports.    EKG:   Results for orders placed or performed during the hospital encounter of 06/20/24 (from the past 1000 hour(s))   ECG 12 lead    Collection Time: 07/03/24  7:03 AM   Result Value    Ventricular Rate 72    Atrial Rate 72    PA Interval 188    QRSD Interval 102    QT Interval 448    QTC Interval 490    P Axis 22    QRS Axis 12    T Wave Axis 175    Narrative    Normal sinus rhythm  T wave abnormality, consider anterolateral ischemia  Prolonged QT  Abnormal ECG  When compared with ECG of 23-JUN-2024 06:25,  Premature atrial complexes are no longer Present  Nonspecific T wave abnormality now evident in Inferior leads  T wave inversion now evident in Anterior leads  Confirmed by Bennie Izquierdo ([id number]) on 7/3/2024 7:49:30  AM     *Note: Due to a large number of results and/or encounters for the requested time period, some results have not been displayed. A complete set of results can be found in Results Review.      VTE Pharmacologic Prophylaxis: Heparin  VTE Mechanical Prophylaxis: sequential compression device    Counseling / Coordination of Care  Total time spent today 30 minutes. Greater than 50% of total time was spent with the patient and / or family counseling and / or coordination of care. A description of the counseling / coordination of care:

## 2024-07-08 NOTE — RESTORATIVE TECHNICIAN NOTE
Restorative Technician Note      Patient Name: Shane KALPANA Chau Sr.     Restorative Tech Visit Date: 07/08/24  Note Type: Mobility  Patient Position Upon Consult: Bedside chair  Activity Performed: Ambulated  Assistive Device: Roller walker  Patient Position at End of Consult: All needs within reach; Bedside chair; Bed/Chair alarm activated

## 2024-07-08 NOTE — PROGRESS NOTES
Pastoral Care Progress Note    2024  Patient: Shane Chau Sr. : 1945  Admission Date & Time: 2024 0521  MRN: [id number] CSN: [id number]         [id number] 1500   Clinical Encounter Type   Visited With   (Fr Maldonado made pastoral visit, pt declined blessing)   Voodoo Encounters   Voodoo Needs Prayer

## 2024-07-08 NOTE — PROGRESS NOTES
"PHYSICAL MEDICINE AND REHABILITATION   PREADMISSION ASSESSMENT     Projected IGC and Rehabilitation Diagnoses:  Impairment of mobility, safety and Activities of Daily Living (ADLs) due to Cardiac:  09 Cardiac    Etiologic:  Coronary Artery Disease   Date of Onset: 6/20/24   Date of surgery: CABG x 3 with left internal mammary artery to left anterior descending, saphenous vein graft to right posterior descending artery, saphenous vein graft to obtuse marginal 1.     PATIENT INFORMATION  Name: Shane Chau . Phone #: [phone] (home)   Address: [address] [place] [address]  YOB: 1945 Age: 78 y.o. #   Marital Status: /Civil Union  Ethnicity:   Employment Status: retired  Extended Emergency Contact Information  Primary Emergency Contact: Maryjo Chau  Address: [address] [place] [address] [place]  Mobile Phone: [phone]  Relation: Spouse  Advance Directive: Level 1 - Full Code (No advanced directives on file)    INSURANCE/COVERAGE:     Primary Payor: [employer]  REP / Plan: [employer]  REP / Product Type: Medicare PPO /   Secondary Payer: Self-pay   Payer Contact:  Payer Contact:   Contact Phone:  Contact Phone:     [facility] has received APPROVED authorization.  Insurance: [employer]    Called in by Rep: Briana HOLLAND#: [phone]  Authorization received for: Acute Rehab  Facility: [facility]   Authorization #: [id number]   Start of Care: 07/09  Next Review Date: 2 Business Days   Submit next review to #: [phone] or [employer] [place]    Medical Record #: [id number]    REFERRAL SOURCE:   Referring provider: KALPANA Amos MD  Referring facility: [facility]  Room: [facility] 405/[facility]  PCP: Sherwin Mantilla MD PCP phone number: [phone]    MEDICAL INFORMATION  HPI: As per Discharge Summary completed by Sp Hagen PA-C on 7/9/24 - \"The " patient was seen in consultation prior to this admission for evaluation of multivessel coronary artery disease.  Risks and benefits of coronary artery bypass grafting were discussed in detail, and patient was agreeable.  Routine preoperative evaluation was completed and informed consent was obtained prior to admission.     6/20: Elective admission for CABGx3 (LIMA to LAD, SVG to RPDA, SVG to OM1, LLE EVH).   No significant postoperative bleeding, Plavix (pre-op med) to start tomorrow).  Transferred to ICU without inotropic or pressor support.  Wean towards extubation. EKG w/ NSR & stable T wave inversions in III/aVR.      6/21: Given total of 500mL LR for low CI, high SVR. Started on cardene for hypertension, which he remains on. Increasing oxygen requirements overnight. Given 40mg IV lasix with minimal output. Given 2mg IV bumex in AM with ongoing response. Remains supported with Cardene 5. Start Lopressor 12.5 PO BID. Bumex, 2 mg IV BID; T/C infusion, pending response to intermittent dosing. Resume Plavix for H/O TIA. Transfer to step down.       6/22: Midflow, may need high flow. NSR, no more junctional. Remains on cardene. Increase metoprolol to 25, cont norvasc 10, may need ACE. D/C CTs and PWs. Increase bumex to TID, Cr 1.25, net even from cardene gtt. Wean cardene as able.       6/23: VSS. Cont lopressor bid. Cont norvasc 10 qd. Down to 7l NC. Cont bumex 2mg iv bid, metolazone x1 yesterday, good UO, Cr 1.6 from 1.2, monitor. Other labs stable. SSI. Tx to tele      6/24: VSS. Remains on 3L NC. Cont Lopressor 50 bid and Norvasc 10. Change to Po bumex, Cr 1.86 from 1.6, -1.8L. SSI. Will need rehab. PM: Good UOP so far today. Still on 3LNC.      6/25: Nausea developing at midnight with 7-8 episodes of non-bloody emesis (hx of open AAA repair), given Zofran with minimal relief, refused Tigan, order CT a/p wo consult general surgery. Net even, up 3 lbs, remains on 3L NC, UOP mod. With Cr continue to uptrend to  2.22, consult nephrology, decrease bumex to 1 mg PO QD, replete K. LLL with diminished BS, order CXR. Lactic acid of 3.8, CT findings of likely chronic ileus though chronic partial small bowel obstruction not entirely excluded general surgery recs for NPO/NGT and IVF bolus.  PM: NG tube placed and on continuous suction, stop further diuretics, needs IVFs start isolyte 50cc/hr, trend lactic acid. 2VCXR without effusion, lungs clear, but left diaphragm elevated likely from gastric dilatation. Hold all oral meds and start IV lopressor 5mg IV q6h while NPO/NG tube in place and until ileus improves. Repeat lactic acid improved to 2.2. Repeat K only 3.4, replete. Significant output of 2L out of NG tube since placement so far. IV Lopressor increased to 7.5mg overnight.      6/26: Remains on 3L NC (turned off this AM), using IS (1000, used once yesterday). +diarrhea in evening, NGT w/ 2.5L bilious OP, LA now WNL, Cr down 1.95, K 3.3, replete with IV and recheck this afternoon. Net neg 2.8L. F/U general surgery recs.       6/27: NGT with 1L OP, +flatus, denies BM yesterday, distention improving but TTP remains- worse in LUQ. Denies nausea. Mag 3.2, K 3.2. Lactic acid 1.3. Discuss with general surgery need for repeat imaging. HR/BP well controlled. Continue meds with tube clamp. Cr down trending 1.95, replete K. Increase bowel regimen.      6/28: Underwent gastrograffin challenge yesterday, contrast found in colon, NGT d/c'd, started on clears, doing well, eager to eat more, denies nausea, +distention but soft (same as yesterday), TTP now worse in LLQ, pain overall improved. +BM this AM- normal. Start cardiac diet with fluid restriction. Remains on 3L NC, Cr down 1.53, net + 1.36, UOP 1L, monitor for need for diuretics. D/C IV Lopressor, restart Lopressor 25 mg BID. Transition back to PO amiodarone, d/c central line. Replete K. PM: repeat Potassium 3.3; Additional 40 mEq oral potassium ordered.       6/29: Developed emesis at  [place]. Episodes of diarrhea as well. Denies abdominal pain or nausea currently. Abdomen remains distended. Diffuse tenderness to palpation, order KUB, discussed with general surgery, start on clears. Remains NSR with PVCs, increase lopressor to 50 mg BID. Continues on 3 L NC. BARBARA improving 1.42, continue to hold diuretics, net neg >400.        6/30: KUB yesterday with signs of ileus, continues to be distended and tympanic, denies nausea, continues with diarrhea, advance to clears. K 3.9, Mag 2.7. No further PVCs with increase in BB. WBC elevated 17, continue to monitor.      7/1: Tolerating clear liquids.  Distended abdomen, no pain with palpation, passing flatus, Ongoing Bms.  D/C IVF.       7/2: Tolerating diet, +BM daily, no n/v general surgery signed off.  Cleared for discharge to rehab. D/C amiodarone. Labs stable.      7/3: Stoke alert at 6 am due to slurred speech and possible left facial droop.  CTA/CT head negative for acute findings, most likely TIA. Stroke pathway. Speech evaluation cleared for level 3 thin lo fiber/lo residue no carbonation for his previous ileus/gen surgery recommendation diet. Labs stable.       7/4: Resolved deficits from yesterday, speech clear.  P2Y12 reviewed by neurology continue ASA and Plavix. Start torsemide 20 mg daily.  Tolerating diet.  ARC referral.       7/5: Speech remains clear with equal strength. Tolerating diet. Cleared for d/c after MRI per neurology. Called MRI and they scheduled for tonight      7/6: No MRI done. Asked MRI to get today today. Na 130, diuresing well.  Otherwise doing well.       7/7: MRI complete with mult small acute/subacute infarctions, changed plavix to brilinta at neurology request. Na 130, bid diuretics.      7/8: No events overnight.  No complaints. Tolerating diet. Breathing well. Pain controlled. + Flatus/BM.       Neuro intact. Na improving to 131. Cardiology to assess if Ziopatch warranted on discharge as suggested by neuro.  Awaiting rehab  "placement pending auth. PM: Cardiology recommending Zio patch after discharge.       7/9: No issues, remains SR, RA, neuro intact, Na improved 133, cardiology arranged Zio patch for after discharge. Awaiting rehab placement. Accepted to [facility] for discharge today.\"    He has been deemed hemodynamically stable at this time. PT/OT therapies were consulted and continue to follow patient at this time. PM&R was consulted and are recommending inpatient acute rehab when medically stable. All involved medical disciplines feel/agree patient is medically ready for discharge at this time. Inpatient acute rehabilitation physician was consulted. Upon review of patient's case and correspondence with PT/OT therapies and PM&R services, [facility] physician feels he will benefit and is a good candidate and appropriate for inpatient acute rehab at this time. He has demonstrated the willingness, desire and tolerance to participate in the required 3 hours or more of therapies per day.     Past Medical History:   Past Surgical History:   Allergies:     Past Medical History:   Diagnosis Date    AAA (abdominal aortic aneurysm) (HCC)     s/p open repair    BPH (benign prostatic hyperplasia)     CAD (coronary artery disease)     Cholelithiasis     Former tobacco use     History of MI (myocardial infarction)     History of TIA (transient ischemic attack)     Hyperlipidemia     Hypertension     Insomnia     Obesity     Past Surgical History:   Procedure Laterality Date    ABDOMINAL AORTIC ANEURYSM REPAIR  06/07/2011    Dr. Yarbrough 6/7/11    APPENDECTOMY      CARDIAC CATHETERIZATION Left 06/03/2024    Procedure: Cardiac Left Heart Cath;  Surgeon: Pk Blanco DO;  [facility]: [facility];  Service: Cardiology    CARDIAC CATHETERIZATION  06/03/2024    Procedure: Cardiac catheterization;  Surgeon: Pk Blanco DO;  [facility]: [facility];  Service: Cardiology    CARDIAC CATHETERIZATION N/A 06/03/2024    Procedure: Cardiac " Coronary Angiogram;  Surgeon: Pk Blanco DO;  [facility]: [facility];  Service: Cardiology    CATARACT EXTRACTION Right 11/2018    CHOLECYSTECTOMY LAPAROSCOPIC      COLONOSCOPY  11/2006    CORONARY ANGIOPLASTY      3 STENTS INSERTED    HARVEST VEIN Left 6/20/2024    Procedure: HARVEST VEIN ENDOSCOPIC (EVH);  Surgeon: KALPANA Amos MD;  Location: [facility] MAIN [facility];  Service: Cardiac Surgery    NE CORONARY ARTERY BYP W/VEIN & ARTERY GRAFT 3 VEIN N/A 6/20/2024    Procedure: CORONARY ARTERY BYPASS GRAFT (CABG) X3 VESSELS, LIMA - LAD, LEFT LEG EVH/SVG - PDA AND OM1, W/ DEDRICK;  Surgeon: KALPANA Amos MD;  Location: [facility];  Service: Cardiac Surgery    NE LAPS SURG CHOLECYSTECTOMY W/CHOLANGIOGRAPHY N/A 09/08/2016    Procedure: CHOLECYSTECTOMY LAPAROSCOPIC with intra-op cholangiogram ;  Surgeon: Prasanth Verdin MD;  Location: [facility];  Service: General     No Known Allergies      Medical/functional conditions requiring inpatient rehabilitation: Impaired balance, ambulation and mobility and ADL self-care    Risk for medical/clinical complications:  Risk for Falls, uncontrolled pain, DVT/PE, skin breakdown, infection, anemia, hypo/hypertension, hypo/hyperglycemia.    Comorbidities:  CAD s/p CABG x 3 6/20/2024  Stroke like symptoms in setting of post CABG  Hypertension   Acute kidney injury  Bilateral carotid stenosis   Hyperlipidemia   Abdominal aortic aneurysm   Chronic right basal ganglia infarcts  Bowel plan: LBM 7/6/2024, incontinent   Bladder plan: Condom catheter  DVT ppx: heparin SQ and SCD    CURRENT VITAL SIGNS:   Temp:  [97.3 °F (36.3 °C)-98.7 °F (37.1 °C)] 97.4 °F (36.3 °C)  HR:  [68-87] 81  Resp:  [16] 16  BP: (120-127)/(69-72) 125/70   Intake/Output Summary (Last 24 hours) at 7/9/2024 1119  Last data filed at 7/9/2024 1013  Gross per 24 hour   Intake 690 ml   Output 550 ml   Net 140 ml        LABORATORY RESULTS:      Lab Results   Component Value Date    HGB 11.3 (L) 07/05/2024     HGB 13.0 2015    HCT 34.3 (L) 2024    HCT 37.6 2015    WBC 12.88 (H) 2024    WBC 6.55 2015     Lab Results   Component Value Date    BUN 23 2024    BUN 15 2015     2015    K 3.8 2024    K 4.3 2015     2024     2015    GLUCOSE 140 2024    GLUCOSE 121 2015    CREATININE 1.05 2024    CREATININE 0.86 2015     Lab Results   Component Value Date    PROTIME 16.7 (H) 2024    PROTIME 14.0 2015    INR 1.37 (H) 2024    INR 1.15 2015        DIAGNOSTIC STUDIES:  XR chest pa & lateral    Result Date: 2024  Impression: NG tube in the stomach. There is moderate gaseous distention of the stomach. Workstation performed: [id number]     CT abdomen pelvis wo contrast    Result Date: 2024  Impression: Gastric distention and associated proximal small bowel dilatation and stool like contents within the dilated loops of small bowel without discrete transition point identified. Findings suggest chronic ileus though chronic partial small bowel obstruction not entirely excluded. Juxtarenal abdominal aortic aneurysm measuring 4.5 x 4.2 cm. Suprarenal abdominal aorta is dilated measuring 3.7 cm. Workstation performed: [id number]     XR chest portable    Result Date: 2024  Impression: No acute cardiopulmonary disease. Workstation performed: [id number]     XR chest portable ICU    Result Date: 2024  Impression: Expected appearance after CABG. Left base opacity, likely atelectasis. Small effusion not excluded. Workstation performed: [id number]       PRECAUTIONS/SPECIAL NEEDS:  Tobacco:   Social History     Tobacco Use   Smoking Status Former    Current packs/day: 0.00    Average packs/day: 0.5 packs/day for 30.0 years (15.0 ttl pk-yrs)    Types: Cigarettes    Start date: 3/10/1956    Quit date: 3/10/1986    Years since quittin.3   Smokeless Tobacco Never   Tobacco Comments    Quit  1980's about 25 years x1ppd   , Alcohol:    Social History     Substance and Sexual Activity   Alcohol Use Not Currently    Comment: 4 oz glass of wine every other day w/ his dinner   , Cardiac Precautions/Sternal Precautions, Anticoagulation:  aspirin 81 mg orally every day and heparin, Blood Sugar Management: as per MD recommendations, Edema Management, Safety Concerns, Pain Management, Bowel Incontinence: # of accidents unknown, Aspiration Risk/Precautions, Dietary Restrictions: Dysphagia 3 - Dental Soft; Thin liquid; Fluid Restriction; No carbonation, and Language Preference: English    MEDICATIONS:     Current Facility-Administered Medications:     acetaminophen (TYLENOL) tablet 975 mg, 975 mg, Oral, Q6H While awake, Moraima Alvarez PA-C, 975 mg at 07/09/24 0910    aspirin (ECOTRIN LOW STRENGTH) EC tablet 81 mg, 81 mg, Oral, Daily, Moraima Alvarez PA-C, 81 mg at 07/09/24 0910    atorvastatin (LIPITOR) tablet 80 mg, 80 mg, Oral, Daily With Dinner, Moraima Alvarez PA-C, 80 mg at 07/08/24 1705    bisacodyl (DULCOLAX) rectal suppository 10 mg, 10 mg, Rectal, Daily PRN, KALPANA Amos MD    chlorhexidine (PERIDEX) 0.12 % oral rinse 15 mL, 15 mL, Mouth/Throat, BID, Moraima Alvarez PA-C, 15 mL at 07/09/24 0912    heparin (porcine) subcutaneous injection 5,000 Units, 5,000 Units, Subcutaneous, Q8H KRISTI, Moraima Alvarez PA-C, 5,000 Units at 07/09/24 0547    hydrALAZINE (APRESOLINE) injection 5 mg, 5 mg, Intravenous, Q6H PRN, Moraima Alvarez PA-C    insulin lispro (HumALOG/ADMELOG) 100 units/mL subcutaneous injection 1-5 Units, 1-5 Units, Subcutaneous, HS, Moraima Alvarez PA-C, 2 Units at 06/23/24 2139    insulin lispro (HumALOG/ADMELOG) 100 units/mL subcutaneous injection 1-6 Units, 1-6 Units, Subcutaneous, TID AC, 1 Units at 07/04/24 1102 **AND** Fingerstick Glucose (POCT), , , TID AC, Moraima Alvarez, KATIA    lidocaine (LIDODERM) 5 % patch 2 patch, 2 patch, Topical, Daily, Moraima  Ilene Alvarez PA-C, 2 patch at 07/07/24 2149    magnesium hydroxide (MILK OF MAGNESIA) oral suspension 30 mL, 30 mL, Oral, Daily PRN, Rahel Baig PA-C    melatonin tablet 6 mg, 6 mg, Oral, HS, Rahel Baig PA-C, 6 mg at 07/08/24 2140    methocarbamol (ROBAXIN) tablet 500 mg, 500 mg, Oral, Q6H PRN, Miranda Mijares PA-C    metoprolol tartrate (LOPRESSOR) tablet 50 mg, 50 mg, Oral, Q12H KRISTI, Sandy Mckeon PA-C, 50 mg at 07/09/24 0910    ondansetron (ZOFRAN) injection 4 mg, 4 mg, Intravenous, Q6H PRN, Stone Lucas MD    pantoprazole (PROTONIX) EC tablet 40 mg, 40 mg, Oral, Early Morning, KALPANA Amos MD, 40 mg at 07/09/24 0547    potassium chloride (Klor-Con M20) CR tablet 20 mEq, 20 mEq, Oral, BID, Miranda Mijares PA-C, 20 mEq at 07/09/24 0910    senna-docusate sodium (SENOKOT S) 8.6-50 mg per tablet 1 tablet, 1 tablet, Oral, BID PRN, Miranda Mijares PA-C    temazepam (RESTORIL) capsule 15 mg, 15 mg, Oral, HS PRN, Moraima Alvarez PA-C    ticagrelor (BRILINTA) tablet 90 mg, 90 mg, Oral, Q12H KRISTI, Miranda Mijares PA-C, 90 mg at 07/09/24 0910    torsemide (DEMADEX) tablet 20 mg, 20 mg, Oral, Daily, Sp Hagen PA-C, 20 mg at 07/09/24 0911    trimethobenzamide (TIGAN) IM injection 200 mg, 200 mg, Intramuscular, Q6H PRN, Stone Lucas MD    SKIN INTEGRITY:   no rashes, no erythema, no peripheral edema, Incision: healing well, no significant drainage, no dehiscence, no significant erythema, Wound - sacrum- first assessed 7/8/24.    PRIOR LEVEL OF FUNCTION:  He lives in a(n) single family home  Shane ACUNA Kandi Orozco is  and lives with their spouse.  Self Care: Independent, Indoor Mobility: Independent, Stairs (in/outdoor): Independent, and Cognition: Independent    FALLS IN THE LAST 6 MONTHS: 0    HOME ENVIRONMENT:  The living area: can live on one level  There are Greater than 10 steps to enter the home.    The patient will have 24 hour  supervision/physical assistance available upon discharge.    PREVIOUS DME:  Equipment in home (previous DME): Grab Bars, Rolling Walker, Manual Wheelchair, Single Point Cane, and Grab bars surrounding toilet    FUNCTIONAL STATUS:   Physical Therapy Occupational Therapy Speech Therapy    As per PT 7/7/24:    General   Chart Reviewed Yes   Additional Pertinent History cleared for Tx session by mode   Response to Previous Treatment Patient with no complaints from previous session.   Cognition   Overall Cognitive Status WFL   Arousal/Participation Alert;Cooperative   Attention Attends with cues to redirect   Orientation Level Oriented to person;Oriented to place;Oriented to situation   Memory Decreased short term memory;Decreased recall of recent events;Decreased recall of precautions   Following Commands Follows one step commands without difficulty   Subjective   Subjective Alert; in the chair; somewhat flat affect; agreeable to mobilize (going to the BR first)   Transfers   Sit to Stand 4  Minimal assistance   Additional items Assist x 1;Verbal cues   Stand to Sit 4  Minimal assistance   Additional items Assist x 1;Verbal cues   Ambulation/Elevation   Gait pattern Excessively slow;Short stride   Gait Assistance 4  Minimal assist   Additional items Assist x 1;Verbal cues;Tactile cues   Assistive Device Rolling walker   Distance 2 x 8 ft + 40 ft + 40 ft + 80 ft w/ seated rest periods in between   Balance   Static Sitting Fair   Dynamic Sitting Fair -   Static Standing Fair -   Dynamic Standing Poor +   Ambulatory Poor +   Activity Tolerance   Activity Tolerance Patient limited by fatigue   Medical Staff Made Aware Co-Tx performed w/ OTR due to complexity of medical status   Nurse Made Aware spoke to JORGE ALBERTO De Jesus   Assessment   Prognosis Good   Problem List Decreased strength;Decreased endurance;Impaired balance;Decreased mobility   Assessment Pt cont to demonstrate gradual improvement in mobility skills achieving min (A) w/  transfers and amb w/ rw; pt still exhibits decreased functional endurance and overall weakness and deconditioning requiring frequent rest periods b/in bouts of mobility; D/C recommendations are listed below; will follow per updated Tx goals below.     As per OT 7/7/24:    ADL   Where Assessed Chair  (+bathroom)   UB Bathing Comments Simulated, pt maintains MOD A level of assist.   LB Bathing Comments Simulated, pt maintains MAX A level of assist.   UB Dressing Comments Simulated, pt maintains MIN A level of assist.   LB Dressing Assistance 2  Maximal Assistance   LB Dressing Deficit Don/doff R sock;Don/doff L sock   Toileting Assistance  3  Moderate Assistance   Toileting Deficit Steadying;Supervison/safety;Increased time to complete;Perineal hygiene   Toileting Comments Pt requiring assist for post hygiene following BM, assist for steadying.   Bed Mobility   Additional Comments Pt greeted and left in chair with alarm on and all needs within reach.   Transfers   Sit to Stand 4  Minimal assistance   Additional items Assist x 1;Verbal cues   Stand to Sit 4  Minimal assistance   Additional items Assist x 1;Verbal cues   Toilet transfer 3  Moderate assistance   Additional items Assist x 1;Increased time required;Verbal cues;Standard toilet   Additional Comments with RW   Functional Mobility   Functional Mobility 4  Minimal assistance   Additional Comments Pt performs short distances with MIN A x 1 with RW and chair follow, requiring 2 seated rest breaks.   Additional items Rolling walker   Cognition   Overall Cognitive Status WFL  (with some higher level deficits)   Arousal/Participation Cooperative   Attention Attends with cues to redirect   Orientation Level Oriented to person;Oriented to place;Oriented to situation   Memory Decreased short term memory;Decreased recall of recent events;Decreased recall of precautions   Following Commands Follows one step commands without difficulty   Comments Pt cooperative to therapy  "although continues to require vc for safety, reports the month being June requiring vc for orientation to date.   Activity Tolerance   Activity Tolerance Patient limited by fatigue   Medical Staff Made Aware RN cleared for therapy, co-tx with DPT during functional mobility due to medical complexity and chair follow.   Assessment   Assessment Pt seen for OT treatment session day 8 on this date focused on ADL retraining, functional transfers and mobility, energy conservation, functional endurance, recall of safety precautions. Pt was greeted in chair and was cooperative throughout session. Following session, pt was left in chair with chair alarm on and all needs within reach. Pt continues to be limited by functional status related to ADLs and transfers requiring MAX A for LB dressing, MOD A for toileting, maintains MIN A for UB dressing, MOD A for UB bathing and MAX A for LB bathing, demonstrates improvements in motivation to improve and endurance. The patient's raw score on the AM-PAC Daily Activity Inpatient Short Form is 16. A raw score of less than 19 suggests the patient may benefit from discharge to post-acute rehabilitation services. Please refer to the recommendation of the Occupational Therapist for safe discharge planning. Pt would benefit from continued acute OT intervention with plan to continue OT treatment sessions 2-3x per week. Recommend d/c to level I services.     As per SLP 7/5/24:    Subjective:  \"I'd rather drink. I can have a pint of ice cream\" Patient is awake and alert.      Objective:  The patient is seen for dysphagia therapy. He reports poor appetite, but denies dislike of level 3 diet. The patient is agreeable to am snack and is assessed with regular cookie and ice cream with thin liquids. Facial weakness appears improved since prior session, with improved ROM. The patient takes cookies x2, all at once. Mastication time is min prolonged. A minimal amount of oral residue is observed in " between bites, but is eventually cleared. The patient uses liquid wash. He feeds himself ice cream with good rate and bite size. No overt s/s aspiration observed across any trial. Discussed level 3 vs regular diet with patient. At this time, the patient wishes to continue on a level 3 diet.      Assessment:  The patient tolerated trials well.      Plan/Recommendations:  Recommend to continue level 3 diet and thin liquids. Continue ST and trial upgrades as able and agreeable.      CARE SCORES:  Self Care:  Eatin: Not applicable  Oral hygiene: 09: Not applicable  Toilet hygiene: 03: Partial/moderate assistance  Shower/bathing self: 02: Substantial/maximal assistance  Upper body dressin: Supervision or touching  assistance  Lower body dressin: Substantial/maximal assistance  Putting on/taking off footwear: 02: Substantial/maximal assistance  Transfers:  Roll left and right: 09: Not applicable  Sit to lyin: Not applicable  Lying to sitting on side of bed: 09: Not applicable  Sit to stand: 04: Supervision or touching  assistance  Chair/bed to chair transfer: 04: Supervision or touching  assistance  Toilet transfer: 04: Supervision or touching  assistance  Mobility:  Walk 10 ft: 04: Supervision or touching  assistance  Walk 50 ft with two turns: 09: Not applicable  Walk 150ft: 09: Not applicable    CURRENT GAP IN FUNCTION  Prior to Admission: Functional Status: Patient was independent with mobility/ambulation, transfers, ADL's, IADL's.    Expected functional outcomes: It is expected that with skilled acute rehabilitation services the patient will progress to Supervision for self care and Supervision for mobility     Estimated length of stay: 10 to 14 days    Anticipated Post-Discharge Disposition/Treatment  Disposition: Return to previous home/apartment.  Outpatient Services: Physical Therapy (PT), Occupational Therapy (OT), and Speech Therapy    BARRIERS TO DISCHARGE  Weakness, Fatigue, Financial  "Resources, Equipment Needs, and Resource Availability    INTERVENTIONS FOR DISCHARGE  Adaptive equipment, Patient/Family/Caregiver Education, Support Group, Financial Assistance, Arrange DME needs, Medication Changes as per MD recommendations, Therapy exercises, Center of balance support , and Energy conservation education     REQUIRED THERAPY:  Patient will require PT, OT and ST 60 minutes each per day, five days per week to achieve rehab goals.     REQUIRED FUNCTIONAL AND MEDICAL MANAGEMENT FOR INPATIENT REHABILITATION:  Pain Management: Overall pain is well controlled, Deep Vein Thrombosis (DVT) Prophylaxis:  heparin and SCD's while in bed, Diabetes Management: continue sliding scale insulin, patient to do finger sticks as ordered, SLIM to continue to manage diabetes, and  ,    Nursing education and bowel/bladder management, internal medicine to manage/monitor medical conditions, PM&R to maximize function and provide medical oversight, PT/OT intervention, patient/family education/training, and any consults as needed.     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  Risks and benefits of coronary artery bypass grafting were discussed in detail, and patient was agreeable.  Routine preoperative evaluation was completed and informed consent was obtained prior to admission.     6/20: Elective admission for CABGx3 (LIMA to LAD, SVG to RPDA, SVG to OM1, LLE EVH).   No significant postoperative bleeding, Plavix (pre-op med) to start tomorrow).  Transferred to ICU without inotropic or pressor support.  Wean towards extubation. EKG w/ NSR & stable T wave inversions in III/aVR.      6/21: Given total of 500mL LR for low CI, high SVR. Started on cardene for hypertension, which he remains on. Increasing oxygen requirements overnight. Given 40mg IV lasix " with minimal output. Given 2mg IV bumex in AM with ongoing response. Remains supported with Cardene 5. Start Lopressor 12.5 PO BID. Bumex, 2 mg IV BID; T/C infusion, pending response to intermittent dosing. Resume Plavix for H/O TIA. Transfer to step down.       6/22: Midflow, may need high flow. NSR, no more junctional. Remains on cardene. Increase metoprolol to 25, cont norvasc 10, may need ACE. D/C CTs and PWs. Increase bumex to TID, Cr 1.25, net even from cardene gtt. Wean cardene as able.       6/23: VSS. Cont lopressor bid. Cont norvasc 10 qd. Down to 7l NC. Cont bumex 2mg iv bid, metolazone x1 yesterday, good UO, Cr 1.6 from 1.2, monitor. Other labs stable. SSI. Tx to tele      6/24: VSS. Remains on 3L NC. Cont Lopressor 50 bid and Norvasc 10. Change to Po bumex, Cr 1.86 from 1.6, -1.8L. SSI. Will need rehab. PM: Good UOP so far today. Still on 3LNC.      6/25: Nausea developing at midnight with 7-8 episodes of non-bloody emesis (hx of open AAA repair), given Zofran with minimal relief, refused Tigan, order CT a/p wo consult general surgery. Net even, up 3 lbs, remains on 3L NC, UOP mod. With Cr continue to uptrend to 2.22, consult nephrology, decrease bumex to 1 mg PO QD, replete K. LLL with diminished BS, order CXR. Lactic acid of 3.8, CT findings of likely chronic ileus though chronic partial small bowel obstruction not entirely excluded general surgery recs for NPO/NGT and IVF bolus.  PM: NG tube placed and on continuous suction, stop further diuretics, needs IVFs start isolyte 50cc/hr, trend lactic acid. 2VCXR without effusion, lungs clear, but left diaphragm elevated likely from gastric dilatation. Hold all oral meds and start IV lopressor 5mg IV q6h while NPO/NG tube in place and until ileus improves. Repeat lactic acid improved to 2.2. Repeat K only 3.4, replete. Significant output of 2L out of NG tube since placement so far. IV Lopressor increased to 7.5mg overnight.      6/26: Remains on 3L NC  (turned off this AM), using IS (1000, used once yesterday). +diarrhea in evening, NGT w/ 2.5L bilious OP, LA now WNL, Cr down 1.95, K 3.3, replete with IV and recheck this afternoon. Net neg 2.8L. F/U general surgery recs.       6/27: NGT with 1L OP, +flatus, denies BM yesterday, distention improving but TTP remains- worse in LUQ. Denies nausea. Mag 3.2, K 3.2. Lactic acid 1.3. Discuss with general surgery need for repeat imaging. HR/BP well controlled. Continue meds with tube clamp. Cr down trending 1.95, replete K. Increase bowel regimen.      6/28: Underwent gastrograffin challenge yesterday, contrast found in colon, NGT d/c'd, started on clears, doing well, eager to eat more, denies nausea, +distention but soft (same as yesterday), TTP now worse in LLQ, pain overall improved. +BM this AM- normal. Start cardiac diet with fluid restriction. Remains on 3L NC, Cr down 1.53, net + 1.36, UOP 1L, monitor for need for diuretics. D/C IV Lopressor, restart Lopressor 25 mg BID. Transition back to PO amiodarone, d/c central line. Replete K. PM: repeat Potassium 3.3; Additional 40 mEq oral potassium ordered.       6/29: Developed emesis at [place]. Episodes of diarrhea as well. Denies abdominal pain or nausea currently. Abdomen remains distended. Diffuse tenderness to palpation, order KUB, discussed with general surgery, start on clears. Remains NSR with PVCs, increase lopressor to 50 mg BID. Continues on 3 L NC. BARBARA improving 1.42, continue to hold diuretics, net neg >400.        6/30: KUB yesterday with signs of ileus, continues to be distended and tympanic, denies nausea, continues with diarrhea, advance to clears. K 3.9, Mag 2.7. No further PVCs with increase in BB. WBC elevated 17, continue to monitor.      7/1: Tolerating clear liquids.  Distended abdomen, no pain with palpation, passing flatus, Ongoing Bms.  D/C IVF.       7/2: Tolerating diet, +BM daily, no n/v general surgery signed off.  Cleared for discharge to rehab.  "D/C amiodarone. Labs stable.      7/3: Stoke alert at 6 am due to slurred speech and possible left facial droop.  CTA/CT head negative for acute findings, most likely TIA. Stroke pathway. Speech evaluation cleared for level 3 thin lo fiber/lo residue no carbonation for his previous ileus/gen surgery recommendation diet. Labs stable.       7/4: Resolved deficits from yesterday, speech clear.  P2Y12 reviewed by neurology continue ASA and Plavix. Start torsemide 20 mg daily.  Tolerating diet.  [facility] referral.       7/5: Speech remains clear with equal strength. Tolerating diet. Cleared for d/c after MRI per neurology. Called MRI and they scheduled for tonight      7/6: No MRI done. Asked MRI to get today today. Na 130, diuresing well.  Otherwise doing well.       7/7: MRI complete with mult small acute/subacute infarctions, changed plavix to brilinta at neurology request. Na 130, bid diuretics.      7/8: No events overnight.  No complaints. Tolerating diet. Breathing well. Pain controlled. + Flatus/BM.       Neuro intact. Na improving to 131. Cardiology to assess if Ziopatch warranted on discharge as suggested by neuro.  Awaiting rehab placement pending auth. PM: Cardiology recommending Zio patch after discharge.       7/9: No issues, remains SR, RA, neuro intact, Na improved 133, cardiology arranged Zio patch for after discharge. Awaiting rehab placement. Accepted to [facility] for discharge today.\"     Prior to admission / hospital stay patient was independent with ADLs, functional mobility and IADLs. Currently with PT therapies: Min A with transfers and ambulation with RW. Currently with OT therapies: Simulated with OT - Mod A UB bathing, Max A LB bathing, Min A UB dressing and Max A LB dressing, Mod A toileting. Nursing is being recommended for medication distribution and management, bowel and bladder management and educational purposes, internal medicine to continue to monitor and manage medical conditions, PM&R to " maximize function and provide medical oversight, and inpatient rehab to maximize self-care, mobility, strength and endurance upon discharge to home.

## 2024-07-08 NOTE — QUICK NOTE
78 year old male with a Shane Chau Sr. is a 78 y.o. male with pertinent PMHx HTN, HLD, multiple TIAs, abdominal aortic aneurysm s/p repair in 2011, who is currently admitted for a CABG x 3 performed on 6/20/2024. Stroke alert called on 7/3/24 at 0620. The patient was found to have dysarthria, left sided weakness, left tongue deviation, left facial droop, and left pronator drift. Symptoms have since improved.     Patient's MRI completed and reviewed on 7/6/2024 by on call team. Punctate acute to subacute infarcts involving the right caudate head, anterior centrum semiovale, and lateral right frontal cortex. Chronic right corona radiata/basal ganglia and bilateral cerebellar lacunar infarcts. Mild chronic microangiopathic ischemic changes.  Patient has been on Aspirin 81mg and Plavix 75mg. P2Y12 level checked during this hospitalization was 180.      The distribution of the strokes found on MRI is suspicious for an embolic source. Reccomend further cardiac work up including a Zio patch / Loop recorder to assess for possible arrhythmias unless high suspicion for procedure related embolic shower as the etiology.     If arrhythmia is found on work up recommend switching from DAPT to AC+ASA.     If procedure related etiology suspected can still continue current DAPT regimen (ASA, Plavix).       Follow up with vascular neurology AP/attending outpatient in 4-6 after discharge.

## 2024-07-08 NOTE — DISCHARGE INSTRUCTIONS
Instructions Following Open Heart Surgery      Protect your sternum (breast bone). Wires are placed during surgery to hold the sternum together. Do not lift anything heavier than 10 pounds for the first four weeks after surgery. (For example, a gallon of milk weighs 8 pounds) When you are seen for a routine postop check, you will be cleared to lift up to 25 lbs. Following three months from the time of surgery, you may lift without any restrictions.     Hug a pillow to your chest or cross your arms over your chest when you laugh, sneeze, or cough. You may resume sexual activity when you feel ready. Do not put any excessive pressure on your chest.    Be careful when you get into or out of a chair or bed.  Hug a pillow or cross your arms when you stand or sit. Do not twist as you move. Use only your legs to sit and stand. You may need a raised toilet seat if you have trouble standing up without using your arms.     No sleeping on side for the first 4 weeks. Limit daytime naps, to prevent difficulty sleeping at night.    Women: Wear a clean surgical bra every day.     Do not play sports that use your shoulder.  Examples include tennis and golf.    Do not drive until cleared by surgeon. Typically cleared to drive after one month, determination will be made at routine postop appointment. When a passenger in the car, wear a seat belt.    Continue you breathing exercises throughout the day with incentive spirometry    Activity: Your goal is to go on frequent walks, slowly increasing your activity level. Walking will help prevent leg swelling and prevent blood clots. When you start outpatient cardiac rehab in one month, you will be given additional instructions and a formal exercise plan. It is OK to go up steps, with help.     You may resume sexual activity when you are comfortable. Do not put excessive pressure on your chest.     Weigh yourself daily in the morning and keep of log of your weight. If your weight  increases more than 2 lbs per day or more than 5 lbs in a week, call your surgeon's office.    Keep your legs elevated when sitting. Pressure stockings may be worn to prevent significant leg swelling.     You may get routine vaccines any time after open heart surgery    Do not smoke:  Nicotine can damage blood vessels and make it more difficult to heal. Do not use e-cigarettes or smokeless tobacco in place of cigarettes or to help you quit. They still contain nicotine. Ask your primary care provider for information if you currently smoke and need help quitting.     Incision/Wound Care:    Shower daily. Gently wash your incision with warm water and mild soap. Do not scrub the area. Gently pat the area dry with a clean towel. If you have a bandage, dry the area and put on a new, clean bandage. Change your bandage at least daily, or if it gets wet or dirty. Always wash your hands before you care for your wound. Do not apply ointments, creams, lotions, powders, etc. If you develop signs of an infection (fever > 101, redness, warm skin, or drainage) please call your surgeon's office.     Do not pick at or touch puncture sites or incisions. Allow and scabs to come off on their own.     It is normal to have some drainage from the chest tube sites. Apply clean/dry dressings, until this resolves.    You may have discomfort or numbness along the incision for 3-4 weeks. It is normal to occasionally experience brief sharp shooing pains, tightness, or pulling sensations.    Do not take a bath or swim until cleared by surgeon.    As your incision heals, sometimes small tender lumps or pimple-like bumps develop which is due to your body attempting to “dissolve” the suture (stiches).     Call your local emergency number (151 in the [place]) or have someone call if:   You have squeezing, pressure, or pain in your chest or back, lightheadedness or sudden cold sweat  Short of breath that does not go away with rest  You cough up blood.  You  have a fast heartbeat that flutters. Or palpitations  You faint.  Signs of a stroke:  Numbness or drooping on one side of your face. Weakness in an arm or leg. Confusion or difficulty speaking. Dizziness, a severe headache, or vision loss    Call your surgeons office if:   You have bleeding that does not stop even after you apply pressure for 5 minutes.  You have redness, tenderness, or signs of infection at incision (pain, swelling, odor, or green/yellow discharge from incision site)  You have a severe headache.  You have a fever higher than 101°F (38.4°C).  You urinate less, or not at all.  You have persistent nausea, vomiting, or diarrhea  You hear persistent or worsening crunching or grinding in your sternum.  You have questions or concerns about your condition or care.      Diet:    Eat heart-healthy foods, low in unhealthy fats and sodium (salt). A heart healthy diet helps improve your cholesterol levels and lowers your risk for heart disease and stroke. You will receive formal education on healthy eating and label reading during your cardiac rehab in one month.   Choose foods that contain healthy fats, such as soybean, canola, olive, corn, and sunflower oils.  Limit unhealthy fats. Examples include:  Cholesterol  is found in animal foods, such as eggs and lobster, and in dairy products made from whole milk.    Saturated fat  is found in meats, such as coker and hamburger. It is also found in chicken or turkey skin, whole milk, and butter.     Trans fat  is found in packaged foods, such as potato chips and cookies. It is also in some fried foods, and shortening. Do not eat foods that contain trans fats.  Get 20 to 30 grams of fiber each day.  Fruits, vegetables, whole-grain foods, and legumes (cooked beans) are good sources of fiber.  Low Sodium: Limit to 2,000 mg or less each day, unless otherwise directed. Choose low-sodium or no-salt-added foods. Add little or no salt to food you prepare. Use herbs and  spices in place of salt.  Foods high in sodium include frozen dinners, macaroni and cheese, instant potatoes, canned vegetables, cured or smoked meats, hot dogs, coker and sausage, ketchup, barbecue sauce, salad dressing, pickles, olives, soy sauce, and miso.     Fluid Restriction: Fluid restriction after hospital discharge is advised. Limit your fluid intake to no more than 8 cups of liquid (8oz = 1cup) or 2000 mL per day.    Limit/Do not drink alcohol. Alcohol can damage heart and raise your blood pressure. The general recommended limit is 1 drink a day for women 21 or older and for men 65 or older. Do not have more than 3 drinks within 24 hours or 7 within a week. A drink of alcohol is 12 oz of beer, 5 oz of wine, or 1½ oz of liquor.        Narcotic Safety     What do I need to know about narcotic safety?    A narcotic is a type of medicine used to treat pain. When you are discharged from the hospital, you will be prescribed a narcotic called oxycodone. Pain control and management may help you rest, heal, and return to your daily activities. Do not take more than the recommended amount. Too much can cause a life-threatening overdose. Do not continue to take it after your pain stops.     How do I use narcotics safely?   Take prescribed narcotics exactly as directed.  You may develop tolerance. This means you keep needing higher doses to get the same effect. You may also develop narcotic use disorder. This means you are not able to control your narcotic use.  Do not give narcotics to others or take narcotics that belong to someone else.  Do not mix narcotics with other medicines or alcohol.  The combination can cause an overdose, or cause you to stop breathing.   Do not drive or operate heavy machinery after you use a narcotic.    Talk to your healthcare provider if you have any side effects.  Side effects include nausea, sleepiness, itching, and trouble thinking clearly.     What can I do to manage constipation?   Constipation is the most common side effect of narcotic medicine. Constipation is when you have hard, dry bowel movements, or you go longer than usual between bowel movements. The following are ways you can prevent or relieve constipation:  Drink liquids as allowed.  Drinking extra liquids will help soften and move your bowels. You should drink up to your maximum fluid allotment of 2 liters.    Eat high-fiber foods.  This may help decrease constipation by adding bulk to your bowel movements. High-fiber foods include fruits, vegetables, whole-grain breads and cereals, and beans  Supplements. You have been prescribed a fiber supplement called polyethylene glycol (Kaleigh lax) and a stool softener called docusate sodium (Colace). You should take these for as long as you continue narcotic medications.  Exercise regularly.  Regular physical activity can help stimulate your intestines. Walking is a good exercise to prevent or relieve constipation. Ask which exercises are best for you.    How do I store narcotics safely?   Store narcotics where others cannot easily get them.  Keep them in a locked cabinet or secure area. Do not  keep them in a purse or other bag you carry with you. A person may be looking for something else and find the narcotics.    Make sure narcotics are stored out of the reach of children.  A child can easily overdose on narcotics. Narcotics may look like candy to a small child.

## 2024-07-08 NOTE — PLAN OF CARE
Problem: PAIN - ADULT  Goal: Verbalizes/displays adequate comfort level or baseline comfort level  Description: Interventions:  - Encourage patient to monitor pain and request assistance  - Assess pain using appropriate pain scale  - Administer analgesics based on type and severity of pain and evaluate response  - Implement non-pharmacological measures as appropriate and evaluate response  - Consider cultural and social influences on pain and pain management  - Notify physician/advanced practitioner if interventions unsuccessful or patient reports new pain  Outcome: Progressing     Problem: SAFETY ADULT  Goal: Patient will remain free of falls  Description: INTERVENTIONS:  - Educate patient/family on patient safety including physical limitations  - Instruct patient to call for assistance with activity   - Consult OT/PT to assist with strengthening/mobility   - Keep Call bell within reach  - Keep bed low and locked with side rails adjusted as appropriate  - Keep care items and personal belongings within reach  - Initiate and maintain comfort rounds  - Make Fall Risk Sign visible to staff  - Offer Toileting every 2 Hours, in advance of need  - Initiate/Maintain bed alarm  - Obtain necessary fall risk management equipment: non-skid  - Apply yellow socks and bracelet for high fall risk patients  - Consider moving patient to room near nurses station  Outcome: Progressing     Problem: Knowledge Deficit  Goal: Patient/family/caregiver demonstrates understanding of disease process, treatment plan, medications, and discharge instructions  Description: Complete learning assessment and assess knowledge base.  Interventions:  - Provide teaching at level of understanding  - Provide teaching via preferred learning methods  Outcome: Progressing

## 2024-07-08 NOTE — CASE MANAGEMENT
Case Management Discharge Planning Note    Patient name Shane Chau .  [facility] MRN [id number]  : 1945 Date 2024       Current Admission Date: 2024  Current Admission Diagnosis:S/P CABG x 3   Patient Active Problem List    Diagnosis Date Noted Date Diagnosed    Stroke-like symptoms 2024     BARBARA (acute kidney injury) (HCC) 2024     S/P CABG x 3 2024     Rib pain 2023     Paresthesia 2023     Leg swelling 05/10/2022     Erectile dysfunction 2022     Microhematuria 2018     Need for pneumococcal vaccination 2018     Hematuria 2018     Asymptomatic bilateral carotid artery stenosis 2017     Closed compression fracture of first lumbar vertebra ([facility]) 2017     Abdominal aortic aneurysm without rupture ([facility]) 2016     Localized primary osteoarthritis of lower leg, unspecified laterality 2013     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     Transient cerebral ischemic attack 2013     Coronary artery disease of native artery of native heart with stable angina pectoris ([facility]) 2012     Hyperlipidemia 2012     Hypertension 2012       LOS (days): 18  Geometric Mean LOS (GMLOS) (days): 8.2  Days to GMLOS:-10     OBJECTIVE:  Risk of Unplanned Readmission Score: 13.94         Current admission status: Inpatient   Preferred Pharmacy:   [facility] - [place] - [address]  [address]  [place] [address]  Phone: [phone] Fax: [phone]    [id number] - [place] - [address]  [place] [address]  Phone: [phone] Fax: [phone]    [facility] #[id number] - [place], [place] - [address]  [address] [place] [address]  Phone: [phone] Fax: [phone]    [facility] - [place] - [address]  [address] [place] [address]  Phone: [phone] Fax: [phone]    Primary Care Provider: Sherwin Mantilla MD    Primary Insurance: [facility]  Secondary Insurance:     DISCHARGE DETAILS:                                                                                                 Additional Comments: Pt is still recommended by PT/OT for acute rehab, pt would prefer [facility], referral sent, He is ready for discharge. Awaiting confirmation from [facility] with NPI so I can send for auth.

## 2024-07-08 NOTE — PROGRESS NOTES
NEUROLOGY RESIDENCY PROGRESS NOTE     Name: Shane Chau Sr.   Age & Sex: 78 y.o. male   MRN: [id number]  Unit/Bed#: [facility] 405-01   Encounter: [id number]    Shane Chau Sr. will need follow up in in 6 weeks with neurovascular Attending/YANIQUE/Resident .  He will not require outpatient neurological testing.   ASSESSMENT & PLAN     Stroke-like symptoms  Assessment & Plan  Shane Chau Sr. is a 78 y.o. male with pertinent PMHx HTN, HLD, multiple TIAs, abdominal aortic aneurysm s/p repair in 2011, who is currently admitted where he had a CABG x 3 performed on 6/20/2024. Stroke alert called this morning at 6:20 AM. Patient was last seen normal at 4:30 AM. Nursing staff noticed at 6 AM that patient was dysarthric, and had left-sided weakness was appreciated when patient stood to use the scale. On neurology arrival patient had BP of 136/75, FSBG 111. Left sided weakness resolved. NIH SS 2 for left facial droop and dysarthria. NC CTH without acute intracranial abnormalities, chronic lacunar infarcts in the right BG corona radiata, chronic bilateral cerebellar lacunar infarcts, and mild microangiopathic changes. CTA H/N showed moderate to severe atherosclerotic stenosis of the origin of the left vertebral artery, patent major vessels of the [place] without high-grade stenosis, no aneurysm, and no other hemodynamically significant stenosis in the cervical carotid or right vertebral artery.s a result of recent CABG patient was not determined to be a candidate for thrombolysis (TNK).        Impression: Impression: Clinical presentation concerning for acute vascular insult . Further work up as outlined below to identify presence of a vascular insult.     Plan:  Frequent neuro checks per protocol. If there is any acute changes in exam, please obtain stat CT head and notify neurology team  BP Goals: Normotension  Antiplatelet agents: Continue DAPT with ASA 81 mg and Plavix 75 mg   Statin: Continue  statin  Labs: P2Y12 - 180  Brain Imaging: Obtain MRI brain without contrast  Will consider medication changes after reviewing MRI results  TTE pending  Euthermic/Euglycemic  DVT PPx: Heparin, SCDs  PT/OT/ST/PMR evaluations when able  Stroke education and counseling  Rest of other care per primary team appreciated    Disposition: PT Recommendations - Rehab Resource Intensity Level, PT: I (Maximum Resource Intensit          SUBJECTIVE     Patient was seen and examined. No acute events overnight. Nursing report no concerns. Patient reports no concerns.     Pertinent Negatives include: numbness, weakness, speech or visual changes, headaches, migraine headaches, syncope, seizures, amaurosis, diplopia, other visual changes, paralysis/weakness, numbness or tingling, involuntary movements, tremor, speech impairment     Review of Systems   Constitutional:  Positive for fatigue. Negative for appetite change, chills and fever.       OBJECTIVE     Patient ID: Shane Chau Sr. is a 78 y.o. male.    Vitals:    24 2300 24 0258 24 0600 24 0659   BP: 125/70 126/69  120/69   BP Location:       Pulse: 73 68  75   Resp: 18   16   Temp: 98.3 °F (36.8 °C)   (!) 97.4 °F (36.3 °C)   TempSrc:       SpO2: 97% 96%  97%   Weight:   78.2 kg (172 lb 6.4 oz)    Height:          Temperature:   Temp (24hrs), Av.8 °F (36.6 °C), Min:97.4 °F (36.3 °C), Max:98.3 °F (36.8 °C)    Temperature: (!) 97.4 °F (36.3 °C)      Physical Exam  Constitutional:       Appearance: Normal appearance.   HENT:      Head: Normocephalic and atraumatic.      Nose: Nose normal.      Mouth/Throat:      Mouth: Mucous membranes are moist.   Eyes:      Extraocular Movements: Extraocular movements intact and EOM normal.      Conjunctiva/sclera: Conjunctivae normal.      Pupils: Pupils are equal, round, and reactive to light.   Cardiovascular:      Rate and Rhythm: Normal rate.      Pulses: Normal pulses.      Heart sounds: Normal heart sounds.    Pulmonary:      Effort: Pulmonary effort is normal.      Breath sounds: Normal breath sounds.   Abdominal:      General: Abdomen is flat.   Musculoskeletal:         General: Normal range of motion.   Skin:     General: Skin is warm.   Neurological:      General: No focal deficit present.      Mental Status: He is alert and oriented to person, place, and time.      Coordination: Finger-Nose-Finger Test and Heel to Shin Test normal.      Deep Tendon Reflexes:      Reflex Scores:       Tricep reflexes are 2+ on the right side and 2+ on the left side.       Bicep reflexes are 2+ on the right side and 2+ on the left side.       Brachioradialis reflexes are 2+ on the right side and 2+ on the left side.       Patellar reflexes are 2+ on the right side and 2+ on the left side.       Achilles reflexes are 2+ on the right side and 2+ on the left side.  Psychiatric:         Mood and Affect: Mood normal.         Speech: Speech normal.         Behavior: Behavior normal.         Thought Content: Thought content normal.         Judgment: Judgment normal.          Neurologic Exam     Mental Status   Oriented to person, place, and time.   Attention: normal. Concentration: normal.   Speech: speech is normal   Level of consciousness: alert  Knowledge: good.     Cranial Nerves     CN II   Visual fields full to confrontation.   Visual acuity: normal  Right visual field deficit: none  Left visual field deficit: none     CN III, IV, VI   Pupils are equal, round, and reactive to light.  Extraocular motions are normal.   Right pupil: Accommodation: intact.   Left pupil: Accommodation: intact.   CN III: no CN III palsy  CN VI: no CN VI palsy  Nystagmus: none   Diplopia: none  Ophthalmoparesis: none  Upgaze: normal  Downgaze: normal    CN V   Facial sensation intact.     CN VII   Facial expression full, symmetric.     CN VIII   CN VIII normal.     CN IX, X   CN IX normal.   CN X normal.     CN XI   CN XI normal.     CN XII   CN XII normal.      Motor Exam   Muscle bulk: normal  Overall muscle tone: normal  Right arm tone: normal  Left arm tone: normal  Right arm pronator drift: absent  Left arm pronator drift: absent  Right leg tone: normal  Left leg tone: normal    Strength   Right neck flexion: 5/5  Left neck flexion: 5/5  Right neck extension: 5/5  Left neck extension: 5/5  Right deltoid: 5/5  Left deltoid: 5/5  Right biceps: 5/5  Left biceps: 5/5  Right triceps: 5/5  Left triceps: 5/5  Right wrist flexion: 5/5  Left wrist flexion: 5/5  Right wrist extension: 5/5  Left wrist extension: 5/5  Right interossei: 5/5  Left interossei: 5/5  Right abdominals: 5/5  Left abdominals: 5/5  Right iliopsoas: 5/5  Left iliopsoas: 5/5  Right quadriceps: 5/5  Left quadriceps: 5/5  Right hamstrin/5  Left hamstrin/5  Right glutei: 5/5  Left glutei: 5/5  Right anterior tibial: 5/5  Left anterior tibial: 5/5  Right posterior tibial: 5/5  Left posterior tibial: 5/5  Right peroneal: 5/5  Left peroneal: 5/5  Right gastroc: 5/5  Left gastroc: 5/5    Sensory Exam   Light touch normal.   Vibration normal.   Temperature sensation normal in all extremeties     Gait, Coordination, and Reflexes     Coordination   Finger to nose coordination: normal  Heel to shin coordination: normal    Tremor   Intention tremor: absent    Reflexes   Right brachioradialis: 2+  Left brachioradialis: 2+  Right biceps: 2+  Left biceps: 2+  Right triceps: 2+  Left triceps: 2+  Right patellar: 2+  Left patellar: 2+  Right achilles: 2+  Left achilles: 2+  Right : 2+  Left : 2+  Right plantar: normal  Left plantar: normal  Right Yates: absent  Left Yates: absent             LABORATORY DATA     Labs: I have personally reviewed pertinent reports.   and I have personally reviewed pertinent films in PACS  Results from last 7 days   Lab Units 24  0847 24  0436   WBC Thousand/uL 12.88* 12.67*   HEMOGLOBIN g/dL 11.3* 9.9*   HEMATOCRIT % 34.3* 30.2*   PLATELETS Thousands/uL 416*  232      Results from last 7 days   Lab Units 07/08/24  0315 07/07/24  0547 07/06/24  0822   SODIUM mmol/L 131* 130* 129*   POTASSIUM mmol/L 3.6 3.8 4.5   CHLORIDE mmol/L 99 100 99   CO2 mmol/L 23 23 22   BUN mg/dL 24 24 28*   CREATININE mg/dL 1.08 1.10 1.14   CALCIUM mg/dL 7.6* 7.3* 7.4*     Results from last 7 days   Lab Units 07/03/24  0436 07/02/24  0633   MAGNESIUM mg/dL 2.1 2.2                        IMAGING & DIAGNOSTIC TESTING     Radiology Results: I have personally reviewed pertinent reports.   and I have personally reviewed pertinent films in PACS    MRI brain wo contrast   Final Result by Олег Knapp MD (07/06 1441)      Punctate acute to subacute infarcts involving the right caudate head, anterior centrum semiovale, and lateral right frontal cortex.      Chronic right corona radiata/basal ganglia and bilateral cerebellar lacunar infarcts.      Mild chronic microangiopathic ischemic changes.      The study was marked in EPIC for immediate notification.      Workstation performed: [id number]         CTA stroke alert (head/neck)   Final Result by Liliana Irwin MD (07/03 0721)      1.  Patent major vessels of the [place] without high-grade stenosis.  No aneurysm.   2.  Moderate to severe atherosclerotic stenosis at the origin of the left vertebral artery.   3.  No hemodynamically significant stenosis in the cervical carotid and the right vertebral arteries.               Findings were directly discussed with Stone Lucas at 7:11 a.m.                           Workstation performed: [id number]         CT stroke alert brain   Final Result by Liliana Irwin MD (07/03 0721)      1.  No acute intracranial CT abnormality.   2.  Chronic lacunar infarcts in the right basal ganglia and corona radiata. Chronic bilateral cerebellar lacunar infarcts.   3.  Mild microangiopathic change.                        Findings were directly discussed with Stone Lucas at 7:11 a.m.                        Workstation  performed: [id number]         XR abdomen 1 view kub   Final Result by Satya Gage MD (06/30 1430)      1. Enteric contrast material seen further in the colon. Persistent small bowel dilatation centrally may be related to partial or intermittent small bowel obstruction.               Workstation performed: [id number]         XR abdomen complete inc upright and/or decubitus   Final Result by Sp Tan MD (06/27 1937)      Contrast noted in the colon thus excluding a complete small bowel obstruction. The small bowel dilatation appears improved compared to most recent CT scan.               Workstation performed: [id number]         XR chest pa & lateral   Final Result by Joce Matthews MD (06/26 0605)      NG tube in the stomach. There is moderate gaseous distention of the stomach.            Workstation performed: [id number]         CT abdomen pelvis wo contrast   Final Result by Sp Barroso MD (06/25 1057)      Gastric distention and associated proximal small bowel dilatation and stool like contents within the dilated loops of small bowel without discrete transition point identified. Findings suggest chronic ileus though chronic partial small bowel obstruction    not entirely excluded.      Juxtarenal abdominal aortic aneurysm measuring 4.5 x 4.2 cm. Suprarenal abdominal aorta is dilated measuring 3.7 cm.      Workstation performed: [id number]         XR chest portable   Final Result by Joesph Marshall MD (06/21 1417)      No acute cardiopulmonary disease.            Workstation performed: [id number]         XR chest portable ICU   Final Result by Nataliia Evans MD (06/20 1543)      Expected appearance after CABG.      Left base opacity, likely atelectasis. Small effusion not excluded.            Workstation performed: [id number]             Other Diagnostic Testing: I have personally reviewed pertinent reports.   and I have personally reviewed pertinent films in PACS    ACTIVE MEDICATIONS      Current Facility-Administered Medications   Medication Dose Route Frequency    acetaminophen (TYLENOL) tablet 975 mg  975 mg Oral Q6H While awake    aspirin (ECOTRIN LOW STRENGTH) EC tablet 81 mg  81 mg Oral Daily    atorvastatin (LIPITOR) tablet 80 mg  80 mg Oral Daily With Dinner    bisacodyl (DULCOLAX) rectal suppository 10 mg  10 mg Rectal Daily PRN    chlorhexidine (PERIDEX) 0.12 % oral rinse 15 mL  15 mL Mouth/Throat BID    heparin (porcine) subcutaneous injection 5,000 Units  5,000 Units Subcutaneous Q8H KRISTI    hydrALAZINE (APRESOLINE) injection 5 mg  5 mg Intravenous Q6H PRN    insulin lispro (HumALOG/ADMELOG) 100 units/mL subcutaneous injection 1-5 Units  1-5 Units Subcutaneous HS    insulin lispro (HumALOG/ADMELOG) 100 units/mL subcutaneous injection 1-6 Units  1-6 Units Subcutaneous TID AC    lidocaine (LIDODERM) 5 % patch 2 patch  2 patch Topical Daily    magnesium hydroxide (MILK OF MAGNESIA) oral suspension 30 mL  30 mL Oral Daily PRN    melatonin tablet 6 mg  6 mg Oral HS    methocarbamol (ROBAXIN) tablet 500 mg  500 mg Oral Q6H PRN    metoprolol tartrate (LOPRESSOR) tablet 50 mg  50 mg Oral Q12H KRISTI    ondansetron (ZOFRAN) injection 4 mg  4 mg Intravenous Q6H PRN    pantoprazole (PROTONIX) EC tablet 40 mg  40 mg Oral Early Morning    potassium chloride (Klor-Con M20) CR tablet 20 mEq  20 mEq Oral BID    senna-docusate sodium (SENOKOT S) 8.6-50 mg per tablet 1 tablet  1 tablet Oral BID PRN    temazepam (RESTORIL) capsule 15 mg  15 mg Oral HS PRN    ticagrelor (BRILINTA) tablet 90 mg  90 mg Oral Q12H KRISTI    torsemide (DEMADEX) tablet 20 mg  20 mg Oral Daily    trimethobenzamide (TIGAN) IM injection 200 mg  200 mg Intramuscular Q6H PRN       Prior to Admission medications    Medication Sig Start Date End Date Taking? Authorizing Provider   amLODIPine (NORVASC) 10 mg tablet Take 1 tablet (10 mg total) by mouth daily 3/7/24  Yes Jose Juares MD   aspirin 325 mg tablet Take 1 tablet (325 mg total) by  mouth daily 5/14/24  Yes SANTHOSH Dominguez   isosorbide mononitrate (IMDUR) 60 mg 24 hr tablet Take 1 tablet (60 mg total) by mouth 2 (two) times a day 7/25/23  Yes Jose Juares MD   metoprolol tartrate (LOPRESSOR) 50 mg tablet Take 1 tablet (50 mg total) by mouth 2 (two) times a day 12/27/23  Yes Jose Juares MD   mupirocin (BACTROBAN) 2 % ointment Apply topically 2 (two) times a day 6/7/24  Yes Ernestina Jj PA-C   ranolazine (RANEXA) 500 mg 12 hr tablet Take 1 tablet (500 mg total) by mouth 2 (two) times a day 7/25/23  Yes Jose Juares MD   rosuvastatin (CRESTOR) 40 MG tablet Take 1 tablet (40 mg total) by mouth daily 6/3/24  Yes SANTHOSH Duckworth   tamsulosin (FLOMAX) 0.4 mg Take 1 capsule by mouth daily with dinner 10/24/23  Yes Jose Juares MD   clopidogrel (PLAVIX) 75 mg tablet Take 1 tablet (75 mg total) by mouth daily 5/22/24   Jose Juares MD   nitroglycerin (NITROSTAT) 0.4 mg SL tablet Place 1 tablet (0.4 mg total) under the tongue every 5 (five) minutes as needed for chest pain 5/14/24   SANTHOSH Dominguez         VTE Pharmacologic Prophylaxis: VTE covered by:  heparin (porcine), Subcutaneous, 5,000 Units at 07/08/24 0608      VTE Mechanical Prophylaxis: sequential compression device    ======    I have discussed the patient's history, physical exam findings, assessment, and plan in detail with attending, Dr. Alcantara    Thank you for allowing me to participate in the care of your patient, Shane Chau Sr..    [facility] Neurology Residency, PGY-***

## 2024-07-08 NOTE — CASE MANAGEMENT
[facility] received request for authorization from Care Manager.  Authorization request submitted for: Acute Rehab  Facility Name:  [facility] [place] NPI: [id number]  Facility MD:  Ashley DePadua NPI: [id number]  Authorization initiated by contacting insurance: [employer]   Via: [employer]   Clinicals submitted via Portal attachment   Pending Reference #: [id number]    Care Manager notified: Mary Marquez      Updates to authorization status will be noted in chart. Please reach out to CM for updates on any clinical information.

## 2024-07-08 NOTE — AR[facility] ADMISSION
[facility]  met with patient/family at bedside : introduced self, explained role, reviewed [facility] program,services offered,acute rehab criteria,review of referral by [facility] Medical Director,insurance authorization process,the three [facility] locations and anticipated rehab length of stay. [facility] rehab folder left patient ; all questions answered. Patient / family first choice is [facility]  and second is [facility] . Patient/ family made aware [facility] reviewer will communicate with the  who will keep patient updated on referral status.

## 2024-07-08 NOTE — ARC ADMISSION
Referral received for patient consideration of [facility] placement for rehab.  Will review and will update CM with determination when known.    Reviewed patient referral with [facility] physician and patient has been preapproved pending medical stability, insurance authorization and bed availability at campus of choice.  As stated in Neurology note, they are recommending further cardiac work up including Zio patch/Loop recorder to assess for possible arrhythmias.  [facility] will continue to follow.  CM has been updated.

## 2024-07-08 NOTE — PROGRESS NOTES
Progress Note - Cardiothoracic Surgery   Shane Chau Sr. 78 y.o. male MRN: [id number]  Unit/Bed#: [facility] 405-01 Encounter: [id number]       Coronary artery disease. S/P coronary artery bypass grafting; POD # 18    24 Hour Events: No events overnight.  No complaints. Tolerating diet. Breathing well. Pain controlled. + Flatus/BM. Neuro intact. Awaiting rehab placement.     Medications:   Scheduled Meds:  Current Facility-Administered Medications   Medication Dose Route Frequency Provider Last Rate    acetaminophen  975 mg Oral Q6H While awake Moraima Alvarez PA-C      aspirin  81 mg Oral Daily Moraima Alvarez PA-C      atorvastatin  80 mg Oral Daily With Dinner Moraima Alvarez PA-C      bisacodyl  10 mg Rectal Daily PRN KALPANA Amos MD      chlorhexidine  15 mL Mouth/Throat BID Moraima Alvarez PA-C      heparin (porcine)  5,000 Units Subcutaneous Q8H KRISTI Moraima Alvarez PA-C      hydrALAZINE  5 mg Intravenous Q6H PRN Moraima Alvarez PA-C      insulin lispro  1-5 Units Subcutaneous HS Moraima Alvarez PA-C      insulin lispro  1-6 Units Subcutaneous TID AC Moraima Alvarez PA-C      lidocaine  2 patch Topical Daily Moraima Alvarez PA-C      magnesium hydroxide  30 mL Oral Daily PRN Rahel Baig PA-C      melatonin  6 mg Oral HS Rahel Baig PA-C      methocarbamol  500 mg Oral Q6H PRN Miranda Mijares PA-C      metoprolol tartrate  50 mg Oral Q12H KRISTI Sandy Mckeon PA-C      ondansetron  4 mg Intravenous Q6H PRN Stone Lucas MD      pantoprazole  40 mg Oral Early Morning KALPANA Amos MD      potassium chloride  20 mEq Oral BID Miranda Mijares PA-C      senna-docusate sodium  1 tablet Oral BID PRN Miranda Mijares PA-C      temazepam  15 mg Oral HS PRN Moraima Alvarez PA-C      ticagrelor  90 mg Oral Q12H KRISTI Miranda Mijares PA-C      torsemide  20 mg Oral ROGER Mijares PA-C      trimethobenzamide  200 mg  Intramuscular Q6H PRN Stone Lucas MD       Continuous Infusions:   PRN Meds:.  bisacodyl    hydrALAZINE    magnesium hydroxide    methocarbamol    ondansetron    senna-docusate sodium    temazepam    trimethobenzamide    Vitals:   Vitals:    07/07/24 2300 07/08/24 0258 07/08/24 0600 07/08/24 0659   BP: 125/70 126/69  120/69   BP Location:       Pulse: 73 68  75   Resp: 18   16   Temp: 98.3 °F (36.8 °C)   (!) 97.4 °F (36.3 °C)   TempSrc:       SpO2: 97% 96%  97%   Weight:   78.2 kg (172 lb 6.4 oz)    Height:           Telemetry: NSR; Heart Rate: 70    Respiratory:   SpO2: SpO2: 97 %; Room Air    Intake/Output:     Intake/Output Summary (Last 24 hours) at 7/8/2024 0716  Last data filed at 7/8/2024 0601  Gross per 24 hour   Intake 834 ml   Output 1500 ml   Net -666 ml        Weights:   Weight (last 2 days)       Date/Time Weight    07/08/24 0600 78.2 (172.4)    07/07/24 0600 80.8 (178.13)    07/06/24 0631 81.9 (180.56)    07/06/24 0600 81.9 (180.56)          Results:   Results from last 7 days   Lab Units 07/05/24  0847 07/03/24  0436   WBC Thousand/uL 12.88* 12.67*   HEMOGLOBIN g/dL 11.3* 9.9*   HEMATOCRIT % 34.3* 30.2*   PLATELETS Thousands/uL 416* 232     Results from last 7 days   Lab Units 07/08/24  0315 07/07/24  0547 07/06/24  0822   SODIUM mmol/L 131* 130* 129*   POTASSIUM mmol/L 3.6 3.8 4.5   CHLORIDE mmol/L 99 100 99   CO2 mmol/L 23 23 22   BUN mg/dL 24 24 28*   CREATININE mg/dL 1.08 1.10 1.14   CALCIUM mg/dL 7.6* 7.3* 7.4*     Studies:  MRI IMPRESSION:     Punctate acute to subacute infarcts involving the right caudate head, anterior centrum semiovale, and lateral right frontal cortex.     Chronic right corona radiata/basal ganglia and bilateral cerebellar lacunar infarcts.     Mild chronic microangiopathic ischemic changes.    Invasive Lines/Tubes:  Invasive Devices       Peripheral Intravenous Line  Duration             Peripheral IV 07/07/24 Right;Ventral (anterior) Forearm 1 day                     Physical Exam:    General: No acute distress, Alert, and Normal appearance  HEENT/NECK:  Normocephalic. Atraumatic.  No jugular venous distention.    Cardiac: Regular rate and rhythm  Pulmonary:  Breath sounds clear bilaterally  Abdomen:  Non-tender, Normal bowel sounds, and Distended  Incisions: Sternum is stable.  Incision is clean, dry, and intact.  and Saphenectomy incison is clean, dry, and intact.   Extremities: Extremities warm/dry and 1+ edema B/L  Neuro: Alert and oriented X 3  Skin: Warm/Dry, without rashes or lesions.    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 S/P coronary artery bypass grafting; POD # 18    Plan:    Cardiac:   LVEF 60% on ASA and statin therapy   Metoprolol 50 mg bid   Amiodarone (prophylactic) d/c after 2 weeks with no telemetry events    Pulmonary: no issues, on RA    Renal:     Post op Creatinine stable; Follow up labs prn     Intake/Output net: inaccurate due to missed voids with BMs  Diuretic Regimen:  Continue PO Torsemide, 20 mg QD  Continue Potassium Chloride 20 mEq PO QD    Hyponatremia, mild, improving to 131   - cont diuresis    Neuro:   Stroke alert 7/3 for stroke like symptoms, now all resolved (dysarthria, left sided weakness and left facial droop) has been on plavix since 6/21 for hx of multiple TIAs in the past (wife states similar as stroke alert findings with quick resolution) . Changed Plavix to Brilinta per neurology, cleared by Dr. Yoon.   GI:    Tolerating diet, ileus resolved, having bowel movement, no abodminal pain, n/v    Continue stool  softeners and prn suppository    Continue GI prophylaxis    Endo: no issues     7    Hematology:     Post-operative blood count acceptable; Trend prn    8.   Disposition:        Anticipated discharge date: [facility] when bed available and insurance auth completed.     VTE Pharmacologic Prophylaxis: Heparin  VTE Mechanical Prophylaxis: sequential compression device    Collaborative rounds completed with supervising physician  Plan of care discussed with bedside nurse    SIGNATURE: Sp Hagen PA-C  DATE: July 8, 2024  TIME: 7:16 AM

## 2024-07-08 NOTE — RESTORATIVE TECHNICIAN NOTE
Restorative Technician Note      Patient Name: Shane ACUNA Kandi Schwartz.     Restorative Tech Visit Date: 07/08/24  Note Type: Mobility  Patient Position Upon Consult: Bedside chair  Activity Performed: Ambulated  Assistive Device: Roller walker  Patient Position at End of Consult: Bed/Chair alarm activated; All needs within reach; Bedside chair

## 2024-07-09 ENCOUNTER — HOSPITAL ENCOUNTER (INPATIENT)
Facility: HOSPITAL | Age: 79
LOS: 8 days | Discharge: HOME HEALTH | End: 2024-07-17
Attending: INTERNAL MEDICINE | Admitting: INTERNAL MEDICINE
Payer: COMMERCIAL

## 2024-07-09 VITALS
DIASTOLIC BLOOD PRESSURE: 70 MMHG | HEIGHT: 65 IN | OXYGEN SATURATION: 97 % | SYSTOLIC BLOOD PRESSURE: 125 MMHG | BODY MASS INDEX: 28.54 KG/M2 | WEIGHT: 171.3 LBS | HEART RATE: 81 BPM | TEMPERATURE: 97.4 F | RESPIRATION RATE: 16 BRPM

## 2024-07-09 DIAGNOSIS — I25.118 CORONARY ARTERY DISEASE OF NATIVE ARTERY OF NATIVE HEART WITH STABLE ANGINA PECTORIS (HCC): ICD-10-CM

## 2024-07-09 DIAGNOSIS — R29.90 STROKE-LIKE SYMPTOMS: ICD-10-CM

## 2024-07-09 DIAGNOSIS — T81.9XXD: ICD-10-CM

## 2024-07-09 DIAGNOSIS — R26.2 AMBULATORY DYSFUNCTION: Primary | ICD-10-CM

## 2024-07-09 DIAGNOSIS — Z95.1 S/P CABG X 3: ICD-10-CM

## 2024-07-09 DIAGNOSIS — E87.79 OTHER HYPERVOLEMIA: ICD-10-CM

## 2024-07-09 DIAGNOSIS — R13.11 ORAL PHASE DYSPHAGIA: ICD-10-CM

## 2024-07-09 PROBLEM — N18.9 ACUTE ON CHRONIC KIDNEY FAILURE  (HCC): Status: ACTIVE | Noted: 2024-06-24

## 2024-07-09 PROBLEM — I73.9 PAD (PERIPHERAL ARTERY DISEASE) (HCC): Status: ACTIVE | Noted: 2024-07-09

## 2024-07-09 PROBLEM — I63.9 CVA (CEREBRAL VASCULAR ACCIDENT) (HCC): Status: ACTIVE | Noted: 2024-07-09

## 2024-07-09 PROBLEM — E87.70 VOLUME OVERLOAD: Status: ACTIVE | Noted: 2024-07-09

## 2024-07-09 PROBLEM — K56.7 ILEUS (HCC): Status: ACTIVE | Noted: 2024-07-09

## 2024-07-09 PROBLEM — E87.1 HYPONATREMIA: Status: ACTIVE | Noted: 2024-07-09

## 2024-07-09 LAB
ANION GAP SERPL CALCULATED.3IONS-SCNC: 9 MMOL/L (ref 4–13)
BUN SERPL-MCNC: 23 MG/DL (ref 5–25)
CALCIUM SERPL-MCNC: 7.8 MG/DL (ref 8.4–10.2)
CHLORIDE SERPL-SCNC: 100 MMOL/L (ref 96–108)
CO2 SERPL-SCNC: 24 MMOL/L (ref 21–32)
CREAT SERPL-MCNC: 1.05 MG/DL (ref 0.6–1.3)
GFR SERPL CREATININE-BSD FRML MDRD: 67 ML/MIN/1.73SQ M
GLUCOSE SERPL-MCNC: 106 MG/DL (ref 65–140)
GLUCOSE SERPL-MCNC: 107 MG/DL (ref 65–140)
GLUCOSE SERPL-MCNC: 107 MG/DL (ref 65–140)
GLUCOSE SERPL-MCNC: 88 MG/DL (ref 65–140)
GLUCOSE SERPL-MCNC: 91 MG/DL (ref 65–140)
POTASSIUM SERPL-SCNC: 3.8 MMOL/L (ref 3.5–5.3)
SODIUM SERPL-SCNC: 133 MMOL/L (ref 135–147)

## 2024-07-09 PROCEDURE — 99223 1ST HOSP IP/OBS HIGH 75: CPT | Performed by: INTERNAL MEDICINE

## 2024-07-09 PROCEDURE — 97530 THERAPEUTIC ACTIVITIES: CPT

## 2024-07-09 PROCEDURE — 97535 SELF CARE MNGMENT TRAINING: CPT

## 2024-07-09 PROCEDURE — 82948 REAGENT STRIP/BLOOD GLUCOSE: CPT

## 2024-07-09 PROCEDURE — NC001 PR NO CHARGE

## 2024-07-09 PROCEDURE — 80048 BASIC METABOLIC PNL TOTAL CA: CPT | Performed by: PHYSICIAN ASSISTANT

## 2024-07-09 PROCEDURE — 99024 POSTOP FOLLOW-UP VISIT: CPT | Performed by: PHYSICIAN ASSISTANT

## 2024-07-09 PROCEDURE — 99223 1ST HOSP IP/OBS HIGH 75: CPT

## 2024-07-09 RX ORDER — INSULIN LISPRO 100 [IU]/ML
1-5 INJECTION, SOLUTION INTRAVENOUS; SUBCUTANEOUS
Status: DISCONTINUED | OUTPATIENT
Start: 2024-07-09 | End: 2024-07-10

## 2024-07-09 RX ORDER — ACETAMINOPHEN 325 MG/1
975 TABLET ORAL
Status: DISCONTINUED | OUTPATIENT
Start: 2024-07-09 | End: 2024-07-17 | Stop reason: HOSPADM

## 2024-07-09 RX ORDER — METOPROLOL TARTRATE 50 MG/1
50 TABLET, FILM COATED ORAL EVERY 12 HOURS SCHEDULED
Status: DISCONTINUED | OUTPATIENT
Start: 2024-07-09 | End: 2024-07-17 | Stop reason: HOSPADM

## 2024-07-09 RX ORDER — LANOLIN ALCOHOL/MO/W.PET/CERES
6 CREAM (GRAM) TOPICAL
Status: DISCONTINUED | OUTPATIENT
Start: 2024-07-09 | End: 2024-07-17 | Stop reason: HOSPADM

## 2024-07-09 RX ORDER — ONDANSETRON 4 MG/1
4 TABLET, ORALLY DISINTEGRATING ORAL EVERY 6 HOURS PRN
Status: DISCONTINUED | OUTPATIENT
Start: 2024-07-09 | End: 2024-07-17 | Stop reason: HOSPADM

## 2024-07-09 RX ORDER — LANOLIN ALCOHOL/MO/W.PET/CERES
6 CREAM (GRAM) TOPICAL
Start: 2024-07-09

## 2024-07-09 RX ORDER — POTASSIUM CHLORIDE 20 MEQ/1
20 TABLET, EXTENDED RELEASE ORAL 2 TIMES DAILY
Status: DISCONTINUED | OUTPATIENT
Start: 2024-07-09 | End: 2024-07-17 | Stop reason: HOSPADM

## 2024-07-09 RX ORDER — AMOXICILLIN 250 MG
1 CAPSULE ORAL 2 TIMES DAILY PRN
Start: 2024-07-09 | End: 2024-07-17

## 2024-07-09 RX ORDER — ACETAMINOPHEN 325 MG/1
650 TABLET ORAL EVERY 6 HOURS PRN
Start: 2024-07-09

## 2024-07-09 RX ORDER — TORSEMIDE 20 MG/1
20 TABLET ORAL DAILY
Start: 2024-07-09 | End: 2024-07-17

## 2024-07-09 RX ORDER — POTASSIUM CHLORIDE 20 MEQ/1
20 TABLET, EXTENDED RELEASE ORAL DAILY
Start: 2024-07-09 | End: 2024-07-17

## 2024-07-09 RX ORDER — TORSEMIDE 20 MG/1
20 TABLET ORAL DAILY
Status: DISCONTINUED | OUTPATIENT
Start: 2024-07-10 | End: 2024-07-17 | Stop reason: HOSPADM

## 2024-07-09 RX ORDER — HEPARIN SODIUM 5000 [USP'U]/ML
5000 INJECTION, SOLUTION INTRAVENOUS; SUBCUTANEOUS EVERY 8 HOURS SCHEDULED
Status: DISCONTINUED | OUTPATIENT
Start: 2024-07-09 | End: 2024-07-17 | Stop reason: HOSPADM

## 2024-07-09 RX ORDER — BISACODYL 10 MG
10 SUPPOSITORY, RECTAL RECTAL DAILY PRN
Status: DISCONTINUED | OUTPATIENT
Start: 2024-07-09 | End: 2024-07-17 | Stop reason: HOSPADM

## 2024-07-09 RX ORDER — LIDOCAINE 50 MG/G
2 PATCH TOPICAL DAILY
Status: DISCONTINUED | OUTPATIENT
Start: 2024-07-09 | End: 2024-07-17 | Stop reason: HOSPADM

## 2024-07-09 RX ORDER — PANTOPRAZOLE SODIUM 40 MG/1
40 TABLET, DELAYED RELEASE ORAL
Start: 2024-07-09 | End: 2024-07-17

## 2024-07-09 RX ORDER — ATORVASTATIN CALCIUM 80 MG/1
80 TABLET, FILM COATED ORAL
Status: DISCONTINUED | OUTPATIENT
Start: 2024-07-09 | End: 2024-07-17 | Stop reason: HOSPADM

## 2024-07-09 RX ORDER — PANTOPRAZOLE SODIUM 40 MG/1
40 TABLET, DELAYED RELEASE ORAL
Status: DISCONTINUED | OUTPATIENT
Start: 2024-07-10 | End: 2024-07-17 | Stop reason: HOSPADM

## 2024-07-09 RX ORDER — POLYETHYLENE GLYCOL 3350 17 G/17G
17 POWDER, FOR SOLUTION ORAL DAILY PRN
Status: DISCONTINUED | OUTPATIENT
Start: 2024-07-09 | End: 2024-07-17 | Stop reason: HOSPADM

## 2024-07-09 RX ORDER — CHLORHEXIDINE GLUCONATE ORAL RINSE 1.2 MG/ML
15 SOLUTION DENTAL 2 TIMES DAILY
Status: DISCONTINUED | OUTPATIENT
Start: 2024-07-09 | End: 2024-07-16

## 2024-07-09 RX ADMIN — CHLORHEXIDINE GLUCONATE 15 ML: 1.2 SOLUTION ORAL at 17:13

## 2024-07-09 RX ADMIN — LIDOCAINE 2 PATCH: 700 PATCH TOPICAL at 21:35

## 2024-07-09 RX ADMIN — PANTOPRAZOLE SODIUM 40 MG: 40 TABLET, DELAYED RELEASE ORAL at 05:47

## 2024-07-09 RX ADMIN — POTASSIUM CHLORIDE 20 MEQ: 1500 TABLET, EXTENDED RELEASE ORAL at 09:10

## 2024-07-09 RX ADMIN — METOPROLOL TARTRATE 50 MG: 50 TABLET, FILM COATED ORAL at 21:35

## 2024-07-09 RX ADMIN — TORSEMIDE 20 MG: 20 TABLET ORAL at 09:11

## 2024-07-09 RX ADMIN — Medication 6 MG: at 21:35

## 2024-07-09 RX ADMIN — ATORVASTATIN CALCIUM 80 MG: 80 TABLET, FILM COATED ORAL at 17:13

## 2024-07-09 RX ADMIN — ACETAMINOPHEN 975 MG: 325 TABLET, FILM COATED ORAL at 21:35

## 2024-07-09 RX ADMIN — HEPARIN SODIUM 5000 UNITS: 5000 INJECTION INTRAVENOUS; SUBCUTANEOUS at 14:17

## 2024-07-09 RX ADMIN — METOPROLOL TARTRATE 50 MG: 50 TABLET, FILM COATED ORAL at 09:10

## 2024-07-09 RX ADMIN — ACETAMINOPHEN 975 MG: 325 TABLET, FILM COATED ORAL at 09:10

## 2024-07-09 RX ADMIN — POTASSIUM CHLORIDE 20 MEQ: 1500 TABLET, EXTENDED RELEASE ORAL at 17:13

## 2024-07-09 RX ADMIN — ACETAMINOPHEN 975 MG: 325 TABLET, FILM COATED ORAL at 14:17

## 2024-07-09 RX ADMIN — TICAGRELOR 90 MG: 90 TABLET ORAL at 09:10

## 2024-07-09 RX ADMIN — HEPARIN SODIUM 5000 UNITS: 5000 INJECTION INTRAVENOUS; SUBCUTANEOUS at 21:35

## 2024-07-09 RX ADMIN — ASPIRIN 81 MG: 81 TABLET, COATED ORAL at 09:10

## 2024-07-09 RX ADMIN — HEPARIN SODIUM 5000 UNITS: 5000 INJECTION INTRAVENOUS; SUBCUTANEOUS at 05:47

## 2024-07-09 RX ADMIN — CHLORHEXIDINE GLUCONATE 0.12% ORAL RINSE 15 ML: 1.2 LIQUID ORAL at 09:12

## 2024-07-09 RX ADMIN — TICAGRELOR 90 MG: 90 TABLET ORAL at 21:42

## 2024-07-09 NOTE — PROGRESS NOTES
Progress Note - Cardiothoracic Surgery   Shane Chau Sr. 78 y.o. male MRN: [id number]  Unit/Bed#: [facility] 405-01 Encounter: [id number]       Coronary artery disease. S/P coronary artery bypass grafting; POD # 19    24 Hour Events: No sig events overnight.  Tolerating diet. Pain controlled. + Flatus/BM. Neuro intact. Cardiology recommending Zio patch after discharge. Awaiting rehab placement.     Medications:   Scheduled Meds:  Current Facility-Administered Medications   Medication Dose Route Frequency Provider Last Rate    acetaminophen  975 mg Oral Q6H While awake Moraima Alvarez PA-C      aspirin  81 mg Oral Daily Moraima Alvarez PA-C      atorvastatin  80 mg Oral Daily With Dinner Moraima Alvarez PA-C      bisacodyl  10 mg Rectal Daily PRN KALPANA Amos MD      chlorhexidine  15 mL Mouth/Throat BID Moraima Alvarez PA-C      heparin (porcine)  5,000 Units Subcutaneous Q8H KRISTI Moraima Alvarez PA-C      hydrALAZINE  5 mg Intravenous Q6H PRN Moraima Alvarez PA-C      insulin lispro  1-5 Units Subcutaneous HS Moraima Alvarez PA-C      insulin lispro  1-6 Units Subcutaneous TID AC Moraima Alvarez PA-C      lidocaine  2 patch Topical Daily Moraima Alvarez PA-C      magnesium hydroxide  30 mL Oral Daily PRN Rahel Baig PA-C      melatonin  6 mg Oral HS Rahel Baig PA-C      methocarbamol  500 mg Oral Q6H PRN Miranda Mijares PA-C      metoprolol tartrate  50 mg Oral Q12H KRISTI Sandy Mckeon PA-C      ondansetron  4 mg Intravenous Q6H PRN Stone Lucas MD      pantoprazole  40 mg Oral Early Morning KALPANA Amos MD      potassium chloride  20 mEq Oral BID Miranda Mijares PA-C      senna-docusate sodium  1 tablet Oral BID PRN Miranda Mijares PA-C      temazepam  15 mg Oral HS PRN Moraima Alvarez PA-C      ticagrelor  90 mg Oral Q12H KRISTI Miranda Mijares PA-C      torsemide  20 mg Oral Daily Sp Hagen PA-C       trimethobenzamide  200 mg Intramuscular Q6H PRN Stone Lucas MD       Continuous Infusions:   PRN Meds:.  bisacodyl    hydrALAZINE    magnesium hydroxide    methocarbamol    ondansetron    senna-docusate sodium    temazepam    trimethobenzamide    Vitals:   Vitals:    07/08/24 2142 07/09/24 0230 07/09/24 0648 07/09/24 0701   BP: 122/70 125/69  123/72   Pulse: 84 68  84   Resp:  16  16   Temp: 97.9 °F (36.6 °C) (!) 97.3 °F (36.3 °C)  97.6 °F (36.4 °C)   TempSrc:       SpO2: 96% 99%  99%   Weight:   77.7 kg (171 lb 4.8 oz)    Height:           Telemetry: NSR; Heart Rate: 70    Respiratory:   SpO2: SpO2: 99 %; Room Air    Intake/Output:     Intake/Output Summary (Last 24 hours) at 7/9/2024 0714  Last data filed at 7/9/2024 0451  Gross per 24 hour   Intake 450 ml   Output 400 ml   Net 50 ml        Weights:   Weight (last 2 days)       Date/Time Weight    07/09/24 0648 77.7 (171.3)    07/08/24 0600 78.2 (172.4)    07/07/24 0600 80.8 (178.13)          Results:   Results from last 7 days   Lab Units 07/05/24  0847 07/03/24  0436   WBC Thousand/uL 12.88* 12.67*   HEMOGLOBIN g/dL 11.3* 9.9*   HEMATOCRIT % 34.3* 30.2*   PLATELETS Thousands/uL 416* 232     Results from last 7 days   Lab Units 07/09/24  0458 07/08/24  0315 07/07/24  0547   SODIUM mmol/L 133* 131* 130*   POTASSIUM mmol/L 3.8 3.6 3.8   CHLORIDE mmol/L 100 99 100   CO2 mmol/L 24 23 23   BUN mg/dL 23 24 24   CREATININE mg/dL 1.05 1.08 1.10   CALCIUM mg/dL 7.8* 7.6* 7.3*     Studies:  MRI IMPRESSION:     Punctate acute to subacute infarcts involving the right caudate head, anterior centrum semiovale, and lateral right frontal cortex.     Chronic right corona radiata/basal ganglia and bilateral cerebellar lacunar infarcts.     Mild chronic microangiopathic ischemic changes.    Invasive Lines/Tubes:  Invasive Devices       Peripheral Intravenous Line  Duration             Peripheral IV 07/07/24 Right;Ventral (anterior) Forearm 2 days                    Physical  Exam:    General: No acute distress and Alert  HEENT/NECK:  Normocephalic. Atraumatic.  No jugular venous distention.    Cardiac: Regular rate and rhythm  Pulmonary:  Breath sounds clear bilaterally  Abdomen:  Non-tender, Non-distended, and Normal bowel sounds  Incisions: Sternum is stable.  Incision is clean, dry, and intact.   Extremities: Extremities warm/dry and Trace edema B/L  Neuro: Alert and oriented X 3 and No focal deficits  Skin: Warm/Dry, without rashes or lesions.       Assessment:  Principal Problem:    S/P CABG x 3  Active Problems:    Abdominal aortic aneurysm without rupture ([facility])    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[facility])    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[facility])    Asymptomatic bilateral carotid artery stenosis    Coronary artery disease of native artery of native heart with stable angina pectoris (HCC)    Hyperlipidemia    Hypertension    BARBARA (acute kidney injury) (HCC)    Stroke-like symptoms          Coronary artery disease. S/P coronary artery bypass grafting; POD # 19    Plan:    Cardiac:   LVEF 60% on ASA and statin therapy   Metoprolol 50 mg bid   Amiodarone (prophylactic) d/c after 2 weeks with no telemetry events    Pulmonary:   no issues, on RA    Renal:     Post op Creatinine stable; Follow up labs prn     Intake/Output net: inaccurate due to missed voids with BMs  Diuretic Regimen:  Continue PO Torsemide, 20 mg QD  Continue Potassium Chloride 20 mEq PO QD    Hyponatremia, mild, improving to 133   - cont diuresis    Neuro:   Stroke alert 7/3 for stroke like symptoms, now all resolved (dysarthria, left sided weakness and left facial droop) has been on plavix since 6/21 for hx of multiple TIAs in the past (wife states similar as stroke alert findings with quick resolution) . Changed Plavix to Brilinta per neurology, cleared by Dr. Yoon.   Doug patch recommended by cardiology on discharge, arranged by them  GI:    Tolerating diet, ileus resolved,   having bowel movement, no abodminal pain, no  n/v    Continue stool softeners and prn suppository    Continue GI prophylaxis    Endo: no issues     7    Hematology:     Post-operative blood count acceptable; Trend prn    8.   Disposition:        Anticipated discharge date:   [facility] when bed available and insurance auth completed.     VTE Pharmacologic Prophylaxis: Heparin  VTE Mechanical Prophylaxis: sequential compression device    Collaborative rounds completed with supervising physician  Plan of care discussed with bedside nurse    SIGNATURE: Sp Hagen PA-C  DATE: July 9, 2024  TIME: 7:14 AM

## 2024-07-09 NOTE — CASE MANAGEMENT
Received call from Briana @ [facility] (P#: [phone]). Stated CT scan did not show any acute findings for a stroke and would need to review MRI. Stated if MRI does not show findings then the case would have to be sent to the medical director for review prior to a determination. Requesting callback. Auth still pended. CM Notified: Mary Marquez.

## 2024-07-09 NOTE — H&P
[facility]  H&P  Name: Shane Chau Sr. 78 y.o. male I MRN: [id number]  Unit/Bed#: -01 I Date of Admission: 7/9/2024   Date of Service: 7/9/2024 I Hospital Day: 0      Assessment & Plan   * CVA (cerebral vascular accident) (formerly [facility])  Assessment & Plan  Developed left facial weakness/left sided weakness and dysarthria  MRI showed punctate acute to subacute infarcts involving the right caudate head, anterior centrum semiovale, and lateral right frontal cortex and chronic right corona radiata/basal ganglia and bilateral cerebellar lacunar infarcts.  He had been, at the time of the stroke, on Plavix and ASA.    P2Y12 showed that he was a Plavix responder but Neuro felt likely not adequate protection and switched the Plavix to Brilinta  Will need price check for Brilinta  Continue statin  For OP Zio patch to rule out that the CVA was cardioembolic from afib = Cards sent message to office to order    Coronary artery disease of native artery of native heart with stable angina pectoris (formerly [facility])  Assessment & Plan  Hx CAD with PCI/stents  Now CABG x 3 on 6/20/24  Will watch incisions  Sternal precautions    Volume overload  Assessment & Plan  Continue Torsemide  LVEF is normal    Hypertension  Assessment & Plan  Continue Lopressor 50 mg q12hrs  stable    Hyponatremia  Assessment & Plan  Mild at 133  Will watch  Continue FR 2000 ml    PAD (peripheral artery disease) (formerly [facility])  Assessment & Plan  Has B/L carotid stenosis  Continue ASA/Brilinta and statin    Ileus (formerly [facility])  Assessment & Plan  Occurred post op and was felt to be an ileus vs SBO  Did resolve  Will watch    Abdominal aortic aneurysm without rupture (formerly [facility])  Assessment & Plan  Had repair 2011  Follows with Dr Juares for surveillance     Acute on chronic kidney failure  (HCC)  Assessment & Plan  Creatinine baseline is 0.9-1.2  Creatinine post-op was up to 2.0 but is baseline now  Will watch            Subjective/HPI:    Shane ACUNADenise  "Kandi Schwartz is a 78 year old patient with a history of AAA repair 2011, CAD with previous PCI/stents, BPH, HLD, HTN, TIAs (on ASA/Plavix at home), PAD/bilateral carotid artery stenosis, 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  He underwent a cardiac catheterization on 6/3/24 as an outpatient that revealed severe multivessel CAD and he was referred for CABG.      On 6/2024, he underwent a CABG x 3 with LIMA/LAD, SVG/right PDA, SVG OM1.  He had post-operative volume overload and hypoxia and was treated with Bumex.  He had transient BARBARA/CKD.  He also developed an ileus vs SBP which did resolve.      On 7/3/24, he was a rapid response for dysarthria, left facial weakness.  CT head was negative for acute changes.  CTA showed \"Patent major vessels of the [place] of [place] without high-grade stenosis; no aneurysm; moderate to severe atherosclerotic stenosis at the origin of the left vertebral artery\".  There was no hemodynamically significant stenosis in the cervical carotid and the right vertebral arteries.  MRI revealed punctate acute to subacute infarcts involving the right caudate head, anterior centrum semiovale, and lateral right frontal cortex along with chronic right corona radiata/basal ganglia and bilateral cerebellar lacunar infarcts.  Neurology felt likely that did not occur as a result of the CABG/procedure since the CVA happened 10 days post-op.  Even thought he was a Plavix responder, it was felt that it may have not provided enough protection and he was switched from ASA/Plavix to ASA/Brilinta.  He is to have a Zio patch as an outpatient to eliminate the possibility that he had PAF as a cause although no atrial fibrillation was seen on telemetry during his stay.      Currently, patient denies CP, SOB, dizziness, N/V/D.        Review of Systems: A 10 point ROS was performed; " negative except as noted above.       Social History:    Substance Use History:   Social History     Substance and Sexual Activity   Alcohol Use Not Currently    Comment: 4 oz glass of wine every other day w/ his dinner     Social History     Tobacco Use   Smoking Status Former    Current packs/day: 0.00    Average packs/day: 0.5 packs/day for 30.0 years (15.0 ttl pk-yrs)    Types: Cigarettes    Start date: 3/10/1956    Quit date: 3/10/1986    Years since quittin.3   Smokeless Tobacco Never   Tobacco Comments    Quit  about 25 years x1ppd     Social History     Substance and Sexual Activity   Drug Use No       Past Medical History:    Past Medical History:   Diagnosis Date    AAA (abdominal aortic aneurysm) (HCC)     s/p open repair    BPH (benign prostatic hyperplasia)     CAD (coronary artery disease)     Cholelithiasis     Former tobacco use     History of MI (myocardial infarction)     History of TIA (transient ischemic attack)     Hyperlipidemia     Hypertension     Insomnia     Obesity          Review of Scheduled Meds:  Current Facility-Administered Medications   Medication Dose Route Frequency Provider Last Rate    acetaminophen  975 mg Oral Q6H While awake SANTHOSH Cunningham      [START ON 7/10/2024] aspirin  81 mg Oral Daily SANTHOSH Cunningham      atorvastatin  80 mg Oral After Dinner SANTHOSH Cunningham      bisacodyl  10 mg Rectal Daily PRN Dandre Stoner MD      chlorhexidine  15 mL Mouth/Throat BID SANTHOSH Cunningham      heparin (porcine)  5,000 Units Subcutaneous Q8H [facility] SANTHOSH Cunningham      insulin lispro  1-5 Units Subcutaneous TID AC SANTHOSH Cunningham      insulin lispro  1-5 Units Subcutaneous HS SANTHOSH Cunningham      lidocaine  2 patch Topical Daily SANTHOSH Cunningham      melatonin  6 mg Oral HS SANTHOSH Cunningham      metoprolol tartrate  50 mg Oral Q12H KRISTI SANTHOSH Cunningham       ondansetron  4 mg Oral Q6H PRN SANTHOSH Cunningham      [START ON 7/10/2024] pantoprazole  40 mg Oral Early Morning SANTHOSH Cunningham      polyethylene glycol  17 g Oral Daily PRN Dandre Stoner MD      potassium chloride  20 mEq Oral BID SANTHOSH Cunningham      ticagrelor  90 mg Oral Q12H [facility] SANTHOSH Cunningham      [START ON 7/10/2024] torsemide  20 mg Oral Daily SANTHOSH Cunningham         Physical Exam:  Temp:  [97.2 °F (36.2 °C)-98.7 °F (37.1 °C)] 97.6 °F (36.4 °C)  HR:  [68-87] 73  Resp:  [16-19] 19  BP: (120-131)/(65-72) 131/65  SpO2:  [95 %-99 %] 95 %    General Appearance: no distress, nontoxic appearing  HEENT:  External ear normal.  Nose normal w/o drainage. Mucous membranes are moist. Oropharynx is clear. Conjunctiva clear w/o icterus or redness.  Neck:  Supple, normal ROM  Lungs: BBS with bibasilar crackles R>L but no wheeze/rhonchi; respirations unlabored with normal inspiratory/expiratory effort.  No retractions noted.  On RA  CV: regular rate and rhythm; no rubs/murmurs/gallops, PMI normal.  Sternum stable with incision healing well, no erythema/drainage.  Chest tube sites are w/o erythema/drainage and healing well   ABD: Abdomen is soft.  Bowel sounds all quadrants.  Nontender with no distention.    EXT: very mild lower leg edema  Skin: normal turgor, normal texture, no rashes  Psych: affect normal, mood normal  Neuro: AAO; face symmetric.  Speech clear.  No weakness UE, no pronator drift left arm       Laboratory:    Results from last 7 days   Lab Units 07/05/24  0847 07/03/24  0436   HEMOGLOBIN g/dL 11.3* 9.9*   HEMATOCRIT % 34.3* 30.2*   WBC Thousand/uL 12.88* 12.67*     Results from last 7 days   Lab Units 07/09/24  0458 07/08/24  0315 07/07/24  0547   BUN mg/dL 23 24 24   SODIUM mmol/L 133* 131* 130*   POTASSIUM mmol/L 3.8 3.6 3.8   CHLORIDE mmol/L 100 99 100   CREATININE mg/dL 1.05 1.08 1.10            Wt Readings from Last 1 Encounters:  "  07/09/24 76.2 kg (168 lb)     Estimated body mass index is 27.96 kg/m² as calculated from the following:    Height as of this encounter: 5' 5\" (1.651 m).    Weight as of this encounter: 76.2 kg (168 lb).    Imaging:  No orders to display       Level 1 - Full Code    Counseling / Coordination of Care:   Total floor / unit time spent today 75 minutes. and Greater than 50% of total time was spent with the patient and / or family counseling and / or coordination of care.  A description of the counseling / coordination of care: review of records, examination of the patient, explanation of the plan of care.      ** Please Note: Fluency Direct voice to text software may have been used in the creation of this document. **      "

## 2024-07-09 NOTE — PHYSICAL THERAPY NOTE
PHYSICAL THERAPY NOTE          Patient Name: Shane Chau Sr.  Today's Date: 7/9/2024 07/09/24 1016   PT Last Visit   PT Visit Date 07/09/24   Note Type   Note Type Treatment   Pain Assessment   Pain Assessment Tool 0-10   Pain Score No Pain   Restrictions/Precautions   Other Precautions Cardiac/sternal;Chair Alarm;Telemetry;Hard of hearing   General   Chart Reviewed Yes   Additional Pertinent History cleared for Tx session by nsg   Response to Previous Treatment Patient with no complaints from previous session.   Cognition   Overall Cognitive Status WFL   Arousal/Participation Cooperative;Alert   Attention Within functional limits   Orientation Level Oriented to person;Oriented to place;Oriented to situation   Memory Decreased recall of precautions   Following Commands Follows one step commands without difficulty   Subjective   Subjective Alert; in the chair; agreeable to perform therex and mobilize   Transfers   Sit to Stand 5  Supervision   Stand to Sit 5  Supervision   Ambulation/Elevation   Gait pattern Short stride;Excessively slow   Gait Assistance 5  Supervision   Additional items Assist x 1;Verbal cues;Tactile cues   Assistive Device Rolling walker   Distance 2 x 100 ft w/ seated rest period in between   Stair Management Assistance Not tested   Balance   Static Sitting Fair +   Dynamic Sitting Fair   Static Standing Fair -   Dynamic Standing Fair -   Ambulatory Fair -   Activity Tolerance   Activity Tolerance Patient limited by fatigue   Nurse Made Aware spoke to JORGE ALBERTO Meade   Exercises   Hip Abduction Sitting;20 reps;AAROM;Bilateral   Knee AROM Long Arc Quad Sitting;20 reps;AROM;Bilateral   Ankle Pumps Sitting;20 reps;AROM;Bilateral   Marching Sitting;10 reps;AROM;Bilateral   Assessment   Prognosis Good   Problem List Decreased strength;Decreased endurance;Impaired balance;Decreased mobility   Assessment Pt was  able to progress to (S) level w/ transfers and amb w/ rw and ambulated further distances as well; pt still remains to be generally weak and deconditioned w/ rest periods required post bouts of activity to avoid excessive fatigue; D/C recommendations are listed below; will follow   Barriers to Discharge Inaccessible home environment   Goals   Patient Goals to cont getting better   STG Expiration Date 07/17/24   PT Treatment Day 10   Plan   Treatment/Interventions Functional transfer training;LE strengthening/ROM;Elevations;Therapeutic exercise;Endurance training;Bed mobility;Gait training;Equipment eval/education;Spoke to nursing;Spoke to case management   Progress Improving as expected   PT Frequency 4-6x/wk   Discharge Recommendation   Rehab Resource Intensity Level, PT I (Maximum Resource Intensity)   Equipment Recommended Walker   Walker Package Recommended Wheeled walker   Change/add to Walker Package? No   AM-PAC Basic Mobility Inpatient   Turning in Flat Bed Without Bedrails 3   Lying on Back to Sitting on Edge of Flat Bed Without Bedrails 3   Moving Bed to Chair 3   Standing Up From Chair Using Arms 3   Walk in Room 3   Climb 3-5 Stairs With Railing 2   Basic Mobility Inpatient Raw Score 17   Basic Mobility Standardized Score 39.67   [facility] Highest Level Of Mobility   -HLM Goal 5: Stand one or more mins   -HLM Achieved 7: Walk 25 feet or more   Education   Education Provided Mobility training;Assistive device   Patient Demonstrates verbal understanding;Reinforcement needed   End of Consult   Patient Position at End of Consult Bedside chair;Bed/Chair alarm activated;All needs within reach       Bradly Howell

## 2024-07-09 NOTE — RESTORATIVE TECHNICIAN NOTE
Restorative Technician Note      Patient Name: Shane ACUNA Kandi Schwartz.     Restorative Tech Visit Date: 07/09/24  Note Type: Mobility  Patient Position Upon Consult: Supine  Activity Performed: Transferred  Assistive Device: Other (Comment) (none)  Patient Position at End of Consult: Bed/Chair alarm activated; All needs within reach; Bedside chair

## 2024-07-09 NOTE — ASSESSMENT & PLAN NOTE
Developed left facial weakness/left sided weakness and dysarthria  MRI showed punctate acute to subacute infarcts involving the right caudate head, anterior centrum semiovale, and lateral right frontal cortex and chronic right corona radiata/basal ganglia and bilateral cerebellar lacunar infarcts.  He had been, at the time of the stroke, on Plavix and ASA.    P2Y12 showed that he was a Plavix responder but Neuro felt likely not adequate protection and switched the Plavix to Brilinta  Will need price check for Brilinta  Continue statin  For OP Zio patch to rule out that the CVA was cardioembolic from afib = Cards sent message to office to order

## 2024-07-09 NOTE — DISCHARGE SUMMARY
Discharge Summary - Cardiothoracic Surgery   Shane Chau Sr. 78 y.o. 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      - post op ileus, resolved  - post op CVAs on MRI, deficits improved  - post op acute blood loss anemia, stable  - post op mild hyponatremia, resolving    Attending: Ravin Amos M.D.    Consulting Physician(s):   Cardiology  Medical critical care  Neurology  General Surgery  PMR    Procedures Performed:   Procedure(s):  CORONARY ARTERY BYPASS GRAFT (CABG) X3 VESSELS, LIMA - LAD, LEFT LEG EVH/SVG - PDA AND OM1, W/ DEDRICK  HARVEST VEIN ENDOSCOPIC (EVH)     Hospital Course:   The patient was seen in consultation prior to this admission for evaluation of multivessel coronary artery disease.  Risks and benefits of coronary artery bypass grafting were discussed in detail, and patient was agreeable.  Routine preoperative evaluation was completed and informed consent was obtained prior to admission.    6/20: Elective admission for CABGx3 (LIMA to LAD, SVG to RPDA, SVG to OM1, LLE EVH).   No significant postoperative bleeding, Plavix (pre-op med) to start tomorrow).  Transferred to ICU without inotropic or pressor support.  Wean towards extubation. EKG w/ NSR & stable T wave inversions in III/aVR.     6/21: Given total of 500mL LR for low CI, high SVR. Started on cardene for hypertension, which he remains on. Increasing oxygen requirements overnight. Given 40mg IV lasix with minimal output. Given 2mg IV bumex in AM  with ongoing response. Remains supported with Cardene 5. Start Lopressor 12.5 PO BID. Bumex, 2 mg IV BID; T/C infusion, pending response to intermittent dosing. Resume Plavix for H/O TIA. Transfer to step down.      6/22: Midflow, may need high flow. NSR, no more junctional. Remains on cardene. Increase metoprolol to 25, cont norvasc 10, may need ACE. D/C CTs and PWs. Increase bumex to TID, Cr 1.25, net even from cardene gtt. Wean cardene as able.      6/23: VSS. Cont lopressor bid. Cont norvasc 10 qd. Down to 7l NC. Cont bumex 2mg iv bid, metolazone x1 yesterday, good UO, Cr 1.6 from 1.2, monitor. Other labs stable. SSI. Tx to tele     6/24: VSS. Remains on 3L NC. Cont Lopressor 50 bid and Norvasc 10. Change to Po bumex, Cr 1.86 from 1.6, -1.8L. SSI. Will need rehab. PM: Good UOP so far today. Still on 3LNC.     6/25: Nausea developing at midnight with 7-8 episodes of non-bloody emesis (hx of open AAA repair), given Zofran with minimal relief, refused Tigan, order CT a/p wo consult general surgery. Net even, up 3 lbs, remains on 3L NC, UOP mod. With Cr continue to uptrend to 2.22, consult nephrology, decrease bumex to 1 mg PO QD, replete K. LLL with diminished BS, order CXR. Lactic acid of 3.8, CT findings of likely chronic ileus though chronic partial small bowel obstruction not entirely excluded general surgery recs for NPO/NGT and IVF bolus.  PM: NG tube placed and on continuous suction, stop further diuretics, needs IVFs start isolyte 50cc/hr, trend lactic acid. 2VCXR without effusion, lungs clear, but left diaphragm elevated likely from gastric dilatation. Hold all oral meds and start IV lopressor 5mg IV q6h while NPO/NG tube in place and until ileus improves. Repeat lactic acid improved to 2.2. Repeat K only 3.4, replete. Significant output of 2L out of NG tube since placement so far. IV Lopressor increased to 7.5mg overnight.     6/26: Remains on 3L NC (turned off this AM), using IS (1000, used once  yesterday). +diarrhea in evening, NGT w/ 2.5L bilious OP, LA now WNL, Cr down 1.95, K 3.3, replete with IV and recheck this afternoon. Net neg 2.8L. F/U general surgery recs.      6/27: NGT with 1L OP, +flatus, denies BM yesterday, distention improving but TTP remains- worse in LUQ. Denies nausea. Mag 3.2, K 3.2. Lactic acid 1.3. Discuss with general surgery need for repeat imaging. HR/BP well controlled. Continue meds with tube clamp. Cr down trending 1.95, replete K. Increase bowel regimen.     6/28: Underwent gastrograffin challenge yesterday, contrast found in colon, NGT d/c'd, started on clears, doing well, eager to eat more, denies nausea, +distention but soft (same as yesterday), TTP now worse in LLQ, pain overall improved. +BM this AM- normal. Start cardiac diet with fluid restriction. Remains on 3L NC, Cr down 1.53, net + 1.36, UOP 1L, monitor for need for diuretics. D/C IV Lopressor, restart Lopressor 25 mg BID. Transition back to PO amiodarone, d/c central line. Replete K. PM: repeat Potassium 3.3; Additional 40 mEq oral potassium ordered.      6/29: Developed emesis at MN. Episodes of diarrhea as well. Denies abdominal pain or nausea currently. Abdomen remains distended. Diffuse tenderness to palpation, order KUB, discussed with general surgery, start on clears. Remains NSR with PVCs, increase lopressor to 50 mg BID. Continues on 3 L NC. BARBARA improving 1.42, continue to hold diuretics, net neg >400.       6/30: KUB yesterday with signs of ileus, continues to be distended and tympanic, denies nausea, continues with diarrhea, advance to clears. K 3.9, Mag 2.7. No further PVCs with increase in BB. WBC elevated 17, continue to monitor.     7/1: Tolerating clear liquids.  Distended abdomen, no pain with palpation, passing flatus, Ongoing Bms.  D/C IVF.      7/2: Tolerating diet, +BM daily, no n/v general surgery signed off.  Cleared for discharge to rehab. D/C amiodarone. Labs stable.     7/3: Stoke alert at 6  am due to slurred speech and possible left facial droop.  CTA/CT head negative for acute findings, most likely TIA. Stroke pathway. Speech evaluation cleared for level 3 thin lo fiber/lo residue no carbonation for his previous ileus/gen surgery recommendation diet. Labs stable.      7/4: Resolved deficits from yesterday, speech clear.  P2Y12 reviewed by neurology continue ASA and Plavix. Start torsemide 20 mg daily.  Tolerating diet.  ARC referral.      7/5: Speech remains clear with equal strength. Tolerating diet. Cleared for d/c after MRI per neurology. Called MRI and they scheduled for tonight     7/6: No MRI done. Asked MRI to get today today. Na 130, diuresing well.  Otherwise doing well.      7/7: MRI complete with mult small acute/subacute infarctions, changed plavix to brilinta at neurology request. Na 130, bid diuretics.     7/8: No events overnight.  No complaints. Tolerating diet. Breathing well. Pain controlled. + Flatus/BM.      Neuro intact. Na improving to 131. Cardiology to assess if Ziopatch warranted on discharge as suggested by neuro.  Awaiting rehab placement pending auth. PM: Cardiology recommending Zio patch after  discharge.      7/9: No issues, remains SR, RA, neuro intact, Na improved 133, cardiology arranged Zio patch for after  discharge. Awaiting rehab placement. Accepted to [facility] for discharge today.      Condition at Discharge:   good     Discharge Physical Exam:    Please see the documented physical exam from this morning's progress note for details.    Discharge Data:  Results from last 7 days   Lab Units 07/05/24  0847 07/03/24  0436   WBC Thousand/uL 12.88* 12.67*   HEMOGLOBIN g/dL 11.3* 9.9*   HEMATOCRIT % 34.3* 30.2*   PLATELETS Thousands/uL 416* 232     Results from last 7 days   Lab Units 07/09/24  0458 07/08/24  0315 07/07/24  0547   POTASSIUM mmol/L 3.8 3.6 3.8   CHLORIDE mmol/L 100 99 100   CO2 mmol/L 24 23 23   BUN mg/dL 23 24 24   CREATININE mg/dL 1.05 1.08 1.10   CALCIUM  mg/dL 7.8* 7.6* 7.3*           Discharge instructions/Information to patient and family:   See after visit summary for information provided to patient and family.      Shane Chau Sr. was educated on restrictions regarding driving and lifting, and techniques of proper incisional care.  They were specifically counselled on signs and symptoms of an incisional infection, and advised to contact our service immediately should they develop fevers, sweats, chill, redness or drainage at the site of any incisions.    Provisions for Follow-Up Care:  See after visit summary for information related to follow-up care and any pertinent home health orders.      Disposition:  SL [facility]    Planned Readmission:   No    Discharge Medications:  See after visit summary for reconciled discharge medications provided to patient and family.      Shane Chau Sr. was provided contact information and scheduled a follow up appointment with Ravin Amos M.D.  Additionally, follow up appointments have been scheduled for their primary care physician and primary cardiologist.  Contact information was provided.    Shane Chau Sr. was counseled on the importance of avoiding tobacco products.  As with all patients whom have undergone open heart surgery, tobacco cessation medication was contraindicated at the time of discharge.     ACE/ARB was Contraindicated secondary to renal dysfunction, EF >40% and no hx of CHF    Beta Blocker was Prescribed at discharge    Aspirin was Prescribed at discharge    Statin was Prescribed at discharge    Preop plavix was changed to Brilinta per neurology for CVAs (occurred while on plavix)      The patient was discharged on ongoing diuretic therapy with Torsemide, 20 mg, PO QD and Potassium Chloride 20 mEq, PO QD.  They were advised to continue these medications for 10 days, unless otherwise directed.     The patient was informed that following their postoperative surgical evaluation,  they will be referred to outpatient cardiac rehabilitation.  They were counseled that this program is run by specialists who will help them safely strengthen their heart and prevent more heart disease.  Cardiac rehabilitation will include exercise, relaxation, stress management, and heart-healthy nutrition.  Caregivers will also check to make sure their medication regimen is working.    During this admission, the patient was questioned on their use of tobacco, alcohol, and illicit/non-prescription drug use in the  previous 24 months. Shane Chau Sr. states that they have not used any of these substances in this time frame.    I spent 30 minutes discharging the patient. This time was spent on the day of discharge. I had direct contact with the patient on the day of discharge. Additional documentation is required if more than 30 minutes were spent on discharge.     SIGNATURE: Sp Hagen PA-C  DATE: July 9, 2024  TIME: 10:00 AM

## 2024-07-09 NOTE — CASE MANAGEMENT
[facility] has received APPROVED authorization.  Insurance: Timothy    Called in by Rep: Briana SKYLER#: [phone]  Authorization received for: Acute Rehab  Facility: [facility]   Authorization #: [id number]   Start of Care: 07/09  Next Review Date: 2 Business Days   Submit next review to F#: [phone] or [employer]     Care Manager notified: Mary Marquez     Please reach out to  for updates on any clinical information.

## 2024-07-09 NOTE — CASE MANAGEMENT
Case Management Discharge Planning Note    Patient name Shane Chau .  Location [facility] 405/[facility] 405-01 MRN [id number]  : 1945 Date 2024       Current Admission Date: 2024  Current Admission Diagnosis:S/P CABG x 3   Patient Active Problem List    Diagnosis Date Noted Date Diagnosed    Stroke-like symptoms 2024     BARBARA (acute kidney injury) (HCC) 2024     S/P CABG x 3 2024     Rib pain 2023     Paresthesia 2023     Leg swelling 05/10/2022     Erectile dysfunction 2022     Microhematuria 2018     Need for pneumococcal vaccination 2018     Hematuria 2018     Asymptomatic bilateral carotid artery stenosis 2017     Closed compression fracture of first lumbar vertebra ([facility]) 2017     Abdominal aortic aneurysm without rupture ([facility]) 2016     Localized primary osteoarthritis of lower leg, unspecified laterality 2013     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[facility]) 2013     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     Transient cerebral ischemic attack 2013     Coronary artery disease of native artery of native heart with stable angina pectoris ([facility]) 2012     Hyperlipidemia 2012     Hypertension 2012       LOS (days): 19  Geometric Mean LOS (GMLOS) (days): 8.2  Days to GMLOS:-10.9     OBJECTIVE:  Risk of Unplanned Readmission Score: 14.11         Current admission status: Inpatient   Preferred Pharmacy:   [facility] - [place] - [address]  [address]  [place] [address]  Phone: [phone] Fax: [phone]    [id number] - [place], [place] - [address]  [place] [address]  Phone: [phone] Fax: [phone]    [facility] #[id number] - [place], [place] - [address]  [address] [place] [address]  Phone: [phone] Fax: [phone]    [facility]) - [place] - [address]  [facility]  [address] [place] [address]  Phone: [phone] Fax: [phone]    Primary Care Provider: Sherwin Mantilla MD    Primary Insurance: [facility] REP  Secondary Insurance:     DISCHARGE DETAILS:                                                                                                               Facility Insurance Auth Number: [id number]

## 2024-07-09 NOTE — PCC SPEECH THERAPY
ST orders received for completion of cognitive linguistic and dysphagia evaluations. Results and recommendations to follow with plan to complete evaluations on 7/10.     As of 7/11/2024: In regards to cognition, Pt completed SLUMs on evaluation and scored 15 which as compared to those with high school education correlates with severe neurocognitive disorder and is indicative of dementia. Compared to the formalized testing and informal analysis, pt noted deficits with working memory, immediate memory, STM, LTM, remote memory, reasoning, sequencing, problem solving, executive function skills, attention and word recall. Additionally, skilled ST can assist w/ ADL such as medication management using cognitive skills necessary to complete. The above deficits present as barriers in pt's ability in independent and safely perform basic ADL and to complete, as well as to maintain safety. Interventions to address these areas include: verbal problem solving tasks, verbal working memory tasks, visual memory recall tasks, drawing conclusions activities, written sequencing tasks, sequencing tasks, categorization tasks, picture problem solving activities, verbal reasoning tasks, higher level deductive reasoning activities, verbal review of current medications, written review of medications, tangible medication management task, written calendar tasks, visual retraining activities., visual attention tasks, and family education/training.     Pt is current functioning at:    Comprehension: Supervision  Expression: Supervision  Social Interaction: Supervision  Problem Solving: Mod A  Memory: Max A    In regards to swallowing, pt presented with s/s suggestive of mild oral and suspected minimal and mild pharyngeal dysphagia.  Symptoms or concerns included mild decreased mastication, suspected mild decreased control of soft solids , some mild residue which is effectively cleared with liquids, and anterior loss/spillage of soft solids.  Additionally, pt is also suspected of mild pharyngeal swallow delay, suspected mild decreased hyolaryngeal elevation upon palpation, and some multiple swallows with soft solids and thins. Mastication was adequate with the materials administered however, at times pt needed increase time for regular items more than level 3 items. Pt noted to benefit most from liquids and sauces to assist in breakdown for bolus.  Bolus formation and transfer were suspected to be mildly delayed with level 3 and regular foods as evidenced by some increase in rate of intake and residue on tongue in between bites. Pt needed some verbal cues for liquid wash and lingual sweeps to clear residue. Pt currently is distant supervision and is taking medication whole with liquids. Precautions include all meals OOB, upright posture, only feed when fully alert, slow rate of feeding, small bites/sips, quiet environment (tv off, limit talking, door closed, etc.), alternating bites and sips. Family training to be completed regarding swallowing strategies.     Update from week: 7/16/2024 . Pt is being followed for cognitive linguistic tx and dysphagia tx  sessions to where pt is making steady progress this week.     Continued cognitive barriers which present include: decreased attention, ST memory recall , reasoning, sequencing, organization of thoughts, judgement, and slower processing, which still impacts pt's overall safety, functional cognitive communication skills as well as functional mobility. The following interventions are used to target these barriers, including verbal working memory tasks, written sequencing tasks, verbal reasoning tasks, verbal review of current medications, written review of medications,  written health management tasks, verbal health management tasks, and family education/training.     Continued dysphagia barriers which present include the following risks for aspiration such as: poor positioning, ineffective mastication,  delay in oral transit, and delay in swallow initiation. In order to address the noted barriers, skilled SLP services will address this by targeting the following interventions: swallow exercise, proper positioning, safe swallow strategies, and family education/training.    Current diet is regular diet w/ thin liquid.      Current level of functioning:   Eating: Supervision-distant   Comprehension: supervision  Expression: supervision  Social Interaction: supervision  Executive functions: mod A  Memory: mod A     Family training/education has been completed with pt's spouse Maryjo on 7/16/2024. Reviewed diet recommendations as well as swallow strategies which have been provided on pt's discharge instructions page which will be provided at time of discharge. Additional recommendations were d/w pt and pt's spouse about providing increased supervision w/ medication management upon d/c. Spouse in agreement. Recommendations at time of discharge are for continued home ST services which will likely be brief given swallow function but could be more given cognitive skills. At this time, pt will continue to benefit from skilled cognitive linguistic tx and dysphagia tx  session to maximize overall functional independence given overall cognitive linguistic skills and swallow abilities  in attempts to decreased caregiver burden over time.

## 2024-07-09 NOTE — RESTORATIVE TECHNICIAN NOTE
Restorative Technician Note      Patient Name: Shane ACUNA Kandi Schwartz.     Restorative Tech Visit Date: 07/09/24  Note Type: Mobility  Patient Position Upon Consult: Bedside chair  Activity Performed: Ambulated  Assistive Device: Roller walker  Patient Position at End of Consult: All needs within reach; Other (comment) (on the stretcher)

## 2024-07-09 NOTE — CASE MANAGEMENT
Case Management Discharge Planning Note    Patient name Shane Chau .  Location [facility] 405/[facility] 405-01 MRN [id number]  : 1945 Date 2024       Current Admission Date: 2024  Current Admission Diagnosis:S/P CABG x 3   Patient Active Problem List    Diagnosis Date Noted Date Diagnosed    Stroke-like symptoms 2024     BARBARA (acute kidney injury) (HCC) 2024     S/P CABG x 3 2024     Rib pain 2023     Paresthesia 2023     Leg swelling 05/10/2022     Erectile dysfunction 2022     Microhematuria 2018     Need for pneumococcal vaccination 2018     Hematuria 2018     Asymptomatic bilateral carotid artery stenosis 2017     Closed compression fracture of first lumbar vertebra ([facility]) 2017     Abdominal aortic aneurysm without rupture ([facility]) 2016     Localized primary osteoarthritis of lower leg, unspecified laterality 2013     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[facility]) 2013     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     Transient cerebral ischemic attack 2013     Coronary artery disease of native artery of native heart with stable angina pectoris ([facility]) 2012     Hyperlipidemia 2012     Hypertension 2012       LOS (days): 19  Geometric Mean LOS (GMLOS) (days): 8.2  Days to GMLOS:-11     OBJECTIVE:  Risk of Unplanned Readmission Score: 15.25         Current admission status: Inpatient   Preferred Pharmacy:   [facility] - [place] - [address]  [address]  [place] [address]  Phone: [phone] Fax: [phone]    [id number] - [place] - [address]  [place] [address]  Phone: [phone] Fax: [phone]    [facility] #[id number] - [place] - [address]  [address] [place] [address]  Phone: [phone] Fax: [phone]    [facility]) - [place] - [address]  [facility]  [address] [place] [address]  Phone: [phone] Fax: [phone]    Primary Care Provider: Sherwin Mantilla MD    Primary Insurance: [employer]  Secondary Insurance:     DISCHARGE DETAILS:                                                                                        IMM Given (Date):: 07/09/24 (given at 0900)  IMM Given to:: Patient  Family notified:: wife Maryjo informed of transfer to [facility] today at 1300.  Additional Comments: Pt. authorized for [facility] and transported today at 1300 to room 973. Wife aware, IMM done this am.

## 2024-07-09 NOTE — PLAN OF CARE
Problem: PHYSICAL THERAPY ADULT  Goal: Performs mobility at highest level of function for planned discharge setting.  See evaluation for individualized goals.  Description: Treatment/Interventions: Functional transfer training, LE strengthening/ROM, Elevations, Therapeutic exercise, Endurance training, Cognitive reorientation, Patient/family training, Equipment eval/education, Bed mobility, Gait training, Spoke to nursing, Spoke to case management, OT  Equipment Recommended: Walker (pt reports owning)       See flowsheet documentation for full assessment, interventions and recommendations.  Outcome: Progressing  Note: Prognosis: Good  Problem List: Decreased strength, Decreased endurance, Impaired balance, Decreased mobility  Assessment: Pt was able to progress to (S) level w/ transfers and amb w/ rw and ambulated further distances as well; pt still remains to be generally weak and deconditioned w/ rest periods required post bouts of activity to avoid excessive fatigue; D/C recommendations are listed below; will follow  Barriers to Discharge: Inaccessible home environment     Rehab Resource Intensity Level, PT: I (Maximum Resource Intensity)    See flowsheet documentation for full assessment.

## 2024-07-10 PROBLEM — R73.03 PREDIABETES: Status: ACTIVE | Noted: 2024-07-10

## 2024-07-10 PROBLEM — T81.9XXA: Status: ACTIVE | Noted: 2024-07-10

## 2024-07-10 PROBLEM — Z91.89 POTENTIAL FOR COGNITIVE IMPAIRMENT: Status: ACTIVE | Noted: 2024-07-10

## 2024-07-10 PROBLEM — E87.70 VOLUME OVERLOAD: Status: ACTIVE | Noted: 2024-07-10

## 2024-07-10 PROBLEM — Z87.19 HISTORY OF ILEUS: Status: ACTIVE | Noted: 2024-07-09

## 2024-07-10 PROBLEM — R13.10 DYSPHAGIA: Status: ACTIVE | Noted: 2024-07-10

## 2024-07-10 PROBLEM — Z91.89 AT RISK FOR VENOUS THROMBOEMBOLISM (VTE): Status: ACTIVE | Noted: 2024-07-10

## 2024-07-10 PROBLEM — N18.2 CKD (CHRONIC KIDNEY DISEASE) STAGE 2, GFR 60-89 ML/MIN: Status: ACTIVE | Noted: 2024-06-24

## 2024-07-10 PROBLEM — D62 POSTOPERATIVE ANEMIA DUE TO ACUTE BLOOD LOSS: Status: ACTIVE | Noted: 2024-07-10

## 2024-07-10 PROBLEM — R13.11 ORAL PHASE DYSPHAGIA: Status: ACTIVE | Noted: 2024-07-10

## 2024-07-10 PROBLEM — I65.02 STENOSIS OF LEFT VERTEBRAL ARTERY: Status: ACTIVE | Noted: 2024-07-10

## 2024-07-10 PROBLEM — R19.4 FREQUENT BOWEL MOVEMENTS: Status: ACTIVE | Noted: 2024-07-10

## 2024-07-10 LAB
ANION GAP SERPL CALCULATED.3IONS-SCNC: 9 MMOL/L (ref 4–13)
BUN SERPL-MCNC: 21 MG/DL (ref 5–25)
CALCIUM SERPL-MCNC: 7.8 MG/DL (ref 8.4–10.2)
CHLORIDE SERPL-SCNC: 100 MMOL/L (ref 96–108)
CO2 SERPL-SCNC: 23 MMOL/L (ref 21–32)
CREAT SERPL-MCNC: 1.18 MG/DL (ref 0.6–1.3)
ERYTHROCYTE [DISTWIDTH] IN BLOOD BY AUTOMATED COUNT: 13.2 % (ref 11.6–15.1)
GFR SERPL CREATININE-BSD FRML MDRD: 58 ML/MIN/1.73SQ M
GLUCOSE SERPL-MCNC: 121 MG/DL (ref 65–140)
GLUCOSE SERPL-MCNC: 122 MG/DL (ref 65–140)
GLUCOSE SERPL-MCNC: 126 MG/DL (ref 65–140)
GLUCOSE SERPL-MCNC: 78 MG/DL (ref 65–140)
HCT VFR BLD AUTO: 31.9 % (ref 36.5–49.3)
HGB BLD-MCNC: 10.6 G/DL (ref 12–17)
MCH RBC QN AUTO: 32.5 PG (ref 26.8–34.3)
MCHC RBC AUTO-ENTMCNC: 33.2 G/DL (ref 31.4–37.4)
MCV RBC AUTO: 98 FL (ref 82–98)
PLATELET # BLD AUTO: 386 THOUSANDS/UL (ref 149–390)
PMV BLD AUTO: 8.3 FL (ref 8.9–12.7)
POTASSIUM SERPL-SCNC: 4.2 MMOL/L (ref 3.5–5.3)
RBC # BLD AUTO: 3.26 MILLION/UL (ref 3.88–5.62)
SODIUM SERPL-SCNC: 132 MMOL/L (ref 135–147)
WBC # BLD AUTO: 9.13 THOUSAND/UL (ref 4.31–10.16)

## 2024-07-10 PROCEDURE — 80048 BASIC METABOLIC PNL TOTAL CA: CPT

## 2024-07-10 PROCEDURE — 99231 SBSQ HOSP IP/OBS SF/LOW 25: CPT | Performed by: PHYSICIAN ASSISTANT

## 2024-07-10 PROCEDURE — 97535 SELF CARE MNGMENT TRAINING: CPT

## 2024-07-10 PROCEDURE — 97530 THERAPEUTIC ACTIVITIES: CPT

## 2024-07-10 PROCEDURE — 99233 SBSQ HOSP IP/OBS HIGH 50: CPT

## 2024-07-10 PROCEDURE — 92523 SPEECH SOUND LANG COMPREHEN: CPT

## 2024-07-10 PROCEDURE — 97110 THERAPEUTIC EXERCISES: CPT

## 2024-07-10 PROCEDURE — 97163 PT EVAL HIGH COMPLEX 45 MIN: CPT

## 2024-07-10 PROCEDURE — 82948 REAGENT STRIP/BLOOD GLUCOSE: CPT

## 2024-07-10 PROCEDURE — 99024 POSTOP FOLLOW-UP VISIT: CPT | Performed by: PHYSICIAN ASSISTANT

## 2024-07-10 PROCEDURE — 97167 OT EVAL HIGH COMPLEX 60 MIN: CPT

## 2024-07-10 PROCEDURE — 92610 EVALUATE SWALLOWING FUNCTION: CPT

## 2024-07-10 PROCEDURE — 85027 COMPLETE CBC AUTOMATED: CPT

## 2024-07-10 RX ORDER — CEFADROXIL 500 MG/1
1000 CAPSULE ORAL EVERY 12 HOURS SCHEDULED
Status: COMPLETED | OUTPATIENT
Start: 2024-07-10 | End: 2024-07-15

## 2024-07-10 RX ADMIN — ACETAMINOPHEN 975 MG: 325 TABLET, FILM COATED ORAL at 13:17

## 2024-07-10 RX ADMIN — PANTOPRAZOLE SODIUM 40 MG: 40 TABLET, DELAYED RELEASE ORAL at 05:51

## 2024-07-10 RX ADMIN — CHLORHEXIDINE GLUCONATE 15 ML: 1.2 SOLUTION ORAL at 08:31

## 2024-07-10 RX ADMIN — ASPIRIN 81 MG: 81 TABLET, COATED ORAL at 08:30

## 2024-07-10 RX ADMIN — Medication 6 MG: at 21:17

## 2024-07-10 RX ADMIN — METOPROLOL TARTRATE 50 MG: 50 TABLET, FILM COATED ORAL at 21:17

## 2024-07-10 RX ADMIN — LIDOCAINE 2 PATCH: 700 PATCH TOPICAL at 21:17

## 2024-07-10 RX ADMIN — METOPROLOL TARTRATE 50 MG: 50 TABLET, FILM COATED ORAL at 08:30

## 2024-07-10 RX ADMIN — ATORVASTATIN CALCIUM 80 MG: 80 TABLET, FILM COATED ORAL at 17:00

## 2024-07-10 RX ADMIN — POTASSIUM CHLORIDE 20 MEQ: 1500 TABLET, EXTENDED RELEASE ORAL at 08:31

## 2024-07-10 RX ADMIN — HEPARIN SODIUM 5000 UNITS: 5000 INJECTION INTRAVENOUS; SUBCUTANEOUS at 13:17

## 2024-07-10 RX ADMIN — HEPARIN SODIUM 5000 UNITS: 5000 INJECTION INTRAVENOUS; SUBCUTANEOUS at 21:17

## 2024-07-10 RX ADMIN — ACETAMINOPHEN 975 MG: 325 TABLET, FILM COATED ORAL at 08:30

## 2024-07-10 RX ADMIN — TICAGRELOR 90 MG: 90 TABLET ORAL at 08:31

## 2024-07-10 RX ADMIN — TICAGRELOR 90 MG: 90 TABLET ORAL at 21:21

## 2024-07-10 RX ADMIN — ACETAMINOPHEN 975 MG: 325 TABLET, FILM COATED ORAL at 21:17

## 2024-07-10 RX ADMIN — TORSEMIDE 20 MG: 20 TABLET ORAL at 08:30

## 2024-07-10 RX ADMIN — HEPARIN SODIUM 5000 UNITS: 5000 INJECTION INTRAVENOUS; SUBCUTANEOUS at 05:51

## 2024-07-10 RX ADMIN — CEFADROXIL 1000 MG: 500 CAPSULE ORAL at 21:21

## 2024-07-10 RX ADMIN — POTASSIUM CHLORIDE 20 MEQ: 1500 TABLET, EXTENDED RELEASE ORAL at 17:00

## 2024-07-10 RX ADMIN — CHLORHEXIDINE GLUCONATE 15 ML: 1.2 SOLUTION ORAL at 17:00

## 2024-07-10 NOTE — APP STUDENT NOTE
YANIQUE STUDENT  Inpatient Progress Note for TRAINING ONLY  Not Part of Legal Medical Record     Progress Note - Shane Chau Sr. 78 y.o. male MRN: [id number]  Unit/Bed#: -01 Encounter: [id number]      CVA  Assessment and Plan  - While on the floor on 7/3/24, patient developed acute dysarthria, L facial droop, L-sided weakness. Hx of TIA  - CT did not show acute changes. Chronic lacunar infarcts.   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  - Hx CAD with PCI/stents  - Multivessel CAD - s/p CABG x 3 on 6/20/24  - Monitor incisions for wound dehiscence, infection, etc  - Continue postoperative amiodarone per surgical protocol with plans to discontinue outpatient  - Continue ASA and Lipitor    Fluid overload  - Diuresis started s/p CABG on 6/21/24  - Monitor I/O, BP, BMP and kidney function intermittently  - Pt noted to have b/l LE edema 7/10/24, continues to have good output  - Continue oral demadex  - Monitor for s/s of fluid overload and discontinue diuretic when no longer deemed necessary    Hypertension  - Continue lopressor  - Maintain hold on ACE inhibitor/ARB secondary to renal function?  - Monitor BP     Diabetes Mellitus?  - Continue SA and LA insulin  - Monitor blood sugar TID  - Continue diabetic diet    History of ileus  Assessment & Plan  Postoperative ileus, managed by general surgery   Required NGT placement for decompression, removed on 6/28  Resolved, tolerating regular diet and having regular BMs    CKD (chronic  kidney disease) stage 2, GFR 60-89 ml/min  Assessment & Plan  No documented diagnosis of CKD however per chart review, baseline creatinine approximately 0.9-1.2 with GFR 60-90  Noted with postoperative BARBARA with creatinine peak of 2.2 in the setting of iatrogenic volume overload  Initiated on Demadex 20 mg daily, and continued on this for now   Monitor BMP intermittently     Hyponatremia  - Mild and in lower 130s for past few days  - Monitor with intermittent BMPs    VTE Pharmacologic Prophylaxis:   Pharmacologic: Enoxaparin (Lovenox)  Mechanical VTE Prophylaxis in Place: Yes?    Patient Centered Rounds: I have performed bedside rounds with nursing staff today.    Discussions with Specialists or Other Care Team Provider:     Education and Discussions with Family / Patient:     Time Spent for Care: 30 minutes.  More than 50% of total time spent on counseling and coordination of care as described above.    Current Length of Stay: 1 day(s)    Current Patient Status: Inpatient Rehab   Certification Statement: The patient will continue to require additional inpatient hospital stay due to rehab    Discharge Plan: Community setting with out patient and home health PT and OT    Code Status: Level 1 - Full Code    Subjective:   Pt is a 77 y/o M with a PMH of TIA, CAD with previous stent/PCI, HTN, CKD II, presents for acute rehab. Pt was admitted on 24 for CABG x 3. Pt developed ileus, NG tube was placed and removed with resolution of symptoms. On 7/3/24, patient developed stroke like symptoms: L sided weakness, facial droop, and dysarthria. CT showed no acute changes. MRI showed acute infarcts in different areas of brain. Pt was already on Plavix and ASA. Neuro switched pt to Brilinta and ASA. Cardiology ordered Zio patch after discharge to look for a.fib. Patient was admitted to acute rehab on 24. Expected LOS 14 days.    Objective:     Vitals:   Temp (24hrs), Av.7 °F (36.5 °C), Min:97.2 °F (36.2 °C), Max:98.1  °F (36.7 °C)    Temp:  [97.2 °F (36.2 °C)-98.1 °F (36.7 °C)] 98.1 °F (36.7 °C)  HR:  [73-84] 83  Resp:  [16-19] 17  BP: (120-147)/(65-77) 120/73  SpO2:  [95 %] 95 %  Body mass index is 27.18 kg/m².     Input and Output Summary (last 24 hours):       Intake/Output Summary (Last 24 hours) at 7/10/2024 1208  Last data filed at 7/10/2024 0841  Gross per 24 hour   Intake 440 ml   Output 700 ml   Net -260 ml       Physical Exam:     Physical Exam  Vitals and nursing note reviewed.   Constitutional:       General: He is not in acute distress.     Appearance: He is well-developed.   HENT:      Head: Normocephalic and atraumatic.   Eyes:      Extraocular Movements: Extraocular movements intact.      Conjunctiva/sclera: Conjunctivae normal.   Cardiovascular:      Rate and Rhythm: Normal rate and regular rhythm.      Heart sounds: No murmur heard.  Pulmonary:      Effort: Pulmonary effort is normal. No respiratory distress.      Breath sounds: Normal breath sounds. No wheezing, rhonchi or rales.   Abdominal:      Palpations: Abdomen is soft.      Tenderness: There is no abdominal tenderness.      Comments: Abdominal binder in place   Musculoskeletal:         General: No swelling.      Cervical back: Neck supple.      Right lower leg: Edema present.      Left lower leg: Edema present.   Skin:     General: Skin is warm and dry.      Capillary Refill: Capillary refill takes less than 2 seconds.   Neurological:      Mental Status: He is alert.   Psychiatric:         Mood and Affect: Mood normal.       ( *** Be Sure to Include Physical Exam: Delete this entire line when you have entered your exam)    Historical Information   Past Medical History:   Diagnosis Date    AAA (abdominal aortic aneurysm) (HCC)     s/p open repair    BPH (benign prostatic hyperplasia)     CAD (coronary artery disease)     Cholelithiasis     Former tobacco use     History of MI (myocardial infarction)     History of TIA (transient ischemic attack)      Hyperlipidemia     Hypertension     Insomnia     Obesity      Past Surgical History:   Procedure Laterality Date    ABDOMINAL AORTIC ANEURYSM REPAIR  06/07/2011    Dr. Yarbrough 6/7/11    APPENDECTOMY      CARDIAC CATHETERIZATION Left 06/03/2024    Procedure: Cardiac Left Heart Cath;  Surgeon: Pk Blanco DO;  [facility];  Service: Cardiology    CARDIAC CATHETERIZATION  06/03/2024    Procedure: Cardiac catheterization;  Surgeon: Pk Blanco DO;  [facility];  Service: Cardiology    CARDIAC CATHETERIZATION N/A 06/03/2024    Procedure: Cardiac Coronary Angiogram;  Surgeon: Pk Blanco DO;  [facility];  Service: Cardiology    CATARACT EXTRACTION Right 11/2018    CHOLECYSTECTOMY LAPAROSCOPIC      COLONOSCOPY  11/2006    CORONARY ANGIOPLASTY      3 STENTS INSERTED    HARVEST VEIN Left 6/20/2024    Procedure: HARVEST VEIN ENDOSCOPIC (EVH);  Surgeon: KALPANA Amos MD;  Location: [facility];  Service: Cardiac Surgery    HI CORONARY ARTERY BYP W/VEIN & ARTERY GRAFT 3 VEIN N/A 6/20/2024    Procedure: CORONARY ARTERY BYPASS GRAFT (CABG) X3 VESSELS, LIMA - LAD, LEFT LEG EVH/SVG - PDA AND OM1, W/ DEDRICK;  Surgeon: KALPANA Amos MD;  Location: [facility];  Service: Cardiac Surgery    HI LAPS SURG CHOLECYSTECTOMY W/CHOLANGIOGRAPHY N/A 09/08/2016    Procedure: CHOLECYSTECTOMY LAPAROSCOPIC with intra-op cholangiogram ;  Surgeon: Prasanth Verdin MD;  Location: [facility];  Service: General     Social History   Social History     Substance and Sexual Activity   Alcohol Use Not Currently    Comment: 4 oz glass of wine every other day w/ his dinner     Social History     Substance and Sexual Activity   Drug Use No     Social History     Tobacco Use   Smoking Status Former    Current packs/day: 0.00    Average packs/day: 0.5 packs/day for 30.0 years (15.0 ttl pk-yrs)    Types: Cigarettes    Start date: 3/10/1956    Quit date: 3/10/1986     Years since quittin.3   Smokeless Tobacco Never   Tobacco Comments    Quit  about 25 years x1ppd     Family History:   Family History   Problem Relation Age of Onset    Heart disease Mother     Hypertension Mother         Benign Essential     Stroke Mother         Stroke     Heart disease Father     Coronary artery disease Brother     Diabetes Brother         mellitus     Heart disease Brother     Aortic aneurysm Brother     Coronary artery disease Brother     Sudden death Brother     Coronary artery disease Brother     Sudden death Brother     Sudden death Brother     Coronary artery disease Brother     Heart disease Family        Meds/Allergies   all medications and allergies reviewed  No Known Allergies    Additional Data:     Labs:    Results from last 7 days   Lab Units 24  0847   WBC Thousand/uL 12.88*   HEMOGLOBIN g/dL 11.3*   HEMATOCRIT % 34.3*   PLATELETS Thousands/uL 416*     Results from last 7 days   Lab Units 24  0458   SODIUM mmol/L 133*   POTASSIUM mmol/L 3.8   CHLORIDE mmol/L 100   CO2 mmol/L 24   BUN mg/dL 23   CREATININE mg/dL 1.05   ANION GAP mmol/L 9   CALCIUM mg/dL 7.8*   GLUCOSE RANDOM mg/dL 91         Results from last 7 days   Lab Units 07/10/24  1052 24  2103 24  1553 24  1026 24  0602 24  2056 24  1534 24  1040 24  0525 24  2107 24  1522 24  1033   POC GLUCOSE mg/dl 121 107 107 106 88 113 118 107 90 105 117 114     Results from last 7 days   Lab Units 24  0438   HEMOGLOBIN A1C % 5.7*             * I Have Reviewed All Lab Data Listed Above.  * Additional Pertinent Lab Tests Reviewed: All Labs For Current Hospital Admission Reviewed    Imaging:    Imaging Reports Reviewed Today Include:   Imaging Personally Reviewed by Myself Includes:      Recent Cultures (last 7 days):           Last 24 Hours Medication List:   Current Facility-Administered Medications   Medication Dose Route Frequency Provider  Last Rate    acetaminophen  975 mg Oral Q6H While awake SANTHOSH Cunningham      aspirin  81 mg Oral Daily SANTHOSH Cunningham      atorvastatin  80 mg Oral After Dinner SANTHOSH Cunningham      bisacodyl  10 mg Rectal Daily PRN Dandre Stoner MD      chlorhexidine  15 mL Mouth/Throat BID SANTHOSH Cunningham      heparin (porcine)  5,000 Units Subcutaneous Q8H KRISTI Mckenzie Oliver, SANTHOSH      insulin lispro  1-5 Units Subcutaneous TID AC Mckenzie Oliver, SANTHOSH      insulin lispro  1-5 Units Subcutaneous HS SANTHOSH Cunningham      lidocaine  2 patch Topical Daily SANTHOSH Cunningham      melatonin  6 mg Oral HS Mckenzie Oliver, SANTHOSH      metoprolol tartrate  50 mg Oral Q12H Count includes the [facility] SANTHOSH Cunningham      ondansetron  4 mg Oral Q6H PRN SANTHOSH Cunningham      pantoprazole  40 mg Oral Early Morning SANTHOSH Cunningham      polyethylene glycol  17 g Oral Daily PRN Dandre Stoner MD      potassium chloride  20 mEq Oral BID SANTHOSH Cunningham      ticagrelor  90 mg Oral Q12H Count includes the [facility] Mckenzie Oliver, SANTHOSH      torsemide  20 mg Oral Daily SANTHOSH Cunningham          Today, Patient Was Seen By: Mita Clarke    ** Please Note: Dictation voice to text software may have been used in the creation of this document. **

## 2024-07-10 NOTE — UTILIZATION REVIEW
NOTIFICATION OF ADMISSION DISCHARGE   This is a Notification of Discharge from [facility]. Please be advised that this patient has been discharge from our facility. Below you will find the admission and discharge date and time including the patient’s disposition.   UTILIZATION REVIEW CONTACT:  Sang Smith  Utilization   Network Utilization Review Department  Phone: [phone] x carefully listen to the prompts. All voicemails are confidential.  Email: [email or web address]     ADMISSION INFORMATION  PRESENTATION DATE: 6/20/2024  5:21 AM  OBERVATION ADMISSION DATE: N/A  INPATIENT ADMISSION DATE: 6/20/24  8:25 AM   DISCHARGE DATE: 7/9/2024  1:30 PM   DISPOSITION:[facility]    Network Utilization Review Department  ATTENTION: Please call with any questions or concerns to [phone] and carefully listen to the prompts so that you are directed to the right person. All voicemails are confidential.   For Discharge needs, contact Care Management DC Support Team at [phone] opt. [phone]  Send all requests for admission clinical reviews, approved or denied determinations and any other requests to dedicated fax number below belonging to the campus where the patient is receiving treatment. List of dedicated fax numbers for the Facilities:  FACILITY NAME UR FAX NUMBER   ADMISSION DENIALS (Administrative/Medical Necessity) [phone]   DISCHARGE SUPPORT TEAM ([facility]) [phone]   PARENT CHILD HEALTH (Maternity/NICU/Pediatrics) [phone]   [facility] [phone]   [facility] [phone]   [facility] [phone]   [facility] [phone]   On license of [facility] [phone]   [facility] [phone]   General acute hospital [phone]   [facility] [phone]   [facility]  [facility] [phone]   Critical access hospital [phone]   [facility] [phone]   [facility] [phone]

## 2024-07-10 NOTE — ASSESSMENT & PLAN NOTE
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

## 2024-07-10 NOTE — ASSESSMENT & PLAN NOTE
Hx CAD with PCI/stents; now status post CABG x 3 on 6/20/24  Intraoperative DEDRICK demonstrates preserved systolic function, with LVEF 55%   Currently on p.o. diuretic due to postop volume overload  Also on postop course of phylactic amiodarone  Continue aspirin, statin, beta-blocker  Monitor incisions, sternal precautions

## 2024-07-10 NOTE — ASSESSMENT & PLAN NOTE
Consult(ed) IM and comanagement with their service during ARC course   Monitor H/H, vitals, signs/symptoms of acute bleeding  Hb stable at 10.6 on 7/10

## 2024-07-10 NOTE — ASSESSMENT & PLAN NOTE
No history of anemia, baseline hemoglobin 13. Noted with postop anemia following CABG.  Remains asymptomatic and hemodynamically stable at this time   Monitor CBC intermittently and transfuse as needed for hemoglobin < 8

## 2024-07-10 NOTE — CONSULTS
Consultation - Cardiothoracic Surgery   Shane Chau Sr. 78 y.o. male MRN: [id number]  Unit/Bed#: [facility], [place] 973-01 Encounter: [id number]      History of Present Illness   Physician Requesting Consult: Tomeka Pino MD  Reason for Consult / Principal Problem: lower sternal drainage  HPI: Shane Chau Sr. is a 78 y.o. year old male who presents with lower sternal drainage. Patient underwent elective outpatient CABGx3 (LIMA to LAD, L SVG to PDA & OM1) on 6/20/2024 w/ Dr. Amos. Patient's post-operative course was complicated by prolonged oxygen wean but on RA PTD, BARBARA w/ Cr normalized by discharge, ileus requiring NGT, post-op CVA on POD #13 (prior history of multiple TIAs, came in on antiplatlet therapy & discharged on this as well) & hyponatremia likely from dilution & stable by discharge. The patient was discharged to [facility] 7/9/2024.    Contacted by primary team at [facility], [place] today due to increase in sternal drainage. No fevers recorded, no CBC today & last on 7/5/2024. Per RN the patient had his wounds open to air w/o issues until this PM when she did a wound check, noted to have murky drainage from inferior sternum (photo shown from her EPIC chat thread & shared w/ surgeon) & was bathed this afternoon but does not have standing orders for showering or wound care etc since his arrival. Per the patient he is feeling well, no cardiac/pulmonary complaints. States he has had a good appetite as far as he can tell, tells me he is eating >50% meals.    Inpatient consult to Cardiac Surgery  Consult performed by: Mercedes Baker PA-C  Consult ordered by: Dandre Stoner MD          Review of Systems   Constitutional: Negative.  Negative for activity change, diaphoresis, fatigue and unexpected weight change.   HENT: Negative.     Respiratory: Negative.  Negative for chest tightness, shortness of breath and wheezing.    Cardiovascular: Negative.  Negative for chest pain, palpitations and leg swelling.    Gastrointestinal: Negative.  Negative for blood in stool, constipation, diarrhea, nausea and vomiting.   Genitourinary: Negative.    Musculoskeletal: Negative.  Negative for arthralgias, back pain, gait problem and myalgias.   Skin:  Positive for wound.   Neurological: Negative.  Negative for dizziness, syncope, weakness, light-headedness, numbness and headaches.   Hematological: Negative.  Does not bruise/bleed easily.   All other systems reviewed and are negative.      Historical Information   Past Medical History:   Diagnosis Date    AAA (abdominal aortic aneurysm) (HCC)     s/p open repair    BPH (benign prostatic hyperplasia)     CAD (coronary artery disease)     Cholelithiasis     Former tobacco use     History of MI (myocardial infarction)     History of TIA (transient ischemic attack)     Hyperlipidemia     Hypertension     Insomnia     Obesity      Past Surgical History:   Procedure Laterality Date    ABDOMINAL AORTIC ANEURYSM REPAIR  06/07/2011    Dr. Yarbrough 6/7/11    APPENDECTOMY      CARDIAC CATHETERIZATION Left 06/03/2024    Procedure: Cardiac Left Heart Cath;  Surgeon: Pk Blanco DO;  Location: [facility];  Service: Cardiology    CARDIAC CATHETERIZATION  06/03/2024    Procedure: Cardiac catheterization;  Surgeon: Pk Blanco DO;  Location: [facility];  Service: Cardiology    CARDIAC CATHETERIZATION N/A 06/03/2024    Procedure: Cardiac Coronary Angiogram;  Surgeon: Pk Blanco DO;  Location: [facility];  Service: Cardiology    CATARACT EXTRACTION Right 11/2018    CHOLECYSTECTOMY LAPAROSCOPIC      COLONOSCOPY  11/2006    CORONARY ANGIOPLASTY      3 STENTS INSERTED    HARVEST VEIN Left 6/20/2024    Procedure: HARVEST VEIN ENDOSCOPIC (EVH);  Surgeon: KALPANA Amos MD;  Location: [facility];  Service: Cardiac Surgery    MD CORONARY ARTERY BYP W/VEIN & ARTERY GRAFT 3 VEIN N/A 6/20/2024    Procedure: CORONARY ARTERY BYPASS GRAFT (CABG) X3  VESSELS, LIMA - LAD, LEFT LEG EVH/SVG - PDA AND OM1, W/ DEDRICK;  Surgeon: KALPANA Amos MD;  Location: [facility];  Service: Cardiac Surgery    MT LAPS SURG CHOLECYSTECTOMY W/CHOLANGIOGRAPHY N/A 2016    Procedure: CHOLECYSTECTOMY LAPAROSCOPIC with intra-op cholangiogram ;  Surgeon: Prasanth Verdin MD;  Location: [facility];  Service: General     Social History     Substance and Sexual Activity   Alcohol Use Not Currently    Comment: 4 oz glass of wine every other day w/ his dinner     Social History     Substance and Sexual Activity   Drug Use No     Social History     Tobacco Use   Smoking Status Former    Current packs/day: 0.00    Average packs/day: 0.5 packs/day for 30.0 years (15.0 ttl pk-yrs)    Types: Cigarettes    Start date: 3/10/1956    Quit date: 3/10/1986    Years since quittin.3   Smokeless Tobacco Never   Tobacco Comments    Quit  about 25 years x1ppd     Family History: non-contributory    Meds/Allergies   all current active meds have been reviewed, current meds:   Current Facility-Administered Medications   Medication Dose Route Frequency    acetaminophen (TYLENOL) tablet 975 mg  975 mg Oral Q6H While awake    aspirin (ECOTRIN LOW STRENGTH) EC tablet 81 mg  81 mg Oral Daily    atorvastatin (LIPITOR) tablet 80 mg  80 mg Oral After Dinner    bisacodyl (DULCOLAX) rectal suppository 10 mg  10 mg Rectal Daily PRN    cefadroxil (DURICEF) capsule 1,000 mg  1,000 mg Oral Q12H KRISTI    chlorhexidine (PERIDEX) 0.12 % oral rinse 15 mL  15 mL Mouth/Throat BID    heparin (porcine) subcutaneous injection 5,000 Units  5,000 Units Subcutaneous Q8H KRISTI    lidocaine (LIDODERM) 5 % patch 2 patch  2 patch Topical Daily    melatonin tablet 6 mg  6 mg Oral HS    metoprolol tartrate (LOPRESSOR) tablet 50 mg  50 mg Oral Q12H KRISTI    ondansetron (ZOFRAN-ODT) dispersible tablet 4 mg  4 mg Oral Q6H PRN    pantoprazole (PROTONIX) EC tablet 40 mg  40 mg Oral Early Morning    polyethylene glycol (MIRALAX)  "packet 17 g  17 g Oral Daily PRN    potassium chloride (Klor-Con M20) CR tablet 20 mEq  20 mEq Oral BID    ticagrelor (BRILINTA) tablet 90 mg  90 mg Oral Q12H KRISTI    torsemide (DEMADEX) tablet 20 mg  20 mg Oral Daily   , and PTA meds:   Prior to Admission Medications   Prescriptions Last Dose Informant Patient Reported? Taking?   acetaminophen (TYLENOL) 325 mg tablet   No No   Sig: Take 2 tablets (650 mg total) by mouth every 6 (six) hours as needed for mild pain, headaches or fever   aspirin (ECOTRIN LOW STRENGTH) 81 mg EC tablet   No No   Sig: Take 1 tablet (81 mg total) by mouth daily   melatonin 3 mg   No No   Sig: Take 2 tablets (6 mg total) by mouth daily at bedtime   metoprolol tartrate (LOPRESSOR) 50 mg tablet  Self No No   Sig: Take 1 tablet (50 mg total) by mouth 2 (two) times a day   pantoprazole (PROTONIX) 40 mg tablet   No No   Sig: Take 1 tablet (40 mg total) by mouth daily in the early morning Take while on both aspirin and brilinta   potassium chloride (Klor-Con M20) 20 mEq tablet   No No   Sig: Take 1 tablet (20 mEq total) by mouth daily   rosuvastatin (CRESTOR) 40 MG tablet  Self No No   Sig: Take 1 tablet (40 mg total) by mouth daily   senna-docusate sodium (SENOKOT S) 8.6-50 mg per tablet   No No   Sig: Take 1 tablet by mouth 2 (two) times a day as needed for constipation (1st line agent)   tamsulosin (FLOMAX) 0.4 mg  Self No No   Sig: Take 1 capsule by mouth daily with dinner   ticagrelor (BRILINTA) 90 MG   No No   Sig: Take 1 tablet (90 mg total) by mouth every 12 (twelve) hours   torsemide (DEMADEX) 20 mg tablet   No No   Sig: Take 1 tablet (20 mg total) by mouth daily      Facility-Administered Medications: None     No Known Allergies    Objective   Vitals: Blood pressure 120/76, pulse 72, temperature 98 °F (36.7 °C), temperature source Oral, resp. rate 16, height 5' 5\" (1.651 m), weight 74.1 kg (163 lb 5.8 oz), SpO2 96%.    Invasive Devices       Peripheral Intravenous Line  Duration         "     Peripheral IV 07/07/24 Right;Ventral (anterior) Forearm 3 days                    Physical Exam  Constitutional:       General: He is not in acute distress.     Appearance: Normal appearance. He is well-developed. He is not ill-appearing or toxic-appearing.      Comments: Sitting in recliner in NAD   HENT:      Head: Normocephalic and atraumatic.      Mouth/Throat:      Mouth: Oropharynx is clear and moist and mucous membranes are normal.      Dentition: Normal dentition. Does not have dentures.      Pharynx: Uvula midline.   Neck:      Vascular: No carotid bruit or JVD.      Trachea: Trachea normal.   Cardiovascular:      Rate and Rhythm: Normal rate and regular rhythm.   Pulmonary:      Effort: Pulmonary effort is normal.      Breath sounds: Normal breath sounds.      Comments: On RA  Abdominal:      General: There is no distension.      Palpations: Abdomen is not rigid. There is no mass.      Tenderness: There is no abdominal tenderness. There is no guarding.   Musculoskeletal:      Cervical back: Normal range of motion and neck supple.   Skin:     General: Skin is warm, dry and intact.      Findings: No bruising, petechiae or rash.      Nails: There is no clubbing.      Comments: Trace edema b/l LE, no varicosities appreciated to b/l LE, EVH incision healing w/ eschar noted & no reactive or infective erythema    Inferior sternal incision dressing remove --> fibrinous maternal noted, eschar noted to more superior portions, no gross purulent drainage expressed w/ manipulation, no reactive or infective erythema, small pinpoint opening to inferior sternal incision noted which was unable to be probed suggesting no wound tracking   Neurological:      Mental Status: He is alert and oriented to person, place, and time.      Cranial Nerves: No cranial nerve deficit.      Sensory: No sensory deficit.      Comments: No focal deficit appreciated   Psychiatric:         Mood and Affect: Mood and affect normal.         Lab  Results:   Results from last 7 days   Lab Units 07/05/24  0847   WBC Thousand/uL 12.88*   HEMOGLOBIN g/dL 11.3*   HEMATOCRIT % 34.3*   PLATELETS Thousands/uL 416*     Results from last 7 days   Lab Units 07/09/24  0458 07/08/24  0315 07/07/24  0547   POTASSIUM mmol/L 3.8 3.6 3.8   CHLORIDE mmol/L 100 99 100   CO2 mmol/L 24 23 23   BUN mg/dL 23 24 24   CREATININE mg/dL 1.05 1.08 1.10   CALCIUM mg/dL 7.8* 7.6* 7.3*         Lab Results   Component Value Date    HGBA1C 5.7 (H) 07/04/2024     Lab Results   Component Value Date    TROPONINI <0.02 12/03/2015       Imaging Studies: I have personally reviewed pertinent reports.   and I have personally reviewed pertinent films in PACS  EKG, Pathology, and Other Studies: I have personally reviewed pertinent reports.   and I have personally reviewed pertinent films in PACS      Assessment:  Patient Active Problem List    Diagnosis Date Noted    Volume overload 07/10/2024    Postoperative anemia due to acute blood loss 07/10/2024    At risk for venous thromboembolism (VTE) 07/10/2024    Stenosis of left vertebral artery 07/10/2024    Prediabetes 07/10/2024    Oral phase dysphagia 07/10/2024    Hyponatremia 07/09/2024    CVA (cerebral vascular accident) ([facility]) 07/09/2024    PAD (peripheral artery disease) ([facility]) 07/09/2024    History of ileus 07/09/2024    Stroke-like symptoms 07/03/2024    CKD (chronic kidney disease) stage 2, GFR 60-89 ml/min 06/24/2024    S/P CABG x 3 06/20/2024    Rib pain 09/08/2023    Paresthesia 06/21/2023    Leg swelling 05/10/2022    Erectile dysfunction 03/22/2022    Microhematuria 11/14/2018    Need for pneumococcal vaccination 08/06/2018    Hematuria 08/06/2018    Asymptomatic bilateral carotid artery stenosis 08/28/2017    Closed compression fracture of first lumbar vertebra ([facility]) 04/18/2017    Abdominal aortic aneurysm without rupture ([facility]) 03/14/2016    Localized primary osteoarthritis of lower leg, unspecified laterality 08/07/2013    Aneurysm of  iliac artery (HCC) 06/11/2013    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 Incision cleansed, steri-strips placed, beta-dined to clean & clean 4x4 placed. Wound care consult pending. CBC pending, no fever. Per surgeon given patient's complicated post-operative course which included minimal nutrition while recovering from post-operative ileus we have ordered Ensure supplementation TID  w/ meals (no h/o DM2) as well as prophylactic Duricef x5 days. Cardiac surgery will continue w/ routine wound checks/dressing changes to monitor wound progression.    Case discussed with Dr. Ravin Amos M.D..    The patient was comfortable with our recommendations, and their questions were answered to their satisfaction.  We will continue to evaluate the patient daily.Thank you for allowing us to participate in the care of this patient.       Mercedes Baker PA-C  3:53 PM  07/10/24

## 2024-07-10 NOTE — ASSESSMENT & PLAN NOTE
Improving   Pt afebrile without leukocytosis or abdominal pain  No recent bowel meds; did have hx of ileus; is eating ok  Monitor for now  If concerns for infectious diarrhea in future obtain C diff or other studies  Ensure adequate hydration

## 2024-07-10 NOTE — ASSESSMENT & PLAN NOTE
Stroke alert called on 7/3/24 due to dysarthria, left sided weakness, left tongue deviation, left facial droop, and left pronator drift. Symptoms have since improved.  MRI brain 7/6/2024 showed: Punctate acute to subacute infarcts involving the right caudate head, anterior centrum semiovale, and lateral right frontal cortex. Chronic right corona radiata/basal ganglia and bilateral cerebellar lacunar infarcts. Mild chronic microangiopathic ischemic changes..   Neurology evaluated, recommendations appreciated  Distribution of the strokes found on MRI is suspicious for an embolic source -> plan for further cardiac work up with Zio patch/ILR as outpatient   If arrhythmia is found on work up recommend switching from DAPT to NOAC and ASA  P2Y12 level checked during this hospitalization was 180 -> switched from Plavix to Brilinta   Will need price check for Brilinta   Continue with ASA and statin

## 2024-07-10 NOTE — PROGRESS NOTES
SLP COGNITION AND DYSPHAGIA EVALUATION     07/10/24 1200   Pain Assessment   Pain Assessment Tool 0-10   Pain Score No Pain   Restrictions/Precautions   Precautions Bed/chair alarms;Cardiac/sternal;Cognitive;Fall Risk;Supervision on toilet/commode   Cognitive Linguisitic Assessments   The [employer] Mental Status ([facility]) Pt completed the [facility] cognitive assessment. Pt total score was 15 which as compared to those with high school education correlates with severe neurocognitive disorder and is indicative of dementia. Breakdown of scores as follows:    Orientation: 2/3  Functional problem solving with math: 3/3  Divergent naming (timed 1 minute- animals): 1/3 (7 animals total)  Word recall: 2/5  Mental flexibility with number order: 1/2  Clock Drawin/4   Following directions given shapes: 2/2  Story recall comprehension questions: 8    Total score: 15/30    Based on score, patient will benefit from further skilled SLP services to maximize overall cognitive lingusitic communication abilities for increased independence and decreased burden of care.      Comprehension   Assist Devices Glasses  (reading glasses as needed)   Auditory Basic;Complex   Visual Basic;Complex   Findings Please see SLP tx note for further information.   QI: Comprehension 4. Undestands: Clear comprehension without cues or repetitions   Comprehension (FIM) 5 - Needs help/cues, repetition only RARELY ( < 10% of the time)   Expression   Verbal Basic;Complex   Non-Verbal Basic;Complex   Intelligibility Sentence   Findings Please see SLP tx note for further information.   QI: Expression 4. Express complex messages without difficulty and with speech that is clear and easy to understan   Expression (FIM) 5 - Needs help/cues only RARELY (< 10% of the time)   Social Interaction   Cooperation with staff;with family   Participation Individual   Behaviors observed Appropriate   Findings Please see SLP tx note for further information.    Social Interaction (FIM) 5 - Interacts appropriately with others 90% of time   Problem Solving   Complex Manages medications   Routine Manages call bell;Manges precautions;Manages ADL   Findings Please see SLP tx note for further information.   Problem solving (FIM) 3 - Solves basic problmes 50-74% of time   Memory   Recognize People Yes   Remember Routine Yes   Initiates Tasks Yes   Short-Term Impaired   Long Term Impaired   Recalls Precaution Yes   Findings Please see SLP tx note for further information.   Memory (FIM) 2 - Recognizes, recalls/performs 25-49%   Speech/Language/Cognition Assessment   Treatment Assessment Pt was awake and alert upon arrival of SLP for skilled ST evaluation assessing current cognitive linguistic skills. Pt demonstrated orientation x4 to month, year, current location and reason for hospitalization. During evaluation, pt was observed to appropriately ask for help such as asking to use the bathroom and following verbal and gestural cues by SLP when directing to and from bathroom. Pt reports he was I prior to admission w/ finances by his wife has begun assisting with the cooking, cleaning, laundry, driving and making appointments.  Previously, he would do online baking and use automatic payments. He notes his wife has begun to take over this and organize all his bills. Pt reports he previously took care of all 7 of his medications and used a pill box. Pt reports he is  to Maryjo for 34 years and noted that he has 2 children-Jaret who lives in [place] and Obdulio Mena who lives in [place]. Pt reports he does not feel he has any cognitive deficits since entering the hospital and reports he uses glasses to read and his right handed. The SLUMs was completed  and the pt had a total score of 15 which compared to those with high school education correlates with severe neurocognitive disorder and is indicative of dementia. Compared to the formalized testing and informal analysis, pt noted  deficits with working memory, immediate memory, STM, LTM, remote memory, reasoning, sequencing, problem solving, executive function skills, attention and word recall. Additionally, skilled ST can assist w/ ADL such as medication management using cognitive skills necessary to complete. At this time, pt continues to benefit from ongoing skilled SLP services to maximize overall cognitive linguistic skills in attempts to decrease caregiver burden over time.   Eating   Type of Assistance Needed Set-up / clean-up;Verbal cues   Physical Assistance Level No physical assistance   Eating CARE Score 4   Swallow Assessment   Swallow Treatment Assessment   Bedside Dysphagia Evaluation      Patient Name: Shane Chau Sr.    Today's Date: 7/10/2024     Problem List  Principal Problem:    CVA (cerebral vascular accident) ([facility])  Active Problems:    Abdominal aortic aneurysm without rupture ([facility])    Coronary artery disease of native artery of native heart with stable angina pectoris ([facility])    Primary hypertension    CKD (chronic kidney disease) stage 2, GFR 60-89 ml/min    Hyponatremia    PAD (peripheral artery disease) ([facility])    History of ileus    Volume overload    Postoperative anemia due to acute blood loss    At risk for venous thromboembolism (VTE)    Stenosis of left vertebral artery    Prediabetes    Oral phase dysphagia      Past Medical History  Past Medical History:   Diagnosis Date    AAA (abdominal aortic aneurysm) ([facility])     s/p open repair    BPH (benign prostatic hyperplasia)     CAD (coronary artery disease)     Cholelithiasis     Former tobacco use     History of MI (myocardial infarction)     History of TIA (transient ischemic attack)     Hyperlipidemia     Hypertension     Insomnia     Obesity        Past Surgical History  Past Surgical History:   Procedure Laterality Date    ABDOMINAL AORTIC ANEURYSM REPAIR  06/07/2011    Dr. Yarbrough 6/7/11    APPENDECTOMY      CARDIAC CATHETERIZATION Left 06/03/2024     Procedure: Cardiac Left Heart Cath;  Surgeon: Pk Blanco DO;  Location: [facility];  Service: Cardiology    CARDIAC CATHETERIZATION  06/03/2024    Procedure: Cardiac catheterization;  Surgeon: Pk Blanco DO;  [facility]: [facility];  Service: Cardiology    CARDIAC CATHETERIZATION N/A 06/03/2024    Procedure: Cardiac Coronary Angiogram;  Surgeon: Pk Blanco DO;  [facility]: [facility];  Service: Cardiology    CATARACT EXTRACTION Right 11/2018    CHOLECYSTECTOMY LAPAROSCOPIC      COLONOSCOPY  11/2006    CORONARY ANGIOPLASTY      3 STENTS INSERTED    HARVEST VEIN Left 6/20/2024    Procedure: HARVEST VEIN ENDOSCOPIC (EVH);  Surgeon: KALPANA Amos MD;  Location: [facility];  Service: Cardiac Surgery    ID CORONARY ARTERY BYP W/VEIN & ARTERY GRAFT 3 VEIN N/A 6/20/2024    Procedure: CORONARY ARTERY BYPASS GRAFT (CABG) X3 VESSELS, LIMA - LAD, LEFT LEG EVH/SVG - PDA AND OM1, W/ DEDRICK;  Surgeon: KALPANA Amos MD;  Location: [facility];  Service: Cardiac Surgery    ID LAPS SURG CHOLECYSTECTOMY W/CHOLANGIOGRAPHY N/A 09/08/2016    Procedure: CHOLECYSTECTOMY LAPAROSCOPIC with intra-op cholangiogram ;  Surgeon: Prasanth Verdin MD;  Location: [facility];  Service: General         Summary   Pt presented with s/s suggestive of mild oral and suspected minimal and mild pharyngeal dysphagia.  Symptoms or concerns included mild decreased mastication, suspected mild decreased control of soft solids , some mild residue which is effectively cleared with liquids, and anterior loss/spillage of soft solids. Additionally, pt is also suspected of mild pharyngeal swallow delay, suspected mild decreased hyolaryngeal elevation upon palpation, and some multiple swallows with soft solids and thins.      Mastication was adequate with the materials administered however, at times pt needed increase time for regular items more than level 3 items. Pt noted to benefit most from  liquids and sauces to assist in breakdown for bolus.  Bolus formation and transfer were suspected to be mildly delayed with level 3 and regular foods as evidenced by some increase in rate of intake and residue on tongue in between bites. Pt needed some verbal cues for liquid wash and lingual sweeps to clear residue. Pt noted to have good labial seal around cup and utensils with no anterior spill but some noted drooling at times. Bolus transfers were suspected to be decreased by piecemeal deglutition with level 3 and regular items. Swallowing initiation appeared prompt with thins but mildly decreased with regular foods. Slight delays in initiation at times with regular foods were noted with pt needing frequent swallows. Laryngeal rise was palpated and judged to be within functional limits.  No coughing, throat clearing, change in vocal quality or respiratory status noted today.     Traditional oral care completed at end of meal with trace amounts of residue cleared.    Later when SLP returned to take order for trial tray for next ST session, pt's son Obdulio Mena was present and SLP introduced self to pt's son and reviewed what was done w/ pt today during evaluation and pt's current diet referencing pink sheet. SLP noted that pt currently is on a level 3 diet but will be trialing a regular diet next session as pt was progressing well during evaluation today. Pt's son asked if this will be done every day and SLP educated son that based on staffing, it will be attempted during the week most likely but on the weekend, it may be at least once but maybe not twice. Son was understanding and thanked SLP for explanation.        Risk/s for Aspiration: present with thins and soft solids occasionally       Recommendations:  Recommended Diet: soft/level 3 diet, thin liquids, and Continue frequent oral care   Recommended Form of Meds: whole with liquid as tolerated  Aspiration precautions and swallowing strategies: upright posture,  "only feed when fully alert, slow rate of feeding, small bites/sips, quiet environment (tv off, limit talking, door closed, etc.), alternating bites and sips, and OOB FOR ALL MEALS  Other Recommendations: Continue frequent oral care    DISTANT supervision w/ meals.  Consider consult with:  nutrition  Results reviewed with:  patient, RN, family, PT, and OT   Aspiration precautions posted.    F/u ST tx: Pt will continue to benefit from ongoing skilled dysphagia tx sessions to establish safest least restrictive diet w/o increased oropharyngeal or aspiration sxs as well as monitor ability to carryover swallow strategies independently.      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  He underwent a cardiac catheterization on 6/3/24 as an outpatient that revealed severe multivessel CAD and he was referred for CABG.       On 6/2024, he underwent a CABG x 3 with LIMA/LAD, SVG/right PDA, SVG OM1.  He had post-operative volume overload and hypoxia and was treated with Bumex.  He had transient BARBARA/CKD.  He also developed an ileus vs SBP which did resolve.       On 7/3/24, he was a rapid response for dysarthria, left facial weakness.  CT head was negative for acute changes.  CTA showed \"Patent major vessels of the [place] of [place] without high-grade stenosis; no aneurysm; moderate to severe atherosclerotic stenosis at the origin of the left vertebral artery\".  There was no hemodynamically significant stenosis in the cervical carotid and the right " vertebral arteries.  MRI revealed punctate acute to subacute infarcts involving the right caudate head, anterior centrum semiovale, and lateral right frontal cortex along with chronic right corona radiata/basal ganglia and bilateral cerebellar lacunar infarcts.  Neurology felt likely that did not occur as a result of the CABG/procedure since the CVA happened 10 days post-op.  Even thought he was a Plavix responder, it was felt that it may have not provided enough protection and he was switched from ASA/Plavix to ASA/Brilinta.  He is to have a Zio patch as an outpatient to eliminate the possibility that he had PAF as a cause although no atrial fibrillation was seen on telemetry during his stay.    Allergies:  No known food allergies    Past medical history:  Please see H&P for details    Special Studies:  On 6/2024, he underwent a CABG x 3 with LIMA/LAD, SVG/right PDA, SVG OM1.  He had post-operative volume overload and hypoxia and was treated with Bumex.  He had transient BARBARA/CKD.  He also developed an ileus vs SBP which did resolve.      Social/Education/Vocational Hx:  Pt lives with family    Swallow Information   Current Risks for Dysphagia & Aspiration: new neuro event, cognitive deficit, positioning issues, head support (sternal precautions)  Current Symptoms/Concerns: mild difficulty with chewing, decreased oral intake, weight loss, dehydration  Current Diet: soft/level 3 diet and thin liquids   Baseline Diet: regular diet and thin liquids      Baseline Assessment   Behavior/Cognition: alert  Speech/Language Status: able to participate in conversation and able to follow commands  Patient Positioning: upright in recliner  Pain Status/Interventions/Response to Interventions: No report of or nonverbal indications of pain.       Swallow Mechanism Exam  Facial: symmetrical  Labial: WFL  Lingual: WFL  Velum: unable to visualize  Mandible: adequate ROM  Dentition: adequate  Vocal quality:clear/adequate   Volitional  Cough: strong/productive   Respiratory Status: on RA       Consistencies Assessed and Total Amount Consumed: 75% of meal and 120 cc of thins by cup    Consistencies Administered: thin liquids and mixed consistency  Materials administered included level 3 and regular foods: chopped pork with gravy, mash potatoes, cheez-its, vanilla and chocolate pudding.       Oral Stage: mild  Mastication was adequate with the materials administered however, at times pt needed increase time for regular items more than level 3 items. Pt noted to benefit most from liquids and sauces to assist in breakdown for bolus.  Bolus formation and transfer were suspected to be mildly delayed with level 3 and regular foods as evidenced by some increase in rate of intake and residue on tongue in between bites. Pt needed some verbal cues for liquid wash and lingual sweeps to clear residue. Pt noted to have good labial seal around cup and utensils with no anterior spill but some noted drooling at times. Bolus transfers were suspected to be decreased by piecemeal deglutition with level 3 and regular items. Bolus breakdown and bolus formation with level 3 solids and regular solids were decreased as pt was observed to need increase time to form, transfer and initiate.      Pharyngeal Stage: minimal and mild  Swallowing initiation appeared prompt with thins but mildly decreased with regular foods. Slight delays in initiation at times with regular foods were noted with pt needing frequent swallows. Laryngeal rise was palpated and judged to be within functional limits.  No coughing, throat clearing, change in vocal quality or respiratory status noted today.     Esophageal Concerns: none reported    Summary and Recommendations (see above)   Swallow Assessment Prognosis   Prognosis Good   Prognosis Considerations Age;Co-morbidities;Medical diagnosis;Medical prognosis;Previous level of function;Severity of impairments   SLP Therapy Minutes   SLP Time In 1200    SLP Time Out 1300   SLP Total Time (minutes) 60   SLP Mode of treatment - Individual (minutes) 60   SLP Mode of treatment - Concurrent (minutes) 0   SLP Mode of treatment - Group (minutes) 0   SLP Mode of treatment - Co-treat (minutes) 0   SLP Mode of Treatment - Total time(minutes) 60 minutes   SLP Cumulative Minutes 60   Therapy Time missed   Time missed? No

## 2024-07-10 NOTE — PROGRESS NOTES
PT CYNDI BERKOWITZ        07/10/24 1401   Patient Data   Rehab Impairment Impairment of mobility, safety and Activities of Daily Living (ADLs) due to Cardiac   Etiologic Diagnosis coronary artery disease  (Punctate acute to subacute infarcts involving the right caudate head, anterior centrum semiovale, and lateral right frontal cortex.)   Date of Onset 06/20/24   Support System   Name Maryjo   Relationship Spouse   Home Setup   Type of Home Single Level   Method of Entry Stairs   Number of Stairs 6  (with bilateral HR)   First Floor Bathroom Tub;Grab Bars   First Floor Bathroom Accessibility Grab bars by toilet;Grab bars in tub/shower   First Floor Setup Available Yes   Baseline Information   Vocation   (retired)   Transportation    Prior Level of Function   Self-Care 3. Independent - Patient completed the activities by him/herself, with or without an assistive device, with no assistance from a helper.   Indoor-Mobility (Ambulation) 3. Independent - Patient completed the activities by him/herself, with or without an assistive device, with no assistance from a helper.   Stairs 3. Independent - Patient completed the activities by him/herself, with or without an assistive device, with no assistance from a helper.   Functional Cognition 3. Independent - Patient completed the activities by him/herself, with or without an assistive device, with no assistance from a helper.   Prior Device Used Z. None of the above   Falls in the Last Year   Number of falls in the past 12 months 0   Patient Preference   Nickname (Patient Preference) Obdulio   Restrictions/Precautions   Precautions Bed/chair alarms;Cardiac/sternal;Fall Risk;Supervision on toilet/commode   Pain Assessment   Pain Assessment Tool 0-10   Pain Score No Pain   Toileting Hygiene   Type of Assistance Needed Physical assistance   Physical Assistance Level 25% or less   Comment for bowel hygeine   Toileting Hygiene CARE Score 3   Toilet Transfer   Type of  Assistance Needed Physical assistance   Physical Assistance Level 25% or less   Comment Ramy with use of grab bar   Toilet Transfer CARE Score 3   Transfer Bed/Chair/Wheelchair   Type of Assistance Needed Physical assistance   Physical Assistance Level 26%-50%   Comment Ramy with no AD   Chair/Bed-to-Chair Transfer CARE Score 3   Roll Left and Right   Comment (S)  please assess next session   Sit to Lying   Comment (S)  please assess next session   Lying to Sitting on Side of Bed   Comment (S)  please assess next session   Sit to Stand   Type of Assistance Needed Physical assistance   Physical Assistance Level 25% or less   Comment Ramy with no AD   Sit to Stand CARE Score 3   Car Transfer   Type of Assistance Needed Physical assistance   Physical Assistance Level 26%-50%   Comment Ramy, VCs for safety and  sternal precautions   Car Transfer CARE Score 3   Ambulation   Primary Mode of Locomotion Prior to Admission Walk   Distance Walked (feet) 115 ft  (70 ft, 60 ft, 53 ft)   Assist Device   (none)   Gait Pattern Slow Chantel;Decreased foot clearance;Step to;Step through;Improper weight shift;Narrow NELLIE   Limitations Noted In Balance;Endurance;Heel Strike;Speed;Strength;Swing   Provided Assistance with: Balance   Walk Assist Level Minimum Assist   Walk 10 Feet   Type of Assistance Needed Physical assistance   Physical Assistance Level 26%-50%   Comment Ramy with no AD   Walk 10 Feet CARE Score 3   Walk 50 Feet with Two Turns   Type of Assistance Needed Physical assistance   Physical Assistance Level 26%-50%   Comment Ramy with no AD   Walk 50 Feet with Two Turns CARE Score 3   Walk 150 Feet   Comment unable to complete due to fatigue level   Reason if not Attempted Safety concerns   Walk 150 Feet CARE Score 88   Walking 10 Feet on Uneven Surfaces   Type of Assistance Needed Physical assistance   Physical Assistance Level 26%-50%   Comment Min/ModA with no AD over mat surface   Walking 10 Feet on Uneven Surfaces CARE  Score 3   Wheel 50 Feet with Two Turns   Reason if not Attempted Activity not applicable   Wheel 50 Feet with Two Turns CARE Score 9   Wheel 150 Feet   Reason if not Attempted Activity not applicable   Wheel 150 Feet CARE Score 9   Curb or Single Stair   Style negotiated Single stair   Type of Assistance Needed Physical assistance   Physical Assistance Level 26%-50%   Comment ModA with HHA to ascend/descend 6 inch curb step   1 Step (Curb) CARE Score 3   4 Steps   Type of Assistance Needed Physical assistance   Physical Assistance Level 26%-50%   Comment Ramy with single HR, reciprocal pattern   4 Steps CARE Score 3   12 Steps   Comment unable to complete secondary to fatigue   Reason if not Attempted Activity not applicable   12 Steps CARE Score 9   Stairs   Type  Steps   # of Steps 6  (x2)   Weight Bearing Precautions Sternal   Assist Devices Single Rail   Comprehension   QI: Comprehension 4. Undestands: Clear comprehension without cues or repetitions   Expression   QI: Expression 4. Express complex messages without difficulty and with speech that is clear and easy to understan   RLE Assessment   RLE Assessment WFL   LLE Assessment   LLE Assessment WFL   Cognition   Overall Cognitive Status WFL   Arousal/Participation Alert;Cooperative   Attention Attends with cues to redirect   Orientation Level Oriented X4   Memory Decreased recall of precautions   Following Commands Follows one step commands with increased time or repetition   Objective Measure   PT Measure(s) 5x STS with no UE upport: 23.19 seconds. 10 Meter Walk Test Self Selected Pace: 0.39 m/s   Therapeutic Exercise   Therapeutic Exercise/Activity NuStep for conditioning  LEs only: Level 2 x 10 minutes. Educated patient on expectations during his stay at the [facility], daily schedule, etc.   Discharge Information   Vocational Plan Retired/not working   Patient's Discharge Plan return home with wife.   Patient's Rehab Expectations To improve stamina with  walking. Increase strength   Barriers to Discharge Home Limited Family Support;Decreased Strength;Decreased Endurance;Safety Considerations   Impressions Patient is a 78 year old male who presents to [facility], [place] following hospitalization forCABGx3 on 6/20/2024 and CVA on 7/3. PMH significant for oronary artery disease status post three-vessel CABG 6/20/2024, hypertension, bilateral carotid artery stenosis, TIA with evidence of prior right basal ganglia and bilateral cerebellar lacunar infarcts, aortic aneurysm . Prior to admission, patient was independent for ADLs, , independent walking without assistive device. Patient lives with with spouse, in a 1SH. On initial evaluation, patient presents decreased strength, imbalance, decreased endurance, fatigue, decreased insight to deficits, and spinal precautions resulting in increased assistance for all functional mobility. Patient requires  Min Assistance for transfers, Min Assistance for ambulation, Min Assistance for stair navigation.  Patient is high risk for falls secondary to deficits found on initial evaluation and via outcome measures. Patient will benefit from physical therapy services to address the deficits identified on evaluation and to maximize safety and independence with all functional mobility and to decrease caregiver burden. Barriers to discharge home at this time include: limited caregiver support/ inaccessible home environment/high risk for falls/impaired cognition. Patient is a good rehabilitation. Patient's estimated length of stay is 10 to 14 days with goals set for [place]. PT plan of care to focus on bed mobility/transfers/gait training/stair navigation/strength training/balance training/improving activity tolerance.   PT Therapy Minutes   PT Time In 1400   PT Time Out 1530   PT Total Time (minutes) 90   PT Mode of treatment - Individual (minutes) 90   PT Mode of treatment - Concurrent (minutes) 0   PT Mode of treatment - Group  (minutes) 0   PT Mode of treatment - Co-treat (minutes) 0   PT Mode of Treatment - Total time(minutes) 90 minutes   PT Cumulative Minutes 90   Cumulative Minutes   Cumulative therapy minutes 180   Loreta Heck, PT, DPT

## 2024-07-10 NOTE — ASSESSMENT & PLAN NOTE
Lab Results   Component Value Date    EGFR 58 07/10/2024    EGFR 67 07/09/2024    EGFR 65 07/08/2024    CREATININE 1.18 07/10/2024    CREATININE 1.05 07/09/2024    CREATININE 1.08 07/08/2024   Monitor BUN/Cr intermittently as well as electrolytes   IM consulted to manage  Limit nephrotoxic agents when possible  Optimal BP mgmt

## 2024-07-10 NOTE — PCC PHYSICAL THERAPY
Patient has made great progress in physical therapy. Patient was progressed to IRP on 7/16 without RW. Patient can independently ambulate shorter distances with no AD, but benefits from RW for longer distances to help conserve energy. All barriers to home have been resolved. Patient discharge home tomorrow afternoon, 7/17 with home health RN services. Patient will benefit from cardiac rehabilitation when cleared by physician.

## 2024-07-10 NOTE — CONSULTS
"PHYSICAL MEDICINE AND REHABILITATION CONSULTATION NOTE  Shane Chau Sr. 78 y.o. male MRN: [id number]  Unit/Bed#: -01 Encounter: [id number]     Referred by:  SANTHOSH Cunningham   Rehab Diagnosis: *Stroke:  01.1  Left Body Involvement (Right Brain)  Etiologic Diagnosis:  *Punctate acute to subacute infarcts involving the right caudate head, anterior centrum semiovale, and lateral right frontal cortex.   *This is addendum from pre-admit screen     History of Present Illness:   Per my recent c/s \"78-year-old male with a past medical history of with a history of coronary artery disease status post three-vessel CABG 6/20/2024, hypertension, bilateral carotid artery stenosis, TIA with evidence of prior right basal ganglia and bilateral cerebellar lacunar infarcts, aortic aneurysm who is postop course complicated by ileus which has been improving, acute kidney injury improving, acute hypoxic respiratory failure improving, who developed dysarthria and left-sided weakness on 7/3 with stroke alert/rapid response called.  Documented vitals at that time were unremarkable.  CT head without acute intracranial findings but did show evidence of the chronic lacunar infarcts in the right basal ganglia and bilateral cerebellar hemispheres with mild microangiopathic changes.  CTA head and neck showed moderate to severe atherosclerotic stenosis of the left vertebral artery.\"  MRI brain showed punctate acute to subacute infarcts involving the right caudate head, anterior centrum semiovale, and lateral right frontal cortex.    Chronic right corona radiata/basal ganglia and bilateral cerebellar lacunar infarcts. Mild chronic microangiopathic ischemic changes.  Neurology was not certain that stroke was related to procedure as it occurred 10 days postop.  Patient was switched to aspirin/Brilinta from aspirin/Plavix per neurology.  They also recommend outpatient Zio patch.  Patient was evaluated by skilled therapies and " was found to have significant decline in ADLs and ambulation and appears appropriate for admission to [facility].     Chief complaint:  Decline in mobility      Subjective: On evaluation, patient notes some generalized fatigue and slight imbalance without acute worsening of strength or sensation.  He notes chest wall soreness improving.  He denies fever, chills, sweats, uncontrolled constipation or diarrhea, abdominal pain, urinary issues, lightheadedness, or other new complaints.     Review of Systems: A 10 point ROS was performed; negative except as noted above.     Coronary artery disease of native artery of native heart with stable angina pectoris (HCC)  Assessment & Plan  S/p 3v CABG 6/20/24 (prior stenting)   Sternal precautions  IM consulted and with overall management at their discretion during ARC course  Antithrombotic: Aspirin, Brilinta  Statin  Optimal blood pressure and blood sugar control      * CVA (cerebral vascular accident) (HCC)  Assessment & Plan  - Occurred about 10 days after CABG; unclear relation to procedure   - punctate acute to subacute infarcts involving the right caudate head, anterior centrum semiovale, and lateral right frontal cortex and chronic right corona radiata/basal ganglia and bilateral cerebellar lacunar infarcts.  - Residual impairments on admission to ARC: L sided weakness, imbalance, incoordination, possibly cog impairments-     Secondary stroke prevention  - OP Ziopatch recommended to rule out atrial fibrillation after discharge  - Antithrombotic: Aspirin and Brilinta  - Statin  - Optimal management of blood pressure and diabetes  -  patient and if applicable caregiver on optimal stroke management  - Fall precautions   - Follow-up with neurology and PCP after d/c   - Recommend acute comprehensive interdisciplinary inpatient rehabilitation to include intensive skilled therapies (PT, OT, ST) as outlined with oversight and management by  "rehabilitation physician as well as inpatient rehab level nursing, case management and weekly interdisciplinary team meetings.       Stenosis of left vertebral artery  Assessment & Plan  Moderate to severe left vertebral artery atherosclerotic stenosis   - Antithrombotic: aspirin, brilinta  - Statin  - Optimal blood pressure and blood sugar control  - OP neuro     At risk for venous thromboembolism (VTE)  Assessment & Plan  SCDs, ambulation, and heparin       Postoperative anemia due to acute blood loss  Assessment & Plan  Consult(ed) IM and comanagement with their service during ARC course   Monitor H/H, vitals, signs/symptoms of acute bleeding      Volume overload  Assessment & Plan  IM consulted and with overall management at their discretion during ARC course  Torsemide per IM     History of ileus  Assessment & Plan  Improved (temp required NGT and Gen Sx c/s on acute)  Monitor GI function, intake     PAD (peripheral artery disease) ([facility])  Assessment & Plan  \"PAD/bilateral carotid artery stenosis, 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\"  IM consulted and with overall management at their discretion during ARC course  - Antithrombotic: asp/brilinta  - Statin  - Optimal blood pressure and blood sugar control  - OP Vasc sx       Hyponatremia  Assessment & Plan  IM consulted and with overall management at their discretion during ARC course  2000 FR    CKD (chronic kidney disease) stage 2, GFR 60-89 ml/min  Assessment & Plan  Lab Results   Component Value Date    EGFR 67 07/09/2024    EGFR 65 07/08/2024    EGFR 63 07/07/2024    CREATININE 1.05 07/09/2024    CREATININE 1.08 07/08/2024    CREATININE 1.10 07/07/2024   Monitor BUN/Cr intermittently as well as electrolytes   IM consulted to manage  Limit nephrotoxic agents when possible  Optimal BP mgmt       Primary hypertension  Assessment & Plan  Internal medicine consulted and co-management with their service  Monitor vitals with and without activity; monitor for " orthostasis  Monitor hemoglobin, electrolytes, kidney function, hydration status   Current meds: MTP and torsemide per IM       Abdominal aortic aneurysm without rupture (HCC)  Assessment & Plan  Mgmt per IM         Disposition:   Home with [facility] 2 weeks from admission    Follow-up:   PCP  Neuro  Cards  Cardiac Sx  Vasc Sx  Other chronic providers    Restrictions include:  Fall precautions and see above     ---------------------------------------------------------------------------------------------------------------------      OBJECTIVE:    Physical Exam:  Temp:  [97.2 °F (36.2 °C)-97.9 °F (36.6 °C)] 97.9 °F (36.6 °C)  HR:  [68-84] 84  Resp:  [16-19] 16  BP: (123-137)/(65-77) 137/77  Vitals above reviewed on date of encounter    General: Awake, alert in NAD  HENT: NCAT, MMM  Neck: Supple, no lymphadenopathy  Respiratory: Unlabored breathing, breath sounds equal, Lungs CTA, no wheezes, rales, or rhonchi  Cardiovascular: Regular rate and rhythm, no murmurs, rubs, or gallops  Gastrointestinal: Soft, non-tender, non-distended, normoactive bowel sounds  Genitourinary: No blackman  SkiN/MSK/Extremities: Sternal incision healing adequately, puncture sites without clear signs of infection.  Distal extremities appropriately warm, normal cap refill distally, mild bilateral lower extremity edema without calf tenderness to palpation   neurologic/Psych:   MENTAL STATUS: awake, oriented to person, place, time, and situation; slight sluggish mentation speed  Affect: Flattened  CN II-XII: grossly intact   Strength/MMT: Left side 4+ to 5 out of 5, right side 5 out of 5  Finger-nose impaired slightly on the left    Laboratory:    Results from last 7 days   Lab Units 07/05/24  0847 07/03/24  0436   HEMOGLOBIN g/dL 11.3* 9.9*   HEMATOCRIT % 34.3* 30.2*   WBC Thousand/uL 12.88* 12.67*     Results from last 7 days   Lab Units 07/09/24  0458 07/08/24  0315 07/07/24  0547   BUN mg/dL 23 24 24   POTASSIUM mmol/L 3.8 3.6 3.8   CHLORIDE mmol/L  "100 99 100   CREATININE mg/dL 1.05 1.08 1.10            Wt Readings from Last 1 Encounters:   07/10/24 74.1 kg (163 lb 5.8 oz)     Estimated body mass index is 27.18 kg/m² as calculated from the following:    Height as of this encounter: 5' 5\" (1.651 m).    Weight as of this encounter: 74.1 kg (163 lb 5.8 oz).    Imaging: reviewed    Per EMR:  Past Medical History:   Past Surgical History:   Family History:   Social history:   Past Medical History:   Diagnosis Date    AAA (abdominal aortic aneurysm) (HCC)     s/p open repair    BPH (benign prostatic hyperplasia)     CAD (coronary artery disease)     Cholelithiasis     Former tobacco use     History of MI (myocardial infarction)     History of TIA (transient ischemic attack)     Hyperlipidemia     Hypertension     Insomnia     Obesity     Past Surgical History:   Procedure Laterality Date    ABDOMINAL AORTIC ANEURYSM REPAIR  06/07/2011    Dr. Yarbrough 6/7/11    APPENDECTOMY      CARDIAC CATHETERIZATION Left 06/03/2024    Procedure: Cardiac Left Heart Cath;  Surgeon: Pk Blanco DO;  Location: [facility];  Service: Cardiology    CARDIAC CATHETERIZATION  06/03/2024    Procedure: Cardiac catheterization;  Surgeon: Pk Blanco DO;  [facility]: [facility];  Service: Cardiology    CARDIAC CATHETERIZATION N/A 06/03/2024    Procedure: Cardiac Coronary Angiogram;  Surgeon: Pk Blanco DO;  Location: [facility];  Service: Cardiology    CATARACT EXTRACTION Right 11/2018    CHOLECYSTECTOMY LAPAROSCOPIC      COLONOSCOPY  11/2006    CORONARY ANGIOPLASTY      3 STENTS INSERTED    HARVEST VEIN Left 6/20/2024    Procedure: HARVEST VEIN ENDOSCOPIC (EVH);  Surgeon: KALPANA Amos MD;  Location: [facility];  Service: Cardiac Surgery    KS CORONARY ARTERY BYP W/VEIN & ARTERY GRAFT 3 VEIN N/A 6/20/2024    Procedure: CORONARY ARTERY BYPASS GRAFT (CABG) X3 VESSELS, LIMA - LAD, LEFT LEG EVH/SVG - PDA AND OM1, W/ DEDRICK;  Surgeon: KALPANA" Ravin Amos MD;  Location: [facility];  Service: Cardiac Surgery    ME LAPS SURG CHOLECYSTECTOMY W/CHOLANGIOGRAPHY N/A 2016    Procedure: CHOLECYSTECTOMY LAPAROSCOPIC with intra-op cholangiogram ;  Surgeon: Prasanth Verdin MD;  Location: [facility];  Service: General     Family History   Problem Relation Age of Onset    Heart disease Mother     Hypertension Mother         Benign Essential     Stroke Mother         Stroke     Heart disease Father     Coronary artery disease Brother     Diabetes Brother         mellitus     Heart disease Brother     Aortic aneurysm Brother     Coronary artery disease Brother     Sudden death Brother     Coronary artery disease Brother     Sudden death Brother     Sudden death Brother     Coronary artery disease Brother     Heart disease Family       Social History     Socioeconomic History    Marital status: /Civil Union     Spouse name: Not on file    Number of children: Not on file    Years of education: Not on file    Highest education level: Not on file   Occupational History    Occupation: Retired    Tobacco Use    Smoking status: Former     Current packs/day: 0.00     Average packs/day: 0.5 packs/day for 30.0 years (15.0 ttl pk-yrs)     Types: Cigarettes     Start date: 3/10/1956     Quit date: 3/10/1986     Years since quittin.3    Smokeless tobacco: Never    Tobacco comments:     Quit  about 25 years x1ppd   Vaping Use    Vaping status: Never Used   Substance and Sexual Activity    Alcohol use: Not Currently     Comment: 4 oz glass of wine every other day w/ his dinner    Drug use: No    Sexual activity: Not Currently     Partners: Female   Other Topics Concern    Not on file   Social History Narrative    Not on file     Social Determinants of Health     Financial Resource Strain: Low Risk  (2023)    Overall Financial Resource Strain (CARDIA)     Difficulty of Paying Living Expenses: Not very hard   Food Insecurity: No Food Insecurity  (6/21/2024)    Hunger Vital Sign     Worried About Running Out of Food in the Last Year: Never true     Ran Out of Food in the Last Year: Never true   Transportation Needs: No Transportation Needs (6/21/2024)    PRAPARE - Transportation     Lack of Transportation (Medical): No     Lack of Transportation (Non-Medical): No   Physical Activity: Not on file   Stress: Not on file   Social Connections: Unknown (6/18/2024)    Received from [employer]    Social Connections     How often do you feel lonely or isolated from those around you? (Adult - for ages 18 years and over): Not on file   Intimate Partner Violence: Not on file   Housing Stability: Low Risk  (6/21/2024)    Housing Stability Vital Sign     Unable to Pay for Housing in the Last Year: No     Number of Times Moved in the Last Year: 0     Homeless in the Last Year: No          Current Medical Diagnosis Medications Allergies   Patient Active Problem List   Diagnosis    Closed compression fracture of first lumbar vertebra ([facility])    Abdominal aortic aneurysm without rupture (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)    Asymptomatic bilateral carotid artery stenosis    Coronary artery disease of native artery of native heart with stable angina pectoris ([facility])    Hyperlipidemia    Primary hypertension    Transient cerebral ischemic attack    Need for pneumococcal vaccination    Hematuria    Microhematuria    Erectile dysfunction    Leg swelling    Paresthesia    Rib pain    S/P CABG x 3    Localized primary osteoarthritis of lower leg, unspecified laterality    CKD (chronic kidney disease) stage 2, GFR 60-89 ml/min    Stroke-like symptoms    Hyponatremia    CVA (cerebral vascular accident) (HCC)    PAD (peripheral artery disease) (HCC)    History of ileus    Volume overload    Postoperative anemia due to acute blood loss    At risk for venous thromboembolism (VTE)    Stenosis of left vertebral artery      Current Facility-Administered  Medications:     acetaminophen (TYLENOL) tablet 975 mg, 975 mg, Oral, Q6H While awake, SANTHOSH Cunningham, 975 mg at 07/10/24 0830    aspirin (ECOTRIN LOW STRENGTH) EC tablet 81 mg, 81 mg, Oral, Daily, SANTHOSH Cunningham, 81 mg at 07/10/24 0830    atorvastatin (LIPITOR) tablet 80 mg, 80 mg, Oral, After Dinner, SANTHOSH Cunningham, 80 mg at 07/09/24 1713    bisacodyl (DULCOLAX) rectal suppository 10 mg, 10 mg, Rectal, Daily PRN, Dandre Stoner MD    chlorhexidine (PERIDEX) 0.12 % oral rinse 15 mL, 15 mL, Mouth/Throat, BID, SANTHOSH Cunningham, 15 mL at 07/10/24 0831    heparin (porcine) subcutaneous injection 5,000 Units, 5,000 Units, Subcutaneous, Q8H KRISTI, SANTHOSH Cunningham, 5,000 Units at 07/10/24 0551    insulin lispro (HumALOG/ADMELOG) 100 units/mL subcutaneous injection 1-5 Units, 1-5 Units, Subcutaneous, TID AC **AND** [CANCELED] Fingerstick Glucose (POCT), , , TID AC, SANTHOSH Cunningham    insulin lispro (HumALOG/ADMELOG) 100 units/mL subcutaneous injection 1-5 Units, 1-5 Units, Subcutaneous, HS, SANTHOSH Cunningham    lidocaine (LIDODERM) 5 % patch 2 patch, 2 patch, Topical, Daily, SANTHOSH Cunningham, 2 patch at 07/09/24 2135    melatonin tablet 6 mg, 6 mg, Oral, HS, SANTHOSH Cunningham, 6 mg at 07/09/24 2135    metoprolol tartrate (LOPRESSOR) tablet 50 mg, 50 mg, Oral, Q12H KRISTI, SANTHOSH Cunningham, 50 mg at 07/10/24 0830    ondansetron (ZOFRAN-ODT) dispersible tablet 4 mg, 4 mg, Oral, Q6H PRN, SANTHOSH Cunningham    pantoprazole (PROTONIX) EC tablet 40 mg, 40 mg, Oral, Early Morning, SANTHOSH Cunningham, 40 mg at 07/10/24 0551    polyethylene glycol (MIRALAX) packet 17 g, 17 g, Oral, Daily PRN, Dandre Stoner MD    potassium chloride (Klor-Con M20) CR tablet 20 mEq, 20 mEq, Oral, BID, SANTHOSH Cunningham, 20 mEq at 07/10/24 0831    ticagrelor (BRILINTA) tablet 90 mg, 90 mg, Oral, Q12H  KRISTI, Mckenzie Gil SANTHOSH Oliver, 90 mg at 07/10/24 0831    torsemide (DEMADEX) tablet 20 mg, 20 mg, Oral, Daily, Mckenzie Oliver SANTHOSH, 20 mg at 07/10/24 0830 No Known Allergies         Prior Level of Function and social history:    He lives in a(n) single family home  Shane Chau Sr. is  and lives with their spouse.  Self Care: Independent, Indoor Mobility: Independent, Stairs (in/outdoor): Independent, and Cognition: Independent     FALLS IN THE LAST 6 MONTHS: 0     HOME ENVIRONMENT:  The living area: can live on one level  There are Greater than 10 steps to enter the home.    The patient will have 24 hour supervision/physical assistance available upon discharge.       Current Level of Function:    Mobility:  Transfers Min assist  Ambulation/Mobility 40-80 ft min assist     ADLs:  Mod assist toileting, UBB  Max assist LBD, LBB    Potential Barriers to Discharge:  Co-morbidities (see above), new functional deficits, impaired balance, impaired coordination, possible impaired cognition, deconditioning    Tolerance for three hours of therapy per therapy day: adequate     Rehabilitation Prognosis: good       Rehabilitation Plan/Therapy Interventions: This patient will have close medical and rehabilitation oversight from a physical medicine and rehabilitation physician and if needed an internal medicine physician to manage the complexity of medical issues to optimize health, ability to participate in therapy, quality of life, and functional outcomes. This patient requires 24 hour rehabilitation nursing to address bowel and bladder management, (pain management if listed below), medication administration, positioning/skin monitoring, fall/injury prevention, and VTE prophylaxis.      Physical, occupational and speech therapy: Patient requires PT and OT to improve functional mobility, transfers, upper and lower body strengthening, conditioning, balance, and gait training with appropriate assistive  "device. Patient also requires ST to evaluate and if appropriate treat deficits in speech, swallowing, and cognition. PT, OT, ST will be provided approximately 5 times per week for 60 minutes per day. Skilled therapists and nursing will also provide patient/family safety education and training.  / will work to ensure proper communication between patient (+/- family) and staff regarding the overall rehabilitation and medical process while patient is in the acute rehabilitation center.  / will work with patient (and if applicable family and community resources) to optimize safe discharge.    Discharge Planning:    Estimated length of stay:   14 days.    Family/Patient Goals:  Patient/family's goals: Return to previous home/apartment.    Mobility Goals:   Modified independent to supervision    Activities of Daily Living (ADLs) Goals:  Largely modified independent to supervision with possible slight assist for lower body dressing and bathing    Cognition / Communication:  Closer to prior level of function    Rehabilitation and discharge goals discussed with the patient and/or family.    Case Managment and Social Work to review patient/family resources and to coordinate Discharge Planning.    Patient and Family Education and Training:  Rehabilitation and discharge goals discussed with the patient and/or family.  Patient/family education/training needs to be discussed in weekly team meeting.    Other equipment: To be determined    Medical Necessity Criteria for ARC Admission:  The preadmission screen, post-admission physical evaluation, overall plan of care and admissions order demonstrate a reasonable expectation that the following criteria were met at the time of admission to the ARC.  (See \"Specific areas of management and oversight in ARC setting\" for additional details on medical necessity as outlined below).  The patient requires active and ongoing therapeutic " intervention of multiple therapy disciplines (physical therapy, occupational therapy, speech-language pathology, or prosthetics/orthotics), one of which is physical or occupational therapy.    Patient requires an intensive rehabilitation therapy program, as defined in Chapter 1, section 110.2.2 of the CMS Medicare Policy Manual. This intensive rehabilitation therapy program will consist of at least 3 hours of therapy per day at least 5 days per week or at least 15 hours of intensive rehabilitation therapy within a 7 consecutive day period, beginning with the date of admission to the [facility].    The patient is reasonably expected to actively participate in, and benefit significantly from, the intensive rehabilitation therapy program as defined in Chapter 1, section 110.2.2 of the CMS Medicare Policy Manual at this time of admission to the [facility]. He can reasonably be expected to make measurable improvement (that will be of practical value to improve the patient’s functional capacity or adaptation to impairments) as a result of the rehabilitation treatment, as defined in section 110.3, and such improvement can be expected to be made within the prescribed period of time. As noted in the CMS Medicare Policy Manual, the patient need not be expected to achieve complete independence in the domain of self-care nor be expected to return to his or her prior level of functioning in order to meet this standard.  The patient must require physician supervision by a rehabilitation physician. As such, a rehabilitation physician will conduct face-to-face visits with the patient at least 3 days per week throughout the patient’s stay in the [facility] to assess the patient both medically and functionally, as well as to modify the course of treatment as needed to maximize the patient’s capacity to benefit from the rehabilitation process.  The patient requires an intensive and coordinated interdisciplinary approach to providing rehabilitation, as  defined in Chapter 1, section 110.2.5 of the CMS Medicare Policy Manual. This will be achieved through periodic team conferences, conducted at least once in a 7-day period, and comprising of an interdisciplinary team of medical professionals consisting of: a rehabilitation physician, registered nurse,  and/or , and a licensed/certified therapist from each therapy discipline involved in treating the patient.     Changes Since Pre-admission Assessment: None -This patient's participation in rehab continues to be reasonable, necessary and appropriate.    CMS Required Post-Admission Physician Evaluation Elements  History and Physical, including medical history, functional history and active comorbidities as in above text.     Post-Admission Physician Evaluation:  The patient has the potential to make improvement and is in need of physical, occupational, and/or therapy services. The patient may also need nutritional services. Given the patient's complex medical condition and risk of further medical complications, rehabilitative services cannot be safely provided at a lower level of care, such as a skilled nursing facility. I have reviewed the patient's functional and medical status at the time of the preadmission screening and they are the same as on the day of this admission. I acknowledge that I have personally performed a full physical examination on this patient within 24 hours of admission. The patient demonstrated understanding the rehabilitation program and the discharge process after we discussed them.     Agree in entirety: yes  Minor adaptions: none    Major changes: none    Specific areas of management and oversight in ARC setting:  Cardiopulmonary function/Anemia: Ensure cardiopulmonary stability and optimize cardiopulmonary function not only at rest but with activity as patient's activity level significantly increases in acute rehab compared with prior to transfer in preparation for  safe discharge from ARC.  Must closely and frequently monitor blood pressure, HR, anemia to ensure adequate cardiac output during ADLs and ambulation as patient is at increased risk for orthostatic hypotension/syncope and potential injury if not monitored for and managed adequately.    Blood pressure management:    Frequent monitoring of blood pressure with appropriate adjustments in blood pressure medication management to optimize blood pressure control and prevent/limit renal complications.   Monitoring impact of blood pressure and side-effects of blood pressure medications at rest and with activity.  Hypoxia prevention: Ensure appropriate level of oxygenation at rest and with activity to avoid symptomatic hypoxia, maximize functional performance, and decrease risk of atelectasis/pneumonia through close and frequent monitoring, providing appropriate respiratory treatments (such as incentive spirometry), and when necessary provide/adjust respiratory medications.    Pain management:  Pain will improve with frequent evaluation of pain, careful adjustments in medications, frequent re-evaluation of patient's pain and medical/neurologic status to ensure optimal pain control, avoidance of potential serious and even life-threatening side-effects and drug interactions, as well as weaning pain medications as soon as possible to decrease risk of short and long-term use.     Neurologic Disorder: CVA causing impaired mobility, ADLs, and gait:  intensive skilled therapies with physical therapy and occupational therapy with close oversight and management by rehab specialized physician in acute rehabilitation setting to most expeditiously and effectively improve functional mobility, transfers, upper and lower body strengthening, conditioning, balance, and gait training with appropriate assistive device.  Patient will have optimal supervision and management of patient's underlying neurologic disorder with specialized  rehabilitation physician during this period of recovery to ensure most expeditious and optimal recovery with decreased risks of fall/injury and other complications including acute worsening of neuro disorder, decrease risk of VTE, PNA, and skin ulceration.  Inpatient rehabilitation education/teaching:  To be provided to patient and typically family/caregiver (if able to be identified) by all skilled therapists, rehab nursing, case management, and rehab specialized physician to ensure optimal recovery and decrease risks of complications in both acute rehabilitation setting as well as after discharge.   Chronic kidney failure management and blood pressure management: Frequent measurements and evaluation of urinary intake, urinary output, and labs which include BUN/Cr and electrolytes with appropriate adjustments in medication and when necessary order additional testing.  Frequent monitoring of blood pressure with appropriate adjustments in blood pressure medication management to optimize blood pressure control and prevent/limit renal complications.    Skin management: Increased risk of skin wounds/breakdown/rashes due to recent immobility and co-morbidities.   Appropriate skin checks from nursing and as needed physician.  Frequent turning, application of appropriate barrier creams/dressings, cushions.  Patient/caregiver education.  Optimal bowel/bladder hygiene and mobilization.      ** Please Note: Fluency Direct voice to text software may have been used in the creation of this document. **    75 minutes or greater spent for this encounter which included a combination of face-to-face time with patient and non-face-to-face time which in part specifically includes management of CVA, CABG.  Face-to-face time included extended discussion with patient regarding current condition, medical history, mood, medical/rehabilitation management, and disposition.  Non face-to-face time included coordination of care with patient's  co-managing AP and/or physician(s) thru communication and review of their recent documentation as well as reviewing vitals, bowel/bladder function, recent labs, diagnostic imaging, and notes from therapy, CM, and nursing.      I personally performed the required components and examined the patient myself in person on 7/9/24.      Dandre Stoner MD, MS  [facility]  Physical Medicine and Rehabilitation  Brain Injury Medicine

## 2024-07-10 NOTE — ASSESSMENT & PLAN NOTE
Postoperative ileus, managed by general surgery   Required NGT placement for decompression, removed on 6/28  Resolved, tolerating regular diet and having regular BMs

## 2024-07-10 NOTE — PCC NURSING
Pt w H/O CAD.  Had CABG x 3 6/20 w mult post op complications.  Remains on sternal precautions and incisions are ТАТЬЯНА. CVA -Developed left facial weakness/left sided weakness and dysarthria MRI showed punctate acute to subacute infarcts involving the right caudate head, anterior centrum semiovale, and lateral right frontal cortex and chronic right corona radiata/basal ganglia and bilateral cerebellar lacunar infarcts. He had been, at the time of the stroke, on Plavix and ASA.   P2Y12 showed that he was a Plavix responder but Neuro felt likely not adequate protection and switched the Plavix to Brilinta Will need price check for Brilinta Continue statin  For OP Zio patch to rule out that the CVA was cardioembolic from afib = Cards sent message to office to order. Volume overload- Continue Torsemide LVEF is normal Hypertension-Continue Lopressor 50 mg q12hrs stable Hyponatremia-Mild at 133 Continue FR 2000 ml  PAD- Has B/L carotid stenosis Continue ASA/Brilinta and statin Ileus - Occurred post op and was felt to be an ileus vs SBO  Did resolve H/O Abdominal aortic aneurysm - Had repair 2011 Acute on chronic kidney failure Creatinine baseline is 0.9-1.2 Creatinine post-op was up to 2.0 but is baseline now. Pt can be inc bowel and bladder at times.      7/17- Drainage and redness noted at base of sternal wound.  Pt started on duricef x 5 days and daily dressing changes initiated.  Family will learn dressing changes for D/C. Remains on demadex po daily for vol overload and 2000ml FR.  No c/o pain.    This week we will encourage independence with ADLs.  We will monitor labs and vital signs.  We will educate pt/family about repositioning to prevent skin breakdown.  We will assist w repositioning and perform routine skin checks.  We will monitor for adequate pain control.  We will monitor for constipation and medicate pt as ordered. We will monitor incision/wound for healing and s/s of infection. We will increase safety  awareness and keep pt free from falls.

## 2024-07-10 NOTE — ASSESSMENT & PLAN NOTE
Postoperative ileus, managed by general surgery conservatively   Resolved, tolerating regular diet and having regular BMs

## 2024-07-10 NOTE — ASSESSMENT & PLAN NOTE
- Occurred about 10 days after CABG; unclear relation to procedure   - punctate acute to subacute infarcts involving the right caudate head, anterior centrum semiovale, and lateral right frontal cortex and chronic right corona radiata/basal ganglia and bilateral cerebellar lacunar infarcts.  - Residual impairments on admission to [facility]: L sided weakness, imbalance, incoordination, possibly cog impairments-     Secondary stroke prevention  - OP Ziopatch recommended to rule out atrial fibrillation after discharge  - Antithrombotic: Aspirin and Brilinta  - Statin  - Optimal management of blood pressure and diabetes  -  patient and if applicable caregiver on optimal stroke management  - Fall precautions   - Follow-up with neurology and PCP after d/c   - Completing acute comprehensive interdisciplinary inpatient rehabilitation to include intensive skilled therapies (PT, OT, ST) as outlined with oversight and management by rehabilitation physician as well as inpatient rehab level nursing, case management and weekly interdisciplinary team meetings.   - Discharge home with Home RN  - Will be able to advance to cardiac rehab once cleared by cardiac surgery

## 2024-07-10 NOTE — TREATMENT PLAN
Individualized Plan of Care - [facility]  Shane Chau Sr. 78 y.o. male MRN: [id number]  Unit/Bed#: [facility] 973-01 Encounter: [id number]     PATIENT INFORMATION  ADMISSION DATE: 7/9/2024  1:31 PM JUAN C CATEGORY:Stroke:  01.1  Left Body Involvement (Right Brain)   ADMISSION DIAGNOSIS: Punctate acute to subacute infarcts involving the right caudate head, anterior centrum semiovale, and lateral right frontal cortex  EXPECTED LOS: 14 days     MEDICAL/FUNCTIONAL PROGNOSIS  Pre-admit screens and post-admit evaluations reviewed and are consistent.     Based on my assessment of the patient's medical conditions and current functional status, the prognosis for attaining medical and functional goals or the IRF stay is:  Good.    Patient is appropriate for acute rehabilitation.    Medical Goals:   Patient will be medically stable for discharge back to community setting upon completion or acute rehab program and patient/family will be able to manage medical conditions and comorbid conditions with medications and appropriate follow up upon completion of rehab program.    Cardiopulmonary function/Anemia: Ensure cardiopulmonary stability and optimize cardiopulmonary function not only at rest but with activity as patient's activity level significantly increases in acute rehab compared with prior to transfer in preparation for safe discharge from [facility].  Must closely and frequently monitor blood pressure, HR, anemia to ensure adequate cardiac output during ADLs and ambulation as patient is at increased risk for orthostatic hypotension/syncope and potential injury if not monitored for and managed adequately.    Blood pressure management:    Frequent monitoring of blood pressure with appropriate adjustments in blood pressure medication management to optimize blood pressure control and prevent/limit renal complications.   Monitoring impact of blood pressure and side-effects of blood pressure medications at rest and with  activity.  Hypoxia prevention: Ensure appropriate level of oxygenation at rest and with activity to avoid symptomatic hypoxia, maximize functional performance, and decrease risk of atelectasis/pneumonia through close and frequent monitoring, providing appropriate respiratory treatments (such as incentive spirometry), and when necessary provide/adjust respiratory medications.    Pain management:  Pain will improve with frequent evaluation of pain, careful adjustments in medications, frequent re-evaluation of patient's pain and medical/neurologic status to ensure optimal pain control, avoidance of potential serious and even life-threatening side-effects and drug interactions, as well as weaning pain medications as soon as possible to decrease risk of short and long-term use.     Neurologic Disorder: CVA causing impaired mobility, ADLs, and gait:  intensive skilled therapies with physical therapy and occupational therapy with close oversight and management by rehab specialized physician in acute rehabilitation setting to most expeditiously and effectively improve functional mobility, transfers, upper and lower body strengthening, conditioning, balance, and gait training with appropriate assistive device.  Patient will have optimal supervision and management of patient's underlying neurologic disorder with specialized rehabilitation physician during this period of recovery to ensure most expeditious and optimal recovery with decreased risks of fall/injury and other complications including acute worsening of neuro disorder, decrease risk of VTE, PNA, and skin ulceration.  Inpatient rehabilitation education/teaching:  To be provided to patient and typically family/caregiver (if able to be identified) by all skilled therapists, rehab nursing, case management, and rehab specialized physician to ensure optimal recovery and decrease risks of complications in both acute rehabilitation setting as well as after discharge.    Chronic kidney failure management and blood pressure management: Frequent measurements and evaluation of urinary intake, urinary output, and labs which include BUN/Cr and electrolytes with appropriate adjustments in medication and when necessary order additional testing.  Frequent monitoring of blood pressure with appropriate adjustments in blood pressure medication management to optimize blood pressure control and prevent/limit renal complications.    Skin management: Increased risk of skin wounds/breakdown/rashes due to recent immobility and co-morbidities.   Appropriate skin checks from nursing and as needed physician.  Frequent turning, application of appropriate barrier creams/dressings, cushions.  Patient/caregiver education.  Optimal bowel/bladder hygiene and mobilization.      ANTICIPATED DISCHARGE DISPOSITION AND SERVICES  COMMUNITY SETTING:    Previous community setting.    ANTICIPATED FOLLOW-UP SERVICE:   Outpatient Therapy PT, OT v  Home Health Services: PT, OT     DISCIPLINE SPECIFIC PLANS:  Required Disciplines & Services: PT, OT, ST, Rehabilitation Physician, Rehabillitation Nursing, Case Management, Dietary    REQUIRED THERAPY (expected):  Therapy Hours per Day Days per Week Tx Days (Days in [facility])   Physical Therapy 1 5 14   Occupational Therapy 1 5 14   Speech/Language Therapy 1 5 7   NOTE: Additional therapy time(s) may be added as appropriate to meet patient needs and to achieve functional goals.    ANTICIPATED FUNCTIONAL OUTCOMES:  ADL: Patient will be largely modified independent with ADLs except possible bathing and LB dressing which may need some assist   Bladder/Bowel: Patient will be modified independent with bladder/bowel management upon completion of rehab program   Transfers: Patient will be supervision-modified independent with transfers upon completion of rehab program   Locomotion: Patient will be at or near supervision-modified independent with locomotion (wheelchair or ambulation)  upon completion of rehab program   Cognitive: Patient will be near prior level of cognitive function upon completion of rehab program     DISCHARGE PLANNING NEEDS  Equipment needs: To be determined at team conference.      REHAB ANTICIPATED PARTICIPATION RESTRICTIONS:  Uncertain at this time.  To be determined closer to discharge.

## 2024-07-10 NOTE — ASSESSMENT & PLAN NOTE
Improved (temp required NGT and Gen Sx c/s on acute)  Last BM 7/15. Currently with only miralax and suppository ordered PRN  Monitor GI function, intake

## 2024-07-10 NOTE — ASSESSMENT & PLAN NOTE
S/p 3v CABG 6/20/24 (prior stenting  Antithrombotic: Aspirin, Brilinta  Statin  Optimal blood pressure and blood sugar control  Outpatient f/u with Cards

## 2024-07-10 NOTE — ASSESSMENT & PLAN NOTE
Hx CAD with PCI/stents; now status post CABG x 3 on 6/20/24  Intraoperative DEDRICK demonstrates preserved systolic function, with LVEF 55%   Currently on p.o. diuretic due to postop volume overload  Also on postop course of prophylactic amiodarone  Continue aspirin, statin, beta-blocker  Monitor incisions, sternal precautions

## 2024-07-10 NOTE — ASSESSMENT & PLAN NOTE
IM consulted and with overall management at their discretion during [facility] course  2000 FR and Torsemide per IM   Outpatient BMP with PCP

## 2024-07-10 NOTE — PROGRESS NOTES
SLP TAA     07/10/24 1200   Patient Data   Rehab Impairment Impairment of mobility, safety and Activities of Daily Living (ADLs) due to Cardiac   Etiologic Diagnosis Coronary Artery Disease  (Punctate acute to subacute infarcts involving the right caudate head, anterior centrum semiovale, and lateral right frontal cortex.)   Date of Onset 06/20/24   Restrictions/Precautions   Precautions Bed/chair alarms;Cardiac/sternal;Cognitive;Fall Risk;Supervision on toilet/commode   Pain Assessment   Pain Assessment Tool 0-10   Pain Score No Pain   Eating Assessment   Type of Assistance Needed Set-up / clean-up;Verbal cues   Physical Assistance Level No physical assistance   Eating CARE Score 4   Comprehension   Assist Devices Glasses  (reading glasses as needed)   Auditory Basic;Complex   Visual Basic;Complex   Findings Please see SLP tx note for further information.   QI: Comprehension 4. Undestands: Clear comprehension without cues or repetitions   Comprehension (FIM) 5 - Needs help/cues, repetition only RARELY ( < 10% of the time)   Expression   Verbal Basic;Complex   Non-Verbal Basic;Complex   Intelligibility Sentence   Findings Please see SLP tx note for further information.   QI: Expression 4. Express complex messages without difficulty and with speech that is clear and easy to understan   Expression (FIM) 5 - Needs help/cues only RARELY (< 10% of the time)   Social Interaction   Cooperation with staff;with family   Participation Individual   Behaviors observed Appropriate   Findings Please see SLP tx note for further information.   Social Interaction (FIM) 5 - Interacts appropriately with others 90% of time   Problem Solving   Complex Manages medications   Routine Manages call bell;Manges precautions;Manages ADL   Findings Please see SLP tx note for further information.   Problem solving (FIM) 3 - Solves basic problmes 50-74% of time   Memory   Recognize People Yes   Remember Routine Yes   Initiates Tasks Yes  "  Short-Term Impaired   Long Term Impaired   Recalls Precaution Yes   Findings Please see SLP tx note for further information.   Memory (FIM) 2 - Recognizes, recalls/performs 25-49%   Discharge Information   Vocational Plan Retired/not working   Barriers to Return to Vocation Environmental Access;Strength;Endurance;Skill Set Limitation   Patient's Discharge Plan Home with family support   Patient's Rehab Expectations 'To get out of here'   Barriers to Discharge Home Limited Family Support;Decreased Cognitive Function;Decreased Strength;Decreased Endurance;Safety Considerations   Impressions Pt  is a 78 year old patient with a history of AAA repair 2011, CAD with previous PCI/stents, BPH, HLD, HTN, TIAs (on ASA/Plavix at home), PAD/bilateral carotid artery stenosis, 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  He underwent a cardiac catheterization on 6/3/24 as an outpatient that revealed severe multivessel CAD and he was referred for CABG.       On 6/2024, he underwent a CABG x 3 with LIMA/LAD, SVG/right PDA, SVG OM1.  He had post-operative volume overload and hypoxia and was treated with Bumex.  He had transient BARBARA/CKD.  He also developed an ileus vs SBP which did resolve.       On 7/3/24, he was a rapid response for dysarthria, left facial weakness.  CT head was negative for acute changes.  CTA showed \"Patent major vessels of the [place] of coe without high-grade stenosis; no aneurysm; moderate to severe atherosclerotic stenosis at the origin of the left vertebral artery\".  There was no hemodynamically significant stenosis in the cervical carotid and the right vertebral arteries.  MRI revealed punctate acute to subacute infarcts involving the right caudate head, anterior centrum semiovale, and lateral right frontal cortex along with chronic right corona radiata/basal ganglia and bilateral " cerebellar lacunar infarcts.  Neurology felt likely that did not occur as a result of the CABG/procedure since the CVA happened 10 days post-op.  Even thought he was a Plavix responder, it was felt that it may have not provided enough protection and he was switched from ASA/Plavix to ASA/Brilinta.  He is to have a Zio patch as an outpatient to eliminate the possibility that he had PAF as a cause although no atrial fibrillation was seen on telemetry during his stay.      Pt is a good rehab candidate to achieve min-mod A assist upon anticipated discharge home w/ family support/supervision. Current barriers include: working memory, immediate memory, STM, LTM, remote memory, reasoning, sequencing, problem solving, executive function skills, attention and word recall and decrease independence overall ADLs which will impact pt's safety awareness and functional mobility. Pt estimated length of stay 2 weeks in [facility]. Pt will benefit from skilled SLP services to maximize overall cognitive and swallow abilities at this time to decrease burden of care for family at time of discharge.    SLP Therapy Minutes   SLP Time In 1200   SLP Time Out 1300   SLP Total Time (minutes) 60   SLP Mode of treatment - Individual (minutes) 60   SLP Mode of treatment - Concurrent (minutes) 0   SLP Mode of treatment - Group (minutes) 0   SLP Mode of treatment - Co-treat (minutes) 0   SLP Mode of Treatment - Total time(minutes) 60 minutes   SLP Cumulative Minutes 60   Cumulative Minutes   Cumulative therapy minutes 150

## 2024-07-10 NOTE — PROGRESS NOTES
[facility]  Progress Note  Name: Shane Chau Sr. I  MRN: [id number]  Unit/Bed#: -01 I Date of Admission: 7/9/2024   Date of Service: 7/10/2024 I Hospital Day: 1    Assessment & Plan   * CVA (cerebral vascular accident) (HCC)  Assessment & Plan  Stroke alert called on 7/3/24 due to dysarthria, left sided weakness, left tongue deviation, left facial droop, and left pronator drift. Symptoms have since improved.  MRI brain 7/6/2024 showed: Punctate acute to subacute infarcts involving the right caudate head, anterior centrum semiovale, and lateral right frontal cortex. Chronic right corona radiata/basal ganglia and bilateral cerebellar lacunar infarcts. Mild chronic microangiopathic ischemic changes..   Neurology evaluated, recommendations appreciated  Distribution of the strokes found on MRI is suspicious for an embolic source -> plan for further cardiac work up with Zio patch/ILR as outpatient   If arrhythmia is found on work up recommend switching from DAPT to NOAC and ASA  P2Y12 level checked during this hospitalization was 180 -> switched from Plavix to Brilinta   Will need price check for Brilinta   Continue with ASA and statin     Coronary artery disease of native artery of native heart with stable angina pectoris (HCC)  Assessment & Plan  Hx CAD with PCI/stents; now status post CABG x 3 on 6/20/24  Intraoperative DEDRICK demonstrates preserved systolic function, with LVEF 55%   Currently on p.o. diuretic due to postop volume overload  Also on postop course of phylactic amiodarone  Continue aspirin, statin, beta-blocker  Monitor incisions, sternal precautions    Volume overload  Assessment & Plan  Iatrogenic volume overload status post CABG. Preserved LVEF on intra-op DEDRICK  Continues with bilateral LE edema on exam, encourage elevation/compression   Continue with Demadex 20 mg daily   Monitor volumes status closely   Daily weights, intake and output   Fluid and salt  restriction   Intermittent BMP    CKD (chronic kidney disease) stage 2, GFR 60-89 ml/min  Assessment & Plan  Diagnosis of CKD however per chart review baseline creatinine approximately 0.9-1.2  Noted with postoperative BARBARA with creatinine peak of 2.2 in the setting of iatrogenic volume overload  Initiated on Demadex 20 mg daily, and continued on this for now   Monitor BMP intermittently    Postoperative anemia due to acute blood loss  Assessment & Plan  No history of anemia, baseline hemoglobin 13. Noted with postop anemia following CABG.  Remains asymptomatic and hemodynamically stable at this time   Monitor CBC intermittently and transfuse as needed for hemoglobin < 8     History of ileus  Assessment & Plan  Postoperative ileus, managed by general surgery conservatively   Resolved, tolerating regular diet and having regular BMs    Hyponatremia  Assessment & Plan  Mild, with serum sodium 133 on 7/9  Continue FR 2000 ml and Demadex 20 mg daily  Monitor volume status, intake and output  Monitor BMP intermittently    Primary hypertension  Assessment & Plan  Blood pressure stable on review  Continue Lopressor 50 mg q12h  Started on Demadex 20 mg daily postoperatively due to volume overload, continue    Abdominal aortic aneurysm without rupture (HCC)  Assessment & Plan  Status post repair 2011  Follows with Dr Juares for surveillance     PAD (peripheral artery disease) (HCC)  Assessment & Plan  Has B/L carotid stenosis  Continue ASA/Brilinta and statin               The above assessment and plan was reviewed and updated as determined by my evaluation of the patient on 7/10/2024.    Labs:   Results from last 7 days   Lab Units 07/05/24  0847   WBC Thousand/uL 12.88*   HEMOGLOBIN g/dL 11.3*   HEMATOCRIT % 34.3*   PLATELETS Thousands/uL 416*     Results from last 7 days   Lab Units 07/09/24  0458 07/08/24  0315   SODIUM mmol/L 133* 131*   POTASSIUM mmol/L 3.8 3.6   CHLORIDE mmol/L 100 99   CO2 mmol/L 24 23   BUN mg/dL 23  24   CREATININE mg/dL 1.05 1.08   CALCIUM mg/dL 7.8* 7.6*     Results from last 7 days   Lab Units 07/04/24  0438   HEMOGLOBIN A1C % 5.7*         Results from last 7 days   Lab Units 07/10/24  1052 07/09/24  2103 07/09/24  1553   POC GLUCOSE mg/dl 121 107 107       Imaging  No orders to display       Review of Scheduled Meds:  Current Facility-Administered Medications   Medication Dose Route Frequency Provider Last Rate    acetaminophen  975 mg Oral Q6H While awake SANTHOSH Cunningham      aspirin  81 mg Oral Daily SANTHOSH Cunningham      atorvastatin  80 mg Oral After Dinner SANTHOSH Cunningham      bisacodyl  10 mg Rectal Daily PRN Dandre Stoner MD      chlorhexidine  15 mL Mouth/Throat BID SANTHOSH Cunningham      heparin (porcine)  5,000 Units Subcutaneous Q8H On license of [facility] SANTHOSH Cunningham      insulin lispro  1-5 Units Subcutaneous TID AC SANTHOSH Cunningham      insulin lispro  1-5 Units Subcutaneous HS SANTHOSH Cunnignham      lidocaine  2 patch Topical Daily SANTHOSH Cunningham      melatonin  6 mg Oral HS SANTHOSH Cunningham      metoprolol tartrate  50 mg Oral Q12H On license of [facility] SANTHOSH Cunningham      ondansetron  4 mg Oral Q6H PRN SANTHOSH Cunningham      pantoprazole  40 mg Oral Early Morning SANTHOSH Cunningham      polyethylene glycol  17 g Oral Daily PRN Dandre Stoner MD      potassium chloride  20 mEq Oral BID SANTHOSH Cunningham      ticagrelor  90 mg Oral Q12H On license of [facility] SANTHOSH Cunningham      torsemide  20 mg Oral Daily SANTHOSH Cunningham         Subjective/ HPI: Patient seen and examined. Patients overnight issues or events were reviewed with nursing staff. New or overnight issues include the following:     Patient offers no complaints today. Specifically denies abdominal pain, chest pain, n/v/d, urinary symptoms.     ROS:   A 10 point ROS was performed; negative except as noted  above.        *Labs /Radiology studies Reviewed, no new imaging  *Medications  reviewed and reconciled as needed  *Please refer to order section for additional ordered labs studies      Physical Examination:  Vitals:   Vitals:    07/10/24 0554 07/10/24 0830 07/10/24 0900 07/10/24 0903   BP: 124/71 134/66 147/77 120/73   BP Location: Left arm  Left arm Left arm   Pulse: 75 83     Resp: 17      Temp: 98.1 °F (36.7 °C)      TempSrc: Oral      SpO2: 95%      Weight: 74.1 kg (163 lb 5.8 oz)      Height:           General Appearance: NAD; pleasant  HEENT: conjunctiva normal; mucous membranes moist; face symmetrical  Neck:  Supple  Lungs: clear bilaterally, normal respiratory effort, no retractions, expiratory effort normal, on room air  CV: regular rate and rhythm, no murmurs rubs or gallops noted   ABD: soft non tender, abdominal binder in place  EXT: +bilateral LE edema, compression stockings in place  Skin: sternotomy incision CDI   Psych: affect normal, mood normal  Neuro: AAOx3       The above physical exam was reviewed and updated as determined by my evaluation of the patient on 7/10/2024.    Invasive Devices       Peripheral Intravenous Line  Duration             Peripheral IV 07/07/24 Right;Ventral (anterior) Forearm 3 days                       VTE Pharmacologic Prophylaxis: Heparin  Code Status: Level 1 - Full Code  Current Length of Stay: 1 day(s)    Total floor / unit time spent today 30 minutes  Coordination of patient's care was performed in conjunction with primary service. Time invested included review of patient's labs, vitals, and management of their comorbidities with continued monitoring, examination of patient as well as answering patient questions, documenting her findings and creating progress note in electronic medical record,  ordering appropriate diagnostic testing.       ** Please Note:  voice to text software may have been used in the creation of this document. Although proof errors in  transcription or interpretation are a potential of such software**

## 2024-07-10 NOTE — ASSESSMENT & PLAN NOTE
Iatrogenic volume overload status post CABG. Preserved LVEF on intra-op DEDRICK  Continues with bilateral LE edema on exam, encourage elevation/compression   Continue with Demadex 20 mg daily   Monitor volumes status closely   Daily weights, intake and output   Fluid and salt restriction   Intermittent BMP

## 2024-07-10 NOTE — ASSESSMENT & PLAN NOTE
Mild, with serum sodium recently 132  Continue FR 2000 ml and Demadex 20 mg daily  Monitor volume status, intake and output  Monitor BMP intermittently

## 2024-07-10 NOTE — ASSESSMENT & PLAN NOTE
Internal medicine consulted and co-management with their service  Monitor vitals with and without activity; monitor for orthostasis  Monitor hemoglobin, electrolytes, kidney function, hydration status   Current meds: Lopressor, Torsemide daily

## 2024-07-10 NOTE — ASSESSMENT & PLAN NOTE
No documented diagnosis of CKD however per chart review, baseline creatinine approximately 0.9-1.2 with GFR 60-90  Noted with postoperative BARBARA with creatinine peak of 2.2 in the setting of iatrogenic volume overload  Initiated on Demadex 20 mg daily, and continued on this for now   Monitor BMP intermittently

## 2024-07-10 NOTE — PROGRESS NOTES
PT ARC GOAL        07/10/24 1600   Rehab Team Goals   Transfer Team Goal Patient will be independent with transfers with least restrictive device upon completion of rehab program   Locomotion Team Goal Patient will be independent with locomotion with least restrictive device upon completion of rehab program   Rehab Team Interventions   PT Interventions Gait Training;Therapeutic Exercise;Neuromuscualr Reeducation;Transfer Training;Bed Mobility;Modalities;Patient/Family Education   PT Transfer Goal   Roll left and right Goal 06. Independent - Patient completes the activity by him/herself with no assistance from a helper.   Sit to lying Goal 06. Independent - Patient completes the activity by him/herself with no assistance from a helper.   Lying to sitting on side of bed Goal 06. Independent - Patient completes the activity by him/herself with no assistance from a helper.   Sit to stand Goal 06. Independent - Patient completes the activity by him/herself with no assistance from a helper.   Chair/bed-to-chair transfer Goal 06. Independent - Patient completes the activity by him/herself with no assistance from a helper.   Car Transfer Goal 06. Independent - Patient completes the activity by him/herself with no assistance from a helper.   Assistive Device   (no AD)   Status Target goal - two weeks  (10-14 days)   Locomotion Goal   Primary discharge mode of locomotion Walking   Assist Device   (none vs RW for energy conservation)   Gait Pattern Improvement Inconsistant Chantel;Improper weight shift;Excess Slow   Environment Level Surface;Uneven Surface;Well Lit   Walk 10 feet Goal 06. Independent - Patient completes the activity by him/herself with no assistance from a helper.   Walk 50 feet with 2 turns Goal 06. Independent - Patient completes the activity by him/herself with no assistance from a helper.   Walk 150 feet Goal 06. Independent - Patient completes the activity by him/herself with no assistance from a helper.    Walking 10 feet on uneven surface 06. Independent - Patient completes the activity by him/herself with no assistance from a helper.   Walking Goal Status Target goal - two weeks  (10-14 days)   Wheel 50 feet with 2 turns Goal 09. Not applicable   Wheel 150 feet Goal 09. Not applicable   Stairs Goal   1 step or curb goal 06. Independent - Patient completes the activity by him/herself with no assistance from a helper.   4 steps Goal 06. Independent - Patient completes the activity by him/herself with no assistance from a helper.   12 steps Goal 06. Independent - Patient completes the activity by him/herself with no assistance from a helper.   Assist Level Moderate [place]   Number of Stairs 12   Technique Reciprocal;Non-reciprocal   Hand Rail Right   Status Target goal - two weeks   Object Retrieval Goal   Picking up object Goal 06. Independent - Patient completes the activity by him/herself with no assistance from a helper.   Assistive Device  Reacher   Small Object Picked Up marker

## 2024-07-10 NOTE — ASSESSMENT & PLAN NOTE
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

## 2024-07-10 NOTE — PROGRESS NOTES
OT Long Term Goals     07/10/24 0800   Rehab Team Goals   ADL Team Goal Patient will be independent with ADLs with least restrictive device upon completion of rehab program   Rehab Team Interventions   OT Interventions Self Care;Therapeutic Exercise;Energy Conservation;Patient/Family Education;Home Management   Eating Goal   Eating Goal 06. Independent - Patient completes the activity by him/herself with no assistance from a helper.   Status Target goal - two weeks   Interventions Optimal Position   Grooming Goal   Oral Hygiene Goal 06. Independent - Patient completes the activity by him/herself with no assistance from a helper.   Status Target goal - two weeks   Intervention Balance Work;Therapeutic Exercise;Tolerance Work   Tub/Shower Transfer Goal   Method Tub Shower   Assist Device Grab Bar   Status Target goal - two weeks   Interventions Assistive Device;ADL Training   Bathing Goal   Shower/bathe self Goal 06. Independent - Patient completes the activity by him/herself with no assistance from a helper.   Environment Standing;Tub;Shower   Safety Precautions Sternal Precaution   Status Target goal - two weeks   Intervention Assistive Device;ADL Training;Therapeutic Exercise   Upper Body Dressing Goal   Upper body dressing Goal 06. Independent - Patient completes the activity by him/herself with no assistance from a helper.   Environment Seated   Safety Precautions Sternal Precaution   Status Target goal - two weeks   Intervention Balance Work;Therapeutic Exercise;Tolerance Work   Lower Body Dressing Goal   Lower body dressing Goal 06. Independent - Patient completes the activity by him/herself with no assistance from a helper.   Safety Precautions Sternal Precaution   Status Target goal - two weeks   Intervention Balance Work;Therapeutic Exercise;Tolerance Work   Toileting Transfer Goal   Toilet transfer Goal 06. Independent - Patient completes the activity by him/herself with no assistance from a helper.    Assistive Device Grab Bar   Safety Sternal Precaution   Status Target goal - two weeks   Intervention ADL Training;Balance Work;Assistive Device   Toileting Goal   Toileting hygiene Goal 06. Independent - Patient completes the activity by him/herself with no assistance from a helper.   Status Target goal - two weeks   Intervention ADL Training;Balance Work;Assistive Device   Meal Prep and Kitchen Mobility   Assist Level Independent   Status Target goal - two weeks   Medication Management   Assist Level Independent   Status Target goal - two weeks   Laundry   Assist Level Independent   Status Target goal - two weeks

## 2024-07-10 NOTE — ASSESSMENT & PLAN NOTE
Diagnosis of CKD however per chart review baseline creatinine approximately 0.9-1.2  Noted with postoperative BRABARA with creatinine peak of 2.2 in the setting of iatrogenic volume overload  Initiated on Demadex 20 mg daily, and continued on this for now   Monitor BMP intermittently

## 2024-07-10 NOTE — PCC OCCUPATIONAL THERAPY
7/16/24  Pt progressed to largely Mod I w/ basic Adls, requires up to [facility] for footwear for TEDs don/doff only, otherwise Mod I. Mod I for basic kitchen mob and meal prep. Rec Sup/assists for higher level meal prep, finances, med management at this time 2' pt performance w/ tasks in sessions and 18/30 MOCA score this admission. FT completed successfully w/ pts wife, Maryjo on 7/16. Pt provided w IRP using RW on 7/16. From OT standpoint pt OK for DC home tomorrow w/ wife and ongoing Home OT services.        OT Eval to be completed 07/10/24  07/11/24: Pt is limited by decreased endurance, impaired balance, weakness, restricted UE ROM, and decreased functional mobility. These impairments cause decline w/ ADL/IADLs function and addressed in pt goals. Pt demo G rehab potential and motivated to completed OT tx to DC home in 10-14 days. Pt plans to be Independent in all goals w/ use of RW and other AE. OT interventions include ADL/IADL retraining, balance training, endurance training, functional transfers, AE education, and energy conservation tech.

## 2024-07-10 NOTE — PROGRESS NOTES
Physical Medicine and Rehabilitation Progress Note  Shane Chau Sr. 78 y.o. male MRN: [id number]  Unit/Bed#: [facility] 973-01 Encounter: [id number]      Assessment & Plan:     Decline in ADLs and mobility: Functional assessment        FIM  Care Score  Admit Score Recent Score    Total assist  1-100% or 2p    Tot     Max assist 2-51-75%    Sub UBD, LBD, bathing    Mod assist 3- 26-50%  Par     Min assist 3- 25% or < Par To hygiene    CG assist 4  TA     Sup/Setup 4-5 Sup     Mod-I/Indep 6 MI      Transfers  Mod-significant assist     Ambulation   50 ft mod assist      Stairs    4 steps mod assist      Goal: Supervision for most ADLs and for mobility with possible slight help for LBD and bathing  Major barriers:  Impaired balance, coordination, possibly cog, deconditioning  Dispo: Home with [facility] 14 days from admission      Coronary artery disease of native artery of native heart with stable angina pectoris ([facility])  Assessment & Plan  S/p 3v CABG 6/20/24 (prior stenting)   Sternal precautions  7/10 noted to have some drainage from sternal incision somewhat increased from earlier; patient states this has been happening from time to time since sx   - Cardiac sx c/s - recommended Duricef and LWC with soap/water, DSD   - Will c/s wound care to see if they have other product/recs for optimal LWC as well   - Obtained CBC - no leukocytosis 7/10, pt afebrile, monitor site closely   IM consulted and with overall management at their discretion during ARC course  Antithrombotic: Aspirin, Brilinta  Statin  Optimal blood pressure and blood sugar control      * CVA (cerebral vascular accident) ([facility])  Assessment & Plan  - Occurred about 10 days after CABG; unclear relation to procedure   - punctate acute to subacute infarcts involving the right caudate head, anterior centrum semiovale, and lateral right frontal cortex and chronic right corona radiata/basal ganglia and bilateral cerebellar lacunar infarcts.  - Residual impairments on  admission to [facility]C: L sided weakness, imbalance, incoordination, possibly cog impairments-     Secondary stroke prevention  - OP Ziopatch recommended to rule out atrial fibrillation after discharge  - Antithrombotic: Aspirin and Brilinta  - Statin  - Optimal management of blood pressure and diabetes  -  patient and if applicable caregiver on optimal stroke management  - Fall precautions   - Follow-up with neurology and PCP after d/c   - Continue acute comprehensive interdisciplinary inpatient rehabilitation to include intensive skilled therapies (PT, OT, ST) as outlined with oversight and management by rehabilitation physician as well as inpatient rehab level nursing, case management and weekly interdisciplinary team meetings.       Frequent bowel movements  Assessment & Plan  Patient had several Bms last 2 days and some were initially more diarrhea but most recent one was formed  Pt afebrile without leukocytosis or abdominal pain  No recent bowel meds; did have hx of ileus; is eating ok  Monitor for now  If concerns for infectious diarrhea in future obtain C diff or other studies  Ensure adequate hydration       Potential for cognitive impairment  Assessment & Plan  -Acute comprehensive interdisciplinary inpatient rehabilitation to include intensive skilled therapies as outlined with oversight and management by rehabilitation physician.  Continue inpatient rehab level nursing, case management and weekly interdisciplinary team meetings.   -Recommend MOCA during ARC course   -Recommend eval of med mgmt and IADLs   -Monitor neuro-exam, wakefulness, mood, cognition, insight into deficits and safety awareness  - If cog impairment noted recommend - patient (+/- family/caregiver education) and training and compensatory strategies to optimize safety, function, and decrease risk of complications  -Optimize sleep-wake cycle  -Limit sedating medications when possible  -Monitor and ensure optimal management electrolytes,  "nutrition, and hydration  -Monitor for signs or symptoms of infection, medication intolerances, other systemic etiologies  -Fall precautions with frequent rounding; proactive toileting program, patient should not be unattended in bathroom    -OP follow-up PCP        Oral phase dysphagia  Assessment & Plan  D3, thins  SLP eval and tx  Aspiration precautions     Stenosis of left vertebral artery  Assessment & Plan  Moderate to severe left vertebral artery atherosclerotic stenosis   - Antithrombotic: aspirin, brilinta  - Statin  - Optimal blood pressure and blood sugar control  - OP neuro     At risk for venous thromboembolism (VTE)  Assessment & Plan  SCDs, ambulation, and heparin       Postoperative anemia due to acute blood loss  Assessment & Plan  Consult(ed) IM and comanagement with their service during ARC course   Monitor H/H, vitals, signs/symptoms of acute bleeding      Volume overload  Assessment & Plan  IM consulted and with overall management at their discretion during ARC course  Torsemide per IM     History of ileus  Assessment & Plan  Improved (temp required NGT and Gen Sx c/s on acute)  Monitor GI function, intake     PAD (peripheral artery disease) ([facility])  Assessment & Plan  \"PAD/bilateral carotid artery stenosis, 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\"  IM consulted and with overall management at their discretion during ARC course  - Antithrombotic: asp/brilinta  - Statin  - Optimal blood pressure and blood sugar control  - OP Vasc sx       Hyponatremia  Assessment & Plan  IM consulted and with overall management at their discretion during ARC course  2000 FR    CKD (chronic kidney disease) stage 2, GFR 60-89 ml/min  Assessment & Plan  Lab Results   Component Value Date    EGFR 67 07/09/2024    EGFR 65 07/08/2024    EGFR 63 07/07/2024    CREATININE 1.05 07/09/2024    CREATININE 1.08 07/08/2024    CREATININE 1.10 07/07/2024   Monitor BUN/Cr intermittently as well as electrolytes   IM consulted to " manage  Limit nephrotoxic agents when possible  Optimal BP mgmt       Primary hypertension  Assessment & Plan  Internal medicine consulted and co-management with their service  Monitor vitals with and without activity; monitor for orthostasis  Monitor hemoglobin, electrolytes, kidney function, hydration status   Current meds: MTP and torsemide per IM       Abdominal aortic aneurysm without rupture (HCC)  Assessment & Plan  Mgmt per IM         Other Medical Issues:  Monitor for     Follow-up providers and other issues to be followed up after discharge  PCP  Neuro  Cards  Cardiac Sx  Vasc Sx  Other chronic providers    Objective:    Allergies per EMR  Diagnostic Studies: Reviewed, no new imaging  No orders to display     See above as well    Laboratory: Labs reviewed  Results from last 7 days   Lab Units 07/10/24  1601 07/05/24  0847   HEMOGLOBIN g/dL 10.6* 11.3*   HEMATOCRIT % 31.9* 34.3*   WBC Thousand/uL 9.13 12.88*     Results from last 7 days   Lab Units 07/10/24  1601 07/09/24  0458 07/08/24  0315   BUN mg/dL 21 23 24   SODIUM mmol/L 132* 133* 131*   POTASSIUM mmol/L 4.2 3.8 3.6   CHLORIDE mmol/L 100 100 99   CREATININE mg/dL 1.18 1.05 1.08            Drug regimen reviewed, all potential adverse effects identified and addressed:    Scheduled Meds:  Current Facility-Administered Medications   Medication Dose Route Frequency Provider Last Rate    acetaminophen  975 mg Oral Q6H While awake SANTHOSH Cunningham      aspirin  81 mg Oral Daily SANTHOSH Cunningham      atorvastatin  80 mg Oral After Dinner SANTHOSH Cunningham      bisacodyl  10 mg Rectal Daily PRN Dandre Stoner MD      cefadroxil  1,000 mg Oral Q12H KRISTI Baker PA-C      chlorhexidine  15 mL Mouth/Throat BID SANTHOSH Cunningham      heparin (porcine)  5,000 Units Subcutaneous Q8H KRISTI SANTHOSH Cunningham      lidocaine  2 patch Topical Daily SANTHOSH Cunningham      melatonin  6 mg Oral HS  SANTHOSH Cunningham      metoprolol tartrate  50 mg Oral Q12H [facility] SANTHOSH Cunningham      ondansetron  4 mg Oral Q6H PRN SANTHOSH Cunningham      pantoprazole  40 mg Oral Early Morning SANTHOSH Cunningham      polyethylene glycol  17 g Oral Daily PRN Dandre Stoner MD      potassium chloride  20 mEq Oral BID SANTHOSH Cunningham      ticagrelor  90 mg Oral Q12H [facility] SANTHOSH Cunningham      torsemide  20 mg Oral Daily SANTHOSH Cunningham         Chief Complaints:  Rehab follow-up     Subjective:     On eval, patient did note some loose bowel movements earlier but did have a form 1 more recently.  He denies abdominal pain, nausea, vomiting.  He reports eating okay.  Patient denies worsening strength, sensation, balance, lightheadedness, chest discomfort, fever, or other new complaints.    ROS: A 10 point ROS was performed; negative except as noted above.       Physical Exam:  Temp:  [97.9 °F (36.6 °C)-98.1 °F (36.7 °C)] 98 °F (36.7 °C)  HR:  [72-84] 72  Resp:  [16-17] 16  BP: (120-147)/(66-78) 140/78  SpO2:  [95 %-96 %] 96 %    GEN:  Sitting in NAD   HEENT/NECK: MMM  CARDIAC: Regular rate rhythm, no murmers, no rubs, no gallops  LUNGS:  clear to auscultation, no wheezes, rales, or rhonchi  ABDOMEN: Soft, non-tender, non-distended, normal active bowel sounds  EXTREMITIES/SKIN:  lower sternal incision with some serosang drainage and fibrinous material inferiorly; subtle BLE edema without calf TTP   NEURO:   MENTAL STATUS: Able to follow simple commands;  and Strength/MMT:  Unchanged  PSYCH:  Affect:  Flattened some      ** Please Note: Fluency Direct voice to text software may have been used in the creation of this document. **

## 2024-07-10 NOTE — ASSESSMENT & PLAN NOTE
-Acute comprehensive interdisciplinary inpatient rehabilitation to include intensive skilled therapies as outlined with oversight and management by rehabilitation physician.  Continue inpatient rehab level nursing, case management and weekly interdisciplinary team meetings.   MOCA 7/12 18 out of 30 indicated mild cog impairment but close to mod with greatest deficits in VSP/executive, language, and delayed recall  -Recommend eval of med mgmt and IADLs   -Monitor neuro-exam, wakefulness, mood, cognition, insight into deficits and safety awareness  - patient (+/- family/caregiver education) and training and compensatory strategies to optimize safety, function, and decrease risk of complications  -Optimize sleep-wake cycle  -Limit sedating medications when possible  -Monitor and ensure optimal management electrolytes, nutrition, and hydration  -Monitor for signs or symptoms of infection, medication intolerances, other systemic etiologies  -Fall precautions with frequent rounding; proactive toileting program, patient should not be unattended in bathroom    -OP follow-up PCP, neuro, and possibly Salma

## 2024-07-10 NOTE — ASSESSMENT & PLAN NOTE
Blood pressure stable on review  Continue Lopressor 50 mg q12h  Started on Demadex 20 mg daily postoperatively due to volume overload, continue

## 2024-07-10 NOTE — ASSESSMENT & PLAN NOTE
Mild, with serum sodium 133 on 7/9  Continue FR 2000 ml and Demadex 20 mg daily  Monitor volume status, intake and output  Monitor BMP intermittently

## 2024-07-10 NOTE — PROGRESS NOTES
OT Initial Evaluation Note       07/10/24 0800   Patient Data   Rehab Impairment Impairment of mobility, safety and Activities of Daily Living (ADLs) due to Cardiac   Etiologic Diagnosis Coronary Artery Disease  (punctate acute to subacute infarcts involving the right caudate head, anterior centrum semiovale, and lateral right frontal cortex and chronic right corona radiata/basal ganglia and bilateral cerebellar lacunar infarcts.)   Date of Onset 06/20/24   Support System   Name mandy Chakraborty wife   Managing Financials Yes   Able to provide 24 hour supervision Yes   Able to provide physical help? Yes   Home Setup   Type of Home Single Level  ([place])   Method of Entry Stairs   Number of Stairs 6   Number of Stairs in Home 13   First Floor Bathroom Tub;Grab Bars   First Floor Bathroom Accessibility Grab bars by toilet;Grab bars in tub/shower   First Floor Setup Available Yes   Available Equipment Roller Walker;Wheelchair;Long Handled Adaptive Equipment   Baseline Information   Vocation Other  (retired)   Transportation ;Family/friends drive  (wife drives)   Prior IADL Participation   Money Management Identify Money;Estimate Costs;Estimate Change;Combine Bills;Manage Checkbook   Meal Preparation Partial Participation   Laundry Partial Participation   Home Cleaning Other  (none)   Prior Level of Function   Self-Care 3. Independent - Patient completed the activities by him/herself, with or without an assistive device, with no assistance from a helper.   Indoor-Mobility (Ambulation) 3. Independent - Patient completed the activities by him/herself, with or without an assistive device, with no assistance from a helper.   Stairs 3. Independent - Patient completed the activities by him/herself, with or without an assistive device, with no assistance from a helper.   Functional Cognition 3. Independent - Patient completed the activities by him/herself, with or without an assistive device, with no  assistance from a helper.   Prior Device Used Z. None of the above   Falls in the Last Year   Number of falls in the past 12 months 0   Type of Injury Associated with Fall No injury   Patient Preference   Nickname (Patient Preference) Obdulio   Psychosocial   Psychosocial (WDL) WDL   Patient Behaviors/Mood Calm;Cooperative;Pleasant   Ability to Express Feelings Able to express   Ability to Express Needs Able to express   Ability to Express Thoughts Able to express   Ability to Understand Others Understands   Restrictions/Precautions   Precautions Bed/chair alarms;Cardiac/sternal;Fall Risk;Supervision on toilet/commode   Braces or Orthoses Other (Comment)  (ARTIE Fong and ABD binder)   Pain Assessment   Pain Assessment Tool 0-10   Pain Score No Pain   Eating Assessment   Type of Assistance Needed Independent   Physical Assistance Level No physical assistance   Eating CARE Score 6   Oral Hygiene   Type of Assistance Needed Incidental touching   Physical Assistance Level No physical assistance   Comment in stance at sink using RW with appropriate sequencing   Oral Hygiene CARE Score 4   Tub/Shower Transfer   Findings has tub/shower at home, does NOT have active shower orders at this time   Shower/Bathe Self   Type of Assistance Needed Physical assistance   Physical Assistance Level 76% or more   Comment pt seated EOB to complete SB. Pt req A with chest area due to drainage from incision. Nursing was consulted and chest dressing was changed. Pt required A with backside bathing while in stance using RW. Pt req VC for thoroughness. Pt demo SOB while in stance and educated on deep breathing techniques and utilizing rest breaks as needed. Pt reports having tub shower at home w/o tub bench/chair. Pt would req Max A if completing standing shower due to decreased endurance, balance, and decreased standing tolerance.   Shower/Bathe Self CARE Score 2   Dressing/Undressing Clothing   Type of Assistance Needed Physical assistance    Physical Assistance Level 51%-75%   Comment pt completed seated EOB and able to thread, orient, and don shirt appropriately. Pt educated on sternal precautions and provided with alernative solution to donning shirt w/o allowing both arms raised overhead. Pt demo G carryover of task. Pt req TA for donning ABD binder and will educate on donning/doffing in upcoming sessions.   Upper Body Dressing CARE Score 2   Type of Assistance Needed Physical assistance   Physical Assistance Level 51%-75%   Comment pt able to thread underwear/pants while seated EOB w/o A and in stance with RW with CGA. Pt does not report dizziness when in stance and able to manage clothing w/ fair+ standing balance. Pt able to tie pants w/o A.   Lower Body Dressing CARE Score 2   Putting On/Taking Off Footwear   Type of Assistance Needed Physical assistance   Physical Assistance Level 76% or more   Comment pt able to don/doff  socks seated EOB w/o A and using crossed leg tech. Pt req TA for donning TEDs and reports never donning ind PTA.   Putting On/Taking Off Footwear CARE Score 2   Toileting Hygiene   Type of Assistance Needed Physical assistance   Physical Assistance Level 25% or less   Comment pt req A with backside hygiene while in stance using GB for support. Pt complete CM w/ CGA   Toileting Hygiene CARE Score 3   Toilet Transfer   Type of Assistance Needed Physical assistance   Physical Assistance Level 26%-50%   Comment uses GB and RW with Min A.   Toilet Transfer CARE Score 3   Transfer Bed/Chair/Wheelchair   Type of Assistance Needed Physical assistance   Physical Assistance Level 51%-75%   Comment SPT using RW <> bed/recliner with Min A.   Chair/Bed-to-Chair Transfer CARE Score 2   Lying to Sitting on Side of Bed   Type of Assistance Needed Physical assistance   Physical Assistance Level 26%-50%   Comment pt able to use bed rails for A and demo some decreased trunk control when seated EOB   Lying to Sitting on Side of Bed CARE  Score 3   Sit to Stand   Type of Assistance Needed Physical assistance   Physical Assistance Level 25% or less   Comment pt completed STS with RW and min A demo Fair+ balance while in stance. Pt did not report any dizziness and some SOB during prolonged standing. Pt sitting EOB /77 and standing /74.   Sit to Stand CARE Score 3   Comprehension   Assist Devices Other  (reading glasses if needed)   Auditory Basic   Visual Basic   QI: Comprehension 4. Undestands: Clear comprehension without cues or repetitions   Comprehension (FIM) 6 - Understands complex/abstract but requires more time   Expression   Verbal Basic   Non-Verbal Basic   Intelligibility Sentence   QI: Expression 4. Express complex messages without difficulty and with speech that is clear and easy to understan   Expression (FIM) 7 - Expresses complex/abstract ideas in a reasonable time w/o devices or helper.   RUE Assessment   RUE Assessment WFL   LUE Assessment   LUE Assessment WFL   Coordination   Movements are Fluid and Coordinated 1   Sensation   Light Touch Not tested   Sharp/Dull Not tested   Stereognosis Not tested   Propioception Not tested   Cognition   Overall Cognitive Status WFL   Arousal/Participation Alert;Cooperative   Attention Attends with cues to redirect   Orientation Level Oriented X4   Memory Decreased recall of precautions   Following Commands Follows one step commands with increased time or repetition   Comments VC for sternal precautions and safety awareness. need to evaluate further to determine if there are any cog deficits from CVA   Discharge Information   Vocational Plan Retired/not working   Barriers to Return to Vocation Environmental Access;Strength;Endurance;Skill Set Limitation   Patient's Discharge Plan plan to return home with wife and return to completing ADLs and functiona mobility IND   Patient's Rehab Expectations Pt goal is to return home with independence   Barriers to Discharge Home Limited Family  Support;Decreased Strength;Decreased Endurance;Safety Considerations   Impressions Pt is 78 y.o admitted on 7/9 due to increased dyspnea and chest pain. Pt was dx with CAD and underwent a CABG x 3 with left internal mammary artery to left anterior descending, saphenous vein graft to right posterior descending artery, saphenous vein graft to obtuse marginal 1. Pt then dx with CVA and referred to [facility]C. PMH includes AAA (abdominal aortic aneurysm) (HCC), BPH (benign prostatic hyperplasia), CAD (coronary artery disease), Cholelithiasis, Former tobacco use, History of MI (myocardial infarction), History of TIA (transient ischemic attack), Hyperlipidemia, Hypertension, Insomnia, and Obesity. Precautions include sternal precautions, fall risk, and bed/chair alarms. Refer to flowsheet for PLOF, home setup, current functional status, and support system. Pt is limited by decreased endurance, impaired balance, weakness, restricted UE ROM, and decreased functional mobility. These impairments cause decline w/ ADL/IADLs function and addressed in pt goals. Pt demo G rehab potential and motivated to completed OT tx to DC home in 10-14 days. Pt plans to be Independent in all goals w/ use of RW and other AE. OT interventions include ADL/IADL retraining, balance training, endurance training, functional transfers, AE education, and energy conservation tech.   OT Therapy Minutes   OT Time In 0800   OT Time Out 0930   OT Total Time (minutes) 90   OT Mode of treatment - Individual (minutes) 90   OT Mode of treatment - Concurrent (minutes) 0   OT Mode of treatment - Group (minutes) 0   OT Mode of treatment - Co-treat (minutes) 0   OT Mode of Treatment - Total time(minutes) 90 minutes   OT Cumulative Minutes 90   Cumulative Minutes   Cumulative therapy minutes 90

## 2024-07-11 PROCEDURE — 99233 SBSQ HOSP IP/OBS HIGH 50: CPT

## 2024-07-11 PROCEDURE — 97530 THERAPEUTIC ACTIVITIES: CPT

## 2024-07-11 PROCEDURE — 99222 1ST HOSP IP/OBS MODERATE 55: CPT

## 2024-07-11 PROCEDURE — 92526 ORAL FUNCTION THERAPY: CPT

## 2024-07-11 PROCEDURE — 97535 SELF CARE MNGMENT TRAINING: CPT

## 2024-07-11 PROCEDURE — 97110 THERAPEUTIC EXERCISES: CPT

## 2024-07-11 RX ADMIN — POTASSIUM CHLORIDE 20 MEQ: 1500 TABLET, EXTENDED RELEASE ORAL at 10:07

## 2024-07-11 RX ADMIN — TICAGRELOR 90 MG: 90 TABLET ORAL at 21:03

## 2024-07-11 RX ADMIN — ACETAMINOPHEN 975 MG: 325 TABLET, FILM COATED ORAL at 10:06

## 2024-07-11 RX ADMIN — TORSEMIDE 20 MG: 20 TABLET ORAL at 10:07

## 2024-07-11 RX ADMIN — ACETAMINOPHEN 975 MG: 325 TABLET, FILM COATED ORAL at 15:08

## 2024-07-11 RX ADMIN — ATORVASTATIN CALCIUM 80 MG: 80 TABLET, FILM COATED ORAL at 17:07

## 2024-07-11 RX ADMIN — TICAGRELOR 90 MG: 90 TABLET ORAL at 10:07

## 2024-07-11 RX ADMIN — LIDOCAINE 2 PATCH: 700 PATCH TOPICAL at 21:01

## 2024-07-11 RX ADMIN — Medication 6 MG: at 21:01

## 2024-07-11 RX ADMIN — CEFADROXIL 1000 MG: 500 CAPSULE ORAL at 10:07

## 2024-07-11 RX ADMIN — ACETAMINOPHEN 975 MG: 325 TABLET, FILM COATED ORAL at 21:01

## 2024-07-11 RX ADMIN — HEPARIN SODIUM 5000 UNITS: 5000 INJECTION INTRAVENOUS; SUBCUTANEOUS at 21:01

## 2024-07-11 RX ADMIN — PANTOPRAZOLE SODIUM 40 MG: 40 TABLET, DELAYED RELEASE ORAL at 06:03

## 2024-07-11 RX ADMIN — HEPARIN SODIUM 5000 UNITS: 5000 INJECTION INTRAVENOUS; SUBCUTANEOUS at 15:08

## 2024-07-11 RX ADMIN — METOPROLOL TARTRATE 50 MG: 50 TABLET, FILM COATED ORAL at 10:07

## 2024-07-11 RX ADMIN — ASPIRIN 81 MG: 81 TABLET, COATED ORAL at 10:07

## 2024-07-11 RX ADMIN — CEFADROXIL 1000 MG: 500 CAPSULE ORAL at 21:03

## 2024-07-11 RX ADMIN — HEPARIN SODIUM 5000 UNITS: 5000 INJECTION INTRAVENOUS; SUBCUTANEOUS at 06:03

## 2024-07-11 RX ADMIN — METOPROLOL TARTRATE 50 MG: 50 TABLET, FILM COATED ORAL at 21:02

## 2024-07-11 RX ADMIN — POTASSIUM CHLORIDE 20 MEQ: 1500 TABLET, EXTENDED RELEASE ORAL at 17:07

## 2024-07-11 NOTE — CASE MANAGEMENT
Met w/pt and reviewed rehab routine and cm role. Pt resides with spouse in a [place] home with either 6 percy from the front or 13 from the garage. Pt has been made aware of the potential los and the restriction on driving temporarily. Pt states his wife is independent and does most of the home making chores. Pt helps with laundry. Pt owns a roller walker and found a w/c at a [employer] store. Pt has had outpt therapy at [facility]. Pt uses [facility] for rx services. Cm reviewed team mtg process and update from todays mtg. Pt was surprised about LOS stating he has been in the hospital for a long time. Cm reviewed his dc from the hospital and admission to acute rehab. Informed of insurance update conducted today.

## 2024-07-11 NOTE — PROGRESS NOTES
[facility]  Progress Note  Name: Shane Chau Sr. I  MRN: [id number]  Unit/Bed#: -01 I Date of Admission: 7/9/2024   Date of Service: 7/11/2024 I Hospital Day: 2    Assessment & Plan   * CVA (cerebral vascular accident) (HCC)  Assessment & Plan  Stroke alert called on 7/3/24 due to dysarthria, left sided weakness, left tongue deviation, left facial droop, and left pronator drift. Symptoms have since improved.  MRI brain 7/6/2024 showed: Punctate acute to subacute infarcts involving the right caudate head, anterior centrum semiovale, and lateral right frontal cortex. Chronic right corona radiata/basal ganglia and bilateral cerebellar lacunar infarcts. Mild chronic microangiopathic ischemic changes..   Neurology evaluated, recommendations appreciated  Distribution of the strokes found on MRI is suspicious for an embolic source -> plan for further cardiac work up with Zio patch/ILR as outpatient   If arrhythmia is found on work up recommend switching from DAPT to NOAC and ASA  P2Y12 level checked during this hospitalization was 180 -> switched from Plavix to Brilinta   Brilinta $47/month  Continue with ASA and statin     Prediabetes  Assessment & Plan  Recent A1c 5.7%   Monitor fasting glucose intermittently     Postoperative anemia due to acute blood loss  Assessment & Plan  No history of anemia, baseline hemoglobin 13. Noted with postop anemia following CABG.  Remains asymptomatic and hemodynamically stable at this time   Monitor CBC intermittently and transfuse as needed for hemoglobin < 8     Volume overload  Assessment & Plan  Iatrogenic volume overload status post CABG. Preserved LVEF on intra-op DEDRICK  Continues with bilateral LE edema on exam, encourage elevation/compression   Continue with Demadex 20 mg daily   Monitor volumes status closely   Daily weights, intake and output   Fluid and salt restriction   Intermittent BMP    PAD (peripheral artery disease)  ([facility])  Assessment & Plan  Has B/L carotid stenosis  Continue ASA/Brilinta and statin    Hyponatremia  Assessment & Plan  Mild, with serum sodium recently 132  Continue FR 2000 ml and Demadex 20 mg daily  Monitor volume status, intake and output  Monitor BMP intermittently    CKD (chronic kidney disease) stage 2, GFR 60-89 ml/min  Assessment & Plan  No documented diagnosis of CKD however per chart review, baseline creatinine approximately 0.9-1.2 with GFR 60-90  Noted with postoperative BARBARA with creatinine peak of 2.2 in the setting of iatrogenic volume overload  Initiated on Demadex 20 mg daily, and continued on this for now   Monitor BMP intermittently    Primary hypertension  Assessment & Plan  Blood pressure stable on review  Continue Lopressor 50 mg q12h  Started on Demadex 20 mg daily postoperatively due to volume overload, continue    Coronary artery disease of native artery of native heart with stable angina pectoris ([facility])  Assessment & Plan  Hx CAD with PCI/stents; now status post CABG x 3 on 6/20/24  Intraoperative DEDRICK demonstrates preserved systolic function, with LVEF 55%   Currently on p.o. diuretic due to postop volume overload  Also on postop course of prophylactic amiodarone  Continue aspirin, statin, beta-blocker  Monitor incisions, sternal precautions    Oral phase dysphagia  Assessment & Plan  Speech therapy is following  Currently on level 3 dysphagia diet with thin liquids    History of ileus  Assessment & Plan  Postoperative ileus, managed by general surgery   Required NGT placement for decompression, removed on 6/28  Resolved, tolerating regular diet and having regular BMs             The above assessment and plan was reviewed and updated as determined by my evaluation of the patient on 7/11/2024.    Labs:   Results from last 7 days   Lab Units 07/10/24  1601 07/05/24  0847   WBC Thousand/uL 9.13 12.88*   HEMOGLOBIN g/dL 10.6* 11.3*   HEMATOCRIT % 31.9* 34.3*   PLATELETS Thousands/uL 386 416*      Results from last 7 days   Lab Units 07/10/24  1601 07/09/24  0458   SODIUM mmol/L 132* 133*   POTASSIUM mmol/L 4.2 3.8   CHLORIDE mmol/L 100 100   CO2 mmol/L 23 24   BUN mg/dL 21 23   CREATININE mg/dL 1.18 1.05   CALCIUM mg/dL 7.8* 7.8*             Results from last 7 days   Lab Units 07/10/24  2104 07/10/24  1558 07/10/24  1052   POC GLUCOSE mg/dl 78 122 121       Imaging  No orders to display       Review of Scheduled Meds:  Current Facility-Administered Medications   Medication Dose Route Frequency Provider Last Rate    acetaminophen  975 mg Oral Q6H While awake SANTHOSH Cunningham      aspirin  81 mg Oral Daily SANTHOSH Cunningham      atorvastatin  80 mg Oral After Dinner SANTHOSH Cunningham      bisacodyl  10 mg Rectal Daily PRN Dandre Stoner MD      cefadroxil  1,000 mg Oral Q12H KRISTI Baker PA-C      chlorhexidine  15 mL Mouth/Throat BID SANTHOSH Cunningham      heparin (porcine)  5,000 Units Subcutaneous Q8H KRISTI SANTHOSH Cunningham      lidocaine  2 patch Topical Daily SANTHOSH Cunningham      melatonin  6 mg Oral HS SANTHOSH Cunningham      metoprolol tartrate  50 mg Oral Q12H [facility] SANTHOSH Cunningham      ondansetron  4 mg Oral Q6H PRN SANTHOSH Cunningham      pantoprazole  40 mg Oral Early Morning SANTHOSH Cunningham      polyethylene glycol  17 g Oral Daily PRN Dandre Stoner MD      potassium chloride  20 mEq Oral BID SANTHOSH Cunningham      ticagrelor  90 mg Oral Q12H [facility] SANTHOSH Cunningham      torsemide  20 mg Oral Daily SANTHOSH Cunningham         Subjective/ HPI: Patient seen and examined. Patients overnight issues or events were reviewed with nursing staff. New or overnight issues include the following:     Pt seen in his room with his wife. He states that he is fatigued. Discussed the price of Brilinta per month and he is agreeable to the cost. He denies any other  complaints.    ROS:   A 10 point ROS was performed; negative except as noted above.        *Labs /Radiology studies Reviewed  *Medications  reviewed and reconciled as needed  *Please refer to order section for additional ordered labs studies      Physical Examination:  Vitals:   Vitals:    07/11/24 0608 07/11/24 0705 07/11/24 0730 07/11/24 1006   BP: 135/76 113/73 120/74 128/76   BP Location: Left arm Right arm Right arm    Pulse: 73      Resp: 18      Temp: 98 °F (36.7 °C)      TempSrc: Oral      SpO2: 96%      Weight: 72 kg (158 lb 11.7 oz)      Height:           General Appearance: NAD; pleasant  HEENT: PERRLA, conjuctiva normal; mucous membranes moist; face symmetrical  Neck:  Supple  Lungs: clear bilaterally, normal respiratory effort, no retractions, expiratory effort normal, on room air  CV: regular rate and rhythm, no murmurs rubs or gallops noted   ABD: soft non tender, +BS x4  EXT: DP pulses intact, no lymphadenopathy, + edema bilat LE  Skin: normal turgor, normal texture, no rash  Psych: affect normal, mood normal  Neuro: AAOx3       The above physical exam was reviewed and updated as determined by my evaluation of the patient on 7/11/2024.    Invasive Devices       Peripheral Intravenous Line  Duration             Peripheral IV 07/07/24 Right;Ventral (anterior) Forearm 4 days                       VTE Pharmacologic Prophylaxis: Heparin  Code Status: Level 1 - Full Code  Current Length of Stay: 2 day(s)    Total floor / unit time spent today  35 minutes   Coordination of patient's care was performed in conjunction with primary service. Time invested included review of patient's labs, vitals, and management of their comorbidities with continued monitoring, examination of patient as well as answering patient questions, documenting her findings and creating progress note in electronic medical record,  ordering appropriate diagnostic testing.       ** Please Note:  voice to text software may have been used in  the creation of this document. Although proof errors in transcription or interpretation are a potential of such software**

## 2024-07-11 NOTE — PROGRESS NOTES
"Physical Medicine and Rehabilitation Progress Note  Shane Chau Sr. 78 y.o. male MRN: [id number]  Unit/Bed#: -01 Encounter: [id number]      Assessment & Plan:     Decline in ADLs and mobility: Functional assessment-improving         FIM  Care Score  Admit Score Recent Score    Total assist  1-100% or 2p    Tot     Max assist 2-51-75%    Sub UBD, LBD, bathing    Mod assist 3- 26-50%  Par  LBD   Min assist 3- 25% or < Par To hygiene To hygiene, Bathing, UBD   CG assist 4  TA     Sup/Setup 4-5 Sup     Mod-I/Indep 6 MI      Transfers  Mod-significant assist Min assist     Ambulation   50 ft mod assist  Min-mod assist short distance     Stairs    4 steps mod assist    Recent SLP FIM - Significant assist memory, mod assist PS, other domains supervision  Goal: Supervision for most ADLs and for mobility with possible slight help for LBD and bathing  Major barriers:  Impaired balance, coordination, possibly cog, deconditioning  Dispo: Home with [facility] 14 days from admission      Coronary artery disease of native artery of native heart with stable angina pectoris (HCC)  Assessment & Plan  S/p 3v CABG 6/20/24 (prior stenting)   Sternal precautions  7/10 noted to have some drainage from sternal incision somewhat increased from earlier; patient states this has been happening from time to time since sx   - Cardiac sx c/s - recommended Duricef and LWC  7/11 Wound care c/s  \"Mid sternal wound with full-thickness dehiscence.  Discussed with cardiac surgery advanced practitioner prior to examining patient, who reported they were able to express moderate amount of yellow drainage on exam from the distal aspect of the incision. No additional drainage able to be palpated or expressed on wound care exam.   Will recommend wound management of silver alginate and dry dressing daily and PRN, for antimicrobial benefit and to absorb any further drainage.  Oral antibiotics as per cardiac surgery is also following.   No apparent " "clinical s/s of infection present on wound care exam.\"   - No leukocytosis 7/10, pt afebrile, monitor site closely   IM consulted and with overall management at their discretion during ARC course  Antithrombotic: Aspirin, Brilinta  Statin  Optimal blood pressure and blood sugar control  Continue PT, OT  Monitor vitals      * CVA (cerebral vascular accident) (HCC)  Assessment & Plan  - Neuro exam stablle  - Occurred about 10 days after CABG; unclear relation to procedure   - punctate acute to subacute infarcts involving the right caudate head, anterior centrum semiovale, and lateral right frontal cortex and chronic right corona radiata/basal ganglia and bilateral cerebellar lacunar infarcts.  - Residual impairments on admission to ARC: L sided weakness, imbalance, incoordination, possibly cog impairments-     Secondary stroke prevention  - OP Ziopatch recommended to rule out atrial fibrillation after discharge  - Antithrombotic: Aspirin and Brilinta  - Statin  - Optimal management of blood pressure and diabetes  -  patient and if applicable caregiver on optimal stroke management  - Fall precautions   - Follow-up with neurology and PCP after d/c   - Continue acute comprehensive interdisciplinary inpatient rehabilitation to include intensive skilled therapies (PT, OT, ST) as outlined with oversight and management by rehabilitation physician as well as inpatient rehab level nursing, case management and weekly interdisciplinary team meetings.       Frequent bowel movements  Assessment & Plan  Improving   Pt afebrile without leukocytosis or abdominal pain  No recent bowel meds; did have hx of ileus; is eating ok  Monitor for now  If concerns for infectious diarrhea in future obtain C diff or other studies  Ensure adequate hydration       Potential for cognitive impairment  Assessment & Plan  -Acute comprehensive interdisciplinary inpatient rehabilitation to include intensive skilled therapies as outlined with " "oversight and management by rehabilitation physician.  Continue inpatient rehab level nursing, case management and weekly interdisciplinary team meetings.   -Recommend MOCA during ARC course   -Recommend eval of med mgmt and IADLs   -Monitor neuro-exam, wakefulness, mood, cognition, insight into deficits and safety awareness  - If cog impairment noted recommend - patient (+/- family/caregiver education) and training and compensatory strategies to optimize safety, function, and decrease risk of complications  -Optimize sleep-wake cycle  -Limit sedating medications when possible  -Monitor and ensure optimal management electrolytes, nutrition, and hydration  -Monitor for signs or symptoms of infection, medication intolerances, other systemic etiologies  -Fall precautions with frequent rounding; proactive toileting program, patient should not be unattended in bathroom    -OP follow-up PCP        Oral phase dysphagia  Assessment & Plan  D3, thins  SLP eval and tx  Aspiration precautions     Stenosis of left vertebral artery  Assessment & Plan  Moderate to severe left vertebral artery atherosclerotic stenosis   - Antithrombotic: aspirin, brilinta  - Statin  - Optimal blood pressure and blood sugar control  - OP neuro     At risk for venous thromboembolism (VTE)  Assessment & Plan  SCDs, ambulation, and heparin       Postoperative anemia due to acute blood loss  Assessment & Plan  Consult(ed) IM and comanagement with their service during ARC course   Monitor H/H, vitals, signs/symptoms of acute bleeding  Hb stable at 10.6 on 7/10     Volume overload  Assessment & Plan  IM consulted and with overall management at their discretion during ARC course  Torsemide per IM     History of ileus  Assessment & Plan  Improved (temp required NGT and Gen Sx c/s on acute)  Monitor GI function, intake     PAD (peripheral artery disease) ([facility])  Assessment & Plan  \"PAD/bilateral carotid artery stenosis, aneurysm of the iliac and popliteal " "artery\"  IM consulted and with overall management at their discretion during ARC course  - Antithrombotic: asp/brilinta  - Statin  - Optimal blood pressure and blood sugar control  - OP Vasc sx       Hyponatremia  Assessment & Plan  IM consulted and with overall management at their discretion during ARC course  2000 FR and Torsemide per IM       CKD (chronic kidney disease) stage 2, GFR 60-89 ml/min  Assessment & Plan  Lab Results   Component Value Date    EGFR 58 07/10/2024    EGFR 67 07/09/2024    EGFR 65 07/08/2024    CREATININE 1.18 07/10/2024    CREATININE 1.05 07/09/2024    CREATININE 1.08 07/08/2024   Monitor BUN/Cr intermittently as well as electrolytes   IM consulted to manage  Limit nephrotoxic agents when possible  Optimal BP mgmt       Primary hypertension  Assessment & Plan  Internal medicine consulted and co-management with their service  Monitor vitals with and without activity; monitor for orthostasis  Monitor hemoglobin, electrolytes, kidney function, hydration status   Current meds: MTP and torsemide per IM       Abdominal aortic aneurysm without rupture (HCC)  Assessment & Plan  Mgmt per IM         Other Medical Issues:  Monitor for     Follow-up providers and other issues to be followed up after discharge  PCP  Neuro  Cards  Cardiac Sx  Vasc Sx  Other chronic providers    Objective:    Allergies per EMR  Diagnostic Studies: Reviewed, no new imaging  No orders to display     See above as well    Laboratory: Labs reviewed  Results from last 7 days   Lab Units 07/10/24  1601 07/05/24  0847   HEMOGLOBIN g/dL 10.6* 11.3*   HEMATOCRIT % 31.9* 34.3*   WBC Thousand/uL 9.13 12.88*     Results from last 7 days   Lab Units 07/10/24  1601 07/09/24  0458 07/08/24  0315   BUN mg/dL 21 23 24   SODIUM mmol/L 132* 133* 131*   POTASSIUM mmol/L 4.2 3.8 3.6   CHLORIDE mmol/L 100 100 99   CREATININE mg/dL 1.18 1.05 1.08            Drug regimen reviewed, all potential adverse effects identified and " addressed:    Scheduled Meds:  Current Facility-Administered Medications   Medication Dose Route Frequency Provider Last Rate    acetaminophen  975 mg Oral Q6H While awake SANTHOSH Cunningham      aspirin  81 mg Oral Daily SANTHOSH Cunningham      atorvastatin  80 mg Oral After Dinner SANTHOSH Cunningham      bisacodyl  10 mg Rectal Daily PRN Dandre Stoner MD      cefadroxil  1,000 mg Oral Q12H KRISTI Mercedes Baker PA-C      chlorhexidine  15 mL Mouth/Throat BID SANTHOSH Cunningham      heparin (porcine)  5,000 Units Subcutaneous Q8H [facility] SANTHOSH Cunningham      lidocaine  2 patch Topical Daily SANTHOSH Cunningham      melatonin  6 mg Oral HS SANTHOSH Cunningham      metoprolol tartrate  50 mg Oral Q12H [facility] SANTHOSH Cunningham      ondansetron  4 mg Oral Q6H PRN SANTHOSH Cunningham      pantoprazole  40 mg Oral Early Morning SANTHOSH Cunningham      polyethylene glycol  17 g Oral Daily PRN Dandre Stoner MD      potassium chloride  20 mEq Oral BID SANTHOSH Cunningham      ticagrelor  90 mg Oral Q12H [facility] SANTHOSH Cunningham      torsemide  20 mg Oral Daily SANTHOSH Cunningham         Chief Complaints:  Rehab follow-up     Subjective:     On eval, patient denies increased shortness of breath, chest pain, fever, chills, worsening strength or sensation, no new complaints.    ROS: A 10 point ROS was performed; negative except as noted above.       Physical Exam:  Temp:  [98 °F (36.7 °C)-98.2 °F (36.8 °C)] 98.1 °F (36.7 °C)  HR:  [59-79] 79  Resp:  [16-18] 16  BP: (113-138)/(73-80) 138/80  SpO2:  [96 %-97 %] 97 %    GEN:  Sitting in NAD   HEENT/NECK: MMM  CARDIAC: Reg rate  LUNGS:  Nonlabored  ABDOMEN: Soft, non-tender, non-distended, normal active bowel sounds  EXTREMITIES/SKIN:  Subtle calf edema without calf TTP  NEURO:   MENTAL STATUS: awake, oriented to person, place, time, and situation and  Strength/MMT:  Left side 4+ to 5 out of 5, right side 5 out of 5  PSYCH:  Affect:  Flattened      ** Please Note: Fluency Direct voice to text software may have been used in the creation of this document. **    Time spent counseling/coordinating care. Counseling includes discussion with patient re: progress in therapies, functional issues observed by therapy staff, and discussion with patient his/her current medical state/wellbeing. Coordination of patient's care was performed in conjunction with Internal Medicine service to monitor patient's labs, vitals, and management of their comorbidities. In addition, this patient was discussed by the interdisciplinary team in weekly case conference today. The care of the patient was extensively discussed with all care providers and an appropriate rehabilitation plan was formulated unique for this patient. Barriers were identified preventing progression of therapy and appropriate interventions were discussed with each discipline. Please see the team note for input from all disciplines regarding barriers, intervention, and discharge planning.

## 2024-07-11 NOTE — CONSULTS
Consultation - Wound Care   Shane Chau . 78 y.o. male MRN: [id number]  Unit/Bed#: [facility] 973-01 Encounter: [id number]    Assessment:  Surgical wound dehiscence  Status post CABG    Plan:  Mid sternal wound with full-thickness dehiscence.  Discussed with cardiac surgery advanced practitioner prior to examining patient, who reported they were able to express moderate amount of yellow drainage on exam from the distal aspect of the incision. No additional drainage able to be palpated or expressed on wound care exam.   Will recommend wound management of silver alginate and dry dressing daily and PRN, for antimicrobial benefit and to absorb any further drainage.  Oral antibiotics as per cardiac surgery is also following.   No apparent clinical s/s of infection present on wound care exam.   A1C results reviewed with the patient today.     Result of 5.7 noted from 7/4/24  Managed by primary team   Recommend to continue with preventative nursing skin care measures as per PMR.  Pressure relief- offloading of pressure with turning/repositioning as patient medically tolerates, heel elevation, foam wedges for offloading/repositioning, and waffle cushion to chair.  Nutrition is following  Patient verbalized understanding of plan of care.  Epic secure chat wound care team with questions or concerns.   Routine wound care follow-up while admitted. AVS updated.     History of Present Illness:  Patient is a 78-year old male who is admitted to [facility] status post CVA.  Patient has a history of multivessel CAD status post CABG x 3 on 6/20/2024, hyponatremia, CKD, hypertension, PAD, AAA, BPH and TIA.  Wound care consulted for sternal wound.  Patient cooperative and agreeable for the assessment.  Wound was examined just prior to wound care eval by cardiac surgery AP.  Per chart review wound began to drain from distal aspect yesterday.  At time of the assessment there is a dry dressing in place to the wound. Patient denies fever,  chills, or increased pain related to the wound.    Subjective:    Review of Systems   Constitutional:  Negative for chills and fever.   HENT:  Negative for congestion and sneezing.    Respiratory:  Negative for cough and shortness of breath.    Musculoskeletal:  Positive for gait problem.   Skin:  Positive for wound.   Psychiatric/Behavioral:  Negative for agitation.        Historical Information   Past Medical History:   Diagnosis Date    AAA (abdominal aortic aneurysm) (HCC)     s/p open repair    BPH (benign prostatic hyperplasia)     CAD (coronary artery disease)     Cholelithiasis     Former tobacco use     History of MI (myocardial infarction)     History of TIA (transient ischemic attack)     Plavix in past, now on Brilinta    Hyperlipidemia     Hypertension     Insomnia     Obesity      Past Surgical History:   Procedure Laterality Date    ABDOMINAL AORTIC ANEURYSM REPAIR  06/07/2011    Dr. Yarbrough 6/7/11    APPENDECTOMY      CARDIAC CATHETERIZATION Left 06/03/2024    Procedure: Cardiac Left Heart Cath;  Surgeon: Pk Blanco DO;  Location: [facility];  Service: Cardiology    CARDIAC CATHETERIZATION  06/03/2024    Procedure: Cardiac catheterization;  Surgeon: Pk Blanco DO;  Location: [facility];  Service: Cardiology    CARDIAC CATHETERIZATION N/A 06/03/2024    Procedure: Cardiac Coronary Angiogram;  Surgeon: Pk Blanco DO;  Location: [facility];  Service: Cardiology    CATARACT EXTRACTION Right 11/2018    CHOLECYSTECTOMY LAPAROSCOPIC      COLONOSCOPY  11/2006    CORONARY ANGIOPLASTY      3 STENTS INSERTED    HARVEST VEIN Left 6/20/2024    Procedure: HARVEST VEIN ENDOSCOPIC (EVH);  Surgeon: KALPANA Amos MD;  Location: [facility];  Service: Cardiac Surgery    GA CORONARY ARTERY BYP W/VEIN & ARTERY GRAFT 3 VEIN N/A 6/20/2024    Procedure: CORONARY ARTERY BYPASS GRAFT (CABG) X3 VESSELS, LIMA - LAD, LEFT LEG EVH/SVG - PDA AND OM1, W/ DEDRICK;   Surgeon: KALPANA Amos MD;  Location: [facility];  Service: Cardiac Surgery    FL LAPS SURG CHOLECYSTECTOMY W/CHOLANGIOGRAPHY N/A 2016    Procedure: CHOLECYSTECTOMY LAPAROSCOPIC with intra-op cholangiogram ;  Surgeon: Prasanth Verdin MD;  Location: [facility];  Service: General     Social History   Social History     Substance and Sexual Activity   Alcohol Use Not Currently    Comment: 4 oz glass of wine every other day w/ his dinner     Social History     Substance and Sexual Activity   Drug Use No     E-Cigarette/Vaping    E-Cigarette Use Never User      E-Cigarette/Vaping Substances    Nicotine No     THC No     CBD No     Flavoring No      Social History     Tobacco Use   Smoking Status Former    Current packs/day: 0.00    Average packs/day: 0.5 packs/day for 30.0 years (15.0 ttl pk-yrs)    Types: Cigarettes    Start date: 3/10/1956    Quit date: 3/10/1986    Years since quittin.3   Smokeless Tobacco Never   Tobacco Comments    Quit  about 25 years x1ppd     Family History:   Family History   Problem Relation Age of Onset    Heart disease Mother     Hypertension Mother         Benign Essential     Stroke Mother         Stroke     Heart disease Father     Coronary artery disease Brother     Diabetes Brother         mellitus     Heart disease Brother     Aortic aneurysm Brother     Coronary artery disease Brother     Sudden death Brother     Coronary artery disease Brother     Sudden death Brother     Sudden death Brother     Coronary artery disease Brother     Heart disease Family        Meds/Allergies   current meds:   Current Facility-Administered Medications   Medication Dose Route Frequency    acetaminophen (TYLENOL) tablet 975 mg  975 mg Oral Q6H While awake    aspirin (ECOTRIN LOW STRENGTH) EC tablet 81 mg  81 mg Oral Daily    atorvastatin (LIPITOR) tablet 80 mg  80 mg Oral After Dinner    bisacodyl (DULCOLAX) rectal suppository 10 mg  10 mg Rectal Daily PRN    cefadroxil  "(DURICEF) capsule 1,000 mg  1,000 mg Oral Q12H [facility], 1928[facility]1983    chlorhexidine (PERIDEX) 0.12 % oral rinse 15 mL  15 mL Mouth/Throat BID    heparin (porcine) subcutaneous injection 5,000 Units  5,000 Units Subcutaneous Q8H [facility], 1928–1983    lidocaine (LIDODERM) 5 % patch 2 patch  2 patch Topical Daily    melatonin tablet 6 mg  6 mg Oral HS    metoprolol tartrate (LOPRESSOR) tablet 50 mg  50 mg Oral Q12H [facility], 1928–1983    ondansetron (ZOFRAN-ODT) dispersible tablet 4 mg  4 mg Oral Q6H PRN    pantoprazole (PROTONIX) EC tablet 40 mg  40 mg Oral Early Morning    polyethylene glycol (MIRALAX) packet 17 g  17 g Oral Daily PRN    potassium chloride (Klor-Con M20) CR tablet 20 mEq  20 mEq Oral BID    ticagrelor (BRILINTA) tablet 90 mg  90 mg Oral Q12H KRISTI    torsemide (DEMADEX) tablet 20 mg  20 mg Oral Daily     No Known Allergies    Objective   Vitals: Blood pressure 128/76, pulse 73, temperature 98 °F (36.7 °C), temperature source Oral, resp. rate 18, height 5' 5\" (1.651 m), weight 72 kg (158 lb 11.7 oz), SpO2 96%.     Wounds:   Midsternal incision with scattered areas of full-thickness dehiscence.  Areas measured together.  Wound beds with approximately 60% moist red tissue and 40% yellow slough noted at the distal aspect.  Adherent scabbing present along remainder of incision.  Wound is producing scant amount of serosanguineous drainage at time of the assessment, no purulence.  No further drainage able to be expressed.  Wound was not explored with cotton tip applicator at recommendation of cardiac surgery AP.  Edges fragile attached without maceration.  Periwound is intact with scar tissue.  There is no erythema, warmth, induration or fluctuance present. No tenderness with palpation.      No induration, fluctuance, odor, warmth/temperature differences, redness, or purulence noted to the above mentioned wounds and skin areas assessed. New dressings applied as noted above. Patient tolerated assessment well- denies pain and no s/s of non-verbal pain or " "discomfort observed during the encounter.        Wound 06/20/24 Chest N/A (Active)   Wound Image   07/11/24 0904   Wound Length (cm) 7.5 cm 07/11/24 0904   Wound Width (cm) 0.3 cm 07/11/24 0904   Wound Depth (cm) 0.2 cm 07/11/24 0904     Physical Exam  Constitutional:       General: He is awake. He is not in acute distress.     Appearance: He is not diaphoretic.   HENT:      Head: Normocephalic and atraumatic.      Right Ear: External ear normal.      Left Ear: External ear normal.   Eyes:      Conjunctiva/sclera: Conjunctivae normal.   Pulmonary:      Effort: Pulmonary effort is normal. No respiratory distress.   Genitourinary:     Comments: No incontinence noted per chart.  Musculoskeletal:      Comments: Patient seen out of bed in recliner chair on offloading seating cushion.   Skin:     General: Skin is warm and dry.      Findings: Wound present. No erythema.      Comments: Refer to wound section for assessment details.    Neurological:      Mental Status: He is alert and oriented to person, place, and time.      Gait: Gait abnormal.   Psychiatric:         Behavior: Behavior is cooperative.           Lab, Imaging and other studies: I have personally reviewed pertinent reports.      Code Status: Level 1 - Full Code      Counseling / Coordination of Care  Total time spent today:    Total time (face-to-face and non-face-to-face) spent on today's visit was 24 minutes. This includes preparation for the visits (H&P on 7/9/24 and cardiac surgery note on 7/10/24) performance of a medically appropriate history and examination, and orders for medications/treatments or testing.  Discussed assessment findings, and plan of care/recommendations with patients RN.      SANTHOSH Manrique, FNP-C, [facility]      Portions of the record may have been created with voice recognition software.  Occasional wrong word or \"sound a like\" substitutions may have occurred due to the inherent limitations of voice recognition software.  Read " the chart carefully and recognize, using context, where substitutions have occurred

## 2024-07-11 NOTE — DISCHARGE INSTR - OTHER ORDERS
Cleanse mid sternum wound with NSS, pat dry, 3m betty wound, betadine and apply silver alginate to the wound. Cover with dry dressing. Change daily or twice daily as needed for saturation        ST Recommendations for Diet:  Continue on REGULAR diet w/ thin liquids. Educated pt and spouse in regard to monitoring tolerance of more dry and crunchy food items. The following swallow strategies were also verbally reviewed w/ pt and spouse:    1- Fully upright for ALL meals  2- Use extra moistening agents w/ solids  (ie: gravies, condiments, etc)  3- Smaller bites of solids  4- Slower rate of intake  5-Alternate foods and liquids    Also d/w pt and spouse about overall appetite and if it is lower at home, pt does like the Ensures, which was provided on tray today, which can be an additional resource for extra nutrition. Briefly reviewed w/ both pt and spouse about cognitive deficits which presented but due to shorter length of stay, not much has been followed up from ST services. SLP currently recommending for spouse to provide supervision given I ADL's (primarily medication management). Spouse in agreement w/ recommendations at this time. Will plan for at best Home ST services for assessment and brief f/u given swallow function and cognitive skills.

## 2024-07-11 NOTE — PROGRESS NOTES
07/11/24 0845   Pain Assessment   Pain Assessment Tool 0-10   Pain Score No Pain   Restrictions/Precautions   Precautions Bed/chair alarms;Cardiac/sternal;Cognitive;Fall Risk;Fluid restriction;Supervision on toilet/commode   Eating   Type of Assistance Needed Set-up / clean-up;Supervision;Verbal cues   Physical Assistance Level No physical assistance   Eating CARE Score 4   Swallow Assessment   Swallow Treatment Assessment   Daily Dysphagia Tx Note     Patient Name: Shane Chau .    Today's Date: 7/11/2024      Current Risks for Dysphagia & Aspiration: new neuro event, cognitive deficit, positioning issues, head support (sternal precautions)     Current Symptoms/Concerns: mild difficulty with chewing, decreased oral intake, weight loss, dehydration    Current diet:soft/level 3 diet and thin liquids     Premorbid diet::regular diet and thin liquids     Cognitive Status: impaired; see cognition evaluation for further details     Positioning: upright in recliner    Items administered:Consistencies Administered: thin liquids and mixed consistency  Materials administered included English toast with breakfast potatoes, banana, yogurt, chocolate pudding, strawberry pudding, apple juice    Total amount of meal consumed: 50% of meal and 120 cc of thins by cup      Oral stage:mild-moderate  Lip closure: functional around cup and utensils   Anterior spillage: pooling of saliva on R side   Mastication: significant prolonged and decreased mastication with most solids; noted to take large rapid intake   Bolus formation: suspect decrease  Bolus control: suspect decrease with solids  Transfer: suspect decrease with solids  Oral residue: trace amounts with soft solids > hard solids  Pocketing: none     Pharyngeal stage:minimal  Swallow promptness: prompts with thins; min-mild with solids  Hyolaryngeal elevation:  present upon palpation but mildly decreased and delayed with solids especially with larger and rapid intake  of solids; prompt and efficient with thins  Wet voice: none  Throat clear: none  Cough: none  Secondary swallows: at times  Audible swallows: at times     Esophageal stage: No over sxs of aspiration      Summary:     Pt presenting with mild-moderate oral and minimal pharyngeal dysphagia today. Symptoms or concerns included decreased mastication, decreased bolus formation, delayed oral intiation, suspected decreased control of soft and hard solids , oral residue with soft solids , residue effectively cleared with lingual sweeps and liquid wash, piecemeal deglutition, and anterior loss/spillage of soft solids. Additionally, pt presents with  suspected pharyngeal swallow delay, suspected decreased hyolaryngeal elevation upon palpation, and multiple swallows.      Pt seen during breakfast meal for dysphagia tx session with pt being independent in self feeding and setting up tray. Pt was seated in recliner and SLP assisted pt in sitting up chair for meal. Pt noted to be excited in trial of regular meal and noted to need verbal cues for reminders for uses for ketchup and syrups on tray to use for meal.     In regards to swallowing, lip closure was overall functional around cup and utensils with mild anterior spillage noted on R side w/ potato 1x but pt was able to catch. Pt was noted to often have pooling of saliva on R side and noted to not be aware at times needing verbal cues by SLP to clear. Mastication was noted to be significantly prolonged and decreased, mainly with harder solids such as U[place]bek toast, coker and potatoes, as pt would often need 2-3 minutes at least to breakdown items. Pt was aware of need for time stating 'It's taking me longer than I thought but hey if it takes me awhile, it takes me awhile.' Pt also was noted to take pieces off from coker that felt hard so to not bite down and eat them. Pt's large bolus size and fast intake affected to decrease bolus formation and control. Additionally, suspected  decrease of bolus transfer w/ harder solids were suspected but improved once use of liquid was was done. Liquids on own and soft solids such as pudding were noted to be prompts with no difficulty with bolus formation, control or transfer. Oral residue were suspected to have trace amounts more with soft amounts on tongue. Swallow promptness was noted to be prompts with thins and min-mild with solids as evidenced by piecemeal deglutition. Hyolaryngeal elevation was present upon palpation but judged to be mildly decreased and delayed with solids especially with larger and rapid intake of solids; prompt and efficient with thins    Traditional oral care completed at end of meal with trace amounts of residue cleared.     Recommendations:  Diet: soft/level 3 diet and thin liquids  Meds: whole with liquid  Strategies:  upright posture, only feed when fully alert, slow rate of feeding, small bites/sips, quiet environment (tv off, limit talking, door closed, etc.), alternating bites and sips, and OOB FOR ALL MEALS -TRADITIONAL CARE AFTER MEALS    DISTANT supervision w/ meals.  Results reviewed with:  patient, RN-Halina, MD-Dr. Stoner, PT-Loreta, and OT-Silvina  Aspiration precautions posted.    F/u ST tx: Pt will continue to benefit from ongoing skilled dysphagia tx sessions to establish safest least restrictive diet w/o increased oropharyngeal or aspiration sxs as well as monitor ability to carryover swallow strategies independently.    Plan  -trials regular diet with thins with SLP ONLY  -family education and training ongoing with son and wife  -provided education on diet and modifications    Swallow Assessment Prognosis   Prognosis Good   Prognosis Considerations Age;Medical diagnosis;Medical prognosis;Previous level of function;Severity of impairments;New learning ability;Ability to carry over   SLP Therapy Minutes   SLP Time In 0845   SLP Time Out 0930   SLP Total Time (minutes) 45   SLP Mode of treatment - Individual  (minutes) 45   SLP Mode of treatment - Concurrent (minutes) 0   SLP Mode of treatment - Group (minutes) 0   SLP Mode of treatment - Co-treat (minutes) 0   SLP Mode of Treatment - Total time(minutes) 45 minutes   SLP Cumulative Minutes 105   Therapy Time missed   Time missed? No

## 2024-07-11 NOTE — PROGRESS NOTES
07/11/24 1000   Pain Assessment   Pain Assessment Tool 0-10   Pain Score No Pain   Restrictions/Precautions   Precautions Bed/chair alarms;Cardiac/sternal;Fall Risk;Fluid restriction;Supervision on toilet/commode   Weight Bearing Restrictions No   ROM Restrictions Yes  (spinal)   Braces or Orthoses Other (Comment)  (TEDs and abdominal binder. Did not use binder this session and BP normal)   Cognition   Overall Cognitive Status WFL   Arousal/Participation Alert;Cooperative   Attention Attends with cues to redirect   Orientation Level Oriented X4   Memory Decreased recall of precautions   Following Commands Follows one step commands with increased time or repetition   Roll Left and Right   Type of Assistance Needed Supervision   Roll Left and Right CARE Score 4   Sit to Lying   Type of Assistance Needed Supervision   Sit to Lying CARE Score 4   Lying to Sitting on Side of Bed   Type of Assistance Needed Supervision   Lying to Sitting on Side of Bed CARE Score 4   Sit to Stand   Type of Assistance Needed Physical assistance   Physical Assistance Level 25% or less   Comment Ramy /CGAwith no AD   Sit to Stand CARE Score 3   Bed-Chair Transfer   Type of Assistance Needed Physical assistance   Physical Assistance Level 25% or less   Comment Ramy with no AD   Chair/Bed-to-Chair Transfer CARE Score 3   Walk 10 Feet   Type of Assistance Needed Physical assistance   Physical Assistance Level 26%-50%   Comment Ramy with no AD   Walk 10 Feet CARE Score 3   Walk 50 Feet with Two Turns   Type of Assistance Needed Physical assistance   Physical Assistance Level 25% or less   Comment Ramy with no AD   Walk 50 Feet with Two Turns CARE Score 3   Walk 150 Feet   Type of Assistance Needed Physical assistance   Physical Assistance Level Total assistance   Comment Ramy with no AD, WC follow for safety, inconsistently able to complete so WC follow this session for safety. Fatigues with more repetition   Walk 150 Feet CARE Score 1    Ambulation   Primary Mode of Locomotion Prior to Admission Walk   Distance Walked (feet) 150 ft  (x2, 98 ft, 55 ft, small bouts of ambulation in the room to access bathroom, etc)   Assist Device   (none)   Gait Pattern Inconsistant Chantel;Slow Chantel;Step to;Step through   Limitations Noted In Balance;Endurance;Speed;Strength   Provided Assistance with: Balance   Walk Assist Level Minimum Assist   Wheel 50 Feet with Two Turns   Reason if not Attempted Activity not applicable   Wheel 50 Feet with Two Turns CARE Score 9   Wheel 150 Feet   Reason if not Attempted Activity not applicable   Wheel 150 Feet CARE Score 9   Curb or Single Stair   Style negotiated Single stair   Type of Assistance Needed Physical assistance   Physical Assistance Level 26%-50%   Comment Min/ModA with single HR, reciprocal pattern, can likely progress to Ramy next session   1 Step (Curb) CARE Score 3   4 Steps   Type of Assistance Needed Physical assistance   Physical Assistance Level 26%-50%   Comment Min/ModA with single HR, reciprocal pattern, can likely progress to Ramy next session   4 Steps CARE Score 3   12 Steps   Comment unable to complete 2/2 to fatigue   Reason if not Attempted Safety concerns   12 Steps CARE Score 88   Stairs   Type Stairs   # of Steps 6  (x2)   Weight Bearing Precautions Sternal   Findings Fatigued after completing 6 steps   Picking Up Object   Type of Assistance Needed Incidental touching   Comment CGA to pick marker up off the floor   Picking Up Object CARE Score 4   Toilet Transfer   Type of Assistance Needed Physical assistance   Physical Assistance Level 25% or less   Comment Ramy with no AD   Toilet Transfer CARE Score 3   Therapeutic Interventions   Strengthening Nu Step Level 2 x10 minutes LEs only; STS 2x8 with no UE support, Statice Marching with no UE for cardiovascular endurance and balance 2x30 CGA for safety   Other Ambulation over various height obstacles 2x 60 ft with Ramy for balance/stability,  + 1 small LOB which pt was able to recover with CGA from therapist   Equipment Use   CPM Educated patient on deep breathing techniques. Educated patient on the difference two different types of strokes (hemorrhagic vs ischemic). Educated on the signs and symptoms of stroke (BE FAST).   Assessment   Treatment Assessment Patient participated in 90 minute skilled physical therapy session focusing on improving overall activity tolerance with repetitive walking, emphasis on distance and stair management. Patient was able to ambulate 150 ft this session which is improvement from evaluation. Pt id require a WC follow 2/2 to varying levels of fatigue. can likely progress to PT pulling WC behind patient. Pt continues to require VCs to recall sternal precautions. Pt will benefit from continued skilled physicla therapy services to maximize safety and independence to return home. furutre PT session to continue focus on progressing ambulation distance as well as increasing challenging tasks, stair management, and TE/TA to improve overall activity tolerance.   Problem List Decreased strength;Decreased range of motion;Decreased endurance;Impaired balance;Decreased mobility;Decreased skin integrity;Orthopedic restrictions   Barriers to Discharge Inaccessible home environment   Plan   Treatment/Interventions Functional transfer training;LE strengthening/ROM;Therapeutic exercise;Endurance training;Patient/family training;Gait training;Bed mobility;Equipment eval/education   PT Therapy Minutes   PT Time In 1000   PT Time Out 1130   PT Total Time (minutes) 90   PT Mode of treatment - Individual (minutes) 90   PT Mode of treatment - Concurrent (minutes) 0   PT Mode of treatment - Group (minutes) 0   PT Mode of treatment - Co-treat (minutes) 0   PT Mode of Treatment - Total time(minutes) 90 minutes   PT Cumulative Minutes 180     Loreta Heck, PT, DPT

## 2024-07-11 NOTE — PROGRESS NOTES
"   07/11/24 0700   Pain Assessment   Pain Assessment Tool 0-10   Pain Score No Pain   Restrictions/Precautions   Precautions Bed/chair alarms;Cardiac/sternal;Fall Risk;Supervision on toilet/commode   Weight Bearing Restrictions No   ROM Restrictions Yes  (sternal precautions)   Braces or Orthoses Other (Comment)  (BLE TEDs and ABD binder--used TEDS for edema management, did not utilize binder)   Lifestyle   Autonomy \"I'm waiting for breakfast\"   Eating   Comment to be completed with SLP after OT session   Oral Hygiene   Type of Assistance Needed Incidental touching   Physical Assistance Level No physical assistance   Comment in stance at sink   Oral Hygiene CARE Score 4   Shower/Bathe Self   Type of Assistance Needed Physical assistance   Physical Assistance Level 25% or less   Comment seated to complete sponge bathing routine. pt able to bathe all parts with assist for thoroughness of feet; stands with CG for bathing of betty/rear.   Shower/Bathe Self CARE Score 3   Upper Body Dressing   Type of Assistance Needed Physical assistance   Physical Assistance Level 25% or less   Comment seated to don shirt; good carryover of sternal precautions with assist to adjust over trunk/down in back   Upper Body Dressing CARE Score 3   Lower Body Dressing   Type of Assistance Needed Physical assistance   Physical Assistance Level 26%-50%   Comment seated to thread underwear/shorts over feet; slight assist as R foot was threaded into wrong opening of underwear requiring assist to correct. stands with CG for CM   Lower Body Dressing CARE Score 3   Putting On/Taking Off Footwear   Type of Assistance Needed Physical assistance   Physical Assistance Level 26%-50%   Comment TA for TEDS; supervision for donning/doffing socks; did not utilize shoes due to edema   Putting On/Taking Off Footwear CARE Score 3   Sit to Stand   Type of Assistance Needed Physical assistance   Physical Assistance Level 25% or less   Comment Min A no AD; CG with " RW   Sit to Stand CARE Score 3   Bed-Chair Transfer   Type of Assistance Needed Physical assistance   Physical Assistance Level 25% or less   Comment Min A no AD; CG with RW   Chair/Bed-to-Chair Transfer CARE Score 3   Toileting Hygiene   Type of Assistance Needed Physical assistance   Physical Assistance Level 25% or less   Comment for thoroughness of rear; CG for CM in stance   Toileting Hygiene CARE Score 3   Toilet Transfer   Type of Assistance Needed Physical assistance   Physical Assistance Level 25% or less   Comment Min A no AD; CG with RW   Toilet Transfer CARE Score 3   Cognition   Overall Cognitive Status WFL   Arousal/Participation Alert;Cooperative   Attention Attends with cues to redirect   Orientation Level Oriented X4   Memory Decreased recall of precautions   Following Commands Follows one step commands with increased time or repetition   Additional Activities   Additional Activities Comments stroke outcome measures completed and are as follows: R : 45, 40, 43; L : 40, 43, 45; R lateral pinch: 10.6, 9.6, 9.7; L lateral pinch: 11.4, 9.1, 9.3; R 9HPT: 36 sec; L 9HPT: 27 sec; R box and blocks: 40 blocks and L box and blocks: 38 blocks. MoCA to be completed in PM session by OTR/L per physician request.   Activity Tolerance   Activity Tolerance Patient tolerated treatment well   Medical Staff Made Aware RN notified of pt's BP w/o TEDS/binder donned; pt with no c/o dizziness/lightheadedness. edu provided on use of TEDS more for edema management.   Assessment   Treatment Assessment pt engages in 90 minute skilled OT Session focusing on ADL retraining and assessing for stroke outcome measures. see above for full func details. pt awake and agreeable to ADL. completes sponge bathing routine seated in recliner as pt does not have active shower orders at this time. pt completes bathing at min A, dressing at min A, func transfers at min A w/o AD and CG with RW. no c/o dizziness/lightheadedness with  standing/transfers. ortho BP's assessed without TEDs and binder donned and charted in vitals session. TEDs donned for edema management vs low BP with edu provided to pt on above. stroke outcome measures completed with no overt deficits with L and R UE remaining fairly consistent with MMT,  strength, pinch strength, FMC and GMC. recommend continued skilled care to focus on ADL retraining, strength/endurance/activity tolerance, standing олег/bal, stroke education, in order to decrease burden of care at d/c.   Prognosis Good   Problem List Decreased strength;Decreased endurance;Impaired balance;Decreased mobility   Plan   Treatment/Interventions ADL retraining;Functional transfer training;LE strengthening/ROM;Therapeutic exercise;Endurance training;Patient/family training;Equipment eval/education;Compensatory technique education   OT Therapy Minutes   OT Time In 0700   OT Time Out 0830   OT Total Time (minutes) 90   OT Mode of treatment - Individual (minutes) 90   OT Mode of treatment - Concurrent (minutes) 0   OT Mode of treatment - Group (minutes) 0   OT Mode of treatment - Co-treat (minutes) 0   OT Mode of Treatment - Total time(minutes) 90 minutes   OT Cumulative Minutes 180   Therapy Time missed   Time missed? No

## 2024-07-11 NOTE — TEAM CONFERENCE
Acute RehabilitationTeam Conference Note  Date: 7/11/2024   Time: 9:47 A.M      Patient Name:  Shane Chau Denise       Medical Record Number: [id number]   YOB: 1945  Sex: Male          Room/Bed:  [facility]-01  Payor Info:  Payor: [employer]  REP / Plan: [employer] [employer]  REP / Product Type: Medicare PPO /      Admitting Diagnosis: No admission diagnoses are documented for this encounter.   Admit Date/Time:  7/9/2024  1:31 PM  Admission Comments: No comment available     Primary Diagnosis:  CVA (cerebral vascular accident) ([facility])  Principal Problem: CVA (cerebral vascular accident) ([facility])    Patient Active Problem List    Diagnosis Date Noted    Volume overload 07/10/2024    Postoperative anemia due to acute blood loss 07/10/2024    At risk for venous thromboembolism (VTE) 07/10/2024    Stenosis of left vertebral artery 07/10/2024    Prediabetes 07/10/2024    Oral phase dysphagia 07/10/2024    Reaction at surgical site 07/10/2024    Potential for cognitive impairment 07/10/2024    Frequent bowel movements 07/10/2024    Hyponatremia 07/09/2024    CVA (cerebral vascular accident) ([facility]) 07/09/2024    PAD (peripheral artery disease) ([facility]) 07/09/2024    History of ileus 07/09/2024    Stroke-like symptoms 07/03/2024    CKD (chronic kidney disease) stage 2, GFR 60-89 ml/min 06/24/2024    S/P CABG x 3 06/20/2024    Rib pain 09/08/2023    Paresthesia 06/21/2023    Leg swelling 05/10/2022    Erectile dysfunction 03/22/2022    Microhematuria 11/14/2018    Need for pneumococcal vaccination 08/06/2018    Hematuria 08/06/2018    Asymptomatic bilateral carotid artery stenosis 08/28/2017    Closed compression fracture of first lumbar vertebra ([facility]) 04/18/2017    Abdominal aortic aneurysm without rupture ([facility]) 03/14/2016    Localized primary osteoarthritis of lower leg, unspecified laterality 08/07/2013    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[facility]) 06/11/2013    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[facility]) 06/11/2013     Transient cerebral ischemic attack 06/11/2013    Coronary artery disease of native artery of native heart with stable angina pectoris (HCC) 07/25/2012    Hyperlipidemia 07/25/2012    Primary hypertension 07/25/2012       Physical Therapy:    Weight Bearing Status: Weight Bearing as Tolerated  Transfers: Minimal Assistance  Bed Mobility: Supervision  Amulation Distance (ft): 60 feet (progressed to 150 ft on 7/11, but required WC follow 2/2 to safety/fatigue)  Ambulation: Minimal Assistance, Moderate Assistance  Assistive Device for Ambulation:  (none)  Wheelchair Mobility Distance:  (not applicable)  Number of Stairs: 6  Assistive Device for Stairs: Right Hand Rail  Stair Assistance: Minimal Assistance (Min/ModAx1)  Discharge Recommendations: Home Independently    Date: 7/11/2024  Date of Evaluation: 7/10/2024  Estimated Length of Stay: 10-14 days    Barriers to Discharge Home: inaccessible home environment (6 JUJU), decreased activity tolerance/endurance, decreased recall of precautions   PT Interventions to address Barriers: TE/TA to improve activity tolerance, stair training, gait training with no AD (evaluate need for AD from energy conservation perspective closer to DC)  Equipments Needs: Will continue to evaluate during POC.   PT Plan of Care for the Week: focus on increasing gait distance and increase challenge during ambulation (over objects, changing direction, increasing gait speed), stair navigation, NuStep  Family Training Needed:  not at this time. Goal is for patient to be independent at discharge   Discharge Planning: plan to discharge home. Need for additional therapy TBD pending progress         Occupational Therapy:  Eating: Independent  Grooming: Incidental Touching  Bathing: Maximum Assistance  Bathing: Maximum Assistance  Upper Body Dressing: Moderate Assistance  Lower Body Dressing: Moderate Assistance  Toileting: Minimal Assistance  Toilet Transfer: Minimal Assistance  Cognition: Exceptions to  WNL  Cognition: Decreased Memory  Orientation: Person, Place, Situation  Discharge Recommendations: Home with:  DC Home with:: Family Support       OT Eval to be completed 07/10/24  07/11/24: Pt is limited by decreased endurance, impaired balance, weakness, restricted UE ROM, and decreased functional mobility. These impairments cause decline w/ ADL/IADLs function and addressed in pt goals. Pt demo G rehab potential and motivated to completed OT tx to DC home in 10-14 days. Pt plans to be Independent in all goals w/ use of RW and other AE. OT interventions include ADL/IADL retraining, balance training, endurance training, functional transfers, AE education, and energy conservation tech.     Speech Therapy:  Mode of Communication: Verbal  Cognition: Exceptions to WNL  Cognition: Decreased Memory, Decreased Executive Functions, Decreased Attention, Decreased Comprehension  Orientation: Person, Place, Time, Situation  Swallowing: Exceptions to WNL  Swallowing: Oral Dysphagia, Pharyngeal Dysphagia, Aspiration Risk  Diet Recommendations: Level 3/Denture Soft, Thin  Discharge Recommendations:  (pending patient progress)  ST orders received for completion of cognitive linguistic and dysphagia evaluations. Results and recommendations to follow with plan to complete evaluations on 7/10.     As of 7/11/2024: In regards to cognition, Pt completed SLUMs on evaluation and scored 15 which as compared to those with high school education correlates with severe neurocognitive disorder and is indicative of dementia. Compared to the formalized testing and informal analysis, pt noted deficits with working memory, immediate memory, STM, LTM, remote memory, reasoning, sequencing, problem solving, executive function skills, attention and word recall. Additionally, skilled ST can assist w/ ADL such as medication management using cognitive skills necessary to complete. The above deficits present as barriers in pt's ability in independent and  safely perform basic ADL and to complete, as well as to maintain safety. Interventions to address these areas include: verbal problem solving tasks, verbal working memory tasks, visual memory recall tasks, drawing conclusions activities, written sequencing tasks, sequencing tasks, categorization tasks, picture problem solving activities, verbal reasoning tasks, higher level deductive reasoning activities, verbal review of current medications, written review of medications, tangible medication management task, written calendar tasks, visual retraining activities., visual attention tasks, and family education/training.     Pt is current functioning at:    Comprehension: Supervision  Expression: Supervision  Social Interaction: Supervision  Problem Solving: Mod A  Memory: Max A    In regards to swallowing, pt presented with s/s suggestive of mild oral and suspected minimal and mild pharyngeal dysphagia.  Symptoms or concerns included mild decreased mastication, suspected mild decreased control of soft solids , some mild residue which is effectively cleared with liquids, and anterior loss/spillage of soft solids. Additionally, pt is also suspected of mild pharyngeal swallow delay, suspected mild decreased hyolaryngeal elevation upon palpation, and some multiple swallows with soft solids and thins. Mastication was adequate with the materials administered however, at times pt needed increase time for regular items more than level 3 items. Pt noted to benefit most from liquids and sauces to assist in breakdown for bolus.  Bolus formation and transfer were suspected to be mildly delayed with level 3 and regular foods as evidenced by some increase in rate of intake and residue on tongue in between bites. Pt needed some verbal cues for liquid wash and lingual sweeps to clear residue. Pt currently is distant supervision and is taking medication whole with liquids. Precautions include all meals OOB, upright posture, only feed  when fully alert, slow rate of feeding, small bites/sips, quiet environment (tv off, limit talking, door closed, etc.), alternating bites and sips. Family training to be completed regarding swallowing strategies.         Nursing Notes:     Diet Type: Dysphagia III, Thin Liquids                      Diet Patient/Family Education Complete: No    Type of Wound (LDA): Wound                    Type of Wound Patient/Family Education: No  Bladder: Continent     Bladder Patient/Family Education: No  Bowel: Incontinent     Bowel Patient/Family Education: No     Pain Score: 0                          Pain Patient/Family Education: No  Medication Management/Safety  Injectable: Insulin  Safe Administration: No  Reason for non-safe administration: needs eval  Medication Patient/Family Education Complete: No     Pt w H/O CAD.  Had CABG x 3 6/20 w mult post op complications.  Remains on sternal precautions and incisions are ТАТЬЯНА. CVA -Developed left facial weakness/left sided weakness and dysarthria MRI showed punctate acute to subacute infarcts involving the right caudate head, anterior centrum semiovale, and lateral right frontal cortex and chronic right corona radiata/basal ganglia and bilateral cerebellar lacunar infarcts. He had been, at the time of the stroke, on Plavix and ASA.   P2Y12 showed that he was a Plavix responder but Neuro felt likely not adequate protection and switched the Plavix to Brilinta Will need price check for Brilinta Continue statin  For OP Zio patch to rule out that the CVA was cardioembolic from afib = Cards sent message to office to order. Volume overload- Continue Torsemide LVEF is normal Hypertension-Continue Lopressor 50 mg q12hrs stable Hyponatremia-Mild at 133 Continue FR 2000 ml  PAD- Has B/L carotid stenosis Continue ASA/Brilinta and statin Ileus - Occurred post op and was felt to be an ileus vs SBO  Did resolve H/O Abdominal aortic aneurysm - Had repair 2011 Acute on chronic kidney failure  Creatinine baseline is 0.9-1.2 Creatinine post-op was up to 2.0 but is baseline now. Pt can be inc bowel and bladder at times.      This week we will encourage independence with ADLs.  We will monitor labs and vital signs.  We will educate pt/family about repositioning to prevent skin breakdown.  We will assist w repositioning and perform routine skin checks.  We will monitor for adequate pain control.  We will monitor for constipation and medicate pt as ordered. We will monitor incision/wound for healing and s/s of infection. We will increase safety awareness and keep pt free from falls.                Case Management:     Discharge Planning  Living Arrangements: Lives w/ Spouse/significant other  Support Systems: Spouse/significant other  Assistance Needed: TBD  Type of Current Residence: Private residence  Current Home Care Services: No  Pt admitted s/p cardiac event followed by a stroke. Pt has supportive family and is expected to return home. Cm assmt to be completed and is following for contd stay review and dc planning recommendations.     Is the patient actively participating in therapies? yes  List any modifications to the treatment plan:     Barriers Interventions   Incisional drainage at base of sternal Abx started , daily dressing changes, wound care consult   Cognition,problem solving memory, aspiration risk Bonner to be conducted, speech therapy exercises, level 3 with thin liquid diet   Steps to enter Therapy stair training   Sternal precautions Compensatory strategies for adls   Activity tolerance, endurance Energy conservation education     Is the patient making expected progress toward goals? yes  List any update or changes to goals:     Medical Goals: Patient will be medically stable for discharge to [facility] upon completion of rehab program and Patient will be able to manage medical conditions and comorbid conditions with medications and follow up upon completion of rehab  program    Weekly Team Goals:   Rehab Team Goals  ADL Team Goal: Patient will be independent with ADLs with least restrictive device upon completion of rehab program  Transfer Team Goal: Patient will be independent with transfers with least restrictive device upon completion of rehab program  Locomotion Team Goal: Patient will be independent with locomotion with least restrictive device upon completion of rehab program  Cognitive Team Goal: Patient will be independent for basic  tasks and require supervision for complex tasks upon completion of rehab program    Discussion: pt presents with the above barriers and the team is working on the above interventions to progress functional ability to independent. Team is estimating a 10-14 day los. Pt has a supportive wife who will be brought in for family training when need is identified. Speech is trialing regular diet as well as swallow strategies. Physical therapy is working on endurance and activity tolerance with strengthening exercises and use of adaptive equipment as needed.     Anticipated Discharge Date:  reteam  [facility] Team Members Present:The following team members are supervising care for this patient and were present during this Weekly Team Conference.    Physician: Dr. Herbie MD  : MARISELA Martinez  Registered Nurse: Zulema Santamaria RN  Physical Therapist: Loreta Heck DPT  Occupational Therapist: Silvina Quiros MS, OTR/L  Speech Therapist: So Pelayo MS, CCC-SLP

## 2024-07-11 NOTE — ASSESSMENT & PLAN NOTE
Stroke alert called on 7/3/24 due to dysarthria, left sided weakness, left tongue deviation, left facial droop, and left pronator drift. Symptoms have since improved.  MRI brain 7/6/2024 showed: Punctate acute to subacute infarcts involving the right caudate head, anterior centrum semiovale, and lateral right frontal cortex. Chronic right corona radiata/basal ganglia and bilateral cerebellar lacunar infarcts. Mild chronic microangiopathic ischemic changes..   Neurology evaluated, recommendations appreciated  Distribution of the strokes found on MRI is suspicious for an embolic source -> plan for further cardiac work up with Zio patch/ILR as outpatient   If arrhythmia is found on work up recommend switching from DAPT to NOAC and ASA  P2Y12 level checked during this hospitalization was 180 -> switched from Plavix to Brilinta   Brilinta $47/month  Continue with ASA and statin

## 2024-07-11 NOTE — PROGRESS NOTES
OT Daily Treatment Note       07/11/24 1230   Pain Assessment   Pain Assessment Tool 0-10   Pain Score No Pain   Cognition   Overall Cognitive Status WFL   Arousal/Participation Alert;Cooperative   Attention Attends with cues to redirect   Orientation Level Oriented X4   Memory Decreased recall of precautions   Following Commands Follows one step commands with increased time or repetition   Comments Pt completed [place] Cognitive Assessment (MoCA) version 8.1. Pt scored overall 18 / 30  indicating a mild cognitive deficit. This score indicates pt may difficulty with completing higher level cognitive tasks including medication mgmt which pt was doing IND at baseline. Plan to formally assess IADLs at next session as pt is going to be a short LOS so we need to determine pts abilities with IADLs and determine recommendations as needed. Please see below for details of MoCA.   Activity Tolerance   Activity Tolerance Patient tolerated treatment well   Assessment   Treatment Assessment Pt engaged in brief skilled OT session focusing on formalized cognitive assessment. See above for details. Pt would benefit from IADL retraining in upcoming sessions along with endurance training, energy conservation techniques, stroke edu and sternal precautions edu in upcoming sessions to inc IND and safety at home.   Prognosis Good   Problem List Decreased strength;Decreased range of motion;Decreased endurance;Impaired balance;Decreased mobility;Decreased skin integrity;Orthopedic restrictions   Barriers to Discharge Inaccessible home environment   Plan   Treatment/Interventions ADL retraining;Functional transfer training;Therapeutic exercise;Endurance training;Patient/family training;Compensatory technique education   Progress Improving as expected   OT Therapy Minutes   OT Time In 1230   OT Time Out 1300   OT Total Time (minutes) 30   OT Mode of treatment - Individual (minutes) 30   OT Mode of treatment - Concurrent (minutes) 0   OT  Mode of treatment - Group (minutes) 0   OT Mode of treatment - Co-treat (minutes) 0   OT Mode of Treatment - Total time(minutes) 30 minutes   OT Cumulative Minutes 210   Therapy Time missed   Time missed? No       Visuospatial/executive: 1 /5   Naming: 3/3   Memory:  (worth no points) 4/5 (1st trial), 4/5 (2nd trial)  Attention:  5/ 6   Language: 1 / 3   Abstraction: 2 / 2   Delayed recall: 0 / 5    Memory Index Score (MIS): 4/15  Orientation: 5 / 6     +1 point given for less than or equal to 12 years of education? Yes       Total score 18/30, indicating a mild cognitive deficit.     MoCA certified rater ID: [id number]    Silvina Quiros, MS OTR/L, CSRS

## 2024-07-11 NOTE — CASE MANAGEMENT
Clinical update faxed to jacqueline at [facility] via [employer] to [phone]. Awaiting determination.     1223 cm asked to phone [facility] for price check on brilinta. Cm informed $47.

## 2024-07-11 NOTE — PCC CARE MANAGEMENT
Patient : Sania Duran Age: 32 year old Sex: male   MRN: [id number] Encounter Date: 8/20/2022    G24/G24    History     Chief Complaint   Patient presents with   • Abdominal Pain     HPI  8/20/2022  5:37 AM Sania Duran is a 32 year old male transitioning to female with a hx/o GERD who presents to the ED for evaluation of upper abd pain that began a couple of months ago. The pt has not taken medication for GERD recently. Tonight's episode started two hours ago.  No nausea, vomiting, change in bowels or other concerns. There are no further complaints or modifying factors at this time.    PCP: No Pcp      Allergies   Allergen Reactions   • Ibuprofen GI UPSET       Past Medical History:   Diagnosis Date   • Anxiety    • Essential (primary) hypertension      History reviewed. No pertinent past surgical history.    History reviewed. No pertinent family history.    Social History     Tobacco Use   • Smoking status: Current Every Day Smoker     Packs/day: 0.50   • Smokeless tobacco: Never Used   Substance Use Topics   • Alcohol use: Yes     Comment: 3X a week   • Drug use: No       E-cigarette/Vaping     E-Cigarette/Vaping Substances & Devices       Review of Systems   Constitutional: Negative for chills, diaphoresis and fever.   HENT: Negative for congestion, rhinorrhea and sore throat.    Eyes: Negative for visual disturbance.   Respiratory: Negative for cough and shortness of breath.    Cardiovascular: Negative for chest pain and leg swelling.   Gastrointestinal: Positive for abdominal pain. Negative for diarrhea, nausea and vomiting.   Genitourinary: Negative for dysuria, flank pain, frequency and hematuria.   Musculoskeletal: Negative for myalgias.   Skin: Negative for rash.   Neurological: Negative for dizziness, weakness, light-headedness and headaches.       Physical Exam     ED Triage Vitals [08/20/22 0526]   ED Triage Vitals Group      Temp 98.6 °F (37 °C)      Heart Rate 94      Resp 18      BP (!) 146/83  Pt admitted s/p cardiac event followed by a stroke. Pt has supportive family and is expected to return home. Cm assmt to be completed and is following for contd stay review and dc planning recommendations.         SpO2 92 %      EtCO2 mmHg       Height       Weight       Weight Scale Used       BMI (Calculated)       IBW/kg (Calculated)        Physical Exam  Vitals and nursing note reviewed.   Constitutional:       Appearance: He is well-developed.   Eyes:      Conjunctiva/sclera: Conjunctivae normal.      Pupils: Pupils are equal, round, and reactive to light.   Cardiovascular:      Rate and Rhythm: Normal rate and regular rhythm.      Pulses: Normal pulses.      Heart sounds: Normal heart sounds.   Pulmonary:      Effort: Pulmonary effort is normal.      Breath sounds: Normal breath sounds.   Abdominal:      General: Bowel sounds are normal.      Palpations: Abdomen is soft.      Tenderness: There is abdominal tenderness in the epigastric area.   Musculoskeletal:         General: No tenderness. Normal range of motion.      Cervical back: Normal range of motion and neck supple.      Right lower leg: No edema.      Left lower leg: No edema.   Skin:     General: Skin is warm and dry.      Findings: No rash.   Neurological:      Mental Status: He is alert and oriented to person, place, and time.      GCS: GCS eye subscore is 4. GCS verbal subscore is 5. GCS motor subscore is 6.      Cranial Nerves: No cranial nerve deficit.      Motor: Motor function is intact.         ED Course     Procedures    Lab Results     Results for orders placed or performed during the hospital encounter of 08/20/22   Comprehensive Metabolic Panel   Result Value Ref Range    Fasting Status      Sodium 137 135 - 145 mmol/L    Potassium 4.4 3.4 - 5.1 mmol/L    Chloride 107 97 - 110 mmol/L    Carbon Dioxide 27 21 - 32 mmol/L    Anion Gap 7 7 - 19 mmol/L    Glucose 122 (H) 70 - 99 mg/dL    BUN 12 6 - 20 mg/dL    Creatinine 0.82 0.67 - 1.17 mg/dL    Glomerular Filtration Rate >90 >=60    BUN/ Creatinine Ratio 15 7 - 25    Calcium 9.6 8.4 - 10.2 mg/dL    Bilirubin, Total 0.4 0.2 - 1.0 mg/dL    GOT/AST 32 <=37 Units/L    GPT/ALT 35 <64 Units/L     Alkaline Phosphatase 83 45 - 117 Units/L    Albumin 3.5 (L) 3.6 - 5.1 g/dL    Protein, Total 7.5 6.4 - 8.2 g/dL    Globulin 4.0 2.0 - 4.0 g/dL    A/G Ratio 0.9 (L) 1.0 - 2.4   Lipase   Result Value Ref Range    Lipase 89 73 - 393 Units/L   CBC with Automated Differential (performable only)   Result Value Ref Range    WBC 14.4 (H) 4.2 - 11.0 K/mcL    RBC 4.90 4.50 - 5.90 mil/mcL    HGB 13.3 13.0 - 17.0 g/dL    HCT 42.3 39.0 - 51.0 %    MCV 86.3 78.0 - 100.0 fl    MCH 27.1 26.0 - 34.0 pg    MCHC 31.4 (L) 32.0 - 36.5 g/dL    RDW-CV 14.1 11.0 - 15.0 %    RDW-SD 44.1 39.0 - 50.0 fL     140 - 450 K/mcL    NRBC 0 <=0 /100 WBC    Neutrophil, Percent 60 %    Lymphocytes, Percent 27 %    Mono, Percent 9 %    Eosinophils, Percent 2 %    Basophils, Percent 1 %    Immature Granulocytes 1 %    Absolute Neutrophils 8.7 (H) 1.8 - 7.7 K/mcL    Absolute Lymphocytes 3.9 1.0 - 4.8 K/mcL    Absolute Monocytes 1.3 (H) 0.3 - 0.9 K/mcL    Absolute Eosinophils  0.3 0.0 - 0.5 K/mcL    Absolute Basophils 0.1 0.0 - 0.3 K/mcL    Absolute Immmature Granulocytes 0.1 0.0 - 0.2 K/mcL       Radiology Results     Imaging Results          US Liver / Gallbladder / Pancreas (Final result)  Result time 08/20/22 06:50:02    Final result                 Impression:    IMPRESSION:  1. Diffuse hepatic steatosis.  2. No evidence of gallstones or biliary dilatation.               Narrative:    EXAM: US LIVER / GALLBLADDER / PANCREAS    INDICATION: Epigastric abdominal pain.     COMPARISON: CT abdomen pelvis from 12/10/2019    TECHNIQUE: Ultrasound right upper quadrant was performed.    FINDINGS: The liver is enlarged and diffusely of increased echogenicity  with poor sound transmission suggesting fatty replacement. This can also be  seen on the prior CT. No focal liver lesion is identified. However the  diffuse hepatic steatosis and poor sound attenuation limits visualization.  There is no intra or extra hepatic biliary dilatation. Common bile  duct  measures 5 mm. Gallbladder is normal without gallstones, wall thickening,  or pericholecystic fluid. There was a negative sonographic Bynum sign. The  pancreas was poorly visualized due to overlying bowel gas. Limited images  of the right kidney show no hydronephrosis. The intrahepatic inferior vena  cava is patent. The portal vein is patent with hepatopedal flow. There is  no ascites.                                ED Medication Orders (From admission, onward)    Ordered Start     Status Ordering Provider    08/20/22 0537 08/20/22 0538  alum-mag hydroxide+simethicone/lidocaine viscous (2:1) (MAGIC MOUTHWASH/GI COCKTAIL) (compounded) oral suspension 15 mL  ONCE         Last MAR action: Given JANIS RODRIGES    08/20/22 0537 08/20/22 0538  famotidine (PEPCID) tablet 20 mg  ONCE         Last MAR action: Given JANIS RODRIGES               [facility]  Vitals  Vitals:    08/20/22 0526 08/20/22 0622   BP: (!) 146/83 135/87   BP Location: LUE - Left upper extremity    Patient Position: Sitting    Pulse: 94 86   Resp: 18 14   Temp: 98.6 °F (37 °C)    TempSrc: Temporal    SpO2: 92% 99%       ED Course  Discussed plan for GI medication, labs and US gallbladder.     Patient recheck:  Feeling improved.  Patient updated on stable labs and negative US for gallstones.  Patient does have hepatic steatosis.      GI referral placed.  Patient prescribed prilosec and instructed to avoid triggers.     [facility]  Imaging Records for the Patient were reviewed.   Patient's Records indicate that the patient did not have a CT Scan of the Abdomen in the last 6 months.     Critical Care    No Critical Care    Diagnosis  The encounter diagnosis was Upper abdominal pain.    Follow Up:  Isabella Dumont MD  [address]  [address]  [facility] [address]  [phone]             New Prescriptions    OMEPRAZOLE (PRILOSEC) 40 MG CAPSULE    Take 1 capsule by mouth daily.       Pt is discharged in stable condition.      I have reviewed the  information recorded by the scribe for accuracy and agree with its contents.    ____________________________________________________________________    Yasmani Hernandez acting as a scribe for Dr. Lisset Mireles  Dictation # [id number]  Scribe: Yasmani Mireles MD  08/20/22 0711

## 2024-07-12 PROCEDURE — 97530 THERAPEUTIC ACTIVITIES: CPT

## 2024-07-12 PROCEDURE — 97116 GAIT TRAINING THERAPY: CPT

## 2024-07-12 PROCEDURE — 99232 SBSQ HOSP IP/OBS MODERATE 35: CPT

## 2024-07-12 PROCEDURE — 99024 POSTOP FOLLOW-UP VISIT: CPT | Performed by: PHYSICIAN ASSISTANT

## 2024-07-12 PROCEDURE — 97110 THERAPEUTIC EXERCISES: CPT

## 2024-07-12 PROCEDURE — 97112 NEUROMUSCULAR REEDUCATION: CPT

## 2024-07-12 PROCEDURE — 97535 SELF CARE MNGMENT TRAINING: CPT

## 2024-07-12 PROCEDURE — 92526 ORAL FUNCTION THERAPY: CPT

## 2024-07-12 RX ADMIN — Medication 6 MG: at 21:38

## 2024-07-12 RX ADMIN — HEPARIN SODIUM 5000 UNITS: 5000 INJECTION INTRAVENOUS; SUBCUTANEOUS at 14:31

## 2024-07-12 RX ADMIN — METOPROLOL TARTRATE 50 MG: 50 TABLET, FILM COATED ORAL at 21:38

## 2024-07-12 RX ADMIN — ACETAMINOPHEN 975 MG: 325 TABLET, FILM COATED ORAL at 21:38

## 2024-07-12 RX ADMIN — ASPIRIN 81 MG: 81 TABLET, COATED ORAL at 09:09

## 2024-07-12 RX ADMIN — TICAGRELOR 90 MG: 90 TABLET ORAL at 09:09

## 2024-07-12 RX ADMIN — POTASSIUM CHLORIDE 20 MEQ: 1500 TABLET, EXTENDED RELEASE ORAL at 17:13

## 2024-07-12 RX ADMIN — LIDOCAINE 2 PATCH: 700 PATCH TOPICAL at 21:38

## 2024-07-12 RX ADMIN — METOPROLOL TARTRATE 50 MG: 50 TABLET, FILM COATED ORAL at 09:10

## 2024-07-12 RX ADMIN — ACETAMINOPHEN 975 MG: 325 TABLET, FILM COATED ORAL at 09:05

## 2024-07-12 RX ADMIN — HEPARIN SODIUM 5000 UNITS: 5000 INJECTION INTRAVENOUS; SUBCUTANEOUS at 21:39

## 2024-07-12 RX ADMIN — CEFADROXIL 1000 MG: 500 CAPSULE ORAL at 09:10

## 2024-07-12 RX ADMIN — HEPARIN SODIUM 5000 UNITS: 5000 INJECTION INTRAVENOUS; SUBCUTANEOUS at 06:41

## 2024-07-12 RX ADMIN — POTASSIUM CHLORIDE 20 MEQ: 1500 TABLET, EXTENDED RELEASE ORAL at 09:08

## 2024-07-12 RX ADMIN — ACETAMINOPHEN 975 MG: 325 TABLET, FILM COATED ORAL at 14:31

## 2024-07-12 RX ADMIN — CEFADROXIL 1000 MG: 500 CAPSULE ORAL at 21:39

## 2024-07-12 RX ADMIN — ATORVASTATIN CALCIUM 80 MG: 80 TABLET, FILM COATED ORAL at 17:13

## 2024-07-12 RX ADMIN — TICAGRELOR 90 MG: 90 TABLET ORAL at 21:39

## 2024-07-12 RX ADMIN — PANTOPRAZOLE SODIUM 40 MG: 40 TABLET, DELAYED RELEASE ORAL at 06:39

## 2024-07-12 RX ADMIN — TORSEMIDE 20 MG: 20 TABLET ORAL at 09:08

## 2024-07-12 NOTE — PROGRESS NOTES
[facility]  Progress Note  Name: Shane Chau Sr. I  MRN: [id number]  Unit/Bed#: -01 I Date of Admission: 7/9/2024   Date of Service: 7/12/2024 I Hospital Day: 3    Assessment & Plan   * CVA (cerebral vascular accident) (HCC)  Assessment & Plan  Stroke alert called on 7/3/24 due to dysarthria, left sided weakness, left tongue deviation, left facial droop, and left pronator drift. Symptoms have since improved.  MRI brain 7/6/2024 showed: Punctate acute to subacute infarcts involving the right caudate head, anterior centrum semiovale, and lateral right frontal cortex. Chronic right corona radiata/basal ganglia and bilateral cerebellar lacunar infarcts. Mild chronic microangiopathic ischemic changes..   Neurology evaluated, recommendations appreciated  Distribution of the strokes found on MRI is suspicious for an embolic source -> plan for further cardiac work up with Zio patch/ILR as outpatient   If arrhythmia is found on work up recommend switching from DAPT to NOAC and ASA  P2Y12 level checked during this hospitalization was 180 -> switched from Plavix to Brilinta   Brilinta $47/month - pt and wife are agreeable to the cost.  Continue with ASA and statin     Prediabetes  Assessment & Plan  Recent A1c 5.7%   Monitor fasting glucose intermittently     Postoperative anemia due to acute blood loss  Assessment & Plan  No history of anemia, baseline hemoglobin 13. Noted with postop anemia following CABG.  Remains asymptomatic and hemodynamically stable at this time   Monitor CBC intermittently and transfuse as needed for hemoglobin < 8     Volume overload  Assessment & Plan  Iatrogenic volume overload status post CABG. Preserved LVEF on intra-op DEDRICK  Continues with bilateral LE edema on exam, encourage elevation/compression   Continue with Demadex 20 mg daily   Monitor volumes status closely   Daily weights, intake and output   Fluid and salt restriction   Intermittent  BMP    PAD (peripheral artery disease) ([facility])  Assessment & Plan  Has B/L carotid stenosis  Continue ASA/Brilinta and statin    Hyponatremia  Assessment & Plan  Mild, with serum sodium recently 132  Continue FR 2000 ml and Demadex 20 mg daily  Monitor volume status, intake and output  Monitor BMP intermittently    CKD (chronic kidney disease) stage 2, GFR 60-89 ml/min  Assessment & Plan  No documented diagnosis of CKD however per chart review, baseline creatinine approximately 0.9-1.2 with GFR 60-90  Noted with postoperative BARBARA with creatinine peak of 2.2 in the setting of iatrogenic volume overload  Initiated on Demadex 20 mg daily, and continued on this for now   Monitor BMP intermittently    Primary hypertension  Assessment & Plan  Blood pressure stable on review  Continue Lopressor 50 mg q12h  Started on Demadex 20 mg daily postoperatively due to volume overload, continue    Coronary artery disease of native artery of native heart with stable angina pectoris ([facility])  Assessment & Plan  Hx CAD with PCI/stents; now status post CABG x 3 on 6/20/24  Intraoperative DEDRICK demonstrates preserved systolic function, with LVEF 55%   Currently on p.o. diuretic due to postop volume overload  Also on postop course of prophylactic amiodarone  Continue aspirin, statin, beta-blocker  Monitor incisions, sternal precautions             The above assessment and plan was reviewed and updated as determined by my evaluation of the patient on 7/12/2024.    Labs:   Results from last 7 days   Lab Units 07/10/24  1601 07/05/24  0847   WBC Thousand/uL 9.13 12.88*   HEMOGLOBIN g/dL 10.6* 11.3*   HEMATOCRIT % 31.9* 34.3*   PLATELETS Thousands/uL 386 416*     Results from last 7 days   Lab Units 07/10/24  1601 07/09/24  0458   SODIUM mmol/L 132* 133*   POTASSIUM mmol/L 4.2 3.8   CHLORIDE mmol/L 100 100   CO2 mmol/L 23 24   BUN mg/dL 21 23   CREATININE mg/dL 1.18 1.05   CALCIUM mg/dL 7.8* 7.8*             Results from last 7 days   Lab Units  07/10/24  2104 07/10/24  1558 07/10/24  1052   POC GLUCOSE mg/dl 78 122 121       Imaging  No orders to display       Review of Scheduled Meds:  Current Facility-Administered Medications   Medication Dose Route Frequency Provider Last Rate    acetaminophen  975 mg Oral Q6H While awake SANTHOSH Cunningham      aspirin  81 mg Oral Daily SANTHOSH Cunningham      atorvastatin  80 mg Oral After Dinner SANTHOSH Cunningham      bisacodyl  10 mg Rectal Daily PRN Dandre Stoner MD      cefadroxil  1,000 mg Oral Q12H [facility] Mercedes Baker PA-C      chlorhexidine  15 mL Mouth/Throat BID SANTHOSH Cunningham      heparin (porcine)  5,000 Units Subcutaneous Q8H [facility] SANTHOSH Cunningham      lidocaine  2 patch Topical Daily SANTHOSH Cunningham      melatonin  6 mg Oral HS SANTHOSH Cunningham      metoprolol tartrate  50 mg Oral Q12H [facility] SANTHOSH Cunningham      ondansetron  4 mg Oral Q6H PRN SANTHOSH Cunningham      pantoprazole  40 mg Oral Early Morning SANTHOSH Cunningham      polyethylene glycol  17 g Oral Daily PRN Dandre Stoner MD      potassium chloride  20 mEq Oral BID SANTHOSH Cunningham      ticagrelor  90 mg Oral Q12H [facility] SANTHOSH Cunningham      torsemide  20 mg Oral Daily SANTHOSH Cunningham         Subjective/ HPI: Patient seen and examined. Patients overnight issues or events were reviewed with nursing staff. New or overnight issues include the following:     Pt seen in his room. He was working on medication management with therapy. He denies any current complaints.    ROS:   A 10 point ROS was performed; negative except as noted above.        *Labs /Radiology studies Reviewed  *Medications  reviewed and reconciled as needed  *Please refer to order section for additional ordered labs studies      Physical Examination:  Vitals:   Vitals:    07/11/24 1006 07/11/24 1352 07/11/24 2043 07/12/24 0608   BP:  128/76 128/75 138/80 139/74   BP Location:  Right arm Left arm Left arm   Pulse:  59 79 77   Resp:  18 16 18   Temp:  98.2 °F (36.8 °C) 98.1 °F (36.7 °C) 98.5 °F (36.9 °C)   TempSrc:  Oral Oral Oral   SpO2:  97% 97% 96%   Weight:    72.1 kg (159 lb)   Height:           General Appearance: NAD; pleasant  HEENT: PERRLA, conjuctiva normal; mucous membranes moist; face symmetrical  Neck:  Supple  Lungs: clear bilaterally, normal respiratory effort, no retractions, expiratory effort normal, on room air  CV: regular rate and rhythm, no murmurs rubs or gallops noted   ABD: soft non tender, +BS x4  EXT: DP pulses intact, no lymphadenopathy, + bilat LE edema  Skin: normal turgor, normal texture, no rash  Psych: affect normal, mood normal  Neuro: AAOx3       The above physical exam was reviewed and updated as determined by my evaluation of the patient on 7/12/2024.    Invasive Devices       None                      VTE Pharmacologic Prophylaxis: Heparin  Code Status: Level 1 - Full Code  Current Length of Stay: 3 day(s)    Total floor / unit time spent today  35 minutes   Coordination of patient's care was performed in conjunction with primary service. Time invested included review of patient's labs, vitals, and management of their comorbidities with continued monitoring, examination of patient as well as answering patient questions, documenting her findings and creating progress note in electronic medical record,  ordering appropriate diagnostic testing.       ** Please Note:  voice to text software may have been used in the creation of this document. Although proof errors in transcription or interpretation are a potential of such software**

## 2024-07-12 NOTE — PROGRESS NOTES
OT Treatment Note       07/12/24 0900   Pain Assessment   Pain Assessment Tool 0-10   Pain Score No Pain   Restrictions/Precautions   Precautions Bed/chair alarms;Cardiac/sternal;Fall Risk;Fluid restriction;Supervision on toilet/commode;Cognitive   Weight Bearing Restrictions No   ROM Restrictions Yes  (sternal precautions)   Braces or Orthoses   (BLE TEDs, no abd binder needed this session)   Grooming   Findings washing hands seated at sink with setup   Sit to Stand   Type of Assistance Needed Incidental touching   Physical Assistance Level No physical assistance   Comment CGA without AD   Sit to Stand CARE Score 4   Bed-Chair Transfer   Type of Assistance Needed Incidental touching   Physical Assistance Level No physical assistance   Comment CGA without AD   Chair/Bed-to-Chair Transfer CARE Score 4   Toileting Hygiene   Type of Assistance Needed Incidental touching   Physical Assistance Level No physical assistance   Comment hygiene seated, CM with CGA in stance   Toileting Hygiene CARE Score 4   Toilet Transfer   Type of Assistance Needed Incidental touching   Physical Assistance Level No physical assistance   Comment SPT CGA without AD   Toilet Transfer CARE Score 4   Meal Prep   Meal Preparation reports wife does majority of cooking but pt will sometimes make breakfast at home. can complete hot meal prep in future session   Kitchen Mobility   Kitchen-Mobility Level   (no AD)   Kitchen Activity Retrieve items   Kitchen Mobility Comments Pt retrieving items throughout kitchen cabinets, fridge, microwave (reports counter height at home) with CGA and vc's for safety. Pt initially requires vc's to recall sternal precautions, requires min vc's throughout to maintain sternal precautions and recall number of items he was instructed to locate during task.   Health Management   Health Management Level of Assistance Close supervision;Moderate verbal cues   Health Management Pt reports PTA IND managing meds using multiple  1x/day pillboxes. OT reviewing meds pt is currently taking. Pt only able to recall 1 med he is currently taking (melatonin) but unable to state purpose of med. When reviewing med list with pt, pt unable to recall purpose of any med besides lopressor. Pt then completing tangible med management task of filling pillboxes. Pt requiring mod vc's throughout task to ensure correct med administration, as pt attempts to put 2 pills in one slot for metoprolol instead of 1 in AM and 1 in PM slot, attempts to put lipitor in AM slot rather than PM, and attempts to sort torsemide twice. Pt requires vc's to recognize errors and also problem solve how to correct errors during task. Reviewed techniques to use to keep track of meds already sorted. May benefit from further review of med management tasks but anticipate he may require SUP with task from wife upon d/c to ensure safety.   Cognition   Overall Cognitive Status WFL   Arousal/Participation Alert;Cooperative   Attention Attends with cues to redirect   Orientation Level Oriented X4   Memory Decreased recall of precautions   Following Commands Follows one step commands with increased time or repetition   Additional Activities   Additional Activities Comments For increased act олег/endurance required for ADLs/mobility, pt completing STS from recliner without UE support x10 reps, then while holding 3# db between BUE 10x2 reps; brief rest breaks between sets as needed to manage fatigue; HR=94.   Activity Tolerance   Activity Tolerance Patient tolerated treatment well   Assessment   Treatment Assessment Pt seen for 90 min OT session focusing on functional transfers/mobility, kitchen mobility, toileting tasks, and medication management tasks. Pt tolerating session well. Following toileting task, OT noting drainage through shirt from chest incision; MD and RN made aware and to change dressing following OT session. Pt demo's decreased recall/adherance to sternal precautions. Pt  requiring mod vc's during med management task for proper sorting of meds into pillbox, see above for further details. OT to continue POC to focus on standing balance, activity tolerance, energy conservation techniques, NMR, functional cog, B/IADLs, and functional transfers/mobility to maximize functional IND and safety prior to d/c.   Prognosis Good   Problem List Decreased strength;Decreased endurance;Impaired balance;Decreased range of motion;Decreased mobility;Decreased cognition;Decreased skin integrity;Orthopedic restrictions   Barriers to Discharge Inaccessible home environment   Plan   Treatment/Interventions Functional transfer training;ADL retraining;Therapeutic exercise;Endurance training;Cognitive reorientation;Patient/family training;Equipment eval/education;Bed mobility;Gait training;Compensatory technique education;Continued evaluation;Spoke to nursing   Progress Progressing toward goals   OT Therapy Minutes   OT Time In 0900   OT Time Out 1030   OT Total Time (minutes) 90   OT Mode of treatment - Individual (minutes) 90   OT Mode of treatment - Concurrent (minutes) 0   OT Mode of treatment - Group (minutes) 0   OT Mode of treatment - Co-treat (minutes) 0   OT Mode of Treatment - Total time(minutes) 90 minutes   OT Cumulative Minutes 300   Therapy Time missed   Time missed? No

## 2024-07-12 NOTE — PROGRESS NOTES
Progress Note - Cardiothoracic Surgery   Shane Chau Sr. 78 y.o. male MRN: [id number]  Unit/Bed#: -01 Encounter: [id number]      24 Hour Events: Seen by wound care. PEr cardiac sx sign out, PA expressed serous pocket from inferior wound then dressed. No other events. No issues per RN, only needing to change dressing after PT sessions. No complaints per patient, eating % & all of his Ensures w/ meals.    Medications:   Scheduled Meds:  Current Facility-Administered Medications   Medication Dose Route Frequency Provider Last Rate    acetaminophen  975 mg Oral Q6H While awake SANTHOSH Cunningham      aspirin  81 mg Oral Daily SANTHOSH Cunningham      atorvastatin  80 mg Oral After Dinner SANTHOSH Cunningham      bisacodyl  10 mg Rectal Daily PRN Dadnre Stoner MD      cefadroxil  1,000 mg Oral Q12H KRISTI Baker PA-C      chlorhexidine  15 mL Mouth/Throat BID SANTHOSH Cunningham      heparin (porcine)  5,000 Units Subcutaneous Q8H [facility] SANTHOSH Cunningham      lidocaine  2 patch Topical Daily SANTHOSH Cunningham      melatonin  6 mg Oral HS SANTHOSH Cunningham      metoprolol tartrate  50 mg Oral Q12H [facility] SANTHOSH Cunningham      ondansetron  4 mg Oral Q6H PRN SANTHOSH Cunningham      pantoprazole  40 mg Oral Early Morning SANTHOSH Cunningham      polyethylene glycol  17 g Oral Daily PRN Dandre Stoner MD      potassium chloride  20 mEq Oral BID SANTHOSH Cunningham      ticagrelor  90 mg Oral Q12H [facility] SANTHOSH Cunningham      torsemide  20 mg Oral Daily SANTHOSH Cunningham       Continuous Infusions:     Results:   NO NEW CBC OR BMP  Results from last 7 days   Lab Units 07/10/24  1601   WBC Thousand/uL 9.13   HEMOGLOBIN g/dL 10.6*   HEMATOCRIT % 31.9*   PLATELETS Thousands/uL 386     Results from last 7 days   Lab Units 07/10/24  1601 07/09/24  0458 07/08/24  0315   POTASSIUM  mmol/L 4.2 3.8 3.6   CHLORIDE mmol/L 100 100 99   CO2 mmol/L 23 24 23   BUN mg/dL 21 23 24   CREATININE mg/dL 1.18 1.05 1.08   CALCIUM mg/dL 7.8* 7.8* 7.6*           Vitals:   Vitals:    07/12/24 0810 07/12/24 0830 07/12/24 0840 07/12/24 0910   BP: 132/72   135/70   BP Location: Right arm      Pulse:  92 (!) 108 88   Resp:       Temp:       TempSrc:       SpO2:       Weight:       Height:           Physical Exam:    General: No acute distress  HEENT/NECK:  Normocephalic. Atraumatic.  Cardiac: Regular rate and rhythm  Pulmonary:   on RA, no respiratory distress  Abdomen:  Non-distended  Extremities: Extremities warm/dry  Neuro: Alert and oriented X 3  Skin: Warm/Dry, without rashes or lesions.    Wound care: dressing taken down, inferior wound stable from exam 7/10, no open portions, unable to probe to suggest tracking, a few CCs of murky yellow fluid expressed, phots taken & shared w/ surgeon via EPIC chat, wound redressed as found, no surrounding erythema suggesting infection    Assessment:    CAD s/p CABGx3  Surgical site reaction/drainage/breakdown    Plan:  Continue 5 day course of antibiotics  No fever  CBC 7/10 w/o leukocytosis  Wound care following w/ orders place  Wound taken down & dressing changed by cardiac sx at bedside, plan for repeat wound change ongoing    Case discussed w/ Dr. Bette BERMUDEZ    SIGNATURE: Mercedes Baker PA-C  DATE: July 12, 2024  TIME: 11:38 AM    * This note was completed in part utilizing m-Pager fluency direct voice recognition software.   Grammatical errors, random word insertion, spelling mistakes, and incomplete sentences may be an occasional consequence of the system secondary to software limitations, ambient noise and hardware issues. At the time of dictation, efforts were made to edit, clarify and /or correct errors. Please read the chart carefully and recognize, using context, where substitutions have occurred.  If you have any questions or concerns about the context,  text or information contained within the body of this dictation, please contact myself, the provider, for further clarification.

## 2024-07-12 NOTE — PROGRESS NOTES
"Physical Medicine and Rehabilitation Progress Note  Shane Chau Sr. 78 y.o. male MRN: [id number]  Unit/Bed#: -01 Encounter: [id number]      Assessment & Plan:     Decline in ADLs and mobility: Functional assessment-improving         FIM  Care Score  Admit Score Recent Score    Total assist  1-100% or 2p    Tot     Max assist 2-51-75%    Sub UBD, LBD, bathing    Mod assist 3- 26-50%  Par  LBD   Min assist 3- 25% or < Par To hygiene To hygiene, Bathing, UBD   CG assist 4  TA     Sup/Setup 4-5 Sup     Mod-I/Indep 6 MI      Transfers  Mod-significant assist CG assist     Ambulation   50 ft mod assist  150 ft CGA    Stairs    4 steps mod assist 4 steps CGA   Recent SLP FIM - Significant assist memory, mod assist PS, other domains supervision  Goal: Supervision for most ADLs and for mobility with possible slight help for LBD and bathing  Major barriers:  Impaired balance, coordination, possibly cog, deconditioning  Dispo: Home with [facility] 14 days from admission      Coronary artery disease of native artery of native heart with stable angina pectoris (HCC)  Assessment & Plan  S/p 3v CABG 6/20/24 (prior stenting)   Sternal precautions  7/10 noted to have some drainage from sternal incision somewhat increased from earlier; patient states this has been happening from time to time since sx   - Cardiac sx c/s - recommended Duricef and LWC  7/11 Wound care c/s  \"Mid sternal wound with full-thickness dehiscence.  Discussed with cardiac surgery advanced practitioner prior to examining patient, who reported they were able to express moderate amount of yellow drainage on exam from the distal aspect of the incision. No additional drainage able to be palpated or expressed on wound care exam.   Will recommend wound management of silver alginate and dry dressing daily and PRN, for antimicrobial benefit and to absorb any further drainage.  Oral antibiotics as per cardiac surgery is also following.   No apparent clinical s/s " "of infection present on wound care exam.\"   - No leukocytosis 7/10, pt afebrile, monitor site closely   7/12 Card sx follow-up  \"dressing taken down, inferior wound stable from exam 7/10, no open portions, unable to probe to suggest tracking, a few CCs of murky yellow fluid expressed, phots taken & shared w/ surgeon via EPIC chat, wound redressed as found, no surrounding erythema suggesting infection\"    IM consulted and with overall management at their discretion during ARC course  Antithrombotic: Aspirin, Brilinta  Statin  Optimal blood pressure and blood sugar control  Continue PT, OT  Monitor vitals      * CVA (cerebral vascular accident) (HCC)  Assessment & Plan  - Neuro exam stable 7/12; function improving  - Occurred about 10 days after CABG; unclear relation to procedure   - punctate acute to subacute infarcts involving the right caudate head, anterior centrum semiovale, and lateral right frontal cortex and chronic right corona radiata/basal ganglia and bilateral cerebellar lacunar infarcts.  - Residual impairments on admission to ARC: L sided weakness, imbalance, incoordination, possibly cog impairments-     Secondary stroke prevention  - OP Ziopatch recommended to rule out atrial fibrillation after discharge  - Antithrombotic: Aspirin and Brilinta  - Statin  - Optimal management of blood pressure and diabetes  -  patient and if applicable caregiver on optimal stroke management  - Fall precautions   - Follow-up with neurology and PCP after d/c   - Continue acute comprehensive interdisciplinary inpatient rehabilitation to include intensive skilled therapies (PT, OT, ST) as outlined with oversight and management by rehabilitation physician as well as inpatient rehab level nursing, case management and weekly interdisciplinary team meetings.       Frequent bowel movements  Assessment & Plan  Improving   Pt afebrile without leukocytosis or abdominal pain  No recent bowel meds; did have hx of ileus; is " eating ok  Monitor for now  If concerns for infectious diarrhea in future obtain C diff or other studies  Ensure adequate hydration       Cognitive impairment  Assessment & Plan  -Acute comprehensive interdisciplinary inpatient rehabilitation to include intensive skilled therapies as outlined with oversight and management by rehabilitation physician.  Continue inpatient rehab level nursing, case management and weekly interdisciplinary team meetings.   MOCA 7/12 18 out of 30 indicated mild cog impairment but close to mod with greatest deficits in VSP/executive, language, and delayed recall  -Recommend eval of med mgmt and IADLs   -Monitor neuro-exam, wakefulness, mood, cognition, insight into deficits and safety awareness  - patient (+/- family/caregiver education) and training and compensatory strategies to optimize safety, function, and decrease risk of complications  -Optimize sleep-wake cycle  -Limit sedating medications when possible  -Monitor and ensure optimal management electrolytes, nutrition, and hydration  -Monitor for signs or symptoms of infection, medication intolerances, other systemic etiologies  -Fall precautions with frequent rounding; proactive toileting program, patient should not be unattended in bathroom    -OP follow-up PCP, neuro, and possibly Salma       Oral phase dysphagia  Assessment & Plan  Improving Slp recommended upgrade to [facility]  SLP eval and tx  Aspiration precautions     Stenosis of left vertebral artery  Assessment & Plan  Moderate to severe left vertebral artery atherosclerotic stenosis   - Antithrombotic: aspirin, brilinta  - Statin  - Optimal blood pressure and blood sugar control  - OP neuro     At risk for venous thromboembolism (VTE)  Assessment & Plan  SCDs, ambulation, and heparin       Postoperative anemia due to acute blood loss  Assessment & Plan  Consult(ed) IM and comanagement with their service during ARC course   Monitor H/H, vitals, signs/symptoms of acute  "bleeding  Hb stable at 10.6 on 7/10     Volume overload  Assessment & Plan  IM consulted and with overall management at their discretion during ARC course  Torsemide per IM     History of ileus  Assessment & Plan  Improved (temp required NGT and Gen Sx c/s on acute)  Monitor GI function, intake     PAD (peripheral artery disease) (HCC)  Assessment & Plan  \"PAD/bilateral carotid artery stenosis, 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\"  IM consulted and with overall management at their discretion during ARC course  - Antithrombotic: asp/brilinta  - Statin  - Optimal blood pressure and blood sugar control  - OP Vasc sx       Hyponatremia  Assessment & Plan  IM consulted and with overall management at their discretion during ARC course  2000 FR and Torsemide per IM       CKD (chronic kidney disease) stage 2, GFR 60-89 ml/min  Assessment & Plan  Lab Results   Component Value Date    EGFR 58 07/10/2024    EGFR 67 07/09/2024    EGFR 65 07/08/2024    CREATININE 1.18 07/10/2024    CREATININE 1.05 07/09/2024    CREATININE 1.08 07/08/2024   Monitor BUN/Cr intermittently as well as electrolytes   IM consulted to manage  Limit nephrotoxic agents when possible  Optimal BP mgmt       Primary hypertension  Assessment & Plan  Internal medicine consulted and co-management with their service  Monitor vitals with and without activity; monitor for orthostasis  Monitor hemoglobin, electrolytes, kidney function, hydration status   Current meds: MTP and torsemide per IM       Abdominal aortic aneurysm without rupture (HCC)  Assessment & Plan  Mgmt per IM         Other Medical Issues:  Monitor for     Follow-up providers and other issues to be followed up after discharge  PCP  Neuro  Cards  Cardiac Sx  Vasc Sx  Other chronic providers    Objective:    Allergies per EMR  Diagnostic Studies: Reviewed, no new imaging  No orders to display     See above as well    Laboratory: Labs reviewed  Results from last 7 days   Lab Units " 07/10/24  1601   HEMOGLOBIN g/dL 10.6*   HEMATOCRIT % 31.9*   WBC Thousand/uL 9.13     Results from last 7 days   Lab Units 07/10/24  1601 07/09/24  0458 07/08/24  0315   BUN mg/dL 21 23 24   SODIUM mmol/L 132* 133* 131*   POTASSIUM mmol/L 4.2 3.8 3.6   CHLORIDE mmol/L 100 100 99   CREATININE mg/dL 1.18 1.05 1.08            Drug regimen reviewed, all potential adverse effects identified and addressed:    Scheduled Meds:  Current Facility-Administered Medications   Medication Dose Route Frequency Provider Last Rate    acetaminophen  975 mg Oral Q6H While awake SANTHOSH Cunningham      aspirin  81 mg Oral Daily SANTHOSH Cunningham      atorvastatin  80 mg Oral After Dinner SANTHOSH Cunningham      bisacodyl  10 mg Rectal Daily PRN Dandre Stoner MD      cefadroxil  1,000 mg Oral Q12H KRISTI Baker PA-C      chlorhexidine  15 mL Mouth/Throat BID SANTHOSH Cunningham      heparin (porcine)  5,000 Units Subcutaneous Q8H [facility] SANTHOSH Cunningham      lidocaine  2 patch Topical Daily SANTHOSH Cunningham      melatonin  6 mg Oral HS SANTHOSH Cunningham      metoprolol tartrate  50 mg Oral Q12H [facility] SANTHOSH Cunningham      ondansetron  4 mg Oral Q6H PRN SANTHOSH Cunningham      pantoprazole  40 mg Oral Early Morning SANTHOSH Cunningham      polyethylene glycol  17 g Oral Daily PRN Dandre Stoner MD      potassium chloride  20 mEq Oral BID SANTHOSH Cunningham      ticagrelor  90 mg Oral Q12H [facility] SANTHOSH Cunningham      torsemide  20 mg Oral Daily SANTHOSH Cunningham         Chief Complaints:  Rehab follow-up     Subjective:     On eval, patient denies increased chest pain, shortness of breath, fever, chills, worsening strength or sensation, or other new complaints.    ROS: A 10 point ROS was performed; negative except as noted above.       Physical Exam:  Temp:  [97.6 °F (36.4 °C)-98.5 °F (36.9 °C)] 98.3 °F  (36.8 °C)  HR:  [] 85  Resp:  [18-19] 18  BP: (120-139)/(65-74) 130/72  SpO2:  [96 %-100 %] 96 %    GEN:  Sitting in NAD   HEENT/NECK: MMM  CARDIAC: Regular rate rhythm, no murmers, no rubs, no gallops  LUNGS:  clear to auscultation, no wheezes, rales, or rhonchi  ABDOMEN: Soft, non-tender, non-distended, normal active bowel sounds  EXTREMITIES/SKIN:  Subtle bilateral lower extremity edema without calf tenderness to palpation, inferior sternal incision with some serosanguineous drainage  NEURO:   MENTAL STATUS: Adequate wakefulness, able to follow simple commands and Strength/MMT:  Unchanged  PSYCH:  Affect:  flattened some      ** Please Note: Fluency Direct voice to text software may have been used in the creation of this document. **

## 2024-07-12 NOTE — ASSESSMENT & PLAN NOTE
Stroke alert called on 7/3/24 due to dysarthria, left sided weakness, left tongue deviation, left facial droop, and left pronator drift. Symptoms have since improved.  MRI brain 7/6/2024 showed: Punctate acute to subacute infarcts involving the right caudate head, anterior centrum semiovale, and lateral right frontal cortex. Chronic right corona radiata/basal ganglia and bilateral cerebellar lacunar infarcts. Mild chronic microangiopathic ischemic changes..   Neurology evaluated, recommendations appreciated  Distribution of the strokes found on MRI is suspicious for an embolic source -> plan for further cardiac work up with Zio patch/ILR as outpatient   If arrhythmia is found on work up recommend switching from DAPT to NOAC and ASA  P2Y12 level checked during this hospitalization was 180 -> switched from Plavix to Brilinta   Brilinta $47/month - pt and wife are agreeable to the cost.  Continue with ASA and statin

## 2024-07-12 NOTE — PROGRESS NOTES
ST Updates:   Recommendations: still assessing  Continued Services: yes   Discharge Date: -  Family Education/Training: spoke with wife and daughter on 7/12 during meal about regular upgrade and swallow strategies but have not done true family training yet. Spoke to them about future family training and they are on board about having and learning more.   Continued Treatment Plan: dysphagia (swallow strategies, carryover of regular diet) cognition (attention, STM recall, safety, problem solving)       07/12/24 1230   Pain Assessment   Pain Assessment Tool 0-10   Pain Score No Pain   Restrictions/Precautions   Precautions Bed/chair alarms;Cognitive;Fall Risk;Fluid restriction;Supervision on toilet/commode   Eating   Type of Assistance Needed Set-up / clean-up;Supervision;Verbal cues   Physical Assistance Level No physical assistance   Eating CARE Score 4   Swallow Assessment   Swallow Treatment Assessment   Daily Dysphagia Tx Note     Patient Name: Shane Chau .    Today's Date: 7/12/2024      Current Risks for Dysphagia & Aspiration: new neuro event, cognitive deficit, positioning issues, head support (sternal precautions)      Current Symptoms/Concerns: mild difficulty with chewing, decreased oral intake, weight loss, dehydration     Current diet:soft/level 3 diet and thin liquids      Premorbid diet::regular diet and thin liquids      Cognitive Status: impaired; see cognition evaluation for further details      Positioning: upright in recliner    Items administered:Consistencies Administered: thin liquids and mixed consistency  Materials administered included cheeseburger with a salad, banana and pudding    Total amount of meal consumed: 100% of meal and 120 cc of thins by cup    Oral stage:mild  Lip closure: functional around cup and utensils  Anterior spillage: none  Mastication: prolonged and decreased but beginning to improve; noted to be longer with burger than salad today  Bolus formation: suspect  decrease   Bolus control: mild decrease but controled for most solids overall  Transfer: suspect timely with thins and and mildly decreased with solids due to weak musculature   Oral residue:trace amounts on tongue but improvement in clearance with no need for verbal cues   Pocketing: none     Pharyngeal stage:minimal  Swallow promptness: prompt with thins and min delays with solids  Hyolaryngeal elevation:  present and prompt  Wet voice: none  Throat clear: none  Cough: none  Secondary swallows: occasionally  Audible swallows: none     Esophageal stage: No overt sxs of aspiration    Summary:     Pt presenting with mild oral and minimal pharyngeal dysphagia today. Symptoms or concerns included prolonged and mild decrease of mastication with hard solids, decreased bolus formation with hard solids, and oral residue with hard solids, additionally, pt presents at times with  multiple swallows to assist with getting hard solids and at times soft solids down.     Pt was seen during lunch meal for dysphagia tx session with pt being independent in self feeding and setting up tray. Pt was seated in recliner and SLP assisted pt in sitting up in chair for meal. Pt noted to continue to be excited for continuing of trialing for regular diet.     In regards to swallowing during meal, lip closure was functional with no anterior spillage was noted. Mastication continued to be prolonged and decreased but is beginning to improve with regular foods. Pt continued to need increase time for breakdown with average time for bites to be 2-3 minutes and is effective. No evidence that swallow is affected by increase breakdown time. Bolus formation with burger was noted to be significantly more decreased than with salad due to size and due to effort needed for breakdown. However, pt did note to indep cut up items, use sauces to assist prior to formation in oral cavity due to decrease of liquid wash. Bolus control is suspected to be mildly  decreased with hard solids evidenced by some piecemeal deglutition at times but overall has significantly improved. Pt has been observed to show improvement in control of solids with regular diet with use of independence in lingual sweeps and liquid wash. Bolus transfers were suspected to be timely with no evidence of coughing or TC with liquids or solids. Trace amounts of oral residue were noted on tongue but pt was independent in clearing with lingual sweeps and liquid wash. Upon palpation, swallow promptness was prompt with thins and minimally delayed with solids, suspect due to increase mastication, anticipate improvement with increase use of swallowing exercises with throat exercises.     Traditional oral care was completed at end of meal with minimal amounts of oral residual cleared.     Pt's wife Maryjo arrived a few minutes into the meal and SLP provided education regarding trial tray and pt's progress. SLP spoke to pt's wife regarding how pt has improved and noted that he has continued to have difficulty with mastication and this has been the main concern and this session, will focus more on this with implementation of exercises to increase swallowing strength and swallowing muscles. SLP spoke to pt and pt's wife regarding swallowing mechanism as pt's wife was curious regarding strokes and dysphagia and SLP provided education on dysphagia and provided information as well as provided examples on how to provide cues for pt to assist and the progress as pt previously was having difficulty with fast intake and large bites but has improved. By end of session, pt's daughter also arrived and SLP presented self and showed pt, daughter and wife throat exercises including effortful swallow, Mendelsohn maneuver, and towel tuck. SLP provided written handout with each exercise written with frequency written out. SLP noted to family that formal family training will be completed in coming sessions prior to d/c but SLP is  here to answer any questions. Family noted no questions but thanked SLP and noted that they will be practicing with pt frequently. Pt was noted to be practicing on own at end of session when SLP was speaking with family.     Recommendations:  Diet: regular diet and thin liquidsUPGRADED  Meds: whole with liquid  Strategies:  upright posture, only feed when fully alert, slow rate of feeding, small bites/sips, quiet environment (tv off, limit talking, door closed, etc.), alternating bites and sips, and OOB FOR ALL MEALS -TRADITIONAL CARE AFTER MEALS     DISTANT supervision w/ meals.  Results reviewed with:  patient, RN-MD Halina-SANTHOSH Rodriguez-Rubi and Family-wife and daughter  Aspiration precautions posted.     F/u ST tx: Pt will continue to benefit from ongoing skilled dysphagia tx sessions to establish safest least restrictive diet w/o increased oropharyngeal or aspiration sxs as well as monitor ability to carryover swallow strategies independently.     Plan  -monitor upgrade with regular diet to ensure safety with regular diet  -family education and training ongoing with son, daughter and wife  -provided education on diet and modifications    Swallow Assessment Prognosis   Prognosis Good   Prognosis Considerations Age;Medical diagnosis;Medical prognosis;Previous level of function;Severity of impairments   SLP Therapy Minutes   SLP Time In 1230   SLP Time Out 1330   SLP Total Time (minutes) 60   SLP Mode of treatment - Individual (minutes) 60   SLP Mode of treatment - Concurrent (minutes) 0   SLP Mode of treatment - Group (minutes) 0   SLP Mode of treatment - Co-treat (minutes) 0   SLP Mode of Treatment - Total time(minutes) 60 minutes   SLP Cumulative Minutes 165   Therapy Time missed   Time missed? No

## 2024-07-12 NOTE — PROGRESS NOTES
07/12/24 0730   Pain Assessment   Pain Assessment Tool 0-10   Pain Score No Pain   Restrictions/Precautions   Precautions Bed/chair alarms;Cardiac/sternal;Fall Risk;Fluid restriction;Supervision on toilet/commode   Weight Bearing Restrictions No   ROM Restrictions Yes  (sternal precautions)   Braces or Orthoses   (b/l knee high TEDs, abd binder not needed this session)   Cognition   Overall Cognitive Status WFL   Arousal/Participation Alert;Cooperative   Subjective   Subjective Pt agreeable to PT session, no new complaints to report   Sit to Stand   Type of Assistance Needed Incidental touching   Comment CG no AD   Sit to Stand CARE Score 4   Bed-Chair Transfer   Type of Assistance Needed Incidental touching   Comment CG no AD   Chair/Bed-to-Chair Transfer CARE Score 4   Walk 10 Feet   Type of Assistance Needed Incidental touching   Comment CG no AD   Walk 10 Feet CARE Score 4   Walk 50 Feet with Two Turns   Type of Assistance Needed Incidental touching   Comment CG no AD   Walk 50 Feet with Two Turns CARE Score 4   Walk 150 Feet   Type of Assistance Needed Incidental touching   Comment CG no AD   Walk 150 Feet CARE Score 4   Ambulation   Primary Mode of Locomotion Prior to Admission Walk   Distance Walked (feet) 150 ft  (x1, 100'x1, 50'x1,)   Assist Device   (none)   Gait Pattern Inconsistant Chantel;Slow Chantel;Step to;Step through   Limitations Noted In Balance;Endurance;Speed;Strength   Walk Assist Level Contact Guard   Does the patient walk? 2. Yes   Wheel 50 Feet with Two Turns   Reason if not Attempted Activity not applicable   Wheel 50 Feet with Two Turns CARE Score 9   Wheel 150 Feet   Reason if not Attempted Activity not applicable   Wheel 150 Feet CARE Score 9   Curb or Single Stair   Style negotiated Curb   Type of Assistance Needed Physical assistance   Physical Assistance Level 26%-50%   Comment curb step x4   1 Step (Curb) CARE Score 3   4 Steps   Type of Assistance Needed Incidental touching    Comment on  steps using single vs b/l railing, step thru pattern; pt fatigued at 8 steps this session   4 Steps CARE Score 4   Stairs   Type Stairs;Curb   # of Steps 8  (x2 trials for conditioning and so pt can access home upon d/c)   Weight Bearing Precautions Sternal;Cardiac   Toilet Transfer   Type of Assistance Needed Incidental touching   Comment CG no AD   Toilet Transfer CARE Score 4   Therapeutic Interventions   Strengthening circuit trng x3 STSs, walk 10', x3 STSs, walk 10', 3 sets total of this circuit with therapeutic rest breaks between trials for pt tolerance   Neuromuscular Re-Education obstacle course step thru L HHA 10'x4 with min-modAx1 provided for balance, 2nd trial w/o HHA, min-modAx1 only needed on 1 occ due to LOB otherwise CGA 10'x2. Sidestepping thru obstacle course 10'x4 and 10'x2 CG-modAx1 for balance   Other BP monitored from sit to stand, no drop in BP, see vitals for details. Activity rated as 11-13/20 on Werner scale. HR monitored t/o session, 108 at most with activity   Equipment Use   NuStep lvl 2, 12 mins, LEs only for conditioning; pt rates activity as 11/20 on Werner scale   Assessment   Treatment Assessment Session focusing on improving pt's activity tolerance and balance to increase pt's independence with functional mobilities. No c/o ortho BP sx's during session, BP monitored and WNL therefore only b/l LE TEDs donned. Monitored activity intensity with use of werner scale, pt rated all interventions as 11-13/20, does fatigue requiring seated therapeutic rest breaks but then able to cont with session. At this time pt cont to progress towards LTGs, cont with POC focusing on stair trng so pt can safely access his home upon d/c, improving pt's activity tolerance and balance in order to increase pt's independence with mobilities and reduce fall risk as well as continue with stroke education.   Problem List Decreased strength;Decreased range of motion;Decreased endurance;Impaired  balance;Decreased mobility;Decreased skin integrity;Orthopedic restrictions   Barriers to Discharge Inaccessible home environment   Plan   Treatment/Interventions Functional transfer training;LE strengthening/ROM;Elevations;Therapeutic exercise;Endurance training;Bed mobility;Gait training   Progress Progressing toward goals   PT Therapy Minutes   PT Time In 0730   PT Time Out 0900   PT Total Time (minutes) 90   PT Mode of treatment - Individual (minutes) 90   PT Mode of treatment - Concurrent (minutes) 0   PT Mode of treatment - Group (minutes) 0   PT Mode of treatment - Co-treat (minutes) 0   PT Mode of Treatment - Total time(minutes) 90 minutes   PT Cumulative Minutes 270   Therapy Time missed   Time missed? No

## 2024-07-12 NOTE — PLAN OF CARE
Problem: PAIN - ADULT  Goal: Verbalizes/displays adequate comfort level or baseline comfort level  Description: Interventions:  - Encourage patient to monitor pain and request assistance  - Assess pain using appropriate pain scale  - Administer analgesics based on type and severity of pain and evaluate response  - Implement non-pharmacological measures as appropriate and evaluate response  - Consider cultural and social influences on pain and pain management  - Notify physician/advanced practitioner if interventions unsuccessful or patient reports new pain  Outcome: Progressing     Problem: SAFETY ADULT  Goal: Patient will remain free of falls  Description: INTERVENTIONS:  - Educate patient/family on patient safety including physical limitations  - Instruct patient to call for assistance with activity   - Consult OT/PT to assist with strengthening/mobility   - Keep Call bell within reach  - Keep bed low and locked with side rails adjusted as appropriate  - Keep care items and personal belongings within reach  - Initiate and maintain comfort rounds  - Make Fall Risk Sign visible to staff  - Offer Toileting every 2 Hours, in advance of need  - Initiate/Maintain bed/chair alarm  - Obtain necessary fall risk management equipment: non skid footwear  - Apply yellow socks and bracelet for high fall risk patients  - Consider moving patient to room near nurses station  Outcome: Progressing  Goal: Maintain or return to baseline ADL function  Description: INTERVENTIONS:  -  Assess patient's ability to carry out ADLs; assess patient's baseline for ADL function and identify physical deficits which impact ability to perform ADLs (bathing, care of mouth/teeth, toileting, grooming, dressing, etc.)  - Assess/evaluate cause of self-care deficits   - Assess range of motion  - Assess patient's mobility; develop plan if impaired  - Assess patient's need for assistive devices and provide as appropriate  - Encourage maximum independence  but intervene and supervise when necessary  - Involve family in performance of ADLs  - Assess for home care needs following discharge   - Consider OT consult to assist with ADL evaluation and planning for discharge  - Provide patient education as appropriate  Outcome: Progressing  Goal: Maintains/Returns to pre admission functional level  Description: INTERVENTIONS:  - Perform AM-PAC 6 Click Basic Mobility/ Daily Activity assessment daily.  - Set and communicate daily mobility goal to care team and patient/family/caregiver.   - Collaborate with rehabilitation services on mobility goals if consulted  - Perform Range of Motion 3 times a day.  - Reposition patient every 2 hours.  - Dangle patient 3 times a day  - Stand patient 3 times a day  - Ambulate patient 3 times a day  - Out of bed to chair 3 times a day   - Out of bed for meals 3 times a day  - Out of bed for toileting  - Record patient progress and toleration of activity level   Outcome: Progressing

## 2024-07-13 PROBLEM — R41.89 COGNITIVE IMPAIRMENT: Status: ACTIVE | Noted: 2024-07-10

## 2024-07-13 PROCEDURE — 97530 THERAPEUTIC ACTIVITIES: CPT

## 2024-07-13 PROCEDURE — 97530 THERAPEUTIC ACTIVITIES: CPT | Performed by: PHYSICAL THERAPIST

## 2024-07-13 PROCEDURE — 97112 NEUROMUSCULAR REEDUCATION: CPT

## 2024-07-13 PROCEDURE — 97110 THERAPEUTIC EXERCISES: CPT | Performed by: PHYSICAL THERAPIST

## 2024-07-13 RX ADMIN — POTASSIUM CHLORIDE 20 MEQ: 1500 TABLET, EXTENDED RELEASE ORAL at 09:59

## 2024-07-13 RX ADMIN — TORSEMIDE 20 MG: 20 TABLET ORAL at 09:59

## 2024-07-13 RX ADMIN — TICAGRELOR 90 MG: 90 TABLET ORAL at 22:17

## 2024-07-13 RX ADMIN — CEFADROXIL 1000 MG: 500 CAPSULE ORAL at 22:17

## 2024-07-13 RX ADMIN — ACETAMINOPHEN 975 MG: 325 TABLET, FILM COATED ORAL at 22:19

## 2024-07-13 RX ADMIN — CEFADROXIL 1000 MG: 500 CAPSULE ORAL at 09:59

## 2024-07-13 RX ADMIN — HEPARIN SODIUM 5000 UNITS: 5000 INJECTION INTRAVENOUS; SUBCUTANEOUS at 14:30

## 2024-07-13 RX ADMIN — Medication 6 MG: at 22:19

## 2024-07-13 RX ADMIN — TICAGRELOR 90 MG: 90 TABLET ORAL at 09:59

## 2024-07-13 RX ADMIN — CHLORHEXIDINE GLUCONATE 15 ML: 1.2 SOLUTION ORAL at 09:59

## 2024-07-13 RX ADMIN — CHLORHEXIDINE GLUCONATE 15 ML: 1.2 SOLUTION ORAL at 17:45

## 2024-07-13 RX ADMIN — HEPARIN SODIUM 5000 UNITS: 5000 INJECTION INTRAVENOUS; SUBCUTANEOUS at 22:08

## 2024-07-13 RX ADMIN — POTASSIUM CHLORIDE 20 MEQ: 1500 TABLET, EXTENDED RELEASE ORAL at 17:45

## 2024-07-13 RX ADMIN — METOPROLOL TARTRATE 50 MG: 50 TABLET, FILM COATED ORAL at 09:59

## 2024-07-13 RX ADMIN — ACETAMINOPHEN 975 MG: 325 TABLET, FILM COATED ORAL at 09:59

## 2024-07-13 RX ADMIN — LIDOCAINE 2 PATCH: 700 PATCH TOPICAL at 22:08

## 2024-07-13 RX ADMIN — METOPROLOL TARTRATE 50 MG: 50 TABLET, FILM COATED ORAL at 22:07

## 2024-07-13 RX ADMIN — PANTOPRAZOLE SODIUM 40 MG: 40 TABLET, DELAYED RELEASE ORAL at 06:01

## 2024-07-13 RX ADMIN — HEPARIN SODIUM 5000 UNITS: 5000 INJECTION INTRAVENOUS; SUBCUTANEOUS at 06:02

## 2024-07-13 RX ADMIN — ATORVASTATIN CALCIUM 80 MG: 80 TABLET, FILM COATED ORAL at 17:45

## 2024-07-13 RX ADMIN — ACETAMINOPHEN 975 MG: 325 TABLET, FILM COATED ORAL at 14:30

## 2024-07-13 RX ADMIN — ASPIRIN 81 MG: 81 TABLET, COATED ORAL at 09:59

## 2024-07-13 NOTE — PROGRESS NOTES
07/13/24 1030   Pain Assessment   Pain Assessment Tool 0-10   Pain Score No Pain   Restrictions/Precautions   Precautions Bed/chair alarms;Cognitive;Cardiac/sternal;Fall Risk;Fluid restriction;Supervision on toilet/commode   Weight Bearing Restrictions No   ROM Restrictions Yes  (sternal precatuions)   Braces or Orthoses Other (Comment)  (BL TEDs)   Cognition   Overall Cognitive Status WFL   Arousal/Participation Alert;Cooperative   Attention Attends with cues to redirect   Orientation Level Oriented X4   Memory Decreased recall of precautions   Following Commands Follows one step commands with increased time or repetition   Subjective   Subjective pt sitting in bed side chair. pt agreeable to AM PT session.   Sit to Stand   Type of Assistance Needed Incidental touching   Physical Assistance Level No physical assistance   Comment CGA no device   Sit to Stand CARE Score 4   Bed-Chair Transfer   Type of Assistance Needed Incidental touching   Physical Assistance Level No physical assistance   Comment CGA no device   Chair/Bed-to-Chair Transfer CARE Score 4   Walk 10 Feet   Type of Assistance Needed Incidental touching   Comment CGA no device   Walk 10 Feet CARE Score 4   Walk 50 Feet with Two Turns   Type of Assistance Needed Incidental touching   Physical Assistance Level No physical assistance   Comment CGA no device   Walk 50 Feet with Two Turns CARE Score 4   Walk 150 Feet   Comment not a focus of todays session   Ambulation   Primary Mode of Locomotion Prior to Admission Walk   Distance Walked (feet) 75 ft  (x2, multiple shorter bouts during session)   Assist Device   (none)   Gait Pattern Inconsistant Chantel;Slow Chantel;Step through   Limitations Noted In Balance;Endurance;Speed;Strength;Swing   Walk Assist Level Contact Guard   Does the patient walk? 2. Yes   Wheel 50 Feet with Two Turns   Reason if not Attempted Activity not applicable   Wheel 50 Feet with Two Turns CARE Score 9   Wheel 150 Feet   Reason  "if not Attempted Activity not applicable   Wheel 150 Feet CARE Score 9   Curb or Single Stair   Style negotiated Single stair   Type of Assistance Needed Incidental touching   Physical Assistance Level No physical assistance   Comment 6\"  steps 1 HR   1 Step (Curb) CARE Score 4   4 Steps   Type of Assistance Needed Incidental touching   Physical Assistance Level No physical assistance   Comment 6\"  steps 1 HR, continues to fatigue with 4 laps.  able to completed 2 bouts   4 Steps CARE Score 4   12 Steps   Reason if not Attempted Safety concerns   12 Steps CARE Score 88   Stairs   Type Stairs   # of Steps 8   Weight Bearing Precautions Sternal;Cardiac   Assist Devices Single Rail   Findings fatigued with 8 steps   Toilet Transfer   Type of Assistance Needed Incidental touching   Physical Assistance Level No physical assistance   Comment CGA no device, grab bars   Toilet Transfer CARE Score 4   Therapeutic Interventions   Strengthening STS x5 3 sets   Equipment Use   NuStep Lv1 10 minutes BLE only   Other Comments   Comments HR monitored throuhgout session wtih max being 100bpm   Assessment   Treatment Assessment pt participated in 60 minute session of PT with focus on LE strength and enurance and stair training.  orthostatics taken at beginning of session with pt reporting no symptoms of dizziness. pt tolerated Nu step well without rest break noting LE fatigue.  pt completed stairs in similar fashion as previous session, limited to about 8 steps.  pt was educated about sternal precautions and how to avoid compromising them with functional tasks such as STS and stairs. post session, pt was returned to bedside chair with chair alarm activated and call bell within reach with lunch and tray placed in front.   Problem List Decreased strength;Decreased endurance;Impaired balance;Decreased range of motion;Decreased mobility;Decreased cognition;Decreased skin integrity;Orthopedic restrictions   Barriers to " Discharge Inaccessible home environment   Plan   Treatment/Interventions Functional transfer training;LE strengthening/ROM;ADL retraining;Therapeutic exercise;Gait training;Endurance training   Progress Progressing toward goals   PT Therapy Minutes   PT Time In 1030   PT Time Out 1130   PT Total Time (minutes) 60   PT Mode of treatment - Individual (minutes) 60   PT Mode of treatment - Concurrent (minutes) 0   PT Mode of treatment - Group (minutes) 0   PT Mode of treatment - Co-treat (minutes) 0   PT Mode of Treatment - Total time(minutes) 60 minutes   PT Cumulative Minutes 330

## 2024-07-13 NOTE — PROGRESS NOTES
"   07/13/24 0852   Pain Assessment   Pain Assessment Tool 0-10   Pain Score No Pain   Restrictions/Precautions   Precautions Bed/chair alarms;Cognitive;Fall Risk;Fluid restriction;Supervision on toilet/commode;Cardiac/sternal   Weight Bearing Restrictions No   ROM Restrictions Yes  (sternal precautions)   Braces or Orthoses Other (Comment)  (BLE FRANCOISE)   Sit to Stand   Type of Assistance Needed Incidental touching   Physical Assistance Level No physical assistance   Comment CG w/o AD; CS with RW   Sit to Stand CARE Score 4   Bed-Chair Transfer   Type of Assistance Needed Incidental touching   Physical Assistance Level No physical assistance   Comment CG w/o AD; CS with RW   Chair/Bed-to-Chair Transfer CARE Score 4   Meal Prep   Meal Preparation wife not present; pt reports he mostly has cold cereal and coffee for breakfast. pt able to retrieve all items necessary for making bowl of cereal while maintaining sternal precautions w/o difficulty and with no safety concerns. due to MoCA scoring, would anticipate/recommend that pt have at least supervision with more complex meal prep such as stove top meal prep and would discourage oven use at this time.   Health Management   Health Management (S)  pt engages in [facility] cog task using medication error activity in which pt was provided mock pill organizers with noted errors. pt was instructed to read each med label and identify the noted errors. pt able to complete with 50% accuracy, requiring VCs/attention to entire mock pill organizer and not just individual rows (AM, PM). pt did better with less medications, however when added in more than 4 medications to activity is when pt required increased assistance. pt reports completing his own medication management PTA and that he uses a \"single row\" per his PCP recommendation. will need to follow up with spouse regarding this and at this time, recommend supervision/assistance with medication management.   Cognition   Overall " Cognitive Status WFL   Arousal/Participation Alert;Cooperative   Attention Attends with cues to redirect   Orientation Level Oriented X4   Memory Decreased recall of precautions   Following Commands Follows one step commands with increased time or repetition   Additional Activities   Additional Activities Comments pt engages in repetitive short distance func transfers w/o use of RW as pt reports his wish is to not have to use at time of d/c; pt completes at overall CG, at times can be CS.   Activity Tolerance   Activity Tolerance Patient tolerated treatment well   Assessment   Treatment Assessment pt engages in 60 minute skilled OT Session focusing on func transfers with and w/o AD, cold meal prep and func cog tasks. see above for full func details. pt demo's slow and steady progress toward OT goals. completes basic transfers WITH RW at CS, WITHOUT at CG but pt wishes to d/c home w/o use of AD (will use a SPC if needed); completed cold meal prep as pt reports mostly having cold cereal and coffee for breakfast and that spouse cooks lunch/dinner or he will make himself a cold sandwhich for lunch. mock medication management activity completed in which he was to identify noted errors, see above. at this time, will recommend supervision/assistance with med management and more complex meal prep; will need to confirm with spouse regarding pt use of single pill organizer. recommend continued skilled care to focus on ADL retraining, func transfers, standing олег/bal, func cog, stroke education, in order to decrease burden of care at d/c.   Prognosis Good   Problem List Decreased strength;Decreased endurance;Impaired balance;Decreased range of motion;Decreased mobility;Decreased cognition;Decreased skin integrity;Orthopedic restrictions   Plan   Treatment/Interventions ADL retraining;Functional transfer training;Therapeutic exercise;Endurance training;Cognitive reorientation;Patient/family training;Equipment  eval/education;Compensatory technique education   OT Therapy Minutes   OT Time In 0830   OT Time Out 0930   OT Total Time (minutes) 60   OT Mode of treatment - Individual (minutes) 60   OT Mode of treatment - Concurrent (minutes) 0   OT Mode of treatment - Group (minutes) 0   OT Mode of treatment - Co-treat (minutes) 0   OT Mode of Treatment - Total time(minutes) 60 minutes   OT Cumulative Minutes 360   Therapy Time missed   Time missed? No

## 2024-07-13 NOTE — PROGRESS NOTES
07/13/24 1330   Pain Assessment   Pain Assessment Tool 0-10   Pain Score No Pain   Restrictions/Precautions   Precautions Bed/chair alarms;Cognitive;Cardiac/sternal;Fall Risk;Fluid restriction;Supervision on toilet/commode   Weight Bearing Restrictions No   ROM Restrictions Yes  (sternal precautions)   Cognition   Overall Cognitive Status WFL   Arousal/Participation Alert;Cooperative   Attention Attends with cues to redirect   Orientation Level Oriented X4   Memory Decreased recall of precautions   Following Commands Follows one step commands with increased time or repetition   Sit to Stand   Type of Assistance Needed Incidental touching   Comment CGA with no AD   Sit to Stand CARE Score 4   Bed-Chair Transfer   Type of Assistance Needed Incidental touching   Comment CGA with no AD   Chair/Bed-to-Chair Transfer CARE Score 4   Walk 10 Feet   Type of Assistance Needed Incidental touching   Comment CGA with no AD   Walk 10 Feet CARE Score 4   Walk 50 Feet with Two Turns   Type of Assistance Needed Incidental touching   Comment CGA with no AD   Walk 50 Feet with Two Turns CARE Score 4   Walk 150 Feet   Type of Assistance Needed Incidental touching   Comment CGA with no AD   Walk 150 Feet CARE Score 4   Ambulation   Primary Mode of Locomotion Prior to Admission Walk   Distance Walked (feet) 165 ft  (150 ft, 144 ft, 168 ft)   Assist Device   (no AD)   Gait Pattern Inconsistant Chantel;Step through   Limitations Noted In Endurance;Speed;Swing   Does the patient walk? 2. Yes   Curb or Single Stair   Style negotiated Single stair   Type of Assistance Needed Incidental touching   Comment CGA to ascend/descend 1 step with single HR   1 Step (Curb) CARE Score 4   4 Steps   Type of Assistance Needed Incidental touching   Comment CGA to ascend/descend 6  steps with single HR. completed 2x6   4 Steps CARE Score 4   12 Steps   Comment unable to complete 2/2 to fatigue   Reason if not Attempted Safety concerns   12 Steps  CARE Score 88   Toilet Transfer   Type of Assistance Needed Incidental touching   Comment CGA with no AD   Toilet Transfer CARE Score 4   Therapeutic Interventions   Neuromuscular Re-Education Backward Ambulation, High Knee Ambulation with CGA for safety, Ambulation over various height bolsters ModA due to 1 LOB laterally requiring assistance from therapist to correct   Assessment   Treatment Assessment Patient participated in 60 minute skilled physical therapy session focusing on increasing ambulation distance as tolerated and higher level tasks during ambulation to improve overall actiivity tolernace. wife present during session .Wife inquired requiring patient's LOS. Wife expressed concern over pt becoming depressed from being in the hospital too long. She also inquired about potential DC date. Informed her patient's care team can discuss early next week. Pt is making good progress in PT, but main limiting factor is activity tolerance/stamina. Patinet will benefit from continued skilled PT to maximize safety and independence.   Problem List Decreased strength;Decreased endurance;Impaired balance;Decreased range of motion;Decreased mobility;Decreased cognition;Decreased skin integrity;Orthopedic restrictions   Barriers to Discharge Inaccessible home environment   PT Barriers   Physical Impairment Decreased strength;Decreased endurance;Impaired balance;Decreased mobility;Orthopedic restrictions   Plan   Treatment/Interventions Functional transfer training;LE strengthening/ROM;Elevations;Therapeutic exercise;Endurance training;Patient/family training;Equipment eval/education;Gait training;Bed mobility   Progress Progressing toward goals   Discharge Recommendation   Rehab Resource Intensity Level, PT   (home with cardiac rehab)   PT Therapy Minutes   PT Time In 1330   PT Time Out 1430   PT Total Time (minutes) 60   PT Mode of treatment - Individual (minutes) 60   PT Mode of treatment - Concurrent (minutes) 0   PT  Mode of treatment - Group (minutes) 0   PT Mode of treatment - Co-treat (minutes) 0   PT Mode of Treatment - Total time(minutes) 60 minutes   PT Cumulative Minutes 390     Loreta Heck, PT, DPT

## 2024-07-13 NOTE — PLAN OF CARE
Problem: PAIN - ADULT  Goal: Verbalizes/displays adequate comfort level or baseline comfort level  Description: Interventions:  - Encourage patient to monitor pain and request assistance  - Assess pain using appropriate pain scale  - Administer analgesics based on type and severity of pain and evaluate response  - Implement non-pharmacological measures as appropriate and evaluate response  - Consider cultural and social influences on pain and pain management  - Notify physician/advanced practitioner if interventions unsuccessful or patient reports new pain  Outcome: Progressing     Problem: INFECTION - ADULT  Goal: Absence or prevention of progression during hospitalization  Description: INTERVENTIONS:  - Assess and monitor for signs and symptoms of infection  - Monitor lab/diagnostic results  - Monitor all insertion sites, i.e. indwelling lines, tubes, and drains  - Monitor endotracheal if appropriate and nasal secretions for changes in amount and color  - [place] appropriate cooling/warming therapies per order  - Administer medications as ordered  - Instruct and encourage patient and family to use good hand hygiene technique  - Identify and instruct in appropriate isolation precautions for identified infection/condition  Outcome: Progressing

## 2024-07-14 PROCEDURE — 97110 THERAPEUTIC EXERCISES: CPT

## 2024-07-14 PROCEDURE — 97530 THERAPEUTIC ACTIVITIES: CPT

## 2024-07-14 PROCEDURE — 99232 SBSQ HOSP IP/OBS MODERATE 35: CPT | Performed by: PHYSICIAN ASSISTANT

## 2024-07-14 RX ADMIN — METOPROLOL TARTRATE 50 MG: 50 TABLET, FILM COATED ORAL at 21:36

## 2024-07-14 RX ADMIN — POTASSIUM CHLORIDE 20 MEQ: 1500 TABLET, EXTENDED RELEASE ORAL at 17:31

## 2024-07-14 RX ADMIN — LIDOCAINE 2 PATCH: 700 PATCH TOPICAL at 21:36

## 2024-07-14 RX ADMIN — Medication 6 MG: at 21:40

## 2024-07-14 RX ADMIN — CHLORHEXIDINE GLUCONATE 15 ML: 1.2 SOLUTION ORAL at 08:19

## 2024-07-14 RX ADMIN — TICAGRELOR 90 MG: 90 TABLET ORAL at 21:37

## 2024-07-14 RX ADMIN — HEPARIN SODIUM 5000 UNITS: 5000 INJECTION INTRAVENOUS; SUBCUTANEOUS at 21:36

## 2024-07-14 RX ADMIN — ATORVASTATIN CALCIUM 80 MG: 80 TABLET, FILM COATED ORAL at 17:29

## 2024-07-14 RX ADMIN — ACETAMINOPHEN 975 MG: 325 TABLET, FILM COATED ORAL at 21:40

## 2024-07-14 RX ADMIN — TORSEMIDE 20 MG: 20 TABLET ORAL at 08:19

## 2024-07-14 RX ADMIN — CEFADROXIL 1000 MG: 500 CAPSULE ORAL at 21:37

## 2024-07-14 RX ADMIN — ACETAMINOPHEN 975 MG: 325 TABLET, FILM COATED ORAL at 08:18

## 2024-07-14 RX ADMIN — ACETAMINOPHEN 975 MG: 325 TABLET, FILM COATED ORAL at 13:24

## 2024-07-14 RX ADMIN — POTASSIUM CHLORIDE 20 MEQ: 1500 TABLET, EXTENDED RELEASE ORAL at 08:19

## 2024-07-14 RX ADMIN — HEPARIN SODIUM 5000 UNITS: 5000 INJECTION INTRAVENOUS; SUBCUTANEOUS at 13:25

## 2024-07-14 RX ADMIN — HEPARIN SODIUM 5000 UNITS: 5000 INJECTION INTRAVENOUS; SUBCUTANEOUS at 06:20

## 2024-07-14 RX ADMIN — METOPROLOL TARTRATE 50 MG: 50 TABLET, FILM COATED ORAL at 08:19

## 2024-07-14 RX ADMIN — CEFADROXIL 1000 MG: 500 CAPSULE ORAL at 08:20

## 2024-07-14 RX ADMIN — PANTOPRAZOLE SODIUM 40 MG: 40 TABLET, DELAYED RELEASE ORAL at 06:20

## 2024-07-14 RX ADMIN — CHLORHEXIDINE GLUCONATE 15 ML: 1.2 SOLUTION ORAL at 17:29

## 2024-07-14 RX ADMIN — ASPIRIN 81 MG: 81 TABLET, COATED ORAL at 08:18

## 2024-07-14 RX ADMIN — TICAGRELOR 90 MG: 90 TABLET ORAL at 08:20

## 2024-07-14 NOTE — PROGRESS NOTES
07/14/24 0903   Pain Assessment   Pain Assessment Tool 0-10   Pain Score No Pain   Restrictions/Precautions   Precautions Bed/chair alarms;Cardiac/sternal;Fluid restriction;Supervision on toilet/commode;Fall Risk   Weight Bearing Restrictions No   ROM Restrictions Yes  (sternal precautions)   Braces or Orthoses   (TEDs)   Cognition   Overall Cognitive Status WFL   Arousal/Participation Alert;Cooperative   Attention Attends with cues to redirect   Orientation Level Oriented X4   Memory Decreased recall of precautions   Following Commands Follows one step commands with increased time or repetition   Roll Left and Right   Type of Assistance Needed Supervision   Roll Left and Right CARE Score 4   Sit to Lying   Type of Assistance Needed Supervision   Sit to Lying CARE Score 4   Lying to Sitting on Side of Bed   Type of Assistance Needed Supervision   Lying to Sitting on Side of Bed CARE Score 4   Sit to Stand   Type of Assistance Needed Incidental touching   Comment CGA/CS with no AD   Sit to Stand CARE Score 4   Bed-Chair Transfer   Type of Assistance Needed Incidental touching   Comment CGA with no AD   Chair/Bed-to-Chair Transfer CARE Score 4   Walk 10 Feet   Type of Assistance Needed Incidental touching;Supervision   Comment CGA with no AD, CS with RW   Walk 10 Feet CARE Score 4   Walk 50 Feet with Two Turns   Type of Assistance Needed Incidental touching   Comment CGA with no AD, CGA/CS with RW   Walk 50 Feet with Two Turns CARE Score 4   Walk 150 Feet   Type of Assistance Needed Incidental touching   Comment CGA with no AD, CGA/CS with RW   Walk 150 Feet CARE Score 4   Ambulation   Primary Mode of Locomotion Prior to Admission Walk   Distance Walked (feet) 215 ft  (124 ft, 92 ft)   Findings Patient able to ambulate longer distances with RW since the AD helped conserve his energy. Pt has RW at home   Does the patient walk? 2. Yes   Wheel 50 Feet with Two Turns   Reason if not Attempted Activity not applicable    Wheel 50 Feet with Two Turns CARE Score 9   Wheel 150 Feet   Reason if not Attempted Activity not applicable   Wheel 150 Feet CARE Score 9   Toilet Transfer   Type of Assistance Needed Incidental touching   Comment CGA with no AD   Toilet Transfer CARE Score 4   Therapeutic Interventions   Strengthening STS 2x8 with no UE support   Equipment Use   NuStep Level 2 x 10 minutes, LEs only   Assessment   Treatment Assessment Patient participated in a 57 minute skilled physical therapy session primarily focusing on gait training to improve overall stamina and conditioning as well as LE strengthening. Pt is making great progress in physical therapy. Using the RW allows the patient to ambulate longer distances prior to needing a seated rest break. confirmed with pt that he has RW at home he can use. Pt will benefit from continued skilled PT services to maximize safety and independence with functional mobility prior to discharge.   Problem List Decreased strength;Decreased endurance;Impaired balance;Decreased range of motion;Decreased mobility;Decreased cognition;Decreased skin integrity;Orthopedic restrictions   Barriers to Discharge Inaccessible home environment   PT Barriers   Physical Impairment Decreased strength;Decreased endurance;Impaired balance;Decreased mobility;Orthopedic restrictions   Plan   Treatment/Interventions Functional transfer training;LE strengthening/ROM;Therapeutic exercise;Endurance training;Patient/family training;Bed mobility;Equipment eval/education   Progress Progressing toward goals   PT Therapy Minutes   PT Time In 0904   PT Time Out 1000   PT Total Time (minutes) 56   PT Mode of treatment - Individual (minutes) 56   PT Mode of treatment - Concurrent (minutes) 0   PT Mode of treatment - Group (minutes) 0   PT Mode of treatment - Co-treat (minutes) 0   PT Mode of Treatment - Total time(minutes) 56 minutes   PT Cumulative Minutes 446     Loreta Heck, PT, DPT

## 2024-07-14 NOTE — PLAN OF CARE
Problem: PAIN - ADULT  Goal: Verbalizes/displays adequate comfort level or baseline comfort level  Description: Interventions:  - Encourage patient to monitor pain and request assistance  - Assess pain using appropriate pain scale  - Administer analgesics based on type and severity of pain and evaluate response  - Implement non-pharmacological measures as appropriate and evaluate response  - Consider cultural and social influences on pain and pain management  - Notify physician/advanced practitioner if interventions unsuccessful or patient reports new pain  Outcome: Progressing     Problem: INFECTION - ADULT  Goal: Absence or prevention of progression during hospitalization  Description: INTERVENTIONS:  - Assess and monitor for signs and symptoms of infection  - Monitor lab/diagnostic results  - Monitor all insertion sites, i.e. indwelling lines, tubes, and drains  - Monitor endotracheal if appropriate and nasal secretions for changes in amount and color  - [place] appropriate cooling/warming therapies per order  - Administer medications as ordered  - Instruct and encourage patient and family to use good hand hygiene technique  - Identify and instruct in appropriate isolation precautions for identified infection/condition  Outcome: Progressing

## 2024-07-14 NOTE — PROGRESS NOTES
[facility]  Progress Note  Name: Shane Chau Sr. I  MRN: [id number]  Unit/Bed#: -01 I Date of Admission: 7/9/2024   Date of Service: 7/14/2024 I Hospital Day: 5    Assessment & Plan   Oral phase dysphagia  Assessment & Plan  Speech therapy is following  Currently on level 3 dysphagia diet with thin liquids    Prediabetes  Assessment & Plan  Recent A1c 5.7%   Monitor fasting glucose intermittently     Postoperative anemia due to acute blood loss  Assessment & Plan  No history of anemia, baseline hemoglobin 13. Noted with postop anemia following CABG.  Remains asymptomatic and hemodynamically stable at this time   Monitor CBC intermittently and transfuse as needed for hemoglobin < 8     Volume overload  Assessment & Plan  Iatrogenic volume overload status post CABG. Preserved LVEF on intra-op DEDRICK  Continues with bilateral LE edema on exam, encourage elevation/compression   Continue with Demadex 20 mg daily   Monitor volumes status closely   Daily weights, intake and output   Fluid and salt restriction   Intermittent BMP    History of ileus  Assessment & Plan  Postoperative ileus, managed by general surgery   Required NGT placement for decompression, removed on 6/28  Resolved, tolerating regular diet and having regular BMs    PAD (peripheral artery disease) (HCC)  Assessment & Plan  Has B/L carotid stenosis  Continue ASA/Brilinta and statin    Hyponatremia  Assessment & Plan  Mild, with serum sodium recently 132  Continue FR 2000 ml and Demadex 20 mg daily  Monitor volume status, intake and output  Monitor BMP intermittently    CKD (chronic kidney disease) stage 2, GFR 60-89 ml/min  Assessment & Plan  No documented diagnosis of CKD however per chart review, baseline creatinine approximately 0.9-1.2 with GFR 60-90  Noted with postoperative BARBARA with creatinine peak of 2.2 in the setting of iatrogenic volume overload  Initiated on Demadex 20 mg daily, and continued on  this for now   Monitor BMP intermittently    Primary hypertension  Assessment & Plan  Blood pressure stable on review  Continue Lopressor 50 mg q12h  Started on Demadex 20 mg daily postoperatively due to volume overload, continue    Coronary artery disease of native artery of native heart with stable angina pectoris (HCC)  Assessment & Plan  Hx CAD with PCI/stents; now status post CABG x 3 on 6/20/24  Intraoperative DEDRICK demonstrates preserved systolic function, with LVEF 55%   Currently on p.o. diuretic due to postop volume overload  Also on postop course of prophylactic amiodarone  Continue aspirin, statin, beta-blocker  Monitor incisions, sternal precautions    Abdominal aortic aneurysm without rupture (HCC)  Assessment & Plan  Status post repair 2011  Follows with Dr Juares for surveillance     * CVA (cerebral vascular accident) ([facility])  Assessment & Plan  Stroke alert called on 7/3/24 due to dysarthria, left sided weakness, left tongue deviation, left facial droop, and left pronator drift. Symptoms have since improved.  MRI brain 7/6/2024 showed: Punctate acute to subacute infarcts involving the right caudate head, anterior centrum semiovale, and lateral right frontal cortex. Chronic right corona radiata/basal ganglia and bilateral cerebellar lacunar infarcts. Mild chronic microangiopathic ischemic changes..   Neurology evaluated, recommendations appreciated  Distribution of the strokes found on MRI is suspicious for an embolic source -> plan for further cardiac work up with Zio patch/ILR as outpatient   If arrhythmia is found on work up recommend switching from DAPT to NOAC and ASA  P2Y12 level checked during this hospitalization was 180 -> switched from Plavix to Brilinta   Brilinta $47/month - pt and wife are agreeable to the cost.  Continue with ASA and statin                  The above assessment and plan was reviewed and updated as determined by my evaluation of the patient on 7/14/2024.    Labs:   Results  from last 7 days   Lab Units 07/10/24  1601   WBC Thousand/uL 9.13   HEMOGLOBIN g/dL 10.6*   HEMATOCRIT % 31.9*   PLATELETS Thousands/uL 386     Results from last 7 days   Lab Units 07/10/24  1601 07/09/24  0458   SODIUM mmol/L 132* 133*   POTASSIUM mmol/L 4.2 3.8   CHLORIDE mmol/L 100 100   CO2 mmol/L 23 24   BUN mg/dL 21 23   CREATININE mg/dL 1.18 1.05   CALCIUM mg/dL 7.8* 7.8*             Results from last 7 days   Lab Units 07/10/24  2104 07/10/24  1558 07/10/24  1052   POC GLUCOSE mg/dl 78 122 121       Imaging  No orders to display       Review of Scheduled Meds:  Current Facility-Administered Medications   Medication Dose Route Frequency Provider Last Rate    acetaminophen  975 mg Oral Q6H While awake SANTHOSH Cunningham      aspirin  81 mg Oral Daily SANTHOSH Cunningham      atorvastatin  80 mg Oral After Dinner SANTHOSH Cunningham      bisacodyl  10 mg Rectal Daily PRN Dandre Stoner MD      cefadroxil  1,000 mg Oral Q12H KRISTI Baker PA-C      chlorhexidine  15 mL Mouth/Throat BID SANTHOSH Cunningham      heparin (porcine)  5,000 Units Subcutaneous Q8H [facility] SANTHOSH Cunningham      lidocaine  2 patch Topical Daily SANTHOSH Cunningham      melatonin  6 mg Oral HS SANTHOSH Cunningham      metoprolol tartrate  50 mg Oral Q12H [facility] SANTHOSH Cunningham      ondansetron  4 mg Oral Q6H PRN SANTHOSH Cunningham      pantoprazole  40 mg Oral Early Morning SANTHOSH Cunningham      polyethylene glycol  17 g Oral Daily PRN Dandre Stoner MD      potassium chloride  20 mEq Oral BID SANTHOSH Cunningham      ticagrelor  90 mg Oral Q12H [facility] SANTHOSH Cunningham      torsemide  20 mg Oral Daily SANTHOSH Cunningham         Subjective/ HPI: Patient seen and examined. Patients overnight issues or events were reviewed with nursing staff. New or overnight issues include the following:     Patient seen sitting  comfortably in chair. He has no complaints. Denies CP/SOB. No abd pain/N/V    ROS:   A 10 point ROS was performed; negative except as noted above.        *Labs /Radiology studies Reviewed  *Medications  reviewed and reconciled as needed  *Please refer to order section for additional ordered labs studies      Physical Examination:  Vitals:   Vitals:    07/13/24 1323 07/13/24 2135 07/14/24 0548 07/14/24 1324   BP: 135/66 129/70 140/71 123/67   BP Location: Right arm Right arm Left arm Left arm   Pulse: 81 86 83 82   Resp: 20 16 16 16   Temp: 98.1 °F (36.7 °C) 98.7 °F (37.1 °C) 97.9 °F (36.6 °C) 98 °F (36.7 °C)   TempSrc: Oral Oral Oral Oral   SpO2: 96% 95% 94% 94%   Weight:   73.3 kg (161 lb 9.6 oz)    Height:           General Appearance: NAD; pleasant  HEENT: PERRLA, conjuctiva normal; mucous membranes moist; face symmetrical  Neck:  Supple  Lungs: clear bilaterally, normal respiratory effort, no retractions, expiratory effort normal, on room air  CV: regular rate and rhythm, no murmurs rubs or gallops noted. Anterior chest wall incision C/D/I  ABD: soft non tender, +BS x4  EXT: DP pulses intact, no lymphadenopathy, no edema  Skin: normal turgor, normal texture, no rash  Psych: affect normal, mood normal  Neuro: AAOx3       The above physical exam was reviewed and updated as determined by my evaluation of the patient on 7/14/2024.    Invasive Devices       Drain  Duration             External Urinary Catheter <1 day                       VTE Pharmacologic Prophylaxis: Heparin  Code Status: Level 1 - Full Code  Current Length of Stay: 5 day(s)    Total floor / unit time spent today 15 minutes  Coordination of patient's care was performed in conjunction with primary service. Time invested included review of patient's labs, vitals, and management of their comorbidities with continued monitoring, examination of patient as well as answering patient questions, documenting her findings and creating progress note in  electronic medical record,  ordering appropriate diagnostic testing.       ** Please Note:  voice to text software may have been used in the creation of this document. Although proof errors in transcription or interpretation are a potential of such software**

## 2024-07-15 PROCEDURE — 97535 SELF CARE MNGMENT TRAINING: CPT

## 2024-07-15 PROCEDURE — 97530 THERAPEUTIC ACTIVITIES: CPT

## 2024-07-15 PROCEDURE — 99024 POSTOP FOLLOW-UP VISIT: CPT | Performed by: PHYSICIAN ASSISTANT

## 2024-07-15 PROCEDURE — 97116 GAIT TRAINING THERAPY: CPT

## 2024-07-15 PROCEDURE — 99232 SBSQ HOSP IP/OBS MODERATE 35: CPT | Performed by: PHYSICAL MEDICINE & REHABILITATION

## 2024-07-15 PROCEDURE — 97110 THERAPEUTIC EXERCISES: CPT

## 2024-07-15 RX ORDER — CEFADROXIL 500 MG/1
1000 CAPSULE ORAL EVERY 12 HOURS SCHEDULED
Status: DISCONTINUED | OUTPATIENT
Start: 2024-07-15 | End: 2024-07-17 | Stop reason: HOSPADM

## 2024-07-15 RX ADMIN — HEPARIN SODIUM 5000 UNITS: 5000 INJECTION INTRAVENOUS; SUBCUTANEOUS at 13:12

## 2024-07-15 RX ADMIN — POTASSIUM CHLORIDE 20 MEQ: 1500 TABLET, EXTENDED RELEASE ORAL at 17:06

## 2024-07-15 RX ADMIN — TORSEMIDE 20 MG: 20 TABLET ORAL at 08:14

## 2024-07-15 RX ADMIN — METOPROLOL TARTRATE 50 MG: 50 TABLET, FILM COATED ORAL at 08:14

## 2024-07-15 RX ADMIN — ATORVASTATIN CALCIUM 80 MG: 80 TABLET, FILM COATED ORAL at 17:06

## 2024-07-15 RX ADMIN — POTASSIUM CHLORIDE 20 MEQ: 1500 TABLET, EXTENDED RELEASE ORAL at 08:14

## 2024-07-15 RX ADMIN — HEPARIN SODIUM 5000 UNITS: 5000 INJECTION INTRAVENOUS; SUBCUTANEOUS at 22:12

## 2024-07-15 RX ADMIN — TICAGRELOR 90 MG: 90 TABLET ORAL at 08:16

## 2024-07-15 RX ADMIN — ACETAMINOPHEN 975 MG: 325 TABLET, FILM COATED ORAL at 13:11

## 2024-07-15 RX ADMIN — METOPROLOL TARTRATE 50 MG: 50 TABLET, FILM COATED ORAL at 22:12

## 2024-07-15 RX ADMIN — HEPARIN SODIUM 5000 UNITS: 5000 INJECTION INTRAVENOUS; SUBCUTANEOUS at 06:12

## 2024-07-15 RX ADMIN — ACETAMINOPHEN 975 MG: 325 TABLET, FILM COATED ORAL at 08:14

## 2024-07-15 RX ADMIN — Medication 6 MG: at 22:12

## 2024-07-15 RX ADMIN — CEFADROXIL 1000 MG: 500 CAPSULE ORAL at 22:14

## 2024-07-15 RX ADMIN — CEFADROXIL 1000 MG: 500 CAPSULE ORAL at 08:15

## 2024-07-15 RX ADMIN — TICAGRELOR 90 MG: 90 TABLET ORAL at 22:15

## 2024-07-15 RX ADMIN — ACETAMINOPHEN 975 MG: 325 TABLET, FILM COATED ORAL at 22:12

## 2024-07-15 RX ADMIN — PANTOPRAZOLE SODIUM 40 MG: 40 TABLET, DELAYED RELEASE ORAL at 06:12

## 2024-07-15 RX ADMIN — ASPIRIN 81 MG: 81 TABLET, COATED ORAL at 08:14

## 2024-07-15 RX ADMIN — LIDOCAINE 2 PATCH: 700 PATCH TOPICAL at 22:11

## 2024-07-15 NOTE — PROGRESS NOTES
"Physical Medicine and Rehabilitation Progress Note  Shane Chau . 78 y.o. male MRN: [id number]  Unit/Bed#: -01 Encounter: [id number]    To Review: 78-year-old male with a past medical history of with a history of coronary artery disease status post three-vessel CABG 6/20/2024, hypertension, bilateral carotid artery stenosis, TIA with evidence of prior right basal ganglia and bilateral cerebellar lacunar infarcts, aortic aneurysm who is postop course complicated by ileus which has been improving, acute kidney injury improving, acute hypoxic respiratory failure improving, who developed dysarthria and left-sided weakness on 7/3 with stroke alert/rapid response called.  Documented vitals at that time were unremarkable.  CT head without acute intracranial findings but did show evidence of the chronic lacunar infarcts in the right basal ganglia and bilateral cerebellar hemispheres with mild microangiopathic changes.  CTA head and neck showed moderate to severe atherosclerotic stenosis of the left vertebral artery.\"  MRI brain showed punctate acute to subacute infarcts involving the right caudate head, anterior centrum semiovale, and lateral right frontal cortex.    Chronic right corona radiata/basal ganglia and bilateral cerebellar lacunar infarcts. Mild chronic microangiopathic ischemic changes.  Neurology was not certain that stroke was related to procedure as it occurred 10 days postop.  Patient was switched to aspirin/Brilinta from aspirin/Plavix per neurology.  They also recommend outpatient Zio patch.  Patient was evaluated by skilled therapies and was found to have significant decline in ADLs and ambulation and appears appropriate for admission to [facility] [place].    Chief Complaint:Overall feeling well.    Interval History/Subjective:  No acute events overnight. Drainage from chest wall inferior incision site. No fevers, cihlls. Has been feeling a bit down per team. Denies " any new pain, Cp, SOB, N/V, abdominal pain. Largely continent of bowel/bladder.     ROS:  A 10 point review of systems was negative except for what is noted in the HPI.    Today's Changes:  Had just a gauze dressing on his incision.  Adding 3m betty-incision to protect skin and betadine  Continue silver alginate to wound and cover with secondary dressing of gauze/paper tape. To change daily Prn soilage/ dislodgement. Significatn amount of serous drainage, and some fibrinous component today. Appears stable overall.   Asked nursing to get patient IS as L base sounded diminished. He had no incentive spirometer at bedside.   Discussed with team, potential for possible discharge end of this week.   4.  Nutrition consult given stroke.     Total visit time: 35 minutes, with more than 50% spent counseling/coordinating care. Counseling includes discussion with patient re: progress in therapies, functional issues observed by therapy staff, and discussion with patient regarding their current medical state and wellbeing. Coordination of patient's care was performed in conjunction with Internal Medicine service to monitor patient's labs, vitals, and management of their comorbidities.    Assessment/Plan:    * CVA (cerebral vascular accident) (HCC)  Assessment & Plan  - Occurred about 10 days after CABG; unclear relation to procedure   - punctate acute to subacute infarcts involving the right caudate head, anterior centrum semiovale, and lateral right frontal cortex and chronic right corona radiata/basal ganglia and bilateral cerebellar lacunar infarcts.  - Residual impairments on admission to [facility]: L sided weakness, imbalance, incoordination, possibly cog impairments-     Secondary stroke prevention  - OP Ziopatch recommended to rule out atrial fibrillation after discharge  - Antithrombotic: Aspirin and Brilinta  - Statin  - Optimal management of blood pressure and diabetes  -  patient and if applicable caregiver on optimal  stroke management  - Fall precautions   - Follow-up with neurology and PCP after d/c   - Continue acute comprehensive interdisciplinary inpatient rehabilitation to include intensive skilled therapies (PT, OT, ST) as outlined with oversight and management by rehabilitation physician as well as inpatient rehab level nursing, case management and weekly interdisciplinary team meetings.       Frequent bowel movements  Assessment & Plan  Improving   Pt afebrile without leukocytosis or abdominal pain  No recent bowel meds; did have hx of ileus; is eating ok  Monitor for now  If concerns for infectious diarrhea in future obtain C diff or other studies  Ensure adequate hydration       Cognitive impairment  Assessment & Plan  -Acute comprehensive interdisciplinary inpatient rehabilitation to include intensive skilled therapies as outlined with oversight and management by rehabilitation physician.  Continue inpatient rehab level nursing, case management and weekly interdisciplinary team meetings.   MOCA 7/12 18 out of 30 indicated mild cog impairment but close to mod with greatest deficits in VSP/executive, language, and delayed recall  -Recommend eval of med mgmt and IADLs   -Monitor neuro-exam, wakefulness, mood, cognition, insight into deficits and safety awareness  - patient (+/- family/caregiver education) and training and compensatory strategies to optimize safety, function, and decrease risk of complications  -Optimize sleep-wake cycle  -Limit sedating medications when possible  -Monitor and ensure optimal management electrolytes, nutrition, and hydration  -Monitor for signs or symptoms of infection, medication intolerances, other systemic etiologies  -Fall precautions with frequent rounding; proactive toileting program, patient should not be unattended in bathroom    -OP follow-up PCP, neuro, and possibly Salma       Oral phase dysphagia  Assessment & Plan  Improving Slp recommended upgrade to [facility]  SLP eval and  "tx  Aspiration precautions     At risk for venous thromboembolism (VTE)  Assessment & Plan  SCDs, ambulation, and heparin       History of ileus  Assessment & Plan  Improved (temp required NGT and Gen Sx c/s on acute)  Last BM 7/14. Currently with only miralax and suppository ordered PRN  Monitor GI function, intake     S/P CABG x 3  Assessment & Plan  6/20/2024  Sternal incision with inferior pole with full thickness dehiscence  Abx as per cardiac surgery  Daily wound care as per wound care.  Monitor and follow closely with Cardiac surgery.    Prediabetes  Assessment & Plan  Nutrition counseling  Outpatient f/u with PCP    Stenosis of left vertebral artery  Assessment & Plan  Moderate to severe left vertebral artery atherosclerotic stenosis   - Antithrombotic: aspirin, brilinta  - Statin  - Optimal blood pressure and blood sugar control  - OP neuro     Postoperative anemia due to acute blood loss  Assessment & Plan  Consult(ed) IM and comanagement with their service during [facility] course   Monitor H/H, vitals, signs/symptoms of acute bleeding  Hb stable at 10.6 on 7/10     Volume overload  Assessment & Plan  Iatrogenic volume overload status post CABG. 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\"  IM consulted and with overall management at their discretion during [facility] course  - Antithrombotic: asp/brilinta  - Statin  - Optimal blood pressure and blood sugar control  - OP Vasc sx       Hyponatremia  Assessment & Plan  IM consulted and with overall management at their discretion during [facility] course  2000 FR and Torsemide per IM   BMP monitor as per IM      CKD (chronic " "kidney disease) stage 2, GFR 60-89 ml/min  Assessment & Plan  Lab Results   Component Value Date    EGFR 58 07/10/2024    EGFR 67 07/09/2024    EGFR 65 07/08/2024    CREATININE 1.18 07/10/2024    CREATININE 1.05 07/09/2024    CREATININE 1.08 07/08/2024   Monitor BUN/Cr intermittently as well as electrolytes   IM consulted to manage  Limit nephrotoxic agents when possible  Optimal BP mgmt       Primary hypertension  Assessment & Plan  Internal medicine consulted and co-management with their service  Monitor vitals with and without activity; monitor for orthostasis  Monitor hemoglobin, electrolytes, kidney function, hydration status   Current meds: Lopressor, Torsemide daily      Coronary artery disease of native artery of native heart with stable angina pectoris (HCC)  Assessment & Plan  S/p 3v CABG 6/20/24 (prior stenting)   Sternal precautions  7/10 noted to have some drainage from sternal incision somewhat increased from earlier; patient states this has been happening from time to time since sx   - Cardiac sx c/s - recommended Duricef and LWC  7/11 Wound care c/s  \"Mid sternal wound with full-thickness dehiscence.  Discussed with cardiac surgery advanced practitioner prior to examining patient, who reported they were able to express moderate amount of yellow drainage on exam from the distal aspect of the incision. No additional drainage able to be palpated or expressed on wound care exam.   Will recommend wound management of silver alginate and dry dressing daily and PRN, for antimicrobial benefit and to absorb any further drainage.  Oral antibiotics as per cardiac surgery is also following.   No apparent clinical s/s of infection present on wound care exam.\"   - No leukocytosis 7/10, pt afebrile, monitor site closely   7/12 Card sx follow-up  \"dressing taken down, inferior wound stable from exam 7/10, no open portions, unable to probe to suggest tracking, a few CCs of murky yellow fluid expressed, phots taken & " "shared w/ surgeon via EPIC chat, wound redressed as found, no surrounding erythema suggesting infection\"    IM consulted and with overall management at their discretion during [facility] course  Antithrombotic: Aspirin, Brilinta  Statin  Optimal blood pressure and blood sugar control  Continue PT, OT  Monitor vitals      Abdominal aortic aneurysm without rupture (HCC)  Assessment & Plan  Mgmt per IM       Health Maintenance  #Delirium/Sleep: At risk, Optimize sleep/wake, bowel, bladder, and pain management. Behavioral/environmental interventions with avoidance of chemical/physical restraint.   #Pain: Scheduled Tylenol, Lidoderm patches for his chest wall.    #Bowel: Last BM 7/14 and continent. Suppository and miralax Prn.   #Bladder: Voiding and continent   #Skin/Pressure Injury Prevention: Turn Q2hr in bed, with weight shifts [id number] in wheelchair.  #GI Prophylaxis: Already on PPI  #Code Status: Full Code  #FEN: Regular, Cardiac diet, 2000mL FR.   #Dispo: ELOS 2 weeks. Lives with spouse in home with 10 JUJU. He will have supervision/assistance available. He was previously independent.       Objective:    Functional Update:  PT: Sup bed mobility, CGA transfers, CGA ambulation 215'   OT: CGA toileting hygiene and transfers. MOCA 18/30.   SLP: mild-minimal oropharyngeal dysphagia. Cleared for reg/thins with distant supervision with meals     Allergies per EMR    Physical Exam:  Temp:  [97.6 °F (36.4 °C)-98.1 °F (36.7 °C)] 98.1 °F (36.7 °C)  HR:  [82-86] 85  Resp:  [16-18] 16  BP: (123-136)/(67-78) 135/76  Oxygen Therapy  SpO2: 93 %    Gen: No acute distress, Well-nourished, well-appearing.  HEENT: Moist mucus membranes  Cardiovascular: Regular rate, rhythm, S1/S2. Distal pulses palpable  Heme/Extr: No edema  Pulmonary: Non-labored breathing, left base diminished. No adventitious sounds  : No blackman  GI: Soft, non-tender, non-distended.   MSK: ROM is full in all extremities. No effusions or deformities. Bulk is symmetric. " See below for MMT scores.   Integumentary: Skin is warm, dry. Sternal incision with full thickness dehiscence and some fibrinous tissue and drainage from dressing changed this morning. Surrounding blanching erythema without tenderness.   Neuro: AAOx3,  Speech is intact. Appropriate to questioning.   Psych: Normal mood and blunted affect.     Diagnostic Studies: reviewed, no new imaging  No results found.    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[facility])    Asymptomatic bilateral carotid artery stenosis    Coronary artery disease of native artery of native heart with stable angina pectoris ([facility])    Hyperlipidemia    Primary hypertension    Transient cerebral ischemic attack    Need for pneumococcal vaccination    Hematuria    Microhematuria    Erectile dysfunction    Leg swelling    Paresthesia    Rib pain    S/P CABG x 3    Localized primary osteoarthritis of lower leg, unspecified laterality    CKD (chronic kidney disease) stage 2, GFR 60-89 ml/min    Stroke-like symptoms    Hyponatremia    CVA (cerebral vascular accident) ([facility])    PAD (peripheral artery disease) ([facility])    History of ileus    Volume overload    Postoperative anemia due to acute blood loss    At risk for venous thromboembolism (VTE)    Stenosis of left vertebral artery    Prediabetes    Oral phase dysphagia    Reaction at surgical site    Cognitive impairment    Frequent bowel movements         Medications  Current Facility-Administered Medications    Medication Dose Route Frequency Provider Last Rate    acetaminophen  975 mg Oral Q6H While awake SANTHOSH Cunningham      aspirin  81 mg Oral Daily SANTHOSH Cunningham      atorvastatin  80 mg Oral After Dinner SANTHOSH Cunningham      bisacodyl  10 mg Rectal Daily PRN Dandre Stoner MD      cefadroxil  1,000 mg Oral Q12H KRISTI Mercedes Baker PA-C      chlorhexidine  15 mL Mouth/Throat BID SANTHOSH Cunningham      heparin (porcine)  5,000 Units Subcutaneous Q8H [facility] SANTHOSH Cunningham      lidocaine  2 patch Topical Daily SANTHOSH Cunningham      melatonin  6 mg Oral HS SANTHOSH Cunningham      metoprolol tartrate  50 mg Oral Q12H [facility] SANTHOSH Cunningham      ondansetron  4 mg Oral Q6H PRN SANTHOSH Cunningham      pantoprazole  40 mg Oral Early Morning SANTHOSH Cunningham      polyethylene glycol  17 g Oral Daily PRN Dandre Stoner MD      potassium chloride  20 mEq Oral BID SANTHOSH Cunningham      ticagrelor  90 mg Oral Q12H [facility] SANTHOSH Cunningham      torsemide  20 mg Oral Daily SANTHOSH Cunningham            ** Please Note: Fluency Direct voice to text software may have been used in the creation of this document. **

## 2024-07-15 NOTE — PHYSICAL THERAPY NOTE
Stroke Education Series    Pt and spouse Maryjo participated in skilled Stroke Education Series in an individual setting to address the topic of Stroke 101: Understanding the Basics of Stroke in both verbal and written formats. Education within this session reviewed the basic structural and functional components of the brain and included information on the causes of stroke, related signs/symptoms, risk factors, and the process of stroke rehabilitation. The goal of this education was to provide the patient with general understanding of how the brain functions and how a stroke can impact his/her functional mobility and independence. In addition, the intention of this education is to provide the patient with the information to reduce the risk of a second stroke. Following education, pt's response to education is: verbalizes understanding.    To individualize the education, the following topics were included based upon the patients' past and current medical history: family history.  Additional topics that were covered include weakness, decreased coordination, depression, and prevention of new conditions and/or worsening condition.       Start Time: 1340    End Time: 1345     Stroke Education Series    Pt and spouse participated in skilled Stroke Education Series in an individual setting to address the topic of Purpose of Rehab post stroke in both verbal and written formats. Education within this session included a review of the individual roles of the rehab team, functions of the acute rehab center, and neuro rehabilitation treatment strategies. The goal of this education session was to provide the patient with an understanding of the overall importance of the therapy process. This section reviews neuroplasticity principles as well that includes how patiens can incorporate specificity, intensity, repetition and salience into their home exercise program. This allows the patient to connect neuro rehabilitation treatment  strategies to his or her individual therapy process. The patients are engaged in conversation to incorporate activities they like to do into therapy sessions. Following education, the patient's response to education is: verbalizes understanding.      Additional topics to individualize this section of stroke education include: cardiovascular components of rehab, involvement of therapy reducing risk of another stroke, and how family can help in the rehab process .    Start Time: 1345    End Time: 1350     Stroke Education Series    Pt and spouse participated in skilled Stroke Education Series in an individualized setting to address the topic of Preparing To Go Home. This education was provided in both verbal and written formats.Education within this session focused on providing safety strategies including environmental and lifestyle changes that if made will support a safe discharge to his/her home setting. One goal of this session is to focus on ways to improve the patient's independence while maintaining safety and decreasing fall risk. Following education, pt's response to education is: verbalizes understanding.    To individualize this session, the following topics were reviewed: caregiver considerations, assistive devices, and family training.      Start Time: 1350    End Time: 1355     Stroke Education Series    Pt and spouse participated in skilled Stroke Education Series in an individualized setting to address the topic of What Comes Next post stroke in both verbal and written formats. Education within this session included information on recommendations and resources after leaving the [facility]. This education session links together what has been covered in previous stroke education classes to improve the patient's understanding of how managing risk factors for stroke, participating in therapy, working with family and friends in the rehab process, and reviewing their role in the rehab process will maximize outcomes  and their functional gains. This education also incorporates what the patient wants to achieve and helps them set realistic goals. To individualize this education the following topics were covered: continued therapy (home versus outpatient), driving programs, and caregiver support. Following education, pt's response to education is: verbalizes understanding.      Start Time: 1355    End Time: 1400

## 2024-07-15 NOTE — PLAN OF CARE
Problem: PAIN - ADULT  Goal: Verbalizes/displays adequate comfort level or baseline comfort level  Description: Interventions:  - Encourage patient to monitor pain and request assistance  - Assess pain using appropriate pain scale  - Administer analgesics based on type and severity of pain and evaluate response  - Implement non-pharmacological measures as appropriate and evaluate response  - Consider cultural and social influences on pain and pain management  - Notify physician/advanced practitioner if interventions unsuccessful or patient reports new pain  Outcome: Progressing     Problem: INFECTION - ADULT  Goal: Absence or prevention of progression during hospitalization  Description: INTERVENTIONS:  - Assess and monitor for signs and symptoms of infection  - Monitor lab/diagnostic results  - Monitor all insertion sites, i.e. indwelling lines, tubes, and drains  - Monitor endotracheal if appropriate and nasal secretions for changes in amount and color  - [place] appropriate cooling/warming therapies per order  - Administer medications as ordered  - Instruct and encourage patient and family to use good hand hygiene technique  - Identify and instruct in appropriate isolation precautions for identified infection/condition  Outcome: Progressing     Problem: SAFETY ADULT  Goal: Patient will remain free of falls  Description: INTERVENTIONS:  - Educate patient/family on patient safety including physical limitations  - Instruct patient to call for assistance with activity   - Consult OT/PT to assist with strengthening/mobility   - Keep Call bell within reach  - Keep bed low and locked with side rails adjusted as appropriate  - Keep care items and personal belongings within reach  - Initiate and maintain comfort rounds  - Make Fall Risk Sign visible to staff  - Offer Toileting every 2 Hours, in advance of need  - Initiate/Maintain bed/chair alarm  - Obtain necessary fall risk management equipment: alarms  - Apply  yellow socks and bracelet for high fall risk patients  - Consider moving patient to room near nurses station  Outcome: Progressing  Goal: Maintain or return to baseline ADL function  Description: INTERVENTIONS:  -  Assess patient's ability to carry out ADLs; assess patient's baseline for ADL function and identify physical deficits which impact ability to perform ADLs (bathing, care of mouth/teeth, toileting, grooming, dressing, etc.)  - Assess/evaluate cause of self-care deficits   - Assess range of motion  - Assess patient's mobility; develop plan if impaired  - Assess patient's need for assistive devices and provide as appropriate  - Encourage maximum independence but intervene and supervise when necessary  - Involve family in performance of ADLs  - Assess for home care needs following discharge   - Consider OT consult to assist with ADL evaluation and planning for discharge  - Provide patient education as appropriate  Outcome: Progressing  Goal: Maintains/Returns to pre admission functional level  Description: INTERVENTIONS:  - Perform AM-PAC 6 Click Basic Mobility/ Daily Activity assessment daily.  - Set and communicate daily mobility goal to care team and patient/family/caregiver.   - Collaborate with rehabilitation services on mobility goals if consulted  - Perform Range of Motion 3 times a day.  - Reposition patient every 2 hours.  - Dangle patient 3 times a day  - Stand patient 3 times a day  - Ambulate patient 3 times a day  - Out of bed to chair 3 times a day   - Out of bed for meals 3 times a day  - Out of bed for toileting  - Record patient progress and toleration of activity level   Outcome: Progressing     Problem: DISCHARGE PLANNING  Goal: Discharge to home or other facility with appropriate resources  Description: INTERVENTIONS:  - Identify barriers to discharge w/patient and caregiver  - Arrange for needed discharge resources and transportation as appropriate  - Identify discharge learning needs  (meds, wound care, etc.)  - Arrange for interpretive services to assist at discharge as needed  - Refer to Case Management Department for coordinating discharge planning if the patient needs post-hospital services based on physician/advanced practitioner order or complex needs related to functional status, cognitive ability, or social support system  Outcome: Progressing     Problem: Nutrition/Hydration-ADULT  Goal: Nutrient/Hydration intake appropriate for improving, restoring or maintaining nutritional needs  Description: Monitor and assess patient's nutrition/hydration status for malnutrition. Collaborate with interdisciplinary team and initiate plan and interventions as ordered.  Monitor patient's weight and dietary intake as ordered or per policy. Utilize nutrition screening tool and intervene as necessary. Determine patient's food preferences and provide high-protein, high-caloric foods as appropriate.     INTERVENTIONS:  - Monitor oral intake, urinary output, labs, and treatment plans  - Assess nutrition and hydration status and recommend course of action  - Evaluate amount of meals eaten  - Assist patient with eating if necessary   - Allow adequate time for meals  - Recommend/ encourage appropriate diets, oral nutritional supplements, and vitamin/mineral supplements  - Order, calculate, and assess calorie counts as needed  - Recommend, monitor, and adjust tube feedings and TPN/PPN based on assessed needs  - Assess need for intravenous fluids  - Provide specific nutrition/hydration education as appropriate  - Include patient/family/caregiver in decisions related to nutrition  Outcome: Progressing     Problem: Prexisting or High Potential for Compromised Skin Integrity  Goal: Skin integrity is maintained or improved  Description: INTERVENTIONS:  - Identify patients at risk for skin breakdown  - Assess and monitor skin integrity  - Assess and monitor nutrition and hydration status  - Monitor labs   - Assess for  incontinence   - Turn and reposition patient  - Assist with mobility/ambulation  - Relieve pressure over bony prominences  - Avoid friction and shearing  - Provide appropriate hygiene as needed including keeping skin clean and dry  - Evaluate need for skin moisturizer/barrier cream  - Collaborate with interdisciplinary team   - Patient/family teaching  - Consider wound care consult   Outcome: Progressing

## 2024-07-15 NOTE — PROGRESS NOTES
[facility]  Progress Note  Name: Shane Chau Sr. I  MRN: [id number]  Unit/Bed#: -01 I Date of Admission: 7/9/2024   Date of Service: 7/15/2024 I Hospital Day: 6    Assessment & Plan   * CVA (cerebral vascular accident) (HCC)  Assessment & Plan  Stroke alert called on 7/3/24 due to dysarthria, left sided weakness, left tongue deviation, left facial droop, and left pronator drift. Symptoms have since improved.  MRI brain 7/6/2024 showed: Punctate acute to subacute infarcts involving the right caudate head, anterior centrum semiovale, and lateral right frontal cortex. Chronic right corona radiata/basal ganglia and bilateral cerebellar lacunar infarcts. Mild chronic microangiopathic ischemic changes..   Neurology evaluated, recommendations appreciated  Distribution of the strokes found on MRI is suspicious for an embolic source -> plan for further cardiac work up with Zio patch/ILR as outpatient   If arrhythmia is found on work up recommend switching from DAPT to NOAC and ASA  P2Y12 level checked during this hospitalization was 180 -> switched from Plavix to Brilinta   Brilinta $47/month - pt and wife are agreeable to the cost.  Continue with ASA and statin     Prediabetes  Assessment & Plan  Recent A1c 5.7%   Monitor fasting glucose intermittently     Postoperative anemia due to acute blood loss  Assessment & Plan  No history of anemia, baseline hemoglobin 13. Noted with postop anemia following CABG.  Remains asymptomatic and hemodynamically stable at this time   Monitor CBC intermittently and transfuse as needed for hemoglobin < 8     Volume overload  Assessment & Plan  Iatrogenic volume overload status post CABG. Preserved LVEF on intra-op DEDRICK  Continues with bilateral LE edema on exam, encourage elevation/compression   Continue with Demadex 20 mg daily   Monitor volumes status closely   Daily weights, intake and output   Fluid and salt restriction   Intermittent  BMP    PAD (peripheral artery disease) ([facility])  Assessment & Plan  Has B/L carotid stenosis  Continue ASA/Brilinta and statin    Hyponatremia  Assessment & Plan  Mild, with serum sodium recently 132  Continue FR 2000 ml and Demadex 20 mg daily  Monitor volume status, intake and output  Monitor BMP intermittently    CKD (chronic kidney disease) stage 2, GFR 60-89 ml/min  Assessment & Plan  No documented diagnosis of CKD however per chart review, baseline creatinine approximately 0.9-1.2 with GFR 60-90  Noted with postoperative BARBARA with creatinine peak of 2.2 in the setting of iatrogenic volume overload  Initiated on Demadex 20 mg daily, and continued on this for now   Monitor BMP intermittently    Primary hypertension  Assessment & Plan  Blood pressure stable on review  Continue Lopressor 50 mg q12h  Started on Demadex 20 mg daily postoperatively due to volume overload, continue    Coronary artery disease of native artery of native heart with stable angina pectoris ([facility])  Assessment & Plan  Hx CAD with PCI/stents; now status post CABG x 3 on 6/20/24  Intraoperative DEDRICK demonstrates preserved systolic function, with LVEF 55%   Currently on p.o. diuretic due to postop volume overload  Also on postop course of prophylactic amiodarone  Continue aspirin, statin, beta-blocker  Monitor incisions, sternal precautions             The above assessment and plan was reviewed and updated as determined by my evaluation of the patient on 7/15/2024.    Labs:   Results from last 7 days   Lab Units 07/10/24  1601   WBC Thousand/uL 9.13   HEMOGLOBIN g/dL 10.6*   HEMATOCRIT % 31.9*   PLATELETS Thousands/uL 386     Results from last 7 days   Lab Units 07/10/24  1601 07/09/24  0458   SODIUM mmol/L 132* 133*   POTASSIUM mmol/L 4.2 3.8   CHLORIDE mmol/L 100 100   CO2 mmol/L 23 24   BUN mg/dL 21 23   CREATININE mg/dL 1.18 1.05   CALCIUM mg/dL 7.8* 7.8*             Results from last 7 days   Lab Units 07/10/24  2104 07/10/24  8398  07/10/24  1052   POC GLUCOSE mg/dl 78 122 121       Imaging  No orders to display       Review of Scheduled Meds:  Current Facility-Administered Medications   Medication Dose Route Frequency Provider Last Rate    acetaminophen  975 mg Oral Q6H While awake SANTHOSH Cunningham      aspirin  81 mg Oral Daily SANTHOSH Cunningham      atorvastatin  80 mg Oral After Dinner SANTHOSH Cunningham      bisacodyl  10 mg Rectal Daily PRN Dandre Stoner MD      cefadroxil  1,000 mg Oral Q12H [facility] Mercedes Baker PA-C      chlorhexidine  15 mL Mouth/Throat BID SANTHOSH Cunningham      heparin (porcine)  5,000 Units Subcutaneous Q8H [facility] SANTHOSH Cunningham      lidocaine  2 patch Topical Daily SANTHOSH Cunningham      melatonin  6 mg Oral HS SANTHOSH Cunningham      metoprolol tartrate  50 mg Oral Q12H [facility] SANTHOSH Cunningham      ondansetron  4 mg Oral Q6H PRN SANTHOSH Cunningham      pantoprazole  40 mg Oral Early Morning SANTHOSH Cunningham      polyethylene glycol  17 g Oral Daily PRN Dandre Stoner MD      potassium chloride  20 mEq Oral BID SANTHOSH Cunningham      ticagrelor  90 mg Oral Q12H [facility] SANTHOSH Cunningham      torsemide  20 mg Oral Daily SANTHOSH Cunningham         Subjective/ HPI: Patient seen and examined. Patients overnight issues or events were reviewed with nursing staff. New or overnight issues include the following:     Pt seen in PT. His wife was present as well. He states that he is doing well and denies any current complaints.    ROS:   A 10 point ROS was performed; negative except as noted above.        *Labs /Radiology studies Reviewed  *Medications  reviewed and reconciled as needed  *Please refer to order section for additional ordered labs studies      Physical Examination:  Vitals:   Vitals:    07/14/24 1324 07/14/24 2045 07/15/24 0556 07/15/24 0814   BP: 123/67 136/78 135/76 139/71    BP Location: Left arm Left arm Left arm Right arm   Pulse: 82 86 85 86   Resp: 16 18 16    Temp: 98 °F (36.7 °C) 97.6 °F (36.4 °C) 98.1 °F (36.7 °C)    TempSrc: Oral Oral Oral    SpO2: 94% 95% 93%    Weight:   73.3 kg (161 lb 8 oz)    Height:           General Appearance: NAD; pleasant  HEENT: PERRLA, conjuctiva normal; mucous membranes moist; face symmetrical  Neck:  Supple  Lungs: clear bilaterally, normal respiratory effort, no retractions, expiratory effort normal, on room air  CV: regular rate and rhythm, no murmurs rubs or gallops noted   ABD: soft non tender, +BS x4  EXT: DP pulses intact, no lymphadenopathy, no edema  Skin: normal turgor, normal texture, no rash, sternal incision stable  Psych: affect normal, mood normal  Neuro: AAOx3       The above physical exam was reviewed and updated as determined by my evaluation of the patient on 7/15/2024.    Invasive Devices       Drain  Duration             External Urinary Catheter 1 day                       VTE Pharmacologic Prophylaxis: Heparin  Code Status: Level 1 - Full Code  Current Length of Stay: 6 day(s)    Total floor / unit time spent today  35 minutes   Coordination of patient's care was performed in conjunction with primary service. Time invested included review of patient's labs, vitals, and management of their comorbidities with continued monitoring, examination of patient as well as answering patient questions, documenting her findings and creating progress note in electronic medical record,  ordering appropriate diagnostic testing.       ** Please Note:  voice to text software may have been used in the creation of this document. Although proof errors in transcription or interpretation are a potential of such software**

## 2024-07-15 NOTE — PROGRESS NOTES
"   07/15/24 5560   Pain Assessment   Pain Assessment Tool 0-10   Pain Score No Pain   Restrictions/Precautions   Precautions Fall Risk;Cardiac/sternal;Fluid restriction   Weight Bearing Restrictions No   ROM Restrictions Yes  (sternal precautions)   General   Change In Medical/Functional Status removed from bed/chair alarms and GH   Cognition   Overall Cognitive Status WFL   Arousal/Participation Alert;Cooperative   Subjective   Subjective Pt's spouse Maryjo present for PT session for FT   Sit to Stand   Type of Assistance Needed Supervision   Comment w/ and w/o RW   Sit to Stand CARE Score 4   Bed-Chair Transfer   Type of Assistance Needed Supervision   Comment w/ and w/o RW   Chair/Bed-to-Chair Transfer CARE Score 4   Car Transfer   Type of Assistance Needed Supervision   Comment no AD   Car Transfer CARE Score 4   Walk 10 Feet   Type of Assistance Needed Supervision   Comment RW vs no AD   Walk 10 Feet CARE Score 4   Walk 50 Feet with Two Turns   Type of Assistance Needed Supervision   Comment RW vs no AD   Walk 50 Feet with Two Turns CARE Score 4   Walk 150 Feet   Type of Assistance Needed Supervision   Comment RW; educated pt and spouse that PT recommends RW for longer distance ambulation due to decreased activity tolerance   Walk 150 Feet CARE Score 4   Walking 10 Feet on Uneven Surfaces   Type of Assistance Needed Supervision   Comment CS no AD on ramp   Walking 10 Feet on Uneven Surfaces CARE Score 4   Ambulation   Primary Mode of Locomotion Prior to Admission Walk   Distance Walked (feet) 150 ft  (x1, 60'x2 RW; 70'x1, multiple short distances w/o AD)   Assist Device Roller Walker  (vs no AD)   Findings spouse observing pt perform all ambulation   Does the patient walk? 2. Yes   Curb or Single Stair   Style negotiated Curb   Type of Assistance Needed Supervision   Comment CS 8\" curb step no AD   1 Step (Curb) CARE Score 4   4 Steps   Type of Assistance Needed Supervision   Comment CS single HR   4 Steps CARE " Score 4   Stairs   Type Stairs;Curb   # of Steps 8   Weight Bearing Precautions Cardiac;Sternal   Toilet Transfer   Type of Assistance Needed Supervision   Comment RW   Toilet Transfer CARE Score 4   Equipment Use   NuStep lvl 3, 10 mins, LEs only for conditioning, rated as 11/20 on [employer]   Assessment   Treatment Assessment Pt and spouse engaged in PT session focusing on FT with spouse Maryjo to demo pt's current level of function. At this time pt S for all mobilities w/ and w/o RW this session with no LOB. Plan to progress to IRPs next session. Spouse reports she is pleased with pt's progress and if possible she would like pt to d/c home Weds 7/17 pending therapy and MD approval, will discuss with team. Pt is making great progress towards Branden goals, would need HEP prior to d/c which was discussed with pt and spouse, pt has RW at home. Stroke education completed with spouse and pt and reviewed energy conservation techniques at length with spouse and pt as well. Reviewed that PT recommends RW outside of home or inside of home if pt more fatigued which both pt and spouse in agreement with. At this time cont POC with focus on providing pt with HEP in prep for dc home sometime this week as well as assess pt for IRPs and cont to focus on functional mobility trng to reduce fall risk.   Family/Caregiver Present spouse Maryjo present t/o entire session   PT Therapy Minutes   PT Time In 1330   PT Time Out 1420   PT Total Time (minutes) 50   PT Mode of treatment - Individual (minutes) 50   PT Mode of treatment - Concurrent (minutes) 0   PT Mode of treatment - Group (minutes) 0   PT Mode of treatment - Co-treat (minutes) 0   PT Mode of Treatment - Total time(minutes) 50 minutes   PT Cumulative Minutes 556   Therapy Time missed   Time missed? No

## 2024-07-15 NOTE — ASSESSMENT & PLAN NOTE
6/20/2024  Sternal incision with inferior pole with full thickness dehiscence   - Wound care following   - Local care   - Wife education on wound care   - Plan to discharge with home nursing  Abx as per cardiac surgery  Daily wound care as per wound care.  Monitor and follow closely with Cardiac surgery.

## 2024-07-15 NOTE — PROGRESS NOTES
Progress Note - Cardiothoracic Surgery   Shane Chau Sr. 78 y.o. male MRN: [id number]  Unit/Bed#: -01 Encounter: [id number]      24 Hour Events: No events. No complaints. Taking a rest at PT session during encounter. Admits to not feeling there is much drainage, wound has been covered. Still eating most of his meals per patient/RN. No report of additional needed dressing changes per RN.    Medications:   Scheduled Meds:  Current Facility-Administered Medications   Medication Dose Route Frequency Provider Last Rate    acetaminophen  975 mg Oral Q6H While awake SANTHOSH Cunningham      aspirin  81 mg Oral Daily SANTHOSH Cunningham      atorvastatin  80 mg Oral After Dinner SANTHOSH Cunningham      bisacodyl  10 mg Rectal Daily PRN Dandre Stoner MD      cefadroxil  1,000 mg Oral Q12H KRISTI Baker PA-C      chlorhexidine  15 mL Mouth/Throat BID SANTHOSH Cunningham      heparin (porcine)  5,000 Units Subcutaneous Q8H KRISTI SANTHOSH Cunningham      lidocaine  2 patch Topical Daily SANTHOSH Cunningham      melatonin  6 mg Oral HS SANTHOSH Cunningham      metoprolol tartrate  50 mg Oral Q12H [facility] SANTHOSH Cunningham      ondansetron  4 mg Oral Q6H PRN SANTHOSH Cunningham      pantoprazole  40 mg Oral Early Morning SANTHOSH Cunningham      polyethylene glycol  17 g Oral Daily PRN Dandre Stoner MD      potassium chloride  20 mEq Oral BID SANTHOSH Cunningham      ticagrelor  90 mg Oral Q12H [facility] SANTHOSH Cunningham      torsemide  20 mg Oral Daily SANTHOSH Cunningham       Continuous Infusions:     Results:   NO NEW CBC OR BMP  Results from last 7 days   Lab Units 07/10/24  1601   WBC Thousand/uL 9.13   HEMOGLOBIN g/dL 10.6*   HEMATOCRIT % 31.9*   PLATELETS Thousands/uL 386     Results from last 7 days   Lab Units 07/10/24  1601 07/09/24  0458   POTASSIUM mmol/L 4.2 3.8   CHLORIDE mmol/L 100  100   CO2 mmol/L 23 24   BUN mg/dL 21 23   CREATININE mg/dL 1.18 1.05   CALCIUM mg/dL 7.8* 7.8*           Vitals:   Vitals:    07/14/24 1324 07/14/24 2045 07/15/24 0556 07/15/24 0814   BP: 123/67 136/78 135/76 139/71   BP Location: Left arm Left arm Left arm Right arm   Pulse: 82 86 85 86   Resp: 16 18 16    Temp: 98 °F (36.7 °C) 97.6 °F (36.4 °C) 98.1 °F (36.7 °C)    TempSrc: Oral Oral Oral    SpO2: 94% 95% 93%    Weight:   73.3 kg (161 lb 8 oz)    Height:           Physical Exam:    General: No acute distress  HEENT/NECK:  Normocephalic. Atraumatic.  Cardiac: Regular rate and rhythm  Pulmonary:   on RA, no respiratory distress  Abdomen:  Non-distended  Extremities: Extremities warm/dry  Neuro: Alert and oriented X 3  Skin: Warm/Dry, without rashes or lesions.    Wound care: dressing taken down,  surgeon updated, wound unable to be debrided, wound cleansed w/ beta-dine then redressed, no surrounding erythema suggesting infection, bedside RN present & to apply wound care suggested dressing once patient PT session complete    Assessment:    CAD s/p CABGx3  Surgical site reaction/drainage/breakdown    Plan:  Renew 5 day course of antibiotics per surgeon w/ ongoing drainage  No fever  CBC 7/10 w/o leukocytosis  Wound care following w/ orders place  Wound taken down & dressing changed by cardiac sx at bedside, plan for repeat wound change ongoing    Case discussed w/ Dr. Bette BERMUDEZ    SIGNATURE: Mercedes Baker PA-C  DATE: July 15, 2024  TIME: 8:45 AM    * This note was completed in part utilizing [employer] direct voice recognition software.   Grammatical errors, random word insertion, spelling mistakes, and incomplete sentences may be an occasional consequence of the system secondary to software limitations, ambient noise and hardware issues. At the time of dictation, efforts were made to edit, clarify and /or correct errors. Please read the chart carefully and recognize, using context, where substitutions  have occurred.  If you have any questions or concerns about the context, text or information contained within the body of this dictation, please contact myself, the provider, for further clarification.

## 2024-07-15 NOTE — PROGRESS NOTES
07/15/24 1030   Pain Assessment   Pain Assessment Tool 0-10   Pain Score No Pain   Restrictions/Precautions   Precautions Bed/chair alarms;Cardiac/sternal;Fluid restriction;Supervision on toilet/commode;Fall Risk   Weight Bearing Restrictions No   ROM Restrictions Yes  (sternal precautions)   Braces or Orthoses   (TEDs)   Cognition   Overall Cognitive Status WFL   Arousal/Participation Alert;Cooperative   Subjective   Subjective Pt ringing call bell to utilize restroom upon PTs arrival   Sit to Stand   Type of Assistance Needed Supervision   Comment no AD   Sit to Stand CARE Score 4   Bed-Chair Transfer   Type of Assistance Needed Supervision   Comment no AD   Chair/Bed-to-Chair Transfer CARE Score 4   Car Transfer   Type of Assistance Needed Supervision   Comment no AD   Car Transfer CARE Score 4   Walk 10 Feet   Type of Assistance Needed Supervision   Comment no AD   Walk 10 Feet CARE Score 4   Walk 50 Feet with Two Turns   Type of Assistance Needed Supervision   Comment no AD   Walk 50 Feet with Two Turns CARE Score 4   Walking 10 Feet on Uneven Surfaces   Type of Assistance Needed Supervision   Comment CS no AD over floor mat with airex foam underneath x4 laps   Walking 10 Feet on Uneven Surfaces CARE Score 4   Ambulation   Primary Mode of Locomotion Prior to Admission Walk   Distance Walked (feet) 70 ft  (x1, 60'x3, 10'x3)   Assist Device   (none)   Gait Pattern Inconsistant Chantel;Step through   Limitations Noted In Endurance;Speed   Walk Assist Level Close Supervision;Supervision   Findings CS/S level no AD this session; pt reports he will use RW if he feels he needs in home upon dc; discussed use of RW in community for prolonged distances which pt agreeable to; will perform longer distance walking with RW in pm session   Does the patient walk? 2. Yes   Wheel 50 Feet with Two Turns   Reason if not Attempted Activity not applicable   Wheel 50 Feet with Two Turns CARE Score 9   Wheel 150 Feet   Reason if  "not Attempted Activity not applicable   Wheel 150 Feet CARE Score 9   Curb or Single Stair   Style negotiated Curb   Type of Assistance Needed Supervision   Comment CS 6\" curb step x2   1 Step (Curb) CARE Score 4   4 Steps   Type of Assistance Needed Supervision   Comment CS single HR   4 Steps CARE Score 4   12 Steps   Comment fatigues at 8 steps   Reason if not Attempted Safety concerns   12 Steps CARE Score 88   Stairs   Type Stairs;Curb   # of Steps 8   Weight Bearing Precautions Sternal;Cardiac   Toilet Transfer   Type of Assistance Needed Supervision   Comment S/CS start and end of session to standard toilet; pt able to perform toileting hygiene with S provided   Toilet Transfer CARE Score 4   Assessment   Treatment Assessment Session focusing on assessing pt perform functional mobilities w/o use of AD this session. Decreased activity tolerance cont to limit pt from performing activities for prolonged period of time but with therapeutic rest breaks pt able to recover. Despite decreased activity tolerance, pt performed all transfers and household distance ambulation at CS/S level this session. Discussed use of RW for prolonged distances which pt confirms he has at home. Discussed potential d/c home on late this week, potentially 7/18 which pt agreeable to stating he has no concerns to d/c home, will f/u with team regarding potential d/c. If d/c to be 7/18 would recommend assessing for IRPs next day and providing pt with with HEP to improve endurance and activity tolerance.   Family/Caregiver Present PT spoke with spouse Maryjo via phone to s/u FT for 7/16/24. Discussed pt's progress and tentative d/c for later this week pending therapy and medical clearance. Maryjo to be present on 7/16 at 10:00 a.m. for FT sessions.   Problem List Decreased strength;Decreased endurance;Impaired balance;Decreased range of motion;Decreased mobility;Decreased cognition;Decreased skin integrity;Orthopedic restrictions   Barriers to " Discharge Inaccessible home environment   PT Barriers   Functional Limitation Stair negotiation;Walking   Plan   Treatment/Interventions Functional transfer training;LE strengthening/ROM;Elevations;Therapeutic exercise;Endurance training;Bed mobility;Gait training   Progress Progressing toward goals   Discharge Recommendation   Rehab Resource Intensity Level, PT   (cardiac rehab)   Equipment Recommended   (RW for community distances)   PT Therapy Minutes   PT Time In 1030   PT Time Out 1130   PT Total Time (minutes) 60   PT Mode of treatment - Individual (minutes) 60   PT Mode of treatment - Concurrent (minutes) 0   PT Mode of treatment - Group (minutes) 0   PT Mode of treatment - Co-treat (minutes) 0   PT Mode of Treatment - Total time(minutes) 60 minutes   PT Cumulative Minutes 506   Therapy Time missed   Time missed? No

## 2024-07-16 ENCOUNTER — HOME HEALTH ADMISSION (OUTPATIENT)
Dept: HOME HEALTH SERVICES | Facility: HOME HEALTHCARE | Age: 79
End: 2024-07-16
Payer: COMMERCIAL

## 2024-07-16 PROBLEM — Z91.89 AT RISK FOR VENOUS THROMBOEMBOLISM (VTE): Status: RESOLVED | Noted: 2024-07-10 | Resolved: 2024-07-16

## 2024-07-16 PROBLEM — E87.70 VOLUME OVERLOAD: Status: RESOLVED | Noted: 2024-07-10 | Resolved: 2024-07-16

## 2024-07-16 PROBLEM — Z87.19 HISTORY OF ILEUS: Status: RESOLVED | Noted: 2024-07-09 | Resolved: 2024-07-16

## 2024-07-16 PROBLEM — R19.4 FREQUENT BOWEL MOVEMENTS: Status: RESOLVED | Noted: 2024-07-10 | Resolved: 2024-07-16

## 2024-07-16 PROBLEM — R13.11 ORAL PHASE DYSPHAGIA: Status: RESOLVED | Noted: 2024-07-10 | Resolved: 2024-07-16

## 2024-07-16 PROCEDURE — 99232 SBSQ HOSP IP/OBS MODERATE 35: CPT | Performed by: PHYSICAL MEDICINE & REHABILITATION

## 2024-07-16 PROCEDURE — 97129 THER IVNTJ 1ST 15 MIN: CPT

## 2024-07-16 PROCEDURE — 97535 SELF CARE MNGMENT TRAINING: CPT

## 2024-07-16 PROCEDURE — 97130 THER IVNTJ EA ADDL 15 MIN: CPT

## 2024-07-16 PROCEDURE — 97530 THERAPEUTIC ACTIVITIES: CPT

## 2024-07-16 PROCEDURE — 99024 POSTOP FOLLOW-UP VISIT: CPT | Performed by: PHYSICIAN ASSISTANT

## 2024-07-16 PROCEDURE — 97110 THERAPEUTIC EXERCISES: CPT

## 2024-07-16 PROCEDURE — 99231 SBSQ HOSP IP/OBS SF/LOW 25: CPT | Performed by: NURSE PRACTITIONER

## 2024-07-16 PROCEDURE — 92526 ORAL FUNCTION THERAPY: CPT

## 2024-07-16 RX ADMIN — HEPARIN SODIUM 5000 UNITS: 5000 INJECTION INTRAVENOUS; SUBCUTANEOUS at 21:49

## 2024-07-16 RX ADMIN — POTASSIUM CHLORIDE 20 MEQ: 1500 TABLET, EXTENDED RELEASE ORAL at 17:00

## 2024-07-16 RX ADMIN — ACETAMINOPHEN 975 MG: 325 TABLET, FILM COATED ORAL at 08:15

## 2024-07-16 RX ADMIN — ASPIRIN 81 MG: 81 TABLET, COATED ORAL at 08:15

## 2024-07-16 RX ADMIN — METOPROLOL TARTRATE 50 MG: 50 TABLET, FILM COATED ORAL at 08:15

## 2024-07-16 RX ADMIN — POTASSIUM CHLORIDE 20 MEQ: 1500 TABLET, EXTENDED RELEASE ORAL at 08:15

## 2024-07-16 RX ADMIN — ACETAMINOPHEN 975 MG: 325 TABLET, FILM COATED ORAL at 21:49

## 2024-07-16 RX ADMIN — METOPROLOL TARTRATE 50 MG: 50 TABLET, FILM COATED ORAL at 21:51

## 2024-07-16 RX ADMIN — TICAGRELOR 90 MG: 90 TABLET ORAL at 21:52

## 2024-07-16 RX ADMIN — LIDOCAINE 2 PATCH: 700 PATCH TOPICAL at 21:48

## 2024-07-16 RX ADMIN — ACETAMINOPHEN 975 MG: 325 TABLET, FILM COATED ORAL at 14:03

## 2024-07-16 RX ADMIN — ATORVASTATIN CALCIUM 80 MG: 80 TABLET, FILM COATED ORAL at 17:00

## 2024-07-16 RX ADMIN — TORSEMIDE 20 MG: 20 TABLET ORAL at 08:15

## 2024-07-16 RX ADMIN — HEPARIN SODIUM 5000 UNITS: 5000 INJECTION INTRAVENOUS; SUBCUTANEOUS at 05:27

## 2024-07-16 RX ADMIN — PANTOPRAZOLE SODIUM 40 MG: 40 TABLET, DELAYED RELEASE ORAL at 05:27

## 2024-07-16 RX ADMIN — CEFADROXIL 1000 MG: 500 CAPSULE ORAL at 21:52

## 2024-07-16 RX ADMIN — Medication 6 MG: at 21:49

## 2024-07-16 RX ADMIN — HEPARIN SODIUM 5000 UNITS: 5000 INJECTION INTRAVENOUS; SUBCUTANEOUS at 14:03

## 2024-07-16 RX ADMIN — TICAGRELOR 90 MG: 90 TABLET ORAL at 08:17

## 2024-07-16 RX ADMIN — CEFADROXIL 1000 MG: 500 CAPSULE ORAL at 08:17

## 2024-07-16 NOTE — ASSESSMENT & PLAN NOTE
Blood pressure stable on review  Continue Lopressor 50 mg q12h  Started on Demadex 20 mg daily postoperatively due to volume overload, continue as his BP is at goal (148/74 this am 7/16/24)

## 2024-07-16 NOTE — Clinical Note
"Patient seen for skilled ST session to address cognitive linguistic skills.  Patient awake and alert upright in recliner upon SLP arrival and agreeable to session.  Patient engaged in short rapport building as SLP new to this patient care.  Patient denies overall cognitive communication deficits.  Results of SLUMS reviewed noting overall score of 15 that does indicate moderate-severe deficits (dementia rating).  Patient was not able to recall items consumed for lunchtime meal or items ordered for dinnertime meal.  Patient was not able to independently recall and state swallowing strategies.  SLP reviewed strategies including slow rate, small sips to complete liquid wash, upright position for PO intake, and oral care after meals.  SLP provided hand-out to aid recall for discharge to home setting.  SLP reviewed medications and created \"cheat sheet\" with patient.  SLP noted medication, purpose, frequency, and time of administration.  SLP educated on recall strategies including verbal rehearsal, association, and visualization to aid recall and modeled utilizing medication list.  Following review patient was able to recall medication information correctly in 6 out of 12 opportunities; mod-max verbal cues increased accuracy to 7 out of 12.  Sternal precautions recalled with 33% accuracy.  SLP created hand-out and reviewed to increase recall and adherence to sternal precautions.  Final task targeted STM for recall of shapes and pictures for picture retention.  Patient was able to code stimuli independently and complete recall with 50% accuracy.  Mod-max verbal cues increase accuracy to 60%.   Initiated education for discharge including recommendations for daily cognitive communication activities.  Reviewed recommendations for assistance with IADL's at this time d/t overall cognitive communication deficits and to continue skilled ST services in home setting.                "

## 2024-07-16 NOTE — PROGRESS NOTES
"   07/16/24 2911   Pain Assessment   Pain Assessment Tool 0-10   Pain Score No Pain   Restrictions/Precautions   Precautions Fall Risk;Cardiac/sternal;Fluid restriction   ROM Restrictions Yes  (sternal prec)   Braces or Orthoses   (TEDs)   Lifestyle   Autonomy \"I'm feeling like I have to pee\" pt responds when asked how he feels about his current cognition vs his baseline   Oral Hygiene   Type of Assistance Needed Independent   Comment standing at sink   Oral Hygiene CARE Score 6   Shower/Bathe Self   Type of Assistance Needed Independent   Comment seated to complete SB routine at sink. extra time to manage fatigue. Seated pt can wash feet w/ cross leg tech. In stance at IND to wash groin and buttocks.   Shower/Bathe Self CARE Score 6   Bathing   Assessed Bath Style Sponge Bath   Anticipated D/C Bath Style Sponge Bath  (2' no  current shower oder)   Upper Body Dressing   Type of Assistance Needed Independent   Comment Able to gather items. IND seated   Upper Body Dressing CARE Score 6   Lower Body Dressing   Type of Assistance Needed Independent   Comment Able to gather items. IND seated to thread BLE to thigh height and CM in stance at sink.   Lower Body Dressing CARE Score 6   Putting On/Taking Off Footwear   Type of Assistance Needed Physical assistance   Physical Assistance Level 25% or less   Comment partial assist for TEDs. Pt able to don/doff hospital socks and sneakers using cross leg tech. REviewed w/ wife need for assist and she verbalized ability to assist w/ TEDs if it is to doc rec for pt to wear following DC   Putting On/Taking Off Footwear CARE Score 3   Lying to Sitting on Side of Bed   Type of Assistance Needed Independent   Comment HOB was elevate   Lying to Sitting on Side of Bed CARE Score 6   Sit to Stand   Type of Assistance Needed Independent   Comment RW   Sit to Stand CARE Score 6   Bed-Chair Transfer   Type of Assistance Needed Independent   Comment w RW   Chair/Bed-to-Chair Transfer CARE " "Score 6   Toileting Hygiene   Type of Assistance Needed Independent   Comment IND w/ RW in stance following voiding and BM   Toileting Hygiene CARE Score 6   Toilet Transfer   Type of Assistance Needed Independent   Comment IND w/ RW on/off toilet w/ GB, pt has GB at home   Toilet Transfer CARE Score 6   Exercise Tools   Exercise Tools Yes   Other Exercise Tool 1 Table margot w/ 4# inserted in all available planes 30x, tolerated well. Followed by Red power flex bar 30x \"wringing\" exercises, tolerated well. Completed to inc activity tolerance and UE strength w/in sternal prec for inc IND w/ ADL/IADLs.   Cognition   Overall Cognitive Status Impaired   Arousal/Participation Alert;Cooperative   Attention Attends with cues to redirect   Orientation Level Oriented X4   Memory Decreased recall of precautions   Following Commands Follows one step commands with increased time or repetition   Comments Recent 18/30 MOCA score   Activity Tolerance   Activity Tolerance Patient tolerated treatment well   Assessment   Treatment Assessment OT session focusing on DC ADL followed by FT w/ pts wife Maryjo in prep for DC home tomorrow. Pt has met OT goals and wife in agreement to assist w/ recommeded tasks. See above for fxnl details and below for details on FT. Plan for ongoing OT services in form of HOME OT. From OT stand point, OK to DC home tomorrow w/ family support. BP assessed t/o and remained WFL, no drop w/ position changes w/o TEDs nor Binder.   OT Family training done with: wifeMaryjo   Assessment of family training FT completed w/ pt's wifeMaryjo and the pt. Reviewed OT role at inpatient rehab and current fxnl status of largely mod I w/ RW for basic ADLs but requiring assist w/ TEDs, but unsure if TEDs to be rec for home and encouraged pt and wife to follow up w/ MD. Reviewed recommendation of sup for meds and higher level IADLs such as finances, stove top/oven use following pts 18/30 MOCA score. Reviewed MOCA results and " overview of implications. Wife reports understanding and endorses she can provide pt support. Reviewed the healing process s/p extensive cardiac surgery and CVA and effects not only physically but also cognitively, and possibilities for healing w/ ongoing therapy. pt and wife in agreement for HOME OT services. Reviewed pt not currently cleared for shower and rec to follow up w/ home OT once cleared. Pt and wife in agreement w/ all provided edu, reporting all questions answered at this time.   Prognosis Good   Problem List Decreased range of motion;Decreased strength;Decreased endurance;Impaired balance;Decreased mobility;Decreased coordination;Decreased cognition;Decreased skin integrity;Orthopedic restrictions   Plan   Treatment/Interventions   (DC home tomorrow)   Discharge Recommendation   Recommendation   (HOME OT)   OT Therapy Minutes   OT Time In 0830   OT Time Out 1010   OT Total Time (minutes) 100   OT Mode of treatment - Individual (minutes) 100   OT Mode of treatment - Concurrent (minutes) 0   OT Mode of treatment - Group (minutes) 0   OT Mode of treatment - Co-treat (minutes) 0   OT Mode of Treatment - Total time(minutes) 100 minutes   OT Cumulative Minutes 550   Therapy Time missed   Time missed? No

## 2024-07-16 NOTE — ASSESSMENT & PLAN NOTE
No documented diagnosis of CKD however per chart review, baseline creatinine approximately 0.9-1.2 with GFR 60-90  Noted with postoperative BARBARA with creatinine peak of 2.2 in the setting of iatrogenic volume overload  Initiated on Demadex 20 mg daily, and continued on this for now   Monitor BMP intermittently, creatinine was stable at last check

## 2024-07-16 NOTE — ASSESSMENT & PLAN NOTE
Iatrogenic volume overload status post CABG. Preserved LVEF on intra-op DEDRICK  Continues with bilateral LE edema on exam, encourage elevation/compression   Continue with Demadex 20 mg daily   Monitor volumes status closely   Daily weights, intake and output   Fluid and salt restriction   Intermittent BMP  Euvolemic on exam today (7/16/24)   O negative

## 2024-07-16 NOTE — CASE MANAGEMENT
Pts dc was scheduled for tomorrow during morning rounds. Recommendation for [facility] for nursing services made. Cm met w/pt and he is in agreement with the plan and with [facility] for services. Referral made via aidin.  Cm phoned pts wife and made aware, she is in agreement.

## 2024-07-16 NOTE — PROGRESS NOTES
"Physical Medicine and Rehabilitation Progress Note  Shane Chau . 78 y.o. male MRN: [id number]  Unit/Bed#: -01 Encounter: [id number]    To Review: 78-year-old male with a past medical history of with a history of coronary artery disease status post three-vessel CABG 6/20/2024, hypertension, bilateral carotid artery stenosis, TIA with evidence of prior right basal ganglia and bilateral cerebellar lacunar infarcts, aortic aneurysm who is postop course complicated by ileus which has been improving, acute kidney injury improving, acute hypoxic respiratory failure improving, who developed dysarthria and left-sided weakness on 7/3 with stroke alert/rapid response called.  Documented vitals at that time were unremarkable.  CT head without acute intracranial findings but did show evidence of the chronic lacunar infarcts in the right basal ganglia and bilateral cerebellar hemispheres with mild microangiopathic changes.  CTA head and neck showed moderate to severe atherosclerotic stenosis of the left vertebral artery.\"  MRI brain showed punctate acute to subacute infarcts involving the right caudate head, anterior centrum semiovale, and lateral right frontal cortex.    Chronic right corona radiata/basal ganglia and bilateral cerebellar lacunar infarcts. Mild chronic microangiopathic ischemic changes.  Neurology was not certain that stroke was related to procedure as it occurred 10 days postop.  Patient was switched to aspirin/Brilinta from aspirin/Plavix per neurology.  They also recommend outpatient Zio patch.  Patient was evaluated by skilled therapies and was found to have significant decline in ADLs and ambulation and appears appropriate for admission to [facility] [place].    Chief Complaint: No new concerns.     Interval History/Subjective:  Wife present today. Fairly significant drainage yet from incision. No new CP, SOB, fevers, chills, N/V, abdominal pain. Last BM 7/15. He is " continent of bladder, but uses a condom catheter at night, as he has bladder frequency and would otherwise keep a urinal between his legs throughout the night. He sleeps better with the condom catheter.     ROS:  A 10 point review of systems was negative except for what is noted in the HPI.    Today's Changes:  Asked nursing to teach wife wound dressing today.  Wife would like patient to discharge tomorrow, which is fine from a rehab perspective  In room privilege trial today.    Total visit time: 35 minutes, with more than 50% spent counseling/coordinating care. Counseling includes discussion with patient re: progress in therapies, functional issues observed by therapy staff, and discussion with patient regarding their current medical state and wellbeing. Coordination of patient's care was performed in conjunction with Internal Medicine service to monitor patient's labs, vitals, and management of their comorbidities.    Assessment/Plan:    * CVA (cerebral vascular accident) (HCC)  Assessment & Plan  - Occurred about 10 days after CABG; unclear relation to procedure   - punctate acute to subacute infarcts involving the right caudate head, anterior centrum semiovale, and lateral right frontal cortex and chronic right corona radiata/basal ganglia and bilateral cerebellar lacunar infarcts.  - Residual impairments on admission to [facility]: L sided weakness, imbalance, incoordination, possibly cog impairments-     Secondary stroke prevention  - OP Ziopatch recommended to rule out atrial fibrillation after discharge  - Antithrombotic: Aspirin and Brilinta  - Statin  - Optimal management of blood pressure and diabetes  -  patient and if applicable caregiver on optimal stroke management  - Fall precautions   - Follow-up with neurology and PCP after d/c   - Completing acute comprehensive interdisciplinary inpatient rehabilitation to include intensive skilled therapies (PT, OT, ST) as outlined with oversight and management  by rehabilitation physician as well as inpatient rehab level nursing, case management and weekly interdisciplinary team meetings.   - Discharge home with Home RN  - Will be able to advance to cardiac rehab once cleared by cardiac surgery      Cognitive impairment  Assessment & Plan  -Acute comprehensive interdisciplinary inpatient rehabilitation to include intensive skilled therapies as outlined with oversight and management by rehabilitation physician.  Continue inpatient rehab level nursing, case management and weekly interdisciplinary team meetings.   MOCA 7/12 18 out of 30 indicated mild cog impairment but close to mod with greatest deficits in VSP/executive, language, and delayed recall  -Recommend eval of med mgmt and IADLs   -Monitor neuro-exam, wakefulness, mood, cognition, insight into deficits and safety awareness  - patient (+/- family/caregiver education) and training and compensatory strategies to optimize safety, function, and decrease risk of complications  -Optimize sleep-wake cycle  -Limit sedating medications when possible  -Monitor and ensure optimal management electrolytes, nutrition, and hydration  -Monitor for signs or symptoms of infection, medication intolerances, other systemic etiologies  -Fall precautions with frequent rounding; proactive toileting program, patient should not be unattended in bathroom    -OP follow-up PCP, neuro, and possibly Salma       S/P CABG x 3  Assessment & Plan  6/20/2024  Sternal incision with inferior pole with full thickness dehiscence   - Wound care following   - Local care   - Wife education on wound care   - Plan to discharge with home nursing  Abx as per cardiac surgery  Daily wound care as per wound care.  Monitor and follow closely with Cardiac surgery.    Frequent bowel movements-resolved as of 7/16/2024  Assessment & Plan  Improving   Pt afebrile without leukocytosis or abdominal pain  No recent bowel meds; did have hx of ileus; is eating ok  Monitor for  "now  If concerns for infectious diarrhea in future obtain C diff or other studies  Ensure adequate hydration       Oral phase dysphagia-resolved as of 7/16/2024  Assessment & Plan  Improving Slp recommended upgrade to reg thins  SLP eval and tx  Aspiration precautions     At risk for venous thromboembolism (VTE)-resolved as of 7/16/2024  Assessment & Plan  SCDs, ambulation, and heparin       History of ileus-resolved as of 7/16/2024  Assessment & Plan  Improved (temp required NGT and Gen Sx c/s on acute)  Last BM 7/15. Currently with only miralax and suppository ordered PRN  Monitor GI function, intake     Prediabetes  Assessment & Plan  Nutrition counseling  Outpatient f/u with PCP    Stenosis of left vertebral artery  Assessment & Plan  Moderate to severe left vertebral artery atherosclerotic stenosis   - Antithrombotic: aspirin, brilinta  - Statin  - Optimal blood pressure and blood sugar control  - OP neuro     Postoperative anemia due to acute blood loss  Assessment & Plan  Consult(ed) IM and comanagement with their service during [facility] course   Monitor H/H, vitals, signs/symptoms of acute bleeding  Hb stable at 10.6 on 7/10     PAD (peripheral artery disease) (HCC)  Assessment & Plan  \"PAD/bilateral carotid artery stenosis, 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\"  IM consulted and with overall management at their discretion during [facility] course  - Antithrombotic: asp/brilinta  - Statin  - Optimal blood pressure and blood sugar control  - OP Vasc sx       Hyponatremia  Assessment & Plan  IM consulted and with overall management at their discretion during [facility] course  2000 FR and Torsemide per IM   BMP monitor as per IM      CKD (chronic kidney disease) stage 2, GFR 60-89 ml/min  Assessment & Plan  Lab Results   Component Value Date    EGFR 58 07/10/2024    EGFR 67 07/09/2024    EGFR 65 07/08/2024    CREATININE 1.18 07/10/2024    CREATININE 1.05 07/09/2024    CREATININE 1.08 07/08/2024   Monitor BUN/Cr " "intermittently as well as electrolytes   IM consulted to manage  Limit nephrotoxic agents when possible  Optimal BP mgmt       Primary hypertension  Assessment & Plan  Internal medicine consulted and co-management with their service  Monitor vitals with and without activity; monitor for orthostasis  Monitor hemoglobin, electrolytes, kidney function, hydration status   Current meds: Lopressor, Torsemide daily      Coronary artery disease of native artery of native heart with stable angina pectoris (HCC)  Assessment & Plan  S/p 3v CABG 6/20/24 (prior stenting)   Sternal precautions  7/10 noted to have some drainage from sternal incision somewhat increased from earlier; patient states this has been happening from time to time since sx   - Cardiac sx c/s - recommended Duricef and LWC  7/11 Wound care c/s  \"Mid sternal wound with full-thickness dehiscence.  Discussed with cardiac surgery advanced practitioner prior to examining patient, who reported they were able to express moderate amount of yellow drainage on exam from the distal aspect of the incision. No additional drainage able to be palpated or expressed on wound care exam.   Will recommend wound management of silver alginate and dry dressing daily and PRN, for antimicrobial benefit and to absorb any further drainage.  Oral antibiotics as per cardiac surgery is also following.   No apparent clinical s/s of infection present on wound care exam.\"   - No leukocytosis 7/10, pt afebrile, monitor site closely   7/12 Card sx follow-up  \"dressing taken down, inferior wound stable from exam 7/10, no open portions, unable to probe to suggest tracking, a few CCs of murky yellow fluid expressed, phots taken & shared w/ surgeon via EPIC chat, wound redressed as found, no surrounding erythema suggesting infection\"    IM consulted and with overall management at their discretion during [facility] course  Antithrombotic: Aspirin, Brilinta  Statin  Optimal blood pressure and blood sugar " control  Continue PT, OT  Monitor vitals      Abdominal aortic aneurysm without rupture (HCC)  Assessment & Plan  Mgmt per IM     Volume overload-resolved as of 7/16/2024  Assessment & Plan  Iatrogenic volume overload status post CABG. Preserved LVEF on intra-op DEDRICK  Continues with bilateral LE edema on exam, encourage elevation/compression   Continue with Demadex 20 mg daily   Monitor volumes status closely   Daily weights, intake and output   Fluid and salt restriction   Intermittent BMP      Health Maintenance  #Delirium/Sleep: At risk, Optimize sleep/wake, bowel, bladder, and pain management. Behavioral/environmental interventions with avoidance of chemical/physical restraint.   #Pain: Scheduled Tylenol, Lidoderm patches for his chest wall.    #Bowel: Last BM 7/15 and continent. Suppository and miralax Prn.   #Bladder: Voiding and continent   #Skin/Pressure Injury Prevention: Turn Q2hr in bed, with weight shifts V19-18vof in wheelchair.  #GI Prophylaxis: Already on PPI  #Code Status: Full Code  #FEN: Regular, Cardiac diet, 2000mL FR.   #Dispo: ADD 7/17 with Home RN. He can start cardiac rehab once cleared by cardiac surgery as an outpatient. Lives with spouse in home with 10 JUJU. He will have supervision/assistance available. He was previously independent.   Outpatient f/u: Cardiac Surgery, Neurology, PCP, Cardiology    Objective:    Functional Update:  PT: Ind bed mobility, Ind transfers, Ind ambulation 168' with RW, Ind 8 stairs, limited by fatigue.  OT: Ind ADLs   SLP: minimal to mild oral and minimal to functional dysphagia today. On reg/thins    Allergies per EMR    Physical Exam:  Temp:  [97.9 °F (36.6 °C)-98.2 °F (36.8 °C)] 97.9 °F (36.6 °C)  HR:  [85-91] 85  Resp:  [18-19] 18  BP: (127-141)/(69-77) 136/77  Oxygen Therapy  SpO2: 95 %      Gen: No acute distress, Well-nourished, well-appearing.  HEENT: Moist mucus membranes, Normocephalic/Atraumatic  Cardiovascular: Regular rate, rhythm, S1/S2. Distal  pulses palpable  Heme/Extr: No edema  Pulmonary: Non-labored breathing. Lungs CTAB  : No blackman  GI: Soft, non-tender, non-distended. BS+  MSK: ROM is WFL in all extremities. No effusions or deformities. Bulk is symmetric. See below for MMT scores.   Integumentary: Skin is warm, dry. Inferior pole of incision with stable drainage. Improving.   Neuro: AAOx3,  Speech is intact. Appropriate to questioning.   Psych: Normal mood and affect.     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[facility])    Asymptomatic bilateral carotid artery stenosis    Coronary artery disease of native artery of native heart with stable angina pectoris ([facility])    Hyperlipidemia    Primary hypertension    Transient cerebral ischemic attack    Need for pneumococcal vaccination    Hematuria    Microhematuria    Erectile dysfunction    Leg swelling    Paresthesia    Rib pain    S/P CABG x 3    Localized primary osteoarthritis of lower leg, unspecified laterality    CKD (chronic kidney disease) stage 2, GFR 60-89 ml/min    Stroke-like symptoms    Hyponatremia    CVA (cerebral vascular accident) ([facility])    PAD (peripheral artery disease) ([facility])    History of ileus    Volume overload    Postoperative anemia due to acute blood loss    At risk for venous thromboembolism (VTE)    Stenosis of left vertebral artery    Prediabetes    Oral phase dysphagia    Reaction at surgical site    Cognitive impairment    Frequent bowel movements          Medications  Current Facility-Administered Medications   Medication Dose Route Frequency Provider Last Rate    acetaminophen  975 mg Oral Q6H While awake SANTHOSH Cunningham      aspirin  81 mg Oral Daily SANTHOSH Cunningham      atorvastatin  80 mg Oral After Dinner SANTHOSH Cunningham      bisacodyl  10 mg Rectal Daily PRN Dandre Stoner MD      cefadroxil  1,000 mg Oral Q12H KRISTI Baker PA-C      heparin (porcine)  5,000 Units Subcutaneous Q8H [facility] SANTHOSH Cunningham      lidocaine  2 patch Topical Daily SANTHOSH Cunningham      melatonin  6 mg Oral HS SANTHOSH Cunningham      metoprolol tartrate  50 mg Oral Q12H [facility] SANTHOSH Cunningham      ondansetron  4 mg Oral Q6H PRN SANTHOSH Cunningham      pantoprazole  40 mg Oral Early Morning SANTHOSH Cunningham      polyethylene glycol  17 g Oral Daily PRN Dandre Stoner MD      potassium chloride  20 mEq Oral BID SANTHOSH Cunningham      ticagrelor  90 mg Oral Q12H [facility] SANTHOSH Cunningham      torsemide  20 mg Oral Daily SANTHOSH Cunningham            ** Please Note: Fluency Direct voice to text software may have been used in the creation of this document. **

## 2024-07-16 NOTE — PROGRESS NOTES
Progress Note - Cardiothoracic Surgery   Shane Chau Sr. 78 y.o. male MRN: [id number]  Unit/Bed#: -01 Encounter: [id number]      24 Hour Events: No events. No complaints. Taking a rest at PT session during encounter. Admits to not feeling there is much drainage, wound has been covered. Still eating most of his meals per patient/RN. No report of additional needed dressing changes per RN.    Medications:   Scheduled Meds:  Current Facility-Administered Medications   Medication Dose Route Frequency Provider Last Rate    acetaminophen  975 mg Oral Q6H While awake SANTHOSH Cunningham      aspirin  81 mg Oral Daily SANTHOSH Cunningham      atorvastatin  80 mg Oral After Dinner SANTHOSH Cunningham      bisacodyl  10 mg Rectal Daily PRN Dandre Stoner MD      cefadroxil  1,000 mg Oral Q12H KRISTI Baker PA-C      heparin (porcine)  5,000 Units Subcutaneous Q8H [facility] SANTHOSH Cunningham      lidocaine  2 patch Topical Daily SANTHOSH Cunningham      melatonin  6 mg Oral HS SANTHOSH Cunningham      metoprolol tartrate  50 mg Oral Q12H [facility] SANTHOSH Cunningham      ondansetron  4 mg Oral Q6H PRN SANTHOSH Cunningham      pantoprazole  40 mg Oral Early Morning SANTHOSH Cunningham      polyethylene glycol  17 g Oral Daily PRN Dandre Stoner MD      potassium chloride  20 mEq Oral BID SANTHOSH Cunningham      ticagrelor  90 mg Oral Q12H [facility] SANTHOSH Cunningham      torsemide  20 mg Oral Daily SANTHOSH Cunningham       Continuous Infusions:     Results:   NO NEW CBC OR BMP  Results from last 7 days   Lab Units 07/10/24  1601   WBC Thousand/uL 9.13   HEMOGLOBIN g/dL 10.6*   HEMATOCRIT % 31.9*   PLATELETS Thousands/uL 386     Results from last 7 days   Lab Units 07/10/24  1601   POTASSIUM mmol/L 4.2   CHLORIDE mmol/L 100   CO2 mmol/L 23   BUN mg/dL 21   CREATININE mg/dL 1.18   CALCIUM mg/dL 7.8*            Vitals:   Vitals:    07/15/24 2016 07/15/24 2212 07/16/24 0607 07/16/24 0815   BP: 127/70 141/69 136/77 148/74   BP Location: Right arm  Left arm    Pulse: 87 89 85 87   Resp: 18  18    Temp: 98.1 °F (36.7 °C)  97.9 °F (36.6 °C)    TempSrc: Oral  Oral    SpO2: 93%  95%    Weight:   72.3 kg (159 lb 4.8 oz)    Height:           Physical Exam:    General: No acute distress  HEENT/NECK:  Normocephalic. Atraumatic.  Cardiac: Regular rate and rhythm  Pulmonary:   on RA, no respiratory distress  Abdomen:  Non-distended  Extremities: Extremities warm/dry  Neuro: Alert and oriented X 3  Skin: Warm/Dry, without rashes or lesions.    Wound care: stable findings from exam 7/15    Assessment:    CAD s/p CABGx3  Surgical site reaction/drainage/breakdown    Plan:  Continue 5 day course of antibiotics per surgeon w/ ongoing drainage  No fever  CBC 7/10 w/o leukocytosis  Wound care following w/ orders place  Wound taken down & dressing changed by cardiac sx at bedside, plan for repeat wound change ongoing    Case discussed w/ Dr. Bette BERMUDEZ    Wound check Friday if patient is discharged tomorrow, office notified.    SIGNATURE: Mercedes Baker PA-C  DATE: July 16, 2024  TIME: 10:16 AM    * This note was completed in part utilizing [employer] direct voice recognition software.   Grammatical errors, random word insertion, spelling mistakes, and incomplete sentences may be an occasional consequence of the system secondary to software limitations, ambient noise and hardware issues. At the time of dictation, efforts were made to edit, clarify and /or correct errors. Please read the chart carefully and recognize, using context, where substitutions have occurred.  If you have any questions or concerns about the context, text or information contained within the body of this dictation, please contact myself, the provider, for further clarification.

## 2024-07-16 NOTE — PROGRESS NOTES
07/16/24 1100   Pain Assessment   Pain Assessment Tool 0-10   Pain Score No Pain   Restrictions/Precautions   Precautions Aspiration;Fall Risk;Fluid restriction;Cardiac/sternal   Eating   Type of Assistance Needed Set-up / clean-up;Supervision   Physical Assistance Level No physical assistance   Eating CARE Score 4   Swallow Assessment   Swallow Treatment Assessment Daily Dysphagia Tx Note     Patient Name: Shane Chau Sr.    Today's Date: 7/16/2024      Current Risks for Dysphagia & Aspiration: new neuro event, cognitive deficit, positioning issues, head support (sternal precautions)      Current Symptoms/Concerns: mild difficulty with chewing, decreased oral intake, weight loss, dehydration    Current diet:regular diet and thin liquids     Premorbid diet::regular diet and thin liquids     Positioning: upright in recliner    Items administered:Consistencies Administered: thin liquids and hard solids  Materials administered included: thin liquids by straw (Ensure), ice cream, side salad    Total amount of meal consumed:   100% of meal and 240cc of thins      Oral stage:minimal to mild  Lip closure: functional  Anterior spillage: none  Mastication: noted to be slower but overall breakdown is functional   Bolus formation: functional   Bolus control: prompt  Transfer: timely w/ ice cream/thins, minimal delay w/ solids  Oral residue: none  Pocketing: none         Pharyngeal stage:minimal to functional  Swallow promptness: minimal delay w/ solids but prompt w/ liquids  Hyolaryngeal elevation: present and noted to be functional upon palpation   Wet voice: none  Throat clear: x1 near end of meal  Cough: none  Secondary swallows: none   Audible swallows: none       Esophageal stage:No overt sxs observed w/ meal today        Summary:     Pt presenting with minimal to mild oral and minimal to functional pharyngeal dysphagia today. Symptoms or concerns included slower but effective mastication given solids in  addition to minimal delay in pharyngeal swallow initiation and throat clear x1 at end of meal.      For session today, primary focus was toward family training and education given current swallow function. Pt's spouse, Maryjo was present for session today. As current SLP was new to pt's care, briefly introducing self and reviewing w/ pt and spouse about current progression given swallow status, including the advancement of diet to regular w/ thin liquids. Pt denied overt difficulty since diet advancement.     In regard to pt's overall swallow function, lip seal was functional around utensils, straw to where bolus retrieval adequate across meal. No anterior loss was observed given textures. While mastication was slower and slightly prolonged give solids, pt's ability to effectively breakdown textures was noted to be functional and overall formulation was adequate to where no overt oral residual/pocketing was observed. Bolus control/transit of thin liquids was prompt. Overall minimal delay of solids noted but prompt swallow elicited w/ thins. HLE was present and functional upon palpation. Pt had throat clear x1 at very end of meal but no further overt coughing, throat clearing, changes in voicing nor respiratory status was observed.     Throughout meal and at end of meal SLP reviewing the following information w/ pt and spouse to recap education and recommendations:     ST Recommendations for Diet:  Continue on REGULAR diet w/ thin liquids. Educated pt and spouse in regard to monitoring tolerance of more dry and crunchy food items. The following swallow strategies were also verbally reviewed w/ pt and spouse:     1- Fully upright for ALL meals  2- Use extra moistening agents w/ solids  (ie: gravies, condiments, etc)  3- Smaller bites of solids  4- Slower rate of intake  5-Alternate foods and liquids     Also d/w pt and spouse about overall appetite and if it is lower at home, pt does like the Ensures, which was provided  on tray today, which can be an additional resource for extra nutrition. Briefly reviewed w/ both pt and spouse about cognitive deficits which presented but due to shorter length of stay, not much has been followed up from ST services. SLP currently recommending for spouse to provide supervision given I ADL's (primarily medication management). Spouse in agreement w/ recommendations at this time. Will plan for at best Home ST services for assessment and brief f/u given swallow function and cognitive skills.        Recommendations:  Diet: regular diet and thin liquids  Meds: whole with liquid  Strategies:  upright posture, only feed when fully alert, slow rate of feeding, small bites/sips, quiet environment (tv off, limit talking, door closed, etc.), alternating bites and sips, and OOB FOR ALL MEALS -TRADITIONAL CARE AFTER MEALS     DISTANT supervision w/ meals.  Results reviewed with:  patient, pt's wife- Maryjo  Aspiration precautions posted.     F/u ST tx/Plan: Pt will continue to benefit from ongoing home dysphagia tx sessions to monitor least restrictive diet w/o increased oropharyngeal or aspiration sxs as well as monitor ability to carryover swallow strategies independently.   Swallow Assessment Prognosis   Prognosis Good   Prognosis Considerations Age;Co-morbidities;Family/community support;Medical diagnosis;Medical prognosis   SLP Therapy Minutes   SLP Time In 1100   SLP Time Out 1130   SLP Total Time (minutes) 30   SLP Mode of treatment - Individual (minutes) 30   SLP Mode of treatment - Concurrent (minutes) 0   SLP Mode of treatment - Group (minutes) 0   SLP Mode of treatment - Co-treat (minutes) 0   SLP Mode of Treatment - Total time(minutes) 30 minutes   SLP Cumulative Minutes 195   Therapy Time missed   Time missed? No

## 2024-07-16 NOTE — PROGRESS NOTES
[facility]  Progress Note  Name: Shane Chau Sr. I  MRN: [id number]  Unit/Bed#: -01 I Date of Admission: 7/9/2024   Date of Service: 7/16/2024 I Hospital Day: 7    Assessment & Plan   * CVA (cerebral vascular accident) (HCC)  Assessment & Plan  Stroke alert called on 7/3/24 due to dysarthria, left sided weakness, left tongue deviation, left facial droop, and left pronator drift. Symptoms have since improved.  MRI brain 7/6/2024 showed: Punctate acute to subacute infarcts involving the right caudate head, anterior centrum semiovale, and lateral right frontal cortex. Chronic right corona radiata/basal ganglia and bilateral cerebellar lacunar infarcts. Mild chronic microangiopathic ischemic changes..   Neurology evaluated, recommendations appreciated  Distribution of the strokes found on MRI is suspicious for an embolic source -> plan for further cardiac work up with Zio patch/ILR as outpatient   If arrhythmia is found on work up recommend switching from DAPT to NOAC and ASA  P2Y12 level checked during this hospitalization was 180 -> switched from Plavix to Brilinta   Brilinta $47/month - pt and wife are agreeable to the cost.  Continue with ASA and statin     Abdominal aortic aneurysm without rupture (HCC)  Assessment & Plan  Status post repair 2011  Follows with Dr Juares for surveillance     CKD (chronic kidney disease) stage 2, GFR 60-89 ml/min  Assessment & Plan  No documented diagnosis of CKD however per chart review, baseline creatinine approximately 0.9-1.2 with GFR 60-90  Noted with postoperative BARBARA with creatinine peak of 2.2 in the setting of iatrogenic volume overload  Initiated on Demadex 20 mg daily, and continued on this for now   Monitor BMP intermittently, creatinine was stable at last check     Coronary artery disease of native artery of native heart with stable angina pectoris (HCC)  Assessment & Plan  Hx CAD with PCI/stents; now status post CABG  x 3 on 6/20/24  Intraoperative DEDRICK demonstrates preserved systolic function, with LVEF 55%   Currently on p.o. diuretic due to postop volume overload  Also on postop course of prophylactic amiodarone  Continue aspirin, statin, beta-blocker  Monitor incisions, sternal precautions    History of ileus  Assessment & Plan  Postoperative ileus, managed by general surgery   Required NGT placement for decompression, removed on 6/28  Resolved, tolerating regular diet and having regular BMs    Hyponatremia  Assessment & Plan  Mild, with serum sodium recently 132  Continue FR 2000 ml and Demadex 20 mg daily  Monitor volume status, intake and output  Monitor BMP intermittently    Oral phase dysphagia  Assessment & Plan  Speech therapy is following  Currently on level 3 dysphagia diet with thin liquids    PAD (peripheral artery disease) (HCC)  Assessment & Plan  Has B/L carotid stenosis  Continue ASA/Brilinta and statin    Primary hypertension  Assessment & Plan  Blood pressure stable on review  Continue Lopressor 50 mg q12h  Started on Demadex 20 mg daily postoperatively due to volume overload, continue as his BP is at goal (148/74 this am 7/16/24)    Volume overload  Assessment & Plan  Iatrogenic volume overload status post CABG. Preserved LVEF on intra-op DEDRICK  Continues with bilateral LE edema on exam, encourage elevation/compression   Continue with Demadex 20 mg daily   Monitor volumes status closely   Daily weights, intake and output   Fluid and salt restriction   Intermittent BMP  Euvolemic on exam today (7/16/24)                 The above assessment and plan was reviewed and updated as determined by my evaluation of the patient on 7/16/2024.    Labs:   Results from last 7 days   Lab Units 07/10/24  1601   WBC Thousand/uL 9.13   HEMOGLOBIN g/dL 10.6*   HEMATOCRIT % 31.9*   PLATELETS Thousands/uL 386     Results from last 7 days   Lab Units 07/10/24  1601   SODIUM mmol/L 132*   POTASSIUM mmol/L 4.2   CHLORIDE mmol/L 100    CO2 mmol/L 23   BUN mg/dL 21   CREATININE mg/dL 1.18   CALCIUM mg/dL 7.8*             Results from last 7 days   Lab Units 07/10/24  2104 07/10/24  1558 07/10/24  1052   POC GLUCOSE mg/dl 78 122 121       Imaging  No orders to display       Review of Scheduled Meds:  Current Facility-Administered Medications   Medication Dose Route Frequency Provider Last Rate    acetaminophen  975 mg Oral Q6H While awake SANTHOSH Cunningham      aspirin  81 mg Oral Daily SANTHOSH Cunningham      atorvastatin  80 mg Oral After Dinner SANTHOSH Cunningham      bisacodyl  10 mg Rectal Daily PRN Danrde Stoner MD      cefadroxil  1,000 mg Oral Q12H KRISTI Baker PA-C      heparin (porcine)  5,000 Units Subcutaneous Q8H [facility] SANTHOSH Cunningham      lidocaine  2 patch Topical Daily SANTHOSH Cunningham      melatonin  6 mg Oral HS SANTHOSH Cunningham      metoprolol tartrate  50 mg Oral Q12H [facility] SANTHOSH Cunningham      ondansetron  4 mg Oral Q6H PRN SANTHOSH Cunningham      pantoprazole  40 mg Oral Early Morning SANTHOSH Cunningham      polyethylene glycol  17 g Oral Daily PRN Dandre Stoner MD      potassium chloride  20 mEq Oral BID SANTHOSH Cunningham      ticagrelor  90 mg Oral Q12H [facility] SANTHOSH Cunningham      torsemide  20 mg Oral Daily SANTHOSH Cunningham         Subjective/ HPI: Patient seen and examined. Patients overnight issues or events were reviewed with nursing staff. New or overnight issues include the following:     No overnight issues per nursing. He and his wife are very excited about the patient going home tomorrow.   Denies CP or SOB when asked. No constipation.     ROS:   A 10 point ROS was performed; negative except as noted above.        *Labs /Radiology studies Reviewed  *Medications  reviewed and reconciled as needed  *Please refer to order section for additional ordered labs studies      Physical  Examination:  Vitals:   Vitals:    07/15/24 2016 07/15/24 2212 07/16/24 0607 07/16/24 0815   BP: 127/70 141/69 136/77 148/74   BP Location: Right arm  Left arm    Pulse: 87 89 85 87   Resp: 18  18    Temp: 98.1 °F (36.7 °C)  97.9 °F (36.6 °C)    TempSrc: Oral  Oral    SpO2: 93%  95%    Weight:   72.3 kg (159 lb 4.8 oz)    Height:           General Appearance: NAD; pleasant eating lunch and sitting up OOB  HEENT: PERRLA, conjuctiva normal; mucous membranes moist; face symmetrical  Neck:  Supple  Lungs: clear bilaterally, normal respiratory effort, no retractions, expiratory effort normal, on room air  CV: regular rate and rhythm, midsternal chest wound healing   ABD: soft non tender, +BS x4  EXT: DP pulses intact, no lymphadenopathy, no edema  Skin: normal turgor, normal texture, no rash  Psych: affect normal, mood normal, very happy when discussing home tomorrow   Neuro: AAOx3      The above physical exam was reviewed and updated as determined by my evaluation of the patient on 7/16/2024.    Invasive Devices       Drain  Duration             External Urinary Catheter 2 days                       VTE Pharmacologic Prophylaxis: Heparin  Code Status: Level 1 - Full Code  Current Length of Stay: 7 day(s)     Coordination of patient's care was performed in conjunction with consultative PMR service. Time invested included review of patient's labs, vitals, and management of their comorbidities with continued monitoring, examination of patient as well as answering patient questions, documenting her findings and creating progress note in electronic medical record,  ordering appropriate diagnostic testing.       ** Please Note:  voice to text software may have been used in the creation of this document. Although proof errors in transcription or interpretation are a potential of such software**

## 2024-07-16 NOTE — PROGRESS NOTES
07/16/24 1230   Pain Assessment   Pain Assessment Tool 0-10   Pain Score No Pain   Restrictions/Precautions   Precautions Aspiration;Fluid restriction;Cardiac/sternal   Weight Bearing Restrictions No   ROM Restrictions Yes  (sternal precautions)   Cognition   Overall Cognitive Status Impaired   Arousal/Participation Alert;Cooperative   Attention Attends with cues to redirect   Orientation Level Oriented X4   Memory Decreased recall of precautions   Following Commands Follows one step commands with increased time or repetition   Roll Left and Right   Type of Assistance Needed Independent   Roll Left and Right CARE Score 6   Sit to Lying   Type of Assistance Needed Independent   Sit to Lying CARE Score 6   Lying to Sitting on Side of Bed   Type of Assistance Needed Independent   Lying to Sitting on Side of Bed CARE Score 6   Sit to Stand   Type of Assistance Needed Independent   Comment no AD   Sit to Stand CARE Score 6   Bed-Chair Transfer   Type of Assistance Needed Independent   Comment no AD   Chair/Bed-to-Chair Transfer CARE Score 6   Car Transfer   Type of Assistance Needed Independent   Comment no AD   Car Transfer CARE Score 6   Walk 10 Feet   Type of Assistance Needed Independent   Comment no AD   Walk 10 Feet CARE Score 6   Walk 50 Feet with Two Turns   Type of Assistance Needed Independent   Comment with and without RW   Walk 50 Feet with Two Turns CARE Score 6   Walk 150 Feet   Type of Assistance Needed Set-up / clean-up   Comment distant supervision for longer distances. Instructed patient to use RW for longer distances to help conserve energy   Walk 150 Feet CARE Score 5   Walking 10 Feet on Uneven Surfaces   Type of Assistance Needed Independent   Comment no AD over mat surface   Walking 10 Feet on Uneven Surfaces CARE Score 6   Ambulation   Primary Mode of Locomotion Prior to Admission Walk   Distance Walked (feet) 168 ft  (120 ft, 138 ft, multiple small distances for in room mobility. Also walked  from PT gym>elevator down to first floor> walkeds to main entrance.)   Assist Device   (practiced with no AD and with RW)   Limitations Noted In Endurance;Speed   Walk Assist Level Independent   Findings Patient can independently ambulate shorter distances with no AD, but benefit from using RW for longer distances for energy conservation. Progressed patient to Mod I with no AD in the room. Instructed patient to use RW if fatigued. Communicated with RN regarding update to in room privileges.   Does the patient walk? 2. Yes   Wheel 50 Feet with Two Turns   Reason if not Attempted Activity not applicable   Wheel 50 Feet with Two Turns CARE Score 9   Wheel 150 Feet   Reason if not Attempted Activity not applicable   Wheel 150 Feet CARE Score 9   Curb or Single Stair   Style negotiated Curb   Type of Assistance Needed Supervision   Comment with no AD   1 Step (Curb) CARE Score 4   4 Steps   Type of Assistance Needed Independent    Comment DS to ascend/descend 8 steps with reciprocal pattern and single HR.   4 Steps CARE Score 6   12 Steps   Comment fatigued after 8 steps. Patient only has 6 steps to enter his home and has consistently been able to perform 6 steps. Has full flight of stairs to the basement but it is not a barrier for patient to return home. Pt confirms he does not need to go down the basement.   Reason if not Attempted Safety concerns   12 Steps CARE Score 88   Picking Up Object   Type of Assistance Needed Supervision   Comment CS to  marker   Picking Up Object CARE Score 4   Toilet Transfer   Type of Assistance Needed Independent   Toilet Transfer CARE Score 6   Therapeutic Interventions   Other Backward Walking 2x 40 ft, Side Steps Ambulation 2x 40 ft, High Knee Ambulation 2x 40 ft   Equipment Use   NuStep level 2 x10 minutes   Assessment   Treatment Assessment Patient participated in 90 minute skilled physical therapy session focusing on longer distance ambulation to improve overall activity  tolerance. Endurance continues to be patient's biggest barrier at this time. With seated rest breaks, patient was able to recover. Oxygen saturation was monitored during session and remained above 94% on RA. Patient has made good progress in physical therapy and was progressed to IRP with no AD. Plan is for patient to discharge home tomorrow afternoon. Pt would benefit from cardiac rehab once cleared by surgeons.   Problem List Decreased strength;Decreased endurance;Impaired balance;Decreased mobility;Decreased safety awareness   Plan   Treatment/Interventions Functional transfer training;LE strengthening/ROM;Therapeutic exercise;Endurance training;Patient/family training;Bed mobility;Gait training   Progress Progressing toward goals   Discharge Recommendation   Rehab Resource Intensity Level, PT   (Home with home health RN. Will benefit from cardiac rehab when cleared by surgeon)   PT Therapy Minutes   PT Time In 1230   PT Time Out 1400   PT Total Time (minutes) 90   PT Mode of treatment - Individual (minutes) 90   PT Mode of treatment - Concurrent (minutes) 0   PT Mode of treatment - Group (minutes) 0   PT Mode of treatment - Co-treat (minutes) 0   PT Mode of Treatment - Total time(minutes) 90 minutes   PT Cumulative Minutes 646     Loreta roque, PT, DPT

## 2024-07-16 NOTE — PROGRESS NOTES
"   07/16/24 1515   Pain Assessment   Pain Assessment Tool 0-10   Pain Score No Pain   Restrictions/Precautions   Precautions Aspiration;Fluid restriction;Cardiac/sternal   Comprehension   Comprehension (FIM) 4 - Understands basic info/conversation 75-90% of time   Expression   Expression (FIM) 5 - Needs help/cues only RARELY (< 10% of the time)   Social Interaction   Social Interaction (FIM) 5 - Interacts appropriately with others 90% of time   Problem Solving   Problem solving (FIM) 3 - Solves basic problmes 50-74% of time   Memory   Memory (FIM) 3 - Recognizes, recalls/performs 50-74%   Speech/Language/Cognition Assessmetn   Treatment Assessment Patient seen for skilled ST session to address cognitive linguistic skills.  Patient awake and alert upright in recliner upon SLP arrival and agreeable to session.  Patient engaged in short rapport building as SLP new to this patient care.  Patient denies overall cognitive communication deficits.  Results of SLUMS reviewed noting overall score of 15 that does indicate moderate-severe deficits (dementia rating).  Patient was not able to recall items consumed for lunchtime meal or items ordered for dinnertime meal.  Patient was not able to independently recall and state swallowing strategies.  SLP reviewed strategies including slow rate, small sips to complete liquid wash, upright position for PO intake, and oral care after meals.  SLP provided hand-out to aid recall for discharge to home setting.  SLP reviewed medications and created \"cheat sheet\" with patient.  SLP noted medication, purpose, frequency, and time of administration.  SLP educated on recall strategies including verbal rehearsal, association, and visualization to aid recall and modeled utilizing medication list.  Following review patient was able to recall medication information correctly in 6 out of 12 opportunities; mod-max verbal cues increased accuracy to 7 out of 12.  Sternal precautions recalled with 33% " accuracy.  SLP created hand-out and reviewed to increase recall and adherence to sternal precautions.  Final task targeted STM for recall of shapes and pictures for picture retention.  Patient was able to code stimuli independently and complete recall with 50% accuracy.  Mod-max verbal cues increase accuracy to 60%.   Initiated education for discharge including recommendations for daily cognitive communication activities.  Reviewed recommendations for assistance with IADL's at this time d/t overall cognitive communication deficits and to continue skilled ST services in home setting.   SLP Therapy Minutes   SLP Time In 1515   SLP Time Out 1615   SLP Total Time (minutes) 60   SLP Mode of treatment - Individual (minutes) 60   SLP Mode of treatment - Concurrent (minutes) 0   SLP Mode of treatment - Group (minutes) 0   SLP Mode of treatment - Co-treat (minutes) 0   SLP Mode of Treatment - Total time(minutes) 60 minutes   SLP Cumulative Minutes 255   Therapy Time missed   Time missed? No

## 2024-07-17 ENCOUNTER — TRANSITIONAL CARE MANAGEMENT (OUTPATIENT)
Dept: FAMILY MEDICINE CLINIC | Facility: CLINIC | Age: 79
End: 2024-07-17

## 2024-07-17 VITALS
WEIGHT: 157.8 LBS | HEIGHT: 65 IN | TEMPERATURE: 97.8 F | RESPIRATION RATE: 18 BRPM | DIASTOLIC BLOOD PRESSURE: 76 MMHG | SYSTOLIC BLOOD PRESSURE: 118 MMHG | OXYGEN SATURATION: 95 % | HEART RATE: 91 BPM | BODY MASS INDEX: 26.29 KG/M2

## 2024-07-17 PROCEDURE — 97110 THERAPEUTIC EXERCISES: CPT

## 2024-07-17 PROCEDURE — 97130 THER IVNTJ EA ADDL 15 MIN: CPT

## 2024-07-17 PROCEDURE — 97530 THERAPEUTIC ACTIVITIES: CPT

## 2024-07-17 PROCEDURE — 92526 ORAL FUNCTION THERAPY: CPT

## 2024-07-17 PROCEDURE — 97129 THER IVNTJ 1ST 15 MIN: CPT

## 2024-07-17 PROCEDURE — 97535 SELF CARE MNGMENT TRAINING: CPT

## 2024-07-17 PROCEDURE — 99233 SBSQ HOSP IP/OBS HIGH 50: CPT | Performed by: PHYSICAL MEDICINE & REHABILITATION

## 2024-07-17 PROCEDURE — 99239 HOSP IP/OBS DSCHRG MGMT >30: CPT | Performed by: NURSE PRACTITIONER

## 2024-07-17 RX ORDER — PANTOPRAZOLE SODIUM 40 MG/1
40 TABLET, DELAYED RELEASE ORAL
Qty: 30 TABLET | Refills: 0 | Status: SHIPPED | OUTPATIENT
Start: 2024-07-18

## 2024-07-17 RX ORDER — CEFADROXIL 500 MG/1
1000 CAPSULE ORAL EVERY 12 HOURS SCHEDULED
Qty: 12 CAPSULE | Refills: 0 | Status: SHIPPED | OUTPATIENT
Start: 2024-07-17 | End: 2024-07-20

## 2024-07-17 RX ORDER — TORSEMIDE 20 MG/1
20 TABLET ORAL DAILY
Qty: 30 TABLET | Refills: 0 | Status: SHIPPED | OUTPATIENT
Start: 2024-07-17 | End: 2024-08-01 | Stop reason: ALTCHOICE

## 2024-07-17 RX ORDER — POTASSIUM CHLORIDE 20 MEQ/1
20 TABLET, EXTENDED RELEASE ORAL DAILY
Qty: 30 TABLET | Refills: 0 | Status: SHIPPED | OUTPATIENT
Start: 2024-07-17 | End: 2024-08-01 | Stop reason: ALTCHOICE

## 2024-07-17 RX ADMIN — CEFADROXIL 1000 MG: 500 CAPSULE ORAL at 08:22

## 2024-07-17 RX ADMIN — POTASSIUM CHLORIDE 20 MEQ: 1500 TABLET, EXTENDED RELEASE ORAL at 08:22

## 2024-07-17 RX ADMIN — METOPROLOL TARTRATE 50 MG: 50 TABLET, FILM COATED ORAL at 08:21

## 2024-07-17 RX ADMIN — TICAGRELOR 90 MG: 90 TABLET ORAL at 08:22

## 2024-07-17 RX ADMIN — TORSEMIDE 20 MG: 20 TABLET ORAL at 08:21

## 2024-07-17 RX ADMIN — HEPARIN SODIUM 5000 UNITS: 5000 INJECTION INTRAVENOUS; SUBCUTANEOUS at 05:39

## 2024-07-17 RX ADMIN — PANTOPRAZOLE SODIUM 40 MG: 40 TABLET, DELAYED RELEASE ORAL at 05:39

## 2024-07-17 RX ADMIN — ASPIRIN 81 MG: 81 TABLET, COATED ORAL at 08:21

## 2024-07-17 RX ADMIN — ACETAMINOPHEN 975 MG: 325 TABLET, FILM COATED ORAL at 08:21

## 2024-07-17 NOTE — ASSESSMENT & PLAN NOTE
Mild, with serum sodium recently 132  Continue FR 2000 ml and Demadex 20 mg daily  Monitored volume status, intake and output while admitted.   Encouraged FR for home with outpatient follow up with cardiology

## 2024-07-17 NOTE — ASSESSMENT & PLAN NOTE
Blood pressure stable on review  Continue Lopressor 50 mg q12h  Started on Demadex 20 mg daily postoperatively due to volume overload, continue as his BP is at goal (118/76 this am 7/17/24)

## 2024-07-17 NOTE — PROGRESS NOTES
"   07/17/24 0700   Pain Assessment   Pain Assessment Tool 0-10   Pain Score No Pain   Restrictions/Precautions   Precautions Aphasia;Cardiac/sternal;Fluid restriction   Weight Bearing Restrictions No   ROM Restrictions Yes  (Sternal Precautions)   Lifestyle   Autonomy \"I'm very excited to go home today.\"   Oral Hygiene   Type of Assistance Needed Independent   Physical Assistance Level No physical assistance   Comment Standing at sink   Oral Hygiene CARE Score 6   Shower/Bathe Self   Type of Assistance Needed Independent   Physical Assistance Level No physical assistance   Comment Seated to complete UB/LB. In stance for betty area/buttocks. Able to set up water and basin with soap and adjsut water tempature.   Shower/Bathe Self CARE Score 6   Upper Body Dressing   Type of Assistance Needed Independent   Physical Assistance Level No physical assistance   Comment Seated at EOB   Upper Body Dressing CARE Score 6   Lower Body Dressing   Type of Assistance Needed Independent   Physical Assistance Level No physical assistance   Comment Seated at EOB to thread bilateral LE. In stance to advance over hips.   Lower Body Dressing CARE Score 6   Putting On/Taking Off Footwear   Type of Assistance Needed Independent   Physical Assistance Level No physical assistance   Comment Able to don/doff socks at EOB using figure 4 tech. Able to don slip on shoes. Pt did not want to wear teds and reports not wearing them at home.   Putting On/Taking Off Footwear CARE Score 6   Lying to Sitting on Side of Bed   Type of Assistance Needed Independent   Physical Assistance Level No physical assistance   Comment Elevated HOB. No use of handrails.   Lying to Sitting on Side of Bed CARE Score 6   Sit to Stand   Type of Assistance Needed Independent   Physical Assistance Level No physical assistance   Comment With RW.   Sit to Stand CARE Score 6   Bed-Chair Transfer   Type of Assistance Needed Independent   Physical Assistance Level No physical " assistance   Comment STP with RW.   Chair/Bed-to-Chair Transfer CARE Score 6   Toileting Hygiene   Type of Assistance Needed Independent   Physical Assistance Level No physical assistance   Comment In stance to complete hygioene and CM with RW.   Toileting Hygiene CARE Score 6   Toilet Transfer   Type of Assistance Needed Independent   Physical Assistance Level No physical assistance   Comment SPT with RW and grab bars. Pt has grab bars at home.   Toilet Transfer CARE Score 6   Cognition   Overall Cognitive Status Impaired   Arousal/Participation Alert   Attention Attends with cues to redirect   Orientation Level Oriented X4   Memory Decreased recall of precautions   Following Commands Follows one step commands with increased time or repetition   Additional Activities   Additional Activities Comments Pt participated in UE ROM and endurance activities (within precautions). Before the start of activities pt's HR: 91 and SPO2: 90%. SPO2 went up to 94% after a 30 seconds. Activities included drawing the alphabet with UE in front of him (air alphabet). This activity focused on UE ROM and endurance. Alphabet was completed bilateral and took 40 seconds on each side. Pt then engaged with arm bike for 5 minutes to focus on endurance. After tasks HR: 96, SPO2: 96%.   Activity Tolerance   Activity Tolerance Patient tolerated treatment well   Assessment   Treatment Assessment Pt participated in 60 minute OT session focusing on ADLs and UE ROM/endurance. See more details above. Overall, pt has made progress towards goals dn is d/c today(7/17) at 2 pm. Pt reports feeling ready to go home and having no other concerns. Pt was given in room privileges and has been doing well. Pt will continue with home therapy after d/c. Pt left in recliner with all needs within reach.   Prognosis Good   OT Therapy Minutes   OT Time In 0700   OT Time Out 0800   OT Total Time (minutes) 60   OT Mode of treatment - Individual (minutes) 60   OT Mode of  treatment - Concurrent (minutes) 0   OT Mode of treatment - Group (minutes) 0   OT Mode of treatment - Co-treat (minutes) 0   OT Mode of Treatment - Total time(minutes) 60 minutes   OT Cumulative Minutes 610   Therapy Time missed   Time missed? No

## 2024-07-17 NOTE — DISCHARGE INSTR - AVS FIRST PAGE
DISCHARGE INSTRUCTIONS: [facility] [place]    Bring these instructions with you to your Outpatient Physician appointments so they can order and follow-up any additional lab work or imaging recommended at time of discharge.    If you (or your health care proxy) have any questions or concerns regarding your acute rehabilitation stay including issues with medications, rehabilitation, and follow-up plan, please call:          [facility] in [place] at [phone] or [phone].    Should you develop fevers, chills, new weakness, changes in sensation, difficulty speaking, facial weakness, confusion, shortness of breath, chest pain, or other concerning symptoms please call 911 and/or obtain transportation to nearest ER immediately.    PHYSICIANS to see:  Please see your doctors listed in the follow up providers section of your discharge paperwork, and take the discharge paperwork with you to your appointments.    Home health has been ordered for you:  Your home health agency should reach out to you or your family soon to arrange follow-up.    (If [facility] home health is your provider their phone number is [phone])    If you are unable to get in touch with home health you may contact your  at [phone].     LAB WORK recommended after discharge:  CBC and BMP to monitor hemoglobin, white blood cell count, electrolytes and kidney function at next visit within 1 week     Excessive delay in labs and appropriate follow-up could potentially increase your risk of complications which could be severe and even life-threatening.  It is you or your caregiver's responsibility to ensure these tests are ordered by your outpatient providers and followed up with accordingly.      Call [facility] to locate the closest open outpatient lab center where you can have your lab work drawn.  Contact them at [phone]    Obtain lab  "orders and follow-up management through skilled nursing facility provider and later primary care physician and outpatient specialists (as indicated).     These labs must be interpreted by your outpatient primary care physician (PCP) and/or other outpatient specialists as discussed.  It is your (or your caregiver's) responsibility to ensure these labs are drawn and followed up by the appropriate outpatient providers.  Contact these providers after discharge to review labs and adjust management as indicated and to order any additional labs.  Your acute rehab physicians will not be able to follow-up labs once discharged from Acute Rehab setting      IMAGING, ADDITIONAL FINDINGS and ISSUES to follow-up:  Check under the \"DISCHARGE PROVIDER\" section of these DISCHARGE INSTRUCTIONS for provider contact information and specific issues as well.      Low Sodium: Repeat BMP within 1 week to monitor F/U with PCP   Anemia: Repeat CBC within 1 week to monitor. F/U with PCP    Excessive delay or lack of appropriate follow-up could potentially increase your risk of complications which could be severe and even life-threatening.  It is you or your caregiver's responsibility to ensure these tests are ordered by your outpatient providers and followed up with accordingly.      SKIN CARE INSTRUCTIONS to follow:  Monitor incision(s) for increased redness, swelling, pain/tenderness, discharge/pus and promptly notify your surgeon should these develop.  - Should you develop significant pain, swelling, or drainage obtain transportation to nearest emergency room for immediate evaluation if unable to reach your surgeon promptly   - Should you develop uncontrolled pain, fever, chills, sweats, changes in strength, sensation, or color of this area obtain transportation to nearest emergency room for immediate evaluation.        WEIGHTBEARING/ACTIVITY PRECAUTIONS to follow:  No pushing/pulling/lifting greater than 5-10 lbs until cleared by your " surgeons.   Sternal precautions until cleared by Cardiac Surgery    Driving restrictions:  You are recommended against driving until cleared by an outpatient physician.      **You should NOT operate a motor vehicle while under the influence of an opiate medication, muscle relaxant, or other sedative.  Doing so may lead to an accident resulting in serious injury or death to yourself or others.  You have agreed to avoid driving when under the influence of this medication.      Work restrictions:  You should NOT return to work (if working) until cleared by an outpatient physician.    You should not operate heavy machinery (if applicable) until cleared by an outpatient physician.      Alcohol restrictions:  You are recommended to not drink alcohol at this time unless cleared by an outpatient physician.  Drinking alcohol in your current functional condition can increase your risk of injury which could be severe.  Drinking alcohol given your current health problems can lead to increased medical complications which could be severe.    Combining alcohol with your current medications can increase your risk of injury which could be severe.      Smoking restrictions:  You are recommended to not smoke nicotine.  Smoking increases your risk of heart attack, stroke, emphysema/COPD, and lung cancer.      MEDICATIONS:  Please see a full list of your medications outlined in the After Visit Summary that is attached to these Discharge Instructions.  Please note changes may have been made to your medications please refer to your discharge paperwork for your current medications and take this list with you to all your doctors appointments for your doctors to review.  Please do not resume a home medication unless the medication reconciliation sheet indicates to do so, please do not assume that a medication that you were given a prescription for is the same as a medication you have at home based on both medications having the same name as  dosages and frequency may have changed.      Unless specifically noted in your medication list provided to you in your discharge paper work do not resume prior vitamins, minerals, or supplements you may have been taking prior to your hospitalization unless instructed by an outpatient physician in the future.  Discuss with your primary care at next visit if applicable.        Blood thinners prescribed to optimize long and short term health and decrease risk of complications but with risks related to bleeding and other complications.     Aspirin instructions  TAKE aspirin daily unless instructed otherwise by a doctor.    Ticagelor (Brilinta) instructions  Take Ticagelor (Brilinta) every 12 hours unless instructed otherwise by a doctor.    This medication will need to be managed by your outpatient physician(s) after discharge.  Follow-up with the appropriate provider as soon as possible to ensure appropriate use.    This is a blood thinner.  It is being recommended for use by you (or your family) to decrease the risk of clotting which can be severely disabling and even life-threatening.  Even when provided as recommended it can cause severe disabling and even life-threatening bleeding.  Too much or too little of this blood thinner further increases your risk of this medication causing serious and even life-threatening complications such as severe bleeding, clots, strokes, and death.  With that said, at this time based on best available evidence and consensus agreement, your physicians recommend you take Ticagelor (Brilinta)  based on your overall risks and benefits in your specific medical situation.      If you (or your family/caregiver) notice black stools, bloody stools, vomit blood, develop new weakness, slurred speech, confusion or have any other concerning symptoms call 911 or obtain transportation to nearest emergency room immediately.     MEDICAL MANAGEMENT AT HOME specific to you:    Cardiac Loop recorder  (Implanted heart rhythm monitor)  or Zio Patch (External heart rhythm monitor) RECOMMENDED:  You are recommended to have an implanted cardiac monitor (LOOP RECORDER) placed after discharge.  Contact cardiology as outlined to set this up as soon as possible after discharge.  This monitor will be looking for abnormal heart rhythms over time that would put you at higher risk of stroke which could change the type of blood thinner you take.      Coronary artery disease management:  Follow-up with cardiology and primary care physician at next visit.  Continue to take blood thinners and other medications as prescribed.  Be sure to maintain good blood pressure and blood sugar control to decrease your risk of heart attacks, heart failure, and other medical complications.  Monitor skin daily for increased breakdown, redness, and/or drainage and notify your physician as soon as possible if you notice this.     Hyponatremia management (Low blood sodium level):  You are recommended to maintain a 2000 ml fluid restriction per day.  1 cup (8 oz) is roughly equal to 235 ml.   5 (8 oz) cups is equal to close to 1200 ml of fluid.  Your outside physicians will need to check you blood sodium levels and adjust management as needed after you discharge.    Please follow-up with them promptly to ensure optimal management.    If you develop increased swelling, lightheadedness, dizziness contact your physicians as soon as possible.  If you develop confusion, weakness, difficulty staying awake, or other significant symptoms obtain transportation to nearest emergency room.      Please note a summary of your hospital stay with relevant information for your doctors will try to be sent to them.  Please confirm with your doctors at your follow up visits that they have received this summary and have them contact [facility] Medical Records if they have not received them along with any other medical records they may require.     [facility]  [place] Phone Number:  [phone]

## 2024-07-17 NOTE — NURSING NOTE
AVS gone over with both patient and patients wife.  Wound care education provided.  All questions were answered.  Belongings were packed.  Pt left via wheelchair with PCA.

## 2024-07-17 NOTE — DISCHARGE SUMMARY
Discharge Summary   Shane Chau . 78 y.o. male MRN: [id number]  Unit/Bed#: -01 Encounter: [id number]  Admission Date: 7/9/2024     Discharge Date: 7/17/24    Discharging Provider: Jodie TREVIZO    PCP:  [phone]      HPI: Refer to previous hospitalization for complete record    Summary of [facility] Course: 78-year-old male with a past medical history of coronary artery disease status post three-vessel CABG on June 20, 2024.  He also has a history of hypertension, bilateral carotid artery stenosis, TIA with evidence of prior right basal ganglial and bilateral cerebellar lacunar infarcts, aortic aneurysm who had a postop course following his CABG complicated by ileus and acute kidney injury.  He also had acute hypoxic respiratory failure.  He developed dysarthria and left-sided weakness on July 3 and a stroke alert/rapid response was called.  CT of the head was done and showed no acute intracranial findings however did show evidence of chronic lacunar infarcts.  MRI of the brain was completed and showed an acute to subacute punctate infarcts involving the right caudate, anterior centrum  semiovale, and lateral right frontal cortex.  Neurology was not certain that the stroke was related to the procedure as it was 10 days postop.  Patient was switched to aspirin and Brilinta from aspirin and Plavix per neurology.  They also recommended an outpatient Zio patch.  The patient was admitted to acute rehab and did well with his therapy.  He then progressed to be able to be discharged home with home health services.    Problem List/Comorbidities:      1. Ambulatory dysfunction  -     Inpatient consult to Physical Medicine Rehab; Standing  -     Inpatient consult to Physical Medicine Rehab  2. S/P CABG x 3  Assessment & Plan:  6/20/2024  Sternal incision with inferior pole with full thickness dehiscence   - Wound care following   - Local care   - Wife education on wound care   - Plan to discharge with home  "nursing  Abx as per cardiac surgery  Daily wound care as per wound care.  Monitor and follow closely with Cardiac surgery.  Orders:  -     ticagrelor (BRILINTA) 90 MG; Take 1 tablet (90 mg total) by mouth every 12 (twelve) hours  -     Inpatient consult to Cardiac Surgery; Standing  -     Inpatient consult to Cardiac Surgery  -     INTERNAL: Ambulatory Referral to Home Health; Future  3. Stroke-like symptoms  -     ticagrelor (BRILINTA) 90 MG; Take 1 tablet (90 mg total) by mouth every 12 (twelve) hours  4. Coronary artery disease of native artery of native heart with stable angina pectoris (HCC)  Assessment & Plan:  Hx CAD with PCI/stents; now status post CABG x 3 on 6/20/24  Intraoperative DEDRICK demonstrates preserved systolic function, with LVEF 55%   Currently on p.o. diuretic due to postop volume overload. He is tolerating this well and remains euvolemic. We will discharge him on this medication and allow titration or discontinuation to his cardiology team.   Also on postop course of prophylactic amiodarone  Continue aspirin, statin, beta-blocker  Monitor incisions, sternal precautions  Orders:  -     ticagrelor (BRILINTA) 90 MG; Take 1 tablet (90 mg total) by mouth every 12 (twelve) hours  5. Reaction at surgical site, subsequent encounter  -     Change dressing (specify)  -     INTERNAL: Ambulatory Referral to Home Health; Future  -     cefadroxil (DURICEF) 500 mg capsule; Take 2 capsules (1,000 mg total) by mouth every 12 (twelve) hours for 6 doses     Discharge Physical Examination:  /76   Pulse 91   Temp 97.8 °F (36.6 °C) (Oral)   Resp 18   Ht 5' 5\" (1.651 m)   Wt 71.6 kg (157 lb 12.8 oz)   SpO2 95%   BMI 26.26 kg/m²         Significant Findings, Care, Treatment and Services Provided: Acute comprehensive interdisciplinary inpatient rehabilitation including PT, OT, SLP, RN, CM, SW, dietary, psychology, etc.      Physiatry Additions/Comorbidities:    * CVA (cerebral vascular accident) " ([facility])  Assessment & Plan  - Occurred about 10 days after CABG; unclear relation to procedure   - punctate acute to subacute infarcts involving the right caudate head, anterior centrum semiovale, and lateral right frontal cortex and chronic right corona radiata/basal ganglia and bilateral cerebellar lacunar infarcts.  - Residual impairments on admission to [facility]: L sided weakness, imbalance, incoordination, possibly cog impairments-      Secondary stroke prevention  - OP Ziopatch recommended to rule out atrial fibrillation after discharge  - Antithrombotic: Aspirin and Brilinta  - Statin  - Optimal management of blood pressure and diabetes  -  patient and if applicable caregiver on optimal stroke management  - Fall precautions   - Follow-up with neurology and PCP after d/c   - Completing acute comprehensive interdisciplinary inpatient rehabilitation to include intensive skilled therapies (PT, OT, ST) as outlined with oversight and management by rehabilitation physician as well as inpatient rehab level nursing, case management and weekly interdisciplinary team meetings.   - Discharge home with Home RN  - Will be able to advance to cardiac rehab once cleared by cardiac surgery        Frequent bowel movements  Assessment & Plan  Improving   Pt afebrile without leukocytosis or abdominal pain  No recent bowel meds; did have hx of ileus; is eating ok  Monitor for now  If concerns for infectious diarrhea in future obtain C diff or other studies  Ensure adequate hydration         Cognitive impairment  Assessment & Plan  -Acute comprehensive interdisciplinary inpatient rehabilitation to include intensive skilled therapies as outlined with oversight and management by rehabilitation physician.  Continue inpatient rehab level nursing, case management and weekly interdisciplinary team meetings.   MOCA 7/12 18 out of 30 indicated mild cog impairment but close to mod with greatest deficits in VSP/executive, language, and  delayed recall  -Recommend eval of med mgmt and IADLs   -Monitor neuro-exam, wakefulness, mood, cognition, insight into deficits and safety awareness  - patient (+/- family/caregiver education) and training and compensatory strategies to optimize safety, function, and decrease risk of complications  -Optimize sleep-wake cycle  -Limit sedating medications when possible  -Monitor and ensure optimal management electrolytes, nutrition, and hydration  -Monitor for signs or symptoms of infection, medication intolerances, other systemic etiologies  -Fall precautions with frequent rounding; proactive toileting program, patient should not be unattended in bathroom    -OP follow-up PCP, neuro, and possibly Salma         Oral phase dysphagia  Assessment & Plan  Improving Slp recommended upgrade to [facility]  SLP eval and tx  Aspiration precautions      At risk for venous thromboembolism (VTE)  Assessment & Plan  SCDs, ambulation, and heparin         History of ileus  Assessment & Plan  Improved (temp required NGT and Gen Sx c/s on acute)  Last BM 7/15. Currently with only miralax and suppository ordered PRN  Monitor GI function, intake     S/P CABG x 3  Assessment & Plan  6/20/2024  Sternal incision with inferior pole with full thickness dehiscence         - Wound care following         - Local care         - Wife education on wound care         - Plan to discharge with home nursing  Sternal precautions  Abx as per cardiac surgery  Daily wound care as per wound care.  Monitor and follow closely with Cardiac surgery.        Acute Rehabilitation Center Course: Patient participated in a comprehensive interdisciplinary inpatient rehabilitation program which included involvment of Rehab MD, therapies (PT, OT, and/or SLP), RN, CM, SW, dietary, and psychology services.     Etiologic/Rehabilitation Diagnosis: Impairment of mobility, safety and Activities of Daily Living (ADLs) due to Stroke:  01.1  Left Body Involvement (Right  Brain)    Functional Status Upon Admission to [facility]:  Mobility:  Transfers Min assist  Ambulation/Mobility 40-80 ft min assist      ADLs:  Mod assist toileting, UBB  Max assist LBD, LBB    Functional Status Upon Discharge from [facility]:   PT: Ind bed mobility, Ind transfers, Ind ambulation 168' with RW, Ind 8 stairs, limited by fatigue.  OT: Ind ADLs   SLP: minimal to mild oral and minimal to functional dysphagia today. On reg/thin    Condition at Discharge: good     Rehab Physician Signature:   Ashley de Padua, MD  Physical Medicine and Rehabilitation    Discharge instructions/Information to patient and family:   See after visit summary for information provided to patient and family.      Provisions for Follow-Up Care:  See after visit summary for information related to follow-up care and any pertinent home health orders.      Future Appointments   Date Time Provider Department Center   7/19/2024  2:45 PM KALPANA Amos MD CV SURG [facility]   7/22/2024  8:00 AM AN [facility] VASCULAR 1 [facility]   7/22/2024  9:30 AM [facility]ASCULAR 1 [facility]   9/16/2024  2:30 PM AN [facility] VASCULAR 1 [facility]           Disposition: Home with home nursing. Plan for him to start cardiac rehab once cleared by cardiac surgery.    Planned Readmission: No    Discharge Statement   I spent 30 minutes or more discharging the patient.  I had direct contact with the patient on the day of discharge. Greater than 50% of the total time was spent examining patient, answering all patient questions, arranging and discussing plan of care with patient and care team as well as directly providing post-discharge instructions. Additional time then spent on discharge activities.    Discharge Medications:  See after visit summary for reconciled discharge medications provided to patient and family.

## 2024-07-17 NOTE — ASSESSMENT & PLAN NOTE
Stroke alert called on 7/3/24 due to dysarthria, left sided weakness, left tongue deviation, left facial droop, and left pronator drift. Symptoms have since improved.  MRI brain 7/6/2024 showed: Punctate acute to subacute infarcts involving the right caudate head, anterior centrum semiovale, and lateral right frontal cortex. Chronic right corona radiata/basal ganglia and bilateral cerebellar lacunar infarcts. Mild chronic microangiopathic ischemic changes..   Neurology evaluated, recommendations appreciated  Distribution of the strokes found on MRI is suspicious for an embolic source -> plan for further cardiac work up with Zio patch/ILR as outpatient   If arrhythmia is found on work up recommend switching from DAPT to NOAC and ASA  P2Y12 level checked during this hospitalization was 180 -> switched from Plavix to Brilinta   Brilinta $47/month - pt and wife are agreeable to the cost.  Continue with ASA and statin   He is going home today with home health services and outpatient follow up.

## 2024-07-17 NOTE — ASSESSMENT & PLAN NOTE
Hx CAD with PCI/stents; now status post CABG x 3 on 6/20/24  Intraoperative DEDRICK demonstrates preserved systolic function, with LVEF 55%   Currently on p.o. diuretic due to postop volume overload. He is tolerating this well and remains euvolemic. We will discharge him on this medication and allow titration or discontinuation to his cardiology team.   Also on postop course of prophylactic amiodarone  Continue aspirin, statin, beta-blocker  Monitor incisions, sternal precautions

## 2024-07-17 NOTE — PROGRESS NOTES
"Physical Medicine and Rehabilitation Progress Note  Shane Chau . 78 y.o. male MRN: [id number]  Unit/Bed#: -01 Encounter: [id number]    To Review: 78-year-old male with a past medical history of with a history of coronary artery disease status post three-vessel CABG 6/20/2024, hypertension, bilateral carotid artery stenosis, TIA with evidence of prior right basal ganglia and bilateral cerebellar lacunar infarcts, aortic aneurysm who is postop course complicated by ileus which has been improving, acute kidney injury improving, acute hypoxic respiratory failure improving, who developed dysarthria and left-sided weakness on 7/3 with stroke alert/rapid response called.  Documented vitals at that time were unremarkable.  CT head without acute intracranial findings but did show evidence of the chronic lacunar infarcts in the right basal ganglia and bilateral cerebellar hemispheres with mild microangiopathic changes.  CTA head and neck showed moderate to severe atherosclerotic stenosis of the left vertebral artery.\"  MRI brain showed punctate acute to subacute infarcts involving the right caudate head, anterior centrum semiovale, and lateral right frontal cortex.    Chronic right corona radiata/basal ganglia and bilateral cerebellar lacunar infarcts. Mild chronic microangiopathic ischemic changes.  Neurology was not certain that stroke was related to procedure as it occurred 10 days postop.  Patient was switched to aspirin/Brilinta from aspirin/Plavix per neurology.  They also recommend outpatient Zio patch.  Patient was evaluated by skilled therapies and was found to have significant decline in ADLs and ambulation and appears appropriate for admission to [facility].    Chief Complaint: Ready for discharge    Interval History/Subjective:  No acute events overnight. Last BM was 7/15. Denies any new CP, SOB, fevers ,chills, N/V, abdominal pain. He is eager for discharge. "     ROS:  A 10 point review of systems was negative except for what is noted in the HPI.    Today's Changes:  Cleared for discharge from a rehab standpoint.     Assessment/Plan:    * CVA (cerebral vascular accident) (HCC)  Assessment & Plan  - Occurred about 10 days after CABG; unclear relation to procedure   - punctate acute to subacute infarcts involving the right caudate head, anterior centrum semiovale, and lateral right frontal cortex and chronic right corona radiata/basal ganglia and bilateral cerebellar lacunar infarcts.  - Residual impairments on admission to [facility]: L sided weakness, imbalance, incoordination, possibly cog impairments-     Secondary stroke prevention  - OP Ziopatch recommended to rule out atrial fibrillation after discharge  - Antithrombotic: Aspirin and Brilinta  - Statin  - Optimal management of blood pressure and diabetes  -  patient and if applicable caregiver on optimal stroke management  - Fall precautions   - Follow-up with neurology and PCP after d/c   - Completing acute comprehensive interdisciplinary inpatient rehabilitation to include intensive skilled therapies (PT, OT, ST) as outlined with oversight and management by rehabilitation physician as well as inpatient rehab level nursing, case management and weekly interdisciplinary team meetings.   - Discharge home with Home RN  - Will be able to advance to cardiac rehab once cleared by cardiac surgery      Cognitive impairment  Assessment & Plan  -Acute comprehensive interdisciplinary inpatient rehabilitation to include intensive skilled therapies as outlined with oversight and management by rehabilitation physician.  Continue inpatient rehab level nursing, case management and weekly interdisciplinary team meetings.   MOCA 7/12 18 out of 30 indicated mild cog impairment but close to mod with greatest deficits in VSP/executive, language, and delayed recall  -Recommend eval of med mgmt and IADLs   -Monitor neuro-exam,  wakefulness, mood, cognition, insight into deficits and safety awareness  - patient (+/- family/caregiver education) and training and compensatory strategies to optimize safety, function, and decrease risk of complications  -Optimize sleep-wake cycle  -Limit sedating medications when possible  -Monitor and ensure optimal management electrolytes, nutrition, and hydration  -Monitor for signs or symptoms of infection, medication intolerances, other systemic etiologies  -Fall precautions with frequent rounding; proactive toileting program, patient should not be unattended in bathroom    -OP follow-up PCP, neuro, and possibly Salma       S/P CABG x 3  Assessment & Plan  6/20/2024  Sternal incision with inferior pole with full thickness dehiscence   - Wound care following   - Local care   - Wife education on wound care   - Plan to discharge with home nursing  Abx as per cardiac surgery  Daily wound care as per wound care.  Monitor and follow closely with Cardiac surgery.    Frequent bowel movements-resolved as of 7/16/2024  Assessment & Plan  Improving   Pt afebrile without leukocytosis or abdominal pain  No recent bowel meds; did have hx of ileus; is eating ok  Monitor for now  If concerns for infectious diarrhea in future obtain C diff or other studies  Ensure adequate hydration       Oral phase dysphagia-resolved as of 7/16/2024  Assessment & Plan  Improving Slp recommended upgrade to reg thins  SLP eval and tx  Aspiration precautions     At risk for venous thromboembolism (VTE)-resolved as of 7/16/2024  Assessment & Plan  SCDs, ambulation, and heparin       History of ileus-resolved as of 7/16/2024  Assessment & Plan  Improved (temp required NGT and Gen Sx c/s on acute)  Last BM 7/15. Currently with only miralax and suppository ordered PRN  Monitor GI function, intake     Prediabetes  Assessment & Plan  Nutrition counseling  Outpatient f/u with PCP    Stenosis of left vertebral artery  Assessment & Plan  Moderate to  "severe left vertebral artery atherosclerotic stenosis   - Antithrombotic: aspirin, brilinta  - Statin  - Optimal blood pressure and blood sugar control  - OP neuro     Postoperative anemia due to acute blood loss  Assessment & Plan  Consult(ed) IM and comanagement with their service during [facility] course   Monitor H/H, vitals, signs/symptoms of acute bleeding  Hb stable at 10.6 on 7/10     PAD (peripheral artery disease) ([facility])  Assessment & Plan  \"PAD/bilateral carotid artery stenosis, 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\"  IM consulted and with overall management at their discretion during [facility] course  - Antithrombotic: asp/brilinta  - Statin  - Optimal blood pressure and blood sugar control  - OP Vasc sx       Hyponatremia  Assessment & Plan  IM consulted and with overall management at their discretion during [facility] course  2000 FR and Torsemide per IM   Outpatient BMP with PCP      CKD (chronic kidney disease) stage 2, GFR 60-89 ml/min  Assessment & Plan  Lab Results   Component Value Date    EGFR 58 07/10/2024    EGFR 67 07/09/2024    EGFR 65 07/08/2024    CREATININE 1.18 07/10/2024    CREATININE 1.05 07/09/2024    CREATININE 1.08 07/08/2024   Monitor BUN/Cr intermittently as well as electrolytes   IM consulted to manage  Limit nephrotoxic agents when possible  Optimal BP mgmt       Primary hypertension  Assessment & Plan  Internal medicine consulted and co-management with their service  Monitor vitals with and without activity; monitor for orthostasis  Monitor hemoglobin, electrolytes, kidney function, hydration status   Current meds: Lopressor, Torsemide daily      Coronary artery disease of native artery of native heart with stable angina pectoris (HCC)  Assessment & Plan  S/p 3v CABG 6/20/24 (prior stenting  Antithrombotic: Aspirin, Brilinta  Statin  Optimal blood pressure and blood sugar control  Outpatient f/u with Cards      Abdominal aortic aneurysm without rupture (HCC)  Assessment & Plan  Mgmt per IM "     Volume overload-resolved as of 7/16/2024  Assessment & Plan  Iatrogenic volume overload status post CABG. Preserved LVEF on intra-op DEDRICK  Continues with bilateral LE edema on exam, encourage elevation/compression   Continue with Demadex 20 mg daily   Monitor volumes status closely   Daily weights, intake and output   Fluid and salt restriction   Intermittent BMP      Health Maintenance  #Delirium/Sleep: At risk, Optimize sleep/wake, bowel, bladder, and pain management. Behavioral/environmental interventions with avoidance of chemical/physical restraint.   #Pain: Scheduled Tylenol, Lidoderm patches for his chest wall.    #Bowel: Last BM 7/15 and continent. Suppository and miralax Prn.   #Bladder: Voiding and continent   #Skin/Pressure Injury Prevention: Turn Q2hr in bed, with weight shifts [id number] in wheelchair.  #GI Prophylaxis: Already on PPI  #Code Status: Full Code  #FEN: Regular, Cardiac diet, 2000mL FR.   #Dispo: ADD 7/17 with Home RN. He can start cardiac rehab once cleared by cardiac surgery as an outpatient. Lives with spouse in home with 10 JUJU. He will have supervision/assistance available. He was previously independent.   Outpatient f/u: Cardiac Surgery, Neurology, PCP, Cardiology    Objective:    Functional Update:  PT: Ind bed mobility, Ind transfers, Ind ambulation 168' with RW, Ind 8 stairs, limited by fatigue.  OT: Ind ADLs   SLP: minimal to mild oral and minimal to functional dysphagia today. On reg/thins    Allergies per EMR    Physical Exam:  Temp:  [97.6 °F (36.4 °C)-98 °F (36.7 °C)] 97.8 °F (36.6 °C)  HR:  [80-87] 80  Resp:  [17-18] 18  BP: (126-138)/(67-79) 138/74  Oxygen Therapy  SpO2: 95 %    Gen: No acute distress, Well-nourished, well-appearing.  HEENT: Moist mucus membranes, Normocephalic/Atraumatic  Cardiovascular: Regular rate, rhythm, S1/S2. Distal pulses palpable  Heme/Extr: No edema  Pulmonary: Non-labored breathing. Lungs CTAB  : No blackman  GI: Soft, non-tender, non-distended.  BS+  MSK: ROM is WFL in all extremities. No effusions or deformities. Bulk is symmetric. See below for MMT scores. Ambulating well with RW with adequate clearance, advancement, and keenan.  Integumentary: Skin is warm, dry. Incision site with healing dehiscence with fibrinous and serous discharge. Erythema improved.  Neuro: AAOx3,  Speech is intact. Appropriate to questioning. Tone is normal. Strength is functional with improved L sided weakness.   Psych: Normal mood and affect.       Diagnostic Studies: Reviewed, no new imaging    Laboratory:  Reviewed.   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[facility])    Asymptomatic bilateral carotid artery stenosis    Coronary artery disease of native artery of native heart with stable angina pectoris ([facility])    Hyperlipidemia    Primary hypertension    Transient cerebral ischemic attack    Need for pneumococcal vaccination    Hematuria    Microhematuria    Erectile dysfunction    Leg swelling    Paresthesia    Rib pain    S/P CABG x 3    Localized primary osteoarthritis of lower leg, unspecified laterality    CKD (chronic kidney disease) stage 2, GFR 60-89 ml/min    Stroke-like symptoms    Hyponatremia    CVA (cerebral vascular accident) ([facility])    PAD (peripheral artery disease) ([facility])    Postoperative anemia due to acute blood loss    Stenosis of left vertebral artery    Prediabetes    Reaction at surgical site    Cognitive impairment         Medications  Current Facility-Administered Medications   Medication Dose Route Frequency Provider  Last Rate    acetaminophen  975 mg Oral Q6H While awake SANTHOSH Cunningham      aspirin  81 mg Oral Daily SANTHOSH Cunningham      atorvastatin  80 mg Oral After Dinner SANTHOSH Cunningham      bisacodyl  10 mg Rectal Daily PRN Dandre Stoner MD      cefadroxil  1,000 mg Oral Q12H KRISTI Baker PA-C      heparin (porcine)  5,000 Units Subcutaneous Q8H [facility] SANTHOSH Cunningham      lidocaine  2 patch Topical Daily SANTHOSH Cunningham      melatonin  6 mg Oral HS SANTHOSH Cunningham      metoprolol tartrate  50 mg Oral Q12H [facility] SANTHOSH Cunningham      ondansetron  4 mg Oral Q6H PRN SANTHOSH Cunningham      pantoprazole  40 mg Oral Early Morning SANTHOSH Cunningham      polyethylene glycol  17 g Oral Daily PRN Dandre Stoner MD      potassium chloride  20 mEq Oral BID SANTHOSH Cunningham      ticagrelor  90 mg Oral Q12H [facility] SANTHOSH Cunningham      torsemide  20 mg Oral Daily SANTHOSH Cunningham            ** Please Note: Fluency Direct voice to text software may have been used in the creation of this document. **

## 2024-07-17 NOTE — PROGRESS NOTES
07/17/24 0800   Pain Assessment   Pain Assessment Tool 0-10   Pain Score No Pain   Restrictions/Precautions   Precautions Aphasia;Cardiac/sternal;Fluid restriction   Comprehension   Comprehension (FIM) 4 - Understands basic info/conversation 75-90% of time   Expression   Expression (FIM) 5 - Needs help/cues only RARELY (< 10% of the time)   Social Interaction   Social Interaction (FIM) 5 - Interacts appropriately with others 90% of time   Problem Solving   Problem solving (FIM) 3 - Solves basic problmes 50-74% of time   Memory   Memory (FIM) 3 - Recognizes, recalls/performs 50-74%   Speech/Language/Cognition Assessment   Treatment Assessment Post dysphagia session, pt was seen for skilled ST session targeting cognitive linguistic communication skills focused on medication management. Focus of session was on medication management and review of ADL. SLP began with review, pt noting that he has not reviewed medication in awhile but was able to reference sheet from previous therapy (OT) and noted to feel comfortable with doing. Began with review of medication management by providing pt a mock medication sheet with mock medication and orthographic questions using mock label. Pt was able to complete task w/ 10/10 accuracy needing no verbal cues. SLP then provided 2 options for pill boxed (3x a day and 4x a day pill box) and asked pt to choose. Then SLP provided a new handout with medication written, frequency, dosage and frequency sectioned out to 4 different times in the day (early morning, noon, dinner and bedtime). Pt then was given tangible medication and asked to place in correct slots. Prior to starting tangible medication task, SLP provided tips for pt to use while completing task. SLP provided a towel for pt to use under pill box so pt could put pills on and catch any that spill. SLP also suggested pt flip over completed bottles so he does not forget which ones he already completed. Lastly, SLP also provided a  strategy by keep medication in a basket to also keep in separate area to help pt. Pt voiced understanding but did not voice that he would do or noted any changes. Will mention to wife when she comes in later prior to d/c. Pt was able to complete task w/ 8/10 accuracy. Initially, pt had difficulty with understanding every 6 hours while awake and pt attempting to put in bedtime and needed verbal cues to read handout. Additionally, pt attempted to place medication previously completed again. Overall, based on pt's understanding with his medication, pt was understanding of most of his medications however medications that were specific with hours (Every 6 hours, every 8 hours, every 12 hours) pt noted to continue to ask questions about and noted to have difficulty with and SLP suggested that some medications have a timer with a handout to go with it to assist with wife helping pt to ensure medication is being taken at correct time in correlation with pill box. SLP noted that when wife arrived later this afternoon prior to d/c that she will come in and speak to her and show her handout SLP created. Pt in agreement and noted difficulty comprehending various hours and difficulty with this. At this time, it is recommended, pt have supervision for medication management due to decrease cognitive skills and complexity of medication and need for assistance to ensure medication is correctly being organized appropriately. Pt vocalized agreement during today's session w/ SLPs suggestion. At this time, pt continues to benefit from ongoing skilled SLP services to maximize overall cognitive linguistic skills in attempts to decrease caregiver burden over time.    Eating   Type of Assistance Needed Independent   Physical Assistance Level No physical assistance   Eating CARE Score 6   Swallow Assessment   Swallow Treatment Assessment   Daily Dysphagia Tx Note     Patient Name: Shane Chau Sr.    Today's Date:  7/17/2024      Current Risks for Dysphagia & Aspiration: new neuro event, cognitive deficit, positioning issues, head support (sternal precautions)      Current Symptoms/Concerns: mild difficulty with chewing, decreased oral intake, weight loss, dehydration     Current diet:regular diet and thin liquids      Premorbid diet::regular diet and thin liquids      Positioning: upright in recliner    Items administered:Consistencies Administered: thin liquids and mixed consistency  Materials administered included pancakes with syrup, cherrios with milk, apple juice, banana, ensure    Total amount of meal consumed: 100% of meal and ~473 cc of thins by cup    Oral stage:minimal and mild  Lip closure: functional around utensils and cup and straw  Anterior spillage:none  Mastication: slower but functional overall  Bolus formation: functional  Bolus control: prompts with thins and solids  Transfer: timely with solids and thins date; some TC and coughing noted but pt able to clear quickly  Oral residue: none  Pocketing: none       Pharyngeal stage:minimal to functional   Swallow promptness: minimal delays with solids but prompts with liquids  Hyolaryngeal elevation:  present and functional  Wet voice: none  Throat clear: x1 with cereal during meal and x2 at end of meal  Cough: x1 with pancake  Secondary swallows: none  Audible swallows: with thins throughout meal       Esophageal stage:No overt sxs of aspiration this meal    Summary:     Pt presenting with minimal and mild oral and minimal to functional pharyngeal dysphagia today. Symptoms or concerns included slower and minimal prolonged mastication given solids, mild throat clearing and coughing with solids and thins during and end of meal and audible swallows were noted with thins throughout meal.      Pt seen during lunch meal for dysphagia session with pt being independent in setting up tray and self feeding. Pt conts to deny no difficulty since diet advancement. Pt  continues to voice need for increase time to complete meal and need for mastication. Pt voiced he has been doing swallowing exercises this SLP provided pt last session.    In regards to swallowing, lip closure and overall bolus retrieval is functional around utensils with cup and straw with no anterior spillage noted. Mastication continues to be slower but is overall function. Bolus formation and control continues to be functional and prompt with thins and solids with adequate bolus propulsion and appropriate movement given breakdown. At times, pt is noted to TC and cough suggesting possible decrease in transfer but pt has been quick in clearing with liquid wash and immediate cough reflex. Swallow promptness was noted to have minimal delays with solids due to increase in breakdown time but overall has been prompt with thins as well. Audible swallows have been observed as well with thins during meal but have not changed voicing nor respiratory status.     Traditional oral care completed at end of meal with no oral residual noted.     Recommendations:  Diet: regular diet and thin liquids  Meds: whole with liquid  Strategies:  upright posture, only feed when fully alert, slow rate of feeding, small bites/sips, quiet environment (tv off, limit talking, door closed, etc.), alternating bites and sips, and OOB FOR ALL MEALS -TRADITIONAL CARE AFTER MEALS     DISTANT supervision w/ meals.  Results reviewed with:  patient  Aspiration precautions posted.     F/u ST tx/Plan: Pt will continue to benefit from ongoing home dysphagia tx sessions to monitor least restrictive diet w/o increased oropharyngeal or aspiration sxs as well as monitor ability to carryover swallow strategies independently.   T   Prognosis Good   Prognosis Considerations Age;Co-morbidities;Medical diagnosis;Medical prognosis;Previous level of function;Severity of impairments   SLP Therapy Minutes   SLP Time In 0800   SLP Time Out 0930   SLP Total Time  (minutes) 90   SLP Mode of treatment - Individual (minutes) 90   SLP Mode of treatment - Concurrent (minutes) 0   SLP Mode of treatment - Group (minutes) 0   SLP Mode of treatment - Co-treat (minutes) 0   SLP Mode of Treatment - Total time(minutes) 90 minutes   SLP Cumulative Minutes 345   Therapy Time missed   Time missed? No

## 2024-07-17 NOTE — PROGRESS NOTES
07/17/24 1000   Pain Assessment   Pain Assessment Tool 0-10   Pain Score No Pain   Restrictions/Precautions   Precautions Aphasia;Cardiac/sternal;Fluid restriction   Weight Bearing Restrictions No   ROM Restrictions Yes  (Sternal Precautions)   Cognition   Overall Cognitive Status Impaired   Arousal/Participation Alert;Cooperative   Subjective   Subjective Pt ready for PT tx, no concerns to d/c home   Sit to Stand   Type of Assistance Needed Independent   Comment w/ or w/o RW   Sit to Stand CARE Score 6   Bed-Chair Transfer   Type of Assistance Needed Independent   Comment w/ or w/o RW   Chair/Bed-to-Chair Transfer CARE Score 6   Walk 10 Feet   Type of Assistance Needed Independent   Comment w/ or w/o RW   Walk 10 Feet CARE Score 6   Walk 50 Feet with Two Turns   Type of Assistance Needed Independent   Comment w/ or w/o RW   Walk 50 Feet with Two Turns CARE Score 6   Toilet Transfer   Type of Assistance Needed Independent   Comment x2 during session   Toilet Transfer CARE Score 6   Therapeutic Interventions   Strengthening had pt perform all of HEP this session with pt pacing self as needed. Access Code: [email or web address] URL: [email or web address] Date: 07/17/2024 Prepared by: Shanna Dee Exercises - Sit to Stand - 1 x daily - 7 x weekly - 3 sets - 5-10 reps - Standing March with Counter Support - 1 x daily - 7 x weekly - 3 sets - 10-15 reps - Mini Squat with Counter Support - 1 x daily - 7 x weekly - 3 sets - 10-15 reps - Standing Hip Extension with Unilateral Counter Support - 1 x daily - 7 x weekly - 3 sets - 10 reps - Standing Hip Abduction with Unilateral Counter Support - 1 x daily - 7 x weekly - 3 sets - 10 reps   Equipment Use   NuStep lvl 2, 10 mins, LEs only for conditioning   Assessment   Treatment Assessment Session focusing on HEP review in prep for d/c home to cont to improve pt's activity tolerance. At this time pt with good understanding of HEP interventions, educated pt to perform with  chair nearby so pt can sit and rest as needed as well as to pace self and to perform HEP on non cardiac rehab days. At this time pt safe to d/c home with support from spouse, will use RW for longer distances as needed.   Barriers to Discharge None   Plan   Progress Improving as expected   PT Therapy Minutes   PT Time In 1000   PT Time Out 1130   PT Total Time (minutes) 90   PT Mode of treatment - Individual (minutes) 90   PT Mode of treatment - Concurrent (minutes) 0   PT Mode of treatment - Group (minutes) 0   PT Mode of treatment - Co-treat (minutes) 0   PT Mode of Treatment - Total time(minutes) 90 minutes   PT Cumulative Minutes 736   Therapy Time missed   Time missed? No

## 2024-07-17 NOTE — ASSESSMENT & PLAN NOTE
No documented diagnosis of CKD however per chart review, baseline creatinine approximately 0.9-1.2 with GFR 60-90  Noted with postoperative BARBARA with creatinine peak of 2.2 in the setting of iatrogenic volume overload  Initiated on Demadex 20 mg daily and continued on this for now   Encourage BMP monitoring as an outpatient

## 2024-07-18 ENCOUNTER — TELEPHONE (OUTPATIENT)
Dept: NEUROLOGY | Facility: CLINIC | Age: 79
End: 2024-07-18

## 2024-07-18 NOTE — CASE MANAGEMENT
Team dc summary - pt made good progress and returned home w/wife and continued nursing services through [facility]. No dme identified for dc. Spouse present for therapy training and for dc instructions. Spouse provided transport home.

## 2024-07-18 NOTE — PROGRESS NOTES
07/18/24 0947   Hello, [Guardian’s Name / Patient’s Name], this is [Caller Name] from [facility], and our clinical care team wanted to check on you / your child after your recent visit to the hospital. It will only take 3-5 minutes. Is this a good time?   Discharge Call Type/ Specific Diagnosis: ARC Stroke   ARC 5 Day General Follow- up Questions   Do you have questions about your medications?   (Patient missing cholesterol medication. Physician notified)   ARC Stroke Follow-up   ARC Stroke Follow-Up Time Frame 5 Day follow up   ARC 5 Day Stroke General Follow- up Questions   Aspiration Pneumonia No   Other injuries related to stroke No   Were you/ your loved one's discharge instructions clear and understandable Yes   Are you/ your loved one taking all medications as prescribed? Yes   Actions Notified provider  (missing cholesterol medication)   Do you have questions about your medications? Yes - Comment Indication/ Dose or Schedule   Medication Actions Provided patient education   Are you/ your loved ones having any unusual symptoms or problems? No   Follow up appointments   Follow up appointment with PCP Future appointment scheduled   Is there anything preventing you from keeping this appointment? No   Healthy Lifestyle   Are you participating in ongoing therapy? No   Arc discharge phone call follow ups and opportunities   Are there any physicians, nurses, therapists or hospital staff you would like us to recognize for doing a very good job? No   Thank you for taking the time to talk with me about your care and recovery. Do you have any suggestions for us? No   Is there a need for follow up? No   Call Complete   Attempted Number of Calls 1   Discharge phone call complete? Complete

## 2024-07-18 NOTE — TELEPHONE ENCOUNTER
Lucy Melara  Pt was in hospital for CVA but no consult with neurology only PMR. Not sure if pt needs HFU or NP because he has never been here before.   The AVS says:   Schedule an appointment with [facility] (Neurology) in 4 days (7/21/2024)   Please advise

## 2024-07-19 ENCOUNTER — HOME CARE VISIT (OUTPATIENT)
Dept: HOME HEALTH SERVICES | Facility: HOME HEALTHCARE | Age: 79
End: 2024-07-19
Payer: COMMERCIAL

## 2024-07-19 ENCOUNTER — OFFICE VISIT (OUTPATIENT)
Dept: CARDIAC SURGERY | Facility: CLINIC | Age: 79
End: 2024-07-19

## 2024-07-19 VITALS
SYSTOLIC BLOOD PRESSURE: 137 MMHG | HEART RATE: 89 BPM | HEIGHT: 65 IN | DIASTOLIC BLOOD PRESSURE: 79 MMHG | BODY MASS INDEX: 26.81 KG/M2 | TEMPERATURE: 98 F | WEIGHT: 160.9 LBS | OXYGEN SATURATION: 98 %

## 2024-07-19 VITALS
DIASTOLIC BLOOD PRESSURE: 64 MMHG | HEART RATE: 85 BPM | SYSTOLIC BLOOD PRESSURE: 134 MMHG | TEMPERATURE: 96.5 F | OXYGEN SATURATION: 95 % | RESPIRATION RATE: 16 BRPM

## 2024-07-19 DIAGNOSIS — Z95.1 S/P CABG X 3: Primary | ICD-10-CM

## 2024-07-19 DIAGNOSIS — Z95.1 S/P CABG X 3: ICD-10-CM

## 2024-07-19 DIAGNOSIS — L08.9 STERNAL WOUND INFECTION: ICD-10-CM

## 2024-07-19 DIAGNOSIS — S21.101A STERNAL WOUND INFECTION: ICD-10-CM

## 2024-07-19 PROCEDURE — G0299 HHS/HOSPICE OF RN EA 15 MIN: HCPCS

## 2024-07-19 PROCEDURE — 99024 POSTOP FOLLOW-UP VISIT: CPT | Performed by: THORACIC SURGERY (CARDIOTHORACIC VASCULAR SURGERY)

## 2024-07-19 PROCEDURE — 400013 VN SOC

## 2024-07-19 RX ORDER — SULFAMETHOXAZOLE AND TRIMETHOPRIM 800; 160 MG/1; MG/1
1 TABLET ORAL EVERY 12 HOURS SCHEDULED
Qty: 20 TABLET | Refills: 0 | Status: SHIPPED | OUTPATIENT
Start: 2024-07-19 | End: 2024-07-29

## 2024-07-19 NOTE — PHYSICAL THERAPY NOTE
PT ARC DISCHARGE SUMMARY     Shane Chau presented to the [facility] following hospitalization for CVA resulting in significant decline in functional mobility.  Physical therapy plan of care focused on bed mobility, transfer training, gait training, stair navigation, LE strengthening , activities to improve endurance/activity tolerance, incorporation of neuroplasticity principles , stroke education , and family training . Patient made good progress during their stay. Goals were mostly met with exception of 150ft ambulation and full flight stair navigation. Barriers to met goals include: decreased activity tolerance. Patient discharged home with supportive wife. Pt will benefit from cardiac rehabilitation once cleared by cardiologis.

## 2024-07-19 NOTE — CASE COMMUNICATION
RN misplaced wound measurements. TC to patient to see if ok to return to remeasure. Patient agreeable. RN remeasured chest wounds and applied new calcium alginate dressing at 1320 7/19/24.

## 2024-07-19 NOTE — TELEPHONE ENCOUNTER
She is requesting pt's Bactrim to be sent to [facility] pharmacy, not mail order    Medication: bactrim    Dose/Frequency: 800-160 mg/ take 1 tablet every 12 hours for 10 days     Quantity: 20    Pharmacy: [facility]    Office:   [x] PCP/Provider - Dr. Amos  [] Speciality/Provider -     Does the patient have enough for 3 days?   [] Yes   [x] No - Send as HP to POD

## 2024-07-19 NOTE — CASE COMMUNICATION
Medication discrepancies or Major drug interactions: Melatonin 5 mg HS PRN not 6 mg daily at bedtime.   Major Drug-Drug interaction ok to continue ticagrelor and aspirin? Additive antiplatelet effect may occur when aspirin is coadministered with ticagrelor. Additionally, aspirin may diminish the therapeutic effect of ticagrelor.    Abnormal clinical findings: Cloudy serous drainage from distal sternal wound. TC to Dr. Bette Dailey  confirmed ok to shoe. Wife reports HR at rest has been in the 90's normally 60's resting. Now HR 85 /64. Patients wife reports patient is not fully baseline mentally due to anxiety but is improving.   This report is informational only, no response is needed  [facility] has Admitted your patient to Home Health service with the following disciplines: Ordered SN Declined PT OT [facility]  Patient stated goals of care: Improved en ergy and independence.  Potential barriers to goal achievement: forgetful and anxiety  Primary focus of home health care:Cardiac Circulatory  Anticipated visit pattern and next visit date:  1w1 2w2 1w1  Thank you for allowing us to participate in the care of your patient.      Dominic Gonzalez RN

## 2024-07-19 NOTE — PROGRESS NOTES
"Procedure: S/P coronary artery bypass grafting x3, performed on 6/20/24    History: Shane Chau Denise is a 78 y.o. year old male who presents to our office today for routine post-surgical follow up care. Patient had a prolonged hospital course, complicated by abdominal distension/tympany and elevated lactate. Imaging demonstrated an ileus, and general surgery was consulted. Lactate cleared with IVF. As a result, he did not have any nutrition for several days, but did eventually start clear liquids on 7/1, and a regular diet on 7/2. Unfortunately on 7/3, patient was noted to have slurred speech and left facial droop, so a stroke alert was called. Imaging negative, therefore noted to be presumed TIA. His deficits resolved by the next day. MRI on 7/7 demonstrated multiple scattered acute/subacute infarcts. Per neurology, patient was started on Brillinta (previously Plavix) for TIA's. Shane was discharged to [facility] on 7/9.     On 7/10, cardiac surgery was contacted while patient was in [facility] for increased drainage from sternal incision. Drainage described as murky. He did not have fevers or leukocytosis. He was started on Duricef for 5 days, and was discharged from [facility] on 7/17. He is here today with his wife for a wound check.     Upon interview today, patient states he feels OK. He denies fevers/chills. Visiting nurse set up to come to his home several times a week. His dressing was changed today by RN. He admits that he has no appetite, but is trying to eat. He has one more does of Duricef left. Notably, he has lost about 30 lbs since prior to surgery. He is abiding by his lifting restrictions. He has not driven yet.     Vital Signs:   Vitals:    07/19/24 1438 07/19/24 1439   BP: 139/75 137/79   BP Location: Left arm Right arm   Patient Position: Sitting Sitting   Cuff Size: Standard Standard   Pulse: 89    Temp: 98 °F (36.7 °C)    TempSrc: Tympanic    SpO2: 98%    Weight: 73 kg (160 lb 14.4 oz)    Height: 5' 5\" " (1.651 m)        Home Medications:   Prior to Admission medications    Medication Sig Start Date End Date Taking? Authorizing Provider   acetaminophen (TYLENOL) 325 mg tablet Take 2 tablets (650 mg total) by mouth every 6 (six) hours as needed for mild pain, headaches or fever 7/9/24  Yes Sp Hagen PA-C   aspirin (ECOTRIN LOW STRENGTH) 81 mg EC tablet Take 1 tablet (81 mg total) by mouth daily 7/9/24  Yes Sp Hagen PA-C   cefadroxil (DURICEF) 500 mg capsule Take 2 capsules (1,000 mg total) by mouth every 12 (twelve) hours for 6 doses 7/17/24 7/20/24 Yes SANTHOSH Braun   melatonin 3 mg Take 2 tablets (6 mg total) by mouth daily at bedtime 7/9/24  Yes Sp Hagen PA-C   metoprolol tartrate (LOPRESSOR) 50 mg tablet Take 1 tablet (50 mg total) by mouth 2 (two) times a day 12/27/23  Yes Jose Juares MD   pantoprazole (PROTONIX) 40 mg tablet Take 1 tablet (40 mg total) by mouth daily in the early morning 7/18/24  Yes SANTHOSH Braun   potassium chloride (Klor-Con M20) 20 mEq tablet Take 1 tablet (20 mEq total) by mouth daily 7/17/24  Yes SANTHOSH Braun   rosuvastatin (CRESTOR) 40 MG tablet Take 1 tablet (40 mg total) by mouth daily 6/3/24  Yes SANTHOSH Duckworth   sulfamethoxazole-trimethoprim (BACTRIM DS) 800-160 mg per tablet Take 1 tablet by mouth every 12 (twelve) hours for 10 days 7/19/24 7/29/24 Yes Ernestina Jj PA-C   ticagrelor (BRILINTA) 90 MG Take 1 tablet (90 mg total) by mouth every 12 (twelve) hours 7/10/24 8/9/24 Yes Orquidea Robbins PA-C   torsemide (DEMADEX) 20 mg tablet Take 1 tablet (20 mg total) by mouth daily 7/17/24  Yes SANTHOSH Braun       Physical Exam:    HEENT/NECK:  Normocephalic. Atraumatic. No jugular venous distention.    Cardiac: Regular rate and rhythm and No murmurs/rubs/gallops  Pulmonary:  Breath sounds clear bilaterally  Abdomen:  Non-tender, Non-distended, and Normal bowel sounds  Incisions: Sternum stable. Distal portion of  "sternal incision with small (approx 0.5 cm) opening, with cloudy yellow drainage on manipulation. Minimal surrounding erythema.   Extremities: Extremities warm/dry and No edema B/L  Neuro: Alert and oriented X 3.  Sensation is grossly intact.  No focal deficits.  Skin: Warm/Dry, without rashes or lesions.    Lab Results:               Invalid input(s): \"LABGLOM\"      Lab Results   Component Value Date    HGBA1C 5.7 (H) 07/04/2024     Lab Results   Component Value Date    TROPONINI <0.02 12/03/2015       Imaging Studies:   ECHO: 7/3/24  Left Ventricle Left ventricular cavity size is normal. Wall thickness is mildly increased. There is mild concentric hypertrophy. The left ventricular ejection fraction is 70%. Systolic function is hyperdynamic. Wall motion is normal. Diastolic function is mildly abnormal, consistent with grade I (abnormal) relaxation.   Interventricular Septum There is abnormal septal motion consistent with post-operative status.   Right Ventricle Right ventricle is not well visualized. Systolic function is at least mildly reduced.   Left Atrium The atrium is normal in size.   Right Atrium The atrium is normal in size.   Aortic Valve The aortic valve is trileaflet. The leaflets are mildly thickened. The leaflets are mildly calcified. The leaflets exhibit normal mobility. There is no evidence of regurgitation. There is aortic valve sclerosis.   Mitral Valve Mitral valve structure is normal.  There is mild regurgitation. There is no evidence of stenosis.   Tricuspid Valve Tricuspid valve structure is normal. There is trace regurgitation. There is no evidence of stenosis.   Pulmonic Valve Pulmonic valve structure is normal. There is no evidence of regurgitation. There is no evidence of stenosis.   Ascending Aorta The aortic root is upper limit of normal in size. The ascending aorta is upper limit of normal in size. The aortic root is 3.60 cm. The ascending aorta is 3.5 cm.   IVC/SVC The right atrial " pressure is estimated at 3.0 mmHg. The inferior vena cava is normal in size. Respirophasic changes were normal.   Pericardium There is no pericardial effusion. The pericardium is normal in appearance.       I have personally reviewed pertinent reports.      Assessment:   Coronary artery disease. S/P coronary artery bypass grafting;  Postop ileus  Postop TIA   Sternal wound infection   Poor nutrition     Plan:   - Distal sternal wound manipulated by surgeon with cloudy yellowish drainage. Cleaned and redressed.   - Patient referred to wound care  - Added Bactrim DS, BID for 10 days   - Discussed importance of nutrition, notably protein in healing. Patient and wife states they will start protein shakes  - Not cleared yet to drive. Will reassess at next wound check.   - Will recheck wound in 2 weeks  - Patient has ordered for BMP and CBC, which they will obtain next week   - Upcoming appointments with PCP on 7/30 and Cardiology on 8/1     Routine referral to gastroenterology for colonoscopy screening was not indicated, as the patient is over 75 years old    Ernestina Jj PA-C  07/19/24

## 2024-07-22 ENCOUNTER — HOSPITAL ENCOUNTER (OUTPATIENT)
Dept: NON INVASIVE DIAGNOSTICS | Facility: CLINIC | Age: 79
Discharge: HOME | End: 2024-07-22
Payer: COMMERCIAL

## 2024-07-22 ENCOUNTER — OFFICE VISIT (OUTPATIENT)
Dept: WOUND CARE | Facility: HOSPITAL | Age: 79
End: 2024-07-22
Payer: COMMERCIAL

## 2024-07-22 VITALS
SYSTOLIC BLOOD PRESSURE: 117 MMHG | HEIGHT: 65 IN | HEART RATE: 82 BPM | DIASTOLIC BLOOD PRESSURE: 72 MMHG | RESPIRATION RATE: 18 BRPM | BODY MASS INDEX: 26.16 KG/M2 | WEIGHT: 157 LBS | TEMPERATURE: 97.3 F

## 2024-07-22 DIAGNOSIS — I71.40 ABDOMINAL AORTIC ANEURYSM (AAA) WITHOUT RUPTURE, UNSPECIFIED PART (HCC): ICD-10-CM

## 2024-07-22 DIAGNOSIS — I72.3 ANEURYSM OF ILIAC ARTERY (HCC): ICD-10-CM

## 2024-07-22 DIAGNOSIS — I65.23 ASYMPTOMATIC BILATERAL CAROTID ARTERY STENOSIS: ICD-10-CM

## 2024-07-22 DIAGNOSIS — T81.89XA NON-HEALING SURGICAL WOUND, INITIAL ENCOUNTER: Primary | ICD-10-CM

## 2024-07-22 PROCEDURE — G0180 MD CERTIFICATION HHA PATIENT: HCPCS | Performed by: PHYSICAL MEDICINE & REHABILITATION

## 2024-07-22 PROCEDURE — 93922 UPR/L XTREMITY ART 2 LEVELS: CPT | Performed by: SURGERY

## 2024-07-22 PROCEDURE — 93880 EXTRACRANIAL BILAT STUDY: CPT | Performed by: SURGERY

## 2024-07-22 PROCEDURE — 93923 UPR/LXTR ART STDY 3+ LVLS: CPT

## 2024-07-22 PROCEDURE — 93978 VASCULAR STUDY: CPT | Performed by: SURGERY

## 2024-07-22 PROCEDURE — 93880 EXTRACRANIAL BILAT STUDY: CPT

## 2024-07-22 PROCEDURE — 93978 VASCULAR STUDY: CPT

## 2024-07-22 PROCEDURE — 99213 OFFICE O/P EST LOW 20 MIN: CPT | Performed by: SURGERY

## 2024-07-22 PROCEDURE — 99203 OFFICE O/P NEW LOW 30 MIN: CPT | Performed by: SURGERY

## 2024-07-22 RX ORDER — SULFAMETHOXAZOLE AND TRIMETHOPRIM 800; 160 MG/1; MG/1
1 TABLET ORAL EVERY 12 HOURS SCHEDULED
Qty: 20 TABLET | Refills: 0 | OUTPATIENT
Start: 2024-07-22 | End: 2024-08-01

## 2024-07-22 RX ORDER — LIDOCAINE 40 MG/G
CREAM TOPICAL ONCE
Status: COMPLETED | OUTPATIENT
Start: 2024-07-22 | End: 2024-07-22

## 2024-07-22 RX ADMIN — LIDOCAINE: 40 CREAM TOPICAL at 10:51

## 2024-07-22 NOTE — PROGRESS NOTES
Patient ID: Shane Chau Denise is a 78 y.o. male Date of Birth 1945       Chief Complaint   Patient presents with    New Patient Visit     Sternal wound       Allergies:  Patient has no known allergies.    Diagnosis:  1. Non-healing surgical wound, initial encounter  Assessment & Plan:  Will continue to paint with Betadine and cover with dry dressing, short term follow up here  Orders:  -     lidocaine (LMX) 4 % cream  -     Wound cleansing and dressings Surgical Distal Sternum; Future     Diagnosis ICD-10-CM Associated Orders   1. Non-healing surgical wound, initial encounter  T81.89XA lidocaine (LMX) 4 % cream     Wound cleansing and dressings Surgical Distal Sternum             Subjective:   78 year old male s/p CABG with drainage from lowest end of wound      Here for wound follow up  The following portions of the patient's history were reviewed and updated as appropriate:   Patient Active Problem List   Diagnosis    Closed compression fracture of first lumbar vertebra ([facility])    Abdominal aortic aneurysm without rupture ([facility])    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[facility])    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[facility])    Asymptomatic bilateral carotid artery stenosis    Coronary artery disease of native artery of native heart with stable angina pectoris ([facility])    Hyperlipidemia    Primary hypertension    Transient cerebral ischemic attack    Need for pneumococcal vaccination    Hematuria    Microhematuria    Erectile dysfunction    Leg swelling    Paresthesia    Rib pain    S/P CABG x 3    Localized primary osteoarthritis of lower leg, unspecified laterality    CKD (chronic kidney disease) stage 2, GFR 60-89 ml/min    Stroke-like symptoms    Hyponatremia    CVA (cerebral vascular accident) ([facility])    PAD (peripheral artery disease) ([facility])    Postoperative anemia due to acute blood loss    Stenosis of left vertebral artery    Prediabetes    Reaction at surgical site    Cognitive impairment    Surgical wound, non healing      Past Medical History:   Diagnosis Date    AAA (abdominal aortic aneurysm) (HCC)     s/p open repair    BPH (benign prostatic hyperplasia)     CAD (coronary artery disease)     Cholelithiasis     Former tobacco use     History of MI (myocardial infarction)     History of TIA (transient ischemic attack)     Plavix in past, now on Brilinta    Hyperlipidemia     Hypertension     Insomnia     Obesity      Past Surgical History:   Procedure Laterality Date    ABDOMINAL AORTIC ANEURYSM REPAIR  06/07/2011    Dr. Yarbrough 6/7/11    APPENDECTOMY      CARDIAC CATHETERIZATION Left 06/03/2024    Procedure: Cardiac Left Heart Cath;  Surgeon: Pk Blanco DO;  Location: [facility];  Service: Cardiology    CARDIAC CATHETERIZATION  06/03/2024    Procedure: Cardiac catheterization;  Surgeon: Pk Blanco DO;  Location: [facility];  Service: Cardiology    CARDIAC CATHETERIZATION N/A 06/03/2024    Procedure: Cardiac Coronary Angiogram;  Surgeon: Pk Blanco DO;  Location: [facility];  Service: Cardiology    CATARACT EXTRACTION Right 11/2018    CHOLECYSTECTOMY LAPAROSCOPIC      COLONOSCOPY  11/2006    CORONARY ANGIOPLASTY      3 STENTS INSERTED    HARVEST VEIN Left 6/20/2024    Procedure: HARVEST VEIN ENDOSCOPIC ([facility]);  Surgeon: KALPANA Amos MD;  Location: [facility];  Service: Cardiac Surgery    IN CORONARY ARTERY BYP W/VEIN & ARTERY GRAFT 3 VEIN N/A 6/20/2024    Procedure: CORONARY ARTERY BYPASS GRAFT (CABG) X3 VESSELS, LIMA - LAD, LEFT LEG EVH/SVG - PDA AND OM1, W/ DEDRICK;  Surgeon: KALPANA Amos MD;  Location: [facility];  Service: Cardiac Surgery    IN LAPS SURG CHOLECYSTECTOMY W/CHOLANGIOGRAPHY N/A 09/08/2016    Procedure: CHOLECYSTECTOMY LAPAROSCOPIC with intra-op cholangiogram ;  Surgeon: Prasanth Verdin MD;  Location: [facility];  Service: General     Social History     Socioeconomic History    Marital status: /Civil Union     Spouse name: None     Number of children: None    Years of education: None    Highest education level: None   Occupational History    Occupation: Retired    Tobacco Use    Smoking status: Former     Current packs/day: 0.00     Average packs/day: 0.5 packs/day for 30.0 years (15.0 ttl pk-yrs)     Types: Cigarettes     Start date: 3/10/1956     Quit date: 3/10/1986     Years since quittin.3    Smokeless tobacco: Never    Tobacco comments:     Quit  about 25 years x1ppd   Vaping Use    Vaping status: Never Used   Substance and Sexual Activity    Alcohol use: Not Currently     Comment: 4 oz glass of wine every other day w/ his dinner    Drug use: No    Sexual activity: Not Currently     Partners: Female   Other Topics Concern    None   Social History Narrative    None     Social Determinants of Health     Financial Resource Strain: Low Risk  (2023)    Overall Financial Resource Strain (CARDIA)     Difficulty of Paying Living Expenses: Not very hard   Food Insecurity: No Food Insecurity (2024)    Hunger Vital Sign     Worried About Running Out of Food in the Last Year: Never true     Ran Out of Food in the Last Year: Never true   Transportation Needs: No Transportation Needs (2024)    PRAPARE - Transportation     Lack of Transportation (Medical): No     Lack of Transportation (Non-Medical): No   Physical Activity: Not on file   Stress: Not on file   Social Connections: Unknown (2024)    Received from [employer]    Social Emitless     How often do you feel lonely or isolated from those around you? (Adult - for ages 18 years and over): Not on file   Intimate Partner Violence: Not on file   Housing Stability: Low Risk  (2024)    Housing Stability Vital Sign     Unable to Pay for Housing in the Last Year: No     Number of Times Moved in the Last Year: 0     Homeless in the Last Year: No        Current Outpatient Medications:     acetaminophen (TYLENOL) 325 mg tablet, Take 2 tablets (650 mg total) by mouth  "every 6 (six) hours as needed for mild pain, headaches or fever, Disp: , Rfl:     aspirin (ECOTRIN LOW STRENGTH) 81 mg EC tablet, Take 1 tablet (81 mg total) by mouth daily, Disp: , Rfl:     melatonin 3 mg, Take 2 tablets (6 mg total) by mouth daily at bedtime, Disp: , Rfl:     metoprolol tartrate (LOPRESSOR) 50 mg tablet, Take 1 tablet (50 mg total) by mouth 2 (two) times a day, Disp: 180 tablet, Rfl: 3    pantoprazole (PROTONIX) 40 mg tablet, Take 1 tablet (40 mg total) by mouth daily in the early morning, Disp: 30 tablet, Rfl: 0    potassium chloride (Klor-Con M20) 20 mEq tablet, Take 1 tablet (20 mEq total) by mouth daily, Disp: 30 tablet, Rfl: 0    rosuvastatin (CRESTOR) 40 MG tablet, Take 1 tablet (40 mg total) by mouth daily, Disp: 90 tablet, Rfl: 3    sulfamethoxazole-trimethoprim (BACTRIM DS) 800-160 mg per tablet, Take 1 tablet by mouth every 12 (twelve) hours for 10 days, Disp: 20 tablet, Rfl: 0    ticagrelor (BRILINTA) 90 MG, Take 1 tablet (90 mg total) by mouth every 12 (twelve) hours, Disp: 60 tablet, Rfl: 0    torsemide (DEMADEX) 20 mg tablet, Take 1 tablet (20 mg total) by mouth daily, Disp: 30 tablet, Rfl: 0  No current facility-administered medications for this visit.  Family History   Problem Relation Age of Onset    Heart disease Mother     Hypertension Mother         Benign Essential     Stroke Mother         Stroke     Heart disease Father     Coronary artery disease Brother     Diabetes Brother         mellitus     Heart disease Brother     Aortic aneurysm Brother     Coronary artery disease Brother     Sudden death Brother     Coronary artery disease Brother     Sudden death Brother     Sudden death Brother     Coronary artery disease Brother     Heart disease Family       Review of Systems      Objective:  /72   Pulse 82   Temp (!) 97.3 °F (36.3 °C)   Resp 18   Ht 5' 5\" (1.651 m)   Wt 71.2 kg (157 lb)   BMI 26.13 kg/m²     Physical Exam        Wound 07/19/24 Surgical Sternum " "Distal (Active)   Wound Image    07/22/24 1043   Wound Description Yellow 07/22/24 1050   Joan-wound Assessment Intact 07/22/24 1050   Wound Length (cm) 0.2 cm 07/22/24 1050   Wound Width (cm) 0.1 cm 07/22/24 1050   Wound Depth (cm) 1 cm 07/22/24 1050   Wound Surface Area (cm^2) 0.02 cm^2 07/22/24 1050   Wound Volume (cm^3) 0.02 cm^3 07/22/24 1050   Calculated Wound Volume (cm^3) 0.02 cm^3 07/22/24 1050   Change in Wound Size % 96.15 07/22/24 1050   Drainage Amount Scant 07/22/24 1050   Drainage Description Serosanguineous 07/22/24 1050   Non-staged Wound Description Full thickness 07/22/24 1050   Dressing Status Intact 07/22/24 1050       Sternal wound intact except lowest end with small opening and depth, serous type drainage                  Procedures             Wound Instructions:  Orders Placed This Encounter   Procedures    Wound cleansing and dressings Surgical Distal Sternum     Wound location sternum  Change dressing daily (home health to visit 3x/wk)  You may remove the dressing and shower. Do not leave wound open to air, apply new dressing immediately.  Cleanse the wound with mild soap and water, rinse, pat dry.   Apply betadine to the wound with a q tip  Cover with gauze  Secure with tape    You can purchase a small bottle of betadine (iodine) at the pharmacy and a new small box of qtips.      [facility]     Standing Status:   Future     Standing Expiration Date:   7/29/2024         Prasanth Verdin MD      Portions of the record may have been created with voice recognition software. Occasional wrong word or \"sound a like\" substitutions may have occurred due to the inherent limitations of voice recognition software. Read the chart carefully and recognize, using context, where substitutions have occurred.    "

## 2024-07-22 NOTE — PATIENT INSTRUCTIONS
Orders Placed This Encounter   Procedures    Wound cleansing and dressings Surgical Distal Sternum     Wound location sternum  Change dressing daily (home health to visit 3x/wk)  You may remove the dressing and shower. Do not leave wound open to air, apply new dressing immediately.  Cleanse the wound with mild soap and water, rinse, pat dry.   Apply betadine to the wound with a q tip  Cover with gauze  Secure with tape    You can purchase a small bottle of betadine (iodine) at the pharmacy and a new small box of qtips.     BUSHRA ROMERO     Standing Status:   Future     Standing Expiration Date:   7/29/2024

## 2024-07-23 ENCOUNTER — HOME CARE VISIT (OUTPATIENT)
Dept: HOME HEALTH SERVICES | Facility: HOME HEALTHCARE | Age: 79
End: 2024-07-23
Payer: COMMERCIAL

## 2024-07-23 VITALS
BODY MASS INDEX: 26.26 KG/M2 | HEART RATE: 78 BPM | SYSTOLIC BLOOD PRESSURE: 120 MMHG | TEMPERATURE: 98.2 F | OXYGEN SATURATION: 96 % | RESPIRATION RATE: 16 BRPM | DIASTOLIC BLOOD PRESSURE: 70 MMHG | WEIGHT: 157.8 LBS

## 2024-07-23 PROCEDURE — 10330064 SPONGE, GAUZE 8PLY N/S 4X4" (200/PK 20P"

## 2024-07-23 PROCEDURE — 10330064 TAPE, ADHSV TRANSPORE WHT 2 (6RL/BX 10B"

## 2024-07-23 PROCEDURE — 10330064

## 2024-07-23 PROCEDURE — G0300 HHS/HOSPICE OF LPN EA 15 MIN: HCPCS

## 2024-07-23 PROCEDURE — 10330064 DRESSING, CALCIUM ALGINATE AG SHEET 4X4"

## 2024-07-24 ENCOUNTER — HOME CARE VISIT (OUTPATIENT)
Dept: HOME HEALTH SERVICES | Facility: HOME HEALTHCARE | Age: 79
End: 2024-07-24
Payer: COMMERCIAL

## 2024-07-24 PROCEDURE — 10330064 CLEANSER, WND SEA-CLEANS 6OZ  COLPLT

## 2024-07-25 ENCOUNTER — HOME CARE VISIT (OUTPATIENT)
Dept: HOME HEALTH SERVICES | Facility: HOME HEALTHCARE | Age: 79
End: 2024-07-25
Payer: COMMERCIAL

## 2024-07-25 ENCOUNTER — TELEPHONE (OUTPATIENT)
Dept: HOME HEALTH SERVICES | Facility: HOME HEALTHCARE | Age: 79
End: 2024-07-25

## 2024-07-29 ENCOUNTER — OFFICE VISIT (OUTPATIENT)
Dept: WOUND CARE | Facility: HOSPITAL | Age: 79
End: 2024-07-29
Payer: COMMERCIAL

## 2024-07-29 ENCOUNTER — HOME CARE VISIT (OUTPATIENT)
Dept: HOME HEALTH SERVICES | Facility: HOME HEALTHCARE | Age: 79
End: 2024-07-29
Payer: COMMERCIAL

## 2024-07-29 VITALS
DIASTOLIC BLOOD PRESSURE: 73 MMHG | HEART RATE: 99 BPM | TEMPERATURE: 97.3 F | RESPIRATION RATE: 20 BRPM | SYSTOLIC BLOOD PRESSURE: 119 MMHG

## 2024-07-29 DIAGNOSIS — T81.89XA NON-HEALING SURGICAL WOUND, INITIAL ENCOUNTER: Primary | ICD-10-CM

## 2024-07-29 PROCEDURE — 99212 OFFICE O/P EST SF 10 MIN: CPT | Performed by: SURGERY

## 2024-07-29 NOTE — CASE COMMUNICATION
Wound cleansing and dressings       The sternal wound is healed. May wash with mild soap and water. May apply moisturizer. Stop using betadine. Thank you for coming to our center.      Standing Status:  Future     Standing Expiration Date:  7/29/2024     Prasanth Verdin MD

## 2024-07-29 NOTE — PROGRESS NOTES
Patient ID: Shane Chau Denise is a 78 y.o. male Date of Birth 1945       Chief Complaint   Patient presents with    Follow Up Wound Care Visit     Sternal wound appears healed       Allergies:  Patient has no known allergies.    Diagnosis:  1. Non-healing surgical wound, initial encounter  Assessment & Plan:  Wound closed, no dressing needed, see back if needed  Orders:  -     Wound cleansing and dressings; Future     Diagnosis ICD-10-CM Associated Orders   1. Non-healing surgical wound, initial encounter  T81.89XA Wound cleansing and dressings             Subjective:   HPI  Here for wound follow up  The following portions of the patient's history were reviewed and updated as appropriate:   Patient Active Problem List   Diagnosis    Closed compression fracture of first lumbar vertebra ([facility])    Abdominal aortic aneurysm without rupture ([facility])    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[facility])    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[facility])    Asymptomatic bilateral carotid artery stenosis    Coronary artery disease of native artery of native heart with stable angina pectoris ([facility])    Hyperlipidemia    Primary hypertension    Transient cerebral ischemic attack    Need for pneumococcal vaccination    Hematuria    Microhematuria    Erectile dysfunction    Leg swelling    Paresthesia    Rib pain    S/P CABG x 3    Localized primary osteoarthritis of lower leg, unspecified laterality    CKD (chronic kidney disease) stage 2, GFR 60-89 ml/min    Stroke-like symptoms    Hyponatremia    CVA (cerebral vascular accident) ([facility])    PAD (peripheral artery disease) ([facility])    Postoperative anemia due to acute blood loss    Stenosis of left vertebral artery    Prediabetes    Reaction at surgical site    Cognitive impairment    Surgical wound, non healing     Past Medical History:   Diagnosis Date    AAA (abdominal aortic aneurysm) ([facility])     s/p open repair    BPH (benign prostatic hyperplasia)     CAD (coronary artery disease)     Cholelithiasis      Former tobacco use     History of MI (myocardial infarction)     History of TIA (transient ischemic attack)     Plavix in past, now on Brilinta    Hyperlipidemia     Hypertension     Insomnia     Obesity      Past Surgical History:   Procedure Laterality Date    ABDOMINAL AORTIC ANEURYSM REPAIR  06/07/2011    Dr. Yarbrough 6/7/11    APPENDECTOMY      CARDIAC CATHETERIZATION Left 06/03/2024    Procedure: Cardiac Left Heart Cath;  Surgeon: Pk Blanco DO;  Location: [facility];  Service: Cardiology    CARDIAC CATHETERIZATION  06/03/2024    Procedure: Cardiac catheterization;  Surgeon: Pk Blanco DO;  Location: [facility];  Service: Cardiology    CARDIAC CATHETERIZATION N/A 06/03/2024    Procedure: Cardiac Coronary Angiogram;  Surgeon: Pk Blanco DO;  Location: [facility];  Service: Cardiology    CATARACT EXTRACTION Right 11/2018    CHOLECYSTECTOMY LAPAROSCOPIC      COLONOSCOPY  11/2006    CORONARY ANGIOPLASTY      3 STENTS INSERTED    HARVEST VEIN Left 6/20/2024    Procedure: HARVEST VEIN ENDOSCOPIC ([facility]);  Surgeon: KALPANA Amos MD;  Location: [facility];  Service: Cardiac Surgery    MI CORONARY ARTERY BYP W/VEIN & ARTERY GRAFT 3 VEIN N/A 6/20/2024    Procedure: CORONARY ARTERY BYPASS GRAFT (CABG) X3 VESSELS, LIMA - LAD, LEFT LEG [facility]H/SVG - PDA AND OM1, W/ DEDRICK;  Surgeon: KALPANA Amos MD;  Location: [facility];  Service: Cardiac Surgery    MI LAPS SURG CHOLECYSTECTOMY W/CHOLANGIOGRAPHY N/A 09/08/2016    Procedure: CHOLECYSTECTOMY LAPAROSCOPIC with intra-op cholangiogram ;  Surgeon: Prasanth Verdin MD;  Location: [facility];  Service: General     Social History     Socioeconomic History    Marital status: /Civil Union     Spouse name: None    Number of children: None    Years of education: None    Highest education level: None   Occupational History    Occupation: Retired    Tobacco Use    Smoking status: Former     Current packs/day:  0.00     Average packs/day: 0.5 packs/day for 30.0 years (15.0 ttl pk-yrs)     Types: Cigarettes     Start date: 3/10/1956     Quit date: 3/10/1986     Years since quittin.4    Smokeless tobacco: Never    Tobacco comments:     Quit  about 25 years x1ppd   Vaping Use    Vaping status: Never Used   Substance and Sexual Activity    Alcohol use: Not Currently     Comment: 4 oz glass of wine every other day w/ his dinner    Drug use: No    Sexual activity: Not Currently     Partners: Female   Other Topics Concern    None   Social History Narrative    None     Social Determinants of Health     Financial Resource Strain: Low Risk  (2023)    Overall Financial Resource Strain (CARDIA)     Difficulty of Paying Living Expenses: Not very hard   Food Insecurity: No Food Insecurity (2024)    Hunger Vital Sign     Worried About Running Out of Food in the Last Year: Never true     Ran Out of Food in the Last Year: Never true   Transportation Needs: No Transportation Needs (2024)    PRAPARE - Transportation     Lack of Transportation (Medical): No     Lack of Transportation (Non-Medical): No   Physical Activity: Not on file   Stress: Not on file   Social Connections: Unknown (2024)    Received from [facility]    Social [facility]     How often do you feel lonely or isolated from those around you? (Adult - for ages 18 years and over): Not on file   Intimate Partner Violence: Not on file   Housing Stability: Low Risk  (2024)    Housing Stability Vital Sign     Unable to Pay for Housing in the Last Year: No     Number of Times Moved in the Last Year: 0     Homeless in the Last Year: No        Current Outpatient Medications:     acetaminophen (TYLENOL) 325 mg tablet, Take 2 tablets (650 mg total) by mouth every 6 (six) hours as needed for mild pain, headaches or fever, Disp: , Rfl:     aspirin (ECOTRIN LOW STRENGTH) 81 mg EC tablet, Take 1 tablet (81 mg total) by mouth daily, Disp: , Rfl:      melatonin 3 mg, Take 2 tablets (6 mg total) by mouth daily at bedtime, Disp: , Rfl:     metoprolol tartrate (LOPRESSOR) 50 mg tablet, Take 1 tablet (50 mg total) by mouth 2 (two) times a day, Disp: 180 tablet, Rfl: 3    pantoprazole (PROTONIX) 40 mg tablet, Take 1 tablet (40 mg total) by mouth daily in the early morning, Disp: 30 tablet, Rfl: 0    potassium chloride (Klor-Con M20) 20 mEq tablet, Take 1 tablet (20 mEq total) by mouth daily, Disp: 30 tablet, Rfl: 0    rosuvastatin (CRESTOR) 40 MG tablet, Take 1 tablet (40 mg total) by mouth daily, Disp: 90 tablet, Rfl: 3    sulfamethoxazole-trimethoprim (BACTRIM DS) 800-160 mg per tablet, Take 1 tablet by mouth every 12 (twelve) hours for 10 days, Disp: 20 tablet, Rfl: 0    ticagrelor (BRILINTA) 90 MG, Take 1 tablet (90 mg total) by mouth every 12 (twelve) hours, Disp: 60 tablet, Rfl: 0    torsemide (DEMADEX) 20 mg tablet, Take 1 tablet (20 mg total) by mouth daily, Disp: 30 tablet, Rfl: 0  Family History   Problem Relation Age of Onset    Heart disease Mother     Hypertension Mother         Benign Essential     Stroke Mother         Stroke     Heart disease Father     Coronary artery disease Brother     Diabetes Brother         mellitus     Heart disease Brother     Aortic aneurysm Brother     Coronary artery disease Brother     Sudden death Brother     Coronary artery disease Brother     Sudden death Brother     Sudden death Brother     Coronary artery disease Brother     Heart disease Family       Review of Systems      Objective:  /73   Pulse 99   Temp (!) 97.3 °F (36.3 °C)   Resp 20     Physical Exam         Sternal wound closed, no open areas                      Procedures             Wound Instructions:  Orders Placed This Encounter   Procedures    Wound cleansing and dressings     The sternal wound is healed. May wash with mild soap and water. May apply moisturizer. Stop using betadine. Thank you for coming to our center.     Standing Status:    "Future     Standing Expiration Date:   7/29/2024         Prasanth Verdin MD      Portions of the record may have been created with voice recognition software. Occasional wrong word or \"sound a like\" substitutions may have occurred due to the inherent limitations of voice recognition software. Read the chart carefully and recognize, using context, where substitutions have occurred.    "

## 2024-07-30 ENCOUNTER — OFFICE VISIT (OUTPATIENT)
Dept: FAMILY MEDICINE CLINIC | Facility: CLINIC | Age: 79
End: 2024-07-30
Payer: COMMERCIAL

## 2024-07-30 VITALS
HEIGHT: 65 IN | TEMPERATURE: 97.8 F | HEART RATE: 84 BPM | OXYGEN SATURATION: 97 % | SYSTOLIC BLOOD PRESSURE: 122 MMHG | WEIGHT: 157.25 LBS | BODY MASS INDEX: 26.2 KG/M2 | RESPIRATION RATE: 18 BRPM | DIASTOLIC BLOOD PRESSURE: 88 MMHG

## 2024-07-30 DIAGNOSIS — R41.89 COGNITIVE IMPAIRMENT: Primary | ICD-10-CM

## 2024-07-30 DIAGNOSIS — I25.118 CORONARY ARTERY DISEASE OF NATIVE ARTERY OF NATIVE HEART WITH STABLE ANGINA PECTORIS (HCC): ICD-10-CM

## 2024-07-30 DIAGNOSIS — I10 PRIMARY HYPERTENSION: ICD-10-CM

## 2024-07-30 PROCEDURE — 99495 TRANSJ CARE MGMT MOD F2F 14D: CPT | Performed by: FAMILY MEDICINE

## 2024-07-30 PROCEDURE — 1111F DSCHRG MED/CURRENT MED MERGE: CPT | Performed by: FAMILY MEDICINE

## 2024-07-30 NOTE — ASSESSMENT & PLAN NOTE
Coronary artery disease.  Patient with CABG x 3 and appears to be healing as expected.  Incisions look clean.  He will follow-up with cardiothoracic surgeon.  Patient denies any chest pain or shortness of breath at this time.  Patient will be starting cardiac rehabilitation in the near future.

## 2024-07-30 NOTE — ASSESSMENT & PLAN NOTE
Cognitive impairment.  I suspect symptoms are multifactorial given his recent hospital stay with TIA and history of chronic lacunar infarcts.  I explained that it may take more time for patient's memory to improve closer to his baseline.  Patient's wife will call if she notices any significant changes

## 2024-07-30 NOTE — PROGRESS NOTES
FAMILY PRACTICE OFFICE VISIT       NAME: Shane Chau Sr.  AGE: 78 y.o. SEX: male       : 1945        MRN: [id number]    DATE: 2024  TIME: 4:06 PM    Assessment and Plan     Problem List Items Addressed This Visit       Coronary artery disease of native artery of native heart with stable angina pectoris (HCC)     Coronary artery disease.  Patient with CABG x 3 and appears to be healing as expected.  Incisions look clean.  He will follow-up with cardiothoracic surgeon.  Patient denies any chest pain or shortness of breath at this time.  Patient will be starting cardiac rehabilitation in the near future.         Primary hypertension     Hypertension.  The patient's blood pressure is stable at this time and he will continue current regimen of medications         Cognitive impairment - Primary     Cognitive impairment.  I suspect symptoms are multifactorial given his recent hospital stay with TIA and history of chronic lacunar infarcts.  I explained that it may take more time for patient's memory to improve closer to his baseline.  Patient's wife will call if she notices any significant changes          TCM Call       Date and time call was made  2024  4:27 PM    Hospital care reviewed  Records reviewed    Patient was hospitialized at  [facility]    Date of Admission  24    Date of discharge  24    Diagnosis  cva cerebral vascular accident    Disposition  Home    Were the patients medications reviewed and updated  Yes    Current Symptoms  None          TCM Call       Post hospital issues  None    Should patient be enrolled in anticoag monitoring?  No    Scheduled for follow up?  Yes    Patients specialists  Cardiologist; Neurologist    Did you obtain your prescribed medications  Yes    Do you need help managing your prescriptions or medications  No    Is transportation to your appointment needed  No    I have advised the patient to call PCP with any new or worsening  symptoms  lay floyd ma    Living Arrangements  Alone    Are you recieving any outpatient services  No    Are you recieving home care services  No    Are you using any community resources  No    Current waiver services  No    Have you fallen in the last 12 months  No    Interperter language line needed  No    Counseling topics  Importance of RX compliance    Comments  schedule for 7/30/2024 dr soliz  3.20am        /20: Elective admission for CABGx3 (LIMA to LAD, SVG to RPDA, SVG to OM1, LLE EVH). No significant postoperative bleeding, Plavix (pre-op med) to start tomorrow). Transferred to ICU without inotropic or pressor support. Wean towards extubation. EKG w/ NSR & stable T wave inversions in III/aVR.   7/3: Stoke alert at 6 am due to slurred speech and possible left facial droop.  CTA/CT head negative for acute findings, most likely TIA. Stroke pathway. Speech evaluation cleared for level 3 thin lo fiber/lo residue no carbonation for his previous ileus/gen surgery recommendation diet. Labs stable.    Neuro intact. Na improving to 131. Cardiology to assess if Ziopatch warranted on discharge as suggested by neuro. Awaiting rehab placement pending auth. PM: Cardiology recommending Zio patch after  discharge.         Chief Complaint     Chief Complaint   Patient presents with    Transition of Care Management       History of Present Illness     I reviewed patient's discharge summary from his latest hospitalization after having a CABG x 3.  Patient is accompanied by his wife.  Since his discharge patient has been struggling with generalized fatigue as well as memory lapses.  Patient did have a stroke alert during hospital stay for which neurologic deficits of slurred speech and left-sided weakness resolved spontaneously.  Patient imaging showed chronic lacunar infarcts previously.  Patient's wife states he still gets forgetful and often asks similar questions that were previously answered by his wife.   Patient's appetite is slowly improving.        Review of Systems   Review of Systems   Constitutional:  Positive for appetite change and fatigue.   HENT: Negative.     Eyes: Negative.    Respiratory: Negative.     Cardiovascular: Negative.    Gastrointestinal: Negative.    Genitourinary: Negative.    Musculoskeletal: Negative.    Skin: Negative.    Neurological:         As per HPI   Psychiatric/Behavioral: Negative.         Active Problem List     Patient Active Problem List   Diagnosis    Closed compression fracture of first lumbar vertebra ([facility])    Abdominal aortic aneurysm without rupture ([facility])    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[facility])    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[facility])    Asymptomatic bilateral carotid artery stenosis    Coronary artery disease of native artery of native heart with stable angina pectoris ([facility])    Hyperlipidemia    Primary hypertension    Transient cerebral ischemic attack    Need for pneumococcal vaccination    Hematuria    Microhematuria    Erectile dysfunction    Leg swelling    Paresthesia    Rib pain    S/P CABG x 3    Localized primary osteoarthritis of lower leg, unspecified laterality    CKD (chronic kidney disease) stage 2, GFR 60-89 ml/min    Stroke-like symptoms    Hyponatremia    CVA (cerebral vascular accident) ([facility])    PAD (peripheral artery disease) ([facility])    Postoperative anemia due to acute blood loss    Stenosis of left vertebral artery    Prediabetes    Reaction at surgical site    Cognitive impairment    Surgical wound, non healing       Past Medical History:  Past Medical History:   Diagnosis Date    AAA (abdominal aortic aneurysm) ([facility])     s/p open repair    BPH (benign prostatic hyperplasia)     CAD (coronary artery disease)     Cholelithiasis     Former tobacco use     History of MI (myocardial infarction)     History of TIA (transient ischemic attack)     Plavix in past, now on Brilinta    Hyperlipidemia     Hypertension     Insomnia     Obesity        Past Surgical  History:  Past Surgical History:   Procedure Laterality Date    ABDOMINAL AORTIC ANEURYSM REPAIR  06/07/2011    Dr. Yarbrough 6/7/11    APPENDECTOMY      CARDIAC CATHETERIZATION Left 06/03/2024    Procedure: Cardiac Left Heart Cath;  Surgeon: Pk Blanco DO;  Location: [facility];  Service: Cardiology    CARDIAC CATHETERIZATION  06/03/2024    Procedure: Cardiac catheterization;  Surgeon: Pk Blanco DO;  [facility]: [facility];  Service: Cardiology    CARDIAC CATHETERIZATION N/A 06/03/2024    Procedure: Cardiac Coronary Angiogram;  Surgeon: Pk Blanco DO;  [facility]: [facility];  Service: Cardiology    CATARACT EXTRACTION Right 11/2018    CHOLECYSTECTOMY LAPAROSCOPIC      COLONOSCOPY  11/2006    CORONARY ANGIOPLASTY      3 STENTS INSERTED    HARVEST VEIN Left 6/20/2024    Procedure: HARVEST VEIN ENDOSCOPIC (EVH);  Surgeon: KALPANA Amos MD;  Location: [facility];  Service: Cardiac Surgery    [place] CORONARY ARTERY BYP W/VEIN & ARTERY GRAFT 3 VEIN N/A 6/20/2024    Procedure: CORONARY ARTERY BYPASS GRAFT (CABG) X3 VESSELS, LIMA - LAD, LEFT LEG EVH/SVG - PDA AND OM1, W/ DEDRICK;  Surgeon: KALPANA Amos MD;  Location: [facility];  Service: Cardiac Surgery    NY LAPS SURG CHOLECYSTECTOMY W/CHOLANGIOGRAPHY N/A 09/08/2016    Procedure: CHOLECYSTECTOMY LAPAROSCOPIC with intra-op cholangiogram ;  Surgeon: Prasanth Verdin MD;  Location: [facility];  Service: General       Family History:  Family History   Problem Relation Age of Onset    Heart disease Mother     Hypertension Mother         Benign Essential     Stroke Mother         Stroke     Heart disease Father     Coronary artery disease Brother     Diabetes Brother         mellitus     Heart disease Brother     Aortic aneurysm Brother     Coronary artery disease Brother     Sudden death Brother     Coronary artery disease Brother     Sudden death Brother     Sudden death Brother     Coronary artery disease  Brother     Heart disease Family        Social History:  Social History     Socioeconomic History    Marital status: /Civil Union     Spouse name: Not on file    Number of children: Not on file    Years of education: Not on file    Highest education level: Not on file   Occupational History    Occupation: Retired    Tobacco Use    Smoking status: Former     Current packs/day: 0.00     Average packs/day: 0.5 packs/day for 30.0 years (15.0 ttl pk-yrs)     Types: Cigarettes     Start date: 3/10/1956     Quit date: 3/10/1986     Years since quittin.4    Smokeless tobacco: Never    Tobacco comments:     Quit  about 25 years x1ppd   Vaping Use    Vaping status: Never Used   Substance and Sexual Activity    Alcohol use: Not Currently     Comment: 4 oz glass of wine every other day w/ his dinner    Drug use: No    Sexual activity: Not Currently     Partners: Female   Other Topics Concern    Not on file   Social History Narrative    Not on file     Social Determinants of Health     Financial Resource Strain: Low Risk  (2023)    Overall Financial Resource Strain (CARDIA)     Difficulty of Paying Living Expenses: Not very hard   Food Insecurity: No Food Insecurity (2024)    Hunger Vital Sign     Worried About Running Out of Food in the Last Year: Never true     Ran Out of Food in the Last Year: Never true   Transportation Needs: No Transportation Needs (2024)    PRAPARE - Transportation     Lack of Transportation (Medical): No     Lack of Transportation (Non-Medical): No   Physical Activity: Not on file   Stress: Not on file   Social Connections: Unknown (2024)    Received from [facility]    Social Connections     How often do you feel lonely or isolated from those around you? (Adult - for ages 18 years and over): Not on file   Intimate Partner Violence: Not on file   Housing Stability: Low Risk  (2024)    Housing Stability Vital Sign     Unable to Pay for Housing in the Last Year: No      Number of Times Moved in the Last Year: 0     Homeless in the Last Year: No       Objective     Vitals:    07/30/24 1516   BP: 122/88   Pulse: 84   Resp: 18   Temp: 97.8 °F (36.6 °C)   SpO2: 97%     Wt Readings from Last 3 Encounters:   07/30/24 71.3 kg (157 lb 4 oz)   07/23/24 71.6 kg (157 lb 12.8 oz)   07/22/24 71.2 kg (157 lb)       Physical Exam  Constitutional:       General: He is not in acute distress.     Appearance: Normal appearance. He is not ill-appearing.   HENT:      Head: Normocephalic and atraumatic.   Eyes:      General:         Right eye: No discharge.         Left eye: No discharge.      Extraocular Movements: Extraocular movements intact.      Conjunctiva/sclera: Conjunctivae normal.      Pupils: Pupils are equal, round, and reactive to light.   Neck:      Vascular: No carotid bruit.   Cardiovascular:      Rate and Rhythm: Normal rate and regular rhythm.      Heart sounds: Normal heart sounds. No murmur heard.  Pulmonary:      Effort: Pulmonary effort is normal.      Breath sounds: Normal breath sounds. No wheezing, rhonchi or rales.   Abdominal:      General: Abdomen is flat. Bowel sounds are normal. There is no distension.      Palpations: Abdomen is soft.      Tenderness: There is no abdominal tenderness. There is no guarding or rebound.   Musculoskeletal:      Right lower leg: No edema.      Left lower leg: No edema.   Lymphadenopathy:      Cervical: No cervical adenopathy.   Skin:     Findings: No rash.      Comments: Incisions along mid chest and laparoscopic incisions of abdomen appear to be healing well with no obvious signs of infection.  No active drainage noted.   Neurological:      General: No focal deficit present.      Mental Status: He is alert and oriented to person, place, and time.      Cranial Nerves: No cranial nerve deficit.      Comments: Patient neurologically intact however was much more quiet than usual with decreased speaking however speech was clear whenever patient  "spoke.   Psychiatric:         Mood and Affect: Mood normal.         Behavior: Behavior normal.         Thought Content: Thought content normal.         Judgment: Judgment normal.         Pertinent Laboratory/Diagnostic Studies:  Lab Results   Component Value Date    GLUCOSE 140 06/20/2024    BUN 21 07/10/2024    CREATININE 1.18 07/10/2024    CALCIUM 7.8 (L) 07/10/2024     12/03/2015    K 4.2 07/10/2024    CO2 23 07/10/2024     07/10/2024     Lab Results   Component Value Date    ALT 15 06/08/2024    AST 18 06/08/2024    ALKPHOS 45 06/08/2024    BILITOT 0.65 12/02/2015       Lab Results   Component Value Date    WBC 9.13 07/10/2024    HGB 10.6 (L) 07/10/2024    HCT 31.9 (L) 07/10/2024    MCV 98 07/10/2024     07/10/2024       No results found for: \"TSH\"    Lab Results   Component Value Date    CHOL 138 08/20/2015     Lab Results   Component Value Date    TRIG 103 07/04/2024     Lab Results   Component Value Date    HDL 12 (L) 07/04/2024     Lab Results   Component Value Date    LDLCALC 10 07/04/2024     Lab Results   Component Value Date    HGBA1C 5.7 (H) 07/04/2024       Results for orders placed or performed during the hospital encounter of 07/09/24   Basic metabolic panel   Result Value Ref Range    Sodium 132 (L) 135 - 147 mmol/L    Potassium 4.2 3.5 - 5.3 mmol/L    Chloride 100 96 - 108 mmol/L    CO2 23 21 - 32 mmol/L    ANION GAP 9 4 - 13 mmol/L    BUN 21 5 - 25 mg/dL    Creatinine 1.18 0.60 - 1.30 mg/dL    Glucose 126 65 - 140 mg/dL    Calcium 7.8 (L) 8.4 - 10.2 mg/dL    eGFR 58 ml/min/1.73sq m   CBC   Result Value Ref Range    WBC 9.13 4.31 - 10.16 Thousand/uL    RBC 3.26 (L) 3.88 - 5.62 Million/uL    Hemoglobin 10.6 (L) 12.0 - 17.0 g/dL    Hematocrit 31.9 (L) 36.5 - 49.3 %    MCV 98 82 - 98 fL    MCH 32.5 26.8 - 34.3 pg    MCHC 33.2 31.4 - 37.4 g/dL    RDW 13.2 11.6 - 15.1 %    Platelets 386 149 - 390 Thousands/uL    MPV 8.3 (L) 8.9 - 12.7 fL   Fingerstick Glucose (POCT)   Result Value Ref " Range    POC Glucose 107 65 - 140 mg/dl   Fingerstick Glucose (POCT)   Result Value Ref Range    POC Glucose 107 65 - 140 mg/dl   Fingerstick Glucose (POCT)   Result Value Ref Range    POC Glucose 121 65 - 140 mg/dl   Fingerstick Glucose (POCT)   Result Value Ref Range    POC Glucose 122 65 - 140 mg/dl   Fingerstick Glucose (POCT)   Result Value Ref Range    POC Glucose 78 65 - 140 mg/dl       No orders of the defined types were placed in this encounter.      ALLERGIES:  No Known Allergies    Current Medications     Current Outpatient Medications   Medication Sig Dispense Refill    acetaminophen (TYLENOL) 325 mg tablet Take 2 tablets (650 mg total) by mouth every 6 (six) hours as needed for mild pain, headaches or fever      aspirin (ECOTRIN LOW STRENGTH) 81 mg EC tablet Take 1 tablet (81 mg total) by mouth daily      melatonin 3 mg Take 2 tablets (6 mg total) by mouth daily at bedtime      metoprolol tartrate (LOPRESSOR) 50 mg tablet Take 1 tablet (50 mg total) by mouth 2 (two) times a day 180 tablet 3    pantoprazole (PROTONIX) 40 mg tablet Take 1 tablet (40 mg total) by mouth daily in the early morning 30 tablet 0    potassium chloride (Klor-Con M20) 20 mEq tablet Take 1 tablet (20 mEq total) by mouth daily 30 tablet 0    rosuvastatin (CRESTOR) 40 MG tablet Take 1 tablet (40 mg total) by mouth daily 90 tablet 3    ticagrelor (BRILINTA) 90 MG Take 1 tablet (90 mg total) by mouth every 12 (twelve) hours 60 tablet 0    torsemide (DEMADEX) 20 mg tablet Take 1 tablet (20 mg total) by mouth daily 30 tablet 0     No current facility-administered medications for this visit.         Health Maintenance     Health Maintenance   Topic Date Due    SLP PLAN OF CARE  Never done    Zoster Vaccine (1 of 2) Never done    Pneumococcal Vaccine: 65+ Years (2 of 2 - PCV) 08/06/2019    COVID-19 Vaccine (4 - 2023-24 season) 09/01/2023    Medicare Annual Wellness Visit (AWV)  06/21/2024    Influenza Vaccine (1) 09/01/2024    Fall Risk   07/30/2025    Depression Screening  07/30/2025    Hepatitis C Screening  Completed    RSV Vaccine Age 60+ Years  Completed    RSV Vaccine age 0-20 Months  Aged Out    HIB Vaccine  Aged Out    IPV Vaccine  Aged Out    Hepatitis A Vaccine  Aged Out    Meningococcal ACWY Vaccine  Aged Out    HPV Vaccine  Aged Out    Colorectal Cancer Screening  Discontinued     Immunization History   Administered Date(s) Administered    COVID-19 MODERNA VACC 0.5 ML IM 02/08/2021, 03/08/2021, 11/06/2021    INFLUENZA 10/15/2016, 11/03/2018, 11/03/2018, 10/24/2020, 10/16/2021, 10/15/2022, 10/07/2023    Influenza Split High Dose Preservative Free IM 11/24/2019    Influenza, high dose seasonal 0.7 mL 08/29/2020    Influenza, seasonal, injectable 09/28/2011, 10/06/2014, 11/05/2017    Pneumococcal Polysaccharide PPV23 08/06/2018    Respiratory Syncytial Virus Vaccine (Recombinant, Adjuvanted) 05/18/2024    Tdap 04/17/2017       Sherwin Mantilla MD

## 2024-07-31 ENCOUNTER — HOME CARE VISIT (OUTPATIENT)
Dept: HOME HEALTH SERVICES | Facility: HOME HEALTHCARE | Age: 79
End: 2024-07-31
Payer: COMMERCIAL

## 2024-07-31 VITALS
OXYGEN SATURATION: 96 % | HEART RATE: 74 BPM | DIASTOLIC BLOOD PRESSURE: 62 MMHG | TEMPERATURE: 97.6 F | SYSTOLIC BLOOD PRESSURE: 108 MMHG | RESPIRATION RATE: 18 BRPM

## 2024-07-31 PROCEDURE — G0299 HHS/HOSPICE OF RN EA 15 MIN: HCPCS
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
## 2024-08-01 ENCOUNTER — HOME CARE VISIT (OUTPATIENT)
Dept: HOME HEALTH SERVICES | Facility: HOME HEALTHCARE | Age: 79
End: 2024-08-01
Payer: COMMERCIAL

## 2024-08-01 ENCOUNTER — OFFICE VISIT (OUTPATIENT)
Dept: CARDIOLOGY CLINIC | Facility: CLINIC | Age: 79
End: 2024-08-01
Payer: COMMERCIAL

## 2024-08-01 ENCOUNTER — OFFICE VISIT (OUTPATIENT)
Dept: CARDIAC SURGERY | Facility: CLINIC | Age: 79
End: 2024-08-01

## 2024-08-01 VITALS
HEART RATE: 85 BPM | HEIGHT: 65 IN | SYSTOLIC BLOOD PRESSURE: 115 MMHG | OXYGEN SATURATION: 96 % | BODY MASS INDEX: 26.12 KG/M2 | WEIGHT: 156.8 LBS | TEMPERATURE: 97.6 F | DIASTOLIC BLOOD PRESSURE: 77 MMHG

## 2024-08-01 VITALS
BODY MASS INDEX: 26.49 KG/M2 | DIASTOLIC BLOOD PRESSURE: 77 MMHG | HEART RATE: 82 BPM | HEIGHT: 65 IN | SYSTOLIC BLOOD PRESSURE: 110 MMHG | OXYGEN SATURATION: 97 % | WEIGHT: 159 LBS

## 2024-08-01 DIAGNOSIS — I65.23 ASYMPTOMATIC BILATERAL CAROTID ARTERY STENOSIS: ICD-10-CM

## 2024-08-01 DIAGNOSIS — L08.9 STERNAL WOUND INFECTION: ICD-10-CM

## 2024-08-01 DIAGNOSIS — I10 PRIMARY HYPERTENSION: ICD-10-CM

## 2024-08-01 DIAGNOSIS — I73.9 PAD (PERIPHERAL ARTERY DISEASE) (HCC): ICD-10-CM

## 2024-08-01 DIAGNOSIS — Z95.1 S/P CABG X 3: Primary | ICD-10-CM

## 2024-08-01 DIAGNOSIS — I25.118 CORONARY ARTERY DISEASE OF NATIVE ARTERY OF NATIVE HEART WITH STABLE ANGINA PECTORIS (HCC): ICD-10-CM

## 2024-08-01 DIAGNOSIS — Z95.1 S/P CABG X 3: ICD-10-CM

## 2024-08-01 DIAGNOSIS — I72.3 ANEURYSM OF ILIAC ARTERY (HCC): ICD-10-CM

## 2024-08-01 DIAGNOSIS — E78.49 OTHER HYPERLIPIDEMIA: ICD-10-CM

## 2024-08-01 DIAGNOSIS — R73.03 PREDIABETES: ICD-10-CM

## 2024-08-01 DIAGNOSIS — D64.9 ANEMIA, UNSPECIFIED TYPE: ICD-10-CM

## 2024-08-01 DIAGNOSIS — N18.2 CKD (CHRONIC KIDNEY DISEASE) STAGE 2, GFR 60-89 ML/MIN: ICD-10-CM

## 2024-08-01 DIAGNOSIS — Z09 HOSPITAL DISCHARGE FOLLOW-UP: Primary | ICD-10-CM

## 2024-08-01 DIAGNOSIS — S21.101A STERNAL WOUND INFECTION: ICD-10-CM

## 2024-08-01 DIAGNOSIS — I63.49 CEREBROVASCULAR ACCIDENT (CVA) DUE TO EMBOLISM OF OTHER CEREBRAL ARTERY (HCC): ICD-10-CM

## 2024-08-01 DIAGNOSIS — I71.40 ABDOMINAL AORTIC ANEURYSM (AAA) WITHOUT RUPTURE, UNSPECIFIED PART (HCC): ICD-10-CM

## 2024-08-01 DIAGNOSIS — I72.4 ANEURYSM OF POPLITEAL ARTERY (HCC): ICD-10-CM

## 2024-08-01 PROCEDURE — 1160F RVW MEDS BY RX/DR IN RCRD: CPT | Performed by: NURSE PRACTITIONER

## 2024-08-01 PROCEDURE — 1159F MED LIST DOCD IN RCRD: CPT | Performed by: NURSE PRACTITIONER

## 2024-08-01 PROCEDURE — 99215 OFFICE O/P EST HI 40 MIN: CPT | Performed by: NURSE PRACTITIONER

## 2024-08-01 PROCEDURE — 99024 POSTOP FOLLOW-UP VISIT: CPT | Performed by: PHYSICIAN ASSISTANT

## 2024-08-01 RX ORDER — METOPROLOL TARTRATE 50 MG/1
25 TABLET, FILM COATED ORAL 2 TIMES DAILY
Start: 2024-08-01

## 2024-08-01 NOTE — PROGRESS NOTES
"   Postop Wound Check     History: Shane Chau Sr. is a 78 y.o. year old male who presents today for wound check. He had a CABG x3 on 6/20/24, and had a prolonged postop course complicated by TIA and ileus. He was discharged to [facility] on 7/9, and was seen by our service on 7/10 for murky drainage from his sternal incision. He was given antibiotics, and discharged from [facility] on 7/17/24.     He was last seen in our office on 7/19/24, and at that time he was referred to wound care, and given a course of Bactrim. He has completed his antibiotic course (previously completed two courses of Duricef), and has seen wound care and been discharged from further follow up. He denies fevers and chills, chest pain, shortness of breath, LE edema. Of note, he has lost another 3-5 lbs since his last visit with us, but he has been drinking one protein shake a day, and having ice cream every other day to help with weight gain. His appetite is very slowly improving. He saw his PCP on Monday, and cardiology this morning - these notes were reviewed prior to his visit today. His wife states that she tries to get him up to walk every hour, but that this is quite difficult.       Vital Signs:   Vitals:    08/01/24 1308 08/01/24 1309   BP: 115/72 115/77   BP Location: Left arm Right arm   Patient Position: Sitting Sitting   Cuff Size: Standard Standard   Pulse: 85    Temp: 97.6 °F (36.4 °C)    TempSrc: Tympanic    SpO2: 96%    Weight: 71.1 kg (156 lb 12.8 oz)    Height: 5' 5\" (1.651 m)        Home Medications:   Prior to Admission medications    Medication Sig Start Date End Date Taking? Authorizing Provider   acetaminophen (TYLENOL) 325 mg tablet Take 2 tablets (650 mg total) by mouth every 6 (six) hours as needed for mild pain, headaches or fever 7/9/24  Yes Sp Hagen PA-C   aspirin (ECOTRIN LOW STRENGTH) 81 mg EC tablet Take 1 tablet (81 mg total) by mouth daily 7/9/24  Yes Sp Hagen PA-C   melatonin 3 mg Take 2 tablets (6 " mg total) by mouth daily at bedtime 7/9/24  Yes Sp Hagen PA-C   metoprolol tartrate (LOPRESSOR) 50 mg tablet Take 0.5 tablets (25 mg total) by mouth 2 (two) times a day 8/1/24  Yes SANTHOSH Dominguez   pantoprazole (PROTONIX) 40 mg tablet Take 1 tablet (40 mg total) by mouth daily in the early morning 7/18/24  Yes SANTHOSH Braun   rosuvastatin (CRESTOR) 40 MG tablet Take 1 tablet (40 mg total) by mouth daily 6/3/24  Yes SANTHOSH Duckworth   ticagrelor (BRILINTA) 90 MG Take 1 tablet (90 mg total) by mouth every 12 (twelve) hours 8/1/24 7/27/25 Yes SANTHOSH Dominguez   metoprolol tartrate (LOPRESSOR) 50 mg tablet Take 1 tablet (50 mg total) by mouth 2 (two) times a day 12/27/23 8/1/24  Jose Juares MD   potassium chloride (Klor-Con M20) 20 mEq tablet Take 1 tablet (20 mEq total) by mouth daily 7/17/24 8/1/24  SANTHOSH Braun   ticagrelor (BRILINTA) 90 MG Take 1 tablet (90 mg total) by mouth every 12 (twelve) hours 7/10/24 8/1/24  Orquidea Robbins PA-C   torsemide (DEMADEX) 20 mg tablet Take 1 tablet (20 mg total) by mouth daily 7/17/24 8/1/24  SANTHOSH Braun     Review of Systems:  Review of Systems - General ROS: positive for  - weight loss. Negative for - fevers/chills.   Psychological ROS: negative  Ophthalmic ROS: negative  ENT ROS: negative  Allergy and Immunology ROS: negative  Hematological and Lymphatic ROS: negative  Endocrine ROS: negative  Respiratory ROS: no cough, shortness of breath, or wheezing  Cardiovascular ROS: no chest pain or dyspnea on exertion  Gastrointestinal ROS: no abdominal pain, change in bowel habits, or black or bloody stools  Genito-Urinary ROS: no dysuria, trouble voiding, or hematuria  Musculoskeletal ROS: negative  Neurological ROS: positive for - dizziness and weakness  Dermatological ROS: positive for drainage from sternotomy wound      Physical Exam:    HEENT/NECK:  Normocephalic. Atraumatic.  No jugular venous distention.    Cardiac: Regular  "rate and rhythm  Pulmonary:  Breath sounds clear bilaterally  Abdomen:  Non-tender, Non-distended, and Normal bowel sounds  Incisions: Sternum is stable. Distal portion of sternal incision with tiny pinpoint opening, no surrounding erythema, induration, swelling, but does have a small amount of serous drainage.   Extremities: Extremities warm/dry  Neuro: Alert and oriented X 3.  Sensation is grossly intact.  No focal deficits.  Skin: Warm/Dry, without rashes or lesions.    Lab Results:               Invalid input(s): \"LABGLOM\"      Lab Results   Component Value Date    HGBA1C 5.7 (H) 07/04/2024     Lab Results   Component Value Date    TROPONINI <0.02 12/03/2015       Assessment:   Coronary artery disease. S/P coronary artery bypass grafting;  Postop ileus  Postop TIA   Sternal wound infection   Poor nutrition     Plan:   - Distal sternal wound still with tiny opening, still with some drainage, though improving.   - Completed 10 days of Bactrim  - Incision cleaned and redressed with dry gauze and tape  - Instructed to increase protein shakes to 2 per day  - Metoprolol dose decreased by cardiology  - Lasix and potassium discontinued by cardiology  - Patient will follow up in one week with Dr. Amos  - Instructed to keep incision clean and dry and to continue clean dry gauze dressings   - Will reassess driving restriction at next visit. He is still having episodes of forgetfulness, and is very fatigued.   - Patient and his wife were advised to call our office if they notice any fevers, chills, increased opening of distal sternum, increased drainage or erythema.     Routine referral to gastroenterology for colonoscopy screening was not indicated, as the patient is over 75 years old    Ernestina Jj PA-C  08/01/24      "
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
## 2024-08-02 ENCOUNTER — HOME CARE VISIT (OUTPATIENT)
Dept: HOME HEALTH SERVICES | Facility: HOME HEALTHCARE | Age: 79
End: 2024-08-02
Payer: COMMERCIAL

## 2024-08-05 ENCOUNTER — HOME CARE VISIT (OUTPATIENT)
Dept: HOME HEALTH SERVICES | Facility: HOME HEALTHCARE | Age: 79
End: 2024-08-05
Payer: COMMERCIAL

## 2024-08-05 VITALS
SYSTOLIC BLOOD PRESSURE: 122 MMHG | TEMPERATURE: 97.6 F | HEART RATE: 80 BPM | OXYGEN SATURATION: 100 % | RESPIRATION RATE: 18 BRPM | DIASTOLIC BLOOD PRESSURE: 82 MMHG

## 2024-08-05 PROCEDURE — G0299 HHS/HOSPICE OF RN EA 15 MIN: HCPCS

## 2024-08-06 ENCOUNTER — APPOINTMENT (OUTPATIENT)
Dept: LAB | Facility: CLINIC | Age: 79
End: 2024-08-06
Payer: COMMERCIAL

## 2024-08-06 DIAGNOSIS — Z95.1 S/P CABG X 3: ICD-10-CM

## 2024-08-06 DIAGNOSIS — D64.9 ANEMIA, UNSPECIFIED TYPE: ICD-10-CM

## 2024-08-06 DIAGNOSIS — E78.49 OTHER HYPERLIPIDEMIA: ICD-10-CM

## 2024-08-06 LAB
ANION GAP SERPL CALCULATED.3IONS-SCNC: 8 MMOL/L (ref 4–13)
BUN SERPL-MCNC: 19 MG/DL (ref 5–25)
CALCIUM SERPL-MCNC: 9.4 MG/DL (ref 8.4–10.2)
CHLORIDE SERPL-SCNC: 103 MMOL/L (ref 96–108)
CO2 SERPL-SCNC: 26 MMOL/L (ref 21–32)
CREAT SERPL-MCNC: 1.32 MG/DL (ref 0.6–1.3)
ERYTHROCYTE [DISTWIDTH] IN BLOOD BY AUTOMATED COUNT: 12.9 % (ref 11.6–15.1)
GFR SERPL CREATININE-BSD FRML MDRD: 51 ML/MIN/1.73SQ M
GLUCOSE P FAST SERPL-MCNC: 107 MG/DL (ref 65–99)
HCT VFR BLD AUTO: 34.8 % (ref 36.5–49.3)
HGB BLD-MCNC: 11.5 G/DL (ref 12–17)
LDLC SERPL DIRECT ASSAY-MCNC: 42 MG/DL (ref 0–100)
MCH RBC QN AUTO: 31.9 PG (ref 26.8–34.3)
MCHC RBC AUTO-ENTMCNC: 33 G/DL (ref 31.4–37.4)
MCV RBC AUTO: 96 FL (ref 82–98)
PLATELET # BLD AUTO: 245 THOUSANDS/UL (ref 149–390)
PMV BLD AUTO: 8.4 FL (ref 8.9–12.7)
POTASSIUM SERPL-SCNC: 3.8 MMOL/L (ref 3.5–5.3)
RBC # BLD AUTO: 3.61 MILLION/UL (ref 3.88–5.62)
SODIUM SERPL-SCNC: 137 MMOL/L (ref 135–147)
WBC # BLD AUTO: 10.7 THOUSAND/UL (ref 4.31–10.16)

## 2024-08-06 PROCEDURE — 36415 COLL VENOUS BLD VENIPUNCTURE: CPT

## 2024-08-06 PROCEDURE — 80048 BASIC METABOLIC PNL TOTAL CA: CPT

## 2024-08-06 PROCEDURE — 83721 ASSAY OF BLOOD LIPOPROTEIN: CPT

## 2024-08-06 PROCEDURE — 85027 COMPLETE CBC AUTOMATED: CPT

## 2024-08-09 ENCOUNTER — TELEPHONE (OUTPATIENT)
Age: 79
End: 2024-08-09

## 2024-08-14 ENCOUNTER — CLINICAL SUPPORT (OUTPATIENT)
Dept: CARDIAC REHAB | Facility: CLINIC | Age: 79
End: 2024-08-14
Payer: COMMERCIAL

## 2024-08-14 DIAGNOSIS — Z95.1 S/P CABG X 3: Primary | ICD-10-CM

## 2024-08-14 NOTE — PROGRESS NOTES
[facility] Cardiopulmonary Rehab  [place]        Dear Dr. Mantilla,    Emotional well-being and depression is addressed in the Cardiac  rehab evaluation. To assess the severity of depression, patients are given the PHQ-9 Depression Questionnaire.      This is to inform you that your patient Shane scored a 16 which is interpreted as 15-19 = Moderately Severe Depression.      Your patient was provided contact information for [facility] and has been encouraged to enroll in [facility].   A repeat PHQ -9 will be administered in 30 days to assess improvement.      Thank you for your support of cardiopulmonary rehab.    Sincerely,    Wendy Lawson MS, CEP, EPC  Cardiopulmonary Rehab

## 2024-08-14 NOTE — PROGRESS NOTES
CARDIAC REHABILITATION   ASSESSMENT AND INDIVIDUALIZED TREATMENT PLAN  INITIAL           Today's date: 2024   # of Exercise Sessions Completed: 1- initial eval  Patient name: Shane Chau Sr.      : 1945  Age: 78 y.o.       MRN: [id number]  Referring Physician: KALPANA Amos MD   Cardiologist: Jose Juares MD  Provider: Rudy  Clinician: Wendy Lawson MS, CEP, EPC        Treatment is tailored to this patient's individual needs.  The ITP was reviewed with the patient and all questions were answered to their satisfaction.  Additional ITP documentation can be found electronically including daily and monthly exercise summaries, daily session notes with ECG summaries, education notes, daily medication reconciliation, and daily physician supervision.      Comments: very low energy, reports not doing much around the house. Has cardiac monitor on right on now, comes off tomorrow. Looking for Afib d/t stroke. Has f/u appt with surgery on 24        Dx: No diagnosis found.    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 The aorta proximal to the graft is aneurysmal, 4.4 cm. Hx stent in         ASSESSMENT    Medical History:   Past Medical History:   Diagnosis Date    AAA (abdominal aortic aneurysm) (HCC)     s/p open repair    BPH (benign prostatic hyperplasia)     CAD (coronary artery disease)     Cholelithiasis     Former tobacco use     History of MI (myocardial infarction)     History of TIA (transient ischemic attack)     Plavix in past, now on Brilinta    Hyperlipidemia     Hypertension     Insomnia     Obesity        Family History:  Family History   Problem Relation Age of Onset    Heart  disease Mother     Hypertension Mother         Benign Essential     Stroke Mother         Stroke     Heart disease Father     Coronary artery disease Brother     Diabetes Brother         mellitus     Heart disease Brother     Aortic aneurysm Brother     Coronary artery disease Brother     Sudden death Brother     Coronary artery disease Brother     Sudden death Brother     Sudden death Brother     Coronary artery disease Brother     Heart disease Family        Allergies:   Patient has no known allergies.    Current Medications:   Current Outpatient Medications   Medication Sig Dispense Refill    acetaminophen (TYLENOL) 325 mg tablet Take 2 tablets (650 mg total) by mouth every 6 (six) hours as needed for mild pain, headaches or fever      aspirin (ECOTRIN LOW STRENGTH) 81 mg EC tablet Take 1 tablet (81 mg total) by mouth daily      melatonin 3 mg Take 2 tablets (6 mg total) by mouth daily at bedtime      metoprolol tartrate (LOPRESSOR) 50 mg tablet Take 0.5 tablets (25 mg total) by mouth 2 (two) times a day      pantoprazole (PROTONIX) 40 mg tablet Take 1 tablet (40 mg total) by mouth daily in the early morning 30 tablet 0    rosuvastatin (CRESTOR) 40 MG tablet Take 1 tablet (40 mg total) by mouth daily 90 tablet 3    ticagrelor (BRILINTA) 90 MG Take 1 tablet (90 mg total) by mouth every 12 (twelve) hours 180 tablet 3     No current facility-administered medications for this visit.       Medication compliance: Yes   Comments: Pt reports to be compliant with medications    Physical Limitations: none    Fall Risk: Low   Comments: Patient uses walking assist device (walker/cane/rollator) and Denies a fall in the past 6 months    Cultural needs: none      CAD Risk Factors:  Cholesterol: Yes  HTN: Yes  DM: No  Obesity: overweight   Inactivity: No- doing some laps around the house      EXERCISE ASSESSMENT:     Initial Fitness Assessment: 6MWT:  Resting:    Resting:  BP: 146/82  HR: 68, Exercise:  BP: 152/72  HR: 95, METs:   1.85, ECG Summary: NSR with occ PACs, and Comments: walked 585 ft, no rest break, used cane      ECG INTERPRETATION:  NSR with occ PACs    Current Functional Status:  Occupation: retired  Recreation/Physical Activity: [facility]  ADL’s:Capable of performing light ADLs only limited by fatigue  [place]: has not resumed driving yet  Home exercise:  walking laps around house      SMART Exercise Goals:   10% improvement in functional capacity based on max METs achieved in initial fitness assessment  improved DASI score by 10%  increased exercise capacity by 40% based on peak METs tolerated in cardiac rehab exercise session  maintain > 150 minutes per week of moderate intensity exercise    Patient Specific EXERCISE GOALS:       Increase energy  Start exercise  Return to [facility]    Functional Capacity Screening Tool:  [facility] Activity Status Index:  4.3 METs      PSYCHOSOCIAL ASSESSMENT:    Date of last Assessment:  8/14/24  Depression screening:  PHQ-9 = 16    Interpretation:  15-19 = Moderately Severe Depression  Anxiety screening:  GUMARO-7 = 6    Interpretation: 5-9 = Mild anxiety    Pt self-report of depression and anxiety   Patient reports they are coping well with good social support and denies depression or anxiety    Self-reported stress level:  1   Stressors:  reports he doesn't have stress  Stress Management Tools: spend time outside, enjoy a hobby, spend time with family, and keep busy around the house    SMART Psychosocial Goals:     improved [facility] QOL < 27, PHQ-9 - reduced severity by one level, Physical Fitness in [facility] Score < 3, Social Support in [facility] Score < 3, Daily Activity in [facility] Score < 3, Social Activities in [facility] Score < 3, Overall Health in [facility] Score < 3, Quality of Life in [facility] Score < 3 , and feel less tired with more energy    Patient Specific PSYCHOCOSOCIAL GOALS:    Return to driving   Have more independence    Quality of Life Screen:  (Higher score indicates  "disease impact on QOL)  [facility] COOP score: 25/45     Social Support:   spouse and friends  Community/Social Activities: camping     Psychosocial Assessment as it relates to rehabilitation:   Patient has been dealing with episodes of forgetfulness and fatigue.   Has not been cleared to drive yet, has f/u with surgery on 8/16/24    NUTRITION ASSESSMENT:    Initial Weight:  161.6  Current Weight:     Height:   Ht Readings from Last 1 Encounters:   08/01/24 5' 5\" (1.651 m)       Rate Your Plate Score: 60/81    Diabetes: N/A  A1c: 5.7    last measured: 7/4/24    Lipid management: Discussed diet and lipid management and Last lipid profile 7/4/24  Chol 43    HDL 12  LDL 10    Current Dietary Habits:  low appetite- has lost a significant amount of weight- was 191 lbs pre-op  Poor nutrition- was encouraged by cardiology to increase to 2 protein drinks a day  Eating ice cream for extra calories  Reports not drinking much water- has juice, and iced tea  Encouraged drinking more water  Wife took sodium away and is monitoring    SMART Nutrition Goals:   Improved Rate Your Plate score  >64, eat 5 or more servings of fruits and vegetables a day, choose lean beef or rarely eat beef, choose healthy desserts and sweets such as sukumar food cake or  fruit, rarely/never eat salty snacks, and choose low sugar desserts and sweets    Patient Specific NUTRITION GOALS:     1. Weight gain goal of 20 lbs   2. Drink more water   3. Increase to 2 protein shakes a day    Drug/Alcohol Use:   No      OTHER CORE COMPONENT ASSESSMENT:    Tobacco Use:     Pt quit in 1986   and has abstained    Anginal Symptoms:  None   NTG use: No prescription    SMART Goals:   consistent, controlled resting BP < 130/80 and medication compliance    Patient Specific CORE COMPONENT GOALS:    Monitor BP  Medication compliance      INDIVIDUALIZED TREATMENT PLAN      EXERCISE GOALS and PLAN      Progress toward Exercise goals:   Reviewed Pt goals and determined " plan of care    Exercise Intervention:    education on home exercise guidelines, home exercise 30+ mins 2 days opposite CR, and Group class: Risk Factors for Heart Disease    The patient was counseled on exercise guidelines to achieve a minimum of 150 mins/wk of moderate intensity (RPE 4-6)   exercise and encouraged to add 1-2 days of exercise on opposite days of cardiac rehab as tolerated.     Current Aerobic Exercise Prescription:      Frequency: 3 days/week   Supplement with home exercise 2+ days/wk as tolerated       Minutes: 20 - 40         METS: 1.5-2.5            HR: RHR +30-40bpm   RPE: 4-6         Modalities: UBE, NuStep, Recumbent bike, and Room walking     Exercise workloads will be progressed gradually as tolerated, within limits of patient's ability, and according to the patient's   response to the exercise program.      Aerobic Exercise Prescription Plan for Progression   Frequency: 3 days/week of cardiac rehab       Supplement with home exercise 2+ days/wk as tolerated    Minutes: 40       >150 mins/wk of moderate intensity exercise   METS: 2-3   HR: RHR +30-40bpm     RPE: 4-6   Modalities: Treadmill, UBE, NuStep, and Recumbent bike    Strength trainin-3 days / week  12-15 repetitions  1-2 sets per modality   Will be added following at least 8 weeks post surgery and 8-10 monitored sessions   Modalities: Arm Curl, Sit to Stands, Upright Rows, and Front Raises    Home Exercise:  walking laps around the house    Exercise Education: benefit of exercise for CAD risk factors, home exercise guidelines, AHA guidelines to achieve >150 mins/wk of moderate exercise, and RPE scale     Readiness to change: Preparation:  (Getting ready to change)       NUTRITION GOALS AND PLAN      Nutritional   Reviewed patient's Rate your Plate. Discussed key elements of heart healthy eating. Reviewed patient goals for dietary modifications and their clinical implications.  Reviewed most recent lipid profile.     Patient's  progress toward Nutrition goals:    Working on eating more to help gain weight      Nutrition Intervention:   group class: Reading Food Labels, group Class: Heart Healthy Eating, increase daily intake of fruits and vegetables, choose lean red meat, never/rarely eat fried foods, choose desserts such as fruit, sukumar food cake, low-fat or fat-free sweets, rarely/never eat salty snacks, choose low sugar desserts and sweets, and add healthy fats and  lean proteins for healthy weight gain    Measurable goals were based Rate Your Plate Dietary Self-Assessment. These are the areas in which the patient could score higher on the assessment.  Goals include recommendations for a heart healthy diet based on American Heart Association.    Nutrition Education:   heart healthy eating principles  maintaining hydration  nutrition for  lipid management  Healthy fats for weight gain    Readiness to change: Preparation:  (Getting ready to change)       PSYCHOSOCIAL GOALS AND PLAN    Psychosocial Assessment as it relates to rehabilitation:   Patient denies issues with his/her family or home life that may affect their rehabilitation efforts.     Patient's progress toward Psychosocial goals:    Reviewed Pt goals and determined plan of care    Psychosocial Intervention:   Class: Stress and Your Health, Class: Relaxation, PHQ-9 >5 will refer to MD, Exercise, Keep a positive mindset, Enjoy family, Return to previous social activity, and Repeat PHQ-9 every 30 days if score >5    Psychosocial Education: benefits of a positive support system and stress management techniques    Information to utilize [employer] was provided as well as contact information for counseling through  Behavioral Health and group psychotherapy groups available.    Readiness to change: Pre-Contemplation:   (Not yet acknowledging that there is a problem behavior that needs to change)      OTHER CORE COMPONENTS GOALS and PLAN    Blood pressure:               Resting:  146/82              Exercise: 152/72              Recovery: 134/78    Blood Pressure will be monitored throughout the program and cardiologist will be notified of elevated trends.    Pt will be encouraged to monitor home BP if advised by cardiologist.    Tobacco Intervention:   Pt quit in 1986 and has abstained since quitting.      Progress toward Core Component goals:   Reviewed Pt goals and determined plan of care    Other Core Components Intervention:   group class: Understanding Heart Disease, group class: Common Heart Medications, medication compliance, avoid processed foods, engage in regular exercise, check labels for sodium content, and monitor home BP    Group and Individual Education:  understanding high blood pressure and it's relationship to CAD and components of blood pressure management    Readiness to change: Preparation:  (Getting ready to change)

## 2024-08-16 ENCOUNTER — OFFICE VISIT (OUTPATIENT)
Dept: CARDIAC SURGERY | Facility: CLINIC | Age: 79
End: 2024-08-16

## 2024-08-16 VITALS
OXYGEN SATURATION: 98 % | TEMPERATURE: 96.7 F | HEIGHT: 65 IN | BODY MASS INDEX: 26.84 KG/M2 | WEIGHT: 161.1 LBS | HEART RATE: 77 BPM | SYSTOLIC BLOOD PRESSURE: 149 MMHG | DIASTOLIC BLOOD PRESSURE: 91 MMHG

## 2024-08-16 DIAGNOSIS — S21.101A STERNAL WOUND INFECTION: ICD-10-CM

## 2024-08-16 DIAGNOSIS — Z95.1 S/P CABG X 3: Primary | ICD-10-CM

## 2024-08-16 DIAGNOSIS — L08.9 STERNAL WOUND INFECTION: ICD-10-CM

## 2024-08-16 PROCEDURE — 99024 POSTOP FOLLOW-UP VISIT: CPT | Performed by: THORACIC SURGERY (CARDIOTHORACIC VASCULAR SURGERY)
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
## 2024-08-20 ENCOUNTER — CLINICAL SUPPORT (OUTPATIENT)
Dept: CARDIAC REHAB | Facility: CLINIC | Age: 79
End: 2024-08-20
Payer: COMMERCIAL

## 2024-08-20 DIAGNOSIS — Z95.1 S/P CABG X 3: Primary | ICD-10-CM

## 2024-08-20 PROCEDURE — 93798 PHYS/QHP OP CAR RHAB W/ECG: CPT

## 2024-08-21 ENCOUNTER — CLINICAL SUPPORT (OUTPATIENT)
Dept: CARDIOLOGY CLINIC | Facility: CLINIC | Age: 79
End: 2024-08-21
Payer: COMMERCIAL

## 2024-08-21 ENCOUNTER — TELEPHONE (OUTPATIENT)
Dept: NEUROLOGY | Facility: CLINIC | Age: 79
End: 2024-08-21

## 2024-08-21 DIAGNOSIS — R29.90 STROKE-LIKE SYMPTOMS: ICD-10-CM

## 2024-08-21 PROCEDURE — 93248 EXT ECG>7D<15D REV&INTERPJ: CPT | Performed by: INTERNAL MEDICINE

## 2024-08-21 NOTE — PROGRESS NOTES
Ambulatory Visit  Name: Shane Chau .      : 1945      MRN: [id number]  Encounter Provider: SANTHOSH Baeza  Encounter Date: 2024   Encounter department: NEUROLOGY [facility]    Assessment & Plan   1. Primary hypertension  2. History of stroke  Assessment & Plan:  Since the hospital, he denies any new stroke like symptoms. Wife and patient both report ongoing forgetfulness and fatigue since the hospital.  He has less interest in the things that he once enjoyed, has not been eating as much as he used to, and is tired all of the time.  We discussed that these symptoms could be related to the strokes, recent CABG, and overall deconditioning but that I would also like to rule out any electrolyte disturbances and anemia but could also be related to post-stroke depression.  PHQ-9 screen completed in office today was 11 which is positive for moderate depression. Recommending discussing with PCP but okay with starting him on a low dose SSRI since depressing can affect his recovery.  Discussed black box warning with both patient and wife. He recently turned in his first zio patch with cardiology.  Recommending loop recorder if nothing is found on zio patch.  He continues to take Brilinta, aspirin, and Crestor daily.  Up until yesterday he was only taking 1/2 tab of Brilinta BID. Wife and patient report that they had misread the discharge paperwork. He started working with outpatient PT recently, which I recommend that he continues.   Additional testing:  Blood work ordered to rule out treatable causes for his symptoms  Education & Recommendations:  For ongoing TIA/stroke prevention continue: Brilinta BID, aspirin daily, crestor daily  Recommend to check blood pressure occasionally away from the doctor's office to make sure that those numbers are typically less than 130/80.  If they are frequently higher than that, we recommend checking it more frequently and following up  with primary care team/cardiologist   Will defer to primary care team for monitoring of cholesterol panel and blood sugar numbers with target LDL cholesterol of less than 70 and hemoglobin A1c less than 7%  Continue all medication for blood pressure control as prescribed by your primary care team/cardiologist  Recommend following a low salt, mediterranean diet   Recommend routine physical exercise as tolerated   Avoid using NSAIDs for headaches or other pain and to use tylenol if needed     3. Fatigue, unspecified type  -     Vitamin B12; Future  -     TSH, 3rd generation with Free T4 reflex; Future  -     Vitamin D 25 hydroxy; Future  -     Vitamin B6; Future  -     Comprehensive metabolic panel; Future  -     CBC and differential; Future  -     escitalopram (LEXAPRO) 5 mg tablet; Take 1 tablet (5 mg total) by mouth daily After 14 days, may increase to 2 tablets (10mg) daily if tolerating well.  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  5. Nutritional anemia, unspecified  -     Vitamin B12; Future  6. Vitamin D deficiency, unspecified  -     Vitamin D 25 hydroxy; Future  7. Abnormal levels of other serum enzymes  -     Vitamin B6; Future  8. Other hyperlipidemia    We will plan for him to return to the office in 4 months to see myself or Dr. León, but would be happy to see him sooner if the need should arise.  If he has any symptoms concerning for TIA or stroke including sudden painless loss of vision or double vision, difficulty speaking or swallowing, vertigo/room spinning that does not quickly resolve, or weakness/numbness/loss of coordination affecting 1 side of the face or body, he should proceed by ambulance to the nearest emergency room immediately.     History of Present Illness     We had the pleasure of evaluating Shane in  "neurological follow-up today. He is a 78 y.o. year-old male who presents today for a hospital follow up after stroke.     History obtained from patient as well as available medical record review.    For Review/Hospital Course:    7/9/24-7/17/24 [facility]: \"postop course following his CABG complicated by ileus and acute kidney injury.  He also had acute hypoxic respiratory failure.  He developed dysarthria and left-sided weakness on July 3 and a stroke alert/rapid response was called.  CT of the head was done and showed no acute intracranial findings however did show evidence of chronic lacunar infarcts.  MRI of the brain was completed and showed an acute to subacute punctate infarcts involving the right caudate, anterior centrum  semiovale, and lateral right frontal cortex.  Neurology was not certain that the stroke was related to the procedure as it was 10 days postop.  Patient was switched to aspirin and Brilinta from aspirin and Plavix per neurology.  They also recommended an outpatient Zio patch.  The patient was admitted to acute rehab and did well with his therapy.  He then progressed to be able to be discharged home with home health services.\"    Interval History:    Since the hospital, he denies any new stroke like symptoms. Wife and patient both report ongoing forgetfulness and fatigue since the hospital.  He has less interest in the things that he once enjoyed, has not been eating as much as he used to, and is tired all of the time.      New stroke symptoms/residual symptoms:    Any new stroke-like symptoms (sudden onset weakness, numbness, facial droop, slurred speech, difficulty speaking, trouble swallowing, persistent vertigo, or sudden double vision or vision loss): No    Residual symptoms include: cognitive changes-forgetfulness and fatigue.    Stroke Etiology:    Likely cardioembolic. Further workup with ZIO/loop. Patient with recent CABG. Other risk factors: hypertension    Stroke risk factors were " evaluated including:     AP/AC therapy: Aspirin 81 mg daily and Brilinta 90 mg twice daily (he was taking half a pill BID until yesterday because they read it wrong on the paperwork)    Statin therapy: Rosuvastatin 40 mg daily    Blood pressure today and as of late:160/81 today in the office. Checks it at home generally sees numbers around 140/80.      Most recent LDL:   Lab Results   Component Value Date    LDLCALC 10 07/04/2024      Most recent hemoglobin A1C:   Lab Results   Component Value Date    HGBA1C 5.7 (H) 07/04/2024      Cardiology evaluation/Cardiac Monitoring: Outpatient follow-up recommended.  Echo 7/3/2024 ejection fraction 70%.  Bilateral atria normal in size.  Evaluation for loop recorder/Zio patch and he also follows with thoracic surgery for recent CABG x 3 on 6/20/24.    Endocrinology evaluation: N/A    Lifestyle history/modifications:    -Diet/Exercise regimen: well balanced diet. Not eating as much as he did before per wife    -PT/OT/ST: PT just started outpatient    -Sleep: Sleeps 7 hours or more per night    -SHEA/CPAP Compliance: denies    -Post-stroke depression/anxiety: Patient denies.     -Smoking: Quit >40 years ago    -ETOH: socially    -Illicit drug use: none    Safety:    Independent of all ADLs. Denies any falls at home and ambulates with a cane.  He can manage his own medications and finances.  He is able o drive his vehicle.  Patient and wife note some increasing forgetfulness but no other memory issues at this time.     Review of Systems:  Constitutional:  Positive for activity change (exhausted 7 days of week), fatigue and unexpected weight change.   HENT: Negative.     Eyes: Negative.    Respiratory: Negative.     Cardiovascular: Negative.    Gastrointestinal: Negative.    Endocrine: Positive for cold intolerance and heat intolerance.   Genitourinary: Negative.    Musculoskeletal: Negative.    Skin: Negative.    Allergic/Immunologic: Negative.    Neurological:  Positive for  dizziness (sometimes) and weakness (whole body).   Hematological: Negative.    Psychiatric/Behavioral: Negative.     All other systems reviewed and are negative.  Memory issues     Reviewed ROS as entered by medical assistant.     Past Medical History   Past Medical History:   Diagnosis Date    AAA (abdominal aortic aneurysm) (HCC)     s/p open repair    BPH (benign prostatic hyperplasia)     CAD (coronary artery disease)     Cholelithiasis     Former tobacco use     History of MI (myocardial infarction)     History of TIA (transient ischemic attack)     Plavix in past, now on Brilinta    Hyperlipidemia     Hypertension     Insomnia     Obesity      Past Surgical History:   Procedure Laterality Date    ABDOMINAL AORTIC ANEURYSM REPAIR  06/07/2011    Dr. Yarbrough 6/7/11    APPENDECTOMY      CARDIAC CATHETERIZATION Left 06/03/2024    Procedure: Cardiac Left Heart Cath;  Surgeon: Pk Blanco DO;  Location: [facility];  Service: Cardiology    CARDIAC CATHETERIZATION  06/03/2024    Procedure: Cardiac catheterization;  Surgeon: Pk Blanco DO;  Location: [facility];  Service: Cardiology    CARDIAC CATHETERIZATION N/A 06/03/2024    Procedure: Cardiac Coronary Angiogram;  Surgeon: Pk Blanco DO;  Location: [facility];  Service: Cardiology    CATARACT EXTRACTION Right 11/2018    CHOLECYSTECTOMY LAPAROSCOPIC      COLONOSCOPY  11/2006    CORONARY ANGIOPLASTY      3 STENTS INSERTED    HARVEST VEIN Left 6/20/2024    Procedure: HARVEST VEIN ENDOSCOPIC (EVH);  Surgeon: KALPANA Amos MD;  Location: [facility];  Service: Cardiac Surgery    IL CORONARY ARTERY BYP W/VEIN & ARTERY GRAFT 3 VEIN N/A 6/20/2024    Procedure: CORONARY ARTERY BYPASS GRAFT (CABG) X3 VESSELS, LIMA - LAD, LEFT LEG EVH/SVG - PDA AND OM1, W/ DEDRICK;  Surgeon: KALPANA Amos MD;  Location: [facility];  Service: Cardiac Surgery    IL LAPS SURG CHOLECYSTECTOMY W/CHOLANGIOGRAPHY N/A 09/08/2016     Procedure: CHOLECYSTECTOMY LAPAROSCOPIC with intra-op cholangiogram ;  Surgeon: Prasanth Verdin MD;  Location: [facility];  Service: General     Family History   Problem Relation Age of Onset    Heart disease Mother     Hypertension Mother         Benign Essential     Stroke Mother         Stroke     Heart disease Father     Coronary artery disease Brother     Diabetes Brother         mellitus     Heart disease Brother     Aortic aneurysm Brother     Coronary artery disease Brother     Sudden death Brother     Coronary artery disease Brother     Sudden death Brother     Sudden death Brother     Coronary artery disease Brother     Heart disease Family      Current Outpatient Medications on File Prior to Visit   Medication Sig Dispense Refill    acetaminophen (TYLENOL) 325 mg tablet Take 2 tablets (650 mg total) by mouth every 6 (six) hours as needed for mild pain, headaches or fever (Patient taking differently: Take 650 mg by mouth every 6 (six) hours as needed for mild pain, headaches or fever As needed)      aspirin (ECOTRIN LOW STRENGTH) 81 mg EC tablet Take 1 tablet (81 mg total) by mouth daily      melatonin 3 mg Take 2 tablets (6 mg total) by mouth daily at bedtime      metoprolol tartrate (LOPRESSOR) 50 mg tablet Take 0.5 tablets (25 mg total) by mouth 2 (two) times a day      pantoprazole (PROTONIX) 40 mg tablet Take 1 tablet (40 mg total) by mouth daily in the early morning 30 tablet 0    rosuvastatin (CRESTOR) 40 MG tablet Take 1 tablet (40 mg total) by mouth daily 90 tablet 3    ticagrelor (BRILINTA) 90 MG Take 1 tablet (90 mg total) by mouth every 12 (twelve) hours 180 tablet 3     No current facility-administered medications on file prior to visit.   No Known Allergies   Current Outpatient Medications on File Prior to Visit   Medication Sig Dispense Refill    acetaminophen (TYLENOL) 325 mg tablet Take 2 tablets (650 mg total) by mouth every 6 (six) hours as needed for mild pain, headaches or fever  "(Patient taking differently: Take 650 mg by mouth every 6 (six) hours as needed for mild pain, headaches or fever As needed)      aspirin (ECOTRIN LOW STRENGTH) 81 mg EC tablet Take 1 tablet (81 mg total) by mouth daily      melatonin 3 mg Take 2 tablets (6 mg total) by mouth daily at bedtime      metoprolol tartrate (LOPRESSOR) 50 mg tablet Take 0.5 tablets (25 mg total) by mouth 2 (two) times a day      pantoprazole (PROTONIX) 40 mg tablet Take 1 tablet (40 mg total) by mouth daily in the early morning 30 tablet 0    rosuvastatin (CRESTOR) 40 MG tablet Take 1 tablet (40 mg total) by mouth daily 90 tablet 3    ticagrelor (BRILINTA) 90 MG Take 1 tablet (90 mg total) by mouth every 12 (twelve) hours 180 tablet 3     No current facility-administered medications on file prior to visit.      Social History     Tobacco Use    Smoking status: Former     Current packs/day: 0.00     Average packs/day: 0.5 packs/day for 30.0 years (15.0 ttl pk-yrs)     Types: Cigarettes     Start date: 3/10/1956     Quit date: 3/10/1986     Years since quittin.4    Smokeless tobacco: Never    Tobacco comments:     Quit  about 25 years x1ppd   Vaping Use    Vaping status: Never Used   Substance and Sexual Activity    Alcohol use: Not Currently     Comment: 4 oz glass of wine every other day w/ his dinner    Drug use: No    Sexual activity: Not Currently     Partners: Female       Objective       /81   Pulse 67   Temp 98 °F (36.7 °C) (Temporal)   Ht 5' 5\" (1.651 m)   Wt 73.7 kg (162 lb 6.4 oz)   BMI 27.02 kg/m²     Pertinent lab results:  Lab Results   Component Value Date    HGBA1C 5.7 (H) 2024      Lab Results   Component Value Date    LDLCALC 10 2024        Pertinent Imagin24 Carotid Ultrasound: Less than 50% stenosis bilateral ICAs  24 MRI Brain:Punctate acute to subacute infarcts involving the right caudate head, anterior centrum semiovale, and lateral right frontal cortex.Chronic right " corona radiata/basal ganglia and bilateral cerebellar lacunar infarcts.Mild chronic microangiopathic ischemic changes.  7/3/24 CTA H&N: Patent major vessels of the [place] of [place] without high-grade stenosis.  No aneurysm.  2.  Moderate to severe atherosclerotic stenosis at the origin of the left vertebral artery. No hemodynamically significant stenosis in the cervical carotid and the right vertebral arteries.  7/3/24 CTH: No acute intracranial CT abnormality.  Chronic lacunar infarcts in the right basal ganglia and corona radiata. Chronic bilateral cerebellar lacunar infarcts. Mild microangiopathic change.    Physical Exam:  Constitutional:       Appearance: Normal appearance.   HENT:      Head: Normocephalic and atraumatic.      Nose: Nose normal.      Mouth: Mucous membranes are moist.   Pulmonary:      Effort: Pulmonary effort is normal.    Skin:     General: Skin is warm and dry.   Psychiatric:         Affect normal.   Neurologic Exam     Mental Status   Oriented to person, place, and time.   Speech: speech is normal   Level of consciousness: alert  Able to name object.     Cranial Nerves   Cranial nerves II through XII intact.     Motor Exam   Muscle bulk: normal  Right arm pronator drift: absent  Left arm pronator drift: absent    Strength   Strength 5/5 throughout.     Sensory Exam   Light touch normal.     Gait, Coordination, and Reflexes     Gait  Gait: normal (ambulates with cane)    Coordination   Romberg: negative  Finger to nose coordination: normal    Tremor   Resting tremor: absent       Administrative Statements   I have spent a total time of 45 minutes in caring for this patient on the day of the visit/encounter including Diagnostic results, Prognosis, Risks and benefits of tx options, Instructions for management, Patient and family education, Importance of tx compliance, Risk factor reductions, Impressions, Counseling / Coordination of care, Documenting in the medical record, Reviewing / ordering  tests, medicine, procedures  , and Obtaining or reviewing history  .

## 2024-08-21 NOTE — PATIENT INSTRUCTIONS
Stroke:  - For ongoing stroke prevention continue: Brilinta, aspirin, and rosuvastatin.  - Discussed the importance of antiplatelet/anticoagulant management with the patient to prevent future strokes.   - Recommend to check blood pressure occasionally away from the doctor's office to make sure that those numbers are typically less than 130/80.  If they are frequently higher than that, we recommend checking a little more often and to follow up with primary care team.  -Continue all medication for blood pressure control as prescribed be your primary care team/cardiologist.   - Will defer to primary care team for monitoring of cholesterol panel and blood sugar numbers with target LDL cholesterol of less than 70 and hemoglobin A1c less than 7%.  - Recommend following a low salt, mediterranean diet.   - Recommend routine physical exercise as tolerated.  - Avoid using NSAIDs (like ibuprofen, motrin, or aleve) for headaches or other pain and to use tylenol if needed.     We will plan for you to return to the office in 4 months to see myself or MD, but would be happy to see you sooner if the need should arise.  If you have any symptoms concerning for TIA or stroke including: sudden painless loss of vision or double vision, difficulty speaking or swallowing, vertigo/room spinning that does not quickly resolve, or weakness/numbness/loss of coordination affecting 1 side of the face or body, you should proceed by ambulance to the nearest emergency room immediately.

## 2024-08-22 ENCOUNTER — CLINICAL SUPPORT (OUTPATIENT)
Dept: CARDIAC REHAB | Facility: CLINIC | Age: 79
End: 2024-08-22
Payer: COMMERCIAL

## 2024-08-22 DIAGNOSIS — Z95.1 S/P CABG X 3: Primary | ICD-10-CM

## 2024-08-22 PROCEDURE — 93798 PHYS/QHP OP CAR RHAB W/ECG: CPT

## 2024-08-23 ENCOUNTER — CLINICAL SUPPORT (OUTPATIENT)
Dept: CARDIAC REHAB | Facility: CLINIC | Age: 79
End: 2024-08-23
Payer: COMMERCIAL

## 2024-08-23 DIAGNOSIS — Z95.1 S/P CABG X 3: Primary | ICD-10-CM

## 2024-08-23 PROCEDURE — 93798 PHYS/QHP OP CAR RHAB W/ECG: CPT

## 2024-08-26 ENCOUNTER — OFFICE VISIT (OUTPATIENT)
Dept: NEUROLOGY | Facility: CLINIC | Age: 79
End: 2024-08-26
Payer: COMMERCIAL

## 2024-08-26 VITALS
BODY MASS INDEX: 27.06 KG/M2 | TEMPERATURE: 98 F | DIASTOLIC BLOOD PRESSURE: 81 MMHG | HEART RATE: 67 BPM | WEIGHT: 162.4 LBS | SYSTOLIC BLOOD PRESSURE: 160 MMHG | HEIGHT: 65 IN

## 2024-08-26 DIAGNOSIS — E55.9 VITAMIN D DEFICIENCY, UNSPECIFIED: ICD-10-CM

## 2024-08-26 DIAGNOSIS — R53.83 FATIGUE, UNSPECIFIED TYPE: ICD-10-CM

## 2024-08-26 DIAGNOSIS — Z86.73 HISTORY OF STROKE: ICD-10-CM

## 2024-08-26 DIAGNOSIS — F06.31 DEPRESSION AS LATE EFFECT OF CEREBROVASCULAR ACCIDENT (CVA): ICD-10-CM

## 2024-08-26 DIAGNOSIS — E78.49 OTHER HYPERLIPIDEMIA: ICD-10-CM

## 2024-08-26 DIAGNOSIS — I10 PRIMARY HYPERTENSION: Primary | ICD-10-CM

## 2024-08-26 DIAGNOSIS — I69.398 DEPRESSION AS LATE EFFECT OF CEREBROVASCULAR ACCIDENT (CVA): ICD-10-CM

## 2024-08-26 DIAGNOSIS — D53.9 NUTRITIONAL ANEMIA, UNSPECIFIED: ICD-10-CM

## 2024-08-26 DIAGNOSIS — R74.8 ABNORMAL LEVELS OF OTHER SERUM ENZYMES: ICD-10-CM

## 2024-08-26 PROCEDURE — 99215 OFFICE O/P EST HI 40 MIN: CPT

## 2024-08-26 RX ORDER — ESCITALOPRAM OXALATE 5 MG/1
5 TABLET ORAL DAILY
Qty: 42 TABLET | Refills: 0 | Status: SHIPPED | OUTPATIENT
Start: 2024-08-26

## 2024-08-26 NOTE — PROGRESS NOTES
Patient ID: Shane Chau Sr. is a 78 y.o. male.    Assessment/Plan:    No problem-specific Assessment & Plan notes found for this encounter.       {Assess/PlanSmartLinks:15296}       Subjective:    HPI    {[facility] Neurology HPI texts:97117}    {Common ambulatory SmartLinks:85903}         Objective:    There were no vitals taken for this visit.    Physical Exam    Neurological Exam      ROS:    Review of Systems   Constitutional:  Positive for activity change (exhausted 7 days of week), fatigue and unexpected weight change.   HENT: Negative.     Eyes: Negative.    Respiratory: Negative.     Cardiovascular: Negative.    Gastrointestinal: Negative.    Endocrine: Positive for cold intolerance and heat intolerance.   Genitourinary: Negative.    Musculoskeletal: Negative.    Skin: Negative.    Allergic/Immunologic: Negative.    Neurological:  Positive for dizziness (sometimes) and weakness (whole body).   Hematological: Negative.    Psychiatric/Behavioral: Negative.     All other systems reviewed and are negative.  Memory issues

## 2024-08-26 NOTE — ASSESSMENT & PLAN NOTE
Since the hospital, he denies any new stroke like symptoms. Wife and patient both report ongoing forgetfulness and fatigue since the hospital.  He has less interest in the things that he once enjoyed, has not been eating as much as he used to, and is tired all of the time.  We discussed that these symptoms could be related to the strokes, recent CABG, and overall deconditioning but that I would also like to rule out any electrolyte disturbances and anemia but could also be related to post-stroke depression.  PHQ-9 screen completed in office today was 11 which is positive for moderate depression. Recommending discussing with PCP but okay with starting him on a low dose SSRI since depressing can affect his recovery.  Discussed black box warning with both patient and wife. He recently turned in his first zio patch with cardiology.  Recommending loop recorder if nothing is found on zio patch.  He continues to take Brilinta, aspirin, and Crestor daily.  Up until yesterday he was only taking 1/2 tab of Brilinta BID. Wife and patient report that they had misread the discharge paperwork. He started working with outpatient PT recently, which I recommend that he continues.   Additional testing:  Blood work ordered to rule out treatable causes for his symptoms  Education & Recommendations:  For ongoing TIA/stroke prevention continue: Brilinta BID, aspirin daily, crestor daily  Recommend to check blood pressure occasionally away from the doctor's office to make sure that those numbers are typically less than 130/80.  If they are frequently higher than that, we recommend checking it more frequently and following up with primary care team/cardiologist   Will defer to primary care team for monitoring of cholesterol panel and blood sugar numbers with target LDL cholesterol of less than 70 and hemoglobin A1c less than 7%  Continue all medication for blood pressure control as prescribed by your primary care  team/cardiologist  Recommend following a low salt, mediterranean diet   Recommend routine physical exercise as tolerated   Avoid using NSAIDs for headaches or other pain and to use tylenol if needed

## 2024-08-27 ENCOUNTER — LAB (OUTPATIENT)
Dept: LAB | Facility: CLINIC | Age: 79
End: 2024-08-27
Payer: COMMERCIAL

## 2024-08-27 ENCOUNTER — CLINICAL SUPPORT (OUTPATIENT)
Dept: CARDIAC REHAB | Facility: CLINIC | Age: 79
End: 2024-08-27
Payer: COMMERCIAL

## 2024-08-27 DIAGNOSIS — R74.8 ABNORMAL LEVELS OF OTHER SERUM ENZYMES: ICD-10-CM

## 2024-08-27 DIAGNOSIS — D53.9 NUTRITIONAL ANEMIA, UNSPECIFIED: ICD-10-CM

## 2024-08-27 DIAGNOSIS — Z95.1 S/P CABG X 3: Primary | ICD-10-CM

## 2024-08-27 DIAGNOSIS — E55.9 VITAMIN D DEFICIENCY, UNSPECIFIED: ICD-10-CM

## 2024-08-27 DIAGNOSIS — R53.83 FATIGUE, UNSPECIFIED TYPE: ICD-10-CM

## 2024-08-27 LAB
25(OH)D3 SERPL-MCNC: 25.7 NG/ML (ref 30–100)
ALBUMIN SERPL BCG-MCNC: 3.8 G/DL (ref 3.5–5)
ALP SERPL-CCNC: 75 U/L (ref 34–104)
ALT SERPL W P-5'-P-CCNC: 11 U/L (ref 7–52)
ANION GAP SERPL CALCULATED.3IONS-SCNC: 6 MMOL/L (ref 4–13)
AST SERPL W P-5'-P-CCNC: 15 U/L (ref 13–39)
BASOPHILS # BLD AUTO: 0.07 THOUSANDS/ÂΜL (ref 0–0.1)
BASOPHILS NFR BLD AUTO: 1 % (ref 0–1)
BILIRUB SERPL-MCNC: 0.74 MG/DL (ref 0.2–1)
BUN SERPL-MCNC: 18 MG/DL (ref 5–25)
CALCIUM SERPL-MCNC: 9.5 MG/DL (ref 8.4–10.2)
CHLORIDE SERPL-SCNC: 101 MMOL/L (ref 96–108)
CO2 SERPL-SCNC: 27 MMOL/L (ref 21–32)
CREAT SERPL-MCNC: 1.21 MG/DL (ref 0.6–1.3)
EOSINOPHIL # BLD AUTO: 0.32 THOUSAND/ÂΜL (ref 0–0.61)
EOSINOPHIL NFR BLD AUTO: 4 % (ref 0–6)
ERYTHROCYTE [DISTWIDTH] IN BLOOD BY AUTOMATED COUNT: 13 % (ref 11.6–15.1)
GFR SERPL CREATININE-BSD FRML MDRD: 56 ML/MIN/1.73SQ M
GLUCOSE SERPL-MCNC: 104 MG/DL (ref 65–140)
HCT VFR BLD AUTO: 32.8 % (ref 36.5–49.3)
HGB BLD-MCNC: 10.6 G/DL (ref 12–17)
IMM GRANULOCYTES # BLD AUTO: 0.04 THOUSAND/UL (ref 0–0.2)
IMM GRANULOCYTES NFR BLD AUTO: 1 % (ref 0–2)
LYMPHOCYTES # BLD AUTO: 1.41 THOUSANDS/ÂΜL (ref 0.6–4.47)
LYMPHOCYTES NFR BLD AUTO: 17 % (ref 14–44)
MCH RBC QN AUTO: 31.2 PG (ref 26.8–34.3)
MCHC RBC AUTO-ENTMCNC: 32.3 G/DL (ref 31.4–37.4)
MCV RBC AUTO: 97 FL (ref 82–98)
MONOCYTES # BLD AUTO: 0.98 THOUSAND/ÂΜL (ref 0.17–1.22)
MONOCYTES NFR BLD AUTO: 12 % (ref 4–12)
NEUTROPHILS # BLD AUTO: 5.27 THOUSANDS/ÂΜL (ref 1.85–7.62)
NEUTS SEG NFR BLD AUTO: 65 % (ref 43–75)
NRBC BLD AUTO-RTO: 0 /100 WBCS
PLATELET # BLD AUTO: 239 THOUSANDS/UL (ref 149–390)
PMV BLD AUTO: 8.6 FL (ref 8.9–12.7)
POTASSIUM SERPL-SCNC: 4.1 MMOL/L (ref 3.5–5.3)
PROT SERPL-MCNC: 7.5 G/DL (ref 6.4–8.4)
RBC # BLD AUTO: 3.4 MILLION/UL (ref 3.88–5.62)
SODIUM SERPL-SCNC: 134 MMOL/L (ref 135–147)
TSH SERPL DL<=0.05 MIU/L-ACNC: 1.12 UIU/ML (ref 0.45–4.5)
VIT B12 SERPL-MCNC: 335 PG/ML (ref 180–914)
WBC # BLD AUTO: 8.09 THOUSAND/UL (ref 4.31–10.16)

## 2024-08-27 PROCEDURE — 82607 VITAMIN B-12: CPT

## 2024-08-27 PROCEDURE — 84207 ASSAY OF VITAMIN B-6: CPT

## 2024-08-27 PROCEDURE — 82306 VITAMIN D 25 HYDROXY: CPT

## 2024-08-27 PROCEDURE — 36415 COLL VENOUS BLD VENIPUNCTURE: CPT

## 2024-08-27 PROCEDURE — 84443 ASSAY THYROID STIM HORMONE: CPT

## 2024-08-27 PROCEDURE — 93798 PHYS/QHP OP CAR RHAB W/ECG: CPT

## 2024-08-27 PROCEDURE — 85025 COMPLETE CBC W/AUTO DIFF WBC: CPT

## 2024-08-27 PROCEDURE — 80053 COMPREHEN METABOLIC PANEL: CPT

## 2024-08-28 DIAGNOSIS — D64.9 ANEMIA: ICD-10-CM

## 2024-08-28 DIAGNOSIS — E55.9 VITAMIN D DEFICIENCY, UNSPECIFIED: Primary | ICD-10-CM

## 2024-08-28 DIAGNOSIS — E55.9 VITAMIN D DEFICIENCY: ICD-10-CM

## 2024-08-28 DIAGNOSIS — E53.8 VITAMIN B12 DEFICIENCY: ICD-10-CM

## 2024-08-28 RX ORDER — ERGOCALCIFEROL 1.25 MG/1
50000 CAPSULE, LIQUID FILLED ORAL WEEKLY
Qty: 6 CAPSULE | Refills: 0 | Status: SHIPPED | OUTPATIENT
Start: 2024-08-28 | End: 2024-08-30 | Stop reason: SDUPTHER

## 2024-08-28 RX ORDER — MAGNESIUM 200 MG
1 TABLET ORAL DAILY
Qty: 90 TABLET | Refills: 0 | Status: SHIPPED | OUTPATIENT
Start: 2024-08-28 | End: 2024-08-30 | Stop reason: SDUPTHER

## 2024-08-28 NOTE — RESULT ENCOUNTER NOTE
Mohsen Lynn,    I was able to review your lab work.  Your thyroid test came back in normal range.     B12 is slightly deficient- we like it to be over 400. I can send a script to your pharmacy for B12 supplementation.     Vitamin D is also deficient- I will send a script for vitamin D replacement to your pharmacy for you to take. It's one tablet once per week for 6 weeks.    I was able to review your CMP- your sodium is just ever so slightly low. I am not concerned at this time. We can continue to monitor.     Your CBC shows ongoing anemia (low red blood cells and low hemoglobin).  While this doesn't appear to be new for you, I would still like you to be evaluated by hematology. I have placed a referral for you.    We can recheck your blood work in the next 2-3 months to ensure your values have returned to normal.

## 2024-08-29 ENCOUNTER — TELEPHONE (OUTPATIENT)
Dept: NEUROLOGY | Facility: CLINIC | Age: 79
End: 2024-08-29

## 2024-08-29 ENCOUNTER — CLINICAL SUPPORT (OUTPATIENT)
Dept: CARDIAC REHAB | Facility: CLINIC | Age: 79
End: 2024-08-29
Payer: COMMERCIAL

## 2024-08-29 DIAGNOSIS — Z95.1 S/P CABG X 3: Primary | ICD-10-CM

## 2024-08-29 PROCEDURE — 93798 PHYS/QHP OP CAR RHAB W/ECG: CPT

## 2024-08-29 NOTE — TELEPHONE ENCOUNTER
Called pt left message on voicemail to reschedule apt on 12/26 with Gaye in [place] office . Let Pt know provider will not be in office on that day and will need to reschedule apt

## 2024-08-30 ENCOUNTER — CLINICAL SUPPORT (OUTPATIENT)
Dept: CARDIAC REHAB | Facility: CLINIC | Age: 79
End: 2024-08-30
Payer: COMMERCIAL

## 2024-08-30 DIAGNOSIS — E53.8 VITAMIN B12 DEFICIENCY: ICD-10-CM

## 2024-08-30 DIAGNOSIS — E55.9 VITAMIN D DEFICIENCY: ICD-10-CM

## 2024-08-30 DIAGNOSIS — Z95.1 S/P CABG X 3: Primary | ICD-10-CM

## 2024-08-30 PROCEDURE — 93798 PHYS/QHP OP CAR RHAB W/ECG: CPT

## 2024-08-30 RX ORDER — MAGNESIUM 200 MG
1 TABLET ORAL DAILY
Qty: 90 TABLET | Refills: 0 | Status: SHIPPED | OUTPATIENT
Start: 2024-08-30 | End: 2024-11-28

## 2024-08-30 RX ORDER — ERGOCALCIFEROL 1.25 MG/1
50000 CAPSULE, LIQUID FILLED ORAL WEEKLY
Qty: 6 CAPSULE | Refills: 0 | Status: SHIPPED | OUTPATIENT
Start: 2024-08-30

## 2024-08-30 NOTE — TELEPHONE ENCOUNTER
Pt's wife would like the B12 and Vit D2 sent to [facility] in [place], instead of [facility]. The pt would like to start the supplements right away.    Please sign off if agreeable. Thank you.
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
## 2024-09-03 ENCOUNTER — OFFICE VISIT (OUTPATIENT)
Dept: CARDIOLOGY CLINIC | Facility: CLINIC | Age: 79
End: 2024-09-03
Payer: COMMERCIAL

## 2024-09-03 ENCOUNTER — APPOINTMENT (OUTPATIENT)
Dept: LAB | Facility: CLINIC | Age: 79
End: 2024-09-03
Payer: COMMERCIAL

## 2024-09-03 VITALS
BODY MASS INDEX: 26.56 KG/M2 | WEIGHT: 159.6 LBS | HEART RATE: 76 BPM | DIASTOLIC BLOOD PRESSURE: 80 MMHG | SYSTOLIC BLOOD PRESSURE: 119 MMHG | OXYGEN SATURATION: 97 %

## 2024-09-03 DIAGNOSIS — I65.23 ASYMPTOMATIC BILATERAL CAROTID ARTERY STENOSIS: ICD-10-CM

## 2024-09-03 DIAGNOSIS — Z95.1 S/P CABG X 3: ICD-10-CM

## 2024-09-03 DIAGNOSIS — Z86.73 HISTORY OF STROKE: ICD-10-CM

## 2024-09-03 DIAGNOSIS — I71.40 ABDOMINAL AORTIC ANEURYSM (AAA) WITHOUT RUPTURE, UNSPECIFIED PART (HCC): ICD-10-CM

## 2024-09-03 DIAGNOSIS — R50.9 FEVER, UNSPECIFIED FEVER CAUSE: ICD-10-CM

## 2024-09-03 DIAGNOSIS — I10 PRIMARY HYPERTENSION: ICD-10-CM

## 2024-09-03 DIAGNOSIS — G45.8 OTHER SPECIFIED TRANSIENT CEREBRAL ISCHEMIAS: ICD-10-CM

## 2024-09-03 DIAGNOSIS — T81.89XD NON-HEALING SURGICAL WOUND, SUBSEQUENT ENCOUNTER: Primary | ICD-10-CM

## 2024-09-03 DIAGNOSIS — E78.49 OTHER HYPERLIPIDEMIA: ICD-10-CM

## 2024-09-03 DIAGNOSIS — I73.9 PAD (PERIPHERAL ARTERY DISEASE) (HCC): ICD-10-CM

## 2024-09-03 DIAGNOSIS — I25.118 CORONARY ARTERY DISEASE OF NATIVE ARTERY OF NATIVE HEART WITH STABLE ANGINA PECTORIS (HCC): Primary | ICD-10-CM

## 2024-09-03 LAB
ANION GAP SERPL CALCULATED.3IONS-SCNC: 7 MMOL/L (ref 4–13)
BUN SERPL-MCNC: 17 MG/DL (ref 5–25)
CALCIUM SERPL-MCNC: 9.3 MG/DL (ref 8.4–10.2)
CHLORIDE SERPL-SCNC: 100 MMOL/L (ref 96–108)
CO2 SERPL-SCNC: 25 MMOL/L (ref 21–32)
CREAT SERPL-MCNC: 1.01 MG/DL (ref 0.6–1.3)
ERYTHROCYTE [DISTWIDTH] IN BLOOD BY AUTOMATED COUNT: 12.8 % (ref 11.6–15.1)
GFR SERPL CREATININE-BSD FRML MDRD: 70 ML/MIN/1.73SQ M
GLUCOSE SERPL-MCNC: 105 MG/DL (ref 65–140)
HCT VFR BLD AUTO: 32.4 % (ref 36.5–49.3)
HGB BLD-MCNC: 10.5 G/DL (ref 12–17)
MCH RBC QN AUTO: 31 PG (ref 26.8–34.3)
MCHC RBC AUTO-ENTMCNC: 32.4 G/DL (ref 31.4–37.4)
MCV RBC AUTO: 96 FL (ref 82–98)
PLATELET # BLD AUTO: 265 THOUSANDS/UL (ref 149–390)
PMV BLD AUTO: 8.7 FL (ref 8.9–12.7)
POTASSIUM SERPL-SCNC: 3.8 MMOL/L (ref 3.5–5.3)
RBC # BLD AUTO: 3.39 MILLION/UL (ref 3.88–5.62)
SODIUM SERPL-SCNC: 132 MMOL/L (ref 135–147)
WBC # BLD AUTO: 7.45 THOUSAND/UL (ref 4.31–10.16)

## 2024-09-03 PROCEDURE — 80048 BASIC METABOLIC PNL TOTAL CA: CPT

## 2024-09-03 PROCEDURE — 99215 OFFICE O/P EST HI 40 MIN: CPT | Performed by: NURSE PRACTITIONER

## 2024-09-03 PROCEDURE — 36415 COLL VENOUS BLD VENIPUNCTURE: CPT

## 2024-09-03 PROCEDURE — 85027 COMPLETE CBC AUTOMATED: CPT

## 2024-09-03 NOTE — Clinical Note
I called and let him know he needs chest CT, left a voicemail.  Can you also offer him a visit with me 10/30 at 2pm.
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
## 2024-09-04 ENCOUNTER — TELEPHONE (OUTPATIENT)
Dept: ULTRASOUND IMAGING | Facility: HOSPITAL | Age: 79
End: 2024-09-04

## 2024-09-04 ENCOUNTER — TELEPHONE (OUTPATIENT)
Dept: FAMILY MEDICINE CLINIC | Facility: CLINIC | Age: 79
End: 2024-09-04

## 2024-09-04 ENCOUNTER — OFFICE VISIT (OUTPATIENT)
Dept: FAMILY MEDICINE CLINIC | Facility: CLINIC | Age: 79
End: 2024-09-04
Payer: COMMERCIAL

## 2024-09-04 ENCOUNTER — HOSPITAL ENCOUNTER (OUTPATIENT)
Dept: ULTRASOUND IMAGING | Facility: HOSPITAL | Age: 79
Discharge: HOME | End: 2024-09-04
Payer: COMMERCIAL

## 2024-09-04 ENCOUNTER — TELEPHONE (OUTPATIENT)
Age: 79
End: 2024-09-04

## 2024-09-04 VITALS
DIASTOLIC BLOOD PRESSURE: 78 MMHG | OXYGEN SATURATION: 100 % | RESPIRATION RATE: 16 BRPM | BODY MASS INDEX: 26.33 KG/M2 | SYSTOLIC BLOOD PRESSURE: 122 MMHG | WEIGHT: 158 LBS | HEIGHT: 65 IN | TEMPERATURE: 97.8 F | HEART RATE: 70 BPM

## 2024-09-04 DIAGNOSIS — M89.8X8 STERNAL MASS: ICD-10-CM

## 2024-09-04 DIAGNOSIS — Z00.00 MEDICARE ANNUAL WELLNESS VISIT, SUBSEQUENT: Primary | ICD-10-CM

## 2024-09-04 DIAGNOSIS — M89.8X8 STERNAL MASS: Primary | ICD-10-CM

## 2024-09-04 DIAGNOSIS — R50.9 FEVER, UNSPECIFIED FEVER CAUSE: ICD-10-CM

## 2024-09-04 LAB
SARS-COV-2 AG UPPER RESP QL IA: NEGATIVE
VALID CONTROL: NORMAL

## 2024-09-04 PROCEDURE — 1159F MED LIST DOCD IN RCRD: CPT | Performed by: NURSE PRACTITIONER

## 2024-09-04 PROCEDURE — 3725F SCREEN DEPRESSION PERFORMED: CPT | Performed by: NURSE PRACTITIONER

## 2024-09-04 PROCEDURE — 76705 ECHO EXAM OF ABDOMEN: CPT

## 2024-09-04 PROCEDURE — 87811 SARS-COV-2 COVID19 W/OPTIC: CPT | Performed by: NURSE PRACTITIONER

## 2024-09-04 PROCEDURE — 3078F DIAST BP <80 MM HG: CPT | Performed by: NURSE PRACTITIONER

## 2024-09-04 PROCEDURE — 3074F SYST BP LT 130 MM HG: CPT | Performed by: NURSE PRACTITIONER

## 2024-09-04 PROCEDURE — 1170F FXNL STATUS ASSESSED: CPT | Performed by: NURSE PRACTITIONER

## 2024-09-04 PROCEDURE — 99213 OFFICE O/P EST LOW 20 MIN: CPT | Performed by: NURSE PRACTITIONER

## 2024-09-04 PROCEDURE — 3288F FALL RISK ASSESSMENT DOCD: CPT | Performed by: NURSE PRACTITIONER

## 2024-09-04 PROCEDURE — G0439 PPPS, SUBSEQ VISIT: HCPCS | Performed by: NURSE PRACTITIONER

## 2024-09-04 PROCEDURE — 1125F AMNT PAIN NOTED PAIN PRSNT: CPT | Performed by: NURSE PRACTITIONER

## 2024-09-04 PROCEDURE — 1160F RVW MEDS BY RX/DR IN RCRD: CPT | Performed by: NURSE PRACTITIONER

## 2024-09-04 NOTE — TELEPHONE ENCOUNTER
Patient did not show for his STAT 14:00 Us with raquel LAIRD, I tried to contact patient via phone number in chart but phone number did not ring out, multiple attempts all unsuccessful.

## 2024-09-04 NOTE — PROGRESS NOTES
Ambulatory Visit  Name: Shane Chau Sr.      : 1945      MRN: [id number]  Encounter Provider: SANTHOSH Sierra  Encounter Date: 2024   Encounter department: [facility]    Assessment & Plan   1. Medicare annual wellness visit, subsequent  2. Sternal mass  -     US superficial lump (non extremity); Future; Expected date: 2024  3. Fever, unspecified fever cause  -     POCT Rapid Covid Ag      Low grade fevers last few days with increasing fatigue and weakness. No cold symptoms, urinary, GI symptoms.   There is a upper sternal mass visible on chest wall, mildly red, tender to palpation, given history of recent CABG x3 in , concerning for possible infection. Will send for US, further plan of care pending results.       Depression Screening and Follow-up Plan: Patient was screened for depression during today's encounter. They screened negative with a PHQ-2 score of 0.      Preventive health issues were discussed with patient, and age appropriate screening tests were ordered as noted in patient's After Visit Summary. Personalized health advice and appropriate referrals for health education or preventive services given if needed, as noted in patient's After Visit Summary.    History of Present Illness     Shane Chau Sr. Is a 78 year old male presenting today for fever, fatigue, generally feeling weak.     He has a mild cough, little bit more than usual, but not much.   No other cold symptoms.   No nausea, vomiting.   No diarrhea.  No urinary symptoms.   No dyspnea. No wheezing.   No headaches.     Low grade fevers, max 100.6.    He had CABG surgery on .   Notes he has a lump on his sternum that hurts with touch.              Patient Care Team:  Sherwin Mantilla MD as PCP - General (Family Medicine)  MD Candy Deutsch, MD Loretta Laboy PA-C Jake Marais, MD Ariel Alicea, MD    Review of Systems   Constitutional:  Positive for  chills, diaphoresis, fatigue, fever and unexpected weight change (weight loss since CABG).   HENT:  Negative for congestion, ear pain, postnasal drip, sinus pain and sore throat.    Respiratory:  Positive for cough. Negative for chest tightness, shortness of breath and wheezing.    Cardiovascular:  Positive for chest pain (upper sternal lump and pain with touch).   Genitourinary: Negative.    Neurological:  Positive for weakness. Negative for headaches.     Medical History Reviewed by provider this encounter:       Annual Wellness Visit Questionnaire   Shane is here for his Subsequent Wellness visit.     Health Risk Assessment:   Patient rates overall health as very good. Patient feels that their physical health rating is same. Patient is very satisfied with their life. Eyesight was rated as same. Hearing was rated as slightly worse. Patient feels that their emotional and mental health rating is same. Patients states they are sometimes angry. Patient states they are sometimes unusually tired/fatigued. Pain experienced in the last 7 days has been none. Patient states that he has experienced no weight loss or gain in last 6 months.     Depression Screening:   PHQ-2 Score: 0      Fall Risk Screening:   In the past year, patient has experienced: no history of falling in past year      Home Safety:  Patient does not have trouble with stairs inside or outside of their home. Patient has working smoke alarms and has working carbon monoxide detector. Home safety hazards include: none.     Nutrition:   Current diet is Low Carb, Regular, Low Saturated Fat and Low Cholesterol.     Medications:   Patient is currently taking over-the-counter supplements. OTC medications include: see medication list. Patient is able to manage medications.     Activities of Daily Living (ADLs)/Instrumental Activities of Daily Living (IADLs):   Walk and transfer into and out of bed and chair?: Yes  Dress and groom yourself?: Yes    Bathe or shower  yourself?: Yes    Feed yourself? Yes  Do your laundry/housekeeping?: Yes  Manage your money, pay your bills and track your expenses?: Yes  Make your own meals?: Yes    Do your own shopping?: Yes    Previous Hospitalizations:   Any hospitalizations or ED visits within the last 12 months?: Yes    How many hospitalizations have you had in the last year?: 1-2    Advance Care Planning:   Living will: Yes    Durable POA for healthcare: Yes    Advanced directive: Yes      Cognitive Screening:   Provider or family/friend/caregiver concerned regarding cognition?: No    PREVENTIVE SCREENINGS      Cardiovascular Screening:    General: Screening Not Indicated and History Lipid Disorder    Due for: Lipid Panel      Diabetes Screening:     General: Screening Current    Due for: Blood Glucose      Colorectal Cancer Screening:     General: Screening Not Indicated      Prostate Cancer Screening:    General: Screening Not Indicated      Osteoporosis Screening:    General: Screening Not Indicated      Abdominal Aortic Aneurysm (AAA) Screening:    Risk factors include: tobacco use        General: Screening Not Indicated and History AAA      Lung Cancer Screening:     General: Screening Not Indicated      Hepatitis C Screening:    General: Screening Current    Screening, Brief Intervention, and Referral to Treatment (SBIRT)    Screening  Typical number of drinks in a day: 3  Typical number of drinks in a week: 3  Interpretation: Low risk drinking behavior.    Single Item Drug Screening:  How often have you used an illegal drug (including marijuana) or a prescription medication for non-medical reasons in the past year? never    Single Item Drug Screen Score: 0  Interpretation: Negative screen for possible drug use disorder    Brief Intervention  Alcohol & drug use screenings were reviewed. No concerns regarding substance use disorder identified.     Social Determinants of Health     Financial Resource Strain: Low Risk  (6/21/2023)     "Overall Financial Resource Strain (CARDIA)     Difficulty of Paying Living Expenses: Not very hard   Food Insecurity: No Food Insecurity (9/4/2024)    Hunger Vital Sign     Worried About Running Out of Food in the Last Year: Never true     Ran Out of Food in the Last Year: Never true   Transportation Needs: No Transportation Needs (9/4/2024)    PRAPARE - Transportation     Lack of Transportation (Medical): No     Lack of Transportation (Non-Medical): No   Housing Stability: Low Risk  (9/4/2024)    Housing Stability Vital Sign     Unable to Pay for Housing in the Last Year: No     Number of Times Moved in the Last Year: 1     Homeless in the Last Year: No   Utilities: Not At Risk (9/4/2024)    [facility] Utilities     Threatened with loss of utilities: No     No results found.    Objective     /78   Pulse 70   Temp 97.8 °F (36.6 °C) (Temporal)   Resp 16   Ht 5' 5\" (1.651 m)   Wt 71.7 kg (158 lb)   SpO2 100%   BMI 26.29 kg/m²     Physical Exam  Vitals and nursing note reviewed.   Constitutional:       Appearance: Normal appearance.      Comments: Frail, weak appearing   HENT:      Head: Atraumatic.      Right Ear: Tympanic membrane normal.      Left Ear: Tympanic membrane normal.      Nose: Nose normal.      Mouth/Throat:      Mouth: Mucous membranes are moist.      Pharynx: Oropharynx is clear.   Eyes:      Conjunctiva/sclera: Conjunctivae normal.   Cardiovascular:      Rate and Rhythm: Normal rate and regular rhythm.      Heart sounds: No murmur heard.  Pulmonary:      Effort: Pulmonary effort is normal. No respiratory distress.      Breath sounds: Normal breath sounds. No wheezing or rales.   Musculoskeletal:      Cervical back: Normal range of motion and neck supple.      Right lower leg: No edema.      Left lower leg: No edema.   Lymphadenopathy:      Cervical: No cervical adenopathy.   Skin:     General: Skin is warm and dry.      Comments: Superior sternal incision with 7 cm X 6 cm mass, mildly red, warm. " Tender to palpation.     Neurological:      Mental Status: He is alert.   Psychiatric:         Mood and Affect: Mood normal.

## 2024-09-04 NOTE — TELEPHONE ENCOUNTER
Please contact Obdulio's wife.       US sternum shows fluid collection, possible infection.   He needs to proceed with CT chest, please set this up for him stat. Orders placed.       Thank you.

## 2024-09-04 NOTE — TELEPHONE ENCOUNTER
Yesy from [facility] ultrasound reading room called in with significant findings already in epic

## 2024-09-04 NOTE — PATIENT INSTRUCTIONS
Medicare Preventive Visit Patient Instructions  Thank you for completing your Welcome to Medicare Visit or Medicare Annual Wellness Visit today. Your next wellness visit will be due in one year (9/5/2025).  The screening/preventive services that you may require over the next 5-10 years are detailed below. Some tests may not apply to you based off risk factors and/or age. Screening tests ordered at today's visit but not completed yet may show as past due. Also, please note that scanned in results may not display below.  Preventive Screenings:  Service Recommendations Previous Testing/Comments   Colorectal Cancer Screening  Colonoscopy    Fecal Occult Blood Test (FOBT)/Fecal Immunochemical Test (FIT)  Fecal DNA/Cologuard Test  Flexible Sigmoidoscopy Age: 45-75 years old   Colonoscopy: every 10 years (May be performed more frequently if at higher risk)  OR  FOBT/FIT: every 1 year  OR  Cologuard: every 3 years  OR  Sigmoidoscopy: every 5 years  Screening may be recommended earlier than age 45 if at higher risk for colorectal cancer. Also, an individualized decision between you and your healthcare provider will decide whether screening between the ages of 76-85 would be appropriate. Colonoscopy: 11/20/2006  FOBT/FIT: Not on file  Cologuard: Not on file  Sigmoidoscopy: Not on file    Screening Not Indicated     Prostate Cancer Screening Individualized decision between patient and health care provider in men between ages of 55-69   Medicare will cover every 12 months beginning on the day after your 50th birthday PSA: 0.3 ng/mL     Screening Not Indicated     Hepatitis C Screening Once for adults born between 1945 and 1965  More frequently in patients at high risk for Hepatitis C Hep C Antibody: 07/05/2016    Screening Current   Diabetes Screening 1-2 times per year if you're at risk for diabetes or have pre-diabetes Fasting glucose: 107 mg/dL (8/6/2024)  A1C: 5.7 % (7/4/2024)  Screening Current  Due for Blood Glucose    Cholesterol Screening Once every 5 years if you don't have a lipid disorder. May order more often based on risk factors. Lipid panel: 07/04/2024  Screening Not Indicated  History Lipid Disorder  Due for Lipid Panel      Other Preventive Screenings Covered by Medicare:  Abdominal Aortic Aneurysm (AAA) Screening: covered once if your at risk. You're considered to be at risk if you have a family history of AAA or a male between the age of 65-75 who smoking at least 100 cigarettes in your lifetime.  Lung Cancer Screening: covers low dose CT scan once per year if you meet all of the following conditions: (1) Age 55-77; (2) No signs or symptoms of lung cancer; (3) Current smoker or have quit smoking within the last 15 years; (4) You have a tobacco smoking history of at least 20 pack years (packs per day x number of years you smoked); (5) You get a written order from a healthcare provider.  Glaucoma Screening: covered annually if you're considered high risk: (1) You have diabetes OR (2) Family history of glaucoma OR (3)  aged 50 and older OR (4)  American aged 65 and older  Osteoporosis Screening: covered every 2 years if you meet one of the following conditions: (1) Have a vertebral abnormality; (2) On glucocorticoid therapy for more than 3 months; (3) Have primary hyperparathyroidism; (4) On osteoporosis medications and need to assess response to drug therapy.  HIV Screening: covered annually if you're between the age of 15-65. Also covered annually if you are younger than 15 and older than 65 with risk factors for HIV infection. For pregnant patients, it is covered up to 3 times per pregnancy.    Immunizations:  Immunization Recommendations   Influenza Vaccine Annual influenza vaccination during flu season is recommended for all persons aged >= 6 months who do not have contraindications   Pneumococcal Vaccine   * Pneumococcal conjugate vaccine = PCV13 (Prevnar 13), PCV15 (Vaxneuvance), PCV20  (Prevnar 20)  * Pneumococcal polysaccharide vaccine = PPSV23 (Pneumovax) Adults 19-63 yo with certain risk factors or if 65+ yo  If never received any pneumonia vaccine: recommend Prevnar 20 (PCV20)  Give PCV20 if previously received 1 dose of PCV13 or PPSV23   Hepatitis B Vaccine 3 dose series if at intermediate or high risk (ex: diabetes, end stage renal disease, liver disease)   Respiratory syncytial virus (RSV) Vaccine - COVERED BY MEDICARE PART D  * RSVPreF3 (Arexvy) CDC recommends that adults 60 years of age and older may receive a single dose of RSV vaccine using shared clinical decision-making (SCDM)   Tetanus (Td) Vaccine - COST NOT COVERED BY MEDICARE PART B Following completion of primary series, a booster dose should be given every 10 years to maintain immunity against tetanus. Td may also be given as tetanus wound prophylaxis.   Tdap Vaccine - COST NOT COVERED BY MEDICARE PART B Recommended at least once for all adults. For pregnant patients, recommended with each pregnancy.   Shingles Vaccine (Shingrix) - COST NOT COVERED BY MEDICARE PART B  2 shot series recommended in those 19 years and older who have or will have weakened immune systems or those 50 years and older     Health Maintenance Due:      Topic Date Due   • Hepatitis C Screening  Completed   • Colorectal Cancer Screening  Discontinued     Immunizations Due:      Topic Date Due   • Pneumococcal Vaccine: 65+ Years (2 of 2 - PCV) 08/06/2019   • COVID-19 Vaccine (4 - 2023-24 season) 09/01/2024   • Influenza Vaccine (1) 09/01/2024     Advance Directives   What are advance directives?  Advance directives are legal documents that state your wishes and plans for medical care. These plans are made ahead of time in case you lose your ability to make decisions for yourself. Advance directives can apply to any medical decision, such as the treatments you want, and if you want to donate organs.   What are the types of advance directives?  There are many  types of advance directives, and each state has rules about how to use them. You may choose a combination of any of the following:  Living will:  This is a written record of the treatment you want. You can also choose which treatments you do not want, which to limit, and which to stop at a certain time. This includes surgery, medicine, IV fluid, and tube feedings.   Durable power of  for healthcare (DPAHC):  This is a written record that states who you want to make healthcare choices for you when you are unable to make them for yourself. This person, called a proxy, is usually a family member or a friend. You may choose more than 1 proxy.  Do not resuscitate (DNR) order:  A DNR order is used in case your heart stops beating or you stop breathing. It is a request not to have certain forms of treatment, such as CPR. A DNR order may be included in other types of advance directives.  Medical directive:  This covers the care that you want if you are in a coma, near death, or unable to make decisions for yourself. You can list the treatments you want for each condition. Treatment may include pain medicine, surgery, blood transfusions, dialysis, IV or tube feedings, and a ventilator (breathing machine).  Values history:  This document has questions about your views, beliefs, and how you feel and think about life. This information can help others choose the care that you would choose.  Why are advance directives important?  An advance directive helps you control your care. Although spoken wishes may be used, it is better to have your wishes written down. Spoken wishes can be misunderstood, or not followed. Treatments may be given even if you do not want them. An advance directive may make it easier for your family to make difficult choices about your care.   Weight Management   Why it is important to manage your weight:  Being overweight increases your risk of health conditions such as heart disease, high blood  pressure, type 2 diabetes, and certain types of cancer. It can also increase your risk for osteoarthritis, sleep apnea, and other respiratory problems. Aim for a slow, steady weight loss. Even a small amount of weight loss can lower your risk of health problems.  How to lose weight safely:  A safe and healthy way to lose weight is to eat fewer calories and get regular exercise. You can lose up about 1 pound a week by decreasing the number of calories you eat by 500 calories each day.   Healthy meal plan for weight management:  A healthy meal plan includes a variety of foods, contains fewer calories, and helps you stay healthy. A healthy meal plan includes the following:  Eat whole-grain foods more often.  A healthy meal plan should contain fiber. Fiber is the part of grains, fruits, and vegetables that is not broken down by your body. Whole-grain foods are healthy and provide extra fiber in your diet. Some examples of whole-grain foods are whole-wheat breads and pastas, oatmeal, brown rice, and bulgur.  Eat a variety of vegetables every day.  Include dark, leafy greens such as spinach, kale, malvin greens, and mustard greens. Eat yellow and orange vegetables such as carrots, sweet potatoes, and winter squash.   Eat a variety of fruits every day.  Choose fresh or canned fruit (canned in its own juice or light syrup) instead of juice. Fruit juice has very little or no fiber.  Eat low-fat dairy foods.  Drink fat-free (skim) milk or 1% milk. Eat fat-free yogurt and low-fat cottage cheese. Try low-fat cheeses such as mozzarella and other reduced-fat cheeses.  Choose meat and other protein foods that are low in fat.  Choose beans or other legumes such as split peas or lentils. Choose fish, skinless poultry (chicken or turkey), or lean cuts of red meat (beef or pork). Before you cook meat or poultry, cut off any visible fat.   Use less fat and oil.  Try baking foods instead of frying them. Add less fat, such as margarine,  sour cream, regular salad dressing and mayonnaise to foods. Eat fewer high-fat foods. Some examples of high-fat foods include french fries, doughnuts, ice cream, and cakes.  Eat fewer sweets.  Limit foods and drinks that are high in sugar. This includes candy, cookies, regular soda, and sweetened drinks.  Exercise:  Exercise at least 30 minutes per day on most days of the week. Some examples of exercise include walking, biking, dancing, and swimming. You can also fit in more physical activity by taking the stairs instead of the elevator or parking farther away from stores. Ask your healthcare provider about the best exercise plan for you.      © Copyright [employer] 2018 Information is for End User's use only and may not be sold, redistributed or otherwise used for commercial purposes. All illustrations and images included in CareNotes® are the copyrighted property of [employer] or [employer]

## 2024-09-05 ENCOUNTER — APPOINTMENT (INPATIENT)
Dept: RADIOLOGY | Facility: HOSPITAL | Age: 79
DRG: 862 | End: 2024-09-05
Payer: COMMERCIAL

## 2024-09-05 ENCOUNTER — HOSPITAL ENCOUNTER (INPATIENT)
Facility: HOSPITAL | Age: 79
LOS: 12 days | Discharge: HOME HEALTH | DRG: 862 | End: 2024-09-17
Attending: EMERGENCY MEDICINE | Admitting: STUDENT IN AN ORGANIZED HEALTH CARE EDUCATION/TRAINING PROGRAM
Payer: COMMERCIAL

## 2024-09-05 ENCOUNTER — HOSPITAL ENCOUNTER (OUTPATIENT)
Dept: RADIOLOGY | Facility: HOSPITAL | Age: 79
Discharge: HOME | End: 2024-09-05
Payer: COMMERCIAL

## 2024-09-05 ENCOUNTER — TELEPHONE (OUTPATIENT)
Dept: FAMILY MEDICINE CLINIC | Facility: CLINIC | Age: 79
End: 2024-09-05

## 2024-09-05 DIAGNOSIS — T81.32XA STERNAL WOUND DEHISCENCE, INITIAL ENCOUNTER: ICD-10-CM

## 2024-09-05 DIAGNOSIS — M89.8X8 STERNAL MASS: ICD-10-CM

## 2024-09-05 DIAGNOSIS — L08.9 STERNAL WOUND INFECTION: ICD-10-CM

## 2024-09-05 DIAGNOSIS — T81.32XA STERNAL WOUND DEHISCENCE, INITIAL ENCOUNTER: Primary | ICD-10-CM

## 2024-09-05 DIAGNOSIS — T88.8XXA FLUID COLLECTION AT SURGICAL SITE: ICD-10-CM

## 2024-09-05 DIAGNOSIS — S21.101A STERNAL WOUND INFECTION: ICD-10-CM

## 2024-09-05 DIAGNOSIS — Z95.1 S/P CABG X 3: ICD-10-CM

## 2024-09-05 DIAGNOSIS — T81.49XA SURGICAL SITE INFECTION: ICD-10-CM

## 2024-09-05 DIAGNOSIS — Z95.1 S/P CABG X 3: Primary | ICD-10-CM

## 2024-09-05 PROBLEM — T81.328A STERNAL WOUND DEHISCENCE: Status: ACTIVE | Noted: 2024-09-05

## 2024-09-05 LAB
ALBUMIN SERPL BCG-MCNC: 3.6 G/DL (ref 3.5–5)
ALP SERPL-CCNC: 69 U/L (ref 34–104)
ALT SERPL W P-5'-P-CCNC: 41 U/L (ref 7–52)
ANION GAP SERPL CALCULATED.3IONS-SCNC: 7 MMOL/L (ref 4–13)
AST SERPL W P-5'-P-CCNC: 31 U/L (ref 13–39)
BASOPHILS # BLD AUTO: 0.05 THOUSANDS/ΜL (ref 0–0.1)
BASOPHILS NFR BLD AUTO: 1 % (ref 0–1)
BILIRUB SERPL-MCNC: 0.53 MG/DL (ref 0.2–1)
BUN SERPL-MCNC: 20 MG/DL (ref 5–25)
CALCIUM SERPL-MCNC: 9.4 MG/DL (ref 8.4–10.2)
CHLORIDE SERPL-SCNC: 102 MMOL/L (ref 96–108)
CO2 SERPL-SCNC: 25 MMOL/L (ref 21–32)
CREAT SERPL-MCNC: 1.07 MG/DL (ref 0.6–1.3)
EOSINOPHIL # BLD AUTO: 0.23 THOUSAND/ΜL (ref 0–0.61)
EOSINOPHIL NFR BLD AUTO: 4 % (ref 0–6)
ERYTHROCYTE [DISTWIDTH] IN BLOOD BY AUTOMATED COUNT: 12.7 % (ref 11.6–15.1)
GFR SERPL CREATININE-BSD FRML MDRD: 66 ML/MIN/1.73SQ M
GLUCOSE SERPL-MCNC: 97 MG/DL (ref 65–140)
HCT VFR BLD AUTO: 30.9 % (ref 36.5–49.3)
HGB BLD-MCNC: 10.1 G/DL (ref 12–17)
IMM GRANULOCYTES # BLD AUTO: 0.04 THOUSAND/UL (ref 0–0.2)
IMM GRANULOCYTES NFR BLD AUTO: 1 % (ref 0–2)
INR PPP: 1.14 (ref 0.85–1.19)
LYMPHOCYTES # BLD AUTO: 0.97 THOUSANDS/ΜL (ref 0.6–4.47)
LYMPHOCYTES NFR BLD AUTO: 15 % (ref 14–44)
MCH RBC QN AUTO: 30.9 PG (ref 26.8–34.3)
MCHC RBC AUTO-ENTMCNC: 32.7 G/DL (ref 31.4–37.4)
MCV RBC AUTO: 95 FL (ref 82–98)
MONOCYTES # BLD AUTO: 0.87 THOUSAND/ΜL (ref 0.17–1.22)
MONOCYTES NFR BLD AUTO: 13 % (ref 4–12)
NEUTROPHILS # BLD AUTO: 4.45 THOUSANDS/ΜL (ref 1.85–7.62)
NEUTS SEG NFR BLD AUTO: 66 % (ref 43–75)
NRBC BLD AUTO-RTO: 0 /100 WBCS
PLATELET # BLD AUTO: 267 THOUSANDS/UL (ref 149–390)
PMV BLD AUTO: 8.4 FL (ref 8.9–12.7)
POTASSIUM SERPL-SCNC: 4.6 MMOL/L (ref 3.5–5.3)
PROT SERPL-MCNC: 7.4 G/DL (ref 6.4–8.4)
PROTHROMBIN TIME: 14.9 SECONDS (ref 12.3–15)
RBC # BLD AUTO: 3.27 MILLION/UL (ref 3.88–5.62)
SODIUM SERPL-SCNC: 134 MMOL/L (ref 135–147)
VIT B6 SERPL-MCNC: 6.1 UG/L (ref 3.4–65.2)
WBC # BLD AUTO: 6.61 THOUSAND/UL (ref 4.31–10.16)

## 2024-09-05 PROCEDURE — 87205 SMEAR GRAM STAIN: CPT | Performed by: STUDENT IN AN ORGANIZED HEALTH CARE EDUCATION/TRAINING PROGRAM

## 2024-09-05 PROCEDURE — 76942 ECHO GUIDE FOR BIOPSY: CPT | Performed by: RADIOLOGY

## 2024-09-05 PROCEDURE — 0H95XZZ DRAINAGE OF CHEST SKIN, EXTERNAL APPROACH: ICD-10-PCS | Performed by: THORACIC SURGERY (CARDIOTHORACIC VASCULAR SURGERY)

## 2024-09-05 PROCEDURE — 80053 COMPREHEN METABOLIC PANEL: CPT | Performed by: EMERGENCY MEDICINE

## 2024-09-05 PROCEDURE — 0J963ZZ DRAINAGE OF CHEST SUBCUTANEOUS TISSUE AND FASCIA, PERCUTANEOUS APPROACH: ICD-10-PCS | Performed by: RADIOLOGY

## 2024-09-05 PROCEDURE — 36415 COLL VENOUS BLD VENIPUNCTURE: CPT | Performed by: EMERGENCY MEDICINE

## 2024-09-05 PROCEDURE — 99285 EMERGENCY DEPT VISIT HI MDM: CPT

## 2024-09-05 PROCEDURE — 87102 FUNGUS ISOLATION CULTURE: CPT | Performed by: STUDENT IN AN ORGANIZED HEALTH CARE EDUCATION/TRAINING PROGRAM

## 2024-09-05 PROCEDURE — 10005 FNA BX W/US GDN 1ST LES: CPT

## 2024-09-05 PROCEDURE — 87070 CULTURE OTHR SPECIMN AEROBIC: CPT | Performed by: STUDENT IN AN ORGANIZED HEALTH CARE EDUCATION/TRAINING PROGRAM

## 2024-09-05 PROCEDURE — 99024 POSTOP FOLLOW-UP VISIT: CPT | Performed by: PHYSICIAN ASSISTANT

## 2024-09-05 PROCEDURE — 87075 CULTR BACTERIA EXCEPT BLOOD: CPT | Performed by: STUDENT IN AN ORGANIZED HEALTH CARE EDUCATION/TRAINING PROGRAM

## 2024-09-05 PROCEDURE — 99223 1ST HOSP IP/OBS HIGH 75: CPT | Performed by: STUDENT IN AN ORGANIZED HEALTH CARE EDUCATION/TRAINING PROGRAM

## 2024-09-05 PROCEDURE — C1729 CATH, DRAINAGE: HCPCS

## 2024-09-05 PROCEDURE — 99284 EMERGENCY DEPT VISIT MOD MDM: CPT | Performed by: INTERNAL MEDICINE

## 2024-09-05 PROCEDURE — 99285 EMERGENCY DEPT VISIT HI MDM: CPT | Performed by: EMERGENCY MEDICINE

## 2024-09-05 PROCEDURE — NC001 PR NO CHARGE: Performed by: NURSE PRACTITIONER

## 2024-09-05 PROCEDURE — 87186 SC STD MICRODIL/AGAR DIL: CPT | Performed by: STUDENT IN AN ORGANIZED HEALTH CARE EDUCATION/TRAINING PROGRAM

## 2024-09-05 PROCEDURE — 85610 PROTHROMBIN TIME: CPT | Performed by: NURSE PRACTITIONER

## 2024-09-05 PROCEDURE — 85025 COMPLETE CBC W/AUTO DIFF WBC: CPT | Performed by: EMERGENCY MEDICINE

## 2024-09-05 PROCEDURE — 71250 CT THORAX DX C-: CPT

## 2024-09-05 PROCEDURE — 10160 PNXR ASPIR ABSC HMTMA BULLA: CPT | Performed by: RADIOLOGY

## 2024-09-05 PROCEDURE — 87077 CULTURE AEROBIC IDENTIFY: CPT | Performed by: STUDENT IN AN ORGANIZED HEALTH CARE EDUCATION/TRAINING PROGRAM

## 2024-09-05 PROCEDURE — 87040 BLOOD CULTURE FOR BACTERIA: CPT | Performed by: EMERGENCY MEDICINE

## 2024-09-05 RX ORDER — PANTOPRAZOLE SODIUM 40 MG/1
40 TABLET, DELAYED RELEASE ORAL
Status: DISCONTINUED | OUTPATIENT
Start: 2024-09-06 | End: 2024-09-17 | Stop reason: HOSPADM

## 2024-09-05 RX ORDER — ESCITALOPRAM OXALATE 5 MG/1
5 TABLET ORAL DAILY
Status: DISCONTINUED | OUTPATIENT
Start: 2024-09-06 | End: 2024-09-17 | Stop reason: HOSPADM

## 2024-09-05 RX ORDER — ATORVASTATIN CALCIUM 80 MG/1
80 TABLET, FILM COATED ORAL
Status: DISCONTINUED | OUTPATIENT
Start: 2024-09-05 | End: 2024-09-17 | Stop reason: HOSPADM

## 2024-09-05 RX ORDER — HEPARIN SODIUM 5000 [USP'U]/ML
5000 INJECTION, SOLUTION INTRAVENOUS; SUBCUTANEOUS EVERY 8 HOURS SCHEDULED
Status: DISCONTINUED | OUTPATIENT
Start: 2024-09-05 | End: 2024-09-07

## 2024-09-05 RX ORDER — LIDOCAINE WITH 8.4% SOD BICARB 0.9%(10ML)
SYRINGE (ML) INJECTION AS NEEDED
Status: COMPLETED | OUTPATIENT
Start: 2024-09-05 | End: 2024-09-05

## 2024-09-05 RX ORDER — ACETAMINOPHEN 325 MG/1
650 TABLET ORAL EVERY 6 HOURS PRN
Status: DISCONTINUED | OUTPATIENT
Start: 2024-09-05 | End: 2024-09-17 | Stop reason: HOSPADM

## 2024-09-05 RX ORDER — METOPROLOL TARTRATE 25 MG/1
25 TABLET, FILM COATED ORAL 2 TIMES DAILY
Status: DISCONTINUED | OUTPATIENT
Start: 2024-09-05 | End: 2024-09-17 | Stop reason: HOSPADM

## 2024-09-05 RX ADMIN — CYANOCOBALAMIN TAB 500 MCG 1000 MCG: 500 TAB at 18:20

## 2024-09-05 RX ADMIN — HEPARIN SODIUM 5000 UNITS: 5000 INJECTION INTRAVENOUS; SUBCUTANEOUS at 21:27

## 2024-09-05 RX ADMIN — METOPROLOL TARTRATE 25 MG: 25 TABLET, FILM COATED ORAL at 18:20

## 2024-09-05 RX ADMIN — ATORVASTATIN CALCIUM 80 MG: 80 TABLET, FILM COATED ORAL at 16:47

## 2024-09-05 RX ADMIN — TICAGRELOR 90 MG: 90 TABLET ORAL at 20:41

## 2024-09-05 RX ADMIN — Medication 10 ML: at 17:51

## 2024-09-05 NOTE — ASSESSMENT & PLAN NOTE
History of recent stroke s/p CABG with no residual focal deficits  Follows neurology outpatient  Continue DAPT and statin

## 2024-09-05 NOTE — ASSESSMENT & PLAN NOTE
Patient history of CABG in 6/2024 with postoperative hospital stay complicated by stroke and post op ileus with 1 week history of progressively worsening swelling near the superior sternotomy site and low-grade fevers over the last 4 days.  Outpatient imaging as below concerning for sternal dehiscence and fluid collection for which patient was recommended ED admission.  9/4 US sternum poorly marginated complex fluid in the area measuring 4.2 x 1.3 x 1.8 cm.  9/5 CT chest: Increased lucency along the midline sternal site, concerning for developing dehiscence.  There is also increased edema throughout this region, with findings concerning for a potential for collection along the anterior aspect of the sternum.  There is also new likely reactive lymphadenopathy within the prevascular mediastinum concerning for superinfection.  CT surgery consulted,   Afebrile in the ED, no leukocytosis, appears hemodynamically stable  Follow-up blood cultures  Holding antibiotics per ID recommendations given no clinical signs of sepsis for deep cultures to guide antibiotic therapy  IR consulted for aspiration

## 2024-09-05 NOTE — ASSESSMENT & PLAN NOTE
Lab Results   Component Value Date    EGFR 66 09/05/2024    EGFR 70 09/03/2024    EGFR 56 08/27/2024    CREATININE 1.07 09/05/2024    CREATININE 1.01 09/03/2024    CREATININE 1.21 08/27/2024     Kidney function stable  Monitor

## 2024-09-05 NOTE — CONSULTS
Consultation - Infectious Disease   Shane Chau Sr. 78 y.o. male MRN: [id number]  Unit/Bed#: ED 29 Encounter: [id number]      IMPRESSION & RECOMMENDATIONS:   Impression/Recommendations:  Sternal wound infection.    In setting of recent CABG, delayed postop wound healing.  Late onset along with CT findings suggest deep infection with possible osteomyelitis.  Fortunately appears clinically stable without sepsis.    Follow closely off antibiotics until culture data can be obtained  Suspect will need operative debridement and possible wire removal  Follow up blood cultures  Follow temperatures closely  Recheck WBC in AM to monitor infection  Supportive care as per the primary service  If seems that surgery may be delayed until next week, could attempt IR-guided aspiration of collection to obtain cultures and then start on vancomycin, ceftriaxone in meantime pending ultimate surgical intervention    Recent CABG.    For CAD on 6/20/24.  Complicated by postop ileus, delayed wound healing.    CKD.    Baseline Cr 1.0-1.2.    I discussed with Dr. Rosas and TE Snowden with CT surgery the above plan to hold antibiotics pending culture data.  He agrees with the plan.    Antibiotics:  None    Thank you for this consultation.  We will follow along with you.    HISTORY OF PRESENT ILLNESS:  Reason for Consult: sternal wound infection    HPI: Shane Chau Sr. is a 78 y.o. male with multivessel CAD status post CABG x3 (2 SVG, 1 LIMA) on 6/20/24.  Postop course complicated by ileus (resulting in poor postop nutrition) and stroke-like symptoms.  Was discharged to [facility].  On 7/10 noted to have drainage from inferior aspect of sternum.  Placed on Duricef for 5 days.  This was then renewed.  Was seen as outpatient by CT surgery and noted to have ongoing purulent drainage and was placed on Bactrim.  Seen briefly by wound center and wound noted to be healed by end of July.  Approximately 5 days ago began to develop an area of  swelling over the upper sternum associated with low grade temps and fatigue.  Was seen by Cardiology 9/3 and noted to have fluctuant area over superior sternum.  Was sent for U/S which showed concern for infected fluid.  Was referred to the ED today.  Upon arrival noted to be afebrile with a normal WBC.  CT chest without contrast suggested sternal dehiscence with possible fluid collection.  Has not received antibiotics.  We are asked to comment on further evaluation and management.    In performing this consult, I have reviewed prior admission and outpatient visit records in detail.    REVIEW OF SYSTEMS:  As per HPI  A complete system-based review of systems is otherwise negative.    PAST MEDICAL HISTORY:  Past Medical History:   Diagnosis Date    AAA (abdominal aortic aneurysm) (HCC)     s/p open repair    BPH (benign prostatic hyperplasia)     CAD (coronary artery disease)     Cholelithiasis     Former tobacco use     History of MI (myocardial infarction)     History of TIA (transient ischemic attack)     Plavix in past, now on Brilinta    Hyperlipidemia     Hypertension     Insomnia     Obesity      Past Surgical History:   Procedure Laterality Date    ABDOMINAL AORTIC ANEURYSM REPAIR  06/07/2011    Dr. Yarbrough 6/7/11    APPENDECTOMY      CARDIAC CATHETERIZATION Left 06/03/2024    Procedure: Cardiac Left Heart Cath;  Surgeon: Pk Blanco DO;  [facility];  Service: Cardiology    CARDIAC CATHETERIZATION  06/03/2024    Procedure: Cardiac catheterization;  Surgeon: kP Blanco DO;  [facility];  Service: Cardiology    CARDIAC CATHETERIZATION N/A 06/03/2024    Procedure: Cardiac Coronary Angiogram;  Surgeon: Pk Blanco DO;  [facility];  Service: Cardiology    CATARACT EXTRACTION Right 11/2018    CHOLECYSTECTOMY LAPAROSCOPIC      COLONOSCOPY  11/2006    CORONARY ANGIOPLASTY      3 STENTS INSERTED    HARVEST VEIN Left 6/20/2024     Procedure: HARVEST VEIN ENDOSCOPIC (EVH);  Surgeon: KALPANA Amos MD;  [facility];  Service: Cardiac Surgery    SC CORONARY ARTERY BYP W/VEIN & ARTERY GRAFT 3 VEIN N/A 2024    Procedure: CORONARY ARTERY BYPASS GRAFT (CABG) X3 VESSELS, LIMA - LAD, LEFT LEG EVH/SVG - PDA AND OM1, W/ DEDRICK;  Surgeon: KALPANA Amos MD;  [facility];  Service: Cardiac Surgery    SC LAPS SURG CHOLECYSTECTOMY W/CHOLANGIOGRAPHY N/A 2016    Procedure: CHOLECYSTECTOMY LAPAROSCOPIC with intra-op cholangiogram ;  Surgeon: Prasanth Verdin MD;  Location: [facility];  Service: General       FAMILY HISTORY:  Non-contributory    SOCIAL HISTORY:  Social History     Substance and Sexual Activity   Alcohol Use Not Currently    Comment: 4 oz glass of wine every other day w/ his dinner     Social History     Substance and Sexual Activity   Drug Use No     Social History     Tobacco Use   Smoking Status Former    Current packs/day: 0.00    Average packs/day: 0.5 packs/day for 30.0 years (15.0 ttl pk-yrs)    Types: Cigarettes    Start date: 3/10/1956    Quit date: 3/10/1986    Years since quittin.5   Smokeless Tobacco Never   Tobacco Comments    Quit  about 25 years x1ppd       ALLERGIES:  No Known Allergies    MEDICATIONS:  All current active medications have been reviewed, including antibiotics as outlined above.    PHYSICAL EXAM:  Vitals:  Temp:  [97.4 °F (36.3 °C)] 97.4 °F (36.3 °C)  HR:  [68-74] 74  Resp:  [18] 18  BP: (142-145)/(68-84) 143/68  SpO2:  [98 %] 98 %  Temp (24hrs), Av.4 °F (36.3 °C), Min:97.4 °F (36.3 °C), Max:97.4 °F (36.3 °C)  Current: Temperature: (!) 97.4 °F (36.3 °C)     Physical Exam:  General:  Weak, fatigued male, in no acute distress  Eyes:  Conjunctive clear with no hemorrhages or effusions  Oropharynx:  No ulcers, no lesions  Neck:  Supple, no lymphadenopathy  Chest:  Swelling over superior sternal scar with faint erythema  Lungs:  Normal respiratory excursion, no  accessory muscle use  Cardiac:  Regular rate and rhythm, extremities well perfused  Abdomen:  Soft, non-tender, non-distended  Extremities:  No peripheral cyanosis, clubbing, or edema  Skin:  No rashes, no ulcers  Neurological:  Moves all four extremities spontaneously, sensation grossly intact    LABS, IMAGING, & OTHER STUDIES:  Lab Results:  I have personally reviewed pertinent labs.  Results from last 7 days   Lab Units 09/05/24  1147 09/03/24  1007   SODIUM mmol/L 134* 132*   POTASSIUM mmol/L 4.6 3.8   CHLORIDE mmol/L 102 100   CO2 mmol/L 25 25   BUN mg/dL 20 17   CREATININE mg/dL 1.07 1.01   EGFR ml/min/1.73sq m 66 70   CALCIUM mg/dL 9.4 9.3   AST U/L 31  --    ALT U/L 41  --    ALK PHOS U/L 69  --      Results from last 7 days   Lab Units 09/05/24  1147 09/03/24  1007   WBC Thousand/uL 6.61 7.45   HEMOGLOBIN g/dL 10.1* 10.5*   PLATELETS Thousands/uL 267 265           Imaging Studies:   I have personally reviewed the following radiographic images in PACS:  CT chest images reviewed increased lucency sternum with possible associated fluid collection

## 2024-09-05 NOTE — TELEPHONE ENCOUNTER
Spoke with Shane's wife Maryjo on the telephone to relay results of the CT chest.     We discussed best course of action is to go to the emergency room for further evaluation.   They are agreeable, and [place] from the radiology department [place] [facility].

## 2024-09-05 NOTE — SEDATION DOCUMENTATION
Sternal aspiration completed by Dr. Carroll. Specimen sent to lab for cultures. Sternal puncture site dressed with band-aid. Report given to Arianna AZUL.

## 2024-09-05 NOTE — BRIEF OP NOTE (RAD/CATH)
INTERVENTIONAL RADIOLOGY PROCEDURE NOTE    Date: 9/5/2024    Procedure: IR ASPIRATION  Procedure Summary       Date:  Room / Location:     Anesthesia Start:  Anesthesia Stop:     Procedure:  Diagnosis:     Scheduled Providers:  Responsible Provider:     Anesthesia Type: Not recorded ASA Status: Not recorded            Preoperative diagnosis:   1. Sternal wound dehiscence, initial encounter    2. Fluid collection at surgical site    3. S/P CABG x 3         Postoperative diagnosis: Same.    Surgeon: Meggan Carroll MD     Assistant: None. No qualified resident was available.    Blood loss: 0    Specimens:   Culture aerobic anaerobic fungal    Findings:   Anterior chest aspiration  No drainable fluid  3 cc cloudy / think yellow/pink fluid sent    Complications: None immediate.    Anesthesia: local

## 2024-09-05 NOTE — H&P
[facility]  H&P  Name: Shane Chau Sr. 78 y.o. male I MRN: [id number]  Unit/Bed#: PPHP-323-01 I Date of Admission: 9/5/2024   Date of Service: 9/5/2024 I Hospital Day: 0      Assessment & Plan   * Sternal wound dehiscence  Assessment & Plan  Patient history of CABG in 6/2024 with postoperative hospital stay complicated by stroke and post op ileus with 1 week history of progressively worsening swelling near the superior sternotomy site and low-grade fevers over the last 4 days.  Outpatient imaging as below concerning for sternal dehiscence and fluid collection for which patient was recommended ED admission.  9/4 US sternum poorly marginated complex fluid in the area measuring 4.2 x 1.3 x 1.8 cm.  9/5 CT chest: Increased lucency along the midline sternal site, concerning for developing dehiscence.  There is also increased edema throughout this region, with findings concerning for a potential for collection along the anterior aspect of the sternum.  There is also new likely reactive lymphadenopathy within the prevascular mediastinum concerning for superinfection.  CT surgery consulted,   Afebrile in the ED, no leukocytosis, appears hemodynamically stable  Follow-up blood cultures  Holding antibiotics per ID recommendations given no clinical signs of sepsis for deep cultures to guide antibiotic therapy  IR consulted for aspiration    History of stroke  Assessment & Plan  History of recent stroke s/p CABG with no residual focal deficits  Follows neurology outpatient  Continue DAPT and statin    CKD (chronic kidney disease) stage 2, GFR 60-89 ml/min  Assessment & Plan  Lab Results   Component Value Date    EGFR 66 09/05/2024    EGFR 70 09/03/2024    EGFR 56 08/27/2024    CREATININE 1.07 09/05/2024    CREATININE 1.01 09/03/2024    CREATININE 1.21 08/27/2024     Kidney function stable  Monitor    Primary hypertension  Assessment & Plan  Blood pressure stable  Continue  Lopressor    Coronary artery disease of native artery of native heart with stable angina pectoris (HCC)  Assessment & Plan  Status post CABG in 6/2024  Continue aspirin, Brilinta, statin, beta-blocker    Abdominal aortic aneurysm without rupture (HCC)  Assessment & Plan  S/p repair in 2011  Follows with Dr. Juares outpatient for surveillance         VTE Pharmacologic Prophylaxis:   Moderate Risk (Score 3-4) - Pharmacological DVT Prophylaxis Ordered: heparin.  Code Status: Level 1 - Full Code   Discussion with family: Updated  (wife) at bedside.    Anticipated Length of Stay: Patient will be admitted on an inpatient basis with an anticipated length of stay of greater than 2 midnights secondary to sternum dehiscence after recent CABG, needs CT surgery evaluation, possible underlying infection.    Total Time Spent on Date of Encounter in care of patient: 60 mins. This time was spent on one or more of the following: performing physical exam; counseling and coordination of care; obtaining or reviewing history; documenting in the medical record; reviewing/ordering tests, medications or procedures; communicating with other healthcare professionals and discussing with patient's family/caregivers.    Chief Complaint: Swelling over sternotomy site    History of Present Illness:  Shane Sandersdomingomiracle  is a 78 y.o. male with a PMH of recent CABG in 6/2024, recent stroke on DAPT, hypertension, AAA s/p repair, CKD who presents with swelling and erythema over his sternotomy site worsening over the last 1 week.  He also reports intermittent low-grade fevers over the last 4 days.  No chest pain or shortness of breath.  History of sternal wound drainage post op for which has been treated with oral antibiotics outpatient by surgical team. His PCP order US which revealed fluid collection on 9/4. Follow-up CT chest ordered by PCP was performed on 9/5 which revealed concerns for developing midline sternotomy site wound  dehiscence along with fluid collection along the anterior aspect of the sternum with reactive lymphadenopathy within the prevascular mediastinum concerning for superinfection. He was instructed to go to the ED by PCP for further evaluation.  Vital signs stable in ED.  Labs unremarkable.  ED discussed with CT surgery recommends admission for evaluation.    Review of Systems:  Review of Systems   Constitutional:  Positive for chills, fatigue and fever.   HENT:  Negative for congestion and voice change.    Eyes:  Negative for photophobia and visual disturbance.   Respiratory:  Negative for cough and shortness of breath.    Cardiovascular:  Negative for chest pain and leg swelling.   Gastrointestinal:  Negative for abdominal pain, constipation, diarrhea, nausea and vomiting.   Genitourinary:  Negative for difficulty urinating and dysuria.   Musculoskeletal:  Negative for back pain and gait problem.   Skin:  Positive for wound (Swelling over the superior sternotomy site with redness). Negative for rash.   Neurological:  Negative for light-headedness and headaches.   Psychiatric/Behavioral:  Negative for agitation and confusion.        Past Medical and Surgical History:   Past Medical History:   Diagnosis Date    AAA (abdominal aortic aneurysm) (HCC)     s/p open repair    BPH (benign prostatic hyperplasia)     CAD (coronary artery disease)     Cholelithiasis     Former tobacco use     History of MI (myocardial infarction)     History of TIA (transient ischemic attack)     Plavix in past, now on Brilinta    Hyperlipidemia     Hypertension     Insomnia     Obesity        Past Surgical History:   Procedure Laterality Date    ABDOMINAL AORTIC ANEURYSM REPAIR  06/07/2011    Dr. Yarbrough 6/7/11    APPENDECTOMY      CARDIAC CATHETERIZATION Left 06/03/2024    Procedure: Cardiac Left Heart Cath;  Surgeon: Pk Blanco DO;  [facility];  Service: Cardiology    CARDIAC CATHETERIZATION  06/03/2024     Procedure: Cardiac catheterization;  Surgeon: Pk Blanco DO;  [facility];  Service: Cardiology    CARDIAC CATHETERIZATION N/A 06/03/2024    Procedure: Cardiac Coronary Angiogram;  Surgeon: Pk Blanco DO;  [facility];  Service: Cardiology    CATARACT EXTRACTION Right 11/2018    CHOLECYSTECTOMY LAPAROSCOPIC      COLONOSCOPY  11/2006    CORONARY ANGIOPLASTY      3 STENTS INSERTED    HARVEST VEIN Left 6/20/2024    Procedure: HARVEST VEIN ENDOSCOPIC (EVH);  Surgeon: KALPANA Amos MD;  [facility];  Service: Cardiac Surgery    FL CORONARY ARTERY BYP W/VEIN & ARTERY GRAFT 3 VEIN N/A 6/20/2024    Procedure: CORONARY ARTERY BYPASS GRAFT (CABG) X3 VESSELS, LIMA - LAD, LEFT LEG EVH/SVG - PDA AND OM1, W/ DEDRICK;  Surgeon: KALPANA Amos MD;  [facility];  Service: Cardiac Surgery    FL LAPS SURG CHOLECYSTECTOMY W/CHOLANGIOGRAPHY N/A 09/08/2016    Procedure: CHOLECYSTECTOMY LAPAROSCOPIC with intra-op cholangiogram ;  Surgeon: Prasanth Verdin MD;  [facility];  Service: General       Meds/Allergies:  Prior to Admission medications    Medication Sig Start Date End Date Taking? Authorizing Provider   acetaminophen (TYLENOL) 325 mg tablet Take 2 tablets (650 mg total) by mouth every 6 (six) hours as needed for mild pain, headaches or fever  Patient taking differently: Take 650 mg by mouth every 6 (six) hours as needed for mild pain, headaches or fever As needed 7/9/24  Yes pS Hagen PA-C   aspirin (ECOTRIN LOW STRENGTH) 81 mg EC tablet Take 1 tablet (81 mg total) by mouth daily 7/9/24  Yes Sp Hagen PA-C   Cyanocobalamin (B-12) 1000 MCG SUBL Place 1 tablet (1,000 mcg total) under the tongue in the morning 8/30/24 11/28/24 Yes SANTHOSH Baeza   ergocalciferol (VITAMIN D2) 50,000 units Take 1 capsule (50,000 Units total) by mouth once a week For 6 weeks 8/30/24  Yes SANTHOSH Baeza   escitalopram  (LEXAPRO) 5 mg tablet Take 1 tablet (5 mg total) by mouth daily After 14 days, may increase to 2 tablets (10mg) daily if tolerating well. 24  Yes SANTHOSH Baeza   metoprolol tartrate (LOPRESSOR) 50 mg tablet Take 0.5 tablets (25 mg total) by mouth 2 (two) times a day 24  Yes SANTHOSH Dominguez   pantoprazole (PROTONIX) 40 mg tablet Take 1 tablet (40 mg total) by mouth daily in the early morning 24  Yes SANTHOSH Braun   rosuvastatin (CRESTOR) 40 MG tablet Take 1 tablet (40 mg total) by mouth daily 6/3/24  Yes SANTHOSH Duckworth   ticagrelor (BRILINTA) 90 MG Take 1 tablet (90 mg total) by mouth every 12 (twelve) hours 24 Yes SANTHOSH Dominguez   melatonin 3 mg Take 2 tablets (6 mg total) by mouth daily at bedtime  Patient not taking: Reported on 9/3/2024 7/9/24   Sp Hagen PA-C     I have reviewed home medications with patient personally.    Allergies: No Known Allergies    Social History:  Marital Status: /Civil Union     Substance Use History:   Social History     Substance and Sexual Activity   Alcohol Use Not Currently    Comment: 4 oz glass of wine every other day w/ his dinner     Social History     Tobacco Use   Smoking Status Former    Current packs/day: 0.00    Average packs/day: 0.5 packs/day for 30.0 years (15.0 ttl pk-yrs)    Types: Cigarettes    Start date: 3/10/1956    Quit date: 3/10/1986    Years since quittin.5   Smokeless Tobacco Never   Tobacco Comments    Quit  about 25 years x1ppd     Social History     Substance and Sexual Activity   Drug Use No       Family History:  Family History   Problem Relation Age of Onset    Heart disease Mother     Hypertension Mother         Benign Essential     Stroke Mother         Stroke     Heart disease Father     Coronary artery disease Brother     Diabetes Brother         mellitus     Heart disease Brother     Aortic aneurysm Brother     Coronary artery disease Brother     Sudden death  Brother     Coronary artery disease Brother     Sudden death Brother     Sudden death Brother     Coronary artery disease Brother     Heart disease Family        Physical Exam:     Vitals:   Blood Pressure: 141/89 (09/05/24 1626)  Pulse: 74 (09/05/24 1626)  Temperature: 97.6 °F (36.4 °C) (09/05/24 1550)  Respirations: 18 (09/05/24 1415)  SpO2: 99 % (09/05/24 1626)    Physical Exam  Vitals reviewed.   Constitutional:       General: He is not in acute distress.     Appearance: Normal appearance. He is not ill-appearing.   HENT:      Head: Normocephalic and atraumatic.   Cardiovascular:      Rate and Rhythm: Normal rate and regular rhythm.      Heart sounds: Normal heart sounds.   Pulmonary:      Effort: Pulmonary effort is normal. No respiratory distress.   Abdominal:      General: Bowel sounds are normal.      Tenderness: There is no abdominal tenderness.   Skin:     General: Skin is warm and dry.      Findings: Erythema (Mild erythema with swelling noted at the superior aspect of the sternotomy site, mildly tender to palpation, no drainage) present.   Neurological:      Mental Status: He is alert and oriented to person, place, and time. Mental status is at baseline.      Cranial Nerves: No cranial nerve deficit.      Motor: No weakness.          Additional Data:     Lab Results:  Results from last 7 days   Lab Units 09/05/24  1147   WBC Thousand/uL 6.61   HEMOGLOBIN g/dL 10.1*   HEMATOCRIT % 30.9*   PLATELETS Thousands/uL 267   SEGS PCT % 66   LYMPHO PCT % 15   MONO PCT % 13*   EOS PCT % 4     Results from last 7 days   Lab Units 09/05/24  1147   SODIUM mmol/L 134*   POTASSIUM mmol/L 4.6   CHLORIDE mmol/L 102   CO2 mmol/L 25   BUN mg/dL 20   CREATININE mg/dL 1.07   ANION GAP mmol/L 7   CALCIUM mg/dL 9.4   ALBUMIN g/dL 3.6   TOTAL BILIRUBIN mg/dL 0.53   ALK PHOS U/L 69   ALT U/L 41   AST U/L 31   GLUCOSE RANDOM mg/dL 97     Results from last 7 days   Lab Units 09/05/24  1638   INR  1.14         Lab Results    Component Value Date    HGBA1C 5.7 (H) 07/04/2024    HGBA1C 5.4 06/08/2024           Lines/Drains:  Invasive Devices       Peripheral Intravenous Line  Duration             Peripheral IV 09/05/24 Distal;Right;Upper;Ventral (anterior) Arm <1 day                        Imaging: Reviewed radiology reports from this admission including: chest CT scan and ultrasound(s)  IR aspiration only    (Results Pending)       EKG and Other Studies Reviewed on Admission:   EKG: No EKG obtained.    ** Please Note: This note has been constructed using a voice recognition system. **

## 2024-09-05 NOTE — CONSULTS
e-Consult (IPC)  - Interventional Radiology  Shane Chau Sr. 78 y.o. male MRN: [id number]  Unit/Bed#: ED 29 Encounter: [id number]      Interventional Radiology has been consulted to evaluate Shane Chau Sr.    We were consulted by Internal Medicine concerning this patient with possible sternal fluid collection.    Inpatient Consult to IR  Consult performed by: SANTHOSH Baird  Consult ordered by: Wellington Rosas DO        09/05/24    Assessment/Recommendation:     78-year-old male presenting to the emergency department secondary to swelling over his sternotomy site.  Patient is status post CABG x 3 6/20/2024 with postoperative complications of CVA (managed on DAPT) and bowel ischemia.    Past medical history includes hypertension, AAA s/p repair, CKD    Patient had ultrasound of sternum 9/4 with identification of poorly marginated complex fluid.  Subsequently, CT scan of his chest performed earlier today showing edema overlying the sternum and possible dehiscence.     Patient reports low-grade fever (99.7-100.6).  No leukocytosis noted on CBC.  Otherwise, patient appears hemodynamically stable.    Interventional radiology has been consulted for patient evaluation and possible aspiration of sternal fluid collection.    Reviewed laboratory findings and diagnostic imaging  Discussed case with Dr. Carroll and Dr. Wellington Rosas  Plan for IR aspiration of sternal fluid collection.  Date/time to be determined per IR availability  PT/INR ordered  Appreciate recs of factious disease cardiothoracic surgery  Remainder of care per cardiothoracic surgery, infectious disease and internal medicine  Please do not hesitate to contact interventional radiology for questions/concerns      11-20 minutes, >50% of the total time devoted to medical consultative verbal/EMR discussion between providers. Written report will be generated in the EMR.     Thank you for allowing Interventional Radiology to  participate in the care of Shane Chau Sr..     SANTHOSH Baird

## 2024-09-05 NOTE — ED PROVIDER NOTES
History  Chief Complaint   Patient presents with    Medical Problem     Pt got ultrasound of chest done and found abscess. Got CT scan done today and the abscess was larger than expected so pt was advised to come to ED for evaluation. Denies CP/SOB. C/o soreness when chest is touched     Patient presents for evaluation due to swelling over his sternotomy site.  Patient states that it has been getting progressively worse over the last few days.  He had an outpatient ultrasound which showed a fluid collection and concern for possible abscess.  He had a CT scan of his chest today which showed edema overlying the sternum and dehiscence of the sternum.  Patient was sent into the emergency department.  He does report having some low-grade fevers at night.  He does report some mild pain in that area.  No drainage.  No additional complaints.  Patient underwent a CABG in June of this year postoperatively complicated by stroke and bowel ischemia.  Since that time, patient has been doing well up until now.      History provided by:  Spouse and patient  Medical Problem  Location:  Chest  Associated symptoms: fever    Associated symptoms: no chest pain, no cough and no shortness of breath        Prior to Admission Medications   Prescriptions Last Dose Informant Patient Reported? Taking?   Cyanocobalamin (B-12) 1000 MCG SUBL  Self, Spouse/Significant Other No No   Sig: Place 1 tablet (1,000 mcg total) under the tongue in the morning   acetaminophen (TYLENOL) 325 mg tablet  Spouse/Significant Other, Self No No   Sig: Take 2 tablets (650 mg total) by mouth every 6 (six) hours as needed for mild pain, headaches or fever   Patient taking differently: Take 650 mg by mouth every 6 (six) hours as needed for mild pain, headaches or fever As needed   aspirin (ECOTRIN LOW STRENGTH) 81 mg EC tablet  Spouse/Significant Other, Self No No   Sig: Take 1 tablet (81 mg total) by mouth daily   ergocalciferol (VITAMIN D2) 50,000 units  Self,  Spouse/Significant Other No No   Sig: Take 1 capsule (50,000 Units total) by mouth once a week For 6 weeks   escitalopram (LEXAPRO) 5 mg tablet  Self, Spouse/Significant Other No No   Sig: Take 1 tablet (5 mg total) by mouth daily After 14 days, may increase to 2 tablets (10mg) daily if tolerating well.   melatonin 3 mg  Spouse/Significant Other, Self No No   Sig: Take 2 tablets (6 mg total) by mouth daily at bedtime   Patient not taking: Reported on 9/3/2024   metoprolol tartrate (LOPRESSOR) 50 mg tablet  Spouse/Significant Other, Self No No   Sig: Take 0.5 tablets (25 mg total) by mouth 2 (two) times a day   pantoprazole (PROTONIX) 40 mg tablet  Spouse/Significant Other, Self No No   Sig: Take 1 tablet (40 mg total) by mouth daily in the early morning   rosuvastatin (CRESTOR) 40 MG tablet  Spouse/Significant Other, Self No No   Sig: Take 1 tablet (40 mg total) by mouth daily   ticagrelor (BRILINTA) 90 MG  Spouse/Significant Other, Self No No   Sig: Take 1 tablet (90 mg total) by mouth every 12 (twelve) hours      Facility-Administered Medications: None       Past Medical History:   Diagnosis Date    AAA (abdominal aortic aneurysm) (HCC)     s/p open repair    BPH (benign prostatic hyperplasia)     CAD (coronary artery disease)     Cholelithiasis     Former tobacco use     History of MI (myocardial infarction)     History of TIA (transient ischemic attack)     Plavix in past, now on Brilinta    Hyperlipidemia     Hypertension     Insomnia     Obesity        Past Surgical History:   Procedure Laterality Date    ABDOMINAL AORTIC ANEURYSM REPAIR  06/07/2011    Dr. Yarbrough 6/7/11    APPENDECTOMY      CARDIAC CATHETERIZATION Left 06/03/2024    Procedure: Cardiac Left Heart Cath;  Surgeon: Pk Blanco DO;  Location: [facility];  Service: Cardiology    CARDIAC CATHETERIZATION  06/03/2024    Procedure: Cardiac catheterization;  Surgeon: Pk Blanco DO;  Location: [facility];  Service:  Cardiology    CARDIAC CATHETERIZATION N/A 06/03/2024    Procedure: Cardiac Coronary Angiogram;  Surgeon: Pk Blanco DO;  Location: [facility];  Service: Cardiology    CATARACT EXTRACTION Right 11/2018    CHOLECYSTECTOMY LAPAROSCOPIC      COLONOSCOPY  11/2006    CORONARY ANGIOPLASTY      3 STENTS INSERTED    HARVEST VEIN Left 6/20/2024    Procedure: HARVEST VEIN ENDOSCOPIC ([facility]);  Surgeon: KALPANA Amos MD;  Location: [facility];  Service: Cardiac Surgery    AL CORONARY ARTERY BYP W/VEIN & ARTERY GRAFT 3 VEIN N/A 6/20/2024    Procedure: CORONARY ARTERY BYPASS GRAFT (CABG) X3 VESSELS, LIMA - LAD, LEFT LEG EVH/SVG - PDA AND OM1, W/ DEDRICK;  Surgeon: KALPANA Amos MD;  Location: [facility];  Service: Cardiac Surgery    AL LAPS SURG CHOLECYSTECTOMY W/CHOLANGIOGRAPHY N/A 09/08/2016    Procedure: CHOLECYSTECTOMY LAPAROSCOPIC with intra-op cholangiogram ;  Surgeon: Prasanth Verdin MD;  Location: [facility];  Service: General       Family History   Problem Relation Age of Onset    Heart disease Mother     Hypertension Mother         Benign Essential     Stroke Mother         Stroke     Heart disease Father     Coronary artery disease Brother     Diabetes Brother         mellitus     Heart disease Brother     Aortic aneurysm Brother     Coronary artery disease Brother     Sudden death Brother     Coronary artery disease Brother     Sudden death Brother     Sudden death Brother     Coronary artery disease Brother     Heart disease Family      I have reviewed and agree with the history as documented.    E-Cigarette/Vaping    E-Cigarette Use Never User      E-Cigarette/Vaping Substances    Nicotine No     THC No     CBD No     Flavoring No      Social History     Tobacco Use    Smoking status: Former     Current packs/day: 0.00     Average packs/day: 0.5 packs/day for 30.0 years (15.0 ttl pk-yrs)     Types: Cigarettes     Start date: 3/10/1956     Quit date: 3/10/1986     Years since quitting:  38.5    Smokeless tobacco: Never    Tobacco comments:     Quit 1980's about 25 years x1ppd   Vaping Use    Vaping status: Never Used   Substance Use Topics    Alcohol use: Not Currently     Comment: 4 oz glass of wine every other day w/ his dinner    Drug use: No       Review of Systems   Constitutional:  Positive for fever. Negative for chills.   Respiratory:  Negative for cough and shortness of breath.    Cardiovascular:  Negative for chest pain.   Neurological:  Negative for dizziness and weakness.   Psychiatric/Behavioral:  Negative for agitation.    All other systems reviewed and are negative.      Physical Exam  Physical Exam  Vitals and nursing note reviewed.   Constitutional:       General: He is not in acute distress.     Appearance: Normal appearance. He is not ill-appearing.   HENT:      Head: Normocephalic and atraumatic.      Mouth/Throat:      Mouth: Mucous membranes are moist.      Pharynx: Oropharynx is clear.   Eyes:      Extraocular Movements: Extraocular movements intact.      Pupils: Pupils are equal, round, and reactive to light.   Cardiovascular:      Rate and Rhythm: Normal rate and regular rhythm.   Pulmonary:      Effort: Pulmonary effort is normal. No respiratory distress.      Comments: Sternal wound dry and intact with swelling and erythema over the superior portion  Abdominal:      General: Abdomen is flat.      Palpations: Abdomen is soft.   Musculoskeletal:         General: No swelling. Normal range of motion.   Skin:     General: Skin is warm and dry.   Neurological:      General: No focal deficit present.      Mental Status: He is alert and oriented to person, place, and time.   Psychiatric:         Mood and Affect: Mood normal.         Behavior: Behavior normal.         Vital Signs  ED Triage Vitals [09/05/24 1110]   Temperature Pulse Respirations Blood Pressure SpO2   (!) 97.4 °F (36.3 °C) 72 18 142/84 98 %      Temp src Heart Rate Source Patient Position - Orthostatic VS BP  Location FiO2 (%)   -- -- -- -- --      Pain Score       --           Vitals:    09/05/24 1110   BP: 142/84   Pulse: 72         Visual Acuity      ED Medications  Medications - No data to display    Diagnostic Studies  Results Reviewed       Procedure Component Value Units Date/Time    CBC and differential [[id number]]  (Abnormal) Collected: 09/05/24 1147    Lab Status: Final result Specimen: Blood from Arm, Right Updated: 09/05/24 1211     WBC 6.61 Thousand/uL      RBC 3.27 Million/uL      Hemoglobin 10.1 g/dL      Hematocrit 30.9 %      MCV 95 fL      MCH 30.9 pg      MCHC 32.7 g/dL      RDW 12.7 %      MPV 8.4 fL      Platelets 267 Thousands/uL      nRBC 0 /100 WBCs      Segmented % 66 %      Immature Grans % 1 %      Lymphocytes % 15 %      Monocytes % 13 %      Eosinophils Relative 4 %      Basophils Relative 1 %      Absolute Neutrophils 4.45 Thousands/µL      Absolute Immature Grans 0.04 Thousand/uL      Absolute Lymphocytes 0.97 Thousands/µL      Absolute Monocytes 0.87 Thousand/µL      Eosinophils Absolute 0.23 Thousand/µL      Basophils Absolute 0.05 Thousands/µL     Comprehensive metabolic panel [[id number]] Collected: 09/05/24 1147    Lab Status: In process Specimen: Blood from Arm, Right Updated: 09/05/24 1202    Blood culture #1 [[id number]] Collected: 09/05/24 1151    Lab Status: In process Specimen: Blood from Arm, Left Updated: 09/05/24 1158    Blood culture #2 [[id number]] Collected: 09/05/24 1147    Lab Status: In process Specimen: Blood from Arm, Right Updated: 09/05/24 1158                   No orders to display              Procedures  Procedures         ED Course                                 SBIRT 22yo+      Flowsheet Row Most Recent Value   Initial Alcohol Screen: US AUDIT-C     1. How often do you have a drink containing alcohol? 0 Filed at: 09/05/2024 1141   2. How many drinks containing alcohol do you have on a typical day you are drinking?  0 Filed at: 09/05/2024 1141   3b. FEMALE Any Age,  or MALE 65+: How often do you have 4 or more drinks on one occassion? 0 Filed at: 09/05/2024 1141   Audit-C Score 0 Filed at: 09/05/2024 1141   RIGOBERTO: How many times in the past year have you...    Used an illegal drug or used a prescription medication for non-medical reasons? Never Filed at: 09/05/2024 1141                      Medical Decision Making  Concern for possible sternal wound infection.  Will send cultures and labs.  Will plan to admit.  Will hold on antibiotics until results or evaluated by CT surgery.    Amount and/or Complexity of Data Reviewed  Labs: ordered.  Discussion of management or test interpretation with external provider(s): Discussed with CT surgery who recommends admission to medicine with CT surgery consult.                 Disposition  Final diagnoses:   Sternal wound dehiscence, initial encounter     Time reflects when diagnosis was documented in both MDM as applicable and the Disposition within this note       Time User Action Codes Description Comment    9/5/2024 11:46 AM Dano Leonard Add [T81.32XA] Sternal wound dehiscence, initial encounter           ED Disposition       ED Disposition   Admit    Condition   Stable    Date/Time   Thu Sep 5, 2024 1219    Comment   Case was discussed with MARLENY and the patient's admission status was agreed to be Admission Status: inpatient status to the service of Dr. Rosas .               Follow-up Information    None         Patient's Medications   Discharge Prescriptions    No medications on file       No discharge procedures on file.    PDMP Review         Value Time User    PDMP Reviewed  Yes 7/16/2024  9:42 PM Ashley Depadua, MD            ED Provider  Electronically Signed by             Dano Leonard MD  09/05/24 1216

## 2024-09-05 NOTE — CONSULTS
Consultation - Cardiothoracic Surgery   Shane Chau Sr. 78 y.o. male MRN: [id number]  Unit/Bed#: ED 29 Encounter: [id number]    Physician Requesting Consult: Wellington Rosas DO    Reason for Consult / Principal Problem: Possible sternal wound infection    Inpatient consult to Cardiac Surgery  Consult performed by: Marcela Hodges PA-C  Consult ordered by: Dano Leonard MD        History of Present Illness: Shane Chau Sr. is a 78 y.o. male known to our service who underwent CABG x 3 on 6/20/24 with a prolonged postop course, complicated by ileus and stroke. He was ultimately discharged to [facility] on 7/9 and was seen by our service on 7/10 for murky drainage from his sternal incision. He was given oral antibiotics and discharged from [facility] on 7/17/24. He was seen in our office for follow-up on 7/19 and 8/1. He completed two courses of oral Duricef, one course of Bactrim and was evaluated and treated by wound care, then discharged from wound care with improvement in his wound.     His most recent office visit with Dr. Amos was on 8/16 and his incision was well healed and he was discharged from our care. He had an outpt cardiologist visit 9/3 and reported fever x 3 days with URI symptoms and sternal tenderness. He was evaluated by his PCP yesterday, 9/4, who ordered US soft tissue sternum, which demonstrated fluid collection concerning for sternal infection. CT chest ordered and completed today, demonstrating concern for developing dehiscence and fluid collection of anterior sternum with concern for superinfection. Pt was instructed by his PCP to proceed to ER for evaluation.     The patient presents to [facility] ER for evaluation and treatment. He reports low grade fevers, ranging 99.7-100.6 at home, admits to chills, and fatigue worse than usual. Currently afebrile with themp 97.2 in ED, vitals stable with /81, 98% on RA. Labs stable, WBC 6.6. Cardiac surgical consultation has been  requested for eval of wound.     On exam, awake/alert, no distress. Incision is intact without drainage. On superior right side side of incision, there is an area of erythema approx 4x4 cm, that is warm and tender to palpation.     Past Medical History:  Past Medical History:   Diagnosis Date    AAA (abdominal aortic aneurysm) (HCC)     s/p open repair    BPH (benign prostatic hyperplasia)     CAD (coronary artery disease)     Cholelithiasis     Former tobacco use     History of MI (myocardial infarction)     History of TIA (transient ischemic attack)     Plavix in past, now on Brilinta    Hyperlipidemia     Hypertension     Insomnia     Obesity          Past Surgical History:   Past Surgical History:   Procedure Laterality Date    ABDOMINAL AORTIC ANEURYSM REPAIR  06/07/2011    Dr. Yarbrough 6/7/11    APPENDECTOMY      CARDIAC CATHETERIZATION Left 06/03/2024    Procedure: Cardiac Left Heart Cath;  Surgeon: Pk Blanco DO;  Location: [facility];  Service: Cardiology    CARDIAC CATHETERIZATION  06/03/2024    Procedure: Cardiac catheterization;  Surgeon: Pk Blanco DO;  Location: [facility];  Service: Cardiology    CARDIAC CATHETERIZATION N/A 06/03/2024    Procedure: Cardiac Coronary Angiogram;  Surgeon: Pk Blanco DO;  Location: [facility];  Service: Cardiology    CATARACT EXTRACTION Right 11/2018    CHOLECYSTECTOMY LAPAROSCOPIC      COLONOSCOPY  11/2006    CORONARY ANGIOPLASTY      3 STENTS INSERTED    HARVEST VEIN Left 6/20/2024    Procedure: HARVEST VEIN ENDOSCOPIC (EVH);  Surgeon: KALPANA Amos MD;  Location: [facility];  Service: Cardiac Surgery    CA CORONARY ARTERY BYP W/VEIN & ARTERY GRAFT 3 VEIN N/A 6/20/2024    Procedure: CORONARY ARTERY BYPASS GRAFT (CABG) X3 VESSELS, LIMA - LAD, LEFT LEG EVH/SVG - PDA AND OM1, W/ DEDRICK;  Surgeon: KALPANA Amos MD;  Location: [facility];  Service: Cardiac Surgery    CA LAPS SURG CHOLECYSTECTOMY  W/CHOLANGIOGRAPHY N/A 2016    Procedure: CHOLECYSTECTOMY LAPAROSCOPIC with intra-op cholangiogram ;  Surgeon: Prasanth Verdin MD;  Location: [facility];  Service: General         Family History:  Family History   Problem Relation Age of Onset    Heart disease Mother     Hypertension Mother         Benign Essential     Stroke Mother         Stroke     Heart disease Father     Coronary artery disease Brother     Diabetes Brother         mellitus     Heart disease Brother     Aortic aneurysm Brother     Coronary artery disease Brother     Sudden death Brother     Coronary artery disease Brother     Sudden death Brother     Sudden death Brother     Coronary artery disease Brother     Heart disease Family          Social History:  Social History     Substance and Sexual Activity   Alcohol Use Not Currently    Comment: 4 oz glass of wine every other day w/ his dinner     Social History     Substance and Sexual Activity   Drug Use No     Social History     Tobacco Use   Smoking Status Former    Current packs/day: 0.00    Average packs/day: 0.5 packs/day for 30.0 years (15.0 ttl pk-yrs)    Types: Cigarettes    Start date: 3/10/1956    Quit date: 3/10/1986    Years since quittin.5   Smokeless Tobacco Never   Tobacco Comments    Quit  about 25 years x1ppd     Marital Status: /Civil Union      Home Medications:   Prior to Admission medications    Medication Sig Start Date End Date Taking? Authorizing Provider   acetaminophen (TYLENOL) 325 mg tablet Take 2 tablets (650 mg total) by mouth every 6 (six) hours as needed for mild pain, headaches or fever  Patient taking differently: Take 650 mg by mouth every 6 (six) hours as needed for mild pain, headaches or fever As needed 24   Sp Hagen PA-C   aspirin (ECOTRIN LOW STRENGTH) 81 mg EC tablet Take 1 tablet (81 mg total) by mouth daily 24   Sp Hagen PA-C   Cyanocobalamin (B-12) 1000 MCG SUBL Place 1 tablet (1,000 mcg total) under the  tongue in the morning 8/30/24 11/28/24  SANTHOSH Baeza   ergocalciferol (VITAMIN D2) 50,000 units Take 1 capsule (50,000 Units total) by mouth once a week For 6 weeks 8/30/24   SANTHOSH Baeza   escitalopram (LEXAPRO) 5 mg tablet Take 1 tablet (5 mg total) by mouth daily After 14 days, may increase to 2 tablets (10mg) daily if tolerating well. 8/26/24   SANTHOSH Baeza   melatonin 3 mg Take 2 tablets (6 mg total) by mouth daily at bedtime  Patient not taking: Reported on 9/3/2024 7/9/24   Sp Hagen PA-C   metoprolol tartrate (LOPRESSOR) 50 mg tablet Take 0.5 tablets (25 mg total) by mouth 2 (two) times a day 8/1/24   SANTHOSH Dominguez   pantoprazole (PROTONIX) 40 mg tablet Take 1 tablet (40 mg total) by mouth daily in the early morning 7/18/24   SANTHOSH Braun   rosuvastatin (CRESTOR) 40 MG tablet Take 1 tablet (40 mg total) by mouth daily 6/3/24   SANTHOSH Duckworth   ticagrelor (BRILINTA) 90 MG Take 1 tablet (90 mg total) by mouth every 12 (twelve) hours 8/1/24 7/27/25  SANTHOSH Dominguez       Inpatient Medications:  Scheduled Meds:   Current Facility-Administered Medications   Medication Dose Route Frequency Provider Last Rate    acetaminophen  650 mg Oral Q6H PRN Juliuskumar Rosas, DO      [START ON 9/6/2024] aspirin  81 mg Oral Daily Harnishkumar Rosas, DO      atorvastatin  80 mg Oral Daily With Dinner Harnishkumar Rosas, DO      cyanocobalamin  1,000 mcg Oral QPM Mynornishkumar Rosas, DO      [START ON 9/6/2024] escitalopram  5 mg Oral Daily Harnishkumar Rosas, DO      heparin (porcine)  5,000 Units Subcutaneous Q8H [facility] Mynornishkumar Rosas, DO      metoprolol tartrate  25 mg Oral BID Harnishkumar Rosas, DO      [START ON 9/6/2024] pantoprazole  40 mg Oral Early Morning Harnishkumar Rsoas, DO      ticagrelor  90 mg Oral Q12H KRISTI Wellington Rosas DO       Continuous Infusions:      PRN Meds:  acetaminophen, 650 mg, Q6H PRN        Allergies:  No Known  Allergies    Review of Systems:  Review of Systems   Constitutional:  Positive for chills, fatigue and fever.   HENT:  Negative for trouble swallowing.    Eyes:  Negative for visual disturbance.   Respiratory:  Negative for chest tightness and shortness of breath.    Cardiovascular:  Negative for chest pain and leg swelling.   Gastrointestinal:  Negative for abdominal distention, abdominal pain, blood in stool, nausea and vomiting.   Genitourinary:  Negative for hematuria.   Musculoskeletal:         Sternal discomfort   Skin:  Positive for wound. Negative for color change.   Neurological:  Negative for syncope.   Psychiatric/Behavioral:  Negative for agitation and behavioral problems.        Vital Signs:     Vitals:    09/05/24 1110 09/05/24 1313 09/05/24 1415   BP: 142/84 145/81 143/68   Pulse: 72 68 74   Resp: 18  18   Temp: (!) 97.4 °F (36.3 °C)     SpO2: 98% 98% 98%     Invasive Devices       Peripheral Intravenous Line  Duration             Peripheral IV 09/05/24 Distal;Right;Upper;Ventral (anterior) Arm <1 day                  Physical Exam  Constitutional:       General: He is not in acute distress.     Appearance: Normal appearance. He is not ill-appearing or toxic-appearing.   HENT:      Head: Normocephalic and atraumatic.      Nose: Nose normal.      Mouth/Throat:      Mouth: Mucous membranes are moist.      Pharynx: Oropharynx is clear.   Cardiovascular:      Rate and Rhythm: Normal rate.      Pulses: Normal pulses.      Heart sounds: Normal heart sounds. No murmur heard.  Pulmonary:      Effort: Pulmonary effort is normal. No respiratory distress.      Breath sounds: Normal breath sounds. No wheezing or rales.   Chest:      Comments: Median sternotomy wound intact without drainage  Erythema at superior right area approx 4x4 cm that warm and tender to  palpation  Abdominal:      General: Abdomen is flat. Bowel sounds are normal. There is no distension.      Tenderness: There is no abdominal tenderness.    Musculoskeletal:      Cervical back: Neck supple. No tenderness.      Right lower leg: No edema.      Left lower leg: No edema.   Skin:     General: Skin is warm and dry.   Neurological:      General: No focal deficit present.      Mental Status: He is alert and oriented to person, place, and time.   Psychiatric:         Mood and Affect: Mood normal.         Behavior: Behavior normal.         Lab Results:     Results from last 7 days   Lab Units 09/05/24  1147 09/03/24  1007   WBC Thousand/uL 6.61 7.45   HEMOGLOBIN g/dL 10.1* 10.5*   HEMATOCRIT % 30.9* 32.4*   PLATELETS Thousands/uL 267 265     Results from last 7 days   Lab Units 09/05/24  1147 09/03/24  1007   POTASSIUM mmol/L 4.6 3.8   CHLORIDE mmol/L 102 100   CO2 mmol/L 25 25   BUN mg/dL 20 17   CREATININE mg/dL 1.07 1.01   CALCIUM mg/dL 9.4 9.3         Lab Results   Component Value Date    HGBA1C 5.7 (H) 07/04/2024     Lab Results   Component Value Date    TROPONINI <0.02 12/03/2015       Imaging Studies:     CT chest: 1.  There is increased lucency along the midline sternotomy site, concerning for developing dehiscence. There is also increased edema throughout this region, with findings concerning for a potential fluid collection along the anterior aspect of the   sternum. There is also new likely reactive lymphadenopathy within the prevascular mediastinum, when compared with the CTA from 7/3/2024. These findings are concerning for superinfection.    US soft tissue lump sternum: Focal poorly marginated complex fluid in the area of concern measuring approximately 4.2 x 1.3 x 1.8 cm in size. The depth of this abnormality cannot be determined secondary to underlying bone, presence or absence of osseous involvement cannot be accurately assessed. Given clinical history of fever and palpable abnormality, this does remain concerning for potential sternal wound infection with complex potentially infected fluid. Fluid aspiration should be considered. CT of chest may  be of further  benefit as well to determine depth of involvement.    I have personally reviewed pertinent reports.   and I have personally reviewed pertinent films in PACS    Assessment:  Principal Problem:    Sternal wound dehiscence  Active Problems:    Abdominal aortic aneurysm without rupture (HCC)    Coronary artery disease of native artery of native heart with stable angina pectoris (HCC)    Primary hypertension    CKD (chronic kidney disease) stage 2, GFR 60-89 ml/min    History of stroke    Impression:  - sternal wound infection, recurrent  - s/p outpatient two courses of oral antibiotics and outpatient wound care  - s/p CABG x 3 6/20/24 by Dr. Amos    Plan:  - admit to [facility], patient non toxic at this time  - consult ID for antibiotic recommendations  - follow up blood cultures  - no sternal drainage to culture at this time. Consider IR aspiration of small collection above sternum if deemed helpful per ID.   - CT chest to be reviewed by surgeon to determine if surgical debridement necessary  - further recommendations to follow    Shane Chau Sr. was comfortable with our recommendations, and their questions were answered to their satisfaction.  We will continue to evaluate the patient daily with further recommendations as work up is completed.  Thank you for allowing us to participate in the care of this patient.     SIGNATURE: Sp Hagen PA-C  DATE: September 5, 2024  TIME: 2:43 PM    * This note was completed in part utilizing [employer] direct voice recognition software.   Grammatical errors, random word insertion, spelling mistakes, and incomplete sentences may be an occasional consequence of the system secondary to software limitations, ambient noise and hardware issues. At the time of dictation, efforts were made to edit, clarify and /or correct errors. Please read the chart carefully and recognize, using context, where substitutions have occurred.  If you have any questions  or concerns about the context, text or information contained within the body of this dictation, please contact myself, the provider, for further clarification.

## 2024-09-06 LAB
ANION GAP SERPL CALCULATED.3IONS-SCNC: 10 MMOL/L (ref 4–13)
BASOPHILS # BLD AUTO: 0.05 THOUSANDS/ΜL (ref 0–0.1)
BASOPHILS NFR BLD AUTO: 1 % (ref 0–1)
BUN SERPL-MCNC: 17 MG/DL (ref 5–25)
CALCIUM SERPL-MCNC: 9.2 MG/DL (ref 8.4–10.2)
CHLORIDE SERPL-SCNC: 102 MMOL/L (ref 96–108)
CO2 SERPL-SCNC: 23 MMOL/L (ref 21–32)
CREAT SERPL-MCNC: 0.92 MG/DL (ref 0.6–1.3)
EOSINOPHIL # BLD AUTO: 0.32 THOUSAND/ΜL (ref 0–0.61)
EOSINOPHIL NFR BLD AUTO: 4 % (ref 0–6)
ERYTHROCYTE [DISTWIDTH] IN BLOOD BY AUTOMATED COUNT: 12.7 % (ref 11.6–15.1)
GFR SERPL CREATININE-BSD FRML MDRD: 79 ML/MIN/1.73SQ M
GLUCOSE SERPL-MCNC: 93 MG/DL (ref 65–140)
HCT VFR BLD AUTO: 25.6 % (ref 36.5–49.3)
HGB BLD-MCNC: 8.5 G/DL (ref 12–17)
IMM GRANULOCYTES # BLD AUTO: 0.03 THOUSAND/UL (ref 0–0.2)
IMM GRANULOCYTES NFR BLD AUTO: 0 % (ref 0–2)
LYMPHOCYTES # BLD AUTO: 1.36 THOUSANDS/ΜL (ref 0.6–4.47)
LYMPHOCYTES NFR BLD AUTO: 18 % (ref 14–44)
MCH RBC QN AUTO: 31.1 PG (ref 26.8–34.3)
MCHC RBC AUTO-ENTMCNC: 33.2 G/DL (ref 31.4–37.4)
MCV RBC AUTO: 94 FL (ref 82–98)
MONOCYTES # BLD AUTO: 0.84 THOUSAND/ΜL (ref 0.17–1.22)
MONOCYTES NFR BLD AUTO: 11 % (ref 4–12)
NEUTROPHILS # BLD AUTO: 4.92 THOUSANDS/ΜL (ref 1.85–7.62)
NEUTS SEG NFR BLD AUTO: 66 % (ref 43–75)
NRBC BLD AUTO-RTO: 0 /100 WBCS
PLATELET # BLD AUTO: 279 THOUSANDS/UL (ref 149–390)
PMV BLD AUTO: 8.4 FL (ref 8.9–12.7)
POTASSIUM SERPL-SCNC: 3.9 MMOL/L (ref 3.5–5.3)
RBC # BLD AUTO: 2.73 MILLION/UL (ref 3.88–5.62)
SODIUM SERPL-SCNC: 135 MMOL/L (ref 135–147)
WBC # BLD AUTO: 7.52 THOUSAND/UL (ref 4.31–10.16)

## 2024-09-06 PROCEDURE — 99232 SBSQ HOSP IP/OBS MODERATE 35: CPT | Performed by: INTERNAL MEDICINE

## 2024-09-06 PROCEDURE — 80048 BASIC METABOLIC PNL TOTAL CA: CPT | Performed by: STUDENT IN AN ORGANIZED HEALTH CARE EDUCATION/TRAINING PROGRAM

## 2024-09-06 PROCEDURE — 85025 COMPLETE CBC W/AUTO DIFF WBC: CPT | Performed by: STUDENT IN AN ORGANIZED HEALTH CARE EDUCATION/TRAINING PROGRAM

## 2024-09-06 PROCEDURE — 99233 SBSQ HOSP IP/OBS HIGH 50: CPT | Performed by: INTERNAL MEDICINE

## 2024-09-06 RX ADMIN — HEPARIN SODIUM 5000 UNITS: 5000 INJECTION INTRAVENOUS; SUBCUTANEOUS at 05:17

## 2024-09-06 RX ADMIN — ATORVASTATIN CALCIUM 80 MG: 80 TABLET, FILM COATED ORAL at 18:08

## 2024-09-06 RX ADMIN — ESCITALOPRAM OXALATE 5 MG: 5 TABLET, FILM COATED ORAL at 09:03

## 2024-09-06 RX ADMIN — TICAGRELOR 90 MG: 90 TABLET ORAL at 09:03

## 2024-09-06 RX ADMIN — PANTOPRAZOLE SODIUM 40 MG: 40 TABLET, DELAYED RELEASE ORAL at 05:17

## 2024-09-06 RX ADMIN — TICAGRELOR 90 MG: 90 TABLET ORAL at 20:37

## 2024-09-06 RX ADMIN — ASPIRIN 81 MG: 81 TABLET, COATED ORAL at 09:03

## 2024-09-06 RX ADMIN — METOPROLOL TARTRATE 25 MG: 25 TABLET, FILM COATED ORAL at 18:08

## 2024-09-06 RX ADMIN — CYANOCOBALAMIN TAB 500 MCG 1000 MCG: 500 TAB at 18:08

## 2024-09-06 RX ADMIN — HEPARIN SODIUM 5000 UNITS: 5000 INJECTION INTRAVENOUS; SUBCUTANEOUS at 14:44

## 2024-09-06 RX ADMIN — HEPARIN SODIUM 5000 UNITS: 5000 INJECTION INTRAVENOUS; SUBCUTANEOUS at 21:49

## 2024-09-06 RX ADMIN — METOPROLOL TARTRATE 25 MG: 25 TABLET, FILM COATED ORAL at 09:03

## 2024-09-06 NOTE — PROGRESS NOTES
[facility]  Progress Note  Name: Shane Chau Sr. I  MRN: [id number]  Unit/Bed#: PPHP-323-01 I Date of Admission: 9/5/2024   Date of Service: 9/6/2024 I Hospital Day: 1    Assessment & Plan   * Sternal wound dehiscence  Assessment & Plan  Patient history of CABG in 6/2024 with postoperative hospital stay complicated by stroke and post op ileus with 1 week history of progressively worsening swelling near the superior sternotomy site and low-grade fevers over the last 4 days.  Outpatient imaging as below concerning for sternal dehiscence and fluid collection for which patient was recommended ED admission.  9/4 US sternum poorly marginated complex fluid in the area measuring 4.2 x 1.3 x 1.8 cm.  9/5 CT chest: Increased lucency along the midline sternal site, concerning for developing dehiscence.  There is also increased edema throughout this region, with findings concerning for a potential for collection along the anterior aspect of the sternum.  There is also new likely reactive lymphadenopathy within the prevascular mediastinum concerning for superinfection.  CT surgery consulted,   Afebrile in the ED, no leukocytosis, appears hemodynamically stable  Follow-up blood cultures  Holding antibiotics per ID recommendations given no clinical signs of sepsis for deep cultures to guide antibiotic therapy  IR consulted fs/p aspiration  With cardiothoracic surgery.  Will wait culture results from IR intervention.    History of stroke  Assessment & Plan  History of recent stroke s/p CABG with no residual focal deficits  Follows neurology outpatient  Continue DAPT and statin    CKD (chronic kidney disease) stage 2, GFR 60-89 ml/min  Assessment & Plan  Lab Results   Component Value Date    EGFR 79 09/06/2024    EGFR 66 09/05/2024    EGFR 70 09/03/2024    CREATININE 0.92 09/06/2024    CREATININE 1.07 09/05/2024    CREATININE 1.01 09/03/2024     Kidney function stable  Monitor    Primary  hypertension  Assessment & Plan  Blood pressure stable  Continue Lopressor    Coronary artery disease of native artery of native heart with stable angina pectoris (HCC)  Assessment & Plan  Status post CABG in 2024  Continue aspirin, Brilinta, statin, beta-blocker    Abdominal aortic aneurysm without rupture (HCC)  Assessment & Plan  S/p repair in   Follows with Dr. uJares outpatient for surveillance           VTE Pharmacologic Prophylaxis:   Pharmacologic: Heparin  Mechanical VTE Prophylaxis in Place: No    Patient Centered Rounds: I have performed bedside rounds with nursing staff today.      Time Spent for Care: 1 hour.  More than 50% of total time spent on counseling and coordination of care as described above.    Current Length of Stay: 1 day(s)        Code Status: Level 1 - Full Code      Subjective:   nad    Objective:     Vitals:   Temp (24hrs), Av.4 °F (36.9 °C), Min:97.4 °F (36.3 °C), Max:99.2 °F (37.3 °C)    Temp:  [97.4 °F (36.3 °C)-99.2 °F (37.3 °C)] 99.2 °F (37.3 °C)  HR:  [68-88] 88  Resp:  [16-18] 16  BP: (128-179)/(68-92) 143/90  SpO2:  [96 %-99 %] 96 %  There is no height or weight on file to calculate BMI.     Input and Output Summary (last 24 hours):       Intake/Output Summary (Last 24 hours) at 2024 0923  Last data filed at 2024 0742  Gross per 24 hour   Intake 640 ml   Output 700 ml   Net -60 ml       Physical Exam:     Physical Exam  HENT:      Head: Normocephalic and atraumatic.   Cardiovascular:      Rate and Rhythm: Normal rate and regular rhythm.   Pulmonary:      Effort: Pulmonary effort is normal.      Breath sounds: Normal breath sounds.         Additional Data:     Labs:    Results from last 7 days   Lab Units 24  0447   WBC Thousand/uL 7.52   HEMOGLOBIN g/dL 8.5*   HEMATOCRIT % 25.6*   PLATELETS Thousands/uL 279   SEGS PCT % 66   LYMPHO PCT % 18   MONO PCT % 11   EOS PCT % 4     Results from last 7 days   Lab Units 24  0447 24  1147   POTASSIUM  mmol/L 3.9 4.6   CHLORIDE mmol/L 102 102   CO2 mmol/L 23 25   BUN mg/dL 17 20   CREATININE mg/dL 0.92 1.07   CALCIUM mg/dL 9.2 9.4   ALK PHOS U/L  --  69   ALT U/L  --  41   AST U/L  --  31     Results from last 7 days   Lab Units 09/05/24  1638   INR  1.14       Recent Cultures (last 7 days):     Results from last 7 days   Lab Units 09/06/24  0908 09/05/24  1151 09/05/24  1147   BLOOD CULTURE   --  Received in Microbiology Lab. Culture in Progress. Received in Microbiology Lab. Culture in Progress.   GRAM STAIN RESULT  No Polys or Bacteria seen  --   --        Last 24 Hours Medication List:   Current Facility-Administered Medications   Medication Dose Route Frequency Provider Last Rate    acetaminophen  650 mg Oral Q6H PRN Harnishkumar Rosas, DO      aspirin  81 mg Oral Daily Harnishkumar Rosas, DO      atorvastatin  80 mg Oral Daily With Dinner Harmicaelakumar Rosas, DO      cyanocobalamin  1,000 mcg Oral QPM Harnishkumar Roass, DO      escitalopram  5 mg Oral Daily Harnishkumar Rosas, DO      heparin (porcine)  5,000 Units Subcutaneous Q8H [facility] Harnishkumar Rosas, DO      metoprolol tartrate  25 mg Oral BID Harnishkumar Rosas, DO      pantoprazole  40 mg Oral Early Morning Harnishkumar Rosas, DO      ticagrelor  90 mg Oral Q12H [facility] Harnishkumar Rosas, DO          Today, Patient Was Seen By: Yadiel Costa DO    ** Please Note: Dictation voice to text software may have been used in the creation of this document. **

## 2024-09-06 NOTE — ASSESSMENT & PLAN NOTE
Lab Results   Component Value Date    EGFR 79 09/06/2024    EGFR 66 09/05/2024    EGFR 70 09/03/2024    CREATININE 0.92 09/06/2024    CREATININE 1.07 09/05/2024    CREATININE 1.01 09/03/2024     Kidney function stable  Monitor

## 2024-09-06 NOTE — UTILIZATION REVIEW
Initial Clinical Review    Admission: Date/Time/Statement:   Admission Orders (From admission, onward)       Ordered        09/05/24 1219  INPATIENT ADMISSION  Once                          Orders Placed This Encounter   Procedures    INPATIENT ADMISSION     Standing Status:   Standing     Number of Occurrences:   1     Order Specific Question:   Level of Care     Answer:   Med Surg [16]     Order Specific Question:   Estimated length of stay     Answer:   More than 2 Midnights     Order Specific Question:   Certification     Answer:   I certify that inpatient services are medically necessary for this patient for a duration of greater than two midnights. See H&P and MD Progress Notes for additional information about the patient's course of treatment.     ED Arrival Information       Expected   9/5/2024     Arrival   9/5/2024 11:04    Acuity   Urgent              Means of arrival   Walk-In    Escorted by   Family Member    Service   Hospitalist    Admission type   Emergency              Arrival complaint   Abnormal ct scan             Chief Complaint   Patient presents with    Medical Problem     Pt got ultrasound of chest done and found abscess. Got CT scan done today and the abscess was larger than expected so pt was advised to come to ED for evaluation. Denies CP/SOB. C/o soreness when chest is touched       Initial Presentation: 78 y.o. male s/p recent CABG in 6/2024, recent stroke on DAPT, HTN, AAA s/p repair, CKD who presents to ED as a walk-in with swelling and erythema over his sternotomy site worsening over the last 1 week.  He also reports intermittent low-grade fevers over the last 4 days. Pt with h/o sternal wound drainage post-op and tx'd with oral abx as op. An US revaled a fluid collection on 9/4. F/u CT chest done op on 9/5 revealed concerns for developing midline sternotomy site wound dehiscence along with fluid collection along the anterior aspect of the sternum with reactive lymphadenopathy within  the prevascular mediastinum concerning for superinfection. He was instructed to go to the ED by his PCP for further eval and tx.  On presentation WBC wnl. Hgb 10.1, Hct 30.9, mild erythema with swelling noted at the superior aspect of the sternotomy site, mildly tender to palpation, no drainage present. Admitted Inpatient to MS Unit with Sternal Wound Dehiscence -- consult Cardiac Sx, ID. Hold abx for now. F/u pending blood cxs. IR consulted for aspiration. Continue pta po meds.     Anticipated Length of Stay/Certification Statement:  Patient will be admitted on an inpatient basis with an anticipated length of stay of greater than 2 midnights secondary to sternum dehiscence after recent CABG, needs CT surgery evaluation, possible underlying infection.     IR consult -- Surgical site near prior cardiac surgery with possible fluid. On review of the US there is no clear definable fluid collection only phlegmonous change and infiltration. Nonetheless this is a safe area to place a needle and see what we can aspirate. Image guided aspiration planned.    Procedure note: IR ASPIRATION - No drainable fluid. 3 cc cloudy / think yellow/pink fluid sent    CTSx consult -- He underwent aspiration yesterday by IR and that specimen is pending culture and gram stain currently.   On exam today there is no local erythema, no fullness or fluctuance, and there is point tenderness over the second or third costal cartilage. CT scan showing no obvious infectious collection, otherwise not out of the ordinary for a postoperative CABG with marginal wound healing.   Plan of care today includes: continue conservative management for now.  Await culture and gram stain results.    ID consult -- A: Sternal wound infection.   Follow closely off abx until culture data can be obtained. Suspect will need operative debridement and possible wire removal. F/u blood cxs. Follow temps closely. Recheck WBC in AM. Supportive care. If seems that surgery may  be delayed until next week, could attempt IR-guided aspiration of collection to obtain cultures and then start on vancomycin, ceftriaxone in meantime pending ultimate surgical intervention.       Date: 9/6   Day 2: c/o feeling tired, but denied fevers or chills. Fluid aspiration cx pending. F/u aspirate cultures - if culture positive for likely causative organism can start abx. Per ID, it pt clinically deteriorates could start vancomycin and ceftriaxone. Continue pta po meds, reg diet, supportive care.         ED Triage Vitals   Temperature Pulse Respirations Blood Pressure SpO2 Pain Score   09/05/24 1110 09/05/24 1110 09/05/24 1110 09/05/24 1110 09/05/24 1110 09/05/24 1415   (!) 97.4 °F (36.3 °C) 72 18 142/84 98 % No Pain     Weight (last 2 days)       None            Vital Signs (last 3 days)       Date/Time Temp Pulse Resp BP MAP (mmHg) SpO2 O2 Device Stef Coma Scale Score Pain    09/06/24 15:08:49 98.3 °F (36.8 °C) 76 18 133/80 98 96 % -- -- --    09/06/24 11:01:03 98.1 °F (36.7 °C) 66 17 134/82 99 97 % -- -- --    09/06/24 0900 -- -- -- -- -- -- None (Room air) 15 No Pain    09/06/24 07:17:02 99.2 °F (37.3 °C) 88 16 143/90 108 96 % -- -- --    09/05/24 23:41:59 99 °F (37.2 °C) 77 16 128/76 93 96 % -- -- --    09/05/24 2100 -- -- -- -- -- -- -- 15 No Pain    09/05/24 19:21:46 99 °F (37.2 °C) 77 -- 157/85 109 98 % -- -- --    09/05/24 1628 -- -- -- -- -- -- -- 15 --    09/05/24 16:26:53 -- 74 -- 141/89 106 99 % -- -- --    09/05/24 15:50:38 97.6 °F (36.4 °C) 73 -- 179/92 121 98 % -- -- --    09/05/24 1415 -- 74 18 143/68 98 98 % -- -- No Pain    09/05/24 1313 -- 68 -- 145/81 107 98 % -- -- --    09/05/24 1142 -- -- -- -- -- -- -- 15 --    09/05/24 1110 97.4 °F (36.3 °C) 72 18 142/84 -- 98 % None (Room air) -- --            Pertinent Labs/Diagnostic Test Results:   Results from last 7 days   Lab Units 09/06/24  0447 09/05/24  1147 09/03/24  1007   WBC Thousand/uL 7.52 6.61 7.45   HEMOGLOBIN g/dL 8.5* 10.1*  10.5*   HEMATOCRIT % 25.6* 30.9* 32.4*   PLATELETS Thousands/uL 279 267 265   TOTAL NEUT ABS Thousands/µL 4.92 4.45  --      Results from last 7 days   Lab Units 09/06/24  0447 09/05/24  1147 09/03/24  1007   SODIUM mmol/L 135 134* 132*   POTASSIUM mmol/L 3.9 4.6 3.8   CHLORIDE mmol/L 102 102 100   CO2 mmol/L 23 25 25   ANION GAP mmol/L 10 7 7   BUN mg/dL 17 20 17   CREATININE mg/dL 0.92 1.07 1.01   EGFR ml/min/1.73sq m 79 66 70   CALCIUM mg/dL 9.2 9.4 9.3     Results from last 7 days   Lab Units 09/05/24  1147   AST U/L 31   ALT U/L 41   ALK PHOS U/L 69   TOTAL PROTEIN g/dL 7.4   ALBUMIN g/dL 3.6   TOTAL BILIRUBIN mg/dL 0.53     Results from last 7 days   Lab Units 09/06/24  0447 09/05/24  1147 09/03/24  1007   GLUCOSE RANDOM mg/dL 93 97 105     Results from last 7 days   Lab Units 09/05/24  1638   PROTIME seconds 14.9   INR  1.14       Results from last 7 days   Lab Units 09/06/24  1415 09/05/24  1151 09/05/24  1147   BLOOD CULTURE   --  No Growth at 24 hrs. No Growth at 24 hrs.   GRAM STAIN RESULT  No Polys or Bacteria seen  --   --    BODY FLUID CULTURE, STERILE  Culture too young- will reincubate  --   --          ED Treatment-Medication Administration from 09/05/2024 1104 to 09/05/2024 1535       None     Past Medical History:   Diagnosis Date    AAA (abdominal aortic aneurysm) ([facility])     s/p open repair    BPH (benign prostatic hyperplasia)     CAD (coronary artery disease)     Cholelithiasis     Former tobacco use     History of MI (myocardial infarction)     History of TIA (transient ischemic attack)     Plavix in past, now on Brilinta    Hyperlipidemia     Hypertension     Insomnia     Obesity      Present on Admission:   CKD (chronic kidney disease) stage 2, GFR 60-89 ml/min   Coronary artery disease of native artery of native heart with stable angina pectoris ([facility])   Primary hypertension   Abdominal aortic aneurysm without rupture ([facility])      Admitting Diagnosis: S/P CABG x 3 [Z95.1]  Sternal wound  dehiscence, initial encounter [T81.32XA]  Fluid collection at surgical site [T88.8XXA]  Age/Sex: 78 y.o. male  Admission Orders:  Scheduled Medications:  aspirin, 81 mg, Oral, Daily  atorvastatin, 80 mg, Oral, Daily With Dinner  cyanocobalamin, 1,000 mcg, Oral, QPM  escitalopram, 5 mg, Oral, Daily  heparin (porcine), 5,000 Units, Subcutaneous, Q8H KRISTI  metoprolol tartrate, 25 mg, Oral, BID  pantoprazole, 40 mg, Oral, Early Morning  ticagrelor, 90 mg, Oral, Q12H KRISTI      PRN Meds:  acetaminophen, 650 mg, Oral, Q6H PRN        IP CONSULT TO CARDIOTHORACIC SURGERY  IP CONSULT TO INFECTIOUS DISEASES  IP CONSULT TO INFECTIOUS DISEASES  INPATIENT CONSULT TO IR    Network Utilization Review Department  ATTENTION: Please call with any questions or concerns to [phone] and carefully listen to the prompts so that you are directed to the right person. All voicemails are confidential.   For Discharge needs, contact Care Management DC Support Team at [phone] opt. 2  Send all requests for admission clinical reviews, approved or denied determinations and any other requests to dedicated fax number below belonging to the campus where the patient is receiving treatment. List of dedicated fax numbers for the Facilities:  FACILITY NAME UR FAX NUMBER   ADMISSION DENIALS (Administrative/Medical Necessity) [phone]   DISCHARGE SUPPORT TEAM (NETWORK) [phone]   PARENT CHILD HEALTH (Maternity/NICU/Pediatrics) [phone]   [facility] [phone]   [facility] [phone]   [facility] [phone]   [facility] [phone]   [facility] [phone]   [facility] [phone]   [facility] [phone]   [facility] [phone]   [facility] [phone]   [facility] [phone]   [facility] [phone]   [facility] [phone]

## 2024-09-06 NOTE — PROGRESS NOTES
"Progress Note - Infectious Disease   Shane Chau Sr. 78 y.o. male MRN: [id number]  [facility] Encounter: [id number]      Impression/Recommendations:  Sternal wound infection.    In setting of recent CABG, delayed postop wound healing.  Late onset along with CT findings suggest deep infection with possible osteomyelitis.  Fortunately appears clinically stable without sepsis.  Status post IR aspiration of thick, cloudy fluid yesterday.  Culture pending.     Follow closely off antibiotics until culture data can be obtained  Suspect will need operative debridement and possible wire removal  Follow up aspirate cultures - if culture positive for likely causative organism can start antibiotics  Follow up blood cultures  Follow temperatures closely  Recheck WBC in AM to monitor infection  Supportive care as per the primary service  If patient clinically deteriorates could start vancomycin and ceftriaxone     Recent CABG.    For CAD on 24.  Complicated by postop ileus, delayed wound healing.     CKD.    Baseline Cr 1.0-1.2.     I discussed with TE Matson with CT surgery the above plan to hold antibiotics pending culture data.  She agrees with the plan.  I will reassess the patient on .  Please call in the interim with new questions.     Antibiotics:  None    Subjective/24 Hour Events:  Patient seen on AM rounds.  \"Tired\" but no fevers or chills.  Had IR aspiration of thick, cloudy fluid yesterday.  Culture pending.    Objective:  Vitals:  Temp:  [97.6 °F (36.4 °C)-99.2 °F (37.3 °C)] 98.3 °F (36.8 °C)  HR:  [66-88] 76  Resp:  [16-18] 18  BP: (128-179)/(76-92) 133/80  SpO2:  [96 %-99 %] 96 %  Temp (24hrs), Av.5 °F (36.9 °C), Min:97.6 °F (36.4 °C), Max:99.2 °F (37.3 °C)  Current: Temperature: 98.3 °F (36.8 °C)    Physical Exam:   General:  No acute distress  Psychiatric:  Awake and alert  Pulmonary:  Normal respiratory excursion without accessory muscle use  Abdomen:  Soft, " nontender  Extremities:  No edema  Skin:  No rashes    Lab Results:  I have personally reviewed pertinent labs.  Results from last 7 days   Lab Units 09/06/24  0447 09/05/24  1147 09/03/24  1007   SODIUM mmol/L 135 134* 132*   POTASSIUM mmol/L 3.9 4.6 3.8   CHLORIDE mmol/L 102 102 100   CO2 mmol/L 23 25 25   BUN mg/dL 17 20 17   CREATININE mg/dL 0.92 1.07 1.01   EGFR ml/min/1.73sq m 79 66 70   CALCIUM mg/dL 9.2 9.4 9.3   AST U/L  --  31  --    ALT U/L  --  41  --    ALK PHOS U/L  --  69  --      Results from last 7 days   Lab Units 09/06/24  0447 09/05/24  1147 09/03/24  1007   WBC Thousand/uL 7.52 6.61 7.45   HEMOGLOBIN g/dL 8.5* 10.1* 10.5*   PLATELETS Thousands/uL 279 267 265     Results from last 7 days   Lab Units 09/06/24  1415 09/05/24  1151 09/05/24  1147   BLOOD CULTURE   --  No Growth at 24 hrs. No Growth at 24 hrs.   GRAM STAIN RESULT  No Polys or Bacteria seen  --   --    BODY FLUID CULTURE, STERILE  Culture too young- will reincubate  --   --        Imaging Studies:   I have personally reviewed pertinent imaging study reports and images in PACS.    EKG, Pathology, and Other Studies:   I have personally reviewed pertinent reports.

## 2024-09-06 NOTE — ASSESSMENT & PLAN NOTE
Patient history of CABG in 6/2024 with postoperative hospital stay complicated by stroke and post op ileus with 1 week history of progressively worsening swelling near the superior sternotomy site and low-grade fevers over the last 4 days.  Outpatient imaging as below concerning for sternal dehiscence and fluid collection for which patient was recommended ED admission.  9/4 US sternum poorly marginated complex fluid in the area measuring 4.2 x 1.3 x 1.8 cm.  9/5 CT chest: Increased lucency along the midline sternal site, concerning for developing dehiscence.  There is also increased edema throughout this region, with findings concerning for a potential for collection along the anterior aspect of the sternum.  There is also new likely reactive lymphadenopathy within the prevascular mediastinum concerning for superinfection.  CT surgery consulted,   Afebrile in the ED, no leukocytosis, appears hemodynamically stable  Follow-up blood cultures  Holding antibiotics per ID recommendations given no clinical signs of sepsis for deep cultures to guide antibiotic therapy  IR consulted for aspiration  Discussed with CT surgery.  Tentative plans for intervention on Monday.  Will observe off of antibiotics for now.  Low threshold to initiate antibiotics if patient becomes toxic/hemodynamically unstable.

## 2024-09-06 NOTE — UTILIZATION REVIEW
NOTIFICATION OF INPATIENT ADMISSION   AUTHORIZATION REQUEST   SERVICING FACILITY:   [facility]  Address: [address] [place] [address]  Tax ID: [id number]  NPI: [id number] ATTENDING PROVIDER:  Attending Name and NPI#: Yadiel Costa Do [[id number]]  Address: [address] [place] [address]  Phone: [phone]   ADMISSION INFORMATION:  Place of Service: Inpatient [facility]  Place of Service Code: 21  Inpatient Admission Date/Time: 9/5/24 12:19 PM  Discharge Date/Time: No discharge date for patient encounter.  Admitting Diagnosis Code/Description:  S/P CABG x 3 [Z95.1]  Sternal wound dehiscence, initial encounter [T81.32XA]  Fluid collection at surgical site [T88.8XXA]     UTILIZATION REVIEW CONTACT:  Sang Smith Utilization   Network Utilization Review Department  Phone: [phone]  Fax: [phone]  Email: [email or web address]  Contact for approvals/pending authorizations, clinical reviews, and discharge.     PHYSICIAN ADVISORY SERVICES:  Medical Necessity Denial & Qezo-bb-Epzk Review  Phone: [phone]  Fax: [phone]  Email: [email or web address]     DISCHARGE SUPPORT TEAM:  For Patients Discharge Needs & Updates  Phone: [phone] opt. 2 Fax: [phone]  Email: [email or web address]

## 2024-09-06 NOTE — RESULT ENCOUNTER NOTE
Mohsne Lynn,    I was able to review your labwork.  Your thyroid testing came back within normal range.  Your CMP and CBC have remained stable even though some of your numbers remain out of range.     As you can see your vitamin B12 level was technically within the range of this test however, it was in the lower end of the range and neurologists often like to see the level above 400 due to its impact on nerve function, therefore I would recommend:  At minimum considering starting over-the-counter vitamin B12 1000 mcg daily sublingual/under the tongue (better absorption than the swallowed pill) . Low B12 is thought to contribute to cognitive dysfunction, fatigue, imbalance and paresthesias/tingling    Your Vitamin D is low. I will prescribe Vit D 50,000 unit pills, 1 pill weekly for 6 weeks.  I also recommend you start taking 2000 units Vitamin D daily (over the counter) .     Please let me know if you have any additional questions or concerns

## 2024-09-06 NOTE — CASE MANAGEMENT
Case Management Assessment & Discharge Planning Note    Patient name Shane Chau .  [facility] MRN [id number]  : 1945 Date 2024       Current Admission Date: 2024  Current Admission Diagnosis:Sternal wound dehiscence   Patient Active Problem List    Diagnosis Date Noted Date Diagnosed    Sternal wound dehiscence 2024     Hospital discharge follow-up 2024     Surgical wound, non healing 2024     Postoperative anemia due to acute blood loss 07/10/2024     Stenosis of left vertebral artery 07/10/2024     Prediabetes 07/10/2024     Reaction at surgical site 07/10/2024     Cognitive impairment 07/10/2024     Hyponatremia 2024     History of stroke 2024     PAD (peripheral artery disease) ([facility]) 2024     Stroke-like symptoms 2024     CKD (chronic kidney disease) stage 2, GFR 60-89 ml/min 2024     S/P CABG x 3 2024     Rib pain 2023     Paresthesia 2023     Leg swelling 05/10/2022     Erectile dysfunction 2022     Microhematuria 2018     Need for pneumococcal vaccination 2018     Hematuria 2018     Asymptomatic bilateral carotid artery stenosis 2017     Closed compression fracture of first lumbar vertebra ([facility]) 2017     Abdominal aortic aneurysm without rupture ([facility]) 2016     Localized primary osteoarthritis of lower leg, unspecified laterality 2013     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[facility]) 2013     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     Transient cerebral ischemic attack 2013     Coronary artery disease of native artery of native heart with stable angina pectoris ([facility]) 2012     Hyperlipidemia 2012     Primary hypertension 2012       LOS (days): 1  Geometric Mean LOS (GMLOS) (days): 2.9  Days to GMLOS:2.1     OBJECTIVE:    Risk of Unplanned Readmission Score: 14.45         Current admission status: Inpatient       Preferred  Pharmacy:   [facility] - [place] - [address]  [address]  [place] [address]  Phone: [phone] Fax: [phone]    [id number] - [place] - [address]  [address]  [place] [address]  Phone: [phone] Fax: [phone]    [id number] [place] - [address]  [address]  [place] [address]  Phone: [phone] Fax: [phone]    [facility] [place] - [address]  [address]  [place] [address]  Phone: [phone] Fax: [phone]    [facility] [place] - [address]  [address]  [place] [address]  Phone: [phone] Fax: [phone]    Primary Care Provider: Sherwin Mantilla MD    Primary Insurance: [facility] REP  Secondary Insurance:     ASSESSMENT:  Active Health Care Proxies    There are no active Health Care Proxies on file.                      Patient Information  Admitted from:: Home  Mental Status: Alert  During Assessment patient was accompanied by: Not accompanied during assessment  Assessment information provided by:: Patient  Primary Caregiver: Self  Support Systems: Spouse/significant other  Home entry access options. Select all that apply.: Stairs  Number of steps to enter home.: 6  Do the steps have railings?: Yes  Type of Current Residence: Ranch (lives in ranch home with basement)  Living Arrangements: Lives w/ Spouse/significant other    Activities of Daily Living Prior to Admission  Functional Status: Independent  Completes ADLs independently?: Yes  Ambulates independently?: Yes  Does patient use assisted devices?: Yes  Assisted Devices (DME) used: Quad Cane  Does patient currently own DME?: Yes  What DME does the patient currently own?: Quad Cane, Walker, Wheelchair  Does patient have a history of Outpatient Therapy (PT/OT)?: Yes (OP cardiac rehab)  Does the patient have a history of  Short-Term Rehab?: Yes ([facility])  Does patient have a history of HHC?: Yes ([facility])  Does patient currently have HHC?: No         Patient Information Continued  Income Source: SSI/SSD  Does patient have prescription coverage?: Yes  Does patient have a history of substance abuse?: No  Does patient have a history of Mental Health Diagnosis?: No         Means of Transportation  Means of Transport to [facility]:: Drives Self      Social Determinants of Health (SDOH)      Flowsheet Row Most Recent Value   Housing Stability    In the last 12 months, was there a time when you were not able to pay the mortgage or rent on time? N   In the past 12 months, how many times have you moved where you were living? 0   At any time in the past 12 months, were you homeless or living in a shelter (including now)? N   Transportation Needs    In the past 12 months, has lack of transportation kept you from medical appointments or from getting medications? no   In the past 12 months, has lack of transportation kept you from meetings, work, or from getting things needed for daily living? No   Food Insecurity    Within the past 12 months, you worried that your food would run out before you got the money to buy more. Never true   Within the past 12 months, the food you bought just didn't last and you didn't have money to get more. Never true   Utilities    In the past 12 months has the electric, gas, oil, or water company threatened to shut off services in your home? No            DISCHARGE DETAILS:    Discharge planning discussed with:: pt & wife  Freedom of Choice: Yes     CM contacted family/caregiver?: Yes  Were Treatment Team discharge recommendations reviewed with patient/caregiver?: Yes  Did patient/caregiver verbalize understanding of patient care needs?: Yes  Were patient/caregiver advised of the risks associated with not following Treatment Team discharge recommendations?: Yes    Contacts  Patient Contacts: Wife Maryjo  Relationship to  Patient:: Family  Contact Method: Phone  Phone Number: [phone]  Reason/Outcome: Emergency Contact, Discharge Planning          S/w pt at bedside & explained CM role.  TC to wife Maryjo as well. Pt lives with wife in 1 story home with basement. IPTA. DME quad cane. Has rw & w/c available.  Cm to follow for dc needs. Wife available to transport home.

## 2024-09-07 LAB
APTT PPP: 100 SECONDS (ref 23–34)
APTT PPP: 45 SECONDS (ref 23–34)
BACTERIA SPEC ANAEROBE CULT: NORMAL
ERYTHROCYTE [DISTWIDTH] IN BLOOD BY AUTOMATED COUNT: 12.6 % (ref 11.6–15.1)
HCT VFR BLD AUTO: 29.1 % (ref 36.5–49.3)
HGB BLD-MCNC: 9.6 G/DL (ref 12–17)
INR PPP: 1.13 (ref 0.85–1.19)
MCH RBC QN AUTO: 31.3 PG (ref 26.8–34.3)
MCHC RBC AUTO-ENTMCNC: 33 G/DL (ref 31.4–37.4)
MCV RBC AUTO: 95 FL (ref 82–98)
PLATELET # BLD AUTO: 268 THOUSANDS/UL (ref 149–390)
PMV BLD AUTO: 8.3 FL (ref 8.9–12.7)
PROTHROMBIN TIME: 14.8 SECONDS (ref 12.3–15)
RBC # BLD AUTO: 3.07 MILLION/UL (ref 3.88–5.62)
WBC # BLD AUTO: 7.84 THOUSAND/UL (ref 4.31–10.16)

## 2024-09-07 PROCEDURE — 99232 SBSQ HOSP IP/OBS MODERATE 35: CPT | Performed by: INTERNAL MEDICINE

## 2024-09-07 PROCEDURE — 99024 POSTOP FOLLOW-UP VISIT: CPT | Performed by: PHYSICIAN ASSISTANT

## 2024-09-07 PROCEDURE — 85027 COMPLETE CBC AUTOMATED: CPT | Performed by: PHYSICIAN ASSISTANT

## 2024-09-07 PROCEDURE — 99233 SBSQ HOSP IP/OBS HIGH 50: CPT | Performed by: INTERNAL MEDICINE

## 2024-09-07 PROCEDURE — 85610 PROTHROMBIN TIME: CPT | Performed by: PHYSICIAN ASSISTANT

## 2024-09-07 PROCEDURE — 87081 CULTURE SCREEN ONLY: CPT | Performed by: PHYSICIAN ASSISTANT

## 2024-09-07 PROCEDURE — 85730 THROMBOPLASTIN TIME PARTIAL: CPT | Performed by: PHYSICIAN ASSISTANT

## 2024-09-07 RX ORDER — MUPIROCIN 20 MG/G
1 OINTMENT TOPICAL EVERY 12 HOURS SCHEDULED
Status: DISCONTINUED | OUTPATIENT
Start: 2024-09-07 | End: 2024-09-07

## 2024-09-07 RX ORDER — CHLORHEXIDINE GLUCONATE ORAL RINSE 1.2 MG/ML
15 SOLUTION DENTAL EVERY 12 HOURS SCHEDULED
Status: DISCONTINUED | OUTPATIENT
Start: 2024-09-07 | End: 2024-09-09

## 2024-09-07 RX ORDER — HEPARIN SODIUM 10000 [USP'U]/100ML
3-20 INJECTION, SOLUTION INTRAVENOUS
Status: DISCONTINUED | OUTPATIENT
Start: 2024-09-07 | End: 2024-09-09

## 2024-09-07 RX ORDER — ERGOCALCIFEROL 1.25 MG/1
50000 CAPSULE, LIQUID FILLED ORAL WEEKLY
Status: DISCONTINUED | OUTPATIENT
Start: 2024-09-07 | End: 2024-09-17 | Stop reason: HOSPADM

## 2024-09-07 RX ADMIN — TICAGRELOR 90 MG: 90 TABLET ORAL at 08:37

## 2024-09-07 RX ADMIN — PANTOPRAZOLE SODIUM 40 MG: 40 TABLET, DELAYED RELEASE ORAL at 05:50

## 2024-09-07 RX ADMIN — ESCITALOPRAM OXALATE 5 MG: 5 TABLET, FILM COATED ORAL at 08:37

## 2024-09-07 RX ADMIN — METOPROLOL TARTRATE 25 MG: 25 TABLET, FILM COATED ORAL at 08:37

## 2024-09-07 RX ADMIN — CYANOCOBALAMIN TAB 500 MCG 1000 MCG: 500 TAB at 17:16

## 2024-09-07 RX ADMIN — MUPIROCIN 1 APPLICATION: 20 OINTMENT TOPICAL at 13:42

## 2024-09-07 RX ADMIN — HEPARIN SODIUM 12 UNITS/KG/HR: 10000 INJECTION, SOLUTION INTRAVENOUS at 13:17

## 2024-09-07 RX ADMIN — MUPIROCIN 1 APPLICATION: 20 OINTMENT TOPICAL at 20:31

## 2024-09-07 RX ADMIN — HEPARIN SODIUM 5000 UNITS: 5000 INJECTION INTRAVENOUS; SUBCUTANEOUS at 05:50

## 2024-09-07 RX ADMIN — ERGOCALCIFEROL 50000 UNITS: 1.25 CAPSULE ORAL at 17:16

## 2024-09-07 RX ADMIN — CHLORHEXIDINE GLUCONATE 0.12% ORAL RINSE 15 ML: 1.2 LIQUID ORAL at 13:17

## 2024-09-07 RX ADMIN — CEFTRIAXONE SODIUM 2000 MG: 10 INJECTION, POWDER, FOR SOLUTION INTRAVENOUS at 13:41

## 2024-09-07 RX ADMIN — ATORVASTATIN CALCIUM 80 MG: 80 TABLET, FILM COATED ORAL at 17:16

## 2024-09-07 RX ADMIN — ASPIRIN 81 MG: 81 TABLET, COATED ORAL at 08:37

## 2024-09-07 RX ADMIN — METOPROLOL TARTRATE 25 MG: 25 TABLET, FILM COATED ORAL at 17:16

## 2024-09-07 RX ADMIN — CHLORHEXIDINE GLUCONATE 0.12% ORAL RINSE 15 ML: 1.2 LIQUID ORAL at 20:31

## 2024-09-07 NOTE — PROGRESS NOTES
Progress Note - Infectious Disease   Shane Chau Sr. 78 y.o. male MRN: [id number]  [facility] En[facility]: [id number]      Impression/Plan:  Sternal wound infection.    In setting of recent CABG, delayed postop wound healing.  Late onset along with CT findings suggest deep infection with possible osteomyelitis.  Fortunately appears clinically stable without sepsis.  Status post IR aspiration of thick, cloudy fluid 2024.  Culture pending with apparent slow growth.     Follow closely off antibiotics until culture data can be obtained  Suspect will need operative debridement and possible wire removal  Follow up aspirate cultures - if culture positive for likely causative organism can start antibiotics  Follow up blood cultures  Follow temperatures closely  Recheck WBC in AM to monitor infection  Supportive care as per the primary service  If patient clinically deteriorates could start vancomycin and ceftriaxone     Recent CABG.    For CAD on 24.  Complicated by postop ileus, delayed wound healing.     CKD.    Baseline Cr 1.0-1.2.    Discussed with the primary service the plan to monitor off the antibiotics while awaiting culture results and they agree with the plan    Antibiotics:  None    Subjective:  Patient has no fever, chills, sweats; no nausea, vomiting, diarrhea; no cough, shortness of breath; no pain. No new symptoms.    Objective:  Vitals:  Temp:  [97.8 °F (36.6 °C)-98.7 °F (37.1 °C)] 97.8 °F (36.6 °C)  HR:  [66-76] 74  Resp:  [14-18] 14  BP: (133-149)/(80-92) 147/92  SpO2:  [94 %-97 %] 96 %  Temp (24hrs), Av.1 °F (36.7 °C), Min:97.8 °F (36.6 °C), Max:98.7 °F (37.1 °C)  Current: Temperature: 97.8 °F (36.6 °C)    Physical Exam:   General Appearance:  Alert, interactive, nontoxic, no acute distress.   Throat: Oropharynx moist without lesions.    Lungs:   Clear to auscultation bilaterally; no wheezes, rhonchi or rales; respirations unlabored   Heart:  RRR; no murmur, rub or gallop    Abdomen:   Soft, non-tender, non-distended, positive bowel sounds.     Extremities: No clubbing, cyanosis or edema   Skin: No new rashes or lesions. No draining wounds noted.       Labs, Imaging, & Other studies:   All pertinent labs and imaging studies were personally reviewed  Results from last 7 days   Lab Units 09/06/24  0447 09/05/24  1147 09/03/24  1007   WBC Thousand/uL 7.52 6.61 7.45   HEMOGLOBIN g/dL 8.5* 10.1* 10.5*   PLATELETS Thousands/uL 279 267 265     Results from last 7 days   Lab Units 09/06/24  0447 09/05/24  1147 09/03/24  1007   SODIUM mmol/L 135 134* 132*   POTASSIUM mmol/L 3.9 4.6 3.8   CHLORIDE mmol/L 102 102 100   CO2 mmol/L 23 25 25   BUN mg/dL 17 20 17   CREATININE mg/dL 0.92 1.07 1.01   EGFR ml/min/1.73sq m 79 66 70   CALCIUM mg/dL 9.2 9.4 9.3   AST U/L  --  31  --    ALT U/L  --  41  --    ALK PHOS U/L  --  69  --      Results from last 7 days   Lab Units 09/05/24  1802 09/05/24  1151 09/05/24  1147   BLOOD CULTURE   --  No Growth at 24 hrs. No Growth at 24 hrs.   GRAM STAIN RESULT  No Polys or Bacteria seen  --   --    BODY FLUID CULTURE, STERILE  Culture too young- will reincubate  --   --

## 2024-09-07 NOTE — QUICK NOTE
Culture from the drainage procedure is growing Klebsiella oxytoca.  Will begin ceftriaxone 2 g IV every 24 hours awaiting additional data.  Discussed with the cardiothoracic surgery the plan to begin the antibiotics for now and they will take the patient to the operating room for washout on Monday.

## 2024-09-07 NOTE — UTILIZATION REVIEW
Date:   9/7  Day 3: Has surpassed a 2nd midnight with active treatments and services.  Culture from drainage procedure growing Klebsiella and starting   IV  rocephin daily.  Plan or  Mon 9/9  for washout.   Continue  all current meds.   Sternal incision  with erythema,  no drainage noted.

## 2024-09-07 NOTE — ASSESSMENT & PLAN NOTE
Patient history of CABG in 6/2024 with postoperative hospital stay complicated by stroke and post op ileus with 1 week history of progressively worsening swelling near the superior sternotomy site and low-grade fevers over the last 4 days.  Outpatient imaging as below concerning for sternal dehiscence and fluid collection for which patient was recommended ED admission.  9/4 US sternum poorly marginated complex fluid in the area measuring 4.2 x 1.3 x 1.8 cm.  9/5 CT chest: Increased lucency along the midline sternal site, concerning for developing dehiscence.  There is also increased edema throughout this region, with findings concerning for a potential for collection along the anterior aspect of the sternum.  There is also new likely reactive lymphadenopathy within the prevascular mediastinum concerning for superinfection.  CT surgery consulted,   Afebrile in the ED, no leukocytosis, appears hemodynamically stable  Follow-up blood cultures  Holding antibiotics per ID recommendations given no clinical signs of sepsis for deep cultures to guide antibiotic therapy  IR consulted for aspiration  Discussed with CT surgery.    Discussed with surgery yesterday.  Will plan on observing for now.  No plans for debridement on Monday.  Observe off of antibiotics.  Monitor temperature curve.  Await final cultures before antibiotics.  Low threshold to initiate antibiotics if patient becomes clinically toxic.

## 2024-09-07 NOTE — PROGRESS NOTES
Progress Note - Cardiothoracic Surgery   Shane Chau . 78 y.o. male MRN: [id number]  Unit/Bed#: [facility]323[facility]01 Encounter: [id number]      24 Hour Events: No significant events overnight. Wife present in room. Pt denies CP, SOB, fevers, chills or incisional drainage.    Medications:   Scheduled Meds:  Current Facility-Administered Medications   Medication Dose Route Frequency Provider Last Rate    acetaminophen  650 mg Oral Q6H PRN Harnishkumar Rosas, DO      aspirin  81 mg Oral Daily Harnishkumar Rosas, DO      atorvastatin  80 mg Oral Daily With Dinner Harnishkumar Rosas, DO      cyanocobalamin  1,000 mcg Oral QPM Harnishkumar Rosas, DO      escitalopram  5 mg Oral Daily Harnishkumar Rosas, DO      heparin (porcine)  5,000 Units Subcutaneous Q8H [facility] Harnishkumar Rosas, DO      metoprolol tartrate  25 mg Oral BID Harnishkumar Rosas, DO      pantoprazole  40 mg Oral Early Morning Harnishkumar Rosas, DO      ticagrelor  90 mg Oral Q12H [facility] Harnishkumar Rosas, DO       Continuous Infusions:     Results:   Results from last 7 days   Lab Units 09/06/24 0447 09/05/24  1147 09/03/24  1007   WBC Thousand/uL 7.52 6.61 7.45   HEMOGLOBIN g/dL 8.5* 10.1* 10.5*   HEMATOCRIT % 25.6* 30.9* 32.4*   PLATELETS Thousands/uL 279 267 265     Results from last 7 days   Lab Units 09/06/24 0447 09/05/24  1147 09/03/24  1007   POTASSIUM mmol/L 3.9 4.6 3.8   CHLORIDE mmol/L 102 102 100   CO2 mmol/L 23 25 25   BUN mg/dL 17 20 17   CREATININE mg/dL 0.92 1.07 1.01   CALCIUM mg/dL 9.2 9.4 9.3     Results from last 7 days   Lab Units 09/05/24  1638   INR  1.14     Wound Culture 9/5:1+ gowth of Klebsiella oxytoca  /Blood culture 9/7: no growth x 24 hrs    Studies:     CT chest: 1.  There is increased lucency along the midline sternotomy site, concerning for developing dehiscence. There is also increased edema throughout this region, with findings concerning for a potential fluid collection along the anterior aspect of the   sternum. There  is also new likely reactive lymphadenopathy within the prevascular mediastinum, when compared with the CTA from 7/3/2024. These findings are concerning for superinfection.     US soft tissue lump sternum: Focal poorly marginated complex fluid in the area of concern measuring approximately 4.2 x 1.3 x 1.8 cm in size. The depth of this abnormality cannot be determined secondary to underlying bone, presence or absence of osseous involvement cannot be accurately assessed. Given clinical history of fever and palpable abnormality, this does remain concerning for potential sternal wound infection with complex potentially infected fluid. Fluid aspiration should be considered. CT of chest may be of further  benefit as well to determine depth of involvement.     I have personally reviewed pertinent reports.   and I have personally reviewed pertinent films in PACS    I have personally reviewed pertinent reports.      Vitals:   Vitals:    09/06/24 2234 09/07/24 0659 09/07/24 0700 09/07/24 0800   BP: 149/84 147/92 147/92    Pulse: 71 72 74    Resp: 14      Temp: 98.7 °F (37.1 °C) 97.8 °F (36.6 °C) 97.8 °F (36.6 °C)    SpO2: 97% 94% 96% 96%       Physical Exam:    General: No acute distress, Alert, and Normal appearance  HEENT/NECK:  Normocephalic. Atraumatic.  No jugular venous distention.    Cardiac: Regular rate and rhythm  Pulmonary:   normal respiratory effort  Abdomen:  Non-tender and Non-distended  Extremities: Extremities warm/dry  Neuro: Alert and oriented X 3, Sensation is grossly intact, and No focal deficits  Incisions: Upper mid sternal incision with erythema, no drainage with palpation  Skin: Warm/Dry, without rashes or lesions.      Assessment:    Sternal Wound Infection  S/P CABG    Plan:    Wound cultures positive for Klebsiella oxytoca. ID following. Plan to start antibiotic therapy today. Patient and wife updated at bedside. Plan for  washout on Monday.    Blood type and cross match ordered for  tomorrow    Continue Mupirocin 2% nasal ointment q 12 hrs    Continue topical chlorhexidine bath and mouth rise    H/O Postop CVA - on DAPT. D/C Brilinta and transition to Heparin infusion    Preoperative beta blocker, insulin, and antibiotics ordered  Sternal Washout/Debridement scheduled for Monday 9/9 with Dr. Ravin Amos M.D.    SIGNATURE: Marcela Hodges PA-C  DATE: September 7, 2024  TIME: 12:29 PM    * This note was completed in part utilizing [employer] direct voice recognition software.   Grammatical errors, random word insertion, spelling mistakes, and incomplete sentences may be an occasional consequence of the system secondary to software limitations, ambient noise and hardware issues. At the time of dictation, efforts were made to edit, clarify and /or correct errors. Please read the chart carefully and recognize, using context, where substitutions have occurred.  If you have any questions or concerns about the context, text or information contained within the body of this dictation, please contact myself, the provider, for further clarification.

## 2024-09-07 NOTE — PROGRESS NOTES
[facility]  Progress Note  Name: Shane Chau Sr. I  MRN: [id number]  Unit/Bed#: [facility]-323-01 I Date of Admission: 9/5/2024   Date of Service: 9/7/2024 I Hospital Day: 2    Assessment & Plan   * Sternal wound dehiscence  Assessment & Plan  Patient history of CABG in 6/2024 with postoperative hospital stay complicated by stroke and post op ileus with 1 week history of progressively worsening swelling near the superior sternotomy site and low-grade fevers over the last 4 days.  Outpatient imaging as below concerning for sternal dehiscence and fluid collection for which patient was recommended ED admission.  9/4 US sternum poorly marginated complex fluid in the area measuring 4.2 x 1.3 x 1.8 cm.  9/5 CT chest: Increased lucency along the midline sternal site, concerning for developing dehiscence.  There is also increased edema throughout this region, with findings concerning for a potential for collection along the anterior aspect of the sternum.  There is also new likely reactive lymphadenopathy within the prevascular mediastinum concerning for superinfection.  CT surgery consulted,   Afebrile in the ED, no leukocytosis, appears hemodynamically stable  Follow-up blood cultures  Holding antibiotics per ID recommendations given no clinical signs of sepsis for deep cultures to guide antibiotic therapy  IR consulted for aspiration  Discussed with CT surgery.    Discussed with surgery yesterday.  Will plan on observing for now.  No plans for debridement on Monday.  Observe off of antibiotics.  Monitor temperature curve.  Await final cultures before antibiotics.  Low threshold to initiate antibiotics if patient becomes clinically toxic.    History of stroke  Assessment & Plan  History of recent stroke s/p CABG with no residual focal deficits  Follows neurology outpatient  Continue DAPT and statin    CKD (chronic kidney disease) stage 2, GFR 60-89 ml/min  Assessment & Plan  Lab  Results   Component Value Date    EGFR 79 2024    EGFR 66 2024    EGFR 70 2024    CREATININE 0.92 2024    CREATININE 1.07 2024    CREATININE 1.01 2024     Kidney function stable  Monitor    Primary hypertension  Assessment & Plan  Blood pressure stable  Continue Lopressor    Abdominal aortic aneurysm without rupture (HCC)  Assessment & Plan  S/p repair in   Follows with Dr. Juares outpatient for surveillance             VTE Pharmacologic Prophylaxis:   Pharmacologic: Heparin  Mechanical VTE Prophylaxis in Place: No    Patient Centered Rounds: I have performed bedside rounds with nursing staff today.      Time Spent for Care: 1 hour.  More than 50% of total time spent on counseling and coordination of care as described above.    Current Length of Stay: 2 day(s)    Current Patient Status: Inpatient     Code Status: Level 1 - Full Code      Subjective:   nad    Objective:     Vitals:   Temp (24hrs), Av.1 °F (36.7 °C), Min:97.8 °F (36.6 °C), Max:98.7 °F (37.1 °C)    Temp:  [97.8 °F (36.6 °C)-98.7 °F (37.1 °C)] 97.8 °F (36.6 °C)  HR:  [66-76] 74  Resp:  [14-18] 14  BP: (133-149)/(80-92) 147/92  SpO2:  [94 %-97 %] 96 %  There is no height or weight on file to calculate BMI.     Input and Output Summary (last 24 hours):       Intake/Output Summary (Last 24 hours) at 2024 09  Last data filed at 2024 0553  Gross per 24 hour   Intake 590 ml   Output 1125 ml   Net -535 ml       Physical Exam:     Physical Exam  Constitutional:       Appearance: He is normal weight.   HENT:      Head: Normocephalic and atraumatic.   Cardiovascular:      Rate and Rhythm: Normal rate and regular rhythm.   Pulmonary:      Effort: Pulmonary effort is normal.      Breath sounds: Normal breath sounds.   Abdominal:      General: Abdomen is flat.      Palpations: Abdomen is soft.   Neurological:      General: No focal deficit present.      Mental Status: He is oriented to person, place, and time.    Psychiatric:         Mood and Affect: Mood normal.         Additional Data:     Labs:    Results from last 7 days   Lab Units 09/06/24  0447   WBC Thousand/uL 7.52   HEMOGLOBIN g/dL 8.5*   HEMATOCRIT % 25.6*   PLATELETS Thousands/uL 279   SEGS PCT % 66   LYMPHO PCT % 18   MONO PCT % 11   EOS PCT % 4     Results from last 7 days   Lab Units 09/06/24  0447 09/05/24  1147   POTASSIUM mmol/L 3.9 4.6   CHLORIDE mmol/L 102 102   CO2 mmol/L 23 25   BUN mg/dL 17 20   CREATININE mg/dL 0.92 1.07   CALCIUM mg/dL 9.2 9.4   ALK PHOS U/L  --  69   ALT U/L  --  41   AST U/L  --  31     Results from last 7 days   Lab Units 09/05/24  1638   INR  1.14       * I Have Reviewed All Lab Data Listed Above.  * Additional Pertinent Lab Tests Reviewed: All Labs Within Last 24 Hours Reviewed      Recent Cultures (last 7 days):     Results from last 7 days   Lab Units 09/05/24  1802 09/05/24  1151 09/05/24  1147   BLOOD CULTURE   --  No Growth at 24 hrs. No Growth at 24 hrs.   GRAM STAIN RESULT  No Polys or Bacteria seen  --   --    BODY FLUID CULTURE, STERILE  Culture too young- will reincubate  --   --        Last 24 Hours Medication List:   Current Facility-Administered Medications   Medication Dose Route Frequency Provider Last Rate    acetaminophen  650 mg Oral Q6H PRN Harnishkumar Rosas, DO      aspirin  81 mg Oral Daily Harnishkumar Rosas, DO      atorvastatin  80 mg Oral Daily With Dinner Harnishkumar Rosas, DO      cyanocobalamin  1,000 mcg Oral QPM Harnishkumar Rosas, DO      escitalopram  5 mg Oral Daily Harnishkumar Rosas, DO      heparin (porcine)  5,000 Units Subcutaneous Q8H [facility] Harnishkumar Rosas, DO      metoprolol tartrate  25 mg Oral BID Harnishkumar Rosas, DO      pantoprazole  40 mg Oral Early Morning Harnishkumar Rosas, DO      ticagrelor  90 mg Oral Q12H [facility] Harnishkumar Rosas, DO          Today, Patient Was Seen By: Yadiel Costa DO    ** Please Note: Dictation voice to text software may have been used in the creation  of this document. **

## 2024-09-08 LAB
ABO GROUP BLD: NORMAL
APTT PPP: 171 SECONDS (ref 23–34)
APTT PPP: 54 SECONDS (ref 23–34)
APTT PPP: 62 SECONDS (ref 23–34)
BACTERIA SPEC BFLD CULT: ABNORMAL
BLD GP AB SCN SERPL QL: NEGATIVE
GRAM STN SPEC: ABNORMAL
MRSA NOSE QL CULT: NORMAL
RH BLD: POSITIVE
SPECIMEN EXPIRATION DATE: NORMAL

## 2024-09-08 PROCEDURE — 99233 SBSQ HOSP IP/OBS HIGH 50: CPT | Performed by: INTERNAL MEDICINE

## 2024-09-08 PROCEDURE — 86923 COMPATIBILITY TEST ELECTRIC: CPT

## 2024-09-08 PROCEDURE — 99232 SBSQ HOSP IP/OBS MODERATE 35: CPT | Performed by: INTERNAL MEDICINE

## 2024-09-08 PROCEDURE — 86900 BLOOD TYPING SEROLOGIC ABO: CPT | Performed by: INTERNAL MEDICINE

## 2024-09-08 PROCEDURE — 85730 THROMBOPLASTIN TIME PARTIAL: CPT | Performed by: INTERNAL MEDICINE

## 2024-09-08 PROCEDURE — 86901 BLOOD TYPING SEROLOGIC RH(D): CPT | Performed by: INTERNAL MEDICINE

## 2024-09-08 PROCEDURE — 86850 RBC ANTIBODY SCREEN: CPT | Performed by: INTERNAL MEDICINE

## 2024-09-08 PROCEDURE — 99024 POSTOP FOLLOW-UP VISIT: CPT | Performed by: PHYSICIAN ASSISTANT

## 2024-09-08 RX ORDER — POLYETHYLENE GLYCOL 3350 17 G/17G
17 POWDER, FOR SOLUTION ORAL DAILY
Status: DISCONTINUED | OUTPATIENT
Start: 2024-09-08 | End: 2024-09-17 | Stop reason: HOSPADM

## 2024-09-08 RX ORDER — CHLORHEXIDINE GLUCONATE ORAL RINSE 1.2 MG/ML
15 SOLUTION DENTAL EVERY 12 HOURS SCHEDULED
Status: DISCONTINUED | OUTPATIENT
Start: 2024-09-08 | End: 2024-09-09

## 2024-09-08 RX ORDER — MUPIROCIN 20 MG/G
1 OINTMENT TOPICAL ONCE
Status: DISCONTINUED | OUTPATIENT
Start: 2024-09-08 | End: 2024-09-09

## 2024-09-08 RX ORDER — AMOXICILLIN 250 MG
1 CAPSULE ORAL
Status: DISCONTINUED | OUTPATIENT
Start: 2024-09-08 | End: 2024-09-16

## 2024-09-08 RX ORDER — BISACODYL 5 MG/1
5 TABLET, DELAYED RELEASE ORAL DAILY PRN
Status: DISCONTINUED | OUTPATIENT
Start: 2024-09-08 | End: 2024-09-16

## 2024-09-08 RX ORDER — CEFAZOLIN SODIUM 2 G/50ML
2000 SOLUTION INTRAVENOUS EVERY 8 HOURS
Status: DISCONTINUED | OUTPATIENT
Start: 2024-09-08 | End: 2024-09-17

## 2024-09-08 RX ADMIN — MUPIROCIN 1 APPLICATION: 20 OINTMENT TOPICAL at 09:24

## 2024-09-08 RX ADMIN — ASPIRIN 81 MG: 81 TABLET, COATED ORAL at 09:24

## 2024-09-08 RX ADMIN — CEFAZOLIN SODIUM 2000 MG: 2 SOLUTION INTRAVENOUS at 11:31

## 2024-09-08 RX ADMIN — ESCITALOPRAM OXALATE 5 MG: 5 TABLET, FILM COATED ORAL at 09:24

## 2024-09-08 RX ADMIN — SENNOSIDES AND DOCUSATE SODIUM 1 TABLET: 50; 8.6 TABLET ORAL at 21:23

## 2024-09-08 RX ADMIN — CHLORHEXIDINE GLUCONATE 0.12% ORAL RINSE 15 ML: 1.2 LIQUID ORAL at 09:24

## 2024-09-08 RX ADMIN — METOPROLOL TARTRATE 25 MG: 25 TABLET, FILM COATED ORAL at 09:24

## 2024-09-08 RX ADMIN — PANTOPRAZOLE SODIUM 40 MG: 40 TABLET, DELAYED RELEASE ORAL at 05:44

## 2024-09-08 RX ADMIN — ACETAMINOPHEN 650 MG: 325 TABLET ORAL at 09:26

## 2024-09-08 RX ADMIN — CYANOCOBALAMIN TAB 500 MCG 1000 MCG: 500 TAB at 17:10

## 2024-09-08 RX ADMIN — CHLORHEXIDINE GLUCONATE 0.12% ORAL RINSE 15 ML: 1.2 LIQUID ORAL at 20:29

## 2024-09-08 RX ADMIN — MUPIROCIN 1 APPLICATION: 20 OINTMENT TOPICAL at 20:29

## 2024-09-08 RX ADMIN — POLYETHYLENE GLYCOL 3350 17 G: 17 POWDER, FOR SOLUTION ORAL at 10:39

## 2024-09-08 RX ADMIN — CEFAZOLIN SODIUM 2000 MG: 2 SOLUTION INTRAVENOUS at 20:26

## 2024-09-08 RX ADMIN — METOPROLOL TARTRATE 25 MG: 25 TABLET, FILM COATED ORAL at 17:10

## 2024-09-08 RX ADMIN — ATORVASTATIN CALCIUM 80 MG: 80 TABLET, FILM COATED ORAL at 17:10

## 2024-09-08 NOTE — PROGRESS NOTES
Progress Note - Cardiothoracic Surgery   Shane Chau Sr. 78 y.o. male MRN: [id number]  Unit/Bed#: [facility]-323-01 Encounter: [id number]      24 Hour Events: No events overnight. Feels well. Denies fevers/chills, SOB or CP. Started on Ceftriaxone yesterday.    Medications:   Scheduled Meds:  Current Facility-Administered Medications   Medication Dose Route Frequency Provider Last Rate    acetaminophen  650 mg Oral Q6H PRN Wellington Kingel, DO      aspirin  81 mg Oral Daily Wellington Rosas, DO      atorvastatin  80 mg Oral Daily With Dinner Harrashaad Rosas, DO      bisacodyl  5 mg Oral Daily PRN Yadiel Costa, DO      cefTRIAXone  2,000 mg Intravenous Q24H Lester Pineda MD 2,000 mg (09/07/24 1341)    chlorhexidine  15 mL Mouth/Throat Q12H [facility] Marcela Hodges PA-C      cyanocobalamin  1,000 mcg Oral QPM Wellington Rosas, DO      ergocalciferol  50,000 Units Oral Weekly Marcela Hodges PA-C      escitalopram  5 mg Oral Daily Wellington Rosas, DO      heparin (porcine)  3-20 Units/kg/hr (Order-Specific) Intravenous Titrated Marcela Hodges PA-C 7 Units/kg/hr (09/08/24 0705)    metoprolol tartrate  25 mg Oral BID Wellington Rosas, DO      mupirocin   Nasal Q12H [facility] Marcela Hodges PA-C      pantoprazole  40 mg Oral Early Morning Wellington Rosas, DO      polyethylene glycol  17 g Oral Daily Yadiel Oterohta, DO      senna-docusate sodium  1 tablet Oral HS Yadiel Costa, DO       Continuous Infusions:heparin (porcine), 3-20 Units/kg/hr (Order-Specific), Last Rate: 7 Units/kg/hr (09/08/24 0705)        Results:   Results from last 7 days   Lab Units 09/07/24  1313 09/06/24  0447 09/05/24  1147   WBC Thousand/uL 7.84 7.52 6.61   HEMOGLOBIN g/dL 9.6* 8.5* 10.1*   HEMATOCRIT % 29.1* 25.6* 30.9*   PLATELETS Thousands/uL 268 279 267     Results from last 7 days   Lab Units 09/06/24  0447 09/05/24  1147 09/03/24  1007   POTASSIUM mmol/L 3.9 4.6 3.8   CHLORIDE mmol/L 102 102 100   CO2 mmol/L 23 25 25   BUN mg/dL 17 20  17   CREATININE mg/dL 0.92 1.07 1.01   CALCIUM mg/dL 9.2 9.4 9.3     Results from last 7 days   Lab Units 09/08/24  0447 09/07/24  1913 09/07/24  1313 09/05/24  1638   INR   --   --  1.13 1.14   PTT seconds 171* 100* 45*  --      Wound Culture 9/5:1+ gowth of Klebsiella oxytoca  Blood culture 9/7: no growth x 48 hrs    Studies:     CT chest: 1.  There is increased lucency along the midline sternotomy site, concerning for developing dehiscence. There is also increased edema throughout this region, with findings concerning for a potential fluid collection along the anterior aspect of the   sternum. There is also new likely reactive lymphadenopathy within the prevascular mediastinum, when compared with the CTA from 7/3/2024. These findings are concerning for superinfection.     US soft tissue lump sternum: Focal poorly marginated complex fluid in the area of concern measuring approximately 4.2 x 1.3 x 1.8 cm in size. The depth of this abnormality cannot be determined secondary to underlying bone, presence or absence of osseous involvement cannot be accurately assessed. Given clinical history of fever and palpable abnormality, this does remain concerning for potential sternal wound infection with complex potentially infected fluid. Fluid aspiration should be considered. CT of chest may be of further  benefit as well to determine depth of involvement.     I have personally reviewed pertinent reports.   and I have personally reviewed pertinent films in PACS       Vitals:   Vitals:    09/07/24 0800 09/07/24 1514 09/1945 09/07/24 2215   BP:  146/81  130/83   Pulse:  76  84   Resp:       Temp:  98.7 °F (37.1 °C)  98.9 °F (37.2 °C)   SpO2: 96% 98% 95% 97%       Physical Exam:    General: No acute distress, Alert, and Normal appearance  HEENT/NECK:  Normocephalic. Atraumatic.  No jugular venous distention.    Cardiac: Regular rate and rhythm  Pulmonary:  Breath sounds clear bilaterally  Abdomen:  Non-tender,  Non-distended, and Normal bowel sounds  Extremities: Extremities warm/dry  Neuro: Alert and oriented X 3, Sensation is grossly intact, and No focal deficits  Incision: Sternal incision with swelling of upper mid portion and surrounding erythema and warmth. No drainage noted.  Skin: Warm/Dry, without rashes or lesions.      Assessment:    Sternal wound infection    Plan:  Patient agreeable to proceed with surgery; Informed consent signed    Blood type and cross match completed    Blood cultures with Klebsiella oxytoca growth. Ceftriaxone initiated. ID following.    Continue Mupirocin 2% nasal ointment q 12 hrs    Continue topical chlorhexidine bath and mouth rise    NPO after midnight  RBC prepared  Hold Heparin at 1000  Preoperative beta blocker, insulin, and antibiotics ordered  Sternal washout/debridement scheduled for tomorrow with Dr. Ravin Amos M.D.    SIGNATURE: Marcela Hodges PA-C  DATE: September 8, 2024  TIME: 10:36 AM    * This note was completed in part utilizing [employer] direct voice recognition software.   Grammatical errors, random word insertion, spelling mistakes, and incomplete sentences may be an occasional consequence of the system secondary to software limitations, ambient noise and hardware issues. At the time of dictation, efforts were made to edit, clarify and /or correct errors. Please read the chart carefully and recognize, using context, where substitutions have occurred.  If you have any questions or concerns about the context, text or information contained within the body of this dictation, please contact myself, the provider, for further clarification.

## 2024-09-08 NOTE — PROGRESS NOTES
[facility]  Progress Note  Name: Shane Chau Sr. I  MRN: [id number]  Unit/Bed#: PPHP-323-01 I Date of Admission: 9/5/2024   Date of Service: 9/8/2024 I Hospital Day: 3    Assessment & Plan   * Sternal wound dehiscence  Assessment & Plan  Patient history of CABG in 6/2024 with postoperative hospital stay complicated by stroke and post op ileus with 1 week history of progressively worsening swelling near the superior sternotomy site and low-grade fevers over the last 4 days.  Outpatient imaging as below concerning for sternal dehiscence and fluid collection for which patient was recommended ED admission.  9/4 US sternum poorly marginated complex fluid in the area measuring 4.2 x 1.3 x 1.8 cm.  9/5 CT chest: Increased lucency along the midline sternal site, concerning for developing dehiscence.  There is also increased edema throughout this region, with findings concerning for a potential for collection along the anterior aspect of the sternum.  There is also new likely reactive lymphadenopathy within the prevascular mediastinum concerning for superinfection.  CT surgery consulted,   Afebrile in the ED, no leukocytosis, appears hemodynamically stable  Follow-up blood cultures  Holding antibiotics per ID recommendations given no clinical signs of sepsis for deep cultures to guide antibiotic therapy  IR consulted for aspiration  Discussed with CT surgery.    Discussed with surgery yesterday.  Will plan on observing for now.  No plans for debridement on Monday.  Observe off of antibiotics.  Monitor temperature curve.  Await final cultures before antibiotics.   Note prelim culture results.  Infectious disease started empiric antibiotic therapy.  Will follow-up with CT surgery regarding debridement plans.    History of stroke  Assessment & Plan  History of recent stroke s/p CABG with no residual focal deficits  Follows neurology outpatient  Continue DAPT and statin    CKD  (chronic kidney disease) stage 2, GFR 60-89 ml/min  Assessment & Plan  Lab Results   Component Value Date    EGFR 79 2024    EGFR 66 2024    EGFR 70 2024    CREATININE 0.92 2024    CREATININE 1.07 2024    CREATININE 1.01 2024     Kidney function stable  Monitor    Primary hypertension  Assessment & Plan  Blood pressure stable  Continue Lopressor    Coronary artery disease of native artery of native heart with stable angina pectoris (HCC)  Assessment & Plan  Status post CABG in 2024  Continue aspirin, Brilinta, statin, beta-blocker    Abdominal aortic aneurysm without rupture (HCC)  Assessment & Plan  S/p repair in   Follows with Dr. Juares outpatient for surveillance           VTE Pharmacologic Prophylaxis:   Pharmacologic: Heparin Drip  Mechanical VTE Prophylaxis in Place: No    Patient Centered Rounds: I have performed bedside rounds with nursing staff today.      Time Spent for Care: 1 hour.  More than 50% of total time spent on counseling and coordination of care as described above.    Current Length of Stay: 3 day(s)    Current Patient Status: Inpatient     Code Status: Level 1 - Full Code      Subjective:   nad    Objective:     Vitals:   Temp (24hrs), Av.8 °F (37.1 °C), Min:98.7 °F (37.1 °C), Max:98.9 °F (37.2 °C)    Temp:  [98.7 °F (37.1 °C)-98.9 °F (37.2 °C)] 98.9 °F (37.2 °C)  HR:  [76-84] 84  BP: (130-146)/(81-83) 130/83  SpO2:  [95 %-98 %] 97 %  There is no height or weight on file to calculate BMI.     Input and Output Summary (last 24 hours):       Intake/Output Summary (Last 24 hours) at 2024 0931  Last data filed at 2024 0901  Gross per 24 hour   Intake 544.78 ml   Output 775 ml   Net -230.22 ml       Physical Exam:     Physical Exam  Constitutional:       Appearance: Normal appearance.   HENT:      Head: Normocephalic and atraumatic.   Cardiovascular:      Rate and Rhythm: Normal rate and regular rhythm.   Pulmonary:      Effort: Pulmonary effort  is normal.      Breath sounds: Normal breath sounds.   Abdominal:      General: Abdomen is flat.   Neurological:      General: No focal deficit present.      Mental Status: He is alert and oriented to person, place, and time.   Psychiatric:         Mood and Affect: Mood normal.         Additional Data:     Labs:    Results from last 7 days   Lab Units 09/07/24  1313 09/06/24  0447   WBC Thousand/uL 7.84 7.52   HEMOGLOBIN g/dL 9.6* 8.5*   HEMATOCRIT % 29.1* 25.6*   PLATELETS Thousands/uL 268 279   SEGS PCT %  --  66   LYMPHO PCT %  --  18   MONO PCT %  --  11   EOS PCT %  --  4     Results from last 7 days   Lab Units 09/06/24  0447 09/05/24  1147   POTASSIUM mmol/L 3.9 4.6   CHLORIDE mmol/L 102 102   CO2 mmol/L 23 25   BUN mg/dL 17 20   CREATININE mg/dL 0.92 1.07   CALCIUM mg/dL 9.2 9.4   ALK PHOS U/L  --  69   ALT U/L  --  41   AST U/L  --  31     Results from last 7 days   Lab Units 09/07/24  1313   INR  1.13         Recent Cultures (last 7 days):     Results from last 7 days   Lab Units 09/05/24  1802 09/05/24  1151 09/05/24  1147   BLOOD CULTURE   --  No Growth at 48 hrs. No Growth at 48 hrs.   GRAM STAIN RESULT  No Polys or Bacteria seen  --   --    BODY FLUID CULTURE, STERILE  1+ Growth of Klebsiella oxytoca*  --   --        Last 24 Hours Medication List:   Current Facility-Administered Medications   Medication Dose Route Frequency Provider Last Rate    acetaminophen  650 mg Oral Q6H PRN Wellington Rosas, DO      aspirin  81 mg Oral Daily Wellington Rosas, DO      atorvastatin  80 mg Oral Daily With Dinner Wellington Rosas, DO      cefTRIAXone  2,000 mg Intravenous Q24H Lester Pineda MD 2,000 mg (09/07/24 1341)    chlorhexidine  15 mL Mouth/Throat Q12H [facility] Marcela Hodges PA-C      cyanocobalamin  1,000 mcg Oral QPM Wellington Rosas DO      ergocalciferol  50,000 Units Oral Weekly Marcela Hodges PA-C      escitalopram  5 mg Oral Daily Wellington Rosas DO      heparin (porcine)  3-20 Units/kg/hr  (Order-Specific) Intravenous Titrated Marcela Hodges PA-C 7 Units/kg/hr (09/08/24 0705)    metoprolol tartrate  25 mg Oral BID Wellington Rosas DO      mupirocin   Nasal Q12H KRISTI Marcela Hodges PA-C      pantoprazole  40 mg Oral Early Morning Wellington Rosas DO          Today, Patient Was Seen By: Yadiel Costa DO    ** Please Note: Dictation voice to text software may have been used in the creation of this document. **

## 2024-09-08 NOTE — ASSESSMENT & PLAN NOTE
Patient history of CABG in 6/2024 with postoperative hospital stay complicated by stroke and post op ileus with 1 week history of progressively worsening swelling near the superior sternotomy site and low-grade fevers over the last 4 days.  Outpatient imaging as below concerning for sternal dehiscence and fluid collection for which patient was recommended ED admission.  9/4 US sternum poorly marginated complex fluid in the area measuring 4.2 x 1.3 x 1.8 cm.  9/5 CT chest: Increased lucency along the midline sternal site, concerning for developing dehiscence.  There is also increased edema throughout this region, with findings concerning for a potential for collection along the anterior aspect of the sternum.  There is also new likely reactive lymphadenopathy within the prevascular mediastinum concerning for superinfection.  CT surgery consulted,   Afebrile in the ED, no leukocytosis, appears hemodynamically stable  Follow-up blood cultures  Holding antibiotics per ID recommendations given no clinical signs of sepsis for deep cultures to guide antibiotic therapy  IR consulted for aspiration  Discussed with CT surgery.    Discussed with surgery yesterday.  Will plan on observing for now.  No plans for debridement on Monday.  Observe off of antibiotics.  Monitor temperature curve.  Await final cultures before antibiotics.   Note prelim culture results.  Infectious disease started empiric antibiotic therapy.  Will follow-up with CT surgery regarding debridement plans.

## 2024-09-08 NOTE — PLAN OF CARE
Problem: PAIN - ADULT  Goal: Verbalizes/displays adequate comfort level or baseline comfort level  Description: Interventions:  - Encourage patient to monitor pain and request assistance  - Assess pain using appropriate pain scale  - Administer analgesics based on type and severity of pain and evaluate response  - Implement non-pharmacological measures as appropriate and evaluate response  - Consider cultural and social influences on pain and pain management  - Notify physician/advanced practitioner if interventions unsuccessful or patient reports new pain  Outcome: Progressing     Problem: INFECTION - ADULT  Goal: Absence or prevention of progression during hospitalization  Description: INTERVENTIONS:  - Assess and monitor for signs and symptoms of infection  - Monitor lab/diagnostic results  - Monitor all insertion sites, i.e. indwelling lines, tubes, and drains  - Monitor endotracheal if appropriate and nasal secretions for changes in amount and color  - [place] appropriate cooling/warming therapies per order  - Administer medications as ordered  - Instruct and encourage patient and family to use good hand hygiene technique  - Identify and instruct in appropriate isolation precautions for identified infection/condition  Outcome: Progressing  Goal: Absence of fever/infection during neutropenic period  Description: INTERVENTIONS:  - Monitor WBC    Outcome: Progressing     Problem: SAFETY ADULT  Goal: Patient will remain free of falls  Description: INTERVENTIONS:  - Educate patient/family on patient safety including physical limitations  - Instruct patient to call for assistance with activity   - Consult OT/PT to assist with strengthening/mobility   - Keep Call bell within reach  - Keep bed low and locked with side rails adjusted as appropriate  - Keep care items and personal belongings within reach  - Initiate and maintain comfort rounds  - Make Fall Risk Sign visible to staff  - Offer Toileting every 2 Hours,  in advance of need  - Apply yellow socks and bracelet for high fall risk patients  - Consider moving patient to room near nurses station  Outcome: Progressing  Goal: Maintain or return to baseline ADL function  Description: INTERVENTIONS:  -  Assess patient's ability to carry out ADLs; assess patient's baseline for ADL function and identify physical deficits which impact ability to perform ADLs (bathing, care of mouth/teeth, toileting, grooming, dressing, etc.)  - Assess/evaluate cause of self-care deficits   - Assess range of motion  - Assess patient's mobility; develop plan if impaired  - Assess patient's need for assistive devices and provide as appropriate  - Encourage maximum independence but intervene and supervise when necessary  - Involve family in performance of ADLs  - Assess for home care needs following discharge   - Consider OT consult to assist with ADL evaluation and planning for discharge  - Provide patient education as appropriate  Outcome: Progressing    Problem: Knowledge Deficit  Goal: Patient/family/caregiver demonstrates understanding of disease process, treatment plan, medications, and discharge instructions  Description: Complete learning assessment and assess knowledge base.  Interventions:  - Provide teaching at level of understanding  - Provide teaching via preferred learning methods  Outcome: Progressing

## 2024-09-08 NOTE — PROGRESS NOTES
Progress Note - Infectious Disease   Shane Chau . 78 y.o. male MRN: [id number]  Unit/Bed#: PPHP-323-01 Encounter: [id number]      Impression/Plan:  Klebsiella oxytoca sternal wound infection.    In setting of recent CABG, delayed postop wound healing.  Late onset along with CT findings suggest deep infection with possible osteomyelitis.  Fortunately appears clinically stable without sepsis.  Status post IR aspiration of thick, cloudy fluid 2024 with a culture growing Klebsiella oxytoca.  Fortunately the organism is quite sensitive     Discontinue ceftriaxone  Begin cefazolin 2 g IV every 8 hours  Patient to the OR tomorrow for sternal washout/debridement  Follow up operative cultures and adjust antibiotics as needed  Follow up blood cultures  Follow temperatures closely  Recheck CBC with differential and BMP to make sure no developing toxicities  Supportive care as per the primary service     Recent CABG.    For CAD on 24.  Complicated by postop ileus, delayed wound healing.     CKD.    Baseline Cr 1.0-1.2.    Discussed with the CT surgery service the plan to continue intravenous antibiotics and the patient is to go to the OR tomorrow for debridement and they agree with the plan.    Discussed with the primary service the plan to change the antibiotics to cefazolin and they agree with the plan    Antibiotics:  Ceftriaxone 2    Subjective:  Patient has no fever, chills, sweats; no nausea, vomiting, diarrhea; no cough, shortness of breath; no pain. No new symptoms.  He does have significant fatigue.    Objective:  Vitals:  Temp:  [98.7 °F (37.1 °C)-98.9 °F (37.2 °C)] 98.9 °F (37.2 °C)  HR:  [76-84] 84  BP: (130-146)/(81-83) 130/83  SpO2:  [95 %-98 %] 97 %  Temp (24hrs), Av.8 °F (37.1 °C), Min:98.7 °F (37.1 °C), Max:98.9 °F (37.2 °C)  Current: Temperature: 98.9 °F (37.2 °C)    Physical Exam:   General Appearance:  Alert, interactive, nontoxic, no acute distress.   Throat: Oropharynx moist without  lesions.    Lungs:   Clear to auscultation bilaterally; no wheezes, rhonchi or rales; respirations unlabored   Heart:  RRR; no murmur, rub or gallop   Abdomen:   Soft, non-tender, non-distended, positive bowel sounds.     Extremities: No clubbing, cyanosis or edema   Skin: Sternal wound without drainage but with some notable erythema and swelling.       Labs, Imaging, & Other studies:   All pertinent labs and imaging studies were personally reviewed  Results from last 7 days   Lab Units 09/07/24  1313 09/06/24 0447 09/05/24  1147   WBC Thousand/uL 7.84 7.52 6.61   HEMOGLOBIN g/dL 9.6* 8.5* 10.1*   PLATELETS Thousands/uL 268 279 267     Results from last 7 days   Lab Units 09/06/24  0447 09/05/24  1147 09/03/24  1007   SODIUM mmol/L 135 134* 132*   POTASSIUM mmol/L 3.9 4.6 3.8   CHLORIDE mmol/L 102 102 100   CO2 mmol/L 23 25 25   BUN mg/dL 17 20 17   CREATININE mg/dL 0.92 1.07 1.01   EGFR ml/min/1.73sq m 79 66 70   CALCIUM mg/dL 9.2 9.4 9.3   AST U/L  --  31  --    ALT U/L  --  41  --    ALK PHOS U/L  --  69  --      Results from last 7 days   Lab Units 09/05/24  1802 09/05/24  1151 09/05/24  1147   BLOOD CULTURE   --  No Growth at 48 hrs. No Growth at 48 hrs.   GRAM STAIN RESULT  No Polys or Bacteria seen  --   --    BODY FLUID CULTURE, STERILE  1+ Growth of Klebsiella oxytoca*  --   --

## 2024-09-09 ENCOUNTER — ANESTHESIA EVENT (INPATIENT)
Dept: PERIOP | Facility: HOSPITAL | Age: 79
DRG: 862 | End: 2024-09-09
Payer: COMMERCIAL

## 2024-09-09 ENCOUNTER — APPOINTMENT (INPATIENT)
Dept: RADIOLOGY | Facility: HOSPITAL | Age: 79
DRG: 862 | End: 2024-09-09
Payer: COMMERCIAL

## 2024-09-09 ENCOUNTER — PREP FOR PROCEDURE (OUTPATIENT)
Dept: CARDIAC SURGERY | Facility: CLINIC | Age: 79
End: 2024-09-09

## 2024-09-09 ENCOUNTER — ANESTHESIA (INPATIENT)
Dept: PERIOP | Facility: HOSPITAL | Age: 79
DRG: 862 | End: 2024-09-09
Payer: COMMERCIAL

## 2024-09-09 DIAGNOSIS — T81.32XA DEHISCENCE OF INTERNAL SURGICAL WOUND, INITIAL ENCOUNTER: Primary | ICD-10-CM

## 2024-09-09 LAB
ABO GROUP BLD BPU: NORMAL
ANION GAP SERPL CALCULATED.3IONS-SCNC: 6 MMOL/L (ref 4–13)
APTT PPP: 89 SECONDS (ref 23–34)
BPU ID: NORMAL
BUN SERPL-MCNC: 13 MG/DL (ref 5–25)
CALCIUM SERPL-MCNC: 9 MG/DL (ref 8.4–10.2)
CHLORIDE SERPL-SCNC: 101 MMOL/L (ref 96–108)
CO2 SERPL-SCNC: 27 MMOL/L (ref 21–32)
CREAT SERPL-MCNC: 1.12 MG/DL (ref 0.6–1.3)
CROSSMATCH: NORMAL
FUNGUS SPEC CULT: NORMAL
GFR SERPL CREATININE-BSD FRML MDRD: 62 ML/MIN/1.73SQ M
GLUCOSE SERPL-MCNC: 101 MG/DL (ref 65–140)
GLUCOSE SERPL-MCNC: 107 MG/DL (ref 65–140)
POTASSIUM SERPL-SCNC: 4.7 MMOL/L (ref 3.5–5.3)
SODIUM SERPL-SCNC: 134 MMOL/L (ref 135–147)
UNIT DISPENSE STATUS: NORMAL
UNIT PRODUCT CODE: NORMAL
UNIT PRODUCT VOLUME: 350 ML
UNIT RH: NORMAL

## 2024-09-09 PROCEDURE — 80048 BASIC METABOLIC PNL TOTAL CA: CPT | Performed by: PHYSICIAN ASSISTANT

## 2024-09-09 PROCEDURE — 83550 IRON BINDING TEST: CPT | Performed by: INTERNAL MEDICINE

## 2024-09-09 PROCEDURE — 99232 SBSQ HOSP IP/OBS MODERATE 35: CPT | Performed by: INTERNAL MEDICINE

## 2024-09-09 PROCEDURE — 83540 ASSAY OF IRON: CPT | Performed by: INTERNAL MEDICINE

## 2024-09-09 PROCEDURE — 10180 I&D COMPLEX PO WOUND INFCTJ: CPT | Performed by: THORACIC SURGERY (CARDIOTHORACIC VASCULAR SURGERY)

## 2024-09-09 PROCEDURE — 87102 FUNGUS ISOLATION CULTURE: CPT | Performed by: THORACIC SURGERY (CARDIOTHORACIC VASCULAR SURGERY)

## 2024-09-09 PROCEDURE — 85730 THROMBOPLASTIN TIME PARTIAL: CPT | Performed by: INTERNAL MEDICINE

## 2024-09-09 PROCEDURE — 87075 CULTR BACTERIA EXCEPT BLOOD: CPT | Performed by: THORACIC SURGERY (CARDIOTHORACIC VASCULAR SURGERY)

## 2024-09-09 PROCEDURE — 87205 SMEAR GRAM STAIN: CPT | Performed by: THORACIC SURGERY (CARDIOTHORACIC VASCULAR SURGERY)

## 2024-09-09 PROCEDURE — 87186 SC STD MICRODIL/AGAR DIL: CPT | Performed by: THORACIC SURGERY (CARDIOTHORACIC VASCULAR SURGERY)

## 2024-09-09 PROCEDURE — 99024 POSTOP FOLLOW-UP VISIT: CPT | Performed by: THORACIC SURGERY (CARDIOTHORACIC VASCULAR SURGERY)

## 2024-09-09 PROCEDURE — 82728 ASSAY OF FERRITIN: CPT | Performed by: INTERNAL MEDICINE

## 2024-09-09 PROCEDURE — 87070 CULTURE OTHR SPECIMN AEROBIC: CPT | Performed by: THORACIC SURGERY (CARDIOTHORACIC VASCULAR SURGERY)

## 2024-09-09 PROCEDURE — 99233 SBSQ HOSP IP/OBS HIGH 50: CPT | Performed by: INTERNAL MEDICINE

## 2024-09-09 PROCEDURE — 71045 X-RAY EXAM CHEST 1 VIEW: CPT

## 2024-09-09 PROCEDURE — 82746 ASSAY OF FOLIC ACID SERUM: CPT | Performed by: INTERNAL MEDICINE

## 2024-09-09 PROCEDURE — 87077 CULTURE AEROBIC IDENTIFY: CPT | Performed by: THORACIC SURGERY (CARDIOTHORACIC VASCULAR SURGERY)

## 2024-09-09 PROCEDURE — 82948 REAGENT STRIP/BLOOD GLUCOSE: CPT

## 2024-09-09 PROCEDURE — 97605 NEG PRS WND THER DME<=50SQCM: CPT | Performed by: THORACIC SURGERY (CARDIOTHORACIC VASCULAR SURGERY)

## 2024-09-09 RX ORDER — PROPOFOL 10 MG/ML
INJECTION, EMULSION INTRAVENOUS AS NEEDED
Status: DISCONTINUED | OUTPATIENT
Start: 2024-09-09 | End: 2024-09-09

## 2024-09-09 RX ORDER — ALBUTEROL SULFATE 0.83 MG/ML
2.5 SOLUTION RESPIRATORY (INHALATION) ONCE AS NEEDED
Status: DISCONTINUED | OUTPATIENT
Start: 2024-09-09 | End: 2024-09-09 | Stop reason: HOSPADM

## 2024-09-09 RX ORDER — FENTANYL CITRATE 50 UG/ML
INJECTION, SOLUTION INTRAMUSCULAR; INTRAVENOUS AS NEEDED
Status: DISCONTINUED | OUTPATIENT
Start: 2024-09-09 | End: 2024-09-09

## 2024-09-09 RX ORDER — PROMETHAZINE HYDROCHLORIDE 25 MG/ML
12.5 INJECTION, SOLUTION INTRAMUSCULAR; INTRAVENOUS ONCE AS NEEDED
Status: DISCONTINUED | OUTPATIENT
Start: 2024-09-09 | End: 2024-09-09 | Stop reason: HOSPADM

## 2024-09-09 RX ORDER — HYDROMORPHONE HCL/PF 1 MG/ML
0.5 SYRINGE (ML) INJECTION
Status: DISCONTINUED | OUTPATIENT
Start: 2024-09-09 | End: 2024-09-09 | Stop reason: HOSPADM

## 2024-09-09 RX ORDER — LIDOCAINE HYDROCHLORIDE 10 MG/ML
INJECTION, SOLUTION EPIDURAL; INFILTRATION; INTRACAUDAL; PERINEURAL AS NEEDED
Status: DISCONTINUED | OUTPATIENT
Start: 2024-09-09 | End: 2024-09-09

## 2024-09-09 RX ORDER — ONDANSETRON 2 MG/ML
4 INJECTION INTRAMUSCULAR; INTRAVENOUS ONCE AS NEEDED
Status: DISCONTINUED | OUTPATIENT
Start: 2024-09-09 | End: 2024-09-09 | Stop reason: HOSPADM

## 2024-09-09 RX ORDER — HYDROMORPHONE HCL IN WATER/PF 6 MG/30 ML
0.2 PATIENT CONTROLLED ANALGESIA SYRINGE INTRAVENOUS
Status: DISCONTINUED | OUTPATIENT
Start: 2024-09-09 | End: 2024-09-09 | Stop reason: HOSPADM

## 2024-09-09 RX ORDER — SODIUM CHLORIDE, SODIUM LACTATE, POTASSIUM CHLORIDE, CALCIUM CHLORIDE 600; 310; 30; 20 MG/100ML; MG/100ML; MG/100ML; MG/100ML
INJECTION, SOLUTION INTRAVENOUS CONTINUOUS PRN
Status: DISCONTINUED | OUTPATIENT
Start: 2024-09-09 | End: 2024-09-09

## 2024-09-09 RX ORDER — METOCLOPRAMIDE HYDROCHLORIDE 5 MG/ML
10 INJECTION INTRAMUSCULAR; INTRAVENOUS ONCE AS NEEDED
Status: DISCONTINUED | OUTPATIENT
Start: 2024-09-09 | End: 2024-09-09 | Stop reason: HOSPADM

## 2024-09-09 RX ORDER — FENTANYL CITRATE/PF 50 MCG/ML
25 SYRINGE (ML) INJECTION
Status: DISCONTINUED | OUTPATIENT
Start: 2024-09-09 | End: 2024-09-09 | Stop reason: HOSPADM

## 2024-09-09 RX ORDER — ROCURONIUM BROMIDE 10 MG/ML
INJECTION, SOLUTION INTRAVENOUS AS NEEDED
Status: DISCONTINUED | OUTPATIENT
Start: 2024-09-09 | End: 2024-09-09

## 2024-09-09 RX ORDER — SODIUM CHLORIDE 9 MG/ML
INJECTION, SOLUTION INTRAVENOUS CONTINUOUS PRN
Status: DISCONTINUED | OUTPATIENT
Start: 2024-09-09 | End: 2024-09-09

## 2024-09-09 RX ORDER — MIDAZOLAM HYDROCHLORIDE 2 MG/2ML
INJECTION, SOLUTION INTRAMUSCULAR; INTRAVENOUS AS NEEDED
Status: DISCONTINUED | OUTPATIENT
Start: 2024-09-09 | End: 2024-09-09

## 2024-09-09 RX ORDER — LABETALOL HYDROCHLORIDE 5 MG/ML
10 INJECTION, SOLUTION INTRAVENOUS
Status: DISCONTINUED | OUTPATIENT
Start: 2024-09-09 | End: 2024-09-09 | Stop reason: HOSPADM

## 2024-09-09 RX ORDER — CEFAZOLIN SODIUM 2 G/50ML
2000 SOLUTION INTRAVENOUS ONCE
Status: DISCONTINUED | OUTPATIENT
Start: 2024-09-09 | End: 2024-09-09 | Stop reason: HOSPADM

## 2024-09-09 RX ORDER — HYDRALAZINE HYDROCHLORIDE 20 MG/ML
5 INJECTION INTRAMUSCULAR; INTRAVENOUS
Status: DISCONTINUED | OUTPATIENT
Start: 2024-09-09 | End: 2024-09-09 | Stop reason: HOSPADM

## 2024-09-09 RX ORDER — SODIUM CHLORIDE, SODIUM LACTATE, POTASSIUM CHLORIDE, CALCIUM CHLORIDE 600; 310; 30; 20 MG/100ML; MG/100ML; MG/100ML; MG/100ML
125 INJECTION, SOLUTION INTRAVENOUS CONTINUOUS
Status: DISCONTINUED | OUTPATIENT
Start: 2024-09-09 | End: 2024-09-09

## 2024-09-09 RX ORDER — ONDANSETRON 2 MG/ML
INJECTION INTRAMUSCULAR; INTRAVENOUS AS NEEDED
Status: DISCONTINUED | OUTPATIENT
Start: 2024-09-09 | End: 2024-09-09

## 2024-09-09 RX ORDER — CEFAZOLIN SODIUM 1 G/3ML
INJECTION, POWDER, FOR SOLUTION INTRAMUSCULAR; INTRAVENOUS AS NEEDED
Status: DISCONTINUED | OUTPATIENT
Start: 2024-09-09 | End: 2024-09-09 | Stop reason: HOSPADM

## 2024-09-09 RX ORDER — CEFAZOLIN SODIUM 2 G/50ML
SOLUTION INTRAVENOUS AS NEEDED
Status: DISCONTINUED | OUTPATIENT
Start: 2024-09-09 | End: 2024-09-09

## 2024-09-09 RX ADMIN — SODIUM CHLORIDE: 0.9 INJECTION, SOLUTION INTRAVENOUS at 11:24

## 2024-09-09 RX ADMIN — CYANOCOBALAMIN TAB 500 MCG 1000 MCG: 500 TAB at 17:59

## 2024-09-09 RX ADMIN — CEFAZOLIN SODIUM 2000 MG: 2 SOLUTION INTRAVENOUS at 02:20

## 2024-09-09 RX ADMIN — ATORVASTATIN CALCIUM 80 MG: 80 TABLET, FILM COATED ORAL at 17:59

## 2024-09-09 RX ADMIN — METOPROLOL TARTRATE 25 MG: 25 TABLET, FILM COATED ORAL at 09:23

## 2024-09-09 RX ADMIN — LIDOCAINE HYDROCHLORIDE 50 MG: 10 INJECTION, SOLUTION EPIDURAL; INFILTRATION; INTRACAUDAL; PERINEURAL at 11:16

## 2024-09-09 RX ADMIN — SUGAMMADEX 400 MG: 100 INJECTION, SOLUTION INTRAVENOUS at 12:06

## 2024-09-09 RX ADMIN — PANTOPRAZOLE SODIUM 40 MG: 40 TABLET, DELAYED RELEASE ORAL at 05:01

## 2024-09-09 RX ADMIN — HEPARIN SODIUM 9 UNITS/KG/HR: 10000 INJECTION, SOLUTION INTRAVENOUS at 01:11

## 2024-09-09 RX ADMIN — FENTANYL CITRATE 100 MCG: 50 INJECTION INTRAMUSCULAR; INTRAVENOUS at 11:16

## 2024-09-09 RX ADMIN — ESCITALOPRAM OXALATE 5 MG: 5 TABLET, FILM COATED ORAL at 09:23

## 2024-09-09 RX ADMIN — ROCURONIUM 30 MG: 50 INJECTION, SOLUTION INTRAVENOUS at 11:45

## 2024-09-09 RX ADMIN — ASPIRIN 81 MG: 81 TABLET, COATED ORAL at 09:23

## 2024-09-09 RX ADMIN — ONDANSETRON 4 MG: 2 INJECTION INTRAMUSCULAR; INTRAVENOUS at 11:35

## 2024-09-09 RX ADMIN — CEFAZOLIN SODIUM 2000 MG: 2 SOLUTION INTRAVENOUS at 11:34

## 2024-09-09 RX ADMIN — CEFAZOLIN SODIUM 2000 MG: 2 SOLUTION INTRAVENOUS at 19:18

## 2024-09-09 RX ADMIN — MUPIROCIN 1 APPLICATION: 20 OINTMENT TOPICAL at 09:23

## 2024-09-09 RX ADMIN — PROPOFOL 60 MG: 10 INJECTION, EMULSION INTRAVENOUS at 11:16

## 2024-09-09 RX ADMIN — METOPROLOL TARTRATE 25 MG: 25 TABLET, FILM COATED ORAL at 17:59

## 2024-09-09 RX ADMIN — CHLORHEXIDINE GLUCONATE 0.12% ORAL RINSE 15 ML: 1.2 LIQUID ORAL at 09:45

## 2024-09-09 RX ADMIN — ROCURONIUM 50 MG: 50 INJECTION, SOLUTION INTRAVENOUS at 11:16

## 2024-09-09 RX ADMIN — SODIUM CHLORIDE, SODIUM LACTATE, POTASSIUM CHLORIDE, AND CALCIUM CHLORIDE: .6; .31; .03; .02 INJECTION, SOLUTION INTRAVENOUS at 11:10

## 2024-09-09 NOTE — ANESTHESIA PREPROCEDURE EVALUATION
Procedure:  DEBRIDEMENT STERNAL WOUND (WASH OUT) (Chest)    Relevant Problems   ANESTHESIA (within normal limits)      CARDIO   (+) Abdominal aortic aneurysm without rupture (HCC)   (+) Coronary artery disease of native artery of native heart with stable angina pectoris (HCC)   (+) Hyperlipidemia   (+) PAD (peripheral artery disease) (HCC)   (+) Primary hypertension   (+) Rib pain      /RENAL   (+) CKD (chronic kidney disease) stage 2, GFR 60-89 ml/min      HEMATOLOGY   (+) Postoperative anemia due to acute blood loss      MUSCULOSKELETAL   (+) Localized primary osteoarthritis of lower leg, unspecified laterality      NEURO/PSYCH   (+) Paresthesia      Neurology/Sleep   (+) Cognitive impairment   (+) History of stroke      Surgery/Wound/Pain   (+) S/P CABG x 3   (+) Sternal wound dehiscence   Normal sinus rhythm  T wave abnormality, consider anterolateral ischemia  Prolonged QT  Abnormal ECG  When compared with ECG of 23-JUN-2024 06:25,  Premature atrial complexes are no longer Present  Nonspecific T wave abnormality now evident in Inferior leads  T wave inversion now evident in Anterior leads    indings    Left Ventricle Left ventricular cavity size is normal. Wall thickness is mildly increased. There is mild concentric hypertrophy. The left ventricular ejection fraction is 70%. Systolic function is hyperdynamic. Wall motion is normal. Diastolic function is mildly abnormal, consistent with grade I (abnormal) relaxation.   Interventricular Septum There is abnormal septal motion consistent with post-operative status.   Right Ventricle Right ventricle is not well visualized. Systolic function is at least mildly reduced.   Left Atrium The atrium is normal in size.   Right Atrium The atrium is normal in size.   Aortic Valve The aortic valve is trileaflet. The leaflets are mildly thickened. The leaflets are mildly calcified. The leaflets exhibit normal mobility. There is no evidence of regurgitation. There is aortic  valve sclerosis.   Mitral Valve Mitral valve structure is normal.  There is mild regurgitation. There is no evidence of stenosis.   Tricuspid Valve Tricuspid valve structure is normal. There is trace regurgitation. There is no evidence of stenosis.   Pulmonic Valve Pulmonic valve structure is normal. There is no evidence of regurgitation. There is no evidence of stenosis.   Ascending Aorta The aortic root is upper limit of normal in size. The ascending aorta is upper limit of normal in size. The aortic root is 3.60 cm. The ascending aorta is 3.5 cm.   IVC/SVC The right atrial pressure is estimated at 3.0 mmHg. The inferior vena cava is normal in size. Respirophasic changes were normal.   Pericardium There is no pericardial effusion. The pericardium is normal in appearance.        Physical Exam    Airway    Mallampati score: II  TM Distance: >3 FB  Neck ROM: full     Dental    upper dentures and lower dentures    Cardiovascular  Rhythm: regular, Rate: normal, Cardiovascular exam normal    Pulmonary  Pulmonary exam normal Breath sounds clear to auscultation    Other Findings        Anesthesia Plan  ASA Score- 3     Anesthesia Type- general with ASA Monitors.         Additional Monitors:     Airway Plan: ETT.           Plan Factors-    Chart reviewed. EKG reviewed.  Existing labs reviewed. Patient summary reviewed.    Patient is not a current smoker.              Induction- intravenous.    Postoperative Plan- Plan for postoperative opioid use. Planned trial extubation    Perioperative Resuscitation Plan - Level 1 - Full Code.       Informed Consent- Anesthetic plan and risks discussed with patient.  I personally reviewed this patient with the CRNA. Discussed and agreed on the Anesthesia Plan with the CRNA..

## 2024-09-09 NOTE — PROGRESS NOTES
Progress Note - Infectious Disease   Shane Chau Sr. 78 y.o. male MRN: [id number]  Unit/Bed#: OR POOL Encounter: [id number]      Impression/Recommendations:  Klebsiella oxytoca sternal wound infection.    In setting of recent CABG, delayed postop wound healing.  Late onset along with CT findings suggest deep infection with possible osteomyelitis.  Fortunately appears clinically stable without sepsis.  Status post IR aspiration of thick, cloudy fluid 2024 with a culture growing Klebsiella oxytoca.  Fortunately the organism is quite sensitive     Continue cefazolin for now  Monitor closely while on antibiotics for toxicities including diarrhea, rash, cytopenias, or kidney injury  Await OR today for sternal washout/debridement  Follow up operative cultures and adjust antibiotics as needed  Follow temperatures closely  Recheck WBC in AM to monitor infectio  Supportive care as per the primary service     Recent CABG.    For CAD on 24.  Complicated by postop ileus, delayed wound healing.     CKD.    Baseline Cr 1.0-1.2.     Antibiotics:  Cefazolin #2  Antibiotics #3    Subjective/24 Hour Events:  Patient seen on AM rounds.  On way to surgery.  Offers no complaints.  No fevers.    Objective:  Vitals:  Temp:  [98.2 °F (36.8 °C)-99.7 °F (37.6 °C)] 98.6 °F (37 °C)  HR:  [69-83] 78  Resp:  [16-18] 16  BP: (134-147)/(80-88) 144/80  SpO2:  [94 %-97 %] 95 %  Temp (24hrs), Av.8 °F (37.1 °C), Min:98.2 °F (36.8 °C), Max:99.7 °F (37.6 °C)  Current: Temperature: 98.6 °F (37 °C)    Physical Exam:   General:  No acute distress  Psychiatric:  Awake and alert  Chest:  Evolving dark pustule over upper sternal scar  Pulmonary:  Normal respiratory excursion without accessory muscle use  Abdomen:  Soft, nontender  Extremities:  No edema  Skin:  No rashes    Lab Results:  I have personally reviewed pertinent labs.  Results from last 7 days   Lab Units 24  0447 24  1147 24  1007   SODIUM mmol/L 135 134*  132*   POTASSIUM mmol/L 3.9 4.6 3.8   CHLORIDE mmol/L 102 102 100   CO2 mmol/L 23 25 25   BUN mg/dL 17 20 17   CREATININE mg/dL 0.92 1.07 1.01   EGFR ml/min/1.73sq m 79 66 70   CALCIUM mg/dL 9.2 9.4 9.3   AST U/L  --  31  --    ALT U/L  --  41  --    ALK PHOS U/L  --  69  --      Results from last 7 days   Lab Units 09/07/24  1313 09/06/24  0447 09/05/24  1147   WBC Thousand/uL 7.84 7.52 6.61   HEMOGLOBIN g/dL 9.6* 8.5* 10.1*   PLATELETS Thousands/uL 268 279 267     Results from last 7 days   Lab Units 09/07/24  1305 09/05/24  1802 09/05/24  1151 09/05/24  1147   BLOOD CULTURE   --   --  No Growth at 72 hrs. No Growth at 72 hrs.   GRAM STAIN RESULT   --  No Polys or Bacteria seen  --   --    BODY FLUID CULTURE, STERILE   --  1+ Growth of Klebsiella oxytoca*  --   --    MRSA CULTURE ONLY  No Methicillin Resistant Staphlyococcus aureus (MRSA) isolated  --   --   --        Imaging Studies:   I have personally reviewed pertinent imaging study reports and images in PACS.    EKG, Pathology, and Other Studies:   I have personally reviewed pertinent reports.

## 2024-09-09 NOTE — ANESTHESIA POSTPROCEDURE EVALUATION
Post-Op Assessment Note    CV Status:  Stable  Pain Score: 0    Pain management: adequate       Mental Status:  Alert and sleepy   Hydration Status:  Euvolemic   PONV Controlled:  Controlled   Airway Patency:  Patent     Post Op Vitals Reviewed: Yes    No anethesia notable event occurred.    Staff: CRNA               BP   178//68   Temp   97.4   Pulse  72   Resp   18   SpO2   99

## 2024-09-09 NOTE — PROGRESS NOTES
[facility]  Progress Note  Name: Shane Chau Sr. I  MRN: [id number]  Unit/Bed#: PPHP-323-01 I Date of Admission: 9/5/2024   Date of Service: 9/9/2024 I Hospital Day: 4    Assessment & Plan   * Sternal wound dehiscence  Assessment & Plan  Patient history of CABG in 6/2024 with postoperative hospital stay complicated by stroke and post op ileus with 1 week history of progressively worsening swelling near the superior sternotomy site and low-grade fevers over the last 4 days.  Outpatient imaging as below concerning for sternal dehiscence and fluid collection for which patient was recommended ED admission.  9/4 US sternum poorly marginated complex fluid in the area measuring 4.2 x 1.3 x 1.8 cm.  9/5 CT chest: Increased lucency along the midline sternal site, concerning for developing dehiscence.  There is also increased edema throughout this region, with findings concerning for a potential for collection along the anterior aspect of the sternum.  There is also new likely reactive lymphadenopathy within the prevascular mediastinum concerning for superinfection.  CT surgery consulted,   Continue with antibiotics as per infectious disease  Status post aspiration.  Note positive growth  Status post surgical intervention  Will reopen on Wednesday for further wound debridement as per surgery    History of stroke  Assessment & Plan  History of recent stroke s/p CABG with no residual focal deficits  Follows neurology outpatient  Continue DAPT and statin    CKD (chronic kidney disease) stage 2, GFR 60-89 ml/min  Assessment & Plan  Lab Results   Component Value Date    EGFR 79 09/06/2024    EGFR 66 09/05/2024    EGFR 70 09/03/2024    CREATININE 0.92 09/06/2024    CREATININE 1.07 09/05/2024    CREATININE 1.01 09/03/2024     Kidney function stable  Monitor    Primary hypertension  Assessment & Plan  Blood pressure stable  Continue Lopressor    Coronary artery disease of native artery of  native heart with stable angina pectoris (HCC)  Assessment & Plan  Status post CABG in 2024  Continue aspirin, Brilinta, statin, beta-blocker    Abdominal aortic aneurysm without rupture (HCC)  Assessment & Plan  S/p repair in   Follows with Dr. Juares outpatient for surveillance           Mechanical VTE Prophylaxis in Place: No    Patient Centered Rounds: I have performed bedside rounds with nursing staff today.      Time Spent for Care: 1 hour.  More than 50% of total time spent on counseling and coordination of care as described above.    Current Length of Stay: 4 day(s)    Current Patient Status: Inpatient     Code Status: Level 1 - Full Code      Subjective:   nad    Objective:     Vitals:   Temp (24hrs), Av.8 °F (37.1 °C), Min:98.2 °F (36.8 °C), Max:99.7 °F (37.6 °C)    Temp:  [98.2 °F (36.8 °C)-99.7 °F (37.6 °C)] 98.6 °F (37 °C)  HR:  [69-83] 78  Resp:  [16-18] 16  BP: (134-147)/(80-88) 144/80  SpO2:  [94 %-97 %] 95 %  Body mass index is 22.84 kg/m².     Input and Output Summary (last 24 hours):       Intake/Output Summary (Last 24 hours) at 2024 0847  Last data filed at 2024 0810  Gross per 24 hour   Intake 617.74 ml   Output 850 ml   Net -232.26 ml       Physical Exam:     Physical Exam  Constitutional:       Appearance: Normal appearance.   HENT:      Head: Normocephalic and atraumatic.   Cardiovascular:      Rate and Rhythm: Normal rate and regular rhythm.   Musculoskeletal:         General: No swelling. Normal range of motion.   Neurological:      Mental Status: He is alert.         Additional Data:     Labs:    Results from last 7 days   Lab Units 24  1313 24  0447   WBC Thousand/uL 7.84 7.52   HEMOGLOBIN g/dL 9.6* 8.5*   HEMATOCRIT % 29.1* 25.6*   PLATELETS Thousands/uL 268 279   SEGS PCT %  --  66   LYMPHO PCT %  --  18   MONO PCT %  --  11   EOS PCT %  --  4     Results from last 7 days   Lab Units 24  0447 24  1147   POTASSIUM mmol/L 3.9 4.6   CHLORIDE  mmol/L 102 102   CO2 mmol/L 23 25   BUN mg/dL 17 20   CREATININE mg/dL 0.92 1.07   CALCIUM mg/dL 9.2 9.4   ALK PHOS U/L  --  69   ALT U/L  --  41   AST U/L  --  31     Results from last 7 days   Lab Units 09/07/24  1313   INR  1.13           Recent Cultures (last 7 days):     Results from last 7 days   Lab Units 09/05/24  1802 09/05/24  1151 09/05/24  1147   BLOOD CULTURE   --  No Growth at 72 hrs. No Growth at 72 hrs.   GRAM STAIN RESULT  No Polys or Bacteria seen  --   --    BODY FLUID CULTURE, STERILE  1+ Growth of Klebsiella oxytoca*  --   --        Last 24 Hours Medication List:   Current Facility-Administered Medications   Medication Dose Route Frequency Provider Last Rate    acetaminophen  650 mg Oral Q6H PRN Wellington Rosas, DO      aspirin  81 mg Oral Daily Wellington Rosas, DO      atorvastatin  80 mg Oral Daily With Dinner Wellington Rosas, DO      bisacodyl  5 mg Oral Daily PRN Yadiel Costa DO      cefazolin  2,000 mg Intravenous Q8H Lester Pineda MD Stopped (09/09/24 0250)    chlorhexidine  15 mL Mouth/Throat Q12H [facility] Marcela Hodges PA-C      chlorhexidine  15 mL Swish & Spit Q12H [facility] Marcela Hodges PA-C      cyanocobalamin  1,000 mcg Oral QPM Wellington Rosas DO      ergocalciferol  50,000 Units Oral Weekly Marcela Hodges PA-C      escitalopram  5 mg Oral Daily Wellington Rosas,       metoprolol tartrate  12.5 mg Oral Once Marcela Hodges PA-C      metoprolol tartrate  25 mg Oral BID Wellington Rosas DO      mupirocin   Nasal Q12H [facility] Marcela Hodges PA-C      mupirocin  1 Application Topical Once Marcela Hodges PA-C      pantoprazole  40 mg Oral Early Morning Wellington Rosas DO      polyethylene glycol  17 g Oral Daily Yadiel Costa DO      senna-docusate sodium  1 tablet Oral HS Yadiel Costa DO          Today, Patient Was Seen By: Yadiel Costa DO    ** Please Note: Dictation voice to text software may have been used in the creation of this document. **

## 2024-09-09 NOTE — ASSESSMENT & PLAN NOTE
History of recent stroke s/p CABG with no residual focal deficits  Follows neurology outpatient  Continue DAPT and statin   pain/decreased strength

## 2024-09-09 NOTE — PROGRESS NOTES
Progress Note - Cardiothoracic Surgery   Shane Chau Sr. 78 y.o. male MRN: [id number]  Unit/Bed#: PPHP-323-01 Encounter: [id number]      24 Hour Events: No events. Vitals stable. Afebrile. Labs stable. Wound aspiration culture grew Klebsiella oxytoca, on IV ancef per ID.     Medications:   Scheduled Meds:  Current Facility-Administered Medications   Medication Dose Route Frequency Provider Last Rate    acetaminophen  650 mg Oral Q6H PRN Wellington Rosas, DO      aspirin  81 mg Oral Daily Wellington Rosas, DO      atorvastatin  80 mg Oral Daily With Dinner Wellington Rosas, DO      bisacodyl  5 mg Oral Daily PRN Yadiel Costa, DO      cefazolin  2,000 mg Intravenous Q8H Lester Pineda MD Stopped (09/09/24 0250)    chlorhexidine  15 mL Mouth/Throat Q12H [facility] Marcela Hodges PA-C      chlorhexidine  15 mL Swish & Spit Q12H [facility] Marcela Hodges PA-C      cyanocobalamin  1,000 mcg Oral QPM Wellington Rosas, DO      ergocalciferol  50,000 Units Oral Weekly Marcela Hodges PA-C      escitalopram  5 mg Oral Daily Wellington Rosas, DO      metoprolol tartrate  12.5 mg Oral Once Marcela Hodges PA-C      metoprolol tartrate  25 mg Oral BID Wellington Rosas, DO      mupirocin   Nasal Q12H [facility] Marcela Hodges PA-C      mupirocin  1 Application Topical Once Marcela Hodges PA-C      pantoprazole  40 mg Oral Early Morning Wellington Rosas, DO      polyethylene glycol  17 g Oral Daily Yadiel Costa, DO      senna-docusate sodium  1 tablet Oral HS Yadiel Costa, DO       Continuous Infusions:       Results:   Results from last 7 days   Lab Units 09/07/24  1313 09/06/24  0447 09/05/24  1147   WBC Thousand/uL 7.84 7.52 6.61   HEMOGLOBIN g/dL 9.6* 8.5* 10.1*   HEMATOCRIT % 29.1* 25.6* 30.9*   PLATELETS Thousands/uL 268 279 267     Results from last 7 days   Lab Units 09/06/24  0447 09/05/24  1147 09/03/24  1007   POTASSIUM mmol/L 3.9 4.6 3.8   CHLORIDE mmol/L 102 102 100   CO2 mmol/L 23 25 25   BUN mg/dL 17 20 17  "  CREATININE mg/dL 0.92 1.07 1.01   CALCIUM mg/dL 9.2 9.4 9.3     Results from last 7 days   Lab Units 09/09/24  0220 09/08/24 2021 09/08/24  1332 09/07/24 1913 09/07/24  1313 09/05/24  1638   INR   --   --   --   --  1.13 1.14   PTT seconds 89* 62* 54*   < > 45*  --     < > = values in this interval not displayed.     Wound Culture 9/5:1+ gowth of Klebsiella oxytoca  Blood culture 9/7: no growth x 48 hrs    Studies:     CT chest: 1.  There is increased lucency along the midline sternotomy site, concerning for developing dehiscence. There is also increased edema throughout this region, with findings concerning for a potential fluid collection along the anterior aspect of the   sternum. There is also new likely reactive lymphadenopathy within the prevascular mediastinum, when compared with the CTA from 7/3/2024. These findings are concerning for superinfection.     US soft tissue lump sternum: Focal poorly marginated complex fluid in the area of concern measuring approximately 4.2 x 1.3 x 1.8 cm in size. The depth of this abnormality cannot be determined secondary to underlying bone, presence or absence of osseous involvement cannot be accurately assessed. Given clinical history of fever and palpable abnormality, this does remain concerning for potential sternal wound infection with complex potentially infected fluid. Fluid aspiration should be considered. CT of chest may be of further  benefit as well to determine depth of involvement.     I have personally reviewed pertinent reports.   and I have personally reviewed pertinent films in PACS       Vitals:   Vitals:    09/08/24 1910 09/08/24 2210 09/09/24 0223 09/09/24 0500   BP:  138/85 134/81    BP Location:   Left arm    Pulse:  83 78    Resp:   18    Temp:  99.7 °F (37.6 °C) 98.7 °F (37.1 °C)    TempSrc:  Oral Oral    SpO2: 97% 94% 95%    Height:    5' 10\" (1.778 m)       Physical Exam:    General: No acute distress and Alert  HEENT/NECK:  Normocephalic. " Atraumatic.  No jugular venous distention.    Cardiac: Regular rate and rhythm  Pulmonary:  Breath sounds clear bilaterally  Abdomen:  Non-tender and Non-distended  Incisions: Sternotomy incision with area of erythema/warmth at upper portion of incision. Tender to palpation. Incision intact without drainage.   Extremities: Extremities warm/dry  Neuro: Alert and oriented X 3      Assessment:    - Recurrent sternal wound infection  - s/p CABG x 3 6/20/24 by Dr. Amos      Plan:  Patient agreeable to proceed with surgery; Informed consent signed    Blood type and cross match completed    Wound cultures with Klebsiella oxytoca growth. IV ancef initiated per ID, appreciate recs. Blood cultures negative.     Continue Mupirocin 2% nasal ointment q 12 hrs    Continue topical chlorhexidine bath and mouth rise    Holding brilinta for OR    NPO  PRBC prepared  Hold Heparin  Sternal debridement/wire removal scheduled for today with Dr. Ravin Amos M.D.    SIGNATURE: Sp Hagen PA-C  DATE: September 9, 2024  TIME: 8:03 AM    * This note was completed in part utilizing [employer] direct voice recognition software.   Grammatical errors, random word insertion, spelling mistakes, and incomplete sentences may be an occasional consequence of the system secondary to software limitations, ambient noise and hardware issues. At the time of dictation, efforts were made to edit, clarify and /or correct errors. Please read the chart carefully and recognize, using context, where substitutions have occurred.  If you have any questions or concerns about the context, text or information contained within the body of this dictation, please contact myself, the provider, for further clarification.

## 2024-09-09 NOTE — ASSESSMENT & PLAN NOTE
Patient history of CABG in 6/2024 with postoperative hospital stay complicated by stroke and post op ileus with 1 week history of progressively worsening swelling near the superior sternotomy site and low-grade fevers over the last 4 days.  Outpatient imaging as below concerning for sternal dehiscence and fluid collection for which patient was recommended ED admission.  9/4 US sternum poorly marginated complex fluid in the area measuring 4.2 x 1.3 x 1.8 cm.  9/5 CT chest: Increased lucency along the midline sternal site, concerning for developing dehiscence.  There is also increased edema throughout this region, with findings concerning for a potential for collection along the anterior aspect of the sternum.  There is also new likely reactive lymphadenopathy within the prevascular mediastinum concerning for superinfection.  CT surgery consulted,   Continue with antibiotics as per infectious disease  Status post aspiration.  No positive growth  Note surgery input.  Plan for surgery later today.

## 2024-09-09 NOTE — OP NOTE
OPERATIVE REPORT  PATIENT NAME: Shane Chau Sr.    :  1945  MRN: [id number]  Pt Location:  OR ROOM 16    SURGERY DATE: 2024    Surgeons and Role:     * KALPANA Amos MD - Primary     * Sp Hagen PA-C - Assisting    Preop Diagnosis:  Superficial sternal wound infection  S/P CABG x 3    Postop Diagnosis:  Superficial sternal wound infection  S/P CABG x 3    Procedure(s):  Sternal wound incision and drainage, washout, application of VAC dressing.    Specimen(s):  ID Type Source Tests Collected by Time Destination   A : sternal wound Tissue Soft Tissue, Other ANAEROBIC CULTURE AND GRAM STAIN, FUNGAL CULTURE, CULTURE, TISSUE AND GRAM STAIN, AFB CULTURE WITH STAIN KALPANA Amos MD 2024 1145        Estimated Blood Loss:   Minimal    Drains:  [REMOVED] Urethral Catheter Latex 16 Fr. (Removed)   Number of days: 0       Anesthesia Type:   General    Operative Indications:  Superficial sternal wound infection  S/P CABG x 3    Operative Findings:  There was a very small pocket of fluctuance in the upper 1/3 of the sternal incision. There was mild surround erythema with tenderness to palpation.        Complications:   None    Procedure and Technique:    The patient was taken to the operating room and placed supine on the operating table. Following the satisfactory induction of general anesthesia the patient was prepped and draped in the usual sterile fashion. A time-out procedure was performed.     Incision was made over the fluctuant area.  There was release of a very small amount of purulent fluid, approximately 1-2 cc.  This fluid was swabbed and cultures were sent.  The incision was extended in both directions for the length of the erythematous area.  No other areas of purulence were encountered.  The incision was carried deeper over the length of the incision.  Still no additional purulence was identified.  Hemostasis was obtained with electrocautery.   The entire incision  was probed to attempt to identify additional pockets of purulence.  The surrounding areas were compressed to see if any purulent pockets would drain into the incision.  No additional purulence was identified.  The deeper aspect of the open wound appeared well healed at this point.  I did not want to create any communication between the superficial infected area and the deeper healed areas, therefore no additional dissection was performed.  The open wound was thoroughly irrigated with 1 liter of antibiotic irrigation.  Hemostasis was confirmed.  A VAC dressing was placed.        I was present for the entire procedure., A qualified resident physician was not available., and A physician assistant was required during the procedure for retraction, tissue handling, dissection and suturing.    Patient Disposition:  PACU       SIGNATURE: KALPANA Amos MD  DATE: September 9, 2024  TIME: 1:15 PM

## 2024-09-10 ENCOUNTER — ANESTHESIA EVENT (INPATIENT)
Dept: PERIOP | Facility: HOSPITAL | Age: 79
DRG: 862 | End: 2024-09-10
Payer: COMMERCIAL

## 2024-09-10 PROBLEM — S21.101A STERNAL WOUND INFECTION: Status: ACTIVE | Noted: 2024-09-05

## 2024-09-10 PROBLEM — D64.9 ANEMIA: Status: ACTIVE | Noted: 2024-09-10

## 2024-09-10 PROBLEM — L08.9 STERNAL WOUND INFECTION: Status: ACTIVE | Noted: 2024-09-05

## 2024-09-10 LAB
BACTERIA BLD CULT: NORMAL
BACTERIA BLD CULT: NORMAL
ERYTHROCYTE [DISTWIDTH] IN BLOOD BY AUTOMATED COUNT: 12.6 % (ref 11.6–15.1)
FERRITIN SERPL-MCNC: 326 NG/ML (ref 24–336)
FOLATE SERPL-MCNC: 9.4 NG/ML
HCT VFR BLD AUTO: 27.5 % (ref 36.5–49.3)
HGB BLD-MCNC: 8.9 G/DL (ref 12–17)
IRON SATN MFR SERPL: 10 % (ref 15–50)
IRON SERPL-MCNC: 21 UG/DL (ref 50–212)
MCH RBC QN AUTO: 31 PG (ref 26.8–34.3)
MCHC RBC AUTO-ENTMCNC: 32.4 G/DL (ref 31.4–37.4)
MCV RBC AUTO: 96 FL (ref 82–98)
PLATELET # BLD AUTO: 256 THOUSANDS/UL (ref 149–390)
PMV BLD AUTO: 8.2 FL (ref 8.9–12.7)
RBC # BLD AUTO: 2.87 MILLION/UL (ref 3.88–5.62)
TIBC SERPL-MCNC: 216 UG/DL (ref 250–450)
UIBC SERPL-MCNC: 195 UG/DL (ref 155–355)
WBC # BLD AUTO: 7.32 THOUSAND/UL (ref 4.31–10.16)

## 2024-09-10 PROCEDURE — 99232 SBSQ HOSP IP/OBS MODERATE 35: CPT | Performed by: INTERNAL MEDICINE

## 2024-09-10 PROCEDURE — 99024 POSTOP FOLLOW-UP VISIT: CPT | Performed by: PHYSICIAN ASSISTANT

## 2024-09-10 PROCEDURE — 85027 COMPLETE CBC AUTOMATED: CPT | Performed by: PHYSICIAN ASSISTANT

## 2024-09-10 RX ORDER — FOLIC ACID 1 MG/1
1 TABLET ORAL DAILY
Status: DISCONTINUED | OUTPATIENT
Start: 2024-09-10 | End: 2024-09-17 | Stop reason: HOSPADM

## 2024-09-10 RX ADMIN — CYANOCOBALAMIN TAB 500 MCG 1000 MCG: 500 TAB at 16:46

## 2024-09-10 RX ADMIN — CEFAZOLIN SODIUM 2000 MG: 2 SOLUTION INTRAVENOUS at 19:11

## 2024-09-10 RX ADMIN — CEFAZOLIN SODIUM 2000 MG: 2 SOLUTION INTRAVENOUS at 03:14

## 2024-09-10 RX ADMIN — IRON SUCROSE 200 MG: 20 INJECTION, SOLUTION INTRAVENOUS at 16:43

## 2024-09-10 RX ADMIN — METOPROLOL TARTRATE 25 MG: 25 TABLET, FILM COATED ORAL at 08:29

## 2024-09-10 RX ADMIN — CEFAZOLIN SODIUM 2000 MG: 2 SOLUTION INTRAVENOUS at 12:52

## 2024-09-10 RX ADMIN — SENNOSIDES AND DOCUSATE SODIUM 1 TABLET: 50; 8.6 TABLET ORAL at 21:36

## 2024-09-10 RX ADMIN — PANTOPRAZOLE SODIUM 40 MG: 40 TABLET, DELAYED RELEASE ORAL at 05:11

## 2024-09-10 RX ADMIN — MUPIROCIN: 20 OINTMENT TOPICAL at 21:38

## 2024-09-10 RX ADMIN — ESCITALOPRAM OXALATE 5 MG: 5 TABLET, FILM COATED ORAL at 08:29

## 2024-09-10 RX ADMIN — ATORVASTATIN CALCIUM 80 MG: 80 TABLET, FILM COATED ORAL at 16:42

## 2024-09-10 RX ADMIN — ASPIRIN 81 MG: 81 TABLET, COATED ORAL at 08:29

## 2024-09-10 RX ADMIN — METOPROLOL TARTRATE 25 MG: 25 TABLET, FILM COATED ORAL at 16:46

## 2024-09-10 RX ADMIN — MUPIROCIN 1 APPLICATION: 20 OINTMENT TOPICAL at 08:29

## 2024-09-10 RX ADMIN — FOLIC ACID 1 MG: 1 TABLET ORAL at 16:42

## 2024-09-10 NOTE — CASE MANAGEMENT
Case Management Discharge Planning Note    Patient name Shane Chau .  Location 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/10/2024     Prediabetes 07/10/2024     Reaction at surgical site 07/10/2024     Cognitive impairment 07/10/2024     Hyponatremia 2024     History of stroke 2024     PAD (peripheral artery disease) ([facility]) 2024     Stroke-like symptoms 2024     CKD (chronic kidney disease) stage 2, GFR 60-89 ml/min 2024     S/P CABG x 3 2024     Rib pain 2023     Paresthesia 2023     Leg swelling 05/10/2022     Erectile dysfunction 2022     Microhematuria 2018     Need for pneumococcal vaccination 2018     Hematuria 2018     Asymptomatic bilateral carotid artery stenosis 2017     Closed compression fracture of first lumbar vertebra ([facility]) 2017     Abdominal aortic aneurysm without rupture ([facility]) 2016     Localized primary osteoarthritis of lower leg, unspecified laterality 2013     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[facility]) 2013     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     Transient cerebral ischemic attack 2013     Coronary artery disease of native artery of native heart with stable angina pectoris ([facility]) 2012     Hyperlipidemia 2012     Primary hypertension 2012       LOS (days): 5  Geometric Mean LOS (GMLOS) (days): 5.4  Days to GMLOS:0.3     OBJECTIVE:  Risk of Unplanned Readmission Score: 14.84         Current admission status: Inpatient   Preferred Pharmacy:   [facility] - [place] - [address]  [address] [place] [address]  Phone: [phone] Fax: [phone]    [id number] - [place] - [address]  [address]  [place] [address]  Phone: [phone] Fax: [phone]    [id number] - [place], [place] - [address]  [address] [place] [address]  Phone: [phone] Fax: [phone]    [facility] - [place] - [address]  [address]  [place] [address]  Phone: [phone] Fax: [phone]    [facility] [place] - [place] - [address]  [address]  [place] [address]  Phone: [phone] Fax: [phone]    Primary Care Provider: Sherwin Mantilla MD    Primary Insurance: [facility] REP  Secondary Insurance:     DISCHARGE DETAILS:                                Requested Home Health Care         Is the patient interested in HHC at discharge?: Yes  Home Health Discipline requested:: Nursing  Home Health Follow-Up Provider:: DON  Home Health Services Needed:: Post-Op Care and Assessment, Administration of IV, IM or SC Medications, Central Line Care  Homebound Criteria Met:: Requires the Assistance of Another Person for Safe Ambulation or to Leave the Home  Supporting Clincal Findings:: Limited Endurance, Fatigues Easliy in Short Distances                                                                Additional Comments: 77yo male was readmitted on 9/5/24 with sternal wound dehiscence, had CABG in June 2024. On 9/9/24 and I&D, washout and apllication of VAC to wound. He is on Ancef 1gm IV q8h. Plan is to return to OR tomorrow for same. Anticipate may need home IV antibiotic at discharge. Will discuss with pt and wife.

## 2024-09-10 NOTE — ASSESSMENT & PLAN NOTE
In setting of recent CABG, delayed postop wound healing.  Late onset along with CT findings suggest deep infection with possible osteomyelitis.  Fortunately appears clinically stable without sepsis.  Status post IR aspiration of thick, cloudy fluid 9/5/2024.  Fluid culture with Klebsiella.  Status post I&D, VAC 9/9.  Intraoperative findings notable for purulence confined to soft tissue.  No exposure or communicaiton with deeper structures.  OR cultures also with Klebsiella.    Continue cefazolin for now  Monitor closely while on antibiotics for toxicities including diarrhea, rash, cytopenias, or kidney injury  Follow up operative cultures and adjust antibiotics as needed  LWC/VAC per CT surgery - change in OR pending tomorrow  Supportive care as per the primary service  If infection remains confined to superficial space, anticipate hopeful transition to PO antibiotics with plan to complete short course

## 2024-09-10 NOTE — PROGRESS NOTES
Progress Note - Infectious Disease   Name: Shane Chau Sr. 78 y.o. male I MRN: [id number]  Unit/Bed#: PPHP-323-01 I Date of Admission: 9/5/2024   Date of Service: 9/10/2024 I Hospital Day: 5     Assessment & Plan  Sternal wound infection  In setting of recent CABG, delayed postop wound healing.  Late onset along with CT findings suggest deep infection with possible osteomyelitis.  Fortunately appears clinically stable without sepsis.  Status post IR aspiration of thick, cloudy fluid 9/5/2024.  Fluid culture with Klebsiella.  Status post I&D, VAC 9/9.  Intraoperative findings notable for purulence confined to soft tissue.  No exposure or communicaiton with deeper structures.  OR cultures also with Klebsiella.    Continue cefazolin for now  Monitor closely while on antibiotics for toxicities including diarrhea, rash, cytopenias, or kidney injury  Follow up operative cultures and adjust antibiotics as needed  LWC/VAC per CT surgery - change in OR pending tomorrow  Supportive care as per the primary service  If infection remains confined to superficial space, anticipate hopeful transition to PO antibiotics with plan to complete short course  S/P CABG x 3  For CAD on 6/20/24. Complicated by postop ileus, delayed wound healing.   CKD (chronic kidney disease) stage 2, GFR 60-89 ml/min  Lab Results   Component Value Date    EGFR 62 09/09/2024    EGFR 79 09/06/2024    EGFR 66 09/05/2024    CREATININE 1.12 09/09/2024    CREATININE 0.92 09/06/2024    CREATININE 1.07 09/05/2024     Antibiotics:  Cefazolin #3  Antibiotics #4  POD #1        History of Present Illness   Seen on AM rounds.  Doing well.  Offers no complaints.  No fever or diarrhea.    Objective      Temp:  [98.5 °F (36.9 °C)-98.7 °F (37.1 °C)] 98.7 °F (37.1 °C)  HR:  [72-75] 73  Resp:  [17] 17  BP: (113-146)/(69-80) 113/69  O2 Device: None (Room air)          I/O last 24 hours:  In: 885.2 [P.O.:810; I.V.:25.2; IV Piggyback:50]  Out: 1230  [Urine:1230]  Lines/Drains/Airways       Active Status       None                  Physical Exam  Constitutional:       Appearance: Normal appearance.   Pulmonary:      Effort: Pulmonary effort is normal.   Skin:     Findings: No rash.   Neurological:      Mental Status: He is alert.      VAC to mid sternum without surrounding cellulitis      Lab Results: I have reviewed the following results:   Recent Labs     09/09/24  0220 09/09/24  1240 09/10/24  0500   WBC  --   --  7.32   HGB  --   --  8.9*   HCT  --   --  27.5*   PLT  --   --  256   SODIUM  --  134*  --    K  --  4.7  --    CL  --  101  --    CO2  --  27  --    BUN  --  13  --    CREATININE  --  1.12  --    GLUC  --  101  --    PTT 89*  --   --      Micro:  Lab Results   Component Value Date    BLOODCX No Growth After 5 Days. 09/05/2024    BLOODCX No Growth After 5 Days. 09/05/2024    URINECX No Growth <1000 cfu/mL 06/29/2016     Imaging Review: No pertinent imaging studies reviewed.  Other Studies: No additional pertinent studies reviewed.

## 2024-09-10 NOTE — ASSESSMENT & PLAN NOTE
Lab Results   Component Value Date    EGFR 62 09/09/2024    EGFR 79 09/06/2024    EGFR 66 09/05/2024    CREATININE 1.12 09/09/2024    CREATININE 0.92 09/06/2024    CREATININE 1.07 09/05/2024     Monitor kidney function  Avoid nephrotoxins

## 2024-09-10 NOTE — ASSESSMENT & PLAN NOTE
Patient history of CABG in 6/2024 with postoperative hospital stay complicated by stroke and post op ileus with 1 week history of progressively worsening swelling near the superior sternotomy site and low-grade fevers over the last 4 days.  Outpatient imaging as below concerning for sternal dehiscence and fluid collection for which patient was recommended ED admission.  9/4 US sternum poorly marginated complex fluid in the area measuring 4.2 x 1.3 x 1.8 cm.  9/5 CT chest: Increased lucency along the midline sternal site, concerning for developing dehiscence.  There is also increased edema throughout this region, with findings concerning for a potential for collection along the anterior aspect of the sternum.  There is also new likely reactive lymphadenopathy within the prevascular mediastinum concerning for superinfection.  S/p aspiration by IR, aspirate positive for Klebsiella oxytoca  Status post I&D and VAC dressing by CT surgery on 9/9.  Cultures positive for Klebsiella oxytoca  Presently on IV cefazolin 2 g every 8 hours  CT surgery plans for I&D noted, n.p.o. after midnight  Infectious disease inputs noted  Wound care and VAC dressing care per CT surgery

## 2024-09-10 NOTE — ASSESSMENT & PLAN NOTE
Lab Results   Component Value Date    EGFR 62 09/09/2024    EGFR 79 09/06/2024    EGFR 66 09/05/2024    CREATININE 1.12 09/09/2024    CREATININE 0.92 09/06/2024    CREATININE 1.07 09/05/2024

## 2024-09-10 NOTE — ASSESSMENT & PLAN NOTE
History of recent stroke s/p CABG with no residual focal deficits  Continue aspirin, statin  Outpatient follow-up

## 2024-09-10 NOTE — ASSESSMENT & PLAN NOTE
Status post CABG in 6/2024  Continue aspirin, metoprolol, atorvastatin  Brilinta on hold per CT surgery for I&D planned tomorrow

## 2024-09-10 NOTE — PROGRESS NOTES
Progress Note - Cardiothoracic Surgery   Shane Chau . 78 y.o. male MRN: [id number]  Unit/Bed#: PPHP-323-01 Encounter: [id number]    Superficial sternal wound infection. S/P Sternal wound incision and drainage, washout, application of VAC dressing. ; POD # 1      24 Hour Events: No events overnight. Denies incisional pain.     Medications:   Scheduled Meds:  Current Facility-Administered Medications   Medication Dose Route Frequency Provider Last Rate    acetaminophen  650 mg Oral Q6H PRN Sp Hagen PA-C      aspirin  81 mg Oral Daily Sp Hagen PA-C      atorvastatin  80 mg Oral Daily With Dinner Sp Hagen PA-C      bisacodyl  5 mg Oral Daily PRN Sp Hagen PA-C      cefazolin  2,000 mg Intravenous Q8H Sp Hagen PA-C Stopped (09/10/24 0409)    cyanocobalamin  1,000 mcg Oral QPM Sp Hagen PA-C      ergocalciferol  50,000 Units Oral Weekly Sp Hagen PA-C      escitalopram  5 mg Oral Daily Sp Hagen PA-C      metoprolol tartrate  25 mg Oral BID Sp Hagen PA-C      mupirocin   Nasal Q12H KRISTI Sp Hagen PA-C      pantoprazole  40 mg Oral Early Morning Sp Hagen PA-C      polyethylene glycol  17 g Oral Daily Sp Hagen PA-C      senna-docusate sodium  1 tablet Oral HS Sp Hagen PA-C       Continuous Infusions:   PRN Meds:.  acetaminophen    bisacodyl    Vitals:   Vitals:    09/09/24 2100 09/09/24 2355 09/10/24 0705 09/10/24 0705   BP:  131/75 146/80 146/80   BP Location:       Pulse:  72 75 72   Resp:  17     Temp:  98.7 °F (37.1 °C) 98.5 °F (36.9 °C) 98.5 °F (36.9 °C)   TempSrc:       SpO2: 95% 92% 93% 94%   Weight:       Height:           Telemetry: NSR; Heart Rate: 72    Respiratory:   SpO2: SpO2: 94 %, SpO2 Activity: SpO2 Activity: At Rest; Room Air    Intake/Output:     Intake/Output Summary (Last 24 hours) at 9/10/2024 1022  Last data filed at 9/10/2024 0900  Gross per 24 hour   Intake 740 ml   Output 980 ml   Net -240 ml     "    Weights:   Weight (last 2 days)       Date/Time Weight    09/09/24 0830 72.2 (159.17)            Chest tube Output:    VAC drain: 0 mL/8 hours  0 mL/24 hours            Results:   Results from last 7 days   Lab Units 09/10/24  0500 09/07/24  1313 09/06/24  0447   WBC Thousand/uL 7.32 7.84 7.52   HEMOGLOBIN g/dL 8.9* 9.6* 8.5*   HEMATOCRIT % 27.5* 29.1* 25.6*   PLATELETS Thousands/uL 256 268 279     Results from last 7 days   Lab Units 09/09/24  1240 09/06/24  0447 09/05/24  1147   SODIUM mmol/L 134* 135 134*   POTASSIUM mmol/L 4.7 3.9 4.6   CHLORIDE mmol/L 101 102 102   CO2 mmol/L 27 23 25   BUN mg/dL 13 17 20   CREATININE mg/dL 1.12 0.92 1.07   CALCIUM mg/dL 9.0 9.2 9.4     No results for input(s): \"MG\" in the last 72 hours.  Results from last 7 days   Lab Units 09/09/24  0220 09/08/24  2021 09/08/24  1332 09/07/24  1913 09/07/24  1313 09/05/24  1638   INR   --   --   --   --  1.13 1.14   PTT seconds 89* 62* 54*   < > 45*  --     < > = values in this interval not displayed.       Studies:  No new studies    Invasive Lines/Tubes:  Invasive Devices       Peripheral Intravenous Line  Duration             Peripheral IV 09/09/24 Left;Proximal;Ventral (anterior) Forearm 1 day    Peripheral IV 09/09/24 Right Wrist <1 day                    Physical Exam:    General: No acute distress and Alert  HEENT/NECK:  Normocephalic. Atraumatic.  No jugular venous distention.    Cardiac: Regular rate and rhythm  Pulmonary:  Breath sounds clear bilaterally  Abdomen:  Non-tender, Non-distended, and Normal bowel sounds  Incisions: Sternum is stable. VAC in place without significant surrounding erythema.  Incision is otherwise clean, dry, and intact.   Extremities: Extremities warm/dry  Neuro: Alert and oriented X 3, Sensation is grossly intact, and No focal deficits  Skin: Warm/Dry, without rashes or lesions.    Assessment:  Principal Problem:    Sternal wound dehiscence  Active Problems:    Abdominal aortic aneurysm without rupture " (HCC)    Coronary artery disease of native artery of native heart with stable angina pectoris (HCC)    Primary hypertension    S/P CABG x 3    CKD (chronic kidney disease) stage 2, GFR 60-89 ml/min    History of stroke       Superficial sternal wound infection. S/P Sternal wound incision and drainage, washout, application of VAC dressing. ; POD # 1      Plan:  Klebsiella oxytoca sternal wound infection.    VAC in place with no appreciable drainage.   NPO after MN for VAC change in OR tomorrow  OR gram stain negative  Fungal culture with no growth to date    Continue cefazolin for now, per ID    History of TIA (on Brilinta as home med)   Hold today, pending re-op tomorrow   Likely resume post op 9/12            SIGNATURE: Nakul Nunez PA-C  DATE: September 10, 2024  TIME: 10:22 AM

## 2024-09-11 ENCOUNTER — ANESTHESIA (INPATIENT)
Dept: PERIOP | Facility: HOSPITAL | Age: 79
DRG: 862 | End: 2024-09-11
Payer: COMMERCIAL

## 2024-09-11 PROBLEM — M25.471 PAIN AND SWELLING OF RIGHT ANKLE: Status: ACTIVE | Noted: 2024-09-11

## 2024-09-11 PROBLEM — M25.571 PAIN AND SWELLING OF RIGHT ANKLE: Status: ACTIVE | Noted: 2024-09-11

## 2024-09-11 LAB
ANION GAP SERPL CALCULATED.3IONS-SCNC: 7 MMOL/L (ref 4–13)
BACTERIA SPEC ANAEROBE CULT: NORMAL
BACTERIA TISS AEROBE CULT: ABNORMAL
BASOPHILS # BLD AUTO: 0.05 THOUSANDS/ΜL (ref 0–0.1)
BASOPHILS NFR BLD AUTO: 1 % (ref 0–1)
BUN SERPL-MCNC: 16 MG/DL (ref 5–25)
CALCIUM SERPL-MCNC: 9 MG/DL (ref 8.4–10.2)
CHLORIDE SERPL-SCNC: 100 MMOL/L (ref 96–108)
CO2 SERPL-SCNC: 28 MMOL/L (ref 21–32)
CREAT SERPL-MCNC: 0.96 MG/DL (ref 0.6–1.3)
CRP SERPL QL: 75 MG/L
EOSINOPHIL # BLD AUTO: 0.34 THOUSAND/ΜL (ref 0–0.61)
EOSINOPHIL NFR BLD AUTO: 5 % (ref 0–6)
ERYTHROCYTE [DISTWIDTH] IN BLOOD BY AUTOMATED COUNT: 12.8 % (ref 11.6–15.1)
GFR SERPL CREATININE-BSD FRML MDRD: 75 ML/MIN/1.73SQ M
GLUCOSE SERPL-MCNC: 102 MG/DL (ref 65–140)
GRAM STN SPEC: ABNORMAL
GRAM STN SPEC: ABNORMAL
HCT VFR BLD AUTO: 27.1 % (ref 36.5–49.3)
HGB BLD-MCNC: 8.6 G/DL (ref 12–17)
IMM GRANULOCYTES # BLD AUTO: 0.05 THOUSAND/UL (ref 0–0.2)
IMM GRANULOCYTES NFR BLD AUTO: 1 % (ref 0–2)
LYMPHOCYTES # BLD AUTO: 1.22 THOUSANDS/ΜL (ref 0.6–4.47)
LYMPHOCYTES NFR BLD AUTO: 17 % (ref 14–44)
MCH RBC QN AUTO: 30.1 PG (ref 26.8–34.3)
MCHC RBC AUTO-ENTMCNC: 31.7 G/DL (ref 31.4–37.4)
MCV RBC AUTO: 95 FL (ref 82–98)
MONOCYTES # BLD AUTO: 0.84 THOUSAND/ΜL (ref 0.17–1.22)
MONOCYTES NFR BLD AUTO: 12 % (ref 4–12)
NEUTROPHILS # BLD AUTO: 4.67 THOUSANDS/ΜL (ref 1.85–7.62)
NEUTS SEG NFR BLD AUTO: 64 % (ref 43–75)
NRBC BLD AUTO-RTO: 0 /100 WBCS
PLATELET # BLD AUTO: 267 THOUSANDS/UL (ref 149–390)
PMV BLD AUTO: 8.5 FL (ref 8.9–12.7)
POTASSIUM SERPL-SCNC: 4 MMOL/L (ref 3.5–5.3)
RBC # BLD AUTO: 2.86 MILLION/UL (ref 3.88–5.62)
SODIUM SERPL-SCNC: 135 MMOL/L (ref 135–147)
URATE SERPL-MCNC: 4.1 MG/DL (ref 3.5–8.5)
WBC # BLD AUTO: 7.17 THOUSAND/UL (ref 4.31–10.16)

## 2024-09-11 PROCEDURE — 97167 OT EVAL HIGH COMPLEX 60 MIN: CPT

## 2024-09-11 PROCEDURE — 99232 SBSQ HOSP IP/OBS MODERATE 35: CPT | Performed by: INTERNAL MEDICINE

## 2024-09-11 PROCEDURE — 99024 POSTOP FOLLOW-UP VISIT: CPT | Performed by: THORACIC SURGERY (CARDIOTHORACIC VASCULAR SURGERY)

## 2024-09-11 PROCEDURE — 84550 ASSAY OF BLOOD/URIC ACID: CPT | Performed by: INTERNAL MEDICINE

## 2024-09-11 PROCEDURE — 80048 BASIC METABOLIC PNL TOTAL CA: CPT | Performed by: INTERNAL MEDICINE

## 2024-09-11 PROCEDURE — 15852 DRESSING CHANGE NOT FOR BURN: CPT | Performed by: PHYSICIAN ASSISTANT

## 2024-09-11 PROCEDURE — 15852 DRESSING CHANGE NOT FOR BURN: CPT | Performed by: THORACIC SURGERY (CARDIOTHORACIC VASCULAR SURGERY)

## 2024-09-11 PROCEDURE — 85025 COMPLETE CBC W/AUTO DIFF WBC: CPT | Performed by: INTERNAL MEDICINE

## 2024-09-11 PROCEDURE — 2W14X6Z COMPRESSION OF CHEST WALL USING PRESSURE DRESSING: ICD-10-PCS | Performed by: THORACIC SURGERY (CARDIOTHORACIC VASCULAR SURGERY)

## 2024-09-11 PROCEDURE — 97163 PT EVAL HIGH COMPLEX 45 MIN: CPT

## 2024-09-11 PROCEDURE — 86140 C-REACTIVE PROTEIN: CPT | Performed by: INTERNAL MEDICINE

## 2024-09-11 RX ORDER — CHLORHEXIDINE GLUCONATE ORAL RINSE 1.2 MG/ML
15 SOLUTION DENTAL ONCE
Status: DISCONTINUED | OUTPATIENT
Start: 2024-09-11 | End: 2024-09-11 | Stop reason: SDUPTHER

## 2024-09-11 RX ORDER — SODIUM CHLORIDE, SODIUM LACTATE, POTASSIUM CHLORIDE, CALCIUM CHLORIDE 600; 310; 30; 20 MG/100ML; MG/100ML; MG/100ML; MG/100ML
50 INJECTION, SOLUTION INTRAVENOUS CONTINUOUS
Status: CANCELLED | OUTPATIENT
Start: 2024-09-11

## 2024-09-11 RX ORDER — LIDOCAINE HYDROCHLORIDE 20 MG/ML
INJECTION, SOLUTION EPIDURAL; INFILTRATION; INTRACAUDAL; PERINEURAL AS NEEDED
Status: DISCONTINUED | OUTPATIENT
Start: 2024-09-11 | End: 2024-09-11

## 2024-09-11 RX ORDER — SODIUM CHLORIDE, SODIUM LACTATE, POTASSIUM CHLORIDE, CALCIUM CHLORIDE 600; 310; 30; 20 MG/100ML; MG/100ML; MG/100ML; MG/100ML
INJECTION, SOLUTION INTRAVENOUS CONTINUOUS PRN
Status: DISCONTINUED | OUTPATIENT
Start: 2024-09-11 | End: 2024-09-11

## 2024-09-11 RX ORDER — COLCHICINE 0.6 MG/1
0.6 TABLET ORAL DAILY
Status: DISCONTINUED | OUTPATIENT
Start: 2024-09-12 | End: 2024-09-17 | Stop reason: HOSPADM

## 2024-09-11 RX ORDER — HYDROMORPHONE HCL IN WATER/PF 6 MG/30 ML
0.2 PATIENT CONTROLLED ANALGESIA SYRINGE INTRAVENOUS
Status: DISCONTINUED | OUTPATIENT
Start: 2024-09-11 | End: 2024-09-11 | Stop reason: HOSPADM

## 2024-09-11 RX ORDER — EPHEDRINE SULFATE 50 MG/ML
INJECTION INTRAVENOUS AS NEEDED
Status: DISCONTINUED | OUTPATIENT
Start: 2024-09-11 | End: 2024-09-11

## 2024-09-11 RX ORDER — ONDANSETRON 2 MG/ML
4 INJECTION INTRAMUSCULAR; INTRAVENOUS ONCE AS NEEDED
Status: DISCONTINUED | OUTPATIENT
Start: 2024-09-11 | End: 2024-09-11 | Stop reason: HOSPADM

## 2024-09-11 RX ORDER — FENTANYL CITRATE 50 UG/ML
INJECTION, SOLUTION INTRAMUSCULAR; INTRAVENOUS AS NEEDED
Status: DISCONTINUED | OUTPATIENT
Start: 2024-09-11 | End: 2024-09-11

## 2024-09-11 RX ORDER — LIDOCAINE HYDROCHLORIDE 10 MG/ML
INJECTION, SOLUTION EPIDURAL; INFILTRATION; INTRACAUDAL; PERINEURAL AS NEEDED
Status: DISCONTINUED | OUTPATIENT
Start: 2024-09-11 | End: 2024-09-11

## 2024-09-11 RX ORDER — PROPOFOL 10 MG/ML
INJECTION, EMULSION INTRAVENOUS AS NEEDED
Status: DISCONTINUED | OUTPATIENT
Start: 2024-09-11 | End: 2024-09-11

## 2024-09-11 RX ORDER — ONDANSETRON 2 MG/ML
INJECTION INTRAMUSCULAR; INTRAVENOUS AS NEEDED
Status: DISCONTINUED | OUTPATIENT
Start: 2024-09-11 | End: 2024-09-11

## 2024-09-11 RX ORDER — FENTANYL CITRATE/PF 50 MCG/ML
25 SYRINGE (ML) INJECTION
Status: DISCONTINUED | OUTPATIENT
Start: 2024-09-11 | End: 2024-09-11 | Stop reason: HOSPADM

## 2024-09-11 RX ORDER — CHLORHEXIDINE GLUCONATE ORAL RINSE 1.2 MG/ML
15 SOLUTION DENTAL EVERY 12 HOURS SCHEDULED
Status: DISCONTINUED | OUTPATIENT
Start: 2024-09-11 | End: 2024-09-17 | Stop reason: HOSPADM

## 2024-09-11 RX ORDER — COLCHICINE 0.6 MG/1
1.2 TABLET ORAL ONCE
Status: COMPLETED | OUTPATIENT
Start: 2024-09-11 | End: 2024-09-11

## 2024-09-11 RX ADMIN — CHLORHEXIDINE GLUCONATE 0.12% ORAL RINSE 15 ML: 1.2 LIQUID ORAL at 09:47

## 2024-09-11 RX ADMIN — FENTANYL CITRATE 25 MCG: 50 INJECTION INTRAMUSCULAR; INTRAVENOUS at 13:27

## 2024-09-11 RX ADMIN — LIDOCAINE HYDROCHLORIDE 50 MG: 10 INJECTION, SOLUTION EPIDURAL; INFILTRATION; INTRACAUDAL; PERINEURAL at 13:08

## 2024-09-11 RX ADMIN — ONDANSETRON 4 MG: 2 INJECTION INTRAMUSCULAR; INTRAVENOUS at 13:25

## 2024-09-11 RX ADMIN — ESCITALOPRAM OXALATE 5 MG: 5 TABLET, FILM COATED ORAL at 09:47

## 2024-09-11 RX ADMIN — PROPOFOL 100 MG: 10 INJECTION, EMULSION INTRAVENOUS at 13:08

## 2024-09-11 RX ADMIN — FENTANYL CITRATE 25 MCG: 50 INJECTION INTRAMUSCULAR; INTRAVENOUS at 13:45

## 2024-09-11 RX ADMIN — CEFAZOLIN SODIUM 2000 MG: 2 SOLUTION INTRAVENOUS at 18:38

## 2024-09-11 RX ADMIN — LIDOCAINE HYDROCHLORIDE 40 MG: 20 INJECTION, SOLUTION EPIDURAL; INFILTRATION; INTRACAUDAL at 13:11

## 2024-09-11 RX ADMIN — CHLORHEXIDINE GLUCONATE 0.12% ORAL RINSE 15 ML: 1.2 LIQUID ORAL at 21:14

## 2024-09-11 RX ADMIN — CYANOCOBALAMIN TAB 500 MCG 1000 MCG: 500 TAB at 17:41

## 2024-09-11 RX ADMIN — CEFAZOLIN SODIUM 2000 MG: 2 SOLUTION INTRAVENOUS at 11:20

## 2024-09-11 RX ADMIN — FENTANYL CITRATE 25 MCG: 50 INJECTION INTRAMUSCULAR; INTRAVENOUS at 13:34

## 2024-09-11 RX ADMIN — ACETAMINOPHEN 650 MG: 325 TABLET ORAL at 11:43

## 2024-09-11 RX ADMIN — MUPIROCIN 1 APPLICATION: 20 OINTMENT TOPICAL at 09:47

## 2024-09-11 RX ADMIN — ASPIRIN 81 MG: 81 TABLET, COATED ORAL at 09:47

## 2024-09-11 RX ADMIN — MUPIROCIN 1 APPLICATION: 20 OINTMENT TOPICAL at 21:14

## 2024-09-11 RX ADMIN — METOPROLOL TARTRATE 25 MG: 25 TABLET, FILM COATED ORAL at 09:46

## 2024-09-11 RX ADMIN — CEFAZOLIN SODIUM 2000 MG: 2 SOLUTION INTRAVENOUS at 03:35

## 2024-09-11 RX ADMIN — SENNOSIDES AND DOCUSATE SODIUM 1 TABLET: 50; 8.6 TABLET ORAL at 21:14

## 2024-09-11 RX ADMIN — FENTANYL CITRATE 25 MCG: 50 INJECTION INTRAMUSCULAR; INTRAVENOUS at 13:40

## 2024-09-11 RX ADMIN — PANTOPRAZOLE SODIUM 40 MG: 40 TABLET, DELAYED RELEASE ORAL at 05:13

## 2024-09-11 RX ADMIN — FOLIC ACID 1 MG: 1 TABLET ORAL at 09:46

## 2024-09-11 RX ADMIN — IRON SUCROSE 200 MG: 20 INJECTION, SOLUTION INTRAVENOUS at 09:10

## 2024-09-11 RX ADMIN — EPHEDRINE SULFATE 5 MG: 50 INJECTION, SOLUTION INTRAVENOUS at 13:38

## 2024-09-11 RX ADMIN — METOPROLOL TARTRATE 25 MG: 25 TABLET, FILM COATED ORAL at 17:41

## 2024-09-11 RX ADMIN — SODIUM CHLORIDE, SODIUM LACTATE, POTASSIUM CHLORIDE, AND CALCIUM CHLORIDE: .6; .31; .03; .02 INJECTION, SOLUTION INTRAVENOUS at 12:48

## 2024-09-11 RX ADMIN — COLCHICINE 1.2 MG: 0.6 TABLET ORAL at 17:42

## 2024-09-11 NOTE — ASSESSMENT & PLAN NOTE
Lab Results   Component Value Date    EGFR 75 09/11/2024    EGFR 62 09/09/2024    EGFR 79 09/06/2024    CREATININE 0.96 09/11/2024    CREATININE 1.12 09/09/2024    CREATININE 0.92 09/06/2024     Monitor kidney function  Avoid nephrotoxins

## 2024-09-11 NOTE — PROGRESS NOTES
Progress Note - Cardiothoracic Surgery   Shane Chau . 78 y.o. male MRN: [id number]  Unit/Bed#: PPHP-323-01 Encounter: [id number]    Superficial sternal wound infection. S/P Sternal wound incision and drainage, washout, application of VAC dressing. ; POD # 1      24 Hour Events: No events overnight. Denies CP, incisional pain, SOB, fevers or chills.     Medications:   Scheduled Meds:  Current Facility-Administered Medications   Medication Dose Route Frequency Provider Last Rate    acetaminophen  650 mg Oral Q6H PRN Sp Hagen PA-C      aspirin  81 mg Oral Daily Sp Hagen PA-C      atorvastatin  80 mg Oral Daily With Dinner Sp Hagen PA-C      bisacodyl  5 mg Oral Daily PRN Sp Hagen PA-C      cefazolin  2,000 mg Intravenous Q8H Sp Hagen PA-C Stopped (09/11/24 0405)    cyanocobalamin  1,000 mcg Oral QPM Sp Hagen PA-C      ergocalciferol  50,000 Units Oral Weekly Sp Hagen PA-C      escitalopram  5 mg Oral Daily Sp Hagen PA-C      folic acid  1 mg Oral Daily Timbo Vazquez MD      iron sucrose  200 mg Intravenous Daily Timbo Vazquez MD Stopped (09/10/24 1743)    metoprolol tartrate  25 mg Oral BID Sp Hagen PA-C      mupirocin   Nasal Q12H KRISTI Sp Hagen PA-C      pantoprazole  40 mg Oral Early Morning Sp Hagen PA-C      polyethylene glycol  17 g Oral Daily Sp Hagen PA-C      senna-docusate sodium  1 tablet Oral HS Sp Hagen PA-C       Continuous Infusions:   PRN Meds:.  acetaminophen    bisacodyl    Vitals:   Vitals:    09/10/24 2159 09/11/24 0735 09/11/24 0828 09/11/24 0832   BP: 130/71 135/84 136/80 147/87   BP Location: Left arm      Pulse: 79 81 84 80   Resp: 17 18     Temp: 97.8 °F (36.6 °C) 98.3 °F (36.8 °C)     TempSrc: Axillary Oral     SpO2: 92% 93% 94% 93%   Weight:       Height:           Telemetry: NSR; Heart Rate: 80    Respiratory:   SpO2: SpO2: 93 %, SpO2 Activity: SpO2 Activity: At Rest; Room  "Air    Intake/Output:     Intake/Output Summary (Last 24 hours) at 9/11/2024 0857  Last data filed at 9/11/2024 0516  Gross per 24 hour   Intake 340 ml   Output 1075 ml   Net -735 ml        Weights:   Weight (last 2 days)       Date/Time Weight    09/09/24 0830 72.2 (159.17)            Chest tube Output:    VAC drain: 0 mL/8 hours  20 mL/24 hours            Results:   Results from last 7 days   Lab Units 09/11/24  0512 09/10/24  0500 09/07/24  1313   WBC Thousand/uL 7.17 7.32 7.84   HEMOGLOBIN g/dL 8.6* 8.9* 9.6*   HEMATOCRIT % 27.1* 27.5* 29.1*   PLATELETS Thousands/uL 267 256 268     Results from last 7 days   Lab Units 09/11/24  0512 09/09/24  1240 09/06/24  0447   SODIUM mmol/L 135 134* 135   POTASSIUM mmol/L 4.0 4.7 3.9   CHLORIDE mmol/L 100 101 102   CO2 mmol/L 28 27 23   BUN mg/dL 16 13 17   CREATININE mg/dL 0.96 1.12 0.92   CALCIUM mg/dL 9.0 9.0 9.2     No results for input(s): \"MG\" in the last 72 hours.  Results from last 7 days   Lab Units 09/09/24  0220 09/08/24 2021 09/08/24  1332 09/07/24  1913 09/07/24  1313 09/05/24  1638   INR   --   --   --   --  1.13 1.14   PTT seconds 89* 62* 54*   < > 45*  --     < > = values in this interval not displayed.       Studies:  No new studies past 24 hrs    Invasive Lines/Tubes:  Invasive Devices       Peripheral Intravenous Line  Duration             Peripheral IV 09/09/24 Left;Proximal;Ventral (anterior) Forearm 2 days                    Physical Exam:    General: No acute distress, Alert, and Normal appearance  HEENT/NECK:  Normocephalic. Atraumatic.  No jugular venous distention.    Cardiac: Regular rate and rhythm  Pulmonary:  Breath sounds clear bilaterally and No rales/rhonchi/wheezes  Abdomen:  Non-tender and Non-distended  Incisions: VAC in place, clean, dry and intact  Extremities: Extremities warm/dry and Right ankle with increased warmth and edema in comparison to left  ankle. Pain with palpation and movement. No erythema  Neuro: Alert and oriented X 3, " Sensation is grossly intact, and No focal deficits  Skin: Warm/Dry, without rashes or lesions.       Assessment:  Principal Problem:    Sternal wound infection  Active Problems:    Abdominal aortic aneurysm without rupture (HCC)    Coronary artery disease of native artery of native heart with stable angina pectoris (HCC)    Primary hypertension    S/P CABG x 3    CKD (chronic kidney disease) stage 2, GFR 60-89 ml/min    History of stroke    Anemia       Superficial sternal wound infection. S/P Sternal wound incision and drainage, washout, application of VAC dressing. ; POD # 1      Plan:  Klebsiella oxytoca sternal wound infection.    VAC in place with no appreciable drainage.   Plan for VAC change/washout in OR today  OR gram stain negative  Fungal culture with no growth to date    Continue cefazolin for now, per ID    Acute right ankle pain, warmth and edema.    Pt denies previous history of gout. Will discuss with primary service - defer workup to primary    History of TIA (on Brilinta as home med)   Hold today, pending re-op tomorrow   Likely resume post op 9/12      SIGNATURE: Marcela Hodges PA-C  DATE: September 11, 2024  TIME: 8:57 AM

## 2024-09-11 NOTE — PLAN OF CARE
Problem: OCCUPATIONAL THERAPY ADULT  Goal: Performs self-care activities at highest level of function for planned discharge setting.  See evaluation for individualized goals.  Description: Treatment Interventions: ADL retraining, Functional transfer training, Endurance training, Patient/family training, Equipment evaluation/education, Continued evaluation, Energy conservation          See flowsheet documentation for full assessment, interventions and recommendations.   Outcome: Progressing  Note: Limitation: Decreased ADL status, Decreased Safe judgement during ADL, Decreased cognition, Decreased endurance, Decreased self-care trans, Decreased high-level ADLs  Prognosis: Fair  Assessment: Pt is a 78 y.o. male who was admitted to [facility] on 9/5/2024 with Sternal wound infection following CABG in 6/2024, now s/p aspiration, I & D with VAC dressing on 9/9. Pt seen for an OT evaluation per active OT orders, VAC in place.  Pt  has a past medical history of AAA (abdominal aortic aneurysm) (HCC), BPH (benign prostatic hyperplasia), CAD (coronary artery disease), Cholelithiasis, Former tobacco use, History of MI (myocardial infarction), History of TIA (transient ischemic attack), Hyperlipidemia, Hypertension, Insomnia, and Obesity. Pt lives in a [facility] with [facility], uses a tub shower with GB and standard toilet with GB. Pta, pt was independent w/ ADL and functional mobility, was (+) driving and was using SPC at times. Currently, pt is Min-Mod Ax1 for UB ADL, Mod-Max Ax1 for LB ADL, and completed transfers+steps to chair w Mod Ax1 with RW. Pt currently presents with impairments in the following categories -steps to enter environment, difficulty performing ADLS, and limited insight into deficits activity tolerance, endurance, standing balance/tolerance, insight, safety , and judgement . These impairments, as well as pt's fatigue, pain, cardiac/sternal precautions, and risk for falls  limit pt's ability to safely  engage in all baseline areas of occupation, includinggrooming, bathing, dressing, toileting, functional mobility/transfers, and community mobility.    The patient's raw score on the AM-PAC Daily Activity Inpatient Short Form is 15. A raw score of less than 19 suggests the patient may benefit from discharge to post-acute rehabilitation services. Please refer to the recommendation of the Occupational Therapist for safe discharge planning. Pt would benefit from continued acute OT services throughout hospital course and following D/C. Plan for OT interventions 2-3x per week. From OT standpoint, recommend level II services upon D/C. Pt was left seated in bedside chair with chair alarm on and all needs within reach.     Rehab Resource Intensity Level, OT: II (Moderate Resource Intensity)

## 2024-09-11 NOTE — PLAN OF CARE
Problem: PHYSICAL THERAPY ADULT  Goal: Performs mobility at highest level of function for planned discharge setting.  See evaluation for individualized goals.  Description: Treatment/Interventions: Functional transfer training, LE strengthening/ROM, Elevations, Therapeutic exercise, Endurance training, Cognitive reorientation, Equipment eval/education, Bed mobility, Gait training, Spoke to nursing, OT  Equipment Recommended: Walker       See flowsheet documentation for full assessment, interventions and recommendations.  Note: Prognosis: Good  Problem List: Decreased strength, Decreased endurance, Impaired balance, Decreased mobility, Decreased cognition, Pain  Assessment: Pt is 78 y.o.  male with hx of CABG, admitted w/ swelling over sternotomy site and Dx of Sternal wound infection; pt underwent Sternal wound incision and drainage, washout, application of VAC dressing on 9/9/2024.  Pt 's comorbidities affecting POC include: AAA (abdominal aortic aneurysm) History of TIA (transient ischemic attack), Hypertension, and Obesity and personal factors of: JUJU. Pt's clinical presentation is currently  unstable/unpredictable which is evident in ongoing management of current Dx w/ additional VAC change/washout in OR pending, abn lab values and generally elevated BP. Pt presents w/ new onset of (R) foot  pain, overall weakness, incl min decreased LE strength, decreased functional endurance andactivity tolerance, impaired balance, gait deviations, and fall risk. Will cont to follow pt in PT for progressive mobilization to address above functional deficits and to max level of (I), endurance, and safety. D/C recommendations are outlined below. Will cont to follow until then.  Barriers to Discharge: Inaccessible home environment     Rehab Resource Intensity Level, PT: II (Moderate Resource Intensity)    See flowsheet documentation for full assessment.

## 2024-09-11 NOTE — ASSESSMENT & PLAN NOTE
Lab Results   Component Value Date    EGFR 75 09/11/2024    EGFR 62 09/09/2024    EGFR 79 09/06/2024    CREATININE 0.96 09/11/2024    CREATININE 1.12 09/09/2024    CREATININE 0.92 09/06/2024

## 2024-09-11 NOTE — OP NOTE
OPERATIVE REPORT  PATIENT NAME: Shane Chau Sr.    :  1945  MRN: [id number]  Pt Location:  OR ROOM 16    SURGERY DATE: 2024    Surgeons and Role:     * KALPANA Amos MD - Primary     * Marcela Hodges PA-C - Assisting    Preop Diagnosis:  Superficial sternal wound infection    Postop Diagnosis:  Same    Procedure(s):  Superficial sternal wound exploration, washout, and VAC change.    Specimen(s):  None    Estimated Blood Loss:   Minimal    Drains:  * No LDAs found *    Anesthesia Type:   General    Operative Indications:  Superficial sternal wound infection.    Complications:   None    Procedure and Technique:    The patient was taken to the operating room and placed supine on the operating table. Following the satisfactory induction of general anesthesia, the existing VAC dressing was removed and the patient was prepped and draped in the usual sterile fashion. A time-out procedure was performed.     The wound was explored.  It had healthy granulation tissue throughout.  There was no tissue that appeared infected or unhealthy.  The wound was thoroughly probed and there was no evidence of communication with any undrained pockets.  The sternum was quite stable and there were no areas of fluctuance or erythema.  When the remainder of the healed sternal wound and surrounding areas were palpated and compressed there was no drainage of any fluid into the open portion of the wound.  Satisfied that there was no need for any additional incision or drainage, debridement, or exploration of the remainder of the sternotomy wound, the open area of the wound was copiously irrigated with antibiotic irrigation.  A new VAC dressing was applied.     I was present for the entire procedure., I was present for all critical portions of the procedure., and A qualified resident physician was not available.    Patient Disposition:    PACU       SIGNATURE: KALPANA Amos MD  DATE: 2024  TIME: 1:48  PM

## 2024-09-11 NOTE — PROGRESS NOTES
Progress Note - Hospitalist   Name: Shane Chau Sr. 78 y.o. male I MRN: [id number]  Unit/Bed#: OR POOL I Date of Admission: 9/5/2024   Date of Service: 9/11/2024 I Hospital Day: 6    Assessment & Plan  Sternal wound infection  Patient history of CABG in 6/2024 with postoperative hospital stay complicated by stroke and post op ileus with 1 week history of progressively worsening swelling near the superior sternotomy site and low-grade fevers over the last 4 days.  Outpatient imaging as below concerning for sternal dehiscence and fluid collection for which patient was recommended ED admission.  9/4 US sternum poorly marginated complex fluid in the area measuring 4.2 x 1.3 x 1.8 cm.  9/5 CT chest: Increased lucency along the midline sternal site, concerning for developing dehiscence.  There is also increased edema throughout this region, with findings concerning for a potential for collection along the anterior aspect of the sternum.  There is also new likely reactive lymphadenopathy within the prevascular mediastinum concerning for superinfection.  S/p aspiration by IR, aspirate positive for Klebsiella oxytoca  Status post I&D and VAC dressing by CT surgery on 9/9.  Cultures positive for Klebsiella oxytoca  Presenting IV cefazolin 2 g every 8 hours per infectious disease  Suggest plans for I&D noted  Infectious disease following  Wound care and VAC dressing per CT surgery    Abdominal aortic aneurysm without rupture (HCC)  S/p repair in 2011  Outpatient follow-up  Coronary artery disease of native artery of native heart with stable angina pectoris (HCC)  Status post CABG in 6/2024  Continue aspirin, metoprolol, atorvastatin  Brilinta presently on hold per CT surgery for I&D planned today    Primary hypertension  Blood pressure reviewed, acceptable  Monitor blood pressures, avoid hypotension  CKD (chronic kidney disease) stage 2, GFR 60-89 ml/min  Lab Results   Component Value Date    EGFR 75 09/11/2024    EGFR  62 2024    EGFR 79 2024    CREATININE 0.96 2024    CREATININE 1.12 2024    CREATININE 0.92 2024     Monitor kidney function  Avoid nephrotoxins  History of stroke  History of recent stroke s/p CABG with no residual focal deficits  Continue aspirin, statin  Outpatient follow-up  Anemia  Anemia  Iron studies suggest iron deficiency state  Iron supplementation  Monitor hemoglobin  Pain and swelling of right ankle  Patient reports severe right ankle pain and swelling  Symptoms doubled overnight  Denies history of gout  Check uric acid CRP  Empirical colchicine for possible gout  Monitor closely    VTE Pharmacologic Prophylaxis:   Moderate Risk (Score 3-4) - Pharmacological DVT Prophylaxis Contraindicated. Sequential Compression Devices Ordered.    Mobility:   Basic Mobility Inpatient Raw Score: 13  JH-HLM Goal: 4: Move to chair/commode  JH-HLM Achieved: 4: Move to chair/commode  JH-HLM Goal achieved. Continue to encourage appropriate mobility.    Patient Centered Rounds: I performed bedside rounds with nursing staff today.   Discussions with Specialists or Other Care Team Provider: CT surgery, case management    Education and Discussions with Family / Patient:  patient, updated spouse Maryjo .     Current Length of Stay: 6 day(s)  Current Patient Status: Inpatient   Certification Statement: The patient will continue to require additional inpatient hospital stay due to as outlined  Discharge Plan:  to OR for I&D of sternal dehiscence wound with CT surgery today    Code Status: Level 1 - Full Code    Subjective     Comfortably sitting up in chair  Reports pain and swelling right ankle  On inquiry denies prior history of gout  Encourage incentive spirometry    Objective     Vitals:   Temp (24hrs), Av.2 °F (36.8 °C), Min:97.8 °F (36.6 °C), Max:98.9 °F (37.2 °C)    Temp:  [97.8 °F (36.6 °C)-98.9 °F (37.2 °C)] 98 °F (36.7 °C)  HR:  [65-84] 65  Resp:  [16-18] 16  BP: (130-147)/(71-87)  "141/76  SpO2:  [92 %-98 %] 98 %  Body mass index is 22.84 kg/m².     Input and Output Summary (last 24 hours):     Intake/Output Summary (Last 24 hours) at 9/11/2024 1336  Last data filed at 9/11/2024 1150  Gross per 24 hour   Intake 420 ml   Output 1120 ml   Net -700 ml       Physical Exam       Comfortably in bed  Features of protein calorie malnutrition noted  Neck supple  Lungs diminished breath sounds bilaterally  Heart sounds S1-S2 noted  Abdomen soft nontender  Awake follows commands  Right ankle warm to touch, tenderness swelling noted  No rash  No pedal edema    Lines/Drains:  Lines/Drains/Airways       Active Status       Name Placement date Placement time Site Days    Supraglottic Airway LMA 4 09/11/24  1310  -- less than 1                            Lab Results: I have reviewed the following results: CBC/BMP:   .     09/11/24  0512   WBC 7.17   HGB 8.6*   HCT 27.1*      SODIUM 135   K 4.0      CO2 28   BUN 16   CREATININE 0.96   GLUC 102    , Creatinine Clearance: Estimated Creatinine Clearance: 64.8 mL/min (by C-G formula based on SCr of 0.96 mg/dL)., PTT/INR:No new results in last 24 hours. , Iron: No results found for: \"IRON\", Transferrin: No results found for: \"TRANSFERRIN\", Folate: No results found for: \"FOLATE\"   Results from last 7 days   Lab Units 09/11/24  0512   WBC Thousand/uL 7.17   HEMOGLOBIN g/dL 8.6*   HEMATOCRIT % 27.1*   PLATELETS Thousands/uL 267   SEGS PCT % 64   LYMPHO PCT % 17   MONO PCT % 12   EOS PCT % 5     Results from last 7 days   Lab Units 09/11/24  0512 09/06/24  0447 09/05/24  1147   SODIUM mmol/L 135   < > 134*   POTASSIUM mmol/L 4.0   < > 4.6   CHLORIDE mmol/L 100   < > 102   CO2 mmol/L 28   < > 25   BUN mg/dL 16   < > 20   CREATININE mg/dL 0.96   < > 1.07   ANION GAP mmol/L 7   < > 7   CALCIUM mg/dL 9.0   < > 9.4   ALBUMIN g/dL  --   --  3.6   TOTAL BILIRUBIN mg/dL  --   --  0.53   ALK PHOS U/L  --   --  69   ALT U/L  --   --  41   AST U/L  --   --  31 "   GLUCOSE RANDOM mg/dL 102   < > 97    < > = values in this interval not displayed.     Results from last 7 days   Lab Units 09/07/24  1313   INR  1.13     Results from last 7 days   Lab Units 09/09/24  1238   POC GLUCOSE mg/dl 107               Recent Cultures (last 7 days):   Results from last 7 days   Lab Units 09/09/24  1149 09/05/24  1802 09/05/24  1151 09/05/24  1147   BLOOD CULTURE   --   --  No Growth After 5 Days. No Growth After 5 Days.   GRAM STAIN RESULT  2+ Polys  No bacteria seen No Polys or Bacteria seen  --   --    BODY FLUID CULTURE, STERILE   --  1+ Growth of Klebsiella oxytoca*  --   --        Imaging Review: Reviewed radiology reports from this admission including: procedure reports.  Other Studies: EKG was reviewed.     Last 24 Hours Medication List:     Current Facility-Administered Medications:     [Transfer Hold] acetaminophen (TYLENOL) tablet 650 mg, Q6H PRN    [Transfer Hold] aspirin (ECOTRIN LOW STRENGTH) EC tablet 81 mg, Daily    [Transfer Hold] atorvastatin (LIPITOR) tablet 80 mg, Daily With Dinner    [Transfer Hold] bisacodyl (DULCOLAX) EC tablet 5 mg, Daily PRN    [Transfer Hold] ceFAZolin (ANCEF) IVPB (premix in dextrose) 2,000 mg 50 mL, Q8H, Last Rate: Stopped (09/11/24 1150)    [Transfer Hold] chlorhexidine (PERIDEX) 0.12 % oral rinse 15 mL, Q12H KRISTI    colchicine (COLCRYS) tablet 1.2 mg, Once **FOLLOWED BY** [START ON 9/12/2024] colchicine (COLCRYS) tablet 0.6 mg, Daily    [Transfer Hold] cyanocobalamin (VITAMIN B-12) tablet 1,000 mcg, QPM    [Transfer Hold] ergocalciferol (VITAMIN D2) capsule 50,000 Units, Weekly    [Transfer Hold] escitalopram (LEXAPRO) tablet 5 mg, Daily    [Transfer Hold] folic acid (FOLVITE) tablet 1 mg, Daily    [Transfer Hold] iron sucrose (VENOFER) 200 mg in sodium chloride 0.9 % 100 mL IVPB, Daily, Last Rate: Stopped (09/11/24 1010)    [Transfer Hold] metoprolol tartrate (LOPRESSOR) tablet 25 mg, BID    [Transfer Hold] mupirocin (BACTROBAN) 2 % nasal  ointment, Q12H KRISTI    [Transfer Hold] pantoprazole (PROTONIX) EC tablet 40 mg, Early Morning    [Transfer Hold] polyethylene glycol (MIRALAX) packet 17 g, Daily    [Transfer Hold] senna-docusate sodium (SENOKOT S) 8.6-50 mg per tablet 1 tablet, HS    Facility-Administered Medications Ordered in Other Encounters:     fentaNYL injection, PRN    lactated ringers infusion, Continuous PRN    lidocaine (PF) (XYLOCAINE-MPF) 1 % injection, PRN    lidocaine (PF) (XYLOCAINE-MPF) 2 % injection, PRN    phenylephrine bolus from bag, PRN    propofol (DIPRIVAN) 200 MG/20ML bolus injection, PRN    Administrative Statements   Today, Patient Was Seen By: Timbo Vazquez MD  I have spent a total time of 33 minutes in caring for this patient on the day of the visit/encounter     **Please Note: This note may have been constructed using a voice recognition system.**

## 2024-09-11 NOTE — ANESTHESIA PREPROCEDURE EVALUATION
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 (HCC)   (+) Hyperlipidemia   (+) Primary hypertension   (+) Rib pain      /RENAL   (+) CKD (chronic kidney disease) stage 2, GFR 60-89 ml/min      HEMATOLOGY   (+) Anemia   (+) Postoperative anemia due to acute blood loss      MUSCULOSKELETAL   (+) Localized primary osteoarthritis of lower leg, unspecified laterality      NEURO/PSYCH   (+) Paresthesia      Recent labs personally reviewed:  Lab Results   Component Value Date    WBC 7.17 09/11/2024    HGB 8.6 (L) 09/11/2024     09/11/2024     Lab Results   Component Value Date     12/03/2015    K 4.0 09/11/2024    BUN 16 09/11/2024    CREATININE 0.96 09/11/2024    GLUCOSE 140 06/20/2024     Lab Results   Component Value Date    PTT 89 (H) 09/09/2024      Lab Results   Component Value Date    INR 1.13 09/07/2024       Lab Results   Component Value Date    HGBA1C 5.7 (H) 07/04/2024       Type and Screen:  O    EKG  Normal sinus rhythm  T wave abnormality, consider anterolateral ischemia  Prolonged QT  Abnormal ECG  When compared with ECG of 23-JUN-2024 06:25,  Premature atrial complexes are no longer Present  Nonspecific T wave abnormality now evident in Inferior leads  T wave inversion now evident in Anterior leads     Findings     Left Ventricle Left ventricular cavity size is normal. Wall thickness is mildly increased. There is mild concentric hypertrophy. The left ventricular ejection fraction is 70%. Systolic function is hyperdynamic. Wall motion is normal. Diastolic function is mildly abnormal, consistent with grade I (abnormal) relaxation.   Interventricular Septum There is abnormal septal motion consistent with post-operative status.   Right Ventricle Right ventricle is not well visualized. Systolic function  is at least mildly reduced.   Left Atrium The atrium is normal in size.   Right Atrium The atrium is normal in size.   Aortic Valve The aortic valve is trileaflet. The leaflets are mildly thickened. The leaflets are mildly calcified. The leaflets exhibit normal mobility. There is no evidence of regurgitation. There is aortic valve sclerosis.   Mitral Valve Mitral valve structure is normal.  There is mild regurgitation. There is no evidence of stenosis.   Tricuspid Valve Tricuspid valve structure is normal. There is trace regurgitation. There is no evidence of stenosis.   Pulmonic Valve Pulmonic valve structure is normal. There is no evidence of regurgitation. There is no evidence of stenosis.   Ascending Aorta The aortic root is upper limit of normal in size. The ascending aorta is upper limit of normal in size. The aortic root is 3.60 cm. The ascending aorta is 3.5 cm.   IVC/SVC The right atrial pressure is estimated at 3.0 mmHg. The inferior vena cava is normal in size. Respirophasic changes were normal.   Pericardium There is no pericardial effusion. The pericardium is normal in appearance.                      Anesthesia Plan  ASA Score- 3     Anesthesia Type- general with ASA Monitors.         Additional Monitors:     Airway Plan: LMA.           Plan Factors-    Chart reviewed.    Patient summary reviewed.                  Induction- intravenous.    Postoperative Plan-     Perioperative Resuscitation Plan - Level 1 - Full Code.       Informed Consent- Anesthetic plan and risks discussed with patient.  I personally reviewed this patient with the CRNA. Discussed and agreed on the Anesthesia Plan with the CRNA..

## 2024-09-11 NOTE — PROGRESS NOTES
Progress Note - Infectious Disease   Name: Shane Chau Sr. 78 y.o. male I MRN: [id number]  Unit/Bed#: [facility]-323-01 I Date of Admission: 9/5/2024   Date of Service: 9/11/2024 I Hospital Day: 6     Assessment & Plan  Sternal wound infection  In setting of recent CABG, delayed postop wound healing.  Late onset along with CT findings suggest deep infection with possible osteomyelitis.  Fortunately appears clinically stable without sepsis.  Status post IR aspiration of thick, cloudy fluid 9/5/2024.  Fluid culture with Klebsiella.  Status post I&D, VAC 9/9.  Intraoperative findings notable for purulence confined to soft tissue.  No exposure or communicaiton with deeper structures.  OR cultures also with Klebsiella.    Continue cefazolin for now  Monitor closely while on antibiotics for toxicities including diarrhea, rash, cytopenias, or kidney injury  Follow up operative cultures and adjust antibiotics as needed  LWC/VAC per CT surgery - change in OR pending tomorrow  Supportive care as per the primary service  If infection remains confined to superficial space, anticipate hopeful transition to PO antibiotics with plan to complete short course    Antibiotics:  Cefazolin #4  Antibiotics #5  POD #2  S/P CABG x 3  For CAD on 6/20/24. Complicated by postop ileus, delayed wound healing.   CKD (chronic kidney disease) stage 2, GFR 60-89 ml/min  Lab Results   Component Value Date    EGFR 75 09/11/2024    EGFR 62 09/09/2024    EGFR 79 09/06/2024    CREATININE 0.96 09/11/2024    CREATININE 1.12 09/09/2024    CREATININE 0.92 09/06/2024         I discussed with Dr. Amos the above plan to continue IV antibiotics and return to OR Friday.  He agrees with the plan.    History of Present Illness   Seen in PACU.  Doing OK.  No fevers, rash, or diarrhea.  Had repeat washout.  Wound remains confined to soft tissues.    Objective      Temp:  [97.3 °F (36.3 °C)-98.3 °F (36.8 °C)] 97.3 °F (36.3 °C)  HR:  [65-84] 69  Resp:  [13-18]  18  BP: (116-147)/(57-87) 121/72  O2 Device: None (Room air)          I/O last 24 hours:  In: 1190 [P.O.:440; I.V.:400; IV Piggyback:350]  Out: 1550 [Urine:1530; Drains:20]  Lines/Drains/Airways       Active Status       None                  Physical Exam  Awake and alert  VAC to mid sternum without cellulitis  No rashes    Lab Results: I have reviewed the following results:   Recent Labs     09/09/24  0220 09/09/24  1240 09/11/24  0512   WBC  --    < > 7.17   HGB  --    < > 8.6*   HCT  --    < > 27.1*   PLT  --    < > 267   SODIUM  --    < > 135   K  --    < > 4.0   CL  --    < > 100   CO2  --    < > 28   BUN  --    < > 16   CREATININE  --    < > 0.96   GLUC  --    < > 102   PTT 89*  --   --     < > = values in this interval not displayed.     Micro:  Lab Results   Component Value Date    BLOODCX No Growth After 5 Days. 09/05/2024    BLOODCX No Growth After 5 Days. 09/05/2024    URINECX No Growth <1000 cfu/mL 06/29/2016     Imaging Review: No pertinent imaging studies reviewed.  Other Studies: No additional pertinent studies reviewed.

## 2024-09-11 NOTE — ASSESSMENT & PLAN NOTE
Status post CABG in 6/2024  Continue aspirin, metoprolol, atorvastatin  Brilinta presently on hold per CT surgery for I&D planned today

## 2024-09-11 NOTE — ASSESSMENT & PLAN NOTE
Patient reports severe right ankle pain and swelling  Symptoms doubled overnight  Denies history of gout  Check uric acid CRP  Empirical colchicine for possible gout  Monitor closely

## 2024-09-11 NOTE — OCCUPATIONAL THERAPY NOTE
Occupational Therapy Evaluation     Patient Name: Shane Chau Sr.  Today's Date: 9/11/2024  Problem List  Principal Problem:    Sternal wound infection  Active Problems:    Abdominal aortic aneurysm without rupture (HCC)    Coronary artery disease of native artery of native heart with stable angina pectoris (HCC)    Primary hypertension    S/P CABG x 3    CKD (chronic kidney disease) stage 2, GFR 60-89 ml/min    History of stroke    Anemia    Pain and swelling of right ankle    Past Medical History  Past Medical History:   Diagnosis Date    AAA (abdominal aortic aneurysm) (HCC)     s/p open repair    BPH (benign prostatic hyperplasia)     CAD (coronary artery disease)     Cholelithiasis     Former tobacco use     History of MI (myocardial infarction)     History of TIA (transient ischemic attack)     Plavix in past, now on Brilinta    Hyperlipidemia     Hypertension     Insomnia     Obesity      Past Surgical History  Past Surgical History:   Procedure Laterality Date    ABDOMINAL AORTIC ANEURYSM REPAIR  06/07/2011    Dr. Yarbrough 6/7/11    APPENDECTOMY      CARDIAC CATHETERIZATION Left 06/03/2024    Procedure: Cardiac Left Heart Cath;  Surgeon: Pk Blanco DO;  [facility];  Service: Cardiology    CARDIAC CATHETERIZATION  06/03/2024    Procedure: Cardiac catheterization;  Surgeon: Pk Blanco DO;  [facility];  Service: Cardiology    CARDIAC CATHETERIZATION N/A 06/03/2024    Procedure: Cardiac Coronary Angiogram;  Surgeon: Pk Blanco DO;  [facility];  Service: Cardiology    CATARACT EXTRACTION Right 11/2018    CHOLECYSTECTOMY LAPAROSCOPIC      COLONOSCOPY  11/2006    CORONARY ANGIOPLASTY      3 STENTS INSERTED    HARVEST VEIN Left 6/20/2024    Procedure: HARVEST VEIN ENDOSCOPIC (EVH);  Surgeon: KALPANA Amos MD;  [facility];  Service: Cardiac Surgery    IR ASPIRATION ONLY  9/5/2024    [place] CORONARY  ARTERY BYP W/VEIN & ARTERY GRAFT 3 VEIN N/A 6/20/2024    Procedure: CORONARY ARTERY BYPASS GRAFT (CABG) X3 VESSELS, LIMA - LAD, LEFT LEG EVH/SVG - PDA AND OM1, W/ DEDRICK;  Surgeon: KALPANA Amos MD;  [facility];  Service: Cardiac Surgery    RI LAPS SURG CHOLECYSTECTOMY W/CHOLANGIOGRAPHY N/A 09/08/2016    Procedure: CHOLECYSTECTOMY LAPAROSCOPIC with intra-op cholangiogram ;  Surgeon: Prasanth Verdin MD;  [facility];  Service: General    RI STERNAL DEBRIDEMENT N/A 9/9/2024    Procedure: DEBRIDEMENT STERNAL WOUND (WASH OUT);  Surgeon: KALPANA Amos MD;  [facility];  Service: Cardiac Surgery           09/11/24 0833   OT Last Visit   OT Visit Date 09/11/24   Note Type   Note type Evaluation   Pain Assessment   Pain Assessment Tool 0-10   Pain Score 6   Pain Location/Orientation Orientation: Right;Location: Foot   Patient's Stated Pain Goal No pain   Hospital Pain Intervention(s) Repositioned;Ambulation/increased activity   Restrictions/Precautions   Weight Bearing Precautions Per Order No   Other Precautions Cardiac/sternal;Cognitive;Chair Alarm;Bed Alarm;Multiple lines;Telemetry;Fall Risk;Pain;Hard of hearing  (VAC)   Home Living   Type of Home House   Home Layout One level;Stairs to enter with rails;Performs ADLs on one level  (13 JUJU)   Bathroom Shower/Tub Tub/shower unit   Bathroom Toilet Standard   Bathroom Equipment Grab bars in shower;Grab bars around toilet   Bathroom Accessibility Accessible   Home Equipment Cane   Additional Comments Pt lives in a 1  with 13 JUJU, uses a tub shower with GB and standard toilet with GB.   Prior Function   Level of [place] Independent with ADLs;Independent with functional mobility;Needs assistance with IADLS   Lives With Spouse   Receives Help From Family   IADLs Independent with driving;Independent with medication management;Family/Friend/Other provides meals   Falls in the last 6 months 0   Vocational Retired   Lifestyle  "  Autonomy Pta pt I with ADL and functional mobility with SPC, assist for some IADL, (+)    Reciprocal Relationships supportive spouse   Service to Others retired   Intrinsic Gratification enjoys time with family   Subjective   Subjective \"I feel a little dizzy\"   ADL   Where Assessed Edge of bed   Eating Assistance 5  Supervision/Setup   Grooming Assistance 4  Minimal Assistance   UB Bathing Assistance 3  Moderate Assistance   LB Bathing Assistance 2  Maximal Assistance   UB Dressing Assistance 3  Moderate Assistance   LB Dressing Assistance 2  Maximal Assistance   Toileting Assistance  3  Moderate Assistance   Functional Assistance 3  Moderate Assistance   Bed Mobility   Supine to Sit 3  Moderate assistance   Additional items Assist x 1;Increased time required;Verbal cues   Additional Comments Pt greeted in bed, left in chair with alarm on and all needs within reach.   Transfers   Sit to Stand 3  Moderate assistance   Additional items Assist x 1;Increased time required;Verbal cues   Stand to Sit 3  Moderate assistance   Additional items Assist x 1;Increased time required;Verbal cues   Stand pivot 3  Moderate assistance  (bed>chair)   Additional items Assist x 1;Increased time required;Verbal cues   Additional Comments with RW, reports feeling dizzy on initial STS although BP stable.   Functional Mobility   Functional Mobility 3  Moderate assistance   Additional Comments Pt performs a couple steps bed>chair with MOD A x 1 with RW   Additional items Rolling walker   Balance   Static Sitting Fair -   Dynamic Sitting Poor +   Static Standing Poor +   Dynamic Standing Poor   Ambulatory Poor   Activity Tolerance   Activity Tolerance Patient limited by fatigue;Patient limited by pain;Treatment limited secondary to medical complications (Comment)  (dizzy although BP stable, RN aware)   Medical Staff Made Aware Co-eval with DPT due to high medical complexity   Nurse Made Aware RN cleared for therapy   RUE Assessment "   RUE Assessment WFL   LUE Assessment   LUE Assessment WFL   Hand Function   Gross Motor Coordination Functional   Fine Motor Coordination Functional   Sensation   Light Touch No apparent deficits   Psychosocial   Psychosocial (WDL) WDL   Cognition   Overall Cognitive Status WFL  (with some higher level deficits detected)   Arousal/Participation Cooperative   Attention Attends with cues to redirect   Orientation Level Oriented to person;Oriented to place;Oriented to situation   Memory Decreased recall of recent events;Decreased recall of precautions   Following Commands Follows one step commands with increased time or repetition   Comments Pt cooperative to therapy although [place], with decreased insight requiring vc for safety.   Assessment   Limitation Decreased ADL status;Decreased Safe judgement during ADL;Decreased cognition;Decreased endurance;Decreased self-care trans;Decreased high-level ADLs   Prognosis Fair   Assessment Pt is a 78 y.o. male who was admitted to [facility] on [facility]5/[facility]24 with Sternal wound infection following CABG in 6/2024, now s/p aspiration, I & D with VAC dressing on 9/9. Pt seen for an OT evaluation per active OT orders, VAC in place.  Pt  has a past medical history of AAA (abdominal aortic aneurysm) (HCC), BPH (benign prostatic hyperplasia), CAD (coronary artery disease), Cholelithiasis, Former tobacco use, History of MI (myocardial infarction), History of TIA (transient ischemic attack), Hyperlipidemia, Hypertension, Insomnia, and Obesity. Pt lives in a [facility] with 13 JUJU, uses a tub shower with GB and standard toilet with GB. Pta, pt was independent w/ ADL and functional mobility, was (+) driving and was using SPC at times. Currently, pt is Min-Mod Ax1 for UB ADL, Mod-Max Ax1 for LB ADL, and completed transfers+steps to chair w Mod Ax1 with RW. Pt currently presents with impairments in the following categories -steps to enter environment, difficulty performing ADLS, and limited  insight into deficits activity tolerance, endurance, standing balance/tolerance, insight, safety , and judgement . These impairments, as well as pt's fatigue, pain, cardiac/sternal precautions, and risk for falls  limit pt's ability to safely engage in all baseline areas of occupation, includinggrooming, bathing, dressing, toileting, functional mobility/transfers, and community mobility.    The patient's raw score on the AM-PAC Daily Activity Inpatient Short Form is 15. A raw score of less than 19 suggests the patient may benefit from discharge to post-acute rehabilitation services. Please refer to the recommendation of the Occupational Therapist for safe discharge planning. Pt would benefit from continued acute OT services throughout hospital course and following D/C. Plan for OT interventions 2-3x per week. From OT standpoint, recommend level II services upon D/C. Pt was left seated in bedside chair with chair alarm on and all needs within reach.   Goals   Patient Goals to feel better   Plan   Treatment Interventions ADL retraining;Functional transfer training;Endurance training;Patient/family training;Equipment evaluation/education;Continued evaluation;Energy conservation   Goal Expiration Date 09/25/24   OT Frequency 2-3x/wk   Discharge Recommendation   Rehab Resource Intensity Level, OT II (Moderate Resource Intensity)   AM-PAC Daily Activity Inpatient   Lower Body Dressing 2   Bathing 2   Toileting 2   Upper Body Dressing 2   Grooming 3   Eating 4   Daily Activity Raw Score 15   Daily Activity Standardized Score (Calc for Raw Score >=11) 34.69   AM-PAC Applied Cognition Inpatient   Following a Speech/Presentation 3   Understanding Ordinary Conversation 4   Taking Medications 3   Remembering Where Things Are Placed or Put Away 3   Remembering List of 4-5 Errands 3   Taking Care of Complicated Tasks 3   Applied Cognition Raw Score 19   Applied Cognition Standardized Score 39.77   Modified [place] Scale   Modified  [place] Scale 4   End of Consult   Education Provided Yes   Patient Position at End of Consult Bedside chair;Bed/Chair alarm activated;All needs within reach   Nurse Communication Nurse aware of consult       OT Goals    - Pt will be Supervision with LB ADL by end of hospital course.    - Pt will be Mod I with UB ADL by end of hospital course.    - Pt will be Mod I with all functional transfers required for patient safety by end of hospital course.    - Pt will be Mod I with functional mobility to/from bathroom for increased independence with toileting tasks.    - Pt will independently recall and implement safety precautions during OT sessions.     - Pt activity tolerance will increase to 30 minutes in order to safely engage in ADL and transfers.     - Pt standing tolerance will increase to 5 minutes  in order to promote sink side ADLs and IADL activities.    - Pt will consistently follow one-step directions with min to no vc or prompting.     - Pt will attend to functional tasks for 10 minutes with min to no vc for attention/redirection.    - Pt will attend to and complete ongoing cognitive assessment in order to inform safe discharge planning.          YARON Rae, OTR/L

## 2024-09-11 NOTE — ASSESSMENT & PLAN NOTE
Patient history of CABG in 6/2024 with postoperative hospital stay complicated by stroke and post op ileus with 1 week history of progressively worsening swelling near the superior sternotomy site and low-grade fevers over the last 4 days.  Outpatient imaging as below concerning for sternal dehiscence and fluid collection for which patient was recommended ED admission.  9/4 US sternum poorly marginated complex fluid in the area measuring 4.2 x 1.3 x 1.8 cm.  9/5 CT chest: Increased lucency along the midline sternal site, concerning for developing dehiscence.  There is also increased edema throughout this region, with findings concerning for a potential for collection along the anterior aspect of the sternum.  There is also new likely reactive lymphadenopathy within the prevascular mediastinum concerning for superinfection.  S/p aspiration by IR, aspirate positive for Klebsiella oxytoca  Status post I&D and VAC dressing by CT surgery on 9/9.  Cultures positive for Klebsiella oxytoca  Presenting IV cefazolin 2 g every 8 hours per infectious disease  Suggest plans for I&D noted  Infectious disease following  Wound care and VAC dressing per CT surgery

## 2024-09-11 NOTE — PHYSICAL THERAPY NOTE
Physical Therapy Evaluation     Patient's Name: Shane Mariemiracle Sr.    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[facility])    Asymptomatic bilateral carotid artery stenosis    Coronary artery disease of native artery of native heart with stable angina pectoris ([facility])    Hyperlipidemia    Primary hypertension    Transient cerebral ischemic attack    Need for pneumococcal vaccination    Hematuria    Microhematuria    Erectile dysfunction    Leg swelling    Paresthesia    Rib pain    S/P CABG x 3    Localized primary osteoarthritis of lower leg, unspecified laterality    CKD (chronic kidney disease) stage 2, GFR 60-89 ml/min    Stroke-like symptoms    Hyponatremia    History of stroke    PAD (peripheral artery disease) ([facility])    Postoperative anemia due to acute blood loss    Stenosis of left vertebral artery    Prediabetes    Reaction at surgical site    Cognitive impairment    Surgical wound, non healing    Hospital discharge follow-up    Sternal wound infection    Anemia       Past Medical History  Past Medical History:   Diagnosis Date    AAA (abdominal aortic aneurysm) ([facility])     s/p open repair    BPH (benign prostatic hyperplasia)     CAD (coronary artery disease)     Cholelithiasis     Former tobacco use     History of MI (myocardial infarction)     History of TIA (transient ischemic attack)     Plavix in past, now on Brilinta    Hyperlipidemia     Hypertension     Insomnia     Obesity        Past Surgical History  Past Surgical History:   Procedure Laterality Date    ABDOMINAL AORTIC ANEURYSM REPAIR  06/07/2011    Dr. Yarbrough 6/7/11    APPENDECTOMY      CARDIAC CATHETERIZATION  Left 06/03/2024    Procedure: Cardiac Left Heart Cath;  Surgeon: Pk Blanco DO;  [facility];  Service: Cardiology    CARDIAC CATHETERIZATION  06/03/2024    Procedure: Cardiac catheterization;  Surgeon: Pk Blanco DO;  [facility];  Service: Cardiology    CARDIAC CATHETERIZATION N/A 06/03/2024    Procedure: Cardiac Coronary Angiogram;  Surgeon: Pk Blanco DO;  [facility];  Service: Cardiology    CATARACT EXTRACTION Right 11/2018    CHOLECYSTECTOMY LAPAROSCOPIC      COLONOSCOPY  11/2006    CORONARY ANGIOPLASTY      3 STENTS INSERTED    HARVEST VEIN Left 6/20/2024    Procedure: HARVEST VEIN ENDOSCOPIC (EVH);  Surgeon: KALPANA Amos MD;  [facility];  Service: Cardiac Surgery    IR ASPIRATION ONLY  9/5/2024    [place] CORONARY ARTERY BYP W/VEIN & ARTERY GRAFT 3 VEIN N/A 6/20/2024    Procedure: CORONARY ARTERY BYPASS GRAFT (CABG) X3 VESSELS, LIMA - LAD, LEFT LEG EVH/SVG - PDA AND OM1, W/ DEDRICK;  Surgeon: KALPANA Amos MD;  [facility];  Service: Cardiac Surgery    [place] LAPS SURG CHOLECYSTECTOMY W/CHOLANGIOGRAPHY N/A 09/08/2016    Procedure: CHOLECYSTECTOMY LAPAROSCOPIC with intra-op cholangiogram ;  Surgeon: Prasanth Verdin MD;  [facility];  Service: General    [place] STERNAL DEBRIDEMENT N/A 9/9/2024    Procedure: DEBRIDEMENT STERNAL WOUND (WASH OUT);  Surgeon: KALPANA Amos MD;  [facility];  Service: Cardiac Surgery          09/11/24 0834   PT Last Visit   PT Visit Date 09/11/24   Note Type   Note type Evaluation   Pain Assessment   Pain Assessment Tool FLACC   Pain Location/Orientation Orientation: Right;Location: Foot  (dorsu,' RN informed)   Pain Onset/Description Onset: Gradual;Frequency: Intermittent;Descriptor: Aching;Descriptor: Discomfort   Effect of Pain on Daily Activities discomfort w/ WB > rest   Patient's Stated Pain Goal No pain   Hospital Pain Intervention(s)  Elevated;Emotional support;Repositioned   Pain Rating: FLACC (Rest) - Face 0   Pain Rating: FLACC (Rest) - Legs 0   Pain Rating: FLACC (Rest) - Activity 0   Pain Rating: FLACC (Rest) - Cry 1   Pain Rating: FLACC (Rest) - Consolability 0   Score: FLACC (Rest) 1   Pain Rating: FLACC (Activity) - Face 1   Pain Rating: FLACC (Activity) - Legs 0   Pain Rating: FLACC (Activity) - Activity 0   Pain Rating: FLACC (Activity) - Cry 1   Pain Rating: FLACC (Activity) - Consolability 1   Score: FLACC (Activity) 3   Restrictions/Precautions   Braces or Orthoses   (denies)   Other Precautions Cognitive;Chair Alarm;Bed Alarm;Multiple lines;Telemetry;Cardiac/sternal;Fall Risk;Hard of hearing  (VAC (sternal) in place)   Home Living   Type of Home House   Home Layout One level  (13 JUJU)   Home Equipment Cane   Prior Function   Level of [place] Independent with functional mobility  (amb w/ occasional use of a cane as needed)   Lives With Spouse   Receives Help From Family   General   Additional Pertinent History cleared for assessment by mode   Cognition   Overall Cognitive Status WFL   Arousal/Participation Cooperative   Orientation Level Oriented to person;Oriented to place;Oriented to situation   Memory Decreased recall of recent events;Decreased recall of precautions   Following Commands Follows one step commands with increased time or repetition  ([place])   Subjective   Subjective Alert; in bed; mod [place]; pleasant and cooperative; agreeable to mobilize; upon initiation of mobilization, reports (R) foot pain; denies trauma; states he woke up w/ it;   RUE Assessment   RUE Assessment WFL  (AROM)   LUE Assessment   LUE Assessment WFL  (AROM)   RLE Assessment   RLE Assessment WFL  (AROM)   Strength RLE   RLE Overall Strength   (fair +/good -)   LLE Assessment   LLE Assessment WFL  (AROM)   Strength LLE   LLE Overall Strength   (fair +/good -)   Bed Mobility   Supine to Sit 3  Moderate assistance   Additional items Assist x 1;Increased  time required;Verbal cues;LE management   Additional Comments C/o dizziness on EDB; BP taken and stable/elevated; more elevated post OOB to chair; RN informed   Transfers   Sit to Stand 3  Moderate assistance   Additional items Assist x 1;Increased time required;Verbal cues   Stand to Sit 3  Moderate assistance   Additional items Assist x 1;Increased time required;Verbal cues   Stand pivot 3  Moderate assistance   Additional items Assist x 1;Increased time required;Verbal cues  (took a few steps from bed to chair to the (R) side)   Ambulation/Elevation   Gait pattern Excessively slow;Short stride;Foward flexed;Inconsistent keenan;Decreased R stance   Gait Assistance 3  Moderate assist   Additional items Assist x 1;Verbal cues;Tactile cues   Assistive Device Rolling walker   Distance 3 ft total distance for bed to chair transition   Balance   Static Sitting Fair -   Dynamic Sitting Poor +   Static Standing Poor +   Dynamic Standing Poor   Ambulatory Poor   Activity Tolerance   Activity Tolerance Patient limited by fatigue;Patient limited by pain;Treatment limited secondary to medical complications (Comment)   Medical Staff Made Aware Co-eval performed w/ OTR due to complexity of medical status and multiple comorbidities   Nurse Made Aware spoke to JORGE ALBERTO [place]   Assessment   Prognosis Good   Problem List Decreased strength;Decreased endurance;Impaired balance;Decreased mobility;Decreased cognition;Pain   Assessment Pt is 78 y.o.  male with hx of CABG, admitted w/ swelling over sternotomy site and Dx of Sternal wound infection; pt underwent Sternal wound incision and drainage, washout, application of VAC dressing on 9/9/2024.  Pt 's comorbidities affecting POC include: AAA (abdominal aortic aneurysm) History of TIA (transient ischemic attack), Hypertension, and Obesity and personal factors of: JUJU. Pt's clinical presentation is currently  unstable/unpredictable which is evident in ongoing management of current Dx w/  additional VAC change/washout in OR pending, abn lab values and generally elevated BP. Pt presents w/ new onset of (R) foot  pain, overall weakness, incl min decreased LE strength, decreased functional endurance andactivity tolerance, impaired balance, gait deviations, and fall risk. Will cont to follow pt in PT for progressive mobilization to address above functional deficits and to max level of (I), endurance, and safety. D/C recommendations are outlined below. Will cont to follow until then.   Barriers to Discharge Inaccessible home environment   Goals   Patient Goals to feel better   STG Expiration Date 09/21/24   Short Term Goal #1 7-10 days. Pt will amb 150 ft w/ rw <--> SPC, mod (I) in order to facilitate safe return to premorbid environment and to initiate return to community amb status. Pt will negotiate 13 steps w/ hand rail and SPC, (S)x1 in order to assure safe navigation in and out of the premorbid living environment. Pt will achieve mod (I) level w/ bed mob in order to facilitate safety with OOB and back to bed transitions in own living environment. Pt will perform transfers w/ mod (I) to assure (I) and safety w/ functional mobility/transitions w/ all aspects of mobility/locomotion. Pt will participate in LE therex and balance activities to max progression w/ mobility skills.   PT Treatment Day 0   Plan   Treatment/Interventions Functional transfer training;LE strengthening/ROM;Elevations;Therapeutic exercise;Endurance training;Cognitive reorientation;Equipment eval/education;Bed mobility;Gait training;Spoke to nursing;OT   PT Frequency 3-5x/wk   Discharge Recommendation   Rehab Resource Intensity Level, PT II (Moderate Resource Intensity)   Equipment Recommended Walker   Walker Package Recommended Wheeled walker   AM-PAC Basic Mobility Inpatient   Turning in Flat Bed Without Bedrails 3   Lying on Back to Sitting on Edge of Flat Bed Without Bedrails 2   Moving Bed to Chair 2   Standing Up From Chair  Using Arms 2   Walk in Room 2   Climb 3-5 Stairs With Railing 2   Basic Mobility Inpatient Raw Score 13   Basic Mobility Standardized Score 33.99   [facility] Highest Level Of Mobility   -[facility] Goal 4: Move to chair/commode   -[facility] Achieved 4: Move to chair/commode   Modified [place] Scale   Modified [place] Scale 4   End of Consult   Patient Position at End of Consult Bedside chair;Bed/Chair alarm activated;All needs within reach       Bradly Howell, PT

## 2024-09-11 NOTE — ASSESSMENT & PLAN NOTE
In setting of recent CABG, delayed postop wound healing.  Late onset along with CT findings suggest deep infection with possible osteomyelitis.  Fortunately appears clinically stable without sepsis.  Status post IR aspiration of thick, cloudy fluid 9/5/2024.  Fluid culture with Klebsiella.  Status post I&D, VAC 9/9.  Intraoperative findings notable for purulence confined to soft tissue.  No exposure or communicaiton with deeper structures.  OR cultures also with Klebsiella.    Continue cefazolin for now  Monitor closely while on antibiotics for toxicities including diarrhea, rash, cytopenias, or kidney injury  Follow up operative cultures and adjust antibiotics as needed  LWC/VAC per CT surgery - change in OR pending tomorrow  Supportive care as per the primary service  If infection remains confined to superficial space, anticipate hopeful transition to PO antibiotics with plan to complete short course    Antibiotics:  Cefazolin #4  Antibiotics #5  POD #2

## 2024-09-11 NOTE — ANESTHESIA POSTPROCEDURE EVALUATION
Post-Op Assessment Note    CV Status:  Stable  Pain Score: 0    Pain management: adequate       Mental Status:  Awake and alert   Hydration Status:  Stable   PONV Controlled:  None   Airway Patency:  Patent     Post Op Vitals Reviewed: Yes    No anethesia notable event occurred.    Staff: CRNA               BP   134/62   Temp   97.6   Pulse   66   Resp   15   SpO2   100%

## 2024-09-12 ENCOUNTER — HOME HEALTH ADMISSION (OUTPATIENT)
Dept: HOME HEALTH SERVICES | Facility: HOME HEALTHCARE | Age: 79
End: 2024-09-12
Payer: COMMERCIAL

## 2024-09-12 PROBLEM — M25.471 PAIN AND SWELLING OF RIGHT ANKLE: Status: ACTIVE | Noted: 2024-09-12

## 2024-09-12 PROBLEM — M25.571 PAIN AND SWELLING OF RIGHT ANKLE: Status: ACTIVE | Noted: 2024-09-12

## 2024-09-12 LAB
ABO GROUP BLD BPU: NORMAL
BPU ID: NORMAL
CROSSMATCH: NORMAL
UNIT DISPENSE STATUS: NORMAL
UNIT PRODUCT CODE: NORMAL
UNIT PRODUCT VOLUME: 350 ML
UNIT RH: NORMAL

## 2024-09-12 PROCEDURE — 99232 SBSQ HOSP IP/OBS MODERATE 35: CPT | Performed by: INTERNAL MEDICINE

## 2024-09-12 PROCEDURE — 97530 THERAPEUTIC ACTIVITIES: CPT

## 2024-09-12 PROCEDURE — 99024 POSTOP FOLLOW-UP VISIT: CPT | Performed by: PHYSICIAN ASSISTANT

## 2024-09-12 RX ADMIN — ESCITALOPRAM OXALATE 5 MG: 5 TABLET, FILM COATED ORAL at 09:11

## 2024-09-12 RX ADMIN — CHLORHEXIDINE GLUCONATE 0.12% ORAL RINSE 15 ML: 1.2 LIQUID ORAL at 09:14

## 2024-09-12 RX ADMIN — SENNOSIDES AND DOCUSATE SODIUM 1 TABLET: 50; 8.6 TABLET ORAL at 19:27

## 2024-09-12 RX ADMIN — IRON SUCROSE 200 MG: 20 INJECTION, SOLUTION INTRAVENOUS at 09:11

## 2024-09-12 RX ADMIN — POLYETHYLENE GLYCOL 3350 17 G: 17 POWDER, FOR SOLUTION ORAL at 09:11

## 2024-09-12 RX ADMIN — FOLIC ACID 1 MG: 1 TABLET ORAL at 09:11

## 2024-09-12 RX ADMIN — CEFAZOLIN SODIUM 2000 MG: 2 SOLUTION INTRAVENOUS at 18:47

## 2024-09-12 RX ADMIN — CEFAZOLIN SODIUM 2000 MG: 2 SOLUTION INTRAVENOUS at 11:30

## 2024-09-12 RX ADMIN — METOPROLOL TARTRATE 25 MG: 25 TABLET, FILM COATED ORAL at 18:38

## 2024-09-12 RX ADMIN — CYANOCOBALAMIN TAB 500 MCG 1000 MCG: 500 TAB at 18:38

## 2024-09-12 RX ADMIN — METOPROLOL TARTRATE 25 MG: 25 TABLET, FILM COATED ORAL at 09:11

## 2024-09-12 RX ADMIN — CHLORHEXIDINE GLUCONATE 0.12% ORAL RINSE 15 ML: 1.2 LIQUID ORAL at 19:27

## 2024-09-12 RX ADMIN — COLCHICINE 0.6 MG: 0.6 TABLET ORAL at 09:11

## 2024-09-12 RX ADMIN — CEFAZOLIN SODIUM 2000 MG: 2 SOLUTION INTRAVENOUS at 03:50

## 2024-09-12 RX ADMIN — ATORVASTATIN CALCIUM 80 MG: 80 TABLET, FILM COATED ORAL at 15:34

## 2024-09-12 RX ADMIN — ASPIRIN 81 MG: 81 TABLET, COATED ORAL at 09:11

## 2024-09-12 RX ADMIN — PANTOPRAZOLE SODIUM 40 MG: 40 TABLET, DELAYED RELEASE ORAL at 06:28

## 2024-09-12 NOTE — ASSESSMENT & PLAN NOTE
Status post CABG in 6/2024  Continue aspirin, metoprolol, atorvastatin  Brilinta presently on hold per CT surgery while awaiting operative intervention/I&D

## 2024-09-12 NOTE — ASSESSMENT & PLAN NOTE
Patient history of CABG in 6/2024 with postoperative hospital stay complicated by stroke and post op ileus with 1 week history of progressively worsening swelling near the superior sternotomy site and low-grade fevers over the last 4 days.  Outpatient imaging as below concerning for sternal dehiscence and fluid collection for which patient was recommended ED admission.  9/4 US sternum poorly marginated complex fluid in the area measuring 4.2 x 1.3 x 1.8 cm.  9/5 CT chest: Increased lucency along the midline sternal site, concerning for developing dehiscence.  There is also increased edema throughout this region, with findings concerning for a potential for collection along the anterior aspect of the sternum.  There is also new likely reactive lymphadenopathy within the prevascular mediastinum concerning for superinfection.  S/p aspiration by IR, aspirate positive for Klebsiella oxytoca  Status post I&D and VAC dressing by CT surgery on 9/9.  Cultures positive for Klebsiella oxytoca  Continue IV cefazolin 2 g every 8 hours per infectious disease  CT surgery plans for I&D noted  CT surgery, infectious disease following  Wound care and VAC dressing per CT surgery

## 2024-09-12 NOTE — PROGRESS NOTES
"Progress Note - Infectious Disease   Name: Shane Chau Sr. 78 y.o. male I MRN: [id number]  Unit/Bed#: [facility]-323-01 I Date of Admission: 9/5/2024   Date of Service: 9/12/2024 I Hospital Day: 7    Assessment & Plan  Sternal wound infection  In setting of recent CABG, delayed postop wound healing.  Late onset along with CT findings suggest deep infection with possible osteomyelitis.  Fortunately appears clinically stable without sepsis.  Status post IR aspiration of thick, cloudy fluid 9/5/2024.  Fluid culture with Klebsiella.  Status post operative I&D, VAC 9/9.  Intraoperative findings notable for purulence confined to soft tissue.  No exposure or communicaiton with deeper structures.  OR cultures also with Klebsiella.    Continue cefazolin for now  Monitor closely while on antibiotics for toxicities including diarrhea, rash, cytopenias, or kidney injury  LWC/VAC per CT surgery - change in OR pending tomorrow  Supportive care as per the primary service  If infection remains confined to superficial space, anticipate short course of antibiotics which may end up finishing in-house    Antibiotics:  Cefazolin #5  Antibiotics #6  POD #3  S/P CABG x 3  For CAD on 6/20/24. Complicated by postop ileus, delayed wound healing.   CKD (chronic kidney disease) stage 2, GFR 60-89 ml/min  Lab Results   Component Value Date    EGFR 75 09/11/2024    EGFR 62 09/09/2024    EGFR 79 09/06/2024    CREATININE 0.96 09/11/2024    CREATININE 1.12 09/09/2024    CREATININE 0.92 09/06/2024           History of Present Illness   Seen on AM rounds.  OOB to chair, \"resting\".  Slept well overnight.  No fevers, rash or diarrhea    Objective      Temp:  [97.3 °F (36.3 °C)-98.4 °F (36.9 °C)] 98.3 °F (36.8 °C)  HR:  [65-80] 75  Resp:  [13-18] 18  BP: (111-141)/(57-76) 121/74  O2 Device: None (Room air)          I/O last 24 hours:  In: 1123.3 [P.O.:420; I.V.:400; IV Piggyback:303.3]  Out: 970 [Urine:970]  Lines/Drains/Airways       Active Status  "      None                  Physical Exam  Constitutional:       Appearance: Normal appearance.   Eyes:      Conjunctiva/sclera: Conjunctivae normal.   Cardiovascular:      Rate and Rhythm: Normal rate.   Pulmonary:      Effort: Pulmonary effort is normal.   Neurological:      Mental Status: He is alert.      VAC to sternum, no surrounding cellulitis    Lab Results: I have reviewed the following results:   Recent Labs     09/11/24  0512   WBC 7.17   HGB 8.6*   HCT 27.1*      SODIUM 135   K 4.0      CO2 28   BUN 16   CREATININE 0.96   GLUC 102     Micro:  Lab Results   Component Value Date    BLOODCX No Growth After 5 Days. 09/05/2024    BLOODCX No Growth After 5 Days. 09/05/2024    URINECX No Growth <1000 cfu/mL 06/29/2016     Imaging Review: No pertinent imaging studies reviewed.  Other Studies: No additional pertinent studies reviewed.

## 2024-09-12 NOTE — CASE MANAGEMENT
Case Management Discharge Planning Note    Patient name Shane Chua .  Location [facility]-323/[facility]-323-01 MRN [id number]  : 1945 Date 2024       Current Admission Date: 2024  Current Admission Diagnosis:Sternal wound infection   Patient Active Problem List    Diagnosis Date Noted Date Diagnosed    Pain and swelling of right ankle 2024     Anemia 09/10/2024     Sternal wound infection 2024     Hospital discharge follow-up 2024     Surgical wound, non healing 2024     Postoperative anemia due to acute blood loss 07/10/2024     Stenosis of left vertebral artery 07/10/2024     Prediabetes 07/10/2024     Reaction at surgical site 07/10/2024     Cognitive impairment 07/10/2024     Hyponatremia 2024     History of stroke 2024     PAD (peripheral artery disease) ([facility]) 2024     Stroke-like symptoms 2024     CKD (chronic kidney disease) stage 2, GFR 60-89 ml/min 2024     S/P CABG x 3 2024     Rib pain 2023     Paresthesia 2023     Leg swelling 05/10/2022     Erectile dysfunction 2022     Microhematuria 2018     Need for pneumococcal vaccination 2018     Hematuria 2018     Asymptomatic bilateral carotid artery stenosis 2017     Closed compression fracture of first lumbar vertebra ([facility]) 2017     Abdominal aortic aneurysm without rupture ([facility]) 2016     Localized primary osteoarthritis of lower leg, unspecified laterality 2013     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[facility]) 2013     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[facility]) 2013     Transient cerebral ischemic attack 2013     Coronary artery disease of native artery of native heart with stable angina pectoris ([facility]) 2012     Hyperlipidemia 2012     Primary hypertension 2012       LOS (days): 7  Geometric Mean LOS (GMLOS) (days): 5.4  Days to GMLOS:-1.6     OBJECTIVE:  Risk of Unplanned Readmission Score: 17.25          Current admission status: Inpatient   Preferred Pharmacy:   [facility] - [place] - [address]  [address]  [place] [address]  Phone: [phone] Fax: [phone]    [id number] - [place] - [address]  [address]  [place] [address]  Phone: [phone] Fax: [phone]    [id number] [place] - [address]  [address] [place] [address]  Phone: [phone] Fax: [phone]    [facility] - [place] - [address]  [address]  [place] [address]  Phone: [phone] Fax: [phone]    [facility] [place] - [place] - [address]  [address]  [place] [address]  Phone: [phone] Fax: [phone]    Primary Care Provider: Sherwin Mantilla MD    [facility]: [facility]  Secondary Insurance:     DISCHARGE DETAILS:    Discharge planning discussed with:: pt and wife--have had [facility] recently and would like again  Freedom of Choice: Yes                        Requested Home Health Care         Is the patient interested in HHC at discharge?: Yes  Home Health Discipline requested:: Nursing, Occupational Therapy, Physical Therapy  Home Health Follow-Up Provider:: PCP  Home Health Services Needed:: [facility], Administration of IV, IM or SC Medications, Gait/ADL Training, Evaluate Functional Status and Safety, Post-Op Care and Assessment, Strengthening/Theraputic Exercises to Improve Function, Wound/Ostomy Care  Homebound Criteria Met:: Requires the Assistance of Another Person for Safe Ambulation or to Leave the Home  Supporting Clincal Findings:: Limited Endurance, Fatigues Easliy in Short Distances         Other Referral/Resources/Interventions Provided:  Interventions: HHC  Referral Comments: requesting return of [facility], referral sent    Would you like to participate in our [facility] service program?   : No - Declined    Treatment Team Recommendation: Home with Home Health Care  Discharge Destination Plan:: Home with Home Health Care  Transport at Discharge : Family                                      Additional Comments: s/p cabg 6/24, sternal wound dehiscence with Klebsiella, has VAC. On IV Ancef, duration tbd. PT/OT recommending SNF rehab but wife states they are still paying for acute rehab stay. Pt. went home and sat in chair and would not shower daily. They have had [facility] before and would like to continue. Will also provide snf rehab list in network with [facility] in case they decide to go to rehab.

## 2024-09-12 NOTE — PROGRESS NOTES
Progress Note - Hospitalist   Name: Shane Chau Sr. 78 y.o. male I MRN: [id number]  Unit/Bed#: [facility]-323-01 I Date of Admission: 9/5/2024   Date of Service: 9/12/2024 I Hospital Day: 7    Assessment & Plan  Sternal wound infection  Patient history of CABG in 6/2024 with postoperative hospital stay complicated by stroke and post op ileus with 1 week history of progressively worsening swelling near the superior sternotomy site and low-grade fevers over the last 4 days.  Outpatient imaging as below concerning for sternal dehiscence and fluid collection for which patient was recommended ED admission.  9/4 US sternum poorly marginated complex fluid in the area measuring 4.2 x 1.3 x 1.8 cm.  9/5 CT chest: Increased lucency along the midline sternal site, concerning for developing dehiscence.  There is also increased edema throughout this region, with findings concerning for a potential for collection along the anterior aspect of the sternum.  There is also new likely reactive lymphadenopathy within the prevascular mediastinum concerning for superinfection.  S/p aspiration by IR, aspirate positive for Klebsiella oxytoca  Status post I&D and VAC dressing by CT surgery on 9/9.  Cultures positive for Klebsiella oxytoca  Continue IV cefazolin 2 g every 8 hours per infectious disease  CT surgery plans for I&D noted  CT surgery, infectious disease following  Wound care and VAC dressing per CT surgery    Abdominal aortic aneurysm without rupture (HCC)  S/p repair in 2011  Outpatient follow-up  Coronary artery disease of native artery of native heart with stable angina pectoris (HCC)  Status post CABG in 6/2024  Continue aspirin, metoprolol, atorvastatin  Brilinta presently on hold per CT surgery while awaiting operative intervention/I&D    Primary hypertension  Blood pressures reviewed, acceptable  Monitor blood pressures  CKD (chronic kidney disease) stage 2, GFR 60-89 ml/min  Lab Results   Component Value Date    EGFR 75  2024    EGFR 62 2024    EGFR 79 2024    CREATININE 0.96 2024    CREATININE 1.12 2024    CREATININE 0.92 2024     Monitor kidney function  History of stroke  History of recent stroke s/p CABG with no residual focal deficits  Continue aspirin, statin  Outpatient follow-up  Anemia  Anemia  Iron studies suggest iron deficiency state  Iron supplementation  Monitor hemoglobin  Pain and swelling of right ankle  Patient reports improving right ankle pain and swelling  Uric acid 4.1  Elevated CRP noted  Receiving empirical colchicine with improvement  Continue colchicine 7-day course  Monitor      VTE Pharmacologic Prophylaxis:    Per CT surgery, Rubia's    Mobility:   Basic Mobility Inpatient Raw Score: 22  JH-HLM Goal: 7: Walk 25 feet or more  JH-HLM Achieved: 4: Move to chair/commode  JH-HLM Goal NOT achieved. Continue with multidisciplinary rounding and encourage appropriate mobility to improve upon JH-HLM goals.    Patient Centered Rounds: I performed bedside rounds with nursing staff today.   Discussions with Specialists or Other Care Team Provider: Case management, physical therapy    Education and Discussions with Family / Patient:  Discussed with the patient, updated wife Maryjo.     Current Length of Stay: 7 day(s)  Current Patient Status: Inpatient   Certification Statement: The patient will continue to require additional inpatient hospital stay due to as outlined  Discharge Plan:  sternal wound dehiscence ongoing operative management I&D with CT surgery, receiving IV antibiotics with infectious disease    Code Status: Level 1 - Full Code    Subjective     Comfortably in chair  Reports significantly improved right ankle pain and swelling  Physical therapy at bedside  Encourage incentive spirometry      Objective     Vitals:   Temp (24hrs), Av.9 °F (36.6 °C), Min:97.3 °F (36.3 °C), Max:98.4 °F (36.9 °C)    Temp:  [97.3 °F (36.3 °C)-98.4 °F (36.9 °C)] 98.3 °F (36.8 °C)  HR:   [65-80] 75  Resp:  [13-18] 16  BP: (111-134)/(57-74) 121/74  SpO2:  [92 %-100 %] 95 %  Body mass index is 22.84 kg/m².     Input and Output Summary (last 24 hours):     Intake/Output Summary (Last 24 hours) at 9/12/2024 1214  Last data filed at 9/12/2024 1200  Gross per 24 hour   Intake 1123.33 ml   Output 945 ml   Net 178.33 ml       Physical Exam       Comfortably sitting up in chair  Features of protein calorie malnutrition noted  Neck supple  Lungs diminished breath sounds  Muscular breath sounds  Heart sounds S1-S2 noted  VAC dressing on chest wall noted  Abdomen soft nontender  Awake follows commands  No pedal edema  Right ankle pain swelling and tenderness improving  No rash    Lines/Drains:  Lines/Drains/Airways       Active Status       None                            Lab Results: I have reviewed the following results: CBC/BMP: No new results in last 24 hours.    Results from last 7 days   Lab Units 09/11/24  0512   WBC Thousand/uL 7.17   HEMOGLOBIN g/dL 8.6*   HEMATOCRIT % 27.1*   PLATELETS Thousands/uL 267   SEGS PCT % 64   LYMPHO PCT % 17   MONO PCT % 12   EOS PCT % 5     Results from last 7 days   Lab Units 09/11/24  0512   SODIUM mmol/L 135   POTASSIUM mmol/L 4.0   CHLORIDE mmol/L 100   CO2 mmol/L 28   BUN mg/dL 16   CREATININE mg/dL 0.96   ANION GAP mmol/L 7   CALCIUM mg/dL 9.0   GLUCOSE RANDOM mg/dL 102     Results from last 7 days   Lab Units 09/07/24  1313   INR  1.13     Results from last 7 days   Lab Units 09/09/24  1238   POC GLUCOSE mg/dl 107               Recent Cultures (last 7 days):   Results from last 7 days   Lab Units 09/09/24  1149 09/05/24  1802   GRAM STAIN RESULT  2+ Polys  No bacteria seen No Polys or Bacteria seen   BODY FLUID CULTURE, STERILE   --  1+ Growth of Klebsiella oxytoca*       Imaging Review: Reviewed radiology reports from this admission including: chest xray and procedure reports.  Other Studies: No additional pertinent studies reviewed.    Last 24 Hours Medication  List:     Current Facility-Administered Medications:     acetaminophen (TYLENOL) tablet 650 mg, Q6H PRN    aspirin (ECOTRIN LOW STRENGTH) EC tablet 81 mg, Daily    atorvastatin (LIPITOR) tablet 80 mg, Daily With Dinner    bisacodyl (DULCOLAX) EC tablet 5 mg, Daily PRN    ceFAZolin (ANCEF) IVPB (premix in dextrose) 2,000 mg 50 mL, Q8H, Last Rate: Stopped (09/12/24 1200)    chlorhexidine (PERIDEX) 0.12 % oral rinse 15 mL, Q12H KRISTI    [COMPLETED] colchicine (COLCRYS) tablet 1.2 mg, Once **FOLLOWED BY** colchicine (COLCRYS) tablet 0.6 mg, Daily    cyanocobalamin (VITAMIN B-12) tablet 1,000 mcg, QPM    ergocalciferol (VITAMIN D2) capsule 50,000 Units, Weekly    escitalopram (LEXAPRO) tablet 5 mg, Daily    folic acid (FOLVITE) tablet 1 mg, Daily    metoprolol tartrate (LOPRESSOR) tablet 25 mg, BID    pantoprazole (PROTONIX) EC tablet 40 mg, Early Morning    polyethylene glycol (MIRALAX) packet 17 g, Daily    senna-docusate sodium (SENOKOT S) 8.6-50 mg per tablet 1 tablet, HS    Administrative Statements   Today, Patient Was Seen By: Timbo Vazquez MD  I have spent a total time of 31 minutes in caring for this patient on the day of the visit/encounter     **Please Note: This note may have been constructed using a voice recognition system.**

## 2024-09-12 NOTE — ASSESSMENT & PLAN NOTE
Lab Results   Component Value Date    EGFR 75 09/11/2024    EGFR 62 09/09/2024    EGFR 79 09/06/2024    CREATININE 0.96 09/11/2024    CREATININE 1.12 09/09/2024    CREATININE 0.92 09/06/2024     Monitor kidney function

## 2024-09-12 NOTE — CASE MANAGEMENT
Case Management Discharge Planning Note    Patient name Shane Chau .  Location [facility]-323/[facility]-01 MRN [id number]  : 1945 Date 2024       Current Admission Date: 2024  Current Admission Diagnosis:Sternal wound infection   Patient Active Problem List    Diagnosis Date Noted Date Diagnosed    Pain and swelling of right ankle 2024     Anemia 09/10/2024     Sternal wound infection 2024     Hospital discharge follow-up 2024     Surgical wound, non healing 2024     Postoperative anemia due to acute blood loss 07/10/2024     Stenosis of left vertebral artery 07/10/2024     Prediabetes 07/10/2024     Reaction at surgical site 07/10/2024     Cognitive impairment 07/10/2024     Hyponatremia 2024     History of stroke 2024     PAD (peripheral artery disease) ([facility]) 2024     Stroke-like symptoms 2024     CKD (chronic kidney disease) stage 2, GFR 60-89 ml/min 2024     S/P CABG x 3 2024     Rib pain 2023     Paresthesia 2023     Leg swelling 05/10/2022     Erectile dysfunction 2022     Microhematuria 2018     Need for pneumococcal vaccination 2018     Hematuria 2018     Asymptomatic bilateral carotid artery stenosis 2017     Closed compression fracture of first lumbar vertebra ([facility]) 2017     Abdominal aortic aneurysm without rupture ([facility]) 2016     Localized primary osteoarthritis of lower leg, unspecified laterality 2013     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[facility]) 2013     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[facility]) 2013     Transient cerebral ischemic attack 2013     Coronary artery disease of native artery of native heart with stable angina pectoris ([facility]) 2012     Hyperlipidemia 2012     Primary hypertension 2012       LOS (days): 7  Geometric Mean LOS (GMLOS) (days): 5.4  Days to GMLOS:-1.7     OBJECTIVE:  Risk of Unplanned Readmission Score: 17.25          Current admission status: Inpatient   Preferred Pharmacy:   [facility] - [place] - [address]  [address]  [place] [address]  Phone: [phone] Fax: [phone]    [facility] - [place] - [address]  [place] [address]  Phone: [phone] Fax: [phone]    [id number] - [place] - [address]  [address] [place] [address]  Phone: [phone] Fax: [phone]    [facility] - [place] - [address]  [address]  [place] [address]  Phone: [phone] Fax: [phone]    [facility] [place] - [place] - [address]  [address]  [place] [address]  Phone: [phone] Fax: [phone]    Primary Care Provider: Sherwin Mantilla MD    Primary Insurance: [facility]  Secondary Insurance:     DISCHARGE DETAILS:                           Contacts  Reason/Outcome: Continuity of Care, Emergency Contact, Discharge Planning    Requested Home Health Care         [facility]                                                                Additional Comments: Accepted by ROSA coello/PT/OT

## 2024-09-12 NOTE — ASSESSMENT & PLAN NOTE
Patient reports improving right ankle pain and swelling  Uric acid 4.1  Elevated CRP noted  Receiving empirical colchicine with improvement  Continue colchicine 7-day course  Monitor

## 2024-09-12 NOTE — MALNUTRITION/BMI
This medical record reflects one or more clinical indicators suggestive of malnutrition and/or morbid obesity.    Malnutrition Findings:   Adult Malnutrition type: Chronic illness  Adult Degree of Malnutrition: Other severe protein calorie malnutrition  Malnutrition Characteristics: Fat loss, Muscle loss, Weight loss                360 Statement: severe protein energy malnutrition is related to chronic illness as evidenced by moderate/severe muscle/fat wasting and severe unintentional weight loss (40#/20% x3m, 34#/17.6% weight loss x6m). Treated with diet and addition of oral nutrition supplement.    BMI Findings:           Body mass index is 22.84 kg/m².     See Nutrition note dated 9/12/24 for additional details.  Completed nutrition assessment is viewable in the nutrition documentation.

## 2024-09-12 NOTE — ASSESSMENT & PLAN NOTE
In setting of recent CABG, delayed postop wound healing.  Late onset along with CT findings suggest deep infection with possible osteomyelitis.  Fortunately appears clinically stable without sepsis.  Status post IR aspiration of thick, cloudy fluid 9/5/2024.  Fluid culture with Klebsiella.  Status post operative I&D, VAC 9/9.  Intraoperative findings notable for purulence confined to soft tissue.  No exposure or communicaiton with deeper structures.  OR cultures also with Klebsiella.    Continue cefazolin for now  Monitor closely while on antibiotics for toxicities including diarrhea, rash, cytopenias, or kidney injury  LWC/VAC per CT surgery - change in OR pending tomorrow  Supportive care as per the primary service  If infection remains confined to superficial space, anticipate short course of antibiotics which may end up finishing in-house    Antibiotics:  Cefazolin #5  Antibiotics #6  POD #3

## 2024-09-12 NOTE — RESTORATIVE TECHNICIAN NOTE
Restorative Technician Note      Patient Name: Shane KALPANA Chau Sr.     Restorative Tech Visit Date: 09/12/24  Note Type: Mobility  Patient Position Upon Consult: Supine  Activity Performed: Transferred  Assistive Device: Roller walker  Patient Position at End of Consult: All needs within reach; Bed/Chair alarm activated; Bedside chair

## 2024-09-12 NOTE — PROGRESS NOTES
Progress Note - Cardiac Surgery   Name: Shane Chau Sr. 78 y.o. male I MRN: [id number]  Unit/Bed#: PPHP-323-01 I Date of Admission: 9/5/2024   Date of Service: 9/12/2024 I Hospital Day: 7     Assessment & Plan  Sternal wound infection    Abdominal aortic aneurysm without rupture (HCC)    Coronary artery disease of native artery of native heart with stable angina pectoris (HCC)    Primary hypertension    S/P CABG x 3    CKD (chronic kidney disease) stage 2, GFR 60-89 ml/min  Lab Results   Component Value Date    EGFR 75 09/11/2024    EGFR 62 09/09/2024    EGFR 79 09/06/2024    CREATININE 0.96 09/11/2024    CREATININE 1.12 09/09/2024    CREATININE 0.92 09/06/2024     History of stroke    Anemia      Progress Note - Cardiothoracic Surgery   Shane Chau Sr. 78 y.o. male MRN: [id number]  Unit/Bed#: PPHP-323-01 Encounter: [id number]    Superficial sternal wound infection. S/P Sternal wound incision and drainage, washout, application of VAC dressing. ; POD # 1      24 Hour Events: No events overnight. Denies incisional pain.     Medications:   Scheduled Meds:  Current Facility-Administered Medications   Medication Dose Route Frequency Provider Last Rate    acetaminophen  650 mg Oral Q6H PRN Timbo Vazquez MD      aspirin  81 mg Oral Daily Timbo Vazquez MD      atorvastatin  80 mg Oral Daily With Dinner Timbo Vazquez MD      bisacodyl  5 mg Oral Daily PRN Timbo Vazquez MD      cefazolin  2,000 mg Intravenous Q8H Timbo Vazquez MD Stopped (09/12/24 0420)    chlorhexidine  15 mL Swish & Spit Q12H KRISTI Timbo Vazquez MD      colchicine  0.6 mg Oral Daily Timbo Vazquez MD      cyanocobalamin  1,000 mcg Oral QPM Timbo Vazquez MD      ergocalciferol  50,000 Units Oral Weekly Timbo Vazquez MD      escitalopram  5 mg Oral Daily Timbo Vazquez MD      folic acid  1 mg Oral Daily Timbo Vazquez MD      iron sucrose   "200 mg Intravenous Daily Timbo Vazquez MD Stopped (09/11/24 1010)    metoprolol tartrate  25 mg Oral BID Timbo Vazquez MD      mupirocin   Nasal Q12H KRISTI Timbo Vazquez MD      pantoprazole  40 mg Oral Early Morning Timbo Vazquez MD      polyethylene glycol  17 g Oral Daily Timbo Vazquez MD      senna-docusate sodium  1 tablet Oral HS Timbo Vazquez MD       Continuous Infusions:   PRN Meds:.  acetaminophen    bisacodyl    Vitals:   Vitals:    09/11/24 2100 09/11/24 2249 09/12/24 0300 09/12/24 0701   BP:  127/72  118/72   BP Location:       Pulse: 67 69 68 75   Resp:   14 18   Temp:  98.4 °F (36.9 °C)  98.3 °F (36.8 °C)   TempSrc:       SpO2: 93% 95% 95% 97%   Weight:       Height:           Telemetry: NSR; Heart Rate: 72    Respiratory:   SpO2: SpO2: 97 %, SpO2 Activity: SpO2 Activity: At Rest; Room Air    Intake/Output:     Intake/Output Summary (Last 24 hours) at 9/12/2024 0846  Last data filed at 9/12/2024 0745  Gross per 24 hour   Intake 1123.33 ml   Output 770 ml   Net 353.33 ml        Weights:   Weight (last 2 days)       None            Chest tube Output:    VAC drain: 0 mL/8 hours  0 mL/24 hours            Results:   Results from last 7 days   Lab Units 09/11/24  0512 09/10/24  0500 09/07/24  1313   WBC Thousand/uL 7.17 7.32 7.84   HEMOGLOBIN g/dL 8.6* 8.9* 9.6*   HEMATOCRIT % 27.1* 27.5* 29.1*   PLATELETS Thousands/uL 267 256 268     Results from last 7 days   Lab Units 09/11/24  0512 09/09/24  1240 09/06/24  0447   SODIUM mmol/L 135 134* 135   POTASSIUM mmol/L 4.0 4.7 3.9   CHLORIDE mmol/L 100 101 102   CO2 mmol/L 28 27 23   BUN mg/dL 16 13 17   CREATININE mg/dL 0.96 1.12 0.92   CALCIUM mg/dL 9.0 9.0 9.2     No results for input(s): \"MG\" in the last 72 hours.  Results from last 7 days   Lab Units 09/09/24  0220 09/08/24 2021 09/08/24  1332 09/07/24  1913 09/07/24  1313 09/05/24  1638   INR   --   --   --   --  1.13 1.14   PTT seconds 89* 62* 54*   " < > 45*  --     < > = values in this interval not displayed.       Studies:  No new studies    Invasive Lines/Tubes:  Invasive Devices       Peripheral Intravenous Line  Duration             Peripheral IV 09/09/24 Left;Proximal;Ventral (anterior) Forearm 3 days                  Physical Exam:    General: No acute distress and Alert  HEENT/NECK:  Normocephalic. Atraumatic.  No jugular venous distention.    Cardiac: Regular rate and rhythm and No murmurs/rubs/gallops  Pulmonary:  Breath sounds clear bilaterally  Abdomen:  Non-tender and Non-distended  Incisions: Sternum is stable. VAC in place without surrounding erythema. Otherwise, the incision is clean, dry, and intact.   Extremities: Extremities warm/dry and Trace edema B/L  Neuro: Alert and oriented X 3 and Sensation is grossly intact  Skin: Warm/Dry, without rashes or lesions.      Assessment:  Principal Problem:    Sternal wound infection  Active Problems:    Abdominal aortic aneurysm without rupture (HCC)    Coronary artery disease of native artery of native heart with stable angina pectoris (HCC)    Primary hypertension    S/P CABG x 3    CKD (chronic kidney disease) stage 2, GFR 60-89 ml/min    History of stroke    Anemia       Superficial sternal wound infection. S/P Sternal wound incision and drainage, washout, application of VAC dressing. ; POD # 1      Plan:  Klebsiella oxytoca sternal wound infection.    VAC in place with no appreciable drainage.     OR gram stain negative  OR culutres (9/9): Few Colonies of Klebsiella oxytoca  Fungal culture with no growth to date    Continue cefazolin for now, per ID    Will likely transition to bedside VAC changes going forward    History of TIA (on Brilinta as home med)   Hold today, pending re-op tomorrow   Likely resume post op 9/12            SIGNATURE: Nakul Nunez PA-C  DATE: September 12, 2024  TIME: 8:46 AM

## 2024-09-12 NOTE — PHYSICAL THERAPY NOTE
PHYSICAL THERAPY NOTE          Patient Name: Shane Chau Sr.  Today's Date: 9/12/2024 09/12/24 1102   PT Last Visit   PT Visit Date 09/12/24   Pain Assessment   Pain Assessment Tool FLACC   Pain Location/Orientation Orientation: Right;Location: Knee  (reports R) foot is better)   Pain Onset/Description Onset: Ongoing;Frequency: Intermittent;Descriptor: Aching;Onset: Gradual;Descriptor: Discomfort   Effect of Pain on Daily Activities some discomfort during amb   Patient's Stated Pain Goal No pain   Hospital Pain Intervention(s) Repositioned;Ambulation/increased activity;Emotional support   Pain Rating: FLACC (Rest) - Face 0   Pain Rating: FLACC (Rest) - Legs 0   Pain Rating: FLACC (Rest) - Activity 0   Pain Rating: FLACC (Rest) - Cry 0   Pain Rating: FLACC (Rest) - Consolability 0   Score: FLACC (Rest) 0   Pain Rating: FLACC (Activity) - Face 1   Pain Rating: FLACC (Activity) - Legs 0   Pain Rating: FLACC (Activity) - Activity 0   Pain Rating: FLACC (Activity) - Cry 1   Pain Rating: FLACC (Activity) - Consolability 1   Score: FLACC (Activity) 3   Restrictions/Precautions   Other Precautions Cardiac/sternal;Cognitive;Chair Alarm;Multiple lines;Telemetry;Fall Risk;Hard of hearing  (VAC)   General   Chart Reviewed Yes   Additional Pertinent History cleared for Tx session by mode   Response to Previous Treatment Patient with no complaints from previous session.   Cognition   Overall Cognitive Status WFL   Arousal/Participation Alert;Cooperative   Attention Attends with cues to redirect   Orientation Level Oriented to person;Oriented to place;Oriented to situation   Memory Decreased recall of recent events;Decreased recall of precautions   Following Commands Follows one step commands without difficulty   Subjective   Subjective Alert; in the chair; somewhat flat affect; agreeable to mobilize and perform therex   Transfers    Sit to Stand 4  Minimal assistance   Additional items Assist x 1;Increased time required;Verbal cues  (3 trials)   Stand to Sit 4  Minimal assistance   Additional items Assist x 1;Increased time required;Verbal cues  (3 trials)   Ambulation/Elevation   Gait pattern Decreased R stance;Excessively slow;Short stride;Inconsistent keenan   Gait Assistance 4  Minimal assist   Additional items Assist x 1;Verbal cues;Tactile cues   Assistive Device Rolling walker   Distance 2 x 60 ft w/ seated rest period in between   Balance   Static Sitting Fair   Dynamic Sitting Fair -   Static Standing Fair -   Dynamic Standing Poor +   Ambulatory Poor +   Activity Tolerance   Activity Tolerance Patient limited by fatigue;Patient limited by pain   Nurse Made Aware spoke to JORGE ALBERTO [place]   Exercises   Knee AROM Long Arc Quad Sitting;15 reps;AROM;Bilateral   Ankle Pumps Sitting;15 reps;AROM;Bilateral   Marching Sitting;10 reps;AROM;Bilateral   Assessment   Prognosis Good   Problem List Decreased strength;Decreased endurance;Impaired balance;Decreased mobility;Decreased skin integrity;Pain   Assessment Pt demonstrated overall improvement in balance, endurance and all aspects of observed mobility progressing to min (A) w/ transfers and amb w/ rw; no pain reported in the (R) foot but now c/o (R) knee discomfort during amb; rest periods provided and pt remained in NAD. At this time, pt's overall mobility status still remains below premorbid level -> will cont to follow to address; D/C recommendations are listed below;   Barriers to Discharge Inaccessible home environment   Goals   Patient Goals to cont getting better   STG Expiration Date 09/21/24   PT Treatment Day 1   Plan   Treatment/Interventions Functional transfer training;LE strengthening/ROM;Elevations;Therapeutic exercise;Endurance training;Cognitive reorientation;Bed mobility;Gait training;Spoke to nursing   Progress Progressing toward goals   PT Frequency 3-5x/wk   Discharge  Recommendation   Rehab Resource Intensity Level, PT II (Moderate Resource Intensity)   Equipment Recommended Walker   Walker Package Recommended Wheeled walker   Change/add to Walker Package? No   AM-PAC Basic Mobility Inpatient   Turning in Flat Bed Without Bedrails 3   Lying on Back to Sitting on Edge of Flat Bed Without Bedrails 2   Moving Bed to Chair 3   Standing Up From Chair Using Arms 3   Walk in Room 3   Climb 3-5 Stairs With Railing 2   Basic Mobility Inpatient Raw Score 16   Basic Mobility Standardized Score 38.32   [facility] Highest Level Of Mobility   -[facility] Goal 5: Stand one or more mins   -HLM Achieved 7: Walk 25 feet or more   Education   Education Provided Mobility training;Home exercise program;Assistive device   End of Consult   Patient Position at End of Consult Bedside chair;Bed/Chair alarm activated;All needs within reach     Bradly Howell

## 2024-09-12 NOTE — PLAN OF CARE
Problem: PHYSICAL THERAPY ADULT  Goal: Performs mobility at highest level of function for planned discharge setting.  See evaluation for individualized goals.  Description: Treatment/Interventions: Functional transfer training, LE strengthening/ROM, Elevations, Therapeutic exercise, Endurance training, Cognitive reorientation, Equipment eval/education, Bed mobility, Gait training, Spoke to nursing, OT  Equipment Recommended: Walker       See flowsheet documentation for full assessment, interventions and recommendations.  Outcome: Progressing  Note: Prognosis: Good  Problem List: Decreased strength, Decreased endurance, Impaired balance, Decreased mobility, Decreased skin integrity, Pain  Assessment: Pt demonstrated overall improvement in balance, endurance and all aspects of observed mobility progressing to min (A) w/ transfers and amb w/ rw; no pain reported in the (R) foot but now c/o (R) knee discomfort during amb; rest periods provided and pt remained in NAD. At this time, pt's overall mobility status still remains below premorbid level -> will cont to follow to address; D/C recommendations are listed below;  Barriers to Discharge: Inaccessible home environment     Rehab Resource Intensity Level, PT: II (Moderate Resource Intensity)    See flowsheet documentation for full assessment.

## 2024-09-13 PROBLEM — E43 SEVERE PROTEIN-CALORIE MALNUTRITION (HCC): Status: ACTIVE | Noted: 2024-09-13

## 2024-09-13 PROCEDURE — 97530 THERAPEUTIC ACTIVITIES: CPT

## 2024-09-13 PROCEDURE — 97535 SELF CARE MNGMENT TRAINING: CPT

## 2024-09-13 PROCEDURE — 2W14X6Z COMPRESSION OF CHEST WALL USING PRESSURE DRESSING: ICD-10-PCS | Performed by: THORACIC SURGERY (CARDIOTHORACIC VASCULAR SURGERY)

## 2024-09-13 PROCEDURE — 99024 POSTOP FOLLOW-UP VISIT: CPT | Performed by: PHYSICIAN ASSISTANT

## 2024-09-13 PROCEDURE — 99233 SBSQ HOSP IP/OBS HIGH 50: CPT | Performed by: INTERNAL MEDICINE

## 2024-09-13 PROCEDURE — 99232 SBSQ HOSP IP/OBS MODERATE 35: CPT | Performed by: INTERNAL MEDICINE

## 2024-09-13 RX ORDER — ENOXAPARIN SODIUM 100 MG/ML
40 INJECTION SUBCUTANEOUS
Status: DISCONTINUED | OUTPATIENT
Start: 2024-09-13 | End: 2024-09-17 | Stop reason: HOSPADM

## 2024-09-13 RX ORDER — FERROUS SULFATE 325(65) MG
325 TABLET ORAL
Status: DISCONTINUED | OUTPATIENT
Start: 2024-09-13 | End: 2024-09-17 | Stop reason: HOSPADM

## 2024-09-13 RX ADMIN — TICAGRELOR 90 MG: 90 TABLET ORAL at 21:34

## 2024-09-13 RX ADMIN — ATORVASTATIN CALCIUM 80 MG: 80 TABLET, FILM COATED ORAL at 15:40

## 2024-09-13 RX ADMIN — ASPIRIN 81 MG: 81 TABLET, COATED ORAL at 09:07

## 2024-09-13 RX ADMIN — COLCHICINE 0.6 MG: 0.6 TABLET ORAL at 09:08

## 2024-09-13 RX ADMIN — CHLORHEXIDINE GLUCONATE 0.12% ORAL RINSE 15 ML: 1.2 LIQUID ORAL at 21:34

## 2024-09-13 RX ADMIN — TICAGRELOR 90 MG: 90 TABLET ORAL at 11:47

## 2024-09-13 RX ADMIN — CEFAZOLIN SODIUM 2000 MG: 2 SOLUTION INTRAVENOUS at 11:39

## 2024-09-13 RX ADMIN — FOLIC ACID 1 MG: 1 TABLET ORAL at 09:08

## 2024-09-13 RX ADMIN — ESCITALOPRAM OXALATE 5 MG: 5 TABLET, FILM COATED ORAL at 09:07

## 2024-09-13 RX ADMIN — POLYETHYLENE GLYCOL 3350 17 G: 17 POWDER, FOR SOLUTION ORAL at 09:07

## 2024-09-13 RX ADMIN — CEFAZOLIN SODIUM 2000 MG: 2 SOLUTION INTRAVENOUS at 18:30

## 2024-09-13 RX ADMIN — SENNOSIDES AND DOCUSATE SODIUM 1 TABLET: 50; 8.6 TABLET ORAL at 21:34

## 2024-09-13 RX ADMIN — FERROUS SULFATE TAB 325 MG (65 MG ELEMENTAL FE) 325 MG: 325 (65 FE) TAB at 15:39

## 2024-09-13 RX ADMIN — PANTOPRAZOLE SODIUM 40 MG: 40 TABLET, DELAYED RELEASE ORAL at 06:13

## 2024-09-13 RX ADMIN — CEFAZOLIN SODIUM 2000 MG: 2 SOLUTION INTRAVENOUS at 03:32

## 2024-09-13 RX ADMIN — METOPROLOL TARTRATE 25 MG: 25 TABLET, FILM COATED ORAL at 09:08

## 2024-09-13 RX ADMIN — ENOXAPARIN SODIUM 40 MG: 40 INJECTION SUBCUTANEOUS at 15:40

## 2024-09-13 RX ADMIN — CHLORHEXIDINE GLUCONATE 0.12% ORAL RINSE 15 ML: 1.2 LIQUID ORAL at 09:07

## 2024-09-13 RX ADMIN — METOPROLOL TARTRATE 25 MG: 25 TABLET, FILM COATED ORAL at 18:30

## 2024-09-13 RX ADMIN — CYANOCOBALAMIN TAB 500 MCG 1000 MCG: 500 TAB at 18:30

## 2024-09-13 NOTE — ASSESSMENT & PLAN NOTE
Clinically and symptomatically improved  Uric acid 4.1  Elevated CRP noted  Continue colchicine to complete 7-day course  Monitor

## 2024-09-13 NOTE — PROGRESS NOTES
Progress Note - Hospitalist   Name: Shane Chau Sr. 78 y.o. male I MRN: [id number]  Unit/Bed#: PPHP-323-01 I Date of Admission: 9/5/2024   Date of Service: 9/13/2024 I Hospital Day: 8    Assessment & Plan  Sternal wound infection  Patient history of CABG in 6/2024 with postoperative hospital stay complicated by stroke and post op ileus with 1 week history of progressively worsening swelling near the superior sternotomy site and low-grade fevers over the last 4 days.  Outpatient imaging as below concerning for sternal dehiscence and fluid collection for which patient was recommended ED admission.  9/4 US sternum poorly marginated complex fluid in the area measuring 4.2 x 1.3 x 1.8 cm.  9/5 CT chest: Increased lucency along the midline sternal site, concerning for developing dehiscence.  There is also increased edema throughout this region, with findings concerning for a potential for collection along the anterior aspect of the sternum.  There is also new likely reactive lymphadenopathy within the prevascular mediastinum concerning for superinfection.  S/p aspiration by IR, aspirate positive for Klebsiella oxytoca  Status post I&D and VAC dressing by CT surgery on 9/9.  Cultures positive for Klebsiella oxytoca  Continue IV cefazolin 2 g every 8 hours per infectious disease  VAC dressing wound care per CT surgery  Infectious disease and CT surgery following      Abdominal aortic aneurysm without rupture (HCC)  S/p repair in 2011  Outpatient follow-up  Coronary artery disease of native artery of native heart with stable angina pectoris (HCC)  Status post CABG in 6/2024  Continue aspirin metoprolol Brilinta atorvastatin    Primary hypertension  Blood pressures reviewed, acceptable  Monitor blood pressures  CKD (chronic kidney disease) stage 2, GFR 60-89 ml/min  Lab Results   Component Value Date    EGFR 75 09/11/2024    EGFR 62 09/09/2024    EGFR 79 09/06/2024    CREATININE 0.96 09/11/2024    CREATININE 1.12  09/09/2024    CREATININE 0.92 09/06/2024     Monitor kidney function  History of stroke  History of recent stroke s/p CABG with no residual focal deficits  Continue aspirin atorvastatin  Anemia  Anemia  Iron studies suggest iron deficiency state  Iron supplementation  Monitor hemoglobin  Pain and swelling of right ankle  Clinically and symptomatically improved  Uric acid 4.1  Elevated CRP noted  Continue colchicine to complete 7-day course  Monitor  S/P CABG x 3    Severe protein-calorie malnutrition (HCC)  Malnutrition Findings:   Adult Malnutrition type: Chronic illness  Adult Degree of Malnutrition: Other severe protein calorie malnutrition  Malnutrition Characteristics: Fat loss, Muscle loss, Weight loss                  360 Statement: severe protein energy malnutrition is related to chronic illness as evidenced by moderate/severe muscle/fat wasting and severe unintentional weight loss (40#/20% x3m, 34#/17.6% weight loss x6m). Treated with diet and addition of oral nutrition supplement.    BMI Findings:           Body mass index is 22.84 kg/m².       VTE Pharmacologic Prophylaxis:   High Risk (Score >/= 5) - Pharmacological DVT Prophylaxis Ordered: enoxaparin (Lovenox). Sequential Compression Devices Ordered.    Mobility:   Basic Mobility Inpatient Raw Score: 18  JH-HLM Goal: 6: Walk 10 steps or more  JH-HLM Achieved: 7: Walk 25 feet or more  JH-HLM Goal achieved. Continue to encourage appropriate mobility.    Patient Centered Rounds: I performed bedside rounds with nursing staff today.   Discussions with Specialists or Other Care Team Provider: Case management    Education and Discussions with Family / Patient:  patient, updated spouse Maryjo questions answered.     Current Length of Stay: 8 day(s)  Current Patient Status: Inpatient   Certification Statement: The patient will continue to require additional inpatient hospital stay due to as outlined  Discharge Plan:  Awaiting CT surgery/infectious disease inputs  for disposition planning    Code Status: Level 1 - Full Code    Subjective     Sitting up in chair  Reports feeling okay  Encourage out of bed and ambulation with assistance  Encourage incentive spirometer  Discussed with RN    Objective     Vitals:   Temp (24hrs), Av.4 °F (36.9 °C), Min:98.2 °F (36.8 °C), Max:98.5 °F (36.9 °C)    Temp:  [98.2 °F (36.8 °C)-98.5 °F (36.9 °C)] 98.4 °F (36.9 °C)  HR:  [70-76] 73  Resp:  [16-18] 18  BP: (123-143)/(72-81) 130/76  SpO2:  [94 %-97 %] 97 %  Body mass index is 22.84 kg/m².     Input and Output Summary (last 24 hours):     Intake/Output Summary (Last 24 hours) at 2024 1507  Last data filed at 2024 1100  Gross per 24 hour   Intake 1040 ml   Output 1600 ml   Net -560 ml       Physical Exam     Comfortably sitting up in chair  Features of severe protein calorie malnutrition noted  Neck supple  Lungs diminished breath sounds bilateral  Heart sounds S1 and S2 noted  Abdomen soft  Awake follows commands  No pedal edema  No rash      Lines/Drains:  Lines/Drains/Airways       Active Status       None                            Lab Results: I have reviewed the following results: CBC/BMP: No new results in last 24 hours. , Creatinine Clearance: Estimated Creatinine Clearance: 64.8 mL/min (by C-G formula based on SCr of 0.96 mg/dL).   Results from last 7 days   Lab Units 24  0512   WBC Thousand/uL 7.17   HEMOGLOBIN g/dL 8.6*   HEMATOCRIT % 27.1*   PLATELETS Thousands/uL 267   SEGS PCT % 64   LYMPHO PCT % 17   MONO PCT % 12   EOS PCT % 5     Results from last 7 days   Lab Units 24  0512   SODIUM mmol/L 135   POTASSIUM mmol/L 4.0   CHLORIDE mmol/L 100   CO2 mmol/L 28   BUN mg/dL 16   CREATININE mg/dL 0.96   ANION GAP mmol/L 7   CALCIUM mg/dL 9.0   GLUCOSE RANDOM mg/dL 102     Results from last 7 days   Lab Units 24  1313   INR  1.13     Results from last 7 days   Lab Units 24  1238   POC GLUCOSE mg/dl 107               Recent Cultures (last 7 days):    Results from last 7 days   Lab Units 09/09/24  1149   GRAM STAIN RESULT  2+ Polys  No bacteria seen       Imaging Review: Reviewed radiology reports from this admission including: procedure reports.  Other Studies: No additional pertinent studies reviewed.    Last 24 Hours Medication List:     Current Facility-Administered Medications:     acetaminophen (TYLENOL) tablet 650 mg, Q6H PRN    aspirin (ECOTRIN LOW STRENGTH) EC tablet 81 mg, Daily    atorvastatin (LIPITOR) tablet 80 mg, Daily With Dinner    bisacodyl (DULCOLAX) EC tablet 5 mg, Daily PRN    ceFAZolin (ANCEF) IVPB (premix in dextrose) 2,000 mg 50 mL, Q8H, Last Rate: 2,000 mg (09/13/24 1139)    chlorhexidine (PERIDEX) 0.12 % oral rinse 15 mL, Q12H KRISTI    [COMPLETED] colchicine (COLCRYS) tablet 1.2 mg, Once **FOLLOWED BY** colchicine (COLCRYS) tablet 0.6 mg, Daily    cyanocobalamin (VITAMIN B-12) tablet 1,000 mcg, QPM    enoxaparin (LOVENOX) subcutaneous injection 40 mg, Q24H KRISTI    ergocalciferol (VITAMIN D2) capsule 50,000 Units, Weekly    escitalopram (LEXAPRO) tablet 5 mg, Daily    ferrous sulfate tablet 325 mg, Q48H    folic acid (FOLVITE) tablet 1 mg, Daily    metoprolol tartrate (LOPRESSOR) tablet 25 mg, BID    pantoprazole (PROTONIX) EC tablet 40 mg, Early Morning    polyethylene glycol (MIRALAX) packet 17 g, Daily    senna-docusate sodium (SENOKOT S) 8.6-50 mg per tablet 1 tablet, HS    ticagrelor (BRILINTA) tablet 90 mg, Q12H KRISTI    Administrative Statements   Today, Patient Was Seen By: Timbo Vazquez MD  I have spent a total time of 31 minutes in caring for this patient on the day of the visit/encounter including Patient and family education.    **Please Note: This note may have been constructed using a voice recognition system.**

## 2024-09-13 NOTE — ASSESSMENT & PLAN NOTE
Malnutrition Findings:   Adult Malnutrition type: Chronic illness  Adult Degree of Malnutrition: Other severe protein calorie malnutrition  Malnutrition Characteristics: Fat loss, Muscle loss, Weight loss                  360 Statement: severe protein energy malnutrition is related to chronic illness as evidenced by moderate/severe muscle/fat wasting and severe unintentional weight loss (40#/20% x3m, 34#/17.6% weight loss x6m). Treated with diet and addition of oral nutrition supplement.    BMI Findings:           Body mass index is 22.84 kg/m².

## 2024-09-13 NOTE — PROCEDURES
VAC dressing changed in routine fashion. Good granulation tissue noted. No purulent drainage. Patient tolerated well. RN notified.     Nakul Nunez PA-C  09/13/24  2:45 PM

## 2024-09-13 NOTE — PLAN OF CARE
Problem: OCCUPATIONAL THERAPY ADULT  Goal: Performs self-care activities at highest level of function for planned discharge setting.  See evaluation for individualized goals.  Description: Treatment Interventions: ADL retraining, Functional transfer training, Endurance training, Patient/family training, Equipment evaluation/education, Continued evaluation, Energy conservation          See flowsheet documentation for full assessment, interventions and recommendations.   Outcome: Progressing  Note: Limitation: Decreased ADL status, Decreased Safe judgement during ADL, Decreased cognition, Decreased endurance, Decreased self-care trans, Decreased high-level ADLs  Prognosis: Fair  Assessment: Pt seen for OT treatment session day 1 on this date focused on ADL retraining, functional transfers and mobility, energy conservation, functional endurance. Pt was greeted in chair and was cooperative throughout session. Following session, pt was left in chair with chair alarm on and all needs within reach. Pt continues to be limited by functional status related to ADLs and transfers requiring CGA for standing grooming and toileting, CGA with RW for FM, although demonstrates improvements in transfers performing with supervision with RW. The patient's raw score on the -PAC Daily Activity Inpatient Short Form is 19. A raw score of greater than or equal to 19 suggests the patient may benefit from discharge to home. Please refer to the recommendation of the Occupational Therapist for safe discharge planning. Pt would benefit from continued acute OT intervention with plan to continue OT treatment sessions 2-3x per week. Recommend d/c to home with increased social support, level III services.     Rehab Resource Intensity Level, OT: (S) III (Minimum Resource Intensity) (changed from previous recommendation)

## 2024-09-13 NOTE — ASSESSMENT & PLAN NOTE
Patient history of CABG in 6/2024 with postoperative hospital stay complicated by stroke and post op ileus with 1 week history of progressively worsening swelling near the superior sternotomy site and low-grade fevers over the last 4 days.  Outpatient imaging as below concerning for sternal dehiscence and fluid collection for which patient was recommended ED admission.  9/4 US sternum poorly marginated complex fluid in the area measuring 4.2 x 1.3 x 1.8 cm.  9/5 CT chest: Increased lucency along the midline sternal site, concerning for developing dehiscence.  There is also increased edema throughout this region, with findings concerning for a potential for collection along the anterior aspect of the sternum.  There is also new likely reactive lymphadenopathy within the prevascular mediastinum concerning for superinfection.  S/p aspiration by IR, aspirate positive for Klebsiella oxytoca  Status post I&D and VAC dressing by CT surgery on 9/9.  Cultures positive for Klebsiella oxytoca  Continue IV cefazolin 2 g every 8 hours per infectious disease  VAC dressing wound care per CT surgery  Infectious disease and CT surgery following

## 2024-09-13 NOTE — PROGRESS NOTES
Progress Note - Cardiac Surgery   Name: Shane Chau Sr. 78 y.o. male I MRN: [id number]  Unit/Bed#: PPHP-323-01 I Date of Admission: 9/5/2024   Date of Service: 9/13/2024 I Hospital Day: 8     Assessment & Plan  Sternal wound infection    Abdominal aortic aneurysm without rupture (HCC)    Coronary artery disease of native artery of native heart with stable angina pectoris (HCC)    Primary hypertension    S/P CABG x 3    CKD (chronic kidney disease) stage 2, GFR 60-89 ml/min  Lab Results   Component Value Date    EGFR 75 09/11/2024    EGFR 62 09/09/2024    EGFR 79 09/06/2024    CREATININE 0.96 09/11/2024    CREATININE 1.12 09/09/2024    CREATININE 0.92 09/06/2024     History of stroke    Anemia    Pain and swelling of right ankle    Severe protein-calorie malnutrition (HCC)  Malnutrition Findings:   Adult Malnutrition type: Chronic illness  Adult Degree of Malnutrition: Other severe protein calorie malnutrition  Malnutrition Characteristics: Fat loss, Muscle loss, Weight loss                  360 Statement: severe protein energy malnutrition is related to chronic illness as evidenced by moderate/severe muscle/fat wasting and severe unintentional weight loss (40#/20% x3m, 34#/17.6% weight loss x6m). Treated with diet and addition of oral nutrition supplement.    BMI Findings:           Body mass index is 22.84 kg/m².       Progress Note - Cardiothoracic Surgery   Shane Chau Sr. 78 y.o. male MRN: [id number]  Unit/Bed#: PPHP-323-01 Encounter: [id number]    Superficial sternal wound infection. S/P Sternal wound incision and drainage, washout, application of VAC dressing. ; POD # 1      24 Hour Events: No events overnight. Denies incisional pain.     Medications:   Scheduled Meds:  Current Facility-Administered Medications   Medication Dose Route Frequency Provider Last Rate    acetaminophen  650 mg Oral Q6H PRN Timbo Vazquez MD      aspirin  81 mg Oral Daily Timbo Vazquez MD       atorvastatin  80 mg Oral Daily With Dinner Timbo Vazquez MD      bisacodyl  5 mg Oral Daily PRN Timbo Vazquez MD      cefazolin  2,000 mg Intravenous Q8H Timbo Vazquez MD 2,000 mg (09/13/24 0332)    chlorhexidine  15 mL Swish & Spit Q12H KRISTI Timbo Vazquez MD      colchicine  0.6 mg Oral Daily Timbo Vazquez MD      cyanocobalamin  1,000 mcg Oral QPM Timbo Vazquez MD      ergocalciferol  50,000 Units Oral Weekly Timbo Vazquez MD      escitalopram  5 mg Oral Daily Timbo Vazquez MD      folic acid  1 mg Oral Daily Timbo Vazquez MD      metoprolol tartrate  25 mg Oral BID Timbo Vazquez MD      pantoprazole  40 mg Oral Early Morning Timbo Vazquez MD      polyethylene glycol  17 g Oral Daily Timbo Vazquez MD      senna-docusate sodium  1 tablet Oral HS Timbo Vazquez MD       Continuous Infusions:   PRN Meds:.  acetaminophen    bisacodyl    Vitals:   Vitals:    09/13/24 0635 09/13/24 0800 09/13/24 0908 09/13/24 0908   BP: 143/81  130/76 130/76   Pulse: 70  73 73   Resp: 18      Temp: 98.4 °F (36.9 °C)      TempSrc:       SpO2: 94% 95%  97%   Weight:       Height:           Telemetry: NSR; Heart Rate: 72    Respiratory:   SpO2: SpO2: 97 %, SpO2 Activity: SpO2 Activity: At Rest; Room Air    Intake/Output:     Intake/Output Summary (Last 24 hours) at 9/13/2024 1137  Last data filed at 9/13/2024 1057  Gross per 24 hour   Intake 610 ml   Output 1850 ml   Net -1240 ml        Weights:   Weight (last 2 days)       None            Chest tube Output:    VAC drain: 0 mL/8 hours  0 mL/24 hours            Results:   Results from last 7 days   Lab Units 09/11/24  0512 09/10/24  0500 09/07/24  1313   WBC Thousand/uL 7.17 7.32 7.84   HEMOGLOBIN g/dL 8.6* 8.9* 9.6*   HEMATOCRIT % 27.1* 27.5* 29.1*   PLATELETS Thousands/uL 267 256 268     Results from last 7 days   Lab Units 09/11/24  0512 09/09/24  1240   SODIUM  "mmol/L 135 134*   POTASSIUM mmol/L 4.0 4.7   CHLORIDE mmol/L 100 101   CO2 mmol/L 28 27   BUN mg/dL 16 13   CREATININE mg/dL 0.96 1.12   CALCIUM mg/dL 9.0 9.0     No results for input(s): \"MG\" in the last 72 hours.  Results from last 7 days   Lab Units 09/09/24  0220 09/08/24 2021 09/08/24  1332 09/07/24  1913 09/07/24  1313   INR   --   --   --   --  1.13   PTT seconds 89* 62* 54*   < > 45*    < > = values in this interval not displayed.       Studies:  No new studies    Invasive Lines/Tubes:  Invasive Devices       Peripheral Intravenous Line  Duration             Peripheral IV 09/13/24 Right;Ventral (anterior) Forearm <1 day                  Physical Exam:    General: No acute distress and Normal appearance  HEENT/NECK:  Normocephalic. Atraumatic.  No jugular venous distention.    Incisions: Sternum is stable.  VAC in place, without significant erythema. Incision is clean, dry, and intact.   Extremities: Extremities warm/dry  Neuro: Alert and oriented X 3  Skin: Warm/Dry, without rashes or lesions.        Assessment:  Principal Problem:    Sternal wound infection  Active Problems:    Abdominal aortic aneurysm without rupture (HCC)    Coronary artery disease of native artery of native heart with stable angina pectoris (HCC)    Primary hypertension    S/P CABG x 3    CKD (chronic kidney disease) stage 2, GFR 60-89 ml/min    History of stroke    Anemia    Pain and swelling of right ankle       Superficial sternal wound infection. S/P Sternal wound incision and drainage, washout, application of VAC dressing. ; POD # 1      Plan:  Klebsiella oxytoca sternal wound infection.    VAC in place with no appreciable drainage.     No need for further surgical debridement  Will change VAC at bedside today and Monday (9/16)    If inspection of wound with VAC change on 9/16 appears acceptable, will recommend discharge, with outpatient wound care for ongoing VAC management    OR gram stain negative  OR culutres (9/9): Few " Colonies of Klebsiella oxytoca  Fungal culture with no growth to date    Continue cefazolin for now, per ID    History of TIA (on Brilinta as home med)   Resume today      SIGNATURE: Nakul Nunez PA-C  DATE: September 13, 2024  TIME: 11:37 AM

## 2024-09-13 NOTE — PLAN OF CARE
Problem: PAIN - ADULT  Goal: Verbalizes/displays adequate comfort level or baseline comfort level  Description: Interventions:  - Encourage patient to monitor pain and request assistance  - Assess pain using appropriate pain scale  - Administer analgesics based on type and severity of pain and evaluate response  - Implement non-pharmacological measures as appropriate and evaluate response  - Consider cultural and social influences on pain and pain management  - Notify physician/advanced practitioner if interventions unsuccessful or patient reports new pain  Outcome: Progressing     Problem: INFECTION - ADULT  Goal: Absence of fever/infection during neutropenic period  Description: INTERVENTIONS:  - Monitor WBC    Outcome: Progressing     Problem: Knowledge Deficit  Goal: Patient/family/caregiver demonstrates understanding of disease process, treatment plan, medications, and discharge instructions  Description: Complete learning assessment and assess knowledge base.  Interventions:  - Provide teaching at level of understanding  - Provide teaching via preferred learning methods  Outcome: Progressing

## 2024-09-13 NOTE — ASSESSMENT & PLAN NOTE
In setting of recent CABG, delayed postop wound healing.  Late onset along with CT findings suggest deep infection with possible osteomyelitis.  Fortunately appears clinically stable without sepsis.  Status post IR aspiration of thick, cloudy fluid 9/5/2024.  Fluid culture with Klebsiella.  Status post operative I&D, VAC 9/9.  Intraoperative findings notable for purulence confined to soft tissue.  No exposure or communicaiton with deeper structures.  OR cultures also with Klebsiella.    Continue cefazolin for now through the weekend  Monitor closely while on antibiotics for toxicities including diarrhea, rash, cytopenias, or kidney injury  LWC/VAC per CT surgery  Supportive care as per the primary service  As infection remains confined to superficial space, anticipate completing short course of IV antibiotics on Monday prior to D/C    Antibiotics:  Cefazolin #6  Antibiotics #7  POD #4

## 2024-09-13 NOTE — PROGRESS NOTES
Progress Note - Infectious Disease   Name: Shane Chau Sr. 78 y.o. male I MRN: [id number]  Unit/Bed#: [facility]-323-01 I Date of Admission: 9/5/2024   Date of Service: 9/13/2024 I Hospital Day: 8    Assessment & Plan  Sternal wound infection  In setting of recent CABG, delayed postop wound healing.  Late onset along with CT findings suggest deep infection with possible osteomyelitis.  Fortunately appears clinically stable without sepsis.  Status post IR aspiration of thick, cloudy fluid 9/5/2024.  Fluid culture with Klebsiella.  Status post operative I&D, VAC 9/9.  Intraoperative findings notable for purulence confined to soft tissue.  No exposure or communicaiton with deeper structures.  OR cultures also with Klebsiella.    Continue cefazolin for now through the weekend  Monitor closely while on antibiotics for toxicities including diarrhea, rash, cytopenias, or kidney injury  LWC/VAC per CT surgery  Supportive care as per the primary service  As infection remains confined to superficial space, anticipate completing short course of IV antibiotics on Monday prior to D/C    Antibiotics:  Cefazolin #6  Antibiotics #7  POD #4  S/P CABG x 3  For CAD on 6/20/24. Complicated by postop ileus, delayed wound healing.   I discussed with TE Smith with CT surgery the above plan to continue IV antibiotics for short course and LWC/VAC.  He agrees with the plan.  I will reassess the patient on Monday 9/16.  Please call in the interim with new questions.      History of Present Illness   Just had VAC change.  Doing OK.  No fevers, rash, or diarrhea    Objective      Temp:  [98.2 °F (36.8 °C)-98.5 °F (36.9 °C)] 98.4 °F (36.9 °C)  HR:  [70-76] 73  Resp:  [16-18] 18  BP: (123-143)/(72-81) 130/76  O2 Device: None (Room air)          I/O last 24 hours:  In: 1670 [P.O.:1500; I.V.:20; IV Piggyback:150]  Out: 2125 [Urine:2125]  Lines/Drains/Airways       Active Status       None                  Physical Exam   VAC to midsternum, no  cellulitis  No rashes    Lab Results: I have reviewed the following results:   Recent Labs     09/11/24  0512   WBC 7.17   HGB 8.6*   HCT 27.1*      SODIUM 135   K 4.0      CO2 28   BUN 16   CREATININE 0.96   GLUC 102     Micro:  Lab Results   Component Value Date    BLOODCX No Growth After 5 Days. 09/05/2024    BLOODCX No Growth After 5 Days. 09/05/2024    URINECX No Growth <1000 cfu/mL 06/29/2016     Imaging Review: No pertinent imaging studies reviewed.  Other Studies: No additional pertinent studies reviewed.

## 2024-09-13 NOTE — PLAN OF CARE
Problem: PHYSICAL THERAPY ADULT  Goal: Performs mobility at highest level of function for planned discharge setting.  See evaluation for individualized goals.  Description: Treatment/Interventions: Functional transfer training, LE strengthening/ROM, Elevations, Therapeutic exercise, Endurance training, Cognitive reorientation, Equipment eval/education, Bed mobility, Gait training, Spoke to nursing, OT  Equipment Recommended: Walker       See flowsheet documentation for full assessment, interventions and recommendations.  Outcome: Progressing  Note: Prognosis: Good  Problem List: Decreased strength, Decreased endurance, Impaired balance, Decreased mobility, Pain, Decreased coordination  Assessment: Pt seen for session for setup, transfers, gait w/ rest time, repsositioning.  Pt frequently noting he is tired, and notes increased dizziness w/ position changes.  Cues for pacing, RW safety.  Do note improving mobility tolerance, w/ mild LOB.  Given progress, and the fact that he is refusing rehab, consider home w/ Level III (minimal) post acute care therapy services at d/c  Barriers to Discharge: Inaccessible home environment     Rehab Resource Intensity Level, PT: II (Moderate Resource Intensity)    See flowsheet documentation for full assessment.

## 2024-09-13 NOTE — OCCUPATIONAL THERAPY NOTE
Occupational Therapy Progress Note     Patient Name: Shane Chau Sr.  Today's Date: 9/13/2024  Problem List  Principal Problem:    Sternal wound infection  Active Problems:    Abdominal aortic aneurysm without rupture (HCC)    Coronary artery disease of native artery of native heart with stable angina pectoris (HCC)    Primary hypertension    S/P CABG x 3    CKD (chronic kidney disease) stage 2, GFR 60-89 ml/min    History of stroke    Anemia    Pain and swelling of right ankle    Severe protein-calorie malnutrition (HCC)          09/13/24 1058   OT Last Visit   OT Visit Date 09/13/24   Note Type   Note Type Treatment   Pain Assessment   Pain Assessment Tool 0-10   Pain Score No Pain   Restrictions/Precautions   Other Precautions Cardiac/sternal;Chair Alarm;Multiple lines;Telemetry;Fall Risk;Hard of hearing  (VAC)   Lifestyle   Autonomy Pta pt I with ADL and functional mobility with SPC, assist for some IADL, (+)    Reciprocal Relationships supportive spouse   Service to Others retired   Intrinsic Gratification enjoys time with family   ADL   Where Assessed Standing at sink   Grooming Assistance 4  Minimal Assistance  (CGA)   Grooming Deficit Steadying;Supervision/safety   Grooming Comments Pt stands at sink to brush teeth with CGA, wetzel hair when seated in chair.   Toileting Assistance  4  Minimal Assistance  (CGA)   Toileting Deficit Steadying;Supervison/safety;Use of bedpan/urinal setup   Toileting Comments Pt stands in bathroom to perform standing toileting using urinal, requiring CGA to steady in stance.   Functional Standing Tolerance   Time 3 minutes   Activity Standing grooming and toileting   Comments Pt stands for 3 minutes in bathroom to first brush teeth at sink, followed by standing toileting using urinal, all requiring CGA to steady in stance.   Bed Mobility   Additional Comments Pt greeted OOB in chair, left in chair with alarm on and all needs within reach.   Transfers   Sit to  Stand 5  Supervision   Additional items Increased time required   Stand to Sit 5  Supervision   Additional items Increased time required   Additional Comments with RW, STS x 3   Functional Mobility   Functional Mobility 4  Minimal assistance  (CGA)   Additional Comments Pt performs short distances to/from bathroom, short household distances in hallway, all with CGA with RW, requiring 2 seated rest breaks.   Additional items Rolling walker   Cognition   Overall Cognitive Status WFL   Arousal/Participation Cooperative;Alert   Attention Within functional limits   Orientation Level Oriented to person;Oriented to place;Oriented to situation   Memory Decreased recall of precautions;Decreased recall of recent events   Following Commands Follows one step commands without difficulty   Comments Pt cooperative to therapy, with improved alertness from previous session although continues to require vc for safety throughout ADL and transfers.   Activity Tolerance   Activity Tolerance Patient limited by fatigue   Medical Staff Made Aware RN cleared, spoke to PT, spoke to CM   Assessment   Assessment Pt seen for OT treatment session day 1 on this date focused on ADL retraining, functional transfers and mobility, energy conservation, functional endurance. Pt was greeted in chair and was cooperative throughout session. Following session, pt was left in chair with chair alarm on and all needs within reach. Pt continues to be limited by functional status related to ADLs and transfers requiring CGA for standing grooming and toileting, CGA with RW for FM, although demonstrates improvements in transfers performing with supervision with RW. The patient's raw score on the AM-PAC Daily Activity Inpatient Short Form is 19. A raw score of greater than or equal to 19 suggests the patient may benefit from discharge to home. Please refer to the recommendation of the Occupational Therapist for safe discharge planning. Pt would benefit from  continued acute OT intervention with plan to continue OT treatment sessions 2-3x per week. Recommend d/c to home with increased social support, level III services.   Plan   Treatment Interventions ADL retraining;Functional transfer training;Endurance training;Continued evaluation;Energy conservation   Goal Expiration Date 09/25/24   OT Treatment Day 1   OT Frequency 2-3x/wk   Discharge Recommendation   Rehab Resource Intensity Level, OT (S)  III (Minimum Resource Intensity)  (changed from previous recommendation)   AM-PAC Daily Activity Inpatient   Lower Body Dressing 3   Bathing 3   Toileting 3   Upper Body Dressing 3   Grooming 3   Eating 4   Daily Activity Raw Score 19   Daily Activity Standardized Score (Calc for Raw Score >=11) 40.22   AM-PAC Applied Cognition Inpatient   Following a Speech/Presentation 4   Understanding Ordinary Conversation 4   Taking Medications 4   Remembering Where Things Are Placed or Put Away 3   Remembering List of 4-5 Errands 3   Taking Care of Complicated Tasks 3   Applied Cognition Raw Score 21   Applied Cognition Standardized Score 44.3   End of Consult   Education Provided Yes   Patient Position at End of Consult Bedside chair;Bed/Chair alarm activated;All needs within reach   Nurse Communication Nurse aware of consult       YARON Rae, OTR/L

## 2024-09-14 PROCEDURE — 99024 POSTOP FOLLOW-UP VISIT: CPT | Performed by: PHYSICIAN ASSISTANT

## 2024-09-14 PROCEDURE — 2W14X6Z COMPRESSION OF CHEST WALL USING PRESSURE DRESSING: ICD-10-PCS | Performed by: THORACIC SURGERY (CARDIOTHORACIC VASCULAR SURGERY)

## 2024-09-14 PROCEDURE — 99232 SBSQ HOSP IP/OBS MODERATE 35: CPT | Performed by: INTERNAL MEDICINE

## 2024-09-14 RX ADMIN — CEFAZOLIN SODIUM 2000 MG: 2 SOLUTION INTRAVENOUS at 03:00

## 2024-09-14 RX ADMIN — ENOXAPARIN SODIUM 40 MG: 40 INJECTION SUBCUTANEOUS at 09:31

## 2024-09-14 RX ADMIN — COLCHICINE 0.6 MG: 0.6 TABLET ORAL at 09:31

## 2024-09-14 RX ADMIN — ERGOCALCIFEROL 50000 UNITS: 1.25 CAPSULE ORAL at 09:44

## 2024-09-14 RX ADMIN — SENNOSIDES AND DOCUSATE SODIUM 1 TABLET: 50; 8.6 TABLET ORAL at 21:33

## 2024-09-14 RX ADMIN — CHLORHEXIDINE GLUCONATE 0.12% ORAL RINSE 15 ML: 1.2 LIQUID ORAL at 09:31

## 2024-09-14 RX ADMIN — ESCITALOPRAM OXALATE 5 MG: 5 TABLET, FILM COATED ORAL at 09:31

## 2024-09-14 RX ADMIN — ATORVASTATIN CALCIUM 80 MG: 80 TABLET, FILM COATED ORAL at 17:19

## 2024-09-14 RX ADMIN — METOPROLOL TARTRATE 25 MG: 25 TABLET, FILM COATED ORAL at 09:31

## 2024-09-14 RX ADMIN — TICAGRELOR 90 MG: 90 TABLET ORAL at 21:33

## 2024-09-14 RX ADMIN — FOLIC ACID 1 MG: 1 TABLET ORAL at 09:31

## 2024-09-14 RX ADMIN — METOPROLOL TARTRATE 25 MG: 25 TABLET, FILM COATED ORAL at 17:19

## 2024-09-14 RX ADMIN — CEFAZOLIN SODIUM 2000 MG: 2 SOLUTION INTRAVENOUS at 19:33

## 2024-09-14 RX ADMIN — CYANOCOBALAMIN TAB 500 MCG 1000 MCG: 500 TAB at 17:18

## 2024-09-14 RX ADMIN — PANTOPRAZOLE SODIUM 40 MG: 40 TABLET, DELAYED RELEASE ORAL at 06:01

## 2024-09-14 RX ADMIN — TICAGRELOR 90 MG: 90 TABLET ORAL at 09:31

## 2024-09-14 RX ADMIN — ASPIRIN 81 MG: 81 TABLET, COATED ORAL at 09:31

## 2024-09-14 RX ADMIN — POLYETHYLENE GLYCOL 3350 17 G: 17 POWDER, FOR SOLUTION ORAL at 09:31

## 2024-09-14 RX ADMIN — CEFAZOLIN SODIUM 2000 MG: 2 SOLUTION INTRAVENOUS at 12:11

## 2024-09-14 NOTE — PROGRESS NOTES
Progress Note - Cardiac Surgery   Name: Shane Chau Sr. 78 y.o. male I MRN: [id number]  Unit/Bed#: PPHP-323-01 I Date of Admission: 9/5/2024   Date of Service: 9/14/2024 I Hospital Day: 9     Assessment & Plan  Sternal wound infection    Abdominal aortic aneurysm without rupture (HCC)    Coronary artery disease of native artery of native heart with stable angina pectoris (HCC)    Primary hypertension    S/P CABG x 3    CKD (chronic kidney disease) stage 2, GFR 60-89 ml/min  Lab Results   Component Value Date    EGFR 75 09/11/2024    EGFR 62 09/09/2024    EGFR 79 09/06/2024    CREATININE 0.96 09/11/2024    CREATININE 1.12 09/09/2024    CREATININE 0.92 09/06/2024     History of stroke    Anemia    Pain and swelling of right ankle    Severe protein-calorie malnutrition (HCC)  Malnutrition Findings:   Adult Malnutrition type: Chronic illness  Adult Degree of Malnutrition: Other severe protein calorie malnutrition  Malnutrition Characteristics: Fat loss, Muscle loss, Weight loss                  360 Statement: severe protein energy malnutrition is related to chronic illness as evidenced by moderate/severe muscle/fat wasting and severe unintentional weight loss (40#/20% x3m, 34#/17.6% weight loss x6m). Treated with diet and addition of oral nutrition supplement.    BMI Findings:           Body mass index is 22.84 kg/m².       Progress Note - Cardiothoracic Surgery   Shane Chau Sr. 78 y.o. male MRN: [id number]  Unit/Bed#: PPHP-323-01 Encounter: [id number]    Superficial sternal wound infection. S/P Sternal wound incision and drainage, washout, application of VAC dressing.       24 Hour Events: No events overnight. Denies incisional pain.     Medications:   Scheduled Meds:  Current Facility-Administered Medications   Medication Dose Route Frequency Provider Last Rate    acetaminophen  650 mg Oral Q6H PRN Timbo Vazquez MD      aspirin  81 mg Oral Daily Timbo Vazquez MD      atorvastatin  80  mg Oral Daily With Dinner Timbo Vazquez MD      bisacodyl  5 mg Oral Daily PRN Timbo Vazquez MD      cefazolin  2,000 mg Intravenous Q8H Timbo Vazquez MD 2,000 mg (09/14/24 1211)    chlorhexidine  15 mL Swish & Spit Q12H [facility] Timbo Vazquez MD      colchicine  0.6 mg Oral Daily Timbo Vazquez MD      cyanocobalamin  1,000 mcg Oral QPM Timbo Vazquez MD      enoxaparin  40 mg Subcutaneous Q24H [facility] Timob Vazquez MD      ergocalciferol  50,000 Units Oral Weekly Timbo Vazquez MD      escitalopram  5 mg Oral Daily Timbo Vazquez MD      ferrous sulfate  325 mg Oral Q48H Timbo Vazquez MD      folic acid  1 mg Oral Daily Timbo Vazquez MD      metoprolol tartrate  25 mg Oral BID Timbo Vazquez MD      pantoprazole  40 mg Oral Early Morning Timbo Vazquez MD      polyethylene glycol  17 g Oral Daily Timbo Vazquez MD      senna-docusate sodium  1 tablet Oral HS Timbo Vazquez MD      ticagrelor  90 mg Oral Q12H [facility] Nakul Nunez PA-C       Continuous Infusions:   PRN Meds:.  acetaminophen    bisacodyl    Vitals:   Vitals:    09/13/24 1751 09/13/24 2100 09/13/24 2330 09/14/24 0759   BP: 142/87  122/76 134/84   Pulse: 80  82 77   Resp: 20  22    Temp: 98.8 °F (37.1 °C)  98.3 °F (36.8 °C) 98.4 °F (36.9 °C)   TempSrc:       SpO2: 95% 96% 96% 95%   Weight:       Height:           Telemetry: NSR; Heart Rate: 72    Respiratory:   SpO2: SpO2: 95 %, SpO2 Activity: SpO2 Activity: At Rest; Room Air    Intake/Output:     Intake/Output Summary (Last 24 hours) at 9/14/2024 1237  Last data filed at 9/14/2024 0830  Gross per 24 hour   Intake 846 ml   Output 850 ml   Net -4 ml        Weights:   Weight (last 2 days)       None            Chest tube Output:    VAC drain: 0 mL/8 hours  0 mL/24 hours            Results:   Results from last 7 days   Lab Units 09/11/24  0512 09/10/24  0500 09/07/24  1313  "  WBC Thousand/uL 7.17 7.32 7.84   HEMOGLOBIN g/dL 8.6* 8.9* 9.6*   HEMATOCRIT % 27.1* 27.5* 29.1*   PLATELETS Thousands/uL 267 256 268     Results from last 7 days   Lab Units 09/11/24  0512 09/09/24  1240   SODIUM mmol/L 135 134*   POTASSIUM mmol/L 4.0 4.7   CHLORIDE mmol/L 100 101   CO2 mmol/L 28 27   BUN mg/dL 16 13   CREATININE mg/dL 0.96 1.12   CALCIUM mg/dL 9.0 9.0     No results for input(s): \"MG\" in the last 72 hours.  Results from last 7 days   Lab Units 09/09/24  0220 09/08/24  2021 09/08/24  1332 09/07/24  1913 09/07/24  1313   INR   --   --   --   --  1.13   PTT seconds 89* 62* 54*   < > 45*    < > = values in this interval not displayed.       Studies:  No new studies    Invasive Lines/Tubes:  Invasive Devices       Peripheral Intravenous Line  Duration             Peripheral IV 09/13/24 Right;Ventral (anterior) Forearm 1 day                  Physical Exam:    General: No acute distress, Alert, and Normal appearance  HEENT/NECK:  Normocephalic. Atraumatic.  No jugular venous distention.    Incisions: Sternum is stable.  VAC in place. No significant surrouding erythema.    Extremities: Extremities warm/dry  Neuro: Alert and oriented X 3  Skin: Warm/Dry, without rashes or lesions.        Assessment:  Principal Problem:    Sternal wound infection  Active Problems:    Abdominal aortic aneurysm without rupture (HCC)    Coronary artery disease of native artery of native heart with stable angina pectoris (HCC)    Primary hypertension    S/P CABG x 3    CKD (chronic kidney disease) stage 2, GFR 60-89 ml/min    History of stroke    Anemia    Pain and swelling of right ankle    Severe protein-calorie malnutrition (HCC)       Superficial sternal wound infection. S/P Sternal wound incision and drainage, washout, application of VAC dressing. ; POD # 1      Plan:  Klebsiella oxytoca sternal wound infection.    VAC in place with no appreciable drainage.     No need for further surgical debridement  Will change VAC at " bedside today and Monday (9/16)    If inspection of wound with VAC change on 9/16 appears acceptable, will recommend discharge, with outpatient wound care for ongoing VAC management    OR gram stain negative  OR culutres (9/9): Few Colonies of Klebsiella oxytoca  Fungal culture with no growth to date    Continue cefazolin for now, per ID    History of ALEXA Kelly resumed      SIGNATURE: Nakul Nunez PA-C  DATE: September 14, 2024  TIME: 12:37 PM

## 2024-09-14 NOTE — PROGRESS NOTES
Progress Note - Hospitalist   Name: Shane Chau Sr. 78 y.o. male I MRN: [id number]  Unit/Bed#: [facility]-323-01 I Date of Admission: 9/5/2024   Date of Service: 9/14/2024 I Hospital Day: 9    Assessment & Plan  Sternal wound infection  Patient history of CABG in 6/2024 with postoperative hospital stay complicated by stroke and post op ileus with 1 week history of progressively worsening swelling near the superior sternotomy site and low-grade fevers over the last 4 days.  Outpatient imaging as below concerning for sternal dehiscence and fluid collection for which patient was recommended ED admission.  9/4 US sternum poorly marginated complex fluid in the area measuring 4.2 x 1.3 x 1.8 cm.  9/5 CT chest: Increased lucency along the midline sternal site, concerning for developing dehiscence.  There is also increased edema throughout this region, with findings concerning for a potential for collection along the anterior aspect of the sternum.  There is also new likely reactive lymphadenopathy within the prevascular mediastinum concerning for superinfection.  S/p aspiration by IR, aspirate positive for Klebsiella oxytoca  Status post I&D and VAC dressing by CT surgery on 9/9.  Cultures positive for Klebsiella oxytoca  Continue IV cefazolin 2 g every 8 hours per infectious disease  Neck dressing wound care per CT surgery  Infectious disease, CT surgery following    Abdominal aortic aneurysm without rupture (HCC)  S/p repair in 2011  Outpatient follow-up  Coronary artery disease of native artery of native heart with stable angina pectoris (HCC)  Status post CABG in 6/2024  Continue aspirin metoprolol Brilinta atorvastatin    Primary hypertension  Blood pressures reviewed, acceptable  Monitor blood pressures  Avoid hypotension  CKD (chronic kidney disease) stage 2, GFR 60-89 ml/min  Lab Results   Component Value Date    EGFR 75 09/11/2024    EGFR 62 09/09/2024    EGFR 79 09/06/2024    CREATININE 0.96 09/11/2024     CREATININE 1.12 09/09/2024    CREATININE 0.92 09/06/2024     Monitor kidney function  History of stroke  History of recent stroke s/p CABG with no residual focal deficits  Continue aspirin, atorvastatin  Anemia  Anemia  Iron studies suggest iron deficiency state  Iron supplementation  Monitor hemoglobin  Pain and swelling of right ankle  Clinically and symptomatically improved  Uric acid 4.1  Elevated CRP noted  Continue colchicine to complete 7-day course  Monitor  Severe protein-calorie malnutrition (HCC)  Malnutrition Findings:   Adult Malnutrition type: Chronic illness  Adult Degree of Malnutrition: Other severe protein calorie malnutrition  Malnutrition Characteristics: Fat loss, Muscle loss, Weight loss                  360 Statement: severe protein energy malnutrition is related to chronic illness as evidenced by moderate/severe muscle/fat wasting and severe unintentional weight loss (40#/20% x3m, 34#/17.6% weight loss x6m). Treated with diet and addition of oral nutrition supplement.    BMI Findings:           Body mass index is 22.84 kg/m².       VTE Pharmacologic Prophylaxis:   High Risk (Score >/= 5) - Pharmacological DVT Prophylaxis Ordered: enoxaparin (Lovenox). Sequential Compression Devices Ordered.    Mobility:   Basic Mobility Inpatient Raw Score: 18  JH-HLM Goal: 6: Walk 10 steps or more  JH-HLM Achieved: 7: Walk 25 feet or more  JH-HLM Goal NOT achieved. Continue with multidisciplinary rounding and encourage appropriate mobility to improve upon JH-HLM goals.    Patient Centered Rounds: I performed bedside rounds with nursing staff today.   Discussions with Specialists or Other Care Team Provider: Case management    Education and Discussions with Family / Patient: patient, updated spouse Maryjo     Current Length of Stay: 9 day(s)  Current Patient Status: Inpatient   Certification Statement: The patient will continue to require additional inpatient hospital stay due to as outlined  Discharge Plan:   Receiving IV antibiotics, awaiting infectious disease, CT surgery inputs for disposition planning    Code Status: Level 1 - Full Code    Subjective     Comfortably in bed  Reports feeling okay  Encourage incentive spirometry      Objective     Vitals:   Temp (24hrs), Av.4 °F (36.9 °C), Min:98.2 °F (36.8 °C), Max:98.8 °F (37.1 °C)    Temp:  [98.2 °F (36.8 °C)-98.8 °F (37.1 °C)] 98.2 °F (36.8 °C)  HR:  [77-82] 79  Resp:  [17-22] 17  BP: (122-143)/(76-96) 143/96  SpO2:  [95 %-97 %] 97 %  Body mass index is 22.84 kg/m².     Input and Output Summary (last 24 hours):     Intake/Output Summary (Last 24 hours) at 2024 1545  Last data filed at 2024 1300  Gross per 24 hour   Intake 964 ml   Output 725 ml   Net 239 ml       Physical Exam       Comfortably in bed  Neck supple  Features of protein calorie malnutrition noted  Lungs diminished breath sounds  Vesicular breath sounds  Heart sounds S1-S2 noted  Abdomen soft  Awake follows commands  No rash      Lines/Drains:  Lines/Drains/Airways       Active Status       None                            Lab Results: I have reviewed the following results: CBC/BMP: No new results in last 24 hours.    Results from last 7 days   Lab Units 24  0512   WBC Thousand/uL 7.17   HEMOGLOBIN g/dL 8.6*   HEMATOCRIT % 27.1*   PLATELETS Thousands/uL 267   SEGS PCT % 64   LYMPHO PCT % 17   MONO PCT % 12   EOS PCT % 5     Results from last 7 days   Lab Units 24  0512   SODIUM mmol/L 135   POTASSIUM mmol/L 4.0   CHLORIDE mmol/L 100   CO2 mmol/L 28   BUN mg/dL 16   CREATININE mg/dL 0.96   ANION GAP mmol/L 7   CALCIUM mg/dL 9.0   GLUCOSE RANDOM mg/dL 102         Results from last 7 days   Lab Units 24  1238   POC GLUCOSE mg/dl 107               Recent Cultures (last 7 days):   Results from last 7 days   Lab Units 24  1149   GRAM STAIN RESULT  2+ Polys  No bacteria seen       Imaging Review: Reviewed radiology reports from this admission including: procedure  reports.  Other Studies: No additional pertinent studies reviewed.    Last 24 Hours Medication List:     Current Facility-Administered Medications:     acetaminophen (TYLENOL) tablet 650 mg, Q6H PRN    aspirin (ECOTRIN LOW STRENGTH) EC tablet 81 mg, Daily    atorvastatin (LIPITOR) tablet 80 mg, Daily With Dinner    bisacodyl (DULCOLAX) EC tablet 5 mg, Daily PRN    ceFAZolin (ANCEF) IVPB (premix in dextrose) 2,000 mg 50 mL, Q8H, Last Rate: 2,000 mg (09/14/24 1211)    chlorhexidine (PERIDEX) 0.12 % oral rinse 15 mL, Q12H KRISTI    [COMPLETED] colchicine (COLCRYS) tablet 1.2 mg, Once **FOLLOWED BY** colchicine (COLCRYS) tablet 0.6 mg, Daily    cyanocobalamin (VITAMIN B-12) tablet 1,000 mcg, QPM    enoxaparin (LOVENOX) subcutaneous injection 40 mg, Q24H KRISTI    ergocalciferol (VITAMIN D2) capsule 50,000 Units, Weekly    escitalopram (LEXAPRO) tablet 5 mg, Daily    ferrous sulfate tablet 325 mg, Q48H    folic acid (FOLVITE) tablet 1 mg, Daily    metoprolol tartrate (LOPRESSOR) tablet 25 mg, BID    pantoprazole (PROTONIX) EC tablet 40 mg, Early Morning    polyethylene glycol (MIRALAX) packet 17 g, Daily    senna-docusate sodium (SENOKOT S) 8.6-50 mg per tablet 1 tablet, HS    ticagrelor (BRILINTA) tablet 90 mg, Q12H KRISTI    Administrative Statements   Today, Patient Was Seen By: Timbo Vazquez MD  I have spent a total time of 30 minutes in caring for this patient on the day of the visit/encounter     **Please Note: This note may have been constructed using a voice recognition system.**

## 2024-09-14 NOTE — ASSESSMENT & PLAN NOTE
Patient history of CABG in 6/2024 with postoperative hospital stay complicated by stroke and post op ileus with 1 week history of progressively worsening swelling near the superior sternotomy site and low-grade fevers over the last 4 days.  Outpatient imaging as below concerning for sternal dehiscence and fluid collection for which patient was recommended ED admission.  9/4 US sternum poorly marginated complex fluid in the area measuring 4.2 x 1.3 x 1.8 cm.  9/5 CT chest: Increased lucency along the midline sternal site, concerning for developing dehiscence.  There is also increased edema throughout this region, with findings concerning for a potential for collection along the anterior aspect of the sternum.  There is also new likely reactive lymphadenopathy within the prevascular mediastinum concerning for superinfection.  S/p aspiration by IR, aspirate positive for Klebsiella oxytoca  Status post I&D and VAC dressing by CT surgery on 9/9.  Cultures positive for Klebsiella oxytoca  Continue IV cefazolin 2 g every 8 hours per infectious disease  Neck dressing wound care per CT surgery  Infectious disease, CT surgery following

## 2024-09-15 LAB — GLUCOSE SERPL-MCNC: 117 MG/DL (ref 65–140)

## 2024-09-15 PROCEDURE — 99024 POSTOP FOLLOW-UP VISIT: CPT | Performed by: PHYSICIAN ASSISTANT

## 2024-09-15 PROCEDURE — 82948 REAGENT STRIP/BLOOD GLUCOSE: CPT

## 2024-09-15 PROCEDURE — 99232 SBSQ HOSP IP/OBS MODERATE 35: CPT | Performed by: INTERNAL MEDICINE

## 2024-09-15 RX ORDER — AMLODIPINE BESYLATE 2.5 MG/1
2.5 TABLET ORAL DAILY
Status: DISCONTINUED | OUTPATIENT
Start: 2024-09-15 | End: 2024-09-17 | Stop reason: HOSPADM

## 2024-09-15 RX ADMIN — CYANOCOBALAMIN TAB 500 MCG 1000 MCG: 500 TAB at 17:14

## 2024-09-15 RX ADMIN — METOPROLOL TARTRATE 25 MG: 25 TABLET, FILM COATED ORAL at 17:14

## 2024-09-15 RX ADMIN — METOPROLOL TARTRATE 25 MG: 25 TABLET, FILM COATED ORAL at 08:21

## 2024-09-15 RX ADMIN — ATORVASTATIN CALCIUM 80 MG: 80 TABLET, FILM COATED ORAL at 15:47

## 2024-09-15 RX ADMIN — CEFAZOLIN SODIUM 2000 MG: 2 SOLUTION INTRAVENOUS at 12:16

## 2024-09-15 RX ADMIN — TICAGRELOR 90 MG: 90 TABLET ORAL at 08:21

## 2024-09-15 RX ADMIN — ENOXAPARIN SODIUM 40 MG: 40 INJECTION SUBCUTANEOUS at 08:21

## 2024-09-15 RX ADMIN — AMLODIPINE BESYLATE 2.5 MG: 2.5 TABLET ORAL at 15:46

## 2024-09-15 RX ADMIN — FERROUS SULFATE TAB 325 MG (65 MG ELEMENTAL FE) 325 MG: 325 (65 FE) TAB at 15:47

## 2024-09-15 RX ADMIN — CEFAZOLIN SODIUM 2000 MG: 2 SOLUTION INTRAVENOUS at 03:08

## 2024-09-15 RX ADMIN — POLYETHYLENE GLYCOL 3350 17 G: 17 POWDER, FOR SOLUTION ORAL at 08:21

## 2024-09-15 RX ADMIN — TICAGRELOR 90 MG: 90 TABLET ORAL at 21:01

## 2024-09-15 RX ADMIN — PANTOPRAZOLE SODIUM 40 MG: 40 TABLET, DELAYED RELEASE ORAL at 05:50

## 2024-09-15 RX ADMIN — FOLIC ACID 1 MG: 1 TABLET ORAL at 08:21

## 2024-09-15 RX ADMIN — CEFAZOLIN SODIUM 2000 MG: 2 SOLUTION INTRAVENOUS at 19:27

## 2024-09-15 RX ADMIN — COLCHICINE 0.6 MG: 0.6 TABLET ORAL at 08:21

## 2024-09-15 RX ADMIN — ASPIRIN 81 MG: 81 TABLET, COATED ORAL at 08:21

## 2024-09-15 RX ADMIN — SENNOSIDES AND DOCUSATE SODIUM 1 TABLET: 50; 8.6 TABLET ORAL at 21:01

## 2024-09-15 RX ADMIN — ESCITALOPRAM OXALATE 5 MG: 5 TABLET, FILM COATED ORAL at 08:21

## 2024-09-15 NOTE — PLAN OF CARE
Problem: INFECTION - ADULT  Goal: Absence or prevention of progression during hospitalization  Description: INTERVENTIONS:  - Assess and monitor for signs and symptoms of infection  - Monitor lab/diagnostic results  - Monitor all insertion sites, i.e. indwelling lines, tubes, and drains  - Monitor endotracheal if appropriate and nasal secretions for changes in amount and color  - [place] appropriate cooling/warming therapies per order  - Administer medications as ordered  - Instruct and encourage patient and family to use good hand hygiene technique  - Identify and instruct in appropriate isolation precautions for identified infection/condition  9/15/2024 1133 by Elida Duncan RN  Outcome: Progressing  9/15/2024 1133 by Elida Duncan RN  Outcome: Progressing  Goal: Absence of fever/infection during neutropenic period  Description: INTERVENTIONS:  - Monitor WBC    9/15/2024 1133 by Elida Duncan RN  Outcome: Progressing  9/15/2024 1133 by Elida Duncan RN  Outcome: Progressing

## 2024-09-15 NOTE — ASSESSMENT & PLAN NOTE
Patient history of CABG in 6/2024 with postoperative hospital stay complicated by stroke and post op ileus with 1 week history of progressively worsening swelling near the superior sternotomy site and low-grade fevers over the last 4 days.  Outpatient imaging as below concerning for sternal dehiscence and fluid collection for which patient was recommended ED admission.  9/4 US sternum poorly marginated complex fluid in the area measuring 4.2 x 1.3 x 1.8 cm.  9/5 CT chest: Increased lucency along the midline sternal site, concerning for developing dehiscence.  There is also increased edema throughout this region, with findings concerning for a potential for collection along the anterior aspect of the sternum.  There is also new likely reactive lymphadenopathy within the prevascular mediastinum concerning for superinfection.  S/p aspiration by IR, aspirate positive for Klebsiella oxytoca  Status post I&D and VAC dressing by CT surgery on 9/9.  Cultures positive for Klebsiella oxytoca  Continue IV cefazolin 2 g every 8 hours per infectious disease  VAC dressing/wound care per CT surgery  Infectious disease, CT surgery following

## 2024-09-15 NOTE — PROGRESS NOTES
Progress Note - Hospitalist   Name: Shane Chau Sr. 78 y.o. male I MRN: [id number]  Unit/Bed#: [facility]-323-01 I Date of Admission: 9/5/2024   Date of Service: 9/15/2024 I Hospital Day: 10    Assessment & Plan  Sternal wound infection  Patient history of CABG in 6/2024 with postoperative hospital stay complicated by stroke and post op ileus with 1 week history of progressively worsening swelling near the superior sternotomy site and low-grade fevers over the last 4 days.  Outpatient imaging as below concerning for sternal dehiscence and fluid collection for which patient was recommended ED admission.  9/4 US sternum poorly marginated complex fluid in the area measuring 4.2 x 1.3 x 1.8 cm.  9/5 CT chest: Increased lucency along the midline sternal site, concerning for developing dehiscence.  There is also increased edema throughout this region, with findings concerning for a potential for collection along the anterior aspect of the sternum.  There is also new likely reactive lymphadenopathy within the prevascular mediastinum concerning for superinfection.  S/p aspiration by IR, aspirate positive for Klebsiella oxytoca  Status post I&D and VAC dressing by CT surgery on 9/9.  Cultures positive for Klebsiella oxytoca  Continue IV cefazolin 2 g every 8 hours per infectious disease  VAC dressing/wound care per CT surgery  Infectious disease, CT surgery following    Abdominal aortic aneurysm without rupture (HCC)  S/p repair in 2011  Outpatient follow-up  Coronary artery disease of native artery of native heart with stable angina pectoris (HCC)  Status post CABG in 6/2024  Continue aspirin metoprolol Brilinta atorvastatin  Primary hypertension  Blood pressures reviewed  Add amlodipine 2.5 mg daily  Monitor blood pressures  Avoid hypotension  CKD (chronic kidney disease) stage 2, GFR 60-89 ml/min  Lab Results   Component Value Date    EGFR 75 09/11/2024    EGFR 62 09/09/2024    EGFR 79 09/06/2024    CREATININE 0.96  09/11/2024    CREATININE 1.12 09/09/2024    CREATININE 0.92 09/06/2024     Monitor kidney function  History of stroke  History of recent stroke s/p CABG with no residual focal deficits  Continue aspirin, atorvastatin  Anemia  Anemia  Iron studies suggest iron deficiency state  Iron supplementation  Monitor hemoglobin  Pain and swelling of right ankle  Clinically and symptomatically improved  Uric acid 4.1  Elevated CRP noted  Continue colchicine to complete 7-day course  Monitor  Severe protein-calorie malnutrition (HCC)  Malnutrition Findings:   Adult Malnutrition type: Chronic illness  Adult Degree of Malnutrition: Other severe protein calorie malnutrition  Malnutrition Characteristics: Fat loss, Muscle loss, Weight loss                  360 Statement: severe protein energy malnutrition is related to chronic illness as evidenced by moderate/severe muscle/fat wasting and severe unintentional weight loss (40#/20% x3m, 34#/17.6% weight loss x6m). Treated with diet and addition of oral nutrition supplement.    BMI Findings:           Body mass index is 22.84 kg/m².       VTE Pharmacologic Prophylaxis:   High Risk (Score >/= 5) - Pharmacological DVT Prophylaxis Ordered: enoxaparin (Lovenox). Sequential Compression Devices Ordered.    Mobility:   Basic Mobility Inpatient Raw Score: 18  JH-HLM Goal: 6: Walk 10 steps or more  JH-HLM Achieved: 6: Walk 10 steps or more  JH-HLM Goal achieved. Continue to encourage appropriate mobility.    Patient Centered Rounds: I performed bedside rounds with nursing staff today.   Discussions with Specialists or Other Care Team Provider: Case management    Education and Discussions with Family / Patient:  Discussed with the patient, updated spouse Maryjo.     Current Length of Stay: 10 day(s)  Current Patient Status: Inpatient   Certification Statement: The patient will continue to require additional inpatient hospital stay due to as outlined   Discharge Plan: Awaiting infectious disease/CT  surgery inputs for disposition planning    Code Status: Level 1 - Full Code    Subjective     Comfortably sitting up in chair  Reports feeling okay  Encourage incentive spirometry      Objective     Vitals:   Temp (24hrs), Av.2 °F (36.8 °C), Min:98 °F (36.7 °C), Max:98.5 °F (36.9 °C)    Temp:  [98 °F (36.7 °C)-98.5 °F (36.9 °C)] 98.1 °F (36.7 °C)  HR:  [70-79] 70  Resp:  [16-17] 16  BP: (140-162)/(88-96) 162/88  SpO2:  [94 %-97 %] 96 %  Body mass index is 22.84 kg/m².     Input and Output Summary (last 24 hours):     Intake/Output Summary (Last 24 hours) at 9/15/2024 1525  Last data filed at 9/15/2024 1300  Gross per 24 hour   Intake 360 ml   Output 1125 ml   Net -765 ml       Physical Exam     Comfortably sitting up in chair  Neck supple  Lungs diminished breath sounds bilateral  Heart sounds S1-S2 noted  Abdomen soft nontender  Awake follows commands  No pedal edema  No rash      Lines/Drains:  Lines/Drains/Airways       Active Status       None                            Lab Results: I have reviewed the following results: CBC/BMP: No new results in last 24 hours.    Results from last 7 days   Lab Units 24  0512   WBC Thousand/uL 7.17   HEMOGLOBIN g/dL 8.6*   HEMATOCRIT % 27.1*   PLATELETS Thousands/uL 267   SEGS PCT % 64   LYMPHO PCT % 17   MONO PCT % 12   EOS PCT % 5     Results from last 7 days   Lab Units 24  0512   SODIUM mmol/L 135   POTASSIUM mmol/L 4.0   CHLORIDE mmol/L 100   CO2 mmol/L 28   BUN mg/dL 16   CREATININE mg/dL 0.96   ANION GAP mmol/L 7   CALCIUM mg/dL 9.0   GLUCOSE RANDOM mg/dL 102         Results from last 7 days   Lab Units 09/15/24  0608 24  1238   POC GLUCOSE mg/dl 117 107               Recent Cultures (last 7 days):   Results from last 7 days   Lab Units 24  1149   GRAM STAIN RESULT  2+ Polys  No bacteria seen       Imaging Review: Reviewed radiology reports from this admission including: procedure reports.  Other Studies: No additional pertinent studies  reviewed.    Last 24 Hours Medication List:     Current Facility-Administered Medications:     acetaminophen (TYLENOL) tablet 650 mg, Q6H PRN    amLODIPine (NORVASC) tablet 2.5 mg, Daily    aspirin (ECOTRIN LOW STRENGTH) EC tablet 81 mg, Daily    atorvastatin (LIPITOR) tablet 80 mg, Daily With Dinner    bisacodyl (DULCOLAX) EC tablet 5 mg, Daily PRN    ceFAZolin (ANCEF) IVPB (premix in dextrose) 2,000 mg 50 mL, Q8H, Last Rate: 2,000 mg (09/15/24 1216)    chlorhexidine (PERIDEX) 0.12 % oral rinse 15 mL, Q12H KRISTI    [COMPLETED] colchicine (COLCRYS) tablet 1.2 mg, Once **FOLLOWED BY** colchicine (COLCRYS) tablet 0.6 mg, Daily    cyanocobalamin (VITAMIN B-12) tablet 1,000 mcg, QPM    enoxaparin (LOVENOX) subcutaneous injection 40 mg, Q24H KRISTI    ergocalciferol (VITAMIN D2) capsule 50,000 Units, Weekly    escitalopram (LEXAPRO) tablet 5 mg, Daily    ferrous sulfate tablet 325 mg, Q48H    folic acid (FOLVITE) tablet 1 mg, Daily    metoprolol tartrate (LOPRESSOR) tablet 25 mg, BID    pantoprazole (PROTONIX) EC tablet 40 mg, Early Morning    polyethylene glycol (MIRALAX) packet 17 g, Daily    senna-docusate sodium (SENOKOT S) 8.6-50 mg per tablet 1 tablet, HS    ticagrelor (BRILINTA) tablet 90 mg, Q12H KRISTI    Administrative Statements   Today, Patient Was Seen By: Timbo Vazquez MD  I have spent a total time of 30 minutes in caring for this patient on the day of the visit/encounter     **Please Note: This note may have been constructed using a voice recognition system.**

## 2024-09-15 NOTE — PROGRESS NOTES
Progress Note - Cardiac Surgery   Shane Chau . 78 y.o. male MRN: [id number]  Unit/Bed#: [facility]-323-01 Encounter: [id number]    Superficial sternal wound infection. S/P Sternal wound incision and drainage, washout, application of VAC dressing.         24 Hour Events: No events overnight. Denies incisional pain    Medications:   Scheduled Meds:  Current Facility-Administered Medications   Medication Dose Route Frequency Provider Last Rate    acetaminophen  650 mg Oral Q6H PRN Timbo Vazquez MD      aspirin  81 mg Oral Daily Timbo Vazquez MD      atorvastatin  80 mg Oral Daily With Dinner Timbo Vazquez MD      bisacodyl  5 mg Oral Daily PRN Timbo Vazquez MD      cefazolin  2,000 mg Intravenous Q8H Timbo Vazquez MD 2,000 mg (09/15/24 0308)    chlorhexidine  15 mL Swish & Spit Q12H [facility] Timbo Vazquez MD      colchicine  0.6 mg Oral Daily Timbo Vazquez MD      cyanocobalamin  1,000 mcg Oral QPM Timbo Vazquez MD      enoxaparin  40 mg Subcutaneous Q24H [facility] Timbo Vazquez MD      ergocalciferol  50,000 Units Oral Weekly Timbo Vazquez MD      escitalopram  5 mg Oral Daily Timbo Vazquez MD      ferrous sulfate  325 mg Oral Q48H Timbo Vazquez MD      folic acid  1 mg Oral Daily Timbo Vazquez MD      metoprolol tartrate  25 mg Oral BID Timbo Vazquez MD      pantoprazole  40 mg Oral Early Morning Timbo Vazquez MD      polyethylene glycol  17 g Oral Daily Timbo Vazquez MD      senna-docusate sodium  1 tablet Oral HS Timbo Vazquez MD      ticagrelor  90 mg Oral Q12H KRISTI Nunez PA-C       Continuous Infusions:   PRN Meds:.  acetaminophen    bisacodyl    Vitals:   Vitals:    09/14/24 0759 09/14/24 1542 09/14/24 2205 09/15/24 0709   BP: 134/84 143/96 140/88 149/90   BP Location:   Left arm    Pulse: 77 79 77 72   Resp:  17 16 16   Temp: 98.4 °F (36.9  "°C) 98.2 °F (36.8 °C) 98.5 °F (36.9 °C) 98 °F (36.7 °C)   TempSrc:   Oral    SpO2: 95% 97% 94% 97%   Weight:       Height:           Telemetry: NSR; Heart Rate: 77    Intake/Output:     Intake/Output Summary (Last 24 hours) at 9/15/2024 1022  Last data filed at 9/15/2024 0845  Gross per 24 hour   Intake 358 ml   Output 1075 ml   Net -717 ml        Weights:   Weight (last 2 days)       None            Chest tube Output:    VAC 0 mL/8 hours  0 mL/24 hours            Results:   Results from last 7 days   Lab Units 09/11/24  0512 09/10/24  0500   WBC Thousand/uL 7.17 7.32   HEMOGLOBIN g/dL 8.6* 8.9*   HEMATOCRIT % 27.1* 27.5*   PLATELETS Thousands/uL 267 256     Results from last 7 days   Lab Units 09/11/24  0512 09/09/24  1240   SODIUM mmol/L 135 134*   POTASSIUM mmol/L 4.0 4.7   CHLORIDE mmol/L 100 101   CO2 mmol/L 28 27   BUN mg/dL 16 13   CREATININE mg/dL 0.96 1.12   CALCIUM mg/dL 9.0 9.0     No results for input(s): \"MG\" in the last 72 hours.  Results from last 7 days   Lab Units 09/09/24  0220 09/08/24 2021 09/08/24  1332   PTT seconds 89* 62* 54*         Studies:  No new studies    Invasive Lines/Tubes:  Invasive Devices       Peripheral Intravenous Line  Duration             Peripheral IV 09/13/24 Right;Ventral (anterior) Forearm 2 days                    Physical Exam:    General: No acute distress, Alert, and Normal appearance  HEENT/NECK:  Normocephalic. Atraumatic.  No jugular venous distention.    Incisions: Sternum is stable. VAC in place, without significant surrounding erythema.    Neuro: Alert and oriented X 3 and Sensation is grossly intact  Skin: Warm/Dry, without rashes or lesions.    Assessment:  Principal Problem:    Sternal wound infection  Active Problems:    Abdominal aortic aneurysm without rupture (HCC)    Coronary artery disease of native artery of native heart with stable angina pectoris (HCC)    Primary hypertension    S/P CABG x 3    CKD (chronic kidney disease) stage 2, GFR 60-89 " ml/min    History of stroke    Anemia    Pain and swelling of right ankle    Severe protein-calorie malnutrition (HCC)       Superficial sternal wound infection. S/P Sternal wound incision and drainage, washout, application of VAC dressing.       Plan:    Klebsiella oxytoca sternal wound infection.    VAC in place with no appreciable drainage.      No need for further surgical debridement  Will change VAC at bedside today and Monday (9/16)     If inspection of wound with VAC change on 9/16 appears acceptable, will recommend discharge, with outpatient wound care for ongoing VAC management     OR gram stain negative  OR culutres (9/9): Few Colonies of Klebsiella oxytoca  Fungal culture with no growth to date     Continue cefazolin for now, per ID     History of TIA Brilinta resumed      SIGNATURE: Nakul Nunez PA-C  DATE: September 15, 2024  TIME: 10:22 AM

## 2024-09-16 LAB
FUNGUS SPEC CULT: NORMAL
FUNGUS SPEC CULT: NORMAL

## 2024-09-16 PROCEDURE — 99232 SBSQ HOSP IP/OBS MODERATE 35: CPT | Performed by: INTERNAL MEDICINE

## 2024-09-16 PROCEDURE — 2W14X6Z COMPRESSION OF CHEST WALL USING PRESSURE DRESSING: ICD-10-PCS | Performed by: THORACIC SURGERY (CARDIOTHORACIC VASCULAR SURGERY)

## 2024-09-16 PROCEDURE — 97530 THERAPEUTIC ACTIVITIES: CPT

## 2024-09-16 PROCEDURE — 99024 POSTOP FOLLOW-UP VISIT: CPT | Performed by: PHYSICIAN ASSISTANT

## 2024-09-16 RX ORDER — BISACODYL 10 MG
10 SUPPOSITORY, RECTAL RECTAL DAILY
Status: COMPLETED | OUTPATIENT
Start: 2024-09-16 | End: 2024-09-16

## 2024-09-16 RX ORDER — AMOXICILLIN 250 MG
2 CAPSULE ORAL 2 TIMES DAILY
Status: DISCONTINUED | OUTPATIENT
Start: 2024-09-16 | End: 2024-09-17 | Stop reason: HOSPADM

## 2024-09-16 RX ADMIN — ATORVASTATIN CALCIUM 80 MG: 80 TABLET, FILM COATED ORAL at 17:52

## 2024-09-16 RX ADMIN — METOPROLOL TARTRATE 25 MG: 25 TABLET, FILM COATED ORAL at 09:26

## 2024-09-16 RX ADMIN — CEFAZOLIN SODIUM 2000 MG: 2 SOLUTION INTRAVENOUS at 19:55

## 2024-09-16 RX ADMIN — TICAGRELOR 90 MG: 90 TABLET ORAL at 20:38

## 2024-09-16 RX ADMIN — AMLODIPINE BESYLATE 2.5 MG: 2.5 TABLET ORAL at 09:26

## 2024-09-16 RX ADMIN — IRON SUCROSE 200 MG: 20 INJECTION, SOLUTION INTRAVENOUS at 12:33

## 2024-09-16 RX ADMIN — ASPIRIN 81 MG: 81 TABLET, COATED ORAL at 09:26

## 2024-09-16 RX ADMIN — ACETAMINOPHEN 650 MG: 325 TABLET ORAL at 11:55

## 2024-09-16 RX ADMIN — BISACODYL 10 MG: 10 SUPPOSITORY RECTAL at 14:57

## 2024-09-16 RX ADMIN — SENNOSIDES AND DOCUSATE SODIUM 2 TABLET: 50; 8.6 TABLET ORAL at 17:52

## 2024-09-16 RX ADMIN — CYANOCOBALAMIN TAB 500 MCG 1000 MCG: 500 TAB at 17:52

## 2024-09-16 RX ADMIN — CHLORHEXIDINE GLUCONATE 0.12% ORAL RINSE 15 ML: 1.2 LIQUID ORAL at 09:26

## 2024-09-16 RX ADMIN — PANTOPRAZOLE SODIUM 40 MG: 40 TABLET, DELAYED RELEASE ORAL at 05:26

## 2024-09-16 RX ADMIN — METOPROLOL TARTRATE 25 MG: 25 TABLET, FILM COATED ORAL at 17:52

## 2024-09-16 RX ADMIN — CEFAZOLIN SODIUM 2000 MG: 2 SOLUTION INTRAVENOUS at 02:17

## 2024-09-16 RX ADMIN — TICAGRELOR 90 MG: 90 TABLET ORAL at 09:26

## 2024-09-16 RX ADMIN — ENOXAPARIN SODIUM 40 MG: 40 INJECTION SUBCUTANEOUS at 09:27

## 2024-09-16 RX ADMIN — FOLIC ACID 1 MG: 1 TABLET ORAL at 09:26

## 2024-09-16 RX ADMIN — ESCITALOPRAM OXALATE 5 MG: 5 TABLET, FILM COATED ORAL at 09:26

## 2024-09-16 RX ADMIN — CEFAZOLIN SODIUM 2000 MG: 2 SOLUTION INTRAVENOUS at 10:54

## 2024-09-16 RX ADMIN — COLCHICINE 0.6 MG: 0.6 TABLET ORAL at 09:26

## 2024-09-16 NOTE — PROCEDURES
Small fluctuant area mid incision opened at bedside ~1cm in diameter. No purulent drainage. VAC dressing changed in routine fashion. Good granulation tissue noted. Patient tolerated well. RN notified.     Rahel Baig PA-C  9/16/2024  12:18 PM

## 2024-09-16 NOTE — ASSESSMENT & PLAN NOTE
In setting of recent CABG, delayed postop wound healing.  Late onset along with CT findings suggested possibe deep infection with possible osteomyelitis.  Fortunately appears clinically stable without sepsis.  Status post IR aspiration of thick, cloudy fluid 9/5/2024.  Fluid culture with Klebsiella.  Status post operative I&D, VAC 9/9.  Intraoperative findings notable for purulence confined to soft tissue.  No exposure or communicaiton with deeper structures.  OR cultures also with Klebsiella.    Continue cefazolin for now   Await VAC change today  If wound continues to look healthy without signs of infection, can D/C antibiotics today to complete treatment course (10 days total, 7 days postop    Antibiotics:  Cefazolin #9  Antibiotics #10  POD #7

## 2024-09-16 NOTE — CASE MANAGEMENT
Case Management Discharge Planning Note    Patient name Shane Chau .  Location [facility] MRN [id number]  : 1945 Date 2024       Current Admission Date: 2024  Current Admission Diagnosis:Sternal wound infection   Patient Active Problem List    Diagnosis Date Noted Date Diagnosed    Severe protein-calorie malnutrition (HCC) 2024     Pain and swelling of right ankle 2024     Anemia 09/10/2024     Sternal wound infection 2024     Hospital discharge follow-up 2024     Surgical wound, non healing 2024     Postoperative anemia due to acute blood loss 07/10/2024     Stenosis of left vertebral artery 07/10/2024     Prediabetes 07/10/2024     Reaction at surgical site 07/10/2024     Cognitive impairment 07/10/2024     Hyponatremia 2024     History of stroke 2024     PAD (peripheral artery disease) ([facility]) 2024     Stroke-like symptoms 2024     CKD (chronic kidney disease) stage 2, GFR 60-89 ml/min 2024     S/P CABG x 3 2024     Rib pain 2023     Paresthesia 2023     Leg swelling 05/10/2022     Erectile dysfunction 2022     Microhematuria 2018     Need for pneumococcal vaccination 2018     Hematuria 2018     Asymptomatic bilateral carotid artery stenosis 2017     Closed compression fracture of first lumbar vertebra ([facility]) 2017     Abdominal aortic aneurysm without rupture ([facility]) 2016     Localized primary osteoarthritis of lower leg, unspecified laterality 2013     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[facility]) 2013     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[facility]) 2013     Transient cerebral ischemic attack 2013     Coronary artery disease of native artery of native heart with stable angina pectoris ([facility]) 2012     Hyperlipidemia 2012     Primary hypertension 2012       LOS (days): 11  Geometric Mean LOS (GMLOS) (days): 9.4  Days to GMLOS:-1.8      OBJECTIVE:  Risk of Unplanned Readmission Score: 18.44         Current admission status: Inpatient   Preferred Pharmacy:   [facility] - [place] - [address]  [address]  [place] [address]  Phone: [phone] Fax: [phone]    [id number] - [place] - [address]  [address]  [place] [address]  Phone: [phone] Fax: [phone]    [id number] [place] - [address]  [address]  [place] [address]  Phone: [phone] Fax: [phone]    [facility] - [place] - [address]  [address]  [place] [address]  Phone: [phone] Fax: [phone]    [facility] [place] - [place] - [address]  [address]  [place] [address]  Phone: [phone] Fax: [phone]    Primary Care Provider: Sherwin Mantilla MD    Primary Insurance: [facility] REP  Secondary Insurance:     DISCHARGE DETAILS:                                                                                                 Additional Comments: VAC delivered to room, pt. signed receipt, faxed back to  discharge support. Plan home with ROSA tomorrow

## 2024-09-16 NOTE — PROGRESS NOTES
Progress Note - Cardiac Surgery   Shane Chau . 78 y.o. male MRN: [id number]  Unit/Bed#: [facility]-01 Encounter: [id number]    Superficial sternal wound infection. S/P Sternal wound incision and drainage, washout, application of VAC dressing.         24 Hour Events: No events overnight. Denies incisional pain    Medications:   Scheduled Meds:  Current Facility-Administered Medications   Medication Dose Route Frequency Provider Last Rate    acetaminophen  650 mg Oral Q6H PRN Timbo Vazquez MD      amLODIPine  2.5 mg Oral Daily Timbo Vazquez MD      aspirin  81 mg Oral Daily Timbo Vazquez MD      atorvastatin  80 mg Oral Daily With Dinner Timbo Vazquez MD      bisacodyl  5 mg Oral Daily PRN Timbo Vazquez MD      cefazolin  2,000 mg Intravenous Q8H Timbo Vazquez MD 2,000 mg (09/16/24 1054)    chlorhexidine  15 mL Sw[facility] & Spit Q12H [facility]      colchicine  0.6 mg Oral Daily Timbo Vazquez MD      cyanocobalamin  1,000 mcg Oral QPM Timbo Vazquez MD      enoxaparin  40 mg Subcutaneous Q24H [facility] Vazquez MD      ergocalciferol  50,000 Units Oral Weekly Timbo Vazquez MD      escitalopram  5 mg Oral Daily Timbo Vazquez MD      ferrous sulfate  325 mg Oral Q48H Timbo Vazquez MD      folic acid  1 mg Oral Daily Timbo Vazquez MD      iron sucrose  200 mg Intravenous Once Timbo Vazquez  mg (09/16/24 1233)    metoprolol tartrate  25 mg Oral BID Timbo Vazquez MD      pantoprazole  40 mg Oral Early Morning Timbo Vazquez MD      polyethylene glycol  17 g Oral Daily Timbo Vazquez MD      senna-docusate sodium  1 tablet Oral HS Timbo Vazquez MD      ticagrelor  90 mg Oral Q12H [facility] Nakul Nunez PA-C       Continuous Infusions:   PRN Meds:.  acetaminophen    bisacodyl    Vitals:   Vitals:    09/16/24 0012 09/16/24 0015  "09/16/24 0649 09/16/24 0900   BP: 134/80  157/89    Pulse: 78  78    Resp: 17  17    Temp: 98.2 °F (36.8 °C)  98.3 °F (36.8 °C)    TempSrc:  Oral     SpO2: 95%  96% 96%   Weight:       Height:           Telemetry: NSR; Heart Rate: 77    Intake/Output:     Intake/Output Summary (Last 24 hours) at 9/16/2024 1331  Last data filed at 9/16/2024 1235  Gross per 24 hour   Intake 600 ml   Output 1225 ml   Net -625 ml        Weights:   Weight (last 2 days)       None            Chest tube Output:    VAC 0 mL/8 hours  0 mL/24 hours            Results:   Results from last 7 days   Lab Units 09/11/24  0512 09/10/24  0500   WBC Thousand/uL 7.17 7.32   HEMOGLOBIN g/dL 8.6* 8.9*   HEMATOCRIT % 27.1* 27.5*   PLATELETS Thousands/uL 267 256     Results from last 7 days   Lab Units 09/11/24  0512   SODIUM mmol/L 135   POTASSIUM mmol/L 4.0   CHLORIDE mmol/L 100   CO2 mmol/L 28   BUN mg/dL 16   CREATININE mg/dL 0.96   CALCIUM mg/dL 9.0     No results for input(s): \"MG\" in the last 72 hours.            Studies:  No new studies    Invasive Lines/Tubes:  Invasive Devices       Peripheral Intravenous Line  Duration             Peripheral IV 09/13/24 Right;Ventral (anterior) Forearm 3 days                    Physical Exam:    General: No acute distress, Alert, and Normal appearance  HEENT/NECK:  Normocephalic. Atraumatic.  no jugular venous distention.    Cardiac: Regular rate and rhythm  Pulmonary:  Breath sounds clear bilaterally  Abdomen:  Non-tender, Non-distended, and Normal bowel sounds  Incisions: wound van in place  Extremities: Extremities warm/dry  Neuro: Alert and oriented X 3, Sensation is grossly intact, and No focal deficits  Skin: Warm/Dry, without rashes or lesions.       Assessment:  Principal Problem:    Sternal wound infection  Active Problems:    Abdominal aortic aneurysm without rupture (HCC)    Coronary artery disease of native artery of native heart with stable angina pectoris (HCC)    Primary hypertension    S/P CABG " x 3    CKD (chronic kidney disease) stage 2, GFR 60-89 ml/min    History of stroke    Anemia    Pain and swelling of right ankle    Severe protein-calorie malnutrition (HCC)       Superficial sternal wound infection. S/P Sternal wound incision and drainage, washout, application of VAC dressing.       Plan:    Klebsiella oxytoca sternal wound infection.    VAC in place with no appreciable drainage.      No need for further surgical debridement  Small fluctuant area mid sternum, I&D at bedside today ~1cm in size, no purulent drainage  VAC changed at bedside today, good granulation tissue, paperwork submitted for home wound vac, OK for discharge home, awaiting home wound vac approval, likely won't be delivered until tomorrow    OR gram stain negative  OR culutres (9/9): Few Colonies of Klebsiella oxytoca  Fungal culture with no growth to date     Continue cefazolin for now, per ID     History of ALEXA Kelly resumed      SIGNATURE: Rahel Baig PA-C  DATE: September 16, 2024  TIME: 1:31 PM

## 2024-09-16 NOTE — PROGRESS NOTES
Progress Note - Infectious Disease   Name: Shane Chau Sr. 78 y.o. male I MRN: [id number]  Unit/Bed#: [facility]-01 I Date of Admission: 9/5/2024   Date of Service: 9/16/2024 I Hospital Day: 11    Assessment & Plan  Sternal wound infection  In setting of recent CABG, delayed postop wound healing.  Late onset along with CT findings suggested possibe deep infection with possible osteomyelitis.  Fortunately appears clinically stable without sepsis.  Status post IR aspiration of thick, cloudy fluid 9/5/2024.  Fluid culture with Klebsiella.  Status post operative I&D, VAC 9/9.  Intraoperative findings notable for purulence confined to soft tissue.  No exposure or communicaiton with deeper structures.  OR cultures also with Klebsiella.    Continue cefazolin for now   Await VAC change today  If wound continues to look healthy without signs of infection, can D/C antibiotics today to complete treatment course (10 days total, 7 days postop    Antibiotics:  Cefazolin #9  Antibiotics #10  POD #7  S/P CABG x 3  For CAD on 6/20/24. Complicated by postop ileus, delayed wound healing.     The patient is stable from an ID standpoint        History of Present Illness   Doing well.  Offers no new complaints.  No fevers, rash, or diarrhea.    Objective      Temp:  [98.1 °F (36.7 °C)-98.4 °F (36.9 °C)] 98.3 °F (36.8 °C)  HR:  [70-78] 78  Resp:  [16-17] 17  BP: (134-162)/(80-89) 157/89  O2 Device: None (Room air)          I/O last 24 hours:  In: 780 [P.O.:780]  Out: 1175 [Urine:1175]  Lines/Drains/Airways       Active Status       None                  Physical Exam   Well appearing, NAD  OOB to chair  VAC to mid-sternum  Reddened area inferior to sponge    Lab Results: I have reviewed the following results:     Micro:  Lab Results   Component Value Date    BLOODCX No Growth After 5 Days. 09/05/2024    BLOODCX No Growth After 5 Days. 09/05/2024    URINECX No Growth <1000 cfu/mL 06/29/2016     Imaging Review: No pertinent imaging  studies reviewed.  Other Studies: No additional pertinent studies reviewed.

## 2024-09-16 NOTE — CASE MANAGEMENT
[facility] has received APPROVAL to release wound vac to patient.  Assigned vac #: [id number]     release 9/17/24    Proof of delivery PPW given to Care Manager:      to deliver to patient.     Please reach out to CM for updates on any clinical information.

## 2024-09-16 NOTE — ASSESSMENT & PLAN NOTE
Patient history of CABG in 6/2024 with postoperative hospital stay complicated by stroke and post op ileus with 1 week history of progressively worsening swelling near the superior sternotomy site and low-grade fevers over the last 4 days.  Outpatient imaging as below concerning for sternal dehiscence and fluid collection for which patient was recommended ED admission.  9/4 US sternum poorly marginated complex fluid in the area measuring 4.2 x 1.3 x 1.8 cm.  9/5 CT chest: Increased lucency along the midline sternal site, concerning for developing dehiscence.  There is also increased edema throughout this region, with findings concerning for a potential for collection along the anterior aspect of the sternum.  There is also new likely reactive lymphadenopathy within the prevascular mediastinum concerning for superinfection.  S/p aspiration by IR, aspirate positive for Klebsiella oxytoca  Status post I&D and VAC dressing by CT surgery on 9/9.  Cultures positive for Klebsiella oxytoca  Patient received IV cefazolin 2 g every 8 hours and completed course per infectious disease  VAC dressing/wound care per CT surgery  Patient is recommended VAC dressing at discharge ongoing arrangements underway with case management

## 2024-09-16 NOTE — CASE MANAGEMENT
Case Management Discharge Planning Note    Patient name Shane Chau .  Location [facility]01 MRN [id number]  : 1945 Date 2024       Current Admission Date: 2024  Current Admission Diagnosis:Sternal wound infection   Patient Active Problem List    Diagnosis Date Noted Date Diagnosed    Severe protein-calorie malnutrition (HCC) 2024     Pain and swelling of right ankle 2024     Anemia 09/10/2024     Sternal wound infection 2024     Hospital discharge follow-up 2024     Surgical wound, non healing 2024     Postoperative anemia due to acute blood loss 07/10/2024     Stenosis of left vertebral artery 07/10/2024     Prediabetes 07/10/2024     Reaction at surgical site 07/10/2024     Cognitive impairment 07/10/2024     Hyponatremia 2024     History of stroke 2024     PAD (peripheral artery disease) ([facility]) 2024     Stroke-like symptoms 2024     CKD (chronic kidney disease) stage 2, GFR 60-89 ml/min 2024     S/P CABG x 3 2024     Rib pain 2023     Paresthesia 2023     Leg swelling 05/10/2022     Erectile dysfunction 2022     Microhematuria 2018     Need for pneumococcal vaccination 2018     Hematuria 2018     Asymptomatic bilateral carotid artery stenosis 2017     Closed compression fracture of first lumbar vertebra ([facility]) 2017     Abdominal aortic aneurysm without rupture ([facility]) 2016     Localized primary osteoarthritis of lower leg, unspecified laterality 2013     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[facility]) 2013     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[facility]) 2013     Transient cerebral ischemic attack 2013     Coronary artery disease of native artery of native heart with stable angina pectoris ([facility]) 2012     Hyperlipidemia 2012     Primary hypertension 2012       LOS (days): 11  Geometric Mean LOS (GMLOS) (days): 9.4  Days to GMLOS:-1.6      OBJECTIVE:  Risk of Unplanned Readmission Score: 18.21         Current admission status: Inpatient   Preferred Pharmacy:   [facility] - [place] - [address]  [address]  [place] [address]  Phone: [phone] Fax: [phone]    [facility] - [place] - [address]  [address]  [place] [address]  Phone: [phone] Fax: [phone]    [id number] - [place] - [address]  [address]  [place] [address]  Phone: [phone] Fax: [phone]    [facility] - [place] - [address]  [address]  [place] [address]  Phone: [phone] Fax: [phone]    [facility] [place] - [place] - [address]  [address]  [place] [address]  Phone: [phone] Fax: [phone]    Primary Care Provider: Sherwin Mantilla MD    Primary Insurance: [employer] [facility] RE[employer]  Secondary Insurance:     DISCHARGE DETAILS:                                                                                                 Additional Comments: [employer] VAC ordered via Discharge Support. Accepted by ROSA coello/PT/OT, informed them of possible d/c home tomorrow with wife when VAC delivered.

## 2024-09-16 NOTE — ASSESSMENT & PLAN NOTE
Blood pressures reviewed  Continue amlodipine 2.5 mg p.o. daily  Monitor blood pressures  Avoid hypotension

## 2024-09-16 NOTE — PROGRESS NOTES
Progress Note - Hospitalist   Name: Shane Chau Sr. 78 y.o. male I MRN: [id number]  Unit/Bed#: [facility]-01 I Date of Admission: 9/5/2024   Date of Service: 9/16/2024 I Hospital Day: 11    Assessment & Plan  Sternal wound infection  Patient history of CABG in 6/2024 with postoperative hospital stay complicated by stroke and post op ileus with 1 week history of progressively worsening swelling near the superior sternotomy site and low-grade fevers over the last 4 days.  Outpatient imaging as below concerning for sternal dehiscence and fluid collection for which patient was recommended ED admission.  9/4 US sternum poorly marginated complex fluid in the area measuring 4.2 x 1.3 x 1.8 cm.  9/5 CT chest: Increased lucency along the midline sternal site, concerning for developing dehiscence.  There is also increased edema throughout this region, with findings concerning for a potential for collection along the anterior aspect of the sternum.  There is also new likely reactive lymphadenopathy within the prevascular mediastinum concerning for superinfection.  S/p aspiration by IR, aspirate positive for Klebsiella oxytoca  Status post I&D and VAC dressing by CT surgery on 9/9.  Cultures positive for Klebsiella oxytoca  Patient received IV cefazolin 2 g every 8 hours and completed course per infectious disease  VAC dressing/wound care per CT surgery  Patient is recommended VAC dressing at discharge ongoing arrangements underway with case management      Abdominal aortic aneurysm without rupture (HCC)  S/p repair in 2011  Outpatient follow-up  Coronary artery disease of native artery of native heart with stable angina pectoris (HCC)  Status post CABG in 6/2024  Continue aspirin Brilinta metoprolol atorvastatin  Primary hypertension  Blood pressures reviewed  Continue amlodipine 2.5 mg p.o. daily  Monitor blood pressures  Avoid hypotension  CKD (chronic kidney disease) stage 2, GFR 60-89 ml/min  Lab Results   Component  Value Date    EGFR 75 09/11/2024    EGFR 62 09/09/2024    EGFR 79 09/06/2024    CREATININE 0.96 09/11/2024    CREATININE 1.12 09/09/2024    CREATININE 0.92 09/06/2024     Monitor kidney function  History of stroke  History of recent stroke s/p CABG with no residual focal deficits  Continue aspirin, atorvastatin  Anemia  Anemia  Iron studies suggest iron deficiency state  Iron supplementation  Monitor hemoglobin  Pain and swelling of right ankle  Clinically and symptomatically improved  Uric acid 4.1  Elevated CRP noted  Continue colchicine to complete 7-day course  Monitor  Severe protein-calorie malnutrition (HCC)  Malnutrition Findings:   Adult Malnutrition type: Chronic illness  Adult Degree of Malnutrition: Other severe protein calorie malnutrition  Malnutrition Characteristics: Fat loss, Muscle loss, Weight loss                  360 Statement: severe protein energy malnutrition is related to chronic illness as evidenced by moderate/severe muscle/fat wasting and severe unintentional weight loss (40#/20% x3m, 34#/17.6% weight loss x6m). Treated with diet and addition of oral nutrition supplement.    BMI Findings:           Body mass index is 22.84 kg/m².       VTE Pharmacologic Prophylaxis:   High Risk (Score >/= 5) - Pharmacological DVT Prophylaxis Ordered: enoxaparin (Lovenox). Sequential Compression Devices Ordered.    Mobility:   Basic Mobility Inpatient Raw Score: 18  JH-HLM Goal: 6: Walk 10 steps or more  JH-HLM Achieved: 6: Walk 10 steps or more  JH-HLM Goal NOT achieved. Continue with multidisciplinary rounding and encourage appropriate mobility to improve upon JH-HLM goals.    Patient Centered Rounds: I performed bedside rounds with nursing staff today.   Discussions with Specialists or Other Care Team Provider: Case management    Education and Discussions with Family / Patient:  Discussed with the patient, wife at bedside updated questions answered.     Current Length of Stay: 11 day(s)  Current Patient  Status: Inpatient   Certification Statement: The patient will continue to require additional inpatient hospital stay due to as outlined  Discharge Plan:  Disposition planning discussed with case management, arrangements for home VAC dressing underway    Code Status: Level 1 - Full Code    Subjective     Comfortably sitting up in chair  Reports feeling okay  Wife at bedside  Encourage incentive spirometry  Reports constipation agreeable to Dulcolax suppository  Discussed with RN      Objective     Vitals:   Temp (24hrs), Av.3 °F (36.8 °C), Min:98.1 °F (36.7 °C), Max:98.4 °F (36.9 °C)    Temp:  [98.1 °F (36.7 °C)-98.4 °F (36.9 °C)] 98.3 °F (36.8 °C)  HR:  [70-78] 78  Resp:  [16-17] 17  BP: (134-162)/(80-89) 157/89  SpO2:  [95 %-96 %] 96 %  Body mass index is 22.84 kg/m².     Input and Output Summary (last 24 hours):     Intake/Output Summary (Last 24 hours) at 2024 1405  Last data filed at 2024 1235  Gross per 24 hour   Intake 600 ml   Output 1225 ml   Net -625 ml       Physical Exam       Comfortably sitting up in chair  Features of protein calorie malnutrition noted  Neck supple  Lungs diminished breath sounds bilateral  Heart sounds S1-S2 noted  VAC dressing anterior chest noted  Abdomen soft nontender  Awake follows commands  No pedal edema  Right ankle pain swelling improved  No rash    Lines/Drains:  Lines/Drains/Airways       Active Status       None                            Lab Results: I have reviewed the following results: CBC/BMP: No new results in last 24 hours. , Creatinine Clearance: Estimated Creatinine Clearance: 64.8 mL/min (by C-G formula based on SCr of 0.96 mg/dL).   Results from last 7 days   Lab Units 24  0512   WBC Thousand/uL 7.17   HEMOGLOBIN g/dL 8.6*   HEMATOCRIT % 27.1*   PLATELETS Thousands/uL 267   SEGS PCT % 64   LYMPHO PCT % 17   MONO PCT % 12   EOS PCT % 5     Results from last 7 days   Lab Units 24  0512   SODIUM mmol/L 135   POTASSIUM mmol/L 4.0   CHLORIDE  mmol/L 100   CO2 mmol/L 28   BUN mg/dL 16   CREATININE mg/dL 0.96   ANION GAP mmol/L 7   CALCIUM mg/dL 9.0   GLUCOSE RANDOM mg/dL 102         Results from last 7 days   Lab Units 09/15/24  0608   POC GLUCOSE mg/dl 117               Recent Cultures (last 7 days):         Imaging Review: Reviewed radiology reports from this admission including: chest xray and procedure reports.  Other Studies: No additional pertinent studies reviewed.    Last 24 Hours Medication List:     Current Facility-Administered Medications:     acetaminophen (TYLENOL) tablet 650 mg, Q6H PRN    amLODIPine (NORVASC) tablet 2.5 mg, Daily    aspirin (ECOTRIN LOW STRENGTH) EC tablet 81 mg, Daily    atorvastatin (LIPITOR) tablet 80 mg, Daily With Dinner    bisacodyl (DULCOLAX) EC tablet 5 mg, Daily PRN    ceFAZolin (ANCEF) IVPB (premix in dextrose) 2,000 mg 50 mL, Q8H, Last Rate: 2,000 mg (09/16/24 1054)    chlorhexidine (PERIDEX) 0.12 % oral rinse 15 mL, Q12H RKISTI    [COMPLETED] colchicine (COLCRYS) tablet 1.2 mg, Once **FOLLOWED BY** colchicine (COLCRYS) tablet 0.6 mg, Daily    cyanocobalamin (VITAMIN B-12) tablet 1,000 mcg, QPM    enoxaparin (LOVENOX) subcutaneous injection 40 mg, Q24H KRISTI    ergocalciferol (VITAMIN D2) capsule 50,000 Units, Weekly    escitalopram (LEXAPRO) tablet 5 mg, Daily    ferrous sulfate tablet 325 mg, Q48H    folic acid (FOLVITE) tablet 1 mg, Daily    [START ON 9/17/2024] iron sucrose (VENOFER) 200 mg in sodium chloride 0.9 % 100 mL IVPB, Once    metoprolol tartrate (LOPRESSOR) tablet 25 mg, BID    pantoprazole (PROTONIX) EC tablet 40 mg, Early Morning    polyethylene glycol (MIRALAX) packet 17 g, Daily    senna-docusate sodium (SENOKOT S) 8.6-50 mg per tablet 1 tablet, HS    ticagrelor (BRILINTA) tablet 90 mg, Q12H KRISTI    Administrative Statements   Today, Patient Was Seen By: Timbo Vazquez MD  I have spent a total time of 31 minutes in caring for this patient on the day of the visit/encounter     **Please Note:  This note may have been constructed using a voice recognition system.**

## 2024-09-16 NOTE — RESTORATIVE TECHNICIAN NOTE
Restorative Technician Note      Patient Name: Shane KALPANA Chau Sr.     Restorative Tech Visit Date: 09/16/24  Note Type: Mobility  Patient Position Upon Consult: Supine  Activity Performed: Transferred  Assistive Device: Other (Comment) (none)  Patient Position at End of Consult: All needs within reach; Bedside chair

## 2024-09-16 NOTE — PHYSICAL THERAPY NOTE
PHYSICAL THERAPY NOTE          Patient Name: Shane Chau Sr.  Today's Date: 9/16/2024 09/16/24 1525   PT Last Visit   PT Visit Date 09/16/24   Note Type   Note Type Treatment   Pain Assessment   Pain Assessment Tool 0-10   Pain Score No Pain   Restrictions/Precautions   Other Precautions Cardiac/sternal;Multiple lines;Telemetry; Chair Alarm  (VAC)   General   Chart Reviewed Yes   Additional Pertinent History cleared for Tx session by nsg   Response to Previous Treatment Patient with no complaints from previous session.   Cognition   Overall Cognitive Status WFL   Arousal/Participation Alert;Cooperative   Attention Within functional limits   Orientation Level Oriented to person;Oriented to place;Oriented to situation   Memory Within functional limits   Following Commands Follows one step commands without difficulty   Subjective   Subjective Alert; in the chair; agreeable to mobilize   Transfers   Sit to Stand 6  Modified independent   Stand to Sit 6  Modified independent   Toilet transfer 5  Supervision   Additional items Verbal cues;Standard toilet   Ambulation/Elevation   Gait pattern Excessively slow;Short stride  (no overt LOB, klnee buckling or swaying)   Gait Assistance 6  Modified independent   Assistive Device Rolling walker   Distance 2 x 100 ft and 8 ft w/ seated rest periods in between   Stair Management Assistance Not tested  (pt declines steps trial)   Balance   Static Sitting Good   Dynamic Sitting Fair +   Static Standing Fair   Dynamic Standing Fair   Ambulatory Fair   Activity Tolerance   Activity Tolerance Patient tolerated treatment well   Nurse Made Aware spoke to JORGE ALBERTO Meyer   Exercises   Balance training  Standing balance/tolerance training x 1 min during pericare post BM   Assessment   Prognosis Good   Problem List Decreased endurance   Assessment Pt demonstrated overall significant improvement in  balance, endurance and all aspects of observed mobility progressing to mod (I) level on level surfaces incl amb w/ rw; no overt uncorrected LOB, gross knee buckling, or swaying observed during the session; no increased discomfort or excessive fatigue expressed; pt appeared to be comfortable at the end of session/reclined; overall, cont to anticipate pt will return home w/ available family support upon D/C from PT/mobility stand point and when medically cleared; may need to address elevations prior to D/C; will follow   Goals   Patient Goals to go home   STG Expiration Date 09/21/24   PT Treatment Day 3   Plan   Treatment/Interventions Elevations;Therapeutic exercise;Endurance training;Equipment eval/education;Bed mobility;Gait training;Spoke to nursing;Spoke to case management   Progress Improving as expected   PT Frequency 3-5x/wk   Discharge Recommendation   Rehab Resource Intensity Level, PT III (Minimum Resource Intensity)   Equipment Recommended Walker   Walker Package Recommended Wheeled walker   Change/add to Walker Package? No   AM-PAC Basic Mobility Inpatient   Turning in Flat Bed Without Bedrails 4   Lying on Back to Sitting on Edge of Flat Bed Without Bedrails 4   Moving Bed to Chair 4   Standing Up From Chair Using Arms 4   Walk in Room 4   Climb 3-5 Stairs With Railing 3   Basic Mobility Inpatient Raw Score 23   Basic Mobility Standardized Score 50.88   [facility] Highest Level Of Mobility   -HLM Goal 7: Walk 25 feet or more   -HLM Achieved 7: Walk 25 feet or more   Education   Education Provided Mobility training;Assistive device   Patient Demonstrates verbal understanding   End of Consult   Patient Position at End of Consult Bedside chair;All needs within reach; Bed/chair alarm activated     Bradly Howell

## 2024-09-16 NOTE — PLAN OF CARE
Problem: PHYSICAL THERAPY ADULT  Goal: Performs mobility at highest level of function for planned discharge setting.  See evaluation for individualized goals.  Description: Treatment/Interventions: Functional transfer training, LE strengthening/ROM, Elevations, Therapeutic exercise, Endurance training, Cognitive reorientation, Equipment eval/education, Bed mobility, Gait training, Spoke to nursing, OT  Equipment Recommended: Walker       See flowsheet documentation for full assessment, interventions and recommendations.  Outcome: Progressing  Note: Prognosis: Good  Problem List: Decreased endurance  Assessment: Pt demonstrated overall significant improvement in balance, endurance and all aspects of observed mobility progressing to mod (I) level on level surfaces incl amb w/ rw; no overt uncorrected LOB, gross knee buckling, or swaying observed during the session; no increased discomfort or excessive fatigue expressed; pt appeared to be comfortable at the end of session/reclined; overall, cont to anticipate pt will return home w/ available family support upon D/C from PT/mobility stand point and when medically cleared; may need to address elevations prior to D/C; will follow  Barriers to Discharge: Inaccessible home environment     Rehab Resource Intensity Level, PT: III (Minimum Resource Intensity)    See flowsheet documentation for full assessment.

## 2024-09-16 NOTE — CASE MANAGEMENT
[facility] has received request for [employer] wound vac from Care Manager. Mary hunt   Request submitted via [facility] website.   Pending order #: [id number]     Care Manager notified: mary hunt     Please reach out to  for updates on any clinical information.

## 2024-09-17 ENCOUNTER — TRANSITIONAL CARE MANAGEMENT (OUTPATIENT)
Dept: FAMILY MEDICINE CLINIC | Facility: CLINIC | Age: 79
End: 2024-09-17

## 2024-09-17 VITALS
HEART RATE: 70 BPM | DIASTOLIC BLOOD PRESSURE: 92 MMHG | RESPIRATION RATE: 18 BRPM | HEIGHT: 70 IN | TEMPERATURE: 98.1 F | SYSTOLIC BLOOD PRESSURE: 138 MMHG | BODY MASS INDEX: 22.79 KG/M2 | WEIGHT: 159.17 LBS | OXYGEN SATURATION: 97 %

## 2024-09-17 PROCEDURE — 99232 SBSQ HOSP IP/OBS MODERATE 35: CPT | Performed by: INTERNAL MEDICINE

## 2024-09-17 PROCEDURE — 97535 SELF CARE MNGMENT TRAINING: CPT

## 2024-09-17 PROCEDURE — 97116 GAIT TRAINING THERAPY: CPT

## 2024-09-17 PROCEDURE — 97530 THERAPEUTIC ACTIVITIES: CPT

## 2024-09-17 PROCEDURE — 99239 HOSP IP/OBS DSCHRG MGMT >30: CPT | Performed by: INTERNAL MEDICINE

## 2024-09-17 PROCEDURE — 99024 POSTOP FOLLOW-UP VISIT: CPT | Performed by: PHYSICIAN ASSISTANT

## 2024-09-17 RX ORDER — AMLODIPINE BESYLATE 2.5 MG/1
2.5 TABLET ORAL DAILY
Qty: 30 TABLET | Refills: 0 | Status: SHIPPED | OUTPATIENT
Start: 2024-09-18 | End: 2024-09-19 | Stop reason: SDUPTHER

## 2024-09-17 RX ORDER — FERROUS SULFATE 325(65) MG
325 TABLET ORAL
Qty: 30 TABLET | Refills: 0 | Status: SHIPPED | OUTPATIENT
Start: 2024-09-17 | End: 2024-09-19 | Stop reason: SDUPTHER

## 2024-09-17 RX ADMIN — PANTOPRAZOLE SODIUM 40 MG: 40 TABLET, DELAYED RELEASE ORAL at 05:25

## 2024-09-17 RX ADMIN — FOLIC ACID 1 MG: 1 TABLET ORAL at 09:31

## 2024-09-17 RX ADMIN — COLCHICINE 0.6 MG: 0.6 TABLET ORAL at 09:31

## 2024-09-17 RX ADMIN — ASPIRIN 81 MG: 81 TABLET, COATED ORAL at 09:31

## 2024-09-17 RX ADMIN — AMLODIPINE BESYLATE 2.5 MG: 2.5 TABLET ORAL at 09:31

## 2024-09-17 RX ADMIN — TICAGRELOR 90 MG: 90 TABLET ORAL at 09:31

## 2024-09-17 RX ADMIN — ENOXAPARIN SODIUM 40 MG: 40 INJECTION SUBCUTANEOUS at 09:32

## 2024-09-17 RX ADMIN — CEFAZOLIN SODIUM 2000 MG: 2 SOLUTION INTRAVENOUS at 02:26

## 2024-09-17 RX ADMIN — METOPROLOL TARTRATE 25 MG: 25 TABLET, FILM COATED ORAL at 09:31

## 2024-09-17 RX ADMIN — ESCITALOPRAM OXALATE 5 MG: 5 TABLET, FILM COATED ORAL at 09:31

## 2024-09-17 NOTE — PLAN OF CARE
Problem: OCCUPATIONAL THERAPY ADULT  Goal: Performs self-care activities at highest level of function for planned discharge setting.  See evaluation for individualized goals.  Description: Treatment Interventions: ADL retraining, Functional transfer training, Endurance training, Patient/family training, Equipment evaluation/education, Continued evaluation, Energy conservation          See flowsheet documentation for full assessment, interventions and recommendations.   Outcome: Progressing  Note: Limitation: Decreased ADL status, Decreased Safe judgement during ADL, Decreased cognition, Decreased endurance, Decreased self-care trans, Decreased high-level ADLs  Prognosis: Fair  Assessment: Pt seen for OT treatment session day 2 on this date focused on ADL retraining, functional transfers and mobility, energy conservation, functional endurance, and patient education. Pt was greeted in chair and was cooperative throughout session. Following session, pt was left in chair with chair alarm on and all needs within reach. Pt continues to be limited by functional status related to ADLs and transfers requiring MIN A for standing UB dressing and toileting, although demonstrates improvements in standing grooming performing with MOD I. The patient's raw score on the -PAC Daily Activity Inpatient Short Form is 20. A raw score of greater than or equal to 19 suggests the patient may benefit from discharge to home. Please refer to the recommendation of the Occupational Therapist for safe discharge planning. Pt would benefit from continued acute OT intervention with plan to continue OT treatment sessions 2-3x per week. Recommend d/c to home with increased social support, level III services.     Rehab Resource Intensity Level, OT: III (Minimum Resource Intensity)

## 2024-09-17 NOTE — PROGRESS NOTES
Progress Note - Cardiac Surgery   Shane Chau . 78 y.o. male MRN: [id number]  Unit/Bed#: [facility]-323-01 Encounter: [id number]    Superficial sternal wound infection. S/P Sternal wound incision and drainage, washout, application of VAC dressing.         24 Hour Events: No events overnight. Denies incisional pain    Medications:   Scheduled Meds:  Current Facility-Administered Medications   Medication Dose Route Frequency Provider Last Rate    acetaminophen  650 mg Oral Q6H PRN Timbo Vazquez MD      amLODIPine  2.5 mg Oral Daily Timbo Vazquez MD      aspirin  81 mg Oral Daily Timbo Vazquez MD      atorvastatin  80 mg Oral Daily With Dinner Timbo Vazquez MD      cefazolin  2,000 mg Intravenous Q8H Timbo Vazquez MD Stopped (09/17/24 0321)    chlorhexidine  15 mL Swish & Spit Q12H [facility] Timbo Vazquez MD      colchicine  0.6 mg Oral Daily Timbo Vazquez MD      cyanocobalamin  1,000 mcg Oral QPM Timbo Vazquez MD      enoxaparin  40 mg Subcutaneous Q24H [facility] Timbo Vazquez MD      ergocalciferol  50,000 Units Oral Weekly Timbo Vazquez MD      escitalopram  5 mg Oral Daily Timbo aVzquez MD      ferrous sulfate  325 mg Oral Q48H Timbo Vazquez MD      folic acid  1 mg Oral Daily Timbo Vazquez MD      iron sucrose  200 mg Intravenous Once Timbo Vazquez MD      metoprolol tartrate  25 mg Oral BID Timbo Vazquez MD      pantoprazole  40 mg Oral Early Morning Timbo Vazquez MD      polyethylene glycol  17 g Oral Daily Timbo Vazquez MD      senna-docusate sodium  2 tablet Oral BID Timbo Vazquez MD      ticagrelor  90 mg Oral Q12H [facility] Nakul Nunez PA-C       Continuous Infusions:   PRN Meds:.  acetaminophen    Vitals:   Vitals:    09/16/24 0900 09/16/24 1536 09/16/24 2225 09/17/24 0641   BP:  131/80 138/83 159/97   Pulse:  72 73 78   Resp:  18 18   "  Temp:  97.6 °F (36.4 °C) 98.2 °F (36.8 °C) 98.1 °F (36.7 °C)   TempSrc:       SpO2: 96% 96% 96% 95%   Weight:       Height:           Telemetry: NSR; Heart Rate: 77    Intake/Output:     Intake/Output Summary (Last 24 hours) at 9/17/2024 0820  Last data filed at 9/17/2024 0648  Gross per 24 hour   Intake 540 ml   Output 1050 ml   Net -510 ml        Weights:   Weight (last 2 days)       None            Chest tube Output:    VAC 0 mL/8 hours  0 mL/24 hours            Results:   Results from last 7 days   Lab Units 09/11/24  0512   WBC Thousand/uL 7.17   HEMOGLOBIN g/dL 8.6*   HEMATOCRIT % 27.1*   PLATELETS Thousands/uL 267     Results from last 7 days   Lab Units 09/11/24  0512   SODIUM mmol/L 135   POTASSIUM mmol/L 4.0   CHLORIDE mmol/L 100   CO2 mmol/L 28   BUN mg/dL 16   CREATININE mg/dL 0.96   CALCIUM mg/dL 9.0     No results for input(s): \"MG\" in the last 72 hours.            Studies:  No new studies    Invasive Lines/Tubes:  Invasive Devices       Peripheral Intravenous Line  Duration             Peripheral IV 09/16/24 Left;Ventral (anterior) Forearm <1 day                    Physical Exam:    General: No acute distress, Alert, and Normal appearance  HEENT/NECK:  Normocephalic. Atraumatic.  no jugular venous distention.    Cardiac: Regular rate and rhythm and No murmurs/rubs/gallops  Pulmonary:  Breath sounds clear bilaterally  Abdomen:  Non-tender, Non-distended, and Normal bowel sounds  Incisions: Wound vac in pace, no drainage good suction  Extremities: Extremities warm/dry  Neuro: Alert and oriented X 3, Sensation is grossly intact, and No focal deficits  Skin: Warm/Dry, without rashes or lesions.        Assessment:  Principal Problem:    Sternal wound infection  Active Problems:    Abdominal aortic aneurysm without rupture (HCC)    Coronary artery disease of native artery of native heart with stable angina pectoris (HCC)    Primary hypertension    S/P CABG x 3    CKD (chronic kidney disease) stage 2, GFR " 60-89 ml/min    History of stroke    Anemia    Pain and swelling of right ankle    Severe protein-calorie malnutrition (HCC)       Superficial sternal wound infection. S/P Sternal wound incision and drainage, washout, application of VAC dressing.       Plan:    Klebsiella oxytoca sternal wound infection.    VAC in place with no appreciable drainage.      No need for further surgical debridement  Home wound vac delivered. Switched over to home wound vac today.  Ok for d/c home today with outpatient wound care for ongoing VAC management    OR gram stain negative  OR culutres (9/9): Few Colonies of Klebsiella oxytoca  Fungal culture with no growth to date     Abx per ID     History of TIA Brilinta resumed      SIGNATURE: Rahel Baig PA-C  DATE: September 17, 2024  TIME: 8:20 AM

## 2024-09-17 NOTE — DISCHARGE SUMMARY
Discharge Summary - Hospitalist   Name: Shane Chau Sr. 78 y.o. male I MRN: [id number]  Unit/Bed#: [facility]-323-01 I Date of Admission: 9/5/2024   Date of Service: 9/17/2024 I Hospital Day: 12     Assessment & Plan  Sternal wound infection  Patient history of CABG in 6/2024 with postoperative hospital stay complicated by stroke and post op ileus with 1 week history of progressively worsening swelling near the superior sternotomy site and low-grade fevers over the last 4 days.  Outpatient imaging as below concerning for sternal dehiscence and fluid collection for which patient was recommended ED admission.  9/4 US sternum poorly marginated complex fluid in the area measuring 4.2 x 1.3 x 1.8 cm.  9/5 CT chest: Increased lucency along the midline sternal site, concerning for developing dehiscence.  There is also increased edema throughout this region, with findings concerning for a potential for collection along the anterior aspect of the sternum.  There is also new likely reactive lymphadenopathy within the prevascular mediastinum concerning for superinfection.  S/p aspiration by IR on 9/5, aspirate positive for Klebsiella oxytoca  Status post I&D and VAC dressing by CT surgery on 9/9.  Cultures positive for Klebsiella oxytoca  Patient received IV cefazolin 2 g every 8 hours and completed course per infectious disease  VAC dressing/wound care per CT surgery  Patient was cleared to be discharged home today with VNA      Abdominal aortic aneurysm without rupture (HCC)  S/p repair in 2011  Outpatient follow-up  Coronary artery disease of native artery of native heart with stable angina pectoris (HCC)  Status post CABG in 6/2024  Continue aspirin Brilinta metoprolol atorvastatin  Primary hypertension  Blood pressures reviewed  Continue amlodipine 2.5 mg p.o. daily  Monitor blood pressures  Avoid hypotension  CKD (chronic kidney disease) stage 2, GFR 60-89 ml/min  Lab Results   Component Value Date    EGFR 75 09/11/2024     EGFR 62 09/09/2024    EGFR 79 09/06/2024    CREATININE 0.96 09/11/2024    CREATININE 1.12 09/09/2024    CREATININE 0.92 09/06/2024     Monitor kidney function  History of stroke  History of recent stroke s/p CABG with no residual focal deficits  Continue aspirin, atorvastatin  Anemia    Continue with iron supplementation  Monitor hemoglobin  Pain and swelling of right ankle  Clinically and symptomatically improved  Uric acid 4.1  Elevated CRP noted  Completed colchicine course  Monitor  Severe protein-calorie malnutrition (HCC)  Malnutrition Findings:   Adult Malnutrition type: Chronic illness  Adult Degree of Malnutrition: Other severe protein calorie malnutrition  Malnutrition Characteristics: Fat loss, Muscle loss, Weight loss                  360 Statement: severe protein energy malnutrition is related to chronic illness as evidenced by moderate/severe muscle/fat wasting and severe unintentional weight loss (40#/20% x3m, 34#/17.6% weight loss x6m). Treated with diet and addition of oral nutrition supplement.    BMI Findings:           Body mass index is 22.84 kg/m².     S/P CABG x 3       Medical Problems       Resolved Problems  Date Reviewed: 9/17/2024   None       Discharging Physician / Practitioner: Kenny Mcdowell DO  PCP: Sherwin Mantilla MD  Admission Date:   Admission Orders (From admission, onward)       Ordered        09/05/24 1219  INPATIENT ADMISSION  Once                          Discharge Date: 09/17/24    Consultations During Hospital Stay:  Cardiac surgery  Infectious disease  IR    Procedures Performed:   Chest CT scan with  There is increased lucency along the midline sternotomy site, concerning for developing dehiscence. There is also increased edema throughout this region, with findings concerning for a potential fluid collection along the anterior aspect of the   sternum. There is also new likely reactive lymphadenopathy within the prevascular mediastinum, when compared with the CTA from  7/3/2024. These findings are concerning for superinfection.      IR aspiration    Stable chest x-ray  Significant Findings / Test Results:   As above    Incidental Findings:   none    Test Results Pending at Discharge (will require follow up):   none     Outpatient Tests Requested:  none    Complications:  none    Reason for Admission:   Sternal wound infection    Hospital Course:   Shane Chau Sr. is a 78 y.o. male patient who originally presented to the hospital on 9/5/2024 due to swelling over sternotomy site    Patient with a PMH of recent CABG in 6/2024, recent stroke on DAPT, hypertension, AAA s/p repair, CKD who presents with swelling and erythema over his sternotomy site worsening over the last 1 week.  He also reports intermittent low-grade fevers over the last 4 days.  No chest pain or shortness of breath.  History of sternal wound drainage post op for which has been treated with oral antibiotics outpatient by surgical team. His PCP order US which revealed fluid collection on 9/4. Follow-up CT chest ordered by PCP was performed on 9/5 which revealed concerns for developing midline sternotomy site wound dehiscence along with fluid collection along the anterior aspect of the sternum with reactive lymphadenopathy within the prevascular mediastinum concerning for superinfection. He was instructed to go to the ED by PCP for further evaluation.  Vital signs stable in ED.  Labs unremarkable     Patient was treated with IV antibiotic I&D and VAC dressing with improvement in his symptoms  Completed antibiotic course cleared by cardiac surgery and ID to be discharged home with VAC dressing and VNA      Please see above list of diagnoses and related plan for additional information.     Condition at Discharge: stable    Discharge Day Visit / Exam:   Subjective:    Patient seen and examined   Comfortable sitting in chair  No event overnight  No chest pain or shortness of breath    Vitals: Blood Pressure:  "138/92 (09/17/24 1045)  Pulse: 70 (09/17/24 1045)  Temperature: 98.1 °F (36.7 °C) (09/17/24 0641)  Temp Source: Oral (09/16/24 0015)  Respirations: 18 (09/16/24 2225)  Height: 5' 10\" (177.8 cm) (09/09/24 0500)  Weight - Scale: 72.2 kg (159 lb 2.8 oz) (09/09/24 0830)  SpO2: 97 % (09/17/24 1045)  Exam:   Physical Exam   Patient is awake alert oriented no acute distress  Comfortable sitting in chair  Lung clear to auscultation bilateral  Heart positive S1-S2 no murmur  VAC dressing in place over sternal wound  Abdomen soft nontender  Extremities no edema    Discussion with Family: Patient    Discharge instructions/Information to patient and family:   See after visit summary for information provided to patient and family.      Provisions for Follow-Up Care:  See after visit summary for information related to follow-up care and any pertinent home health orders.      Mobility at time of Discharge:   Basic Mobility Inpatient Raw Score: 24  JH-HLM Goal: 8: Walk 250 feet or more  JH-HLM Achieved: 7: Walk 25 feet or more  HLM Goal NOT achieved. Continue to encourage mobility in post discharge setting.     Disposition:   Home with VNA Services (Reminder: Complete face to face encounter)    Planned Readmission: no    Discharge Medications:  See after visit summary for reconciled discharge medications provided to patient and/or family.      Administrative Statements   Discharge Statement:  I have spent a total time of 35 minutes in caring for this patient on the day of the visit/encounter. >30 minutes of time was spent on: Communicating with other healthcare professionals .    **Please Note: This note may have been constructed using a voice recognition system**  "

## 2024-09-17 NOTE — OCCUPATIONAL THERAPY NOTE
Occupational Therapy Progress Note     Patient Name: Shane Chau Sr.  Today's Date: 9/17/2024  Problem List  Principal Problem:    Sternal wound infection  Active Problems:    Abdominal aortic aneurysm without rupture (HCC)    Coronary artery disease of native artery of native heart with stable angina pectoris (HCC)    Primary hypertension    S/P CABG x 3    CKD (chronic kidney disease) stage 2, GFR 60-89 ml/min    History of stroke    Anemia    Pain and swelling of right ankle    Severe protein-calorie malnutrition (HCC)            09/17/24 1116   OT Last Visit   OT Visit Date 09/17/24   Note Type   Note Type Treatment   Pain Assessment   Pain Assessment Tool 0-10   Pain Score No Pain   Restrictions/Precautions   Weight Bearing Precautions Per Order No   Other Precautions Cardiac/sternal;Chair Alarm;Multiple lines;Telemetry  (VAC)   Lifestyle   Autonomy Pta pt I with ADL and functional mobility with SPC, assist for some IADL, (+)    Reciprocal Relationships supportive spouse   Service to Others retired   Intrinsic Gratification enjoys time with family   ADL   Where Assessed Standing at sink   Grooming Assistance 6  Modified Independent   Grooming Comments Pt stands at sink to brush teeth with MOD I   UB Dressing Assistance 4  Minimal Assistance   UB Dressing Deficit Verbal cueing;Supervision/safety;Pull around back   UB Dressing Comments Pt doffs gown, dons new gown with MIN A for assist due to VAC management, assist to pull around back, performed standing in bathroom.   Toileting Assistance  4  Minimal Assistance   Toileting Deficit Verbal cueing;Supervison/safety;Increased time to complete;Clothing management up   Toileting Comments Pt requiring assist for clothing management following BM, performs post. hygiene without physical assist.   Bed Mobility   Additional Comments Pt greeted and left in chair with alarm on and all needs within reach.   Transfers   Sit to Stand 6  Modified independent    Stand to Sit 6  Modified independent   Toilet transfer 5  Supervision   Additional items Increased time required;Verbal cues;Standard toilet   Additional Comments with RW, STS x 3   Functional Mobility   Functional Mobility 6  Modified independent   Additional Comments Pt performs household distance mobility with MOD I with RW, requiring one seated rest break.   Additional items Rolling walker   Cognition   Overall Cognitive Status WFL   Arousal/Participation Cooperative;Alert   Attention Within functional limits   Orientation Level Oriented X4   Memory Within functional limits   Following Commands Follows one step commands without difficulty   Comments Pt cooperative to therapy, verbalizes understanding to all vc for safety throughout ADL, verbalizes understanding to ways to safely manage VAC during FM and ADLs.   Activity Tolerance   Activity Tolerance Patient limited by fatigue   Medical Staff Made Aware RN cleared for therapy   Assessment   Assessment Pt seen for OT treatment session day 2 on this date focused on ADL retraining, functional transfers and mobility, energy conservation, functional endurance, and patient education. Pt was greeted in chair and was cooperative throughout session. Following session, pt was left in chair with chair alarm on and all needs within reach. Pt continues to be limited by functional status related to ADLs and transfers requiring MIN A for standing UB dressing and toileting, although demonstrates improvements in standing grooming performing with MOD I. The patient's raw score on the AM-PAC Daily Activity Inpatient Short Form is 20. A raw score of greater than or equal to 19 suggests the patient may benefit from discharge to home. Please refer to the recommendation of the Occupational Therapist for safe discharge planning. Pt would benefit from continued acute OT intervention with plan to continue OT treatment sessions 2-3x per week. Recommend d/c to home with increased social  support, level III services.   Plan   Treatment Interventions ADL retraining;Functional transfer training;Endurance training;Continued evaluation;Energy conservation   Goal Expiration Date 09/25/24   OT Treatment Day 2   OT Frequency 2-3x/wk   Discharge Recommendation   Rehab Resource Intensity Level, OT III (Minimum Resource Intensity)   AM-PAC Daily Activity Inpatient   Lower Body Dressing 3   Bathing 3   Toileting 3   Upper Body Dressing 3   Grooming 4   Eating 4   Daily Activity Raw Score 20   Daily Activity Standardized Score (Calc for Raw Score >=11) 42.03   AM-PAC Applied Cognition Inpatient   Following a Speech/Presentation 4   Understanding Ordinary Conversation 4   Taking Medications 4   Remembering Where Things Are Placed or Put Away 4   Remembering List of 4-5 Errands 3   Taking Care of Complicated Tasks 3   Applied Cognition Raw Score 22   Applied Cognition Standardized Score 47.83   End of Consult   Education Provided Yes   Patient Position at End of Consult Bedside chair;All needs within reach;Bed/Chair alarm activated   Nurse Communication Nurse aware of consult       YARON Rae, OTR/L

## 2024-09-17 NOTE — ASSESSMENT & PLAN NOTE
Patient history of CABG in 6/2024 with postoperative hospital stay complicated by stroke and post op ileus with 1 week history of progressively worsening swelling near the superior sternotomy site and low-grade fevers over the last 4 days.  Outpatient imaging as below concerning for sternal dehiscence and fluid collection for which patient was recommended ED admission.  9/4 US sternum poorly marginated complex fluid in the area measuring 4.2 x 1.3 x 1.8 cm.  9/5 CT chest: Increased lucency along the midline sternal site, concerning for developing dehiscence.  There is also increased edema throughout this region, with findings concerning for a potential for collection along the anterior aspect of the sternum.  There is also new likely reactive lymphadenopathy within the prevascular mediastinum concerning for superinfection.  S/p aspiration by IR on 9/5, aspirate positive for Klebsiella oxytoca  Status post I&D and VAC dressing by CT surgery on 9/9.  Cultures positive for Klebsiella oxytoca  Patient received IV cefazolin 2 g every 8 hours and completed course per infectious disease  VAC dressing/wound care per CT surgery  Patient was cleared to be discharged home today with VNA

## 2024-09-17 NOTE — PROGRESS NOTES
"Progress Note - Infectious Disease   Name: Shane Chau Sr. 78 y.o. male I MRN: [id number]  Unit/Bed#: [facility]-323-01 I Date of Admission: 9/5/2024   Date of Service: 9/17/2024 I Hospital Day: 12    Assessment & Plan  Sternal wound infection  In setting of recent CABG, delayed postop wound healing.  Late onset along with CT findings suggested possibe deep infection with possible osteomyelitis.  Fortunately appears clinically stable without sepsis.  Status post IR aspiration of thick, cloudy fluid 9/5/2024.  Fluid culture with Klebsiella.  Status post operative I&D, VAC 9/9.  Intraoperative findings notable for purulence confined to soft tissue.  No exposure or communicaiton with deeper structures.  OR cultures also with Klebsiella.  Status post 10 days total, 7 days postop IV antibiotics.    OK from ID for D/C home off antibiotics  LWC/VAC per CT surgery    Antibiotics:  Off antibiotics #1  S/P CABG x 3  For CAD on 6/20/24. Complicated by postop ileus, delayed wound healing.         History of Present Illness   Had VAC change yesterday and small 1cm area inferiorly opened up.  No purulence.  Good granulation tissue noted.  Patient eager to go home.  No diarrhea.    Objective      Temp:  [97.6 °F (36.4 °C)-98.2 °F (36.8 °C)] 98.1 °F (36.7 °C)  HR:  [72-78] 78  Resp:  [18] 18  BP: (131-159)/(80-97) 159/97  O2 Device: None (Room air)          I/O last 24 hours:  In: 1140 [P.O.:1140]  Out: 1350 [Urine:1350]  Lines/Drains/Airways       Active Status       None                  Physical Exam   OOB to chair  Well-appearing  VAC to upper sternum without surrounding cellulitis  No rashes    Lab Results: I have reviewed the following results:   No results for input(s): \"WBC\", \"HGB\", \"HCT\", \"PLT\", \"BANDSPCT\", \"SODIUM\", \"K\", \"CL\", \"CO2\", \"BUN\", \"CREATININE\", \"GLUC\", \"CAIONIZED\", \"MG\", \"PHOS\", \"AST\", \"ALT\", \"ALB\", \"TBILI\", \"DBILI\", \"ALKPHOS\", \"PTT\", \"INR\", \"HSTNI0\", \"HSTNI2\", \"BNP\", \"LACTICACID\" in the last 72 " hours.  Micro:  Lab Results   Component Value Date    BLOODCX No Growth After 5 Days. 09/05/2024    BLOODCX No Growth After 5 Days. 09/05/2024    URINECX No Growth <1000 cfu/mL 06/29/2016     Imaging Review: No pertinent imaging studies reviewed.  Other Studies: No additional pertinent studies reviewed.

## 2024-09-17 NOTE — PLAN OF CARE
Problem: PHYSICAL THERAPY ADULT  Goal: Performs mobility at highest level of function for planned discharge setting.  See evaluation for individualized goals.  Description: Treatment/Interventions: Functional transfer training, LE strengthening/ROM, Elevations, Therapeutic exercise, Endurance training, Cognitive reorientation, Equipment eval/education, Bed mobility, Gait training, Spoke to nursing, OT  Equipment Recommended: Walker       See flowsheet documentation for full assessment, interventions and recommendations.  Outcome: Progressing  Note: Prognosis: Good  Problem List: Decreased endurance  Assessment: Pt seen for PT treatment session this date. Therapy session focused on ambulation, endurance training, dynamic standing balance and stair training in order to improve overall mobility and independence. Pt demonstrates mod I level for transfers and ambulation w/ RW and SUP level for x7 steps w/ HR for support. Pt able to stand unsupported while completing dynamic hygiene/ADL tasks w/ no overt LOB and minimal trunk sway noted. Pt making good progress toward goals. Pt was left sitting at the end of PT session with all needs in reach. Pt would benefit from continued PT services while in hospital to address remaining limitations. PT to continue treating pt and recommends home w/ level 3 resources. The patient's AM-PAC Basic Mobility Inpatient Short Form Raw Score is 24. A Raw score of greater than 16 suggests the patient may benefit from discharge to home. Please also refer to the recommendation of the Physical Therapist for safe discharge planning.  Barriers to Discharge: None     Rehab Resource Intensity Level, PT: III (Minimum Resource Intensity)    See flowsheet documentation for full assessment.

## 2024-09-17 NOTE — ASSESSMENT & PLAN NOTE
Clinically and symptomatically improved  Uric acid 4.1  Elevated CRP noted  Completed colchicine course  Monitor

## 2024-09-17 NOTE — ASSESSMENT & PLAN NOTE
In setting of recent CABG, delayed postop wound healing.  Late onset along with CT findings suggested possibe deep infection with possible osteomyelitis.  Fortunately appears clinically stable without sepsis.  Status post IR aspiration of thick, cloudy fluid 9/5/2024.  Fluid culture with Klebsiella.  Status post operative I&D, VAC 9/9.  Intraoperative findings notable for purulence confined to soft tissue.  No exposure or communicaiton with deeper structures.  OR cultures also with Klebsiella.  Status post 10 days total, 7 days postop IV antibiotics.    OK from ID for D/C home off antibiotics  LWC/VAC per CT surgery    Antibiotics:  Off antibiotics #1

## 2024-09-17 NOTE — PHYSICAL THERAPY NOTE
PHYSICAL THERAPY NOTE      Patient Name: Shane Chau Sr.  Today's Date: 9/17/2024 09/17/24 0815   PT Last Visit   PT Visit Date 09/17/24   Note Type   Note Type Treatment   Pain Assessment   Pain Assessment Tool 0-10   Pain Score No Pain   Restrictions/Precautions   Weight Bearing Precautions Per Order No   Braces or Orthoses   (denies)   Other Precautions Cardiac/sternal;Chair Alarm;Bed Alarm;Multiple lines;Fall Risk  (sternal wound VAC)   General   Chart Reviewed Yes   Response to Previous Treatment Patient with no complaints from previous session.   Family/Caregiver Present No   Cognition   Overall Cognitive Status WFL   Arousal/Participation Alert;Cooperative   Attention Within functional limits   Orientation Level Oriented X4   Memory Within functional limits   Following Commands Follows one step commands without difficulty   Comments Pt very pleasant and cooperative, receptive to all cues and education   Bed Mobility   Supine to Sit Unable to assess   Sit to Supine Unable to assess   Additional Comments Pt sitting in chair on arrival and returned to chair following PT session   Transfers   Sit to Stand 6  Modified independent   Additional items Increased time required   Stand to Sit 6  Modified independent   Additional items Increased time required   Toilet transfer 6  Modified independent   Additional items Increased time required;Standard toilet  (+ GB)   Additional Comments Transfers w/ RW. x6 STS completed t/o PT session from recliner chair, toilet and lowered seat height at mod I level   Ambulation/Elevation   Gait pattern Decreased foot clearance;Short stride;Excessively slow   Gait Assistance 6  Modified independent   Assistive Device Rolling walker   Distance 100' + 7 steps + 100'  (seated rest breaks inbetween 2/2 c/o fatigue)   Stair Management Assistance 5  Supervision   Additional items Increased time  required   Stair Management Technique One rail R;Alternating pattern;Foreward;Reciprocal   Number of Stairs 7   Ambulation/Elevation Additional Comments No overt LOB during ambulation and stair trial.   Balance   Static Sitting Good   Dynamic Sitting Fair +   Static Standing Fair +   Dynamic Standing Fair   Ambulatory Fair   Endurance Deficit   Endurance Deficit Yes   Endurance Deficit Description generalized fatigue and weakness   Activity Tolerance   Activity Tolerance Patient tolerated treatment well   Nurse Made Aware RN cleared and updated   Exercises   Balance training  standing balance/tolerance tested unsupported at sink while patient completed ADL/hygiene activities   Assessment   Prognosis Good   Problem List Decreased endurance   Assessment Pt seen for PT treatment session this date. Therapy session focused on ambulation, endurance training, dynamic standing balance and stair training in order to improve overall mobility and independence. Pt demonstrates mod I level for transfers and ambulation w/ RW and SUP level for x7 steps w/ HR for support. Pt able to stand unsupported while completing dynamic hygiene/ADL tasks w/ no overt LOB and minimal trunk sway noted. Pt making good progress toward goals. Pt was left sitting at the end of PT session with all needs in reach. Pt would benefit from continued PT services while in hospital to address remaining limitations. PT to continue treating pt and recommends home w/ level 3 resources. The patient's AM-[facility] Basic Mobility Inpatient Short Form Raw Score is 24. A Raw score of greater than 16 suggests the patient may benefit from discharge to home. Please also refer to the recommendation of the Physical Therapist for safe discharge planning.   Barriers to Discharge None   Goals   Patient Goals to go home   STG Expiration Date 09/21/24   PT Treatment Day 4   Plan   Treatment/Interventions Elevations;Functional transfer training;Endurance training;Gait training;Bed  mobility;Spoke to nursing;Spoke to case management   Progress Progressing toward goals   PT Frequency 3-5x/wk   Discharge Recommendation   Rehab Resource Intensity Level, PT III (Minimum Resource Intensity)   Equipment Recommended Walker   Walker Package Recommended Wheeled walker   AM-PAC Basic Mobility Inpatient   Turning in Flat Bed Without Bedrails 4   Lying on Back to Sitting on Edge of Flat Bed Without Bedrails 4   Moving Bed to Chair 4   Standing Up From Chair Using Arms 4   Walk in Room 4   Climb 3-5 Stairs With Railing 4   Basic Mobility Inpatient Raw Score 24   Basic Mobility Standardized Score 57.68   [facility] Highest Level Of Mobility   -HL Goal 8: Walk 250 feet or more   -HLM Achieved 7: Walk 25 feet or more   Education   Education Provided Mobility training;Assistive device   Patient Demonstrates verbal understanding   End of Consult   Patient Position at End of Consult Bedside chair;Bed/Chair alarm activated;All needs within reach     Jasmine Kang PT, DPT

## 2024-09-17 NOTE — PLAN OF CARE
Problem: PAIN - ADULT  Goal: Verbalizes/displays adequate comfort level or baseline comfort level  Description: Interventions:  - Encourage patient to monitor pain and request assistance  - Assess pain using appropriate pain scale  - Administer analgesics based on type and severity of pain and evaluate response  - Implement non-pharmacological measures as appropriate and evaluate response  - Consider cultural and social influences on pain and pain management  - Notify physician/advanced practitioner if interventions unsuccessful or patient reports new pain  Outcome: Progressing     Problem: INFECTION - ADULT  Goal: Absence or prevention of progression during hospitalization  Description: INTERVENTIONS:  - Assess and monitor for signs and symptoms of infection  - Monitor lab/diagnostic results  - Monitor all insertion sites, i.e. indwelling lines, tubes, and drains  - Monitor endotracheal if appropriate and nasal secretions for changes in amount and color  - [place] appropriate cooling/warming therapies per order  - Administer medications as ordered  - Instruct and encourage patient and family to use good hand hygiene technique  - Identify and instruct in appropriate isolation precautions for identified infection/condition  Outcome: Progressing  Goal: Absence of fever/infection during neutropenic period  Description: INTERVENTIONS:  - Monitor WBC    Outcome: Progressing     Problem: SAFETY ADULT  Goal: Patient will remain free of falls  Description: INTERVENTIONS:  - Educate patient/family on patient safety including physical limitations  - Instruct patient to call for assistance with activity   - Consult OT/PT to assist with strengthening/mobility   - Keep Call bell within reach  - Keep bed low and locked with side rails adjusted as appropriate  - Keep care items and personal belongings within reach  - Initiate and maintain comfort rounds  - Make Fall Risk Sign visible to staff  - Offer Toileting every 2 Hours,  in advance of need  - Initiate/Maintain chair/alarm alarm  - Apply yellow socks and bracelet for high fall risk patients  - Consider moving patient to room near nurses station  Outcome: Progressing  Goal: Maintain or return to baseline ADL function  Description: INTERVENTIONS:  -  Assess patient's ability to carry out ADLs; assess patient's baseline for ADL function and identify physical deficits which impact ability to perform ADLs (bathing, care of mouth/teeth, toileting, grooming, dressing, etc.)  - Assess/evaluate cause of self-care deficits   - Assess range of motion  - Assess patient's mobility; develop plan if impaired  - Assess patient's need for assistive devices and provide as appropriate  - Encourage maximum independence but intervene and supervise when necessary  - Involve family in performance of ADLs  - Assess for home care needs following discharge   - Consider OT consult to assist with ADL evaluation and planning for discharge  - Provide patient education as appropriate  Outcome: Progressing  Goal: Maintains/Returns to pre admission functional level  Description: INTERVENTIONS:  - Perform AM-PAC 6 Click Basic Mobility/ Daily Activity assessment daily.  - Set and communicate daily mobility goal to care team and patient/family/caregiver.   - Collaborate with rehabilitation services on mobility goals if consulted  - Perform Range of Motion 4 times a day.  - Reposition patient every 1 hours.  - Dangle patient 4 times a day  - Stand patient 4 times a day  - Ambulate patient 4 times a day  - Out of bed to chair 4 times a day   - Out of bed for meals 4 times a day  - Out of bed for toileting  - Record patient progress and toleration of activity level   Outcome: Progressing     Problem: Knowledge Deficit  Goal: Patient/family/caregiver demonstrates understanding of disease process, treatment plan, medications, and discharge instructions  Description: Complete learning assessment and assess knowledge  base.  Interventions:  - Provide teaching at level of understanding  - Provide teaching via preferred learning methods  Outcome: Progressing     Problem: DISCHARGE PLANNING  Goal: Discharge to home or other facility with appropriate resources  Description: INTERVENTIONS:  - Identify barriers to discharge w/patient and caregiver  - Arrange for needed discharge resources and transportation as appropriate  - Identify discharge learning needs (meds, wound care, etc.)  - Arrange for interpretive services to assist at discharge as needed  - Refer to Case Management Department for coordinating discharge planning if the patient needs post-hospital services based on physician/advanced practitioner order or complex needs related to functional status, cognitive ability, or social support system  Outcome: Progressing

## 2024-09-17 NOTE — CASE MANAGEMENT
Case Management Discharge Planning Note    Patient name Shane Chau .  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     Prediabetes 07/10/2024     Reaction at surgical site 07/10/2024     Cognitive impairment 07/10/2024     Hyponatremia 2024     History of stroke 2024     PAD (peripheral artery disease) ([facility]) 2024     Stroke-like symptoms 2024     CKD (chronic kidney disease) stage 2, GFR 60-89 ml/min 2024     S/P CABG x 3 2024     Rib pain 2023     Paresthesia 2023     Leg swelling 05/10/2022     Erectile dysfunction 2022     Microhematuria 2018     Need for pneumococcal vaccination 2018     Hematuria 2018     Asymptomatic bilateral carotid artery stenosis 2017     Closed compression fracture of first lumbar vertebra ([facility]) 2017     Abdominal aortic aneurysm without rupture ([facility]) 2016     Localized primary osteoarthritis of lower leg, unspecified laterality 2013     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     Transient cerebral ischemic attack 2013     Coronary artery disease of native artery of native heart with stable angina pectoris ([facility]) 2012     Hyperlipidemia 2012     Primary hypertension 2012       LOS (days): 12  Geometric Mean LOS (GMLOS) (days): 9.4  Days to GMLOS:-2.6      OBJECTIVE:  Risk of Unplanned Readmission Score: 18.62         Current admission status: Inpatient   Preferred Pharmacy:   [facility] - [place] - [address]  [address]  [place] [address]  Phone: [phone] Fax: [phone]    [id number] - [place] - [address]  [address]  [place] [address]  Phone: [phone] Fax: [phone]    [id number] - [place] - [address]  [address]  [place] [address]  Phone: [phone] Fax: [phone]    [facility] - [place] - [address]  [address]  [place] [address]  Phone: [phone] Fax: [phone]    [facility] [place] - [place] - [address]  [address]  [place] [address]  Phone: [phone] Fax: [phone]    Primary Care Provider: Sherwin Mantilla MD    Primary Insurance: [employer]  Secondary Insurance:     DISCHARGE DETAILS:                                                                                        IMM Given (Date):: 09/17/24 (given 0900)  IMM Given to:: Patient     Additional Comments: Home today with [facility] [facility], wife transporting, [facility] nsg/PT/OT

## 2024-09-18 ENCOUNTER — HOME CARE VISIT (OUTPATIENT)
Dept: HOME HEALTH SERVICES | Facility: HOME HEALTHCARE | Age: 79
End: 2024-09-18
Payer: COMMERCIAL

## 2024-09-18 ENCOUNTER — TELEPHONE (OUTPATIENT)
Dept: CARDIAC SURGERY | Facility: CLINIC | Age: 79
End: 2024-09-18

## 2024-09-18 VITALS — SYSTOLIC BLOOD PRESSURE: 130 MMHG | OXYGEN SATURATION: 97 % | HEART RATE: 71 BPM | DIASTOLIC BLOOD PRESSURE: 70 MMHG

## 2024-09-18 VITALS
OXYGEN SATURATION: 97 % | HEART RATE: 74 BPM | DIASTOLIC BLOOD PRESSURE: 76 MMHG | TEMPERATURE: 96.3 F | RESPIRATION RATE: 14 BRPM | SYSTOLIC BLOOD PRESSURE: 142 MMHG

## 2024-09-18 PROCEDURE — 400013 VN SOC

## 2024-09-18 PROCEDURE — 10330064 TAPE, ADHSV TRANSPORE WHT 2 (6RL/BX 10B"

## 2024-09-18 PROCEDURE — G0299 HHS/HOSPICE OF RN EA 15 MIN: HCPCS

## 2024-09-18 PROCEDURE — 10330064 DRESSING, HYDROCOLLOID THIN STR 4X4" (1"

## 2024-09-18 PROCEDURE — G0151 HHCP-SERV OF PT,EA 15 MIN: HCPCS

## 2024-09-18 PROCEDURE — G0152 HHCP-SERV OF OT,EA 15 MIN: HCPCS

## 2024-09-18 PROCEDURE — 10330064 SPONGE, GAUZE 8PLY N/S 4X4" (200/PK 20P"

## 2024-09-18 NOTE — CASE COMMUNICATION
Ship to - Patient home   Ordering MD -Timbo Vazquez MD;     Wound 1 - Full x       Frequency 3x/wk      Thin [id number]

## 2024-09-18 NOTE — UTILIZATION REVIEW
NOTIFICATION OF ADMISSION DISCHARGE   This is a Notification of Discharge from [facility]. Please be advised that this patient has been discharge from our facility. Below you will find the admission and discharge date and time including the patient’s disposition.   UTILIZATION REVIEW CONTACT:  Sang Smith  Utilization   Network Utilization Review Department  Phone: [phone] x carefully listen to the prompts. All voicemails are confidential.  Email: [email or web address]     ADMISSION INFORMATION  PRESENTATION DATE: 9/5/2024 11:12 AM  OBERVATION ADMISSION DATE: N/A  INPATIENT ADMISSION DATE: 9/5/24 12:19 PM   DISCHARGE DATE: 9/17/2024  2:12 PM   DISPOSITION:Home with Home Health Care    Network Utilization Review Department  ATTENTION: Please call with any questions or concerns to [phone] and carefully listen to the prompts so that you are directed to the right person. All voicemails are confidential.   For Discharge needs, contact Care Management DC Support Team at [phone] opt. 2  Send all requests for admission clinical reviews, approved or denied determinations and any other requests to dedicated fax number below belonging to the campus where the patient is receiving treatment. List of dedicated fax numbers for the Facilities:  FACILITY NAME UR FAX NUMBER   ADMISSION DENIALS (Administrative/Medical Necessity) [phone]   DISCHARGE SUPPORT TEAM ([facility]) [phone]   PARENT CHILD HEALTH (Maternity/NICU/Pediatrics) [phone]   General acute hospital [phone]   [facility] [phone]   [facility] [phone]   [facility] [phone]   [facility] [phone]   [facility] [phone]   [facility] [phone]   [facility]  [phone]   [facility] [phone]   [facility] [phone]   [facility] [phone]   [facility] [phone]

## 2024-09-18 NOTE — CASE COMMUNICATION
Medication discrepancies or Major drug interactions: Amlodipine and iron sent to mail order pharmacy and will take a few days to arrive. If needs sooner please call into [facility].  Not taking melatonin. Needs a refill of pantoprazole 40 mg daily.     Abnormal clinical findings:   TC to CT surgery notified Essie Garcia RN vac black sponge applied in hospital directly to skin periwound causing weeping around the  wound, picture in media. Verbal order obtained to cleanse with NSS, apply thin hydrocolloid PRN to betty original wound for weeping, then protective layer of vac drape followed by black foam in wound and for trac pad. Then cover with drape cut hole large enough for trac pad apply trac pad. Change Vac three times per week Mon Wed Fri.   This report is informational only, no response is needed  [facility] has Admitted your patient to [facility] service with the following disciplines: SN, PT and OT  Patient stated goals of care: walk better  Potential barriers to goal achievementForgetgulness  Primary focus of home health care:Wound  Anticipated visit pattern and next visit date: 2w1 3w7 6 PRN for VAC complications  Thank you for allowing us to participate in the care of your patient.      Dominic Gonzalez RN

## 2024-09-18 NOTE — CASE COMMUNICATION
Ship to - clinician  Ordering MD -Timbo Vazquez MD;     Wound 1 - Full x       Frequency 3x/wk    Gauze 4x4 N/S 200 4x4s per unit  [id number]   Transpore white  2in [id number] -1  Thin [id number] -1

## 2024-09-18 NOTE — TELEPHONE ENCOUNTER
Spoke with BUSHRA Alfaro RN.   Agree with his plan of adding a hydrocolloid to the surrounding area of sternal wound, and with adjustment of vac change schedule.

## 2024-09-18 NOTE — CASE COMMUNICATION
TC to CT surgery notified Essie to vac black sponge applied in hospital directly to skin periwound causing a much larger wound, picture in media. Requested if ok to cleanse with NSS, apply thin hydrocolloid to betty original wound then protective layer of vac drape followed by black foam in wound and for trac pad. Then cover with drape cut hole large enough for trac pad apply trac pad. Change Vac three times per week Mon Wed Fri.

## 2024-09-19 ENCOUNTER — OFFICE VISIT (OUTPATIENT)
Dept: FAMILY MEDICINE CLINIC | Facility: CLINIC | Age: 79
End: 2024-09-19
Payer: COMMERCIAL

## 2024-09-19 VITALS
HEART RATE: 76 BPM | TEMPERATURE: 97.8 F | DIASTOLIC BLOOD PRESSURE: 78 MMHG | BODY MASS INDEX: 22.47 KG/M2 | SYSTOLIC BLOOD PRESSURE: 156 MMHG | WEIGHT: 156.6 LBS | OXYGEN SATURATION: 97 % | RESPIRATION RATE: 16 BRPM

## 2024-09-19 VITALS — DIASTOLIC BLOOD PRESSURE: 68 MMHG | OXYGEN SATURATION: 96 % | SYSTOLIC BLOOD PRESSURE: 122 MMHG | HEART RATE: 63 BPM

## 2024-09-19 DIAGNOSIS — S21.101A STERNAL WOUND INFECTION: ICD-10-CM

## 2024-09-19 DIAGNOSIS — K21.9 GASTROESOPHAGEAL REFLUX DISEASE WITHOUT ESOPHAGITIS: ICD-10-CM

## 2024-09-19 DIAGNOSIS — F41.9 ANXIETY: Primary | ICD-10-CM

## 2024-09-19 DIAGNOSIS — L08.9 STERNAL WOUND INFECTION: ICD-10-CM

## 2024-09-19 DIAGNOSIS — E78.49 OTHER HYPERLIPIDEMIA: ICD-10-CM

## 2024-09-19 DIAGNOSIS — I10 PRIMARY HYPERTENSION: ICD-10-CM

## 2024-09-19 DIAGNOSIS — D64.9 ANEMIA, UNSPECIFIED TYPE: ICD-10-CM

## 2024-09-19 DIAGNOSIS — I25.118 CORONARY ARTERY DISEASE OF NATIVE ARTERY OF NATIVE HEART WITH STABLE ANGINA PECTORIS (HCC): ICD-10-CM

## 2024-09-19 PROCEDURE — 99496 TRANSJ CARE MGMT HIGH F2F 7D: CPT | Performed by: FAMILY MEDICINE

## 2024-09-19 RX ORDER — FERROUS SULFATE 325(65) MG
325 TABLET ORAL
Qty: 45 TABLET | Refills: 0 | Status: SHIPPED | OUTPATIENT
Start: 2024-09-19

## 2024-09-19 RX ORDER — ESCITALOPRAM OXALATE 5 MG/1
5 TABLET ORAL DAILY
Qty: 90 TABLET | Refills: 3 | Status: SHIPPED | OUTPATIENT
Start: 2024-09-19

## 2024-09-19 RX ORDER — AMLODIPINE BESYLATE 2.5 MG/1
2.5 TABLET ORAL DAILY
Qty: 90 TABLET | Refills: 0 | Status: SHIPPED | OUTPATIENT
Start: 2024-09-19

## 2024-09-19 RX ORDER — PANTOPRAZOLE SODIUM 40 MG/1
40 TABLET, DELAYED RELEASE ORAL DAILY
Qty: 90 TABLET | Refills: 3 | Status: SHIPPED | OUTPATIENT
Start: 2024-09-19

## 2024-09-19 NOTE — ASSESSMENT & PLAN NOTE
GERD.  Patient denies any symptoms at this time but will continue with pantoprazole 40 mg once daily.

## 2024-09-19 NOTE — ASSESSMENT & PLAN NOTE
Anxiety.  Patient feels symptoms are stable at this time and he will continue with current dose of Lexapro.  Medication was sent to pharmacy

## 2024-09-19 NOTE — PROGRESS NOTES
FAMILY PRACTICE OFFICE VISIT       NAME: Shane Chau Sr.  AGE: 78 y.o. SEX: male       : 1945        MRN: [id number]    DATE: 2024  TIME: 11:13 AM    Assessment and Plan     Problem List Items Addressed This Visit       Coronary artery disease of native artery of native heart with stable angina pectoris (HCC)     Coronary artery disease.  Patient denies any chest pain or shortness of breath at this time.  He will follow-up with cardiologist to monitor condition         Hyperlipidemia     Hyperlipidemia.  Lipid panel is stable on current dose of statin therapy         Primary hypertension     Hypertension.  The patient's blood pressure is stable at this time and he will continue current regimen of medications         Anemia     Anemia.  Patient with iron deficiency anemia.  He will start taking ferrous sulfate 65 mg every other day and follow-up with hematologist next month for any further evaluation.         Anxiety - Primary     Anxiety.  Patient feels symptoms are stable at this time and he will continue with current dose of Lexapro.  Medication was sent to pharmacy         Relevant Medications    escitalopram (LEXAPRO) 5 mg tablet    Gastroesophageal reflux disease without esophagitis     GERD.  Patient denies any symptoms at this time but will continue with pantoprazole 40 mg once daily.         Relevant Medications    pantoprazole (PROTONIX) 40 mg tablet     TCM Call       Date and time call was made  2024  4:11 PM    Hospital care reviewed  Records reviewed    Patient was hospitialized at  [facility]    Date of Admission  24    Date of discharge  24    Diagnosis  Sternal wound infection    Disposition  Home; Home health services    Were the patients medications reviewed and updated  No    Current Symptoms  None          TCM Call       Post hospital issues  None    Should patient be enrolled in anticoag monitoring?  No    Scheduled for follow up?  Yes     Patients specialists  Cardiologist; Neurologist    Did you obtain your prescribed medications  Yes    Do you need help managing your prescriptions or medications  No    Is transportation to your appointment needed  No    I have advised the patient to call PCP with any new or worsening symptoms  Tamara Castorena MA    Living Arrangements  Alone    Are you recieving any outpatient services  No    Are you recieving home care services  No    Are you using any community resources  No    Current waiver services  No    Have you fallen in the last 12 months  No    Interperter language line needed  No    Counseling topics  Activities of daily living; Importance of RX compliance    Comments  Patient called and schedule TCM appt with Dr. Mantilla on 09/19/2024 at 10:40 am          Hospital Course:   Shane Chau Sr. is a 78 y.o. male patient who originally presented to the hospital on 9/5/2024 due to swelling over sternotomy site     Patient with a PMH of recent CABG in 6/2024, recent stroke on DAPT, hypertension, AAA s/p repair, CKD who presents with swelling and erythema over his sternotomy site worsening over the last 1 week.  He also reports intermittent low-grade fevers over the last 4 days.  No chest pain or shortness of breath.  History of sternal wound drainage post op for which has been treated with oral antibiotics outpatient by surgical team. His PCP order US which revealed fluid collection on 9/4. Follow-up CT chest ordered by PCP was performed on 9/5 which revealed concerns for developing midline sternotomy site wound dehiscence along with fluid collection along the anterior aspect of the sternum with reactive lymphadenopathy within the prevascular mediastinum concerning for superinfection. He was instructed to go to the ED by PCP for further evaluation.  Vital signs stable in ED.  Labs unremarkable      Patient was treated with IV antibiotic I&D and VAC dressing with improvement in his symptoms  Completed  antibiotic course cleared by cardiac surgery and ID to be discharged home with VAC dressing and VNA           Chief Complaint     Chief Complaint   Patient presents with    Transition of Care Management       History of Present Illness     I reviewed the patient's discharge summary from his latest hospitalization.  Patient is accompanied by his wife.  He is slowly recovering from infection of incisional wound after bypass surgery.  Patient does have visiting nurses evaluating progress of healing.  He does have a VAC system on the wound of anterior chest.  He denies any fevers.  Patient was discovered to have iron deficiency anemia and was ordered ferrous sulfate to take every other day.  He does have an appointment with the hematologist next month for further evaluation.  Patient is ambulating in a household with the use of a walking cane.  He denies any chest pain or shortness of breath.  He does get fatigued easily and takes naps often throughout the day.        Review of Systems   Review of Systems   Constitutional:  Positive for fatigue.   HENT: Negative.     Eyes: Negative.    Respiratory: Negative.     Cardiovascular: Negative.    Gastrointestinal: Negative.    Genitourinary: Negative.    Musculoskeletal: Negative.    Skin: Negative.    Neurological: Negative.    Psychiatric/Behavioral: Negative.         Active Problem List     Patient Active Problem List   Diagnosis    Closed compression fracture of first lumbar vertebra (HCC)    Abdominal aortic aneurysm without rupture (HCC)    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HCC)    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HCC)    Asymptomatic bilateral carotid artery stenosis    Coronary artery disease of native artery of native heart with stable angina pectoris (HCC)    Hyperlipidemia    Primary hypertension    Transient cerebral ischemic attack    Need for pneumococcal vaccination    Hematuria    Microhematuria    Erectile dysfunction    Leg swelling    Paresthesia    Rib pain    S/P  CABG x 3    Localized primary osteoarthritis of lower leg, unspecified laterality    CKD (chronic kidney disease) stage 2, GFR 60-89 ml/min    Stroke-like symptoms    Hyponatremia    History of stroke    PAD (peripheral artery disease) ([facility])    Postoperative anemia due to acute blood loss    Stenosis of left vertebral artery    Prediabetes    Reaction at surgical site    Cognitive impairment    Surgical wound, non healing    Hospital discharge follow-up    Sternal wound infection    Anemia    Pain and swelling of right ankle    Severe protein-calorie malnutrition ([facility])    Anxiety    Gastroesophageal reflux disease without esophagitis       Past Medical History:  Past Medical History:   Diagnosis Date    AAA (abdominal aortic aneurysm) ([facility])     s/p open repair    BPH (benign prostatic hyperplasia)     CAD (coronary artery disease)     Cholelithiasis     Former tobacco use     History of MI (myocardial infarction)     History of TIA (transient ischemic attack)     Plavix in past, now on Brilinta    Hyperlipidemia     Hypertension     Insomnia     Obesity        Past Surgical History:  Past Surgical History:   Procedure Laterality Date    ABDOMINAL AORTIC ANEURYSM REPAIR  06/07/2011    Dr. Yarbrough 6/7/11    APPENDECTOMY      CARDIAC CATHETERIZATION Left 06/03/2024    Procedure: Cardiac Left Heart Cath;  Surgeon: Pk Blanco DO;  Location: [facility];  Service: Cardiology    CARDIAC CATHETERIZATION  06/03/2024    Procedure: Cardiac catheterization;  Surgeon: Pk Blanco DO;  [facility]: [facility];  Service: Cardiology    CARDIAC CATHETERIZATION N/A 06/03/2024    Procedure: Cardiac Coronary Angiogram;  Surgeon: Pk Blanco DO;  [facility]: [facility];  Service: Cardiology    CATARACT EXTRACTION Right 11/2018    CHOLECYSTECTOMY LAPAROSCOPIC      COLONOSCOPY  11/2006    CORONARY ANGIOPLASTY      3 STENTS INSERTED    HARVEST VEIN Left 6/20/2024    Procedure: HARVEST  VEIN ENDOSCOPIC (EVH);  Surgeon: KALPANA Amos MD;  Location: [facility];  Service: Cardiac Surgery    IR ASPIRATION ONLY  9/5/2024    NM CORONARY ARTERY BYP W/VEIN & ARTERY GRAFT 3 VEIN N/A 6/20/2024    Procedure: CORONARY ARTERY BYPASS GRAFT (CABG) X3 VESSELS, LIMA - LAD, LEFT LEG EVH/SVG - PDA AND OM1, W/ DEDRICK;  Surgeon: KALPANA Amos MD;  Location: [facility];  Service: Cardiac Surgery    NM LAPS SURG CHOLECYSTECTOMY W/CHOLANGIOGRAPHY N/A 09/08/2016    Procedure: CHOLECYSTECTOMY LAPAROSCOPIC with intra-op cholangiogram ;  Surgeon: Prasanth Verdin MD;  Location: [facility];  Service: General    NM STERNAL DEBRIDEMENT N/A 9/9/2024    Procedure: DEBRIDEMENT STERNAL WOUND (WASH OUT);  Surgeon: KALPANA Amos MD;  [facility];  Service: Cardiac Surgery    NM STERNAL DEBRIDEMENT N/A 9/11/2024    Procedure: DEBRIDEMENT STERNAL WOUND (WASH OUT) VAC CHANGE;  Surgeon: KALPANA Amos MD;  Location: [facility];  Service: Cardiac Surgery       Family History:  Family History   Problem Relation Age of Onset    Heart disease Mother     Hypertension Mother         Benign Essential     Stroke Mother         Stroke     Heart disease Father     Coronary artery disease Brother     Diabetes Brother         mellitus     Heart disease Brother     Aortic aneurysm Brother     Coronary artery disease Brother     Sudden death Brother     Coronary artery disease Brother     Sudden death Brother     Sudden death Brother     Coronary artery disease Brother     Heart disease Family        Social History:  Social History     Socioeconomic History    Marital status: /Civil Union     Spouse name: Not on file    Number of children: Not on file    Years of education: Not on file    Highest education level: Not on file   Occupational History    Occupation: Retired    Tobacco Use    Smoking status: Former     Current packs/day: 0.00     Average packs/day: 0.5 packs/day for 30.0 years (15.0 ttl  pk-yrs)     Types: Cigarettes     Start date: 3/10/1956     Quit date: 3/10/1986     Years since quittin.5    Smokeless tobacco: Never    Tobacco comments:     Quit  about 25 years x1ppd   Vaping Use    Vaping status: Never Used   Substance and Sexual Activity    Alcohol use: Not Currently     Comment: 4 oz glass of wine every other day w/ his dinner    Drug use: No    Sexual activity: Not Currently     Partners: Female   Other Topics Concern    Not on file   Social History Narrative    Not on file     Social Determinants of Health     Financial Resource Strain: Low Risk  (2023)    Overall Financial Resource Strain (CARDIA)     Difficulty of Paying Living Expenses: Not very hard   Food Insecurity: No Food Insecurity (2024)    Hunger Vital Sign     Worried About Running Out of Food in the Last Year: Never true     Ran Out of Food in the Last Year: Never true   Transportation Needs: No Transportation Needs (2024)    PRAPARE - Transportation     Lack of Transportation (Medical): No     Lack of Transportation (Non-Medical): No   Physical Activity: Not on file   Stress: Not on file   Social Connections: Unknown (2024)    Received from [employer]     How often do you feel lonely or isolated from those around you? (Adult - for ages 18 years and over): Not on file   Intimate Partner Violence: Not on file   Housing Stability: Low Risk  (2024)    Housing Stability Vital Sign     Unable to Pay for Housing in the Last Year: No     Number of Times Moved in the Last Year: 0     Homeless in the Last Year: No       Objective     Vitals:    24 1037   BP: 156/78   Pulse: 76   Resp: 16   Temp: 97.8 °F (36.6 °C)   SpO2: 97%     Wt Readings from Last 3 Encounters:   24 71 kg (156 lb 9.6 oz)   24 72.2 kg (159 lb 2.8 oz)   24 71.7 kg (158 lb)       Physical Exam  Constitutional:       General: He is not in acute distress.     Appearance: Normal appearance. He is  not ill-appearing.   HENT:      Head: Normocephalic and atraumatic.   Eyes:      General:         Right eye: No discharge.         Left eye: No discharge.      Extraocular Movements: Extraocular movements intact.      Conjunctiva/sclera: Conjunctivae normal.      Pupils: Pupils are equal, round, and reactive to light.   Neck:      Vascular: No carotid bruit.   Cardiovascular:      Rate and Rhythm: Normal rate and regular rhythm.      Heart sounds: Normal heart sounds. No murmur heard.  Pulmonary:      Effort: Pulmonary effort is normal.      Breath sounds: Normal breath sounds. No wheezing, rhonchi or rales.   Musculoskeletal:      Right lower leg: No edema.      Left lower leg: No edema.      Comments: Patient ambulates slowly with the use of a walking cane.   Lymphadenopathy:      Cervical: No cervical adenopathy.   Skin:     Findings: No rash.      Comments: HVAC in place over anterior chest incision.  No active drainage noted at this time.  Patient slightly tender to palpation of sternal area bilaterally.   Neurological:      General: No focal deficit present.      Mental Status: He is alert and oriented to person, place, and time.      Cranial Nerves: No cranial nerve deficit.   Psychiatric:         Mood and Affect: Mood normal.         Behavior: Behavior normal.         Thought Content: Thought content normal.         Judgment: Judgment normal.         Pertinent Laboratory/Diagnostic Studies:  Lab Results   Component Value Date    GLUCOSE 140 06/20/2024    BUN 16 09/11/2024    CREATININE 0.96 09/11/2024    CALCIUM 9.0 09/11/2024     12/03/2015    K 4.0 09/11/2024    CO2 28 09/11/2024     09/11/2024     Lab Results   Component Value Date    ALT 41 09/05/2024    AST 31 09/05/2024    ALKPHOS 69 09/05/2024    BILITOT 0.65 12/02/2015       Lab Results   Component Value Date    WBC 7.17 09/11/2024    HGB 8.6 (L) 09/11/2024    HCT 27.1 (L) 09/11/2024    MCV 95 09/11/2024     09/11/2024       No  "results found for: \"TSH\"    Lab Results   Component Value Date    CHOL 138 08/20/2015     Lab Results   Component Value Date    TRIG 103 07/04/2024     Lab Results   Component Value Date    HDL 12 (L) 07/04/2024     Lab Results   Component Value Date    LDLCALC 10 07/04/2024     Lab Results   Component Value Date    HGBA1C 5.7 (H) 07/04/2024       Results for orders placed or performed during the hospital encounter of 09/05/24   Blood culture #1    Specimen: Arm, Left; Blood   Result Value Ref Range    Blood Culture No Growth After 5 Days.    Blood culture #2    Specimen: Arm, Right; Blood   Result Value Ref Range    Blood Culture No Growth After 5 Days.    Body fluid culture and Gram stain    Specimen: Abscess; Body Fluid   Result Value Ref Range    Body Fluid Culture, Sterile 1+ Growth of Klebsiella oxytoca (A)     Gram Stain Result No Polys or Bacteria seen        Susceptibility    Klebsiella oxytoca - FARHANA     ZID Performed Yes       Amoxicillin + Clavulanate <=8/4 Susceptible ug/ml     Ampicillin ($$) <=8.00 Resistant ug/ml     Ampicillin + Sulbactam ($) <=4/2 Susceptible ug/ml     Aztreonam ($$$)  <=4 Susceptible ug/ml     Cefazolin ($) <=2.00 Susceptible ug/ml     Ciprofloxacin ($)  <=0.25 Susceptible ug/ml     Gentamicin ($$) <=2 Susceptible ug/ml     Levofloxacin ($) <=0.50 Susceptible ug/ml     Tetracycline <=4 Susceptible ug/ml     Trimethoprim + Sulfamethoxazole ($$$) <=0.5/9.5 Susceptible ug/ml   Anaerobic culture and Gram stain    Specimen: Abscess; Body Fluid   Result Value Ref Range    Anaerobic Culture No anaerobes isolated    Fungal culture    Specimen: Other; Body Fluid   Result Value Ref Range    Fungus (Mycology) Culture No Fungus Isolated at 1 Week    MRSA culture    Specimen: Nose; Nares   Result Value Ref Range    MRSA Culture Only       No Methicillin Resistant Staphlyococcus aureus (MRSA) isolated   Anaerobic culture and Gram stain    Specimen: Soft Tissue, Other   Result Value Ref Range    " Anaerobic Culture No anaerobes isolated    Fungal culture    Specimen: Soft Tissue, Other   Result Value Ref Range    Fungus (Mycology) Culture No growth to date    Culture, tissue and Gram stain    Specimen: Soft Tissue, Other   Result Value Ref Range    Tissue Culture Few Colonies of Klebsiella oxytoca (A)     Gram Stain Result 2+ Polys     Gram Stain Result No bacteria seen        Susceptibility    Klebsiella oxytoca - FARHANA     ZID Performed Yes       Amoxicillin + Clavulanate <=8/4 Susceptible ug/ml     Ampicillin ($$) <=8.00 Resistant ug/ml     Ampicillin + Sulbactam ($) <=4/2 Susceptible ug/ml     Aztreonam ($$$)  <=4 Susceptible ug/ml     Cefazolin ($) <=2.00 Susceptible ug/ml     Ciprofloxacin ($)  <=0.25 Susceptible ug/ml     Gentamicin ($$) <=2 Susceptible ug/ml     Levofloxacin ($) <=0.50 Susceptible ug/ml     Tetracycline <=4 Susceptible ug/ml     Trimethoprim + Sulfamethoxazole ($$$) <=0.5/9.5 Susceptible ug/ml   CBC and differential   Result Value Ref Range    WBC 6.61 4.31 - 10.16 Thousand/uL    RBC 3.27 (L) 3.88 - 5.62 Million/uL    Hemoglobin 10.1 (L) 12.0 - 17.0 g/dL    Hematocrit 30.9 (L) 36.5 - 49.3 %    MCV 95 82 - 98 fL    MCH 30.9 26.8 - 34.3 pg    MCHC 32.7 31.4 - 37.4 g/dL    RDW 12.7 11.6 - 15.1 %    MPV 8.4 (L) 8.9 - 12.7 fL    Platelets 267 149 - 390 Thousands/uL    nRBC 0 /100 WBCs    Segmented % 66 43 - 75 %    Immature Grans % 1 0 - 2 %    Lymphocytes % 15 14 - 44 %    Monocytes % 13 (H) 4 - 12 %    Eosinophils Relative 4 0 - 6 %    Basophils Relative 1 0 - 1 %    Absolute Neutrophils 4.45 1.85 - 7.62 Thousands/µL    Absolute Immature Grans 0.04 0.00 - 0.20 Thousand/uL    Absolute Lymphocytes 0.97 0.60 - 4.47 Thousands/µL    Absolute Monocytes 0.87 0.17 - 1.22 Thousand/µL    Eosinophils Absolute 0.23 0.00 - 0.61 Thousand/µL    Basophils Absolute 0.05 0.00 - 0.10 Thousands/µL   Comprehensive metabolic panel   Result Value Ref Range    Sodium 134 (L) 135 - 147 mmol/L    Potassium 4.6 3.5  - 5.3 mmol/L    Chloride 102 96 - 108 mmol/L    CO2 25 21 - 32 mmol/L    ANION GAP 7 4 - 13 mmol/L    BUN 20 5 - 25 mg/dL    Creatinine 1.07 0.60 - 1.30 mg/dL    Glucose 97 65 - 140 mg/dL    Calcium 9.4 8.4 - 10.2 mg/dL    AST 31 13 - 39 U/L    ALT 41 7 - 52 U/L    Alkaline Phosphatase 69 34 - 104 U/L    Total Protein 7.4 6.4 - 8.4 g/dL    Albumin 3.6 3.5 - 5.0 g/dL    Total Bilirubin 0.53 0.20 - 1.00 mg/dL    eGFR 66 ml/min/1.73sq m   Protime-INR   Result Value Ref Range    Protime 14.9 12.3 - 15.0 seconds    INR 1.14 0.85 - 1.19   Basic metabolic panel   Result Value Ref Range    Sodium 135 135 - 147 mmol/L    Potassium 3.9 3.5 - 5.3 mmol/L    Chloride 102 96 - 108 mmol/L    CO2 23 21 - 32 mmol/L    ANION GAP 10 4 - 13 mmol/L    BUN 17 5 - 25 mg/dL    Creatinine 0.92 0.60 - 1.30 mg/dL    Glucose 93 65 - 140 mg/dL    Calcium 9.2 8.4 - 10.2 mg/dL    eGFR 79 ml/min/1.73sq m   CBC and differential   Result Value Ref Range    WBC 7.52 4.31 - 10.16 Thousand/uL    RBC 2.73 (L) 3.88 - 5.62 Million/uL    Hemoglobin 8.5 (L) 12.0 - 17.0 g/dL    Hematocrit 25.6 (L) 36.5 - 49.3 %    MCV 94 82 - 98 fL    MCH 31.1 26.8 - 34.3 pg    MCHC 33.2 31.4 - 37.4 g/dL    RDW 12.7 11.6 - 15.1 %    MPV 8.4 (L) 8.9 - 12.7 fL    Platelets 279 149 - 390 Thousands/uL    nRBC 0 /100 WBCs    Segmented % 66 43 - 75 %    Immature Grans % 0 0 - 2 %    Lymphocytes % 18 14 - 44 %    Monocytes % 11 4 - 12 %    Eosinophils Relative 4 0 - 6 %    Basophils Relative 1 0 - 1 %    Absolute Neutrophils 4.92 1.85 - 7.62 Thousands/µL    Absolute Immature Grans 0.03 0.00 - 0.20 Thousand/uL    Absolute Lymphocytes 1.36 0.60 - 4.47 Thousands/µL    Absolute Monocytes 0.84 0.17 - 1.22 Thousand/µL    Eosinophils Absolute 0.32 0.00 - 0.61 Thousand/µL    Basophils Absolute 0.05 0.00 - 0.10 Thousands/µL   CBC   Result Value Ref Range    WBC 7.84 4.31 - 10.16 Thousand/uL    RBC 3.07 (L) 3.88 - 5.62 Million/uL    Hemoglobin 9.6 (L) 12.0 - 17.0 g/dL    Hematocrit 29.1 (L)  36.5 - 49.3 %    MCV 95 82 - 98 fL    MCH 31.3 26.8 - 34.3 pg    MCHC 33.0 31.4 - 37.4 g/dL    RDW 12.6 11.6 - 15.1 %    Platelets 268 149 - 390 Thousands/uL    MPV 8.3 (L) 8.9 - 12.7 fL   APTT   Result Value Ref Range    PTT 45 (H) 23 - 34 seconds   Protime-INR   Result Value Ref Range    Protime 14.8 12.3 - 15.0 seconds    INR 1.13 0.85 - 1.19   APTT   Result Value Ref Range     (H) 23 - 34 seconds   APTT   Result Value Ref Range     (HH) 23 - 34 seconds   APTT   Result Value Ref Range    PTT 54 (H) 23 - 34 seconds   APTT   Result Value Ref Range    PTT 62 (H) 23 - 34 seconds   APTT   Result Value Ref Range    PTT 89 (H) 23 - 34 seconds   Basic metabolic panel   Result Value Ref Range    Sodium 134 (L) 135 - 147 mmol/L    Potassium 4.7 3.5 - 5.3 mmol/L    Chloride 101 96 - 108 mmol/L    CO2 27 21 - 32 mmol/L    ANION GAP 6 4 - 13 mmol/L    BUN 13 5 - 25 mg/dL    Creatinine 1.12 0.60 - 1.30 mg/dL    Glucose 101 65 - 140 mg/dL    Calcium 9.0 8.4 - 10.2 mg/dL    eGFR 62 ml/min/1.73sq m   CBC   Result Value Ref Range    WBC 7.32 4.31 - 10.16 Thousand/uL    RBC 2.87 (L) 3.88 - 5.62 Million/uL    Hemoglobin 8.9 (L) 12.0 - 17.0 g/dL    Hematocrit 27.5 (L) 36.5 - 49.3 %    MCV 96 82 - 98 fL    MCH 31.0 26.8 - 34.3 pg    MCHC 32.4 31.4 - 37.4 g/dL    RDW 12.6 11.6 - 15.1 %    Platelets 256 149 - 390 Thousands/uL    MPV 8.2 (L) 8.9 - 12.7 fL   Folate   Result Value Ref Range    Folate 9.4 >5.9 ng/mL   TIBC Panel (incl. Iron, TIBC, % Iron Saturation)   Result Value Ref Range    Iron Saturation 10 (L) 15 - 50 %    TIBC 216 (L) 250 - 450 ug/dL    Iron 21 (L) 50 - 212 ug/dL    UIBC 195 155 - 355 ug/dL   Ferritin   Result Value Ref Range    Ferritin 326 24 - 336 ng/mL   CBC and differential   Result Value Ref Range    WBC 7.17 4.31 - 10.16 Thousand/uL    RBC 2.86 (L) 3.88 - 5.62 Million/uL    Hemoglobin 8.6 (L) 12.0 - 17.0 g/dL    Hematocrit 27.1 (L) 36.5 - 49.3 %    MCV 95 82 - 98 fL    MCH 30.1 26.8 - 34.3 pg     MCHC 31.7 31.4 - 37.4 g/dL    RDW 12.8 11.6 - 15.1 %    MPV 8.5 (L) 8.9 - 12.7 fL    Platelets 267 149 - 390 Thousands/uL    nRBC 0 /100 WBCs    Segmented % 64 43 - 75 %    Immature Grans % 1 0 - 2 %    Lymphocytes % 17 14 - 44 %    Monocytes % 12 4 - 12 %    Eosinophils Relative 5 0 - 6 %    Basophils Relative 1 0 - 1 %    Absolute Neutrophils 4.67 1.85 - 7.62 Thousands/µL    Absolute Immature Grans 0.05 0.00 - 0.20 Thousand/uL    Absolute Lymphocytes 1.22 0.60 - 4.47 Thousands/µL    Absolute Monocytes 0.84 0.17 - 1.22 Thousand/µL    Eosinophils Absolute 0.34 0.00 - 0.61 Thousand/µL    Basophils Absolute 0.05 0.00 - 0.10 Thousands/µL   Basic metabolic panel   Result Value Ref Range    Sodium 135 135 - 147 mmol/L    Potassium 4.0 3.5 - 5.3 mmol/L    Chloride 100 96 - 108 mmol/L    CO2 28 21 - 32 mmol/L    ANION GAP 7 4 - 13 mmol/L    BUN 16 5 - 25 mg/dL    Creatinine 0.96 0.60 - 1.30 mg/dL    Glucose 102 65 - 140 mg/dL    Calcium 9.0 8.4 - 10.2 mg/dL    eGFR 75 ml/min/1.73sq m   C-reactive protein   Result Value Ref Range    CRP 75.0 (H) <3.0 mg/L   Uric acid   Result Value Ref Range    Uric Acid 4.1 3.5 - 8.5 mg/dL   Type and screen   Result Value Ref Range    ABO Grouping O     Rh Factor Positive     Antibody Screen Negative     Specimen Expiration Date 20240911    Prepare Leukoreduced RBC: 2 Units, Leukoreduced   Result Value Ref Range    Unit Product Code W7214X53     Unit Number [id number]     Unit ABO O     Unit RH POS     Crossmatch Compatible     Unit Dispense Status Return to Inv     Unit Product Volume 350 mL    Unit Product Code K0699U77     Unit Number [id number]     Unit ABO O     Unit RH POS     Crossmatch Compatible     Unit Dispense Status Return to Inv     Unit Product Volume 350 mL   Prepare Leukoreduced RBC: 2 Units   Result Value Ref Range    Unit Product Code A0836S22     Unit Number [id number]     Unit ABO O     Unit RH POS     Crossmatch Compatible     Unit Dispense Status  Return to Inv     Unit Product Volume 350 mL    Unit Product Code I1254C97     Unit Number [id number]     Unit ABO O     Unit RH POS     Crossmatch Compatible     Unit Dispense Status Return to Inv     Unit Product Volume 350 mL   Prepare Leukoreduced RBC: 4 Units   Result Value Ref Range    Unit Product Code S5501P39     Unit Number [id number]     Unit ABO O     Unit RH POS     Crossmatch Compatible     Unit Dispense Status Return to Inv     Unit Product Volume 350 ml    Unit Product Code V5366C80     Unit Number [id number]     Unit ABO O     Unit RH POS     Crossmatch Compatible     Unit Dispense Status Return to Inv     Unit Product Volume 350 mL    Unit Product Code D1114Y66     Unit Number [id number]     Unit ABO O     Unit RH POS     Crossmatch Compatible     Unit Dispense Status Return to Inv     Unit Product Volume 350 mL    Unit Product Code N1792I90     Unit Number [id number]     Unit ABO O     Unit RH POS     Crossmatch Compatible     Unit Dispense Status Return to Inv     Unit Product Volume 350 mL   Fingerstick Glucose (POCT)   Result Value Ref Range    POC Glucose 107 65 - 140 mg/dl   Fingerstick Glucose (POCT)   Result Value Ref Range    POC Glucose 117 65 - 140 mg/dl       No orders of the defined types were placed in this encounter.      ALLERGIES:  No Known Allergies    Current Medications     Current Outpatient Medications   Medication Sig Dispense Refill    acetaminophen (TYLENOL) 325 mg tablet Take 2 tablets (650 mg total) by mouth every 6 (six) hours as needed for mild pain, headaches or fever (Patient taking differently: Take 650 mg by mouth every 6 (six) hours as needed for mild pain, headaches or fever As needed)      amLODIPine (NORVASC) 2.5 mg tablet Take 1 tablet (2.5 mg total) by mouth daily 30 tablet 0    aspirin (ECOTRIN LOW STRENGTH) 81 mg EC tablet Take 1 tablet (81 mg total) by mouth daily      Cyanocobalamin (B-12) 1000 MCG SUBL Place 1 tablet (1,000 mcg  total) under the tongue in the morning 90 tablet 0    ergocalciferol (VITAMIN D2) 50,000 units Take 1 capsule (50,000 Units total) by mouth once a week For 6 weeks 6 capsule 0    escitalopram (LEXAPRO) 5 mg tablet Take 1 tablet (5 mg total) by mouth daily After 14 days, may increase to 2 tablets (10mg) daily if tolerating well. 42 tablet 0    escitalopram (LEXAPRO) 5 mg tablet Take 1 tablet (5 mg total) by mouth daily 90 tablet 3    ferrous sulfate 325 (65 Fe) mg tablet Take 1 tablet (325 mg total) by mouth every other day 30 tablet 0    metoprolol tartrate (LOPRESSOR) 50 mg tablet Take 0.5 tablets (25 mg total) by mouth 2 (two) times a day      pantoprazole (PROTONIX) 40 mg tablet Take 1 tablet (40 mg total) by mouth daily in the early morning 30 tablet 0    pantoprazole (PROTONIX) 40 mg tablet Take 1 tablet (40 mg total) by mouth daily 90 tablet 3    rosuvastatin (CRESTOR) 40 MG tablet Take 1 tablet (40 mg total) by mouth daily 90 tablet 3    ticagrelor (BRILINTA) 90 MG Take 1 tablet (90 mg total) by mouth every 12 (twelve) hours 180 tablet 3    melatonin 3 mg Take 2 tablets (6 mg total) by mouth daily at bedtime (Patient not taking: Reported on 9/3/2024)       No current facility-administered medications for this visit.         Health Maintenance     Health Maintenance   Topic Date Due    SLP PLAN OF CARE  Never done    Zoster Vaccine (1 of 2) Never done    Pneumococcal Vaccine: 65+ Years (2 of 2 - PCV) 08/06/2019    COVID-19 Vaccine (4 - 2023-24 season) 09/01/2024    Influenza Vaccine (1) 09/01/2024    Fall Risk  09/04/2025    Depression Screening  09/04/2025    Medicare Annual Wellness Visit (AWV)  09/04/2025    Hepatitis C Screening  Completed    RSV Vaccine Age 60+ Years  Completed    RSV Vaccine age 0-20 Months  Aged Out    HIB Vaccine  Aged Out    IPV Vaccine  Aged Out    Hepatitis A Vaccine  Aged Out    Meningococcal ACWY Vaccine  Aged Out    HPV Vaccine  Aged Out    Colorectal Cancer Screening   Discontinued     Immunization History   Administered Date(s) Administered    COVID-19 MODERNA VACC 0.5 ML IM 02/08/2021, 03/08/2021, 11/06/2021    INFLUENZA 10/15/2016, 11/03/2018, 11/03/2018, 10/24/2020, 10/16/2021, 10/15/2022, 10/07/2023    Influenza Split High Dose Preservative Free IM 11/24/2019    Influenza, high dose seasonal 0.7 mL 08/29/2020    Influenza, seasonal, injectable 09/28/2011, 10/06/2014, 11/05/2017    Pneumococcal Polysaccharide PPV23 08/06/2018    Respiratory Syncytial Virus Vaccine (Recombinant, Adjuvanted) 05/18/2024    Tdap 04/17/2017       Sherwin Mantilla MD

## 2024-09-19 NOTE — ASSESSMENT & PLAN NOTE
Coronary artery disease.  Patient denies any chest pain or shortness of breath at this time.  He will follow-up with cardiologist to monitor condition

## 2024-09-19 NOTE — TELEPHONE ENCOUNTER
Medications recently prescribed by hospital however, [facility] ([facility] [facility] mail order) requires 90 day scripts.     Reason for call:   [x] Refill   [] Prior Auth  [] Other:     Office:   [] PCP/Provider -   [x] Specialty/Provider - Cardio [facility]oc [facility]      amLODIPine (NORVASC) 2.5 mg tablet Take 1 tablet (2.5 mg total) by mouth daily     ferrous sulfate 325 (65 Fe) mg tablet  Take 1 tablet (325 mg total) by mouth every other day       Pharmacy: [facility] - [place] [facility] - [address] [place] [address]     Does the patient have enough for 3 days?   [] Yes   [x] No - Send as HP to POD

## 2024-09-19 NOTE — ASSESSMENT & PLAN NOTE
Anemia.  Patient with iron deficiency anemia.  He will start taking ferrous sulfate 65 mg every other day and follow-up with hematologist next month for any further evaluation.

## 2024-09-20 ENCOUNTER — HOME CARE VISIT (OUTPATIENT)
Dept: HOME HEALTH SERVICES | Facility: HOME HEALTHCARE | Age: 79
End: 2024-09-20
Payer: COMMERCIAL

## 2024-09-20 VITALS
RESPIRATION RATE: 18 BRPM | SYSTOLIC BLOOD PRESSURE: 122 MMHG | DIASTOLIC BLOOD PRESSURE: 72 MMHG | OXYGEN SATURATION: 95 % | HEART RATE: 72 BPM | TEMPERATURE: 97.6 F

## 2024-09-20 PROCEDURE — G0299 HHS/HOSPICE OF RN EA 15 MIN: HCPCS

## 2024-09-23 ENCOUNTER — HOME CARE VISIT (OUTPATIENT)
Dept: HOME HEALTH SERVICES | Facility: HOME HEALTHCARE | Age: 79
End: 2024-09-23
Payer: COMMERCIAL

## 2024-09-23 LAB
FUNGUS SPEC CULT: NORMAL
FUNGUS SPEC CULT: NORMAL

## 2024-09-23 PROCEDURE — G0299 HHS/HOSPICE OF RN EA 15 MIN: HCPCS

## 2024-09-24 ENCOUNTER — HOME CARE VISIT (OUTPATIENT)
Dept: HOME HEALTH SERVICES | Facility: HOME HEALTHCARE | Age: 79
End: 2024-09-24
Payer: COMMERCIAL

## 2024-09-24 VITALS
DIASTOLIC BLOOD PRESSURE: 68 MMHG | RESPIRATION RATE: 20 BRPM | OXYGEN SATURATION: 97 % | SYSTOLIC BLOOD PRESSURE: 130 MMHG | HEART RATE: 64 BPM

## 2024-09-24 VITALS
HEART RATE: 72 BPM | SYSTOLIC BLOOD PRESSURE: 132 MMHG | OXYGEN SATURATION: 98 % | RESPIRATION RATE: 18 BRPM | DIASTOLIC BLOOD PRESSURE: 72 MMHG | TEMPERATURE: 97.6 F

## 2024-09-24 PROCEDURE — G0151 HHCP-SERV OF PT,EA 15 MIN: HCPCS

## 2024-09-24 PROCEDURE — G0180 MD CERTIFICATION HHA PATIENT: HCPCS | Performed by: INTERNAL MEDICINE

## 2024-09-25 ENCOUNTER — HOME CARE VISIT (OUTPATIENT)
Dept: HOME HEALTH SERVICES | Facility: HOME HEALTHCARE | Age: 79
End: 2024-09-25
Payer: COMMERCIAL

## 2024-09-25 VITALS
TEMPERATURE: 98 F | SYSTOLIC BLOOD PRESSURE: 140 MMHG | RESPIRATION RATE: 20 BRPM | OXYGEN SATURATION: 96 % | DIASTOLIC BLOOD PRESSURE: 82 MMHG | HEART RATE: 84 BPM

## 2024-09-25 PROCEDURE — G0299 HHS/HOSPICE OF RN EA 15 MIN: HCPCS

## 2024-09-26 ENCOUNTER — HOME CARE VISIT (OUTPATIENT)
Dept: HOME HEALTH SERVICES | Facility: HOME HEALTHCARE | Age: 79
End: 2024-09-26
Payer: COMMERCIAL

## 2024-09-26 VITALS
OXYGEN SATURATION: 96 % | SYSTOLIC BLOOD PRESSURE: 122 MMHG | RESPIRATION RATE: 20 BRPM | HEART RATE: 86 BPM | DIASTOLIC BLOOD PRESSURE: 78 MMHG

## 2024-09-26 PROCEDURE — G0151 HHCP-SERV OF PT,EA 15 MIN: HCPCS

## 2024-09-27 ENCOUNTER — HOME CARE VISIT (OUTPATIENT)
Dept: HOME HEALTH SERVICES | Facility: HOME HEALTHCARE | Age: 79
End: 2024-09-27
Payer: COMMERCIAL

## 2024-09-27 VITALS
TEMPERATURE: 98.6 F | OXYGEN SATURATION: 97 % | RESPIRATION RATE: 16 BRPM | DIASTOLIC BLOOD PRESSURE: 70 MMHG | SYSTOLIC BLOOD PRESSURE: 130 MMHG | HEART RATE: 76 BPM

## 2024-09-27 PROCEDURE — G0300 HHS/HOSPICE OF LPN EA 15 MIN: HCPCS

## 2024-09-30 ENCOUNTER — HOME CARE VISIT (OUTPATIENT)
Dept: HOME HEALTH SERVICES | Facility: HOME HEALTHCARE | Age: 79
End: 2024-09-30
Payer: COMMERCIAL

## 2024-09-30 VITALS
RESPIRATION RATE: 16 BRPM | DIASTOLIC BLOOD PRESSURE: 80 MMHG | HEART RATE: 66 BPM | SYSTOLIC BLOOD PRESSURE: 120 MMHG | TEMPERATURE: 98.1 F | OXYGEN SATURATION: 98 %

## 2024-09-30 LAB
FUNGUS SPEC CULT: NORMAL
FUNGUS SPEC CULT: NORMAL

## 2024-09-30 PROCEDURE — G0300 HHS/HOSPICE OF LPN EA 15 MIN: HCPCS

## 2024-10-02 ENCOUNTER — HOME CARE VISIT (OUTPATIENT)
Dept: HOME HEALTH SERVICES | Facility: HOME HEALTHCARE | Age: 79
End: 2024-10-02
Payer: COMMERCIAL

## 2024-10-02 VITALS
SYSTOLIC BLOOD PRESSURE: 126 MMHG | TEMPERATURE: 98.3 F | HEART RATE: 78 BPM | OXYGEN SATURATION: 98 % | DIASTOLIC BLOOD PRESSURE: 80 MMHG | RESPIRATION RATE: 20 BRPM

## 2024-10-02 VITALS
SYSTOLIC BLOOD PRESSURE: 126 MMHG | DIASTOLIC BLOOD PRESSURE: 80 MMHG | OXYGEN SATURATION: 97 % | HEART RATE: 72 BPM | RESPIRATION RATE: 20 BRPM

## 2024-10-02 PROCEDURE — G0151 HHCP-SERV OF PT,EA 15 MIN: HCPCS

## 2024-10-02 PROCEDURE — G0300 HHS/HOSPICE OF LPN EA 15 MIN: HCPCS

## 2024-10-03 ENCOUNTER — HOME CARE VISIT (OUTPATIENT)
Dept: HOME HEALTH SERVICES | Facility: HOME HEALTHCARE | Age: 79
End: 2024-10-03
Payer: COMMERCIAL

## 2024-10-04 ENCOUNTER — HOME CARE VISIT (OUTPATIENT)
Dept: HOME HEALTH SERVICES | Facility: HOME HEALTHCARE | Age: 79
End: 2024-10-04
Payer: COMMERCIAL

## 2024-10-04 ENCOUNTER — OFFICE VISIT (OUTPATIENT)
Dept: CARDIAC SURGERY | Facility: CLINIC | Age: 79
End: 2024-10-04

## 2024-10-04 VITALS
DIASTOLIC BLOOD PRESSURE: 69 MMHG | WEIGHT: 159.9 LBS | HEIGHT: 70 IN | HEART RATE: 68 BPM | OXYGEN SATURATION: 97 % | BODY MASS INDEX: 22.89 KG/M2 | TEMPERATURE: 98.2 F | SYSTOLIC BLOOD PRESSURE: 125 MMHG

## 2024-10-04 VITALS
OXYGEN SATURATION: 97 % | HEART RATE: 60 BPM | SYSTOLIC BLOOD PRESSURE: 120 MMHG | RESPIRATION RATE: 20 BRPM | DIASTOLIC BLOOD PRESSURE: 74 MMHG

## 2024-10-04 DIAGNOSIS — T81.328A STERNAL WOUND DEHISCENCE, INITIAL ENCOUNTER: ICD-10-CM

## 2024-10-04 DIAGNOSIS — L08.9 STERNAL WOUND INFECTION: ICD-10-CM

## 2024-10-04 DIAGNOSIS — Z95.1 S/P CABG X 3: Primary | ICD-10-CM

## 2024-10-04 DIAGNOSIS — T81.89XD NON-HEALING SURGICAL WOUND, SUBSEQUENT ENCOUNTER: ICD-10-CM

## 2024-10-04 DIAGNOSIS — S21.101A STERNAL WOUND INFECTION: ICD-10-CM

## 2024-10-04 PROCEDURE — G0151 HHCP-SERV OF PT,EA 15 MIN: HCPCS

## 2024-10-04 PROCEDURE — 99024 POSTOP FOLLOW-UP VISIT: CPT | Performed by: THORACIC SURGERY (CARDIOTHORACIC VASCULAR SURGERY)

## 2024-10-04 NOTE — PROGRESS NOTES
Procedure: S/P coronary artery bypass grafting, performed on 6/2/24  Sternal wound dehiscence and infection; s/p debridement and washout of sternal wound with vac x2 on 9/9/24 and 9/11/24    History: Shane Chau Sr. is a 79 y.o. year old male who presents to our office today for routine post-surgical follow up care after sternal wound debridement, washout and vac placement. He had a CABG x3 on 6/20/24, and had a prolonged postop course complicated by TIA and ileus. He was discharged to [facility] on 7/9, and was seen by our service on 7/10 for murky drainage from his sternal incision. He was given antibiotics, and discharged from [facility] on 7/17/24. He had been evaluated several times in our office due to poor wound healing. He had also been seen and treated by the Wound care clinic. His last visit with us was on 8/16, and he had significant improvement in the healing of his incision, and he was discharged from our care. On 9/3 at his cardiology appointment, he was having URI symptoms and sternal tenderness. He saw his PCP on 9/4 and an US soft tissue sternum was obtained which demonstrated fluid collection concerning for sternal infection. CT chest ordered and completed on 9/5 and demonstrated concern for developing dehiscence and fluid collection. Patient was directed to [facility] ED, where he was evaluated by our team, and ultimately on 9/9 underwent debridement and washout of sternal wound with vac placement, followed by washout again on 9/11 and vac replacement. He remained in the hospital until 9/17 and then was discharged home with wound vac, and was having VNA do vac changes 3 days a week. He returns today for reassessment.     Patient is accompanied today by his wife. He states that he feels better. He has been walking around the block with his wife as a support, and his appetite has improved, and he is eating a little more than he was previously. He is anxious to get the wound vac off and to start cardiac rehab.  "        Vital Signs:   Vitals:    10/04/24 1538   BP: 125/69   BP Location: Left arm   Patient Position: Sitting   Cuff Size: Standard   Pulse: 68   Temp: 98.2 °F (36.8 °C)   TempSrc: Tympanic   SpO2: 97%   Weight: 72.5 kg (159 lb 14.4 oz)   Height: 5' 10\" (1.778 m)       Home Medications:   Prior to Admission medications    Medication Sig Start Date End Date Taking? Authorizing Provider   acetaminophen (TYLENOL) 325 mg tablet Take 2 tablets (650 mg total) by mouth every 6 (six) hours as needed for mild pain, headaches or fever  Patient taking differently: Take 650 mg by mouth every 6 (six) hours as needed for mild pain, headaches or fever As needed 7/9/24  Yes Sp Hagen PA-C   amLODIPine (NORVASC) 2.5 mg tablet Take 1 tablet (2.5 mg total) by mouth daily 9/19/24  Yes Kenny Mcdowell DO   aspirin (ECOTRIN LOW STRENGTH) 81 mg EC tablet Take 1 tablet (81 mg total) by mouth daily 7/9/24  Yes Sp Hagen PA-C   Cyanocobalamin (B-12) 1000 MCG SUBL Place 1 tablet (1,000 mcg total) under the tongue in the morning 8/30/24 11/28/24 Yes SANTHOSH Baeza   ergocalciferol (VITAMIN D2) 50,000 units Take 1 capsule (50,000 Units total) by mouth once a week For 6 weeks 8/30/24  Yes SANTHOSH Baeza   escitalopram (LEXAPRO) 5 mg tablet Take 1 tablet (5 mg total) by mouth daily 9/19/24  Yes Sherwin Mantilla MD   metoprolol tartrate (LOPRESSOR) 50 mg tablet Take 0.5 tablets (25 mg total) by mouth 2 (two) times a day 8/1/24  Yes SANTHOSH Dominguez   pantoprazole (PROTONIX) 40 mg tablet Take 1 tablet (40 mg total) by mouth daily 9/19/24  Yes Sherwin Mantilla MD   rosuvastatin (CRESTOR) 40 MG tablet Take 1 tablet (40 mg total) by mouth daily 6/3/24  Yes SANTHOSH Duckworth   ticagrelor (BRILINTA) 90 MG Take 1 tablet (90 mg total) by mouth every 12 (twelve) hours 8/1/24 7/27/25 Yes SANTHOSH Dominguez   escitalopram (LEXAPRO) 5 mg tablet Take 1 tablet (5 mg total) by mouth daily After 14 days, may increase to 2 " "tablets (10mg) daily if tolerating well.  Patient not taking: Reported on 10/4/2024 8/26/24   SANTHOSH Baeza   ferrous sulfate 325 (65 Fe) mg tablet Take 1 tablet (325 mg total) by mouth every other day  Patient not taking: Reported on 10/4/2024 9/19/24   Kenny CoburnDO keila   melatonin 3 mg Take 2 tablets (6 mg total) by mouth daily at bedtime  Patient not taking: Reported on 9/3/2024 7/9/24   Sp Hagen PA-C   pantoprazole (PROTONIX) 40 mg tablet Take 1 tablet (40 mg total) by mouth daily in the early morning  Patient not taking: Reported on 10/4/2024 7/18/24   SANTHOSH Braun       Physical Exam:    HEENT/NECK:  Normocephalic. Atraumatic.  No jugular venous distention.    Cardiac: Regular rate and rhythm  Pulmonary:  Breath sounds clear bilaterally  Abdomen:  Non-tender, Non-distended, and Normal bowel sounds  Incisions: sternum stable, vac in place with good suction at top portion of incision. No surrounding redness. Vac cannister with minimal drainage.   Extremities: Extremities warm/dry  Neuro: Alert and oriented X 3.  Sensation is grossly intact.  No focal deficits.  Skin: Warm/Dry, without rashes or lesions.    Lab Results:               Invalid input(s): \"LABGLOM\"      Lab Results   Component Value Date    HGBA1C 5.7 (H) 07/04/2024     Lab Results   Component Value Date    TROPONINI <0.02 12/03/2015       Imaging Studies:     CT Chest 9/5/24  IMPRESSION:  There is increased lucency along the midline sternotomy site, concerning for developing dehiscence. There is also increased edema throughout this region, with findings concerning for a potential fluid collection along the anterior aspect of the   sternum. There is also new likely reactive lymphadenopathy within the prevascular mediastinum, when compared with the CTA from 7/3/2024. These findings are concerning for superinfection.      XR chest: 9/9/24  IMPRESSION:  Stable, chronic fracture of the fourth sternal wire    Results Review " Statement: I personally reviewed the following image studies/reports in PACS and discussed pertinent findings with Radiology: chest xray and CT chest. My interpretation of the radiology images/reports is: as above.    Assessment: S/P coronary artery bypass grafting, performed on 6/2/24  Sternal wound dehiscence and infection; s/p debridement and washout of sternal wound with vac x2 on 9/9/24 and 9/11/24    Plan:   - Maintain vac for now   - Referral to outpatient wound care clinic, wound care instructions at their discretion   - return to see Dr. Amos in 3 weeks   - continue exercise with ambulation as tolerated        Ernestina Jj PA-C  10/04/24    * This note was completed in part utilizing [employer] direct voice recognition software.   Grammatical errors, random word insertion, spelling mistakes, and incomplete sentences may be an occasional consequence of the system secondary to software limitations, ambient noise and hardware issues. At the time of dictation, efforts were made to edit, clarify and /or correct errors. Please read the chart carefully and recognize, using context, where substitutions have occurred.  If you have any questions or concerns about the context, text or information contained within the body of this dictation, please contact myself, the provider, for further clarification.

## 2024-10-07 ENCOUNTER — HOME CARE VISIT (OUTPATIENT)
Dept: HOME HEALTH SERVICES | Facility: HOME HEALTHCARE | Age: 79
End: 2024-10-07
Payer: COMMERCIAL

## 2024-10-07 VITALS
OXYGEN SATURATION: 97 % | HEART RATE: 66 BPM | SYSTOLIC BLOOD PRESSURE: 118 MMHG | DIASTOLIC BLOOD PRESSURE: 62 MMHG | RESPIRATION RATE: 18 BRPM | TEMPERATURE: 97.6 F

## 2024-10-07 LAB
FUNGUS SPEC CULT: NORMAL
FUNGUS SPEC CULT: NORMAL

## 2024-10-07 PROCEDURE — G0299 HHS/HOSPICE OF RN EA 15 MIN: HCPCS

## 2024-10-09 ENCOUNTER — HOME CARE VISIT (OUTPATIENT)
Dept: HOME HEALTH SERVICES | Facility: HOME HEALTHCARE | Age: 79
End: 2024-10-09
Payer: COMMERCIAL

## 2024-10-09 ENCOUNTER — OFFICE VISIT (OUTPATIENT)
Dept: WOUND CARE | Facility: HOSPITAL | Age: 79
End: 2024-10-09
Payer: COMMERCIAL

## 2024-10-09 VITALS
HEART RATE: 68 BPM | WEIGHT: 159 LBS | TEMPERATURE: 97.5 F | RESPIRATION RATE: 16 BRPM | SYSTOLIC BLOOD PRESSURE: 154 MMHG | HEIGHT: 65 IN | DIASTOLIC BLOOD PRESSURE: 73 MMHG | BODY MASS INDEX: 26.49 KG/M2

## 2024-10-09 DIAGNOSIS — T81.89XA NON-HEALING SURGICAL WOUND, INITIAL ENCOUNTER: Primary | ICD-10-CM

## 2024-10-09 PROCEDURE — 99213 OFFICE O/P EST LOW 20 MIN: CPT | Performed by: ORTHOPAEDIC SURGERY

## 2024-10-09 NOTE — PROGRESS NOTES
Patient ID: Shane Chau Sr. is a 79 y.o. male Date of Birth 1945       Chief Complaint   Patient presents with    New Patient Visit     Non healing surgical wound       Allergies:  Patient has no known allergies.    Diagnosis:   Diagnosis ICD-10-CM Associated Orders   1. Non-healing surgical wound, initial encounter  T81.89XA Wound cleansing and dressings Surgical Mid Chest     Wound Procedure Treatment Surgical Mid Chest           Assessment and Plan :  Initial Evaluation of non-healing surgical wound. No signs of infection today.  Hold wound vac for now.  W\wound management with Purchol Ag followed by calcium alginate and DSD, see wound orders below   Do not wet wound.   No harsh cleansers such as alcohol, peroxide, or antibacterial soap, do not submerge in water such as bathtub, hot tub, swimming pool, ocean, etc.   Can cleanse with NSS at dressing changes.   Avoid direct pressure to Wound site.  Counseled on adequate protein intake (chicken, fish, yogurt, eggs and nuts), 3-4 servings per day to expedite wound healing.   Followup in 1 week(s) or call sooner with questions or concerns or any signs of infection such as redness, swelling, increased/purulent drainage, fever, chills, increased severe pain.     Subjective:   10/9: Patient is a 79 y.o. male well known to [facility] with history of CABG x 3 (on Brilinta/ASA 81mg) on 6/20/2024 with postoperative hospital stay complicated by stroke, post-op ileus sternal wound dehiscence who presents for initial eval of non healing surgical wound which has been present since 9/9/24 after I&D  with a repeat washout on 9/11/24 of infected sternal wound.  Fluid and OR cultures were positive for Klebsiella oxytoca. Since then pt was treated with 2 courses of antibiotics: ceftriaxone and cefazolin. Pt has been applying wound vac on wound bed. Does not have an odor.  No diabetes.  No smoking, ETOH or drug use. Pt denies any sob, fatigue, N/V, CP, fevers or chills.    Pt  is accompanied by his wife who is actively involved in his care.        The following portions of the patient's history were reviewed and updated as appropriate:   Patient Active Problem List   Diagnosis    Closed compression fracture of first lumbar vertebra (HCC)    Abdominal aortic aneurysm without rupture (Formerly [facility])    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Formerly [facility])    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Formerly [facility])    Asymptomatic bilateral carotid artery stenosis    Coronary artery disease of native artery of native heart with stable angina pectoris (Formerly [facility])    Hyperlipidemia    Primary hypertension    Transient cerebral ischemic attack    Need for pneumococcal vaccination    Hematuria    Microhematuria    Erectile dysfunction    Leg swelling    Paresthesia    Rib pain    S/P CABG x 3    Localized primary osteoarthritis of lower leg, unspecified laterality    CKD (chronic kidney disease) stage 2, GFR 60-89 ml/min    Stroke-like symptoms    Hyponatremia    History of stroke    PAD (peripheral artery disease) ([facility])    Postoperative anemia due to acute blood loss    Stenosis of left vertebral artery    Prediabetes    Reaction at surgical site    Cognitive impairment    Surgical wound, non healing    Hospital discharge follow-up    Sternal wound infection    Anemia    Pain and swelling of right ankle    Severe protein-calorie malnutrition ([facility]ly [facility])    Anxiety    Gastroesophageal reflux disease without esophagitis     Past Medical History:   Diagnosis Date    AAA (abdominal aortic aneurysm) ([facility])     s/p open repair    BPH (benign prostatic hyperplasia)     CAD (coronary artery disease)     Cholelithiasis     Former tobacco use     History of MI (myocardial infarction)     History of TIA (transient ischemic attack)     Plavix in past, now on Brilinta    Hyperlipidemia     Hypertension     Insomnia     Obesity      Past Surgical History:   Procedure Laterality Date    ABDOMINAL AORTIC ANEURYSM REPAIR  06/07/2011    Dr. Yarbrough 6/7/11    APPENDECTOMY       CARDIAC CATHETERIZATION Left 06/03/2024    Procedure: Cardiac Left Heart Cath;  Surgeon: Pk Blanco DO;  [facility]: [facility];  Service: Cardiology    CARDIAC CATHETERIZATION  06/03/2024    Procedure: Cardiac catheterization;  Surgeon: Pk Blanco DO;  [facility];  Service: Cardiology    CARDIAC CATHETERIZATION N/A 06/03/2024    Procedure: Cardiac Coronary Angiogram;  Surgeon: Pk Blanco DO;  [facility]: [facility];  Service: Cardiology    CATARACT EXTRACTION Right 11/2018    CHOLECYSTECTOMY LAPAROSCOPIC      COLONOSCOPY  11/2006    CORONARY ANGIOPLASTY      3 STENTS INSERTED    HARVEST VEIN Left 6/20/2024    Procedure: HARVEST VEIN ENDOSCOPIC (EVH);  Surgeon: KALPANA Amos MD;  Location: [facility];  Service: Cardiac Surgery    IR ASPIRATION ONLY  9/5/2024    MA CORONARY ARTERY BYP W/VEIN & ARTERY GRAFT 3 VEIN N/A 6/20/2024    Procedure: CORONARY ARTERY BYPASS GRAFT (CABG) X3 VESSELS, LIMA - LAD, LEFT LEG EVH/SVG - PDA AND OM1, W/ DEDRICK;  Surgeon: KALPANA Amos MD;  Location: [facility];  Service: Cardiac Surgery    MA LAPS SURG CHOLECYSTECTOMY W/CHOLANGIOGRAPHY N/A 09/08/2016    Procedure: CHOLECYSTECTOMY LAPAROSCOPIC with intra-op cholangiogram ;  Surgeon: Prasanth Verdin MD;  Location: [facility];  Service: General    MA STERNAL DEBRIDEMENT N/A 9/9/2024    Procedure: DEBRIDEMENT STERNAL WOUND (WASH OUT);  Surgeon: KALPANA Amos MD;  [facility];  Service: Cardiac Surgery    MA STERNAL DEBRIDEMENT N/A 9/11/2024    Procedure: DEBRIDEMENT STERNAL WOUND (WASH OUT) VAC CHANGE;  Surgeon: KALPANA Amos MD;  [facility];  Service: Cardiac Surgery     Family History   Problem Relation Age of Onset    Heart disease Mother     Hypertension Mother         Benign Essential     Stroke Mother         Stroke     Heart disease Father     Coronary artery disease Brother     Diabetes Brother         mellitus      Heart disease Brother     Aortic aneurysm Brother     Coronary artery disease Brother     Sudden death Brother     Coronary artery disease Brother     Sudden death Brother     Sudden death Brother     Coronary artery disease Brother     Heart disease Family      Social History     Socioeconomic History    Marital status: /Civil Union     Spouse name: Not on file    Number of children: Not on file    Years of education: Not on file    Highest education level: Not on file   Occupational History    Occupation: Retired    Tobacco Use    Smoking status: Former     Current packs/day: 0.00     Average packs/day: 0.5 packs/day for 30.0 years (15.0 ttl pk-yrs)     Types: Cigarettes     Start date: 3/10/1956     Quit date: 3/10/1986     Years since quittin.6    Smokeless tobacco: Never    Tobacco comments:     Quit  about 25 years x1ppd   Vaping Use    Vaping status: Never Used   Substance and Sexual Activity    Alcohol use: Not Currently     Comment: 4 oz glass of wine every other day w/ his dinner    Drug use: No    Sexual activity: Not Currently     Partners: Female   Other Topics Concern    Not on file   Social History Narrative    Not on file     Social Determinants of Health     Financial Resource Strain: Low Risk  (2023)    Overall Financial Resource Strain (CARDIA)     Difficulty of Paying Living Expenses: Not very hard   Food Insecurity: No Food Insecurity (2024)    Hunger Vital Sign     Worried About Running Out of Food in the Last Year: Never true     Ran Out of Food in the Last Year: Never true   Transportation Needs: No Transportation Needs (2024)    PRAPARE - Transportation     Lack of Transportation (Medical): No     Lack of Transportation (Non-Medical): No   Physical Activity: Not on file   Stress: Not on file   Social Connections: Unknown (2024)    Received from [employer]    Social Connections     How often do you feel lonely or isolated from those around you? (Adult - for  ages 18 years and over): Not on file   Intimate Partner Violence: Not on file   Housing Stability: Low Risk  (9/6/2024)    Housing Stability Vital Sign     Unable to Pay for Housing in the Last Year: No     Number of Times Moved in the Last Year: 0     Homeless in the Last Year: No       Current Outpatient Medications:     acetaminophen (TYLENOL) 325 mg tablet, Take 2 tablets (650 mg total) by mouth every 6 (six) hours as needed for mild pain, headaches or fever (Patient taking differently: Take 650 mg by mouth every 6 (six) hours as needed for mild pain, headaches or fever As needed), Disp: , Rfl:     amLODIPine (NORVASC) 2.5 mg tablet, Take 1 tablet (2.5 mg total) by mouth daily, Disp: 90 tablet, Rfl: 0    aspirin (ECOTRIN LOW STRENGTH) 81 mg EC tablet, Take 1 tablet (81 mg total) by mouth daily, Disp: , Rfl:     Cyanocobalamin (B-12) 1000 MCG SUBL, Place 1 tablet (1,000 mcg total) under the tongue in the morning, Disp: 90 tablet, Rfl: 0    ergocalciferol (VITAMIN D2) 50,000 units, Take 1 capsule (50,000 Units total) by mouth once a week For 6 weeks, Disp: 6 capsule, Rfl: 0    escitalopram (LEXAPRO) 5 mg tablet, Take 1 tablet (5 mg total) by mouth daily After 14 days, may increase to 2 tablets (10mg) daily if tolerating well. (Patient not taking: Reported on 10/4/2024), Disp: 42 tablet, Rfl: 0    escitalopram (LEXAPRO) 5 mg tablet, Take 1 tablet (5 mg total) by mouth daily, Disp: 90 tablet, Rfl: 3    ferrous sulfate 325 (65 Fe) mg tablet, Take 1 tablet (325 mg total) by mouth every other day (Patient not taking: Reported on 10/4/2024), Disp: 45 tablet, Rfl: 0    melatonin 3 mg, Take 2 tablets (6 mg total) by mouth daily at bedtime (Patient not taking: Reported on 9/3/2024), Disp: , Rfl:     metoprolol tartrate (LOPRESSOR) 50 mg tablet, Take 0.5 tablets (25 mg total) by mouth 2 (two) times a day, Disp: , Rfl:     pantoprazole (PROTONIX) 40 mg tablet, Take 1 tablet (40 mg total) by mouth daily in the early morning  "(Patient not taking: Reported on 10/4/2024), Disp: 30 tablet, Rfl: 0    pantoprazole (PROTONIX) 40 mg tablet, Take 1 tablet (40 mg total) by mouth daily, Disp: 90 tablet, Rfl: 3    rosuvastatin (CRESTOR) 40 MG tablet, Take 1 tablet (40 mg total) by mouth daily, Disp: 90 tablet, Rfl: 3    ticagrelor (BRILINTA) 90 MG, Take 1 tablet (90 mg total) by mouth every 12 (twelve) hours, Disp: 180 tablet, Rfl: 3    Review of Systems   Constitutional:  Negative for appetite change, chills, fatigue, fever and unexpected weight change.   HENT:  Negative for congestion, hearing loss and postnasal drip.    Respiratory:  Negative for cough and shortness of breath.    Cardiovascular:  Negative for leg swelling.   Musculoskeletal:  Positive for gait problem (ambulates with cane).   Skin:  Positive for wound (chest). Negative for rash.   Neurological:  Negative for numbness.   Hematological:  Does not bruise/bleed easily.   Psychiatric/Behavioral: Negative.           Objective:  /73   Pulse 68   Temp 97.5 °F (36.4 °C)   Resp 16   Ht 5' 5\" (1.651 m)   Wt 72.1 kg (159 lb)   BMI 26.46 kg/m²         Physical Exam  Vitals reviewed.   Constitutional:       General: He is not in acute distress.     Appearance: Normal appearance. He is well-developed and normal weight.   HENT:      Head: Normocephalic and atraumatic.   Cardiovascular:      Rate and Rhythm: Normal rate.   Pulmonary:      Effort: Pulmonary effort is normal.   Musculoskeletal:         General: No deformity.      Right lower leg: No edema.      Left lower leg: No edema.   Skin:     General: Skin is warm and dry.      Findings: Wound (mid chest) present.             Comments: Pink granulated wound bed with sersanguinous drainage. See wound assessment   Neurological:      General: No focal deficit present.      Mental Status: He is alert and oriented to person, place, and time.      Gait: Gait normal.   Psychiatric:         Mood and Affect: Mood and affect normal.        " " Behavior: Behavior normal. Behavior is cooperative.         Wound 09/11/24 Surgical Chest Mid (Active)   Wound Image Images linked 10/09/24 0902   Wound Description Granulation tissue;Pink 10/09/24 0903   Joan-wound Assessment Intact 10/09/24 0903   Wound Length (cm) 5.8 cm 10/09/24 0903   Wound Width (cm) 2 cm 10/09/24 0903   Wound Depth (cm) 0.1 cm 10/09/24 0903   Wound Surface Area (cm^2) 11.6 cm^2 10/09/24 0903   Wound Volume (cm^3) 1.16 cm^3 10/09/24 0903   Calculated Wound Volume (cm^3) 1.16 cm^3 10/09/24 0903   Change in Wound Size % 88.33 10/09/24 0903   Drainage Amount Small 10/09/24 0903   Drainage Description Serosanguineous 10/09/24 0903   Non-staged Wound Description Full thickness 10/09/24 0903   Patient Tolerance Tolerated well 10/09/24 0903   Dressing Status Intact 10/09/24 0903             Wound Instructions:  Orders Placed This Encounter   Procedures    Wound cleansing and dressings Surgical Mid Chest     Wound location chest  Change dressing 3x per week.    Sponge bathe only, do not shower at this time.  Cleanse the wound with wound cleanser or mild soap and water, rinse, pat dry.   Apply Puracol AG or collagen equivalent then calcium alginate to wound bed.    Cover with ABD.  Secure with tape.      Please hold vac for one week to reeval need for same at next appt.      Please try to incorporate 3 to 4 servings of protein a day to help maximize wound healing.  This includes peanut butter, eggs, milk, greek yogurt, beans, lean meat.    [facility] to continue for home wound care.     Standing Status:   Future     Standing Expiration Date:   10/16/2024    Wound Procedure Treatment Surgical Mid Chest     This order was created via procedure documentation       Laura Blancas PA-C, [facility]      Portions of the record may have been created with voice recognition software. Occasional wrong word or \"sound alike\" substitutions may have occurred due to the inherent limitations of voice recognition software. Read " the chart carefully and recognize, using context, where substitutions have occurred.

## 2024-10-09 NOTE — PATIENT INSTRUCTIONS
Orders Placed This Encounter   Procedures    Wound cleansing and dressings Surgical Mid Chest     Wound location chest  Change dressing 3x per week.    Sponge bathe only, do not shower at this time.  Cleanse the wound with wound cleanser or mild soap and water, rinse, pat dry.   Apply Puracol AG or collagen equivalent then calcium alginate to wound bed.    Cover with ABD.  Secure with tape.      Please hold vac for one week to reeval need for same at next appt.      Please try to incorporate 3 to 4 servings of protein a day to help maximize wound healing.  This includes peanut butter, eggs, milk, greek yogurt, beans, lean meat.    [facility] to continue for home wound care.     Standing Status:   Future     Standing Expiration Date:   10/16/2024

## 2024-10-09 NOTE — PROGRESS NOTES
Wound Procedure Treatment Surgical Mid Chest    Performed by: Lucia Morfin RN  Authorized by: Laura Blancas PA-C    Associated wounds:   Wound 09/11/24 Surgical Chest Mid  Wound cleansed with:  NSS  Applied primary dressing:  Collagen dressing, Silver and Calcium alginate  Applied secondary dressing:  ABD  Dressing secured with:  Tape

## 2024-10-10 ENCOUNTER — HOME CARE VISIT (OUTPATIENT)
Dept: HOME HEALTH SERVICES | Facility: HOME HEALTHCARE | Age: 79
End: 2024-10-10
Payer: COMMERCIAL

## 2024-10-11 ENCOUNTER — HOME CARE VISIT (OUTPATIENT)
Dept: HOME HEALTH SERVICES | Facility: HOME HEALTHCARE | Age: 79
End: 2024-10-11
Payer: COMMERCIAL

## 2024-10-11 VITALS
SYSTOLIC BLOOD PRESSURE: 102 MMHG | DIASTOLIC BLOOD PRESSURE: 62 MMHG | HEART RATE: 72 BPM | RESPIRATION RATE: 18 BRPM | TEMPERATURE: 97.6 F | OXYGEN SATURATION: 98 %

## 2024-10-11 PROCEDURE — 10330064 TAPE, RETENTION 2X10YDS (1/BX24BX/CS)"

## 2024-10-11 PROCEDURE — 10330064 PAD, ABD 5X9 STR LF (1/PK 20PK/BX) MGM1"

## 2024-10-11 PROCEDURE — 10330064 DRESSING, WND DERMACOL AG W/COLLAGEN 4X4

## 2024-10-11 PROCEDURE — 10330064 DRESSING, CALCIUM ALGINATE SHEET 4X4.75"

## 2024-10-11 PROCEDURE — G0299 HHS/HOSPICE OF RN EA 15 MIN: HCPCS

## 2024-10-14 ENCOUNTER — HOME CARE VISIT (OUTPATIENT)
Dept: HOME HEALTH SERVICES | Facility: HOME HEALTHCARE | Age: 79
End: 2024-10-14
Payer: COMMERCIAL

## 2024-10-14 VITALS
RESPIRATION RATE: 16 BRPM | HEART RATE: 68 BPM | SYSTOLIC BLOOD PRESSURE: 120 MMHG | TEMPERATURE: 98.4 F | OXYGEN SATURATION: 95 % | DIASTOLIC BLOOD PRESSURE: 70 MMHG

## 2024-10-14 LAB — FUNGUS SPEC CULT: NORMAL

## 2024-10-14 PROCEDURE — G0300 HHS/HOSPICE OF LPN EA 15 MIN: HCPCS

## 2024-10-16 ENCOUNTER — OFFICE VISIT (OUTPATIENT)
Dept: WOUND CARE | Facility: HOSPITAL | Age: 79
End: 2024-10-16
Payer: COMMERCIAL

## 2024-10-16 ENCOUNTER — HOME CARE VISIT (OUTPATIENT)
Dept: HOME HEALTH SERVICES | Facility: HOME HEALTHCARE | Age: 79
End: 2024-10-16
Payer: COMMERCIAL

## 2024-10-16 VITALS
SYSTOLIC BLOOD PRESSURE: 144 MMHG | TEMPERATURE: 97.2 F | HEART RATE: 76 BPM | DIASTOLIC BLOOD PRESSURE: 80 MMHG | RESPIRATION RATE: 16 BRPM

## 2024-10-16 DIAGNOSIS — T81.89XA NON-HEALING SURGICAL WOUND, INITIAL ENCOUNTER: Primary | ICD-10-CM

## 2024-10-16 PROCEDURE — 11042 DBRDMT SUBQ TIS 1ST 20SQCM/<: CPT | Performed by: ORTHOPAEDIC SURGERY

## 2024-10-16 NOTE — PROGRESS NOTES
Patient ID: Shane Chau Sr. is a 79 y.o. male Date of Birth 1945       Chief Complaint   Patient presents with    Follow Up Wound Care Visit     Chest wound.         Allergies:  Patient has no known allergies.    Diagnosis:   Diagnosis ICD-10-CM Associated Orders   1. Non-healing surgical wound, initial encounter  T81.89XA Wound cleansing and dressings Surgical Mid Chest     Wound Procedure Treatment Surgical Mid Chest     Sodium Hypochlorite 0.25 % SOLN     Debridement           Assessment and Plan :  Follow up evaluation of non-healing surgical wound progressively healing and decreasing in size.  Debrided as below.   Discontinue wound vac.  Change wound management to 5 minute Dakins (prescribed) soak followed by [employer] with Hydrogel and DSD, see wound orders below   Do not wet wound.   No harsh cleansers such as alcohol, peroxide, or antibacterial soap, do not submerge in water such as bathtub, hot tub, swimming pool, ocean, etc.   Can cleanse with NSS or Dakins at dressing changes.   Avoid direct pressure to Wound site.  Continue adequate protein intake (chicken, fish, yogurt, eggs and nuts), 3-4 servings per day to expedite wound healing.   Followup in 1 week(s) or call sooner with questions or concerns or any signs of infection such as redness, swelling, increased/purulent drainage, fever, chills, increased severe pain.     Subjective:   10/9: Patient is a 79 y.o. male well known to [facility] with history of CABG x 3 (on Brilinta/ASA 81mg) on 6/20/2024 with postoperative hospital stay complicated by stroke, post-op ileus sternal wound dehiscence who presents for initial eval of non healing surgical wound which has been present since 9/9/24 after I&D  with a repeat washout on 9/11/24 of infected sternal wound.  Fluid and OR cultures were positive for Klebsiella oxytoca. Since then pt was treated with 2 courses of antibiotics: ceftriaxone and cefazolin. Pt has been applying wound vac on wound bed.  Does not have an odor.  No diabetes.  No smoking, ETOH or drug use. Pt denies any sob, fatigue, N/V, CP, fevers or chills.    Pt is accompanied by his wife who is actively involved in his care.    10/16: Patient presents for followup evaluation of non-healing surgical wound accompanied by his wife.  No new complaints. No increased pain or drainage.  Wound vac was placed on hold and he has been applying Purachol Ag on wound bed followed by Calcium Alginate with DSD on wound bed. Pt denies any sob, fatigue, N/V, CP, fevers or chills.      The following portions of the patient's history were reviewed and updated as appropriate:   Patient Active Problem List   Diagnosis    Closed compression fracture of first lumbar vertebra (HCC)    Abdominal aortic aneurysm without rupture ([facility])    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[facility])    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[facility])    Asymptomatic bilateral carotid artery stenosis    Coronary artery disease of native artery of native heart with stable angina pectoris ([facility])    Hyperlipidemia    Primary hypertension    Transient cerebral ischemic attack    Need for pneumococcal vaccination    Hematuria    Microhematuria    Erectile dysfunction    Leg swelling    Paresthesia    Rib pain    S/P CABG x 3    Localized primary osteoarthritis of lower leg, unspecified laterality    CKD (chronic kidney disease) stage 2, GFR 60-89 ml/min    Stroke-like symptoms    Hyponatremia    History of stroke    PAD (peripheral artery disease) ([facility])    Postoperative anemia due to acute blood loss    Stenosis of left vertebral artery    Prediabetes    Reaction at surgical site    Cognitive impairment    Surgical wound, non healing    Hospital discharge follow-up    Sternal wound infection    Anemia    Pain and swelling of right ankle    Severe protein-calorie malnutrition (HCC)    Anxiety    Gastroesophageal reflux disease without esophagitis     Past Medical History:   Diagnosis Date    AAA (abdominal aortic aneurysm)  (HCC)     s/p open repair    BPH (benign prostatic hyperplasia)     CAD (coronary artery disease)     Cholelithiasis     Former tobacco use     History of MI (myocardial infarction)     History of TIA (transient ischemic attack)     Plavix in past, now on Brilinta    Hyperlipidemia     Hypertension     Insomnia     Obesity      Past Surgical History:   Procedure Laterality Date    ABDOMINAL AORTIC ANEURYSM REPAIR  06/07/2011    Dr. Yarbrough 6/7/11    APPENDECTOMY      CARDIAC CATHETERIZATION Left 06/03/2024    Procedure: Cardiac Left Heart Cath;  Surgeon: Pk Blanco DO;  [facility]: [facility];  Service: Cardiology    CARDIAC CATHETERIZATION  06/03/2024    Procedure: Cardiac catheterization;  Surgeon: Pk Blanco DO;  [facility];  Service: Cardiology    CARDIAC CATHETERIZATION N/A 06/03/2024    Procedure: Cardiac Coronary Angiogram;  Surgeon: Pk Blanco DO;  [facility]: [facility];  Service: Cardiology    CATARACT EXTRACTION Right 11/2018    CHOLECYSTECTOMY LAPAROSCOPIC      COLONOSCOPY  11/2006    CORONARY ANGIOPLASTY      3 STENTS INSERTED    HARVEST VEIN Left 6/20/2024    Procedure: HARVEST VEIN ENDOSCOPIC (EVH);  Surgeon: KALPANA Amos MD;  Location: [facility];  Service: Cardiac Surgery    IR ASPIRATION ONLY  9/5/2024    KS CORONARY ARTERY BYP W/VEIN & ARTERY GRAFT 3 VEIN N/A 6/20/2024    Procedure: CORONARY ARTERY BYPASS GRAFT (CABG) X3 VESSELS, LIMA - LAD, LEFT LEG EVH/SVG - PDA AND OM1, W/ DEDRICK;  Surgeon: KALPANA Amos MD;  Location: [facility];  Service: Cardiac Surgery    KS LAPS SURG CHOLECYSTECTOMY W/CHOLANGIOGRAPHY N/A 09/08/2016    Procedure: CHOLECYSTECTOMY LAPAROSCOPIC with intra-op cholangiogram ;  Surgeon: Prasanth Verdin MD;  Location: [facility];  Service: General    KS STERNAL DEBRIDEMENT N/A 9/9/2024    Procedure: DEBRIDEMENT STERNAL WOUND (WASH OUT);  Surgeon: KALPANA Amos MD;  [facility];   Service: Cardiac Surgery    IN STERNAL DEBRIDEMENT N/A 2024    Procedure: DEBRIDEMENT STERNAL WOUND (WASH OUT) VAC CHANGE;  Surgeon: KALPANA Amos MD;  [facility]: [facility];  Service: Cardiac Surgery     Family History   Problem Relation Age of Onset    Heart disease Mother     Hypertension Mother         Benign Essential     Stroke Mother         Stroke     Heart disease Father     Coronary artery disease Brother     Diabetes Brother         mellitus     Heart disease Brother     Aortic aneurysm Brother     Coronary artery disease Brother     Sudden death Brother     Coronary artery disease Brother     Sudden death Brother     Sudden death Brother     Coronary artery disease Brother     Heart disease Family      Social History     Socioeconomic History    Marital status: /Civil Union     Spouse name: None    Number of children: None    Years of education: None    Highest education level: None   Occupational History    Occupation: Retired    Tobacco Use    Smoking status: Former     Current packs/day: 0.00     Average packs/day: 0.5 packs/day for 30.0 years (15.0 ttl pk-yrs)     Types: Cigarettes     Start date: 3/10/1956     Quit date: 3/10/1986     Years since quittin.6    Smokeless tobacco: Never    Tobacco comments:     Quit  about 25 years x1ppd   Vaping Use    Vaping status: Never Used   Substance and Sexual Activity    Alcohol use: Not Currently     Comment: 4 oz glass of wine every other day w/ his dinner    Drug use: No    Sexual activity: Not Currently     Partners: Female   Other Topics Concern    None   Social History Narrative    None     Social Determinants of Health     Financial Resource Strain: Low Risk  (2023)    Overall Financial Resource Strain (CARDIA)     Difficulty of Paying Living Expenses: Not very hard   Food Insecurity: No Food Insecurity (2024)    Hunger Vital Sign     Worried About Running Out of Food in the Last Year: Never true     Ran Out of  Food in the Last Year: Never true   Transportation Needs: No Transportation Needs (9/6/2024)    PRAPARE - Transportation     Lack of Transportation (Medical): No     Lack of Transportation (Non-Medical): No   Physical Activity: Not on file   Stress: Not on file   Social Connections: Unknown (6/18/2024)    Received from [place]    Social Job1001     How often do you feel lonely or isolated from those around you? (Adult - for ages 18 years and over): Not on file   Intimate Partner Violence: Not on file   Housing Stability: Low Risk  (9/6/2024)    Housing Stability Vital Sign     Unable to Pay for Housing in the Last Year: No     Number of Times Moved in the Last Year: 0     Homeless in the Last Year: No       Current Outpatient Medications:     Sodium Hypochlorite 0.25 % SOLN, Apply 1 Application topically daily, Disp: 473 mL, Rfl: 0    acetaminophen (TYLENOL) 325 mg tablet, Take 2 tablets (650 mg total) by mouth every 6 (six) hours as needed for mild pain, headaches or fever (Patient taking differently: Take 650 mg by mouth every 6 (six) hours as needed for mild pain, headaches or fever As needed), Disp: , Rfl:     amLODIPine (NORVASC) 2.5 mg tablet, Take 1 tablet (2.5 mg total) by mouth daily, Disp: 90 tablet, Rfl: 0    aspirin (ECOTRIN LOW STRENGTH) 81 mg EC tablet, Take 1 tablet (81 mg total) by mouth daily, Disp: , Rfl:     Cyanocobalamin (B-12) 1000 MCG SUBL, Place 1 tablet (1,000 mcg total) under the tongue in the morning, Disp: 90 tablet, Rfl: 0    ergocalciferol (VITAMIN D2) 50,000 units, Take 1 capsule (50,000 Units total) by mouth once a week For 6 weeks, Disp: 6 capsule, Rfl: 0    escitalopram (LEXAPRO) 5 mg tablet, Take 1 tablet (5 mg total) by mouth daily After 14 days, may increase to 2 tablets (10mg) daily if tolerating well. (Patient not taking: Reported on 10/4/2024), Disp: 42 tablet, Rfl: 0    escitalopram (LEXAPRO) 5 mg tablet, Take 1 tablet (5 mg total) by mouth daily, Disp: 90 tablet, Rfl:  3    ferrous sulfate 325 (65 Fe) mg tablet, Take 1 tablet (325 mg total) by mouth every other day (Patient not taking: Reported on 10/4/2024), Disp: 45 tablet, Rfl: 0    melatonin 3 mg, Take 2 tablets (6 mg total) by mouth daily at bedtime (Patient not taking: Reported on 9/3/2024), Disp: , Rfl:     metoprolol tartrate (LOPRESSOR) 50 mg tablet, Take 0.5 tablets (25 mg total) by mouth 2 (two) times a day, Disp: , Rfl:     pantoprazole (PROTONIX) 40 mg tablet, Take 1 tablet (40 mg total) by mouth daily in the early morning (Patient not taking: Reported on 10/4/2024), Disp: 30 tablet, Rfl: 0    pantoprazole (PROTONIX) 40 mg tablet, Take 1 tablet (40 mg total) by mouth daily, Disp: 90 tablet, Rfl: 3    rosuvastatin (CRESTOR) 40 MG tablet, Take 1 tablet (40 mg total) by mouth daily, Disp: 90 tablet, Rfl: 3    ticagrelor (BRILINTA) 90 MG, Take 1 tablet (90 mg total) by mouth every 12 (twelve) hours, Disp: 180 tablet, Rfl: 3    Review of Systems   Constitutional:  Negative for appetite change, chills, fatigue, fever and unexpected weight change.   HENT:  Negative for congestion, hearing loss and postnasal drip.    Respiratory:  Negative for cough and shortness of breath.    Cardiovascular:  Negative for leg swelling.   Musculoskeletal:  Positive for gait problem (ambulates with cane).   Skin:  Positive for wound (chest). Negative for rash.   Neurological:  Negative for numbness.   Hematological:  Does not bruise/bleed easily.   Psychiatric/Behavioral: Negative.           Objective:  /80   Pulse 76   Temp (!) 97.2 °F (36.2 °C)   Resp 16         Physical Exam  Vitals reviewed.   Constitutional:       General: He is not in acute distress.     Appearance: Normal appearance. He is well-developed and normal weight.   HENT:      Head: Normocephalic and atraumatic.   Cardiovascular:      Rate and Rhythm: Normal rate.   Pulmonary:      Effort: Pulmonary effort is normal.   Musculoskeletal:         General: No deformity.     "  Right lower leg: No edema.      Left lower leg: No edema.   Skin:     General: Skin is warm and dry.      Findings: Wound (mid chest) present.             Comments: Yellow adherent slough with dry exudate and eschar on pink granulated wound bed with yellow/sersanguinous drainage. See wound assessment   Neurological:      General: No focal deficit present.      Mental Status: He is alert and oriented to person, place, and time.      Gait: Gait normal.   Psychiatric:         Mood and Affect: Mood and affect normal.         Behavior: Behavior normal. Behavior is cooperative.       Wound 09/11/24 Surgical Chest Mid (Active)   Wound Image Images linked 10/16/24 1010   Wound Description Pink;Yellow;Dry;Slough 10/16/24 1008   Joan-wound Assessment Intact;Scar Tissue 10/16/24 1008   Wound Length (cm) 4.1 cm 10/16/24 1008   Wound Width (cm) 0.6 cm 10/16/24 1008   Wound Depth (cm) 0.1 cm 10/16/24 1008   Wound Surface Area (cm^2) 2.46 cm^2 10/16/24 1008   Wound Volume (cm^3) 0.246 cm^3 10/16/24 1008   Calculated Wound Volume (cm^3) 0.25 cm^3 10/16/24 1008   Change in Wound Size % 97.48 10/16/24 1008   Drainage Amount Small 10/16/24 1008   Drainage Description Serosanguineous;Purulent;Yellow 10/16/24 1008   Non-staged Wound Description Full thickness 10/16/24 1008   Patient Tolerance Tolerated well 10/16/24 1008   Dressing Status Intact 10/16/24 1008       Debridement   Wound 09/11/24 Surgical Chest Mid    Universal Protocol:  procedure performed by consultantConsent: Verbal consent obtained. Written consent obtained.  Risks and benefits: risks, benefits and alternatives were discussed  Consent given by: patient  Time out: Immediately prior to procedure a \"time out\" was called to verify the correct patient, procedure, equipment, support staff and site/side marked as required.  Patient understanding: patient states understanding of the procedure being performed  Patient identity confirmed: verbally with patient    Debridement " Details  Performed by: PA  Debridement type: surgical  Level of debridement: subcutaneous tissue  Pain control: lidocaine 4%      Post-debridement measurements  Length (cm): 4.1  Width (cm): 0.6  Depth (cm): 0.2  Percent debrided: 100%  Surface Area (cm^2): 2.46  Area Debrided (cm^2): 2.46  Volume (cm^3): 0.49    Tissue and other material debrided: subcutaneous tissue  Devitalized tissue debrided: exudate, slough and eschar  Instrument(s) utilized: curette  Bleeding: small  Hemostasis obtained with: pressure  Procedural pain (0-10): 0  Post-procedural pain: 0   Response to treatment: procedure was tolerated well               Wound Instructions:  Orders Placed This Encounter   Procedures    Wound cleansing and dressings Surgical Mid Chest     Wound location midline chest.   Change dressing 3x per week.  If drainage gets through the outer dressing, please change outer dressing as needed.    Sponge bathe only, do not shower at this time.  Cleanse the wound with wound cleanser or mild soap and water, rinse, pat dry.   Apply a gauze moistened with quarter strength Dakins solution, allow to sit for 5 minutes then remove.  Script was sent to the Pharmacy today for Dakins.  Please obtain ASAP.    Apply Puracol AG to wound bed.  Apply small amt of hydrogel to Puracol AG in wound to assist with dissolving Puracol AG and incorporating collagen into the wound bed for optimal effectiveness.   (Hydrogel sent with patient today).   Cover with gauze  Secure with tape.       Please note:  Wound VAC has been discontinued at this time.  [facility]      [facility] to continue for home wound care and assessment.     Standing Status:   Future     Standing Expiration Date:   10/23/2024    Wound Procedure Treatment Surgical Mid Chest     This order was created via procedure documentation    Debridement     This order was created via procedure documentation       Laura Blancas PA-C,  "[facility]      Portions of the record may have been created with voice recognition software. Occasional wrong word or \"sound alike\" substitutions may have occurred due to the inherent limitations of voice recognition software. Read the chart carefully and recognize, using context, where substitutions have occurred.    "

## 2024-10-16 NOTE — PROGRESS NOTES
Wound Procedure Treatment Surgical Mid Chest    Performed by: Lucia Morfin RN  Authorized by: Laura Blancas PA-C    Associated wounds:   Wound 09/11/24 Surgical Chest Mid  Wound cleansed with:  NSS and Dakin's 0.25%  Applied Topical: Hydrogel    Applied primary dressing:  Collagen dressing and Silver  Applied secondary dressing:  Gauze  Dressing secured with:  Tape

## 2024-10-16 NOTE — PATIENT INSTRUCTIONS
Orders Placed This Encounter   Procedures    Wound cleansing and dressings Surgical Mid Chest     Wound location midline chest.   Change dressing 3x per week.  If drainage gets through the outer dressing, please change outer dressing as needed.    Sponge bathe only, do not shower at this time.  Cleanse the wound with wound cleanser or mild soap and water, rinse, pat dry.   Apply a gauze moistened with quarter strength Dakins solution, allow to sit for 5 minutes then remove.  Script was sent to the Pharmacy today for Dakins.  Please obtain ASAP.    Apply Puracol AG to wound bed.  Apply small amt of hydrogel to Puracol AG in wound to assist with dissolving Puracol AG and incorporating collagen into the wound bed for optimal effectiveness.   (Hydrogel sent with patient today).   Cover with gauze  Secure with tape.       Please note:  Wound VAC has been discontinued at this time.  Patient has returned VAC to [facility] as of this past Monday.      [facility] to continue for home wound care and assessment.     Standing Status:   Future     Standing Expiration Date:   10/23/2024

## 2024-10-18 ENCOUNTER — HOME CARE VISIT (OUTPATIENT)
Dept: HOME HEALTH SERVICES | Facility: HOME HEALTHCARE | Age: 79
End: 2024-10-18
Payer: COMMERCIAL

## 2024-10-18 ENCOUNTER — APPOINTMENT (OUTPATIENT)
Dept: LAB | Facility: CLINIC | Age: 79
End: 2024-10-18
Payer: COMMERCIAL

## 2024-10-18 ENCOUNTER — OFFICE VISIT (OUTPATIENT)
Dept: HEMATOLOGY ONCOLOGY | Facility: CLINIC | Age: 79
End: 2024-10-18
Payer: COMMERCIAL

## 2024-10-18 VITALS
HEART RATE: 72 BPM | RESPIRATION RATE: 18 BRPM | OXYGEN SATURATION: 98 % | SYSTOLIC BLOOD PRESSURE: 122 MMHG | DIASTOLIC BLOOD PRESSURE: 64 MMHG | TEMPERATURE: 96.7 F

## 2024-10-18 VITALS
RESPIRATION RATE: 16 BRPM | WEIGHT: 160 LBS | BODY MASS INDEX: 26.66 KG/M2 | DIASTOLIC BLOOD PRESSURE: 82 MMHG | OXYGEN SATURATION: 97 % | TEMPERATURE: 97.9 F | SYSTOLIC BLOOD PRESSURE: 134 MMHG | HEIGHT: 65 IN | HEART RATE: 71 BPM

## 2024-10-18 DIAGNOSIS — Z79.899 OTHER LONG TERM (CURRENT) DRUG THERAPY: ICD-10-CM

## 2024-10-18 DIAGNOSIS — D64.9 ANEMIA: ICD-10-CM

## 2024-10-18 LAB
BASOPHILS # BLD AUTO: 0.07 THOUSANDS/ΜL (ref 0–0.1)
BASOPHILS NFR BLD AUTO: 1 % (ref 0–1)
CRP SERPL QL: 2 MG/L
EOSINOPHIL # BLD AUTO: 0.44 THOUSAND/ΜL (ref 0–0.61)
EOSINOPHIL NFR BLD AUTO: 6 % (ref 0–6)
ERYTHROCYTE [DISTWIDTH] IN BLOOD BY AUTOMATED COUNT: 13.7 % (ref 11.6–15.1)
FERRITIN SERPL-MCNC: 304 NG/ML (ref 24–336)
HCT VFR BLD AUTO: 35 % (ref 36.5–49.3)
HGB BLD-MCNC: 11.2 G/DL (ref 12–17)
IMM GRANULOCYTES # BLD AUTO: 0.02 THOUSAND/UL (ref 0–0.2)
IMM GRANULOCYTES NFR BLD AUTO: 0 % (ref 0–2)
IRON SATN MFR SERPL: 24 % (ref 15–50)
IRON SERPL-MCNC: 68 UG/DL (ref 50–212)
LYMPHOCYTES # BLD AUTO: 1.28 THOUSANDS/ΜL (ref 0.6–4.47)
LYMPHOCYTES NFR BLD AUTO: 18 % (ref 14–44)
MCH RBC QN AUTO: 31 PG (ref 26.8–34.3)
MCHC RBC AUTO-ENTMCNC: 32 G/DL (ref 31.4–37.4)
MCV RBC AUTO: 97 FL (ref 82–98)
MONOCYTES # BLD AUTO: 0.68 THOUSAND/ΜL (ref 0.17–1.22)
MONOCYTES NFR BLD AUTO: 10 % (ref 4–12)
NEUTROPHILS # BLD AUTO: 4.5 THOUSANDS/ΜL (ref 1.85–7.62)
NEUTS SEG NFR BLD AUTO: 65 % (ref 43–75)
NRBC BLD AUTO-RTO: 0 /100 WBCS
PLATELET # BLD AUTO: 222 THOUSANDS/UL (ref 149–390)
PMV BLD AUTO: 8.7 FL (ref 8.9–12.7)
RBC # BLD AUTO: 3.61 MILLION/UL (ref 3.88–5.62)
TIBC SERPL-MCNC: 288 UG/DL (ref 250–450)
UIBC SERPL-MCNC: 220 UG/DL (ref 155–355)
VIT B12 SERPL-MCNC: 495 PG/ML (ref 180–914)
WBC # BLD AUTO: 6.99 THOUSAND/UL (ref 4.31–10.16)

## 2024-10-18 PROCEDURE — 85025 COMPLETE CBC W/AUTO DIFF WBC: CPT

## 2024-10-18 PROCEDURE — 86140 C-REACTIVE PROTEIN: CPT

## 2024-10-18 PROCEDURE — 83550 IRON BINDING TEST: CPT

## 2024-10-18 PROCEDURE — 36415 COLL VENOUS BLD VENIPUNCTURE: CPT

## 2024-10-18 PROCEDURE — 83540 ASSAY OF IRON: CPT

## 2024-10-18 PROCEDURE — 99202 OFFICE O/P NEW SF 15 MIN: CPT | Performed by: PHYSICIAN ASSISTANT

## 2024-10-18 PROCEDURE — G0299 HHS/HOSPICE OF RN EA 15 MIN: HCPCS

## 2024-10-18 PROCEDURE — 82607 VITAMIN B-12: CPT

## 2024-10-18 PROCEDURE — 82728 ASSAY OF FERRITIN: CPT

## 2024-10-18 RX ORDER — BACILLUS COAGULANS 1B CELL
1 CAPSULE ORAL DAILY
Qty: 60 TABLET | Refills: 1 | Status: SHIPPED | OUTPATIENT
Start: 2024-10-18

## 2024-10-18 NOTE — PROGRESS NOTES
Oncology Outpatient Consult Note  Shane Chau Sr. 79 y.o. male MRN: @ Encounter: [id number]        Date:  10/18/2024      Assessment/ Plan:      Anemia that occurred during 6/2024 hospitalization where he underwent CABG and had complications including wound infection.    9/9/2024 iron saturation 10%, ferritin 326  9/11/24 hgb 8.6    He is not taking oral iron.  He is advised to start with oral iron.  If this is poorly tolerated or ineffective, we discussed the possibility of IV iron replacement.    Follow-up in 1 month with repeat labs      HPI:  Shane Chau Sr. is a 79 y.o. seen for initial consultation 10/18/2024 at the referral of Gaye Sanders,* regarding anemia.    Past medical history of hypertension, stroke, vitamin D deficiency, depression, hyperlipidemia, AAA s/p repair 2011  Quit 1985 tobacco use.  Was< 1/2ppd.  Rum and coke a few time/week but not since surgery 6/2024.      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  Postop course complicated by ileus and BARBARA, acute hypoxic respiratory failure.  CVA 10 days postop thought to be cardioembolic  Inpatient rehab until 7/17/2024 6/21/24 hemoglobin 12.5  7/5/24 hgb 11.3  9/3/24 hgb 10.5    Admitted 9/5- 9/17/2024 with sternal wound infection requiring VAC dressing.    9/9/2024 iron saturation 10%, ferritin 326  9/11/24 hgb 8.6    Itchy- diffuse along incision.  Weight loss since surgery 190 - 160.    + Fatigue, anhedonia.  Decreased appetite  RLS improved.    Denies melena, hematochezia, hematuria, other bleeding.    Taking oral B12 supplements daily.            Review of Systems   Constitutional:  Negative for appetite change, chills, diaphoresis, fatigue, fever and unexpected weight change.   HENT:   Negative for mouth sores, nosebleeds, sore throat, tinnitus and voice change.    Eyes:  Negative for eye problems.   Respiratory:  Negative for chest tightness,  cough, shortness of breath and wheezing.    Cardiovascular:  Negative for chest pain, leg swelling and palpitations.   Gastrointestinal:  Negative for abdominal distention, abdominal pain, blood in stool, constipation, diarrhea, nausea, rectal pain and vomiting.   Endocrine: Negative for hot flashes.   Genitourinary: Negative.     Musculoskeletal:  Negative for gait problem and myalgias.   Skin:  Negative for itching and rash.   Neurological:  Negative for dizziness, gait problem, headaches, light-headedness and numbness.   Hematological:  Negative for adenopathy.   Psychiatric/Behavioral:  Negative for confusion and sleep disturbance. The patient is not nervous/anxious.         Test Results:      Labs:   Lab Results   Component Value Date    HGB 8.6 (L) 09/11/2024    HCT 27.1 (L) 09/11/2024    MCV 95 09/11/2024     09/11/2024    WBC 7.17 09/11/2024    NRBC 0 09/11/2024     Lab Results   Component Value Date     12/03/2015    K 4.0 09/11/2024     09/11/2024    CO2 28 09/11/2024    ANIONGAP 7 12/03/2015    BUN 16 09/11/2024    CREATININE 0.96 09/11/2024    GLUCOSE 140 06/20/2024    GLUF 107 (H) 08/06/2024    CALCIUM 9.0 09/11/2024    AST 31 09/05/2024    ALT 41 09/05/2024    ALKPHOS 69 09/05/2024    PROT 7.5 12/02/2015    BILITOT 0.65 12/02/2015    EGFR 75 09/11/2024           Imaging: .No results found.          Active Problems:   Patient Active Problem List   Diagnosis    Closed compression fracture of first lumbar vertebra (HCC)    Abdominal aortic aneurysm without rupture (HCC)    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HCC)    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HCC)    Asymptomatic bilateral carotid artery stenosis    Coronary artery disease of native artery of native heart with stable angina pectoris (HCC)    Hyperlipidemia    Primary hypertension    Transient cerebral ischemic attack    Need for pneumococcal vaccination    Hematuria    Microhematuria    Erectile dysfunction    Leg swelling    Paresthesia     Rib pain    S/P CABG x 3    Localized primary osteoarthritis of lower leg, unspecified laterality    CKD (chronic kidney disease) stage 2, GFR 60-89 ml/min    Stroke-like symptoms    Hyponatremia    History of stroke    PAD (peripheral artery disease) ([facility])    Postoperative anemia due to acute blood loss    Stenosis of left vertebral artery    Prediabetes    Reaction at surgical site    Cognitive impairment    Surgical wound, non healing    Hospital discharge follow-up    Sternal wound infection    Anemia    Pain and swelling of right ankle    Severe protein-calorie malnutrition ([facility])    Anxiety    Gastroesophageal reflux disease without esophagitis       Past Medical History:   Past Medical History:   Diagnosis Date    AAA (abdominal aortic aneurysm) ([facility])     s/p open repair    BPH (benign prostatic hyperplasia)     CAD (coronary artery disease)     Cholelithiasis     Former tobacco use     History of MI (myocardial infarction)     History of TIA (transient ischemic attack)     Plavix in past, now on Brilinta    Hyperlipidemia     Hypertension     Insomnia     Obesity        Surgical History:   Past Surgical History:   Procedure Laterality Date    ABDOMINAL AORTIC ANEURYSM REPAIR  06/07/2011    Dr. Yarbrough 6/7/11    APPENDECTOMY      CARDIAC CATHETERIZATION Left 06/03/2024    Procedure: Cardiac Left Heart Cath;  Surgeon: Pk Blanco DO;  Location: [facility];  Service: Cardiology    CARDIAC CATHETERIZATION  06/03/2024    Procedure: Cardiac catheterization;  Surgeon: Pk Blanco DO;  [facility]: [facility];  Service: Cardiology    CARDIAC CATHETERIZATION N/A 06/03/2024    Procedure: Cardiac Coronary Angiogram;  Surgeon: Pk Blanco DO;  [facility]: [facility];  Service: Cardiology    CATARACT EXTRACTION Right 11/2018    CHOLECYSTECTOMY LAPAROSCOPIC      COLONOSCOPY  11/2006    CORONARY ANGIOPLASTY      3 STENTS INSERTED    HARVEST VEIN Left 6/20/2024     Procedure: HARVEST VEIN ENDOSCOPIC (EVH);  Surgeon: KALPANA Amos MD;  Location: [facility];  Service: Cardiac Surgery    IR ASPIRATION ONLY  9/5/2024    GA CORONARY ARTERY BYP W/VEIN & ARTERY GRAFT 3 VEIN N/A 6/20/2024    Procedure: CORONARY ARTERY BYPASS GRAFT (CABG) X3 VESSELS, LIMA - LAD, LEFT LEG EVH/SVG - PDA AND OM1, W/ DEDRICK;  Surgeon: KALPANA Amos MD;  Location: [facility];  Service: Cardiac Surgery    GA LAPS SURG CHOLECYSTECTOMY W/CHOLANGIOGRAPHY N/A 09/08/2016    Procedure: CHOLECYSTECTOMY LAPAROSCOPIC with intra-op cholangiogram ;  Surgeon: Prasanth Verdin MD;  Location: [facility];  Service: General    GA STERNAL DEBRIDEMENT N/A 9/9/2024    Procedure: DEBRIDEMENT STERNAL WOUND (WASH OUT);  Surgeon: KALPANA Amos MD;  [facility];  Service: Cardiac Surgery    GA STERNAL DEBRIDEMENT N/A 9/11/2024    Procedure: DEBRIDEMENT STERNAL WOUND (WASH OUT) VAC CHANGE;  Surgeon: KALPANA Amos MD;  [facility]: [facility];  Service: Cardiac Surgery       Family History:    Family History   Problem Relation Age of Onset    Heart disease Mother     Hypertension Mother         Benign Essential     Stroke Mother         Stroke     Heart disease Father     Coronary artery disease Brother     Diabetes Brother         mellitus     Heart disease Brother     Aortic aneurysm Brother     Coronary artery disease Brother     Sudden death Brother     Coronary artery disease Brother     Sudden death Brother     Sudden death Brother     Coronary artery disease Brother     Heart disease Family        Cancer-related family history is not on file.    Social History:   Social History     Socioeconomic History    Marital status: /Civil Union     Spouse name: Not on file    Number of children: Not on file    Years of education: Not on file    Highest education level: Not on file   Occupational History    Occupation: Retired    Tobacco Use    Smoking status: Former     Current packs/day:  0.00     Average packs/day: 0.5 packs/day for 30.0 years (15.0 ttl pk-yrs)     Types: Cigarettes     Start date: 3/10/1956     Quit date: 3/10/1986     Years since quittin.6    Smokeless tobacco: Never    Tobacco comments:     Quit  about 25 years x1ppd   Vaping Use    Vaping status: Never Used   Substance and Sexual Activity    Alcohol use: Not Currently     Comment: 4 oz glass of wine every other day w/ his dinner    Drug use: No    Sexual activity: Not Currently     Partners: Female   Other Topics Concern    Not on file   Social History Narrative    Not on file     Social Determinants of Health     Financial Resource Strain: Low Risk  (2023)    Overall Financial Resource Strain (CARDIA)     Difficulty of Paying Living Expenses: Not very hard   Food Insecurity: No Food Insecurity (2024)    Hunger Vital Sign     Worried About Running Out of Food in the Last Year: Never true     Ran Out of Food in the Last Year: Never true   Transportation Needs: No Transportation Needs (2024)    PRAPARE - Transportation     Lack of Transportation (Medical): No     Lack of Transportation (Non-Medical): No   Physical Activity: Not on file   Stress: Not on file   Social Connections: Unknown (2024)    Received from [facility]    Social Hailo     How often do you feel lonely or isolated from those around you? (Adult - for ages 18 years and over): Not on file   Intimate Partner Violence: Not on file   Housing Stability: Low Risk  (2024)    Housing Stability Vital Sign     Unable to Pay for Housing in the Last Year: No     Number of Times Moved in the Last Year: 0     Homeless in the Last Year: No       Current Medications:   Current Outpatient Medications   Medication Sig Dispense Refill    acetaminophen (TYLENOL) 325 mg tablet Take 2 tablets (650 mg total) by mouth every 6 (six) hours as needed for mild pain, headaches or fever (Patient taking differently: Take 650 mg by mouth every 6 (six) hours as  "needed for mild pain, headaches or fever As needed)      amLODIPine (NORVASC) 2.5 mg tablet Take 1 tablet (2.5 mg total) by mouth daily 90 tablet 0    aspirin (ECOTRIN LOW STRENGTH) 81 mg EC tablet Take 1 tablet (81 mg total) by mouth daily      Cyanocobalamin (B-12) 1000 MCG SUBL Place 1 tablet (1,000 mcg total) under the tongue in the morning 90 tablet 0    ergocalciferol (VITAMIN D2) 50,000 units Take 1 capsule (50,000 Units total) by mouth once a week For 6 weeks 6 capsule 0    escitalopram (LEXAPRO) 5 mg tablet Take 1 tablet (5 mg total) by mouth daily After 14 days, may increase to 2 tablets (10mg) daily if tolerating well. (Patient not taking: Reported on 10/4/2024) 42 tablet 0    escitalopram (LEXAPRO) 5 mg tablet Take 1 tablet (5 mg total) by mouth daily 90 tablet 3    ferrous sulfate 325 (65 Fe) mg tablet Take 1 tablet (325 mg total) by mouth every other day (Patient not taking: Reported on 10/4/2024) 45 tablet 0    melatonin 3 mg Take 2 tablets (6 mg total) by mouth daily at bedtime (Patient not taking: Reported on 9/3/2024)      metoprolol tartrate (LOPRESSOR) 50 mg tablet Take 0.5 tablets (25 mg total) by mouth 2 (two) times a day      pantoprazole (PROTONIX) 40 mg tablet Take 1 tablet (40 mg total) by mouth daily in the early morning (Patient not taking: Reported on 10/4/2024) 30 tablet 0    pantoprazole (PROTONIX) 40 mg tablet Take 1 tablet (40 mg total) by mouth daily 90 tablet 3    rosuvastatin (CRESTOR) 40 MG tablet Take 1 tablet (40 mg total) by mouth daily 90 tablet 3    Sodium Hypochlorite 0.25 % SOLN Apply 1 Application topically daily 473 mL 0    ticagrelor (BRILINTA) 90 MG Take 1 tablet (90 mg total) by mouth every 12 (twelve) hours 180 tablet 3     No current facility-administered medications for this visit.       Allergies: No Known Allergies      Physical Exam:    There is no height or weight on file to calculate BSA.   Ht Readings from Last 3 Encounters:   10/09/24 5' 5\" (1.651 m) " "  10/04/24 5' 10\" (1.778 m)   09/09/24 5' 10\" (1.778 m)       Wt Readings from Last 3 Encounters:   10/09/24 72.1 kg (159 lb)   10/04/24 72.5 kg (159 lb 14.4 oz)   09/19/24 71 kg (156 lb 9.6 oz)        Temp Readings from Last 3 Encounters:   10/16/24 (!) 97.2 °F (36.2 °C)   10/14/24 98.4 °F (36.9 °C) (Temporal)   10/11/24 97.6 °F (36.4 °C) (Temporal)        BP Readings from Last 3 Encounters:   10/16/24 144/80   10/14/24 120/70   10/11/24 102/62         Pulse Readings from Last 3 Encounters:   10/16/24 76   10/14/24 68   10/11/24 72          Physical Exam    Physical Exam  Vitals reviewed.   Constitutional:       General: He is not in acute distress.     Appearance: He is well-developed. He is not diaphoretic.   HENT:      Head: Normocephalic and atraumatic.   Eyes:      Conjunctiva/sclera: Conjunctivae normal.   Neck:      Trachea: No tracheal deviation.   Cardiovascular:      Rate and Rhythm: Normal rate and regular rhythm.      Heart sounds: No murmur heard.     No friction rub. No gallop.   Pulmonary:      Effort: Pulmonary effort is normal. No respiratory distress.      Breath sounds: Normal breath sounds. No wheezing or rales.   Chest:      Chest wall: No tenderness.   Abdominal:      General: There is no distension.      Palpations: Abdomen is soft.      Tenderness: There is no abdominal tenderness.   Musculoskeletal:      Cervical back: Normal range of motion and neck supple.   Lymphadenopathy:      Cervical: No cervical adenopathy.   Skin:     General: Skin is warm and dry.      Coloration: Skin is not pale.      Findings: No erythema.   Neurological:      Mental Status: He is alert. Mental status is at baseline.   Psychiatric:         Behavior: Behavior normal.         Thought Content: Thought content normal.             Emergency Contacts:    Extended Emergency Contact Information  Primary Emergency Contact: Maryjo Chau  Address: [address] [place] [address]           [facility] [place] [id number] [facility]" [place]  [employer] Phone: [phone]  Relation: Spouse

## 2024-10-21 ENCOUNTER — HOME CARE VISIT (OUTPATIENT)
Dept: HOME HEALTH SERVICES | Facility: HOME HEALTHCARE | Age: 79
End: 2024-10-21
Payer: COMMERCIAL

## 2024-10-21 VITALS
SYSTOLIC BLOOD PRESSURE: 110 MMHG | OXYGEN SATURATION: 98 % | RESPIRATION RATE: 18 BRPM | TEMPERATURE: 97.6 F | HEART RATE: 64 BPM | DIASTOLIC BLOOD PRESSURE: 64 MMHG

## 2024-10-21 PROCEDURE — G0299 HHS/HOSPICE OF RN EA 15 MIN: HCPCS

## 2024-10-22 ENCOUNTER — OFFICE VISIT (OUTPATIENT)
Dept: VASCULAR SURGERY | Facility: CLINIC | Age: 79
End: 2024-10-22
Payer: COMMERCIAL

## 2024-10-22 VITALS
HEART RATE: 67 BPM | HEIGHT: 65 IN | BODY MASS INDEX: 27.16 KG/M2 | WEIGHT: 163 LBS | DIASTOLIC BLOOD PRESSURE: 82 MMHG | SYSTOLIC BLOOD PRESSURE: 142 MMHG | OXYGEN SATURATION: 98 %

## 2024-10-22 DIAGNOSIS — I72.4 ANEURYSM OF POPLITEAL ARTERY (HCC): ICD-10-CM

## 2024-10-22 DIAGNOSIS — I72.3 ANEURYSM OF ILIAC ARTERY (HCC): ICD-10-CM

## 2024-10-22 DIAGNOSIS — I73.9 PAD (PERIPHERAL ARTERY DISEASE) (HCC): ICD-10-CM

## 2024-10-22 DIAGNOSIS — I71.40 ABDOMINAL AORTIC ANEURYSM (AAA) WITHOUT RUPTURE, UNSPECIFIED PART (HCC): Primary | ICD-10-CM

## 2024-10-22 DIAGNOSIS — I65.23 ASYMPTOMATIC BILATERAL CAROTID ARTERY STENOSIS: ICD-10-CM

## 2024-10-22 PROCEDURE — 99213 OFFICE O/P EST LOW 20 MIN: CPT | Performed by: NURSE PRACTITIONER

## 2024-10-22 NOTE — PROGRESS NOTES
Ambulatory Visit  Name: Shane Chau Sr.      : 1945      MRN: [id number]  Encounter Provider: SANTHOSH Bay  Encounter Date: 10/22/2024   Encounter department: [facility]    Assessment & Plan  Abdominal aortic aneurysm (AAA) without rupture, unspecified part (HCC)  80 yo male with HTH, HLD, TIA, CAD s/p PCI '12 s/p CABG x 3 24 (LLE vein harvest), AAA s/p repair in  ([facility]), iliac artery aneurysm, popliteal ectasia, and asymptomatic bilateral carotid stenosis presents to review imaging and [facility].    - patient presents without complaints. Denies abdominal, back, or leg pain  - Palpable distal DP/PT pulses bilaterally  - since last OV pt s/p CABG x3 c/b midline surgical wound infection with VAC and serial debridements Klebsiella +, postop ileus, anemia, and stroke.  Patient doing well now hoping to be cleared for cardiac rehab.    AOIL 24-no significant change or progression of disease  Wide patency is demonstrated throughout the abdominal aortic tube graft.  The aorta proximal to the graft is aneurysmal, 4.4 cm, (Prior 4.3 cm).  The common iliac arteries are patent and ectatic bilaterally, Rt - 1.6 cm, Lt 1.6 cm, (Prior: 1.6 cm b/l).  Celiac trunk has a >70% stenosis and is aneurysmal measuring 1.2 cm, (Prior: 1.1 cm).  The superior mesenteric artery is aneurysmal measuring 1.1 cm, (Prior: 1.2 cm)  The renal arteries are patent bilaterally.    GALILEO Right: 1.11/188/102 and left 1.05/176/98    Plan:  - AOIL in 1 year with return OV  - Continue Plavix  - Continue statin           Orders:    VAS abdominal aorta/iliac duplex; Future    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HCC)  See above  - Asymptomatic  Orders:    VAS abdominal aorta/iliac duplex; Future    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HCC)  - b/l pop ectasia.   -Asymptomatic   -No recent imaging.  Will obtain LEAD with next duplex imaging. (AOIL andCV)  Orders:    VAS ARTERIAL DUPLEX- LOWER LIMB BILATERAL; Future    PAD  (peripheral artery disease) (HCC)  -Asymptomatic  -Known bilateral CFA stenosis and popliteal ectasia.  -Denies claudication, rest pain or wounds.    Plan as above.  Will obtain lead imaging with next abdominal iliac and carotid duplex imaging.  Orders:    VAS ARTERIAL DUPLEX- LOWER LIMB BILATERAL; Future    Asymptomatic bilateral carotid artery stenosis  -Known history of mild bilateral carotid artery stenosis  Asymptomatic.  Denies TIA or strokelike symptoms.  Neuroexam intact  - Carotid duplex 7/22/24-no significant change or progression of disease  <50% ICA stenosis bilaterally    Plan:  Carotid duplex in 1 year with return office visit  Continue aspirin, Brilinta (cardiology)  Continue statin  Educated on TIA and strokelike symptoms when to seek medical attention    Orders:    VAS carotid complete study; Future      History of Present Illness   Pt had an SHABBIR on 09/12/23, AOIL and CV on 07/22/24. Pt denies TIA/Stroke symptoms. Pt denies change in appetite, abdominal pain, or back pain. Pt denies claudication, rest pain, and non healing wounds.    Shane ACUNA Kandi Orozco is a 79 y.o. male who presents to review duplex imaging and [facility] visit.    Patient presents without complaints.  Denies lower extremity pain, claudication, rest pain or nonhealing wounds.  Denies TIA or strokelike symptoms.    Since last office visit patient is status post CABG x 3 in June complicated by wound infection with VAC placement and serial debridements positive for Klebsiella, postop ileus, and anemia.  Patient is hopeful to begin cardiac rehab.  He is not limited by how far he can walk.    Imaging reviewed without significant change or progression of disease.    He is maintained on aspirin, Brilinta and statin.      History obtained from : patient  Review of Systems   Constitutional:  Negative for appetite change and unexpected weight change.   HENT: Negative.     Eyes:  Negative for visual disturbance.   Respiratory: Negative.      Cardiovascular: Negative.    Gastrointestinal: Negative.    Endocrine: Negative.    Genitourinary: Negative.    Musculoskeletal:  Negative for back pain and gait problem.   Skin: Negative.    Allergic/Immunologic: Negative.    Neurological:  Negative for dizziness, facial asymmetry, speech difficulty, weakness and numbness.   Hematological: Negative.    Psychiatric/Behavioral: Negative.       Medical History Reviewed by provider this encounter:  Problems       Past Medical History   Past Medical History:   Diagnosis Date    AAA (abdominal aortic aneurysm) (HCC)     s/p open repair    BPH (benign prostatic hyperplasia)     CAD (coronary artery disease)     Cholelithiasis     Former tobacco use     History of MI (myocardial infarction)     History of TIA (transient ischemic attack)     Plavix in past, now on Brilinta    Hyperlipidemia     Hypertension     Insomnia     Obesity      Past Surgical History:   Procedure Laterality Date    ABDOMINAL AORTIC ANEURYSM REPAIR  06/07/2011    Dr. Yarbrough 6/7/11    APPENDECTOMY      CARDIAC CATHETERIZATION Left 06/03/2024    Procedure: Cardiac Left Heart Cath;  Surgeon: Pk Blanco DO;  Location: [facility];  Service: Cardiology    CARDIAC CATHETERIZATION  06/03/2024    Procedure: Cardiac catheterization;  Surgeon: Pk Blanco DO;  [facility]: [facility];  Service: Cardiology    CARDIAC CATHETERIZATION N/A 06/03/2024    Procedure: Cardiac Coronary Angiogram;  Surgeon: Pk Blanco DO;  Location: [facility];  Service: Cardiology    CATARACT EXTRACTION Right 11/2018    CHOLECYSTECTOMY LAPAROSCOPIC      COLONOSCOPY  11/2006    CORONARY ANGIOPLASTY      3 STENTS INSERTED    HARVEST VEIN Left 6/20/2024    Procedure: HARVEST VEIN ENDOSCOPIC (EVH);  Surgeon: KALPANA Amos MD;  Location: [facility];  Service: Cardiac Surgery    IR ASPIRATION ONLY  9/5/2024    MI CORONARY ARTERY BYP W/VEIN & ARTERY GRAFT 3 VEIN N/A 6/20/2024     Procedure: CORONARY ARTERY BYPASS GRAFT (CABG) X3 VESSELS, LIMA - LAD, LEFT LEG EVH/SVG - PDA AND OM1, W/ DEDRICK;  Surgeon: KALPANA Amos MD;  Location: [facility];  Service: Cardiac Surgery    NC LAPS SURG CHOLECYSTECTOMY W/CHOLANGIOGRAPHY N/A 09/08/2016    Procedure: CHOLECYSTECTOMY LAPAROSCOPIC with intra-op cholangiogram ;  Surgeon: Prasanth Verdin MD;  Location: [facility];  Service: General    NC STERNAL DEBRIDEMENT N/A 9/9/2024    Procedure: DEBRIDEMENT STERNAL WOUND (WASH OUT);  Surgeon: KALPANA Amos MD;  [facility];  Service: Cardiac Surgery    NC STERNAL DEBRIDEMENT N/A 9/11/2024    Procedure: DEBRIDEMENT STERNAL WOUND (WASH OUT) VAC CHANGE;  Surgeon: KALPANA Amos MD;  Location[facility];  Service: Cardiac Surgery     Family History   Problem Relation Age of Onset    Heart disease Mother     Hypertension Mother         Benign Essential     Stroke Mother         Stroke     Heart disease Father     Coronary artery disease Brother     Diabetes Brother         mellitus     Heart disease Brother     Aortic aneurysm Brother     Coronary artery disease Brother     Sudden death Brother     Coronary artery disease Brother     Sudden death Brother     Sudden death Brother     Coronary artery disease Brother     Heart disease Family      Current Outpatient Medications on File Prior to Visit   Medication Sig Dispense Refill    acetaminophen (TYLENOL) 325 mg tablet Take 2 tablets (650 mg total) by mouth every 6 (six) hours as needed for mild pain, headaches or fever (Patient taking differently: Take 650 mg by mouth every 6 (six) hours as needed for mild pain, headaches or fever As needed)      amLODIPine (NORVASC) 2.5 mg tablet Take 1 tablet (2.5 mg total) by mouth daily 90 tablet 0    aspirin (ECOTRIN LOW STRENGTH) 81 mg EC tablet Take 1 tablet (81 mg total) by mouth daily      Cyanocobalamin (B-12) 1000 MCG SUBL Place 1 tablet (1,000 mcg total) under the tongue in the morning  90 tablet 0    ergocalciferol (VITAMIN D2) 50,000 units Take 1 capsule (50,000 Units total) by mouth once a week For 6 weeks 6 capsule 0    escitalopram (LEXAPRO) 5 mg tablet Take 1 tablet (5 mg total) by mouth daily After 14 days, may increase to 2 tablets (10mg) daily if tolerating well. 42 tablet 0    Iron-Vitamin C (Vitron-C)  MG TABS Take 1 each by mouth daily 60 tablet 1    metoprolol tartrate (LOPRESSOR) 50 mg tablet Take 0.5 tablets (25 mg total) by mouth 2 (two) times a day      pantoprazole (PROTONIX) 40 mg tablet Take 1 tablet (40 mg total) by mouth daily in the early morning 30 tablet 0    Sodium Hypochlorite 0.25 % SOLN Apply 1 Application topically daily 473 mL 0    ticagrelor (BRILINTA) 90 MG Take 1 tablet (90 mg total) by mouth every 12 (twelve) hours 180 tablet 3    escitalopram (LEXAPRO) 5 mg tablet Take 1 tablet (5 mg total) by mouth daily (Patient not taking: Reported on 10/18/2024) 90 tablet 3    melatonin 3 mg Take 2 tablets (6 mg total) by mouth daily at bedtime (Patient not taking: Reported on 9/3/2024)      pantoprazole (PROTONIX) 40 mg tablet Take 1 tablet (40 mg total) by mouth daily (Patient not taking: Reported on 10/18/2024) 90 tablet 3    rosuvastatin (CRESTOR) 40 MG tablet Take 1 tablet (40 mg total) by mouth daily (Patient not taking: Reported on 10/22/2024) 90 tablet 3     No current facility-administered medications on file prior to visit.   No Known Allergies   Current Outpatient Medications on File Prior to Visit   Medication Sig Dispense Refill    acetaminophen (TYLENOL) 325 mg tablet Take 2 tablets (650 mg total) by mouth every 6 (six) hours as needed for mild pain, headaches or fever (Patient taking differently: Take 650 mg by mouth every 6 (six) hours as needed for mild pain, headaches or fever As needed)      amLODIPine (NORVASC) 2.5 mg tablet Take 1 tablet (2.5 mg total) by mouth daily 90 tablet 0    aspirin (ECOTRIN LOW STRENGTH) 81 mg EC tablet Take 1 tablet (81 mg  total) by mouth daily      Cyanocobalamin (B-12) 1000 MCG SUBL Place 1 tablet (1,000 mcg total) under the tongue in the morning 90 tablet 0    ergocalciferol (VITAMIN D2) 50,000 units Take 1 capsule (50,000 Units total) by mouth once a week For 6 weeks 6 capsule 0    escitalopram (LEXAPRO) 5 mg tablet Take 1 tablet (5 mg total) by mouth daily After 14 days, may increase to 2 tablets (10mg) daily if tolerating well. 42 tablet 0    Iron-Vitamin C (Vitron-C)  MG TABS Take 1 each by mouth daily 60 tablet 1    metoprolol tartrate (LOPRESSOR) 50 mg tablet Take 0.5 tablets (25 mg total) by mouth 2 (two) times a day      pantoprazole (PROTONIX) 40 mg tablet Take 1 tablet (40 mg total) by mouth daily in the early morning 30 tablet 0    Sodium Hypochlorite 0.25 % SOLN Apply 1 Application topically daily 473 mL 0    ticagrelor (BRILINTA) 90 MG Take 1 tablet (90 mg total) by mouth every 12 (twelve) hours 180 tablet 3    escitalopram (LEXAPRO) 5 mg tablet Take 1 tablet (5 mg total) by mouth daily (Patient not taking: Reported on 10/18/2024) 90 tablet 3    melatonin 3 mg Take 2 tablets (6 mg total) by mouth daily at bedtime (Patient not taking: Reported on 9/3/2024)      pantoprazole (PROTONIX) 40 mg tablet Take 1 tablet (40 mg total) by mouth daily (Patient not taking: Reported on 10/18/2024) 90 tablet 3    rosuvastatin (CRESTOR) 40 MG tablet Take 1 tablet (40 mg total) by mouth daily (Patient not taking: Reported on 10/22/2024) 90 tablet 3     No current facility-administered medications on file prior to visit.      Social History     Tobacco Use    Smoking status: Former     Current packs/day: 0.00     Average packs/day: 0.5 packs/day for 30.0 years (15.0 ttl pk-yrs)     Types: Cigarettes     Start date: 3/10/1956     Quit date: 3/10/1986     Years since quittin.6    Smokeless tobacco: Never    Tobacco comments:     Quit  about 25 years x1ppd   Vaping Use    Vaping status: Never Used   Substance and Sexual  "Activity    Alcohol use: Not Currently     Comment: 4 oz glass of wine every other day w/ his dinner    Drug use: No    Sexual activity: Not Currently     Partners: Female         Objective     /82 (BP Location: Left arm, Patient Position: Sitting, Cuff Size: Standard)   Pulse 67   Ht 5' 5\" (1.651 m)   Wt 73.9 kg (163 lb)   SpO2 98%   BMI 27.12 kg/m²     Physical Exam  Vitals reviewed.   Constitutional:       General: He is not in acute distress.     Appearance: Normal appearance. He is normal weight.   HENT:      Head: Normocephalic and atraumatic.      Ears:      Comments: [place]     Nose: Nose normal.   Neck:      Vascular: No carotid bruit.   Cardiovascular:      Rate and Rhythm: Normal rate and regular rhythm.      Pulses:           Carotid pulses are 2+ on the right side and 2+ on the left side.       Radial pulses are 2+ on the right side and 2+ on the left side.        Dorsalis pedis pulses are 2+ on the right side and 1+ on the left side.        Posterior tibial pulses are 2+ on the right side and 2+ on the left side.      Comments: Midline chest incision/surgical wound with dressing intact. No drainage  Pulmonary:      Effort: Pulmonary effort is normal. No respiratory distress.      Breath sounds: Normal breath sounds. No wheezing.   Musculoskeletal:         General: Normal range of motion.      Cervical back: Normal range of motion and neck supple.      Right lower leg: No edema.      Left lower leg: No edema.   Skin:     General: Skin is warm.      Capillary Refill: Capillary refill takes less than 2 seconds.   Neurological:      Mental Status: He is alert and oriented to person, place, and time.      Sensory: No sensory deficit.      Motor: No weakness.   Psychiatric:         Mood and Affect: Mood normal.         Behavior: Behavior normal.       Administrative Statements   I have spent a total time of 25 minutes in caring for this patient on the day of the visit/encounter including Diagnostic " results, Instructions for management, Patient and family education, Impressions, Documenting in the medical record, Reviewing / ordering tests, medicine, procedures  , and Obtaining or reviewing history  .

## 2024-10-22 NOTE — ASSESSMENT & PLAN NOTE
- b/l pop ectasia.   -Asymptomatic   -No recent imaging.  Will obtain LEAD with next duplex imaging. (AOIL andCV)  Orders:    VAS ARTERIAL DUPLEX- LOWER LIMB BILATERAL; Future

## 2024-10-22 NOTE — ASSESSMENT & PLAN NOTE
80 yo male with HTH, HLD, TIA, CAD s/p PCI '12 s/p CABG x 3 6/20/24 (LLE vein harvest), AAA s/p repair in '11 ([facility]), iliac artery aneurysm, popliteal ectasia, and asymptomatic bilateral carotid stenosis presents to review imaging and [facility].    - patient presents without complaints. Denies abdominal, back, or leg pain  - Palpable distal DP/PT pulses bilaterally  - since last OV pt s/p CABG x3 c/b midline surgical wound infection with VAC and serial debridements Klebsiella +, postop ileus, anemia, and stroke.  Patient doing well now hoping to be cleared for cardiac rehab.    AOIL 7/22/24-no significant change or progression of disease  Wide patency is demonstrated throughout the abdominal aortic tube graft.  The aorta proximal to the graft is aneurysmal, 4.4 cm, (Prior 4.3 cm).  The common iliac arteries are patent and ectatic bilaterally, Rt - 1.6 cm, Lt 1.6 cm, (Prior: 1.6 cm b/l).  Celiac trunk has a >70% stenosis and is aneurysmal measuring 1.2 cm, (Prior: 1.1 cm).  The superior mesenteric artery is aneurysmal measuring 1.1 cm, (Prior: 1.2 cm)  The renal arteries are patent bilaterally.    GALILEO Right: 1.11/188/102 and left 1.05/176/98    Plan:  - AOIL in 1 year with return OV  - Continue Plavix  - Continue statin           Orders:    VAS abdominal aorta/iliac duplex; Future

## 2024-10-22 NOTE — ASSESSMENT & PLAN NOTE
-Known history of mild bilateral carotid artery stenosis  Asymptomatic.  Denies TIA or strokelike symptoms.  Neuroexam intact  - Carotid duplex 7/22/24-no significant change or progression of disease  <50% ICA stenosis bilaterally    Plan:  Carotid duplex in 1 year with return office visit  Continue aspirin, Brilinta (cardiology)  Continue statin  Educated on TIA and strokelike symptoms when to seek medical attention    Orders:    VAS carotid complete study; Future

## 2024-10-22 NOTE — ASSESSMENT & PLAN NOTE
-Asymptomatic  -Known bilateral CFA stenosis and popliteal ectasia.  -Denies claudication, rest pain or wounds.    Plan as above.  Will obtain lead imaging with next abdominal iliac and carotid duplex imaging.  Orders:    VAS ARTERIAL DUPLEX- LOWER LIMB BILATERAL; Future

## 2024-10-22 NOTE — PATIENT INSTRUCTIONS
Abdominal iliac duplex, Carotid duplex, and Lower extremity arterial duplex q. yearly imaging, due next July/August 2025    Continue aspirin and Brilinta (per cardiology)  Continue statin  Encourage daily dedicated walking    Call return to office sooner with new or worsening symptoms, questions or concerns    Call 911 or go to the ED with TIA or strokelike symptoms that we discussed in office today    Follow-up with cardiology/neurology as scheduled

## 2024-10-23 ENCOUNTER — HOME CARE VISIT (OUTPATIENT)
Dept: HOME HEALTH SERVICES | Facility: HOME HEALTHCARE | Age: 79
End: 2024-10-23
Payer: COMMERCIAL

## 2024-10-23 ENCOUNTER — TELEPHONE (OUTPATIENT)
Dept: HEMATOLOGY ONCOLOGY | Facility: CLINIC | Age: 79
End: 2024-10-23

## 2024-10-23 ENCOUNTER — NURSE TRIAGE (OUTPATIENT)
Age: 79
End: 2024-10-23

## 2024-10-23 ENCOUNTER — TELEPHONE (OUTPATIENT)
Dept: NEUROLOGY | Facility: CLINIC | Age: 79
End: 2024-10-23

## 2024-10-23 ENCOUNTER — OFFICE VISIT (OUTPATIENT)
Dept: WOUND CARE | Facility: HOSPITAL | Age: 79
End: 2024-10-23
Payer: COMMERCIAL

## 2024-10-23 VITALS
HEART RATE: 71 BPM | TEMPERATURE: 97.2 F | DIASTOLIC BLOOD PRESSURE: 71 MMHG | SYSTOLIC BLOOD PRESSURE: 131 MMHG | RESPIRATION RATE: 16 BRPM

## 2024-10-23 DIAGNOSIS — T81.89XA NON-HEALING SURGICAL WOUND, INITIAL ENCOUNTER: Primary | ICD-10-CM

## 2024-10-23 DIAGNOSIS — D64.9 ANEMIA, UNSPECIFIED TYPE: Primary | ICD-10-CM

## 2024-10-23 PROCEDURE — 99213 OFFICE O/P EST LOW 20 MIN: CPT | Performed by: ORTHOPAEDIC SURGERY

## 2024-10-23 NOTE — TELEPHONE ENCOUNTER
Called and spoke with patient's wife, Maryjo.  Reviewed Mee Sheikh's note...    Hgb is much better on 10/18/24 labs.  Up to 11.2g /dL from 8.6 g/dL 9/11/24.     (Almost back to baseline of 12.5)     Iron saturation 24%.  B12 normal.  CRP normal.     Hgb hould continue to improve.       Patient can discontinue oral iron.  Recommend repeating labs in 2 months to assess stability. Orders placed. OK to cancel 11/2024 f/u with me.     Maryjo verbalized understanding.  Will cancel appointment on 11/5 with Mee.

## 2024-10-23 NOTE — TELEPHONE ENCOUNTER
Spoke to Pt rescheduled his apt for 1/2/25 at 9:30 with Gaye in [place] . Pt was rescheduled form 12/26 provider will not be available.

## 2024-10-23 NOTE — PATIENT INSTRUCTIONS
Orders Placed This Encounter   Procedures    Wound cleansing and dressings Surgical Mid Chest     Wound location midline chest.   Change dressing 3x per week.  If drainage gets through the outer dressing, please change outer dressing as needed.    Sponge bathe only, do not shower at this time.  Cleanse the wound with wound cleanser or mild soap and water, rinse, pat dry.   You may stop the quarter strength Dakins solution soak however please resume soaks should the drainage become green again.     Apply Puracol AG to wound bed.  Apply small amt of hydrogel to Puracol AG in wound as needed help dissolved the Puracol AG if not completely dissolving into wound bed.   Cover with gauze  Secure with tape.            [facility] to continue for home wound care and assessment.     Standing Status:   Future     Standing Expiration Date:   10/30/2024

## 2024-10-23 NOTE — TELEPHONE ENCOUNTER
"Reason for Disposition   MODERATE weakness or fatigue from poor fluid intake with no improvement after 2 hours of rest and fluids    Answer Assessment - Initial Assessment Questions  1. DESCRIPTION: \"Describe how you are feeling.\"      NO energy  2. SEVERITY: \"How bad is it?\"  \"Can you stand and walk?\"      Yes, will walk with assistance.  But then sleeps.  Will sleep all day  3. ONSET: \"When did these symptoms begin?\" (e.g., hours, days, weeks, months)      2 months  4. CAUSE: \"What do you think is causing the weakness or fatigue?\" (e.g., not drinking enough fluids, medical problem, trouble sleeping)      She thinks he is anemia.   5. NEW MEDICINES:  \"Have you started on any new medicines recently?\" (e.g., opioid pain medicines, benzodiazepines, muscle relaxants, antidepressants, antihistamines, neuroleptics, beta blockers)      iron  6. OTHER SYMPTOMS: \"Do you have any other symptoms?\" (e.g., chest pain, fever, cough, SOB, vomiting, diarrhea, bleeding, other areas of pain)      Nothing.  Eating and drinking    Protocols used: Weakness (Generalized) and Fatigue-Adult-OH    "

## 2024-10-23 NOTE — TELEPHONE ENCOUNTER
Hgb is much better on 10/18/24 labs.  Up to 11.2g /dL from 8.6 g/dL 9/11/24.    (Almost back to baseline of 12.5)    Iron saturation 24%.  B12 normal.  CRP normal.    Hgb hould continue to improve.      Patient can discontinue oral iron.  Recommend repeating labs in 2 months to assess stability. Orders placed. OK to cancel 11/2024 f/u with me.

## 2024-10-23 NOTE — PROGRESS NOTES
Wound Procedure Treatment Surgical Mid Chest    Performed by: Lucia Morfin RN  Authorized by: Laura Blancas PA-C    Associated wounds:   Wound 09/11/24 Surgical Chest Mid  Wound cleansed with:  NSS  Applied primary dressing:  Collagen dressing and Silver    cosmopore

## 2024-10-23 NOTE — TELEPHONE ENCOUNTER
"Patient's wife Maryjo returned call to say patient is currently declining ED as he think they will just draw his labs and send him home. Maryjo is requesting for Mee Sheikh PA-C to review the labs patient had completed on 10/18 and let them know her thoughts before he goes to ED. She denies patient having any additional symptoms aside from being fatigued/\"having no pep\" for past 2 months.   "

## 2024-10-23 NOTE — PROGRESS NOTES
Patient ID: Shane Chau Denise is a 79 y.o. male Date of Birth 1945       Chief Complaint   Patient presents with    Follow Up Wound Care Visit     Sternal wound.         Allergies:  Patient has no known allergies.    Diagnosis:   Diagnosis ICD-10-CM Associated Orders   1. Non-healing surgical wound, initial encounter  T81.89XA Wound cleansing and dressings Surgical Mid Chest     Wound Procedure Treatment Surgical Mid Chest           Assessment and Plan :  Follow up evaluation of non-healing surgical wound significantly decreasing in size and slowly healing.   Discontinue Dakins soaks.  Continue wound management with Purchol Ag and Hydrogel followed by DSD, see wound orders below   Do not wet wound.   No harsh cleansers such as alcohol, peroxide, or antibacterial soap, do not submerge in water such as bathtub, hot tub, swimming pool, ocean, etc.   Can cleanse with NSS or Dakins if green drainage at dressing changes.   Avoid direct pressure to Wound site.  Continue adequate protein intake (chicken, fish, yogurt, eggs and nuts), 3-4 servings per day to expedite wound healing.   Followup in 2 week(s) or call sooner with questions or concerns or any signs of infection such as redness, swelling, increased/purulent drainage, fever, chills, increased severe pain.     Subjective:   10/9: Patient is a 79 y.o. male well known to [facility] with history of CABG x 3 (on Brilinta/ASA 81mg) on 6/20/2024 with postoperative hospital stay complicated by stroke, post-op ileus sternal wound dehiscence who presents for initial eval of non healing surgical wound which has been present since 9/9/24 after I&D  with a repeat washout on 9/11/24 of infected sternal wound.  Fluid and OR cultures were positive for Klebsiella oxytoca. Since then pt was treated with 2 courses of antibiotics: ceftriaxone and cefazolin. Pt has been applying wound vac on wound bed. Does not have an odor.  No diabetes.  No smoking, ETOH or drug use. Pt denies  any sob, fatigue, N/V, CP, fevers or chills.    Pt is accompanied by his wife who is actively involved in his care.    10/16: Patient presents for followup evaluation of non-healing surgical wound accompanied by his wife.  No new complaints. No increased pain or drainage.  Wound vac was placed on hold and he has been applying Purachol Ag on wound bed followed by Calcium Alginate with DSD on wound bed. Pt denies any sob, fatigue, N/V, CP, fevers or chills.    10/23:  Patient presents for followup evaluation of non-healing surgical wound accompanied by his wife. Pt states he is in good spirits and feeling well. Has been applying Purachol Ag followed by Calcium Alginate with DSD on wound bed. Pt denies any fevers or chills.        The following portions of the patient's history were reviewed and updated as appropriate:   Patient Active Problem List   Diagnosis    Closed compression fracture of first lumbar vertebra ([facility])    Abdominal aortic aneurysm without rupture ([facility])    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[facility])    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[facility])    Asymptomatic bilateral carotid artery stenosis    Coronary artery disease of native artery of native heart with stable angina pectoris ([facility])    Hyperlipidemia    Primary hypertension    Transient cerebral ischemic attack    Need for pneumococcal vaccination    Hematuria    Microhematuria    Erectile dysfunction    Leg swelling    Paresthesia    Rib pain    S/P CABG x 3    Localized primary osteoarthritis of lower leg, unspecified laterality    CKD (chronic kidney disease) stage 2, GFR 60-89 ml/min    Stroke-like symptoms    Hyponatremia    History of stroke    PAD (peripheral artery disease) ([facility])    Postoperative anemia due to acute blood loss    Stenosis of left vertebral artery    Prediabetes    Reaction at surgical site    Cognitive impairment    Surgical wound, non healing    Hospital discharge follow-up    Sternal wound infection    Anemia    Pain and swelling of right  ankle    Severe protein-calorie malnutrition (HCC)    Anxiety    Gastroesophageal reflux disease without esophagitis     Past Medical History:   Diagnosis Date    AAA (abdominal aortic aneurysm) (HCC)     s/p open repair    BPH (benign prostatic hyperplasia)     CAD (coronary artery disease)     Cholelithiasis     Former tobacco use     History of MI (myocardial infarction)     History of TIA (transient ischemic attack)     Plavix in past, now on Brilinta    Hyperlipidemia     Hypertension     Insomnia     Obesity      Past Surgical History:   Procedure Laterality Date    ABDOMINAL AORTIC ANEURYSM REPAIR  06/07/2011    Dr. Yarbrough 6/7/11    APPENDECTOMY      CARDIAC CATHETERIZATION Left 06/03/2024    Procedure: Cardiac Left Heart Cath;  Surgeon: Pk Blanco DO;  Location: [facility];  Service: Cardiology    CARDIAC CATHETERIZATION  06/03/2024    Procedure: Cardiac catheterization;  Surgeon: Pk Blanco DO;  [facility]: [facility];  Service: Cardiology    CARDIAC CATHETERIZATION N/A 06/03/2024    Procedure: Cardiac Coronary Angiogram;  Surgeon: Pk Blanco DO;  Location: [facility];  Service: Cardiology    CATARACT EXTRACTION Right 11/2018    CHOLECYSTECTOMY LAPAROSCOPIC      COLONOSCOPY  11/2006    CORONARY ANGIOPLASTY      3 STENTS INSERTED    HARVEST VEIN Left 6/20/2024    Procedure: HARVEST VEIN ENDOSCOPIC (EVH);  Surgeon: KALPANA Amos MD;  Location: [facility];  Service: Cardiac Surgery    IR ASPIRATION ONLY  9/5/2024    OR CORONARY ARTERY BYP W/VEIN & ARTERY GRAFT 3 VEIN N/A 6/20/2024    Procedure: CORONARY ARTERY BYPASS GRAFT (CABG) X3 VESSELS, LIMA - LAD, LEFT LEG EVH/SVG - PDA AND OM1, W/ DEDRICK;  Surgeon: KALPANA Amos MD;  Location: [facility];  Service: Cardiac Surgery    OR LAPS SURG CHOLECYSTECTOMY W/CHOLANGIOGRAPHY N/A 09/08/2016    Procedure: CHOLECYSTECTOMY LAPAROSCOPIC with intra-op cholangiogram ;  Surgeon: Prasanth Verdin MD;   Location: [facility];  Service: General    FL STERNAL DEBRIDEMENT N/A 2024    Procedure: DEBRIDEMENT STERNAL WOUND (WASH OUT);  Surgeon: KALPANA Amos MD;  [facility];  Service: Cardiac Surgery    FL STERNAL DEBRIDEMENT N/A 2024    Procedure: DEBRIDEMENT STERNAL WOUND (WASH OUT) VAC CHANGE;  Surgeon: KALPANA Amos MD;  Location: [facility];  Service: Cardiac Surgery     Family History   Problem Relation Age of Onset    Heart disease Mother     Hypertension Mother         Benign Essential     Stroke Mother         Stroke     Heart disease Father     Coronary artery disease Brother     Diabetes Brother         mellitus     Heart disease Brother     Aortic aneurysm Brother     Coronary artery disease Brother     Sudden death Brother     Coronary artery disease Brother     Sudden death Brother     Sudden death Brother     Coronary artery disease Brother     Heart disease Family      Social History     Socioeconomic History    Marital status: /Civil Union     Spouse name: None    Number of children: None    Years of education: None    Highest education level: None   Occupational History    Occupation: Retired    Tobacco Use    Smoking status: Former     Current packs/day: 0.00     Average packs/day: 0.5 packs/day for 30.0 years (15.0 ttl pk-yrs)     Types: Cigarettes     Start date: 3/10/1956     Quit date: 3/10/1986     Years since quittin.6    Smokeless tobacco: Never    Tobacco comments:     Quit  about 25 years x1ppd   Vaping Use    Vaping status: Never Used   Substance and Sexual Activity    Alcohol use: Not Currently     Comment: 4 oz glass of wine every other day w/ his dinner    Drug use: No    Sexual activity: Not Currently     Partners: Female   Other Topics Concern    None   Social History Narrative    None     Social Determinants of Health     Financial Resource Strain: Low Risk  (2023)    Overall Financial Resource Strain (CARDIA)     Difficulty of  Paying Living Expenses: Not very hard   Food Insecurity: No Food Insecurity (9/6/2024)    Nursing - Inadequate Food Risk Classification     Worried About Running Out of Food in the Last Year: Never true     Ran Out of Food in the Last Year: Never true     Ran Out of Food in the Last Year: Not on file   Transportation Needs: No Transportation Needs (9/18/2024)    OASIS : Transportation     Lack of Transportation (Medical): No     Lack of Transportation (Non-Medical): No     Patient Unable or Declines to Respond: No   Physical Activity: Not on file   Stress: Not on file   Social Connections: Unknown (6/18/2024)    Received from [facility]     How often do you feel lonely or isolated from those around you? (Adult - for ages 18 years and over): Not on file   Intimate Partner Violence: Not on file   Housing Stability: Low Risk  (9/6/2024)    Housing Stability Vital Sign     Unable to Pay for Housing in the Last Year: No     Number of Times Moved in the Last Year: 0     Homeless in the Last Year: No       Current Outpatient Medications:     acetaminophen (TYLENOL) 325 mg tablet, Take 2 tablets (650 mg total) by mouth every 6 (six) hours as needed for mild pain, headaches or fever (Patient taking differently: Take 650 mg by mouth every 6 (six) hours as needed for mild pain, headaches or fever As needed), Disp: , Rfl:     amLODIPine (NORVASC) 2.5 mg tablet, Take 1 tablet (2.5 mg total) by mouth daily, Disp: 90 tablet, Rfl: 0    aspirin (ECOTRIN LOW STRENGTH) 81 mg EC tablet, Take 1 tablet (81 mg total) by mouth daily, Disp: , Rfl:     Cyanocobalamin (B-12) 1000 MCG SUBL, Place 1 tablet (1,000 mcg total) under the tongue in the morning, Disp: 90 tablet, Rfl: 0    ergocalciferol (VITAMIN D2) 50,000 units, Take 1 capsule (50,000 Units total) by mouth once a week For 6 weeks, Disp: 6 capsule, Rfl: 0    escitalopram (LEXAPRO) 5 mg tablet, Take 1 tablet (5 mg total) by mouth daily After 14 days, may  increase to 2 tablets (10mg) daily if tolerating well., Disp: 42 tablet, Rfl: 0    escitalopram (LEXAPRO) 5 mg tablet, Take 1 tablet (5 mg total) by mouth daily (Patient not taking: Reported on 10/18/2024), Disp: 90 tablet, Rfl: 3    Iron-Vitamin C (Vitron-C)  MG TABS, Take 1 each by mouth daily, Disp: 60 tablet, Rfl: 1    melatonin 3 mg, Take 2 tablets (6 mg total) by mouth daily at bedtime (Patient not taking: Reported on 9/3/2024), Disp: , Rfl:     metoprolol tartrate (LOPRESSOR) 50 mg tablet, Take 0.5 tablets (25 mg total) by mouth 2 (two) times a day, Disp: , Rfl:     pantoprazole (PROTONIX) 40 mg tablet, Take 1 tablet (40 mg total) by mouth daily in the early morning, Disp: 30 tablet, Rfl: 0    pantoprazole (PROTONIX) 40 mg tablet, Take 1 tablet (40 mg total) by mouth daily (Patient not taking: Reported on 10/18/2024), Disp: 90 tablet, Rfl: 3    rosuvastatin (CRESTOR) 40 MG tablet, Take 1 tablet (40 mg total) by mouth daily (Patient not taking: Reported on 10/22/2024), Disp: 90 tablet, Rfl: 3    Sodium Hypochlorite 0.25 % SOLN, Apply 1 Application topically daily, Disp: 473 mL, Rfl: 0    ticagrelor (BRILINTA) 90 MG, Take 1 tablet (90 mg total) by mouth every 12 (twelve) hours, Disp: 180 tablet, Rfl: 3    Review of Systems   Constitutional:  Negative for appetite change, chills, fatigue, fever and unexpected weight change.   HENT:  Negative for congestion, hearing loss and postnasal drip.    Respiratory:  Negative for cough and shortness of breath.    Cardiovascular:  Negative for leg swelling.   Musculoskeletal:  Positive for gait problem (ambulates with cane).   Skin:  Positive for wound (chest). Negative for rash.   Neurological:  Negative for numbness.   Hematological:  Does not bruise/bleed easily.   Psychiatric/Behavioral: Negative.           Objective:  /71   Pulse 71   Temp (!) 97.2 °F (36.2 °C)   Resp 16         Physical Exam  Vitals reviewed.   Constitutional:       General: He is not  in acute distress.     Appearance: Normal appearance. He is well-developed and normal weight.   HENT:      Head: Normocephalic and atraumatic.   Cardiovascular:      Rate and Rhythm: Normal rate.   Pulmonary:      Effort: Pulmonary effort is normal.   Musculoskeletal:         General: No deformity.      Right lower leg: No edema.      Left lower leg: No edema.   Skin:     General: Skin is warm and dry.      Findings: Wound (mid chest) present.             Comments: Beefy red wound bed decreased in size. See wound assessment   Neurological:      General: No focal deficit present.      Mental Status: He is alert and oriented to person, place, and time.      Gait: Gait normal.   Psychiatric:         Mood and Affect: Mood and affect normal.         Behavior: Behavior normal. Behavior is cooperative.              Wound 09/11/24 Surgical Chest Mid (Active)   Wound Description Pink 10/23/24 1015   Joan-wound Assessment Intact;Scar Tissue 10/23/24 1015   Wound Length (cm) 1.7 cm 10/23/24 1015   Wound Width (cm) 0.4 cm 10/23/24 1015   Wound Depth (cm) 0.1 cm 10/23/24 1015   Wound Surface Area (cm^2) 0.68 cm^2 10/23/24 1015   Wound Volume (cm^3) 0.068 cm^3 10/23/24 1015   Calculated Wound Volume (cm^3) 0.07 cm^3 10/23/24 1015   Change in Wound Size % 99.3 10/23/24 1015   Drainage Amount Small 10/23/24 1015   Drainage Description Serosanguineous 10/23/24 1015   Non-staged Wound Description Full thickness 10/23/24 1015   Patient Tolerance Tolerated well 10/23/24 1015   Dressing Status Intact 10/23/24 1015     Wound Instructions:  Orders Placed This Encounter   Procedures    Wound cleansing and dressings Surgical Mid Chest     Wound location midline chest.   Change dressing 3x per week.  If drainage gets through the outer dressing, please change outer dressing as needed.    Sponge bathe only, do not shower at this time.  Cleanse the wound with wound cleanser or mild soap and water, rinse, pat dry.   You may stop the quarter  "strength Dakins solution soak however please resume soaks should the drainage become green again.     Apply Puracol AG to wound bed.  Apply small amt of hydrogel to Puracol AG in wound as needed help dissolved the Puracol AG if not completely dissolving into wound bed.   Cover with gauze  Secure with tape.            [facility] to continue for home wound care and assessment.     Standing Status:   Future     Standing Expiration Date:   10/30/2024    Wound Procedure Treatment Surgical Mid Chest     This order was created via procedure documentation       Laura Blancas PA-C, [facility]      Portions of the record may have been created with voice recognition software. Occasional wrong word or \"sound alike\" substitutions may have occurred due to the inherent limitations of voice recognition software. Read the chart carefully and recognize, using context, where substitutions have occurred.    "

## 2024-10-24 ENCOUNTER — HOME CARE VISIT (OUTPATIENT)
Dept: HOME HEALTH SERVICES | Facility: HOME HEALTHCARE | Age: 79
End: 2024-10-24
Payer: COMMERCIAL

## 2024-10-25 ENCOUNTER — OFFICE VISIT (OUTPATIENT)
Dept: CARDIAC SURGERY | Facility: CLINIC | Age: 79
End: 2024-10-25

## 2024-10-25 ENCOUNTER — HOME CARE VISIT (OUTPATIENT)
Dept: HOME HEALTH SERVICES | Facility: HOME HEALTHCARE | Age: 79
End: 2024-10-25
Payer: COMMERCIAL

## 2024-10-25 VITALS
BODY MASS INDEX: 27.31 KG/M2 | TEMPERATURE: 96.6 F | HEIGHT: 65 IN | DIASTOLIC BLOOD PRESSURE: 80 MMHG | HEART RATE: 62 BPM | OXYGEN SATURATION: 98 % | SYSTOLIC BLOOD PRESSURE: 149 MMHG | WEIGHT: 163.9 LBS

## 2024-10-25 VITALS
SYSTOLIC BLOOD PRESSURE: 122 MMHG | TEMPERATURE: 97 F | OXYGEN SATURATION: 98 % | HEART RATE: 62 BPM | DIASTOLIC BLOOD PRESSURE: 62 MMHG | RESPIRATION RATE: 18 BRPM

## 2024-10-25 DIAGNOSIS — Z95.1 S/P CABG X 3: ICD-10-CM

## 2024-10-25 DIAGNOSIS — S21.101A STERNAL WOUND INFECTION: ICD-10-CM

## 2024-10-25 DIAGNOSIS — L08.9 STERNAL WOUND INFECTION: ICD-10-CM

## 2024-10-25 DIAGNOSIS — I25.10 CORONARY ARTERY DISEASE INVOLVING NATIVE CORONARY ARTERY OF NATIVE HEART WITHOUT ANGINA PECTORIS: Primary | ICD-10-CM

## 2024-10-25 PROCEDURE — G0299 HHS/HOSPICE OF RN EA 15 MIN: HCPCS

## 2024-10-25 PROCEDURE — 99024 POSTOP FOLLOW-UP VISIT: CPT | Performed by: THORACIC SURGERY (CARDIOTHORACIC VASCULAR SURGERY)

## 2024-10-25 NOTE — PROGRESS NOTES
Procedure: S/P coronary artery bypass grafting, performed on 6/2/24  Sternal wound dehiscence and infection; s/p debridement and washout of sternal wound with vac x2 on 9/9/24 and 9/11/24     History:  Shane Chau Sr. is a 79 y.o. year old male who presents to our office today for routine post-surgical follow up care after sternal wound debridement, washout and vac placement. He had a CABG x3 on 6/20/24, and had a prolonged postop course complicated by TIA and ileus. He was discharged to [facility] on 7/9, and was seen by our service on 7/10 for murky drainage from his sternal incision. He was given antibiotics, and discharged from [facility] on 7/17/24. He had been evaluated several times in our office due to poor wound healing. He had also been seen and treated by the Wound care clinic. His last visit with us was on 8/16, and he had significant improvement in the healing of his incision, and he was discharged from our care. On 9/3 at his cardiology appointment, he was having URI symptoms and sternal tenderness. He saw his PCP on 9/4 and an US soft tissue sternum was obtained which demonstrated fluid collection concerning for sternal infection. CT chest ordered and completed on 9/5 and demonstrated concern for developing dehiscence and fluid collection. Patient was directed to [facility] ED, where he was evaluated by our team, and ultimately on 9/9 underwent debridement and washout of sternal wound with vac placement, followed by washout again on 9/11 and vac replacement. He remained in the hospital until 9/17 and then was discharged home with wound vac, and was having [facility] do vac changes 3 days a week.      He returns today for a wound check and is accompanied by his wife. He reports he feels great and has been doing well. He gets out for walks and goes shopping with his wife. His VAC therapy has been completed since his last visit. His wound is healing nicely and he and his wife are very pleased with his progress. He denies  "fevers, chills, incisional drainage or redness.    He saw the wound center today, who moved out his follow up appointment to 2 weeks (instead of every week, previously). He reports they told him they are anticipating his next visit to be his last.     Vital Signs:   Vitals:    10/25/24 1445 10/25/24 1452   BP: 135/69 149/80   BP Location: Right arm Left arm   Patient Position: Sitting Sitting   Cuff Size: Standard Standard   Pulse: 62    Temp: (!) 96.6 °F (35.9 °C)    TempSrc: Tympanic    SpO2: 98%    Weight: 74.3 kg (163 lb 14.4 oz)    Height: 5' 5\" (1.651 m)        Home Medications:   Prior to Admission medications    Medication Sig Start Date End Date Taking? Authorizing Provider   acetaminophen (TYLENOL) 325 mg tablet Take 2 tablets (650 mg total) by mouth every 6 (six) hours as needed for mild pain, headaches or fever  Patient taking differently: Take 650 mg by mouth every 6 (six) hours as needed for mild pain, headaches or fever As needed 7/9/24  Yes Sp Hagen PA-C   amLODIPine (NORVASC) 2.5 mg tablet Take 1 tablet (2.5 mg total) by mouth daily 9/19/24  Yes Kenny Mcdowell,    aspirin (ECOTRIN LOW STRENGTH) 81 mg EC tablet Take 1 tablet (81 mg total) by mouth daily 7/9/24  Yes Sp Hagen PA-C   Cyanocobalamin (B-12) 1000 MCG SUBL Place 1 tablet (1,000 mcg total) under the tongue in the morning 8/30/24 11/28/24 Yes SANTHOSH Baeza   ergocalciferol (VITAMIN D2) 50,000 units Take 1 capsule (50,000 Units total) by mouth once a week For 6 weeks 8/30/24  Yes SANTHOSH Baeza   escitalopram (LEXAPRO) 5 mg tablet Take 1 tablet (5 mg total) by mouth daily After 14 days, may increase to 2 tablets (10mg) daily if tolerating well. 8/26/24  Yes SANTHOSH Baeza   Iron-Vitamin C (Vitron-C)  MG TABS Take 1 each by mouth daily 10/18/24  Yes Mee Sheikh PA-C   metoprolol tartrate (LOPRESSOR) 50 mg tablet Take 0.5 tablets (25 mg total) by mouth 2 (two) times a day " "8/1/24  Yes SANTHOSH Dominguez   pantoprazole (PROTONIX) 40 mg tablet Take 1 tablet (40 mg total) by mouth daily in the early morning 7/18/24  Yes SANTHOSH Braun   rosuvastatin (CRESTOR) 40 MG tablet Take 1 tablet (40 mg total) by mouth daily 6/3/24  Yes SANTHOSH Duckworth   Sodium Hypochlorite 0.25 % SOLN Apply 1 Application topically daily 10/17/24  Yes Laura Blancas PA-C   ticagrelor (BRILINTA) 90 MG Take 1 tablet (90 mg total) by mouth every 12 (twelve) hours 8/1/24 7/27/25 Yes SANTHOSH Dominguez   escitalopram (LEXAPRO) 5 mg tablet Take 1 tablet (5 mg total) by mouth daily  Patient not taking: Reported on 10/18/2024 9/19/24   Sherwin Mantilla MD   melatonin 3 mg Take 2 tablets (6 mg total) by mouth daily at bedtime  Patient not taking: Reported on 9/3/2024 7/9/24   Sp Hagen PA-C   pantoprazole (PROTONIX) 40 mg tablet Take 1 tablet (40 mg total) by mouth daily  Patient not taking: Reported on 10/18/2024 9/19/24   Sherwin Mantilla MD       Physical Exam:    HEENT/NECK:  Normocephalic. Atraumatic.  No jugular venous distention.    Cardiac: Regular rate and rhythm and No murmurs/rubs/gallops  Pulmonary:  Breath sounds clear bilaterally and No rales/rhonchi/wheezes  Abdomen:  Non-tender, Non-distended, and Normal bowel sounds  Incisions: Sternum is stable.  Incision is clean, dry, and intact.  2 tiny areas (approx 0.5cm and 0.25cm in length) of superficial skin abrasion at proximal incision. No erythema or drainage noted. Healing well.  Extremities: Extremities warm/dry and No edema B/L  Neuro: Alert and oriented X 3.  Sensation is grossly intact.  No focal deficits.  Skin: Warm/Dry, without rashes or lesions.    Lab Results:               Invalid input(s): \"LABGLOM\"      Lab Results   Component Value Date    HGBA1C 5.7 (H) 07/04/2024     Lab Results   Component Value Date    TROPONINI <0.02 12/03/2015       Imaging Studies:     none    Assessment: Coronary artery disease. S/P coronary artery bypass " grafting x 3  Sternal wound dehiscence and infection; s/p debridement and washout of sternal wound with vac x2 on 9/9/24 and 9/11/24     Plan:     Shane Chau Sr. continues to recover well following surgery.    His sternal wound is nearly completely healed and he has made significant progress. He continues to follow with Wound Care, who is currently managing his wound care regimen. They are anticipating discharge from their care at his next appointment in approximately 2 weeks.      I have advised Mr. Chau and his wife to contact us and schedule another follow up appointment if he requires further wound care visits.  If he is discharged from wound care, he does not require any further follow up appointments with us and can call us if he has any concerns.        Marcela Hodges PA-C  10/25/24    * This note was completed in part utilizing [employer] direct voice recognition software.   Grammatical errors, random word insertion, spelling mistakes, and incomplete sentences may be an occasional consequence of the system secondary to software limitations, ambient noise and hardware issues. At the time of dictation, efforts were made to edit, clarify and /or correct errors. Please read the chart carefully and recognize, using context, where substitutions have occurred.  If you have any questions or concerns about the context, text or information contained within the body of this dictation, please contact myself, the provider, for further clarification.

## 2024-10-28 ENCOUNTER — HOME CARE VISIT (OUTPATIENT)
Dept: HOME HEALTH SERVICES | Facility: HOME HEALTHCARE | Age: 79
End: 2024-10-28
Payer: COMMERCIAL

## 2024-10-28 VITALS
RESPIRATION RATE: 18 BRPM | SYSTOLIC BLOOD PRESSURE: 140 MMHG | OXYGEN SATURATION: 98 % | HEART RATE: 64 BPM | TEMPERATURE: 97.6 F | DIASTOLIC BLOOD PRESSURE: 82 MMHG

## 2024-10-28 PROCEDURE — G0299 HHS/HOSPICE OF RN EA 15 MIN: HCPCS

## 2024-10-30 ENCOUNTER — OFFICE VISIT (OUTPATIENT)
Dept: CARDIOLOGY CLINIC | Facility: CLINIC | Age: 79
End: 2024-10-30
Payer: COMMERCIAL

## 2024-10-30 ENCOUNTER — HOME CARE VISIT (OUTPATIENT)
Dept: HOME HEALTH SERVICES | Facility: HOME HEALTHCARE | Age: 79
End: 2024-10-30
Payer: COMMERCIAL

## 2024-10-30 VITALS
SYSTOLIC BLOOD PRESSURE: 118 MMHG | DIASTOLIC BLOOD PRESSURE: 72 MMHG | OXYGEN SATURATION: 98 % | RESPIRATION RATE: 18 BRPM | TEMPERATURE: 97.6 F | HEART RATE: 79 BPM

## 2024-10-30 VITALS
DIASTOLIC BLOOD PRESSURE: 66 MMHG | SYSTOLIC BLOOD PRESSURE: 128 MMHG | HEART RATE: 67 BPM | WEIGHT: 166 LBS | OXYGEN SATURATION: 98 % | HEIGHT: 65 IN | BODY MASS INDEX: 27.66 KG/M2

## 2024-10-30 DIAGNOSIS — I25.118 CORONARY ARTERY DISEASE OF NATIVE ARTERY OF NATIVE HEART WITH STABLE ANGINA PECTORIS (HCC): Primary | ICD-10-CM

## 2024-10-30 DIAGNOSIS — I71.40 ABDOMINAL AORTIC ANEURYSM (AAA) WITHOUT RUPTURE, UNSPECIFIED PART (HCC): ICD-10-CM

## 2024-10-30 DIAGNOSIS — I65.23 ASYMPTOMATIC BILATERAL CAROTID ARTERY STENOSIS: ICD-10-CM

## 2024-10-30 DIAGNOSIS — E78.49 OTHER HYPERLIPIDEMIA: ICD-10-CM

## 2024-10-30 DIAGNOSIS — I10 PRIMARY HYPERTENSION: ICD-10-CM

## 2024-10-30 PROCEDURE — 99215 OFFICE O/P EST HI 40 MIN: CPT | Performed by: INTERNAL MEDICINE

## 2024-10-30 PROCEDURE — G2211 COMPLEX E/M VISIT ADD ON: HCPCS | Performed by: INTERNAL MEDICINE

## 2024-10-30 PROCEDURE — G0299 HHS/HOSPICE OF RN EA 15 MIN: HCPCS

## 2024-10-30 NOTE — PROGRESS NOTES
[facility] Cardiology  Follow up note  Shane Chau Sr. 79 y.o. male MRN: [id number]        Impression:      CAD  Chronic moderate nonobstructive disease  But increased symptoms 5/24, sent for cath,  LAD, collateralized/90%RCA  CABG x 3 6/24, LVEF preserved  Currently on aspirin, beta-blocker    Hypertension  Blood pressure is excellent    Vascular disease is followed closely by the vascular department   AAA repair with slowly enlarging proximal segment of the aorta, up to 4.4 cm as of 7/24  He has mild iliac artery aneurysms  He has mild carotid artery stenosis, last assessed 7/24, no bruits on exam    CVA  Occurred post CABG, a few days later which would be less likely due to aortic calcification or crossclamping  Zio patch was negative for A-fib  He has fully recovered he is on Brilinta per neurology after his P2 Y12 testing was abnormal on Plavix    Hyperlipidemia  [place] controlled in July, in the context of 40 pound weight loss after CABG, sternal infection, stroke, ileus  LDL was 10  Will reevaluate, suspect his cholesterol panel in July was due to illness.      Plan:    Recheck lipids and BMP in 6 months  Follow-up with me then  Thankfully sternal infection is clearing up, has a final check with wound care  He has gained about 10 to 15 pounds from his low of 151 after his summer of illness dealing with complications post CABG.    HPI:   Shane Chau Sr. is a 79 y.o. year old male with hypertension, hyperlipidemia, CAD defined by multivessel disease noted in 2006, but only severe obstruction of the obtuse marginal status post stenting at that time nonobstructive in 2019, but progressed to worse symptomatic disease 5/24 with discovery of subtotal LAD and 90% RCA status post CABG x 3.  Complicated by sternal wound infection, postoperative CVA.  He does not have atrial fibrillation.  Zio patch was negative.  He has a history of AAA, AAA repair, iliac aneurysms, celiac stenosis, bilateral carotid  artery stenosis.    He dropped down to about 150 pounds after his extended hospitalization which was complicated by ileus, stroke after CABG.  9/24 he was seen again in our office with complaints of chest discomfort, sent immediately for cross-sectional CT with evidence of fluid accumulation and suggestive of sternal infection, underwent debridement, wound VAC, antibiotics, and has been healing very well, no longer with a wound VAC in place.  His blood pressure is excellent.  His cholesterol dropped to an LDL of 10 during his acute illness over the summer, this likely represents a sign of his illness over the summer.  Would not consider this over treatment of his cholesterol.  He is on rosuvastatin 40 mg.  He has no residual CVA deficits.  However his wife says he is less motivated and sits on the couch a lot more than he ever did prior to CABG.  He is on metoprolol 25 mg twice daily, in 2023 we felt he might have chronotropic incompetence we try to reduce the dose but 4 months later he was on a higher dose he could not recall how he ended up on the higher dose but no longer complained of any symptoms that were consistent with chronotropic incompetence.      Review of Systems   Constitutional:  Positive for fatigue. Negative for appetite change, diaphoresis and fever.   Respiratory:  Negative for chest tightness, shortness of breath and wheezing.    Cardiovascular:  Negative for chest pain, palpitations and leg swelling.   Gastrointestinal:  Negative for abdominal pain and blood in stool.   Musculoskeletal:  Negative for arthralgias and joint swelling.   Skin:  Negative for rash.   Neurological:  Positive for weakness. Negative for dizziness, syncope and light-headedness.         Past Medical History:   Diagnosis Date    AAA (abdominal aortic aneurysm) (HCC)     s/p open repair    BPH (benign prostatic hyperplasia)     CAD (coronary artery disease)     Cholelithiasis     Former tobacco use     History of MI  (myocardial infarction)     History of TIA (transient ischemic attack)     Plavix in past, now on Brilinta    Hyperlipidemia     Hypertension     Insomnia     Obesity      Social History     Substance and Sexual Activity   Alcohol Use Not Currently     Social History     Substance and Sexual Activity   Drug Use No     Social History     Tobacco Use   Smoking Status Former    Current packs/day: 0.00    Average packs/day: 0.5 packs/day for 30.0 years (15.0 ttl pk-yrs)    Types: Cigarettes    Start date: 3/10/1956    Quit date: 3/10/1986    Years since quittin.6   Smokeless Tobacco Never   Tobacco Comments    Quit  about 25 years x1ppd       Allergies:  No Known Allergies    Medications:     Current Outpatient Medications:     acetaminophen (TYLENOL) 325 mg tablet, Take 2 tablets (650 mg total) by mouth every 6 (six) hours as needed for mild pain, headaches or fever (Patient taking differently: Take 650 mg by mouth every 6 (six) hours as needed for mild pain, headaches or fever As needed), Disp: , Rfl:     amLODIPine (NORVASC) 2.5 mg tablet, Take 1 tablet (2.5 mg total) by mouth daily, Disp: 90 tablet, Rfl: 0    aspirin (ECOTRIN LOW STRENGTH) 81 mg EC tablet, Take 1 tablet (81 mg total) by mouth daily, Disp: , Rfl:     Cyanocobalamin (B-12) 1000 MCG SUBL, Place 1 tablet (1,000 mcg total) under the tongue in the morning, Disp: 90 tablet, Rfl: 0    ergocalciferol (VITAMIN D2) 50,000 units, Take 1 capsule (50,000 Units total) by mouth once a week For 6 weeks, Disp: 6 capsule, Rfl: 0    escitalopram (LEXAPRO) 5 mg tablet, Take 1 tablet (5 mg total) by mouth daily After 14 days, may increase to 2 tablets (10mg) daily if tolerating well., Disp: 42 tablet, Rfl: 0    metoprolol tartrate (LOPRESSOR) 50 mg tablet, Take 0.5 tablets (25 mg total) by mouth 2 (two) times a day, Disp: , Rfl:     pantoprazole (PROTONIX) 40 mg tablet, Take 1 tablet (40 mg total) by mouth daily in the early morning, Disp: 30 tablet, Rfl: 0     rosuvastatin (CRESTOR) 40 MG tablet, Take 1 tablet (40 mg total) by mouth daily, Disp: 90 tablet, Rfl: 3    ticagrelor (BRILINTA) 90 MG, Take 1 tablet (90 mg total) by mouth every 12 (twelve) hours, Disp: 180 tablet, Rfl: 3    escitalopram (LEXAPRO) 5 mg tablet, Take 1 tablet (5 mg total) by mouth daily (Patient not taking: Reported on 10/18/2024), Disp: 90 tablet, Rfl: 3    Iron-Vitamin C (Vitron-C)  MG TABS, Take 1 each by mouth daily (Patient not taking: Reported on 10/30/2024), Disp: 60 tablet, Rfl: 1    melatonin 3 mg, Take 2 tablets (6 mg total) by mouth daily at bedtime (Patient not taking: Reported on 10/30/2024), Disp: , Rfl:     pantoprazole (PROTONIX) 40 mg tablet, Take 1 tablet (40 mg total) by mouth daily (Patient not taking: Reported on 10/18/2024), Disp: 90 tablet, Rfl: 3    Sodium Hypochlorite 0.25 % SOLN, Apply 1 Application topically daily (Patient not taking: Reported on 10/30/2024), Disp: 473 mL, Rfl: 0      Vitals:    10/30/24 1348   BP: 128/66   Pulse: 67   SpO2: 98%     Weight (last 2 days)       Date/Time Weight    10/30/24 1348 75.3 (166)          Physical Exam  Constitutional:       General: He is not in acute distress.     Appearance: Normal appearance. He is well-developed. He is not diaphoretic.   HENT:      Head: Normocephalic and atraumatic.   Eyes:      General: No scleral icterus.     Conjunctiva/sclera: Conjunctivae normal.      Pupils: Pupils are equal, round, and reactive to light.   Neck:      Thyroid: No thyromegaly.      Vascular: No carotid bruit or JVD.   Cardiovascular:      Rate and Rhythm: Normal rate and regular rhythm.      Heart sounds: Normal heart sounds. No murmur heard.     No friction rub. No gallop.   Pulmonary:      Effort: Pulmonary effort is normal. No respiratory distress.      Breath sounds: Normal breath sounds. No wheezing or rales.   Abdominal:      General: Bowel sounds are normal. There is no distension.      Palpations: Abdomen is soft.       Tenderness: There is no abdominal tenderness.   Musculoskeletal:         General: No deformity. Normal range of motion.      Cervical back: Normal range of motion and neck supple.      Right lower leg: No edema.      Left lower leg: No edema.   Skin:     General: Skin is warm and dry.      Findings: No erythema or rash.   Neurological:      General: No focal deficit present.      Mental Status: He is alert and oriented to person, place, and time.      Cranial Nerves: No cranial nerve deficit.      Motor: No weakness.           Laboratory Studies:  Chem:   Lab Results   Component Value Date    HGBA1C 5.7 (H) 07/04/2024    HGBA1C 5.4 06/08/2024     12/03/2015     12/02/2015     08/20/2015    K 4.0 09/11/2024    K 4.7 09/09/2024    K 3.9 09/06/2024    K 4.3 12/03/2015    K 4.0 12/02/2015    K 4.8 08/20/2015     09/11/2024     09/09/2024     09/06/2024     12/03/2015     12/02/2015     08/20/2015    CO2 28 09/11/2024    CO2 27 09/09/2024    CO2 23 09/06/2024    CO2 21 06/20/2024    CO2 22 06/20/2024    CO2 25 06/20/2024    GLUCOSE 140 06/20/2024    GLUCOSE 171 (H) 06/20/2024    GLUCOSE 182 (H) 06/20/2024    GLUCOSE 121 12/03/2015    GLUCOSE 105 12/02/2015    GLUCOSE 105 08/20/2015    CREATININE 0.96 09/11/2024    CREATININE 1.12 09/09/2024    CREATININE 0.92 09/06/2024    CREATININE 0.86 12/03/2015    CREATININE 0.92 12/02/2015    CREATININE 0.89 08/20/2015    BUN 16 09/11/2024    BUN 13 09/09/2024    BUN 17 09/06/2024    BUN 15 12/03/2015    BUN 17 12/02/2015    BUN 15 08/20/2015    MG 2.1 07/03/2024    MG 2.2 07/02/2024    MG 2.3 07/01/2024     NT-proBNP:   Coags:  Lipid Profile:   Lab Results   Component Value Date    CHOL 138 08/20/2015    CHOL 135 09/30/2014    LDLCALC 10 07/04/2024    LDLCALC 62 06/08/2024    LDLCALC 65 06/22/2023    LDLCALC 67 08/20/2015    LDLCALC 65 09/30/2014    LDLDIRECT 42 08/06/2024    HDL 12 (L) 07/04/2024    HDL 36 (L) 06/08/2024     "HDL 43 06/22/2023    HDL 42 08/20/2015    HDL 46 09/30/2014    TRIG 103 07/04/2024    TRIG 116 06/08/2024    TRIG 104 06/22/2023    TRIG 147 08/20/2015    TRIG 120 09/30/2014       Cardiac testing:   EKG reviewed personally:    No results found for this visit on 10/30/24.        Cardiac catheterization  12/18 - 50% nonobstructive disease of all major vessels  5/24-subtotal  LAD with collaterals, 90% RCA.  This is my personal interpretation upon review of the cardiac catheterization.    Echocardiogram  5/24-EF normal, no major valve disease-this is my personal interpretation upon review of the echocardiogram personally.    Holter  8/24-upon my personal review no evidence of atrial fibrillation    Stress Myoview  1/19-anterior wall ischemia    Jose Juares MD    Portions of the record may have been created with voice recognition software.  Occasional wrong word or \"sound a like\" substitutions may have occurred due to the inherent limitations of voice recognition software.  Read the chart carefully and recognize, using context, where substitutions have occurred.    I have spent a total time of 44 minutes in caring for this patient on the day of the visit/encounter including Prognosis, Instructions for management, Patient and family education, Importance of tx compliance, Impressions, and Reviewing / ordering tests, medicine, procedures  .    "

## 2024-11-01 ENCOUNTER — HOME CARE VISIT (OUTPATIENT)
Dept: HOME HEALTH SERVICES | Facility: HOME HEALTHCARE | Age: 79
End: 2024-11-01
Payer: COMMERCIAL

## 2024-11-01 PROCEDURE — G0299 HHS/HOSPICE OF RN EA 15 MIN: HCPCS

## 2024-11-03 VITALS
TEMPERATURE: 97.7 F | SYSTOLIC BLOOD PRESSURE: 122 MMHG | DIASTOLIC BLOOD PRESSURE: 62 MMHG | RESPIRATION RATE: 18 BRPM | HEART RATE: 62 BPM | OXYGEN SATURATION: 95 %

## 2024-11-04 ENCOUNTER — HOME CARE VISIT (OUTPATIENT)
Dept: HOME HEALTH SERVICES | Facility: HOME HEALTHCARE | Age: 79
End: 2024-11-04
Payer: COMMERCIAL

## 2024-11-04 PROCEDURE — G0299 HHS/HOSPICE OF RN EA 15 MIN: HCPCS

## 2024-11-05 VITALS
SYSTOLIC BLOOD PRESSURE: 108 MMHG | OXYGEN SATURATION: 98 % | TEMPERATURE: 97.6 F | RESPIRATION RATE: 18 BRPM | HEART RATE: 64 BPM | DIASTOLIC BLOOD PRESSURE: 62 MMHG

## 2024-11-06 ENCOUNTER — HOME CARE VISIT (OUTPATIENT)
Dept: HOME HEALTH SERVICES | Facility: HOME HEALTHCARE | Age: 79
End: 2024-11-06
Payer: COMMERCIAL

## 2024-11-06 ENCOUNTER — OFFICE VISIT (OUTPATIENT)
Dept: WOUND CARE | Facility: HOSPITAL | Age: 79
End: 2024-11-06
Payer: COMMERCIAL

## 2024-11-06 VITALS
RESPIRATION RATE: 16 BRPM | DIASTOLIC BLOOD PRESSURE: 67 MMHG | HEART RATE: 63 BPM | SYSTOLIC BLOOD PRESSURE: 132 MMHG | TEMPERATURE: 97.1 F

## 2024-11-06 DIAGNOSIS — T81.89XA NON-HEALING SURGICAL WOUND, INITIAL ENCOUNTER: Primary | ICD-10-CM

## 2024-11-06 PROCEDURE — 99213 OFFICE O/P EST LOW 20 MIN: CPT | Performed by: ORTHOPAEDIC SURGERY

## 2024-11-06 NOTE — PROGRESS NOTES
Wound Procedure Treatment Surgical Mid Chest    Performed by: Lucia Morfin RN  Authorized by: Laura Blancas PA-C    Associated wounds:   Wound 09/11/24 Surgical Chest Mid  Wound cleansed with:  NSS  Applied Topical: Hydrogel    Applied primary dressing:  Collagen dressing and Silver  Applied secondary dressing:  Gauze  Dressing secured with:  Tape

## 2024-11-06 NOTE — PROGRESS NOTES
Patient ID: Shane Chau Denise is a 79 y.o. male Date of Birth 1945       Chief Complaint   Patient presents with    Follow Up Wound Care Visit     Sternal wound       Allergies:  Patient has no known allergies.    Diagnosis:   Diagnosis ICD-10-CM Associated Orders   1. Non-healing surgical wound, initial encounter  T81.89XA Wound cleansing and dressings Surgical Mid Chest     Wound Procedure Treatment Surgical Mid Chest           Assessment and Plan :  Follow up evaluation of non-healing surgical wound progressively decreasing in size.   Continue wound management with Purchol Ag and Hydrogel followed by DSD, see wound orders below   Do not wet wound.   No harsh cleansers such as alcohol, peroxide, or antibacterial soap, do not submerge in water such as bathtub, hot tub, swimming pool, ocean, etc.   Can cleanse with NSS or Dakins only if green drainage at dressing changes.   Avoid direct pressure to Wound site.  Continue adequate protein intake (chicken, fish, yogurt, eggs and nuts), 3-4 servings per day to expedite wound healing.   Followup in 2 week(s) or call sooner with questions or concerns or any signs of infection such as redness, swelling, increased/purulent drainage, fever, chills, increased severe pain.     Subjective:   10/9: Patient is a 79 y.o. male well known to [facility] with history of CABG x 3 (on Brilinta/ASA 81mg) on 6/20/2024 with postoperative hospital stay complicated by stroke, post-op ileus sternal wound dehiscence who presents for initial eval of non healing surgical wound which has been present since 9/9/24 after I&D  with a repeat washout on 9/11/24 of infected sternal wound.  Fluid and OR cultures were positive for Klebsiella oxytoca. Since then pt was treated with 2 courses of antibiotics: ceftriaxone and cefazolin. Pt has been applying wound vac on wound bed. Does not have an odor.  No diabetes.  No smoking, ETOH or drug use. Pt denies any sob, fatigue, N/V, CP, fevers or  chills.    Pt is accompanied by his wife who is actively involved in his care.    10/16: Patient presents for followup evaluation of non-healing surgical wound accompanied by his wife.  No new complaints. No increased pain or drainage.  Wound vac was placed on hold and he has been applying Purachol Ag on wound bed followed by Calcium Alginate with DSD on wound bed. Pt denies any sob, fatigue, N/V, CP, fevers or chills.    10/23:  Patient presents for followup evaluation of non-healing surgical wound accompanied by his wife. Pt states he is in good spirits and feeling well. Has been applying Purachol Ag followed by Calcium Alginate with DSD on wound bed. Pt denies any fevers or chills.    11/6: Patient presents for followup evaluation of non-healing surgical wound accompanied by his wife. No issues. Has been applying Purachol Ag with Hypdrogel and DSD on wound bed. Pt denies any fevers or chills.        The following portions of the patient's history were reviewed and updated as appropriate:   Patient Active Problem List   Diagnosis    Closed compression fracture of first lumbar vertebra ([facility])    Abdominal aortic aneurysm without rupture ([facility])    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[facility])    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[facility])    Asymptomatic bilateral carotid artery stenosis    Coronary artery disease of native artery of native heart with stable angina pectoris ([facility])    Hyperlipidemia    Primary hypertension    Transient cerebral ischemic attack    Need for pneumococcal vaccination    Hematuria    Microhematuria    Erectile dysfunction    Leg swelling    Paresthesia    Rib pain    S/P CABG x 3    Localized primary osteoarthritis of lower leg, unspecified laterality    CKD (chronic kidney disease) stage 2, GFR 60-89 ml/min    Stroke-like symptoms    Hyponatremia    History of stroke    PAD (peripheral artery disease) ([facility])    Postoperative anemia due to acute blood loss    Stenosis of left vertebral artery    Prediabetes     Reaction at surgical site    Cognitive impairment    Surgical wound, non healing    Hospital discharge follow-up    Sternal wound infection    Anemia    Pain and swelling of right ankle    Severe protein-calorie malnutrition (HCC)    Anxiety    Gastroesophageal reflux disease without esophagitis     Past Medical History:   Diagnosis Date    AAA (abdominal aortic aneurysm) (HCC)     s/p open repair    BPH (benign prostatic hyperplasia)     CAD (coronary artery disease)     Cholelithiasis     Former tobacco use     History of MI (myocardial infarction)     History of TIA (transient ischemic attack)     Plavix in past, now on Brilinta    Hyperlipidemia     Hypertension     Insomnia     Obesity      Past Surgical History:   Procedure Laterality Date    ABDOMINAL AORTIC ANEURYSM REPAIR  06/07/2011    Dr. Yarbrough 6/7/11    APPENDECTOMY      CARDIAC CATHETERIZATION Left 06/03/2024    Procedure: Cardiac Left Heart Cath;  Surgeon: Pk Blanco DO;  Location: [facility];  Service: Cardiology    CARDIAC CATHETERIZATION  06/03/2024    Procedure: Cardiac catheterization;  Surgeon: Pk Blanco DO;  [facility]: [facility];  Service: Cardiology    CARDIAC CATHETERIZATION N/A 06/03/2024    Procedure: Cardiac Coronary Angiogram;  Surgeon: Pk Blanco DO;  [facility]: [facility];  Service: Cardiology    CATARACT EXTRACTION Right 11/2018    CHOLECYSTECTOMY LAPAROSCOPIC      COLONOSCOPY  11/2006    CORONARY ANGIOPLASTY      3 STENTS INSERTED    HARVEST VEIN Left 6/20/2024    Procedure: HARVEST VEIN ENDOSCOPIC (EVH);  Surgeon: KALPANA Amos MD;  Location: [facility];  Service: Cardiac Surgery    IR ASPIRATION ONLY  9/5/2024    NM CORONARY ARTERY BYP W/VEIN & ARTERY GRAFT 3 VEIN N/A 6/20/2024    Procedure: CORONARY ARTERY BYPASS GRAFT (CABG) X3 VESSELS, LIMA - LAD, LEFT LEG EVH/SVG - PDA AND OM1, W/ DEDRICK;  Surgeon: KALPANA Amos MD;  Location: [facility];  Service:  Cardiac Surgery    CO LAPS SURG CHOLECYSTECTOMY W/CHOLANGIOGRAPHY N/A 2016    Procedure: CHOLECYSTECTOMY LAPAROSCOPIC with intra-op cholangiogram ;  Surgeon: Prasanth Verdin MD;  Location: [facility];  Service: General    CO STERNAL DEBRIDEMENT N/A 2024    Procedure: DEBRIDEMENT STERNAL WOUND (WASH OUT);  Surgeon: KALPANA Amos MD;  Location: [facility];  Service: Cardiac Surgery    CO STERNAL DEBRIDEMENT N/A 2024    Procedure: DEBRIDEMENT STERNAL WOUND (WASH OUT) VAC CHANGE;  Surgeon: KALPANA Amos MD;  Location: [facility];  Service: Cardiac Surgery     Family History   Problem Relation Age of Onset    Heart disease Mother     Hypertension Mother         Benign Essential     Stroke Mother         Stroke     Heart disease Father     Coronary artery disease Brother     Diabetes Brother         mellitus     Heart disease Brother     Aortic aneurysm Brother     Coronary artery disease Brother     Sudden death Brother     Coronary artery disease Brother     Sudden death Brother     Sudden death Brother     Coronary artery disease Brother     Heart disease Family      Social History     Socioeconomic History    Marital status: /Civil Union     Spouse name: None    Number of children: None    Years of education: None    Highest education level: None   Occupational History    Occupation: Retired    Tobacco Use    Smoking status: Former     Current packs/day: 0.00     Average packs/day: 0.5 packs/day for 30.0 years (15.0 ttl pk-yrs)     Types: Cigarettes     Start date: 3/10/1956     Quit date: 3/10/1986     Years since quittin.6    Smokeless tobacco: Never    Tobacco comments:     Quit  about 25 years x1ppd   Vaping Use    Vaping status: Never Used   Substance and Sexual Activity    Alcohol use: Not Currently    Drug use: No    Sexual activity: Not Currently     Partners: Female   Other Topics Concern    None   Social History Narrative    None     Social Determinants of  Health     Financial Resource Strain: Low Risk  (6/21/2023)    Overall Financial Resource Strain (CARDIA)     Difficulty of Paying Living Expenses: Not very hard   Food Insecurity: No Food Insecurity (9/6/2024)    Nursing - Inadequate Food Risk Classification     Worried About Running Out of Food in the Last Year: Never true     Ran Out of Food in the Last Year: Never true     Ran Out of Food in the Last Year: Not on file   Transportation Needs: No Transportation Needs (9/18/2024)    OASIS : Transportation     Lack of Transportation (Medical): No     Lack of Transportation (Non-Medical): No     Patient Unable or Declines to Respond: No   Physical Activity: Not on file   Stress: Not on file   Social Connections: Unknown (6/18/2024)    Received from [employer]     How often do you feel lonely or isolated from those around you? (Adult - for ages 18 years and over): Not on file   Intimate Partner Violence: Not on file   Housing Stability: Low Risk  (9/6/2024)    Housing Stability Vital Sign     Unable to Pay for Housing in the Last Year: No     Number of Times Moved in the Last Year: 0     Homeless in the Last Year: No       Current Outpatient Medications:     acetaminophen (TYLENOL) 325 mg tablet, Take 2 tablets (650 mg total) by mouth every 6 (six) hours as needed for mild pain, headaches or fever (Patient taking differently: Take 650 mg by mouth every 6 (six) hours as needed for mild pain, headaches or fever As needed), Disp: , Rfl:     amLODIPine (NORVASC) 2.5 mg tablet, Take 1 tablet (2.5 mg total) by mouth daily, Disp: 90 tablet, Rfl: 0    aspirin (ECOTRIN LOW STRENGTH) 81 mg EC tablet, Take 1 tablet (81 mg total) by mouth daily, Disp: , Rfl:     Cyanocobalamin (B-12) 1000 MCG SUBL, Place 1 tablet (1,000 mcg total) under the tongue in the morning, Disp: 90 tablet, Rfl: 0    ergocalciferol (VITAMIN D2) 50,000 units, Take 1 capsule (50,000 Units total) by mouth once a week For 6 weeks,  Disp: 6 capsule, Rfl: 0    escitalopram (LEXAPRO) 5 mg tablet, Take 1 tablet (5 mg total) by mouth daily After 14 days, may increase to 2 tablets (10mg) daily if tolerating well., Disp: 42 tablet, Rfl: 0    melatonin 3 mg, Take 2 tablets (6 mg total) by mouth daily at bedtime (Patient not taking: Reported on 10/30/2024), Disp: , Rfl:     metoprolol tartrate (LOPRESSOR) 50 mg tablet, Take 0.5 tablets (25 mg total) by mouth 2 (two) times a day, Disp: , Rfl:     pantoprazole (PROTONIX) 40 mg tablet, Take 1 tablet (40 mg total) by mouth daily in the early morning, Disp: 30 tablet, Rfl: 0    rosuvastatin (CRESTOR) 40 MG tablet, Take 1 tablet (40 mg total) by mouth daily, Disp: 90 tablet, Rfl: 3    ticagrelor (BRILINTA) 90 MG, Take 1 tablet (90 mg total) by mouth every 12 (twelve) hours, Disp: 180 tablet, Rfl: 3    Review of Systems   Constitutional:  Negative for appetite change, chills, fatigue, fever and unexpected weight change.   HENT:  Negative for congestion, hearing loss and postnasal drip.    Respiratory:  Negative for cough and shortness of breath.    Cardiovascular:  Negative for leg swelling.   Musculoskeletal:  Positive for gait problem (ambulates with cane).   Skin:  Positive for wound (chest). Negative for rash.   Neurological:  Negative for numbness.   Hematological:  Does not bruise/bleed easily.   Psychiatric/Behavioral: Negative.           Objective:  /67   Pulse 63   Temp (!) 97.1 °F (36.2 °C)   Resp 16         Physical Exam  Vitals reviewed.   Constitutional:       General: He is not in acute distress.     Appearance: Normal appearance. He is well-developed and normal weight.   HENT:      Head: Normocephalic and atraumatic.   Cardiovascular:      Rate and Rhythm: Normal rate.   Pulmonary:      Effort: Pulmonary effort is normal.   Musculoskeletal:         General: No deformity.      Right lower leg: No edema.      Left lower leg: No edema.   Skin:     General: Skin is warm and dry.       Findings: Wound (mid chest) present.             Comments: Beefy red wound bed significantly decreased in size. See wound assessment   Neurological:      General: No focal deficit present.      Mental Status: He is alert and oriented to person, place, and time.      Gait: Gait normal.   Psychiatric:         Mood and Affect: Mood and affect normal.         Behavior: Behavior normal. Behavior is cooperative.         Wound 09/11/24 Surgical Chest Mid (Active)   Wound Image Images linked 11/06/24 0943   Wound Description Pink;Hypergranulation 11/06/24 0945   Joan-wound Assessment Intact;Scar Tissue 11/06/24 0945   Wound Length (cm) 0.5 cm 11/06/24 0945   Wound Width (cm) 0.3 cm 11/06/24 0945   Wound Depth (cm) 0.1 cm 11/06/24 0945   Wound Surface Area (cm^2) 0.15 cm^2 11/06/24 0945   Wound Volume (cm^3) 0.015 cm^3 11/06/24 0945   Calculated Wound Volume (cm^3) 0.02 cm^3 11/06/24 0945   Change in Wound Size % 99.8 11/06/24 0945   Drainage Amount Small 11/06/24 0945   Drainage Description Serosanguineous 11/06/24 0945   Non-staged Wound Description Full thickness 11/06/24 0945   Patient Tolerance Tolerated well 11/06/24 0945   Dressing Status Intact 11/06/24 0945         Wound Instructions:  Orders Placed This Encounter   Procedures    Wound cleansing and dressings Surgical Mid Chest     Wound location midline chest.   Change dressing 3x per week.  If drainage gets through the outer dressing, please change outer dressing as needed.    Sponge bathe only, do not shower at this time.  Cleanse the wound with wound cleanser or mild soap and water, rinse, pat dry.   You may stop the quarter strength Dakins solution soak however please resume soaks should the drainage become green again.     Apply Puracol AG to wound bed.  Apply small amt of hydrogel to Puracol AG in wound as needed help dissolved the Puracol AG if not completely dissolving into wound bed.   Cover with gauze  Secure with tape.            [facility] to continue for  "home wound care and assessment.     Standing Status:   Future     Standing Expiration Date:   11/13/2024    Wound Procedure Treatment Surgical Mid Chest     This order was created via procedure documentation       Laura Blancas PA-C, [facility]      Portions of the record may have been created with voice recognition software. Occasional wrong word or \"sound alike\" substitutions may have occurred due to the inherent limitations of voice recognition software. Read the chart carefully and recognize, using context, where substitutions have occurred.      "

## 2024-11-06 NOTE — PATIENT INSTRUCTIONS
Orders Placed This Encounter   Procedures    Wound cleansing and dressings Surgical Mid Chest     Wound location midline chest.   Change dressing 3x per week.  If drainage gets through the outer dressing, please change outer dressing as needed.    Sponge bathe only, do not shower at this time.  Cleanse the wound with wound cleanser or mild soap and water, rinse, pat dry.   You may stop the quarter strength Dakins solution soak however please resume soaks should the drainage become green again.     Apply Puracol AG to wound bed.  Apply small amt of hydrogel to Puracol AG in wound as needed help dissolved the Puracol AG if not completely dissolving into wound bed.   Cover with gauze  Secure with tape.            [facility] to continue for home wound care and assessment.     Standing Status:   Future     Standing Expiration Date:   11/13/2024

## 2024-11-08 ENCOUNTER — HOME CARE VISIT (OUTPATIENT)
Dept: HOME HEALTH SERVICES | Facility: HOME HEALTHCARE | Age: 79
End: 2024-11-08
Payer: COMMERCIAL

## 2024-11-08 VITALS
DIASTOLIC BLOOD PRESSURE: 68 MMHG | TEMPERATURE: 97.6 F | RESPIRATION RATE: 18 BRPM | HEART RATE: 68 BPM | OXYGEN SATURATION: 98 % | SYSTOLIC BLOOD PRESSURE: 118 MMHG

## 2024-11-08 PROCEDURE — G0299 HHS/HOSPICE OF RN EA 15 MIN: HCPCS

## 2024-11-11 ENCOUNTER — HOME CARE VISIT (OUTPATIENT)
Dept: HOME HEALTH SERVICES | Facility: HOME HEALTHCARE | Age: 79
End: 2024-11-11
Payer: COMMERCIAL

## 2024-11-11 VITALS
RESPIRATION RATE: 18 BRPM | SYSTOLIC BLOOD PRESSURE: 124 MMHG | HEART RATE: 72 BPM | OXYGEN SATURATION: 98 % | TEMPERATURE: 97.6 F | DIASTOLIC BLOOD PRESSURE: 72 MMHG

## 2024-11-11 PROCEDURE — G0299 HHS/HOSPICE OF RN EA 15 MIN: HCPCS

## 2024-11-13 ENCOUNTER — HOME CARE VISIT (OUTPATIENT)
Dept: HOME HEALTH SERVICES | Facility: HOME HEALTHCARE | Age: 79
End: 2024-11-13
Payer: COMMERCIAL

## 2024-11-13 VITALS
RESPIRATION RATE: 18 BRPM | DIASTOLIC BLOOD PRESSURE: 62 MMHG | OXYGEN SATURATION: 97 % | HEART RATE: 72 BPM | TEMPERATURE: 97.6 F | SYSTOLIC BLOOD PRESSURE: 124 MMHG

## 2024-11-13 PROCEDURE — G0299 HHS/HOSPICE OF RN EA 15 MIN: HCPCS

## 2024-11-26 ENCOUNTER — TELEMEDICINE (OUTPATIENT)
Dept: FAMILY MEDICINE CLINIC | Facility: CLINIC | Age: 79
End: 2024-11-26
Payer: COMMERCIAL

## 2024-11-26 VITALS — BODY MASS INDEX: 27.79 KG/M2 | WEIGHT: 167 LBS

## 2024-11-26 DIAGNOSIS — U07.1 COVID-19 VIRUS INFECTION: Primary | ICD-10-CM

## 2024-11-26 PROCEDURE — 99213 OFFICE O/P EST LOW 20 MIN: CPT | Performed by: FAMILY MEDICINE

## 2024-11-26 PROCEDURE — G2211 COMPLEX E/M VISIT ADD ON: HCPCS | Performed by: FAMILY MEDICINE

## 2024-11-26 NOTE — PROGRESS NOTES
COVID-19 Outpatient Progress Note  Name: Shane Chau Sr.      : 1945      MRN: [id number]  Encounter Provider: Saurabh Munoz MD  Encounter Date: 2024   Encounter department: [facility]    Assessment & Plan  COVID-19 virus infection         Patient Instructions   Borderline time.  For antiviral medication but sounds like he is getting better.  Continue Tylenol.  Decongestants if needed.  Does not need to be quarantined.  Should wear a mask for a total of 5 days which would end tomorrow.  Could be okay to go to [facility].  Might want to wear a mask if still coughing.      COVID-19 Plan       Encounter provider: Saurabh Munoz MD     Provider located at: [address] [address]     Recent Visits  No visits were found meeting these conditions.  Showing recent visits within past 7 days and meeting all other requirements  Today's Visits  Date Type Provider Dept   24 Telemedicine Saurabh Munoz MD [facility]   Showing today's visits and meeting all other requirements  Future Appointments  No visits were found meeting these conditions.  Showing future appointments within next 150 days and meeting all other requirements    History of Present Illness      COVID-19 HPI  Started with COVID symptoms 4 nights ago.  Wife had COVID which started 4 days earlier.  His symptoms included fever and chills.  Also stuffy nose.  Minimal cough.  Last fever was 2 days ago.  Using Tylenol.  Taking vitamin C.  Was able to sleep through the night last night.    Lab Results   Component Value Date    SARSCOV2 Negative 2024    SARSCOV2 Negative 2021    SARSCOVAG Negative 2024       Review of Systems  Objective   Wt 75.8 kg (167 lb)   BMI 27.79 kg/m²     Physical Exam  Appears well.

## 2024-11-26 NOTE — PATIENT INSTRUCTIONS
Borderline time.  For antiviral medication but sounds like he is getting better.  Continue Tylenol.  Decongestants if needed.  Does not need to be quarantined.  Should wear a mask for a total of 5 days which would end tomorrow.  Could be okay to go to [place].  Might want to wear a mask if still coughing.

## 2024-12-03 ENCOUNTER — DOCUMENTATION (OUTPATIENT)
Dept: CARDIAC REHAB | Facility: CLINIC | Age: 79
End: 2024-12-03

## 2024-12-03 NOTE — PROGRESS NOTES
[facility] Cardiopulmonary Rehab  [place]    Cardiac  Rehabilitation      Dear Dr. Juares,    Thank you for referring your patient to our Cardiac  Rehabilitation program. The patient has completed 7  visits. However, rehab is being discontinued at this time due to:    Medical Withdrawl:  The patient is too sick to continue, his last session attended was on 8/30/2024.       Please contact us at [phone] if you have any questions about this patent’s case.  Thank you for your continued support of cardiac rehabilitation.       Sincerely,    Wendy Lawson MS, CEP, EPC  Cardiopulmonary Rehab

## 2024-12-09 DIAGNOSIS — I10 PRIMARY HYPERTENSION: ICD-10-CM

## 2024-12-10 RX ORDER — METOPROLOL TARTRATE 50 MG
50 TABLET ORAL 2 TIMES DAILY
Qty: 180 TABLET | Refills: 1 | Status: SHIPPED | OUTPATIENT
Start: 2024-12-10

## 2024-12-11 ENCOUNTER — OFFICE VISIT (OUTPATIENT)
Dept: FAMILY MEDICINE CLINIC | Facility: CLINIC | Age: 79
End: 2024-12-11
Payer: COMMERCIAL

## 2024-12-11 ENCOUNTER — APPOINTMENT (OUTPATIENT)
Dept: LAB | Facility: CLINIC | Age: 79
End: 2024-12-11
Payer: COMMERCIAL

## 2024-12-11 VITALS
BODY MASS INDEX: 29.32 KG/M2 | WEIGHT: 176 LBS | TEMPERATURE: 98 F | HEIGHT: 65 IN | HEART RATE: 81 BPM | SYSTOLIC BLOOD PRESSURE: 150 MMHG | DIASTOLIC BLOOD PRESSURE: 90 MMHG

## 2024-12-11 DIAGNOSIS — I25.118 CORONARY ARTERY DISEASE OF NATIVE ARTERY OF NATIVE HEART WITH STABLE ANGINA PECTORIS (HCC): ICD-10-CM

## 2024-12-11 DIAGNOSIS — R41.89 COGNITIVE IMPAIRMENT: ICD-10-CM

## 2024-12-11 DIAGNOSIS — D64.9 ANEMIA, UNSPECIFIED TYPE: Primary | ICD-10-CM

## 2024-12-11 DIAGNOSIS — D64.9 ANEMIA, UNSPECIFIED TYPE: ICD-10-CM

## 2024-12-11 DIAGNOSIS — F41.9 ANXIETY: ICD-10-CM

## 2024-12-11 DIAGNOSIS — F39 MOOD DISORDER (HCC): ICD-10-CM

## 2024-12-11 DIAGNOSIS — E08.49 DIABETES DUE TO UNDRL CONDITION W OTH DIABETIC NEURO COMP (HCC): ICD-10-CM

## 2024-12-11 DIAGNOSIS — I10 PRIMARY HYPERTENSION: ICD-10-CM

## 2024-12-11 LAB
ALBUMIN SERPL BCG-MCNC: 4 G/DL (ref 3.5–5)
ALP SERPL-CCNC: 64 U/L (ref 34–104)
ALT SERPL W P-5'-P-CCNC: 15 U/L (ref 7–52)
ANION GAP SERPL CALCULATED.3IONS-SCNC: 7 MMOL/L (ref 4–13)
AST SERPL W P-5'-P-CCNC: 16 U/L (ref 13–39)
BASOPHILS # BLD AUTO: 0.07 THOUSANDS/ÂΜL (ref 0–0.1)
BASOPHILS NFR BLD AUTO: 1 % (ref 0–1)
BILIRUB SERPL-MCNC: 0.51 MG/DL (ref 0.2–1)
BUN SERPL-MCNC: 20 MG/DL (ref 5–25)
CALCIUM SERPL-MCNC: 9.8 MG/DL (ref 8.4–10.2)
CHLORIDE SERPL-SCNC: 102 MMOL/L (ref 96–108)
CO2 SERPL-SCNC: 28 MMOL/L (ref 21–32)
CREAT SERPL-MCNC: 1.1 MG/DL (ref 0.6–1.3)
EOSINOPHIL # BLD AUTO: 0.62 THOUSAND/ÂΜL (ref 0–0.61)
EOSINOPHIL NFR BLD AUTO: 9 % (ref 0–6)
ERYTHROCYTE [DISTWIDTH] IN BLOOD BY AUTOMATED COUNT: 13 % (ref 11.6–15.1)
FERRITIN SERPL-MCNC: 236 NG/ML (ref 24–336)
GFR SERPL CREATININE-BSD FRML MDRD: 63 ML/MIN/1.73SQ M
GLUCOSE P FAST SERPL-MCNC: 108 MG/DL (ref 65–99)
HCT VFR BLD AUTO: 36.5 % (ref 36.5–49.3)
HGB BLD-MCNC: 12 G/DL (ref 12–17)
IMM GRANULOCYTES # BLD AUTO: 0.03 THOUSAND/UL (ref 0–0.2)
IMM GRANULOCYTES NFR BLD AUTO: 0 % (ref 0–2)
IRON SATN MFR SERPL: 20 % (ref 15–50)
IRON SERPL-MCNC: 56 UG/DL (ref 50–212)
LYMPHOCYTES # BLD AUTO: 1.23 THOUSANDS/ÂΜL (ref 0.6–4.47)
LYMPHOCYTES NFR BLD AUTO: 18 % (ref 14–44)
MCH RBC QN AUTO: 31.5 PG (ref 26.8–34.3)
MCHC RBC AUTO-ENTMCNC: 32.9 G/DL (ref 31.4–37.4)
MCV RBC AUTO: 96 FL (ref 82–98)
MONOCYTES # BLD AUTO: 0.92 THOUSAND/ÂΜL (ref 0.17–1.22)
MONOCYTES NFR BLD AUTO: 13 % (ref 4–12)
NEUTROPHILS # BLD AUTO: 4.11 THOUSANDS/ÂΜL (ref 1.85–7.62)
NEUTS SEG NFR BLD AUTO: 59 % (ref 43–75)
NRBC BLD AUTO-RTO: 0 /100 WBCS
PLATELET # BLD AUTO: 202 THOUSANDS/UL (ref 149–390)
PMV BLD AUTO: 8.9 FL (ref 8.9–12.7)
POTASSIUM SERPL-SCNC: 4.1 MMOL/L (ref 3.5–5.3)
PROT SERPL-MCNC: 7.7 G/DL (ref 6.4–8.4)
RBC # BLD AUTO: 3.81 MILLION/UL (ref 3.88–5.62)
SODIUM SERPL-SCNC: 137 MMOL/L (ref 135–147)
TIBC SERPL-MCNC: 286 UG/DL (ref 250–450)
TSH SERPL DL<=0.05 MIU/L-ACNC: 1.42 UIU/ML (ref 0.45–4.5)
UIBC SERPL-MCNC: 230 UG/DL (ref 155–355)
WBC # BLD AUTO: 6.98 THOUSAND/UL (ref 4.31–10.16)

## 2024-12-11 PROCEDURE — 82728 ASSAY OF FERRITIN: CPT

## 2024-12-11 PROCEDURE — 85025 COMPLETE CBC W/AUTO DIFF WBC: CPT

## 2024-12-11 PROCEDURE — 84443 ASSAY THYROID STIM HORMONE: CPT

## 2024-12-11 PROCEDURE — G2211 COMPLEX E/M VISIT ADD ON: HCPCS | Performed by: FAMILY MEDICINE

## 2024-12-11 PROCEDURE — 99214 OFFICE O/P EST MOD 30 MIN: CPT | Performed by: FAMILY MEDICINE

## 2024-12-11 PROCEDURE — 80053 COMPREHEN METABOLIC PANEL: CPT

## 2024-12-11 PROCEDURE — 83550 IRON BINDING TEST: CPT

## 2024-12-11 PROCEDURE — 36415 COLL VENOUS BLD VENIPUNCTURE: CPT

## 2024-12-11 PROCEDURE — 83540 ASSAY OF IRON: CPT

## 2024-12-11 RX ORDER — ESCITALOPRAM OXALATE 10 MG/1
10 TABLET ORAL DAILY
Qty: 90 TABLET | Refills: 1 | Status: SHIPPED | OUTPATIENT
Start: 2024-12-11 | End: 2025-06-09

## 2024-12-11 NOTE — ASSESSMENT & PLAN NOTE
Cognitive impairment.  Patient will follow-up with neurology next month however difficult to say whether symptoms due to depression or history of lacunar strokes.

## 2024-12-11 NOTE — PROGRESS NOTES
FAMILY PRACTICE OFFICE VISIT       NAME: Shane Chau Sr.  AGE: 79 y.o. SEX: male       : 1945        MRN: [id number]    DATE: 2024  TIME: 12:47 PM    Assessment and Plan     Problem List Items Addressed This Visit       Coronary artery disease of native artery of native heart with stable angina pectoris (HCC)    Coronary artery disease.  Correspondence letters appears to stay patient in stable cardiac health at this time.         Primary hypertension    Hypertension.  The patient's blood pressure is stable at this time and he will continue current regimen of medications         Cognitive impairment    Cognitive impairment.  Patient will follow-up with neurology next month however difficult to say whether symptoms due to depression or history of lacunar strokes.         Anemia - Primary    Anemia.  Patient will recheck blood work as ordered for monitoring of condition         Relevant Orders    Comprehensive metabolic panel    TSH, 3rd generation    CBC    Anxiety    Anxiety/depression.  Patient was recommended to increase Lexapro to 10 mg once daily.         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  They state that patient continues to feel easily fatigued and has decreased motivation to perform any activities or get out of bed in the morning.  Patient also has poor short-term memory since his lacunar strokes.  He was started on Lexapro 5 mg once daily by cardiology office however since the summertime patient has not experienced any improvement in symptoms.  He is scheduled to see neurology in 2025 for follow-up of lacunar strokes.  Patient currently denies any chest  pains.        Review of Systems   Review of Systems   Constitutional:  Positive for fatigue.   Respiratory: Negative.     Cardiovascular: Negative.    Gastrointestinal: Negative.    Neurological:         As per HPI   Psychiatric/Behavioral:  Positive for confusion, decreased concentration and dysphoric mood.        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[facility])    Asymptomatic bilateral carotid artery stenosis    Coronary artery disease of native artery of native heart with stable angina pectoris ([facility])    Hyperlipidemia    Primary hypertension    Transient cerebral ischemic attack    Need for pneumococcal vaccination    Hematuria    Microhematuria    Erectile dysfunction    Leg swelling    Paresthesia    Rib pain    S/P CABG x 3    Localized primary osteoarthritis of lower leg, unspecified laterality    CKD (chronic kidney disease) stage 2, GFR 60-89 ml/min    Stroke-like symptoms    Hyponatremia    History of stroke    PAD (peripheral artery disease) ([facility])    Postoperative anemia due to acute blood loss    Stenosis of left vertebral artery    Prediabetes    Reaction at surgical site    Cognitive impairment    Surgical wound, non healing    Hospital discharge follow-up    Sternal wound infection    Anemia    Pain and swelling of right ankle    Severe protein-calorie malnutrition ([facility])    Anxiety    Gastroesophageal reflux disease without esophagitis    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 ([facility])       Past Medical History:  Past Medical History:   Diagnosis Date    AAA (abdominal aortic aneurysm) ([facility])     s/p open repair    BPH (benign prostatic hyperplasia)     CAD (coronary artery disease)     Cholelithiasis     Former tobacco use     History of MI (myocardial infarction)     History of TIA  (transient ischemic attack)     Plavix in past, now on Brilinta    Hyperlipidemia     Hypertension     Insomnia     Obesity        Past Surgical History:  Past Surgical History:   Procedure Laterality Date    ABDOMINAL AORTIC ANEURYSM REPAIR  06/07/2011    Dr. Yarbrough 6/7/11    APPENDECTOMY      CARDIAC CATHETERIZATION Left 06/03/2024    Procedure: Cardiac Left Heart Cath;  Surgeon: Pk Blanco DO;  Location: [facility];  Service: Cardiology    CARDIAC CATHETERIZATION  06/03/2024    Procedure: Cardiac catheterization;  Surgeon: Pk Blanco DO;  [facility]: [facility];  Service: Cardiology    CARDIAC CATHETERIZATION N/A 06/03/2024    Procedure: Cardiac Coronary Angiogram;  Surgeon: Pk Blanco DO;  [facility]: [facility];  Service: Cardiology    CATARACT EXTRACTION Right 11/2018    CHOLECYSTECTOMY LAPAROSCOPIC      COLONOSCOPY  11/2006    CORONARY ANGIOPLASTY      3 STENTS INSERTED    HARVEST VEIN Left 6/20/2024    Procedure: HARVEST VEIN ENDOSCOPIC (EVH);  Surgeon: KALPANA Amos MD;  Location: [facility];  Service: Cardiac Surgery    IR ASPIRATION ONLY  9/5/2024    ND CORONARY ARTERY BYP W/VEIN & ARTERY GRAFT 3 VEIN N/A 6/20/2024    Procedure: CORONARY ARTERY BYPASS GRAFT (CABG) X3 VESSELS, LIMA - LAD, LEFT LEG EVH/SVG - PDA AND OM1, W/ DEDRICK;  Surgeon: KALPANA Amos MD;  Location: [facility];  Service: Cardiac Surgery    ND LAPS SURG CHOLECYSTECTOMY W/CHOLANGIOGRAPHY N/A 09/08/2016    Procedure: CHOLECYSTECTOMY LAPAROSCOPIC with intra-op cholangiogram ;  Surgeon: Prasanth Verdin MD;  Location: [facility];  Service: General    ND STERNAL DEBRIDEMENT N/A 9/9/2024    Procedure: DEBRIDEMENT STERNAL WOUND (WASH OUT);  Surgeon: KALPANA Amos MD;  [facility];  Service: Cardiac Surgery    ND STERNAL DEBRIDEMENT N/A 9/11/2024    Procedure: DEBRIDEMENT STERNAL WOUND (WASH OUT) VAC CHANGE;  Surgeon: KALPANA Amos MD;  Location:  [facility];  Service: Cardiac Surgery       Family History:  Family History   Problem Relation Age of Onset    Heart disease Mother     Hypertension Mother         Benign Essential     Stroke Mother         Stroke     Heart disease Father     Coronary artery disease Brother     Diabetes Brother         mellitus     Heart disease Brother     Aortic aneurysm Brother     Coronary artery disease Brother     Sudden death Brother     Coronary artery disease Brother     Sudden death Brother     Sudden death Brother     Coronary artery disease Brother     Heart disease Family        Social History:  Social History     Socioeconomic History    Marital status: /Civil Union     Spouse name: Not on file    Number of children: Not on file    Years of education: Not on file    Highest education level: Not on file   Occupational History    Occupation: Retired    Tobacco Use    Smoking status: Former     Current packs/day: 0.00     Average packs/day: 0.5 packs/day for 30.0 years (15.0 ttl pk-yrs)     Types: Cigarettes     Start date: 3/10/1956     Quit date: 3/10/1986     Years since quittin.7    Smokeless tobacco: Never    Tobacco comments:     Quit  about 25 years [id number]   Vaping Use    Vaping status: Never Used   Substance and Sexual Activity    Alcohol use: Not Currently    Drug use: No    Sexual activity: Not Currently     Partners: Female   Other Topics Concern    Not on file   Social History Narrative    Not on file     Social Drivers of Health     Financial Resource Strain: Low Risk  (2023)    Overall Financial Resource Strain (CARDIA)     Difficulty of Paying Living Expenses: Not very hard   Food Insecurity: No Food Insecurity (2024)    Nursing - Inadequate Food Risk Classification     Worried About Running Out of Food in the Last Year: Never true     Ran Out of Food in the Last Year: Never true     Ran Out of Food in the Last Year: Not on file   Transportation Needs: No Transportation Needs  (11/13/2024)    OASIS : Transportation     Lack of Transportation (Medical): No     Lack of Transportation (Non-Medical): No     Patient Unable or Declines to Respond: No   Physical Activity: Not on file   Stress: Not on file   Social Connections: Unknown (6/18/2024)    Received from [employer]     How often do you feel lonely or isolated from those around you? (Adult - for ages 18 years and over): Not on file   Intimate Partner Violence: Not on file   Housing Stability: Low Risk  (9/6/2024)    Housing Stability Vital Sign     Unable to Pay for Housing in the Last Year: No     Number of Times Moved in the Last Year: 0     Homeless in the Last Year: No       Objective     Vitals:    12/11/24 1054   BP: 150/90   Pulse: 81   Temp: 98 °F (36.7 °C)     Wt Readings from Last 3 Encounters:   12/11/24 79.8 kg (176 lb)   11/26/24 75.8 kg (167 lb)   10/30/24 75.3 kg (166 lb)       Physical Exam  Constitutional:       General: He is not in acute distress.     Appearance: Normal appearance. He is not ill-appearing.   HENT:      Head: Normocephalic and atraumatic.   Eyes:      General:         Right eye: No discharge.         Left eye: No discharge.      Extraocular Movements: Extraocular movements intact.      Conjunctiva/sclera: Conjunctivae normal.      Pupils: Pupils are equal, round, and reactive to light.   Neck:      Vascular: No carotid bruit.   Cardiovascular:      Rate and Rhythm: Normal rate and regular rhythm.      Heart sounds: Normal heart sounds. No murmur heard.  Pulmonary:      Effort: Pulmonary effort is normal.      Breath sounds: Normal breath sounds. No wheezing, rhonchi or rales.   Abdominal:      General: Abdomen is flat. Bowel sounds are normal. There is no distension.      Palpations: Abdomen is soft.      Tenderness: There is no abdominal tenderness. There is no guarding or rebound.   Musculoskeletal:      Right lower leg: No edema.      Left lower leg: No edema.  "  Lymphadenopathy:      Cervical: No cervical adenopathy.   Skin:     Findings: No rash.   Neurological:      General: No focal deficit present.      Mental Status: He is alert and oriented to person, place, and time.      Cranial Nerves: No cranial nerve deficit.   Psychiatric:         Mood and Affect: Mood normal.         Behavior: Behavior normal.         Thought Content: Thought content normal.         Judgment: Judgment normal.         Pertinent Laboratory/Diagnostic Studies:  Lab Results   Component Value Date    GLUCOSE 140 06/20/2024    BUN 16 09/11/2024    CREATININE 0.96 09/11/2024    CALCIUM 9.0 09/11/2024     12/03/2015    K 4.0 09/11/2024    CO2 28 09/11/2024     09/11/2024     Lab Results   Component Value Date    ALT 41 09/05/2024    AST 31 09/05/2024    ALKPHOS 69 09/05/2024    BILITOT 0.65 12/02/2015       Lab Results   Component Value Date    WBC 6.99 10/18/2024    HGB 11.2 (L) 10/18/2024    HCT 35.0 (L) 10/18/2024    MCV 97 10/18/2024     10/18/2024       No results found for: \"TSH\"    Lab Results   Component Value Date    CHOL 138 08/20/2015     Lab Results   Component Value Date    TRIG 103 07/04/2024     Lab Results   Component Value Date    HDL 12 (L) 07/04/2024     Lab Results   Component Value Date    LDLCALC 10 07/04/2024     Lab Results   Component Value Date    HGBA1C 5.7 (H) 07/04/2024       Results for orders placed or performed in visit on 10/18/24   CBC and differential    Collection Time: 10/18/24  9:49 AM   Result Value Ref Range    WBC 6.99 4.31 - 10.16 Thousand/uL    RBC 3.61 (L) 3.88 - 5.62 Million/uL    Hemoglobin 11.2 (L) 12.0 - 17.0 g/dL    Hematocrit 35.0 (L) 36.5 - 49.3 %    MCV 97 82 - 98 fL    MCH 31.0 26.8 - 34.3 pg    MCHC 32.0 31.4 - 37.4 g/dL    RDW 13.7 11.6 - 15.1 %    MPV 8.7 (L) 8.9 - 12.7 fL    Platelets 222 149 - 390 Thousands/uL    nRBC 0 /100 WBCs    Segmented % 65 43 - 75 %    Immature Grans % 0 0 - 2 %    Lymphocytes % 18 14 - 44 %    " Monocytes % 10 4 - 12 %    Eosinophils Relative 6 0 - 6 %    Basophils Relative 1 0 - 1 %    Absolute Neutrophils 4.50 1.85 - 7.62 Thousands/µL    Absolute Immature Grans 0.02 0.00 - 0.20 Thousand/uL    Absolute Lymphocytes 1.28 0.60 - 4.47 Thousands/µL    Absolute Monocytes 0.68 0.17 - 1.22 Thousand/µL    Eosinophils Absolute 0.44 0.00 - 0.61 Thousand/µL    Basophils Absolute 0.07 0.00 - 0.10 Thousands/µL   Vitamin B12    Collection Time: 10/18/24  9:49 AM   Result Value Ref Range    Vitamin B-12 495 180 - 914 pg/mL   C-reactive protein    Collection Time: 10/18/24  9:49 AM   Result Value Ref Range    CRP 2.0 <3.0 mg/L   TIBC Panel (incl. Iron, TIBC, % Iron Saturation)    Collection Time: 10/18/24  9:49 AM   Result Value Ref Range    Iron Saturation 24 15 - 50 %    TIBC 288 250 - 450 ug/dL    Iron 68 50 - 212 ug/dL    UIBC 220 155 - 355 ug/dL   Ferritin    Collection Time: 10/18/24  9:49 AM   Result Value Ref Range    Ferritin 304 24 - 336 ng/mL       Orders Placed This Encounter   Procedures    Comprehensive metabolic panel    TSH, 3rd generation    CBC       ALLERGIES:  No Known Allergies    Current Medications     Current Outpatient Medications   Medication Sig Dispense Refill    acetaminophen (TYLENOL) 325 mg tablet Take 2 tablets (650 mg total) by mouth every 6 (six) hours as needed for mild pain, headaches or fever      amLODIPine (NORVASC) 2.5 mg tablet Take 1 tablet (2.5 mg total) by mouth daily 90 tablet 0    aspirin (ECOTRIN LOW STRENGTH) 81 mg EC tablet Take 1 tablet (81 mg total) by mouth daily      ergocalciferol (VITAMIN D2) 50,000 units Take 1 capsule (50,000 Units total) by mouth once a week For 6 weeks 6 capsule 0    escitalopram (LEXAPRO) 10 mg tablet Take 1 tablet (10 mg total) by mouth daily 90 tablet 1    escitalopram (LEXAPRO) 5 mg tablet Take 1 tablet (5 mg total) by mouth daily After 14 days, may increase to 2 tablets (10mg) daily if tolerating well. 42 tablet 0    metoprolol tartrate  (LOPRESSOR) 50 mg tablet TAKE ONE TABLET BY MOUTH TWICE DAILY 180 tablet 1    pantoprazole (PROTONIX) 40 mg tablet Take 1 tablet (40 mg total) by mouth daily in the early morning 30 tablet 0    rosuvastatin (CRESTOR) 40 MG tablet Take 1 tablet (40 mg total) by mouth daily 90 tablet 3    ticagrelor (BRILINTA) 90 MG Take 1 tablet (90 mg total) by mouth every 12 (twelve) hours 180 tablet 3    Cyanocobalamin (B-12) 1000 MCG SUBL Place 1 tablet (1,000 mcg total) under the tongue in the morning 90 tablet 0    melatonin 3 mg Take 2 tablets (6 mg total) by mouth daily at bedtime (Patient not taking: Reported on 10/30/2024)       No current facility-administered medications for this visit.         Health Maintenance     Health Maintenance   Topic Date Due    Kidney Health Evaluation: Albumin/Creatinine Ratio  Never done    SLP PLAN OF CARE  Never done    Diabetic Foot Exam  Never done    DM Eye Exam  Never done    Zoster Vaccine (1 of 2) Never done    Pneumococcal Vaccine: 65+ Years (2 of 2 - PCV) 08/06/2019    COVID-19 Vaccine (4 - 2024-25 season) 09/01/2024    HEMOGLOBIN A1C  01/04/2025    Fall Risk  09/04/2025    Depression Screening  09/04/2025    Medicare Annual Wellness Visit (AWV)  09/04/2025    Kidney Health Evaluation: GFR  09/11/2025    Hepatitis C Screening  Completed    RSV Vaccine Age 60+ Years  Completed    Influenza Vaccine  Completed    RSV Vaccine age 0-20 Months  Aged Out    HIB Vaccine  Aged Out    IPV Vaccine  Aged Out    Hepatitis A Vaccine  Aged Out    Meningococcal ACWY Vaccine  Aged Out    HPV Vaccine  Aged Out    Colorectal Cancer Screening  Discontinued     Immunization History   Administered Date(s) Administered    COVID-19 MODERNA VACC 0.5 ML IM 02/08/2021, 03/08/2021, 11/06/2021    INFLUENZA 10/15/2016, 11/03/2018, 11/03/2018, 10/24/2020, 10/16/2021, 10/15/2022, 10/07/2023    Influenza Split High Dose Preservative Free IM 11/24/2019, 10/19/2024    Influenza, high dose seasonal 0.7 mL 08/29/2020     Influenza, seasonal, injectable 09/28/2011, 10/06/2014, 11/05/2017    Pneumococcal Polysaccharide PPV23 08/06/2018    Respiratory Syncytial Virus Vaccine (Recombinant, Adjuvanted) 05/18/2024    Tdap 04/17/2017       Sherwin Mantilla MD    I spent 30 minutes with this patient of which greater than 50% was spent counseling or reviewing chart

## 2024-12-11 NOTE — ASSESSMENT & PLAN NOTE
Coronary artery disease.  Correspondence letters appears to stay patient in stable cardiac health at this time.

## 2024-12-12 ENCOUNTER — RESULTS FOLLOW-UP (OUTPATIENT)
Dept: FAMILY MEDICINE CLINIC | Facility: CLINIC | Age: 79
End: 2024-12-12

## 2024-12-12 DIAGNOSIS — S21.101A STERNAL WOUND INFECTION: ICD-10-CM

## 2024-12-12 DIAGNOSIS — L08.9 STERNAL WOUND INFECTION: ICD-10-CM

## 2024-12-12 RX ORDER — AMLODIPINE BESYLATE 2.5 MG/1
2.5 TABLET ORAL DAILY
Qty: 90 TABLET | Refills: 1 | Status: SHIPPED | OUTPATIENT
Start: 2024-12-12

## 2024-12-12 NOTE — TELEPHONE ENCOUNTER
Reason for call:   [x] Refill   [] Prior Auth  [] Other:     Office:   [] PCP/Provider -   [x] Specialty/Provider - cardio/  Jose Juares MD    Medication: amLODIPine (NORVASC) 2.5 mg tablet     Dose/Frequency: 2.5 mg, Oral, Daily     Quantity: 90    Pharmacy:  [facility]      Does the patient have enough for 3 days?   [] Yes   [x] No - Send as HP to POD

## 2025-01-02 ENCOUNTER — OFFICE VISIT (OUTPATIENT)
Dept: NEUROLOGY | Facility: CLINIC | Age: 80
End: 2025-01-02
Payer: COMMERCIAL

## 2025-01-02 ENCOUNTER — TRANSCRIBE ORDERS (OUTPATIENT)
Dept: SLEEP CENTER | Facility: CLINIC | Age: 80
End: 2025-01-02

## 2025-01-02 VITALS
BODY MASS INDEX: 29.66 KG/M2 | SYSTOLIC BLOOD PRESSURE: 132 MMHG | WEIGHT: 178 LBS | DIASTOLIC BLOOD PRESSURE: 64 MMHG | HEART RATE: 58 BPM | HEIGHT: 65 IN

## 2025-01-02 DIAGNOSIS — R53.82 CHRONIC FATIGUE: ICD-10-CM

## 2025-01-02 DIAGNOSIS — R41.89 COGNITIVE IMPAIRMENT: ICD-10-CM

## 2025-01-02 DIAGNOSIS — Z86.73 HISTORY OF STROKE: Primary | ICD-10-CM

## 2025-01-02 PROCEDURE — 99214 OFFICE O/P EST MOD 30 MIN: CPT

## 2025-01-02 NOTE — PROGRESS NOTES
Review of Systems   Constitutional:  Positive for fatigue (no energy). Negative for appetite change and fever.   HENT: Negative.  Negative for hearing loss, tinnitus, trouble swallowing and voice change.    Eyes: Negative.  Negative for photophobia, pain and visual disturbance.   Respiratory: Negative.  Negative for shortness of breath.    Cardiovascular: Negative.  Negative for palpitations.   Gastrointestinal: Negative.  Negative for nausea and vomiting.   Endocrine: Negative.  Negative for cold intolerance.   Genitourinary: Negative.  Negative for dysuria, frequency and urgency.   Musculoskeletal:  Negative for back pain, gait problem, myalgias, neck pain and neck stiffness.   Skin: Negative.  Negative for rash.   Allergic/Immunologic: Negative.    Neurological:  Negative for dizziness, tremors, seizures, syncope, facial asymmetry, speech difficulty, weakness, light-headedness, numbness and headaches.        Memory concern   Hematological: Negative.  Does not bruise/bleed easily.   Psychiatric/Behavioral:  Positive for sleep disturbance. Negative for confusion and hallucinations.

## 2025-01-02 NOTE — ASSESSMENT & PLAN NOTE
Orders:    Ambulatory referral to Senior Care; Future    Ambulatory referral to Sleep Medicine; Future

## 2025-01-02 NOTE — PROGRESS NOTES
Name: Shane Chau .      : 1945      MRN: [id number]  Encounter Provider: SANTHOSH Baeza  Encounter Date: 2025   Encounter department: NEUROLOGY [facility]  :  Assessment & Plan  History of stroke  Patient's wife feels that he continues to experience forgetfulness and mild cognitive decline.  [place] on 24 was 18/30 when he was evaluated by OT.  Dr. Depadua recommended follow up with PCP and possible referral to geriatrics.  Today in the office, he scored 21/30 which is reassuring.  She denies any unsafe behaviors and he is driving his vehicle and does not ever get lost or confused.  She denies any behavior changes I.e. agitation etc.  He also continues to experience excessive daytime fatigue. Per wife, he sleeps about 12 hours per night and he will still take frequent naps throughout the night.  She believes he might be restless at night because she has heard him moving around when she has used the bathroom, but she sleeps in another room.  PCP ordered blood work which was unrevealing for a cause.  He continues to take lexapro and family doctor increased the dosage to 10mg daily and has not noticed any changes about 1 month into the increased dosage. He has never been evaluated for SHEA. She reports that he has tried melatonin in the past which was not helpful.  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  His forgetfulness may also be related to his poor sleep, excessive daytime fatigue, and/or ongoing depression.  I believe he would benefit from further evaluation by Senior care.  From a stroke perspective, he continues to do exceptionally well with adequate control of his LDL cholesterol, A1c, and blood pressures.  Recommending IRL to rule out atrial fibrillation despite no A-fib detected on Zio patch.  Carotid ultrasound showed only mild stenosis bilaterally.  Additional testing:  Referral to Senior  "care  Referral for diagnostic sleep study  Education & Recommendations:  For ongoing TIA/stroke prevention continue: Brilinta BID, aspirin daily, crestor daily  Recommend to check blood pressure occasionally away from the doctor's office to make sure that those numbers are typically less than 130/80.  If they are frequently higher than that, we recommend checking it more frequently and following up with primary care team/cardiologist   Will defer to primary care team for monitoring of cholesterol panel and blood sugar numbers with target LDL cholesterol of less than 70 and hemoglobin A1c less than 7%  Continue all medication for blood pressure control as prescribed by your primary care team/cardiologist  Recommend following a low salt, mediterranean diet   Recommend routine physical exercise as tolerated   Avoid using NSAIDs for headaches or other pain and to use tylenol if needed     Orders:    Ambulatory referral to Senior Care; Future    Ambulatory referral to Sleep Medicine; Future    Cognitive impairment    Orders:    Ambulatory referral to Senior Care; Future    Ambulatory referral to Sleep Medicine; Future    Chronic fatigue    Orders:    Ambulatory referral to Sleep Medicine; Future        History of Present Illness   We had the pleasure of evaluating Shane in neurological follow-up today. He is a 78 y.o. year-old male who presents today for a hospital follow up after stroke.      History obtained from patient as well as available medical record review.     For Review/Hospital Course:     7/9/24-7/17/24 [facility]: \"postop course following his CABG complicated by ileus and acute kidney injury.  He also had acute hypoxic respiratory failure.  He developed dysarthria and left-sided weakness on July 3 and a stroke alert/rapid response was called.  CT of the head was done and showed no acute intracranial findings however did show evidence of chronic lacunar infarcts.  MRI of the brain was completed and showed an acute " "to subacute punctate infarcts involving the right caudate, anterior centrum  semiovale, and lateral right frontal cortex.  Neurology was not certain that the stroke was related to the procedure as it was 10 days postop.  Patient was switched to aspirin and Brilinta from aspirin and Plavix per neurology.  They also recommended an outpatient Zio patch.  The patient was admitted to acute rehab and did well with his therapy.  He then progressed to be able to be discharged home with home health services.\"  OV with me 8/26/24: Since the hospital, he denies any new stroke like symptoms. Wife and patient both report ongoing forgetfulness and fatigue since the hospital.  He has less interest in the things that he once enjoyed, has not been eating as much as he used to, and is tired all of the time.      Interval history as of 1/2/25:  Patient's wife feels that he continues to experience forgetfulness and mild cognitive decline.  [place] on 7/12/24 was 18/30 when he was evaluated by OT.  Dr. Depadua recommended follow up with PCP and possible referral to geriatrics.  Today in the office, he scored 21/30 which is reassuring.  She denies any unsafe behaviors and he is driving his vehicle and does not ever get lost or confused.  She denies any behavior changes I.e. agitation etc.  He also continues to experience excessive daytime fatigue. Per wife, he sleeps about 12 hours per night and he will still take frequent naps throughout the night.  She believes he might be restless at night because she has heard him moving around when she has used the bathroom, but she sleeps in another room.  PCP ordered blood work which was unrevealing for a cause.  He continues to take lexapro and family doctor increased the dosage to 10mg daily and has not noticed any changes about 1 month into the increased dosage. He has never been evaluated for SHEA. She reports that he has tried melatonin in the past which was not helpful.      New stroke " symptoms/residual symptoms:     Any new stroke-like symptoms (sudden onset weakness, numbness, facial droop, slurred speech, difficulty speaking, trouble swallowing, persistent vertigo, or sudden double vision or vision loss): No     Residual symptoms include: cognitive changes-forgetfulness and fatigue.     Stroke Etiology:     Likely cardioembolic. Further workup with ZIO/loop. Patient with recent CABG. Other risk factors: hypertension     Stroke risk factors were evaluated including:      AP/AC therapy: Aspirin 81 mg daily and Brilinta 90 mg twice daily      Statin therapy: Rosuvastatin 40 mg daily     Blood pressure today and as of late:160/81 today in the office. Checks it at home generally sees numbers around 140/80.       Most recent LDL:   Lab Results   Component Value Date    LDLCALC 10 07/04/2024     Most recent hemoglobin A1C:   Lab Results   Component Value Date    HGBA1C 5.7 (H) 07/04/2024     Cardiology evaluation/Cardiac Monitoring: Echo 7/3/2024 ejection fraction 70%.  Bilateral atria normal in size.  He also follows with thoracic surgery for recent CABG x 3 on 6/20/24. He wore a zio patch and per report: NSR. Low ectopy burden. SVT runs, nonsustained. No afib revealed.     Endocrinology evaluation: N/A     Lifestyle history/modifications:     -Diet/Exercise regimen: Well balanced diet.     -PT/OT/ST: None currently.       -Sleep: Sleeps about 12 hours per night and naps during daytime     -SHEA/CPAP Compliance: denies     -Post-stroke depression/anxiety: Patient denies.      -Smoking: Quit >40 years ago     -ETOH: socially     -Illicit drug use: none     Safety:     Independent of all ADLs. Denies any falls at home and ambulates with a cane.  He can manage his own medications and finances.  He is able to drive his vehicle.  Patient and wife note some increasing forgetfulness but no other memory issues at this time.     Review of Systems:  Constitutional:  Positive for fatigue (no energy). Negative  for appetite change and fever.   HENT: Negative.  Negative for hearing loss, tinnitus, trouble swallowing and voice change.    Eyes: Negative.  Negative for photophobia, pain and visual disturbance.   Respiratory: Negative.  Negative for shortness of breath.    Cardiovascular: Negative.  Negative for palpitations.   Gastrointestinal: Negative.  Negative for nausea and vomiting.   Endocrine: Negative.  Negative for cold intolerance.   Genitourinary: Negative.  Negative for dysuria, frequency and urgency.   Musculoskeletal:  Negative for back pain, gait problem, myalgias, neck pain and neck stiffness.   Skin: Negative.  Negative for rash.   Allergic/Immunologic: Negative.    Neurological:  Negative for dizziness, tremors, seizures, syncope, facial asymmetry, speech difficulty, weakness, light-headedness, numbness and headaches.        Memory concern   Hematological: Negative.  Does not bruise/bleed easily.   Psychiatric/Behavioral:  Positive for sleep disturbance. Negative for confusion and hallucinations.      I have personally reviewed the MA's review of systems and made changes as necessary.    Current Outpatient Medications on File Prior to Visit   Medication Sig Dispense Refill    acetaminophen (TYLENOL) 325 mg tablet Take 2 tablets (650 mg total) by mouth every 6 (six) hours as needed for mild pain, headaches or fever      amLODIPine (NORVASC) 2.5 mg tablet Take 1 tablet (2.5 mg total) by mouth daily 90 tablet 1    aspirin (ECOTRIN LOW STRENGTH) 81 mg EC tablet Take 1 tablet (81 mg total) by mouth daily      Cyanocobalamin (B-12) 1000 MCG SUBL Place 1 tablet (1,000 mcg total) under the tongue in the morning 90 tablet 0    escitalopram (LEXAPRO) 10 mg tablet Take 1 tablet (10 mg total) by mouth daily 90 tablet 1    metoprolol tartrate (LOPRESSOR) 50 mg tablet TAKE ONE TABLET BY MOUTH TWICE DAILY 180 tablet 1    pantoprazole (PROTONIX) 40 mg tablet Take 1 tablet (40 mg total) by mouth daily in the early morning 30  "tablet 0    rosuvastatin (CRESTOR) 40 MG tablet Take 1 tablet (40 mg total) by mouth daily 90 tablet 3    ticagrelor (BRILINTA) 90 MG Take 1 tablet (90 mg total) by mouth every 12 (twelve) hours 180 tablet 3    melatonin 3 mg Take 2 tablets (6 mg total) by mouth daily at bedtime (Patient not taking: Reported on 10/30/2024)      [DISCONTINUED] ergocalciferol (VITAMIN D2) 50,000 units Take 1 capsule (50,000 Units total) by mouth once a week For 6 weeks (Patient not taking: Reported on 2025) 6 capsule 0    [DISCONTINUED] escitalopram (LEXAPRO) 5 mg tablet Take 1 tablet (5 mg total) by mouth daily After 14 days, may increase to 2 tablets (10mg) daily if tolerating well. (Patient not taking: Reported on 2025) 42 tablet 0     No current facility-administered medications on file prior to visit.      Social History     Tobacco Use    Smoking status: Former     Current packs/day: 0.00     Average packs/day: 0.5 packs/day for 30.0 years (15.0 ttl pk-yrs)     Types: Cigarettes     Start date: 3/10/1956     Quit date: 3/10/1986     Years since quittin.8    Smokeless tobacco: Never    Tobacco comments:     Quit  about 25 years x1ppd   Vaping Use    Vaping status: Never Used   Substance and Sexual Activity    Alcohol use: Not Currently    Drug use: No    Sexual activity: Not Currently     Partners: Female     Objective   /64 (BP Location: Left arm, Patient Position: Sitting, Cuff Size: Standard)   Pulse 58   Ht 5' 5\" (1.651 m)   Wt 80.7 kg (178 lb)   BMI 29.62 kg/m²     On neurological examination the patient was awake, alert, attentive, oriented to person, place, and time. Recent and remote memory intact to conversation with no evidence of language dysfunction. Satisfactory fund of knowledge. Normal attention span and concentration.  Affect is slightly flat. Speech is fluent without dysarthria or aphasia. Face appears symmetric, with no obvious weakness noted.  Audition is intact to casual " conversation.  Eye movements are intact. Able to move bilateral upper extremities antigravity without difficulty.      Labs:  Lab Results   Component Value Date    LDLCALC 10 07/04/2024     Lab Results   Component Value Date    HGBA1C 5.7 (H) 07/04/2024     Lab Results   Component Value Date    ZKP6RTDHZYBO 1.416 12/11/2024     Lab Results   Component Value Date    IRON 56 12/11/2024    TIBC 286 12/11/2024    FERRITIN 236 12/11/2024     Lab Results   Component Value Date    WBC 6.98 12/11/2024    HGB 12.0 12/11/2024    HCT 36.5 12/11/2024    MCV 96 12/11/2024     12/11/2024     Lab Results   Component Value Date    SODIUM 137 12/11/2024    K 4.1 12/11/2024     12/11/2024    CO2 28 12/11/2024    BUN 20 12/11/2024    CREATININE 1.10 12/11/2024    GLUC 102 09/11/2024    CALCIUM 9.8 12/11/2024     Radiology Results Review:   7/22/24 Carotid Ultrasound: Less than 50% stenosis bilateral ICAs  7/6/24 MRI Brain:Punctate acute to subacute infarcts involving the right caudate head, anterior centrum semiovale, and lateral right frontal cortex.Chronic right corona radiata/basal ganglia and bilateral cerebellar lacunar infarcts.Mild chronic microangiopathic ischemic changes.  7/3/24 CTA H&N: Patent major vessels of the [place] without high-grade stenosis.  No aneurysm. 2.  Moderate to severe atherosclerotic stenosis at the origin of the left vertebral artery. No hemodynamically significant stenosis in the cervical carotid and the right vertebral arteries.  7/3/24 CTH: No acute intracranial CT abnormality.  Chronic lacunar infarcts in the right basal ganglia and corona radiata. Chronic bilateral cerebellar lacunar infarcts. Mild microangiopathic change.  Administrative Statements   I have spent a total time of 35 minutes in caring for this patient on the day of the visit/encounter including Diagnostic results, Prognosis, Risks and benefits of tx options, Instructions for management, Patient and family  education, Importance of tx compliance, Risk factor reductions, Impressions, Counseling / Coordination of care, Documenting in the medical record, Reviewing / ordering tests, medicine, procedures  , and Obtaining or reviewing history  .

## 2025-01-02 NOTE — PATIENT INSTRUCTIONS
Memory/Fatigue:  -Referral to senior care.  Please contact them at [phone] to schedule your appointment.     Stroke:  - For ongoing stroke prevention continue: Brilinta, aspirin, and rosuvastatin.  - Discussed the importance of antiplatelet/anticoagulant management with the patient to prevent future strokes.   - Recommend to check blood pressure occasionally away from the doctor's office to make sure that those numbers are typically less than 130/80.  If they are frequently higher than that, we recommend checking a little more often and to follow up with primary care team.  -Continue all medication for blood pressure control as prescribed be your primary care team/cardiologist.   - Will defer to primary care team for monitoring of cholesterol panel and blood sugar numbers with target LDL cholesterol of less than 70 and hemoglobin A1c less than 7%.  - Recommend following a low salt, mediterranean diet.   - Recommend routine physical exercise as tolerated.  - Avoid using NSAIDs (like ibuprofen, motrin, or aleve) for headaches or other pain and to use tylenol if needed.     We will plan for you to return to the office in 10 months to see myself or MD, but would be happy to see you sooner if the need should arise.  If you have any symptoms concerning for TIA or stroke including: sudden painless loss of vision or double vision, difficulty speaking or swallowing, vertigo/room spinning that does not quickly resolve, or weakness/numbness/loss of coordination affecting 1 side of the face or body, you should proceed by ambulance to the nearest emergency room immediately.

## 2025-01-02 NOTE — ASSESSMENT & PLAN NOTE
Patient's wife feels that he continues to experience forgetfulness and mild cognitive decline.  [place] on 7/12/24 was 18/30 when he was evaluated by OT.  Dr. Depadua recommended follow up with PCP and possible referral to geriatrics.  Today in the office, he scored 21/30 which is reassuring.  She denies any unsafe behaviors and he is driving his vehicle and does not ever get lost or confused.  She denies any behavior changes I.e. agitation etc.  He also continues to experience excessive daytime fatigue. Per wife, he sleeps about 12 hours per night and he will still take frequent naps throughout the night.  She believes he might be restless at night because she has heard him moving around when she has used the bathroom, but she sleeps in another room.  PCP ordered blood work which was unrevealing for a cause.  He continues to take lexapro and family doctor increased the dosage to 10mg daily and has not noticed any changes about 1 month into the increased dosage. He has never been evaluated for SHEA. She reports that he has tried melatonin in the past which was not helpful.  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  His forgetfulness may also be related to his poor sleep, excessive daytime fatigue, and/or ongoing depression.  I believe he would benefit from further evaluation by Senior care.  From a stroke perspective, he continues to do exceptionally well with adequate control of his LDL cholesterol, A1c, and blood pressures.  Recommending IRL to rule out atrial fibrillation despite no A-fib detected on Zio patch.  Carotid ultrasound showed only mild stenosis bilaterally.  Additional testing:  Referral to Senior care  Referral for diagnostic sleep study  Education & Recommendations:  For ongoing TIA/stroke prevention continue: Brilinta BID, aspirin daily, crestor daily  Recommend to check blood pressure occasionally away from the doctor's office to make sure that those  numbers are typically less than 130/80.  If they are frequently higher than that, we recommend checking it more frequently and following up with primary care team/cardiologist   Will defer to primary care team for monitoring of cholesterol panel and blood sugar numbers with target LDL cholesterol of less than 70 and hemoglobin A1c less than 7%  Continue all medication for blood pressure control as prescribed by your primary care team/cardiologist  Recommend following a low salt, mediterranean diet   Recommend routine physical exercise as tolerated   Avoid using NSAIDs for headaches or other pain and to use tylenol if needed     Orders:    Ambulatory referral to Senior Care; Future    Ambulatory referral to Sleep Medicine; Future

## 2025-01-06 ENCOUNTER — TELEPHONE (OUTPATIENT)
Age: 80
End: 2025-01-06

## 2025-01-06 NOTE — TELEPHONE ENCOUNTER
Pts wife called to schedule sleep study for him but the order is finalized and said it was performed 1/2/2025    Senior care advised that we would reach out to schedule an appt.- noone has called     Please advise

## 2025-01-07 DIAGNOSIS — R13.11 ORAL PHASE DYSPHAGIA: ICD-10-CM

## 2025-01-07 RX ORDER — PANTOPRAZOLE SODIUM 40 MG/1
40 TABLET, DELAYED RELEASE ORAL
Qty: 30 TABLET | Refills: 5 | Status: SHIPPED | OUTPATIENT
Start: 2025-01-07

## 2025-01-07 NOTE — TELEPHONE ENCOUNTER
After review of chart discovered that diagnostic sleep study order was linked in error to patient's scheduled consult with Senior [facility].     Unlinked order.  Called wife and scheduled sleep study tomorrow night in [place].  Instructions provided.  Verbalized understanding.

## 2025-01-07 NOTE — TELEPHONE ENCOUNTER
Reason for call:   [x] Refill   [] Prior Auth  [] Other:     Office:   [x] PCP/Provider -   [] Specialty/Provider -     Medication: Pantoprazole 40 mg, take 1 tablet by mouth daily       Pharmacy: [facility] [place] [place]     Does the patient have enough for 3 days?   [] Yes   [x] No - Send as HP to POD

## 2025-01-08 ENCOUNTER — HOSPITAL ENCOUNTER (OUTPATIENT)
Dept: SLEEP CENTER | Facility: CLINIC | Age: 80
Discharge: HOME | End: 2025-01-08
Payer: COMMERCIAL

## 2025-01-08 DIAGNOSIS — R41.89 COGNITIVE IMPAIRMENT: ICD-10-CM

## 2025-01-08 DIAGNOSIS — R53.82 CHRONIC FATIGUE: ICD-10-CM

## 2025-01-08 DIAGNOSIS — Z86.73 HISTORY OF STROKE: ICD-10-CM

## 2025-01-08 PROCEDURE — 95810 POLYSOM 6/> YRS 4/> PARAM: CPT

## 2025-01-08 PROCEDURE — 95810 POLYSOM 6/> YRS 4/> PARAM: CPT | Performed by: INTERNAL MEDICINE

## 2025-01-09 PROBLEM — G47.33 OSA (OBSTRUCTIVE SLEEP APNEA): Status: ACTIVE | Noted: 2025-01-09

## 2025-01-09 NOTE — PROGRESS NOTES
Sleep Study Documentation    Pre-Sleep Study       Sleep testing procedure explained to patient:YES    Patient napped prior to study:NO    Caffeine:Dayshift worker after 12PM.  Caffeine use:YES- coffee  6 to 18 ounces    Alcohol:Dayshift workers after 5PM: Alcohol use:NO    Typical day for patient:YES       Study Documentation    Sleep Study Indications: snoring and EDS    Sleep Study: Diagnostic   Snore:occasional soft to Moderate  Supplemental O2: no    Minimum SaO2 89  Baseline SaO2 93      EKG abnormalities: no     EEG abnormalities: no    Were abnormal behaviors in sleep observed:NO    Is Total Sleep Study Recording Time < 2 hours: N/A    Is Total Sleep Study Recording Time > 2 hours but study is incomplete: N/A    Is Total Sleep Study Recording Time 6 hours or more but sleep was not obtained: NO      Post-Sleep Study    Medication used at bedtime or during sleep study:NO    Patient reports time it took to fall asleep:less than 20 minutes    Patient reports waking up during study:Denied    Patient reports sleeping 4 to 6 hours without dreaming.    Does the Patient feel this is a typical night of sleep:typical    Patient rated sleepiness: Somewhat sleepy or tired    PAP treatment:no.

## 2025-01-14 ENCOUNTER — TELEPHONE (OUTPATIENT)
Dept: SLEEP CENTER | Facility: CLINIC | Age: 80
End: 2025-01-14

## 2025-01-14 ENCOUNTER — RESULTS FOLLOW-UP (OUTPATIENT)
Dept: NEUROLOGY | Facility: CLINIC | Age: 80
End: 2025-01-14

## 2025-01-14 DIAGNOSIS — G47.33 OSA (OBSTRUCTIVE SLEEP APNEA): Primary | ICD-10-CM

## 2025-01-14 NOTE — TELEPHONE ENCOUNTER
----- Message from Thuan Shay MD sent at 1/14/2025 10:07 AM EST -----  Regarding: High AHI  Hi this patient had a diagnostic PSG which showed an AHI above 70.  Study was ordered by PCP.  Could we have him see sleep medicine ASAP, thanks

## 2025-01-14 NOTE — TELEPHONE ENCOUNTER
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      Expedited consultation to Sleep Medicine recommended.    Call placed to patient, advised of above.    Scheduled for consultation with Dr. East 2/24/2025 in the [place] office.  Added to the wait list for a sooner appointment with any provider (other than Dr. Schwartz) in [place] only.  Added to Teams expedited consult list.

## 2025-01-20 DIAGNOSIS — G47.33 OSA (OBSTRUCTIVE SLEEP APNEA): Primary | ICD-10-CM

## 2025-02-11 ENCOUNTER — TELEPHONE (OUTPATIENT)
Age: 80
End: 2025-02-11

## 2025-02-11 NOTE — TELEPHONE ENCOUNTER
Pt's wife Maryjo called stating that she did drop off the form but she just wanted to let the staff know that only Dr. Juares can fill out the form. And she needs the form done prior to 2/28 as she will lose their health insurance.     FYI

## 2025-02-11 NOTE — TELEPHONE ENCOUNTER
Patients wife states she received a letter from [facility] requesting a letter of verification from Dr Mantilla stating the patient has a heart condition in order for them to continue with [facility]. They were told to fax to [phone]. Please advise back to patient if needed.

## 2025-02-11 NOTE — TELEPHONE ENCOUNTER
Received a call from the patient's spouse. She stated that her insurance company, [employer] sent them a form that needs to be filled out in regard to the patient's cardiac issues, verifying he has them. Advised that she bring the form to the office to be reviewed and filled out for return. Maryjo stated it needed to be completed by the end of the month.

## 2025-02-11 NOTE — TELEPHONE ENCOUNTER
"Patient has an extensive cardiac history.  A letter from his cardiology office would \"hold more weight\" when it comes to maintaining an insurance plan.  Unless they absolutely refused to do then we could also consider writing a letter on his behalf  "

## 2025-02-12 NOTE — PHYSICAL THERAPY NOTE
Physical Therapy Treatment Note       09/13/24 0855   PT Last Visit   PT Visit Date 09/13/24   Note Type   Note Type Treatment   Pain Assessment   Pain Assessment Tool 0-10   Pain Score 2   Pain Location/Orientation Location: Generalized   Patient's Stated Pain Goal No pain   Hospital Pain Intervention(s) Ambulation/increased activity   Restrictions/Precautions   Weight Bearing Precautions Per Order No   Other Precautions Cardiac/sternal;Multiple lines;Telemetry;Fall Risk;Pain   General   Chart Reviewed Yes   Family/Caregiver Present No   Cognition   Overall Cognitive Status WFL   Arousal/Participation Responsive   Attention Attends with cues to redirect   Orientation Level Oriented X4   Memory Unable to assess   Following Commands Follows one step commands without difficulty   Subjective   Subjective states he feels tired today, and notes dizziness w/ position changes and w/ mobility   Transfers   Sit to Stand 4  Minimal assistance   Additional items Assist x 1;Increased time required;Verbal cues   Stand to Sit 5  Supervision   Additional items Assist x 1;Increased time required;Verbal cues   Ambulation/Elevation   Gait pattern   (slow, forward flexion, short step length)   Gait Assistance 4  Minimal assist   Additional items Assist x 1  (w/ 2nd for chair follow)   Assistive Device Rolling walker   Distance 130'+110'x1 w/ seated rest break   Balance   Static Sitting Good   Dynamic Sitting Fair -   Static Standing Fair -   Dynamic Standing Fair -   Ambulatory Fair -   Endurance Deficit   Endurance Deficit Yes   Endurance Deficit Description fatigue, weakness   Activity Tolerance   Activity Tolerance Patient limited by fatigue;Treatment limited secondary to medical complications (Comment)   Nurse Made Aware yes   Assessment   Prognosis Good   Problem List Decreased strength;Decreased endurance;Impaired balance;Decreased mobility;Pain;Decreased coordination   Assessment Pt seen for session for setup, transfers,  hide gait w/ rest time, repsositioning.  Pt frequently noting he is tired, and notes increased dizziness w/ position changes.  Cues for pacing, RW safety.  Do note improving mobility tolerance, w/ mild LOB.  Given progress, and the fact that he is refusing rehab, consider home w/ Level III (minimal) post acute care therapy services at d/c   Goals   Patient Goals to feel better   STG Expiration Date 09/21/24   PT Treatment Day 2   Plan   Treatment/Interventions Functional transfer training;LE strengthening/ROM;Therapeutic exercise;Endurance training;Patient/family training;Equipment eval/education;Bed mobility;Gait training;Elevations   Progress Progressing toward goals   PT Frequency 3-5x/wk   Discharge Recommendation   Rehab Resource Intensity Level, PT II (Moderate Resource Intensity)   Equipment Recommended Walker   Walker Package Recommended Wheeled walker   Change/add to Walker Package? No   AM-PAC Basic Mobility Inpatient   Turning in Flat Bed Without Bedrails 4   Lying on Back to Sitting on Edge of Flat Bed Without Bedrails 3   Moving Bed to Chair 3   Standing Up From Chair Using Arms 3   Walk in Room 3   Climb 3-5 Stairs With Railing 2   Basic Mobility Inpatient Raw Score 18   Basic Mobility Standardized Score 41.05   [facility] Highest Level Of Mobility   JH-HLM Goal 6: Walk 10 steps or more   JH-HLM Achieved 7: Walk 25 feet or more     Dandre Castillo PT, DPT, GCS, CSRS

## 2025-02-14 NOTE — TELEPHONE ENCOUNTER
No forms have come across my desk.  Someone please locate them and try to have them at [place] for next Tuesday

## 2025-02-19 NOTE — TELEPHONE ENCOUNTER
Signed form received - faxed to [facility] [phone].    Also scanned to pts chart under media tab.

## 2025-02-20 ENCOUNTER — CONSULT (OUTPATIENT)
Age: 80
End: 2025-02-20
Payer: COMMERCIAL

## 2025-02-20 VITALS
SYSTOLIC BLOOD PRESSURE: 118 MMHG | OXYGEN SATURATION: 97 % | WEIGHT: 186.4 LBS | BODY MASS INDEX: 29.26 KG/M2 | DIASTOLIC BLOOD PRESSURE: 64 MMHG | TEMPERATURE: 98 F | HEIGHT: 67 IN | HEART RATE: 74 BPM

## 2025-02-20 DIAGNOSIS — G31.84 MILD COGNITIVE IMPAIRMENT: Primary | ICD-10-CM

## 2025-02-20 DIAGNOSIS — E43 SEVERE PROTEIN-CALORIE MALNUTRITION (HCC): ICD-10-CM

## 2025-02-20 DIAGNOSIS — R41.89 COGNITIVE IMPAIRMENT: ICD-10-CM

## 2025-02-20 DIAGNOSIS — F41.9 ANXIETY: ICD-10-CM

## 2025-02-20 DIAGNOSIS — Z93.1 GASTROSTOMY STATUS (HCC): ICD-10-CM

## 2025-02-20 DIAGNOSIS — E08.49 DIABETES DUE TO UNDRL CONDITION W OTH DIABETIC NEURO COMP (HCC): ICD-10-CM

## 2025-02-20 DIAGNOSIS — E78.49 OTHER HYPERLIPIDEMIA: ICD-10-CM

## 2025-02-20 DIAGNOSIS — E55.9 VITAMIN D DEFICIENCY: ICD-10-CM

## 2025-02-20 DIAGNOSIS — G47.33 OSA (OBSTRUCTIVE SLEEP APNEA): ICD-10-CM

## 2025-02-20 DIAGNOSIS — Z86.73 HISTORY OF STROKE: ICD-10-CM

## 2025-02-20 DIAGNOSIS — I50.9 HEART FAILURE, UNSPECIFIED HF CHRONICITY, UNSPECIFIED HEART FAILURE TYPE (HCC): ICD-10-CM

## 2025-02-20 PROCEDURE — 99205 OFFICE O/P NEW HI 60 MIN: CPT | Performed by: NURSE PRACTITIONER

## 2025-02-20 NOTE — PROGRESS NOTES
Assessment & Plan:   Geriatric Evaluation  Memory  Pt independent  with adl, assistance for iadls. Needs reminders to take pills.  Memory loss:  Short term memory loss.   MOCA 20/30.  Deficits in visuospatial/executive functioning 3/5. Language 1/2, delayed recall 0/5, orientation 4/6.    Imaging:MRI 07/06/2024 impression: punctate acute to subacute infarcts involving the right caudate head, anterior centrum semiovale, and lateral right frontal cortex.   [facility]:  TSH 12/11/24 1.416, CRP 2.0, Vitamin B-12 495, 08/27/2024: Vitamin D level 25.7 (L)  History, physical, and cognitive screening are most consistent with:  Mild cognitive impairment vs early stage vascular dementia.   Contributing factors:  History of stroke, CAD, HLD, stenosis of vertebral artery.   Further eval warrants the following:  Speech Therapy, OT for fit to drive evaluation, Vitamin D level.   Memory meds:  None  Cont supportive cares   Caregiver stress concerns:  Concerned about memory loss and energy level.   SW needs this visit:  Folder mailed to wife focusing on support services for wife who is his primary caregiver.   Discussed safe environment  Wandering:  Some items to consider would be door video surveillance, ID bracelet, Tile GPS   Home:  Ensure pt has phone and reinforce to not use stove or shower while gone.  Level of care:  Needs mild assist in the home.   Fall preventions:  Home fall precautions.   Medication management:  Wife assists   Driving safety:  Yes, still drives. Wife reports no issues driving.   Scam safety:  Do not answer suspicious mail, phone calls, email, or text message without having second person review d/t safety risk.  Do not give out personal info to questionable sources- read company safety policies for how they might contact you.  Continue to engage in physical, cognitive, social activity.  Cont doing games, puzzles, trivia, current event discussions  Cont daily walks or exercise video  Cont outings with  friends and family.  Avoid social isolation.  Referral to cognitive therapy:  Speech therapy.   Optimize chronic and acute conditions  Cont to f/u with providers w/chronic conditions and ensure medication compliance  Last GFR 63, albumin 4.0   Vascular risk factors:  History of stroke   Geriatric syndromes:  Insomnia in the setting of  severe SHEA.   Chronic condition concerns:  CAD, History of Stroke. S/p CABG x 3, History of sternal wound infection,   Ensure good diet (ex Mediterranean diet) and adequate hydration  Monitor for changes in behavior/mood- if noted, notify provider for eval  Encourage mindfulness and positive socialization for general wellness.  Do not overwhelm pt with info.    Do one task at a time. Use slower pace.  Keep to one conversation at a time.  Do not multitask.  Decision making:  At this point, recommend shared between patient and wife.  In future, if capacity is of concern, would refer to neuropsychology for full eval.  Encourage independence as able.  Recommended next follow up: Care conference    Mobility  Baseline ambulation: Independent .  Fall type: No recent falls.   PT OT needs:   OT for fit to drive evaluation  Fall precautions    Mood  Baseline mood:  No reported depression/anxiety  GDS 1/15  Pharmalogical interventions:  Lexapro 10 mg po daily   Non pharmalogical interventions:  Encourage relaxation techniques, emotional support.      Medication Review  Medications are  appropriate for current conditions  Patient is  risk for increased side effects due to polypharmacy  7 medications  Patient is taking the following medications that meet Beer's Criteria to monitor/avoid:  Lexapro: Monitor for adverse signs/symptoms such as hyponatremia.   Avoid medications that worsen cognition - check with provider or pharmacist before starting new or OTC meds    5.  Other Syndromes:   SHEA: patient recently diagnosed with Severe SHEA. Patient expressed that he is does not want to use C-PAP  "machine. Recommend compliance with treatment as ordered by sleep medicine specialist.  Hearing Loss: Patient has hearing aids that are no longer working. Recommend he go back to [facility] to have his hearing and hearing aids evaluated.  Nose Bleeds: Recommend Afrin nasal spray as needed for nose bleeds and follow-up with PCP if they persist.  History of Gastrostomy: No reported issues. Appetite is good.   Heart failure: Patient euvolemic on examination. Recommend monitoring weight and fluid intake. Follow-up with Cardiology regularly.  Diabetes: . Recommend hearth healthy diet such as Mediterranean diet and regular exercise as tolerated.   Severe protein calorie malnutrition: History of, patient and wife report no recent weight loss. Appetite is good. No muscle loss or subcutaneous tissue wasting on examination.   Hyperlipidemia: LDL direct 42 on 08/06/24. Repeat lipid panel ordered by PCP.   Vitamin D deficiency: Vitamin D level low. Recommend repeat Vitamin D level.  Anxiety: Mood currently stable. GDS 1/15. Continue Lexapro 10 mg po daily. Monitor BMP with PCP.       HPI:  We had the pleasure of evaluating Shane Chau Sr. who is a 79 y.o. male in Geriatric Consultation today.  Previous MOCA:  21/30 ln 01/02/2025.  Comorbidities include CAD s/p CABG x 3, History of stroke, abdominal aortic aneurysm, anemia, anxiety, bilateral carotid artery stenosis, CKD, GERD, CHF, SHEA, PAD, HTN, history of severe protein calorie malnutrition, vitamin d deficiency.     Mr. Chau is in the office with his  wife.  He lives with his wife.     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     Memory report per wife: Wife states that since this past summer after hospitalizations, her  has \"no pep or energy.\" He no longer does the things that he enjoyed doing such as jig saw puzzles, sudoku, reading. She is concerned about his memory. She states " "that she frequently needs to remind him of the date.     Memory report per patient: Patient reports that his memory is \"not that sharp.\"     Current activities:  Wife reports that patient no longer doing the things that he enjoyed doing such as jig saw puzzles, sudoku, reading    He has difficulty finding the right word while speaking: No  Patient requires repeat information or ask the same question repeatedly: Yes  He has no problems operating household appliance such as TV remote, kitchen appliances, computer.    Functional concerns:  Bathing No concerns   Dressing No concerns  Feeding yourself No concerns  Tolieting No concerns  Continence No concerns   Transferring Independent  Uses : None    Can you make your own light meals Yes   Do light cleaning/chores Yes  Do you take care of your own medications Yes wife assists   Do you do your own shopping Yes Goes with wife shopping.   Do you handle your own financial affairs such as balancing your checkbook, paying bills, investments: Yes use to handle finances. Now wife and daughter are handling them.   Do have any difficulties with handling your financial affairs: Yes  Do you use a cell phone Yes with no reported issues.   Do you drive: Yes       Have you had any recent accidents, citations or getting lost in familiar places :No    Psychosocial concerns:  Have you or your family noted any change in your mood or personality:No  Are you currently or have you been treated in the past for depression or anxiety: Yes  Any hallucination or delusion: No  Sleep Issues: Yes Recently diagnosed with severe sleep apnea.   Hearing and vision issue: Yes Hearing issues. Had a hearing aid that no longer works.   ADL/IADL:  Mostly independent with ADLs, needs assist with IADLs   Appetite/swallow:  No issues   Pain:  Intermittent knee or wrist pain.     Family Review of Behavior [facility]:    pacing. No    agressive/combative behavior. No    agitated. No   wandering. No   resistance to " "care. No   hoarding/hiding objects.No    suspicious  No  withdrawn No  rummaging/pillaging.No    misplacing/losing objects Yes  personal hygiene problems.No  forgetfulness of actions Yes     Past Medical, surgical, social, medication and allergy history and patients previous records reviewed.    Family member with dementia and what type? No  Does patient have previous diagnosis of dementia?  No    Does patient take any \"memory medications\"? No  Stroke history Yes  Have you had any head trauma No  Does patient have history of alcohol abuse No    ROS:  Review of Systems   Constitutional:  Positive for activity change and fatigue. Negative for appetite change and unexpected weight change.   Respiratory:  Negative for cough, chest tightness, shortness of breath and wheezing.    Cardiovascular:  Negative for chest pain.   Gastrointestinal:  Negative for abdominal pain, constipation and diarrhea.   Genitourinary:  Negative for difficulty urinating, flank pain and frequency.   Musculoskeletal:  Negative for arthralgias and myalgias.   Neurological:  Positive for weakness. Negative for dizziness and headaches.   Psychiatric/Behavioral:  Negative for confusion.         Short term memory loss  Memory \"not that sharp\"       Allergies:   No Known Allergies    Medications:      Current Outpatient Medications:     acetaminophen (TYLENOL) 325 mg tablet, Take 2 tablets (650 mg total) by mouth every 6 (six) hours as needed for mild pain, headaches or fever, Disp: , Rfl:     amLODIPine (NORVASC) 2.5 mg tablet, Take 1 tablet (2.5 mg total) by mouth daily, Disp: 90 tablet, Rfl: 1    aspirin (ECOTRIN LOW STRENGTH) 81 mg EC tablet, Take 1 tablet (81 mg total) by mouth daily, Disp: , Rfl:     Cyanocobalamin (B-12) 1000 MCG SUBL, Place 1 tablet (1,000 mcg total) under the tongue in the morning, Disp: 90 tablet, Rfl: 0    escitalopram (LEXAPRO) 10 mg tablet, Take 1 tablet (10 mg total) by mouth daily, Disp: 90 tablet, Rfl: 1    melatonin 3 " mg, Take 2 tablets (6 mg total) by mouth daily at bedtime (Patient not taking: Reported on 10/30/2024), Disp: , Rfl:     metoprolol tartrate (LOPRESSOR) 50 mg tablet, TAKE ONE TABLET BY MOUTH TWICE DAILY, Disp: 180 tablet, Rfl: 1    pantoprazole (PROTONIX) 40 mg tablet, Take 1 tablet (40 mg total) by mouth daily in the early morning, Disp: 30 tablet, Rfl: 5    rosuvastatin (CRESTOR) 40 MG tablet, Take 1 tablet (40 mg total) by mouth daily, Disp: 90 tablet, Rfl: 3    ticagrelor (BRILINTA) 90 MG, Take 1 tablet (90 mg total) by mouth every 12 (twelve) hours, Disp: 180 tablet, Rfl: 3    Vitals:  There were no vitals filed for this visit.    History:  Past Medical History:   Diagnosis Date    AAA (abdominal aortic aneurysm) (HCC)     s/p open repair    BPH (benign prostatic hyperplasia)     CAD (coronary artery disease)     Cholelithiasis     Former tobacco use     History of MI (myocardial infarction)     History of TIA (transient ischemic attack)     Plavix in past, now on Brilinta    Hyperlipidemia     Hypertension     Insomnia     Obesity      Past Surgical History:   Procedure Laterality Date    ABDOMINAL AORTIC ANEURYSM REPAIR  06/07/2011    Dr. Yarbrough 6/7/11    APPENDECTOMY      CARDIAC CATHETERIZATION Left 06/03/2024    Procedure: Cardiac Left Heart Cath;  Surgeon: Pk Blanco DO;  [facility];  Service: Cardiology    CARDIAC CATHETERIZATION  06/03/2024    Procedure: Cardiac catheterization;  Surgeon: Pk Blanco DO;  [facility];  Service: Cardiology    CARDIAC CATHETERIZATION N/A 06/03/2024    Procedure: Cardiac Coronary Angiogram;  Surgeon: Pk Blanco DO;  [facility];  Service: Cardiology    CATARACT EXTRACTION Right 11/2018    CHOLECYSTECTOMY LAPAROSCOPIC      COLONOSCOPY  11/2006    CORONARY ANGIOPLASTY      3 STENTS INSERTED    HARVEST VEIN Left 6/20/2024    Procedure: HARVEST VEIN ENDOSCOPIC (EVH);  Surgeon: KALPANA Alicia  MD Bette;  [facility];  Service: Cardiac Surgery    IR ASPIRATION ONLY  9/5/2024    IN CORONARY ARTERY BYP W/VEIN & ARTERY GRAFT 3 VEIN N/A 6/20/2024    Procedure: CORONARY ARTERY BYPASS GRAFT (CABG) X3 VESSELS, LIMA - LAD, LEFT LEG EVH/SVG - PDA AND OM1, W/ DEDRICK;  Surgeon: KALPANA Amos MD;  [facility];  Service: Cardiac Surgery    IN LAPS SURG CHOLECYSTECTOMY W/CHOLANGIOGRAPHY N/A 09/08/2016    Procedure: CHOLECYSTECTOMY LAPAROSCOPIC with intra-op cholangiogram ;  Surgeon: Prasanth Verdin MD;  [facility];  Service: General    IN STERNAL DEBRIDEMENT N/A 9/9/2024    Procedure: DEBRIDEMENT STERNAL WOUND (WASH OUT);  Surgeon: KALPANA Amos MD;  [facility];  Service: Cardiac Surgery    IN STERNAL DEBRIDEMENT N/A 9/11/2024    Procedure: DEBRIDEMENT STERNAL WOUND (WASH OUT) VAC CHANGE;  Surgeon: KALPANA Amos MD;  [facility];  Service: Cardiac Surgery     Family History   Problem Relation Age of Onset    Heart disease Mother     Hypertension Mother         Benign Essential     Stroke Mother         Stroke     Heart disease Father     Coronary artery disease Brother     Diabetes Brother         mellitus     Heart disease Brother     Aortic aneurysm Brother     Coronary artery disease Brother     Sudden death Brother     Coronary artery disease Brother     Sudden death Brother     Sudden death Brother     Coronary artery disease Brother     Heart disease Family      Social History     Socioeconomic History    Marital status: /Civil Union     Spouse name: Not on file    Number of children: Not on file    Years of education: Not on file    Highest education level: Not on file   Occupational History    Occupation: Retired    Tobacco Use    Smoking status: Former     Current packs/day: 0.00     Average packs/day: 0.5 packs/day for 30.0 years (15.0 ttl pk-yrs)     Types: Cigarettes     Start date: 3/10/1956     Quit date: 3/10/1986      Years since quittin.9    Smokeless tobacco: Never    Tobacco comments:     Quit  about 25 years x1ppd   Vaping Use    Vaping status: Never Used   Substance and Sexual Activity    Alcohol use: Not Currently    Drug use: No    Sexual activity: Not Currently     Partners: Female   Other Topics Concern    Not on file   Social History Narrative    Not on file     Social Drivers of Health     Financial Resource Strain: Low Risk  (2023)    Overall Financial Resource Strain (CARDIA)     Difficulty of Paying Living Expenses: Not very hard   Food Insecurity: No Food Insecurity (2024)    Nursing - Inadequate Food Risk Classification     Worried About Running Out of Food in the Last Year: Never true     Ran Out of Food in the Last Year: Never true     Ran Out of Food in the Last Year: Not on file   Transportation Needs: No Transportation Needs (2024)    OASIS : Transportation     Lack of Transportation (Medical): No     Lack of Transportation (Non-Medical): No     Patient Unable or Declines to Respond: No   Physical Activity: Not on file   Stress: Not on file   Social Connections: Unknown (2024)    Received from [employer]    Social [employer]     How often do you feel lonely or isolated from those around you? (Adult - for ages 18 years and over): Not on file   Intimate Partner Violence: Not on file   Housing Stability: Low Risk  (2024)    Housing Stability Vital Sign     Unable to Pay for Housing in the Last Year: No     Number of Times Moved in the Last Year: 0     Homeless in the Last Year: No       Past Surgical History:   Procedure Laterality Date    ABDOMINAL AORTIC ANEURYSM REPAIR  2011    Dr. Yarbrough 11    APPENDECTOMY      CARDIAC CATHETERIZATION Left 2024    Procedure: Cardiac Left Heart Cath;  Surgeon: Pk Blanco DO;  [facility];  Service: Cardiology    CARDIAC CATHETERIZATION  2024    Procedure: Cardiac catheterization;   Surgeon: Pk Blanco DO;  [facility];  Service: Cardiology    CARDIAC CATHETERIZATION N/A 06/03/2024    Procedure: Cardiac Coronary Angiogram;  Surgeon: Pk Blanco DO;  [facility];  Service: Cardiology    CATARACT EXTRACTION Right 11/2018    CHOLECYSTECTOMY LAPAROSCOPIC      COLONOSCOPY  11/2006    CORONARY ANGIOPLASTY      3 STENTS INSERTED    HARVEST VEIN Left 6/20/2024    Procedure: HARVEST VEIN ENDOSCOPIC (EVH);  Surgeon: KALPANA Amos MD;  [facility];  Service: Cardiac Surgery    IR ASPIRATION ONLY  9/5/2024    [facility] CORONARY ARTERY BYP W/VEIN & ARTERY GRAFT 3 VEIN N/A 6/20/2024    Procedure: CORONARY ARTERY BYPASS GRAFT (CABG) X3 VESSELS, LIMA - LAD, LEFT LEG EVH/SVG - PDA AND OM1, W/ DEDRICK;  Surgeon: KALPANA Amos MD;  [facility];  Service: Cardiac Surgery    NH LAPS SURG CHOLECYSTECTOMY W/CHOLANGIOGRAPHY N/A 09/08/2016    Procedure: CHOLECYSTECTOMY LAPAROSCOPIC with intra-op cholangiogram ;  Surgeon: Prasanth Verdin MD;  [facility];  Service: General    NH STERNAL DEBRIDEMENT N/A 9/9/2024    Procedure: DEBRIDEMENT STERNAL WOUND (WASH OUT);  Surgeon: KALPANA Amos MD;  [facility];  Service: Cardiac Surgery    NH STERNAL DEBRIDEMENT N/A 9/11/2024    Procedure: DEBRIDEMENT STERNAL WOUND (WASH OUT) VAC CHANGE;  Surgeon: KALPANA Amos MD;  [facility];  Service: Cardiac Surgery       Physical Exam  Observed Ambulation:   TUG score 10 seconds. Gait steady.   Physical Exam  Constitutional:       Appearance: Normal appearance.   HENT:      Head: Normocephalic and atraumatic.      Right Ear: There is impacted cerumen.      Left Ear: Tympanic membrane, ear canal and external ear normal. There is no impacted cerumen.      Nose: Nose normal. No congestion or rhinorrhea.      Mouth/Throat:      Mouth: Mucous membranes are moist.      Pharynx: Oropharynx is clear. No oropharyngeal  exudate or posterior oropharyngeal erythema.   Eyes:      General: No scleral icterus.        Right eye: No discharge.         Left eye: No discharge.      Extraocular Movements: Extraocular movements intact.      Conjunctiva/sclera: Conjunctivae normal.      Pupils: Pupils are equal, round, and reactive to light.   Cardiovascular:      Rate and Rhythm: Normal rate and regular rhythm.      Pulses: Normal pulses.   Pulmonary:      Effort: Pulmonary effort is normal.      Breath sounds: Normal breath sounds.   Abdominal:      General: Bowel sounds are normal. There is no distension.      Palpations: Abdomen is soft. There is no mass.      Tenderness: There is no abdominal tenderness. There is no guarding.   Musculoskeletal:         General: Normal range of motion.      Right lower leg: No edema.      Left lower leg: No edema.   Skin:     General: Skin is warm and dry.   Neurological:      General: No focal deficit present.      Mental Status: He is alert.      Cranial Nerves: No cranial nerve deficit.      Sensory: No sensory deficit.      Motor: No weakness.      Gait: Gait normal.      Comments: Alert and oriented to month, year, day and city. Disoriented to date, place.    Psychiatric:         Mood and Affect: Mood normal.         Behavior: Behavior normal.         Thought Content: Thought content normal.        I have spent a total time of 60 minutes in caring for this patient on the day of the visit/encounter including Diagnostic results, Risks and benefits of tx options, Instructions for management, Patient and family education, Impressions, Counseling / Coordination of care, Documenting in the medical record, Reviewing/placing orders in the medical record (including tests, medications, and/or procedures), Obtaining or reviewing history  , and Communicating with other healthcare professionals .

## 2025-02-20 NOTE — PATIENT INSTRUCTIONS
May use Afrin nasal spray as directed as needed for bloody nose.  Recommend getting hearing aids replaced  Recommend Debrox ear drops. Instill 5 drops into left ear twice daily times 4 days. Follow-up with PCP for ear lavage.   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

## 2025-02-24 ENCOUNTER — OFFICE VISIT (OUTPATIENT)
Dept: SLEEP CENTER | Facility: CLINIC | Age: 80
End: 2025-02-24
Payer: COMMERCIAL

## 2025-02-24 VITALS
DIASTOLIC BLOOD PRESSURE: 64 MMHG | BODY MASS INDEX: 29.54 KG/M2 | HEIGHT: 67 IN | WEIGHT: 188.2 LBS | SYSTOLIC BLOOD PRESSURE: 126 MMHG

## 2025-02-24 DIAGNOSIS — G47.31 CENTRAL SLEEP APNEA: Primary | ICD-10-CM

## 2025-02-24 DIAGNOSIS — I50.32 CHRONIC DIASTOLIC (CONGESTIVE) HEART FAILURE (HCC): ICD-10-CM

## 2025-02-24 DIAGNOSIS — G47.33 OSA (OBSTRUCTIVE SLEEP APNEA): ICD-10-CM

## 2025-02-24 DIAGNOSIS — I63.9 CEREBROVASCULAR ACCIDENT (CVA), UNSPECIFIED MECHANISM (HCC): ICD-10-CM

## 2025-02-24 PROCEDURE — 99204 OFFICE O/P NEW MOD 45 MIN: CPT | Performed by: INTERNAL MEDICINE

## 2025-02-24 PROCEDURE — G2211 COMPLEX E/M VISIT ADD ON: HCPCS | Performed by: INTERNAL MEDICINE

## 2025-02-24 NOTE — ASSESSMENT & PLAN NOTE
Recent PSG 1/9/25 showed severe sleep apnea AHI 73. Minimal oxygen desaturations  However, the waveforms in the PSG is highly suspicious for CSA/[place] - I reviewed this data independently with the patient and his wife  His history of significant change in EDS after cardiac surgery/CVA is concerning for new onset     Start with CPAP study - I suspect CSA  If CPAP/BIPAP not effective we will need ASV study - last LVEF 75%    Patient and wife agreeable for plan  Orders:    Ambulatory referral to Sleep Medicine    CPAP Study; Future

## 2025-02-24 NOTE — PROGRESS NOTES
Speech-Language Pathology Initial Evaluation    Today's date: 2025   Patient’s name: Shane Chau .  : 1945  MRN: [id number]  Safety measures: MCI  Referring provider: Marguerite Lock*    Encounter Diagnosis     ICD-10-CM    1. Other symbolic dysfunctions  R48.8       2. Mild cognitive impairment  G31.84 Ambulatory Referral to Speech Therapy      3. Cognitive communication deficit  R41.841           Assessment:  Patient presents with mild cognitive impairment as characterized by a score of 35/50 upon administration of the BCAT. Pt presents with deficits in areas of delayed verbal recall, immediate & delayed story recall, delayed visual memory, and story recognition. Pt presents with strengths in areas of visual recognition/naming, attention, abstraction, language, and executive functioning skills along with visuospatial skills. Pt would benefit from skilled outpatient speech therapy targeting education on memory strategies along with a personalized home exercise program targeting cognitive training.     Short Term Goals ( 25)    Patient will be educated on the use of internal and external memory aids and compensatory strategies to facilitate increased recall of routine, personal information, and recent events    Patient will complete auditory immediate and short term memory tasks to facilitate increased ability to retell narratives and recall information within functional living environment    Patient will complete complex auditory attention processing tasks (e.g., sentence unscramble, ranking numbers/words, etc.) to improve working memory with 80% accuracy    Patient will complete thought organization tasks (e.g., sequencing, deduction puzzles, etc.) with 80% accuracy to facilitate increased executive functioning, working memory, problem solving, and processing skills, to be achieved in 4-6 weeks.    Long Term Goals    Patient will demonstrate cognitive-communication skills  "consistent with age and education given use of compensatory strategies when needed to resume baseline activities and responsibilities in home and community settings by discharge.     Patient will complete cognitive-linguistic therapy that addresses patient’s specific deficits in processing speed, short-term working memory, attention to detail, monitoring, sequencing, and organization skills, with instruction, to alleviate effects of executive functioning disorder deficits by discharge.    Plan:  Patient would benefit from outpatient skilled Speech Therapy services: Cognitive-linguistic therapy    Frequency: 1x weekly  Duration: 3 months    Intervention certification from: 2/25/2025  Intervention certification to: 05/25/25      Subjective:  History of present illness: Patient is a 79 y.o. male who was referred to outpatient skilled Speech Therapy services for a cognitive-linguistic evaluation. Pt with past medical history of CVA. Patient arrived today with wife who was present at the end of the evaluation. Pt was pleasant throughout testing.    Pt referred by [facility] for cognitive therapy. Pt seen by Marguerite Lock on 02/20/25. MOCA administered on this date with a score of 20/30 characterized by a mild cognitive impairment. On date of testing, pt presented with deficits in visuospatial/executive functioning skills (scored 3/5), memory and delayed recall (score 0/5), orientation (scored 3/5), along with language and abstraction (scored 4/6).     As per reports on date of evaluation with [facility] on 02/20/25:  Memory report per wife: Wife states that since this past summer after hospitalizations, her  has \"no pep or energy.\" He no longer does the things that he enjoyed doing such as jig saw puzzles, sudoku, reading. She is concerned about his memory. She states that she frequently needs to remind him of the date. Memory report per patient: Patient reports that his memory is \"not that sharp.\" " "Current activities:  Wife reports that patient no longer doing the things that he enjoyed doing such as jig saw puzzles, sudoku, reading    Pt reported he does not remember all the time; remembers things from a long time ago but forgets \"what happened yesterday\". Pt stated he uses a calendar on his phone for appointments; sometimes sets reminders. Pt expressed he does not exercise or participate in many social or cognitive activities, pt stated he \"sits on the recliner and watched TV\". Pt reports he asks his wife \"lots of questions\" and feels as though his memory has been declining for many years; however does not seem too concerned about it.    Pt educated on cognitive therapy and the benefits of cognition. Pt agreeable to trial of therapy.     Patient's goal(s): To improve memory; wants to do things himself, not rely on wife as much    Pain: None reported     Hearing: Decreased; pt reports hearing aids not working at this time. Pt's wife given referral to audiology.   Vision: [facility]    Home environment/lifestyle: Lives at home with wife. Pt driving at this time. Pt stated he used to enjoy completing puzzles/sodoku; however has \"slowly moved away from those activities\"; however stated he would like to get back to it. Pt reported he enjoys \"relaxing and sitting on the recliner\"; pt stated \"it has been a while since I wanted to do anything\".   Highest level of education:  Not addressed on date of evaluation.  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      Objective:  The Brief Cognitive Assessment Test (BCAT) is a multi-domain cognitive tool that assesses a patient’s orientation, verbal recall, visual recognition, visual recall, attention, abstraction, language, executive functions, and visuospatial processing. BCAT is sensitive to the full spectrum of cognitive functioning (normal, MCI, mild dementia, moderate-severe dementia) and can " "predict basic and instrumental activities of daily living (ADL, IADL).     During today’s administration of the test, patient achieved a total score of 35/50 points.     Using the BCAT Crosswalk Table as a reference, patient’s score falls within the range and may be suggestive of \"Mild Cognitive Impairment\". The Crosswalk Table suggests the following:    Cognitive Stage: Mild Cognitive Impairment (BCAT Range: 34-46 // MMSE: 24-27 // GDS: 3)  Cognitive & Functional Issues: Generally functionally normal, but early specific functional declines (IADL); subjective and objective memory deficits. Individuals at lower end of range more likely to have more significant cognitive deficits. Lower scores more suggestive of residential support needs. At the bottom range of MCI, consider medication management and consider support around community reintegration.    Treatment:  N/A      Visit Tracking:  POC   Expires Auth Expiration Date ST Visit Limit   05/25/25 12/31/25 BOMN          Visit/Unit Tracking:  Auth Status Date 02/25/25  IE   Re-eval at 10th visit  Used 1    Remaining 9       Intervention Comments:  N/A  "

## 2025-02-24 NOTE — PROGRESS NOTES
Name: Shane Chau .      : 1945      MRN: [id number]  Encounter Provider: Jaron East MD  Encounter Date: 2025   Encounter department: [facility] [place]  :  Assessment & Plan  SHEA (obstructive sleep apnea)  Recent PSG 25 showed severe sleep apnea AHI 73. Minimal oxygen desaturations  However, the waveforms in the PSG is highly suspicious for CSA/[place] - I reviewed this data independently with the patient and his wife  His history of significant change in EDS after cardiac surgery/CVA is concerning for new onset     Start with CPAP study - I suspect CSA  If CPAP/BIPAP not effective we will need ASV study - last LVEF 75%    Patient and wife agreeable for plan  Orders:    Ambulatory referral to Sleep Medicine    CPAP Study; Future    Chronic diastolic (congestive) heart failure (HCC)  Wt Readings from Last 3 Encounters:   25 85.4 kg (188 lb 3.2 oz)   25 84.6 kg (186 lb 6.4 oz)   25 80.7 kg (178 lb)     Ischemic cardiomyopathy s/p CABG  Preserved LVEF 2024 echo 70%       Cerebrovascular accident (CVA), unspecified mechanism (HCC)  MRI 2024 showed acute to subacute punctate infarcts involving the right caudate, anterior centrum semiovale, and lateral right frontal cortex     Could be contributing to daytime sleepiness       Central sleep apnea    Orders:    CPAP Study; Future        History of Present Illness   HPI   79 year old with a history of ischemic cardiomyopathy, CAD s/p CABG complicated by basal ganglia strokes, sternal dehiscence presents for evaluation of excessive daytime sleepiness and severe sleep apnea    He is here with his wife.  His wife says that before the extensive hospitalizations for CABG and subsequent complications Shane was a very active man.  Prior to 2024 he would wake up around 8 AM and did not need a nap during the day.  He had extensive hospitalizations after his CABG which was complicated by postop  stroke-punctate acute to subacute infarcts in the right caudate, centrum semiovale and lateral right frontal cortex, ileus, sternal wound.    After these hospitalizations Toni has been sleeping a lot.  He has abnormal breathing during the night with deep breathing followed by pauses and witnessed apneas.  He was to go to bed at 10 PM falling asleep quickly and stay asleep the entire night.  He will wake up around 10 AM.  He gets up to use the bathroom once overnight.  There is not much snoring.  He is sleepy throughout the day and may sleep into the afternoon after breakfast and take naps frequently.  He seems to have no energy or motivation.    He was started on Lexapro 5 mg around August 2024 with a suspicion that he might be depressed.  However Shane denies any depression.  Lexapro has not helped with his energy levels    No significant PLM, parasomnias.            Sitting and reading: High chance of dozing  Watching TV: High chance of dozing  Sitting, inactive in a public place (e.g. a theatre or a meeting): Moderate chance of dozing  As a passenger in a car for an hour without a break: Moderate chance of dozing  Lying down to rest in the afternoon when circumstances permit: Moderate chance of dozing  Sitting and talking to someone: Moderate chance of dozing  Sitting quietly after a lunch without alcohol: Moderate chance of dozing  In a car, while stopped for a few minutes in traffic: Would never doze  Total score: 16     Review of Systems  Pertinent positives/negatives included in HPI and also as noted:     Past Medical History   Past Medical History:   Diagnosis Date    AAA (abdominal aortic aneurysm) (HCC)     s/p open repair    BPH (benign prostatic hyperplasia)     CAD (coronary artery disease)     Cholelithiasis     Former tobacco use     History of MI (myocardial infarction)     History of TIA (transient ischemic attack)     Plavix in past, now on Brilinta    Hyperlipidemia     Hypertension     Insomnia      Obesity      Past Surgical History:   Procedure Laterality Date    ABDOMINAL AORTIC ANEURYSM REPAIR  06/07/2011    Dr. Yarbrough 6/7/11    APPENDECTOMY      CARDIAC CATHETERIZATION Left 06/03/2024    Procedure: Cardiac Left Heart Cath;  Surgeon: Pk Blanco DO;  Location: [facility];  Service: Cardiology    CARDIAC CATHETERIZATION  06/03/2024    Procedure: Cardiac catheterization;  Surgeon: Pk Blanco DO;  [facility]: [facility];  Service: Cardiology    CARDIAC CATHETERIZATION N/A 06/03/2024    Procedure: Cardiac Coronary Angiogram;  Surgeon: Pk Blanco DO;  [facility]: [facility];  Service: Cardiology    CATARACT EXTRACTION Right 11/2018    CHOLECYSTECTOMY LAPAROSCOPIC      COLONOSCOPY  11/2006    CORONARY ANGIOPLASTY      3 STENTS INSERTED    HARVEST VEIN Left 6/20/2024    Procedure: HARVEST VEIN ENDOSCOPIC (EVH);  Surgeon: KALPANA Amos MD;  Location: [facility];  Service: Cardiac Surgery    IR ASPIRATION ONLY  9/5/2024    NM CORONARY ARTERY BYP W/VEIN & ARTERY GRAFT 3 VEIN N/A 6/20/2024    Procedure: CORONARY ARTERY BYPASS GRAFT (CABG) X3 VESSELS, LIMA - LAD, LEFT LEG EVH/SVG - PDA AND OM1, W/ DEDRICK;  Surgeon: KALPANA Amos MD;  Location: [facility];  Service: Cardiac Surgery    NM LAPS SURG CHOLECYSTECTOMY W/CHOLANGIOGRAPHY N/A 09/08/2016    Procedure: CHOLECYSTECTOMY LAPAROSCOPIC with intra-op cholangiogram ;  Surgeon: Prasanth Verdin MD;  Location: [facility];  Service: General    NM STERNAL DEBRIDEMENT N/A 9/9/2024    Procedure: DEBRIDEMENT STERNAL WOUND (WASH OUT);  Surgeon: KALPANA Amos MD;  [facility];  Service: Cardiac Surgery    NM STERNAL DEBRIDEMENT N/A 9/11/2024    Procedure: DEBRIDEMENT STERNAL WOUND (WASH OUT) VAC CHANGE;  Surgeon: KALPANA Amos MD;  Location: [facility];  Service: Cardiac Surgery     Family History   Problem Relation Age of Onset    Heart disease Mother     Hypertension Mother   "       Benign Essential     Stroke Mother         Stroke     Heart disease Father     Coronary artery disease Brother     Diabetes Brother         mellitus     Heart disease Brother     Aortic aneurysm Brother     Coronary artery disease Brother     Sudden death Brother     Coronary artery disease Brother     Sudden death Brother     Sudden death Brother     Coronary artery disease Brother     Heart disease Family       reports that he quit smoking about 38 years ago. His smoking use included cigarettes. He started smoking about 69 years ago. He has a 15 pack-year smoking history. He has never used smokeless tobacco. He reports that he does not currently use alcohol. He reports that he does not use drugs.  Current Outpatient Medications   Medication Instructions    acetaminophen (TYLENOL) 650 mg, Oral, Every 6 hours PRN    amLODIPine (NORVASC) 2.5 mg, Oral, Daily    aspirin (ECOTRIN LOW STRENGTH) 81 mg, Oral, Daily    B-12 1,000 mcg, Sublingual, Daily    escitalopram (LEXAPRO) 10 mg, Oral, Daily    melatonin 6 mg, Oral, Daily at bedtime    metoprolol tartrate (LOPRESSOR) 50 mg, Oral, 2 times daily    pantoprazole (PROTONIX) 40 mg, Oral, Daily (early morning)    rosuvastatin (CRESTOR) 40 mg, Oral, Daily    ticagrelor (BRILINTA) 90 mg, Oral, Every 12 hours scheduled   No Known Allergies   Objective   /64   Ht 5' 7.1\" (1.704 m)   Wt 85.4 kg (188 lb 3.2 oz)   BMI 29.39 kg/m²     Neck Circumference: 14  Physical Exam  Constitutional:       General: He is not in acute distress.     Appearance: Normal appearance. He is not ill-appearing, toxic-appearing or diaphoretic.   HENT:      Head: Normocephalic and atraumatic.      Mouth/Throat:      Mouth: Mucous membranes are moist.      Pharynx: Oropharynx is clear. No oropharyngeal exudate.      Comments: Mallampati 4  Eyes:      Extraocular Movements: Extraocular movements intact.      Conjunctiva/sclera: Conjunctivae normal.   Cardiovascular:      Rate and Rhythm: " Normal rate.   Pulmonary:      Effort: Pulmonary effort is normal. No respiratory distress.      Breath sounds: Normal breath sounds.   Abdominal:      Tenderness: There is no guarding.   Musculoskeletal:         General: Normal range of motion.      Cervical back: Normal range of motion and neck supple. No rigidity.   Skin:     Coloration: Skin is not jaundiced or pale.   Neurological:      General: No focal deficit present.      Mental Status: He is alert. Mental status is at baseline.   Psychiatric:         Mood and Affect: Mood normal.         Data  Lab Results   Component Value Date    HGB 12.0 12/11/2024    HCT 36.5 12/11/2024    MCV 96 12/11/2024      Lab Results   Component Value Date    GLUCOSE 140 06/20/2024    CALCIUM 9.8 12/11/2024     12/03/2015    K 4.1 12/11/2024    CO2 28 12/11/2024     12/11/2024    BUN 20 12/11/2024    CREATININE 1.10 12/11/2024     Lab Results   Component Value Date    IRON 56 12/11/2024    TIBC 286 12/11/2024    FERRITIN 236 12/11/2024     Lab Results   Component Value Date    AST 16 12/11/2024    ALT 15 12/11/2024

## 2025-02-25 ENCOUNTER — EVALUATION (OUTPATIENT)
Dept: SPEECH THERAPY | Facility: CLINIC | Age: 80
End: 2025-02-25
Payer: COMMERCIAL

## 2025-02-25 DIAGNOSIS — R48.8 OTHER SYMBOLIC DYSFUNCTIONS: Primary | ICD-10-CM

## 2025-02-25 DIAGNOSIS — R41.841 COGNITIVE COMMUNICATION DEFICIT: ICD-10-CM

## 2025-02-25 DIAGNOSIS — G31.84 MILD COGNITIVE IMPAIRMENT: ICD-10-CM

## 2025-02-25 PROCEDURE — 92523 SPEECH SOUND LANG COMPREHEN: CPT

## 2025-02-27 ENCOUNTER — TELEPHONE (OUTPATIENT)
Dept: SLEEP CENTER | Facility: CLINIC | Age: 80
End: 2025-02-27

## 2025-02-27 DIAGNOSIS — F39 MOOD DISORDER (HCC): ICD-10-CM

## 2025-02-27 DIAGNOSIS — Z95.1 S/P CABG X 3: ICD-10-CM

## 2025-02-27 DIAGNOSIS — I25.118 CORONARY ARTERY DISEASE OF NATIVE ARTERY OF NATIVE HEART WITH STABLE ANGINA PECTORIS (HCC): ICD-10-CM

## 2025-02-27 NOTE — TELEPHONE ENCOUNTER
Reason for call: NOT A DUPLICATE. Requesting a different pharmacy  [x] Refill   [] Prior Auth  [] Other:     Office:   [] PCP/Provider -   [x] Specialty/Provider - Cardio    Medication: ticagrelor (BRILINTA) 90 MG     Dose/Frequency: Take 1 tablet (90 mg total) by mouth every 12 (twelve) hours     Quantity: 180    Pharmacy: [facility] [address] [place] [address]     Does the patient have enough for 3 days?   [x] Yes   [] No - Send as HP to POD

## 2025-02-27 NOTE — TELEPHONE ENCOUNTER
Reason for call: NOT A DUPLICATE. Requesting a different pharmacy  [x] Refill   [] Prior Auth  [] Other:     Office:   [x] PCP/Provider -   [] Specialty/Provider -     Medication: escitalopram (LEXAPRO) 10 mg tablet     Dose/Frequency:  Take 1 tablet (10 mg total) by mouth daily     Quantity: 90    Pharmacy: [facility] [address] [place] [address]      Does the patient have enough for 3 days?   [x] Yes   [] No - Send as HP to POD

## 2025-02-27 NOTE — TELEPHONE ENCOUNTER
Voicemail message left from wife stating  got lost on way to sleep center in [place] and so turned around and went home. Wife wondering what next steps should be.     Return call to patient, who said he really got lost on way to study and so just went home. Hoping not to get $100 fee for no show.     CPAP titration study scheduled for 3/2/2025 at [place] [facility]. Reviewed instructions and cancellation policy.     Chat message to Penny Barrett MA to inform, Authorization not required.     Email to Add on study team (Abigail Delong, Mary Zee, Sandrine Latif and Karlene Bee) to inform of add on patient for 3/2/2025 @ [place] [facility].

## 2025-02-28 RX ORDER — ESCITALOPRAM OXALATE 10 MG/1
10 TABLET ORAL DAILY
Qty: 90 TABLET | Refills: 1 | Status: SHIPPED | OUTPATIENT
Start: 2025-02-28 | End: 2025-08-27

## 2025-02-28 NOTE — TELEPHONE ENCOUNTER
Patient's wife called in regards to medication.  She stated patient is almost out.  Informed waiting for provider approval.

## 2025-02-28 NOTE — TELEPHONE ENCOUNTER
Patients wife called to check the status of this refill patient has one tablet left for tonight.    Please review

## 2025-02-28 NOTE — TELEPHONE ENCOUNTER
Patient's wife call again regards to her 's Jorge Luisilinta. Also informed her it is waiting on approval. She stated he only has 2 pills left. Please expedite if possible. Thank you.    Please contact patient if there issues refilling this medication. [phone]

## 2025-03-02 ENCOUNTER — HOSPITAL ENCOUNTER (OUTPATIENT)
Dept: SLEEP CENTER | Facility: CLINIC | Age: 80
Discharge: HOME | End: 2025-03-02
Payer: COMMERCIAL

## 2025-03-02 DIAGNOSIS — G47.31 CENTRAL SLEEP APNEA: ICD-10-CM

## 2025-03-02 DIAGNOSIS — G47.33 OSA (OBSTRUCTIVE SLEEP APNEA): ICD-10-CM

## 2025-03-02 PROCEDURE — 95811 POLYSOM 6/>YRS CPAP 4/> PARM: CPT

## 2025-03-02 PROCEDURE — 95811 POLYSOM 6/>YRS CPAP 4/> PARM: CPT | Performed by: INTERNAL MEDICINE

## 2025-03-03 DIAGNOSIS — G47.33 OSA (OBSTRUCTIVE SLEEP APNEA): Primary | ICD-10-CM

## 2025-03-03 PROBLEM — G47.31 CENTRAL SLEEP APNEA: Status: ACTIVE | Noted: 2025-03-03

## 2025-03-03 NOTE — PROGRESS NOTES
Sleep Study Documentation    Pre-Sleep Study       Sleep testing procedure explained to patient:YES    Patient napped prior to study:NO    Caffeine:Dayshift worker after 12PM.  Caffeine use:NO    Alcohol:Dayshift workers after 5PM: Alcohol use:NO    Typical day for patient:YES       Study Documentation    Sleep Study Indications: HSEA (obstructive sleep apnea) [G47.33 (ICD-10-CM)]; Central sleep apnea [G47.31 (ICD-10-CM)]    Sleep Study: Treatment   Optimal PAP pressure: 12 cmH2O  Leak:Small  Snore:Eliminated  REM Obtained:no  Supplemental O2: no    Minimum SaO2 93%  Baseline SaO2 95%  PAP mask tried (list all) medium F&P Mary Full  PAP mask choice (final) medium F&P Mary Full  PAP mask type:full face  PAP pressure at which snoring was eliminated 12 cmH2O  Minimum SaO2 at final PAP pressure 94%  Mode of Therapy:CPAP  ETCO2:No  CPAP changed to BiPAP:No    EKG abnormalities: no     EEG abnormalities: no    Were abnormal behaviors in sleep observed:NO    Is Total Sleep Study Recording Time < 2 hours: N/A    Is Total Sleep Study Recording Time > 2 hours but study is incomplete: N/A    Is Total Sleep Study Recording Time 6 hours or more but sleep was not obtained: NO    Patient classification: retired       Post-Sleep Study    Medication used at bedtime or during sleep study:NO    Patient reports time it took to fall asleep:less than 20 minutes    Patient reports waking up during study:Denied    Patient reports sleeping 4 to 6 hours without dreaming.    Does the Patient feel this is a typical night of sleep:typical    Patient rated sleepiness: Somewhat sleepy or tired    PAP treatment:yes: Post PAP treatment patient reports feeling unchanged and would not wear PAP mask at home.

## 2025-03-04 ENCOUNTER — TELEPHONE (OUTPATIENT)
Dept: SLEEP CENTER | Facility: CLINIC | Age: 80
End: 2025-03-04

## 2025-03-04 ENCOUNTER — RESULTS FOLLOW-UP (OUTPATIENT)
Dept: SLEEP CENTER | Facility: CLINIC | Age: 80
End: 2025-03-04

## 2025-03-04 NOTE — TELEPHONE ENCOUNTER
----- Message from Jaron East MD sent at 3/4/2025  1:28 PM EST -----  I called the patient's wife and explained the CPAP study results.  She wants the patient to try CPAP so please help us process the CPAP order placed by Dr. Newton yesterday and let the patient's wife know when processed thank you     Her name is Maryjo and phone # is [phone]

## 2025-03-04 NOTE — TELEPHONE ENCOUNTER
Call to patient wife, who spoke with Dr. East today about proceeding with getting patient a CPAP.     Wife states patient has frequent nose bleeds and she is wondering if the sleep apnea could cause this.  I told her not that I heard of but I would ask Dr. East.      Asked if patient uses saline nose spray and a humidifier.  She states he does use a humidifier.     [facility] chosen as DME provider, phone number given. Request Antonio celis For set up.     Compliance appointment scheduled for 5/1/2025 @ 1 pm with Dr. East in the [place] office.     See new encounter for script to [facility]

## 2025-03-04 NOTE — TELEPHONE ENCOUNTER
I called the patient's wife and reviewed CPAP study results  Patient's wife is amenable for Shane to try CPAP

## 2025-03-04 NOTE — TELEPHONE ENCOUNTER
See previous encounter sleep study results    CPAP script and other clinicals sent to [facility] via [facility] with request to expedite.

## 2025-03-05 LAB
DME PARACHUTE DELIVERY DATE REQUESTED: NORMAL
DME PARACHUTE DELIVERY NOTE: NORMAL
DME PARACHUTE ITEM DESCRIPTION: NORMAL
DME PARACHUTE ORDER STATUS: NORMAL
DME PARACHUTE SUPPLIER NAME: NORMAL
DME PARACHUTE SUPPLIER PHONE: NORMAL

## 2025-03-06 ENCOUNTER — OFFICE VISIT (OUTPATIENT)
Dept: SPEECH THERAPY | Facility: CLINIC | Age: 80
End: 2025-03-06
Payer: COMMERCIAL

## 2025-03-06 DIAGNOSIS — G31.84 MILD COGNITIVE IMPAIRMENT: Primary | ICD-10-CM

## 2025-03-06 DIAGNOSIS — R48.8 OTHER SYMBOLIC DYSFUNCTIONS: ICD-10-CM

## 2025-03-06 DIAGNOSIS — R41.841 COGNITIVE COMMUNICATION DEFICIT: ICD-10-CM

## 2025-03-06 PROCEDURE — 92523 SPEECH SOUND LANG COMPREHEN: CPT

## 2025-03-06 NOTE — PROGRESS NOTES
Daily Speech Treatment Note    Today's date: 3/6/2025   Patient’s name: Shane Chau .  : 1945  MRN: [id number]  Safety measures: MCI  Referring provider: Marguerite Lock*    Encounter Diagnosis     ICD-10-CM    1. Mild cognitive impairment  G31.84       2. Other symbolic dysfunctions  R48.8       3. Cognitive communication deficit  R41.841         Visit Tracking:  POC   Expires Auth Expiration Date ST Visit Limit   25 BOMN              Visit/Unit Tracking:  Auth Status Date 25  IE 25   Re-eval at 10th visit  Used 1 2     Remaining 9 8     Subjective/Behavioral:  Pt's spouse present throughout today's session; reported that pt will be getting CPAP machine. Pt provided education regarding the importance of proper sleep in terms of memory. Spouse stated pt got lost in [place] on way to doctor appointment; pt educated on using [employer]. Pt/spouse provided with handout on cognitive games/activities.       Objective/Assessment:  -Patient's family member/caregiver was present during today's session.  -Reviewed testing results and goals in plan care with patient. Patient is in agreement at this time.  Provided pt with home exercises; will review next session.       Short Term Goals ( 25)     Patient will be educated on the use of internal and external memory aids and compensatory strategies to facilitate increased recall of routine, personal information, and recent events     Patient will complete auditory immediate and short term memory tasks to facilitate increased ability to retell narratives and recall information within functional living environment     Patient will complete complex auditory attention processing tasks (e.g., sentence unscramble, ranking numbers/words, etc.) to improve working memory with 80% accuracy     Patient will complete thought organization tasks (e.g., sequencing, deduction puzzles, etc.) with 80% accuracy to facilitate  increased executive functioning, working memory, problem solving, and processing skills, to be achieved in 4-6 weeks.    Memory Task:   Pt participated in memory task, targeting immediate and short term memory along with memory strategy use (e.g., note taking). Pt educated on importance of note taking to aid in memory; pt verbalized/demonstrated understanding. Verbally given a short paragraph, the pt was instructed to take notes regarding important information as presented throughout the paragraph. Given a question regarding the information presented in each paragraph immediately after being read, pt independently answered each question in 12/12 opportunities. Pt benefited from using his notes for recall of information throughout task. Given a question regarding the information presented in each paragraph shortly after being read, pt independently answered each question in 11/12 opportunities; accuracy increased to 12/12 opportunities given verbal cue to look at notes. Pt presented with minimal difficulty throughout task. Pt benefited from note taking throughout task to facilitate recall of information.     Distraction Task (Q-Bitz):  Pt  independently completed task in 3/3 opportunities.      Plan:  Patient would benefit from outpatient skilled Speech Therapy services: Cognitive-linguistic therapy     Frequency: 1x weekly  Duration: 3 months     Intervention certification from: 2/25/2025  Intervention certification to: 05/25/25

## 2025-03-07 ENCOUNTER — TELEPHONE (OUTPATIENT)
Age: 80
End: 2025-03-07

## 2025-03-07 NOTE — TELEPHONE ENCOUNTER
Pt's wife Maryjo called stating that pt's Brilinta script for 90 day supply is over 400$. She iw wanting to know if there is any other medication that could be prescribed that is cheaper.    Please advise

## 2025-03-07 NOTE — TELEPHONE ENCOUNTER
Pt's spouse called back in regard to Guille. She also wanted to report that the patient is having nose bleeds and wanted to know if it could be related. Advised that yes, it could be. However the patient may need to be on a medication in this class.   Instructed on using a humidifier in the home and saline spray or gel. The  spouse reports using a humidifier and that he uses a spray. Advised to try the gel and possibly to avoid picking at the inside of the nares. The spouse admitted to the patient picking. The bleeding did not sound like enough to cause alarm.

## 2025-03-07 NOTE — TELEPHONE ENCOUNTER
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  He has no cardiac reason for being on Brilinta.  So when and if dose reduction of Brilinta is appropriate per neurology, I will defer to their recommendations.  Gaye, this is in regards to frequent nosebleeds.

## 2025-03-10 NOTE — TELEPHONE ENCOUNTER
MSW phoned [employer] at [phone] and spoke with Hyacinth. She stated that patients with Medicare Part D who are experience financial issues with the cost of the Brilinta can apply for the [employer] PAP. Hyacinth provided the income guidelines as 300% of the FPL with income amounts as follows:    Household of 1 - income must be less than $45,180  Household of 2 - income must be less than $61,320    Hyacinth stated that if patient's income is less than above that he can complete and submit one of their applications. Hyacinth stated that application processing times are about 1 week. If approved, the patient would receive the medication free of charge and that it would be shipped to their home address.     MSW phoned the number for patient at [phone] and spoke with patient's wife, Maryjo (who is listed on the Communication Consent). She stated that she spoke with the [employer] insurance and that the high cost was due to patient needing to meet his deductible. Maryjo stated that they paid the $400 yesterday and picked up the medication. Maryjo stated that she was able to get through to the insurance after 8 attempts, and that they informed her that once the deductible has been met, future refills will cost $45-48 which is affordable to them.    MSW did educate patient's wife about the [place] [place] Patient Assistance Program, but she does not feel that they need to apply as they can afford the future refills at $45-48 per month. MSW directed patient/wife to reach out to our office is they change their minds and wish to apply.     MSW phoned patient's insurance at [phone] to confirm drug pricing. MSW's call was transferred to [employer] and this writer spoke with Elicia. She stated that patient's cost yesterday was $481 which included his deductible or $340 and the standard copay which is $141 for a 90 day supply (this is $47 per month). Elicia stated that patient's deductible has been met so going forward he  will only need to pay $141 for a 90 day supply or $47 per month. Reference # for call is [id number].

## 2025-03-12 LAB
DME PARACHUTE DELIVERY DATE ACTUAL: NORMAL
DME PARACHUTE DELIVERY DATE EXPECTED: NORMAL
DME PARACHUTE DELIVERY DATE REQUESTED: NORMAL
DME PARACHUTE DELIVERY NOTE: NORMAL
DME PARACHUTE ITEM DESCRIPTION: NORMAL
DME PARACHUTE ORDER STATUS: NORMAL
DME PARACHUTE SUPPLIER NAME: NORMAL
DME PARACHUTE SUPPLIER PHONE: NORMAL

## 2025-03-13 ENCOUNTER — OFFICE VISIT (OUTPATIENT)
Dept: SPEECH THERAPY | Facility: CLINIC | Age: 80
End: 2025-03-13
Payer: COMMERCIAL

## 2025-03-13 DIAGNOSIS — R48.8 OTHER SYMBOLIC DYSFUNCTIONS: ICD-10-CM

## 2025-03-13 DIAGNOSIS — G31.84 MILD COGNITIVE IMPAIRMENT: Primary | ICD-10-CM

## 2025-03-13 DIAGNOSIS — R41.841 COGNITIVE COMMUNICATION DEFICIT: ICD-10-CM

## 2025-03-13 PROCEDURE — 92507 TX SP LANG VOICE COMM INDIV: CPT

## 2025-03-13 NOTE — PROGRESS NOTES
Daily Speech Treatment Note    Today's date: 3/13/2025   Patient’s name: Shane Chau Sr.  : 1945  MRN: [id number]  Safety measures: MCI  Referring provider: Marguerite Lock*    Encounter Diagnosis     ICD-10-CM    1. Mild cognitive impairment  G31.84       2. Other symbolic dysfunctions  R48.8       3. Cognitive communication deficit  R41.841           Visit Tracking:  POC   Expires Auth Expiration Date ST Visit Limit   25 BOMN              Visit/Unit Tracking:  Auth Status Date 25  IE 25   Re-eval at 10th visit  Used 1 2 3     Remaining 9 8 7     Subjective/Behavioral:  Pt participated well throughout today's session.     Objective/Assessment:  Reviewed home exercises with pt; pt completed with about 95% accuracy with reported difficulties on deduction puzzle worksheets. Pt provided with additional home exercises; will review next session.     Short Term Goals ( 25)     Patient will be educated on the use of internal and external memory aids and compensatory strategies to facilitate increased recall of routine, personal information, and recent events     Patient will complete auditory immediate and short term memory tasks to facilitate increased ability to retell narratives and recall information within functional living environment     Patient will complete complex auditory attention processing tasks (e.g., sentence unscramble, ranking numbers/words, etc.) to improve working memory with 80% accuracy     Patient will complete thought organization tasks (e.g., sequencing, deduction puzzles, etc.) with 80% accuracy to facilitate increased executive functioning, working memory, problem solving, and processing skills, to be achieved in 4-6 weeks.    Sorting/Remembering Categories:  Pt participated in word activity targeting memory/recall, requiring pt to sort words based on similar features using association. Visually given a list of words, pt  "independently sorted the words into categories based on similar features with 100% accuracy (e.g., drinks, rooms, sports, space). Targeting immediate memory, pt instructed to recall the words and write them down according to category. Pt independently completed task in 10/16 opportunities; accuracy increased to 16/16 opportunities given verbal cues. Pt asked to recall the words once more using association (e.g., \"You can associate the word living room with watching TV\"). Pt independently completed task in 12/16 opportunities; accuracy increased to 16/16 opportunities given verbal cues. Following distraction task (e.g., IQ Stix), pt instructed to recall the same list of words, targeting delayed memory. Pt completed task independently in 14/16 opportunities; accuracy increased to 16/16 opportunities given verbal cues. Pt presented with moderate difficulty throughout task; however benefited from increased processing time along with verbal cues throughout task.     IQ Stix:  Pt participated in IQ Stix activity, where he was instructed to complete the puzzle by placing each stick where it fits. Pt completed task independently throughout 3 trials; pt stated that he enjoyed this activity.      Plan:  Patient would benefit from outpatient skilled Speech Therapy services: Cognitive-linguistic therapy     Frequency: 1x weekly  Duration: 3 months     Intervention certification from: 2/25/2025  Intervention certification to: 05/25/25  "

## 2025-03-14 NOTE — PROGRESS NOTES
[facility]  [address], [address]  [place] [address]  [phone]    Care Conference    NAME: Shane Chau Sr. AGE: 79 y.o. SEX: male  YOB: 1945  DATE OF ASSESSMENT: 02/20/2025  DATE OF CONFERENCE: 03/20/2025    Family Present: Wife   Staff Present: SANTHOSH Stevenson    Patient / Family Goals of Care: Review cognitive decline and associated symptoms, discuss treatment options and care planning.     Medical Concerns (Current/Historical): Mobility, Mood, SHEA (obstructive sleep apnea), Nose bleeds, History of gastrostomy, Heart failure, Diabetes, Severe protein calorie malnutrition, Hyperlipidemia, Vitamin D deficiency, Anxiety    Geriatric Syndromes/Age Related Syndromes: Mild cognitive impairment, at risk for early stage vascular dementia, Hearing loss    Neuropsychological  Mild cognitive impairment vs. Early stage vascular dementia  Gael Cognitive Assessment: 20/30, deficits in: visuospatial/executive functioning, language, delayed recall, orientation  Geriatric Depression Screen: 1/15    History, physical, and cognitive screening are most consistent with:  Mild cognitive impairment. At risk for early stage vascular dementia.   Contributing factors:  History of stroke, CAD, HLD, stenosis of vertebral artery.   Further evaluation warrants the following:  Speech Therapy, OT for fit to drive evaluation, Vitamin D level.   Memory medications:  None  Continue supportive cares   Discussed safe environment  Wandering:  Some items to consider would be door video surveillance, ID bracelet, Tile GPS   Home:  Ensure patient has phone and reinforce to not use stove or shower while gone.  Level of care:  Needs mild assist in the home.   Fall preventions:  Home fall precautions.   Medication management:  Wife assists   Scam safety:  Do not answer suspicious mail, phone calls, email, or text message without having second person review due to safety  risk.  Do not give out personal info to questionable sources- read company safety policies for how they might contact you.  Continue to engage in physical, cognitive, social activity.  Continue doing games, puzzles, trivia, current event discussions  Continue daily walks or exercise video  Continue outings with friends and family.  Avoid social isolation.  Referral to cognitive therapy:  Speech therapy.   Optimize chronic and acute conditions  Continue to follow up with providers w/chronic conditions and ensure medication compliance  Last GFR 63, albumin 4.0   Vascular risk factors:  History of stroke   Geriatric syndromes:  Insomnia in the setting of  severe SHEA.   Chronic condition concerns:  CAD, History of Stroke. S/p CABG x 3, History of sternal wound infection.  Ensure good diet (ex Mediterranean diet) and adequate hydration  Monitor for changes in behavior/mood- if noted, notify provider for eval  Encourage mindfulness and positive socialization for general wellness.  Do not overwhelm patient with information.    Do one task at a time. Use slower pace.  Keep to one conversation at a time.  Do not multitask.  Encourage independence as able.    Staging: Mild Cognitive Impairment.   Decision-making capacity: At this point, recommend shared between patient and wife.  In future, if capacity is of concern, would refer to neuropsychology for full evaluation  Caregiver Review: Caregiver stress concerns:  Concerned about memory loss and energy level.   Driving Safety: Yes, still drives. Wife reports no issues driving.   ACP Review: Recommend ACP documents be brought to PCP office or our next follow-up visit to scan in chart.   Remain active physically, mentally and socially  Pharmaceutical and non-pharmaceutical interventions discussed  Engage in cognitively challenging exercises such as crosswords and puzzles  Maintain chronic conditions under control  Repeat cognitive assessment in 6 months      Diagnostic  Studies  Review of bloodwork: ordered blood work needs to be completed.   Review of previous imaging   MRI brain without contrast (7/6/24)  IMPRESSION  Punctate acute to subacute infarcts involving the right caudate head, anterior centrum semiovale, and lateral right frontal cortex.  Chronic right corona radiata/basal ganglia and bilateral cerebellar lacunar infarcts.  Mild chronic microangiopathic ischemic changes.    Physical Finding Impacting Function   Timed Up and Go Test: 10 seconds   Fall Risk: Low   Activities of Daily Living: Independent   Instrumental Activities of Daily Living: Assisted    Encourage appropriate footwear at all times  Review fall risk prevention tips and adjust within the home environment as needed    Medications Reviewed   Medications are  appropriate for current conditions  Patient is  risk for increased side effects due to polypharmacy  7 medications  Patient is taking the following medications that meet Beer's Criteria to monitor/avoid:  Lexapro: Monitor for adverse signs/symptoms such as hyponatremia.   Check with PCP before using over the counter medications  Avoid over the counter medications that can affect cognition (e.g., Benadryl, Tylenol PM)   Avoid NSAIDs due to risk of GI bleed and renal impairment    Other Findings   Overall health  BMI: 29.39 kg/m2  Maintain well-balanced diet  Continue following with primary care physician regularly  Mobility  Baseline ambulation: Independent .  Fall type: No recent falls.   PT OT needs:   OT for fit to drive evaluation  Fall precautions  Mood  Baseline mood:  No reported depression/anxiety  GDS 1/15  Pharmalogical interventions:  Lexapro 10 mg po daily   Non pharmalogical interventions:  Encourage relaxation techniques, emotional support.    SHEA (obstructive sleep apnea)  patient recently diagnosed with Severe SHEA. Patient expressed that he is does not want to use C-PAP machine. Recommend compliance with treatment as ordered by sleep  medicine specialist.   Hearing loss  Patient has hearing aids that are no longer working. Recommend he go back to [facility] to have his hearing and hearing aids evaluated.   Nose Bleeds  Continue Afrin nasal spray as needed for nose bleeds and follow-up with PCP.   History of Gastrostomy  No reported issues. Appetite is good.   Heart failure  Patient euvolemic on examination. Recommend monitoring weight and fluid intake. Follow-up with Cardiology regularly.   Diabetes  . Recommend hearth healthy diet such as Mediterranean diet and regular exercise as tolerated.   Severe protein calorie malnutrition  History of, patient and wife report no recent weight loss. Appetite is good. No muscle loss or subcutaneous tissue wasting on examination.   Hyperlipidemia  LDL direct 42 on 08/06/24. Repeat lipid panel ordered by PCP.   Vitamin D deficiency  Vitamin D level low. Recommend repeat Vitamin D level.   Anxiety  Mood currently stable. GDS 1/15. Continue Lexapro 10 mg po daily. Monitor BMP with PCP.     Social /safety recommendations & considerations  Consider an Adult Day Program or [facility] for positive socialization, physical exercise, cognitive stimulation and family respite  Stay in touch with family and friends  Recommend review of fall risk prevention tips  Recommend use of fall precautions including fall alert device  Scam safety: do not answer suspicious mail, phone calls, email or text messages without having another person review for safety  Consider assistance for medication administration such as blister packaging or use of an automated pill dispenser  Recommend contacting your local [facility] Agency on Aging for possible eligible programs such as OPTIONS, Caregiver Support Program, or WAIVER    Long term care recommendations  Maintain updated advance directives and provide a copy to your primary care provider  Consider caregiver support groups and educational resources through the  Alzheimer's Association; access Alzheimer's Association 24/7 Helpline at 1-664.541.9097  [facility] does offer a monthly caregiver support group. If interested, please speak with a .  Utilize reorientation and redirection as needed (dependent on situation)  Review educational information provided    Patient and family verbalized understanding of above care plan and in agreement with plan. For care coordination purposes, this care plan will be shared with your primary care provider. With any questions, please contact our office at [phone].

## 2025-03-20 ENCOUNTER — OFFICE VISIT (OUTPATIENT)
Age: 80
End: 2025-03-20
Payer: COMMERCIAL

## 2025-03-20 VITALS
OXYGEN SATURATION: 98 % | HEART RATE: 60 BPM | DIASTOLIC BLOOD PRESSURE: 78 MMHG | WEIGHT: 196.4 LBS | SYSTOLIC BLOOD PRESSURE: 130 MMHG | TEMPERATURE: 98.8 F | RESPIRATION RATE: 16 BRPM | BODY MASS INDEX: 30.67 KG/M2

## 2025-03-20 DIAGNOSIS — G31.84 MILD COGNITIVE IMPAIRMENT: Primary | ICD-10-CM

## 2025-03-20 PROCEDURE — 99483 ASSMT & CARE PLN PT COG IMP: CPT | Performed by: NURSE PRACTITIONER

## 2025-03-20 NOTE — PROGRESS NOTES
[facility]  [address], [address]  [place] [address]  [phone]    Care Conference: Resources Provided    LSW provided the following resources, along with a copy of care plan:  [address] [place] [id number]    General Information  - 10 Ways to Love Your Brain  - Memory loss ladder  - Alzheimer's Association information sheet on Mild Cognitive Impairment diagnosis  - Packet with Alzheimer's Association information including: definition of dementia, communication tips for different stages, common behaviors and response strategies  -Vascular dementia packet    Caregiver Support  - Flyer for [facility] Virtual Caregiver Support Group (meeting 2x per month by Zoom)    Safety  - CDC fall prevention / home safety checklist    Community Resources  - [facility] Resource Guide (contains information about higher levels of care, homecare, etc.)

## 2025-03-21 ENCOUNTER — OFFICE VISIT (OUTPATIENT)
Dept: SPEECH THERAPY | Facility: CLINIC | Age: 80
End: 2025-03-21
Payer: COMMERCIAL

## 2025-03-21 DIAGNOSIS — R48.8 OTHER SYMBOLIC DYSFUNCTIONS: ICD-10-CM

## 2025-03-21 DIAGNOSIS — R41.841 COGNITIVE COMMUNICATION DEFICIT: ICD-10-CM

## 2025-03-21 DIAGNOSIS — G31.84 MILD COGNITIVE IMPAIRMENT: Primary | ICD-10-CM

## 2025-03-21 PROCEDURE — 92507 TX SP LANG VOICE COMM INDIV: CPT

## 2025-03-21 NOTE — PROGRESS NOTES
"Daily Speech Treatment Note    Today's date: 3/21/2025   Patient’s name: Shane Chau .  : 1945  MRN: [id number]  Safety measures: MCI  Referring provider: Marguerite Lock*    No diagnosis found.      Visit Tracking:  POC   Expires Auth Expiration Date ST Visit Limit   25 BOMN              Visit/Unit Tracking:  Auth Status Date 25  IE 25   Re-eval at 10th visit  Used 1 2 3 4     Remaining 9 8 7 6     Subjective/Behavioral:  Pt's spouse present throughout session. Pt participated well throughout session.     Objective/Assessment:  Reviewed home exercises with pt; pt completed with 100% accuracy. Discussed POC with patient; patient feels no further speech therapy is warranted and is being discharged at this time. He is encouraged to reach out if anything were to change.     Short Term Goals ( 25)     Patient will be educated on the use of internal and external memory aids and compensatory strategies to facilitate increased recall of routine, personal information, and recent events     Patient will complete auditory immediate and short term memory tasks to facilitate increased ability to retell narratives and recall information within functional living environment     Patient will complete complex auditory attention processing tasks (e.g., sentence unscramble, ranking numbers/words, etc.) to improve working memory with 80% accuracy     Patient will complete thought organization tasks (e.g., sequencing, deduction puzzles, etc.) with 80% accuracy to facilitate increased executive functioning, working memory, problem solving, and processing skills, to be achieved in 4-6 weeks.    Memory Mix-Up Game:  Pt participated in \"Memory Mix-Up\" task, where he was instructed to remember the pictures on each token by using a memory strategy (e.g., association).      (Immediate Recall):   Pt asked to recall each picture as presented on the tokens, " targeting immediate recall. Given a field of 4 tokens, pt independently recalled each picture on the tokens in 3/4 opportunities, using association. Accuracy increased to 4/4 opportunities given verbal cues. Given a field of 6 tokens, pt independently recalled each picture on the tokens in 5/6 opportunities, using association. Accuracy increased to 6/6 opportunities given verbal cues. Given a field of 8 tokens, pt independently recalled each picture on the tokens In 7/8 opportunities, using association. Accuracy increased to 8/8 opportunities given verbal cues.      (Delayed Recall):   Pt asked to recall each picture as presented on the tokens, targeting delayed recall. Given a field of 4 tokens, pt independently recalled each picture on the tokens in 3/4 opportunities, using association. Accuracy increased to 4/4 opportunities given verbal cues. Given a field of 6 tokens, pt independently recalled each picture on the tokens in 4/6 opportunities, using association; accuracy increased to 6/6 opportunities given verbal cues. Given a field of 8 tokens, pt independently recalled each picture on the tokens in 5/8 opportunities; accuracy increased to 8/8 opportunities given verbal cues.      Sodoku (Distraction Task):   Pt independently completed a Sodoku puzzle across 1 trial.     Plan:  Discharge

## 2025-03-24 ENCOUNTER — OFFICE VISIT (OUTPATIENT)
Dept: FAMILY MEDICINE CLINIC | Facility: CLINIC | Age: 80
End: 2025-03-24
Payer: COMMERCIAL

## 2025-03-24 VITALS
DIASTOLIC BLOOD PRESSURE: 70 MMHG | HEIGHT: 67 IN | SYSTOLIC BLOOD PRESSURE: 150 MMHG | BODY MASS INDEX: 30.53 KG/M2 | HEART RATE: 57 BPM | TEMPERATURE: 97.8 F | WEIGHT: 194.5 LBS | OXYGEN SATURATION: 96 % | RESPIRATION RATE: 18 BRPM

## 2025-03-24 DIAGNOSIS — I25.118 CORONARY ARTERY DISEASE OF NATIVE ARTERY OF NATIVE HEART WITH STABLE ANGINA PECTORIS (HCC): ICD-10-CM

## 2025-03-24 DIAGNOSIS — E08.49 DIABETES DUE TO UNDRL CONDITION W OTH DIABETIC NEURO COMP (HCC): Primary | ICD-10-CM

## 2025-03-24 DIAGNOSIS — I10 PRIMARY HYPERTENSION: ICD-10-CM

## 2025-03-24 DIAGNOSIS — R73.03 PREDIABETES: ICD-10-CM

## 2025-03-24 DIAGNOSIS — G47.33 OSA (OBSTRUCTIVE SLEEP APNEA): ICD-10-CM

## 2025-03-24 PROBLEM — R20.2 PARESTHESIA: Status: RESOLVED | Noted: 2023-06-21 | Resolved: 2025-03-24

## 2025-03-24 PROBLEM — Z86.73 HISTORY OF STROKE: Status: RESOLVED | Noted: 2024-07-09 | Resolved: 2025-03-24

## 2025-03-24 PROBLEM — L08.9 STERNAL WOUND INFECTION: Status: RESOLVED | Noted: 2024-09-05 | Resolved: 2025-03-24

## 2025-03-24 PROBLEM — T81.9XXA: Status: RESOLVED | Noted: 2024-07-10 | Resolved: 2025-03-24

## 2025-03-24 PROBLEM — Z23 NEED FOR PNEUMOCOCCAL VACCINATION: Status: RESOLVED | Noted: 2018-08-06 | Resolved: 2025-03-24

## 2025-03-24 PROBLEM — R07.81 RIB PAIN: Status: RESOLVED | Noted: 2023-09-08 | Resolved: 2025-03-24

## 2025-03-24 PROBLEM — M25.471 PAIN AND SWELLING OF RIGHT ANKLE: Status: RESOLVED | Noted: 2024-09-12 | Resolved: 2025-03-24

## 2025-03-24 PROBLEM — Z09 HOSPITAL DISCHARGE FOLLOW-UP: Status: RESOLVED | Noted: 2024-08-01 | Resolved: 2025-03-24

## 2025-03-24 PROBLEM — D62 POSTOPERATIVE ANEMIA DUE TO ACUTE BLOOD LOSS: Status: RESOLVED | Noted: 2024-07-10 | Resolved: 2025-03-24

## 2025-03-24 PROBLEM — E87.1 HYPONATREMIA: Status: RESOLVED | Noted: 2024-07-09 | Resolved: 2025-03-24

## 2025-03-24 PROBLEM — G47.31 CENTRAL SLEEP APNEA: Status: RESOLVED | Noted: 2025-03-03 | Resolved: 2025-03-24

## 2025-03-24 PROBLEM — M79.89 LEG SWELLING: Status: RESOLVED | Noted: 2022-05-10 | Resolved: 2025-03-24

## 2025-03-24 PROBLEM — T81.89XA SURGICAL WOUND, NON HEALING: Status: RESOLVED | Noted: 2024-07-22 | Resolved: 2025-03-24

## 2025-03-24 PROBLEM — M25.571 PAIN AND SWELLING OF RIGHT ANKLE: Status: RESOLVED | Noted: 2024-09-12 | Resolved: 2025-03-24

## 2025-03-24 PROBLEM — R31.29 MICROHEMATURIA: Status: RESOLVED | Noted: 2018-11-14 | Resolved: 2025-03-24

## 2025-03-24 PROBLEM — S21.101A STERNAL WOUND INFECTION: Status: RESOLVED | Noted: 2024-09-05 | Resolved: 2025-03-24

## 2025-03-24 LAB
CREAT UR-MCNC: 107.6 MG/DL
MICROALBUMIN UR-MCNC: 56.1 MG/L
MICROALBUMIN/CREAT 24H UR: 52 MG/G CREATININE (ref 0–30)
SL AMB POCT HEMOGLOBIN AIC: 5.6 (ref ?–6.5)

## 2025-03-24 PROCEDURE — 83036 HEMOGLOBIN GLYCOSYLATED A1C: CPT | Performed by: FAMILY MEDICINE

## 2025-03-24 PROCEDURE — G2211 COMPLEX E/M VISIT ADD ON: HCPCS | Performed by: FAMILY MEDICINE

## 2025-03-24 PROCEDURE — 82043 UR ALBUMIN QUANTITATIVE: CPT | Performed by: FAMILY MEDICINE

## 2025-03-24 PROCEDURE — 82570 ASSAY OF URINE CREATININE: CPT | Performed by: FAMILY MEDICINE

## 2025-03-24 PROCEDURE — 99214 OFFICE O/P EST MOD 30 MIN: CPT | Performed by: FAMILY MEDICINE

## 2025-03-24 NOTE — PROGRESS NOTES
FAMILY PRACTICE OFFICE VISIT       NAME: Shane Chau Sr.  AGE: 79 y.o. SEX: male       : 1945        MRN: [id number]    DATE: 3/24/2025  TIME: 4:05 PM    Assessment and Plan     Problem List Items Addressed This Visit       Coronary artery disease of native artery of native heart with stable angina pectoris (HCC)    Primary hypertension    Hypertension.  The patient's blood pressure is stable at this time and he will continue current regimen of medications         Prediabetes    Prediabetes.  A1c stable at 5.6.         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  We stressed the importance of patient trying to be compliant with nightly use of CPAP machine.  He will follow-up with sleep medicine physician tomorrow to discuss issues involved with tolerating use of machine                Chief Complaint     Chief Complaint   Patient presents with    Follow-up     6 MONTH        History of Present Illness     Patient in the office to review chronic medical condition.  He is accompanied by his wife.  He is having a challenging time tolerating a CPAP device at night which he has been trying to use for the past 2 weeks.  He does have an appointment tomorrow with his sleep medicine physician.  Patient is aware of risks involved with not using machine with increased risk of heart disease, strokes etc.  Patient does continue to have daytime drowsiness and fatigue easily.  He is still under care of cardiologist since his bypass surgery.  He denies any recent illness.  His A1c is stable at 5.6.        Review of Systems   Review of Systems   Constitutional:  Positive for fatigue.   HENT: Negative.     Eyes: Negative.    Respiratory: Negative.     Cardiovascular: Negative.    Gastrointestinal: Negative.    Genitourinary: Negative.     Musculoskeletal: Negative.    Skin: Negative.    Neurological: Negative.    Psychiatric/Behavioral: Negative.         Active Problem List     Patient Active Problem List   Diagnosis    Closed compression fracture of first lumbar vertebra ([facility])    Abdominal aortic aneurysm without rupture ([facility])    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[facility])    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[facility])    Asymptomatic bilateral carotid artery stenosis    Coronary artery disease of native artery of native heart with stable angina pectoris ([facility])    Hyperlipidemia    Primary hypertension    Transient cerebral ischemic attack    Hematuria    Erectile dysfunction    S/P CABG x 3    Localized primary osteoarthritis of lower leg, unspecified laterality    CKD (chronic kidney disease) stage 2, GFR 60-89 ml/min    Stroke-like symptoms    PAD (peripheral artery disease) ([facility])    Stenosis of left vertebral artery    Prediabetes    Mild cognitive impairment    Anemia    Severe protein-calorie malnutrition ([facility])    Anxiety    Gastroesophageal reflux disease without esophagitis    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 ([facility])    SHEA (obstructive sleep apnea)    Gastrostomy status ([facility])    Heart failure, unspecified HF chronicity, unspecified heart failure type ([facility])    Vitamin D deficiency       Past Medical History:  Past Medical History:   Diagnosis Date    AAA (abdominal aortic aneurysm) ([facility])     s/p open repair    BPH (benign prostatic hyperplasia)     CAD (coronary artery disease)     Cholelithiasis     Former tobacco use     History of MI (myocardial infarction)     History of TIA (transient ischemic attack)     Plavix in past, now on Brilinta    Hyperlipidemia     Hypertension     Insomnia     Obesity        Past Surgical History:  Past Surgical History:   Procedure Laterality Date    ABDOMINAL AORTIC ANEURYSM REPAIR  06/07/2011    Dr. Yarbrough 6/7/11    APPENDECTOMY      CARDIAC CATHETERIZATION Left 06/03/2024    Procedure: Cardiac Left  Heart Cath;  Surgeon: Pk Blanco DO;  Location: [facility];  Service: Cardiology    CARDIAC CATHETERIZATION  06/03/2024    Procedure: Cardiac catheterization;  Surgeon: Pk Blanco DO;  [facility];  Service: Cardiology    CARDIAC CATHETERIZATION N/A 06/03/2024    Procedure: Cardiac Coronary Angiogram;  Surgeon: Pk Blanco DO;  [facility]: [facility];  Service: Cardiology    CATARACT EXTRACTION Right 11/2018    CHOLECYSTECTOMY LAPAROSCOPIC      COLONOSCOPY  11/2006    CORONARY ANGIOPLASTY      3 STENTS INSERTED    HARVEST VEIN Left 6/20/2024    Procedure: HARVEST VEIN ENDOSCOPIC (EVH);  Surgeon: KALPANA Amos MD;  Location: [facility];  Service: Cardiac Surgery    IR ASPIRATION ONLY  9/5/2024    ID CORONARY ARTERY BYP W/VEIN & ARTERY GRAFT 3 VEIN N/A 6/20/2024    Procedure: CORONARY ARTERY BYPASS GRAFT (CABG) X3 VESSELS, LIMA - LAD, LEFT LEG EVH/SVG - PDA AND OM1, W/ DEDRICK;  Surgeon: KALPANA Amos MD;  Location: [facility];  Service: Cardiac Surgery    ID LAPS SURG CHOLECYSTECTOMY W/CHOLANGIOGRAPHY N/A 09/08/2016    Procedure: CHOLECYSTECTOMY LAPAROSCOPIC with intra-op cholangiogram ;  Surgeon: Prasanth Verdin MD;  Location: [facility];  Service: General    ID STERNAL DEBRIDEMENT N/A 9/9/2024    Procedure: DEBRIDEMENT STERNAL WOUND (WASH OUT);  Surgeon: KALPANA Amos MD;  [facility];  Service: Cardiac Surgery    ID STERNAL DEBRIDEMENT N/A 9/11/2024    Procedure: DEBRIDEMENT STERNAL WOUND (WASH OUT) VAC CHANGE;  Surgeon: KALPANA Amos MD;  Location: [facility];  Service: Cardiac Surgery       Family History:  Family History   Problem Relation Age of Onset    Heart disease Mother     Hypertension Mother         Benign Essential     Stroke Mother         Stroke     Heart disease Father     Coronary artery disease Brother     Diabetes Brother         mellitus     Heart disease Brother     Aortic aneurysm  Brother     Coronary artery disease Brother     Sudden death Brother     Coronary artery disease Brother     Sudden death Brother     Sudden death Brother     Coronary artery disease Brother     Heart disease Family        Social History:  Social History     Socioeconomic History    Marital status: /Civil Union     Spouse name: Not on file    Number of children: Not on file    Years of education: Not on file    Highest education level: Not on file   Occupational History    Occupation: Retired    Tobacco Use    Smoking status: Former     Current packs/day: 0.00     Average packs/day: 0.5 packs/day for 30.0 years (15.0 ttl pk-yrs)     Types: Cigarettes     Start date: 3/10/1956     Quit date: 3/10/1986     Years since quittin.0    Smokeless tobacco: Never    Tobacco comments:     Quit  about 25 years x1ppd   Vaping Use    Vaping status: Never Used   Substance and Sexual Activity    Alcohol use: Not Currently    Drug use: No    Sexual activity: Not Currently     Partners: Female   Other Topics Concern    Not on file   Social History Narrative    Not on file     Social Drivers of Health     Financial Resource Strain: Low Risk  (2023)    Overall Financial Resource Strain (CARDIA)     Difficulty of Paying Living Expenses: Not very hard   Food Insecurity: No Food Insecurity (2024)    Nursing - Inadequate Food Risk Classification     Worried About Running Out of Food in the Last Year: Never true     Ran Out of Food in the Last Year: Never true     Ran Out of Food in the Last Year: Not on file   Transportation Needs: No Transportation Needs (2024)    OASIS : Transportation     Lack of Transportation (Medical): No     Lack of Transportation (Non-Medical): No     Patient Unable or Declines to Respond: No   Physical Activity: Not on file   Stress: Not on file   Social Connections: Unknown (2024)    Received from [facility]     How often do you feel lonely or  isolated from those around you? (Adult - for ages 18 years and over): Not on file   Intimate Partner Violence: Not on file   Housing Stability: Low Risk  (9/6/2024)    Housing Stability Vital Sign     Unable to Pay for Housing in the Last Year: No     Number of Times Moved in the Last Year: 0     Homeless in the Last Year: No       Objective     Vitals:    03/24/25 1423   BP: 150/70   Pulse: 57   Resp: 18   Temp: 97.8 °F (36.6 °C)   SpO2: 96%     Wt Readings from Last 3 Encounters:   03/24/25 88.2 kg (194 lb 8 oz)   03/20/25 89.1 kg (196 lb 6.4 oz)   02/24/25 85.4 kg (188 lb 3.2 oz)       Physical Exam  Constitutional:       General: He is not in acute distress.     Appearance: Normal appearance. He is not ill-appearing.   HENT:      Head: Normocephalic and atraumatic.   Eyes:      General:         Right eye: No discharge.         Left eye: No discharge.      Extraocular Movements: Extraocular movements intact.      Conjunctiva/sclera: Conjunctivae normal.      Pupils: Pupils are equal, round, and reactive to light.   Neck:      Vascular: No carotid bruit.   Cardiovascular:      Rate and Rhythm: Normal rate and regular rhythm.      Heart sounds: Normal heart sounds. No murmur heard.  Pulmonary:      Effort: Pulmonary effort is normal.      Breath sounds: Normal breath sounds. No wheezing, rhonchi or rales.   Musculoskeletal:      Right lower leg: No edema.      Left lower leg: No edema.   Lymphadenopathy:      Cervical: No cervical adenopathy.   Skin:     Findings: No rash.   Neurological:      General: No focal deficit present.      Mental Status: He is alert and oriented to person, place, and time.      Cranial Nerves: No cranial nerve deficit.   Psychiatric:         Mood and Affect: Mood normal.         Behavior: Behavior normal.         Thought Content: Thought content normal.         Judgment: Judgment normal.         Pertinent Laboratory/Diagnostic Studies:  Lab Results   Component Value Date    GLUCOSE 140  "06/20/2024    BUN 20 12/11/2024    CREATININE 1.10 12/11/2024    CALCIUM 9.8 12/11/2024     12/03/2015    K 4.1 12/11/2024    CO2 28 12/11/2024     12/11/2024     Lab Results   Component Value Date    ALT 15 12/11/2024    AST 16 12/11/2024    ALKPHOS 64 12/11/2024    BILITOT 0.65 12/02/2015       Lab Results   Component Value Date    WBC 6.98 12/11/2024    HGB 12.0 12/11/2024    HCT 36.5 12/11/2024    MCV 96 12/11/2024     12/11/2024       No results found for: \"TSH\"    Lab Results   Component Value Date    CHOL 138 08/20/2015     Lab Results   Component Value Date    TRIG 103 07/04/2024     Lab Results   Component Value Date    HDL 12 (L) 07/04/2024     Lab Results   Component Value Date    LDLCALC 10 07/04/2024     Lab Results   Component Value Date    HGBA1C 5.6 03/24/2025       Results for orders placed or performed in visit on 03/24/25   POCT hemoglobin A1c   Result Value Ref Range    Hemoglobin A1C 5.6 <=6.5       Orders Placed This Encounter   Procedures    Albumin / creatinine urine ratio    POCT hemoglobin A1c       ALLERGIES:  No Known Allergies    Current Medications     Current Outpatient Medications   Medication Sig Dispense Refill    acetaminophen (TYLENOL) 325 mg tablet Take 2 tablets (650 mg total) by mouth every 6 (six) hours as needed for mild pain, headaches or fever      amLODIPine (NORVASC) 2.5 mg tablet Take 1 tablet (2.5 mg total) by mouth daily 90 tablet 1    aspirin (ECOTRIN LOW STRENGTH) 81 mg EC tablet Take 1 tablet (81 mg total) by mouth daily      escitalopram (LEXAPRO) 10 mg tablet Take 1 tablet (10 mg total) by mouth daily 90 tablet 1    metoprolol tartrate (LOPRESSOR) 50 mg tablet TAKE ONE TABLET BY MOUTH TWICE DAILY 180 tablet 1    pantoprazole (PROTONIX) 40 mg tablet Take 1 tablet (40 mg total) by mouth daily in the early morning 30 tablet 5    rosuvastatin (CRESTOR) 40 MG tablet Take 1 tablet (40 mg total) by mouth daily 90 tablet 3    ticagrelor (BRILINTA) 90 MG " Take 1 tablet (90 mg total) by mouth every 12 (twelve) hours 180 tablet 3    Cyanocobalamin (B-12) 1000 MCG SUBL Place 1 tablet (1,000 mcg total) under the tongue in the morning 90 tablet 0    melatonin 3 mg Take 2 tablets (6 mg total) by mouth daily at bedtime (Patient not taking: Reported on 10/30/2024)       No current facility-administered medications for this visit.         Health Maintenance     Health Maintenance   Topic Date Due    Kidney Health Evaluation: Albumin/Creatinine Ratio  Never done    Zoster Vaccine (1 of 2) Never done    Pneumococcal Vaccine: 65+ Years (2 of 2 - PCV) 08/06/2019    COVID-19 Vaccine (4 - 2024-25 season) 09/01/2024    [facility] PLAN OF CARE  03/27/2025    Diabetic Foot Exam  04/24/2025 (Originally 9/29/1955)    Diabetic Eye Exam  03/24/2026 (Originally 9/29/1955)    Medicare Annual Wellness Visit (AWV)  09/04/2025    HEMOGLOBIN A1C  09/24/2025    Kidney Health Evaluation: GFR  12/11/2025    Depression Screening  02/20/2026    Fall Risk  02/25/2026    Hepatitis C Screening  Completed    RSV Vaccine for Pregnant Patients and Patients Age 60+ Years  Completed    Influenza Vaccine  Completed    Meningococcal B Vaccine  Aged Out    RSV Vaccine age 0-20 Months  Aged Out    HIB Vaccine  Aged Out    IPV Vaccine  Aged Out    Hepatitis A Vaccine  Aged Out    Meningococcal ACWY Vaccine  Aged Out    HPV Vaccine  Aged Out    Colorectal Cancer Screening  Discontinued     Immunization History   Administered Date(s) Administered    COVID-19 MODERNA VACC 0.5 ML IM 02/08/2021, 03/08/2021, 11/06/2021    INFLUENZA 10/15/2016, 11/03/2018, 11/03/2018, 10/24/2020, 10/16/2021, 10/15/2022, 10/07/2023    Influenza Split High Dose Preservative Free IM 11/24/2019, 10/19/2024    Influenza, high dose seasonal 0.7 mL 08/29/2020    Influenza, seasonal, injectable 09/28/2011, 10/06/2014, 11/05/2017    Pneumococcal Polysaccharide PPV23 08/06/2018    Respiratory Syncytial Virus Vaccine (Recombinant, Adjuvanted) 05/18/2024     Tdap 04/17/2017       Sherwin Mantilla MD    I spent 30 minutes with this patient of which greater than 50% was spent counseling or reviewing chart

## 2025-03-24 NOTE — ASSESSMENT & PLAN NOTE
Sleep apnea.  We stressed the importance of patient trying to be compliant with nightly use of CPAP machine.  He will follow-up with sleep medicine physician tomorrow to discuss issues involved with tolerating use of machine

## 2025-03-25 ENCOUNTER — OFFICE VISIT (OUTPATIENT)
Dept: SLEEP CENTER | Facility: CLINIC | Age: 80
End: 2025-03-25
Payer: COMMERCIAL

## 2025-03-25 ENCOUNTER — TELEPHONE (OUTPATIENT)
Age: 80
End: 2025-03-25

## 2025-03-25 VITALS
SYSTOLIC BLOOD PRESSURE: 150 MMHG | WEIGHT: 194 LBS | DIASTOLIC BLOOD PRESSURE: 70 MMHG | HEIGHT: 67 IN | BODY MASS INDEX: 30.45 KG/M2

## 2025-03-25 DIAGNOSIS — G47.33 OSA (OBSTRUCTIVE SLEEP APNEA): Primary | ICD-10-CM

## 2025-03-25 DIAGNOSIS — Z78.9 DIFFICULTY USING CONTINUOUS POSITIVE AIRWAY PRESSURE (CPAP) DEVICE: ICD-10-CM

## 2025-03-25 PROCEDURE — G2211 COMPLEX E/M VISIT ADD ON: HCPCS | Performed by: NURSE PRACTITIONER

## 2025-03-25 PROCEDURE — 99214 OFFICE O/P EST MOD 30 MIN: CPT | Performed by: NURSE PRACTITIONER

## 2025-03-25 NOTE — PROGRESS NOTES
"Name: Shane Chau .      : 1945      MRN: [id number]  Encounter Provider: SANTHOSH Moya  Encounter Date: 3/25/2025   [facility]  :  Assessment & Plan  SHEA (obstructive sleep apnea)  He received CPAP equipment on 3/12/25.  He has been trying to use it, but every time he puts the mask on, he feels like it is \"blowing too much air\".  Large air leaks are noted.  It is unclear if he is tightening the mask appropriately, but feels he is.  Data is limited.  I reviewed the results of both sleep studies with the patient and his wife, stressing the importance of treating the severe sleep apnea.  Complications associated with untreated sleep apnea were again reviewed.    A mask fitting was arranged today  A pressure change was ordered today.  Patient and his wife were advised to call or message if he is having any ongoing difficulty using the equipment.  Follow up in May with Dr. East, as planned.    Orders:    [facility] Pressure Change    Difficulty using continuous positive airway pressure (CPAP) device    Orders:    [facility] Pressure Change        History of Present Illness   79 year old male who presents with his wife for follow up of severe obstructive sleep apnea.  He has a PMHx of ischemic cardiomyopathy, CAD s/p CABG complicated by basal ganglia strokes, sternal dehiscence presents for evaluation of excessive daytime sleepiness and severe sleep apnea.  He had changes in his sleep patterns and increase in daytime sleepiness after having cardiac surgery.  A diagnostic sleep study, completed on 25 showed severe sleep apnea - AHI 73, with waveforms in the PSG appearing highly suspicious for CSA/[place]yne Ward.  A CPAP titration study was ordered and completed.  He began use if CPAP equipment and returns urgently today, due to difficulty using the equipment.          Patient accompanied at this vist by : wife        Sitting and reading: Slight " "chance of dozing  Watching TV: Slight chance of dozing  Sitting, inactive in a public place (e.g. a theatre or a meeting): Would never doze  As a passenger in a car for an hour without a break: Slight chance of dozing  Lying down to rest in the afternoon when circumstances permit: Moderate chance of dozing  Sitting and talking to someone: Slight chance of dozing  Sitting quietly after a lunch without alcohol: Slight chance of dozing  In a car, while stopped for a few minutes in traffic: Moderate chance of dozing  Total score: 9     Review of Systems  Pertinent positives/negatives included in HPI and also as noted: fatigue, EDS    Current Outpatient Medications on File Prior to Visit   Medication Sig Dispense Refill    acetaminophen (TYLENOL) 325 mg tablet Take 2 tablets (650 mg total) by mouth every 6 (six) hours as needed for mild pain, headaches or fever      amLODIPine (NORVASC) 2.5 mg tablet Take 1 tablet (2.5 mg total) by mouth daily 90 tablet 1    aspirin (ECOTRIN LOW STRENGTH) 81 mg EC tablet Take 1 tablet (81 mg total) by mouth daily      escitalopram (LEXAPRO) 10 mg tablet Take 1 tablet (10 mg total) by mouth daily 90 tablet 1    metoprolol tartrate (LOPRESSOR) 50 mg tablet TAKE ONE TABLET BY MOUTH TWICE DAILY 180 tablet 1    pantoprazole (PROTONIX) 40 mg tablet Take 1 tablet (40 mg total) by mouth daily in the early morning 30 tablet 5    rosuvastatin (CRESTOR) 40 MG tablet Take 1 tablet (40 mg total) by mouth daily 90 tablet 3    ticagrelor (BRILINTA) 90 MG Take 1 tablet (90 mg total) by mouth every 12 (twelve) hours 180 tablet 3    Cyanocobalamin (B-12) 1000 MCG SUBL Place 1 tablet (1,000 mcg total) under the tongue in the morning 90 tablet 0    melatonin 3 mg Take 2 tablets (6 mg total) by mouth daily at bedtime (Patient not taking: Reported on 10/30/2024)       No current facility-administered medications on file prior to visit.      Objective   /70 Comment: taken on 3/24/25  Ht 5' 7\" (1.702 m)  "  Wt 88 kg (194 lb)   BMI 30.38 kg/m²        Physical Exam    Constitutional: Alert, cooperative, no distress, appears stated age  Skin: Warm, dry, no rashes noted  Lungs: Clear to auscultation bilaterally, respirations unlabored  Heart: normal rate and regular rhythm, S1/2 normal, no murmur noted, no rub or gallop  Extremities: Normal, no digital clubbing, no pedal edema  Neuro: No focal deficits noted    Data  Lab Results   Component Value Date    HGB 12.0 12/11/2024    HCT 36.5 12/11/2024    MCV 96 12/11/2024      Lab Results   Component Value Date    GLUCOSE 140 06/20/2024    CALCIUM 9.8 12/11/2024     12/03/2015    K 4.1 12/11/2024    CO2 28 12/11/2024     12/11/2024    BUN 20 12/11/2024    CREATININE 1.10 12/11/2024     Lab Results   Component Value Date    IRON 56 12/11/2024    TIBC 286 12/11/2024    FERRITIN 236 12/11/2024     Lab Results   Component Value Date    AST 16 12/11/2024    ALT 15 12/11/2024       Administrative Statements   I have spent a total time of 25 minutes in caring for this patient on the day of the visit/encounter including Risks and benefits of tx options, Instructions for management, Patient and family education, Importance of tx compliance, Risk factor reductions, Impressions, Counseling / Coordination of care, Documenting in the medical record, and Communicating with other healthcare professionals .

## 2025-03-25 NOTE — TELEPHONE ENCOUNTER
Patient is here today for a mask fitting. Patient was fitted with the res med air touch f-20 med. I went over cleaning and resupply with them.

## 2025-03-26 PROBLEM — Z78.9 DIFFICULTY USING CONTINUOUS POSITIVE AIRWAY PRESSURE (CPAP) DEVICE: Status: ACTIVE | Noted: 2025-03-26

## 2025-03-26 NOTE — ASSESSMENT & PLAN NOTE
"He received CPAP equipment on 3/12/25.  He has been trying to use it, but every time he puts the mask on, he feels like it is \"blowing too much air\".  Large air leaks are noted.  It is unclear if he is tightening the mask appropriately, but feels he is.  Data is limited.  I reviewed the results of both sleep studies with the patient and his wife, stressing the importance of treating the severe sleep apnea.  Complications associated with untreated sleep apnea were again reviewed.    A mask fitting was arranged today  A pressure change was ordered today.  Patient and his wife were advised to call or message if he is having any ongoing difficulty using the equipment.  Follow up in May with Dr. East, as planned.    Orders:    [facility] Pressure Change    "

## 2025-03-27 ENCOUNTER — TELEPHONE (OUTPATIENT)
Dept: SLEEP CENTER | Facility: CLINIC | Age: 80
End: 2025-03-27

## 2025-03-28 LAB
DME PARACHUTE DELIVERY DATE ACTUAL: NORMAL
DME PARACHUTE DELIVERY DATE REQUESTED: NORMAL
DME PARACHUTE ITEM DESCRIPTION: NORMAL
DME PARACHUTE ORDER STATUS: NORMAL
DME PARACHUTE SUPPLIER NAME: NORMAL
DME PARACHUTE SUPPLIER PHONE: NORMAL

## 2025-04-01 ENCOUNTER — OFFICE VISIT (OUTPATIENT)
Dept: CARDIOLOGY CLINIC | Facility: CLINIC | Age: 80
End: 2025-04-01
Payer: COMMERCIAL

## 2025-04-01 VITALS
HEART RATE: 56 BPM | BODY MASS INDEX: 33.27 KG/M2 | WEIGHT: 199.7 LBS | HEIGHT: 65 IN | OXYGEN SATURATION: 96 % | SYSTOLIC BLOOD PRESSURE: 126 MMHG | DIASTOLIC BLOOD PRESSURE: 70 MMHG

## 2025-04-01 DIAGNOSIS — G47.33 OSA (OBSTRUCTIVE SLEEP APNEA): ICD-10-CM

## 2025-04-01 DIAGNOSIS — Z95.1 S/P CABG X 3: ICD-10-CM

## 2025-04-01 DIAGNOSIS — R04.0 EPISTAXIS, RECURRENT: Primary | ICD-10-CM

## 2025-04-01 DIAGNOSIS — E78.49 OTHER HYPERLIPIDEMIA: ICD-10-CM

## 2025-04-01 DIAGNOSIS — I10 PRIMARY HYPERTENSION: ICD-10-CM

## 2025-04-01 DIAGNOSIS — Z86.73 HISTORY OF STROKE: ICD-10-CM

## 2025-04-01 PROCEDURE — 99214 OFFICE O/P EST MOD 30 MIN: CPT | Performed by: NURSE PRACTITIONER

## 2025-04-01 NOTE — ASSESSMENT & PLAN NOTE
6/2024 sp CABG x 3 LIMA to LAD, SVG to PDA, SVG to OM1 with complication of  sternal wound infection   Denies CP  Continue on  Brilinta 90mg Q12 hours and Crestor 40mg daily, Amlodipine 2.5mg daily, Metoprolol Tartrate 50mg Q12 hour  Heart healthy diet  Instructed to walk daily

## 2025-04-01 NOTE — ASSESSMENT & PLAN NOTE
BP LUE sitting 118/60  Continue on Amlodipine 2.5mg daily   Metoprolol Tartrate 50mg Q12 hours  DASH diet

## 2025-04-01 NOTE — PATIENT INSTRUCTIONS
Walk daily work up to 30 minutes a day  One piece of Candy once a week  Decrease daily sweets   Heart healthy   Soduko daily   Lipid panel at the end of April

## 2025-04-01 NOTE — ASSESSMENT & PLAN NOTE
Recently started to use CPAP during the night.  It appears he is having difficulty getting use to the mask

## 2025-04-01 NOTE — ASSESSMENT & PLAN NOTE
7/02/24 TC 43, ,  HDL 12, LDL 10   Continue on Crestor 40mg daily   Heart healthy diet  Fasting lipid panel in near futue

## 2025-04-29 DIAGNOSIS — L08.9 STERNAL WOUND INFECTION: ICD-10-CM

## 2025-04-29 DIAGNOSIS — S21.101A STERNAL WOUND INFECTION: ICD-10-CM

## 2025-04-29 NOTE — TELEPHONE ENCOUNTER
Reason for call:   [x] Refill   [] Prior Auth  [] Other:     Office:   [] PCP/Provider -   [x] Specialty/Provider - Jose Juares MD / [facility]    Medication: amLODIPine (NORVASC) 2.5 mg tablet / Take 1 tablet (2.5 mg total) by mouth daily     Pharmacy: [address]   Does the patient have enough for 3 days?   [x] Yes   [] No - Send as HP to POD

## 2025-04-30 RX ORDER — AMLODIPINE BESYLATE 2.5 MG/1
2.5 TABLET ORAL DAILY
Qty: 90 TABLET | Refills: 1 | Status: SHIPPED | OUTPATIENT
Start: 2025-04-30

## 2025-05-06 ENCOUNTER — OFFICE VISIT (OUTPATIENT)
Dept: SLEEP CENTER | Facility: CLINIC | Age: 80
End: 2025-05-06
Payer: COMMERCIAL

## 2025-05-06 VITALS
OXYGEN SATURATION: 97 % | BODY MASS INDEX: 33.82 KG/M2 | HEIGHT: 65 IN | SYSTOLIC BLOOD PRESSURE: 120 MMHG | DIASTOLIC BLOOD PRESSURE: 70 MMHG | HEART RATE: 63 BPM | WEIGHT: 203 LBS

## 2025-05-06 DIAGNOSIS — E55.9 VITAMIN D DEFICIENCY: ICD-10-CM

## 2025-05-06 DIAGNOSIS — R53.83 FATIGUE, UNSPECIFIED TYPE: ICD-10-CM

## 2025-05-06 DIAGNOSIS — G47.33 OSA (OBSTRUCTIVE SLEEP APNEA): Primary | ICD-10-CM

## 2025-05-06 DIAGNOSIS — I50.9 HEART FAILURE, UNSPECIFIED HF CHRONICITY, UNSPECIFIED HEART FAILURE TYPE (HCC): ICD-10-CM

## 2025-05-06 DIAGNOSIS — D64.9 ANEMIA, UNSPECIFIED TYPE: ICD-10-CM

## 2025-05-06 DIAGNOSIS — I10 PRIMARY HYPERTENSION: ICD-10-CM

## 2025-05-06 DIAGNOSIS — E53.8 VITAMIN B12 DEFICIENCY: ICD-10-CM

## 2025-05-06 PROCEDURE — 99214 OFFICE O/P EST MOD 30 MIN: CPT | Performed by: NURSE PRACTITIONER

## 2025-05-06 PROCEDURE — G2211 COMPLEX E/M VISIT ADD ON: HCPCS | Performed by: NURSE PRACTITIONER

## 2025-05-06 NOTE — ASSESSMENT & PLAN NOTE
Hypertension - Blood pressure is well controlled today.  We reviewed the association between untreated obstructive sleep apnea and the increased risk for hypertension. Patient to continue on prescribed anti-hypertensive therapy (norvasc, lopressor) and follow up with PCP/cardiology for continuity of care.

## 2025-05-06 NOTE — PROGRESS NOTES
Name: Shane Chau .      : 1945      MRN: [id number]  Encounter Provider: SANTHOSH Moya  Encounter Date: 2025   Encounter department: [facility] SLEEP MEDICINE [place]  :  Assessment & Plan  SHEA (obstructive sleep apnea)  Patient has been working on increasing use of the CPAP equipment.  Since pressure setting was decreased and he has tried different masks, he has been able to increase use.  He is still not at goal for hours of use.  He feels the starting setting is comfortable.  He does not feel the pressure increases to feel overwhelming.  Some nights, he is able to wear the mask for 8-10 hours.  Other nights, he wakes up feeling claustrophobic.  He does not like wearing the full face mask.    He has low energy levels.  His wife reports that more recently, he seems more fatigued and is sleeping more.  She reports that she has needed to take over many of the responsibilities at home, due to his lack of energy.  We discussed that poor sleep/untreated sleep apnea can cause daytime sleepiness and decrease in energy.  He was encouraged to use the CPAP equipment any time he is planning to sleep, including when napping.        Current PAP Compliance Data:  Average usage:  He has been able to use the equipment 100% of all days recorded.  Average usage was 4 or more hours 50% of all days recorded.  Average hours used:  4 hours and 56 minutes  Average time in an air leak:  varies, some days minimal, some days large   Average pressure:  7.4 cm of water pressure  Residual AHI:  10.6/hour, including 1.2 OA, 0.5 CA, 7.4 hypopneas, 1.4 unknown    The patient feels they benefit from the use of PAP equipment and would like to continue PAP therapy.    Response to treatment has been good.    The patient is not at goal for hours of PAP use and not at goal for effectiveness of treatment.  A pressure change was ordered today to APAP 4-10cm.  A mask fitting was arranged today, with plan for a nasal cradle  mask with a chin strap.  A prescription for supplies has been provided to last for the next year.  The patient was advised to continue to clean the equipment appropriately, as discussed and to change supplies regularly.    He will continue using this equipment at the settings noted above for the next 6 months.  At that timehe will then return for a routine follow-up evaluation. I have asked the patient to contact the [facility] if he encounters any difficulties prior to that time.  Orders:    PAP DME Resupply/Reorder    Resmed DME Pressure Change    Primary hypertension  Hypertension - Blood pressure is well controlled today.  We reviewed the association between untreated obstructive sleep apnea and the increased risk for hypertension. Patient to continue on prescribed anti-hypertensive therapy (norvasc, lopressor) and follow up with PCP/cardiology for continuity of care.          Heart failure, unspecified HF chronicity, unspecified heart failure type (HCC)  Wt Readings from Last 3 Encounters:   05/06/25 92.1 kg (203 lb)   04/01/25 90.6 kg (199 lb 11.2 oz)   03/25/25 88 kg (194 lb)     We discussed the association between heart failure and sleep apnea.  The importance of having good compliance with use of PAP therapy to prevent worsening of heart failure or associated complications, was discussed.  Patient was advised to contact the sleep disorders center to report any difficulty in using the equipment prior to the next follow-up visit.  The patient will continue their current medical regimen and follow-up with their PCP or cardiologist regarding the heart failure, as usual.           Fatigue, unspecified type  He has low energy levels.  His wife reports that more recently, he seems more fatigued and is sleeping more.  She reports that she has needed to take over many of the responsibilities at home, due to his lack of energy.  We discussed that poor sleep/untreated sleep apnea can cause daytime  sleepiness and decrease in energy.  He was encouraged to use the CPAP equipment any time he is planning to sleep, including when napping.    As noted, the change in energy level and fatigue seems recent to his wife.  Severe SHEA was likely longstanding.  Other causes for increase in fatigue need to be ruled out as well.  Past history of anemia noted, likely related to blood loss after surgery.  Lab testing ordered for further evaluation.  TSH in December was normal.  Vitamin D level ordered.          Anemia, unspecified type  History of anemia with past result in normal range.  Repeat CBC, iron panel and vitamin B12 ordered, due to reports of fatigue.    Orders:    CBC and differential; Future    Iron Panel (Includes Ferritin, Iron Sat%, Iron, and TIBC); Future    Vitamin B12; Future    Vitamin D deficiency  Low vitamin D level may be contributing to decrease in energy levels.  History of vitamin D deficiency.  Repeat vitamin D level.  Orders:    Vitamin D 25 hydroxy; Future    Vitamin B12 deficiency  Past B12 was as low as 335.  Lab ordered for re-evaluation.  Orders:    Vitamin B12; Future        History of Present Illness   Shane Kimalis Orozco Is a 79 year old male who presents with his wife for follow up of severe obstructive sleep apnea.  He has a PMHx of ischemic cardiomyopathy, CAD s/p CABG complicated by basal ganglia strokes and severe sleep apnea.  He had changes in his sleep patterns and increase in daytime sleepiness after having cardiac surgery.    A diagnostic sleep study, completed on 1/9/25 showed severe sleep apnea - AHI 73, with waveforms in the PSG appearing highly suspicious for CSA/[place].  A CPAP titration study was ordered and completed.  He began use of CPAP equipment in March 2025 and returns to review compliance and effectiveness of treatment.           Sitting and reading: Would never doze  Watching TV: Moderate chance of dozing  Sitting, inactive in a public place (e.g. a  "theatre or a meeting): Slight chance of dozing  As a passenger in a car for an hour without a break: High chance of dozing  Lying down to rest in the afternoon when circumstances permit: High chance of dozing  Sitting and talking to someone: Moderate chance of dozing  Sitting quietly after a lunch without alcohol: High chance of dozing  In a car, while stopped for a few minutes in traffic: Would never doze  Total score: 14     Review of Systems  Pertinent positives/negatives included in HPI and also as noted: fatigue    Medical History Reviewed by provider this encounter:  Tobacco  Allergies  Meds  Problems  Med Hx  Surg Hx  Fam Hx     .  Current Outpatient Medications on File Prior to Visit   Medication Sig Dispense Refill    acetaminophen (TYLENOL) 325 mg tablet Take 2 tablets (650 mg total) by mouth every 6 (six) hours as needed for mild pain, headaches or fever      amLODIPine (NORVASC) 2.5 mg tablet Take 1 tablet (2.5 mg total) by mouth daily 90 tablet 1    aspirin (ECOTRIN LOW STRENGTH) 81 mg EC tablet Take 1 tablet (81 mg total) by mouth daily      escitalopram (LEXAPRO) 10 mg tablet Take 1 tablet (10 mg total) by mouth daily 90 tablet 1    metoprolol tartrate (LOPRESSOR) 50 mg tablet TAKE ONE TABLET BY MOUTH TWICE DAILY 180 tablet 1    pantoprazole (PROTONIX) 40 mg tablet Take 1 tablet (40 mg total) by mouth daily in the early morning 30 tablet 5    rosuvastatin (CRESTOR) 40 MG tablet Take 1 tablet (40 mg total) by mouth daily 90 tablet 3    ticagrelor (BRILINTA) 90 MG Take 1 tablet (90 mg total) by mouth every 12 (twelve) hours 180 tablet 3    Cyanocobalamin (B-12) 1000 MCG SUBL Place 1 tablet (1,000 mcg total) under the tongue in the morning 90 tablet 0    melatonin 3 mg Take 2 tablets (6 mg total) by mouth daily at bedtime (Patient not taking: Reported on 5/6/2025)       No current facility-administered medications on file prior to visit.      Objective   /70   Pulse 63   Ht 5' 5\" (1.651 m) "   Wt 92.1 kg (203 lb)   SpO2 97%   BMI 33.78 kg/m²        Physical Exam    Constitutional: Alert, cooperative, no distress, appears stated age, obese  Skin: Warm, dry, no rashes noted  Lungs: Clear to auscultation bilaterally, respirations unlabored  Heart: Normal rate and regular rhythm, S1/2 normal, no murmur noted, no rub or gallop  Extremities: Normal, no digital clubbing, no pedal edema  Neuro: No focal deficits noted    Data  Lab Results   Component Value Date    HGB 12.0 12/11/2024    HCT 36.5 12/11/2024    MCV 96 12/11/2024      Lab Results   Component Value Date    GLUCOSE 140 06/20/2024    CALCIUM 9.8 12/11/2024     12/03/2015    K 4.1 12/11/2024    CO2 28 12/11/2024     12/11/2024    BUN 20 12/11/2024    CREATININE 1.10 12/11/2024     Lab Results   Component Value Date    IRON 56 12/11/2024    TIBC 286 12/11/2024    FERRITIN 236 12/11/2024     Lab Results   Component Value Date    AST 16 12/11/2024    ALT 15 12/11/2024       Administrative Statements   I have spent a total time of 30 minutes in caring for this patient on the day of the visit/encounter including Risks and benefits of tx options, Instructions for management, Patient and family education, Importance of tx compliance, Risk factor reductions, Impressions, Counseling / Coordination of care, Documenting in the medical record, and Reviewing/placing orders in the medical record (including tests, medications, and/or procedures).

## 2025-05-06 NOTE — PATIENT INSTRUCTIONS
Patient Instructions:    1.  Continue use of PAP equipment nightly  2.  Continue to clean your equipment, as discussed  3.  Contact the Sleep Disorders Center with any questions or concerns prior to your next visit, as needed  4.  Schedule visit for follow-up in 6 months      Suggested PAP supply Replacement Frequency  Disposable filters....................................every 2 weeks  Replaceable nasal mask cushions.........every 2 weeks  Replaceable full face cushions...............every 1 month  Mask.......................................................every 3 months  Tubing.....................................................every 3 months  Head Gear..............................................every 6 months  Water chamber.......................................every 6 months         Thank you for trusting me with your care!    I know we often  cover a lot of information at the visit, so if you have follow-up questions, are unclear about the plan, or feel there were important items that we did not discuss or you did not receive clarity on, please don't hesitate to reach out to me.     MyChart messages are preferred for routine matters.  Please make sure to call for urgent matters as there can be a delay in responding to questions over [place].    IMPORTANT- Prior to a sleep study (at home or in the sleep lab), I strongly recommend contacting your medical insurance first to understand your benefits (including deductible if applicable), coverage for this test, and out of pocket costs.  Even if the test is approved by medical insurance, the cost to you is determined by your medical benefits.   If you have concerns about your out of pocket costs for sleep testing, please contact me/the office before you complete the test and we can discuss if there are alternate options.     I recommend following this advice in general before any lab test, imaging test, doctor visit, surgery, or ordering CPAP supplies as it is  best to understand your coverage to avoid unexpected bills after the fact.       Nursing Support:  When: Monday through Friday 7:30A-4:30PM except holidays  Where: Our direct line is [phone]  *3  *1.      If you are having a true emergency please call 911.  In the event that the line is busy or it is after hours please leave a voice message and we will return your call.  Please speak clearly, leaving your full name, birth date, best number to reach you and the reason for your call.   Medication refills: We will need the name of the medication, the dosage, the ordering provider, whether you get a 30 or 90 day refill, and the pharmacy name and address.  Medications will be ordered by the provider only.  Nurses cannot call in prescriptions.  Please allow 7 days for medication refills.  Physician requested updates: If your provider requested that you call with an update after starting medication, please be ready to provide us the medication and dosage, what time you take your medication, the time you attempt to fall asleep, time you fall asleep, when you wake up, and what time you get out of bed.  Sleep Study Results: We will contact you with sleep study results and/or next steps after the physician has reviewed your testing.

## 2025-05-06 NOTE — ASSESSMENT & PLAN NOTE
History of anemia with past result in normal range.  Repeat CBC, iron panel and vitamin B12 ordered, due to reports of fatigue.    Orders:    CBC and differential; Future    Iron Panel (Includes Ferritin, Iron Sat%, Iron, and TIBC); Future    Vitamin B12; Future

## 2025-05-06 NOTE — ASSESSMENT & PLAN NOTE
Wt Readings from Last 3 Encounters:   05/06/25 92.1 kg (203 lb)   04/01/25 90.6 kg (199 lb 11.2 oz)   03/25/25 88 kg (194 lb)     We discussed the association between heart failure and sleep apnea.  The importance of having good compliance with use of PAP therapy to prevent worsening of heart failure or associated complications, was discussed.  Patient was advised to contact the sleep disorders center to report any difficulty in using the equipment prior to the next follow-up visit.  The patient will continue their current medical regimen and follow-up with their PCP or cardiologist regarding the heart failure, as usual.

## 2025-05-06 NOTE — ASSESSMENT & PLAN NOTE
Patient has been working on increasing use of the CPAP equipment.  Since pressure setting was decreased and he has tried different masks, he has been able to increase use.  He is still not at goal for hours of use.  He feels the starting setting is comfortable.  He does not feel the pressure increases to feel overwhelming.  Some nights, he is able to wear the mask for 8-10 hours.  Other nights, he wakes up feeling claustrophobic.  He does not like wearing the full face mask.    He has low energy levels.  His wife reports that more recently, he seems more fatigued and is sleeping more.  She reports that she has needed to take over many of the responsibilities at home, due to his lack of energy.  We discussed that poor sleep/untreated sleep apnea can cause daytime sleepiness and decrease in energy.  He was encouraged to use the CPAP equipment any time he is planning to sleep, including when napping.        Current PAP Compliance Data:  Average usage:  He has been able to use the equipment 100% of all days recorded.  Average usage was 4 or more hours 50% of all days recorded.  Average hours used:  4 hours and 56 minutes  Average time in an air leak:  varies, some days minimal, some days large   Average pressure:  7.4 cm of water pressure  Residual AHI:  10.6/hour, including 1.2 OA, 0.5 CA, 7.4 hypopneas, 1.4 unknown    The patient feels they benefit from the use of PAP equipment and would like to continue PAP therapy.    Response to treatment has been good.    The patient is not at goal for hours of PAP use and not at goal for effectiveness of treatment.  A pressure change was ordered today to APAP 4-10cm.  A mask fitting was arranged today, with plan for a nasal cradle mask with a chin strap.  A prescription for supplies has been provided to last for the next year.  The patient was advised to continue to clean the equipment appropriately, as discussed and to change supplies regularly.    He will continue using this  equipment at the settings noted above for the next 6 months.  At that timehe will then return for a routine follow-up evaluation. I have asked the patient to contact the Sleep Disorders Center if he encounters any difficulties prior to that time.  Orders:    PAP DME Resupply/Reorder    Resmed DME Pressure Change

## 2025-05-06 NOTE — ASSESSMENT & PLAN NOTE
Low vitamin D level may be contributing to decrease in energy levels.  History of vitamin D deficiency.  Repeat vitamin D level.  Orders:    Vitamin D 25 hydroxy; Future

## 2025-05-07 ENCOUNTER — TELEPHONE (OUTPATIENT)
Dept: SLEEP CENTER | Facility: CLINIC | Age: 80
End: 2025-05-07

## 2025-05-07 PROBLEM — E53.8 VITAMIN B12 DEFICIENCY: Status: ACTIVE | Noted: 2025-05-07

## 2025-05-07 PROBLEM — R53.83 FATIGUE: Status: ACTIVE | Noted: 2025-05-07

## 2025-05-07 NOTE — ASSESSMENT & PLAN NOTE
He has low energy levels.  His wife reports that more recently, he seems more fatigued and is sleeping more.  She reports that she has needed to take over many of the responsibilities at home, due to his lack of energy.  We discussed that poor sleep/untreated sleep apnea can cause daytime sleepiness and decrease in energy.  He was encouraged to use the CPAP equipment any time he is planning to sleep, including when napping.    As noted, the change in energy level and fatigue seems recent to his wife.  Severe SHEA was likely longstanding.  Other causes for increase in fatigue need to be ruled out as well.  Past history of anemia noted, likely related to blood loss after surgery.  Lab testing ordered for further evaluation.  TSH in December was normal.  Vitamin D level ordered.

## 2025-05-08 ENCOUNTER — APPOINTMENT (OUTPATIENT)
Dept: LAB | Facility: CLINIC | Age: 80
End: 2025-05-08
Payer: COMMERCIAL

## 2025-05-08 DIAGNOSIS — I25.118 CORONARY ARTERY DISEASE OF NATIVE ARTERY OF NATIVE HEART WITH STABLE ANGINA PECTORIS (HCC): ICD-10-CM

## 2025-05-08 DIAGNOSIS — E55.9 VITAMIN D DEFICIENCY: ICD-10-CM

## 2025-05-08 DIAGNOSIS — E53.8 VITAMIN B12 DEFICIENCY: ICD-10-CM

## 2025-05-08 DIAGNOSIS — D64.9 ANEMIA, UNSPECIFIED TYPE: ICD-10-CM

## 2025-05-08 LAB
25(OH)D3 SERPL-MCNC: 29.1 NG/ML (ref 30–100)
ALBUMIN SERPL BCG-MCNC: 4.3 G/DL (ref 3.5–5)
ALP SERPL-CCNC: 51 U/L (ref 34–104)
ALT SERPL W P-5'-P-CCNC: 16 U/L (ref 7–52)
ANION GAP SERPL CALCULATED.3IONS-SCNC: 9 MMOL/L (ref 4–13)
AST SERPL W P-5'-P-CCNC: 18 U/L (ref 13–39)
BASOPHILS # BLD AUTO: 0.07 THOUSANDS/ÂΜL (ref 0–0.1)
BASOPHILS NFR BLD AUTO: 1 % (ref 0–1)
BILIRUB SERPL-MCNC: 0.73 MG/DL (ref 0.2–1)
BUN SERPL-MCNC: 21 MG/DL (ref 5–25)
CALCIUM SERPL-MCNC: 9.6 MG/DL (ref 8.4–10.2)
CHLORIDE SERPL-SCNC: 104 MMOL/L (ref 96–108)
CHOLEST SERPL-MCNC: 98 MG/DL (ref ?–200)
CO2 SERPL-SCNC: 26 MMOL/L (ref 21–32)
CREAT SERPL-MCNC: 1.27 MG/DL (ref 0.6–1.3)
DME PARACHUTE DELIVERY DATE REQUESTED: NORMAL
DME PARACHUTE DELIVERY DATE REQUESTED: NORMAL
DME PARACHUTE ITEM DESCRIPTION: NORMAL
DME PARACHUTE ORDER STATUS: NORMAL
DME PARACHUTE ORDER STATUS: NORMAL
DME PARACHUTE SUPPLIER NAME: NORMAL
DME PARACHUTE SUPPLIER NAME: NORMAL
DME PARACHUTE SUPPLIER PHONE: NORMAL
DME PARACHUTE SUPPLIER PHONE: NORMAL
EOSINOPHIL # BLD AUTO: 0.4 THOUSAND/ÂΜL (ref 0–0.61)
EOSINOPHIL NFR BLD AUTO: 6 % (ref 0–6)
ERYTHROCYTE [DISTWIDTH] IN BLOOD BY AUTOMATED COUNT: 11.9 % (ref 11.6–15.1)
FERRITIN SERPL-MCNC: 146 NG/ML (ref 30–336)
GFR SERPL CREATININE-BSD FRML MDRD: 53 ML/MIN/1.73SQ M
GLUCOSE P FAST SERPL-MCNC: 109 MG/DL (ref 65–99)
HCT VFR BLD AUTO: 36.2 % (ref 36.5–49.3)
HDLC SERPL-MCNC: 33 MG/DL
HGB BLD-MCNC: 11.6 G/DL (ref 12–17)
IMM GRANULOCYTES # BLD AUTO: 0.03 THOUSAND/UL (ref 0–0.2)
IMM GRANULOCYTES NFR BLD AUTO: 0 % (ref 0–2)
IRON SATN MFR SERPL: 25 % (ref 15–50)
IRON SERPL-MCNC: 84 UG/DL (ref 50–212)
LDLC SERPL CALC-MCNC: 38 MG/DL (ref 0–100)
LYMPHOCYTES # BLD AUTO: 1.34 THOUSANDS/ÂΜL (ref 0.6–4.47)
LYMPHOCYTES NFR BLD AUTO: 19 % (ref 14–44)
MCH RBC QN AUTO: 31.8 PG (ref 26.8–34.3)
MCHC RBC AUTO-ENTMCNC: 32 G/DL (ref 31.4–37.4)
MCV RBC AUTO: 99 FL (ref 82–98)
MONOCYTES # BLD AUTO: 0.74 THOUSAND/ÂΜL (ref 0.17–1.22)
MONOCYTES NFR BLD AUTO: 10 % (ref 4–12)
NEUTROPHILS # BLD AUTO: 4.57 THOUSANDS/ÂΜL (ref 1.85–7.62)
NEUTS SEG NFR BLD AUTO: 64 % (ref 43–75)
NONHDLC SERPL-MCNC: 65 MG/DL
NRBC BLD AUTO-RTO: 0 /100 WBCS
PLATELET # BLD AUTO: 193 THOUSANDS/UL (ref 149–390)
PMV BLD AUTO: 9.3 FL (ref 8.9–12.7)
POTASSIUM SERPL-SCNC: 4.3 MMOL/L (ref 3.5–5.3)
PROT SERPL-MCNC: 7.4 G/DL (ref 6.4–8.4)
RBC # BLD AUTO: 3.65 MILLION/UL (ref 3.88–5.62)
SODIUM SERPL-SCNC: 139 MMOL/L (ref 135–147)
TIBC SERPL-MCNC: 333.2 UG/DL (ref 250–450)
TRANSFERRIN SERPL-MCNC: 238 MG/DL (ref 203–362)
TRIGL SERPL-MCNC: 137 MG/DL (ref ?–150)
UIBC SERPL-MCNC: 249 UG/DL (ref 155–355)
VIT B12 SERPL-MCNC: 813 PG/ML (ref 180–914)
WBC # BLD AUTO: 7.15 THOUSAND/UL (ref 4.31–10.16)

## 2025-05-08 PROCEDURE — 83550 IRON BINDING TEST: CPT

## 2025-05-08 PROCEDURE — 85025 COMPLETE CBC W/AUTO DIFF WBC: CPT

## 2025-05-08 PROCEDURE — 82728 ASSAY OF FERRITIN: CPT

## 2025-05-08 PROCEDURE — 82306 VITAMIN D 25 HYDROXY: CPT

## 2025-05-08 PROCEDURE — 83540 ASSAY OF IRON: CPT

## 2025-05-08 PROCEDURE — 82607 VITAMIN B-12: CPT

## 2025-05-08 PROCEDURE — 36415 COLL VENOUS BLD VENIPUNCTURE: CPT

## 2025-05-08 PROCEDURE — 80061 LIPID PANEL: CPT

## 2025-05-09 ENCOUNTER — TELEPHONE (OUTPATIENT)
Age: 80
End: 2025-05-09

## 2025-05-09 ENCOUNTER — RESULTS FOLLOW-UP (OUTPATIENT)
Dept: SLEEP CENTER | Facility: CLINIC | Age: 80
End: 2025-05-09

## 2025-05-09 NOTE — TELEPHONE ENCOUNTER
Patient's wife called the med refill line to tell the office that the patient does not new the new C-pap mask, she said they have the same one at home

## 2025-05-09 NOTE — TELEPHONE ENCOUNTER
Rec'd call from wife stating  got blood work done and she will like someone to give her a call regarding his results. [phone]

## 2025-05-16 DIAGNOSIS — I25.118 CORONARY ARTERY DISEASE OF NATIVE ARTERY OF NATIVE HEART WITH STABLE ANGINA PECTORIS (HCC): ICD-10-CM

## 2025-05-16 RX ORDER — ROSUVASTATIN CALCIUM 40 MG/1
40 TABLET, COATED ORAL DAILY
Qty: 90 TABLET | Refills: 1 | Status: SHIPPED | OUTPATIENT
Start: 2025-05-16

## 2025-05-16 NOTE — TELEPHONE ENCOUNTER
Patient's wife is requesting Rosuvastatin 40mg  1 tab daily #90. Please review and advise. Patient uses [facility]  [place], [place] - [address] [phone]

## 2025-05-29 ENCOUNTER — RA CDI HCC (OUTPATIENT)
Dept: OTHER | Facility: HOSPITAL | Age: 80
End: 2025-05-29

## 2025-05-29 NOTE — PROGRESS NOTES
HCC coding opportunities     I13.0     Chart Reviewed number of suggestions sent to Provider: 1     Patients Insurance     Medicare Insurance: [employer]age

## 2025-06-03 ENCOUNTER — OFFICE VISIT (OUTPATIENT)
Dept: FAMILY MEDICINE CLINIC | Facility: CLINIC | Age: 80
End: 2025-06-03
Payer: COMMERCIAL

## 2025-06-03 VITALS
HEIGHT: 65 IN | SYSTOLIC BLOOD PRESSURE: 140 MMHG | BODY MASS INDEX: 34.07 KG/M2 | WEIGHT: 204.5 LBS | DIASTOLIC BLOOD PRESSURE: 90 MMHG | OXYGEN SATURATION: 97 % | HEART RATE: 74 BPM | TEMPERATURE: 98 F | RESPIRATION RATE: 18 BRPM

## 2025-06-03 DIAGNOSIS — F32.A FATIGUE DUE TO DEPRESSION: ICD-10-CM

## 2025-06-03 DIAGNOSIS — I25.118 CORONARY ARTERY DISEASE OF NATIVE ARTERY OF NATIVE HEART WITH STABLE ANGINA PECTORIS (HCC): ICD-10-CM

## 2025-06-03 DIAGNOSIS — I10 PRIMARY HYPERTENSION: ICD-10-CM

## 2025-06-03 DIAGNOSIS — G31.84 MILD COGNITIVE IMPAIRMENT: ICD-10-CM

## 2025-06-03 DIAGNOSIS — R53.83 FATIGUE DUE TO DEPRESSION: ICD-10-CM

## 2025-06-03 DIAGNOSIS — F41.8 DEPRESSION WITH ANXIETY: Primary | ICD-10-CM

## 2025-06-03 PROBLEM — Z99.11 DEPENDENCE ON RESPIRATOR (VENTILATOR) STATUS (HCC): Status: ACTIVE | Noted: 2025-06-03

## 2025-06-03 PROCEDURE — G2211 COMPLEX E/M VISIT ADD ON: HCPCS | Performed by: FAMILY MEDICINE

## 2025-06-03 PROCEDURE — 99214 OFFICE O/P EST MOD 30 MIN: CPT | Performed by: FAMILY MEDICINE

## 2025-06-03 RX ORDER — ESCITALOPRAM OXALATE 10 MG/1
10 TABLET ORAL DAILY
Qty: 30 TABLET | Refills: 5 | Status: SHIPPED | OUTPATIENT
Start: 2025-06-03 | End: 2025-06-05

## 2025-06-03 NOTE — ASSESSMENT & PLAN NOTE
Coronary artery disease.  Patient denies any chest pain at this time however his wife will speak with his cardiology office to see if cardiac rehabilitation would be an option for him to try and get him moving and develop more stamina since he is extremely sedentary at this time

## 2025-06-03 NOTE — ASSESSMENT & PLAN NOTE
Hypertension.  Patient will try to switch amlodipine 2.5 mg to the evening dose saying that his morning blood pressures tend to be more elevated.  His wife will continue blood pressure checks and report readings if they maintain high during the morning whereby we will titrate medication

## 2025-06-03 NOTE — ASSESSMENT & PLAN NOTE
Cognitive impairment.  I suspect symptoms are multifactorial.  Explained to his wife that it is unlikely these memory lapses will improve over time.

## 2025-06-03 NOTE — PROGRESS NOTES
FAMILY PRACTICE OFFICE VISIT       NAME: Shane Chau Sr.  AGE: 79 y.o. SEX: male       : 1945        MRN: [id number]    DATE: 6/3/2025  TIME: 1:37 PM    Assessment and Plan     Problem List Items Addressed This Visit       Coronary artery disease of native artery of native heart with stable angina pectoris (HCC)    Coronary artery disease.  Patient denies any chest pain at this time however his wife will speak with his cardiology office to see if cardiac rehabilitation would be an option for him to try and get him moving and develop more stamina since he is extremely sedentary at this time         Primary hypertension    Hypertension.  Patient will try to switch amlodipine 2.5 mg to the evening dose saying that his morning blood pressures tend to be more elevated.  His wife will continue blood pressure checks and report readings if they maintain high during the morning whereby we will titrate medication         Mild cognitive impairment    Cognitive impairment.  I suspect symptoms are multifactorial.  Explained to his wife that it is unlikely these memory lapses will improve over time.         Fatigue    Fatigue.  I suspect symptoms are multifactorial.  We discussed initiating Lexapro 10 mg once daily for possibility of depression.  She will call within 4 weeks if symptoms persist without improvement and we will titrate medication          Other Visit Diagnoses         Depression with anxiety    -  Primary    Relevant Medications    escitalopram (LEXAPRO) 10 mg tablet                Chief Complaint     Chief Complaint   Patient presents with    Depression       History of Present Illness     Patient in the office accompanied by his wife.  His wife relates that patient has significant anhedonia and will sleep 12 to 13 hours throughout the night.  He awakens and after doing minimal activities will want to sit or lay down on a couch and sleep again.  He has no interest in doing activities he had  participated in prior to his cardiac surgery last year.  He will not perform chores around the house.  He has no interest in getting together with other camping friends.  He did go on a camping outing for 1 week but did not want to participate in any activities.  He denies any chest pain or shortness of breath at rest.    Patient states he is has difficulties wearing his CPAP machine more than a couple hours due to discomfort throughout the night with using mask.    Patient's wife also report that morning blood pressures can spike as high as 170s over 90s.  Patient denies any symptoms during these episodes.  During the day his blood pressures may be stable in the 110s over 80s.    Depression  Associated symptoms include fatigue.       Review of Systems   Review of Systems   Constitutional:  Positive for fatigue.   Respiratory: Negative.     Cardiovascular: Negative.    Gastrointestinal: Negative.    Psychiatric/Behavioral:  Positive for decreased concentration, depression, dysphoric mood and sleep disturbance.        Active Problem List     Problem List[1]    Past Medical History:  Past Medical History[2]    Past Surgical History:  Past Surgical History[3]    Family History:  Family History[4]    Social History:  Social History     Socioeconomic History    Marital status: /Civil Union     Spouse name: Not on file    Number of children: Not on file    Years of education: Not on file    Highest education level: Not on file   Occupational History    Occupation: Retired    Tobacco Use    Smoking status: Former     Current packs/day: 0.00     Average packs/day: 0.5 packs/day for 30.0 years (15.0 ttl pk-yrs)     Types: Cigarettes     Start date: 3/10/1956     Quit date: 3/10/1986     Years since quittin.2    Smokeless tobacco: Never    Tobacco comments:     Quit  about 25 years [id number]   Vaping Use    Vaping status: Never Used   Substance and Sexual Activity    Alcohol use: Not Currently    Drug use: No     Sexual activity: Not Currently     Partners: Female   Other Topics Concern    Not on file   Social History Narrative    Not on file     Social Drivers of Health     Financial Resource Strain: Low Risk  (6/21/2023)    Overall Financial Resource Strain (CARDIA)     Difficulty of Paying Living Expenses: Not very hard   Food Insecurity: No Food Insecurity (9/6/2024)    Nursing - Inadequate Food Risk Classification     Worried About Running Out of Food in the Last Year: Never true     Ran Out of Food in the Last Year: Never true     Ran Out of Food in the Last Year: Not on file   Transportation Needs: No Transportation Needs (11/13/2024)    OASIS : Transportation     Lack of Transportation (Medical): No     Lack of Transportation (Non-Medical): No     Patient Unable or Declines to Respond: No   Physical Activity: Not on file   Stress: Not on file   Social Connections: Unknown (6/18/2024)    Received from [facility]    Social Connections     How often do you feel lonely or isolated from those around you? (Adult - for ages 18 years and over): Not on file   Intimate Partner Violence: Not on file   Housing Stability: Low Risk  (9/6/2024)    Housing Stability Vital Sign     Unable to Pay for Housing in the Last Year: No     Number of Times Moved in the Last Year: 0     Homeless in the Last Year: No       Objective     Vitals:    06/03/25 0935   BP: 140/90   Pulse: 74   Resp: 18   Temp: 98 °F (36.7 °C)   SpO2: 97%     Wt Readings from Last 3 Encounters:   06/03/25 92.8 kg (204 lb 8 oz)   05/06/25 92.1 kg (203 lb)   04/01/25 90.6 kg (199 lb 11.2 oz)       Physical Exam  Constitutional:       General: He is not in acute distress.     Appearance: Normal appearance. He is not ill-appearing.   HENT:      Head: Normocephalic and atraumatic.     Eyes:      General:         Right eye: No discharge.         Left eye: No discharge.      Extraocular Movements: Extraocular movements intact.      Conjunctiva/sclera: Conjunctivae  "normal.      Pupils: Pupils are equal, round, and reactive to light.     Neck:      Vascular: No carotid bruit.     Cardiovascular:      Rate and Rhythm: Normal rate and regular rhythm.      Heart sounds: Normal heart sounds. No murmur heard.  Pulmonary:      Effort: Pulmonary effort is normal.      Breath sounds: Normal breath sounds. No wheezing, rhonchi or rales.   Abdominal:      General: Bowel sounds are normal.     Musculoskeletal:      Right lower leg: No edema.      Left lower leg: No edema.   Lymphadenopathy:      Cervical: No cervical adenopathy.     Skin:     Findings: No rash.     Neurological:      General: No focal deficit present.      Mental Status: He is alert and oriented to person, place, and time.      Cranial Nerves: No cranial nerve deficit.     Psychiatric:      Comments: Patient in no acute distress but appeared somber and quiet during office visit.         Pertinent Laboratory/Diagnostic Studies:  Lab Results   Component Value Date    GLUCOSE 140 06/20/2024    BUN 21 05/08/2025    CREATININE 1.27 05/08/2025    CALCIUM 9.6 05/08/2025     12/03/2015    K 4.3 05/08/2025    CO2 26 05/08/2025     05/08/2025     Lab Results   Component Value Date    ALT 16 05/08/2025    AST 18 05/08/2025    ALKPHOS 51 05/08/2025    BILITOT 0.65 12/02/2015       Lab Results   Component Value Date    WBC 7.15 05/08/2025    HGB 11.6 (L) 05/08/2025    HCT 36.2 (L) 05/08/2025    MCV 99 (H) 05/08/2025     05/08/2025       No results found for: \"TSH\"    Lab Results   Component Value Date    CHOL 138 08/20/2015     Lab Results   Component Value Date    TRIG 137 05/08/2025     Lab Results   Component Value Date    HDL 33 (L) 05/08/2025     Lab Results   Component Value Date    LDLCALC 38 05/08/2025     Lab Results   Component Value Date    HGBA1C 5.6 03/24/2025       Results for orders placed or performed in visit on 05/08/25   Lipid panel   Result Value Ref Range    Cholesterol 98 See Comment mg/dL    " Triglycerides 137 See Comment mg/dL    HDL, Direct 33 (L) >=40 mg/dL    LDL Calculated 38 0 - 100 mg/dL    Non-HDL-Chol (CHOL-HDL) 65 mg/dl   CBC and differential   Result Value Ref Range    WBC 7.15 4.31 - 10.16 Thousand/uL    RBC 3.65 (L) 3.88 - 5.62 Million/uL    Hemoglobin 11.6 (L) 12.0 - 17.0 g/dL    Hematocrit 36.2 (L) 36.5 - 49.3 %    MCV 99 (H) 82 - 98 fL    MCH 31.8 26.8 - 34.3 pg    MCHC 32.0 31.4 - 37.4 g/dL    RDW 11.9 11.6 - 15.1 %    MPV 9.3 8.9 - 12.7 fL    Platelets 193 149 - 390 Thousands/uL    nRBC 0 /100 WBCs    Segmented % 64 43 - 75 %    Immature Grans % 0 0 - 2 %    Lymphocytes % 19 14 - 44 %    Monocytes % 10 4 - 12 %    Eosinophils Relative 6 0 - 6 %    Basophils Relative 1 0 - 1 %    Absolute Neutrophils 4.57 1.85 - 7.62 Thousands/µL    Absolute Immature Grans 0.03 0.00 - 0.20 Thousand/uL    Absolute Lymphocytes 1.34 0.60 - 4.47 Thousands/µL    Absolute Monocytes 0.74 0.17 - 1.22 Thousand/µL    Eosinophils Absolute 0.40 0.00 - 0.61 Thousand/µL    Basophils Absolute 0.07 0.00 - 0.10 Thousands/µL   Vitamin B12   Result Value Ref Range    Vitamin B-12 813 180 - 914 pg/mL   Vitamin D 25 hydroxy   Result Value Ref Range    Vit D, 25-Hydroxy 29.1 (L) 30.0 - 100.0 ng/mL   TIBC Panel (incl. Iron, TIBC, % Iron Saturation)   Result Value Ref Range    Iron Saturation 25 15 - 50 %    TIBC 333.2 250 - 450 ug/dL    Iron 84 50 - 212 ug/dL    Transferrin 238 203 - 362 mg/dL    UIBC 249 155 - 355 ug/dL   Ferritin   Result Value Ref Range    Ferritin 146 30 - 336 ng/mL     *Note: Due to a large number of results and/or encounters for the requested time period, some results have not been displayed. A complete set of results can be found in Results Review.       No orders of the defined types were placed in this encounter.      ALLERGIES:  Allergies[5]    Current Medications   Current Medications[6]      Health Maintenance     Health Maintenance   Topic Date Due    Zoster Vaccine (1 of 2) Never done     Pneumococcal Vaccine: 65+ Years (2 of 2 - PCV) 08/06/2019    COVID-19 Vaccine (4 - 2024-25 season) 09/01/2024    [facility] PLAN OF CARE  03/27/2025    Diabetic Foot Exam  07/02/2025 (Originally 9/29/1955)    Diabetic Eye Exam  03/24/2026 (Originally 9/29/1955)    Medicare Annual Wellness Visit (AWV)  09/04/2025    HEMOGLOBIN A1C  09/24/2025    Depression Screening  02/20/2026    Fall Risk  02/25/2026    Kidney Health Evaluation: Albumin/Creatinine Ratio  03/24/2026    Kidney Health Evaluation: GFR  05/08/2026    Hepatitis C Screening  Completed    RSV Vaccine for Pregnant Patients and Patients Age 60+ Years  Completed    Influenza Vaccine  Completed    Meningococcal B Vaccine  Aged Out    RSV Vaccine age 0-20 Months  Aged Out    HIB Vaccine  Aged Out    IPV Vaccine  Aged Out    Hepatitis A Vaccine  Aged Out    Meningococcal ACWY Vaccine  Aged Out    HPV Vaccine  Aged Out    Colorectal Cancer Screening  Discontinued     Immunization History   Administered Date(s) Administered    COVID-19 MODERNA VACC 0.5 ML IM 02/08/2021, 03/08/2021, 11/06/2021    INFLUENZA 10/15/2016, 11/03/2018, 11/03/2018, 10/24/2020, 10/16/2021, 10/15/2022, 10/07/2023    Influenza Split High Dose Preservative Free IM 11/24/2019, 10/19/2024    Influenza, high dose seasonal 0.7 mL 08/29/2020    Influenza, seasonal, injectable 09/28/2011, 10/06/2014, 11/05/2017    Pneumococcal Polysaccharide PPV23 08/06/2018    Respiratory Syncytial Virus Vaccine (Recombinant, Adjuvanted) 05/18/2024    Tdap 04/17/2017       Sherwin Mantilla MD  I spent 30 minutes with this patient of which greater than 50% was spent counseling or reviewing chart           [1]   Patient Active Problem List  Diagnosis    Closed compression fracture of first lumbar vertebra (HCC)    Abdominal aortic aneurysm without rupture (HCC)    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HCC)    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HCC)    Asymptomatic bilateral carotid artery stenosis    Coronary artery disease of native artery  of native heart with stable angina pectoris ([facility])    Hyperlipidemia    Primary hypertension    Transient cerebral ischemic attack    Hematuria    Erectile dysfunction    S/P CABG x 3    Localized primary osteoarthritis of lower leg, unspecified laterality    CKD (chronic kidney disease) stage 2, GFR 60-89 ml/min    Stroke-like symptoms    PAD (peripheral artery disease) ([facility])    Stenosis of left vertebral artery    Prediabetes    Mild cognitive impairment    Anemia    Severe protein-calorie malnutrition ([facility])    Anxiety    Gastroesophageal reflux disease without esophagitis    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 ([facility])    Obstructive sleep apnea treated with continuous positive airway pressure (CPAP)    Gastrostomy status ([facility])    Heart failure, unspecified HF chronicity, unspecified heart failure type ([facility])    Vitamin D deficiency    Difficulty using continuous positive airway pressure (CPAP) device    Vitamin B12 deficiency    Fatigue    Dependence on respirator (ventilator) status ([facility])   [2]   Past Medical History:  Diagnosis Date    AAA (abdominal aortic aneurysm) ([facility])     s/p open repair    BPH (benign prostatic hyperplasia)     CAD (coronary artery disease)     Cholelithiasis     Former tobacco use     History of MI (myocardial infarction)     History of TIA (transient ischemic attack)     Plavix in past, now on Brilinta    Hyperlipidemia     Hypertension     Insomnia     Obesity    [3]   Past Surgical History:  Procedure Laterality Date    ABDOMINAL AORTIC ANEURYSM REPAIR  06/07/2011    Dr. Yarbrough 6/7/11    APPENDECTOMY      CARDIAC CATHETERIZATION Left 06/03/2024    Procedure: Cardiac Left Heart Cath;  Surgeon: Pk Blanco DO;  [facility];  Service: Cardiology    CARDIAC CATHETERIZATION  06/03/2024    Procedure: Cardiac catheterization;  Surgeon: Pk Blanco DO;  [facility];  Service: Cardiology    CARDIAC CATHETERIZATION N/A  2024    Procedure: Cardiac Coronary Angiogram;  Surgeon: Pk Blanco DO;  [facility]: [facility];  Service: Cardiology    CATARACT EXTRACTION Right 2018    CHOLECYSTECTOMY LAPAROSCOPIC      COLONOSCOPY  2006    CORONARY ANGIOPLASTY      3 STENTS INSERTED    HARVEST VEIN Left 2024    Procedure: HARVEST VEIN ENDOSCOPIC (EVH);  Surgeon: KALPANA Amos MD;  Location: [facility];  Service: Cardiac Surgery    IR ASPIRATION ONLY  2024    TX CORONARY ARTERY BYP W/VEIN & ARTERY GRAFT 3 VEIN N/A 2024    Procedure: CORONARY ARTERY BYPASS GRAFT (CABG) X3 VESSELS, LIMA - LAD, LEFT LEG EVH/SVG - PDA AND OM1, W/ DEDRICK;  Surgeon: KALPANA Amos MD;  Location: [facility];  Service: Cardiac Surgery    TX LAPS SURG CHOLECYSTECTOMY W/CHOLANGIOGRAPHY N/A 2016    Procedure: CHOLECYSTECTOMY LAPAROSCOPIC with intra-op cholangiogram ;  Surgeon: Prasanth Verdin MD;  Location: [facility];  Service: General    TX STERNAL DEBRIDEMENT N/A 2024    Procedure: DEBRIDEMENT STERNAL WOUND (WASH OUT);  Surgeon: KALPANA Amos MD;  Location: [facility];  Service: Cardiac Surgery    TX STERNAL DEBRIDEMENT N/A 2024    Procedure: DEBRIDEMENT STERNAL WOUND (WASH OUT) VAC CHANGE;  Surgeon: KALPANA Amos MD;  Location: [facility];  Service: Cardiac Surgery   [4]   Family History  Problem Relation Name Age of Onset    Heart disease Mother decased     Hypertension Mother decased         Benign Essential     Stroke Mother decased         Stroke     Heart disease Father      Coronary artery disease Brother Adrian     Diabetes Brother Adrian         mellitus     Heart disease Brother Adrian     Aortic aneurysm Brother Adrian     Coronary artery disease Brother      Sudden death Brother      Coronary artery disease Brother      Sudden death Brother      Sudden death Brother      Coronary artery disease Brother      Heart disease Family     [5] No Known  Allergies  [6]   Current Outpatient Medications   Medication Sig Dispense Refill    acetaminophen (TYLENOL) 325 mg tablet Take 2 tablets (650 mg total) by mouth every 6 (six) hours as needed for mild pain, headaches or fever      amLODIPine (NORVASC) 2.5 mg tablet Take 1 tablet (2.5 mg total) by mouth daily 90 tablet 1    aspirin (ECOTRIN LOW STRENGTH) 81 mg EC tablet Take 1 tablet (81 mg total) by mouth daily      escitalopram (LEXAPRO) 10 mg tablet Take 1 tablet (10 mg total) by mouth daily 90 tablet 1    escitalopram (LEXAPRO) 10 mg tablet Take 1 tablet (10 mg total) by mouth daily 30 tablet 5    metoprolol tartrate (LOPRESSOR) 50 mg tablet TAKE ONE TABLET BY MOUTH TWICE DAILY 180 tablet 1    pantoprazole (PROTONIX) 40 mg tablet Take 1 tablet (40 mg total) by mouth daily in the early morning 30 tablet 5    rosuvastatin (CRESTOR) 40 MG tablet Take 1 tablet (40 mg total) by mouth daily 90 tablet 1    ticagrelor (BRILINTA) 90 MG Take 1 tablet (90 mg total) by mouth every 12 (twelve) hours 180 tablet 3    Cyanocobalamin (B-12) 1000 MCG SUBL Place 1 tablet (1,000 mcg total) under the tongue in the morning 90 tablet 0    melatonin 3 mg Take 2 tablets (6 mg total) by mouth daily at bedtime (Patient not taking: Reported on 5/6/2025)       No current facility-administered medications for this visit.

## 2025-06-04 ENCOUNTER — TELEPHONE (OUTPATIENT)
Age: 80
End: 2025-06-04

## 2025-06-04 NOTE — TELEPHONE ENCOUNTER
The patient's wife was notified yesterday that any medication for mood may take up to 4 weeks before she notices any improvement

## 2025-06-04 NOTE — TELEPHONE ENCOUNTER
Patient wife call to inform the doctor the medication he prescribe is not working for the patient for escitalopram (LEXAPRO) 10 mg tablet. Patient wife would like to know if the dose can be increase for the this medication.thank you

## 2025-06-05 DIAGNOSIS — F32.1 CURRENT MODERATE EPISODE OF MAJOR DEPRESSIVE DISORDER, UNSPECIFIED WHETHER RECURRENT (HCC): Primary | ICD-10-CM

## 2025-06-05 RX ORDER — BUPROPION HYDROCHLORIDE 150 MG/1
150 TABLET ORAL EVERY MORNING
Qty: 30 TABLET | Refills: 5 | Status: SHIPPED | OUTPATIENT
Start: 2025-06-05 | End: 2025-12-02

## 2025-06-05 NOTE — TELEPHONE ENCOUNTER
I was not aware that patient was still on medication since it was not on his medicine list.  We could consider an alternative medicine but any of these medicines may take up to 4 weeks to see any type of improvement.  I would also recommend he consider counseling to treat any suspected depression.  Please have her  a list of psychology offices from our office.

## 2025-06-05 NOTE — TELEPHONE ENCOUNTER
Spoke with pts wife, Maryjo. MD instructions given. She will stop for list of councilors. Placed at front for p/u.

## 2025-07-16 DIAGNOSIS — R13.11 ORAL PHASE DYSPHAGIA: ICD-10-CM

## 2025-07-17 RX ORDER — PANTOPRAZOLE SODIUM 40 MG/1
TABLET, DELAYED RELEASE ORAL
Qty: 30 TABLET | Refills: 5 | Status: SHIPPED | OUTPATIENT
Start: 2025-07-17

## 2025-08-07 ENCOUNTER — HOSPITAL ENCOUNTER (OUTPATIENT)
Dept: NON INVASIVE DIAGNOSTICS | Facility: CLINIC | Age: 80
Discharge: HOME | End: 2025-08-07
Payer: COMMERCIAL

## 2025-08-07 ENCOUNTER — RESULTS FOLLOW-UP (OUTPATIENT)
Dept: OTHER | Facility: HOSPITAL | Age: 80
End: 2025-08-07

## 2025-08-07 DIAGNOSIS — I72.4 ANEURYSM OF POPLITEAL ARTERY (HCC): ICD-10-CM

## 2025-08-07 DIAGNOSIS — I73.9 PAD (PERIPHERAL ARTERY DISEASE) (HCC): ICD-10-CM

## 2025-08-07 DIAGNOSIS — I65.23 ASYMPTOMATIC BILATERAL CAROTID ARTERY STENOSIS: ICD-10-CM

## 2025-08-07 DIAGNOSIS — I72.3 ANEURYSM OF ILIAC ARTERY (HCC): ICD-10-CM

## 2025-08-07 DIAGNOSIS — I71.40 ABDOMINAL AORTIC ANEURYSM (AAA) WITHOUT RUPTURE, UNSPECIFIED PART (HCC): ICD-10-CM

## 2025-08-07 PROCEDURE — 93922 UPR/L XTREMITY ART 2 LEVELS: CPT | Performed by: SURGERY

## 2025-08-07 PROCEDURE — 93978 VASCULAR STUDY: CPT | Performed by: SURGERY

## 2025-08-07 PROCEDURE — 93978 VASCULAR STUDY: CPT

## 2025-08-07 PROCEDURE — 93923 UPR/LXTR ART STDY 3+ LVLS: CPT

## 2025-08-07 PROCEDURE — 93925 LOWER EXTREMITY STUDY: CPT | Performed by: SURGERY

## 2025-08-07 PROCEDURE — 93925 LOWER EXTREMITY STUDY: CPT

## 2025-08-07 PROCEDURE — 93880 EXTRACRANIAL BILAT STUDY: CPT

## 2025-08-08 PROCEDURE — 93880 EXTRACRANIAL BILAT STUDY: CPT | Performed by: SURGERY

## (undated) DEVICE — AIRLIFE™ TRI-FLO™ SUCTION CATHETER: Brand: AIRLIFE™

## (undated) DEVICE — GLOVE SRG BIOGEL ECLIPSE 8

## (undated) DEVICE — PACK CABG PBDS

## (undated) DEVICE — INTENDED FOR TISSUE SEPARATION, AND OTHER PROCEDURES THAT REQUIRE A SHARP SURGICAL BLADE TO PUNCTURE OR CUT.: Brand: BARD-PARKER SAFETY BLADES SIZE 15, STERILE

## (undated) DEVICE — LIGHT HANDLE COVER SLEEVE DISP BLUE STELLAR

## (undated) DEVICE — EXOFIN PRECISION PEN HIGH VISCOSITY TOPICAL SKIN ADHESIVE: Brand: EXOFIN PRECISION PEN, 1G

## (undated) DEVICE — PACK CUSTOM PERFUSION PLEG PK

## (undated) DEVICE — GUIDEWIRE WHOLEY HI TORQUE INTERM MOD J .035 145CM

## (undated) DEVICE — EVERGRIP INSERT SET 86MM: Brand: FOGARTY EVERGRIP

## (undated) DEVICE — ACE WRAP 6 IN UNSTERILE

## (undated) DEVICE — SUT PDS PLUS 1 CTB 36 IN PDPB359T

## (undated) DEVICE — MEDI-VAC YANK SUCT HNDL W/TPRD BULBOUS TIP: Brand: CARDINAL HEALTH

## (undated) DEVICE — PLEDGET CARDIO PTFE 9.5 X 4.8 SOFT LF (6EA/PK)

## (undated) DEVICE — DRESSING ALLEVYN LIFE HEEL 25 X 25.2CM

## (undated) DEVICE — TUBING INSUFFLATION SET ISO CONNECTOR

## (undated) DEVICE — CULTURE TUBE AEROBIC

## (undated) DEVICE — SUT VICRYL PLUS 1 CTB-1 36 IN VCPB947H

## (undated) DEVICE — VAC DRESSING SENSATRAC RND

## (undated) DEVICE — RED RUBBER URETHRAL CATHETER: Brand: DOVER

## (undated) DEVICE — ELECTRODE BLADE E-Z CLEAN 4IN -0014A

## (undated) DEVICE — 3000CC GUARDIAN II: Brand: GUARDIAN

## (undated) DEVICE — CHLORAPREP HI-LITE 26ML ORANGE

## (undated) DEVICE — MASTISOL LIQ ADHESIVE 2/3ML

## (undated) DEVICE — RECIP.STERNUM SAW BLADE 34/7.5/0.7MM: Brand: AESCULAP

## (undated) DEVICE — SUT PROLENE 7-0 BV-1/BV-1 24 IN 8304H

## (undated) DEVICE — VAC DRESSING SENSATRAC SMALL

## (undated) DEVICE — SUT MONOCRYL PLUS 3-0 PS-2 27 IN MCP427H

## (undated) DEVICE — FILTER SMOKE EVAC VIROSAFE

## (undated) DEVICE — INTENDED FOR TISSUE SEPARATION, AND OTHER PROCEDURES THAT REQUIRE A SHARP SURGICAL BLADE TO PUNCTURE OR CUT.: Brand: BARD-PARKER ® CARBON RIB-BACK BLADES

## (undated) DEVICE — SUT MONOCRYL 4-0 PS-2 18 IN Y496G

## (undated) DEVICE — GLIDESHEATH BASIC HYDROPHILIC COATED INTRODUCER SHEATH: Brand: GLIDESHEATH

## (undated) DEVICE — VASOVIEW HEMOPRO 2: Brand: VASOVIEW HEMOPRO 2

## (undated) DEVICE — SUT SILK 2 60 IN SA8H

## (undated) DEVICE — AORTIC PUNCH 5.2 MM DISP

## (undated) DEVICE — TRAY FOLEY 16FR SURESTEP TEMP SENS URIMETER STAT LOK

## (undated) DEVICE — SYRINGE 50ML LL

## (undated) DEVICE — SUT PROLENE 5-0 C-1/C-1 36 IN 8321H

## (undated) DEVICE — STERNAL WIRE

## (undated) DEVICE — TR BAND RADIAL ARTERY COMPRESSION DEVICE: Brand: TR BAND

## (undated) DEVICE — PENCIL ELECTROSURG E-Z CLEAN -0035H

## (undated) DEVICE — GLOVE INDICATOR PI UNDERGLOVE SZ 8 BLUE

## (undated) DEVICE — SUT SILK 0 CT-1 30 IN 424H

## (undated) DEVICE — ANTIBACTERIAL UNDYED BRAIDED (POLYGLACTIN 910), SYNTHETIC ABSORBABLE SUTURE: Brand: COATED VICRYL

## (undated) DEVICE — SURGICEL NU-KNIT 3 X 4

## (undated) DEVICE — THERMOFLECT BLANKET, L, 25EA                               TS THERMOFLECT BLANKET, 48" X 84", SILVER, 5/BG, 5 BG/CS NW: Brand: THERMOFLECT

## (undated) DEVICE — Device: Brand: RETRACT-O-TAPE 18G X 30.5CM BLUNT NEEDLE

## (undated) DEVICE — BLADE BEAVER MINI SZ 69

## (undated) DEVICE — 40601 PROLONGED POSITIONING SYSTEM: Brand: 40601 PROLONGED POSITIONING SYSTEM

## (undated) DEVICE — RADIFOCUS OPTITORQUE ANGIOGRAPHIC CATHETER: Brand: OPTITORQUE

## (undated) DEVICE — SUT SILK 2-0 SH CR/8 18 IN C012D

## (undated) DEVICE — OASIS DRAIN, SINGLE, INLINE & ATS COMPATIBLE: Brand: OASIS

## (undated) DEVICE — ALCON OPHTHALMIC KNIFE 15 °: Brand: ALCON

## (undated) DEVICE — GAUZE SPONGES,16 PLY: Brand: CURITY

## (undated) DEVICE — PUMP TUBING FUSION PACK

## (undated) DEVICE — VAC CANISTER 500ML

## (undated) DEVICE — PACK CUSTOM PERFUSION ANH

## (undated) DEVICE — ADHESIVE SKIN HIGH VISCOSITY EXOFIN 1ML

## (undated) DEVICE — SUT PROLENE 7-0 BV175-8/BV175-8 24 IN EPM8747

## (undated) DEVICE — BLANKET HYPOTHERMIA ADULT GAYMAR

## (undated) DEVICE — BETHLEHEM MAJOR GENERAL PACK: Brand: CARDINAL HEALTH

## (undated) DEVICE — CULTURE TUBE ANAEROBIC

## (undated) DEVICE — SILVER-COATED ANTIBACTERIAL BARRIER DRESSING: Brand: ACTICOAT SURGIC 10X12CM 5PK US

## (undated) DEVICE — SUT PROLENE 4-0 BB 36 IN 8581H

## (undated) DEVICE — 32 FR RIGHT ANGLE – SOFT PVC CATHETER: Brand: PVC THORACIC CATHETERS

## (undated) DEVICE — BONE WAX WHITE: Brand: BONE WAX WHITE

## (undated) DEVICE — HEMOCLIP CARTRIDGE LRG

## (undated) DEVICE — SUT ETHIBOND 2-0 SH/SH 36 IN X523H

## (undated) DEVICE — SUT ETHIBOND 2-0 SH-1/SH-1 30 IN X763H

## (undated) DEVICE — PLUMEPEN PRO 10FT

## (undated) DEVICE — SILVER-COATED ANTIBACTERIAL BARRIER DRESSING: Brand: ACTICOAT SURGIC 10X25CM 5PK US

## (undated) DEVICE — SPECIMEN CONTAINER STERILE PEEL PACK

## (undated) DEVICE — 32 FR STRAIGHT – SOFT PVC CATHETER: Brand: PVC THORACIC CATHETERS

## (undated) DEVICE — SILVER-COATED ANTIBACTERIAL BARRIER DRESSING: Brand: ACTICOAT SURGIC 10X35CM 5PK US

## (undated) DEVICE — DGW .035 FC J3MM 260CM TEF: Brand: EMERALD

## (undated) DEVICE — 2000CC GUARDIAN II: Brand: GUARDIAN

## (undated) DEVICE — DRAPE SHEET THREE QUARTER